# Patient Record
Sex: MALE | Race: BLACK OR AFRICAN AMERICAN | NOT HISPANIC OR LATINO | Employment: OTHER | URBAN - METROPOLITAN AREA
[De-identification: names, ages, dates, MRNs, and addresses within clinical notes are randomized per-mention and may not be internally consistent; named-entity substitution may affect disease eponyms.]

---

## 2018-10-02 ENCOUNTER — HOSPITAL ENCOUNTER (OUTPATIENT)
Dept: CARDIOLOGY | Facility: HOSPITAL | Age: 58
Discharge: HOME | End: 2018-10-02
Attending: INTERNAL MEDICINE
Payer: COMMERCIAL

## 2018-10-02 ENCOUNTER — OFFICE VISIT (OUTPATIENT)
Dept: INTERNAL MEDICINE | Facility: CLINIC | Age: 58
End: 2018-10-02
Payer: COMMERCIAL

## 2018-10-02 ENCOUNTER — HOSPITAL ENCOUNTER (OUTPATIENT)
Dept: RADIOLOGY | Facility: HOSPITAL | Age: 58
Discharge: HOME | End: 2018-10-02
Attending: INTERNAL MEDICINE
Payer: COMMERCIAL

## 2018-10-02 VITALS — WEIGHT: 242 LBS

## 2018-10-02 DIAGNOSIS — I50.22 CHRONIC SYSTOLIC CONGESTIVE HEART FAILURE: ICD-10-CM

## 2018-10-02 DIAGNOSIS — E78.00 PURE HYPERCHOLESTEROLEMIA: ICD-10-CM

## 2018-10-02 DIAGNOSIS — I50.22 CHRONIC SYSTOLIC CONGESTIVE HEART FAILURE: Primary | ICD-10-CM

## 2018-10-02 DIAGNOSIS — Z11.59 NEED FOR HEPATITIS C SCREENING TEST: ICD-10-CM

## 2018-10-02 DIAGNOSIS — N40.1 BENIGN PROSTATIC HYPERPLASIA WITH NOCTURIA: ICD-10-CM

## 2018-10-02 DIAGNOSIS — I25.10 CORONARY ARTERY DISEASE INVOLVING NATIVE CORONARY ARTERY OF NATIVE HEART WITHOUT ANGINA PECTORIS: ICD-10-CM

## 2018-10-02 DIAGNOSIS — D50.9 IRON DEFICIENCY ANEMIA, UNSPECIFIED IRON DEFICIENCY ANEMIA TYPE: ICD-10-CM

## 2018-10-02 DIAGNOSIS — R35.1 BENIGN PROSTATIC HYPERPLASIA WITH NOCTURIA: ICD-10-CM

## 2018-10-02 DIAGNOSIS — E03.9 HYPOTHYROIDISM, UNSPECIFIED TYPE: ICD-10-CM

## 2018-10-02 DIAGNOSIS — I47.20 V-TACH: ICD-10-CM

## 2018-10-02 PROCEDURE — 99999 PR PBB SHADOW E&M-NEW PATIENT-LVL III: CPT | Mod: PBBFAC,,, | Performed by: INTERNAL MEDICINE

## 2018-10-02 PROCEDURE — 93306 TTE W/DOPPLER COMPLETE: CPT | Mod: 26,,, | Performed by: INTERNAL MEDICINE

## 2018-10-02 PROCEDURE — 90686 IIV4 VACC NO PRSV 0.5 ML IM: CPT | Mod: S$GLB,,, | Performed by: INTERNAL MEDICINE

## 2018-10-02 PROCEDURE — 99205 OFFICE O/P NEW HI 60 MIN: CPT | Mod: 25,S$GLB,, | Performed by: INTERNAL MEDICINE

## 2018-10-02 PROCEDURE — 71046 X-RAY EXAM CHEST 2 VIEWS: CPT | Mod: TC,FY,PO

## 2018-10-02 PROCEDURE — 90471 IMMUNIZATION ADMIN: CPT | Mod: S$GLB,,, | Performed by: INTERNAL MEDICINE

## 2018-10-02 PROCEDURE — 93306 TTE W/DOPPLER COMPLETE: CPT | Mod: PO

## 2018-10-02 RX ORDER — FUROSEMIDE 80 MG/1
80 TABLET ORAL DAILY
Qty: 90 TABLET | Refills: 3 | Status: SHIPPED | OUTPATIENT
Start: 2018-10-02 | End: 2018-12-13

## 2018-10-02 RX ORDER — NITROGLYCERIN 80 MG/1
1 PATCH TRANSDERMAL DAILY
COMMUNITY
End: 2018-10-16

## 2018-10-02 RX ORDER — CARVEDILOL 25 MG/1
25 TABLET ORAL 2 TIMES DAILY WITH MEALS
COMMUNITY
End: 2018-12-13 | Stop reason: SDUPTHER

## 2018-10-02 RX ORDER — TADALAFIL 5 MG/1
5 TABLET ORAL DAILY PRN
COMMUNITY
End: 2019-09-18 | Stop reason: SDUPTHER

## 2018-10-02 RX ORDER — NITROGLYCERIN 0.4 MG/1
0.4 TABLET SUBLINGUAL EVERY 5 MIN PRN
COMMUNITY
End: 2020-08-19 | Stop reason: SDUPTHER

## 2018-10-02 RX ORDER — PRAVASTATIN SODIUM 40 MG/1
40 TABLET ORAL DAILY
COMMUNITY
End: 2019-12-18 | Stop reason: SDUPTHER

## 2018-10-02 RX ORDER — SPIRONOLACTONE 25 MG/1
25 TABLET ORAL DAILY
Qty: 90 TABLET | Refills: 3 | Status: SHIPPED | OUTPATIENT
Start: 2018-10-02 | End: 2019-06-19 | Stop reason: SDUPTHER

## 2018-10-02 RX ORDER — ASPIRIN 325 MG
325 TABLET ORAL DAILY
COMMUNITY
End: 2018-10-02

## 2018-10-02 RX ORDER — LEVOTHYROXINE SODIUM 25 UG/1
25 TABLET ORAL DAILY
COMMUNITY
End: 2018-10-16

## 2018-10-02 RX ORDER — MEXILETINE HYDROCHLORIDE 150 MG/1
150 CAPSULE ORAL 2 TIMES DAILY WITH MEALS
COMMUNITY
End: 2018-10-16 | Stop reason: SDUPTHER

## 2018-10-02 RX ORDER — FUROSEMIDE 40 MG/1
40 TABLET ORAL 2 TIMES DAILY
COMMUNITY
End: 2018-10-02

## 2018-10-02 RX ORDER — AMIODARONE HYDROCHLORIDE 200 MG/1
TABLET ORAL DAILY
COMMUNITY
End: 2019-06-20 | Stop reason: SDUPTHER

## 2018-10-02 NOTE — PROGRESS NOTES
Subjective:       Patient ID: Ar Sharma is a 57 y.o. male.    Chief Complaint: Establish Care    HPI  Pt establishing care.  Pt with a hx of MI, CHF, Vtach, has a defibrillator which last fired in Feb.  Pt was started on Entresto in March, and has had an increase in SINGH since then, now unable to walk 200 feet, with 2 pillow orthopnea.  He c/o abd bloating, an increase of LE edema, with a 20 lb weght gain since then.  He was apparently dxed with a Fe deff anemia in Aug, but hasn't had the EGD and colonoscopy scheduled.  Colonoscopy 2012 WNL per his report.  On  mg qd. He denies melena/BRBPR.  C/O nocturia x 3. Obtaining records from PCP and cardiologist in [place].  Review of Systems   All other systems reviewed and are negative.      Objective:      Physical Exam   Constitutional: He appears well-developed. No distress.   obese   HENT:   Head: Normocephalic.   Eyes: EOM are normal. No scleral icterus.   Neck: Normal range of motion. No tracheal deviation present.   Cardiovascular: Normal rate, regular rhythm and intact distal pulses. Exam reveals gallop (S3).   Pulmonary/Chest: Effort normal and breath sounds normal. No respiratory distress.   Abdominal: Soft. Bowel sounds are normal. He exhibits distension. There is no tenderness.   Genitourinary: Rectum normal. Rectal exam shows guaiac negative stool.   Genitourinary Comments: Prostate 45g   Musculoskeletal: Normal range of motion. He exhibits edema (2+ LEs).   Neurological: He is alert.   Skin: Skin is warm and dry. No rash noted. He is not diaphoretic. No erythema.   Psychiatric: He has a normal mood and affect. His behavior is normal.   Vitals reviewed.      Assessment:       1. Chronic systolic congestive heart failure    2. V-tach    3. Hypothyroidism, unspecified type    4. Iron deficiency anemia, unspecified iron deficiency anemia type    5. Pure hypercholesterolemia    6. Benign prostatic hyperplasia with nocturia    7. Coronary artery disease  involving native coronary artery of native heart without angina pectoris    8. Need for hepatitis C screening test        Plan:       Ar was seen today for establish care.    Diagnoses and all orders for this visit:    Chronic systolic congestive heart failure  -     2D echo with color flow doppler; Future  -     X-Ray Chest PA And Lateral; Future  -     Influenza - Quadrivalent (3 years & older) (PF)    V-tach  -     EKG 12-lead; Future  -     Comprehensive metabolic panel; Future    Hypothyroidism, unspecified type  -     TSH; Future    Iron deficiency anemia, unspecified iron deficiency anemia type  -     CBC auto differential; Future  -     Iron and TIBC; Future   D/C  qd    Pure hypercholesterolemia  -     Lipid panel; Future    Benign prostatic hyperplasia with nocturia  -     Prostate Specific Antigen, Diagnostic; Future    Coronary artery disease involving native coronary artery of native heart without angina pectoris  -     EKG 12-lead; Future   ASA 81 mg qd    Need for hepatitis C screening test  -     Hepatitis C antibody; Future      Follow-up in about 2 weeks (around 10/16/2018).

## 2018-10-04 LAB
AORTIC VALVE REGURGITATION: ABNORMAL
DIASTOLIC DYSFUNCTION: YES
ESTIMATED PA SYSTOLIC PRESSURE: 64
MITRAL VALVE MOBILITY: NORMAL
MITRAL VALVE REGURGITATION: ABNORMAL
RETIRED EF AND QEF - SEE NOTES: 30 (ref 55–65)
TRICUSPID VALVE REGURGITATION: ABNORMAL

## 2018-10-16 ENCOUNTER — OFFICE VISIT (OUTPATIENT)
Dept: INTERNAL MEDICINE | Facility: CLINIC | Age: 58
End: 2018-10-16
Payer: COMMERCIAL

## 2018-10-16 VITALS
WEIGHT: 224.19 LBS | TEMPERATURE: 97 F | BODY MASS INDEX: 33.2 KG/M2 | SYSTOLIC BLOOD PRESSURE: 122 MMHG | HEIGHT: 69 IN | HEART RATE: 67 BPM | OXYGEN SATURATION: 95 % | DIASTOLIC BLOOD PRESSURE: 80 MMHG

## 2018-10-16 DIAGNOSIS — I47.20 V-TACH: ICD-10-CM

## 2018-10-16 DIAGNOSIS — D50.9 IRON DEFICIENCY ANEMIA, UNSPECIFIED IRON DEFICIENCY ANEMIA TYPE: ICD-10-CM

## 2018-10-16 DIAGNOSIS — I50.22 CHRONIC SYSTOLIC CONGESTIVE HEART FAILURE: Primary | ICD-10-CM

## 2018-10-16 DIAGNOSIS — E03.9 HYPOTHYROIDISM, UNSPECIFIED TYPE: ICD-10-CM

## 2018-10-16 DIAGNOSIS — E78.00 PURE HYPERCHOLESTEROLEMIA: ICD-10-CM

## 2018-10-16 DIAGNOSIS — I25.10 CORONARY ARTERY DISEASE INVOLVING NATIVE CORONARY ARTERY OF NATIVE HEART WITHOUT ANGINA PECTORIS: ICD-10-CM

## 2018-10-16 DIAGNOSIS — Z23 NEED FOR TDAP VACCINATION: ICD-10-CM

## 2018-10-16 PROCEDURE — 99214 OFFICE O/P EST MOD 30 MIN: CPT | Mod: 25,S$GLB,, | Performed by: INTERNAL MEDICINE

## 2018-10-16 PROCEDURE — 90715 TDAP VACCINE 7 YRS/> IM: CPT | Mod: S$GLB,,, | Performed by: INTERNAL MEDICINE

## 2018-10-16 PROCEDURE — 99999 PR PBB SHADOW E&M-EST. PATIENT-LVL IV: CPT | Mod: PBBFAC,,, | Performed by: INTERNAL MEDICINE

## 2018-10-16 PROCEDURE — 90471 IMMUNIZATION ADMIN: CPT | Mod: S$GLB,,, | Performed by: INTERNAL MEDICINE

## 2018-10-16 PROCEDURE — 3008F BODY MASS INDEX DOCD: CPT | Mod: CPTII,S$GLB,, | Performed by: INTERNAL MEDICINE

## 2018-10-16 RX ORDER — LEVOTHYROXINE SODIUM 75 UG/1
75 TABLET ORAL DAILY
Qty: 90 TABLET | Refills: 3 | Status: SHIPPED | OUTPATIENT
Start: 2018-10-16 | End: 2018-12-17

## 2018-10-16 RX ORDER — MEXILETINE HYDROCHLORIDE 150 MG/1
150 CAPSULE ORAL 2 TIMES DAILY WITH MEALS
Qty: 180 CAPSULE | Refills: 3 | Status: SHIPPED | OUTPATIENT
Start: 2018-10-16 | End: 2019-12-13 | Stop reason: SDUPTHER

## 2018-10-16 NOTE — PROGRESS NOTES
Subjective:       Patient ID: Ar Sharma is a 57 y.o. male.    Chief Complaint: 2 week f/u    HPI  Recheck.  Labs reviewed.  Much less SOB on increased dose of Lasix, able to walk 100 m. Now.  No orthopnea, PND.  No LE edema.  Weight down 18 lbs / 2 weeks.  No CP, palpitations.  Review of Systems   All other systems reviewed and are negative.      Objective:      Physical Exam   Constitutional: He appears well-developed. No distress.   obese   HENT:   Head: Normocephalic.   Eyes: EOM are normal. No scleral icterus.   Neck: Normal range of motion. No tracheal deviation present.   Cardiovascular: Normal rate, regular rhythm, normal heart sounds and intact distal pulses.   Pulmonary/Chest: Effort normal and breath sounds normal. No respiratory distress.   Abdominal: He exhibits no distension.   Musculoskeletal: Normal range of motion. He exhibits no edema.   Neurological: He is alert.   Skin: Skin is warm and dry. No rash noted. He is not diaphoretic. No erythema.   Psychiatric: He has a normal mood and affect. His behavior is normal.   Vitals reviewed.      Assessment:       1. Chronic systolic congestive heart failure    2. V-tach    3. Hypothyroidism, unspecified type    4. Iron deficiency anemia, unspecified iron deficiency anemia type    5. Pure hypercholesterolemia    6. Coronary artery disease involving native coronary artery of native heart without angina pectoris    7. Need for Tdap vaccination        Plan:       Ar was seen today for 2 week f/u.    Diagnoses and all orders for this visit:    Chronic systolic congestive heart failure  -     Renal function panel; Future  -     Ambulatory referral to Cardiology    V-tach  -     mexiletine (MEXITIL) 150 MG Cap; Take 1 capsule (150 mg total) by mouth 2 (two) times daily with meals.  -     Ambulatory referral to Cardiology    Hypothyroidism, unspecified type  -     TSH; Future  -     levothyroxine (SYNTHROID) 75 MCG tablet; Take 1 tablet (75 mcg total) by mouth once  daily.    Iron deficiency anemia, unspecified iron deficiency anemia type  -     CBC auto differential; Future  -     Ambulatory referral to Gastroenterology    Pure hypercholesterolemia   Well-cont    Coronary artery disease involving native coronary artery of native heart without angina pectoris   Quiescent    Need for Tdap vaccination  -     Tdap Vaccine      No Follow-up on file.

## 2018-10-18 ENCOUNTER — INITIAL CONSULT (OUTPATIENT)
Dept: GASTROENTEROLOGY | Facility: CLINIC | Age: 58
End: 2018-10-18
Payer: COMMERCIAL

## 2018-10-18 DIAGNOSIS — D50.9 IRON DEFICIENCY ANEMIA, UNSPECIFIED IRON DEFICIENCY ANEMIA TYPE: Primary | ICD-10-CM

## 2018-10-18 PROCEDURE — 99999 PR PBB SHADOW E&M-EST. PATIENT-LVL II: CPT | Mod: PBBFAC,,, | Performed by: INTERNAL MEDICINE

## 2018-10-18 PROCEDURE — 99204 OFFICE O/P NEW MOD 45 MIN: CPT | Mod: S$GLB,,, | Performed by: INTERNAL MEDICINE

## 2018-10-18 NOTE — LETTER
October 18, 2018      Navid Newman MD  [address]  [address]  [place] [address]           [place] [facility]  [address]  [place] [address]  Phone: [phone]  Fax: [phone]          Patient: Ar Sharma   MR Number: [id number]   YOB: 1960   Date of Visit: 10/18/2018       Dear Dr. Navid Nweman:    Thank you for referring Ar Sharma to me for evaluation. Attached you will find relevant portions of my assessment and plan of care.    If you have questions, please do not hesitate to call me. I look forward to following Ar Sharma along with you.    Sincerely,    Ananya Morillo MD    Enclosure  CC:  No Recipients    If you would like to receive this communication electronically, please contact [email or web address] or [phone] to request more information on [employer] Link access.    For providers and/or their staff who would like to refer a patient to [facility], please contact us through our one-stop-shop provider referral line, [facility], at [phone].    If you feel you have received this communication in error or would no longer like to receive these types of communications, please e-mail [email or web address]

## 2018-10-18 NOTE — PROGRESS NOTES
Subjective:       Patient ID: Ar Sharma is a 57 y.o. male.    Chief Complaint: Advice Only and Colonoscopy    This is a 57-year-old male here for evaluation of iron-deficiency anemia.  He has noted to have a mild to moderate anemia with a hematocrit in the low 30s associated with no overt GI bleeding.  His a microcytic anemia with MCV of 59.  He states his last colonoscopy was approximately 6-7 years ago and was unremarkable.  No overt GI bleeding has been noted.  No change in stool caliber or unintentional weight loss.  Denies nausea, vomiting, dysphagia.  No other exacerbating or relieving factors or other associated symptoms.    The following portions of the patient's history were reviewed and updated as appropriate: allergies, current medications, past family history, past medical history, past social history, past surgical history and problem list.    HPI  Review of Systems   Constitutional: Negative for chills and fever.   HENT: Negative for postnasal drip and trouble swallowing.    Eyes: Negative for pain and visual disturbance.   Respiratory: Positive for shortness of breath. Negative for cough and choking.    Cardiovascular: Negative for chest pain and leg swelling.   Gastrointestinal: Negative for abdominal distention, abdominal pain, anal bleeding, blood in stool, constipation, diarrhea, nausea, rectal pain and vomiting.   Endocrine: Negative for cold intolerance and heat intolerance.   Genitourinary: Negative for difficulty urinating and hematuria.   Neurological: Negative for dizziness and numbness.   Hematological: Negative for adenopathy. Does not bruise/bleed easily.   Psychiatric/Behavioral: Negative for agitation and confusion.       Objective:      Physical Exam   Constitutional: He is oriented to person, place, and time. He appears well-developed and well-nourished. No distress.   HENT:   Head: Normocephalic and atraumatic.   Eyes: Conjunctivae are normal. No scleral icterus.   Neck: No tracheal  deviation present. No thyromegaly present.   Cardiovascular: Normal rate and regular rhythm. Exam reveals no gallop and no friction rub.   Murmur heard.  Pulmonary/Chest: Effort normal and breath sounds normal. He has no wheezes. He has no rales.   Abdominal: Soft. Bowel sounds are normal. He exhibits no distension. There is no tenderness. There is no rebound and no guarding.   Musculoskeletal: Normal range of motion. He exhibits edema. He exhibits no tenderness.   Neurological: He is alert and oriented to person, place, and time.   Skin: He is not diaphoretic.   Psychiatric: He has a normal mood and affect. His behavior is normal.   Nursing note and vitals reviewed.      Labs; as above  Assessment:       1. Iron deficiency anemia, unspecified iron deficiency anemia type        Plan:   1. Has cardiology f/u next week, messaged Dr. Stewart regarding candidacy for endoscopic evaluation  2. Prescribed iron therapy for now

## 2018-10-25 ENCOUNTER — OFFICE VISIT (OUTPATIENT)
Dept: CARDIOLOGY | Facility: CLINIC | Age: 58
End: 2018-10-25
Payer: COMMERCIAL

## 2018-10-25 VITALS
BODY MASS INDEX: 32.73 KG/M2 | DIASTOLIC BLOOD PRESSURE: 78 MMHG | HEART RATE: 85 BPM | WEIGHT: 221 LBS | SYSTOLIC BLOOD PRESSURE: 122 MMHG | HEIGHT: 69 IN

## 2018-10-25 DIAGNOSIS — E78.00 PURE HYPERCHOLESTEROLEMIA: ICD-10-CM

## 2018-10-25 DIAGNOSIS — Z98.890 S/P ABLATION OF VENTRICULAR ARRHYTHMIA: ICD-10-CM

## 2018-10-25 DIAGNOSIS — I50.23 ACUTE ON CHRONIC SYSTOLIC HEART FAILURE: Primary | ICD-10-CM

## 2018-10-25 DIAGNOSIS — D50.9 IRON DEFICIENCY ANEMIA, UNSPECIFIED IRON DEFICIENCY ANEMIA TYPE: ICD-10-CM

## 2018-10-25 DIAGNOSIS — I25.10 CORONARY ARTERY DISEASE INVOLVING NATIVE CORONARY ARTERY OF NATIVE HEART WITHOUT ANGINA PECTORIS: ICD-10-CM

## 2018-10-25 DIAGNOSIS — Z95.810 CARDIAC RESYNCHRONIZATION THERAPY DEFIBRILLATOR (CRT-D) IN PLACE: ICD-10-CM

## 2018-10-25 DIAGNOSIS — Z86.79 S/P ABLATION OF VENTRICULAR ARRHYTHMIA: ICD-10-CM

## 2018-10-25 DIAGNOSIS — I47.20 V-TACH: ICD-10-CM

## 2018-10-25 DIAGNOSIS — E03.9 HYPOTHYROIDISM, UNSPECIFIED TYPE: ICD-10-CM

## 2018-10-25 PROCEDURE — 99999 PR PBB SHADOW E&M-EST. PATIENT-LVL III: CPT | Mod: PBBFAC,,, | Performed by: INTERNAL MEDICINE

## 2018-10-25 PROCEDURE — 3008F BODY MASS INDEX DOCD: CPT | Mod: CPTII,S$GLB,, | Performed by: INTERNAL MEDICINE

## 2018-10-25 PROCEDURE — 99205 OFFICE O/P NEW HI 60 MIN: CPT | Mod: S$GLB,,, | Performed by: INTERNAL MEDICINE

## 2018-10-25 PROCEDURE — 93000 ELECTROCARDIOGRAM COMPLETE: CPT | Mod: S$GLB,,, | Performed by: INTERNAL MEDICINE

## 2018-10-25 NOTE — PATIENT INSTRUCTIONS
Pacemaker check   Schedule to see one of our heart failure specialists  Follow up with me in one month  Weigh yourself daily  Low salt diet

## 2018-10-25 NOTE — PROGRESS NOTES
Subjective:    Patient ID:  Ar Sharma is a 57 y.o. male who presents for evaluation of Congestive Heart Failure and Establish Care      HPI  58 y/o male recently moved from [facility], here to establish care. He has a hx of CAD (states he had MI in his 20's), HFrEF with EF 30-35% per recent 2DE with SLAVA 5.6 cm (chronic per record available in media section), s/p CRT-D (initial ICD 2012 with upgrade 2016 - Medtronic), DD, VT s/p ablation x 2 (2/2018 and again 3/2018), HLD, hypothyroidism, YOEL. Initially seen by Dr Newman and it sounds like he was in ADHF, lasix dose increased, and symptoms have significantly improved. Was very SOB with minimal activity, with bilateral LE edema/orthopnea/PND. Since increase in lasix dose SOB back at baseline and orthopnea/PND resolved, with improvement in edema. Currently with NYHA FC II symptoms. Denies CP, palps, syncope. Compliant with meds. Entresto started after last VT ablation.     Review of Systems   Constitution: Negative for weakness and malaise/fatigue.   HENT: Negative for congestion.    Eyes: Negative for blurred vision.   Cardiovascular: Positive for dyspnea on exertion and leg swelling. Negative for chest pain, claudication, cyanosis, irregular heartbeat, near-syncope, orthopnea, palpitations, paroxysmal nocturnal dyspnea and syncope.   Respiratory: Negative for shortness of breath.    Endocrine: Negative for polyuria.   Hematologic/Lymphatic: Negative for bleeding problem.   Skin: Negative for itching and rash.   Musculoskeletal: Negative for joint swelling, muscle cramps and muscle weakness.   Gastrointestinal: Negative for abdominal pain, hematemesis, hematochezia, melena, nausea and vomiting.   Genitourinary: Negative for dysuria and hematuria.   Neurological: Negative for dizziness, focal weakness, headaches, light-headedness and loss of balance.   Psychiatric/Behavioral: Negative for depression. The patient is not nervous/anxious.         Objective:    Physical  Exam   Constitutional: He is oriented to person, place, and time. He appears well-developed and well-nourished.   HENT:   Head: Normocephalic and atraumatic.   Neck: Neck supple. No JVD present.   Cardiovascular: Normal rate, regular rhythm and normal heart sounds.   Pulses:       Carotid pulses are 2+ on the right side, and 2+ on the left side.       Radial pulses are 2+ on the right side, and 2+ on the left side.        Femoral pulses are 2+ on the right side, and 2+ on the left side.       Dorsalis pedis pulses are 2+ on the right side, and 2+ on the left side.        Posterior tibial pulses are 2+ on the right side, and 2+ on the left side.   Pulmonary/Chest: Effort normal and breath sounds normal.   Abdominal: Soft. Bowel sounds are normal.   Musculoskeletal: He exhibits edema.   Neurological: He is alert and oriented to person, place, and time.   Skin: Skin is warm and dry.   Psychiatric: He has a normal mood and affect. His behavior is normal. Thought content normal.         Assessment:       1. Acute on chronic systolic heart failure    2. Coronary artery disease involving native coronary artery of native heart without angina pectoris    3. Pure hypercholesterolemia    4. V-tach    5. Cardiac resynchronization therapy defibrillator (CRT-D) in place    6. Iron deficiency anemia, unspecified iron deficiency anemia type    7. Hypothyroidism, unspecified type    8. S/P ablation of ventricular arrhythmia      56 y/o pt with hx and presentation as above. Doing well from a cardiac perspective and now compensated from a HF perspective. We discussed the etiology, evaluation, and management of CHF. It appears his decompensation was from dietary indiscretion (not following strict low salt diet). Had not been on increased dose of lasix for some time bc prior to VT ablation increased doses would correlate with VT/ICD shocks. We also discussed daily weights and indications to increase lasix dose at home when necessary.  Will refer to HTS clinic at [place]. Will schedule for ICD interrogation and to enroll in clinic.         Plan:       -ICD interrogation  -Referral to [facility] clinic  -F/u with me in 1 month

## 2018-10-25 NOTE — LETTER
October 25, 2018      Navid Newman MD  [address]  [address]  [place] [address]           [place] [facility]  [address]  [place] [address]  Phone: [phone]  Fax: [phone]          Patient: Ar Sharma   MR Number: [id number]   YOB: 1960   Date of Visit: 10/25/2018       Dear Dr. Navid Newman:    Thank you for referring Ar Sharma to me for evaluation. Attached you will find relevant portions of my assessment and plan of care.    If you have questions, please do not hesitate to call me. I look forward to following Ar Sharma along with you.    Sincerely,    Shahram Stewart MD    Enclosure  CC:  No Recipients    If you would like to receive this communication electronically, please contact [email or web address] or [phone] to request more information on [employer] Link access.    For providers and/or their staff who would like to refer a patient to [facility], please contact us through our one-stop-shop provider referral line, [facility], at [phone].    If you feel you have received this communication in error or would no longer like to receive these types of communications, please e-mail [email or web address]

## 2018-10-26 ENCOUNTER — TELEPHONE (OUTPATIENT)
Dept: GASTROENTEROLOGY | Facility: CLINIC | Age: 58
End: 2018-10-26

## 2018-10-26 NOTE — TELEPHONE ENCOUNTER
----- Message from Jodie Sotelo sent at 10/26/2018 12:49 PM CDT -----  Contact: Self [phone]  Patient would like to speak with you about the pharmacy still not having ferrous sulfate, dried (SLOW FE) 160 mg (50 mg iron) TbSR . Please advise

## 2018-10-29 ENCOUNTER — TELEPHONE (OUTPATIENT)
Dept: CARDIOLOGY | Facility: CLINIC | Age: 58
End: 2018-10-29

## 2018-10-29 NOTE — TELEPHONE ENCOUNTER
----- Message from Ivy Huerta sent at 10/26/2018  1:47 PM CDT -----  Regarding: EKG ORDER  Please place and link EKG order to the EKG or Doctor appointment scheduled on 10/25/18 thanks. Ivy [phone]

## 2018-10-30 ENCOUNTER — TELEPHONE (OUTPATIENT)
Dept: GASTROENTEROLOGY | Facility: CLINIC | Age: 58
End: 2018-10-30

## 2018-10-31 ENCOUNTER — TELEPHONE (OUTPATIENT)
Dept: GASTROENTEROLOGY | Facility: CLINIC | Age: 58
End: 2018-10-31

## 2018-10-31 NOTE — TELEPHONE ENCOUNTER
----- Message from Sunday Sepulveda sent at 10/30/2018  4:26 PM CDT -----  Contact: nunu/hannah [phone]  Pharmacist advised they need to verify the dosage for the ferrous sulfate, dried (SLOW FE) 160 mg (50 mg iron) TbSR. Please call.

## 2018-11-02 ENCOUNTER — OFFICE VISIT (OUTPATIENT)
Dept: TRANSPLANT | Facility: CLINIC | Age: 58
End: 2018-11-02
Payer: COMMERCIAL

## 2018-11-02 ENCOUNTER — LAB VISIT (OUTPATIENT)
Dept: LAB | Facility: HOSPITAL | Age: 58
End: 2018-11-02
Attending: INTERNAL MEDICINE
Payer: COMMERCIAL

## 2018-11-02 VITALS
HEART RATE: 78 BPM | HEIGHT: 69 IN | BODY MASS INDEX: 32.57 KG/M2 | DIASTOLIC BLOOD PRESSURE: 77 MMHG | WEIGHT: 219.88 LBS | SYSTOLIC BLOOD PRESSURE: 113 MMHG

## 2018-11-02 DIAGNOSIS — I50.22 CHRONIC SYSTOLIC CHF, NYHA CLASS 2 AND ACA/AHA STAGE C: ICD-10-CM

## 2018-11-02 DIAGNOSIS — I50.22 CHRONIC SYSTOLIC CONGESTIVE HEART FAILURE: ICD-10-CM

## 2018-11-02 DIAGNOSIS — I50.22 CHRONIC SYSTOLIC CONGESTIVE HEART FAILURE: Primary | ICD-10-CM

## 2018-11-02 DIAGNOSIS — D50.9 IRON DEFICIENCY ANEMIA, UNSPECIFIED IRON DEFICIENCY ANEMIA TYPE: ICD-10-CM

## 2018-11-02 LAB
ANION GAP SERPL CALC-SCNC: 11 MMOL/L
BNP SERPL-MCNC: 724 PG/ML
BUN SERPL-MCNC: 36 MG/DL
CALCIUM SERPL-MCNC: 9.6 MG/DL
CHLORIDE SERPL-SCNC: 101 MMOL/L
CO2 SERPL-SCNC: 25 MMOL/L
CREAT SERPL-MCNC: 1.9 MG/DL
EST. GFR  (AFRICAN AMERICAN): 44 ML/MIN/1.73 M^2
EST. GFR  (NON AFRICAN AMERICAN): 38 ML/MIN/1.73 M^2
GLUCOSE SERPL-MCNC: 105 MG/DL
POTASSIUM SERPL-SCNC: 4.8 MMOL/L
SODIUM SERPL-SCNC: 137 MMOL/L

## 2018-11-02 PROCEDURE — 3008F BODY MASS INDEX DOCD: CPT | Mod: CPTII,S$GLB,, | Performed by: INTERNAL MEDICINE

## 2018-11-02 PROCEDURE — 36415 COLL VENOUS BLD VENIPUNCTURE: CPT

## 2018-11-02 PROCEDURE — 99204 OFFICE O/P NEW MOD 45 MIN: CPT | Mod: S$GLB,,, | Performed by: INTERNAL MEDICINE

## 2018-11-02 PROCEDURE — 99999 PR PBB SHADOW E&M-EST. PATIENT-LVL III: CPT | Mod: PBBFAC,,, | Performed by: INTERNAL MEDICINE

## 2018-11-02 PROCEDURE — 83880 ASSAY OF NATRIURETIC PEPTIDE: CPT

## 2018-11-02 PROCEDURE — 80048 BASIC METABOLIC PNL TOTAL CA: CPT

## 2018-11-02 NOTE — PATIENT INSTRUCTIONS
INCREASE entresto to 49 / 51 this month ( Will start with two tablets of 24 /26 at night and one of the 24/26 in the day til Monday 11/5)  labs today and in 10 days  NEXt visit will try to increase entresto to full dose (97 / 103)

## 2018-11-03 ENCOUNTER — TELEPHONE (OUTPATIENT)
Dept: GASTROENTEROLOGY | Facility: HOSPITAL | Age: 58
End: 2018-11-03

## 2018-11-03 PROBLEM — I50.22 CHRONIC SYSTOLIC CHF, NYHA CLASS 2 AND ACA/AHA STAGE C: Status: ACTIVE | Noted: 2018-10-25

## 2018-11-03 PROBLEM — I50.22 CHRONIC SYSTOLIC CONGESTIVE HEART FAILURE: Status: RESOLVED | Noted: 2018-10-16 | Resolved: 2018-11-03

## 2018-11-03 NOTE — PROGRESS NOTES
"Subjective: class 2     Patient ID:  Ar Sharma is a 57 y.o. male who presents for evaluation of Consult (CHF)      HPI     hx of ICM ( LVEF 30-35%  2DE with LVEDD 5.6 cm OCt 2018)   s/p CRT-D (initial ICD 2012 with upgrade 2016 - Medtronic), VT s/p ablation x 2 (2/2018 and again 3/2018) who reports a progression of his CHF symptom over last 6 months  In early Oct 2018, had his lasix increased to 80 mg daily.  Due to his iron deficiency anemia, his ASA was decrease to 81 with plans to do GI workup  Since that time, he has lost 20 lbs with improvement in his SINGH      Today he can walk two blocks  His previously reported atypical right sided chest pain has decreased in frequency  Chest pain has not occurred in over a week.   No edema today    Review of Systems   Constitution: Negative for decreased appetite, weight gain and weight loss.   Cardiovascular: Negative for chest pain, dyspnea on exertion, leg swelling, near-syncope, orthopnea and palpitations.   Respiratory: Negative for cough and shortness of breath.    Musculoskeletal: Negative for myalgias.   Gastrointestinal: Negative for jaundice.        Objective:    Physical Exam   Constitutional: He is oriented to person, place, and time. He appears well-developed and well-nourished. He is active. He is not intubated.   /77 (BP Location: Left arm, Patient Position: Sitting, BP Method: Large (Automatic))   Pulse 78   Ht 5' 9" (1.753 m)   Wt 99.7 kg (219 lb 14.4 oz)   BMI 32.47 kg/m²      HENT:   Head: Normocephalic and atraumatic. Hair is normal.   Right Ear: External ear normal.   Left Ear: External ear normal.   Nose: Nose normal. No nasal deformity. No epistaxis.  No foreign bodies.   Mouth/Throat: Mucous membranes are normal. Mucous membranes are not cyanotic. No oropharyngeal exudate.   Eyes: Conjunctivae and EOM are normal. Pupils are equal, round, and reactive to light.   Neck: Neck supple. JVD (JVP 7 to 8 ) present. No hepatojugular reflux present. "   Cardiovascular: Normal rate, regular rhythm, normal heart sounds and normal pulses. Exam reveals no gallop.   Pulmonary/Chest: Effort normal and breath sounds normal. No apnea and no tachypnea. He is not intubated. No respiratory distress. He exhibits no tenderness.   Abdominal: Soft. Normal appearance and bowel sounds are normal. There is no tenderness. No hernia.   Musculoskeletal: Normal range of motion.   Neurological: He is alert and oriented to person, place, and time. He displays no seizure activity.   Skin: Skin is warm, dry and intact. No rash noted. No pallor.   Psychiatric: He has a normal mood and affect. His speech is normal and behavior is normal. Thought content normal. Cognition and memory are normal.     Lab Results   Component Value Date     (H) 11/02/2018     11/02/2018    K 4.8 11/02/2018     11/02/2018    CO2 25 11/02/2018    BUN 36 (H) 11/02/2018    CREATININE 1.9 (H) 11/02/2018     11/02/2018    AST 31 10/02/2018    ALT 20 10/02/2018    ALBUMIN 3.9 10/02/2018    PROT 7.3 10/02/2018    BILITOT 1.7 (H) 10/02/2018    CHOL 107 (L) 10/02/2018    HDL 35 (L) 10/02/2018    LDLCALC 60.2 (L) 10/02/2018    TRIG 59 10/02/2018       BNP   Date Value Ref Range Status   11/02/2018 724 (H) 0 - 99 pg/mL Final     Comment:     Values of less than 100 pg/ml are consistent with non-CHF populations.             Assessment:       1. Chronic systolic congestive heart failure    2. Iron deficiency anemia, unspecified iron deficiency anemia type    3. Chronic systolic CHF, NYHA class 2 and MARIFER/AHA stage C         Plan:       Iron deficiency anemia  CHF stable - Ok to proceed with much need colonoscopy    Chronic systolic CHF, NYHA class 2 and MARIFER/AHA stage C  Will increase enstresto to 49 /51 BID (will try to go to 24/26 in am and 49/51 in evening for a few days first)  Will check labs in ten days after change If creatinine > 2.2 or pt shows signs of symptomatic hypotension will decrease  lasix to 40 qd (I believe this will be his eventual chronic dose)  Wonder if cardiomems will be a good option for him (want to wait til he has his GI workup complete)  Will ask Dr Stewart to increase entersto to 97 /103 next visit

## 2018-11-03 NOTE — ASSESSMENT & PLAN NOTE
Will increase enstresto to 49 /51 BID (will try to go to 24/26 in am and 49/51 in evening for a few days first)  Will check labs in ten days after change If creatinine > 2.3 or pt shows signs of symptomatic hypotension will decrease lasix to 40 qd (I believe this will be his eventual chronic dose)  Wonder if cardiomems will be a good option for him (want to wait til he has his GI workup complete)  Will ask Dr Stewart to increase entersto to 97 /103 next visit

## 2018-11-03 NOTE — TELEPHONE ENCOUNTER
----- Message from Inna Shah MD sent at 11/2/2018 11:41 AM CDT -----  Saw him today   CHF stable   Ok to proceed with much need colonoscopy (will put it in my note today)

## 2018-11-05 ENCOUNTER — PATIENT MESSAGE (OUTPATIENT)
Dept: GASTROENTEROLOGY | Facility: CLINIC | Age: 58
End: 2018-11-05

## 2018-11-05 DIAGNOSIS — Z12.11 COLON CANCER SCREENING: Primary | ICD-10-CM

## 2018-11-07 ENCOUNTER — CLINICAL SUPPORT (OUTPATIENT)
Dept: CARDIOLOGY | Facility: CLINIC | Age: 58
End: 2018-11-07
Payer: COMMERCIAL

## 2018-11-07 DIAGNOSIS — Z95.810 CARDIAC RESYNCHRONIZATION THERAPY DEFIBRILLATOR (CRT-D) IN PLACE: Primary | ICD-10-CM

## 2018-11-07 PROCEDURE — 93289 INTERROG DEVICE EVAL HEART: CPT | Mod: 26,,, | Performed by: INTERNAL MEDICINE

## 2018-11-07 PROCEDURE — 99999 PR PBB SHADOW E&M-EST. PATIENT-LVL I: CPT | Mod: PBBFAC,,,

## 2018-11-12 ENCOUNTER — TELEPHONE (OUTPATIENT)
Dept: TRANSPLANT | Facility: CLINIC | Age: 58
End: 2018-11-12

## 2018-11-12 ENCOUNTER — LAB VISIT (OUTPATIENT)
Dept: LAB | Facility: HOSPITAL | Age: 58
End: 2018-11-12
Attending: INTERNAL MEDICINE
Payer: COMMERCIAL

## 2018-11-12 DIAGNOSIS — I50.22 CHRONIC SYSTOLIC CONGESTIVE HEART FAILURE: ICD-10-CM

## 2018-11-12 LAB
ANION GAP SERPL CALC-SCNC: 9 MMOL/L
BUN SERPL-MCNC: 35 MG/DL
CALCIUM SERPL-MCNC: 9.2 MG/DL
CHLORIDE SERPL-SCNC: 105 MMOL/L
CO2 SERPL-SCNC: 26 MMOL/L
CREAT SERPL-MCNC: 1.55 MG/DL
EST. GFR  (AFRICAN AMERICAN): 56.2 ML/MIN/1.73 M^2
EST. GFR  (NON AFRICAN AMERICAN): 48.6 ML/MIN/1.73 M^2
GLUCOSE SERPL-MCNC: 105 MG/DL
POTASSIUM SERPL-SCNC: 4.3 MMOL/L
SODIUM SERPL-SCNC: 140 MMOL/L

## 2018-11-12 PROCEDURE — 80048 BASIC METABOLIC PNL TOTAL CA: CPT | Mod: PO

## 2018-11-12 PROCEDURE — 36415 COLL VENOUS BLD VENIPUNCTURE: CPT | Mod: PO

## 2018-11-12 NOTE — TELEPHONE ENCOUNTER
1;30 pm  F/U on today's nurse lab phone review  And result review not from Dr. Shah.   Called and spoke with pt at this time.  Pt told me he is taking a full tablet of 49/51 twice daily and has been for over a week. Said at first he was taking 1/2 dose in the am and a full dose in the pm.  Informed pt Dr. Shah reviewed his lab results and they were good. Told pt Dr. Shah would like for him to continue the 49/51 (1) tablet twice daily and he will refer any further increase in dose to Dr. Stewart, who pt is scheduled to see 12/13/18 and pt is aware of this appt.

## 2018-12-03 ENCOUNTER — TELEPHONE (OUTPATIENT)
Dept: GASTROENTEROLOGY | Facility: CLINIC | Age: 58
End: 2018-12-03

## 2018-12-03 NOTE — TELEPHONE ENCOUNTER
----- Message from Jacklyn Looney sent at 12/3/2018  3:33 PM CST -----  Contact: self, [phone] (M)  Patient requests to speak with you regarding colonoscopy prep and directions received. Please advise.

## 2018-12-05 ENCOUNTER — TELEPHONE (OUTPATIENT)
Dept: ENDOSCOPY | Facility: HOSPITAL | Age: 58
End: 2018-12-05

## 2018-12-05 NOTE — TELEPHONE ENCOUNTER
"Spoke with patient about arrival time @1245 . Spoke with patient about prep instructions. States that his "other doctor" called him in the Golytely and prefers to take that instead of Suprep.  Golytely instructions reviewed with patient.     Prep instructions reviewed: the day before the procedure, follow a clear liquid diet all day, then start the first 1/2 of prep at 5pm and take 2nd 1/2 of prep @0645      .  Pt must be completely NPO when prep completed @  0745        .    Medications: Do not take Insulin or oral diabetic medications the day of the procedure.  Take as prescribed: heart, seizure and blood pressure medication in the morning with a sip of water (less than an ounce).  Take any breathing medications and bring inhalers to hospital with you Leave all valuables and jewelry at home.     Wear comfortable clothes to procedure to change into hospital gown You cannot drive for 24 hours after your procedure because you will receive sedation for your procedure to make you comfortable.  A ride must be provided at discharge.        "

## 2018-12-05 NOTE — PROGRESS NOTES
ICD Evaluation Report     Date: 11/7/2018     Primary MD: Dr. Newman  Primary Cardiologist: Dr. Stewart  Implanting MD:   and serial number:  Viva Quad XT CRT [id number]; [id number]; Medtronic  Implant date: 12/15/2016  Reason for evaluation: routine   Indication for ICD: unknown      Measurements  Atrial sensing - P wave: 1.1 mV  Atrial capture: Maintained: 0.75 V @ 0.4 ms   Atrial lead impedance: 475 ohms  Ventricular sensing - R wave: 9.8 mV  Ventricular capture: RV Maintained: 2.0 V @ 0.4 ms (M2 - RVC)  LV: Maintained: 1.25 V @ 0.4 ms   Ventricular lead impedance: RV:  437 ohms; LV:  418 ohms  Shock Impedance:  RV 47/ SVC 53 ohms  Underlying rhythm: NSR        Diagnostic Data  Atrial paced: 99.8 % Ventricular paced: 99.8 % (BVp)  Mode switch: none   Other: n/a  Ventricular Events: 4 NSVT events last one 10/28/2018 for 2 seconds  Battery status: satisfactory Magnet rate: 98.5 bpm   Estimated battery longevity:  1.6 years     VT Zones and Therapies:  AT/AF monitor > 171 bpm All Rx Off  VF > 200 bpm ATP during charging, 35 J x 6  FVT via -231 bpm Burst(2), 10J, 35J x 4  VT on 122-200 bpm Burst(3) 5J, 35J x 4        Final Parameters  Mode: DDDR Lower rate: 60 bpm Upper rate: 1115 bpm  AV Delay: 170/110 ms Mode Switch: 70 Rate Response: On - max sensor 115 bpm  Atrial - Amplitude: 2.0 V Pulse width: 0.5 ms Sensitivity: 0.3 mV   R Ventricular - Amplitude: 2.0 V Pulse width: 0.4 ms Sensitivity: 0.3 mV   LV output:  1.25 V @ 0.4 msec  Polarity: Bipolar      Changes made:  No changes         Conclusions: normal CRT-D function.         Follow up: 3 months, transfer remote monitoring from [place] to [facility]       Prashant Hudson.

## 2018-12-07 ENCOUNTER — ANESTHESIA (OUTPATIENT)
Dept: ENDOSCOPY | Facility: HOSPITAL | Age: 58
End: 2018-12-07
Payer: COMMERCIAL

## 2018-12-07 ENCOUNTER — HOSPITAL ENCOUNTER (OUTPATIENT)
Facility: HOSPITAL | Age: 58
Discharge: HOME | End: 2018-12-07
Attending: INTERNAL MEDICINE | Admitting: INTERNAL MEDICINE
Payer: COMMERCIAL

## 2018-12-07 ENCOUNTER — ANESTHESIA EVENT (OUTPATIENT)
Dept: ENDOSCOPY | Facility: HOSPITAL | Age: 58
End: 2018-12-07
Payer: COMMERCIAL

## 2018-12-07 VITALS
BODY MASS INDEX: 31.84 KG/M2 | RESPIRATION RATE: 18 BRPM | HEIGHT: 69 IN | OXYGEN SATURATION: 94 % | TEMPERATURE: 98 F | HEART RATE: 60 BPM | SYSTOLIC BLOOD PRESSURE: 107 MMHG | DIASTOLIC BLOOD PRESSURE: 69 MMHG | WEIGHT: 215 LBS

## 2018-12-07 DIAGNOSIS — D50.9 IRON DEFICIENCY ANEMIA: ICD-10-CM

## 2018-12-07 PROCEDURE — 27201028 HC NEEDLE, SCLERO: Performed by: INTERNAL MEDICINE

## 2018-12-07 PROCEDURE — 27201012 HC FORCEPS, HOT/COLD, DISP: Performed by: INTERNAL MEDICINE

## 2018-12-07 PROCEDURE — 88305 TISSUE EXAM BY PATHOLOGIST: CPT | Mod: 26,,, | Performed by: PATHOLOGY

## 2018-12-07 PROCEDURE — 88305 TISSUE EXAM BY PATHOLOGIST: CPT

## 2018-12-07 PROCEDURE — 45380 COLONOSCOPY AND BIOPSY: CPT | Mod: ,,, | Performed by: INTERNAL MEDICINE

## 2018-12-07 PROCEDURE — 43239 EGD BIOPSY SINGLE/MULTIPLE: CPT | Mod: 51,,, | Performed by: INTERNAL MEDICINE

## 2018-12-07 PROCEDURE — 45380 COLONOSCOPY AND BIOPSY: CPT | Performed by: INTERNAL MEDICINE

## 2018-12-07 PROCEDURE — 43239 EGD BIOPSY SINGLE/MULTIPLE: CPT | Performed by: INTERNAL MEDICINE

## 2018-12-07 PROCEDURE — 63600175 PHARM REV CODE 636 W HCPCS: Performed by: NURSE ANESTHETIST, CERTIFIED REGISTERED

## 2018-12-07 PROCEDURE — 25000003 PHARM REV CODE 250: Performed by: NURSE ANESTHETIST, CERTIFIED REGISTERED

## 2018-12-07 PROCEDURE — 25000003 PHARM REV CODE 250: Performed by: INTERNAL MEDICINE

## 2018-12-07 PROCEDURE — 45381 COLONOSCOPY SUBMUCOUS NJX: CPT | Performed by: INTERNAL MEDICINE

## 2018-12-07 PROCEDURE — 88342 IMHCHEM/IMCYTCHM 1ST ANTB: CPT | Mod: 26,,, | Performed by: PATHOLOGY

## 2018-12-07 PROCEDURE — 37000009 HC ANESTHESIA EA ADD 15 MINS: Performed by: INTERNAL MEDICINE

## 2018-12-07 PROCEDURE — 45381 COLONOSCOPY SUBMUCOUS NJX: CPT | Mod: 51,,, | Performed by: INTERNAL MEDICINE

## 2018-12-07 PROCEDURE — 37000008 HC ANESTHESIA 1ST 15 MINUTES: Performed by: INTERNAL MEDICINE

## 2018-12-07 RX ORDER — ETOMIDATE 2 MG/ML
INJECTION INTRAVENOUS
Status: DISCONTINUED | OUTPATIENT
Start: 2018-12-07 | End: 2018-12-07

## 2018-12-07 RX ORDER — PROPOFOL 10 MG/ML
VIAL (ML) INTRAVENOUS CONTINUOUS PRN
Status: DISCONTINUED | OUTPATIENT
Start: 2018-12-07 | End: 2018-12-07

## 2018-12-07 RX ORDER — LIDOCAINE HCL/PF 100 MG/5ML
SYRINGE (ML) INTRAVENOUS
Status: DISCONTINUED | OUTPATIENT
Start: 2018-12-07 | End: 2018-12-07

## 2018-12-07 RX ORDER — SODIUM CHLORIDE 0.9 % (FLUSH) 0.9 %
3 SYRINGE (ML) INJECTION
Status: DISCONTINUED | OUTPATIENT
Start: 2018-12-07 | End: 2018-12-07 | Stop reason: HOSPADM

## 2018-12-07 RX ORDER — KETAMINE HYDROCHLORIDE 50 MG/ML
INJECTION, SOLUTION INTRAMUSCULAR; INTRAVENOUS
Status: DISCONTINUED | OUTPATIENT
Start: 2018-12-07 | End: 2018-12-07

## 2018-12-07 RX ORDER — SODIUM CHLORIDE 9 MG/ML
INJECTION, SOLUTION INTRAVENOUS CONTINUOUS
Status: DISCONTINUED | OUTPATIENT
Start: 2018-12-07 | End: 2018-12-07 | Stop reason: HOSPADM

## 2018-12-07 RX ADMIN — ETOMIDATE 4 MG: 2 INJECTION, SOLUTION INTRAVENOUS at 01:12

## 2018-12-07 RX ADMIN — KETAMINE HYDROCHLORIDE 20 MG: 50 INJECTION, SOLUTION, CONCENTRATE INTRAMUSCULAR; INTRAVENOUS at 01:12

## 2018-12-07 RX ADMIN — ETOMIDATE 6 MG: 2 INJECTION, SOLUTION INTRAVENOUS at 01:12

## 2018-12-07 RX ADMIN — PROPOFOL 75 MCG/KG/MIN: 10 INJECTION, EMULSION INTRAVENOUS at 01:12

## 2018-12-07 RX ADMIN — LIDOCAINE HYDROCHLORIDE 100 MG: 20 INJECTION, SOLUTION INTRAVENOUS at 01:12

## 2018-12-07 RX ADMIN — SODIUM CHLORIDE: 0.9 INJECTION, SOLUTION INTRAVENOUS at 12:12

## 2018-12-07 NOTE — ANESTHESIA POSTPROCEDURE EVALUATION
"Anesthesia Post Evaluation    Patient: Ar Sharma    Procedure(s) Performed: Procedure(s) (LRB):  COLONOSCOPY/suprep (N/A)  EGD (ESOPHAGOGASTRODUODENOSCOPY) (N/A)    Final Anesthesia Type: MAC  Patient location during evaluation: GI PACU  Patient participation: Yes- Able to Participate  Level of consciousness: awake and alert and oriented  Post-procedure vital signs: reviewed and stable  Pain management: adequate  Airway patency: patent  PONV status at discharge: No PONV  Anesthetic complications: no      Cardiovascular status: blood pressure returned to baseline, hemodynamically stable and stable  Respiratory status: unassisted, spontaneous ventilation and room air  Hydration status: euvolemic  Follow-up not needed.        Visit Vitals  /71   Pulse 65   Temp 37.7 °C (99.9 °F)   Resp 17   Ht 5' 9" (1.753 m)   Wt 97.5 kg (215 lb)   SpO2 (!) 93%   BMI 31.75 kg/m²       Pain/Gina Score: Pain Assessment Performed: Yes (12/7/2018 12:50 PM)  Presence of Pain: denies (12/7/2018 12:50 PM)        "

## 2018-12-07 NOTE — PROVATION PATIENT INSTRUCTIONS
Discharge Summary/Instructions after an Endoscopic Procedure  Patient Name: Ar Sharma  Patient MRN: [id number]  Patient YOB: 1960 Friday, December 07, 2018  Ananya Morillo MD  RESTRICTIONS:  During your procedure today, you received medications for sedation.  These   medications may affect your judgment, balance and coordination.  Therefore,   for 24 hours, you have the following restrictions:   - DO NOT drive a car, operate machinery, make legal/financial decisions,   sign important papers or drink alcohol.    ACTIVITY:  Today: no heavy lifting, straining or running due to procedural   sedation/anesthesia.  The following day: return to full activity including work.  DIET:  Eat and drink normally unless instructed otherwise.     TREATMENT FOR COMMON SIDE EFFECTS:  - Mild abdominal pain, nausea, belching, bloating or excessive gas:  rest,   eat lightly and use a heating pad.  - Sore Throat: treat with throat lozenges and/or gargle with warm salt   water.  - Because air was used during the procedure, expelling large amounts of air   from your rectum or belching is normal.  - If a bowel prep was taken, you may not have a bowel movement for 1-3 days.    This is normal.  SYMPTOMS TO WATCH FOR AND REPORT TO YOUR PHYSICIAN:  1. Abdominal pain or bloating, other than gas cramps.  2. Chest pain.  3. Back pain.  4. Signs of infection such as: chills or fever occurring within 24 hours   after the procedure.  5. Rectal bleeding, which would show as bright red, maroon, or black stools.   (A tablespoon of blood from the rectum is not serious, especially if   hemorrhoids are present.)  6. Vomiting.  7. Weakness or dizziness.  GO DIRECTLY TO THE NEAREST EMERGENCY ROOM IF YOU HAVE ANY OF THE FOLLOWING:      Difficulty breathing              Chills and/or fever over 101 F   Persistent vomiting and/or vomiting blood   Severe abdominal pain   Severe chest pain   Black, tarry stools   Bleeding- more than one  tablespoon   Any other symptom or condition that you feel may need urgent attention  Your doctor recommends these additional instructions:  If any biopsies were taken, your doctors clinic will contact you in 1 to 2   weeks with any results.  - Discharge patient to home (via wheelchair).   - Patient has a contact number available for emergencies.  The signs and   symptoms of potential delayed complications were discussed with the   patient.  Return to normal activities tomorrow.  Written discharge   instructions were provided to the patient.   - Resume previous diet.   - Continue present medications.   - Await pathology results.   - Repeat colonoscopy date to be determined after pending pathology results   are reviewed for surveillance.  For questions, problems or results please call your physician - Ananya Morillo MD at Work:  [phone].  EMERGENCY PHONE NUMBER: [phone],  LAB RESULTS: [phone]  IF A COMPLICATION OR EMERGENCY SITUATION ARISES AND YOU ARE UNABLE TO REACH   YOUR PHYSICIAN - GO DIRECTLY TO THE EMERGENCY ROOM.  Ananya Morillo MD  12/7/2018 2:13:45 PM  This report has been verified and signed electronically.  PROVATION

## 2018-12-07 NOTE — H&P
[facility]  Gastroenterology  H&P    Patient Name: Ar Sharma  MRN: [id number]  Admission Date: 12/7/2018  Code Status: Full Code    Attending Provider: Ananya Morillo MD   Primary Care Physician: Navid Newman MD  Principal Problem:<principal problem not specified>    Subjective:     History of Present Illness: Iron deficiency anemia    Past Medical History:   Diagnosis Date    Congestive heart failure (CHF) 2015    Heart attack     HLD (hyperlipidemia)     Hypertension     Thyroid disease     V tach        Past Surgical History:   Procedure Laterality Date    ablations  03/05/2018    albations  02/01/2018    defibulater N/A 2016    JOINT REPLACEMENT         Review of patient's allergies indicates:   Allergen Reactions    Atorvastatin Other (See Comments)     Joint pain     Family History     None        Tobacco Use    Smoking status: Former Smoker    Smokeless tobacco: Former User   Substance and Sexual Activity    Alcohol use: No     Frequency: Never    Drug use: No    Sexual activity: Yes     Partners: Female     Review of Systems   Constitutional: Negative for appetite change and unexpected weight change.   Cardiovascular: Negative for chest pain and palpitations.   Gastrointestinal: Negative for abdominal distention and abdominal pain.     Objective:     Vital Signs (Most Recent):  Temp: 99.9 °F (37.7 °C) (12/07/18 1248)  Pulse: 65 (12/07/18 1248)  Resp: 17 (12/07/18 1248)  BP: 109/71 (12/07/18 1248)  SpO2: (!) 93 % (12/07/18 1248) Vital Signs (24h Range):  Temp:  [99.9 °F (37.7 °C)] 99.9 °F (37.7 °C)  Pulse:  [65] 65  Resp:  [17] 17  SpO2:  [93 %] 93 %  BP: (109)/(71) 109/71     Weight: 97.5 kg (215 lb) (12/07/18 1248)  Body mass index is 31.75 kg/m².    No intake or output data in the 24 hours ending 12/07/18 1302    Lines/Drains/Airways     Peripheral Intravenous Line                 Peripheral IV - Single Lumen 12/07/18 1250 Right Hand less than 1 day                 Physical Exam   Constitutional: He is oriented to person, place, and time. He appears well-developed and well-nourished. No distress.   HENT:   Head: Normocephalic.   Eyes: Conjunctivae are normal. No scleral icterus.   Neck: No tracheal deviation present. No thyromegaly present.   Cardiovascular: Normal rate, regular rhythm and normal heart sounds. Exam reveals no gallop and no friction rub.   No murmur heard.  Pulmonary/Chest: Effort normal and breath sounds normal. He has no wheezes. He has no rales.   Abdominal: Soft. Bowel sounds are normal. He exhibits no distension. There is no tenderness. There is no rebound and no guarding.   Musculoskeletal: Normal range of motion. He exhibits no edema or tenderness.   Neurological: He is alert and oriented to person, place, and time.   Skin: He is not diaphoretic.   Psychiatric: He has a normal mood and affect. His behavior is normal.         Assessment/Plan:     Active Diagnoses:    Diagnosis Date Noted POA    Iron deficiency anemia [D50.9] 10/16/2018 Yes      Problems Resolved During this Admission:     Plan  1. EGD/Colonoscopy    Ananya Morillo MD  Gastroenterology  [facility]

## 2018-12-07 NOTE — PROVATION PATIENT INSTRUCTIONS
Discharge Summary/Instructions after an Endoscopic Procedure  Patient Name: Ar Sharma  Patient MRN: [id number]  Patient YOB: 1960 Friday, December 07, 2018  Ananya Morillo MD  RESTRICTIONS:  During your procedure today, you received medications for sedation.  These   medications may affect your judgment, balance and coordination.  Therefore,   for 24 hours, you have the following restrictions:   - DO NOT drive a car, operate machinery, make legal/financial decisions,   sign important papers or drink alcohol.    ACTIVITY:  Today: no heavy lifting, straining or running due to procedural   sedation/anesthesia.  The following day: return to full activity including work.  DIET:  Eat and drink normally unless instructed otherwise.     TREATMENT FOR COMMON SIDE EFFECTS:  - Mild abdominal pain, nausea, belching, bloating or excessive gas:  rest,   eat lightly and use a heating pad.  - Sore Throat: treat with throat lozenges and/or gargle with warm salt   water.  - Because air was used during the procedure, expelling large amounts of air   from your rectum or belching is normal.  - If a bowel prep was taken, you may not have a bowel movement for 1-3 days.    This is normal.  SYMPTOMS TO WATCH FOR AND REPORT TO YOUR PHYSICIAN:  1. Abdominal pain or bloating, other than gas cramps.  2. Chest pain.  3. Back pain.  4. Signs of infection such as: chills or fever occurring within 24 hours   after the procedure.  5. Rectal bleeding, which would show as bright red, maroon, or black stools.   (A tablespoon of blood from the rectum is not serious, especially if   hemorrhoids are present.)  6. Vomiting.  7. Weakness or dizziness.  GO DIRECTLY TO THE NEAREST EMERGENCY ROOM IF YOU HAVE ANY OF THE FOLLOWING:      Difficulty breathing              Chills and/or fever over 101 F   Persistent vomiting and/or vomiting blood   Severe abdominal pain   Severe chest pain   Black, tarry stools   Bleeding- more than one  tablespoon   Any other symptom or condition that you feel may need urgent attention  Your doctor recommends these additional instructions:  If any biopsies were taken, your doctors clinic will contact you in 1 to 2   weeks with any results.  - Discharge patient to home (via wheelchair).   - Patient has a contact number available for emergencies.  The signs and   symptoms of potential delayed complications were discussed with the   patient.  Return to normal activities tomorrow.  Written discharge   instructions were provided to the patient.   - Resume previous diet.   - Continue present medications.   - Await pathology results.  For questions, problems or results please call your physician - Ananya Morillo MD at Work:  [phone].  EMERGENCY PHONE NUMBER: [phone],  LAB RESULTS: [phone]  IF A COMPLICATION OR EMERGENCY SITUATION ARISES AND YOU ARE UNABLE TO REACH   YOUR PHYSICIAN - GO DIRECTLY TO THE EMERGENCY ROOM.  Ananya Morillo MD  12/7/2018 1:48:35 PM  This report has been verified and signed electronically.  PROVATION

## 2018-12-07 NOTE — ANESTHESIA PREPROCEDURE EVALUATION
12/07/2018  Ar Sharma is a 57 y.o., male.    1. Chronic systolic congestive heart failure    2. Iron deficiency anemia, unspecified iron deficiency anemia type    3. Chronic systolic CHF, NYHA class 2 and MARIFER/AHA stage C      Anesthesia Evaluation      I have reviewed the Medications.     Review of Systems  Anesthesia Hx:  No problems with previous Anesthesia Denies Hx of Anesthetic complications History of prior surgery of interest to airway management or planning: Previous anesthesia: General Denies Family Hx of Anesthesia complications.   Denies Personal Hx of Anesthesia complications.   Social:  Non-Smoker, No Alcohol Use    Hematology/Oncology:  Hematology Normal   Oncology Normal     EENT/Dental:EENT/Dental Normal   Cardiovascular:   Exercise tolerance: good CAD   CHF    Pulmonary:  Pulmonary Normal    Renal/:  Renal/ Normal     Hepatic/GI:  Hepatic/GI Normal    Musculoskeletal:  Musculoskeletal Normal    Neurological:  Neurology Normal    Endocrine:   Hypothyroidism    Dermatological:  Skin Normal    Psych:  Psychiatric Normal         CONCLUSIONS     1 - Moderately depressed left ventricular systolic function (EF 30-35%).     2 - Restrictive LV filling pattern, indicating markedly elevated LAP (grade 3 diastolic dysfunction).     3 - Normal right ventricular systolic function .     4 - Pulmonary hypertension. The estimated PA systolic pressure is 64 mmHg.     5 - Mild mitral regurgitation.     6 - Increased central venous pressure.     7 - Severe left atrial enlargement.   Small PFO present.    Physical Exam  General:  Well nourished    Airway/Jaw/Neck:  Airway Findings: Mouth Opening: Normal Tongue: Normal  General Airway Assessment: Adult  Mallampati: II      Dental:  Dental Findings: In tact   Chest/Lungs:  Chest/Lungs Findings: Clear to auscultation, Normal Respiratory Rate      Heart/Vascular:  Heart Findings: Rate: Normal  Rhythm: Regular Rhythm        Mental Status:  Mental Status Findings:  Cooperative, Alert and Oriented         Anesthesia Plan  Type of Anesthesia, risks & benefits discussed:  Anesthesia Type:  MAC  Patient's Preference: MAC  Intra-op Monitoring Plan:   Intra-op Monitoring Plan Comments:   Post Op Pain Control Plan:   Post Op Pain Control Plan Comments:   Induction:   IV  Beta Blocker:  Patient is on a Beta-Blocker and has received one dose within the past 24 hours (No further documentation required).       Informed Consent: Patient understands risks and agrees with Anesthesia plan.  Questions answered. Anesthesia consent signed with patient.  ASA Score: 2     Day of Surgery Review of History & Physical: I have interviewed and examined the patient. I have reviewed the patient's H&P dated:            Ready For Surgery From Anesthesia Perspective.

## 2018-12-13 ENCOUNTER — OFFICE VISIT (OUTPATIENT)
Dept: CARDIOLOGY | Facility: CLINIC | Age: 58
End: 2018-12-13
Payer: COMMERCIAL

## 2018-12-13 ENCOUNTER — LAB VISIT (OUTPATIENT)
Dept: LAB | Facility: HOSPITAL | Age: 58
End: 2018-12-13
Attending: INTERNAL MEDICINE
Payer: COMMERCIAL

## 2018-12-13 ENCOUNTER — TELEPHONE (OUTPATIENT)
Dept: CARDIOLOGY | Facility: CLINIC | Age: 58
End: 2018-12-13

## 2018-12-13 VITALS
WEIGHT: 216 LBS | DIASTOLIC BLOOD PRESSURE: 75 MMHG | HEART RATE: 89 BPM | HEIGHT: 69 IN | BODY MASS INDEX: 31.99 KG/M2 | SYSTOLIC BLOOD PRESSURE: 119 MMHG

## 2018-12-13 DIAGNOSIS — I25.10 CORONARY ARTERY DISEASE INVOLVING NATIVE CORONARY ARTERY OF NATIVE HEART WITHOUT ANGINA PECTORIS: ICD-10-CM

## 2018-12-13 DIAGNOSIS — Z98.890 S/P ABLATION OF VENTRICULAR ARRHYTHMIA: ICD-10-CM

## 2018-12-13 DIAGNOSIS — E03.9 HYPOTHYROIDISM, UNSPECIFIED TYPE: ICD-10-CM

## 2018-12-13 DIAGNOSIS — I47.20 V-TACH: ICD-10-CM

## 2018-12-13 DIAGNOSIS — E78.00 PURE HYPERCHOLESTEROLEMIA: ICD-10-CM

## 2018-12-13 DIAGNOSIS — I50.22 CHRONIC SYSTOLIC CONGESTIVE HEART FAILURE: ICD-10-CM

## 2018-12-13 DIAGNOSIS — D50.9 IRON DEFICIENCY ANEMIA, UNSPECIFIED IRON DEFICIENCY ANEMIA TYPE: ICD-10-CM

## 2018-12-13 DIAGNOSIS — Z95.810 CARDIAC RESYNCHRONIZATION THERAPY DEFIBRILLATOR (CRT-D) IN PLACE: ICD-10-CM

## 2018-12-13 DIAGNOSIS — I50.22 CHRONIC SYSTOLIC CHF, NYHA CLASS 2 AND ACA/AHA STAGE C: Primary | ICD-10-CM

## 2018-12-13 DIAGNOSIS — Z86.79 S/P ABLATION OF VENTRICULAR ARRHYTHMIA: ICD-10-CM

## 2018-12-13 LAB
ALBUMIN SERPL BCP-MCNC: 4.2 G/DL
ANION GAP SERPL CALC-SCNC: 10 MMOL/L
ANISOCYTOSIS BLD QL SMEAR: ABNORMAL
BASOPHILS # BLD AUTO: 0.04 K/UL
BASOPHILS NFR BLD: 0.5 %
BUN SERPL-MCNC: 38 MG/DL
CALCIUM SERPL-MCNC: 9.2 MG/DL
CHLORIDE SERPL-SCNC: 101 MMOL/L
CO2 SERPL-SCNC: 28 MMOL/L
CREAT SERPL-MCNC: 1.93 MG/DL
DACRYOCYTES BLD QL SMEAR: ABNORMAL
DIFFERENTIAL METHOD: ABNORMAL
EOSINOPHIL # BLD AUTO: 0.3 K/UL
EOSINOPHIL NFR BLD: 2.9 %
ERYTHROCYTE [DISTWIDTH] IN BLOOD BY AUTOMATED COUNT: 27.5 %
EST. GFR  (AFRICAN AMERICAN): 43.1 ML/MIN/1.73 M^2
EST. GFR  (NON AFRICAN AMERICAN): 37.3 ML/MIN/1.73 M^2
GLUCOSE SERPL-MCNC: 103 MG/DL
HCT VFR BLD AUTO: 45.5 %
HGB BLD-MCNC: 14.7 G/DL
LYMPHOCYTES # BLD AUTO: 1 K/UL
LYMPHOCYTES NFR BLD: 10.9 %
MCH RBC QN AUTO: 20.5 PG
MCHC RBC AUTO-ENTMCNC: 32.3 G/DL
MCV RBC AUTO: 64 FL
MONOCYTES # BLD AUTO: 1 K/UL
MONOCYTES NFR BLD: 11.3 %
NEUTROPHILS # BLD AUTO: 6.5 K/UL
NEUTROPHILS NFR BLD: 74.4 %
OVALOCYTES BLD QL SMEAR: ABNORMAL
PHOSPHATE SERPL-MCNC: 4.8 MG/DL
PLATELET # BLD AUTO: 491 K/UL
PLATELET BLD QL SMEAR: ABNORMAL
PMV BLD AUTO: ABNORMAL FL
POIKILOCYTOSIS BLD QL SMEAR: ABNORMAL
POTASSIUM SERPL-SCNC: 4.1 MMOL/L
RBC # BLD AUTO: 7.17 M/UL
SCHISTOCYTES BLD QL SMEAR: PRESENT
SODIUM SERPL-SCNC: 139 MMOL/L
T4 FREE SERPL-MCNC: 1.89 NG/DL
TSH SERPL DL<=0.005 MIU/L-ACNC: 0.09 UIU/ML
WBC # BLD AUTO: 8.75 K/UL

## 2018-12-13 PROCEDURE — 3008F BODY MASS INDEX DOCD: CPT | Mod: CPTII,S$GLB,, | Performed by: INTERNAL MEDICINE

## 2018-12-13 PROCEDURE — 84443 ASSAY THYROID STIM HORMONE: CPT | Mod: PO

## 2018-12-13 PROCEDURE — 80069 RENAL FUNCTION PANEL: CPT | Mod: PO

## 2018-12-13 PROCEDURE — 84439 ASSAY OF FREE THYROXINE: CPT

## 2018-12-13 PROCEDURE — 99214 OFFICE O/P EST MOD 30 MIN: CPT | Mod: S$GLB,,, | Performed by: INTERNAL MEDICINE

## 2018-12-13 PROCEDURE — 36415 COLL VENOUS BLD VENIPUNCTURE: CPT | Mod: PO

## 2018-12-13 PROCEDURE — 99999 PR PBB SHADOW E&M-EST. PATIENT-LVL III: CPT | Mod: PBBFAC,,, | Performed by: INTERNAL MEDICINE

## 2018-12-13 PROCEDURE — 85025 COMPLETE CBC W/AUTO DIFF WBC: CPT | Mod: PO

## 2018-12-13 RX ORDER — CARVEDILOL 25 MG/1
25 TABLET ORAL 2 TIMES DAILY WITH MEALS
Qty: 180 TABLET | Refills: 3 | Status: SHIPPED | OUTPATIENT
Start: 2018-12-13 | End: 2019-12-09 | Stop reason: SDUPTHER

## 2018-12-13 RX ORDER — ASPIRIN 81 MG/1
81 TABLET ORAL DAILY
COMMUNITY
End: 2022-10-17 | Stop reason: SDUPTHER

## 2018-12-13 RX ORDER — FUROSEMIDE 40 MG/1
40 TABLET ORAL DAILY
Qty: 90 TABLET | Refills: 3 | Status: SHIPPED | OUTPATIENT
Start: 2018-12-13 | End: 2019-12-09 | Stop reason: SDUPTHER

## 2018-12-13 NOTE — PATIENT INSTRUCTIONS
Increase entresto to 2 in the AM and 1 in the PM for 2 weeks and then 2 in the AM and 2 in the PM after  Will decrease lasix dose to 40 mg once daily

## 2018-12-13 NOTE — TELEPHONE ENCOUNTER
Alessandro Agosto     Pt relocated from [place]. Can you follow his [facility] home monitoring     [phone]     Thank you!

## 2018-12-13 NOTE — PROGRESS NOTES
Subjective:    Patient ID:  Ar Sharma is a 57 y.o. male who presents for follow-up of Congestive Heart Failure      HPI  56 y/o male recently moved from [facility], here for f/u. He has a hx of CAD (states he had MI in his 20's), HFrEF with EF 30-35% per recent 2DE with SLAVA 5.6 cm (chronic per record available in media section), s/p CRT-D (initial ICD 2012 with upgrade 2016 - Medtronic), DD, VT s/p ablation x 2 (2/2018 and again 3/2018), HLD, hypothyroidism, YOEL. Initially seen by Dr Newman and it sounds like he was in ADHF, lasix dose increased, and symptoms had significantly improved. Was very SOB with minimal activity, with bilateral LE edema/orthopnea/PND. Since increase in lasix dose SOB back at baseline and orthopnea/PND resolved, with improvement in edema. Currently with NYHA FC II symptoms. Denies CP, palps, syncope. Compliant with meds. Entresto started after last VT ablation.   Was seen by Dr Shah with [facility] and Entresto dose increased with plan to increase with me next clinic visit. Has had intermittent dizziness without syncope.     Review of Systems   Constitution: Negative for weakness and malaise/fatigue.   HENT: Negative for congestion.    Eyes: Negative for blurred vision.   Cardiovascular: Positive for dyspnea on exertion. Negative for chest pain, claudication, cyanosis, irregular heartbeat, leg swelling, near-syncope, orthopnea, palpitations, paroxysmal nocturnal dyspnea and syncope.   Respiratory: Negative for shortness of breath.    Endocrine: Negative for polyuria.   Hematologic/Lymphatic: Negative for bleeding problem.   Skin: Negative for itching and rash.   Musculoskeletal: Negative for joint swelling, muscle cramps and muscle weakness.   Gastrointestinal: Negative for abdominal pain, hematemesis, hematochezia, melena, nausea and vomiting.   Genitourinary: Negative for dysuria and hematuria.   Neurological: Positive for dizziness. Negative for focal weakness, headaches,  light-headedness and loss of balance.   Psychiatric/Behavioral: Negative for depression. The patient is not nervous/anxious.         Objective:    Physical Exam   Constitutional: He is oriented to person, place, and time. He appears well-developed and well-nourished.   HENT:   Head: Normocephalic and atraumatic.   Neck: Neck supple. No JVD present.   Cardiovascular: Normal rate, regular rhythm and normal heart sounds.   Pulses:       Carotid pulses are 2+ on the right side, and 2+ on the left side.       Radial pulses are 2+ on the right side, and 2+ on the left side.        Femoral pulses are 2+ on the right side, and 2+ on the left side.       Dorsalis pedis pulses are 2+ on the right side, and 2+ on the left side.        Posterior tibial pulses are 2+ on the right side, and 2+ on the left side.   Pulmonary/Chest: Effort normal and breath sounds normal.   Abdominal: Soft. Bowel sounds are normal.   Musculoskeletal: He exhibits no edema.   Neurological: He is alert and oriented to person, place, and time.   Skin: Skin is warm and dry.   Psychiatric: He has a normal mood and affect. His behavior is normal. Thought content normal.         Assessment:       1. Chronic systolic CHF, NYHA class 2 and MARIFER/AHA stage C    2. Coronary artery disease involving native coronary artery of native heart without angina pectoris    3. Cardiac resynchronization therapy defibrillator (CRT-D) in place    4. S/P ablation of ventricular arrhythmia    5. V-tach    6. Pure hypercholesterolemia    7. Iron deficiency anemia, unspecified iron deficiency anemia type    8. Hypothyroidism, unspecified type      56 y/o pt with hx and presentation as above. Doing well from a cardiac perspective and compensated from a HF perspective, currently with NYHA FC II symptoms. His dizziness may be related to meds and hypotension and will decrease lasix to 40 mg once daily. He was told to increase if HF symptoms recur or weight gain of 3-5 lbs. Will  increase Entresto to eventual 98/103 BID dosing, however, will slowly titrate 2 in the AM and 1 in the PM for 2 weeks, then 2 and 2 for total 97/103 BID after.          Plan:       -Increase Entresto as above   -Decrease lasix to 40 mg daily  -BMP in 2 weeks  -f/u with me in 3 months

## 2018-12-17 ENCOUNTER — OFFICE VISIT (OUTPATIENT)
Dept: INTERNAL MEDICINE | Facility: CLINIC | Age: 58
End: 2018-12-17
Payer: COMMERCIAL

## 2018-12-17 VITALS
WEIGHT: 220.44 LBS | DIASTOLIC BLOOD PRESSURE: 70 MMHG | HEIGHT: 69 IN | HEART RATE: 85 BPM | SYSTOLIC BLOOD PRESSURE: 108 MMHG | BODY MASS INDEX: 32.65 KG/M2

## 2018-12-17 DIAGNOSIS — E03.9 HYPOTHYROIDISM, UNSPECIFIED TYPE: ICD-10-CM

## 2018-12-17 DIAGNOSIS — I47.20 V-TACH: ICD-10-CM

## 2018-12-17 DIAGNOSIS — I25.10 CORONARY ARTERY DISEASE INVOLVING NATIVE CORONARY ARTERY OF NATIVE HEART WITHOUT ANGINA PECTORIS: ICD-10-CM

## 2018-12-17 DIAGNOSIS — D50.9 IRON DEFICIENCY ANEMIA, UNSPECIFIED IRON DEFICIENCY ANEMIA TYPE: ICD-10-CM

## 2018-12-17 DIAGNOSIS — I50.22 CHRONIC SYSTOLIC CONGESTIVE HEART FAILURE: Primary | ICD-10-CM

## 2018-12-17 DIAGNOSIS — N18.30 CKD (CHRONIC KIDNEY DISEASE), STAGE III: ICD-10-CM

## 2018-12-17 DIAGNOSIS — E78.00 PURE HYPERCHOLESTEROLEMIA: ICD-10-CM

## 2018-12-17 PROCEDURE — 3008F BODY MASS INDEX DOCD: CPT | Mod: CPTII,S$GLB,, | Performed by: INTERNAL MEDICINE

## 2018-12-17 PROCEDURE — 99999 PR PBB SHADOW E&M-EST. PATIENT-LVL III: CPT | Mod: PBBFAC,,, | Performed by: INTERNAL MEDICINE

## 2018-12-17 PROCEDURE — 99214 OFFICE O/P EST MOD 30 MIN: CPT | Mod: S$GLB,,, | Performed by: INTERNAL MEDICINE

## 2018-12-17 RX ORDER — LEVOTHYROXINE SODIUM 50 UG/1
50 TABLET ORAL DAILY
Qty: 30 TABLET | Refills: 11 | Status: SHIPPED | OUTPATIENT
Start: 2018-12-17 | End: 2019-03-18

## 2018-12-17 NOTE — PROGRESS NOTES
Subjective:       Patient ID: Ar Sharma is a 57 y.o. male.    Chief Complaint: Follow-up    HPI  Recheck.  Chart reviewed.  Still wit R sided CP q 2 weeks.  Has some SINGH but no orthopnea/PND.  No LE edema.  No palpitations.  Review of Systems   All other systems reviewed and are negative.      Objective:      Physical Exam   Constitutional: He appears well-developed. No distress.   HENT:   Head: Normocephalic.   Eyes: EOM are normal. No scleral icterus.   Neck: Normal range of motion. No tracheal deviation present.   Cardiovascular: Normal rate, regular rhythm, normal heart sounds and intact distal pulses.   Pulmonary/Chest: Effort normal and breath sounds normal. No respiratory distress.   Abdominal: Soft. Bowel sounds are normal. He exhibits no distension. There is no tenderness.   Musculoskeletal: Normal range of motion. He exhibits no edema.   Neurological: He is alert.   Skin: Skin is warm and dry. No rash noted. He is not diaphoretic. No erythema.   Psychiatric: He has a normal mood and affect. His behavior is normal.   Vitals reviewed.      Assessment:       1. Chronic systolic congestive heart failure    2. Hypothyroidism, unspecified type    3. Pure hypercholesterolemia    4. Coronary artery disease involving native coronary artery of native heart without angina pectoris    5. V-tach    6. CKD (chronic kidney disease), stage III    7. Iron deficiency anemia, unspecified iron deficiency anemia type        Plan:       Ar was seen today for follow-up.    Diagnoses and all orders for this visit:    Chronic systolic congestive heart failure   Compensated    Hypothyroidism, unspecified type  -     TSH; Future  -     levothyroxine (SYNTHROID) 50 MCG tablet; Take 1 tablet (50 mcg total) by mouth once daily.    Pure hypercholesterolemia   Well-cont    Coronary artery disease involving native coronary artery of native heart without angina pectoris   Quiescent    V-tach   Quiescent    CKD (chronic kidney  disease), stage III  -     Comprehensive metabolic panel; Future    Iron deficiency anemia, unspecified iron deficiency anemia type  -     CBC auto differential; Future      Follow-up in about 3 months (around 3/17/2019).

## 2018-12-21 ENCOUNTER — TELEPHONE (OUTPATIENT)
Dept: CARDIOLOGY | Facility: CLINIC | Age: 58
End: 2018-12-21

## 2018-12-21 NOTE — TELEPHONE ENCOUNTER
----- Message from Molly Boyle sent at 12/21/2018 11:25 AM CST -----  Contact: [phone]/ self   Patient called in requesting to speak with you regarding Rx of entresto causing dizziness. During call pt also mentioned his defibrillator went out last night 12/20, advised pt to go ER but he declined. Pt also has questions in regards to transferring of device. Please call to further discuss and advise.

## 2018-12-21 NOTE — TELEPHONE ENCOUNTER
Returned pt phone call    During last clinical visit, dosage of entresto was increased to 49-51 mg     Pt stated he has been having side effects of feeling dizzy, passing out and recent chest pain     His defibrillator shocked him, pt did not head over to the ED but stated he would upload the episode to be transmitted over to us     Pt stated he has since decreased the dosage of the entresto and feels better doing so    Would you like for pt to be scheduled for sooner clinical lorena

## 2019-01-07 ENCOUNTER — TELEPHONE (OUTPATIENT)
Dept: CARDIOLOGY | Facility: CLINIC | Age: 59
End: 2019-01-07

## 2019-01-25 ENCOUNTER — OFFICE VISIT (OUTPATIENT)
Dept: TRANSPLANT | Facility: CLINIC | Age: 59
End: 2019-01-25
Payer: COMMERCIAL

## 2019-01-25 VITALS
BODY MASS INDEX: 33.96 KG/M2 | HEIGHT: 69 IN | SYSTOLIC BLOOD PRESSURE: 108 MMHG | HEART RATE: 88 BPM | WEIGHT: 229.31 LBS | DIASTOLIC BLOOD PRESSURE: 70 MMHG

## 2019-01-25 DIAGNOSIS — I50.22 CHRONIC SYSTOLIC CHF, NYHA CLASS 2 AND ACA/AHA STAGE C: Primary | ICD-10-CM

## 2019-01-25 DIAGNOSIS — Z98.890 S/P ABLATION OF VENTRICULAR ARRHYTHMIA: ICD-10-CM

## 2019-01-25 DIAGNOSIS — Z86.79 S/P ABLATION OF VENTRICULAR ARRHYTHMIA: ICD-10-CM

## 2019-01-25 PROCEDURE — 99999 PR PBB SHADOW E&M-EST. PATIENT-LVL III: CPT | Mod: PBBFAC,,, | Performed by: INTERNAL MEDICINE

## 2019-01-25 PROCEDURE — 3008F BODY MASS INDEX DOCD: CPT | Mod: CPTII,S$GLB,, | Performed by: INTERNAL MEDICINE

## 2019-01-25 PROCEDURE — 99999 PR PBB SHADOW E&M-EST. PATIENT-LVL III: ICD-10-PCS | Mod: PBBFAC,,, | Performed by: INTERNAL MEDICINE

## 2019-01-25 PROCEDURE — 99213 PR OFFICE/OUTPT VISIT, EST, LEVL III, 20-29 MIN: ICD-10-PCS | Mod: S$GLB,,, | Performed by: INTERNAL MEDICINE

## 2019-01-25 PROCEDURE — 3008F PR BODY MASS INDEX (BMI) DOCUMENTED: ICD-10-PCS | Mod: CPTII,S$GLB,, | Performed by: INTERNAL MEDICINE

## 2019-01-25 PROCEDURE — 99213 OFFICE O/P EST LOW 20 MIN: CPT | Mod: S$GLB,,, | Performed by: INTERNAL MEDICINE

## 2019-01-25 NOTE — PROGRESS NOTES
"Subjective: class 2    Patient ID:  Ar Sharma is a 58 y.o. male who presents for follow-up of Congestive Heart Failure      HPI  hx of ICM ( LVEF 30-35%  2DE with LVEDD 5.6 cm OCt 2018)   s/p CRT-D (initial ICD 2012 with upgrade 2016 - Medtronic), VT s/p ablation x 2 (2/2018 and again 3/2018) who comes in for medical management At his last visit I tried to titrate his entresto.  He got up to 97 / 103 in Dec 2018 when started developing dizziness when he stop  He noted an ICD shock in late Dec 2018 at which point he decreased he decreased his entresto to 49 / 51.  Since that change in dose, he has no issues.  Feels less winded / tired since increasing entresto dose. No edema despite gaining 10 lbs since last visit in Nov 2018.     Review of Systems   Constitution: Negative for decreased appetite, weight gain and weight loss.   Cardiovascular: Negative for chest pain, dyspnea on exertion, leg swelling, near-syncope, orthopnea and palpitations.   Respiratory: Negative for cough and shortness of breath.    Musculoskeletal: Negative for myalgias.   Gastrointestinal: Negative for jaundice.        Objective:    Physical Exam   Constitutional: He is oriented to person, place, and time. He appears well-developed and well-nourished. He is active. He is not intubated.   /70 (BP Location: Left arm, Patient Position: Sitting, BP Method: Medium (Automatic))   Pulse 88   Ht 5' 9" (1.753 m)   Wt 104 kg (229 lb 4.8 oz)   BMI 33.86 kg/m²      HENT:   Head: Normocephalic and atraumatic. Hair is normal.   Right Ear: External ear normal.   Left Ear: External ear normal.   Nose: Nose normal. No nasal deformity. No epistaxis.  No foreign bodies.   Mouth/Throat: Mucous membranes are normal. Mucous membranes are not cyanotic. No oropharyngeal exudate.   Eyes: Conjunctivae and EOM are normal. Pupils are equal, round, and reactive to light.   Neck: Neck supple. No hepatojugular reflux and no JVD present.   Cardiovascular: " Normal rate, regular rhythm, normal heart sounds and normal pulses. Exam reveals no gallop.   Pulmonary/Chest: Effort normal and breath sounds normal. No apnea and no tachypnea. He is not intubated. No respiratory distress. He exhibits no tenderness.   Abdominal: Soft. Normal appearance and bowel sounds are normal. There is no tenderness. No hernia.   Musculoskeletal: Normal range of motion.   Neurological: He is alert and oriented to person, place, and time. He displays no seizure activity.   Skin: Skin is warm, dry and intact. No rash noted. No pallor.   Psychiatric: He has a normal mood and affect. His speech is normal and behavior is normal. Thought content normal. Cognition and memory are normal.   no labs today      Assessment:       1. Chronic systolic CHF, NYHA class 2 and MARIFER/AHA stage C    2. S/P ablation of ventricular arrhythmia         Plan:       Chronic systolic CHF, NYHA class 2 and MARIFER/AHA stage C  Keep on coreg 25 BID / entresto 49 / 51 / aldactone 25       S/P ablation of ventricular arrhythmia  ICD interogogation scheduled for next week  Can followup with Dr Stewart     Follow-up if symptoms worsen or fail to improve.

## 2019-01-28 ENCOUNTER — OFFICE VISIT (OUTPATIENT)
Dept: GASTROENTEROLOGY | Facility: CLINIC | Age: 59
End: 2019-01-28
Payer: COMMERCIAL

## 2019-01-28 ENCOUNTER — LAB VISIT (OUTPATIENT)
Dept: LAB | Facility: HOSPITAL | Age: 59
End: 2019-01-28
Attending: INTERNAL MEDICINE
Payer: COMMERCIAL

## 2019-01-28 VITALS
BODY MASS INDEX: 33.14 KG/M2 | WEIGHT: 224.44 LBS | SYSTOLIC BLOOD PRESSURE: 111 MMHG | DIASTOLIC BLOOD PRESSURE: 62 MMHG | HEART RATE: 89 BPM

## 2019-01-28 DIAGNOSIS — D50.8 OTHER IRON DEFICIENCY ANEMIA: Primary | ICD-10-CM

## 2019-01-28 DIAGNOSIS — D50.8 OTHER IRON DEFICIENCY ANEMIA: ICD-10-CM

## 2019-01-28 LAB
BASOPHILS # BLD AUTO: 0.04 K/UL
BASOPHILS NFR BLD: 0.5 %
DIFFERENTIAL METHOD: ABNORMAL
EOSINOPHIL # BLD AUTO: 0.2 K/UL
EOSINOPHIL NFR BLD: 2.8 %
ERYTHROCYTE [DISTWIDTH] IN BLOOD BY AUTOMATED COUNT: 20.3 %
FERRITIN SERPL-MCNC: 20 NG/ML
HCT VFR BLD AUTO: 45.7 %
HGB BLD-MCNC: 14.6 G/DL
IRON SERPL-MCNC: 42 UG/DL
LYMPHOCYTES # BLD AUTO: 1.2 K/UL
LYMPHOCYTES NFR BLD: 15.7 %
MCH RBC QN AUTO: 21.9 PG
MCHC RBC AUTO-ENTMCNC: 31.9 G/DL
MCV RBC AUTO: 68 FL
MONOCYTES # BLD AUTO: 0.4 K/UL
MONOCYTES NFR BLD: 5 %
NEUTROPHILS # BLD AUTO: 5.9 K/UL
NEUTROPHILS NFR BLD: 76 %
PLATELET # BLD AUTO: 481 K/UL
PMV BLD AUTO: ABNORMAL FL
RBC # BLD AUTO: 6.68 M/UL
SATURATED IRON: 9 %
TOTAL IRON BINDING CAPACITY: 462 UG/DL
TRANSFERRIN SERPL-MCNC: 312 MG/DL
WBC # BLD AUTO: 7.75 K/UL

## 2019-01-28 PROCEDURE — 3008F BODY MASS INDEX DOCD: CPT | Mod: CPTII,S$GLB,, | Performed by: INTERNAL MEDICINE

## 2019-01-28 PROCEDURE — 99214 OFFICE O/P EST MOD 30 MIN: CPT | Mod: S$GLB,,, | Performed by: INTERNAL MEDICINE

## 2019-01-28 PROCEDURE — 99214 PR OFFICE/OUTPT VISIT, EST, LEVL IV, 30-39 MIN: ICD-10-PCS | Mod: S$GLB,,, | Performed by: INTERNAL MEDICINE

## 2019-01-28 PROCEDURE — 83540 ASSAY OF IRON: CPT

## 2019-01-28 PROCEDURE — 3008F PR BODY MASS INDEX (BMI) DOCUMENTED: ICD-10-PCS | Mod: CPTII,S$GLB,, | Performed by: INTERNAL MEDICINE

## 2019-01-28 PROCEDURE — 99999 PR PBB SHADOW E&M-EST. PATIENT-LVL III: CPT | Mod: PBBFAC,,, | Performed by: INTERNAL MEDICINE

## 2019-01-28 PROCEDURE — 99999 PR PBB SHADOW E&M-EST. PATIENT-LVL III: ICD-10-PCS | Mod: PBBFAC,,, | Performed by: INTERNAL MEDICINE

## 2019-01-28 PROCEDURE — 82728 ASSAY OF FERRITIN: CPT

## 2019-01-28 PROCEDURE — 36415 COLL VENOUS BLD VENIPUNCTURE: CPT

## 2019-01-28 PROCEDURE — 85025 COMPLETE CBC W/AUTO DIFF WBC: CPT

## 2019-01-28 NOTE — PROGRESS NOTES
Subjective:       Patient ID: Ar Sharma is a 58 y.o. male.    Chief Complaint: Follow-up    This is a 58-year-old male here for a follow-up visit regarding iron deficiency anemia.  Initially presented with a mild-to-moderate anemia associated with a hematocrit in the low 30s.  No overt GI bleeding was noted.  It was a microcytic anemia, moderate.  He underwent upper endoscopy revealing H pylori negative gastritis in addition to some granular mucosa in the hepatic flexure.  No bleeding was noted and biopsies of this area in the colon did not show any adenomatous change but chronic colitis.  Clinically doing well at this time.  No change in bowel habits.  His last hematocrit had normalized.  He is no longer on iron therapy.    The following portions of the patient's history were reviewed and updated as appropriate: allergies, current medications, past family history, past medical history, past social history, past surgical history and problem list.    (Portions of this note were dictated using voice recognition software and may contain dictation related errors in spelling/grammar/syntax not found on text review)    HPI  Review of Systems   Constitutional: Negative for appetite change and unexpected weight change.   Respiratory: Negative for apnea and chest tightness.    Cardiovascular: Negative for chest pain and palpitations.   Gastrointestinal: Negative for abdominal distention and blood in stool.       Objective:      Physical Exam   Constitutional: He is oriented to person, place, and time. He appears well-developed and well-nourished. No distress.   HENT:   Head: Normocephalic and atraumatic.   Eyes: Conjunctivae are normal. No scleral icterus.   Cardiovascular: Normal rate and regular rhythm. Exam reveals no gallop and no friction rub.   Murmur heard.  Pulmonary/Chest: Effort normal. No respiratory distress.   Abdominal: Soft. Bowel sounds are normal. There is no tenderness. There is no rebound and no  guarding.   Musculoskeletal: Normal range of motion. He exhibits edema. He exhibits no tenderness.   Neurological: He is alert and oriented to person, place, and time.   Skin: He is not diaphoretic.   Psychiatric: He has a normal mood and affect. His behavior is normal.   Nursing note and vitals reviewed.      Labs; reviewe  Assessment:       1. Other iron deficiency anemia        Plan:   1. Unclear etiology, colon bx reviewed with him. No adenomatous/malignant changes. No current symptoms of chronic colitis  2. Will repeat labs today, continue current meds

## 2019-01-30 ENCOUNTER — OFFICE VISIT (OUTPATIENT)
Dept: CARDIOLOGY | Facility: CLINIC | Age: 59
End: 2019-01-30
Payer: COMMERCIAL

## 2019-01-30 DIAGNOSIS — I47.20 V-TACH: Primary | ICD-10-CM

## 2019-01-30 PROCEDURE — 93289 PR INTERROG EVAL, IN PERSON,CARDVERT/DEFIB: ICD-10-PCS | Mod: 26,,, | Performed by: INTERNAL MEDICINE

## 2019-01-30 PROCEDURE — 93289 INTERROG DEVICE EVAL HEART: CPT | Mod: 26,,, | Performed by: INTERNAL MEDICINE

## 2019-01-30 PROCEDURE — 99212 OFFICE O/P EST SF 10 MIN: CPT | Mod: S$GLB,,, | Performed by: INTERNAL MEDICINE

## 2019-01-30 PROCEDURE — 99999 PR PBB SHADOW E&M-EST. PATIENT-LVL I: ICD-10-PCS | Mod: PBBFAC,,,

## 2019-01-30 PROCEDURE — 99212 PR OFFICE/OUTPT VISIT, EST, LEVL II, 10-19 MIN: ICD-10-PCS | Mod: S$GLB,,, | Performed by: INTERNAL MEDICINE

## 2019-01-30 PROCEDURE — 99999 PR PBB SHADOW E&M-EST. PATIENT-LVL I: CPT | Mod: PBBFAC,,,

## 2019-02-04 ENCOUNTER — TELEPHONE (OUTPATIENT)
Dept: GASTROENTEROLOGY | Facility: CLINIC | Age: 59
End: 2019-02-04

## 2019-02-04 NOTE — TELEPHONE ENCOUNTER
----- Message from Ananya Morillo MD sent at 1/31/2019 10:20 PM CST -----  Iron levels are better, they are just below normal. I think once daily iron supplement is warranted. CBC is normal. F/u in 6 months

## 2019-03-11 ENCOUNTER — LAB VISIT (OUTPATIENT)
Dept: LAB | Facility: HOSPITAL | Age: 59
End: 2019-03-11
Attending: INTERNAL MEDICINE
Payer: COMMERCIAL

## 2019-03-11 DIAGNOSIS — N18.30 CKD (CHRONIC KIDNEY DISEASE), STAGE III: ICD-10-CM

## 2019-03-11 DIAGNOSIS — D50.9 IRON DEFICIENCY ANEMIA, UNSPECIFIED IRON DEFICIENCY ANEMIA TYPE: ICD-10-CM

## 2019-03-11 DIAGNOSIS — E03.9 HYPOTHYROIDISM, UNSPECIFIED TYPE: ICD-10-CM

## 2019-03-11 LAB
ALBUMIN SERPL BCP-MCNC: 4.3 G/DL
ALP SERPL-CCNC: 99 U/L
ALT SERPL W/O P-5'-P-CCNC: 30 U/L
ANION GAP SERPL CALC-SCNC: 10 MMOL/L
ANISOCYTOSIS BLD QL SMEAR: ABNORMAL
AST SERPL-CCNC: 41 U/L
BASOPHILS # BLD AUTO: 0.09 K/UL
BASOPHILS NFR BLD: 1 %
BILIRUB SERPL-MCNC: 1.2 MG/DL
BUN SERPL-MCNC: 20 MG/DL
CALCIUM SERPL-MCNC: 9.5 MG/DL
CHLORIDE SERPL-SCNC: 104 MMOL/L
CO2 SERPL-SCNC: 26 MMOL/L
CREAT SERPL-MCNC: 1.34 MG/DL
DIFFERENTIAL METHOD: ABNORMAL
EOSINOPHIL # BLD AUTO: 0.3 K/UL
EOSINOPHIL NFR BLD: 3.6 %
ERYTHROCYTE [DISTWIDTH] IN BLOOD BY AUTOMATED COUNT: 21.1 %
EST. GFR  (AFRICAN AMERICAN): >60 ML/MIN/1.73 M^2
EST. GFR  (NON AFRICAN AMERICAN): 57.6 ML/MIN/1.73 M^2
GLUCOSE SERPL-MCNC: 104 MG/DL
HCT VFR BLD AUTO: 51.8 %
HGB BLD-MCNC: 16.9 G/DL
LYMPHOCYTES # BLD AUTO: 0.9 K/UL
LYMPHOCYTES NFR BLD: 10.2 %
MCH RBC QN AUTO: 21.7 PG
MCHC RBC AUTO-ENTMCNC: 32.6 G/DL
MCV RBC AUTO: 67 FL
MONOCYTES # BLD AUTO: 1.1 K/UL
MONOCYTES NFR BLD: 12.1 %
NEUTROPHILS # BLD AUTO: 6.6 K/UL
NEUTROPHILS NFR BLD: 73.1 %
OVALOCYTES BLD QL SMEAR: ABNORMAL
PLATELET # BLD AUTO: 545 K/UL
PLATELET BLD QL SMEAR: ABNORMAL
PMV BLD AUTO: ABNORMAL FL
POIKILOCYTOSIS BLD QL SMEAR: SLIGHT
POTASSIUM SERPL-SCNC: 4.3 MMOL/L
PROT SERPL-MCNC: 8 G/DL
RBC # BLD AUTO: 7.79 M/UL
SODIUM SERPL-SCNC: 140 MMOL/L
T4 FREE SERPL-MCNC: 1.39 NG/DL
TSH SERPL DL<=0.005 MIU/L-ACNC: 9.29 UIU/ML
WBC # BLD AUTO: 9.01 K/UL

## 2019-03-11 PROCEDURE — 36415 COLL VENOUS BLD VENIPUNCTURE: CPT | Mod: PO

## 2019-03-11 PROCEDURE — 84439 ASSAY OF FREE THYROXINE: CPT

## 2019-03-11 PROCEDURE — 84443 ASSAY THYROID STIM HORMONE: CPT | Mod: PO

## 2019-03-11 PROCEDURE — 85025 COMPLETE CBC W/AUTO DIFF WBC: CPT | Mod: PO

## 2019-03-11 PROCEDURE — 80053 COMPREHEN METABOLIC PANEL: CPT | Mod: PO

## 2019-03-11 NOTE — PROGRESS NOTES
ICD Evaluation Report     Date: 1/30/2019     Primary MD: Dr. Newman  Primary Cardiologist: Dr. Stewart  Implanting MD:   and serial number:  Viva Quad XT CRT [id number]; [id number]; Medtronic  Implant date: 12/15/2016  Reason for evaluation: routine   Indication for ICD: unknown      Measurements  Atrial sensing - P wave: 1.3-2.1 mV  Atrial capture: Maintained: 1.0 V @ 0.4 ms   Atrial lead impedance: 475 ohms  Ventricular sensing - R wave: 8.0 mV  Ventricular capture: RV Maintained: 0.5 V @ 0.4 ms (M2 - RVC)  LV: Maintained: 1.00 V @ 0.4 ms   Ventricular lead impedance: RV:  418 ohms; LV:  399 ohms  Shock Impedance:  RV 44/51 SVC 53 ohms  Underlying rhythm: NSR        Diagnostic Data  Atrial paced: 99.3 % Ventricular paced: 99.9 % (BVp)  Mode switch: none   Other: n/a  Ventricular Events: 1 VT terminated with ATP on 12/26/2018 lasting 12 seconds at 130 bpm, 3 NSVT events longest 2 seconds with V rate 130-139 on 12/20/2018; another VT event terminated with 5 shocks on 12/20/2018 lasting 38 seconds at heart rate 140 bpm.   Battery status: satisfactory Magnet rate: 98.5 bpm   Estimated battery longevity:  1.6 years     VT Zones and Therapies:  AT/AF monitor > 171 bpm All Rx Off  VF > 200 bpm ATP during charging, 35 J x 6  FVT via -231 bpm Burst(2), 10J, 35J x 4  VT on 122-200 bpm Burst(3) 5J, 35J x 4        Final Parameters  Mode: DDDR Lower rate: 60 bpm Upper rate: 1115 bpm  AV Delay: 170/110 ms Mode Switch: 70 Rate Response: On - max sensor 115 bpm  Atrial - Amplitude: 2.0 V Pulse width: 0.5 ms Sensitivity: 0.3 mV   R Ventricular - Amplitude: 2.0 V Pulse width: 0.4 ms Sensitivity: 0.3 mV   LV output:  1.25 V @ 0.4 msec  Polarity: Bipolar      Changes made:  No changes         Conclusions: normal CRT-D function.         Follow up: 3 months, transfer remote monitoring from [place] to [facility]       Prashant Sepulveda

## 2019-03-14 ENCOUNTER — OFFICE VISIT (OUTPATIENT)
Dept: CARDIOLOGY | Facility: CLINIC | Age: 59
End: 2019-03-14
Payer: COMMERCIAL

## 2019-03-14 VITALS
HEART RATE: 85 BPM | WEIGHT: 222.88 LBS | OXYGEN SATURATION: 90 % | SYSTOLIC BLOOD PRESSURE: 118 MMHG | DIASTOLIC BLOOD PRESSURE: 78 MMHG | BODY MASS INDEX: 32.91 KG/M2

## 2019-03-14 DIAGNOSIS — Z86.79 S/P ABLATION OF VENTRICULAR ARRHYTHMIA: ICD-10-CM

## 2019-03-14 DIAGNOSIS — I25.10 CORONARY ARTERY DISEASE INVOLVING NATIVE CORONARY ARTERY OF NATIVE HEART WITHOUT ANGINA PECTORIS: ICD-10-CM

## 2019-03-14 DIAGNOSIS — Z95.810 CARDIAC RESYNCHRONIZATION THERAPY DEFIBRILLATOR (CRT-D) IN PLACE: ICD-10-CM

## 2019-03-14 DIAGNOSIS — I47.20 V-TACH: ICD-10-CM

## 2019-03-14 DIAGNOSIS — I50.22 CHRONIC SYSTOLIC CHF, NYHA CLASS 2 AND ACA/AHA STAGE C: Primary | ICD-10-CM

## 2019-03-14 DIAGNOSIS — N18.30 CKD (CHRONIC KIDNEY DISEASE), STAGE III: ICD-10-CM

## 2019-03-14 DIAGNOSIS — D50.9 IRON DEFICIENCY ANEMIA, UNSPECIFIED IRON DEFICIENCY ANEMIA TYPE: ICD-10-CM

## 2019-03-14 DIAGNOSIS — Z98.890 S/P ABLATION OF VENTRICULAR ARRHYTHMIA: ICD-10-CM

## 2019-03-14 DIAGNOSIS — E78.00 PURE HYPERCHOLESTEROLEMIA: ICD-10-CM

## 2019-03-14 DIAGNOSIS — E03.9 HYPOTHYROIDISM, UNSPECIFIED TYPE: ICD-10-CM

## 2019-03-14 PROCEDURE — 99214 PR OFFICE/OUTPT VISIT, EST, LEVL IV, 30-39 MIN: ICD-10-PCS | Mod: S$GLB,,, | Performed by: INTERNAL MEDICINE

## 2019-03-14 PROCEDURE — 3008F PR BODY MASS INDEX (BMI) DOCUMENTED: ICD-10-PCS | Mod: CPTII,S$GLB,, | Performed by: INTERNAL MEDICINE

## 2019-03-14 PROCEDURE — 99214 OFFICE O/P EST MOD 30 MIN: CPT | Mod: S$GLB,,, | Performed by: INTERNAL MEDICINE

## 2019-03-14 PROCEDURE — 99999 PR PBB SHADOW E&M-EST. PATIENT-LVL III: ICD-10-PCS | Mod: PBBFAC,,, | Performed by: INTERNAL MEDICINE

## 2019-03-14 PROCEDURE — 3008F BODY MASS INDEX DOCD: CPT | Mod: CPTII,S$GLB,, | Performed by: INTERNAL MEDICINE

## 2019-03-14 PROCEDURE — 99999 PR PBB SHADOW E&M-EST. PATIENT-LVL III: CPT | Mod: PBBFAC,,, | Performed by: INTERNAL MEDICINE

## 2019-03-14 NOTE — PROGRESS NOTES
Subjective:    Patient ID:  Ar Sharma is a 58 y.o. male who presents for follow-up of Congestive Heart Failure      HPI     59 y/o male with a hx of CAD (states he had MI in his 20's), HFrEF with EF 30-35% per last 2DE with SLAVA 5.6 cm (chronic per record available in media section), s/p CRT-D (initial ICD 2012 with upgrade 2016 - Medtronic), DD, VT s/p ablation x 2 (2/2018 and again 3/2018), HLD, hypothyroidism, YOEL. Initially seen by Dr Newman and it sounds like he was in ADHF, lasix dose increased, and symptoms had significantly improved. Was very SOB with minimal activity, with bilateral LE edema/orthopnea/PND. Since increase in lasix dose SOB back at baseline and orthopnea/PND resolved, with improvement in edema. Currently with NYHA FC II symptoms. Denies CP, palps, syncope. Compliant with meds. Entresto started after last VT ablation.   Entresto dose was increased in Dec. Did not tolerate it well. Had ICD shocks in Dec (per interrogation mentions 5 times), he remembers one. Doing well since and staying active.    Review of Systems   Constitution: Negative for weakness and malaise/fatigue.   HENT: Negative for congestion.    Eyes: Negative for blurred vision.   Cardiovascular: Negative for chest pain, claudication, cyanosis, dyspnea on exertion, irregular heartbeat, leg swelling, near-syncope, orthopnea, palpitations, paroxysmal nocturnal dyspnea and syncope.   Respiratory: Negative for shortness of breath.    Endocrine: Negative for polyuria.   Hematologic/Lymphatic: Negative for bleeding problem.   Skin: Negative for itching and rash.   Musculoskeletal: Negative for joint swelling, muscle cramps and muscle weakness.   Gastrointestinal: Negative for abdominal pain, hematemesis, hematochezia, melena, nausea and vomiting.   Genitourinary: Negative for dysuria and hematuria.   Neurological: Negative for dizziness, focal weakness, headaches, light-headedness and loss of balance.   Psychiatric/Behavioral:  Negative for depression. The patient is not nervous/anxious.         Objective:    Physical Exam   Constitutional: He is oriented to person, place, and time. He appears well-developed and well-nourished.   HENT:   Head: Normocephalic and atraumatic.   Neck: Neck supple. No JVD present.   Cardiovascular: Normal rate, regular rhythm and normal heart sounds.   Pulses:       Carotid pulses are 2+ on the right side, and 2+ on the left side.       Radial pulses are 2+ on the right side, and 2+ on the left side.        Femoral pulses are 2+ on the right side, and 2+ on the left side.       Dorsalis pedis pulses are 2+ on the right side, and 2+ on the left side.        Posterior tibial pulses are 2+ on the right side, and 2+ on the left side.   Pulmonary/Chest: Effort normal and breath sounds normal.   Abdominal: Soft. Bowel sounds are normal.   Musculoskeletal: He exhibits no edema.   Neurological: He is alert and oriented to person, place, and time.   Skin: Skin is warm and dry.   Psychiatric: He has a normal mood and affect. His behavior is normal. Thought content normal.         Assessment:       1. Chronic systolic CHF, NYHA class 2 and MARIFER/AHA stage C    2. Coronary artery disease involving native coronary artery of native heart without angina pectoris    3. Cardiac resynchronization therapy defibrillator (CRT-D) in place    4. S/P ablation of ventricular arrhythmia    5. Pure hypercholesterolemia    6. V-tach    7. CKD (chronic kidney disease), stage III    8. Iron deficiency anemia, unspecified iron deficiency anemia type    9. Hypothyroidism, unspecified type      57 y/o pt with hx and presentation as above. Doing well from a cardiac perspective and compensated from a HF perspective. Discussed the importance of med compliance, heart healthy diet, and regular exercise.        Plan:       -Continue current medical management  -F/u in 6 months

## 2019-03-15 ENCOUNTER — TELEPHONE (OUTPATIENT)
Dept: GASTROENTEROLOGY | Facility: CLINIC | Age: 59
End: 2019-03-15

## 2019-03-15 NOTE — TELEPHONE ENCOUNTER
----- Message from Ananya Morillo MD sent at 3/14/2019 10:45 PM CDT -----  Hct even higher and normal, f/u in one year

## 2019-03-18 ENCOUNTER — OFFICE VISIT (OUTPATIENT)
Dept: INTERNAL MEDICINE | Facility: CLINIC | Age: 59
End: 2019-03-18
Payer: COMMERCIAL

## 2019-03-18 VITALS
HEART RATE: 91 BPM | HEIGHT: 69 IN | DIASTOLIC BLOOD PRESSURE: 70 MMHG | BODY MASS INDEX: 33.09 KG/M2 | WEIGHT: 223.44 LBS | SYSTOLIC BLOOD PRESSURE: 110 MMHG | OXYGEN SATURATION: 95 %

## 2019-03-18 DIAGNOSIS — D75.839 THROMBOCYTOSIS: ICD-10-CM

## 2019-03-18 DIAGNOSIS — I50.22 CHRONIC SYSTOLIC CONGESTIVE HEART FAILURE: ICD-10-CM

## 2019-03-18 DIAGNOSIS — E78.00 PURE HYPERCHOLESTEROLEMIA: ICD-10-CM

## 2019-03-18 DIAGNOSIS — Z00.00 ROUTINE GENERAL MEDICAL EXAMINATION AT A HEALTH CARE FACILITY: Primary | ICD-10-CM

## 2019-03-18 DIAGNOSIS — I47.20 V-TACH: ICD-10-CM

## 2019-03-18 DIAGNOSIS — E03.9 HYPOTHYROIDISM, UNSPECIFIED TYPE: ICD-10-CM

## 2019-03-18 DIAGNOSIS — I25.10 CORONARY ARTERY DISEASE INVOLVING NATIVE CORONARY ARTERY OF NATIVE HEART WITHOUT ANGINA PECTORIS: ICD-10-CM

## 2019-03-18 PROCEDURE — 99396 PREV VISIT EST AGE 40-64: CPT | Mod: S$GLB,,, | Performed by: INTERNAL MEDICINE

## 2019-03-18 PROCEDURE — 99999 PR PBB SHADOW E&M-EST. PATIENT-LVL III: CPT | Mod: PBBFAC,,, | Performed by: INTERNAL MEDICINE

## 2019-03-18 PROCEDURE — 99396 PR PREVENTIVE VISIT,EST,40-64: ICD-10-PCS | Mod: S$GLB,,, | Performed by: INTERNAL MEDICINE

## 2019-03-18 PROCEDURE — 99999 PR PBB SHADOW E&M-EST. PATIENT-LVL III: ICD-10-PCS | Mod: PBBFAC,,, | Performed by: INTERNAL MEDICINE

## 2019-03-18 RX ORDER — LEVOTHYROXINE SODIUM 75 UG/1
75 TABLET ORAL DAILY
Qty: 90 TABLET | Refills: 3 | Status: SHIPPED | OUTPATIENT
Start: 2019-03-18 | End: 2019-09-18

## 2019-03-18 NOTE — PROGRESS NOTES
Subjective:       Patient ID: Ar Sharma is a 58 y.o. male.    Chief Complaint: Follow-up (3 month f/up )    HPI  Wellness check.  Chart reviewed.  SINGH at 100 m, no orthopnea.  Has occ palpitations.  Defibrillator fired in Dec.  No CP.  Review of Systems   Constitutional: Negative for activity change and unexpected weight change.   HENT: Negative for hearing loss, rhinorrhea and trouble swallowing.    Eyes: Negative for discharge and visual disturbance.   Respiratory: Negative for chest tightness and wheezing.    Cardiovascular: Negative for chest pain and palpitations.   Gastrointestinal: Negative for blood in stool, constipation, diarrhea and vomiting.   Endocrine: Negative for polydipsia and polyuria.   Genitourinary: Negative for difficulty urinating, hematuria and urgency.   Musculoskeletal: Negative for arthralgias, joint swelling and neck pain.   Neurological: Negative for weakness and headaches.   Psychiatric/Behavioral: Negative for confusion and dysphoric mood.   All other systems reviewed and are negative.      Objective:      Physical Exam   Constitutional: He appears well-developed. No distress.   obese   HENT:   Head: Normocephalic.   Eyes: EOM are normal. No scleral icterus.   Neck: Normal range of motion. No tracheal deviation present.   Cardiovascular: Normal rate, regular rhythm, normal heart sounds and intact distal pulses.   Pulmonary/Chest: Effort normal and breath sounds normal. No respiratory distress.   Abdominal: Soft. Bowel sounds are normal. He exhibits no distension. There is no tenderness.   Musculoskeletal: Normal range of motion. He exhibits no edema.   Neurological: He is alert.   Skin: Skin is warm and dry. No rash noted. He is not diaphoretic. No erythema.   Psychiatric: He has a normal mood and affect. His behavior is normal.   Vitals reviewed.      Assessment:       1. Routine general medical examination at a health care facility    2. Chronic systolic congestive heart  failure    3. Hypothyroidism, unspecified type    4. Pure hypercholesterolemia    5. Coronary artery disease involving native coronary artery of native heart without angina pectoris    6. V-tach    7. Thrombocytosis        Plan:       Ar was seen today for follow-up.    Diagnoses and all orders for this visit:    Routine general medical examination at a health care facility    Chronic systolic congestive heart failure  -     Cancel: Comprehensive metabolic panel; Future  -     Comprehensive metabolic panel; Future    Hypothyroidism, unspecified type  -     Cancel: TSH; Future  -     levothyroxine (SYNTHROID) 75 MCG tablet; Take 1 tablet (75 mcg total) by mouth once daily.  -     TSH; Future    Pure hypercholesterolemia   Well-cont    Coronary artery disease involving native coronary artery of native heart without angina pectoris   Quiescent    V-tach   Quiescent    Thrombocytosis  -     CBC auto differential; Future  -     CBC auto differential; Future      Follow-up in about 6 months (around 9/18/2019).

## 2019-03-19 ENCOUNTER — TELEPHONE (OUTPATIENT)
Dept: GASTROENTEROLOGY | Facility: CLINIC | Age: 59
End: 2019-03-19

## 2019-04-10 ENCOUNTER — CLINICAL SUPPORT (OUTPATIENT)
Dept: CARDIOLOGY | Facility: CLINIC | Age: 59
End: 2019-04-10
Payer: COMMERCIAL

## 2019-04-10 DIAGNOSIS — Z95.810 CARDIAC RESYNCHRONIZATION THERAPY DEFIBRILLATOR (CRT-D) IN PLACE: Primary | ICD-10-CM

## 2019-04-10 PROCEDURE — 93289 INTERROG DEVICE EVAL HEART: CPT | Mod: 26,,, | Performed by: INTERNAL MEDICINE

## 2019-04-10 PROCEDURE — 93289 PR INTERROG EVAL, IN PERSON,CARDVERT/DEFIB: ICD-10-PCS | Mod: 26,,, | Performed by: INTERNAL MEDICINE

## 2019-04-11 NOTE — PROGRESS NOTES
ICD Evaluation Report     Date: 4/10/2019     Primary MD: Dr. Newman  Primary Cardiologist: Dr. Stewart  Implanting MD:   and serial number:  Viva Quad XT CRT [id number]; [id number]; Medtronic  Implant date: 12/15/2016  Reason for evaluation: routine   Indication for ICD: unknown      Measurements  Atrial sensing - P wave: 1.5 mV  Atrial capture: Maintained: 1.0 V @ 0.4 ms   Atrial lead impedance: 494 ohms  Ventricular sensing - R wave: 9.8 mV  Ventricular capture: RV Maintained: 0.75 V @ 0.4 ms (M2 - RVC)  LV: Maintained: 1.0 V @ 0.4 ms   Ventricular lead impedance: RV:  437 ohms; LV:  418 ohms  Shock Impedance:  RV 47/ SVC 56 ohms  Underlying rhythm: NSR        Diagnostic Data  Atrial paced: 99.8 % Ventricular paced: 99.8 % (BVp)  Mode switch: none   Other: n/a  Ventricular Events: none   Battery status: satisfactory Magnet rate: 98.5 bpm   Estimated battery longevity:  1.6 years     VT Zones and Therapies:  AT/AF monitor > 171 bpm All Rx Off  VF > 200 bpm ATP during charging, 35 J x 6  FVT via -231 bpm Burst(2), 10J, 35J x 4  VT on 122-200 bpm Burst(3) 5J, 35J x 4        Final Parameters  Mode: DDDR Lower rate: 60 bpm Upper rate: 1115 bpm  AV Delay: 170/110 ms Mode Switch: 70 Rate Response: On - max sensor 115 bpm  Atrial - Amplitude: 2.0 V Pulse width: 0.4 ms Sensitivity: 0.3 mV   R Ventricular - Amplitude: 2.0 V Pulse width: 0.4 ms Sensitivity: 0.3 mV   LV output:  1.5 V @ 0.4 msec  Polarity: Bipolar      Changes made:  No changes         Conclusions: normal CRT-D function.         Follow up: 3 months        Prashant Sepulveda

## 2019-05-27 ENCOUNTER — TELEPHONE (OUTPATIENT)
Dept: CARDIOLOGY | Facility: CLINIC | Age: 59
End: 2019-05-27

## 2019-05-27 NOTE — TELEPHONE ENCOUNTER
Reached out to pt     Left detailed message requesting call back in regards to rescheduling pt clinical lorena

## 2019-06-19 ENCOUNTER — PATIENT MESSAGE (OUTPATIENT)
Dept: CARDIOLOGY | Facility: CLINIC | Age: 59
End: 2019-06-19

## 2019-06-19 ENCOUNTER — TELEPHONE (OUTPATIENT)
Dept: FAMILY MEDICINE | Facility: CLINIC | Age: 59
End: 2019-06-19

## 2019-06-19 DIAGNOSIS — I50.22 CHRONIC SYSTOLIC CONGESTIVE HEART FAILURE: ICD-10-CM

## 2019-06-19 RX ORDER — SPIRONOLACTONE 25 MG/1
25 TABLET ORAL DAILY
Qty: 90 TABLET | Refills: 3 | Status: SHIPPED | OUTPATIENT
Start: 2019-06-19 | End: 2020-06-17 | Stop reason: SDUPTHER

## 2019-06-19 RX ORDER — SPIRONOLACTONE 25 MG/1
25 TABLET ORAL DAILY
Qty: 90 TABLET | Refills: 3 | Status: SHIPPED | OUTPATIENT
Start: 2019-06-19 | End: 2019-06-19 | Stop reason: SDUPTHER

## 2019-06-20 NOTE — TELEPHONE ENCOUNTER
----- Message from Lyric Joyner sent at 6/20/2019  1:35 PM CDT -----  Contact: [phone]/self  Patient requesting to speak with you regarding refills for medications below. Please send to [facility] in [place].    amiodarone (PACERONE) 200 MG Tab  sacubitril-valsartan (ENTRESTO) 49-51 mg per tablet

## 2019-06-21 RX ORDER — AMIODARONE HYDROCHLORIDE 200 MG/1
200 TABLET ORAL DAILY
Qty: 90 TABLET | Refills: 3 | Status: SHIPPED | OUTPATIENT
Start: 2019-06-21 | End: 2020-06-19

## 2019-06-21 NOTE — TELEPHONE ENCOUNTER
Reached out to pt     Advised that medications were erx to his [facility] pharmacy

## 2019-06-21 NOTE — TELEPHONE ENCOUNTER
----- Message from Gene Boyle sent at 6/21/2019  9:44 AM CDT -----  Contact: Patient  Patient called to state his refill of sacubitril-valsartan (ENTRESTO) 49-51 mg per tablet was sent to the wrong pharmacy.  He needs it to go to the pharmacy listed below.    He also needs a refill on his amiodarone (PACERONE) 200 MG Tab.    Please call [phone] to discuss today as well he wants his default pharmacy changed.    [facility] [address] [facility]  PHONE NUMBER -[phone]

## 2019-06-24 ENCOUNTER — TELEPHONE (OUTPATIENT)
Dept: CARDIOLOGY | Facility: CLINIC | Age: 59
End: 2019-06-24

## 2019-06-24 NOTE — TELEPHONE ENCOUNTER
----- Message from Verena Cortes sent at 6/24/2019 10:13 AM CDT -----  Contact: Self [phone]  Patient is calling to get refills on his medication sent to [facility]/pharmacy #[id number] - [address] AT [facility] [phone] (Fax)    1. sacubitril-valsartan (ENTRESTO) 49-51 mg per tablet 180 tablet     2. amiodarone (PACERONE) 200 MG Tab 90 tablet

## 2019-06-24 NOTE — TELEPHONE ENCOUNTER
Reached out to [facility] pharmacy to confirm pt rx     Pt advised that medications were verbally confirmed, pharmacy will be reaching out to him once his rx is ready

## 2019-07-09 ENCOUNTER — TELEPHONE (OUTPATIENT)
Dept: CARDIOLOGY | Facility: CLINIC | Age: 59
End: 2019-07-09

## 2019-07-31 ENCOUNTER — CLINICAL SUPPORT (OUTPATIENT)
Dept: CARDIOLOGY | Facility: CLINIC | Age: 59
End: 2019-07-31
Payer: COMMERCIAL

## 2019-07-31 DIAGNOSIS — Z95.810 CARDIAC RESYNCHRONIZATION THERAPY DEFIBRILLATOR (CRT-D) IN PLACE: Primary | ICD-10-CM

## 2019-07-31 PROCEDURE — 93289 PR INTERROG EVAL, IN PERSON,CARDVERT/DEFIB: ICD-10-PCS | Mod: 26,,, | Performed by: INTERNAL MEDICINE

## 2019-07-31 PROCEDURE — 93289 INTERROG DEVICE EVAL HEART: CPT | Mod: 26,,, | Performed by: INTERNAL MEDICINE

## 2019-08-27 NOTE — PROGRESS NOTES
ICD Evaluation Report     Date: 7/31/2019     Primary MD: Dr. Newman  Primary Cardiologist: Dr. Stewart  Implanting MD:   and serial number:  Viva Quad XT CRT [id number]; [id number]; [employer]tronic  Implant date: 12/15/2016  Reason for evaluation: routine   Indication for ICD: unknown      Measurements  Atrial sensing - P wave: 1.4 mV  Atrial capture: Maintained: 1.0 V @ 0.4 ms   Atrial lead impedance: 513 ohms  Ventricular sensing - R wave: 9.8 mV  Ventricular capture: RV Maintained: 0.75 V @ 0.4 ms (M2 - RVC)  LV: Maintained: 1.0 V @ 0.4 ms   Ventricular lead impedance: RV:  437 ohms; LV:  418 ohms  Shock Impedance:  RV 49/ SVC 61 ohms  Underlying rhythm: NSR        Diagnostic Data  Atrial paced: 99.8 % Ventricular paced: 99.8 % (BVp)  Mode switch: none   Other: n/a  Ventricular Events: none   Battery status: satisfactory Magnet rate: 98.5 bpm   Estimated battery longevity:  1.8 years     VT Zones and Therapies:  AT/AF monitor > 171 bpm All Rx Off  VF > 200 bpm ATP during charging, 35 J x 6  FVT via -231 bpm Burst(2), 10J, 35J x 4  VT on 122-200 bpm Burst(3) 5J, 35J x 4        Final Parameters  Mode: DDDR Lower rate: 60 bpm Upper rate: 115 bpm  AV Delay: 170/110 ms Mode Switch: 70 Rate Response: On - max sensor 115 bpm  Atrial - Amplitude: 2.0 V Pulse width: 0.4 ms Sensitivity: 0.3 mV   R Ventricular - Amplitude: 2.0 V Pulse width: 0.4 ms Sensitivity: 0.3 mV   LV output:  1.5 V @ 0.4 msec  Polarity: Bipolar      Changes made:  No changes         Conclusions: normal CRT-D function.         Follow up: 3 months        Prashant Sepulveda

## 2019-09-11 ENCOUNTER — LAB VISIT (OUTPATIENT)
Dept: LAB | Facility: HOSPITAL | Age: 59
End: 2019-09-11
Attending: INTERNAL MEDICINE
Payer: COMMERCIAL

## 2019-09-11 DIAGNOSIS — D75.839 THROMBOCYTOSIS: ICD-10-CM

## 2019-09-11 DIAGNOSIS — E03.9 HYPOTHYROIDISM, UNSPECIFIED TYPE: ICD-10-CM

## 2019-09-11 DIAGNOSIS — I50.22 CHRONIC SYSTOLIC CONGESTIVE HEART FAILURE: ICD-10-CM

## 2019-09-11 LAB
ALBUMIN SERPL BCP-MCNC: 3.9 G/DL (ref 3.5–5.2)
ALP SERPL-CCNC: 70 U/L (ref 38–126)
ALT SERPL W/O P-5'-P-CCNC: 25 U/L (ref 10–44)
ANION GAP SERPL CALC-SCNC: 5 MMOL/L (ref 8–16)
AST SERPL-CCNC: 32 U/L (ref 15–46)
BASOPHILS # BLD AUTO: ABNORMAL K/UL (ref 0–0.2)
BASOPHILS NFR BLD: 2 % (ref 0–1.9)
BILIRUB SERPL-MCNC: 1.2 MG/DL (ref 0.1–1)
BUN SERPL-MCNC: 31 MG/DL (ref 2–20)
CALCIUM SERPL-MCNC: 9 MG/DL (ref 8.7–10.5)
CHLORIDE SERPL-SCNC: 103 MMOL/L (ref 95–110)
CO2 SERPL-SCNC: 30 MMOL/L (ref 23–29)
CREAT SERPL-MCNC: 1.88 MG/DL (ref 0.5–1.4)
DIFFERENTIAL METHOD: ABNORMAL
EOSINOPHIL # BLD AUTO: ABNORMAL K/UL (ref 0–0.5)
EOSINOPHIL NFR BLD: 1 % (ref 0–8)
ERYTHROCYTE [DISTWIDTH] IN BLOOD BY AUTOMATED COUNT: 16.9 % (ref 11.5–14.5)
EST. GFR  (AFRICAN AMERICAN): 44.2 ML/MIN/1.73 M^2
EST. GFR  (NON AFRICAN AMERICAN): 38.2 ML/MIN/1.73 M^2
GLUCOSE SERPL-MCNC: 106 MG/DL (ref 70–110)
HCT VFR BLD AUTO: 50.7 % (ref 40–54)
HGB BLD-MCNC: 16.9 G/DL (ref 14–18)
LYMPHOCYTES # BLD AUTO: ABNORMAL K/UL (ref 1–4.8)
LYMPHOCYTES NFR BLD: 11 % (ref 18–48)
MCH RBC QN AUTO: 26.4 PG (ref 27–31)
MCHC RBC AUTO-ENTMCNC: 33.3 G/DL (ref 32–36)
MCV RBC AUTO: 79 FL (ref 82–98)
MONOCYTES # BLD AUTO: ABNORMAL K/UL (ref 0.3–1)
MONOCYTES NFR BLD: 8 % (ref 4–15)
NEUTROPHILS NFR BLD: 78 % (ref 38–73)
PLATELET # BLD AUTO: 499 K/UL (ref 150–350)
PLATELET BLD QL SMEAR: ABNORMAL
PMV BLD AUTO: 10.8 FL (ref 9.2–12.9)
POTASSIUM SERPL-SCNC: 4.1 MMOL/L (ref 3.5–5.1)
PROT SERPL-MCNC: 7.4 G/DL (ref 6–8.4)
RBC # BLD AUTO: 6.41 M/UL (ref 4.6–6.2)
SODIUM SERPL-SCNC: 138 MMOL/L (ref 136–145)
T4 FREE SERPL-MCNC: 1.07 NG/DL (ref 0.71–1.51)
TSH SERPL DL<=0.005 MIU/L-ACNC: 27.6 UIU/ML (ref 0.4–4)
WBC # BLD AUTO: 8.71 K/UL (ref 3.9–12.7)

## 2019-09-11 PROCEDURE — 36415 COLL VENOUS BLD VENIPUNCTURE: CPT | Mod: PO

## 2019-09-11 PROCEDURE — 85027 COMPLETE CBC AUTOMATED: CPT | Mod: PO

## 2019-09-11 PROCEDURE — 84443 ASSAY THYROID STIM HORMONE: CPT | Mod: PO

## 2019-09-11 PROCEDURE — 80053 COMPREHEN METABOLIC PANEL: CPT | Mod: PO

## 2019-09-11 PROCEDURE — 85007 BL SMEAR W/DIFF WBC COUNT: CPT | Mod: PO

## 2019-09-11 PROCEDURE — 84439 ASSAY OF FREE THYROXINE: CPT

## 2019-09-18 ENCOUNTER — OFFICE VISIT (OUTPATIENT)
Dept: INTERNAL MEDICINE | Facility: CLINIC | Age: 59
End: 2019-09-18
Payer: COMMERCIAL

## 2019-09-18 VITALS
HEART RATE: 88 BPM | DIASTOLIC BLOOD PRESSURE: 78 MMHG | HEIGHT: 69 IN | OXYGEN SATURATION: 94 % | BODY MASS INDEX: 34.21 KG/M2 | SYSTOLIC BLOOD PRESSURE: 120 MMHG | WEIGHT: 231 LBS | TEMPERATURE: 98 F

## 2019-09-18 DIAGNOSIS — N18.30 CKD (CHRONIC KIDNEY DISEASE), STAGE III: ICD-10-CM

## 2019-09-18 DIAGNOSIS — E03.9 HYPOTHYROIDISM, UNSPECIFIED TYPE: ICD-10-CM

## 2019-09-18 DIAGNOSIS — E78.00 PURE HYPERCHOLESTEROLEMIA: ICD-10-CM

## 2019-09-18 DIAGNOSIS — I25.10 CORONARY ARTERY DISEASE INVOLVING NATIVE CORONARY ARTERY OF NATIVE HEART WITHOUT ANGINA PECTORIS: ICD-10-CM

## 2019-09-18 DIAGNOSIS — I50.22 CHRONIC SYSTOLIC CONGESTIVE HEART FAILURE: Primary | ICD-10-CM

## 2019-09-18 DIAGNOSIS — N52.9 IMPOTENCE: ICD-10-CM

## 2019-09-18 PROCEDURE — 3008F PR BODY MASS INDEX (BMI) DOCUMENTED: ICD-10-PCS | Mod: CPTII,S$GLB,, | Performed by: INTERNAL MEDICINE

## 2019-09-18 PROCEDURE — 99999 PR PBB SHADOW E&M-EST. PATIENT-LVL III: ICD-10-PCS | Mod: PBBFAC,,, | Performed by: INTERNAL MEDICINE

## 2019-09-18 PROCEDURE — 99999 PR PBB SHADOW E&M-EST. PATIENT-LVL III: CPT | Mod: PBBFAC,,, | Performed by: INTERNAL MEDICINE

## 2019-09-18 PROCEDURE — 3008F BODY MASS INDEX DOCD: CPT | Mod: CPTII,S$GLB,, | Performed by: INTERNAL MEDICINE

## 2019-09-18 PROCEDURE — 90471 FLU VACCINE (QUAD) GREATER THAN OR EQUAL TO 3YO PRESERVATIVE FREE IM: ICD-10-PCS | Mod: S$GLB,,, | Performed by: INTERNAL MEDICINE

## 2019-09-18 PROCEDURE — 99214 OFFICE O/P EST MOD 30 MIN: CPT | Mod: 25,S$GLB,, | Performed by: INTERNAL MEDICINE

## 2019-09-18 PROCEDURE — 90471 IMMUNIZATION ADMIN: CPT | Mod: S$GLB,,, | Performed by: INTERNAL MEDICINE

## 2019-09-18 PROCEDURE — 99214 PR OFFICE/OUTPT VISIT, EST, LEVL IV, 30-39 MIN: ICD-10-PCS | Mod: 25,S$GLB,, | Performed by: INTERNAL MEDICINE

## 2019-09-18 PROCEDURE — 90686 FLU VACCINE (QUAD) GREATER THAN OR EQUAL TO 3YO PRESERVATIVE FREE IM: ICD-10-PCS | Mod: S$GLB,,, | Performed by: INTERNAL MEDICINE

## 2019-09-18 PROCEDURE — 90686 IIV4 VACC NO PRSV 0.5 ML IM: CPT | Mod: S$GLB,,, | Performed by: INTERNAL MEDICINE

## 2019-09-18 RX ORDER — LEVOTHYROXINE SODIUM 100 UG/1
100 TABLET ORAL DAILY
Qty: 90 TABLET | Refills: 3 | Status: SHIPPED | OUTPATIENT
Start: 2019-09-18 | End: 2019-12-18

## 2019-09-18 RX ORDER — TADALAFIL 5 MG/1
5 TABLET ORAL DAILY PRN
Qty: 12 TABLET | Refills: 3 | Status: SHIPPED | OUTPATIENT
Start: 2019-09-18 | End: 2020-08-19 | Stop reason: SDUPTHER

## 2019-09-18 RX ORDER — SIMVASTATIN 40 MG/1
40 TABLET, FILM COATED ORAL NIGHTLY
Qty: 90 TABLET | Refills: 3 | Status: SHIPPED | OUTPATIENT
Start: 2019-09-18 | End: 2019-12-18

## 2019-09-18 NOTE — PROGRESS NOTES
Subjective:       Patient ID: Ar Sharma is a 58 y.o. male.    Chief Complaint: Follow-up (6 month f/u thinks he had problem with pravastin c/o toe and leg soreness); Mole (discuss mole on back thinks its getting bigger); and Medication Refill (ironpills, cialis )    HPI  Checkup.  Chart reviewed.  Stopped Prava due to r podagra.  Weight up 8 lbs/ 6 mo.  No CP, SOB, orthopnea.  Has occ palpitations.  No SINGH.  No nocturia.  Review of Systems   Constitutional: Negative for activity change and unexpected weight change.   HENT: Negative for hearing loss, rhinorrhea and trouble swallowing.    Eyes: Negative for discharge and visual disturbance.   Respiratory: Negative for chest tightness and wheezing.    Cardiovascular: Negative for chest pain and palpitations.   Gastrointestinal: Negative for blood in stool, constipation, diarrhea and vomiting.   Endocrine: Negative for polydipsia and polyuria.   Genitourinary: Negative for difficulty urinating, hematuria and urgency.   Musculoskeletal: Positive for arthralgias and joint swelling. Negative for neck pain.   Neurological: Negative for weakness and headaches.   Psychiatric/Behavioral: Negative for confusion and dysphoric mood.   All other systems reviewed and are negative.      Objective:      Physical Exam   Constitutional: He appears well-developed.   obese   HENT:   Head: Normocephalic.   Eyes: EOM are normal.   Neck: Normal range of motion.   Cardiovascular: Normal rate, regular rhythm and intact distal pulses. Exam reveals gallop (S3).   Pulmonary/Chest: Effort normal and breath sounds normal.   Abdominal: Soft. Bowel sounds are normal. He exhibits no distension. There is no tenderness.   Musculoskeletal: Normal range of motion. He exhibits no edema.   Neurological: He is alert.   Skin: Skin is warm and dry.   Psychiatric: He has a normal mood and affect.   Vitals reviewed.      Assessment:       1. Chronic systolic congestive heart failure    2. Hypothyroidism,  unspecified type    3. Pure hypercholesterolemia    4. Coronary artery disease involving native coronary artery of native heart without angina pectoris    5. CKD (chronic kidney disease), stage III        Plan:       Ar was seen today for follow-up, mole and medication refill.    Diagnoses and all orders for this visit:    Chronic systolic congestive heart failure  -     Influenza - Quadrivalent (PF)    Hypothyroidism, unspecified type  -     TSH; Future  -     levothyroxine (SYNTHROID) 100 MCG tablet; Take 1 tablet (100 mcg total) by mouth once daily.    Pure hypercholesterolemia  -     simvastatin (ZOCOR) 40 MG tablet; Take 1 tablet (40 mg total) by mouth every evening.  -     Lipid panel; Future    Coronary artery disease involving native coronary artery of native heart without angina pectoris   Quiescent    CKD (chronic kidney disease), stage III  -     Renal function panel; Future  -     Uric acid; Future      Follow up in about 3 months (around 12/18/2019).

## 2019-09-24 ENCOUNTER — PATIENT OUTREACH (OUTPATIENT)
Dept: ADMINISTRATIVE | Facility: OTHER | Age: 59
End: 2019-09-24

## 2019-09-26 ENCOUNTER — OFFICE VISIT (OUTPATIENT)
Dept: CARDIOLOGY | Facility: CLINIC | Age: 59
End: 2019-09-26
Payer: COMMERCIAL

## 2019-09-26 VITALS
OXYGEN SATURATION: 90 % | HEART RATE: 79 BPM | HEIGHT: 69 IN | WEIGHT: 231.31 LBS | SYSTOLIC BLOOD PRESSURE: 120 MMHG | BODY MASS INDEX: 34.26 KG/M2 | DIASTOLIC BLOOD PRESSURE: 88 MMHG

## 2019-09-26 DIAGNOSIS — E78.5 HYPERLIPIDEMIA, UNSPECIFIED HYPERLIPIDEMIA TYPE: ICD-10-CM

## 2019-09-26 DIAGNOSIS — Z86.79 S/P ABLATION OF VENTRICULAR ARRHYTHMIA: ICD-10-CM

## 2019-09-26 DIAGNOSIS — Z95.810 CARDIAC RESYNCHRONIZATION THERAPY DEFIBRILLATOR (CRT-D) IN PLACE: ICD-10-CM

## 2019-09-26 DIAGNOSIS — E03.9 HYPOTHYROIDISM, UNSPECIFIED TYPE: ICD-10-CM

## 2019-09-26 DIAGNOSIS — I50.22 CHRONIC SYSTOLIC CHF, NYHA CLASS 2 AND ACA/AHA STAGE C: Primary | ICD-10-CM

## 2019-09-26 DIAGNOSIS — I47.20 V-TACH: ICD-10-CM

## 2019-09-26 DIAGNOSIS — N18.30 CKD (CHRONIC KIDNEY DISEASE), STAGE III: ICD-10-CM

## 2019-09-26 DIAGNOSIS — E78.00 PURE HYPERCHOLESTEROLEMIA: ICD-10-CM

## 2019-09-26 DIAGNOSIS — D50.9 IRON DEFICIENCY ANEMIA, UNSPECIFIED IRON DEFICIENCY ANEMIA TYPE: ICD-10-CM

## 2019-09-26 DIAGNOSIS — Z98.890 S/P ABLATION OF VENTRICULAR ARRHYTHMIA: ICD-10-CM

## 2019-09-26 DIAGNOSIS — I25.10 CORONARY ARTERY DISEASE INVOLVING NATIVE CORONARY ARTERY OF NATIVE HEART WITHOUT ANGINA PECTORIS: ICD-10-CM

## 2019-09-26 PROCEDURE — 99999 PR PBB SHADOW E&M-EST. PATIENT-LVL III: ICD-10-PCS | Mod: PBBFAC,,, | Performed by: INTERNAL MEDICINE

## 2019-09-26 PROCEDURE — 99214 PR OFFICE/OUTPT VISIT, EST, LEVL IV, 30-39 MIN: ICD-10-PCS | Mod: S$GLB,,, | Performed by: INTERNAL MEDICINE

## 2019-09-26 PROCEDURE — 99999 PR PBB SHADOW E&M-EST. PATIENT-LVL III: CPT | Mod: PBBFAC,,, | Performed by: INTERNAL MEDICINE

## 2019-09-26 PROCEDURE — 3008F BODY MASS INDEX DOCD: CPT | Mod: CPTII,S$GLB,, | Performed by: INTERNAL MEDICINE

## 2019-09-26 PROCEDURE — 99214 OFFICE O/P EST MOD 30 MIN: CPT | Mod: S$GLB,,, | Performed by: INTERNAL MEDICINE

## 2019-09-26 PROCEDURE — 3008F PR BODY MASS INDEX (BMI) DOCUMENTED: ICD-10-PCS | Mod: CPTII,S$GLB,, | Performed by: INTERNAL MEDICINE

## 2019-09-26 NOTE — PROGRESS NOTES
Subjective:    Patient ID:  Ar Sharma is a 58 y.o. male who presents for follow-up of Congestive Heart Failure      HPI    57 y/o male with a hx of CAD (states he had MI in his 20's), HFrEF with EF 30-35% per last 2DE with SLAVA 5.6 cm (chronic per record available in media section), s/p CRT-D (initial ICD 2012 with upgrade 2016 - Medtronic), DD, VT s/p ablation x 2 (2/2018 and again 3/2018), HLD, hypothyroidism, YOEL. Initially seen by Dr Newman and it sounds like he was in ADHF, lasix dose increased, and symptoms had significantly improved. Was very SOB with minimal activity, with bilateral LE edema/orthopnea/PND. Since increase in lasix dose SOB back at baseline and orthopnea/PND resolved, with improvement in edema. Currently with NYHA FC II symptoms. Denies CP, palps, syncope. Compliant with meds. Entresto started after last VT ablation. Entresto dose was increased in Dec 2018. Did not tolerate it well. Had ICD shocks in Dec (per interrogation mentions 5 times), he remembers one.   Doing well since last clinic visit.     Review of Systems   Constitution: Negative for malaise/fatigue.   HENT: Negative for congestion.    Eyes: Negative for blurred vision.   Cardiovascular: Positive for dyspnea on exertion. Negative for chest pain, claudication, cyanosis, irregular heartbeat, leg swelling, near-syncope, orthopnea, palpitations, paroxysmal nocturnal dyspnea and syncope.   Respiratory: Negative for shortness of breath.    Endocrine: Negative for polyuria.   Hematologic/Lymphatic: Negative for bleeding problem.   Skin: Negative for itching and rash.   Musculoskeletal: Negative for joint swelling, muscle cramps and muscle weakness.   Gastrointestinal: Negative for abdominal pain, hematemesis, hematochezia, melena, nausea and vomiting.   Genitourinary: Negative for dysuria and hematuria.   Neurological: Negative for dizziness, focal weakness, headaches, light-headedness, loss of balance and weakness.    Psychiatric/Behavioral: Negative for depression. The patient is not nervous/anxious.         Objective:    Physical Exam   Constitutional: He is oriented to person, place, and time. He appears well-developed and well-nourished.   HENT:   Head: Normocephalic and atraumatic.   Neck: Neck supple. No JVD present.   Cardiovascular: Normal rate, regular rhythm and normal heart sounds.   Pulses:       Carotid pulses are 2+ on the right side, and 2+ on the left side.       Radial pulses are 2+ on the right side, and 2+ on the left side.        Femoral pulses are 2+ on the right side, and 2+ on the left side.       Dorsalis pedis pulses are 2+ on the right side, and 2+ on the left side.        Posterior tibial pulses are 2+ on the right side, and 2+ on the left side.   Pulmonary/Chest: Effort normal and breath sounds normal.   Abdominal: Soft. Bowel sounds are normal.   Musculoskeletal: He exhibits no edema.   Neurological: He is alert and oriented to person, place, and time.   Skin: Skin is warm and dry.   Psychiatric: He has a normal mood and affect. His behavior is normal. Thought content normal.         Assessment:       1. Chronic systolic CHF, NYHA class 2 and MARIFER/AHA stage C    2. Coronary artery disease involving native coronary artery of native heart without angina pectoris    3. Cardiac resynchronization therapy defibrillator (CRT-D) in place    4. Pure hypercholesterolemia    5. S/P ablation of ventricular arrhythmia    6. V-tach    7. CKD (chronic kidney disease), stage III    8. Iron deficiency anemia, unspecified iron deficiency anemia type    9. Hypothyroidism, unspecified type      59 y/o pt with hx and presentation as above. Doing well from a cardiac perspective and compensated from a HF perspective. Will update 2DE and obtain lipid panel. Discussed the etiology, evaluation, and management of CHF, HLD, ICD, CRT, CKD, YOEL. Discussed the importance of med compliance, heart healthy diet, and regular exercise.           Plan:       -2DE with CFD  -Lipid panel  -f/u in 6 months

## 2019-10-01 ENCOUNTER — HOSPITAL ENCOUNTER (OUTPATIENT)
Dept: CARDIOLOGY | Facility: HOSPITAL | Age: 59
Discharge: HOME | End: 2019-10-01
Attending: INTERNAL MEDICINE
Payer: COMMERCIAL

## 2019-10-01 DIAGNOSIS — I50.22 CHRONIC SYSTOLIC CHF, NYHA CLASS 2 AND ACA/AHA STAGE C: ICD-10-CM

## 2019-10-01 PROCEDURE — 93306 TTE W/DOPPLER COMPLETE: CPT | Mod: PO

## 2019-10-01 PROCEDURE — 93306 TTE W/DOPPLER COMPLETE: CPT | Mod: 26,,, | Performed by: STUDENT IN AN ORGANIZED HEALTH CARE EDUCATION/TRAINING PROGRAM

## 2019-10-01 PROCEDURE — 93306 ECHO (CUPID ONLY): ICD-10-PCS | Mod: 26,,, | Performed by: STUDENT IN AN ORGANIZED HEALTH CARE EDUCATION/TRAINING PROGRAM

## 2019-10-02 LAB
AORTIC ROOT ANNULUS: 2.43 CM
AORTIC VALVE CUSP SEPERATION: 1.77 CM
AV INDEX (PROSTH): 0.51
AV MEAN GRADIENT: 6 MMHG
AV PEAK GRADIENT: 9 MMHG
AV VALVE AREA: 2.01 CM2
AV VELOCITY RATIO: 0.49
CV ECHO LV RWT: 0.48 CM
DOP CALC AO PEAK VEL: 1.54 M/S
DOP CALC AO VTI: 35 CM
DOP CALC LVOT AREA: 4 CM2
DOP CALC LVOT DIAMETER: 2.25 CM
DOP CALC LVOT PEAK VEL: 0.75 M/S
DOP CALC LVOT STROKE VOLUME: 70.46 CM3
DOP CALCLVOT PEAK VEL VTI: 17.73 CM
E WAVE DECELERATION TIME: 204.99 MSEC
E/A RATIO: 1.15
ECHO LV POSTERIOR WALL: 1.14 CM (ref 0.6–1.1)
FRACTIONAL SHORTENING: 29 % (ref 28–44)
INTERVENTRICULAR SEPTUM: 1.09 CM (ref 0.6–1.1)
IVC PROX: 1.4 CM
LA MAJOR: 6.73 CM
LA MINOR: 5.65 CM
LA WIDTH: 4.9 CM
LEFT ATRIUM SIZE: 4.08 CM
LEFT ATRIUM VOLUME: 104.39 CM3
LEFT INTERNAL DIMENSION IN SYSTOLE: 3.4 CM (ref 2.1–4)
LEFT VENTRICLE DIASTOLIC VOLUME: 106.43 ML
LEFT VENTRICLE SYSTOLIC VOLUME: 47.54 ML
LEFT VENTRICULAR INTERNAL DIMENSION IN DIASTOLE: 4.78 CM (ref 3.5–6)
LEFT VENTRICULAR MASS: 196.33 G
MV A" WAVE DURATION": 138 MSEC
MV PEAK A VEL: 0.65 M/S
MV PEAK E VEL: 0.75 M/S
PISA TR MAX VEL: 3.12 M/S
PULM VEIN S/D RATIO: 1.02
PV PEAK D VEL: 0.43 M/S
PV PEAK S VEL: 0.44 M/S
PV PEAK VELOCITY: 0.93 CM/S
RA PRESSURE: 3 MMHG
RIGHT VENTRICULAR END-DIASTOLIC DIMENSION: 2.94 CM
SINUS: 2.05 CM
TR MAX PG: 39 MMHG
TRICUSPID ANNULAR PLANE SYSTOLIC EXCURSION: 2.02 CM
TV REST PULMONARY ARTERY PRESSURE: 42 MMHG

## 2019-10-03 ENCOUNTER — PATIENT MESSAGE (OUTPATIENT)
Dept: INTERNAL MEDICINE | Facility: CLINIC | Age: 59
End: 2019-10-03

## 2019-10-03 ENCOUNTER — PATIENT MESSAGE (OUTPATIENT)
Dept: CARDIOLOGY | Facility: CLINIC | Age: 59
End: 2019-10-03

## 2019-10-23 ENCOUNTER — OFFICE VISIT (OUTPATIENT)
Dept: INTERNAL MEDICINE | Facility: CLINIC | Age: 59
End: 2019-10-23
Payer: COMMERCIAL

## 2019-10-23 VITALS
WEIGHT: 232.06 LBS | BODY MASS INDEX: 34.37 KG/M2 | DIASTOLIC BLOOD PRESSURE: 88 MMHG | HEART RATE: 67 BPM | SYSTOLIC BLOOD PRESSURE: 122 MMHG | OXYGEN SATURATION: 92 % | HEIGHT: 69 IN

## 2019-10-23 DIAGNOSIS — E78.00 PURE HYPERCHOLESTEROLEMIA: ICD-10-CM

## 2019-10-23 DIAGNOSIS — I50.22 CHRONIC SYSTOLIC CONGESTIVE HEART FAILURE: ICD-10-CM

## 2019-10-23 DIAGNOSIS — J06.9 UPPER RESPIRATORY TRACT INFECTION, UNSPECIFIED TYPE: Primary | ICD-10-CM

## 2019-10-23 PROCEDURE — 99214 OFFICE O/P EST MOD 30 MIN: CPT | Mod: S$GLB,,, | Performed by: INTERNAL MEDICINE

## 2019-10-23 PROCEDURE — 99214 PR OFFICE/OUTPT VISIT, EST, LEVL IV, 30-39 MIN: ICD-10-PCS | Mod: S$GLB,,, | Performed by: INTERNAL MEDICINE

## 2019-10-23 PROCEDURE — 99999 PR PBB SHADOW E&M-EST. PATIENT-LVL III: CPT | Mod: PBBFAC,,, | Performed by: INTERNAL MEDICINE

## 2019-10-23 PROCEDURE — 3008F BODY MASS INDEX DOCD: CPT | Mod: CPTII,S$GLB,, | Performed by: INTERNAL MEDICINE

## 2019-10-23 PROCEDURE — 3008F PR BODY MASS INDEX (BMI) DOCUMENTED: ICD-10-PCS | Mod: CPTII,S$GLB,, | Performed by: INTERNAL MEDICINE

## 2019-10-23 PROCEDURE — 99999 PR PBB SHADOW E&M-EST. PATIENT-LVL III: ICD-10-PCS | Mod: PBBFAC,,, | Performed by: INTERNAL MEDICINE

## 2019-10-23 RX ORDER — HYDROCODONE BITARTRATE AND HOMATROPINE METHYLBROMIDE ORAL SOLUTION 5; 1.5 MG/5ML; MG/5ML
5 LIQUID ORAL EVERY 4 HOURS PRN
Qty: 120 ML | Refills: 0 | Status: SHIPPED | OUTPATIENT
Start: 2019-10-23 | End: 2019-11-02

## 2019-10-23 RX ORDER — METHYLPREDNISOLONE 4 MG/1
TABLET ORAL
Qty: 1 PACKAGE | Refills: 0 | Status: SHIPPED | OUTPATIENT
Start: 2019-10-23 | End: 2021-05-18

## 2019-10-23 NOTE — PROGRESS NOTES
Subjective:       Patient ID: Ar Sharma is a 58 y.o. male.    Chief Complaint: Sore Throat (body aches) and Cough    HPI  Pt with 3 days of coryza, min prod cough, sl SINGH.  No F/C, no N/V/D.  Got his fluvax.  Having trouble tolerating statins.  Review of Systems   All other systems reviewed and are negative.      Objective:      Physical Exam   Constitutional: He appears well-developed.   obese   HENT:   Head: Normocephalic.   Eyes: EOM are normal.   Neck: Normal range of motion.   Cardiovascular: Normal rate, regular rhythm, normal heart sounds and intact distal pulses.   Pulmonary/Chest: Effort normal. He has wheezes (few scattered).   Abdominal: Bowel sounds are normal.   Musculoskeletal: Normal range of motion. He exhibits no edema.   Neurological: He is alert.   Skin: Skin is warm and dry.   Psychiatric: He has a normal mood and affect.   Vitals reviewed.      Assessment:       1. Upper respiratory tract infection, unspecified type    2. Chronic systolic congestive heart failure    3. Pure hypercholesterolemia        Plan:       Ar was seen today for sore throat and cough.    Diagnoses and all orders for this visit:    Upper respiratory tract infection, unspecified type  -     methylPREDNISolone (MEDROL DOSEPACK) 4 mg tablet; use as directed  -     hydrocodone-homatropine 5-1.5 mg/5 ml (HYCODAN) 5-1.5 mg/5 mL Syrp; Take 5 mLs by mouth every 4 (four) hours as needed (cough).    Chronic systolic congestive heart failure   Compensated    Pure hypercholesterolemia   Add Coenzyme Q10    Follow up if symptoms worsen or fail to improve.

## 2019-11-20 ENCOUNTER — CLINICAL SUPPORT (OUTPATIENT)
Dept: CARDIOLOGY | Facility: CLINIC | Age: 59
End: 2019-11-20
Payer: COMMERCIAL

## 2019-11-20 DIAGNOSIS — Z95.810 CARDIAC RESYNCHRONIZATION THERAPY DEFIBRILLATOR (CRT-D) IN PLACE: Primary | ICD-10-CM

## 2019-11-20 PROCEDURE — 93289 INTERROG DEVICE EVAL HEART: CPT | Mod: 26,,, | Performed by: INTERNAL MEDICINE

## 2019-11-20 PROCEDURE — 93289 PR INTERROG EVAL, IN PERSON,CARDVERT/DEFIB: ICD-10-PCS | Mod: 26,,, | Performed by: INTERNAL MEDICINE

## 2019-12-03 ENCOUNTER — PATIENT OUTREACH (OUTPATIENT)
Dept: ADMINISTRATIVE | Facility: HOSPITAL | Age: 59
End: 2019-12-03

## 2019-12-09 DIAGNOSIS — I50.22 CHRONIC SYSTOLIC CONGESTIVE HEART FAILURE: ICD-10-CM

## 2019-12-10 RX ORDER — FUROSEMIDE 40 MG/1
TABLET ORAL
Qty: 90 TABLET | Refills: 1 | Status: SHIPPED | OUTPATIENT
Start: 2019-12-10 | End: 2020-06-17 | Stop reason: SDUPTHER

## 2019-12-10 RX ORDER — CARVEDILOL 25 MG/1
TABLET ORAL
Qty: 180 TABLET | Refills: 1 | Status: SHIPPED | OUTPATIENT
Start: 2019-12-10 | End: 2020-06-17 | Stop reason: SDUPTHER

## 2019-12-11 ENCOUNTER — LAB VISIT (OUTPATIENT)
Dept: LAB | Facility: HOSPITAL | Age: 59
End: 2019-12-11
Attending: INTERNAL MEDICINE
Payer: COMMERCIAL

## 2019-12-11 DIAGNOSIS — E03.9 HYPOTHYROIDISM, UNSPECIFIED TYPE: ICD-10-CM

## 2019-12-11 DIAGNOSIS — E78.00 PURE HYPERCHOLESTEROLEMIA: ICD-10-CM

## 2019-12-11 DIAGNOSIS — N18.30 CKD (CHRONIC KIDNEY DISEASE), STAGE III: ICD-10-CM

## 2019-12-11 LAB
ALBUMIN SERPL BCP-MCNC: 4 G/DL (ref 3.5–5.2)
ANION GAP SERPL CALC-SCNC: 8 MMOL/L (ref 8–16)
BUN SERPL-MCNC: 29 MG/DL (ref 2–20)
CALCIUM SERPL-MCNC: 9 MG/DL (ref 8.7–10.5)
CHLORIDE SERPL-SCNC: 102 MMOL/L (ref 95–110)
CHOLEST SERPL-MCNC: 172 MG/DL (ref 120–199)
CHOLEST/HDLC SERPL: 5.1 {RATIO} (ref 2–5)
CO2 SERPL-SCNC: 30 MMOL/L (ref 23–29)
CREAT SERPL-MCNC: 1.76 MG/DL (ref 0.5–1.4)
EST. GFR  (AFRICAN AMERICAN): 47.9 ML/MIN/1.73 M^2
EST. GFR  (NON AFRICAN AMERICAN): 41.4 ML/MIN/1.73 M^2
GLUCOSE SERPL-MCNC: 94 MG/DL (ref 70–110)
HDLC SERPL-MCNC: 34 MG/DL (ref 40–75)
HDLC SERPL: 19.8 % (ref 20–50)
LDLC SERPL CALC-MCNC: 110.8 MG/DL (ref 63–159)
NONHDLC SERPL-MCNC: 138 MG/DL
PHOSPHATE SERPL-MCNC: 4.1 MG/DL (ref 2.7–4.5)
POTASSIUM SERPL-SCNC: 4.6 MMOL/L (ref 3.5–5.1)
SODIUM SERPL-SCNC: 140 MMOL/L (ref 136–145)
T4 FREE SERPL-MCNC: 1.34 NG/DL (ref 0.71–1.51)
TRIGL SERPL-MCNC: 136 MG/DL (ref 30–150)
TSH SERPL DL<=0.005 MIU/L-ACNC: 12.8 UIU/ML (ref 0.4–4)
URATE SERPL-MCNC: 11.2 MG/DL (ref 3.4–7)

## 2019-12-11 PROCEDURE — 36415 COLL VENOUS BLD VENIPUNCTURE: CPT | Mod: PO

## 2019-12-11 PROCEDURE — 84550 ASSAY OF BLOOD/URIC ACID: CPT

## 2019-12-11 PROCEDURE — 84443 ASSAY THYROID STIM HORMONE: CPT | Mod: PO

## 2019-12-11 PROCEDURE — 80069 RENAL FUNCTION PANEL: CPT | Mod: PO

## 2019-12-11 PROCEDURE — 84439 ASSAY OF FREE THYROXINE: CPT

## 2019-12-11 PROCEDURE — 80061 LIPID PANEL: CPT

## 2019-12-11 NOTE — PROGRESS NOTES
ICD Evaluation Report     Date: 11/20/2019     Primary MD: Dr. Newman  Primary Cardiologist: Dr. Stewart  Implanting MD:   and serial number:  Viva Quad XT CRT [id number]; [id number]; Medtronic  Implant date: 12/15/2016  Reason for evaluation: routine   Indication for ICD: unknown      Measurements  Atrial sensing - P wave: 1.0 mV  Atrial capture: Maintained: 1.0 V @ 0.4 ms   Atrial lead impedance: 437 ohms  Ventricular sensing - R wave: 12.0 mV  Ventricular capture: RV Maintained: 0.75 V @ 0.4 ms (M2 - RVC)  LV: Maintained: 1.0 V @ 0.4 ms   Ventricular lead impedance: RV:  437 ohms; LV:  437 ohms  Shock Impedance:  RV 43/ SVC 52 ohms  Underlying rhythm: NSR        Diagnostic Data  Atrial paced: 99.8 % Ventricular paced: 99.8 % (BVp)  Mode switch: none   Other: n/a  Ventricular Events: 11/16/2019 v rate similar to bigemin rate  bpm  Battery status: satisfactory Magnet rate: 98.5 bpm   Estimated battery longevity:  1.9 years     VT Zones and Therapies:  AT/AF monitor > 171 bpm All Rx Off  VF > 200 bpm ATP during charging, 35 J x 6  FVT via -231 bpm Burst(2), 10J, 35J x 4  VT on 122-200 bpm Burst(3) 5J, 35J x 4        Final Parameters  Mode: DDDR Lower rate: 60 bpm Upper rate: 115 bpm  AV Delay: 170/110 ms Mode Switch: 70 Rate Response: On - max sensor 115 bpm  Atrial - Amplitude: 2.0 V Pulse width: 0.4 ms Sensitivity: 0.3 mV   R Ventricular - Amplitude: 2.0 V Pulse width: 0.4 ms Sensitivity: 0.3 mV   LV output:  1.5 V @ 0.4 msec  Polarity: Bipolar      Changes made:  No changes         Conclusions: normal CRT-D function.         Follow up: 3 months        Prashant Sepulveda

## 2019-12-13 DIAGNOSIS — I47.20 V-TACH: ICD-10-CM

## 2019-12-13 RX ORDER — MEXILETINE HYDROCHLORIDE 150 MG/1
CAPSULE ORAL
Qty: 180 CAPSULE | Refills: 4 | Status: SHIPPED | OUTPATIENT
Start: 2019-12-13 | End: 2020-06-17 | Stop reason: SDUPTHER

## 2019-12-18 ENCOUNTER — OFFICE VISIT (OUTPATIENT)
Dept: INTERNAL MEDICINE | Facility: CLINIC | Age: 59
End: 2019-12-18
Payer: COMMERCIAL

## 2019-12-18 VITALS
OXYGEN SATURATION: 94 % | SYSTOLIC BLOOD PRESSURE: 104 MMHG | HEIGHT: 69 IN | BODY MASS INDEX: 34.65 KG/M2 | HEART RATE: 82 BPM | DIASTOLIC BLOOD PRESSURE: 80 MMHG | WEIGHT: 233.94 LBS

## 2019-12-18 DIAGNOSIS — E78.00 PURE HYPERCHOLESTEROLEMIA: ICD-10-CM

## 2019-12-18 DIAGNOSIS — N18.30 CKD (CHRONIC KIDNEY DISEASE), STAGE III: ICD-10-CM

## 2019-12-18 DIAGNOSIS — M1A.30X0 CHRONIC GOUT DUE TO RENAL IMPAIRMENT WITHOUT TOPHUS, UNSPECIFIED SITE: ICD-10-CM

## 2019-12-18 DIAGNOSIS — I25.10 CORONARY ARTERY DISEASE INVOLVING NATIVE CORONARY ARTERY OF NATIVE HEART WITHOUT ANGINA PECTORIS: ICD-10-CM

## 2019-12-18 DIAGNOSIS — I50.22 CHRONIC SYSTOLIC CONGESTIVE HEART FAILURE: Primary | ICD-10-CM

## 2019-12-18 DIAGNOSIS — E03.9 HYPOTHYROIDISM, UNSPECIFIED TYPE: ICD-10-CM

## 2019-12-18 PROCEDURE — 99999 PR PBB SHADOW E&M-EST. PATIENT-LVL III: ICD-10-PCS | Mod: PBBFAC,,, | Performed by: INTERNAL MEDICINE

## 2019-12-18 PROCEDURE — 90471 PNEUMOCOCCAL CONJUGATE VACCINE 13-VALENT LESS THAN 5YO & GREATER THAN: ICD-10-PCS | Mod: S$GLB,,, | Performed by: INTERNAL MEDICINE

## 2019-12-18 PROCEDURE — 99999 PR PBB SHADOW E&M-EST. PATIENT-LVL III: CPT | Mod: PBBFAC,,, | Performed by: INTERNAL MEDICINE

## 2019-12-18 PROCEDURE — 3008F BODY MASS INDEX DOCD: CPT | Mod: CPTII,S$GLB,, | Performed by: INTERNAL MEDICINE

## 2019-12-18 PROCEDURE — 99214 OFFICE O/P EST MOD 30 MIN: CPT | Mod: 25,S$GLB,, | Performed by: INTERNAL MEDICINE

## 2019-12-18 PROCEDURE — 90670 PNEUMOCOCCAL CONJUGATE VACCINE 13-VALENT LESS THAN 5YO & GREATER THAN: ICD-10-PCS | Mod: S$GLB,,, | Performed by: INTERNAL MEDICINE

## 2019-12-18 PROCEDURE — 90471 IMMUNIZATION ADMIN: CPT | Mod: S$GLB,,, | Performed by: INTERNAL MEDICINE

## 2019-12-18 PROCEDURE — 3008F PR BODY MASS INDEX (BMI) DOCUMENTED: ICD-10-PCS | Mod: CPTII,S$GLB,, | Performed by: INTERNAL MEDICINE

## 2019-12-18 PROCEDURE — 99214 PR OFFICE/OUTPT VISIT, EST, LEVL IV, 30-39 MIN: ICD-10-PCS | Mod: 25,S$GLB,, | Performed by: INTERNAL MEDICINE

## 2019-12-18 PROCEDURE — 90670 PCV13 VACCINE IM: CPT | Mod: S$GLB,,, | Performed by: INTERNAL MEDICINE

## 2019-12-18 RX ORDER — ALLOPURINOL 300 MG/1
300 TABLET ORAL DAILY
Qty: 90 TABLET | Refills: 3 | Status: SHIPPED | OUTPATIENT
Start: 2019-12-18 | End: 2022-03-10 | Stop reason: SDUPTHER

## 2019-12-18 RX ORDER — SIMVASTATIN 40 MG/1
40 TABLET, FILM COATED ORAL NIGHTLY
Qty: 90 TABLET | Refills: 3 | Status: SHIPPED | OUTPATIENT
Start: 2019-12-18 | End: 2019-12-18

## 2019-12-18 RX ORDER — LEVOTHYROXINE SODIUM 125 UG/1
125 TABLET ORAL DAILY
Qty: 90 TABLET | Refills: 3 | Status: SHIPPED | OUTPATIENT
Start: 2019-12-18 | End: 2020-11-20

## 2019-12-18 RX ORDER — PRAVASTATIN SODIUM 40 MG/1
40 TABLET ORAL DAILY
Qty: 90 TABLET | Refills: 4 | Status: SHIPPED | OUTPATIENT
Start: 2019-12-18 | End: 2021-09-15 | Stop reason: SDUPTHER

## 2019-12-18 NOTE — PROGRESS NOTES
Subjective:       Patient ID: Ar Sharma is a 58 y.o. male.    Chief Complaint: Follow-up and Medication Refill (Pravastatin)    HPI  Checkup.  Chart reviewed.  Weight stable.  SINGH improving over time but still present.  LVEF up to 45% as of Oct.  No CP, palpitations, LE edema.  Has had 2 recent gout flares in his R foot.  Back on Prava.  Due for dental cleaning.  Review of Systems   All other systems reviewed and are negative.      Objective:      Physical Exam   Constitutional: He appears well-developed.   obese   HENT:   Head: Normocephalic.   Eyes: EOM are normal.   Neck: Normal range of motion.   Cardiovascular: Normal rate, regular rhythm and intact distal pulses. Exam reveals gallop (soft S3).   Pulmonary/Chest: Effort normal and breath sounds normal.   Abdominal: Soft. Bowel sounds are normal. He exhibits no distension. There is no tenderness.   Musculoskeletal: Normal range of motion. He exhibits no edema.   Neurological: He is alert.   Skin: Skin is warm and dry.   Psychiatric: He has a normal mood and affect.   Vitals reviewed.      Assessment:       1. Chronic systolic congestive heart failure    2. Pure hypercholesterolemia    3. Hypothyroidism, unspecified type    4. Coronary artery disease involving native coronary artery of native heart without angina pectoris    5. CKD (chronic kidney disease), stage III    6. Chronic gout due to renal impairment without tophus, unspecified site        Plan:       Ar was seen today for follow-up and medication refill.    Diagnoses and all orders for this visit:    Chronic systolic congestive heart failure  -     Pneumococcal Conjugate Vaccine (13 Valent) (IM)    Pure hypercholesterolemia  -     simvastatin (ZOCOR) 40 MG tablet; Take 1 tablet (40 mg total) by mouth every evening.    Hypothyroidism, unspecified type  -     TSH; Future  -     levothyroxine (SYNTHROID) 125 MCG tablet; Take 1 tablet (125 mcg total) by mouth once daily.    Coronary artery disease  involving native coronary artery of native heart without angina pectoris  -     Pneumococcal Conjugate Vaccine (13 Valent) (IM)    CKD (chronic kidney disease), stage III  -     Comprehensive metabolic panel; Future    Chronic gout due to renal impairment without tophus, unspecified site  -     Uric acid; Future  -     allopurinol (ZYLOPRIM) 300 MG tablet; Take 1 tablet (300 mg total) by mouth once daily.      Follow up in about 3 months (around 3/18/2020).

## 2019-12-31 ENCOUNTER — CLINICAL SUPPORT (OUTPATIENT)
Dept: CARDIOLOGY | Facility: CLINIC | Age: 59
End: 2019-12-31
Payer: COMMERCIAL

## 2019-12-31 DIAGNOSIS — Z95.810 CARDIAC RESYNCHRONIZATION THERAPY DEFIBRILLATOR (CRT-D) IN PLACE: Primary | ICD-10-CM

## 2019-12-31 PROCEDURE — 93295 DEV INTERROG REMOTE 1/2/MLT: CPT | Mod: S$GLB,,, | Performed by: INTERNAL MEDICINE

## 2019-12-31 PROCEDURE — 93296 REM INTERROG EVL PM/IDS: CPT | Mod: S$GLB,,, | Performed by: INTERNAL MEDICINE

## 2019-12-31 PROCEDURE — 99499 UNLISTED E&M SERVICE: CPT | Mod: S$GLB,,, | Performed by: INTERNAL MEDICINE

## 2019-12-31 PROCEDURE — 93295 PR INTERROG EVAL, REMOTE, UP TO 90 DAYS,CARDVERT/DEFIB: ICD-10-PCS | Mod: S$GLB,,, | Performed by: INTERNAL MEDICINE

## 2019-12-31 PROCEDURE — 93296 PR INTERROG EVAL, REMOTE, UP TO 90 DAYS, PACER/DEFIB,TECH REVIEW: ICD-10-PCS | Mod: S$GLB,,, | Performed by: INTERNAL MEDICINE

## 2019-12-31 PROCEDURE — 99499 NO LOS: ICD-10-PCS | Mod: S$GLB,,, | Performed by: INTERNAL MEDICINE

## 2020-01-07 NOTE — PROGRESS NOTES
Subjective:      Patient ID: Ar Sharma is a 59 y.o. male.    Chief Complaint: No chief complaint on file.    HPI:    ROS Medtronic remote AICD interrogation report downloaded and prepared by medical assistant and interpreted by me and full report scanned into Epic media.  Battery OK with ANTHONY 2 years.  All 3 leads OK.  One monitored nonsustained VT noted. No therapy given.  Normal device function.    Past Medical History:   Diagnosis Date    Congestive heart failure (CHF) 2015    Heart attack     HLD (hyperlipidemia)     Hypertension     Thyroid disease     V tach         Past Surgical History:   Procedure Laterality Date    ablations  03/05/2018    albations  02/01/2018    COLONOSCOPY N/A 12/7/2018    Procedure: COLONOSCOPY/suprep;  Surgeon: Ananya Morillo MD;  Location: [facility];  Service: Endoscopy;  Laterality: N/A;    defibulater N/A 2016    ESOPHAGOGASTRODUODENOSCOPY N/A 12/7/2018    Procedure: EGD (ESOPHAGOGASTRODUODENOSCOPY);  Surgeon: Ananya Morillo MD;  Location: [facility];  Service: Endoscopy;  Laterality: N/A;    JOINT REPLACEMENT         No family history on file.    Social History     Socioeconomic History    Marital status:      Spouse name: Not on file    Number of children: Not on file    Years of education: Not on file    Highest education level: Not on file   Occupational History    Not on file   Social Needs    Financial resource strain: Not on file    Food insecurity:     Worry: Not on file     Inability: Not on file    Transportation needs:     Medical: Not on file     Non-medical: Not on file   Tobacco Use    Smoking status: Former Smoker    Smokeless tobacco: Former User   Substance and Sexual Activity    Alcohol use: No     Frequency: Never    Drug use: No    Sexual activity: Yes     Partners: Female   Lifestyle    Physical activity:     Days per week: Not on file     Minutes per session: Not on file    Stress: Not at all   Relationships     Social connections:     Talks on phone: Not on file     Gets together: Not on file     Attends Mandaen service: Not on file     Active member of club or organization: Not on file     Attends meetings of clubs or organizations: Not on file     Relationship status: Not on file   Other Topics Concern    Not on file   Social History Narrative    Not on file       Current Outpatient Medications on File Prior to Visit   Medication Sig Dispense Refill    allopurinol (ZYLOPRIM) 300 MG tablet Take 1 tablet (300 mg total) by mouth once daily. 90 tablet 3    amiodarone (PACERONE) 200 MG Tab Take 1 tablet (200 mg total) by mouth once daily. 90 tablet 3    aspirin (ECOTRIN) 81 MG EC tablet Take 81 mg by mouth once daily.      carvedilol (COREG) 25 MG tablet TAKE 1 TABLET BY MOUTH TWICE A DAY WITH MEALS 180 tablet 1    furosemide (LASIX) 40 MG tablet TAKE 1 TABLET BY MOUTH EVERY DAY 90 tablet 1    levothyroxine (SYNTHROID) 125 MCG tablet Take 1 tablet (125 mcg total) by mouth once daily. 90 tablet 3    methylPREDNISolone (MEDROL DOSEPACK) 4 mg tablet use as directed (Patient not taking: Reported on 12/18/2019) 1 Package 0    mexiletine (MEXITIL) 150 MG Cap TAKE 1 CAPSULE BY MOUTH TWICE A DAY WITH MEALS 180 capsule 4    nitroGLYCERIN (NITROSTAT) 0.4 MG SL tablet Place 0.4 mg under the tongue every 5 (five) minutes as needed for Chest pain.      pravastatin (PRAVACHOL) 40 MG tablet Take 1 tablet (40 mg total) by mouth once daily. 90 tablet 4    sacubitril-valsartan (ENTRESTO) 49-51 mg per tablet Take 1 tablet by mouth 2 (two) times daily. 180 tablet 3    spironolactone (ALDACTONE) 25 MG tablet Take 1 tablet (25 mg total) by mouth once daily. 90 tablet 3    tadalafil (CIALIS) 5 MG tablet Take 1 tablet (5 mg total) by mouth daily as needed for Erectile Dysfunction. 12 tablet 3     No current facility-administered medications on file prior to visit.        Review of patient's allergies indicates:   Allergen Reactions     Atorvastatin Other (See Comments)     Joint pain     Objective:   There were no vitals filed for this visit.     Physical Exam     Assessment:     1. Cardiac resynchronization therapy defibrillator (CRT-D) in place      Plan:   Diagnoses and all orders for this visit:    Cardiac resynchronization therapy defibrillator (CRT-D) in place         No follow-ups on file.

## 2020-02-17 ENCOUNTER — TELEPHONE (OUTPATIENT)
Dept: CARDIOLOGY | Facility: CLINIC | Age: 60
End: 2020-02-17

## 2020-02-17 NOTE — TELEPHONE ENCOUNTER
Returned pt phone call     Pt was advised that he is due to come back to clinic for a device check in November     Pt was also scheduled for 6 month f/u with Dr. Stewart

## 2020-02-17 NOTE — TELEPHONE ENCOUNTER
----- Message from Pablo Foley sent at 2/17/2020  2:50 PM CST -----  Contact: [phone]/self  Patient calling to schedule a pacemaker device check appointment.  Please call back to assist at [phone]

## 2020-03-13 ENCOUNTER — PATIENT OUTREACH (OUTPATIENT)
Dept: ADMINISTRATIVE | Facility: OTHER | Age: 60
End: 2020-03-13

## 2020-03-17 ENCOUNTER — TELEPHONE (OUTPATIENT)
Dept: CARDIOLOGY | Facility: CLINIC | Age: 60
End: 2020-03-17

## 2020-03-17 NOTE — TELEPHONE ENCOUNTER
Reached out to pt in regards to rescheduling clinical visit this Thursday 3/19 due to COVID-19 concerns     Offered pt the option of a telemedicine visit through pt's patient portal, pt kindly declined and wished to be scheduled for a clinical visit instead

## 2020-03-18 ENCOUNTER — LAB VISIT (OUTPATIENT)
Dept: LAB | Facility: HOSPITAL | Age: 60
End: 2020-03-18
Attending: INTERNAL MEDICINE
Payer: COMMERCIAL

## 2020-03-18 DIAGNOSIS — E03.9 HYPOTHYROIDISM, UNSPECIFIED TYPE: ICD-10-CM

## 2020-03-18 DIAGNOSIS — N18.30 CKD (CHRONIC KIDNEY DISEASE), STAGE III: ICD-10-CM

## 2020-03-18 DIAGNOSIS — M1A.30X0 CHRONIC GOUT DUE TO RENAL IMPAIRMENT WITHOUT TOPHUS, UNSPECIFIED SITE: ICD-10-CM

## 2020-03-18 LAB
ALBUMIN SERPL BCP-MCNC: 3.9 G/DL (ref 3.5–5.2)
ALP SERPL-CCNC: 65 U/L (ref 38–126)
ALT SERPL W/O P-5'-P-CCNC: 25 U/L (ref 10–44)
ANION GAP SERPL CALC-SCNC: 8 MMOL/L (ref 8–16)
AST SERPL-CCNC: 42 U/L (ref 15–46)
BILIRUB SERPL-MCNC: 0.8 MG/DL (ref 0.1–1)
BUN SERPL-MCNC: 33 MG/DL (ref 2–20)
CALCIUM SERPL-MCNC: 8.5 MG/DL (ref 8.7–10.5)
CHLORIDE SERPL-SCNC: 103 MMOL/L (ref 95–110)
CO2 SERPL-SCNC: 27 MMOL/L (ref 23–29)
CREAT SERPL-MCNC: 1.92 MG/DL (ref 0.5–1.4)
EST. GFR  (AFRICAN AMERICAN): 42.8 ML/MIN/1.73 M^2
EST. GFR  (NON AFRICAN AMERICAN): 37 ML/MIN/1.73 M^2
GLUCOSE SERPL-MCNC: 87 MG/DL (ref 70–110)
POTASSIUM SERPL-SCNC: 4.9 MMOL/L (ref 3.5–5.1)
PROT SERPL-MCNC: 6.9 G/DL (ref 6–8.4)
SODIUM SERPL-SCNC: 138 MMOL/L (ref 136–145)
T4 FREE SERPL-MCNC: 1.2 NG/DL (ref 0.71–1.51)
TSH SERPL DL<=0.005 MIU/L-ACNC: 8.99 UIU/ML (ref 0.4–4)
URATE SERPL-MCNC: 11.6 MG/DL (ref 3.4–7)

## 2020-03-18 PROCEDURE — 84439 ASSAY OF FREE THYROXINE: CPT

## 2020-03-18 PROCEDURE — 36415 COLL VENOUS BLD VENIPUNCTURE: CPT | Mod: PO

## 2020-03-18 PROCEDURE — 84550 ASSAY OF BLOOD/URIC ACID: CPT

## 2020-03-18 PROCEDURE — 80053 COMPREHEN METABOLIC PANEL: CPT | Mod: PO

## 2020-03-18 PROCEDURE — 84443 ASSAY THYROID STIM HORMONE: CPT | Mod: PO

## 2020-05-19 ENCOUNTER — PATIENT OUTREACH (OUTPATIENT)
Dept: ADMINISTRATIVE | Facility: OTHER | Age: 60
End: 2020-05-19

## 2020-05-21 ENCOUNTER — OFFICE VISIT (OUTPATIENT)
Dept: INTERNAL MEDICINE | Facility: CLINIC | Age: 60
End: 2020-05-21
Payer: COMMERCIAL

## 2020-05-21 ENCOUNTER — OFFICE VISIT (OUTPATIENT)
Dept: CARDIOLOGY | Facility: CLINIC | Age: 60
End: 2020-05-21
Payer: COMMERCIAL

## 2020-05-21 VITALS
TEMPERATURE: 97 F | HEART RATE: 77 BPM | HEIGHT: 69 IN | SYSTOLIC BLOOD PRESSURE: 100 MMHG | OXYGEN SATURATION: 98 % | DIASTOLIC BLOOD PRESSURE: 62 MMHG | BODY MASS INDEX: 33.92 KG/M2 | WEIGHT: 229 LBS

## 2020-05-21 VITALS
WEIGHT: 228.19 LBS | SYSTOLIC BLOOD PRESSURE: 100 MMHG | OXYGEN SATURATION: 98 % | HEIGHT: 69 IN | HEART RATE: 78 BPM | BODY MASS INDEX: 33.8 KG/M2 | DIASTOLIC BLOOD PRESSURE: 68 MMHG

## 2020-05-21 DIAGNOSIS — N18.30 CKD (CHRONIC KIDNEY DISEASE), STAGE III: ICD-10-CM

## 2020-05-21 DIAGNOSIS — I25.10 CORONARY ARTERY DISEASE INVOLVING NATIVE CORONARY ARTERY OF NATIVE HEART WITHOUT ANGINA PECTORIS: ICD-10-CM

## 2020-05-21 DIAGNOSIS — M1A.30X0 CHRONIC GOUT DUE TO RENAL IMPAIRMENT WITHOUT TOPHUS, UNSPECIFIED SITE: ICD-10-CM

## 2020-05-21 DIAGNOSIS — E78.00 PURE HYPERCHOLESTEROLEMIA: ICD-10-CM

## 2020-05-21 DIAGNOSIS — D75.839 THROMBOCYTOSIS: ICD-10-CM

## 2020-05-21 DIAGNOSIS — E03.9 HYPOTHYROIDISM, UNSPECIFIED TYPE: ICD-10-CM

## 2020-05-21 DIAGNOSIS — D50.9 IRON DEFICIENCY ANEMIA, UNSPECIFIED IRON DEFICIENCY ANEMIA TYPE: ICD-10-CM

## 2020-05-21 DIAGNOSIS — Z86.79 S/P ABLATION OF VENTRICULAR ARRHYTHMIA: ICD-10-CM

## 2020-05-21 DIAGNOSIS — I47.20 V-TACH: ICD-10-CM

## 2020-05-21 DIAGNOSIS — Z98.890 S/P ABLATION OF VENTRICULAR ARRHYTHMIA: ICD-10-CM

## 2020-05-21 DIAGNOSIS — Z95.810 CARDIAC RESYNCHRONIZATION THERAPY DEFIBRILLATOR (CRT-D) IN PLACE: ICD-10-CM

## 2020-05-21 DIAGNOSIS — I50.22 CHRONIC SYSTOLIC CHF, NYHA CLASS 2 AND ACA/AHA STAGE C: Primary | ICD-10-CM

## 2020-05-21 DIAGNOSIS — Z00.00 ROUTINE GENERAL MEDICAL EXAMINATION AT A HEALTH CARE FACILITY: Primary | ICD-10-CM

## 2020-05-21 DIAGNOSIS — I50.22 CHRONIC SYSTOLIC CONGESTIVE HEART FAILURE: ICD-10-CM

## 2020-05-21 PROCEDURE — 99999 PR PBB SHADOW E&M-EST. PATIENT-LVL III: CPT | Mod: PBBFAC,,, | Performed by: INTERNAL MEDICINE

## 2020-05-21 PROCEDURE — 90732 PNEUMOCOCCAL POLYSACCHARIDE VACCINE 23-VALENT =>2YO SQ IM: ICD-10-PCS | Mod: S$GLB,,, | Performed by: INTERNAL MEDICINE

## 2020-05-21 PROCEDURE — 99396 PR PREVENTIVE VISIT,EST,40-64: ICD-10-PCS | Mod: 25,S$GLB,, | Performed by: INTERNAL MEDICINE

## 2020-05-21 PROCEDURE — 99214 OFFICE O/P EST MOD 30 MIN: CPT | Mod: S$GLB,,, | Performed by: INTERNAL MEDICINE

## 2020-05-21 PROCEDURE — 3008F PR BODY MASS INDEX (BMI) DOCUMENTED: ICD-10-PCS | Mod: CPTII,S$GLB,, | Performed by: INTERNAL MEDICINE

## 2020-05-21 PROCEDURE — 99999 PR PBB SHADOW E&M-EST. PATIENT-LVL III: ICD-10-PCS | Mod: PBBFAC,,, | Performed by: INTERNAL MEDICINE

## 2020-05-21 PROCEDURE — 90471 PNEUMOCOCCAL POLYSACCHARIDE VACCINE 23-VALENT =>2YO SQ IM: ICD-10-PCS | Mod: S$GLB,,, | Performed by: INTERNAL MEDICINE

## 2020-05-21 PROCEDURE — 90732 PPSV23 VACC 2 YRS+ SUBQ/IM: CPT | Mod: S$GLB,,, | Performed by: INTERNAL MEDICINE

## 2020-05-21 PROCEDURE — 99214 PR OFFICE/OUTPT VISIT, EST, LEVL IV, 30-39 MIN: ICD-10-PCS | Mod: S$GLB,,, | Performed by: INTERNAL MEDICINE

## 2020-05-21 PROCEDURE — 90471 IMMUNIZATION ADMIN: CPT | Mod: S$GLB,,, | Performed by: INTERNAL MEDICINE

## 2020-05-21 PROCEDURE — 3008F BODY MASS INDEX DOCD: CPT | Mod: CPTII,S$GLB,, | Performed by: INTERNAL MEDICINE

## 2020-05-21 PROCEDURE — 99396 PREV VISIT EST AGE 40-64: CPT | Mod: 25,S$GLB,, | Performed by: INTERNAL MEDICINE

## 2020-05-21 NOTE — PROGRESS NOTES
Subjective:       Patient ID: Ar Sharma is a 59 y.o. male.    Chief Complaint: Follow-up (here for resultsl)    HPI  Wellness check.  Chart reviewed.  Weight stable.  No CP, palpitations, defibrillator firing.  SINGH at baseline.  No cough, F/C.  Denies orthostasis.  Still on synthroid 100.  No gout flares.  Review of Systems   All other systems reviewed and are negative.      Objective:      Physical Exam   Constitutional: He appears well-developed.   obese   HENT:   Head: Normocephalic.   Eyes: EOM are normal.   Neck: Normal range of motion.   Cardiovascular: Normal rate, regular rhythm, normal heart sounds and intact distal pulses.   Pulmonary/Chest: Effort normal and breath sounds normal.   Abdominal: Soft. Bowel sounds are normal. He exhibits no distension. There is no tenderness.   Musculoskeletal: Normal range of motion. He exhibits no edema.   Neurological: He is alert.   Skin: Skin is warm and dry.   Psychiatric: He has a normal mood and affect.   Vitals reviewed.      Assessment:       1. Routine general medical examination at a health care facility    2. Chronic systolic congestive heart failure    3. Pure hypercholesterolemia    4. Hypothyroidism, unspecified type    5. Coronary artery disease involving native coronary artery of native heart without angina pectoris    6. CKD (chronic kidney disease), stage III    7. Chronic gout due to renal impairment without tophus, unspecified site    8. V-tach    9. Thrombocytosis        Plan:       Ar was seen today for follow-up.    Diagnoses and all orders for this visit:    Routine general medical examination at a health care facility    Chronic systolic congestive heart failure  -     Pneumococcal Polysaccharide Vaccine (23 Valent) (SQ/IM)   Cont rx    Pure hypercholesterolemia  -     Lipid Panel; Future    Hypothyroidism, unspecified type  -     TSH; Future    Coronary artery disease involving native coronary artery of native heart without angina  pectoris  -     CBC auto differential; Future    CKD (chronic kidney disease), stage III  -     Comprehensive metabolic panel; Future    Chronic gout due to renal impairment without tophus, unspecified site  -     Uric acid; Future    V-tach   Quiescent    Thrombocytosis  -     CBC auto differential; Future      Follow up in about 6 months (around 11/21/2020).

## 2020-05-21 NOTE — PROGRESS NOTES
Subjective:    Patient ID:  Ar Sharma is a 59 y.o. male who presents for follow-up of Congestive Heart Failure      HPI    60 y/o male with a hx of CAD (states he had MI in his 20's), HFrEF with EF 30-35% initially with last EF 45% (10/19) with SLAVA 4.78 cm, s/p CRT-D (initial ICD 2012 with upgrade 2016 - Medtronic), DD, VT s/p ablation x 2 (2/2018 and again 3/2018), HLD, hypothyroidism, YOEL. Initially seen by Dr Newman and it sounds like he was in ADHF, lasix dose increased, and symptoms had significantly improved. Was very SOB with minimal activity, with bilateral LE edema/orthopnea/PND. Increase in lasix dose resulted in SOB back at baseline and orthopnea/PND resolved, with improvement in edema. Entresto started after last VT ablation. Entresto dose was increased in Dec 2018. Did not tolerate it well, however, current dose tolerated (49-51). Had ICD shocks in 12/2018 (per interrogation mentions 5 times), he remembers one.   Doing well since last clinic visit. Currently with NYHA FC II symptoms. Denies CP, SOB/SINGH, palps, syncope. Compliant with meds.     Review of Systems   Constitution: Negative for malaise/fatigue.   HENT: Negative for congestion.    Eyes: Negative for blurred vision.   Cardiovascular: Negative for chest pain, claudication, cyanosis, dyspnea on exertion, irregular heartbeat, leg swelling, near-syncope, orthopnea, palpitations, paroxysmal nocturnal dyspnea and syncope.   Respiratory: Negative for shortness of breath.    Endocrine: Negative for polyuria.   Hematologic/Lymphatic: Negative for bleeding problem.   Skin: Negative for itching and rash.   Musculoskeletal: Negative for joint swelling, muscle cramps and muscle weakness.   Gastrointestinal: Negative for abdominal pain, hematemesis, hematochezia, melena, nausea and vomiting.   Genitourinary: Negative for dysuria and hematuria.   Neurological: Negative for dizziness, focal weakness, headaches, light-headedness, loss of balance and  weakness.   Psychiatric/Behavioral: Negative for depression. The patient is not nervous/anxious.         Objective:    Physical Exam   Constitutional: He is oriented to person, place, and time. He appears well-developed and well-nourished.   HENT:   Head: Normocephalic and atraumatic.   Neck: Neck supple. No JVD present.   Cardiovascular: Normal rate, regular rhythm and normal heart sounds.   Pulses:       Carotid pulses are 2+ on the right side, and 2+ on the left side.       Radial pulses are 2+ on the right side, and 2+ on the left side.        Femoral pulses are 2+ on the right side, and 2+ on the left side.       Dorsalis pedis pulses are 2+ on the right side, and 2+ on the left side.        Posterior tibial pulses are 2+ on the right side, and 2+ on the left side.   Pulmonary/Chest: Effort normal and breath sounds normal.   Abdominal: Soft. Bowel sounds are normal.   Musculoskeletal: He exhibits no edema.   Neurological: He is alert and oriented to person, place, and time.   Skin: Skin is warm and dry.   Psychiatric: He has a normal mood and affect. His behavior is normal. Thought content normal.         Assessment:       1. Chronic systolic CHF, NYHA class 2 and MARIFER/AHA stage C    2. Coronary artery disease involving native coronary artery of native heart without angina pectoris    3. S/P ablation of ventricular arrhythmia    4. Pure hypercholesterolemia    5. Cardiac resynchronization therapy defibrillator (CRT-D) in place    6. V-tach    7. CKD (chronic kidney disease), stage III    8. Iron deficiency anemia, unspecified iron deficiency anemia type    9. Hypothyroidism, unspecified type      58 y/o pt with hx and presentation as above. Doing well from a cardiac perspective and compensated from a HF perspective, currently with NYHA FC II symptoms. Needs to stay active and lose weight. Discussed the etiology, evaluation, and management of CHF, HLD, ICD, CRT, CKD, YOEL. Discussed the importance of med  compliance, heart healthy diet, and regular exercise.        Plan:       -Continue current medical management  -f/u in 6 months

## 2020-06-17 DIAGNOSIS — I50.22 CHRONIC SYSTOLIC CONGESTIVE HEART FAILURE: ICD-10-CM

## 2020-06-17 DIAGNOSIS — I47.20 V-TACH: ICD-10-CM

## 2020-06-17 RX ORDER — MEXILETINE HYDROCHLORIDE 150 MG/1
150 CAPSULE ORAL 2 TIMES DAILY WITH MEALS
Qty: 180 CAPSULE | Refills: 4 | Status: SHIPPED | OUTPATIENT
Start: 2020-06-17 | End: 2020-07-17 | Stop reason: SDUPTHER

## 2020-06-17 RX ORDER — CARVEDILOL 25 MG/1
25 TABLET ORAL 2 TIMES DAILY WITH MEALS
Qty: 180 TABLET | Refills: 4 | Status: SHIPPED | OUTPATIENT
Start: 2020-06-17 | End: 2020-11-12

## 2020-06-17 RX ORDER — FUROSEMIDE 40 MG/1
40 TABLET ORAL DAILY
Qty: 90 TABLET | Refills: 4 | Status: SHIPPED | OUTPATIENT
Start: 2020-06-17 | End: 2021-01-29 | Stop reason: SDUPTHER

## 2020-06-17 RX ORDER — SACUBITRIL AND VALSARTAN 49; 51 MG/1; MG/1
1 TABLET, FILM COATED ORAL 2 TIMES DAILY
Qty: 180 TABLET | Refills: 4 | Status: SHIPPED | OUTPATIENT
Start: 2020-06-17 | End: 2020-06-19

## 2020-06-17 RX ORDER — SPIRONOLACTONE 25 MG/1
25 TABLET ORAL DAILY
Qty: 90 TABLET | Refills: 4 | Status: SHIPPED | OUTPATIENT
Start: 2020-06-17 | End: 2020-08-17

## 2020-07-17 DIAGNOSIS — I47.20 V-TACH: ICD-10-CM

## 2020-07-17 RX ORDER — MEXILETINE HYDROCHLORIDE 150 MG/1
150 CAPSULE ORAL 2 TIMES DAILY WITH MEALS
Qty: 180 CAPSULE | Refills: 4 | Status: SHIPPED | OUTPATIENT
Start: 2020-07-17 | End: 2021-10-27 | Stop reason: SDUPTHER

## 2020-07-17 NOTE — TELEPHONE ENCOUNTER
----- Message from Ashanti Holliday sent at 7/17/2020 12:44 PM CDT -----  Pt needs a refill on  Mexiletine 150 Mg called into pharmacy at  [facility] [phone]    Pt can be reached at [phone]

## 2020-08-17 DIAGNOSIS — I50.22 CHRONIC SYSTOLIC CONGESTIVE HEART FAILURE: ICD-10-CM

## 2020-08-17 DIAGNOSIS — N52.9 IMPOTENCE: ICD-10-CM

## 2020-08-17 RX ORDER — TADALAFIL 5 MG/1
5 TABLET ORAL DAILY PRN
Qty: 12 TABLET | Refills: 3 | OUTPATIENT
Start: 2020-08-17

## 2020-08-17 RX ORDER — SPIRONOLACTONE 25 MG/1
TABLET ORAL
Qty: 90 TABLET | Refills: 4 | Status: SHIPPED | OUTPATIENT
Start: 2020-08-17 | End: 2021-08-21

## 2020-08-18 DIAGNOSIS — N52.9 IMPOTENCE: ICD-10-CM

## 2020-08-18 RX ORDER — TADALAFIL 5 MG/1
5 TABLET ORAL DAILY PRN
Qty: 12 TABLET | Refills: 3 | OUTPATIENT
Start: 2020-08-18

## 2020-08-18 NOTE — TELEPHONE ENCOUNTER
Returned call back to pt,  Stated that he need paperwork filled out, pt will drop of at temp station.

## 2020-08-19 ENCOUNTER — OFFICE VISIT (OUTPATIENT)
Dept: INTERNAL MEDICINE | Facility: CLINIC | Age: 60
End: 2020-08-19
Payer: MEDICARE

## 2020-08-19 VITALS
BODY MASS INDEX: 34.82 KG/M2 | OXYGEN SATURATION: 92 % | HEIGHT: 69 IN | SYSTOLIC BLOOD PRESSURE: 104 MMHG | WEIGHT: 235.13 LBS | DIASTOLIC BLOOD PRESSURE: 70 MMHG | HEART RATE: 80 BPM

## 2020-08-19 DIAGNOSIS — E03.9 HYPOTHYROIDISM, UNSPECIFIED TYPE: ICD-10-CM

## 2020-08-19 DIAGNOSIS — N52.9 IMPOTENCE: ICD-10-CM

## 2020-08-19 DIAGNOSIS — I50.22 CHRONIC SYSTOLIC CONGESTIVE HEART FAILURE: Primary | ICD-10-CM

## 2020-08-19 DIAGNOSIS — I25.10 CORONARY ARTERY DISEASE INVOLVING NATIVE CORONARY ARTERY OF NATIVE HEART WITHOUT ANGINA PECTORIS: ICD-10-CM

## 2020-08-19 PROCEDURE — 99214 PR OFFICE/OUTPT VISIT, EST, LEVL IV, 30-39 MIN: ICD-10-PCS | Mod: HCNC,S$GLB,, | Performed by: INTERNAL MEDICINE

## 2020-08-19 PROCEDURE — 99499 RISK ADDL DX/OHS AUDIT: ICD-10-PCS | Mod: S$GLB,,, | Performed by: INTERNAL MEDICINE

## 2020-08-19 PROCEDURE — 99999 PR PBB SHADOW E&M-EST. PATIENT-LVL IV: ICD-10-PCS | Mod: PBBFAC,HCNC,, | Performed by: INTERNAL MEDICINE

## 2020-08-19 PROCEDURE — 3008F PR BODY MASS INDEX (BMI) DOCUMENTED: ICD-10-PCS | Mod: HCNC,CPTII,S$GLB, | Performed by: INTERNAL MEDICINE

## 2020-08-19 PROCEDURE — 99999 PR PBB SHADOW E&M-EST. PATIENT-LVL IV: CPT | Mod: PBBFAC,HCNC,, | Performed by: INTERNAL MEDICINE

## 2020-08-19 PROCEDURE — 99499 UNLISTED E&M SERVICE: CPT | Mod: S$GLB,,, | Performed by: INTERNAL MEDICINE

## 2020-08-19 PROCEDURE — 3008F BODY MASS INDEX DOCD: CPT | Mod: HCNC,CPTII,S$GLB, | Performed by: INTERNAL MEDICINE

## 2020-08-19 PROCEDURE — 99214 OFFICE O/P EST MOD 30 MIN: CPT | Mod: HCNC,S$GLB,, | Performed by: INTERNAL MEDICINE

## 2020-08-19 RX ORDER — TADALAFIL 5 MG/1
5 TABLET ORAL DAILY PRN
Qty: 12 TABLET | Refills: 3 | Status: SHIPPED | OUTPATIENT
Start: 2020-08-19 | End: 2021-07-27

## 2020-08-19 RX ORDER — NITROGLYCERIN 0.4 MG/1
0.4 TABLET SUBLINGUAL EVERY 5 MIN PRN
Qty: 20 TABLET | Refills: 1 | Status: SHIPPED | OUTPATIENT
Start: 2020-08-19 | End: 2022-06-02 | Stop reason: SDUPTHER

## 2020-08-19 NOTE — PROGRESS NOTES
Subjective:       Patient ID: Ar Sharma is a 59 y.o. male.    Chief Complaint: Medication Refill (Cialis and need form filled out)    HPI  Chart reviewed.  Weight up 6 lbs/ 2 mo.  Pt requesting cialis rx.  Ins won't pay for Levitra or Viagra.  Pt denies any recent orthostasis, SOB, palpitations.  No defibrillator firing.  No use of SL NTG in years.  Review of Systems   All other systems reviewed and are negative.      Objective:      Physical Exam  Vitals signs reviewed.   Constitutional:       General: He is not in acute distress.     Appearance: He is well-developed. He is obese.   HENT:      Head: Normocephalic.      Mouth/Throat:      Mouth: Mucous membranes are moist.   Eyes:      General: No scleral icterus.     Extraocular Movements: Extraocular movements intact.   Neck:      Musculoskeletal: Normal range of motion.   Cardiovascular:      Rate and Rhythm: Normal rate and regular rhythm.      Heart sounds: Normal heart sounds.   Pulmonary:      Effort: Pulmonary effort is normal.      Breath sounds: Normal breath sounds.   Abdominal:      General: Bowel sounds are normal. There is no distension.      Palpations: Abdomen is soft. There is no mass.      Tenderness: There is no abdominal tenderness.   Musculoskeletal: Normal range of motion.      Right lower leg: Edema (tr) present.      Left lower leg: Edema (tr) present.   Skin:     General: Skin is warm and dry.   Neurological:      Mental Status: He is alert.         Assessment:       1. Chronic systolic congestive heart failure    2. Hypothyroidism, unspecified type    3. Coronary artery disease involving native coronary artery of native heart without angina pectoris    4. Impotence        Plan:       Ar was seen today for medication refill.    Diagnoses and all orders for this visit:    Chronic systolic congestive heart failure   Compensated    Hypothyroidism, unspecified type   Monitor    Coronary artery disease involving native coronary artery of  native heart without angina pectoris  -     nitroGLYCERIN (NITROSTAT) 0.4 MG SL tablet; Place 1 tablet (0.4 mg total) under the tongue every 5 (five) minutes as needed for Chest pain.    Impotence  -     tadalafiL (CIALIS) 5 MG tablet; Take 1 tablet (5 mg total) by mouth daily as needed for Erectile Dysfunction.      Follow up if symptoms worsen or fail to improve.

## 2020-09-17 ENCOUNTER — TELEPHONE (OUTPATIENT)
Dept: CARDIOLOGY | Facility: CLINIC | Age: 60
End: 2020-09-17

## 2020-09-17 NOTE — TELEPHONE ENCOUNTER
----- Message from Padmini Mancera sent at 9/17/2020  2:20 PM CDT -----  Type:  Needs Medical Advice    Who Called: call  Would the patient rather a call back or a response via [facility]? call  Best Call Back Number: [phone]  Additional Information: He was calling to see when should he return for fu and to have pacemaker checked.

## 2020-09-17 NOTE — TELEPHONE ENCOUNTER
Tried reaching out to pt in regards to message     Unable to leave VM requesting a call back     Pt is due for a 6 month f/u in November with Dr. Stewart, lorena was scheduled     Will try reaching out to pt again

## 2020-09-21 ENCOUNTER — TELEPHONE (OUTPATIENT)
Dept: CARDIOLOGY | Facility: CLINIC | Age: 60
End: 2020-09-21

## 2020-09-21 NOTE — TELEPHONE ENCOUNTER
Pt reached out to the office inquiring about when he's due for his medtronic device check    Advised pt that he is not due to f/u until November     Will reach out in the upcoming months when it's time to get the pt scheduled for his device check, pt states he is okay with coming to either the [facility] or [facility] location

## 2020-10-14 ENCOUNTER — TELEPHONE (OUTPATIENT)
Dept: CARDIOLOGY | Facility: CLINIC | Age: 60
End: 2020-10-14

## 2020-10-14 NOTE — TELEPHONE ENCOUNTER
Returned pt phone call     Pt states that his insurance is requesting for pt to have yearly testing done for either an echo or stress test for disability compliance     Pt is requesting to know if orders can be placed for pt to have and echo and nuclear stress test done for insurance    Please place orders

## 2020-10-14 NOTE — TELEPHONE ENCOUNTER
----- Message from Darrius Lam sent at 10/14/2020  1:20 PM CDT -----  Type:  Needs Medical Advice    Who Called: self  Reason:has some questions about tests he needs scheduled   Would the patient rather a call back or a response via [facility]? call  Best Call Back Number: [phone]  Additional Information: none

## 2020-10-16 RX ORDER — SACUBITRIL AND VALSARTAN 49; 51 MG/1; MG/1
1 TABLET, FILM COATED ORAL 2 TIMES DAILY
Qty: 180 TABLET | Refills: 3 | Status: SHIPPED | OUTPATIENT
Start: 2020-10-16 | End: 2021-10-27 | Stop reason: SDUPTHER

## 2020-10-20 DIAGNOSIS — I50.22 NYHA CLASS 2 AND ACC/AHA STAGE C CHRONIC SYSTOLIC CONGESTIVE HEART FAILURE: Primary | ICD-10-CM

## 2020-10-28 ENCOUNTER — PATIENT MESSAGE (OUTPATIENT)
Dept: CARDIOLOGY | Facility: CLINIC | Age: 60
End: 2020-10-28

## 2020-10-30 ENCOUNTER — TELEPHONE (OUTPATIENT)
Dept: CARDIOLOGY | Facility: CLINIC | Age: 60
End: 2020-10-30

## 2020-10-30 NOTE — TELEPHONE ENCOUNTER
Reached out to patient to schedule appointment for the [facility].  Appointment scheduled and confirmed with patient.

## 2020-11-04 ENCOUNTER — HOSPITAL ENCOUNTER (OUTPATIENT)
Dept: CARDIOLOGY | Facility: HOSPITAL | Age: 60
Discharge: HOME | End: 2020-11-04
Attending: NURSE PRACTITIONER
Payer: MEDICARE

## 2020-11-04 VITALS — WEIGHT: 235 LBS | BODY MASS INDEX: 34.8 KG/M2 | HEIGHT: 69 IN

## 2020-11-04 DIAGNOSIS — I50.22 NYHA CLASS 2 AND ACC/AHA STAGE C CHRONIC SYSTOLIC CONGESTIVE HEART FAILURE: ICD-10-CM

## 2020-11-04 LAB
AORTIC ROOT ANNULUS: 3.02 CM
AORTIC VALVE CUSP SEPERATION: 2.07 CM
AV INDEX (PROSTH): 0.75
AV MEAN GRADIENT: 7 MMHG
AV PEAK GRADIENT: 13 MMHG
AV VALVE AREA: 3.04 CM2
AV VELOCITY RATIO: 0.62
BSA FOR ECHO PROCEDURE: 2.28 M2
CV ECHO LV RWT: 0.56 CM
DOP CALC AO PEAK VEL: 1.77 M/S
DOP CALC AO VTI: 37.06 CM
DOP CALC LVOT AREA: 4.1 CM2
DOP CALC LVOT DIAMETER: 2.28 CM
DOP CALC LVOT PEAK VEL: 1.1 M/S
DOP CALC LVOT STROKE VOLUME: 112.67 CM3
DOP CALCLVOT PEAK VEL VTI: 27.61 CM
E WAVE DECELERATION TIME: 216.19 MSEC
E/A RATIO: 1.47
E/E' RATIO: 14 M/S
ECHO LV POSTERIOR WALL: 1.49 CM (ref 0.6–1.1)
FRACTIONAL SHORTENING: 27 % (ref 28–44)
INTERVENTRICULAR SEPTUM: 1.47 CM (ref 0.6–1.1)
IVC PROX: 2.1 CM
LA MAJOR: 6.08 CM
LA MINOR: 5.86 CM
LA WIDTH: 5.21 CM
LEFT INTERNAL DIMENSION IN SYSTOLE: 3.89 CM (ref 2.1–4)
LEFT VENTRICLE DIASTOLIC VOLUME INDEX: 62.56 ML/M2
LEFT VENTRICLE DIASTOLIC VOLUME: 138.39 ML
LEFT VENTRICLE MASS INDEX: 159 G/M2
LEFT VENTRICLE SYSTOLIC VOLUME INDEX: 29.6 ML/M2
LEFT VENTRICLE SYSTOLIC VOLUME: 65.56 ML
LEFT VENTRICULAR INTERNAL DIMENSION IN DIASTOLE: 5.35 CM (ref 3.5–6)
LEFT VENTRICULAR MASS: 350.64 G
LV LATERAL E/E' RATIO: 11.38 M/S
LV SEPTAL E/E' RATIO: 18.2 M/S
MV A" WAVE DURATION": 131 MSEC
MV PEAK A VEL: 0.62 M/S
MV PEAK E VEL: 0.91 M/S
MV STENOSIS PRESSURE HALF TIME: 62.69 MS
MV VALVE AREA P 1/2 METHOD: 3.51 CM2
PISA MRMAX VEL: 0.04 M/S
PISA TR MAX VEL: 4.06 M/S
PULM VEIN A" WAVE DURATION": 145 MSEC
PULM VEIN S/D RATIO: 0.83
PV PEAK D VEL: 0.63 M/S
PV PEAK S VEL: 0.52 M/S
PV PEAK VELOCITY: 0.89 CM/S
RA MAJOR: 5.77 CM
RA PRESSURE: 3 MMHG
RA WIDTH: 5.7 CM
RIGHT VENTRICULAR END-DIASTOLIC DIMENSION: 3.7 CM
SINUS: 2.27 CM
TDI LATERAL: 0.08 M/S
TDI SEPTAL: 0.05 M/S
TDI: 0.07 M/S
TR MAX PG: 66 MMHG
TRICUSPID ANNULAR PLANE SYSTOLIC EXCURSION: 2.31 CM
TV REST PULMONARY ARTERY PRESSURE: 69 MMHG

## 2020-11-04 PROCEDURE — 93306 ECHO (CUPID ONLY): ICD-10-PCS | Mod: 26,,, | Performed by: INTERNAL MEDICINE

## 2020-11-04 PROCEDURE — 93306 TTE W/DOPPLER COMPLETE: CPT | Mod: PO

## 2020-11-04 PROCEDURE — 93306 TTE W/DOPPLER COMPLETE: CPT | Mod: 26,,, | Performed by: INTERNAL MEDICINE

## 2020-11-10 ENCOUNTER — PATIENT OUTREACH (OUTPATIENT)
Dept: ADMINISTRATIVE | Facility: OTHER | Age: 60
End: 2020-11-10

## 2020-11-10 NOTE — PROGRESS NOTES
Health Maintenance Due   Topic Date Due    HIV Screening  12/22/1975     Updates were requested from care everywhere.  Chart was reviewed for overdue [facility] ([facility]) topics (CRS, Breast Cancer Screening, Eye exam)  Health Maintenance has been updated.  [facility] immunization registry triggered.  Immunizations were reconciled.

## 2020-11-12 ENCOUNTER — OFFICE VISIT (OUTPATIENT)
Dept: CARDIOLOGY | Facility: CLINIC | Age: 60
End: 2020-11-12
Payer: MEDICARE

## 2020-11-12 VITALS
DIASTOLIC BLOOD PRESSURE: 76 MMHG | WEIGHT: 233.63 LBS | BODY MASS INDEX: 34.5 KG/M2 | SYSTOLIC BLOOD PRESSURE: 118 MMHG | HEART RATE: 77 BPM | OXYGEN SATURATION: 95 %

## 2020-11-12 DIAGNOSIS — I25.10 CORONARY ARTERY DISEASE INVOLVING NATIVE CORONARY ARTERY OF NATIVE HEART WITHOUT ANGINA PECTORIS: Primary | ICD-10-CM

## 2020-11-12 DIAGNOSIS — Z98.890 S/P ABLATION OF VENTRICULAR ARRHYTHMIA: ICD-10-CM

## 2020-11-12 DIAGNOSIS — E78.00 PURE HYPERCHOLESTEROLEMIA: ICD-10-CM

## 2020-11-12 DIAGNOSIS — I50.22 NYHA CLASS 2 AND ACC/AHA STAGE C CHRONIC SYSTOLIC CONGESTIVE HEART FAILURE: ICD-10-CM

## 2020-11-12 DIAGNOSIS — Z86.79 S/P ABLATION OF VENTRICULAR ARRHYTHMIA: ICD-10-CM

## 2020-11-12 DIAGNOSIS — Z95.810 CARDIAC RESYNCHRONIZATION THERAPY DEFIBRILLATOR (CRT-D) IN PLACE: ICD-10-CM

## 2020-11-12 DIAGNOSIS — R07.9 CHEST PAIN, UNSPECIFIED TYPE: ICD-10-CM

## 2020-11-12 DIAGNOSIS — N18.30 STAGE 3 CHRONIC KIDNEY DISEASE, UNSPECIFIED WHETHER STAGE 3A OR 3B CKD: ICD-10-CM

## 2020-11-12 DIAGNOSIS — D50.9 IRON DEFICIENCY ANEMIA, UNSPECIFIED IRON DEFICIENCY ANEMIA TYPE: ICD-10-CM

## 2020-11-12 DIAGNOSIS — I47.20 V-TACH: ICD-10-CM

## 2020-11-12 PROCEDURE — 99215 OFFICE O/P EST HI 40 MIN: CPT | Mod: S$GLB,,, | Performed by: INTERNAL MEDICINE

## 2020-11-12 PROCEDURE — 99215 PR OFFICE/OUTPT VISIT, EST, LEVL V, 40-54 MIN: ICD-10-PCS | Mod: S$GLB,,, | Performed by: INTERNAL MEDICINE

## 2020-11-12 PROCEDURE — 99499 RISK ADDL DX/OHS AUDIT: ICD-10-PCS | Mod: S$GLB,,, | Performed by: INTERNAL MEDICINE

## 2020-11-12 PROCEDURE — 99999 PR PBB SHADOW E&M-EST. PATIENT-LVL IV: ICD-10-PCS | Mod: PBBFAC,,, | Performed by: INTERNAL MEDICINE

## 2020-11-12 PROCEDURE — 1126F AMNT PAIN NOTED NONE PRSNT: CPT | Mod: S$GLB,,, | Performed by: INTERNAL MEDICINE

## 2020-11-12 PROCEDURE — 1126F PR PAIN SEVERITY QUANTIFIED, NO PAIN PRESENT: ICD-10-PCS | Mod: S$GLB,,, | Performed by: INTERNAL MEDICINE

## 2020-11-12 PROCEDURE — 99499 UNLISTED E&M SERVICE: CPT | Mod: S$GLB,,, | Performed by: INTERNAL MEDICINE

## 2020-11-12 PROCEDURE — 99999 PR PBB SHADOW E&M-EST. PATIENT-LVL IV: CPT | Mod: PBBFAC,,, | Performed by: INTERNAL MEDICINE

## 2020-11-12 PROCEDURE — 3008F PR BODY MASS INDEX (BMI) DOCUMENTED: ICD-10-PCS | Mod: CPTII,S$GLB,, | Performed by: INTERNAL MEDICINE

## 2020-11-12 PROCEDURE — 3008F BODY MASS INDEX DOCD: CPT | Mod: CPTII,S$GLB,, | Performed by: INTERNAL MEDICINE

## 2020-11-12 RX ORDER — CARVEDILOL 12.5 MG/1
12.5 TABLET ORAL 2 TIMES DAILY WITH MEALS
Qty: 180 TABLET | Refills: 3 | Status: SHIPPED | OUTPATIENT
Start: 2020-11-12 | End: 2021-03-18

## 2020-11-12 NOTE — PROGRESS NOTES
Subjective:    Patient ID:  Ar Sharma is a 59 y.o. male who presents for follow-up of Congestive Heart Failure      HPI    60 y/o male with a hx of CAD (states he had MI in his 20's), HFrEF with EF 30-35% initially with last EF 45% (10/19) with SLAVA 4.78 cm, s/p CRT-D (initial ICD 2012 with upgrade 2016 - Medtronic), DD, VT s/p ablation x 2 (2/2018 and again 3/2018), HLD, hypothyroidism, YOEL. Initially seen by Dr Newman and it sounds like he was in ADHF, lasix dose increased, and symptoms had significantly improved. Was very SOB with minimal activity, with bilateral LE edema/orthopnea/PND. Increase in lasix dose resulted in SOB back at baseline and orthopnea/PND resolved, with improvement in edema. Entresto started after last VT ablation. Entresto dose was increased in Dec 2018. Did not tolerate it well, however, current dose tolerated (49-51). Had ICD shocks in 12/2018 (per interrogation mentions 5 times), he remembers one.   Has multiple symptoms. Has been having worsening of chronic angina and currently with CCS class III symptoms. Continues with NYHA FC II symptoms. Has episodic dizziness and fatigue.   Denies palps, syncope. Compliant with meds.     Review of Systems   Constitution: Positive for malaise/fatigue.   HENT: Negative for congestion.    Eyes: Negative for blurred vision.   Cardiovascular: Positive for chest pain, dyspnea on exertion and orthopnea. Negative for claudication, cyanosis, irregular heartbeat, leg swelling, near-syncope, palpitations, paroxysmal nocturnal dyspnea and syncope.   Respiratory: Negative for shortness of breath.    Endocrine: Negative for polyuria.   Hematologic/Lymphatic: Negative for bleeding problem.   Skin: Negative for itching and rash.   Musculoskeletal: Negative for joint swelling, muscle cramps and muscle weakness.   Gastrointestinal: Negative for abdominal pain, hematemesis, hematochezia, melena, nausea and vomiting.   Genitourinary: Negative for dysuria and  hematuria.   Neurological: Positive for dizziness and weakness. Negative for focal weakness, headaches, light-headedness and loss of balance.   Psychiatric/Behavioral: Negative for depression. The patient is not nervous/anxious.         Objective:    Physical Exam   Constitutional: He is oriented to person, place, and time. He appears well-developed and well-nourished.   HENT:   Head: Normocephalic and atraumatic.   Neck: Neck supple. No JVD present.   Cardiovascular: Normal rate, regular rhythm and normal heart sounds.   Pulses:       Carotid pulses are 2+ on the right side and 2+ on the left side.       Radial pulses are 2+ on the right side and 2+ on the left side.        Femoral pulses are 2+ on the right side and 2+ on the left side.       Dorsalis pedis pulses are 2+ on the right side and 2+ on the left side.        Posterior tibial pulses are 2+ on the right side and 2+ on the left side.   Pulmonary/Chest: Effort normal and breath sounds normal.   Abdominal: Soft. Bowel sounds are normal.   Musculoskeletal:         General: No edema.   Neurological: He is alert and oriented to person, place, and time.   Skin: Skin is warm and dry.   Psychiatric: He has a normal mood and affect. His behavior is normal. Thought content normal.         Assessment:       1. Coronary artery disease involving native coronary artery of native heart without angina pectoris    2. V-tach    3. S/P ablation of ventricular arrhythmia    4. Pure hypercholesterolemia    5. Cardiac resynchronization therapy defibrillator (CRT-D) in place    6. Stage 3 chronic kidney disease, unspecified whether stage 3a or 3b CKD    7. Iron deficiency anemia, unspecified iron deficiency anemia type    8. Chronic systolic CHF, NYHA class 2 and MARIFER/AHA stage C       59 year old patient with hx and presentation as above. Has a hx of CAD and will evaluate symptoms with noninvasive cardiac stress imaging. Will decrease dose of BB to see if it aids with symptoms.  Needs to stay active and lose weight. Has upcoming ICD interrogation. Discussed the etiology, evaluation, and management of CAD, VT, CHF, HLD, ICD, CRT, CKD, YOEL. Discussed the importance of med compliance, heart healthy diet, and regular exercise.      Plan:       -Nuclear SPECT  -Decrease dose of Coreg to 12.5 mg BID  -f/u in 1 month

## 2020-11-13 ENCOUNTER — LAB VISIT (OUTPATIENT)
Dept: LAB | Facility: HOSPITAL | Age: 60
End: 2020-11-13
Attending: INTERNAL MEDICINE
Payer: MEDICARE

## 2020-11-13 DIAGNOSIS — I25.10 CORONARY ARTERY DISEASE INVOLVING NATIVE CORONARY ARTERY OF NATIVE HEART WITHOUT ANGINA PECTORIS: ICD-10-CM

## 2020-11-13 DIAGNOSIS — E78.00 PURE HYPERCHOLESTEROLEMIA: ICD-10-CM

## 2020-11-13 DIAGNOSIS — N18.30 CKD (CHRONIC KIDNEY DISEASE), STAGE III: ICD-10-CM

## 2020-11-13 DIAGNOSIS — M1A.30X0 CHRONIC GOUT DUE TO RENAL IMPAIRMENT WITHOUT TOPHUS, UNSPECIFIED SITE: ICD-10-CM

## 2020-11-13 DIAGNOSIS — D75.839 THROMBOCYTOSIS: ICD-10-CM

## 2020-11-13 DIAGNOSIS — E03.9 HYPOTHYROIDISM, UNSPECIFIED TYPE: ICD-10-CM

## 2020-11-13 LAB
ALBUMIN SERPL BCP-MCNC: 4.1 G/DL (ref 3.5–5.2)
ALP SERPL-CCNC: 66 U/L (ref 38–126)
ALT SERPL W/O P-5'-P-CCNC: 17 U/L (ref 10–44)
ANION GAP SERPL CALC-SCNC: 5 MMOL/L (ref 8–16)
AST SERPL-CCNC: 29 U/L (ref 15–46)
BASOPHILS # BLD AUTO: 0.08 K/UL (ref 0–0.2)
BASOPHILS NFR BLD: 0.9 % (ref 0–1.9)
BILIRUB SERPL-MCNC: 0.8 MG/DL (ref 0.1–1)
BUN SERPL-MCNC: 50 MG/DL (ref 2–20)
CALCIUM SERPL-MCNC: 9.2 MG/DL (ref 8.7–10.5)
CHLORIDE SERPL-SCNC: 105 MMOL/L (ref 95–110)
CHOLEST SERPL-MCNC: 165 MG/DL (ref 120–199)
CHOLEST/HDLC SERPL: 4.9 {RATIO} (ref 2–5)
CO2 SERPL-SCNC: 31 MMOL/L (ref 23–29)
CREAT SERPL-MCNC: 2.43 MG/DL (ref 0.5–1.4)
DIFFERENTIAL METHOD: ABNORMAL
EOSINOPHIL # BLD AUTO: 0.3 K/UL (ref 0–0.5)
EOSINOPHIL NFR BLD: 3.2 % (ref 0–8)
ERYTHROCYTE [DISTWIDTH] IN BLOOD BY AUTOMATED COUNT: 15 % (ref 11.5–14.5)
EST. GFR  (AFRICAN AMERICAN): 32.2 ML/MIN/1.73 M^2
EST. GFR  (NON AFRICAN AMERICAN): 27.8 ML/MIN/1.73 M^2
GLUCOSE SERPL-MCNC: 97 MG/DL (ref 70–110)
HCT VFR BLD AUTO: 47.5 % (ref 40–54)
HDLC SERPL-MCNC: 34 MG/DL (ref 40–75)
HDLC SERPL: 20.6 % (ref 20–50)
HGB BLD-MCNC: 15.3 G/DL (ref 14–18)
IMM GRANULOCYTES # BLD AUTO: 0.08 K/UL (ref 0–0.04)
IMM GRANULOCYTES NFR BLD AUTO: 0.9 % (ref 0–0.5)
LDLC SERPL CALC-MCNC: 106.4 MG/DL (ref 63–159)
LYMPHOCYTES # BLD AUTO: 0.9 K/UL (ref 1–4.8)
LYMPHOCYTES NFR BLD: 10.4 % (ref 18–48)
MCH RBC QN AUTO: 27.6 PG (ref 27–31)
MCHC RBC AUTO-ENTMCNC: 32.2 G/DL (ref 32–36)
MCV RBC AUTO: 86 FL (ref 82–98)
MONOCYTES # BLD AUTO: 0.8 K/UL (ref 0.3–1)
MONOCYTES NFR BLD: 9.4 % (ref 4–15)
NEUTROPHILS # BLD AUTO: 6.4 K/UL (ref 1.8–7.7)
NEUTROPHILS NFR BLD: 75.2 % (ref 38–73)
NONHDLC SERPL-MCNC: 131 MG/DL
NRBC BLD-RTO: 0 /100 WBC
PLATELET # BLD AUTO: 520 K/UL (ref 150–350)
PMV BLD AUTO: 11.3 FL (ref 9.2–12.9)
POTASSIUM SERPL-SCNC: 4.9 MMOL/L (ref 3.5–5.1)
PROT SERPL-MCNC: 7.5 G/DL (ref 6–8.4)
RBC # BLD AUTO: 5.55 M/UL (ref 4.6–6.2)
SODIUM SERPL-SCNC: 141 MMOL/L (ref 136–145)
T4 FREE SERPL-MCNC: 1.35 NG/DL (ref 0.71–1.51)
TRIGL SERPL-MCNC: 123 MG/DL (ref 30–150)
TSH SERPL DL<=0.005 MIU/L-ACNC: 7.46 UIU/ML (ref 0.4–4)
URATE SERPL-MCNC: 10.8 MG/DL (ref 3.4–7)
WBC # BLD AUTO: 8.53 K/UL (ref 3.9–12.7)

## 2020-11-13 PROCEDURE — 84439 ASSAY OF FREE THYROXINE: CPT

## 2020-11-13 PROCEDURE — 85025 COMPLETE CBC W/AUTO DIFF WBC: CPT | Mod: PO

## 2020-11-13 PROCEDURE — 84550 ASSAY OF BLOOD/URIC ACID: CPT

## 2020-11-13 PROCEDURE — 84443 ASSAY THYROID STIM HORMONE: CPT | Mod: PO

## 2020-11-13 PROCEDURE — 80061 LIPID PANEL: CPT

## 2020-11-13 PROCEDURE — 36415 COLL VENOUS BLD VENIPUNCTURE: CPT | Mod: PO

## 2020-11-13 PROCEDURE — 80053 COMPREHEN METABOLIC PANEL: CPT | Mod: PO

## 2020-11-18 ENCOUNTER — CLINICAL SUPPORT (OUTPATIENT)
Dept: CARDIOLOGY | Facility: CLINIC | Age: 60
End: 2020-11-18
Payer: MEDICARE

## 2020-11-18 DIAGNOSIS — Z95.810 CARDIAC RESYNCHRONIZATION THERAPY DEFIBRILLATOR (CRT-D) IN PLACE: Primary | ICD-10-CM

## 2020-11-18 PROCEDURE — 93284 PRGRMG EVAL IMPLANTABLE DFB: CPT | Mod: S$GLB,,, | Performed by: INTERNAL MEDICINE

## 2020-11-18 PROCEDURE — 93284 PR PROGRAM EVAL (IN PERSON) IMPLANT DEVICE,CARDVERT/DEFIB,MULT LEAD: ICD-10-PCS | Mod: S$GLB,,, | Performed by: INTERNAL MEDICINE

## 2020-11-19 NOTE — PROGRESS NOTES
ICD Evaluation Report     Date: 11/18/2020     Primary MD: Dr. Newman  Primary Cardiologist: Dr. Stewart  Implanting MD:   and serial number:  Viva Quad XT CRT [id number]; [id number]; Medtronic  Implant date: 12/15/2016  Reason for evaluation: routine   Indication for ICD: unknown      Measurements  Atrial sensing - P wave: 1.5 mV  Atrial capture: Maintained: 0.75 V @ 0.4 ms   Atrial lead impedance: 475 ohms  Ventricular sensing - R wave: 12.0 mV  Ventricular capture: RV Maintained: 0.75 V @ 0.4 ms (M2 - RVC)  LV: Maintained: 1.0 V @ 0.4 ms   Ventricular lead impedance: RV:  418 ohms; LV:  418 ohms  Shock Impedance:  RV 43/ SVC 53 ohms  Underlying rhythm: NSR        Diagnostic Data  Atrial paced: 99.5 % Ventricular paced: 99.9 % (BVp)  Mode switch: none   Other: n/a  Ventricular Events: 4 NSVT last 9/12/2020 for 4 sec V rate 164 bpm  Battery status: satisfactory Magnet rate: 98.5 bpm   Estimated battery longevity:  1.8 years     VT Zones and Therapies:  AT/AF monitor > 171 bpm All Rx Off  VF > 200 bpm ATP during charging, 35 J x 6  FVT via -231 bpm Burst(2), 10J, 35J x 4  VT on 122-200 bpm Burst(3) 5J, 35J x 4        Final Parameters  Mode: DDDR Lower rate: 60 bpm Upper rate: 115 bpm  AV Delay: 170/130 ms Mode Switch: 70 Rate Response: On - max sensor 115 bpm  Atrial - Amplitude: 2.0 V Pulse width: 0.4 ms Sensitivity: 0.3 mV   R Ventricular - Amplitude: 2.0 V Pulse width: 0.4 ms Sensitivity: 0.3 mV   LV output:  1.5 V @ 0.4 msec  Polarity: Bipolar      Changes made:  No changes         Conclusions: normal CRT-D function.         Follow up: 1 year         Prashant Sepulveda

## 2020-11-20 ENCOUNTER — OFFICE VISIT (OUTPATIENT)
Dept: INTERNAL MEDICINE | Facility: CLINIC | Age: 60
End: 2020-11-20
Payer: MEDICARE

## 2020-11-20 VITALS
HEART RATE: 73 BPM | DIASTOLIC BLOOD PRESSURE: 78 MMHG | BODY MASS INDEX: 35.15 KG/M2 | SYSTOLIC BLOOD PRESSURE: 130 MMHG | WEIGHT: 237.31 LBS | OXYGEN SATURATION: 93 % | HEIGHT: 69 IN

## 2020-11-20 DIAGNOSIS — I50.22 CHRONIC SYSTOLIC CONGESTIVE HEART FAILURE: Primary | ICD-10-CM

## 2020-11-20 DIAGNOSIS — N18.32 STAGE 3B CHRONIC KIDNEY DISEASE: ICD-10-CM

## 2020-11-20 DIAGNOSIS — D50.9 IRON DEFICIENCY ANEMIA, UNSPECIFIED IRON DEFICIENCY ANEMIA TYPE: ICD-10-CM

## 2020-11-20 DIAGNOSIS — M1A.30X0 CHRONIC GOUT DUE TO RENAL IMPAIRMENT WITHOUT TOPHUS, UNSPECIFIED SITE: ICD-10-CM

## 2020-11-20 DIAGNOSIS — Z11.4 ENCOUNTER FOR SCREENING FOR HIV: ICD-10-CM

## 2020-11-20 DIAGNOSIS — I25.10 CORONARY ARTERY DISEASE INVOLVING NATIVE CORONARY ARTERY OF NATIVE HEART WITHOUT ANGINA PECTORIS: ICD-10-CM

## 2020-11-20 DIAGNOSIS — E03.9 HYPOTHYROIDISM, UNSPECIFIED TYPE: ICD-10-CM

## 2020-11-20 DIAGNOSIS — E78.00 PURE HYPERCHOLESTEROLEMIA: ICD-10-CM

## 2020-11-20 PROCEDURE — 99999 PR PBB SHADOW E&M-EST. PATIENT-LVL IV: ICD-10-PCS | Mod: PBBFAC,,, | Performed by: INTERNAL MEDICINE

## 2020-11-20 PROCEDURE — 1125F AMNT PAIN NOTED PAIN PRSNT: CPT | Mod: S$GLB,,, | Performed by: INTERNAL MEDICINE

## 2020-11-20 PROCEDURE — 99499 RISK ADDL DX/OHS AUDIT: ICD-10-PCS | Mod: S$GLB,,, | Performed by: INTERNAL MEDICINE

## 2020-11-20 PROCEDURE — 99215 OFFICE O/P EST HI 40 MIN: CPT | Mod: S$GLB,,, | Performed by: INTERNAL MEDICINE

## 2020-11-20 PROCEDURE — 99999 PR PBB SHADOW E&M-EST. PATIENT-LVL IV: CPT | Mod: PBBFAC,,, | Performed by: INTERNAL MEDICINE

## 2020-11-20 PROCEDURE — 3008F BODY MASS INDEX DOCD: CPT | Mod: CPTII,S$GLB,, | Performed by: INTERNAL MEDICINE

## 2020-11-20 PROCEDURE — 3008F PR BODY MASS INDEX (BMI) DOCUMENTED: ICD-10-PCS | Mod: CPTII,S$GLB,, | Performed by: INTERNAL MEDICINE

## 2020-11-20 PROCEDURE — 99499 UNLISTED E&M SERVICE: CPT | Mod: S$GLB,,, | Performed by: INTERNAL MEDICINE

## 2020-11-20 PROCEDURE — 99215 PR OFFICE/OUTPT VISIT, EST, LEVL V, 40-54 MIN: ICD-10-PCS | Mod: S$GLB,,, | Performed by: INTERNAL MEDICINE

## 2020-11-20 PROCEDURE — 1125F PR PAIN SEVERITY QUANTIFIED, PAIN PRESENT: ICD-10-PCS | Mod: S$GLB,,, | Performed by: INTERNAL MEDICINE

## 2020-11-20 RX ORDER — LEVOTHYROXINE SODIUM 150 UG/1
150 TABLET ORAL DAILY
Qty: 90 TABLET | Refills: 3 | Status: SHIPPED | OUTPATIENT
Start: 2020-11-20 | End: 2021-02-18

## 2020-11-20 NOTE — PROGRESS NOTES
Subjective:       Patient ID: Ar Sharma is a 59 y.o. male.    Chief Complaint: Follow-up (6 month f/u labs done) and Low-back Pain (thinks its his kidneys both sides says it seems to be getting worse)    HPI  Wellness check.  Chart reviewed.  Weight stable.  Pt c/o SINGH at 75-100m, has frequent PND with 2 pillow orthopnea.  No defibrillator firing, no CP.  No gout flares.  No LE edema.  Has noticed more fatigue lately.  Echo 2 weeks ago showed an eF of 50%. Stress test pending.  Having some LBP with stooping, no radicular sxs.  No F/C, no cough.  Vaccines UTD.  Review of Systems   Constitutional: Negative for activity change and unexpected weight change.   HENT: Positive for trouble swallowing. Negative for hearing loss and rhinorrhea.    Eyes: Negative for discharge and visual disturbance.   Respiratory: Negative for chest tightness and wheezing.    Cardiovascular: Positive for chest pain and palpitations.   Gastrointestinal: Negative for blood in stool, constipation, diarrhea and vomiting.   Endocrine: Negative for polydipsia and polyuria.   Genitourinary: Negative for difficulty urinating, hematuria and urgency.   Musculoskeletal: Negative for arthralgias, joint swelling and neck pain.   Neurological: Negative for weakness and headaches.   Psychiatric/Behavioral: Positive for dysphoric mood. Negative for confusion.       Objective:      Physical Exam  Vitals signs reviewed.   Constitutional:       General: He is not in acute distress.     Appearance: He is well-developed. He is obese.   HENT:      Head: Normocephalic.      Mouth/Throat:      Mouth: Mucous membranes are moist.   Eyes:      General: No scleral icterus.     Extraocular Movements: Extraocular movements intact.      Conjunctiva/sclera: Conjunctivae normal.   Neck:      Musculoskeletal: Normal range of motion.   Cardiovascular:      Rate and Rhythm: Normal rate and regular rhythm.      Pulses: Normal pulses.      Heart sounds: Normal heart  sounds. No gallop.    Pulmonary:      Effort: Pulmonary effort is normal.      Breath sounds: Normal breath sounds.   Abdominal:      General: Bowel sounds are normal. There is no distension.      Palpations: Abdomen is soft. There is no mass.   Musculoskeletal: Normal range of motion.      Right lower leg: No edema.      Left lower leg: No edema.   Skin:     General: Skin is warm and dry.   Neurological:      General: No focal deficit present.      Mental Status: He is alert.   Psychiatric:         Mood and Affect: Mood normal.         Assessment:       1. Chronic systolic congestive heart failure    2. Hypothyroidism, unspecified type    3. Coronary artery disease involving native coronary artery of native heart without angina pectoris    4. Stage 3b chronic kidney disease    5. Pure hypercholesterolemia    6. Chronic gout due to renal impairment without tophus, unspecified site    7. Iron deficiency anemia, unspecified iron deficiency anemia type    8. Encounter for screening for HIV        Plan:       Ar was seen today for follow-up and low-back pain.    Diagnoses and all orders for this visit:    Chronic systolic congestive heart failure   Cont rx    Hypothyroidism, unspecified type  -     TSH; Future  -     levothyroxine (SYNTHROID) 150 MCG tablet; Take 1 tablet (150 mcg total) by mouth once daily.    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

## 2020-11-20 NOTE — PROGRESS NOTES
Patient, Ar Sharma (MRN #[id number]), presented with a recorded BMI of 35.05 kg/m^2 and a documented comorbidity(s):  - Hyperlipidemia  - Atrial Fibrillation  to which the severe obesity is a contributing factor. This is consistent with the definition of severe obesity (BMI 35.0-39.9) with comorbidity (ICD-10 E66.01, Z68.35). The patient's severe obesity was monitored, evaluated, addressed and/or treated. This addendum to the medical record is made on 11/20/2020.

## 2020-11-24 ENCOUNTER — HOSPITAL ENCOUNTER (OUTPATIENT)
Dept: RADIOLOGY | Facility: HOSPITAL | Age: 60
Discharge: HOME | End: 2020-11-24
Attending: INTERNAL MEDICINE
Payer: MEDICARE

## 2020-11-24 ENCOUNTER — HOSPITAL ENCOUNTER (OUTPATIENT)
Dept: CARDIOLOGY | Facility: HOSPITAL | Age: 60
Discharge: HOME | End: 2020-11-24
Attending: INTERNAL MEDICINE
Payer: MEDICARE

## 2020-11-24 DIAGNOSIS — R07.9 CHEST PAIN, UNSPECIFIED TYPE: ICD-10-CM

## 2020-11-24 LAB
CV STRESS BASE HR: 73 BPM
DIASTOLIC BLOOD PRESSURE: 71 MMHG
OHS CV CPX 1 MINUTE RECOVERY HEART RATE: 63 BPM
OHS CV CPX 85 PERCENT MAX PREDICTED HEART RATE MALE: 136
OHS CV CPX MAX PREDICTED HEART RATE: 160
OHS CV CPX PATIENT IS FEMALE: 0
OHS CV CPX PATIENT IS MALE: 1
OHS CV CPX PEAK DIASTOLIC BLOOD PRESSURE: 64 MMHG
OHS CV CPX PEAK HEAR RATE: 77 BPM
OHS CV CPX PEAK RATE PRESSURE PRODUCT: 7777
OHS CV CPX PEAK SYSTOLIC BLOOD PRESSURE: 101 MMHG
OHS CV CPX PERCENT MAX PREDICTED HEART RATE ACHIEVED: 48
OHS CV CPX RATE PRESSURE PRODUCT PRESENTING: 6935
SYSTOLIC BLOOD PRESSURE: 95 MMHG

## 2020-11-24 PROCEDURE — 93018 NUCLEAR STRESS TEST (CUPID ONLY): ICD-10-PCS | Mod: ,,, | Performed by: INTERNAL MEDICINE

## 2020-11-24 PROCEDURE — 93017 CV STRESS TEST TRACING ONLY: CPT | Mod: PO

## 2020-11-24 PROCEDURE — 93016 NUCLEAR STRESS TEST (CUPID ONLY): ICD-10-PCS | Mod: ,,, | Performed by: INTERNAL MEDICINE

## 2020-11-24 PROCEDURE — 63600175 PHARM REV CODE 636 W HCPCS: Mod: PO | Performed by: INTERNAL MEDICINE

## 2020-11-24 PROCEDURE — 93016 CV STRESS TEST SUPVJ ONLY: CPT | Mod: ,,, | Performed by: INTERNAL MEDICINE

## 2020-11-24 PROCEDURE — A9502 TC99M TETROFOSMIN: HCPCS | Mod: PO

## 2020-11-24 PROCEDURE — 93018 CV STRESS TEST I&R ONLY: CPT | Mod: ,,, | Performed by: INTERNAL MEDICINE

## 2020-11-24 RX ORDER — REGADENOSON 0.08 MG/ML
0.4 INJECTION, SOLUTION INTRAVENOUS ONCE
Status: COMPLETED | OUTPATIENT
Start: 2020-11-24 | End: 2020-11-24

## 2020-11-24 RX ADMIN — REGADENOSON 0.4 MG: 0.08 INJECTION, SOLUTION INTRAVENOUS at 12:11

## 2020-11-30 ENCOUNTER — HOSPITAL ENCOUNTER (OUTPATIENT)
Dept: RADIOLOGY | Facility: HOSPITAL | Age: 60
Discharge: HOME | End: 2020-11-30
Attending: INTERNAL MEDICINE
Payer: MEDICARE

## 2020-11-30 DIAGNOSIS — N18.32 STAGE 3B CHRONIC KIDNEY DISEASE: ICD-10-CM

## 2020-11-30 PROCEDURE — 76770 US EXAM ABDO BACK WALL COMP: CPT | Mod: TC,PO

## 2020-12-04 ENCOUNTER — TELEPHONE (OUTPATIENT)
Dept: CARDIOLOGY | Facility: CLINIC | Age: 60
End: 2020-12-04

## 2020-12-04 NOTE — TELEPHONE ENCOUNTER
----- Message from Shahram Stewart MD sent at 12/4/2020 11:23 AM CST -----  Your stress test was abnormal. I would like to perform a procedure called an angiogram to look for heart blockages. We can schedule for the procedure directly or schedule you a clinic visit first to discuss the procedure.

## 2020-12-04 NOTE — TELEPHONE ENCOUNTER
Reached out to pt and advised about abnormal stress test results     Pt wished to scheduled a clinical lorena to further discuss procedure and test results

## 2020-12-07 ENCOUNTER — PES CALL (OUTPATIENT)
Dept: ADMINISTRATIVE | Facility: CLINIC | Age: 60
End: 2020-12-07

## 2020-12-10 ENCOUNTER — OFFICE VISIT (OUTPATIENT)
Dept: CARDIOLOGY | Facility: CLINIC | Age: 60
End: 2020-12-10
Payer: MEDICARE

## 2020-12-10 VITALS
OXYGEN SATURATION: 90 % | SYSTOLIC BLOOD PRESSURE: 112 MMHG | BODY MASS INDEX: 34.83 KG/M2 | DIASTOLIC BLOOD PRESSURE: 72 MMHG | WEIGHT: 235.19 LBS | HEART RATE: 86 BPM | HEIGHT: 69 IN

## 2020-12-10 DIAGNOSIS — R94.39 ABNORMAL STRESS TEST: Primary | ICD-10-CM

## 2020-12-10 DIAGNOSIS — I47.20 V-TACH: ICD-10-CM

## 2020-12-10 DIAGNOSIS — E03.9 HYPOTHYROIDISM, UNSPECIFIED TYPE: ICD-10-CM

## 2020-12-10 DIAGNOSIS — I25.10 CORONARY ARTERY DISEASE INVOLVING NATIVE CORONARY ARTERY OF NATIVE HEART WITHOUT ANGINA PECTORIS: ICD-10-CM

## 2020-12-10 DIAGNOSIS — N18.32 STAGE 3B CHRONIC KIDNEY DISEASE: ICD-10-CM

## 2020-12-10 DIAGNOSIS — E78.00 PURE HYPERCHOLESTEROLEMIA: ICD-10-CM

## 2020-12-10 DIAGNOSIS — Z86.79 S/P ABLATION OF VENTRICULAR ARRHYTHMIA: ICD-10-CM

## 2020-12-10 DIAGNOSIS — I50.20 HFREF (HEART FAILURE WITH REDUCED EJECTION FRACTION): ICD-10-CM

## 2020-12-10 DIAGNOSIS — Z95.810 CARDIAC RESYNCHRONIZATION THERAPY DEFIBRILLATOR (CRT-D) IN PLACE: ICD-10-CM

## 2020-12-10 DIAGNOSIS — Z98.890 S/P ABLATION OF VENTRICULAR ARRHYTHMIA: ICD-10-CM

## 2020-12-10 DIAGNOSIS — D50.9 IRON DEFICIENCY ANEMIA, UNSPECIFIED IRON DEFICIENCY ANEMIA TYPE: ICD-10-CM

## 2020-12-10 PROCEDURE — 1126F AMNT PAIN NOTED NONE PRSNT: CPT | Mod: HCNC,S$GLB,, | Performed by: INTERNAL MEDICINE

## 2020-12-10 PROCEDURE — 3008F PR BODY MASS INDEX (BMI) DOCUMENTED: ICD-10-PCS | Mod: HCNC,CPTII,S$GLB, | Performed by: INTERNAL MEDICINE

## 2020-12-10 PROCEDURE — 99214 PR OFFICE/OUTPT VISIT, EST, LEVL IV, 30-39 MIN: ICD-10-PCS | Mod: HCNC,S$GLB,, | Performed by: INTERNAL MEDICINE

## 2020-12-10 PROCEDURE — 1126F PR PAIN SEVERITY QUANTIFIED, NO PAIN PRESENT: ICD-10-PCS | Mod: HCNC,S$GLB,, | Performed by: INTERNAL MEDICINE

## 2020-12-10 PROCEDURE — 99999 PR PBB SHADOW E&M-EST. PATIENT-LVL IV: ICD-10-PCS | Mod: PBBFAC,HCNC,, | Performed by: INTERNAL MEDICINE

## 2020-12-10 PROCEDURE — 3008F BODY MASS INDEX DOCD: CPT | Mod: HCNC,CPTII,S$GLB, | Performed by: INTERNAL MEDICINE

## 2020-12-10 PROCEDURE — 99214 OFFICE O/P EST MOD 30 MIN: CPT | Mod: HCNC,S$GLB,, | Performed by: INTERNAL MEDICINE

## 2020-12-10 PROCEDURE — 99999 PR PBB SHADOW E&M-EST. PATIENT-LVL IV: CPT | Mod: PBBFAC,HCNC,, | Performed by: INTERNAL MEDICINE

## 2020-12-10 RX ORDER — DIPHENHYDRAMINE HCL 25 MG
50 CAPSULE ORAL ONCE
Status: CANCELLED | OUTPATIENT
Start: 2020-12-10 | End: 2020-12-10

## 2020-12-10 RX ORDER — SODIUM CHLORIDE 9 MG/ML
INJECTION, SOLUTION INTRAVENOUS CONTINUOUS
Status: CANCELLED | OUTPATIENT
Start: 2020-12-10 | End: 2020-12-10

## 2020-12-10 NOTE — PROGRESS NOTES
Subjective:    Patient ID:  Ar Sharma is a 59 y.o. male who presents for follow-up of Coronary Artery Disease      HPI     58 y/o male with a hx of CAD (states he had MI in his 20's), HFrEF with EF 30-35% initially with last EF 50% (11/04) with SLAVA 5.35 cm, s/p CRT-D (initial ICD 2012 with upgrade 2016 - Medtronic), DD, VT s/p ablation x 2 (2/2018 and again 3/2018), HLD, hypothyroidism, YOEL. Initially seen by Dr Newman and it sounds like he was in ADHF, lasix dose increased, and symptoms had significantly improved. Was very SOB with minimal activity, with bilateral LE edema/orthopnea/PND. Increase in lasix dose resulted in SOB back at baseline and orthopnea/PND resolved, with improvement in edema. Entresto started after last VT ablation. Entresto dose was increased in Dec 2018. Did not tolerate it well, however, current dose tolerated (49-51). Had ICD shocks in 12/2018 (per interrogation mentions 5 times), he remembers one.   Last clinic visit had been having worsening of chronic angina and symptoms. Continues with CCS III angina (although improved) and NYHA FC II symptoms. Has episodic dizziness and fatigue. Takes extra doses of ASA with improvement. Reserves NTG for very severe pain, which he has not had.  Denies palps, syncope. Compliant with meds.   Recent 2DE with EF 50% and nuclear SPECT abnormal with mostly prior infarct (EF 31%).    Review of Systems   Constitution: Positive for malaise/fatigue.   HENT: Negative for congestion.    Eyes: Negative for blurred vision.   Cardiovascular: Positive for chest pain and dyspnea on exertion. Negative for claudication, cyanosis, irregular heartbeat, leg swelling, near-syncope, orthopnea, palpitations, paroxysmal nocturnal dyspnea and syncope.   Respiratory: Positive for shortness of breath.    Endocrine: Negative for polyuria.   Hematologic/Lymphatic: Negative for bleeding problem.   Skin: Negative for itching and rash.   Musculoskeletal: Negative for joint  swelling, muscle cramps and muscle weakness.   Gastrointestinal: Negative for abdominal pain, hematemesis, hematochezia, melena, nausea and vomiting.   Genitourinary: Negative for dysuria and hematuria.   Neurological: Negative for dizziness, focal weakness, headaches, light-headedness, loss of balance and weakness.   Psychiatric/Behavioral: Negative for depression. The patient is not nervous/anxious.         Objective:    Physical Exam   Constitutional: He is oriented to person, place, and time. He appears well-developed and well-nourished.   HENT:   Head: Normocephalic and atraumatic.   Neck: Neck supple. No JVD present.   Cardiovascular: Normal rate, regular rhythm and normal heart sounds.   Pulses:       Carotid pulses are 2+ on the right side and 2+ on the left side.       Radial pulses are 2+ on the right side and 2+ on the left side.        Femoral pulses are 2+ on the right side and 2+ on the left side.       Dorsalis pedis pulses are 2+ on the right side and 2+ on the left side.        Posterior tibial pulses are 2+ on the right side and 2+ on the left side.   Pulmonary/Chest: Effort normal and breath sounds normal.   Abdominal: Soft. Bowel sounds are normal.   Musculoskeletal:         General: No edema.   Neurological: He is alert and oriented to person, place, and time.   Skin: Skin is warm and dry.   Psychiatric: He has a normal mood and affect. His behavior is normal. Thought content normal.         Assessment:       1. Abnormal stress test    2. Coronary artery disease involving native coronary artery of native heart without angina pectoris    3. Cardiac resynchronization therapy defibrillator (CRT-D) in place    4. Pure hypercholesterolemia    5. S/P ablation of ventricular arrhythmia    6. V-tach    7. Stage 3b chronic kidney disease    8. Iron deficiency anemia, unspecified iron deficiency anemia type    9. Hypothyroidism, unspecified type    10. HFrEF (heart failure with reduced ejection fraction)       59 year old patient with hx and presentation as above. Has hx of CAD,abnormal stress test, and will evaluate symptoms with C. Wishes to wait until after Holidays, which is reasonable, as symptoms are currently stable. Discussed the etiology, evaluation, and management of CAD, VT, CHF, HLD, ICD, CRT, CKD, YOEL. Discussed the importance of med compliance, heart healthy diet, and regular exercise.      Plan:       -LHC (diagnostic only)  -Right radial access with 4/5 slender, pigtail, [place]  -The procedure was discussed with the pt along with the risks/benefits and the pt voiced understanding. All questions were answered and written consents obtained.   -f/u in 2 months

## 2020-12-14 ENCOUNTER — LAB VISIT (OUTPATIENT)
Dept: LAB | Facility: HOSPITAL | Age: 60
End: 2020-12-14
Attending: INTERNAL MEDICINE
Payer: MEDICARE

## 2020-12-14 ENCOUNTER — LAB VISIT (OUTPATIENT)
Dept: FAMILY MEDICINE | Facility: CLINIC | Age: 60
End: 2020-12-14
Payer: MEDICARE

## 2020-12-14 DIAGNOSIS — Z01.818 PREOP TESTING: ICD-10-CM

## 2020-12-14 LAB
ANION GAP SERPL CALC-SCNC: 10 MMOL/L (ref 8–16)
BASOPHILS # BLD AUTO: 0.09 K/UL (ref 0–0.2)
BASOPHILS NFR BLD: 1 % (ref 0–1.9)
BUN SERPL-MCNC: 38 MG/DL (ref 6–20)
CALCIUM SERPL-MCNC: 9.3 MG/DL (ref 8.7–10.5)
CHLORIDE SERPL-SCNC: 106 MMOL/L (ref 95–110)
CO2 SERPL-SCNC: 24 MMOL/L (ref 23–29)
CREAT SERPL-MCNC: 2 MG/DL (ref 0.5–1.4)
DIFFERENTIAL METHOD: ABNORMAL
EOSINOPHIL # BLD AUTO: 0.3 K/UL (ref 0–0.5)
EOSINOPHIL NFR BLD: 3.2 % (ref 0–8)
ERYTHROCYTE [DISTWIDTH] IN BLOOD BY AUTOMATED COUNT: 14.8 % (ref 11.5–14.5)
EST. GFR  (AFRICAN AMERICAN): 41 ML/MIN/1.73 M^2
EST. GFR  (NON AFRICAN AMERICAN): 35 ML/MIN/1.73 M^2
GLUCOSE SERPL-MCNC: 77 MG/DL (ref 70–110)
HCT VFR BLD AUTO: 47.5 % (ref 40–54)
HGB BLD-MCNC: 15.5 G/DL (ref 14–18)
IMM GRANULOCYTES # BLD AUTO: 0.09 K/UL (ref 0–0.04)
IMM GRANULOCYTES NFR BLD AUTO: 1 % (ref 0–0.5)
LYMPHOCYTES # BLD AUTO: 0.8 K/UL (ref 1–4.8)
LYMPHOCYTES NFR BLD: 8.8 % (ref 18–48)
MCH RBC QN AUTO: 27.6 PG (ref 27–31)
MCHC RBC AUTO-ENTMCNC: 32.6 G/DL (ref 32–36)
MCV RBC AUTO: 85 FL (ref 82–98)
MONOCYTES # BLD AUTO: 0.8 K/UL (ref 0.3–1)
MONOCYTES NFR BLD: 9.6 % (ref 4–15)
NEUTROPHILS # BLD AUTO: 6.7 K/UL (ref 1.8–7.7)
NEUTROPHILS NFR BLD: 76.4 % (ref 38–73)
NRBC BLD-RTO: 0 /100 WBC
PLATELET # BLD AUTO: 530 K/UL (ref 150–350)
PMV BLD AUTO: 11.8 FL (ref 9.2–12.9)
POTASSIUM SERPL-SCNC: 5 MMOL/L (ref 3.5–5.1)
RBC # BLD AUTO: 5.61 M/UL (ref 4.6–6.2)
SODIUM SERPL-SCNC: 140 MMOL/L (ref 136–145)
WBC # BLD AUTO: 8.77 K/UL (ref 3.9–12.7)

## 2020-12-14 PROCEDURE — 85025 COMPLETE CBC W/AUTO DIFF WBC: CPT | Mod: HCNC

## 2020-12-14 PROCEDURE — U0003 INFECTIOUS AGENT DETECTION BY NUCLEIC ACID (DNA OR RNA); SEVERE ACUTE RESPIRATORY SYNDROME CORONAVIRUS 2 (SARS-COV-2) (CORONAVIRUS DISEASE [COVID-19]), AMPLIFIED PROBE TECHNIQUE, MAKING USE OF HIGH THROUGHPUT TECHNOLOGIES AS DESCRIBED BY CMS-2020-01-R: HCPCS | Mod: HCNC

## 2020-12-14 PROCEDURE — 80048 BASIC METABOLIC PNL TOTAL CA: CPT | Mod: HCNC

## 2020-12-14 PROCEDURE — 36415 COLL VENOUS BLD VENIPUNCTURE: CPT | Mod: HCNC

## 2020-12-15 LAB — SARS-COV-2 RNA RESP QL NAA+PROBE: NOT DETECTED

## 2020-12-16 ENCOUNTER — HOSPITAL ENCOUNTER (OUTPATIENT)
Facility: HOSPITAL | Age: 60
Discharge: HOME | End: 2020-12-16
Attending: INTERNAL MEDICINE | Admitting: INTERNAL MEDICINE
Payer: MEDICARE

## 2020-12-16 VITALS
BODY MASS INDEX: 34.8 KG/M2 | RESPIRATION RATE: 22 BRPM | HEIGHT: 69 IN | DIASTOLIC BLOOD PRESSURE: 65 MMHG | OXYGEN SATURATION: 93 % | TEMPERATURE: 98 F | SYSTOLIC BLOOD PRESSURE: 105 MMHG | HEART RATE: 61 BPM | WEIGHT: 235 LBS

## 2020-12-16 DIAGNOSIS — I25.10 CORONARY ARTERY DISEASE INVOLVING NATIVE CORONARY ARTERY OF NATIVE HEART WITHOUT ANGINA PECTORIS: ICD-10-CM

## 2020-12-16 DIAGNOSIS — Z01.818 PREOP TESTING: Primary | ICD-10-CM

## 2020-12-16 DIAGNOSIS — R94.39 ABNORMAL STRESS TEST: ICD-10-CM

## 2020-12-16 PROCEDURE — C1887 CATHETER, GUIDING: HCPCS | Mod: HCNC | Performed by: INTERNAL MEDICINE

## 2020-12-16 PROCEDURE — C1769 GUIDE WIRE: HCPCS | Mod: HCNC | Performed by: INTERNAL MEDICINE

## 2020-12-16 PROCEDURE — 93010 EKG 12-LEAD: ICD-10-PCS | Mod: HCNC,76,, | Performed by: INTERNAL MEDICINE

## 2020-12-16 PROCEDURE — 93458 L HRT ARTERY/VENTRICLE ANGIO: CPT | Mod: 26,HCNC,, | Performed by: INTERNAL MEDICINE

## 2020-12-16 PROCEDURE — 25500020 PHARM REV CODE 255: Mod: HCNC | Performed by: INTERNAL MEDICINE

## 2020-12-16 PROCEDURE — 99152 PR MOD CONSCIOUS SEDATION, SAME PHYS, 5+ YRS, FIRST 15 MIN: ICD-10-PCS | Mod: HCNC,,, | Performed by: INTERNAL MEDICINE

## 2020-12-16 PROCEDURE — 25000003 PHARM REV CODE 250: Mod: HCNC | Performed by: INTERNAL MEDICINE

## 2020-12-16 PROCEDURE — 99152 MOD SED SAME PHYS/QHP 5/>YRS: CPT | Mod: HCNC,,, | Performed by: INTERNAL MEDICINE

## 2020-12-16 PROCEDURE — 93458 L HRT ARTERY/VENTRICLE ANGIO: CPT | Mod: HCNC | Performed by: INTERNAL MEDICINE

## 2020-12-16 PROCEDURE — C1894 INTRO/SHEATH, NON-LASER: HCPCS | Mod: HCNC | Performed by: INTERNAL MEDICINE

## 2020-12-16 PROCEDURE — 99152 MOD SED SAME PHYS/QHP 5/>YRS: CPT | Mod: HCNC | Performed by: INTERNAL MEDICINE

## 2020-12-16 PROCEDURE — 93005 ELECTROCARDIOGRAM TRACING: CPT | Mod: HCNC

## 2020-12-16 PROCEDURE — 93458 PR CATH PLACE/CORON ANGIO, IMG SUPER/INTERP,W LEFT HEART VENTRICULOGRAPHY: ICD-10-PCS | Mod: 26,HCNC,, | Performed by: INTERNAL MEDICINE

## 2020-12-16 PROCEDURE — 63600175 PHARM REV CODE 636 W HCPCS: Mod: HCNC | Performed by: INTERNAL MEDICINE

## 2020-12-16 PROCEDURE — 93010 ELECTROCARDIOGRAM REPORT: CPT | Mod: HCNC,76,, | Performed by: INTERNAL MEDICINE

## 2020-12-16 RX ORDER — VERAPAMIL HYDROCHLORIDE 2.5 MG/ML
INJECTION, SOLUTION INTRAVENOUS
Status: DISCONTINUED | OUTPATIENT
Start: 2020-12-16 | End: 2020-12-16 | Stop reason: HOSPADM

## 2020-12-16 RX ORDER — SODIUM CHLORIDE 9 MG/ML
INJECTION, SOLUTION INTRAVENOUS CONTINUOUS
Status: ACTIVE | OUTPATIENT
Start: 2020-12-16 | End: 2020-12-16

## 2020-12-16 RX ORDER — HEPARIN SODIUM 200 [USP'U]/100ML
INJECTION, SOLUTION INTRAVENOUS
Status: DISCONTINUED | OUTPATIENT
Start: 2020-12-16 | End: 2020-12-16 | Stop reason: HOSPADM

## 2020-12-16 RX ORDER — MIDAZOLAM HYDROCHLORIDE 1 MG/ML
INJECTION, SOLUTION INTRAMUSCULAR; INTRAVENOUS
Status: DISCONTINUED | OUTPATIENT
Start: 2020-12-16 | End: 2020-12-16 | Stop reason: HOSPADM

## 2020-12-16 RX ORDER — LIDOCAINE HYDROCHLORIDE 10 MG/ML
INJECTION, SOLUTION EPIDURAL; INFILTRATION; INTRACAUDAL; PERINEURAL
Status: DISCONTINUED | OUTPATIENT
Start: 2020-12-16 | End: 2020-12-16 | Stop reason: HOSPADM

## 2020-12-16 RX ORDER — HEPARIN SODIUM 1000 [USP'U]/ML
INJECTION, SOLUTION INTRAVENOUS; SUBCUTANEOUS
Status: DISCONTINUED | OUTPATIENT
Start: 2020-12-16 | End: 2020-12-16 | Stop reason: HOSPADM

## 2020-12-16 RX ORDER — ONDANSETRON 2 MG/ML
4 INJECTION INTRAMUSCULAR; INTRAVENOUS EVERY 12 HOURS PRN
Status: DISCONTINUED | OUTPATIENT
Start: 2020-12-16 | End: 2020-12-16 | Stop reason: HOSPADM

## 2020-12-16 RX ORDER — DIPHENHYDRAMINE HCL 25 MG
50 CAPSULE ORAL ONCE
Status: DISCONTINUED | OUTPATIENT
Start: 2020-12-16 | End: 2020-12-16 | Stop reason: HOSPADM

## 2020-12-16 RX ORDER — FENTANYL CITRATE 50 UG/ML
INJECTION, SOLUTION INTRAMUSCULAR; INTRAVENOUS
Status: DISCONTINUED | OUTPATIENT
Start: 2020-12-16 | End: 2020-12-16 | Stop reason: HOSPADM

## 2020-12-16 RX ORDER — DIPHENHYDRAMINE HYDROCHLORIDE 50 MG/ML
INJECTION INTRAMUSCULAR; INTRAVENOUS
Status: DISCONTINUED | OUTPATIENT
Start: 2020-12-16 | End: 2020-12-16 | Stop reason: HOSPADM

## 2020-12-16 RX ORDER — IODIXANOL 320 MG/ML
INJECTION, SOLUTION INTRAVASCULAR
Status: DISCONTINUED | OUTPATIENT
Start: 2020-12-16 | End: 2020-12-16 | Stop reason: HOSPADM

## 2020-12-16 RX ORDER — ACETAMINOPHEN 325 MG/1
650 TABLET ORAL EVERY 4 HOURS PRN
Status: DISCONTINUED | OUTPATIENT
Start: 2020-12-16 | End: 2020-12-16 | Stop reason: HOSPADM

## 2020-12-16 NOTE — PROCEDURES
": Shahram Stewart MD  Pre-procedure diagnosis: Abnormal stress test  Post-procedure diagnosis: nonobstructive CAD    Post Procedure Note: [facility]    The pt was brought to the cath lab and under sterile technique, right radial access was obtained without difficulty. Images were obtained in multiple views and nonobstructive CAD noted. Please see full report for details. The pt tolerated the procedure well without complications. Radial band device used with successful hemostasis.     Vitals:    12/16/20 0807   BP: 111/63   BP Location: Left arm   Patient Position: Lying   Pulse: 61   Resp: 19   Temp: 98.2 °F (36.8 °C)   TempSrc: Oral   SpO2: (!) 93%   Weight: 106.6 kg (235 lb)   Height: 5' 9" (1.753 m)         Gen: NAD  Ext: 2+ radial pulse, no evidence of hematoma  Estimated blood loss: < 50 cc    Plan:  -Post cath care per protocol  -Medical management for GDMT for CHF    "

## 2020-12-16 NOTE — DISCHARGE SUMMARY
[facility]  Discharge Summary      Admit Date: 12/16/2020    Discharge Date and Time:  12/16/2020 3:41 PM    Attending Physician: Shahram Stewart MD     Reason for Admission: [facility]    Procedures Performed: Procedure(s) (LRB):  Left heart cath (N/A)    Hospital Course (synopsis of major diagnoses, care, treatment, and services provided during the course of the hospital stay): The pt was brought to the cath lab and LHC revealed mild CAD, elevated EDP. No immediate post procedure complications.      Final Diagnoses:    Principal Problem: Abnormal stress test   Secondary Diagnoses:   Active Hospital Problems    Diagnosis  POA    *Abnormal stress test [R94.39]  Yes     Priority: Low    HFrEF (heart failure with reduced ejection fraction) [I50.20]  Yes    CKD (chronic kidney disease), stage III [N18.30]  Yes    Cardiac resynchronization therapy defibrillator (CRT-D) in place [Z95.810]  Yes    Chronic systolic CHF, NYHA class 2 and MARIFER/AHA stage C [I50.22]  Yes     [employer] Regulatory Update 10/1/2020      S/P ablation of ventricular arrhythmia [Z98.890, Z86.79]  Not Applicable    Coronary artery disease involving native coronary artery of native heart without angina pectoris [I25.10]  Yes    Pure hypercholesterolemia [E78.00]  Yes    Iron deficiency anemia [D50.9]  Yes      Resolved Hospital Problems   No resolved problems to display.       Discharged Condition: good    Disposition: Home or Self Care    Follow Up/Patient Instructions:     Medications:  Reconciled Home Medications:      Medication List      CONTINUE taking these medications    allopurinoL 300 MG tablet  Commonly known as: ZYLOPRIM  Take 1 tablet (300 mg total) by mouth once daily.     amiodarone 200 MG Tab  Commonly known as: PACERONE  TAKE 1 TABLET BY MOUTH EVERY DAY     aspirin 81 MG EC tablet  Commonly known as: ECOTRIN  Take 81 mg by mouth once daily.     carvediloL 12.5 MG tablet  Commonly known as: COREG  Take 1 tablet (12.5 mg  total) by mouth 2 (two) times daily with meals.     ENTRESTO 49-51 mg per tablet  Generic drug: sacubitriL-valsartan  Take 1 tablet by mouth 2 (two) times daily.     furosemide 40 MG tablet  Commonly known as: LASIX  Take 1 tablet (40 mg total) by mouth once daily.     levothyroxine 150 MCG tablet  Commonly known as: SYNTHROID  Take 1 tablet (150 mcg total) by mouth once daily.     methylPREDNISolone 4 mg tablet  Commonly known as: MEDROL DOSEPACK  use as directed     mexiletine 150 MG Cap  Commonly known as: MEXITIL  Take 1 capsule (150 mg total) by mouth 2 (two) times daily with meals.     nitroGLYCERIN 0.4 MG SL tablet  Commonly known as: NITROSTAT  Place 1 tablet (0.4 mg total) under the tongue every 5 (five) minutes as needed for Chest pain.     pravastatin 40 MG tablet  Commonly known as: PRAVACHOL  Take 1 tablet (40 mg total) by mouth once daily.     spironolactone 25 MG tablet  Commonly known as: ALDACTONE  TAKE 1 TABLET BY MOUTH EVERY DAY     tadalafiL 5 MG tablet  Commonly known as: CIALIS  Take 1 tablet (5 mg total) by mouth daily as needed for Erectile Dysfunction.          Discharge Procedure Orders   COVID-19 Routine Screening   Standing Status: Future Number of Occurrences: 1 Standing Exp. Date: 02/09/22   Order Comments: preop----run asap     Order Specific Question Answer Comments   Is the patient symptomatic? No    Is this needed for pre-procedure or pre-op testing? Yes    Diagnosis: Preop testing [782515]      CBC W/ AUTO DIFFERENTIAL   Standing Status: Future Number of Occurrences: 1 Standing Exp. Date: 02/09/22     BASIC METABOLIC PANEL   Standing Status: Future Number of Occurrences: 1 Standing Exp. Date: 02/09/22     Diet Cardiac     Activity as tolerated     Follow-up Information     Schedule an appointment as soon as possible for a visit with Shahram Stewart MD.    Specialties: INTERVENTIONAL CARDIOLOGY, Cardiology  Why: If symptoms worsen, For suture removal  Contact information:  [address]  [address]  [place] [address]  [phone]

## 2020-12-16 NOTE — Clinical Note
The catheter is inserted ostium   left main. Angiography performed of the left coronary arteries. Multiple views were taken. Angiography performed via hand injection with 13 mL of contrast

## 2020-12-16 NOTE — DISCHARGE INSTRUCTIONS
Discharge Instructions:    Wear face mask.      Do not drive a car, operate heavy equipment, care for a young child, etc for the next 12-24 hours.   Avoid drinking alcohol for 24 hours.  Do not make any important decisions for 24 hours.    Drink fluids to keep hydrated. Resume your usual diet as tolerated.     Rest for today then activity as tolerated.   Do not lift anything over 5 pounds for the first 3 days after procedure.    Remove dressing tomorrow then may shower with warm soapy water. Do not scrub site. Pat dry.   May apply bandaid for 2 days.  No tub baths.  Do not submerge wound in water for 3 days.     Call MD for any unrelieved pain, excessive nausea or vomiting, redness around site, bleeding, or pus or foul smelling drainage, or any other questions or concerns.    CALL 911 and Go to the ER for any difficulty breathing or chest pain.      If site swells or bleeds, hold direct pressure to area for 10 full minutes.   If site continues to bleed, continue to hold pressure to site and have someone bring you to the ER.      Follow any additional instructions given to you by MD.      Call MD to schedule a follow up appointment, or follow up as instructed.

## 2020-12-16 NOTE — PROGRESS NOTES
Pt arrived to post cath lab awake and alert. Vasc band on right wrist, no complications. Pulses palpable. VSS. Cap refill <3. IVF infusing. Will continue to monitor.

## 2020-12-16 NOTE — NURSING
Dr menon at bedside to speak to pt regarding plan and follow up; pt resting quietlyn in bed w/o complaints and belongings at bedside and pt wearing blue surg mask for discharge right radial access site drsg cdi soft w/ palpable pulse and good check check and sensation

## 2020-12-16 NOTE — NURSING
Patient discharged to home and able to ambulate and urinate and meal tray given to patient; pt has belongings and blue surg mask for discharge; discharge instructions reviewed with pt and pt has copy of instructions; pt escorted to pov to wife with nurse Pritchett; pt aao and has no complaintsl; right radial access site drsg cdi and no hematoma nor bleeding and good circ check at discharge

## 2020-12-16 NOTE — Clinical Note
The catheter is repositioned ostium   right coronary artery. Angiography performed of the right coronary arteries. Angiography performed via hand injection with 5 mL of contrast

## 2021-01-14 ENCOUNTER — TELEPHONE (OUTPATIENT)
Dept: CARDIOLOGY | Facility: CLINIC | Age: 61
End: 2021-01-14

## 2021-01-28 ENCOUNTER — OFFICE VISIT (OUTPATIENT)
Dept: INTERNAL MEDICINE | Facility: CLINIC | Age: 61
End: 2021-01-28
Payer: MEDICARE

## 2021-01-28 VITALS
DIASTOLIC BLOOD PRESSURE: 68 MMHG | HEIGHT: 69 IN | BODY MASS INDEX: 35.2 KG/M2 | HEART RATE: 77 BPM | OXYGEN SATURATION: 95 % | SYSTOLIC BLOOD PRESSURE: 120 MMHG | WEIGHT: 237.63 LBS

## 2021-01-28 DIAGNOSIS — N18.32 STAGE 3B CHRONIC KIDNEY DISEASE: ICD-10-CM

## 2021-01-28 DIAGNOSIS — D50.9 IRON DEFICIENCY ANEMIA, UNSPECIFIED IRON DEFICIENCY ANEMIA TYPE: ICD-10-CM

## 2021-01-28 DIAGNOSIS — M1A.30X0 CHRONIC GOUT DUE TO RENAL IMPAIRMENT WITHOUT TOPHUS, UNSPECIFIED SITE: ICD-10-CM

## 2021-01-28 DIAGNOSIS — K11.20 PAROTITIS: Primary | ICD-10-CM

## 2021-01-28 PROCEDURE — 1126F AMNT PAIN NOTED NONE PRSNT: CPT | Mod: S$GLB,,, | Performed by: INTERNAL MEDICINE

## 2021-01-28 PROCEDURE — 99999 PR PBB SHADOW E&M-EST. PATIENT-LVL IV: ICD-10-PCS | Mod: PBBFAC,,, | Performed by: INTERNAL MEDICINE

## 2021-01-28 PROCEDURE — 99214 PR OFFICE/OUTPT VISIT, EST, LEVL IV, 30-39 MIN: ICD-10-PCS | Mod: S$GLB,,, | Performed by: INTERNAL MEDICINE

## 2021-01-28 PROCEDURE — 1126F PR PAIN SEVERITY QUANTIFIED, NO PAIN PRESENT: ICD-10-PCS | Mod: S$GLB,,, | Performed by: INTERNAL MEDICINE

## 2021-01-28 PROCEDURE — 99999 PR PBB SHADOW E&M-EST. PATIENT-LVL IV: CPT | Mod: PBBFAC,,, | Performed by: INTERNAL MEDICINE

## 2021-01-28 PROCEDURE — 99214 OFFICE O/P EST MOD 30 MIN: CPT | Mod: S$GLB,,, | Performed by: INTERNAL MEDICINE

## 2021-01-28 PROCEDURE — 3008F BODY MASS INDEX DOCD: CPT | Mod: CPTII,S$GLB,, | Performed by: INTERNAL MEDICINE

## 2021-01-28 PROCEDURE — 3008F PR BODY MASS INDEX (BMI) DOCUMENTED: ICD-10-PCS | Mod: CPTII,S$GLB,, | Performed by: INTERNAL MEDICINE

## 2021-01-28 RX ORDER — CEPHALEXIN 500 MG/1
500 CAPSULE ORAL EVERY 12 HOURS
Qty: 14 CAPSULE | Refills: 0 | Status: SHIPPED | OUTPATIENT
Start: 2021-01-28 | End: 2021-02-04

## 2021-01-29 DIAGNOSIS — I50.22 CHRONIC SYSTOLIC CONGESTIVE HEART FAILURE: ICD-10-CM

## 2021-01-30 RX ORDER — FUROSEMIDE 40 MG/1
40 TABLET ORAL DAILY
Qty: 90 TABLET | Refills: 4 | Status: SHIPPED | OUTPATIENT
Start: 2021-01-30 | End: 2021-12-14 | Stop reason: SDUPTHER

## 2021-02-04 ENCOUNTER — LAB VISIT (OUTPATIENT)
Dept: LAB | Facility: HOSPITAL | Age: 61
End: 2021-02-04
Attending: INTERNAL MEDICINE
Payer: MEDICARE

## 2021-02-04 DIAGNOSIS — M1A.30X0 CHRONIC GOUT DUE TO RENAL IMPAIRMENT WITHOUT TOPHUS, UNSPECIFIED SITE: ICD-10-CM

## 2021-02-04 DIAGNOSIS — D50.9 IRON DEFICIENCY ANEMIA, UNSPECIFIED IRON DEFICIENCY ANEMIA TYPE: ICD-10-CM

## 2021-02-04 DIAGNOSIS — N18.32 STAGE 3B CHRONIC KIDNEY DISEASE: ICD-10-CM

## 2021-02-04 DIAGNOSIS — Z11.4 ENCOUNTER FOR SCREENING FOR HIV: ICD-10-CM

## 2021-02-04 DIAGNOSIS — E03.9 HYPOTHYROIDISM, UNSPECIFIED TYPE: ICD-10-CM

## 2021-02-04 LAB
ALBUMIN SERPL BCP-MCNC: 4.1 G/DL (ref 3.5–5.2)
ALP SERPL-CCNC: 71 U/L (ref 38–126)
ALT SERPL W/O P-5'-P-CCNC: 22 U/L (ref 10–44)
ANION GAP SERPL CALC-SCNC: 7 MMOL/L (ref 8–16)
AST SERPL-CCNC: 31 U/L (ref 15–46)
BASOPHILS # BLD AUTO: 0.08 K/UL (ref 0–0.2)
BASOPHILS NFR BLD: 1 % (ref 0–1.9)
BILIRUB SERPL-MCNC: 0.7 MG/DL (ref 0.1–1)
CALCIUM SERPL-MCNC: 9.3 MG/DL (ref 8.7–10.5)
CHLORIDE SERPL-SCNC: 106 MMOL/L (ref 95–110)
CO2 SERPL-SCNC: 30 MMOL/L (ref 23–29)
CREAT SERPL-MCNC: 1.95 MG/DL (ref 0.5–1.4)
DIFFERENTIAL METHOD: ABNORMAL
EOSINOPHIL # BLD AUTO: 0.2 K/UL (ref 0–0.5)
EOSINOPHIL NFR BLD: 2.9 % (ref 0–8)
ERYTHROCYTE [DISTWIDTH] IN BLOOD BY AUTOMATED COUNT: 14.6 % (ref 11.5–14.5)
EST. GFR  (AFRICAN AMERICAN): 41.7 ML/MIN/1.73 M^2
EST. GFR  (NON AFRICAN AMERICAN): 36.1 ML/MIN/1.73 M^2
GLUCOSE SERPL-MCNC: 110 MG/DL (ref 70–110)
HCT VFR BLD AUTO: 48 % (ref 40–54)
HGB BLD-MCNC: 15.3 G/DL (ref 14–18)
IMM GRANULOCYTES # BLD AUTO: 0.08 K/UL (ref 0–0.04)
IMM GRANULOCYTES NFR BLD AUTO: 1 % (ref 0–0.5)
LYMPHOCYTES # BLD AUTO: 0.9 K/UL (ref 1–4.8)
LYMPHOCYTES NFR BLD: 10.6 % (ref 18–48)
MCH RBC QN AUTO: 26.7 PG (ref 27–31)
MCHC RBC AUTO-ENTMCNC: 31.9 G/DL (ref 32–36)
MCV RBC AUTO: 84 FL (ref 82–98)
MONOCYTES # BLD AUTO: 0.8 K/UL (ref 0.3–1)
MONOCYTES NFR BLD: 10.1 % (ref 4–15)
NEUTROPHILS # BLD AUTO: 6.1 K/UL (ref 1.8–7.7)
NEUTROPHILS NFR BLD: 74.4 % (ref 38–73)
NRBC BLD-RTO: 0 /100 WBC
PLATELET # BLD AUTO: 551 K/UL (ref 150–350)
PMV BLD AUTO: 11.8 FL (ref 9.2–12.9)
POTASSIUM SERPL-SCNC: 5 MMOL/L (ref 3.5–5.1)
PROT SERPL-MCNC: 7.3 G/DL (ref 6–8.4)
RBC # BLD AUTO: 5.73 M/UL (ref 4.6–6.2)
SODIUM SERPL-SCNC: 143 MMOL/L (ref 136–145)
T4 FREE SERPL-MCNC: 1.42 NG/DL (ref 0.71–1.51)
TSH SERPL DL<=0.005 MIU/L-ACNC: 6.65 UIU/ML (ref 0.4–4)
URATE SERPL-MCNC: 7.9 MG/DL (ref 3.4–7)
UUN UR-MCNC: 37 MG/DL (ref 2–20)
WBC # BLD AUTO: 8.22 K/UL (ref 3.9–12.7)

## 2021-02-04 PROCEDURE — 85025 COMPLETE CBC W/AUTO DIFF WBC: CPT | Mod: PO

## 2021-02-04 PROCEDURE — 84439 ASSAY OF FREE THYROXINE: CPT

## 2021-02-04 PROCEDURE — 84443 ASSAY THYROID STIM HORMONE: CPT | Mod: PO

## 2021-02-04 PROCEDURE — 36415 COLL VENOUS BLD VENIPUNCTURE: CPT | Mod: PO

## 2021-02-04 PROCEDURE — 84550 ASSAY OF BLOOD/URIC ACID: CPT

## 2021-02-04 PROCEDURE — 80053 COMPREHEN METABOLIC PANEL: CPT | Mod: PO

## 2021-02-04 PROCEDURE — 86703 HIV-1/HIV-2 1 RESULT ANTBDY: CPT | Mod: PO

## 2021-02-05 LAB — HIV 1+2 AB+HIV1 P24 AG SERPL QL IA: NEGATIVE

## 2021-02-18 ENCOUNTER — OFFICE VISIT (OUTPATIENT)
Dept: INTERNAL MEDICINE | Facility: CLINIC | Age: 61
End: 2021-02-18
Payer: MEDICARE

## 2021-02-18 VITALS
BODY MASS INDEX: 34.44 KG/M2 | HEART RATE: 73 BPM | DIASTOLIC BLOOD PRESSURE: 76 MMHG | OXYGEN SATURATION: 94 % | TEMPERATURE: 96 F | HEIGHT: 69 IN | SYSTOLIC BLOOD PRESSURE: 132 MMHG | WEIGHT: 232.5 LBS

## 2021-02-18 DIAGNOSIS — M1A.30X0 CHRONIC GOUT DUE TO RENAL IMPAIRMENT WITHOUT TOPHUS, UNSPECIFIED SITE: ICD-10-CM

## 2021-02-18 DIAGNOSIS — N18.32 STAGE 3B CHRONIC KIDNEY DISEASE: Primary | ICD-10-CM

## 2021-02-18 DIAGNOSIS — E78.00 PURE HYPERCHOLESTEROLEMIA: ICD-10-CM

## 2021-02-18 DIAGNOSIS — E03.9 HYPOTHYROIDISM, UNSPECIFIED TYPE: ICD-10-CM

## 2021-02-18 DIAGNOSIS — I50.20 HFREF (HEART FAILURE WITH REDUCED EJECTION FRACTION): ICD-10-CM

## 2021-02-18 DIAGNOSIS — I50.22 CHRONIC SYSTOLIC CONGESTIVE HEART FAILURE: ICD-10-CM

## 2021-02-18 PROBLEM — R94.39 ABNORMAL STRESS TEST: Status: RESOLVED | Noted: 2020-12-10 | Resolved: 2021-02-18

## 2021-02-18 PROCEDURE — 99214 PR OFFICE/OUTPT VISIT, EST, LEVL IV, 30-39 MIN: ICD-10-PCS | Mod: S$GLB,,, | Performed by: INTERNAL MEDICINE

## 2021-02-18 PROCEDURE — 99999 PR PBB SHADOW E&M-EST. PATIENT-LVL III: CPT | Mod: PBBFAC,,, | Performed by: INTERNAL MEDICINE

## 2021-02-18 PROCEDURE — 99999 PR PBB SHADOW E&M-EST. PATIENT-LVL III: ICD-10-PCS | Mod: PBBFAC,,, | Performed by: INTERNAL MEDICINE

## 2021-02-18 PROCEDURE — 1125F PR PAIN SEVERITY QUANTIFIED, PAIN PRESENT: ICD-10-PCS | Mod: S$GLB,,, | Performed by: INTERNAL MEDICINE

## 2021-02-18 PROCEDURE — 3008F PR BODY MASS INDEX (BMI) DOCUMENTED: ICD-10-PCS | Mod: CPTII,S$GLB,, | Performed by: INTERNAL MEDICINE

## 2021-02-18 PROCEDURE — 1125F AMNT PAIN NOTED PAIN PRSNT: CPT | Mod: S$GLB,,, | Performed by: INTERNAL MEDICINE

## 2021-02-18 PROCEDURE — 99214 OFFICE O/P EST MOD 30 MIN: CPT | Mod: S$GLB,,, | Performed by: INTERNAL MEDICINE

## 2021-02-18 PROCEDURE — 3008F BODY MASS INDEX DOCD: CPT | Mod: CPTII,S$GLB,, | Performed by: INTERNAL MEDICINE

## 2021-02-18 RX ORDER — LEVOTHYROXINE SODIUM 175 UG/1
175 TABLET ORAL DAILY
Qty: 90 TABLET | Refills: 3 | Status: SHIPPED | OUTPATIENT
Start: 2021-02-18 | End: 2022-02-23

## 2021-02-22 ENCOUNTER — TELEPHONE (OUTPATIENT)
Dept: CARDIOLOGY | Facility: CLINIC | Age: 61
End: 2021-02-22

## 2021-02-22 ENCOUNTER — CLINICAL SUPPORT (OUTPATIENT)
Dept: CARDIOLOGY | Facility: CLINIC | Age: 61
End: 2021-02-22
Payer: MEDICARE

## 2021-02-22 DIAGNOSIS — Z95.810 CARDIAC RESYNCHRONIZATION THERAPY DEFIBRILLATOR (CRT-D) IN PLACE: Primary | ICD-10-CM

## 2021-02-22 PROCEDURE — 93295 DEV INTERROG REMOTE 1/2/MLT: CPT | Mod: S$GLB,,, | Performed by: INTERNAL MEDICINE

## 2021-02-22 PROCEDURE — 93296 REM INTERROG EVL PM/IDS: CPT | Mod: S$GLB,,, | Performed by: INTERNAL MEDICINE

## 2021-02-22 PROCEDURE — 93295 PR INTERROG EVAL, REMOTE, UP TO 90 DAYS,CARDVERT/DEFIB: ICD-10-PCS | Mod: S$GLB,,, | Performed by: INTERNAL MEDICINE

## 2021-02-22 PROCEDURE — 93296 PR INTERROG EVAL, REMOTE, UP TO 90 DAYS, PACER/DEFIB,TECH REVIEW: ICD-10-PCS | Mod: S$GLB,,, | Performed by: INTERNAL MEDICINE

## 2021-02-23 ENCOUNTER — PATIENT MESSAGE (OUTPATIENT)
Dept: CARDIOLOGY | Facility: CLINIC | Age: 61
End: 2021-02-23

## 2021-03-18 ENCOUNTER — OFFICE VISIT (OUTPATIENT)
Dept: CARDIOLOGY | Facility: CLINIC | Age: 61
End: 2021-03-18
Payer: MEDICARE

## 2021-03-18 VITALS
OXYGEN SATURATION: 92 % | WEIGHT: 236.38 LBS | SYSTOLIC BLOOD PRESSURE: 97 MMHG | BODY MASS INDEX: 35.01 KG/M2 | DIASTOLIC BLOOD PRESSURE: 59 MMHG | HEART RATE: 68 BPM | HEIGHT: 69 IN

## 2021-03-18 DIAGNOSIS — Z86.79 S/P ABLATION OF VENTRICULAR ARRHYTHMIA: ICD-10-CM

## 2021-03-18 DIAGNOSIS — D50.9 IRON DEFICIENCY ANEMIA, UNSPECIFIED IRON DEFICIENCY ANEMIA TYPE: ICD-10-CM

## 2021-03-18 DIAGNOSIS — I50.22 NYHA CLASS 2 AND ACC/AHA STAGE C CHRONIC SYSTOLIC CONGESTIVE HEART FAILURE: Primary | ICD-10-CM

## 2021-03-18 DIAGNOSIS — I25.10 CORONARY ARTERY DISEASE INVOLVING NATIVE CORONARY ARTERY OF NATIVE HEART WITHOUT ANGINA PECTORIS: ICD-10-CM

## 2021-03-18 DIAGNOSIS — Z98.890 S/P ABLATION OF VENTRICULAR ARRHYTHMIA: ICD-10-CM

## 2021-03-18 DIAGNOSIS — Z95.810 CARDIAC RESYNCHRONIZATION THERAPY DEFIBRILLATOR (CRT-D) IN PLACE: ICD-10-CM

## 2021-03-18 DIAGNOSIS — E78.00 PURE HYPERCHOLESTEROLEMIA: ICD-10-CM

## 2021-03-18 DIAGNOSIS — N18.32 STAGE 3B CHRONIC KIDNEY DISEASE: ICD-10-CM

## 2021-03-18 DIAGNOSIS — I50.20 HFREF (HEART FAILURE WITH REDUCED EJECTION FRACTION): ICD-10-CM

## 2021-03-18 DIAGNOSIS — I47.20 V-TACH: ICD-10-CM

## 2021-03-18 PROCEDURE — 1126F PR PAIN SEVERITY QUANTIFIED, NO PAIN PRESENT: ICD-10-PCS | Mod: S$GLB,,, | Performed by: INTERNAL MEDICINE

## 2021-03-18 PROCEDURE — 99999 PR PBB SHADOW E&M-EST. PATIENT-LVL III: CPT | Mod: PBBFAC,,, | Performed by: INTERNAL MEDICINE

## 2021-03-18 PROCEDURE — 1126F AMNT PAIN NOTED NONE PRSNT: CPT | Mod: S$GLB,,, | Performed by: INTERNAL MEDICINE

## 2021-03-18 PROCEDURE — 3008F BODY MASS INDEX DOCD: CPT | Mod: CPTII,S$GLB,, | Performed by: INTERNAL MEDICINE

## 2021-03-18 PROCEDURE — 99214 PR OFFICE/OUTPT VISIT, EST, LEVL IV, 30-39 MIN: ICD-10-PCS | Mod: S$GLB,,, | Performed by: INTERNAL MEDICINE

## 2021-03-18 PROCEDURE — 99214 OFFICE O/P EST MOD 30 MIN: CPT | Mod: S$GLB,,, | Performed by: INTERNAL MEDICINE

## 2021-03-18 PROCEDURE — 3008F PR BODY MASS INDEX (BMI) DOCUMENTED: ICD-10-PCS | Mod: CPTII,S$GLB,, | Performed by: INTERNAL MEDICINE

## 2021-03-18 PROCEDURE — 99499 UNLISTED E&M SERVICE: CPT | Mod: S$GLB,,, | Performed by: INTERNAL MEDICINE

## 2021-03-18 PROCEDURE — 99499 RISK ADDL DX/OHS AUDIT: ICD-10-PCS | Mod: S$GLB,,, | Performed by: INTERNAL MEDICINE

## 2021-03-18 PROCEDURE — 99999 PR PBB SHADOW E&M-EST. PATIENT-LVL III: ICD-10-PCS | Mod: PBBFAC,,, | Performed by: INTERNAL MEDICINE

## 2021-03-18 RX ORDER — CARVEDILOL 25 MG/1
25 TABLET ORAL 2 TIMES DAILY WITH MEALS
Qty: 180 TABLET | Refills: 3 | Status: SHIPPED | OUTPATIENT
Start: 2021-03-18 | End: 2022-02-23

## 2021-05-10 ENCOUNTER — LAB VISIT (OUTPATIENT)
Dept: LAB | Facility: HOSPITAL | Age: 61
End: 2021-05-10
Attending: INTERNAL MEDICINE
Payer: MEDICARE

## 2021-05-10 DIAGNOSIS — E78.00 PURE HYPERCHOLESTEROLEMIA: ICD-10-CM

## 2021-05-10 DIAGNOSIS — N18.32 STAGE 3B CHRONIC KIDNEY DISEASE: ICD-10-CM

## 2021-05-10 DIAGNOSIS — E03.9 HYPOTHYROIDISM, UNSPECIFIED TYPE: ICD-10-CM

## 2021-05-10 LAB
ALBUMIN SERPL BCP-MCNC: 4.2 G/DL (ref 3.5–5.2)
ALP SERPL-CCNC: 78 U/L (ref 38–126)
ALT SERPL W/O P-5'-P-CCNC: 19 U/L (ref 10–44)
ANION GAP SERPL CALC-SCNC: 7 MMOL/L (ref 8–16)
AST SERPL-CCNC: 38 U/L (ref 15–46)
BILIRUB SERPL-MCNC: 0.6 MG/DL (ref 0.1–1)
CALCIUM SERPL-MCNC: 9 MG/DL (ref 8.7–10.5)
CHLORIDE SERPL-SCNC: 104 MMOL/L (ref 95–110)
CHOLEST SERPL-MCNC: 156 MG/DL (ref 120–199)
CHOLEST/HDLC SERPL: 5.2 {RATIO} (ref 2–5)
CO2 SERPL-SCNC: 27 MMOL/L (ref 23–29)
CREAT SERPL-MCNC: 3.53 MG/DL (ref 0.5–1.4)
EST. GFR  (AFRICAN AMERICAN): 20.3 ML/MIN/1.73 M^2
EST. GFR  (NON AFRICAN AMERICAN): 17.6 ML/MIN/1.73 M^2
GLUCOSE SERPL-MCNC: 114 MG/DL (ref 70–110)
HDLC SERPL-MCNC: 30 MG/DL (ref 40–75)
HDLC SERPL: 19.2 % (ref 20–50)
LDLC SERPL CALC-MCNC: 100.6 MG/DL (ref 63–159)
NONHDLC SERPL-MCNC: 126 MG/DL
POTASSIUM SERPL-SCNC: 4.8 MMOL/L (ref 3.5–5.1)
PROT SERPL-MCNC: 7.6 G/DL (ref 6–8.4)
SODIUM SERPL-SCNC: 138 MMOL/L (ref 136–145)
TRIGL SERPL-MCNC: 127 MG/DL (ref 30–150)
TSH SERPL DL<=0.005 MIU/L-ACNC: 3.97 UIU/ML (ref 0.4–4)
UUN UR-MCNC: 72 MG/DL (ref 2–20)

## 2021-05-10 PROCEDURE — 84443 ASSAY THYROID STIM HORMONE: CPT | Mod: PO | Performed by: INTERNAL MEDICINE

## 2021-05-10 PROCEDURE — 36415 COLL VENOUS BLD VENIPUNCTURE: CPT | Mod: PO | Performed by: INTERNAL MEDICINE

## 2021-05-10 PROCEDURE — 80061 LIPID PANEL: CPT | Performed by: INTERNAL MEDICINE

## 2021-05-10 PROCEDURE — 80053 COMPREHEN METABOLIC PANEL: CPT | Mod: PO | Performed by: INTERNAL MEDICINE

## 2021-05-18 ENCOUNTER — LAB VISIT (OUTPATIENT)
Dept: LAB | Facility: HOSPITAL | Age: 61
End: 2021-05-18
Attending: STUDENT IN AN ORGANIZED HEALTH CARE EDUCATION/TRAINING PROGRAM
Payer: MEDICARE

## 2021-05-18 ENCOUNTER — OFFICE VISIT (OUTPATIENT)
Dept: FAMILY MEDICINE | Facility: CLINIC | Age: 61
End: 2021-05-18
Payer: MEDICARE

## 2021-05-18 VITALS
HEART RATE: 78 BPM | DIASTOLIC BLOOD PRESSURE: 78 MMHG | WEIGHT: 231.13 LBS | TEMPERATURE: 98 F | HEIGHT: 69 IN | OXYGEN SATURATION: 92 % | SYSTOLIC BLOOD PRESSURE: 116 MMHG | BODY MASS INDEX: 34.23 KG/M2

## 2021-05-18 DIAGNOSIS — Z95.810 CARDIAC RESYNCHRONIZATION THERAPY DEFIBRILLATOR (CRT-D) IN PLACE: ICD-10-CM

## 2021-05-18 DIAGNOSIS — E03.9 HYPOTHYROIDISM, UNSPECIFIED TYPE: ICD-10-CM

## 2021-05-18 DIAGNOSIS — N18.32 STAGE 3B CHRONIC KIDNEY DISEASE: ICD-10-CM

## 2021-05-18 DIAGNOSIS — N17.9 AKI (ACUTE KIDNEY INJURY): ICD-10-CM

## 2021-05-18 DIAGNOSIS — N17.9 AKI (ACUTE KIDNEY INJURY): Primary | ICD-10-CM

## 2021-05-18 DIAGNOSIS — R73.9 HYPERGLYCEMIA: ICD-10-CM

## 2021-05-18 LAB
ALBUMIN SERPL BCP-MCNC: 4.2 G/DL (ref 3.5–5.2)
ANION GAP SERPL CALC-SCNC: 9 MMOL/L (ref 8–16)
CALCIUM SERPL-MCNC: 9.1 MG/DL (ref 8.7–10.5)
CHLORIDE SERPL-SCNC: 105 MMOL/L (ref 95–110)
CO2 SERPL-SCNC: 26 MMOL/L (ref 23–29)
CREAT SERPL-MCNC: 2.63 MG/DL (ref 0.5–1.4)
EST. GFR  (AFRICAN AMERICAN): 29 ML/MIN/1.73 M^2
EST. GFR  (NON AFRICAN AMERICAN): 25.1 ML/MIN/1.73 M^2
GLUCOSE SERPL-MCNC: 99 MG/DL (ref 70–110)
PHOSPHATE SERPL-MCNC: 3.7 MG/DL (ref 2.7–4.5)
POTASSIUM SERPL-SCNC: 4.9 MMOL/L (ref 3.5–5.1)
SODIUM SERPL-SCNC: 140 MMOL/L (ref 136–145)
UUN UR-MCNC: 52 MG/DL (ref 2–20)

## 2021-05-18 PROCEDURE — 80069 RENAL FUNCTION PANEL: CPT | Mod: PO | Performed by: STUDENT IN AN ORGANIZED HEALTH CARE EDUCATION/TRAINING PROGRAM

## 2021-05-18 PROCEDURE — 36415 COLL VENOUS BLD VENIPUNCTURE: CPT | Mod: PO | Performed by: STUDENT IN AN ORGANIZED HEALTH CARE EDUCATION/TRAINING PROGRAM

## 2021-05-18 PROCEDURE — 1126F AMNT PAIN NOTED NONE PRSNT: CPT | Mod: S$GLB,,, | Performed by: STUDENT IN AN ORGANIZED HEALTH CARE EDUCATION/TRAINING PROGRAM

## 2021-05-18 PROCEDURE — 1126F PR PAIN SEVERITY QUANTIFIED, NO PAIN PRESENT: ICD-10-PCS | Mod: S$GLB,,, | Performed by: STUDENT IN AN ORGANIZED HEALTH CARE EDUCATION/TRAINING PROGRAM

## 2021-05-18 PROCEDURE — 99214 OFFICE O/P EST MOD 30 MIN: CPT | Mod: S$GLB,,, | Performed by: STUDENT IN AN ORGANIZED HEALTH CARE EDUCATION/TRAINING PROGRAM

## 2021-05-18 PROCEDURE — 3008F PR BODY MASS INDEX (BMI) DOCUMENTED: ICD-10-PCS | Mod: CPTII,S$GLB,, | Performed by: STUDENT IN AN ORGANIZED HEALTH CARE EDUCATION/TRAINING PROGRAM

## 2021-05-18 PROCEDURE — 3008F BODY MASS INDEX DOCD: CPT | Mod: CPTII,S$GLB,, | Performed by: STUDENT IN AN ORGANIZED HEALTH CARE EDUCATION/TRAINING PROGRAM

## 2021-05-18 PROCEDURE — 99499 RISK ADDL DX/OHS AUDIT: ICD-10-PCS | Mod: S$GLB,,, | Performed by: STUDENT IN AN ORGANIZED HEALTH CARE EDUCATION/TRAINING PROGRAM

## 2021-05-18 PROCEDURE — 99499 UNLISTED E&M SERVICE: CPT | Mod: S$GLB,,, | Performed by: STUDENT IN AN ORGANIZED HEALTH CARE EDUCATION/TRAINING PROGRAM

## 2021-05-18 PROCEDURE — 99214 PR OFFICE/OUTPT VISIT, EST, LEVL IV, 30-39 MIN: ICD-10-PCS | Mod: S$GLB,,, | Performed by: STUDENT IN AN ORGANIZED HEALTH CARE EDUCATION/TRAINING PROGRAM

## 2021-05-26 ENCOUNTER — CLINICAL SUPPORT (OUTPATIENT)
Dept: CARDIOLOGY | Facility: CLINIC | Age: 61
End: 2021-05-26
Payer: MEDICARE

## 2021-05-26 DIAGNOSIS — Z95.810 CARDIAC RESYNCHRONIZATION THERAPY DEFIBRILLATOR (CRT-D) IN PLACE: Primary | ICD-10-CM

## 2021-05-26 PROCEDURE — 93296 REM INTERROG EVL PM/IDS: CPT | Mod: S$GLB,,, | Performed by: INTERNAL MEDICINE

## 2021-05-26 PROCEDURE — 93295 DEV INTERROG REMOTE 1/2/MLT: CPT | Mod: S$GLB,,, | Performed by: INTERNAL MEDICINE

## 2021-05-26 PROCEDURE — 93295 PR INTERROG EVAL, REMOTE, UP TO 90 DAYS,CARDVERT/DEFIB: ICD-10-PCS | Mod: S$GLB,,, | Performed by: INTERNAL MEDICINE

## 2021-05-26 PROCEDURE — 93296 PR INTERROG EVAL, REMOTE, UP TO 90 DAYS, PACER/DEFIB,TECH REVIEW: ICD-10-PCS | Mod: S$GLB,,, | Performed by: INTERNAL MEDICINE

## 2021-06-03 ENCOUNTER — OFFICE VISIT (OUTPATIENT)
Dept: NEPHROLOGY | Facility: CLINIC | Age: 61
End: 2021-06-03
Payer: MEDICARE

## 2021-06-03 VITALS
HEIGHT: 69 IN | BODY MASS INDEX: 34.12 KG/M2 | WEIGHT: 230.38 LBS | HEART RATE: 86 BPM | SYSTOLIC BLOOD PRESSURE: 120 MMHG | DIASTOLIC BLOOD PRESSURE: 78 MMHG

## 2021-06-03 DIAGNOSIS — N18.32 STAGE 3B CHRONIC KIDNEY DISEASE: ICD-10-CM

## 2021-06-03 PROCEDURE — 99205 PR OFFICE/OUTPT VISIT, NEW, LEVL V, 60-74 MIN: ICD-10-PCS | Mod: S$GLB,,, | Performed by: INTERNAL MEDICINE

## 2021-06-03 PROCEDURE — 3008F PR BODY MASS INDEX (BMI) DOCUMENTED: ICD-10-PCS | Mod: CPTII,S$GLB,, | Performed by: INTERNAL MEDICINE

## 2021-06-03 PROCEDURE — 3008F BODY MASS INDEX DOCD: CPT | Mod: CPTII,S$GLB,, | Performed by: INTERNAL MEDICINE

## 2021-06-03 PROCEDURE — 99999 PR PBB SHADOW E&M-EST. PATIENT-LVL III: CPT | Mod: PBBFAC,,, | Performed by: INTERNAL MEDICINE

## 2021-06-03 PROCEDURE — 99205 OFFICE O/P NEW HI 60 MIN: CPT | Mod: S$GLB,,, | Performed by: INTERNAL MEDICINE

## 2021-06-03 PROCEDURE — 99999 PR PBB SHADOW E&M-EST. PATIENT-LVL III: ICD-10-PCS | Mod: PBBFAC,,, | Performed by: INTERNAL MEDICINE

## 2021-06-10 ENCOUNTER — HOSPITAL ENCOUNTER (OUTPATIENT)
Dept: RADIOLOGY | Facility: HOSPITAL | Age: 61
Discharge: HOME | End: 2021-06-10
Attending: INTERNAL MEDICINE
Payer: MEDICARE

## 2021-06-10 DIAGNOSIS — N18.32 STAGE 3B CHRONIC KIDNEY DISEASE: ICD-10-CM

## 2021-06-10 PROCEDURE — 76770 US EXAM ABDO BACK WALL COMP: CPT | Mod: TC,PO

## 2021-06-14 ENCOUNTER — LAB VISIT (OUTPATIENT)
Dept: LAB | Facility: HOSPITAL | Age: 61
End: 2021-06-14
Attending: INTERNAL MEDICINE
Payer: MEDICARE

## 2021-06-14 DIAGNOSIS — N18.32 CHRONIC KIDNEY DISEASE (CKD) STAGE G3B/A1, MODERATELY DECREASED GLOMERULAR FILTRATION RATE (GFR) BETWEEN 30-44 ML/MIN/1.73 SQUARE METER AND ALBUMINURIA CREATININE RATIO LESS THAN 30 MG/G: Primary | ICD-10-CM

## 2021-06-14 PROCEDURE — 84166 PROTEIN E-PHORESIS/URINE/CSF: CPT | Mod: PO | Performed by: INTERNAL MEDICINE

## 2021-06-14 PROCEDURE — 84166 PATHOLOGIST INTERPRETATION UPE: ICD-10-PCS | Mod: 26,,, | Performed by: PATHOLOGY

## 2021-06-14 PROCEDURE — 84156 ASSAY OF PROTEIN URINE: CPT | Mod: PO | Performed by: INTERNAL MEDICINE

## 2021-06-14 PROCEDURE — 84166 PROTEIN E-PHORESIS/URINE/CSF: CPT | Mod: 26,,, | Performed by: PATHOLOGY

## 2021-06-15 LAB
PROT 24H UR-MRATE: NORMAL MG/SPEC (ref 0–100)
PROT UR-MCNC: <7 MG/DL (ref 0–15)
URINE COLLECTION DURATION: 24 HR
URINE VOLUME: 1950 ML

## 2021-06-17 LAB
PATHOLOGIST INTERPRETATION UPE: NORMAL
PROT PATTERN UR ELPH-IMP: NORMAL

## 2021-07-22 ENCOUNTER — OFFICE VISIT (OUTPATIENT)
Dept: NEPHROLOGY | Facility: CLINIC | Age: 61
End: 2021-07-22
Payer: MEDICARE

## 2021-07-22 VITALS
WEIGHT: 231.69 LBS | HEIGHT: 69 IN | DIASTOLIC BLOOD PRESSURE: 74 MMHG | SYSTOLIC BLOOD PRESSURE: 110 MMHG | HEART RATE: 83 BPM | BODY MASS INDEX: 34.32 KG/M2

## 2021-07-22 DIAGNOSIS — N18.32 STAGE 3B CHRONIC KIDNEY DISEASE: Primary | ICD-10-CM

## 2021-07-22 DIAGNOSIS — E79.0 HYPERURICEMIA: ICD-10-CM

## 2021-07-22 DIAGNOSIS — E55.9 VITAMIN D DEFICIENCY: ICD-10-CM

## 2021-07-22 PROCEDURE — 99999 PR PBB SHADOW E&M-EST. PATIENT-LVL III: CPT | Mod: PBBFAC,,, | Performed by: INTERNAL MEDICINE

## 2021-07-22 PROCEDURE — 1126F AMNT PAIN NOTED NONE PRSNT: CPT | Mod: CPTII,S$GLB,, | Performed by: INTERNAL MEDICINE

## 2021-07-22 PROCEDURE — 3008F BODY MASS INDEX DOCD: CPT | Mod: CPTII,S$GLB,, | Performed by: INTERNAL MEDICINE

## 2021-07-22 PROCEDURE — 99214 PR OFFICE/OUTPT VISIT, EST, LEVL IV, 30-39 MIN: ICD-10-PCS | Mod: S$GLB,,, | Performed by: INTERNAL MEDICINE

## 2021-07-22 PROCEDURE — 99999 PR PBB SHADOW E&M-EST. PATIENT-LVL III: ICD-10-PCS | Mod: PBBFAC,,, | Performed by: INTERNAL MEDICINE

## 2021-07-22 PROCEDURE — 99214 OFFICE O/P EST MOD 30 MIN: CPT | Mod: S$GLB,,, | Performed by: INTERNAL MEDICINE

## 2021-07-22 PROCEDURE — 1126F PR PAIN SEVERITY QUANTIFIED, NO PAIN PRESENT: ICD-10-PCS | Mod: CPTII,S$GLB,, | Performed by: INTERNAL MEDICINE

## 2021-07-22 PROCEDURE — 3008F PR BODY MASS INDEX (BMI) DOCUMENTED: ICD-10-PCS | Mod: CPTII,S$GLB,, | Performed by: INTERNAL MEDICINE

## 2021-07-22 RX ORDER — ERGOCALCIFEROL 1.25 MG/1
50000 CAPSULE ORAL
Qty: 4 CAPSULE | Refills: 5 | Status: SHIPPED | OUTPATIENT
Start: 2021-07-22 | End: 2022-02-28 | Stop reason: SDUPTHER

## 2021-08-26 ENCOUNTER — CLINICAL SUPPORT (OUTPATIENT)
Dept: CARDIOLOGY | Facility: CLINIC | Age: 61
End: 2021-08-26
Payer: MEDICARE

## 2021-08-26 DIAGNOSIS — Z95.810 CARDIAC RESYNCHRONIZATION THERAPY DEFIBRILLATOR (CRT-D) IN PLACE: Primary | ICD-10-CM

## 2021-08-26 PROCEDURE — 93295 PR INTERROG EVAL, REMOTE, UP TO 90 DAYS,CARDVERT/DEFIB: ICD-10-PCS | Mod: S$GLB,,, | Performed by: INTERNAL MEDICINE

## 2021-08-26 PROCEDURE — 93295 DEV INTERROG REMOTE 1/2/MLT: CPT | Mod: S$GLB,,, | Performed by: INTERNAL MEDICINE

## 2021-08-26 PROCEDURE — 93296 REM INTERROG EVL PM/IDS: CPT | Mod: S$GLB,,, | Performed by: INTERNAL MEDICINE

## 2021-08-26 PROCEDURE — 93296 PR INTERROG EVAL, REMOTE, UP TO 90 DAYS, PACER/DEFIB,TECH REVIEW: ICD-10-PCS | Mod: S$GLB,,, | Performed by: INTERNAL MEDICINE

## 2021-09-07 NOTE — Clinical Note
The ECG shows a sinus rhythm.  75 year old female with PMH of DM, CAD, ESRD (M/W/F, compliant, had a session yesterday) presents to the ED complaining of right knee pain for many years. HD stable. Imp: Right knee arthritis, no clinical evidence suggestive of septic arthritis. Plan for right knee xray, meds, reassess.

## 2021-09-14 ENCOUNTER — TELEPHONE (OUTPATIENT)
Dept: FAMILY MEDICINE | Facility: CLINIC | Age: 61
End: 2021-09-14

## 2021-09-14 DIAGNOSIS — E78.00 PURE HYPERCHOLESTEROLEMIA: ICD-10-CM

## 2021-09-15 RX ORDER — AMIODARONE HYDROCHLORIDE 200 MG/1
200 TABLET ORAL DAILY
Qty: 90 TABLET | Refills: 0 | Status: SHIPPED | OUTPATIENT
Start: 2021-09-15 | End: 2021-12-13

## 2021-09-15 RX ORDER — PRAVASTATIN SODIUM 40 MG/1
40 TABLET ORAL DAILY
Qty: 90 TABLET | Refills: 4 | Status: SHIPPED | OUTPATIENT
Start: 2021-09-15 | End: 2022-11-10

## 2021-09-20 ENCOUNTER — TELEPHONE (OUTPATIENT)
Dept: CARDIOLOGY | Facility: CLINIC | Age: 61
End: 2021-09-20

## 2021-10-18 ENCOUNTER — PATIENT MESSAGE (OUTPATIENT)
Dept: TRANSPLANT | Facility: CLINIC | Age: 61
End: 2021-10-18

## 2021-10-27 ENCOUNTER — TELEPHONE (OUTPATIENT)
Dept: FAMILY MEDICINE | Facility: CLINIC | Age: 61
End: 2021-10-27

## 2021-10-27 DIAGNOSIS — I47.20 V-TACH: ICD-10-CM

## 2021-10-27 RX ORDER — SACUBITRIL AND VALSARTAN 49; 51 MG/1; MG/1
1 TABLET, FILM COATED ORAL 2 TIMES DAILY
Qty: 180 TABLET | Refills: 3 | Status: ON HOLD | OUTPATIENT
Start: 2021-10-27 | End: 2022-10-03 | Stop reason: HOSPADM

## 2021-10-27 RX ORDER — MEXILETINE HYDROCHLORIDE 150 MG/1
150 CAPSULE ORAL 2 TIMES DAILY WITH MEALS
Qty: 180 CAPSULE | Refills: 4 | Status: SHIPPED | OUTPATIENT
Start: 2021-10-27 | End: 2022-11-10

## 2021-11-10 ENCOUNTER — CLINICAL SUPPORT (OUTPATIENT)
Dept: CARDIOLOGY | Facility: CLINIC | Age: 61
End: 2021-11-10
Payer: MEDICARE

## 2021-11-10 DIAGNOSIS — Z95.810 CARDIAC RESYNCHRONIZATION THERAPY DEFIBRILLATOR (CRT-D) IN PLACE: Primary | ICD-10-CM

## 2021-11-10 PROCEDURE — 93283 PRGRMG EVAL IMPLANTABLE DFB: CPT | Mod: 26,HCNC,, | Performed by: INTERNAL MEDICINE

## 2021-11-10 PROCEDURE — 93283 PR PROGRAM EVAL (IN PERSON) IMPLANT DEVICE,CARDVERT/DEFIB,2 LEAD: ICD-10-PCS | Mod: 26,HCNC,, | Performed by: INTERNAL MEDICINE

## 2021-11-11 ENCOUNTER — LAB VISIT (OUTPATIENT)
Dept: LAB | Facility: HOSPITAL | Age: 61
End: 2021-11-11
Attending: STUDENT IN AN ORGANIZED HEALTH CARE EDUCATION/TRAINING PROGRAM
Payer: MEDICARE

## 2021-11-11 DIAGNOSIS — N17.9 AKI (ACUTE KIDNEY INJURY): ICD-10-CM

## 2021-11-11 DIAGNOSIS — R73.9 HYPERGLYCEMIA: ICD-10-CM

## 2021-11-11 DIAGNOSIS — E03.9 HYPOTHYROIDISM, UNSPECIFIED TYPE: ICD-10-CM

## 2021-11-11 DIAGNOSIS — N18.32 STAGE 3B CHRONIC KIDNEY DISEASE: ICD-10-CM

## 2021-11-11 LAB
ALBUMIN SERPL BCP-MCNC: 4 G/DL (ref 3.5–5.2)
ALP SERPL-CCNC: 67 U/L (ref 38–126)
ALT SERPL W/O P-5'-P-CCNC: 13 U/L (ref 10–44)
ANION GAP SERPL CALC-SCNC: 9 MMOL/L (ref 8–16)
AST SERPL-CCNC: 21 U/L (ref 15–46)
BASOPHILS # BLD AUTO: 0.09 K/UL (ref 0–0.2)
BASOPHILS NFR BLD: 1.1 % (ref 0–1.9)
BILIRUB SERPL-MCNC: 0.7 MG/DL (ref 0.1–1)
CALCIUM SERPL-MCNC: 8.6 MG/DL (ref 8.7–10.5)
CHLORIDE SERPL-SCNC: 101 MMOL/L (ref 95–110)
CO2 SERPL-SCNC: 31 MMOL/L (ref 23–29)
CREAT SERPL-MCNC: 4.22 MG/DL (ref 0.5–1.4)
DIFFERENTIAL METHOD: ABNORMAL
EOSINOPHIL # BLD AUTO: 0.3 K/UL (ref 0–0.5)
EOSINOPHIL NFR BLD: 3.5 % (ref 0–8)
ERYTHROCYTE [DISTWIDTH] IN BLOOD BY AUTOMATED COUNT: 15.7 % (ref 11.5–14.5)
EST. GFR  (AFRICAN AMERICAN): 16.4 ML/MIN/1.73 M^2
EST. GFR  (NON AFRICAN AMERICAN): 14.2 ML/MIN/1.73 M^2
ESTIMATED AVG GLUCOSE: 126 MG/DL (ref 68–131)
GLUCOSE SERPL-MCNC: 89 MG/DL (ref 70–110)
HBA1C MFR BLD: 6 % (ref 4–5.6)
HCT VFR BLD AUTO: 43.4 % (ref 40–54)
HGB BLD-MCNC: 14 G/DL (ref 14–18)
IMM GRANULOCYTES # BLD AUTO: 0.07 K/UL (ref 0–0.04)
IMM GRANULOCYTES NFR BLD AUTO: 0.8 % (ref 0–0.5)
LYMPHOCYTES # BLD AUTO: 0.8 K/UL (ref 1–4.8)
LYMPHOCYTES NFR BLD: 9.7 % (ref 18–48)
MCH RBC QN AUTO: 25.5 PG (ref 27–31)
MCHC RBC AUTO-ENTMCNC: 32.3 G/DL (ref 32–36)
MCV RBC AUTO: 79 FL (ref 82–98)
MONOCYTES # BLD AUTO: 0.8 K/UL (ref 0.3–1)
MONOCYTES NFR BLD: 9.3 % (ref 4–15)
NEUTROPHILS # BLD AUTO: 6.3 K/UL (ref 1.8–7.7)
NEUTROPHILS NFR BLD: 75.6 % (ref 38–73)
NRBC BLD-RTO: 0 /100 WBC
PLATELET # BLD AUTO: 648 K/UL (ref 150–450)
PMV BLD AUTO: 12.7 FL (ref 9.2–12.9)
POTASSIUM SERPL-SCNC: 4.9 MMOL/L (ref 3.5–5.1)
PROT SERPL-MCNC: 6.9 G/DL (ref 6–8.4)
RBC # BLD AUTO: 5.49 M/UL (ref 4.6–6.2)
SODIUM SERPL-SCNC: 141 MMOL/L (ref 136–145)
T4 FREE SERPL-MCNC: 1.48 NG/DL (ref 0.71–1.51)
TSH SERPL DL<=0.005 MIU/L-ACNC: 6.08 UIU/ML (ref 0.4–4)
UUN UR-MCNC: 65 MG/DL (ref 2–20)
WBC # BLD AUTO: 8.36 K/UL (ref 3.9–12.7)

## 2021-11-11 PROCEDURE — 84443 ASSAY THYROID STIM HORMONE: CPT | Mod: HCNC,PO | Performed by: STUDENT IN AN ORGANIZED HEALTH CARE EDUCATION/TRAINING PROGRAM

## 2021-11-11 PROCEDURE — 83036 HEMOGLOBIN GLYCOSYLATED A1C: CPT | Mod: HCNC | Performed by: STUDENT IN AN ORGANIZED HEALTH CARE EDUCATION/TRAINING PROGRAM

## 2021-11-11 PROCEDURE — 36415 COLL VENOUS BLD VENIPUNCTURE: CPT | Mod: HCNC,PO | Performed by: STUDENT IN AN ORGANIZED HEALTH CARE EDUCATION/TRAINING PROGRAM

## 2021-11-11 PROCEDURE — 84439 ASSAY OF FREE THYROXINE: CPT | Mod: HCNC | Performed by: STUDENT IN AN ORGANIZED HEALTH CARE EDUCATION/TRAINING PROGRAM

## 2021-11-11 PROCEDURE — 80053 COMPREHEN METABOLIC PANEL: CPT | Mod: HCNC,PO | Performed by: STUDENT IN AN ORGANIZED HEALTH CARE EDUCATION/TRAINING PROGRAM

## 2021-11-11 PROCEDURE — 85025 COMPLETE CBC W/AUTO DIFF WBC: CPT | Mod: HCNC,PO | Performed by: STUDENT IN AN ORGANIZED HEALTH CARE EDUCATION/TRAINING PROGRAM

## 2021-11-17 ENCOUNTER — PATIENT OUTREACH (OUTPATIENT)
Dept: ADMINISTRATIVE | Facility: OTHER | Age: 61
End: 2021-11-17

## 2021-11-18 ENCOUNTER — OFFICE VISIT (OUTPATIENT)
Dept: FAMILY MEDICINE | Facility: CLINIC | Age: 61
End: 2021-11-18
Payer: MEDICARE

## 2021-11-18 ENCOUNTER — OFFICE VISIT (OUTPATIENT)
Dept: CARDIOLOGY | Facility: CLINIC | Age: 61
End: 2021-11-18
Payer: MEDICARE

## 2021-11-18 VITALS
BODY MASS INDEX: 34.97 KG/M2 | SYSTOLIC BLOOD PRESSURE: 96 MMHG | TEMPERATURE: 98 F | OXYGEN SATURATION: 88 % | HEIGHT: 69 IN | HEART RATE: 65 BPM | WEIGHT: 236.13 LBS | DIASTOLIC BLOOD PRESSURE: 72 MMHG

## 2021-11-18 VITALS
OXYGEN SATURATION: 89 % | WEIGHT: 236.38 LBS | DIASTOLIC BLOOD PRESSURE: 63 MMHG | BODY MASS INDEX: 35.01 KG/M2 | SYSTOLIC BLOOD PRESSURE: 95 MMHG | HEIGHT: 69 IN | HEART RATE: 82 BPM

## 2021-11-18 DIAGNOSIS — I47.20 V-TACH: ICD-10-CM

## 2021-11-18 DIAGNOSIS — Z86.79 S/P ABLATION OF VENTRICULAR ARRHYTHMIA: ICD-10-CM

## 2021-11-18 DIAGNOSIS — I50.22 NYHA CLASS 2 AND ACC/AHA STAGE C CHRONIC SYSTOLIC CONGESTIVE HEART FAILURE: ICD-10-CM

## 2021-11-18 DIAGNOSIS — I50.20 HFREF (HEART FAILURE WITH REDUCED EJECTION FRACTION): Primary | ICD-10-CM

## 2021-11-18 DIAGNOSIS — Z95.810 CARDIAC RESYNCHRONIZATION THERAPY DEFIBRILLATOR (CRT-D) IN PLACE: ICD-10-CM

## 2021-11-18 DIAGNOSIS — E78.00 PURE HYPERCHOLESTEROLEMIA: ICD-10-CM

## 2021-11-18 DIAGNOSIS — I25.10 CORONARY ARTERY DISEASE INVOLVING NATIVE CORONARY ARTERY OF NATIVE HEART WITHOUT ANGINA PECTORIS: ICD-10-CM

## 2021-11-18 DIAGNOSIS — D50.9 IRON DEFICIENCY ANEMIA, UNSPECIFIED IRON DEFICIENCY ANEMIA TYPE: ICD-10-CM

## 2021-11-18 DIAGNOSIS — N18.4 CKD (CHRONIC KIDNEY DISEASE) STAGE 4, GFR 15-29 ML/MIN: Primary | ICD-10-CM

## 2021-11-18 DIAGNOSIS — E03.9 HYPOTHYROIDISM, UNSPECIFIED TYPE: ICD-10-CM

## 2021-11-18 DIAGNOSIS — I50.20 HFREF (HEART FAILURE WITH REDUCED EJECTION FRACTION): ICD-10-CM

## 2021-11-18 DIAGNOSIS — Z98.890 S/P ABLATION OF VENTRICULAR ARRHYTHMIA: ICD-10-CM

## 2021-11-18 PROCEDURE — 3078F PR MOST RECENT DIASTOLIC BLOOD PRESSURE < 80 MM HG: ICD-10-PCS | Mod: HCNC,CPTII,S$GLB, | Performed by: INTERNAL MEDICINE

## 2021-11-18 PROCEDURE — 4010F ACE/ARB THERAPY RXD/TAKEN: CPT | Mod: CPTII,S$GLB,, | Performed by: STUDENT IN AN ORGANIZED HEALTH CARE EDUCATION/TRAINING PROGRAM

## 2021-11-18 PROCEDURE — 3066F NEPHROPATHY DOC TX: CPT | Mod: CPTII,S$GLB,, | Performed by: STUDENT IN AN ORGANIZED HEALTH CARE EDUCATION/TRAINING PROGRAM

## 2021-11-18 PROCEDURE — 4010F PR ACE/ARB THEARPY RXD/TAKEN: ICD-10-PCS | Mod: CPTII,S$GLB,, | Performed by: STUDENT IN AN ORGANIZED HEALTH CARE EDUCATION/TRAINING PROGRAM

## 2021-11-18 PROCEDURE — 3074F SYST BP LT 130 MM HG: CPT | Mod: HCNC,CPTII,S$GLB, | Performed by: INTERNAL MEDICINE

## 2021-11-18 PROCEDURE — 3074F PR MOST RECENT SYSTOLIC BLOOD PRESSURE < 130 MM HG: ICD-10-PCS | Mod: HCNC,CPTII,S$GLB, | Performed by: INTERNAL MEDICINE

## 2021-11-18 PROCEDURE — 99214 PR OFFICE/OUTPT VISIT, EST, LEVL IV, 30-39 MIN: ICD-10-PCS | Mod: HCNC,S$GLB,, | Performed by: INTERNAL MEDICINE

## 2021-11-18 PROCEDURE — 4010F ACE/ARB THERAPY RXD/TAKEN: CPT | Mod: HCNC,CPTII,S$GLB, | Performed by: INTERNAL MEDICINE

## 2021-11-18 PROCEDURE — 1159F MED LIST DOCD IN RCRD: CPT | Mod: CPTII,S$GLB,, | Performed by: STUDENT IN AN ORGANIZED HEALTH CARE EDUCATION/TRAINING PROGRAM

## 2021-11-18 PROCEDURE — 99999 PR PBB SHADOW E&M-EST. PATIENT-LVL IV: ICD-10-PCS | Mod: PBBFAC,HCNC,, | Performed by: INTERNAL MEDICINE

## 2021-11-18 PROCEDURE — 3078F DIAST BP <80 MM HG: CPT | Mod: CPTII,S$GLB,, | Performed by: STUDENT IN AN ORGANIZED HEALTH CARE EDUCATION/TRAINING PROGRAM

## 2021-11-18 PROCEDURE — 3066F NEPHROPATHY DOC TX: CPT | Mod: HCNC,CPTII,S$GLB, | Performed by: INTERNAL MEDICINE

## 2021-11-18 PROCEDURE — 3044F PR MOST RECENT HEMOGLOBIN A1C LEVEL <7.0%: ICD-10-PCS | Mod: HCNC,CPTII,S$GLB, | Performed by: INTERNAL MEDICINE

## 2021-11-18 PROCEDURE — 3078F DIAST BP <80 MM HG: CPT | Mod: HCNC,CPTII,S$GLB, | Performed by: INTERNAL MEDICINE

## 2021-11-18 PROCEDURE — 1160F PR REVIEW ALL MEDS BY PRESCRIBER/CLIN PHARMACIST DOCUMENTED: ICD-10-PCS | Mod: CPTII,S$GLB,, | Performed by: STUDENT IN AN ORGANIZED HEALTH CARE EDUCATION/TRAINING PROGRAM

## 2021-11-18 PROCEDURE — 99215 OFFICE O/P EST HI 40 MIN: CPT | Mod: S$GLB,,, | Performed by: STUDENT IN AN ORGANIZED HEALTH CARE EDUCATION/TRAINING PROGRAM

## 2021-11-18 PROCEDURE — 4010F PR ACE/ARB THEARPY RXD/TAKEN: ICD-10-PCS | Mod: HCNC,CPTII,S$GLB, | Performed by: INTERNAL MEDICINE

## 2021-11-18 PROCEDURE — 3066F PR DOCUMENTATION OF TREATMENT FOR NEPHROPATHY: ICD-10-PCS | Mod: CPTII,S$GLB,, | Performed by: STUDENT IN AN ORGANIZED HEALTH CARE EDUCATION/TRAINING PROGRAM

## 2021-11-18 PROCEDURE — 99215 PR OFFICE/OUTPT VISIT, EST, LEVL V, 40-54 MIN: ICD-10-PCS | Mod: S$GLB,,, | Performed by: STUDENT IN AN ORGANIZED HEALTH CARE EDUCATION/TRAINING PROGRAM

## 2021-11-18 PROCEDURE — 3074F SYST BP LT 130 MM HG: CPT | Mod: CPTII,S$GLB,, | Performed by: STUDENT IN AN ORGANIZED HEALTH CARE EDUCATION/TRAINING PROGRAM

## 2021-11-18 PROCEDURE — 3008F PR BODY MASS INDEX (BMI) DOCUMENTED: ICD-10-PCS | Mod: HCNC,CPTII,S$GLB, | Performed by: INTERNAL MEDICINE

## 2021-11-18 PROCEDURE — 3008F BODY MASS INDEX DOCD: CPT | Mod: HCNC,CPTII,S$GLB, | Performed by: INTERNAL MEDICINE

## 2021-11-18 PROCEDURE — 1159F PR MEDICATION LIST DOCUMENTED IN MEDICAL RECORD: ICD-10-PCS | Mod: CPTII,S$GLB,, | Performed by: STUDENT IN AN ORGANIZED HEALTH CARE EDUCATION/TRAINING PROGRAM

## 2021-11-18 PROCEDURE — 99499 RISK ADDL DX/OHS AUDIT: ICD-10-PCS | Mod: S$PBB,,, | Performed by: INTERNAL MEDICINE

## 2021-11-18 PROCEDURE — 1160F RVW MEDS BY RX/DR IN RCRD: CPT | Mod: CPTII,S$GLB,, | Performed by: STUDENT IN AN ORGANIZED HEALTH CARE EDUCATION/TRAINING PROGRAM

## 2021-11-18 PROCEDURE — 99499 UNLISTED E&M SERVICE: CPT | Mod: S$PBB,,, | Performed by: INTERNAL MEDICINE

## 2021-11-18 PROCEDURE — 99999 PR PBB SHADOW E&M-EST. PATIENT-LVL IV: CPT | Mod: PBBFAC,HCNC,, | Performed by: INTERNAL MEDICINE

## 2021-11-18 PROCEDURE — 3078F PR MOST RECENT DIASTOLIC BLOOD PRESSURE < 80 MM HG: ICD-10-PCS | Mod: CPTII,S$GLB,, | Performed by: STUDENT IN AN ORGANIZED HEALTH CARE EDUCATION/TRAINING PROGRAM

## 2021-11-18 PROCEDURE — 3074F PR MOST RECENT SYSTOLIC BLOOD PRESSURE < 130 MM HG: ICD-10-PCS | Mod: CPTII,S$GLB,, | Performed by: STUDENT IN AN ORGANIZED HEALTH CARE EDUCATION/TRAINING PROGRAM

## 2021-11-18 PROCEDURE — 3044F HG A1C LEVEL LT 7.0%: CPT | Mod: CPTII,S$GLB,, | Performed by: STUDENT IN AN ORGANIZED HEALTH CARE EDUCATION/TRAINING PROGRAM

## 2021-11-18 PROCEDURE — 3044F PR MOST RECENT HEMOGLOBIN A1C LEVEL <7.0%: ICD-10-PCS | Mod: CPTII,S$GLB,, | Performed by: STUDENT IN AN ORGANIZED HEALTH CARE EDUCATION/TRAINING PROGRAM

## 2021-11-18 PROCEDURE — 3066F PR DOCUMENTATION OF TREATMENT FOR NEPHROPATHY: ICD-10-PCS | Mod: HCNC,CPTII,S$GLB, | Performed by: INTERNAL MEDICINE

## 2021-11-18 PROCEDURE — 99417 PR PROLONGED SVC, OUTPT, W/WO DIRECT PT CONTACT,  EA ADDTL 15 MIN: ICD-10-PCS | Mod: S$GLB,,, | Performed by: STUDENT IN AN ORGANIZED HEALTH CARE EDUCATION/TRAINING PROGRAM

## 2021-11-18 PROCEDURE — 99417 PROLNG OP E/M EACH 15 MIN: CPT | Mod: S$GLB,,, | Performed by: STUDENT IN AN ORGANIZED HEALTH CARE EDUCATION/TRAINING PROGRAM

## 2021-11-18 PROCEDURE — 99214 OFFICE O/P EST MOD 30 MIN: CPT | Mod: HCNC,S$GLB,, | Performed by: INTERNAL MEDICINE

## 2021-11-18 PROCEDURE — 3044F HG A1C LEVEL LT 7.0%: CPT | Mod: HCNC,CPTII,S$GLB, | Performed by: INTERNAL MEDICINE

## 2021-11-18 PROCEDURE — 3008F BODY MASS INDEX DOCD: CPT | Mod: CPTII,S$GLB,, | Performed by: STUDENT IN AN ORGANIZED HEALTH CARE EDUCATION/TRAINING PROGRAM

## 2021-11-18 PROCEDURE — 3008F PR BODY MASS INDEX (BMI) DOCUMENTED: ICD-10-PCS | Mod: CPTII,S$GLB,, | Performed by: STUDENT IN AN ORGANIZED HEALTH CARE EDUCATION/TRAINING PROGRAM

## 2021-11-22 ENCOUNTER — LAB VISIT (OUTPATIENT)
Dept: LAB | Facility: HOSPITAL | Age: 61
End: 2021-11-22
Attending: INTERNAL MEDICINE
Payer: MEDICARE

## 2021-11-22 DIAGNOSIS — N17.8 ACUTE RENAL FAILURE WITH OTHER SPECIFIED PATHOLOGICAL LESION IN KIDNEY: ICD-10-CM

## 2021-11-22 DIAGNOSIS — D63.1 ANEMIA IN STAGE 4 CHRONIC KIDNEY DISEASE: ICD-10-CM

## 2021-11-22 DIAGNOSIS — N18.4 ANEMIA IN STAGE 4 CHRONIC KIDNEY DISEASE: ICD-10-CM

## 2021-11-22 LAB
ALBUMIN SERPL BCP-MCNC: 3.9 G/DL (ref 3.5–5.2)
ANION GAP SERPL CALC-SCNC: 7 MMOL/L (ref 8–16)
BASOPHILS # BLD AUTO: 0.09 K/UL (ref 0–0.2)
BASOPHILS NFR BLD: 1 % (ref 0–1.9)
BUN SERPL-MCNC: 58 MG/DL (ref 8–23)
C3 SERPL-MCNC: 70 MG/DL (ref 50–180)
C4 SERPL-MCNC: 21 MG/DL (ref 11–44)
CALCIUM SERPL-MCNC: 9 MG/DL (ref 8.7–10.5)
CHLORIDE SERPL-SCNC: 106 MMOL/L (ref 95–110)
CO2 SERPL-SCNC: 26 MMOL/L (ref 23–29)
CREAT SERPL-MCNC: 3.5 MG/DL (ref 0.5–1.4)
DIFFERENTIAL METHOD: ABNORMAL
EOSINOPHIL # BLD AUTO: 0.3 K/UL (ref 0–0.5)
EOSINOPHIL NFR BLD: 3.5 % (ref 0–8)
ERYTHROCYTE [DISTWIDTH] IN BLOOD BY AUTOMATED COUNT: 15.9 % (ref 11.5–14.5)
EST. GFR  (AFRICAN AMERICAN): 21 ML/MIN/1.73 M^2
EST. GFR  (NON AFRICAN AMERICAN): 18 ML/MIN/1.73 M^2
FERRITIN SERPL-MCNC: 59 NG/ML (ref 20–300)
GLUCOSE SERPL-MCNC: 92 MG/DL (ref 70–110)
HCT VFR BLD AUTO: 42.1 % (ref 40–54)
HGB BLD-MCNC: 13.6 G/DL (ref 14–18)
IMM GRANULOCYTES # BLD AUTO: 0.08 K/UL (ref 0–0.04)
IMM GRANULOCYTES NFR BLD AUTO: 0.9 % (ref 0–0.5)
IRON SERPL-MCNC: 50 UG/DL (ref 45–160)
LYMPHOCYTES # BLD AUTO: 0.7 K/UL (ref 1–4.8)
LYMPHOCYTES NFR BLD: 8.2 % (ref 18–48)
MCH RBC QN AUTO: 25.2 PG (ref 27–31)
MCHC RBC AUTO-ENTMCNC: 32.3 G/DL (ref 32–36)
MCV RBC AUTO: 78 FL (ref 82–98)
MONOCYTES # BLD AUTO: 0.7 K/UL (ref 0.3–1)
MONOCYTES NFR BLD: 8.2 % (ref 4–15)
NEUTROPHILS # BLD AUTO: 6.7 K/UL (ref 1.8–7.7)
NEUTROPHILS NFR BLD: 78.2 % (ref 38–73)
NRBC BLD-RTO: 0 /100 WBC
PHOSPHATE SERPL-MCNC: 4.1 MG/DL (ref 2.7–4.5)
PLATELET # BLD AUTO: 545 K/UL (ref 150–450)
PMV BLD AUTO: 11.8 FL (ref 9.2–12.9)
POTASSIUM SERPL-SCNC: 5.5 MMOL/L (ref 3.5–5.1)
PTH-INTACT SERPL-MCNC: 177.7 PG/ML (ref 9–77)
RBC # BLD AUTO: 5.39 M/UL (ref 4.6–6.2)
RHEUMATOID FACT SERPL-ACNC: <13 IU/ML (ref 0–15)
SATURATED IRON: 13 % (ref 20–50)
SODIUM SERPL-SCNC: 139 MMOL/L (ref 136–145)
TOTAL IRON BINDING CAPACITY: 373 UG/DL (ref 250–450)
TRANSFERRIN SERPL-MCNC: 252 MG/DL (ref 200–375)
URATE SERPL-MCNC: 9.2 MG/DL (ref 3.4–7)
WBC # BLD AUTO: 8.62 K/UL (ref 3.9–12.7)

## 2021-11-22 PROCEDURE — 86704 HEP B CORE ANTIBODY TOTAL: CPT | Mod: HCNC | Performed by: INTERNAL MEDICINE

## 2021-11-22 PROCEDURE — 86160 COMPLEMENT ANTIGEN: CPT | Mod: HCNC | Performed by: INTERNAL MEDICINE

## 2021-11-22 PROCEDURE — 83970 ASSAY OF PARATHORMONE: CPT | Mod: HCNC | Performed by: INTERNAL MEDICINE

## 2021-11-22 PROCEDURE — 86706 HEP B SURFACE ANTIBODY: CPT | Mod: HCNC | Performed by: INTERNAL MEDICINE

## 2021-11-22 PROCEDURE — 36415 COLL VENOUS BLD VENIPUNCTURE: CPT | Mod: HCNC | Performed by: INTERNAL MEDICINE

## 2021-11-22 PROCEDURE — 83520 IMMUNOASSAY QUANT NOS NONAB: CPT | Mod: 59,HCNC | Performed by: INTERNAL MEDICINE

## 2021-11-22 PROCEDURE — 86225 DNA ANTIBODY NATIVE: CPT | Mod: HCNC | Performed by: INTERNAL MEDICINE

## 2021-11-22 PROCEDURE — 87340 HEPATITIS B SURFACE AG IA: CPT | Mod: HCNC | Performed by: INTERNAL MEDICINE

## 2021-11-22 PROCEDURE — 86160 COMPLEMENT ANTIGEN: CPT | Mod: 59,HCNC | Performed by: INTERNAL MEDICINE

## 2021-11-22 PROCEDURE — 86038 ANTINUCLEAR ANTIBODIES: CPT | Mod: HCNC | Performed by: INTERNAL MEDICINE

## 2021-11-22 PROCEDURE — 82728 ASSAY OF FERRITIN: CPT | Mod: HCNC | Performed by: INTERNAL MEDICINE

## 2021-11-22 PROCEDURE — 84550 ASSAY OF BLOOD/URIC ACID: CPT | Mod: HCNC | Performed by: INTERNAL MEDICINE

## 2021-11-22 PROCEDURE — 86592 SYPHILIS TEST NON-TREP QUAL: CPT | Mod: HCNC | Performed by: INTERNAL MEDICINE

## 2021-11-22 PROCEDURE — 84466 ASSAY OF TRANSFERRIN: CPT | Mod: HCNC | Performed by: INTERNAL MEDICINE

## 2021-11-22 PROCEDURE — 86255 FLUORESCENT ANTIBODY SCREEN: CPT | Mod: HCNC | Performed by: INTERNAL MEDICINE

## 2021-11-22 PROCEDURE — 85025 COMPLETE CBC W/AUTO DIFF WBC: CPT | Mod: HCNC | Performed by: INTERNAL MEDICINE

## 2021-11-22 PROCEDURE — 86235 NUCLEAR ANTIGEN ANTIBODY: CPT | Mod: HCNC | Performed by: INTERNAL MEDICINE

## 2021-11-22 PROCEDURE — 80069 RENAL FUNCTION PANEL: CPT | Mod: HCNC | Performed by: INTERNAL MEDICINE

## 2021-11-22 PROCEDURE — 86431 RHEUMATOID FACTOR QUANT: CPT | Mod: HCNC | Performed by: INTERNAL MEDICINE

## 2021-11-23 LAB
ANA SER QL IF: NORMAL
DSDNA AB SER-ACNC: NORMAL [IU]/ML
HBV CORE AB SERPL QL IA: NEGATIVE
HBV SURFACE AB SER-ACNC: NEGATIVE M[IU]/ML
HBV SURFACE AG SERPL QL IA: NEGATIVE
KAPPA LC SER QL IA: 5.53 MG/DL (ref 0.33–1.94)
KAPPA LC/LAMBDA SER IA: 1.68 (ref 0.26–1.65)
LAMBDA LC SER QL IA: 3.29 MG/DL (ref 0.57–2.63)
RPR SER QL: NORMAL

## 2021-11-24 DIAGNOSIS — Z01.818 PRE-OP EVALUATION: Primary | ICD-10-CM

## 2021-11-24 LAB
ANTI-SSA ANTIBODY: 0.11 RATIO (ref 0–0.99)
ANTI-SSA INTERPRETATION: NEGATIVE

## 2021-11-26 ENCOUNTER — INITIAL CONSULT (OUTPATIENT)
Dept: VASCULAR SURGERY | Facility: CLINIC | Age: 61
End: 2021-11-26
Attending: SURGERY
Payer: MEDICARE

## 2021-11-26 ENCOUNTER — CLINICAL SUPPORT (OUTPATIENT)
Dept: CARDIOLOGY | Facility: CLINIC | Age: 61
End: 2021-11-26
Payer: MEDICARE

## 2021-11-26 ENCOUNTER — HOSPITAL ENCOUNTER (OUTPATIENT)
Dept: RADIOLOGY | Facility: HOSPITAL | Age: 61
Discharge: HOME | End: 2021-11-26
Attending: SURGERY
Payer: MEDICARE

## 2021-11-26 ENCOUNTER — HOSPITAL ENCOUNTER (OUTPATIENT)
Dept: CARDIOLOGY | Facility: CLINIC | Age: 61
Discharge: HOME | End: 2021-11-26
Attending: SURGERY
Payer: MEDICARE

## 2021-11-26 ENCOUNTER — HOSPITAL ENCOUNTER (OUTPATIENT)
Dept: VASCULAR SURGERY | Facility: CLINIC | Age: 61
Discharge: HOME | End: 2021-11-26
Attending: SURGERY
Payer: MEDICARE

## 2021-11-26 VITALS
DIASTOLIC BLOOD PRESSURE: 71 MMHG | HEART RATE: 80 BPM | WEIGHT: 231.5 LBS | TEMPERATURE: 98 F | HEIGHT: 70 IN | SYSTOLIC BLOOD PRESSURE: 108 MMHG | BODY MASS INDEX: 33.14 KG/M2

## 2021-11-26 DIAGNOSIS — Z01.818 PRE-OP EVALUATION: ICD-10-CM

## 2021-11-26 DIAGNOSIS — N18.4 CKD (CHRONIC KIDNEY DISEASE), STAGE IV: ICD-10-CM

## 2021-11-26 DIAGNOSIS — Z01.818 PRE-OP EVALUATION: Primary | ICD-10-CM

## 2021-11-26 DIAGNOSIS — N18.4 CKD (CHRONIC KIDNEY DISEASE) STAGE 4, GFR 15-29 ML/MIN: ICD-10-CM

## 2021-11-26 DIAGNOSIS — Z95.810 CARDIAC RESYNCHRONIZATION THERAPY DEFIBRILLATOR (CRT-D) IN PLACE: Primary | ICD-10-CM

## 2021-11-26 LAB
ANCA AB TITR SER IF: NORMAL TITER
P-ANCA TITR SER IF: NORMAL TITER

## 2021-11-26 PROCEDURE — 93295 PR INTERROG EVAL, REMOTE, UP TO 90 DAYS,CARDVERT/DEFIB: ICD-10-PCS | Mod: HCNC,S$GLB,, | Performed by: INTERNAL MEDICINE

## 2021-11-26 PROCEDURE — 93296 REM INTERROG EVL PM/IDS: CPT | Mod: HCNC,S$GLB,, | Performed by: INTERNAL MEDICINE

## 2021-11-26 PROCEDURE — 99499 RISK ADDL DX/OHS AUDIT: ICD-10-PCS | Mod: S$PBB,,, | Performed by: SURGERY

## 2021-11-26 PROCEDURE — 71046 X-RAY EXAM CHEST 2 VIEWS: CPT | Mod: 26,HCNC,, | Performed by: RADIOLOGY

## 2021-11-26 PROCEDURE — 93010 EKG 12-LEAD: ICD-10-PCS | Mod: HCNC,S$GLB,, | Performed by: INTERNAL MEDICINE

## 2021-11-26 PROCEDURE — 93970 PR US DUPLEX, UPPER OR LOWER EXT VENOUS,COMPLETE BILAT: ICD-10-PCS | Mod: HCNC,S$GLB,, | Performed by: SURGERY

## 2021-11-26 PROCEDURE — 99999 PR PBB SHADOW E&M-EST. PATIENT-LVL III: CPT | Mod: PBBFAC,HCNC,, | Performed by: SURGERY

## 2021-11-26 PROCEDURE — 93005 ELECTROCARDIOGRAM TRACING: CPT | Mod: HCNC,S$GLB,, | Performed by: SURGERY

## 2021-11-26 PROCEDURE — 93296 PR INTERROG EVAL, REMOTE, UP TO 90 DAYS, PACER/DEFIB,TECH REVIEW: ICD-10-PCS | Mod: HCNC,S$GLB,, | Performed by: INTERNAL MEDICINE

## 2021-11-26 PROCEDURE — 71046 X-RAY EXAM CHEST 2 VIEWS: CPT | Mod: TC,HCNC

## 2021-11-26 PROCEDURE — 99203 PR OFFICE/OUTPT VISIT, NEW, LEVL III, 30-44 MIN: ICD-10-PCS | Mod: HCNC,S$GLB,, | Performed by: SURGERY

## 2021-11-26 PROCEDURE — 93295 DEV INTERROG REMOTE 1/2/MLT: CPT | Mod: HCNC,S$GLB,, | Performed by: INTERNAL MEDICINE

## 2021-11-26 PROCEDURE — 99999 PR PBB SHADOW E&M-EST. PATIENT-LVL III: ICD-10-PCS | Mod: PBBFAC,HCNC,, | Performed by: SURGERY

## 2021-11-26 PROCEDURE — 93970 EXTREMITY STUDY: CPT | Mod: HCNC,S$GLB,, | Performed by: SURGERY

## 2021-11-26 PROCEDURE — 93010 ELECTROCARDIOGRAM REPORT: CPT | Mod: HCNC,S$GLB,, | Performed by: INTERNAL MEDICINE

## 2021-11-26 PROCEDURE — 93005 EKG 12-LEAD: ICD-10-PCS | Mod: HCNC,S$GLB,, | Performed by: SURGERY

## 2021-11-26 PROCEDURE — 99499 UNLISTED E&M SERVICE: CPT | Mod: S$PBB,,, | Performed by: SURGERY

## 2021-11-26 PROCEDURE — 71046 XR CHEST PA AND LATERAL: ICD-10-PCS | Mod: 26,HCNC,, | Performed by: RADIOLOGY

## 2021-11-26 PROCEDURE — 99203 OFFICE O/P NEW LOW 30 MIN: CPT | Mod: HCNC,S$GLB,, | Performed by: SURGERY

## 2021-11-30 ENCOUNTER — TELEPHONE (OUTPATIENT)
Dept: FAMILY MEDICINE | Facility: CLINIC | Age: 61
End: 2021-11-30

## 2021-12-09 ENCOUNTER — HOSPITAL ENCOUNTER (OUTPATIENT)
Dept: RADIOLOGY | Facility: HOSPITAL | Age: 61
Discharge: HOME | End: 2021-12-09
Attending: INTERNAL MEDICINE
Payer: MEDICARE

## 2021-12-09 DIAGNOSIS — N17.8 ACUTE RENAL FAILURE WITH OTHER SPECIFIED PATHOLOGICAL LESION IN KIDNEY: ICD-10-CM

## 2021-12-09 PROCEDURE — 76770 US RETROPERITONEAL COMPLETE: ICD-10-PCS | Mod: 26,HCNC,, | Performed by: RADIOLOGY

## 2021-12-09 PROCEDURE — 76770 US EXAM ABDO BACK WALL COMP: CPT | Mod: 26,HCNC,, | Performed by: RADIOLOGY

## 2021-12-09 PROCEDURE — 76770 US EXAM ABDO BACK WALL COMP: CPT | Mod: TC,HCNC

## 2021-12-29 ENCOUNTER — TELEPHONE (OUTPATIENT)
Dept: VASCULAR SURGERY | Facility: CLINIC | Age: 61
End: 2021-12-29

## 2022-01-10 ENCOUNTER — LAB VISIT (OUTPATIENT)
Dept: LAB | Facility: HOSPITAL | Age: 62
End: 2022-01-10
Attending: INTERNAL MEDICINE
Payer: MEDICARE

## 2022-01-10 DIAGNOSIS — N18.4 CKD (CHRONIC KIDNEY DISEASE) STAGE 4, GFR 15-29 ML/MIN: ICD-10-CM

## 2022-01-10 DIAGNOSIS — D63.1 ANEMIA IN STAGE 4 CHRONIC KIDNEY DISEASE: ICD-10-CM

## 2022-01-10 DIAGNOSIS — N18.4 ANEMIA IN STAGE 4 CHRONIC KIDNEY DISEASE: ICD-10-CM

## 2022-01-10 DIAGNOSIS — E79.0 HYPERURICEMIA: ICD-10-CM

## 2022-01-10 DIAGNOSIS — N25.81 SECONDARY HYPERPARATHYROIDISM OF RENAL ORIGIN: ICD-10-CM

## 2022-01-10 LAB
25(OH)D3+25(OH)D2 SERPL-MCNC: 19 NG/ML (ref 30–96)
ALBUMIN SERPL BCP-MCNC: 4.1 G/DL (ref 3.5–5.2)
ANION GAP SERPL CALC-SCNC: 13 MMOL/L (ref 8–16)
BASOPHILS # BLD AUTO: 0.07 K/UL (ref 0–0.2)
BASOPHILS NFR BLD: 1 % (ref 0–1.9)
CALCIUM SERPL-MCNC: 8.8 MG/DL (ref 8.7–10.5)
CHLORIDE SERPL-SCNC: 98 MMOL/L (ref 95–110)
CO2 SERPL-SCNC: 25 MMOL/L (ref 23–29)
CREAT SERPL-MCNC: 4.83 MG/DL (ref 0.5–1.4)
DIFFERENTIAL METHOD: ABNORMAL
EOSINOPHIL # BLD AUTO: 0.3 K/UL (ref 0–0.5)
EOSINOPHIL NFR BLD: 3.9 % (ref 0–8)
ERYTHROCYTE [DISTWIDTH] IN BLOOD BY AUTOMATED COUNT: 16.3 % (ref 11.5–14.5)
EST. GFR  (AFRICAN AMERICAN): 13.8 ML/MIN/1.73 M^2
EST. GFR  (NON AFRICAN AMERICAN): 12 ML/MIN/1.73 M^2
FERRITIN SERPL-MCNC: 40 NG/ML (ref 20–300)
GLUCOSE SERPL-MCNC: 103 MG/DL (ref 70–110)
HCT VFR BLD AUTO: 42.9 % (ref 40–54)
HGB BLD-MCNC: 13.8 G/DL (ref 14–18)
IMM GRANULOCYTES # BLD AUTO: 0.05 K/UL (ref 0–0.04)
IMM GRANULOCYTES NFR BLD AUTO: 0.7 % (ref 0–0.5)
IRON SERPL-MCNC: 31 UG/DL (ref 45–160)
LYMPHOCYTES # BLD AUTO: 0.6 K/UL (ref 1–4.8)
LYMPHOCYTES NFR BLD: 8.3 % (ref 18–48)
MCH RBC QN AUTO: 23.9 PG (ref 27–31)
MCHC RBC AUTO-ENTMCNC: 32.2 G/DL (ref 32–36)
MCV RBC AUTO: 74 FL (ref 82–98)
MONOCYTES # BLD AUTO: 0.8 K/UL (ref 0.3–1)
MONOCYTES NFR BLD: 10.8 % (ref 4–15)
NEUTROPHILS # BLD AUTO: 5.2 K/UL (ref 1.8–7.7)
NEUTROPHILS NFR BLD: 75.3 % (ref 38–73)
NRBC BLD-RTO: 0 /100 WBC
PHOSPHATE SERPL-MCNC: 4.3 MG/DL (ref 2.7–4.5)
PLATELET # BLD AUTO: 438 K/UL (ref 150–450)
PMV BLD AUTO: ABNORMAL FL (ref 9.2–12.9)
POTASSIUM SERPL-SCNC: 4.4 MMOL/L (ref 3.5–5.1)
PTH-INTACT SERPL-MCNC: 184.2 PG/ML (ref 9–77)
RBC # BLD AUTO: 5.78 M/UL (ref 4.6–6.2)
SATURATED IRON: 9 % (ref 20–50)
SODIUM SERPL-SCNC: 136 MMOL/L (ref 136–145)
TOTAL IRON BINDING CAPACITY: 357 UG/DL (ref 250–450)
TRANSFERRIN SERPL-MCNC: 241 MG/DL (ref 200–375)
URATE SERPL-MCNC: 8.7 MG/DL (ref 3.4–7)
UUN UR-MCNC: 72 MG/DL (ref 2–20)
WBC # BLD AUTO: 6.95 K/UL (ref 3.9–12.7)

## 2022-01-10 PROCEDURE — 84550 ASSAY OF BLOOD/URIC ACID: CPT | Performed by: INTERNAL MEDICINE

## 2022-01-10 PROCEDURE — 80069 RENAL FUNCTION PANEL: CPT | Mod: PO | Performed by: INTERNAL MEDICINE

## 2022-01-10 PROCEDURE — 84466 ASSAY OF TRANSFERRIN: CPT | Mod: PO | Performed by: INTERNAL MEDICINE

## 2022-01-10 PROCEDURE — 85025 COMPLETE CBC W/AUTO DIFF WBC: CPT | Mod: PO | Performed by: INTERNAL MEDICINE

## 2022-01-10 PROCEDURE — 82728 ASSAY OF FERRITIN: CPT | Performed by: INTERNAL MEDICINE

## 2022-01-10 PROCEDURE — 36415 COLL VENOUS BLD VENIPUNCTURE: CPT | Mod: PO | Performed by: INTERNAL MEDICINE

## 2022-01-10 PROCEDURE — 83970 ASSAY OF PARATHORMONE: CPT | Mod: PO | Performed by: INTERNAL MEDICINE

## 2022-01-10 PROCEDURE — 82306 VITAMIN D 25 HYDROXY: CPT | Mod: PO | Performed by: INTERNAL MEDICINE

## 2022-02-07 ENCOUNTER — LAB VISIT (OUTPATIENT)
Dept: LAB | Facility: HOSPITAL | Age: 62
End: 2022-02-07
Attending: INTERNAL MEDICINE
Payer: MEDICARE

## 2022-02-07 DIAGNOSIS — I50.22 CHRONIC SYSTOLIC CONGESTIVE HEART FAILURE: ICD-10-CM

## 2022-02-07 DIAGNOSIS — N18.4 ANEMIA IN STAGE 4 CHRONIC KIDNEY DISEASE: ICD-10-CM

## 2022-02-07 DIAGNOSIS — N25.81 SECONDARY HYPERPARATHYROIDISM OF RENAL ORIGIN: ICD-10-CM

## 2022-02-07 DIAGNOSIS — D63.1 ANEMIA IN STAGE 4 CHRONIC KIDNEY DISEASE: ICD-10-CM

## 2022-02-07 LAB
ALBUMIN SERPL BCP-MCNC: 4.1 G/DL (ref 3.5–5.2)
ANION GAP SERPL CALC-SCNC: 9 MMOL/L (ref 8–16)
BASOPHILS # BLD AUTO: 0.08 K/UL (ref 0–0.2)
BASOPHILS NFR BLD: 1.1 % (ref 0–1.9)
CALCIUM SERPL-MCNC: 9.1 MG/DL (ref 8.7–10.5)
CHLORIDE SERPL-SCNC: 97 MMOL/L (ref 95–110)
CO2 SERPL-SCNC: 29 MMOL/L (ref 23–29)
CREAT SERPL-MCNC: 4.3 MG/DL (ref 0.5–1.4)
DIFFERENTIAL METHOD: ABNORMAL
EOSINOPHIL # BLD AUTO: 0.3 K/UL (ref 0–0.5)
EOSINOPHIL NFR BLD: 3.4 % (ref 0–8)
ERYTHROCYTE [DISTWIDTH] IN BLOOD BY AUTOMATED COUNT: 17.6 % (ref 11.5–14.5)
EST. GFR  (AFRICAN AMERICAN): 15.9 ML/MIN/1.73 M^2
EST. GFR  (NON AFRICAN AMERICAN): 13.8 ML/MIN/1.73 M^2
GLUCOSE SERPL-MCNC: 86 MG/DL (ref 70–110)
HCT VFR BLD AUTO: 42.3 % (ref 40–54)
HGB BLD-MCNC: 13.6 G/DL (ref 14–18)
IMM GRANULOCYTES # BLD AUTO: 0.05 K/UL (ref 0–0.04)
IMM GRANULOCYTES NFR BLD AUTO: 0.7 % (ref 0–0.5)
LYMPHOCYTES # BLD AUTO: 0.8 K/UL (ref 1–4.8)
LYMPHOCYTES NFR BLD: 10.6 % (ref 18–48)
MCH RBC QN AUTO: 22.9 PG (ref 27–31)
MCHC RBC AUTO-ENTMCNC: 32.2 G/DL (ref 32–36)
MCV RBC AUTO: 71 FL (ref 82–98)
MONOCYTES # BLD AUTO: 0.8 K/UL (ref 0.3–1)
MONOCYTES NFR BLD: 10.8 % (ref 4–15)
NEUTROPHILS # BLD AUTO: 5.4 K/UL (ref 1.8–7.7)
NEUTROPHILS NFR BLD: 73.4 % (ref 38–73)
NRBC BLD-RTO: 0 /100 WBC
PHOSPHATE SERPL-MCNC: 3.7 MG/DL (ref 2.7–4.5)
PLATELET # BLD AUTO: 495 K/UL (ref 150–450)
PMV BLD AUTO: ABNORMAL FL (ref 9.2–12.9)
POTASSIUM SERPL-SCNC: 4.5 MMOL/L (ref 3.5–5.1)
RBC # BLD AUTO: 5.93 M/UL (ref 4.6–6.2)
SODIUM SERPL-SCNC: 135 MMOL/L (ref 136–145)
URATE SERPL-MCNC: 4.8 MG/DL (ref 3.4–7)
UUN UR-MCNC: 72 MG/DL (ref 2–20)
WBC # BLD AUTO: 7.33 K/UL (ref 3.9–12.7)

## 2022-02-07 PROCEDURE — 84550 ASSAY OF BLOOD/URIC ACID: CPT | Performed by: INTERNAL MEDICINE

## 2022-02-07 PROCEDURE — 36415 COLL VENOUS BLD VENIPUNCTURE: CPT | Mod: PO | Performed by: INTERNAL MEDICINE

## 2022-02-07 PROCEDURE — 85025 COMPLETE CBC W/AUTO DIFF WBC: CPT | Mod: PO | Performed by: INTERNAL MEDICINE

## 2022-02-07 PROCEDURE — 83970 ASSAY OF PARATHORMONE: CPT | Mod: PO | Performed by: INTERNAL MEDICINE

## 2022-02-07 PROCEDURE — 80069 RENAL FUNCTION PANEL: CPT | Mod: PO | Performed by: INTERNAL MEDICINE

## 2022-02-08 LAB — PTH-INTACT SERPL-MCNC: 194.7 PG/ML (ref 9–77)

## 2022-02-23 DIAGNOSIS — E03.9 HYPOTHYROIDISM, UNSPECIFIED TYPE: ICD-10-CM

## 2022-02-23 RX ORDER — LEVOTHYROXINE SODIUM 175 UG/1
TABLET ORAL
Qty: 90 TABLET | Refills: 3 | Status: SHIPPED | OUTPATIENT
Start: 2022-02-23 | End: 2023-02-26

## 2022-02-23 NOTE — TELEPHONE ENCOUNTER
Care Due:                  Date            Visit Type   Department     Provider  --------------------------------------------------------------------------------                                EP -                              PRIMARY      [facility] FAMILY  Last Visit: 11-      CARE ([facility])   MEDICINE       Jc Elliott                              EP -                              PRIMARY      [facility]  Next Visit: 02-      CARE ([facility])   MEDICINE       Jc Elliott                                                            Last  Test          Frequency    Reason                     Performed    Due Date  --------------------------------------------------------------------------------    Lipid Panel.  12 months..  pravastatin..............  05-   05-    Powered by [employer]. Reference number: [id number].   2/23/2022 10:06:59 AM CST

## 2022-02-28 ENCOUNTER — CLINICAL SUPPORT (OUTPATIENT)
Dept: CARDIOLOGY | Facility: CLINIC | Age: 62
End: 2022-02-28
Payer: MEDICARE

## 2022-02-28 ENCOUNTER — OFFICE VISIT (OUTPATIENT)
Dept: FAMILY MEDICINE | Facility: CLINIC | Age: 62
End: 2022-02-28
Payer: MEDICARE

## 2022-02-28 VITALS
BODY MASS INDEX: 32.32 KG/M2 | HEART RATE: 87 BPM | OXYGEN SATURATION: 90 % | DIASTOLIC BLOOD PRESSURE: 80 MMHG | WEIGHT: 225.75 LBS | SYSTOLIC BLOOD PRESSURE: 136 MMHG | HEIGHT: 70 IN | TEMPERATURE: 98 F

## 2022-02-28 DIAGNOSIS — E03.9 HYPOTHYROIDISM, UNSPECIFIED TYPE: ICD-10-CM

## 2022-02-28 DIAGNOSIS — Z95.810 CARDIAC RESYNCHRONIZATION THERAPY DEFIBRILLATOR (CRT-D) IN PLACE: Primary | ICD-10-CM

## 2022-02-28 DIAGNOSIS — I25.10 CORONARY ARTERY DISEASE INVOLVING NATIVE CORONARY ARTERY OF NATIVE HEART WITHOUT ANGINA PECTORIS: ICD-10-CM

## 2022-02-28 DIAGNOSIS — I50.22 NYHA CLASS 2 AND ACC/AHA STAGE C CHRONIC SYSTOLIC CONGESTIVE HEART FAILURE: ICD-10-CM

## 2022-02-28 DIAGNOSIS — Z95.810 CARDIAC RESYNCHRONIZATION THERAPY DEFIBRILLATOR (CRT-D) IN PLACE: ICD-10-CM

## 2022-02-28 DIAGNOSIS — N18.4 CKD (CHRONIC KIDNEY DISEASE) STAGE 4, GFR 15-29 ML/MIN: ICD-10-CM

## 2022-02-28 DIAGNOSIS — Z12.5 PROSTATE CANCER SCREENING: Primary | ICD-10-CM

## 2022-02-28 DIAGNOSIS — R73.9 HYPERGLYCEMIA: ICD-10-CM

## 2022-02-28 DIAGNOSIS — Z87.891 PERSONAL HISTORY OF NICOTINE DEPENDENCE: ICD-10-CM

## 2022-02-28 DIAGNOSIS — I50.20 HFREF (HEART FAILURE WITH REDUCED EJECTION FRACTION): ICD-10-CM

## 2022-02-28 DIAGNOSIS — Z13.6 SCREENING FOR CARDIOVASCULAR CONDITION: ICD-10-CM

## 2022-02-28 DIAGNOSIS — E78.00 PURE HYPERCHOLESTEROLEMIA: ICD-10-CM

## 2022-02-28 PROCEDURE — 99999 PR PBB SHADOW E&M-EST. PATIENT-LVL I: ICD-10-PCS | Mod: PBBFAC,,,

## 2022-02-28 PROCEDURE — 93295 DEV INTERROG REMOTE 1/2/MLT: CPT | Mod: S$GLB,,, | Performed by: INTERNAL MEDICINE

## 2022-02-28 PROCEDURE — 99214 PR OFFICE/OUTPT VISIT, EST, LEVL IV, 30-39 MIN: ICD-10-PCS | Mod: S$GLB,,, | Performed by: STUDENT IN AN ORGANIZED HEALTH CARE EDUCATION/TRAINING PROGRAM

## 2022-02-28 PROCEDURE — 99999 PR PBB SHADOW E&M-EST. PATIENT-LVL I: CPT | Mod: PBBFAC,,,

## 2022-02-28 PROCEDURE — 99214 OFFICE O/P EST MOD 30 MIN: CPT | Mod: S$GLB,,, | Performed by: STUDENT IN AN ORGANIZED HEALTH CARE EDUCATION/TRAINING PROGRAM

## 2022-02-28 PROCEDURE — 93295 PR INTERROG EVAL, REMOTE, UP TO 90 DAYS,CARDVERT/DEFIB: ICD-10-PCS | Mod: S$GLB,,, | Performed by: INTERNAL MEDICINE

## 2022-02-28 RX ORDER — ERGOCALCIFEROL 1.25 MG/1
50000 CAPSULE ORAL
Qty: 12 CAPSULE | Refills: 3 | Status: SHIPPED | OUTPATIENT
Start: 2022-02-28 | End: 2023-02-02

## 2022-02-28 NOTE — PROGRESS NOTES
Patient ID: Ar Sharma is a 61 y.o. male.     Chief Complaint: Follow-up    HPI   patient here for follow up    CKD4- getting swelling in his legs bilaterally, left worse than right. Wearing compression stockings. Compliant with lasix and low sodium diet.     HFrEF- having increasing shortness of breath, cannot walk one block without stopping to rest.     Hypothyroidism- denies heat/cold intolerance.     Review of Systems  Review of Systems   Constitutional: Negative for fever.   HENT: Negative for ear pain and sinus pain.    Eyes: Negative for discharge.   Respiratory: Negative for cough and shortness of breath.    Cardiovascular: Negative for chest pain and leg swelling.   Gastrointestinal: Negative for diarrhea, nausea and vomiting.   Genitourinary: Negative for urgency.   Musculoskeletal: Negative for myalgias.   Skin: Negative for rash.   Neurological: Negative for weakness and headaches.   Psychiatric/Behavioral: Negative for depression.   All other systems reviewed and are negative.      Currently Medications  Current Outpatient Medications on File Prior to Visit   Medication Sig Dispense Refill    allopurinol (ZYLOPRIM) 300 MG tablet Take 1 tablet (300 mg total) by mouth once daily. 90 tablet 3    amiodarone (PACERONE) 200 MG Tab TAKE 1 TABLET BY MOUTH ONCE DAILY 90 tablet 0    aspirin (ECOTRIN) 81 MG EC tablet Take 81 mg by mouth once daily.      carvediloL (COREG) 25 MG tablet TAKE 1 TABLET BY MOUTH TWICE A DAY WITH FOOD 180 tablet 3    FLUZONE QUAD 8464-5324, PF, 60 mcg (15 mcg x 4)/0.5 mL Syrg       furosemide (LASIX) 80 MG tablet Take 1 tablet (80 mg total) by mouth 3 (three) times daily as needed (Swelling). 180 tablet 3    levothyroxine (SYNTHROID, LEVOTHROID) 175 MCG tablet TAKE 1 TABLET BY MOUTH ONCE DAILY. 90 tablet 3    mexiletine (MEXITIL) 150 MG Cap Take 1 capsule (150 mg total) by mouth 2 (two) times daily with meals. 180 capsule 4    pravastatin (PRAVACHOL) 40 MG tablet Take 1  "tablet (40 mg total) by mouth once daily. 90 tablet 4    sacubitriL-valsartan (ENTRESTO) 49-51 mg per tablet Take 1 tablet by mouth 2 (two) times daily. 180 tablet 3    [DISCONTINUED] ergocalciferol (ERGOCALCIFEROL) 50,000 unit Cap Take 1 capsule (50,000 Units total) by mouth every 7 days. 4 capsule 5    nitroGLYCERIN (NITROSTAT) 0.4 MG SL tablet Place 1 tablet (0.4 mg total) under the tongue every 5 (five) minutes as needed for Chest pain. (Patient not taking: Reported on 2/28/2022) 20 tablet 1    tadalafiL (CIALIS) 5 MG tablet TAKE 1 TABLET BY MOUTH DAILY AS NEEDED FOR ERECTILE DYSFUNCTION (Patient not taking: Reported on 2/28/2022) 12 tablet 3     No current facility-administered medications on file prior to visit.       Physical  Exam  Vitals:    02/28/22 0929   BP: 136/80   BP Location: Left arm   Patient Position: Sitting   Pulse: 87   Temp: 98.1 °F (36.7 °C)   SpO2: (!) 90%   Weight: 102.4 kg (225 lb 12.4 oz)   Height: 5' 10" (1.778 m)      Body mass index is 32.4 kg/m².    Physical Exam  Vitals and nursing note reviewed.   Constitutional:       General: He is not in acute distress.     Appearance: He is not ill-appearing.   HENT:      Head: Normocephalic and atraumatic.      Right Ear: External ear normal.      Left Ear: External ear normal.      Nose: Nose normal.      Mouth/Throat:      Mouth: Mucous membranes are moist.   Eyes:      Extraocular Movements: Extraocular movements intact.      Conjunctiva/sclera: Conjunctivae normal.   Cardiovascular:      Rate and Rhythm: Normal rate and regular rhythm.      Pulses: Normal pulses.      Heart sounds: No murmur heard.  Pulmonary:      Effort: Pulmonary effort is normal. No respiratory distress.      Breath sounds: No wheezing.   Abdominal:      General: There is no distension.      Palpations: Abdomen is soft. There is no mass.      Tenderness: There is no abdominal tenderness.   Musculoskeletal:         General: No swelling.      Cervical back: Normal " range of motion.      Right lower leg: Edema present.      Left lower leg: Edema present.      Comments: Compression stockings in place to bilateral lower extremities.    Skin:     Coloration: Skin is not jaundiced.      Findings: No rash.   Neurological:      General: No focal deficit present.      Mental Status: He is alert and oriented to person, place, and time.   Psychiatric:         Mood and Affect: Mood normal.         Thought Content: Thought content normal.         Labs:    Complete Blood Count  Lab Results   Component Value Date    RBC 5.93 02/07/2022    HGB 13.6 (L) 02/07/2022    HCT 42.3 02/07/2022    MCV 71 (L) 02/07/2022    MCH 22.9 (L) 02/07/2022    MCHC 32.2 02/07/2022    RDW 17.6 (H) 02/07/2022     (H) 02/07/2022    MPV SEE COMMENT 02/07/2022    GRAN 5.4 02/07/2022    GRAN 73.4 (H) 02/07/2022    LYMPH 0.8 (L) 02/07/2022    LYMPH 10.6 (L) 02/07/2022    MONO 0.8 02/07/2022    MONO 10.8 02/07/2022    EOS 0.3 02/07/2022    BASO 0.08 02/07/2022    EOSINOPHIL 3.4 02/07/2022    BASOPHIL 1.1 02/07/2022    DIFFMETHOD Automated 02/07/2022       Comprehensive Metabolic Panel  Lab Results   Component Value Date    GLU 86 02/07/2022    BUN 72 (H) 02/07/2022    CREATININE 4.30 (H) 02/07/2022     (L) 02/07/2022    K 4.5 02/07/2022    CL 97 02/07/2022    PROT 6.9 11/11/2021    ALBUMIN 4.1 02/07/2022    BILITOT 0.7 11/11/2021    AST 21 11/11/2021    ALKPHOS 67 11/11/2021    CO2 29 02/07/2022    ALT 13 11/11/2021    ANIONGAP 9 02/07/2022    EGFRNONAA 13.8 (A) 02/07/2022    ESTGFRAFRICA 15.9 (A) 02/07/2022       TSH  Lab Results   Component Value Date    TSH 6.080 (H) 11/11/2021       Imaging:  US Retroperitoneal Complete  Narrative: EXAMINATION:  US RETROPERITONEAL COMPLETE    CLINICAL HISTORY:  Other acute kidney failure    TECHNIQUE:  Ultrasound of the kidneys and urinary bladder was performed including color flow and Doppler evaluation of the kidneys.    COMPARISON:  None.    FINDINGS:  The right  kidney is normal in length measuring 11.0 cm. The left kidney is normal in length measuring 12.0 cm. Segmental arterial resistive indices are within normal limits. 3 mm hyperechoic foci in the right midpole, possible calcification.  Right mid/lower pole cyst measuring 2.0 cm.  No left renal lesions identified.  No hydronephrosis..  The bladder is grossly unremarkable.  Impression: Small right renal cyst.  No renal masses or hydronephrosis identified.    Electronically signed by: Franklin Buchanan MD  Date:    12/09/2021  Time:    13:25      Assessment/Plan:    Problem List Items Addressed This Visit        Cardiac/Vascular    Pure hypercholesterolemia    Current Assessment & Plan     - continue pravastatin           Coronary artery disease involving native coronary artery of native heart without angina pectoris    Cardiac resynchronization therapy defibrillator (CRT-D) in place    HFrEF (heart failure with reduced ejection fraction)    Current Assessment & Plan     - wearing compression stockings  - following with cardiology  - compliant with low sodium diet  - taking lasix as needed              Renal/    CKD (chronic kidney disease) stage 4, GFR 15-29 ml/min    Current Assessment & Plan     - following with renal  - discussed options for dialysis, patient states that he will likely do peritoneal dialysis              Endocrine    Hypothyroidism    Current Assessment & Plan     - taking synthroid              Other    Chronic systolic CHF, NYHA class 2 and MARIFER/AHA stage C    Overview     IMO Regulatory Update 10/1/2020             Other Visit Diagnoses     Prostate cancer screening    -  Primary    Relevant Orders    PSA, Screening    Hyperglycemia        Relevant Orders    Hemoglobin A1C    Personal history of nicotine dependence        Relevant Orders    CT Chest Lung Screening Low Dose    Screening for cardiovascular condition        Relevant Orders    Lipid Panel           Discussed how to stay healthy including:  diet, exercise, refraining from smoking and discussed screening exams / tests needed for age, sex and family Hx.    RTC 6 mo    Jc Elliott MD

## 2022-02-28 NOTE — ASSESSMENT & PLAN NOTE
- wearing compression stockings  - following with cardiology  - compliant with low sodium diet  - taking lasix as needed

## 2022-02-28 NOTE — ASSESSMENT & PLAN NOTE
- following with renal  - discussed options for dialysis, patient states that he will likely do peritoneal dialysis

## 2022-03-08 NOTE — PROGRESS NOTES
Subjective:    Patient ID:  Ar Sharma is a 61 y.o. male who presents for follow-up of Pacemaker Check      HPI    CRT-D remote interrogation report downloaded and prepared by medical assistant and interpreted by me and full report scanned into Epic media.      Company: [employer]  Mode: DDDR  Battery: OK with ANTHONY 13 months   Leads: OK    Events: No significant events   Function: Normal device function. Will schedule with EP in 3-6 months for generator change.    ROS     Objective:    Physical Exam      Assessment:       1. Cardiac resynchronization therapy defibrillator (CRT-D) in place         Plan:

## 2022-03-10 DIAGNOSIS — M1A.30X0 CHRONIC GOUT DUE TO RENAL IMPAIRMENT WITHOUT TOPHUS, UNSPECIFIED SITE: ICD-10-CM

## 2022-03-10 RX ORDER — ALLOPURINOL 300 MG/1
300 TABLET ORAL DAILY
Qty: 90 TABLET | Refills: 3 | Status: ON HOLD | OUTPATIENT
Start: 2022-03-10 | End: 2022-10-03 | Stop reason: HOSPADM

## 2022-03-10 NOTE — TELEPHONE ENCOUNTER
----- Message from Pablo Foley sent at 3/10/2022  2:48 PM CST -----  Contact: patient  [phone]  Refill request for allopurinol (ZYLOPRIM) 300 MG tablet  Pharmacy: [facility]/PHARMACY #[id number] - [address] AT [facility]  #[facility] [place] Name

## 2022-03-10 NOTE — TELEPHONE ENCOUNTER
No new care gaps identified.  Powered by [employer]. Reference number: [id number].   3/10/2022 2:51:44 PM CST

## 2022-03-11 ENCOUNTER — HOSPITAL ENCOUNTER (OUTPATIENT)
Dept: RADIOLOGY | Facility: HOSPITAL | Age: 62
Discharge: HOME | End: 2022-03-11
Attending: STUDENT IN AN ORGANIZED HEALTH CARE EDUCATION/TRAINING PROGRAM
Payer: MEDICARE

## 2022-03-11 DIAGNOSIS — Z87.891 PERSONAL HISTORY OF NICOTINE DEPENDENCE: ICD-10-CM

## 2022-03-11 PROCEDURE — 71271 CT THORAX LUNG CANCER SCR C-: CPT | Mod: TC,PO

## 2022-03-14 ENCOUNTER — PATIENT MESSAGE (OUTPATIENT)
Dept: FAMILY MEDICINE | Facility: CLINIC | Age: 62
End: 2022-03-14
Payer: MEDICARE

## 2022-03-14 RX ORDER — FLUTICASONE PROPIONATE 50 MCG
1 SPRAY, SUSPENSION (ML) NASAL DAILY
Qty: 10 ML | Refills: 0 | Status: SHIPPED | OUTPATIENT
Start: 2022-03-14 | End: 2022-05-20

## 2022-04-04 ENCOUNTER — LAB VISIT (OUTPATIENT)
Dept: LAB | Facility: HOSPITAL | Age: 62
End: 2022-04-04
Attending: STUDENT IN AN ORGANIZED HEALTH CARE EDUCATION/TRAINING PROGRAM
Payer: MEDICARE

## 2022-04-04 DIAGNOSIS — Z12.5 PROSTATE CANCER SCREENING: ICD-10-CM

## 2022-04-04 DIAGNOSIS — Z13.6 SCREENING FOR CARDIOVASCULAR CONDITION: ICD-10-CM

## 2022-04-04 DIAGNOSIS — R73.9 HYPERGLYCEMIA: ICD-10-CM

## 2022-04-04 LAB
CHOLEST SERPL-MCNC: 104 MG/DL (ref 120–199)
CHOLEST/HDLC SERPL: 2.9 {RATIO} (ref 2–5)
COMPLEXED PSA SERPL-MCNC: 3.4 NG/ML (ref 0–4)
ESTIMATED AVG GLUCOSE: 128 MG/DL (ref 68–131)
HBA1C MFR BLD: 6.1 % (ref 4–5.6)
HDLC SERPL-MCNC: 36 MG/DL (ref 40–75)
HDLC SERPL: 34.6 % (ref 20–50)
LDLC SERPL CALC-MCNC: 57.2 MG/DL (ref 63–159)
NONHDLC SERPL-MCNC: 68 MG/DL
TRIGL SERPL-MCNC: 54 MG/DL (ref 30–150)

## 2022-04-04 PROCEDURE — 83036 HEMOGLOBIN GLYCOSYLATED A1C: CPT | Performed by: STUDENT IN AN ORGANIZED HEALTH CARE EDUCATION/TRAINING PROGRAM

## 2022-04-04 PROCEDURE — 80061 LIPID PANEL: CPT | Performed by: STUDENT IN AN ORGANIZED HEALTH CARE EDUCATION/TRAINING PROGRAM

## 2022-04-04 PROCEDURE — 84153 ASSAY OF PSA TOTAL: CPT | Performed by: STUDENT IN AN ORGANIZED HEALTH CARE EDUCATION/TRAINING PROGRAM

## 2022-04-04 PROCEDURE — 36415 COLL VENOUS BLD VENIPUNCTURE: CPT | Mod: PO | Performed by: STUDENT IN AN ORGANIZED HEALTH CARE EDUCATION/TRAINING PROGRAM

## 2022-05-09 ENCOUNTER — PES CALL (OUTPATIENT)
Dept: ADMINISTRATIVE | Facility: CLINIC | Age: 62
End: 2022-05-09
Payer: MEDICARE

## 2022-05-18 PROBLEM — I50.9 CHF (NYHA CLASS II, ACC/AHA STAGE C): Status: RESOLVED | Noted: 2018-02-13 | Resolved: 2022-05-18

## 2022-05-18 PROBLEM — R09.89 CARDIAC LV EJECTION FRACTION 10-20%: Status: ACTIVE | Noted: 2017-02-02

## 2022-05-18 PROBLEM — I47.20 V-TACH: Status: RESOLVED | Noted: 2018-10-16 | Resolved: 2022-05-18

## 2022-05-18 PROBLEM — E78.5 HYPERLIPIDEMIA: Status: ACTIVE | Noted: 2022-05-18

## 2022-05-18 PROBLEM — I50.9 CHF (NYHA CLASS II, ACC/AHA STAGE C): Status: ACTIVE | Noted: 2018-02-13

## 2022-05-18 PROBLEM — I10 ESSENTIAL (PRIMARY) HYPERTENSION: Status: ACTIVE | Noted: 2022-05-18

## 2022-05-18 PROBLEM — R73.03 PREDIABETES: Status: ACTIVE | Noted: 2018-04-30

## 2022-05-18 PROBLEM — D75.839 THROMBOCYTHEMIA: Status: ACTIVE | Noted: 2022-05-18

## 2022-05-18 NOTE — PROGRESS NOTES
"Ar Sharma presented for a  Medicare AWV and comprehensive Health Risk Assessment today. The following components were reviewed and updated:    · Medical history  · Family History  · Social history  · Allergies and Current Medications  · Health Risk Assessment  · Health Maintenance  · Care Team         ** See Completed Assessments for Annual Wellness Visit within the encounter summary.**         The following assessments were completed:  · Living Situation  · CAGE  · Depression Screening  · Timed Get Up and Go  · Whisper Test  · Cognitive Function Screening  · Nutrition Screening  · ADL Screening  · PAQ Screening        Vitals:    05/20/22 1058   BP: (!) 108/58   Pulse: 84   SpO2: (!) 93%   Weight: 101.4 kg (223 lb 7 oz)   Height: 5' 10" (1.778 m)     Body mass index is 32.06 kg/m².  Physical Exam  Vitals and nursing note reviewed.   Constitutional:       General: He is not in acute distress.     Appearance: Normal appearance. He is obese. He is not ill-appearing.   HENT:      Head: Normocephalic and atraumatic.   Eyes:      General: No scleral icterus.        Right eye: No discharge.         Left eye: No discharge.      Extraocular Movements: Extraocular movements intact.      Conjunctiva/sclera: Conjunctivae normal.   Cardiovascular:      Rate and Rhythm: Normal rate and regular rhythm.      Heart sounds: Normal heart sounds. No murmur heard.     Comments: +defibrillator  Pulmonary:      Effort: Pulmonary effort is normal. No respiratory distress.      Breath sounds: Rales (bibasilar ) present. No wheezing or rhonchi.      Comments: +congested cough  Musculoskeletal:      Cervical back: Normal range of motion.      Right lower leg: Edema present.      Left lower leg: Edema present.   Skin:     General: Skin is warm and dry.      Findings: No rash.   Neurological:      Mental Status: He is alert and oriented to person, place, and time.   Psychiatric:         Mood and Affect: Mood normal.         Behavior: Behavior " normal. Behavior is cooperative.         Cognition and Memory: Cognition and memory normal.               Diagnoses and health risks identified today and associated recommendations/orders:    1. Encounter for preventive health examination  - Chart reviewed. Problem list updated. Discussed current medical diagnosis, current medications, medical/surgical/family/social history; updated provider list; documented vital signs; identified any cognitive impairment; and updated risk factor list. Addressed any outstanding health maintenance. Provided patient with personalized health advice. Continue to follow up with PCP and any specialists.       2. CKD (chronic kidney disease) stage 4, GFR 15-29 ml/min  Chronic; stable on current treatment plan; follow up with PCP  - follows with Nephrology  - avoid NSAIDS, renal dose meds, avoid nephrotoxins     3. Chronic systolic CHF, NYHA class 2 and MARIFER/AHA stage C  Chronic; stable on current treatment plan; follow up with PCP  - follows with Cardiology  - recommend low sodium diet    4. HFrEF (heart failure with reduced ejection fraction)  Chronic; stable on current treatment plan; follow up with PCP  - follows with Cardiology  - recommend low sodium diet    5. Cardiac LV ejection fraction 10-20%  Chronic; stable on current treatment plan; follow up with PCP  - follows with Cardiology  - recommend low sodium diet    6. Ischemic cardiomyopathy  Chronic; stable on current treatment plan; follow up with PCP  - follows with Cardiology  - recommend low sodium diet    7. Thrombocythemia -  on 4/4/22  Chronic; stable on current treatment plan; follow up with PCP  -  on 4/4/22    8. Coronary artery disease involving native coronary artery of native heart without angina pectoris  Chronic; stable on current treatment plan; follow up with PCP  - follows with Cardiology  - on statin and ASA    9. Cardiac resynchronization therapy defibrillator (CRT-D) in place  Chronic; stable on  current treatment plan; follow up with PCP  - follows with Cardiology     10. Essential (primary) hypertension  Chronic; stable on current treatment plan; follow up with PCP  - follows with Cardiology  - recommend low sodium diet     11. Hyperlipidemia, unspecified hyperlipidemia type  Chronic; stable on current treatment plan; follow up with PCP  - follows with Cardiology  - on statin and ASA    12. Prediabetes  Chronic; stable on current treatment plan; follow up with PCP  -- Recommendation for healthy diet and increasing exercise as tolerated with goal of 150min/week . Recommend weight loss    13. Benign prostatic hyperplasia with lower urinary tract symptoms, symptom details unspecified  Chronic; stable on current treatment plan; follow up with PCP      14. BMI 32.0-32.9,adult  - Recommendation for healthy diet and increasing exercise as tolerated with goal of 150min/week . Recommend weight loss        Provided Ar with a 5-10 year written screening schedule and personal prevention plan. Recommendations were developed using the USPSTF age appropriate recommendations. Education, counseling, and referrals were provided as needed. After Visit Summary printed and given to patient which includes a list of additional screenings\tests needed.    Follow up in about 1 year (around 5/20/2023) for your next annual wellness visit.    Isha Copeland, FNP-C   Advance Care Planning         I offered to discuss advanced care planning, including how to pick a person who would make decisions for you if you were unable to make them for yourself, called a health care power of , and what kind of decisions you might make such as use of life sustaining treatments such as ventilators and tube feeding when faced with a life limiting illness recorded on a living will that they will need to know. (How you want to be cared for as you near the end of your natural life)     X Patient is interested in learning more about how to make  advanced directives.  I provided them paperwork and offered to discuss this with them.

## 2022-05-19 ENCOUNTER — TELEPHONE (OUTPATIENT)
Dept: ADMINISTRATIVE | Facility: CLINIC | Age: 62
End: 2022-05-19
Payer: MEDICARE

## 2022-05-20 ENCOUNTER — OFFICE VISIT (OUTPATIENT)
Dept: INTERNAL MEDICINE | Facility: CLINIC | Age: 62
End: 2022-05-20
Payer: MEDICARE

## 2022-05-20 VITALS
HEART RATE: 84 BPM | OXYGEN SATURATION: 93 % | BODY MASS INDEX: 31.99 KG/M2 | SYSTOLIC BLOOD PRESSURE: 108 MMHG | WEIGHT: 223.44 LBS | DIASTOLIC BLOOD PRESSURE: 58 MMHG | HEIGHT: 70 IN

## 2022-05-20 DIAGNOSIS — I50.22 NYHA CLASS 2 AND ACC/AHA STAGE C CHRONIC SYSTOLIC CONGESTIVE HEART FAILURE: ICD-10-CM

## 2022-05-20 DIAGNOSIS — I25.5 ISCHEMIC CARDIOMYOPATHY: ICD-10-CM

## 2022-05-20 DIAGNOSIS — D75.839 THROMBOCYTHEMIA: ICD-10-CM

## 2022-05-20 DIAGNOSIS — R09.89 CARDIAC LV EJECTION FRACTION 10-20%: ICD-10-CM

## 2022-05-20 DIAGNOSIS — I50.20 HFREF (HEART FAILURE WITH REDUCED EJECTION FRACTION): ICD-10-CM

## 2022-05-20 DIAGNOSIS — I25.10 CORONARY ARTERY DISEASE INVOLVING NATIVE CORONARY ARTERY OF NATIVE HEART WITHOUT ANGINA PECTORIS: ICD-10-CM

## 2022-05-20 DIAGNOSIS — I10 ESSENTIAL (PRIMARY) HYPERTENSION: ICD-10-CM

## 2022-05-20 DIAGNOSIS — N18.4 CKD (CHRONIC KIDNEY DISEASE) STAGE 4, GFR 15-29 ML/MIN: ICD-10-CM

## 2022-05-20 DIAGNOSIS — Z00.00 ENCOUNTER FOR PREVENTIVE HEALTH EXAMINATION: Primary | ICD-10-CM

## 2022-05-20 DIAGNOSIS — E78.5 HYPERLIPIDEMIA, UNSPECIFIED HYPERLIPIDEMIA TYPE: ICD-10-CM

## 2022-05-20 DIAGNOSIS — R73.03 PREDIABETES: ICD-10-CM

## 2022-05-20 DIAGNOSIS — Z95.810 CARDIAC RESYNCHRONIZATION THERAPY DEFIBRILLATOR (CRT-D) IN PLACE: ICD-10-CM

## 2022-05-20 DIAGNOSIS — N40.1 BENIGN PROSTATIC HYPERPLASIA WITH LOWER URINARY TRACT SYMPTOMS, SYMPTOM DETAILS UNSPECIFIED: ICD-10-CM

## 2022-05-20 PROCEDURE — 99999 PR PBB SHADOW E&M-EST. PATIENT-LVL IV: ICD-10-PCS | Mod: PBBFAC,,, | Performed by: NURSE PRACTITIONER

## 2022-05-20 PROCEDURE — G0439 PR MEDICARE ANNUAL WELLNESS SUBSEQUENT VISIT: ICD-10-PCS | Mod: S$GLB,,, | Performed by: NURSE PRACTITIONER

## 2022-05-20 PROCEDURE — 99999 PR PBB SHADOW E&M-EST. PATIENT-LVL IV: CPT | Mod: PBBFAC,,, | Performed by: NURSE PRACTITIONER

## 2022-05-20 PROCEDURE — G0439 PPPS, SUBSEQ VISIT: HCPCS | Mod: S$GLB,,, | Performed by: NURSE PRACTITIONER

## 2022-05-20 NOTE — PATIENT INSTRUCTIONS
Counseling and Referral of Other Preventative  (Italic type indicates deductible and co-insurance are waived)    Patient Name: Ar Sharma  Today's Date: 5/20/2022    Health Maintenance       Date Due Completion Date    COVID-19 Vaccine (3 - Booster for Pfizer series) 05/31/2022 (Originally 8/25/2021) 3/25/2021    Influenza Vaccine (Season Ended) 09/01/2022 9/25/2020    LDCT Lung Screen 03/11/2023 3/11/2022    PROSTATE-SPECIFIC ANTIGEN 04/04/2023 4/4/2022    High Dose Statin 05/20/2023 5/20/2022    Aspirin/Antiplatelet Therapy 05/20/2023 5/20/2022    Lipid Panel 04/04/2027 4/4/2022    TETANUS VACCINE 10/16/2028 10/16/2018 (Done)    Override on 10/16/2018: Done    Colorectal Cancer Screening 12/07/2028 12/7/2018        No orders of the defined types were placed in this encounter.    The following information is provided to all patients.  This information is to help you find resources for any of the problems found today that may be affecting your health:                Living healthy guide: www.[facility].gov      Understanding Diabetes: www.diabetes.org      Eating healthy: www.cdc.gov/healthyweight      CDC home safety checklist: www.cdc.gov/steadi/patient.html      Agency on Aging: www.goea.[place].[facility]      Alcoholics anonymous (AA): www.aa.org      Physical Activity: www.claudia.nih.gov/gy0rwao      Tobacco use: www.quitwithusla.org

## 2022-05-27 ENCOUNTER — CLINICAL SUPPORT (OUTPATIENT)
Dept: CARDIOLOGY | Facility: CLINIC | Age: 62
End: 2022-05-27
Payer: MEDICARE

## 2022-05-27 DIAGNOSIS — Z95.810 CARDIAC RESYNCHRONIZATION THERAPY DEFIBRILLATOR (CRT-D) IN PLACE: Primary | ICD-10-CM

## 2022-05-27 PROCEDURE — 99999 PR PBB SHADOW E&M-EST. PATIENT-LVL I: ICD-10-PCS | Mod: PBBFAC,,,

## 2022-05-27 PROCEDURE — 99999 PR PBB SHADOW E&M-EST. PATIENT-LVL I: CPT | Mod: PBBFAC,,,

## 2022-06-02 ENCOUNTER — OFFICE VISIT (OUTPATIENT)
Dept: CARDIOLOGY | Facility: CLINIC | Age: 62
End: 2022-06-02
Payer: MEDICARE

## 2022-06-02 VITALS
SYSTOLIC BLOOD PRESSURE: 100 MMHG | DIASTOLIC BLOOD PRESSURE: 60 MMHG | HEIGHT: 70 IN | OXYGEN SATURATION: 93 % | WEIGHT: 221.19 LBS | BODY MASS INDEX: 31.67 KG/M2 | HEART RATE: 83 BPM

## 2022-06-02 DIAGNOSIS — I25.5 ISCHEMIC CARDIOMYOPATHY: ICD-10-CM

## 2022-06-02 DIAGNOSIS — Z98.890 S/P ABLATION OF VENTRICULAR ARRHYTHMIA: ICD-10-CM

## 2022-06-02 DIAGNOSIS — Z86.79 S/P ABLATION OF VENTRICULAR ARRHYTHMIA: ICD-10-CM

## 2022-06-02 DIAGNOSIS — I50.20 HFREF (HEART FAILURE WITH REDUCED EJECTION FRACTION): ICD-10-CM

## 2022-06-02 DIAGNOSIS — E78.00 PURE HYPERCHOLESTEROLEMIA: Primary | ICD-10-CM

## 2022-06-02 DIAGNOSIS — D50.9 IRON DEFICIENCY ANEMIA, UNSPECIFIED IRON DEFICIENCY ANEMIA TYPE: ICD-10-CM

## 2022-06-02 DIAGNOSIS — I50.22 NYHA CLASS 2 AND ACC/AHA STAGE C CHRONIC SYSTOLIC CONGESTIVE HEART FAILURE: ICD-10-CM

## 2022-06-02 DIAGNOSIS — Z95.810 CARDIAC RESYNCHRONIZATION THERAPY DEFIBRILLATOR (CRT-D) IN PLACE: ICD-10-CM

## 2022-06-02 DIAGNOSIS — R73.03 PREDIABETES: ICD-10-CM

## 2022-06-02 DIAGNOSIS — I25.10 ATHEROSCLEROSIS OF NATIVE CORONARY ARTERY OF NATIVE HEART WITHOUT ANGINA PECTORIS: ICD-10-CM

## 2022-06-02 DIAGNOSIS — N18.4 CKD (CHRONIC KIDNEY DISEASE) STAGE 4, GFR 15-29 ML/MIN: ICD-10-CM

## 2022-06-02 DIAGNOSIS — E78.5 HYPERLIPIDEMIA, UNSPECIFIED HYPERLIPIDEMIA TYPE: ICD-10-CM

## 2022-06-02 DIAGNOSIS — I25.10 CORONARY ARTERY DISEASE INVOLVING NATIVE CORONARY ARTERY OF NATIVE HEART WITHOUT ANGINA PECTORIS: ICD-10-CM

## 2022-06-02 DIAGNOSIS — R09.89 CARDIAC LV EJECTION FRACTION 10-20%: ICD-10-CM

## 2022-06-02 DIAGNOSIS — I10 ESSENTIAL (PRIMARY) HYPERTENSION: ICD-10-CM

## 2022-06-02 PROCEDURE — 99999 PR PBB SHADOW E&M-EST. PATIENT-LVL IV: ICD-10-PCS | Mod: PBBFAC,,, | Performed by: INTERNAL MEDICINE

## 2022-06-02 PROCEDURE — 99214 OFFICE O/P EST MOD 30 MIN: CPT | Mod: S$GLB,,, | Performed by: INTERNAL MEDICINE

## 2022-06-02 PROCEDURE — 99214 PR OFFICE/OUTPT VISIT, EST, LEVL IV, 30-39 MIN: ICD-10-PCS | Mod: S$GLB,,, | Performed by: INTERNAL MEDICINE

## 2022-06-02 PROCEDURE — 99999 PR PBB SHADOW E&M-EST. PATIENT-LVL IV: CPT | Mod: PBBFAC,,, | Performed by: INTERNAL MEDICINE

## 2022-06-02 RX ORDER — NITROGLYCERIN 0.4 MG/1
0.4 TABLET SUBLINGUAL EVERY 5 MIN PRN
Qty: 20 TABLET | Refills: 1 | Status: SHIPPED | OUTPATIENT
Start: 2022-06-02

## 2022-06-02 NOTE — PROGRESS NOTES
Cardiology Clinic note    Subjective:   Patient ID:  Ar Sharma is a 61 y.o. male who presents for rotuine follow-up of CAD.    Hx:  Coronary artery disease with reported MI in 20s, HFrEF (EF 50% 2020),CRT-D with upgrade to Medtronic 2016, DD, VT s/p ablation x 2, HTD, hypothyroidism, YOEL.    Last seen by primary cardiologist November 2021 and doing well from cardiac perspective in compensated from heart failure perspective.  Was active and compliant with medication regimen.    Previous episodes of tachycardia, chest pain.  None reported recently until the past few weeks.  He reports chest pain at rest with associated symptoms of diaphoresis lasting minutes with resolution with aspirin or sublingual nitroglycerin.  No radiation.  Reports an episode of chest pain this morning.  Previous stress test 2020 with large apical defect noted however follow up coronary angiogram with nonobstructive CAD.    He denies shortness of breath, palpitations, orthopnea, bilateral lower extremity edema, palpitations, syncope.    Lipids reviewed and at goal  Recent interrogation without any events; battery life 11 months    HPI:   Ar Sharma  has a past medical history of CKD (chronic kidney disease) stage 4, GFR 15-29 ml/min, Congestive heart failure (CHF) (2015), Edema, Heart attack, HLD (hyperlipidemia), Hypertension, Hyperuricemia, Hypocalcemia, Renal cyst, left, Secondary hyperparathyroidism, Thyroid disease, V tach, and Vitamin D deficiency.          Patient Active Problem List    Diagnosis Date Noted    Hyperlipidemia 05/18/2022     Formatting of this note might be different from the original.  Converted from [employer]:  Description - HYPERLIPIDEMIA      Essential (primary) hypertension 05/18/2022     Formatting of this note might be different from the original.  Converted from [employer]:  Description - ESSENTIAL HYPERTENSION, BENIGN      Thrombocythemia -  on 4/4/22 05/18/2022    HFrEF (heart failure  with reduced ejection fraction) 12/10/2020    CKD (chronic kidney disease) stage 4, GFR 15-29 ml/min 12/17/2018    Chronic systolic CHF, NYHA class 2 and MARIFER/AHA stage C 10/25/2018     [facility] Regulatory Update 10/1/2020      Cardiac resynchronization therapy defibrillator (CRT-D) in place 10/25/2018    S/P ablation of ventricular arrhythmia 10/25/2018    Hypothyroidism 10/16/2018    Iron deficiency anemia 10/16/2018    Pure hypercholesterolemia 10/16/2018    Coronary artery disease involving native coronary artery of native heart without angina pectoris 10/16/2018    Prediabetes 04/30/2018    Cardiac LV ejection fraction 10-20% 02/02/2017    Enlarged prostate with lower urinary tract symptoms (LUTS) 06/09/2014     Formatting of this note might be different from the original.  Converted from [employer]:  Description - BENIGN PROSTATIC HYPERTROPHY, WITH OBSTRUCTION      Ischemic cardiomyopathy 04/16/2014     Formatting of this note might be different from the original.  Converted from [employer]:  Description - CARDIOMYOPATHY, ISCHEMIC      Primary osteoarthritis of one knee 02/05/2013     Formatting of this note might be different from the original.  Converted from [employer]:  Description - OSTEOARTHRITIS PRIMARY KNEE         Patient's Medications   New Prescriptions    No medications on file   Previous Medications    ALLOPURINOL (ZYLOPRIM) 300 MG TABLET    Take 1 tablet (300 mg total) by mouth once daily.    AMIODARONE (PACERONE) 200 MG TAB    TAKE 1 TABLET BY MOUTH EVERY DAY    ASPIRIN (ECOTRIN) 81 MG EC TABLET    Take 81 mg by mouth once daily.    CARVEDILOL (COREG) 25 MG TABLET    TAKE 1 TABLET BY MOUTH TWICE A DAY WITH FOOD    ERGOCALCIFEROL (ERGOCALCIFEROL) 50,000 UNIT CAP    Take 1 capsule (50,000 Units total) by mouth every 7 days.    FLUZONE QUAD 3629-3037, PF, 60 MCG (15 MCG X 4)/0.5 ML SYRG        FUROSEMIDE (LASIX) 80 MG TABLET    Take 1 tablet (80 mg total) by mouth 3 (three) times daily as  "needed (Swelling).    LEVOTHYROXINE (SYNTHROID, LEVOTHROID) 175 MCG TABLET    TAKE 1 TABLET BY MOUTH ONCE DAILY.    MEXILETINE (MEXITIL) 150 MG CAP    Take 1 capsule (150 mg total) by mouth 2 (two) times daily with meals.    PRAVASTATIN (PRAVACHOL) 40 MG TABLET    Take 1 tablet (40 mg total) by mouth once daily.    SACUBITRIL-VALSARTAN (ENTRESTO) 49-51 MG PER TABLET    Take 1 tablet by mouth 2 (two) times daily.    TADALAFIL (CIALIS) 5 MG TABLET    TAKE 1 TABLET BY MOUTH DAILY AS NEEDED FOR ERECTILE DYSFUNCTION   Modified Medications    Modified Medication Previous Medication    NITROGLYCERIN (NITROSTAT) 0.4 MG SL TABLET nitroGLYCERIN (NITROSTAT) 0.4 MG SL tablet       Place 1 tablet (0.4 mg total) under the tongue every 5 (five) minutes as needed for Chest pain.    Place 1 tablet (0.4 mg total) under the tongue every 5 (five) minutes as needed for Chest pain.   Discontinued Medications    No medications on file        Review of Systems   Constitutional: Negative.   Cardiovascular: Positive for chest pain. Negative for dyspnea on exertion, irregular heartbeat, leg swelling, near-syncope, orthopnea, palpitations and syncope.   Skin: Negative.    Musculoskeletal: Negative.    Neurological: Negative.    Psychiatric/Behavioral: Negative.          Objective:   Vitals  Vitals:    06/02/22 1125 06/02/22 1126   BP: 90/62 100/60   Pulse: 83 83   SpO2: (!) 93% (!) 93%   Weight: 100.3 kg (221 lb 3.2 oz)    Height: 5' 10" (1.778 m)           Physical Exam  Constitutional:       Appearance: He is obese.   HENT:      Head: Normocephalic.   Cardiovascular:      Rate and Rhythm: Normal rate and regular rhythm.      Pulses: Normal pulses.      Heart sounds: Murmur heard.   Pulmonary:      Effort: Pulmonary effort is normal.      Breath sounds: Normal breath sounds.   Musculoskeletal:      Cervical back: Normal range of motion and neck supple.   Skin:     General: Skin is warm and dry.   Neurological:      Mental Status: He is alert " "and oriented to person, place, and time.           Lab Results    Lab Results   Component Value Date    WBC 6.88 04/04/2022    HGB 13.1 (L) 04/04/2022    HCT 40.8 04/04/2022    MCV 70 (L) 04/04/2022       Lab Results   Component Value Date     (H) 04/04/2022       Lab Results   Component Value Date    K 4.8 04/04/2022    BUN 68 (H) 04/04/2022    CREATININE 4.08 (H) 04/04/2022       Lab Results   Component Value Date    GLU 92 04/04/2022    HGBA1C 6.1 (H) 04/04/2022       Lab Results   Component Value Date    AST 21 11/11/2021    ALT 13 11/11/2021    ALBUMIN 4.0 04/04/2022    PROT 6.9 11/11/2021       Lab Results   Component Value Date    CHOL 104 (L) 04/04/2022    HDL 36 (L) 04/04/2022    LDLCALC 57.2 (L) 04/04/2022    TRIG 54 04/04/2022       Lab Results   Component Value Date     (H) 11/02/2018       Cardiac Studies  Significant Imaging: Echocardiogram:   Transthoracic echo (TTE) complete (Cupid Only):   Results for orders placed or performed during the hospital encounter of 11/04/20   Echo Color Flow Doppler? Yes   Result Value Ref Range    BSA 2.28 m2    TDI SEPTAL 0.05 m/s    LV LATERAL E/E' RATIO 11.38 m/s    LV SEPTAL E/E' RATIO 18.20 m/s    LA WIDTH 5.21 cm    AORTIC VALVE CUSP SEPERATION 2.07 cm    TDI LATERAL 0.08 m/s    PV PEAK VELOCITY 0.89 cm/s    LVIDd 5.35 3.5 - 6.0 cm    IVS 1.47 (A) 0.6 - 1.1 cm    Posterior Wall 1.49 (A) 0.6 - 1.1 cm    Ao root annulus 3.02 cm    LVIDs 3.89 2.1 - 4.0 cm    FS 27 28 - 44 %    IVC proximal 2.1 cm    Sinus 2.27 cm    LV mass 350.64 g    RVDD 3.70 cm    TAPSE 2.31 cm    Left Ventricle Relative Wall Thickness 0.56 cm    AV mean gradient 7 mmHg    AV valve area 3.04 cm2    AV Velocity Ratio 0.62     AV index (prosthetic) 0.75     MV valve area p 1/2 method 3.51 cm2    E/A ratio 1.47     Mean e' 0.07 m/s    E wave deceleration time 216.19 msec    MV "A" wave duration 131.00 msec    Pulm vein "A" wave 145.00 msec    Pulm vein S/D ratio 0.83     LVOT diameter " 2.28 cm    LVOT area 4.1 cm2    LVOT peak marvin 1.10 m/s    LVOT peak VTI 27.61 cm    Ao peak marvin 1.77 m/s    Ao VTI 37.06 cm    Mr max marvin 0.04 m/s    LVOT stroke volume 112.67 cm3    AV peak gradient 13 mmHg    E/E' ratio 14.00 m/s    MV Peak E Marvin 0.91 m/s    TR Max Marvin 4.06 m/s    MV stenosis pressure 1/2 time 62.69 ms    MV Peak A Marvin 0.62 m/s    PV Peak S Marvin 0.52 m/s    PV Peak D Marvin 0.63 m/s    LV Systolic Volume 65.56 mL    LV Systolic Volume Index 29.6 mL/m2    LV Diastolic Volume 138.39 mL    LV Diastolic Volume Index 62.56 mL/m2    LV Mass Index 159 g/m2    RA Major Axis 5.77 cm    Left Atrium Minor Axis 5.86 cm    Left Atrium Major Axis 6.08 cm    Triscuspid Valve Regurgitation Peak Gradient 66 mmHg    RA Width 5.70 cm    Right Atrial Pressure (from IVC) 3 mmHg    TV rest pulmonary artery pressure 69 mmHg    Narrative    · The left ventricle is normal in size with normal systolic function. The   estimated ejection fraction is 50%.  · There is mild left ventricular concentric hypertrophy.  · There are segmental left ventricular wall motion abnormalities, apical,   distal anterior akinesis.  · Normal left ventricular diastolic function.  · Mild-to-moderate mitral regurgitation.  · Moderate right ventricular enlargement.  · The estimated PA systolic pressure is 69 mmHg.  · Mild to moderate tricuspid regurgitation.  · There is pulmonary hypertension.        ECG:  AV dual paced rhythm  Stress Test 2020:   1.  There is a large fixed perfusion defect in the apical aspect of the left ventricle.  This is characteristic of an infarct.  There is no reversible perfusion defect visualized.     2.  There is moderate generalized hypokinesis of the walls of the left ventricle.     3.  The left ventricular ejection fraction was calculated to be 31%.  The normal is greater than 54    Cath study 2020: Non-obstructive disease, + DD    Assessment:     1. Pure hypercholesterolemia    2. Coronary artery disease involving native  coronary artery of native heart without angina pectoris    3. Atherosclerosis of native coronary artery of native heart without angina pectoris    4. Chronic systolic CHF, NYHA class 2 and MARIFER/AHA stage C    5. Cardiac resynchronization therapy defibrillator (CRT-D) in place    6. S/P ablation of ventricular arrhythmia    7. HFrEF (heart failure with reduced ejection fraction)    8. Cardiac LV ejection fraction 10-20%    9. Ischemic cardiomyopathy    10. Hyperlipidemia, unspecified hyperlipidemia type    11. Essential (primary) hypertension    12. Prediabetes    13. Iron deficiency anemia, unspecified iron deficiency anemia type    14. CKD (chronic kidney disease) stage 4, GFR 15-29 ml/min        Plan:     Unable to get EKG today    Nuclear stress test at next availability; if abnormal will proceed with coronary  angiogram; will need testing with O2 instead of contrast dye given severely depressed kidney functions; creatinine 4.8, GFR 17; see nephro for managment    Continue to use PRN nitro, as CP is relieved with administration of 1-2.  Will follow follow up immediately once nuclear testing results.      Otherwise, continue with current medical plan and lifestyle changes.      Orders Placed This Encounter   Procedures    NM Myocardial Perfusion Spect Multi Pharmacologic     Standing Status:   Future     Standing Expiration Date:   6/2/2023     Order Specific Question:   May the Radiologist modify the order per protocol to meet the clinical needs of the patient?     Answer:   Yes     Order Specific Question:   Stress Medication to use:     Answer:   Regadenoson     Order Specific Question:   Diabetes?     Answer:   No     Order Specific Question:   Will a Cardiologist read this study?     Answer:   No    Nuclear Stress Test     Standing Status:   Future     Standing Expiration Date:   6/2/2023     Order Specific Question:   Which stress agent will be used?     Answer:   Pharm     Order Specific Question:   Which  medicaton for the stress procedure?     Answer:   Regadenoson     Order Specific Question:   Release to patient     Answer:   Immediate       Follow up in 2 weeks  Return sooner for concerns or questions. If symptoms persist go to the ED    He expressed verbal understanding and agreed with the plan    Thank you for the opportunity to care for this patient. Will be available for questions if needed.     Total duration of face to face visit time 30 minutes.  Total time spent counseling greater than fifty percent of total visit time.  Counseling included discussion regarding imaging findings, diagnosis, possibilities, treatment options, risks and benefits.    MARIBEL Wynn-C  Cardiology Clinic  [facility]

## 2022-06-03 NOTE — PROGRESS NOTES
Subjective:    Patient ID:  Ar Sharma is a 61 y.o. male who presents for follow-up of Pacemaker Check      HPI    CRT-D remote interrogation report downloaded and prepared by medical assistant and interpreted by me and full report scanned into Epic media.      Company: [employer]  Mode: DDDR  Battery: OK with ANTHONY 11 months   Leads: OK    Events: No significant events   Function: Normal device function    ROS     Objective:    Physical Exam      Assessment:       1. Cardiac resynchronization therapy defibrillator (CRT-D) in place         Plan:

## 2022-06-09 ENCOUNTER — LAB VISIT (OUTPATIENT)
Dept: LAB | Facility: HOSPITAL | Age: 62
End: 2022-06-09
Attending: INTERNAL MEDICINE
Payer: MEDICARE

## 2022-06-09 DIAGNOSIS — N18.4 ANEMIA IN STAGE 4 CHRONIC KIDNEY DISEASE: ICD-10-CM

## 2022-06-09 DIAGNOSIS — N18.4 CKD (CHRONIC KIDNEY DISEASE) STAGE 4, GFR 15-29 ML/MIN: ICD-10-CM

## 2022-06-09 DIAGNOSIS — N25.81 SECONDARY HYPERPARATHYROIDISM OF RENAL ORIGIN: ICD-10-CM

## 2022-06-09 DIAGNOSIS — D63.1 ANEMIA IN STAGE 4 CHRONIC KIDNEY DISEASE: ICD-10-CM

## 2022-06-09 LAB
ALBUMIN SERPL BCP-MCNC: 4 G/DL (ref 3.5–5.2)
ANION GAP SERPL CALC-SCNC: 13 MMOL/L (ref 8–16)
BASOPHILS # BLD AUTO: 0.07 K/UL (ref 0–0.2)
BASOPHILS NFR BLD: 0.9 % (ref 0–1.9)
CALCIUM SERPL-MCNC: 8.7 MG/DL (ref 8.7–10.5)
CHLORIDE SERPL-SCNC: 100 MMOL/L (ref 95–110)
CO2 SERPL-SCNC: 26 MMOL/L (ref 23–29)
CREAT SERPL-MCNC: 4.64 MG/DL (ref 0.5–1.4)
DIFFERENTIAL METHOD: ABNORMAL
EOSINOPHIL # BLD AUTO: 0.2 K/UL (ref 0–0.5)
EOSINOPHIL NFR BLD: 2.3 % (ref 0–8)
ERYTHROCYTE [DISTWIDTH] IN BLOOD BY AUTOMATED COUNT: 21 % (ref 11.5–14.5)
EST. GFR  (AFRICAN AMERICAN): 14.5 ML/MIN/1.73 M^2
EST. GFR  (NON AFRICAN AMERICAN): 12.6 ML/MIN/1.73 M^2
FERRITIN SERPL-MCNC: 37 NG/ML (ref 20–300)
GLUCOSE SERPL-MCNC: 90 MG/DL (ref 70–110)
HCT VFR BLD AUTO: 41.3 % (ref 40–54)
HGB BLD-MCNC: 13.3 G/DL (ref 14–18)
IMM GRANULOCYTES # BLD AUTO: 0.03 K/UL (ref 0–0.04)
IMM GRANULOCYTES NFR BLD AUTO: 0.4 % (ref 0–0.5)
IRON SERPL-MCNC: 36 UG/DL (ref 45–160)
LYMPHOCYTES # BLD AUTO: 0.7 K/UL (ref 1–4.8)
LYMPHOCYTES NFR BLD: 8.8 % (ref 18–48)
MCH RBC QN AUTO: 22.7 PG (ref 27–31)
MCHC RBC AUTO-ENTMCNC: 32.2 G/DL (ref 32–36)
MCV RBC AUTO: 70 FL (ref 82–98)
MONOCYTES # BLD AUTO: 0.9 K/UL (ref 0.3–1)
MONOCYTES NFR BLD: 11.2 % (ref 4–15)
NEUTROPHILS # BLD AUTO: 5.9 K/UL (ref 1.8–7.7)
NEUTROPHILS NFR BLD: 76.4 % (ref 38–73)
NRBC BLD-RTO: 0 /100 WBC
PHOSPHATE SERPL-MCNC: 5.4 MG/DL (ref 2.7–4.5)
PLATELET # BLD AUTO: 403 K/UL (ref 150–450)
PMV BLD AUTO: ABNORMAL FL (ref 9.2–12.9)
POTASSIUM SERPL-SCNC: 4.8 MMOL/L (ref 3.5–5.1)
PTH-INTACT SERPL-MCNC: 248.6 PG/ML (ref 9–77)
RBC # BLD AUTO: 5.87 M/UL (ref 4.6–6.2)
SATURATED IRON: 9 % (ref 20–50)
SODIUM SERPL-SCNC: 139 MMOL/L (ref 136–145)
TOTAL IRON BINDING CAPACITY: 410 UG/DL (ref 250–450)
TRANSFERRIN SERPL-MCNC: 277 MG/DL (ref 200–375)
URATE SERPL-MCNC: 4.8 MG/DL (ref 3.4–7)
UUN UR-MCNC: 81 MG/DL (ref 2–20)
WBC # BLD AUTO: 7.75 K/UL (ref 3.9–12.7)

## 2022-06-09 PROCEDURE — 87340 HEPATITIS B SURFACE AG IA: CPT | Mod: PO | Performed by: INTERNAL MEDICINE

## 2022-06-09 PROCEDURE — 86704 HEP B CORE ANTIBODY TOTAL: CPT | Mod: PO | Performed by: INTERNAL MEDICINE

## 2022-06-09 PROCEDURE — 86706 HEP B SURFACE ANTIBODY: CPT | Mod: PO | Performed by: INTERNAL MEDICINE

## 2022-06-09 PROCEDURE — 36415 COLL VENOUS BLD VENIPUNCTURE: CPT | Mod: PO | Performed by: INTERNAL MEDICINE

## 2022-06-09 PROCEDURE — 85025 COMPLETE CBC W/AUTO DIFF WBC: CPT | Mod: PO | Performed by: INTERNAL MEDICINE

## 2022-06-09 PROCEDURE — 82728 ASSAY OF FERRITIN: CPT | Performed by: INTERNAL MEDICINE

## 2022-06-09 PROCEDURE — 80069 RENAL FUNCTION PANEL: CPT | Mod: PO | Performed by: INTERNAL MEDICINE

## 2022-06-09 PROCEDURE — 84466 ASSAY OF TRANSFERRIN: CPT | Mod: PO | Performed by: INTERNAL MEDICINE

## 2022-06-09 PROCEDURE — 83970 ASSAY OF PARATHORMONE: CPT | Mod: PO | Performed by: INTERNAL MEDICINE

## 2022-06-09 PROCEDURE — 84550 ASSAY OF BLOOD/URIC ACID: CPT | Performed by: INTERNAL MEDICINE

## 2022-06-09 PROCEDURE — 86803 HEPATITIS C AB TEST: CPT | Mod: PO | Performed by: INTERNAL MEDICINE

## 2022-06-10 LAB
HBV CORE AB SERPL QL IA: NEGATIVE
HBV SURFACE AB SER-ACNC: NEGATIVE M[IU]/ML
HBV SURFACE AG SERPL QL IA: NEGATIVE
HCV AB SERPL QL IA: NEGATIVE

## 2022-07-07 ENCOUNTER — LAB VISIT (OUTPATIENT)
Dept: LAB | Facility: HOSPITAL | Age: 62
End: 2022-07-07
Attending: INTERNAL MEDICINE
Payer: MEDICARE

## 2022-07-07 DIAGNOSIS — N18.4 CKD (CHRONIC KIDNEY DISEASE) STAGE 4, GFR 15-29 ML/MIN: ICD-10-CM

## 2022-07-07 LAB
ALBUMIN SERPL BCP-MCNC: 4.4 G/DL (ref 3.5–5.2)
ANION GAP SERPL CALC-SCNC: 11 MMOL/L (ref 8–16)
CALCIUM SERPL-MCNC: 9.5 MG/DL (ref 8.7–10.5)
CHLORIDE SERPL-SCNC: 99 MMOL/L (ref 95–110)
CO2 SERPL-SCNC: 26 MMOL/L (ref 23–29)
CREAT SERPL-MCNC: 4.06 MG/DL (ref 0.5–1.4)
EST. GFR  (AFRICAN AMERICAN): 17.1 ML/MIN/1.73 M^2
EST. GFR  (NON AFRICAN AMERICAN): 14.8 ML/MIN/1.73 M^2
GLUCOSE SERPL-MCNC: 92 MG/DL (ref 70–110)
PHOSPHATE SERPL-MCNC: 3.9 MG/DL (ref 2.7–4.5)
POTASSIUM SERPL-SCNC: 4.3 MMOL/L (ref 3.5–5.1)
SODIUM SERPL-SCNC: 136 MMOL/L (ref 136–145)
UUN UR-MCNC: 76 MG/DL (ref 2–20)

## 2022-07-07 PROCEDURE — 36415 COLL VENOUS BLD VENIPUNCTURE: CPT | Mod: PO | Performed by: INTERNAL MEDICINE

## 2022-07-07 PROCEDURE — 80069 RENAL FUNCTION PANEL: CPT | Mod: PO | Performed by: INTERNAL MEDICINE

## 2022-07-11 ENCOUNTER — HOSPITAL ENCOUNTER (OUTPATIENT)
Dept: CARDIOLOGY | Facility: HOSPITAL | Age: 62
Discharge: HOME | End: 2022-07-11
Attending: NURSE PRACTITIONER
Payer: MEDICARE

## 2022-07-11 ENCOUNTER — HOSPITAL ENCOUNTER (OUTPATIENT)
Dept: RADIOLOGY | Facility: HOSPITAL | Age: 62
Discharge: HOME | End: 2022-07-11
Attending: NURSE PRACTITIONER
Payer: MEDICARE

## 2022-07-11 DIAGNOSIS — I25.10 CORONARY ARTERY DISEASE INVOLVING NATIVE CORONARY ARTERY OF NATIVE HEART WITHOUT ANGINA PECTORIS: ICD-10-CM

## 2022-07-11 DIAGNOSIS — I25.10 ATHEROSCLEROSIS OF NATIVE CORONARY ARTERY OF NATIVE HEART WITHOUT ANGINA PECTORIS: ICD-10-CM

## 2022-07-11 LAB
CV STRESS BASE HR: 81 BPM
DIASTOLIC BLOOD PRESSURE: 58 MMHG
OHS CV CPX 85 PERCENT MAX PREDICTED HEART RATE MALE: 134
OHS CV CPX MAX PREDICTED HEART RATE: 158
OHS CV CPX PATIENT IS FEMALE: 0
OHS CV CPX PATIENT IS MALE: 1
OHS CV CPX PEAK DIASTOLIC BLOOD PRESSURE: 67 MMHG
OHS CV CPX PEAK HEAR RATE: 96 BPM
OHS CV CPX PEAK RATE PRESSURE PRODUCT: 9312
OHS CV CPX PEAK SYSTOLIC BLOOD PRESSURE: 97 MMHG
OHS CV CPX PERCENT MAX PREDICTED HEART RATE ACHIEVED: 61
OHS CV CPX RATE PRESSURE PRODUCT PRESENTING: 7857
SYSTOLIC BLOOD PRESSURE: 97 MMHG

## 2022-07-11 PROCEDURE — 93018 NUCLEAR STRESS TEST (CUPID ONLY): ICD-10-PCS | Mod: ,,, | Performed by: INTERNAL MEDICINE

## 2022-07-11 PROCEDURE — 63600175 PHARM REV CODE 636 W HCPCS: Mod: PO | Performed by: NURSE PRACTITIONER

## 2022-07-11 PROCEDURE — 93016 NUCLEAR STRESS TEST (CUPID ONLY): ICD-10-PCS | Mod: ,,, | Performed by: INTERNAL MEDICINE

## 2022-07-11 PROCEDURE — 93016 CV STRESS TEST SUPVJ ONLY: CPT | Mod: ,,, | Performed by: INTERNAL MEDICINE

## 2022-07-11 PROCEDURE — 93018 CV STRESS TEST I&R ONLY: CPT | Mod: ,,, | Performed by: INTERNAL MEDICINE

## 2022-07-11 PROCEDURE — A9502 TC99M TETROFOSMIN: HCPCS | Mod: PO

## 2022-07-11 PROCEDURE — 93017 CV STRESS TEST TRACING ONLY: CPT | Mod: PO

## 2022-07-11 RX ORDER — REGADENOSON 0.08 MG/ML
0.4 INJECTION, SOLUTION INTRAVENOUS ONCE
Status: COMPLETED | OUTPATIENT
Start: 2022-07-11 | End: 2022-07-11

## 2022-07-11 RX ADMIN — REGADENOSON 0.4 MG: 0.08 INJECTION, SOLUTION INTRAVENOUS at 12:07

## 2022-07-21 ENCOUNTER — OFFICE VISIT (OUTPATIENT)
Dept: CARDIOLOGY | Facility: CLINIC | Age: 62
End: 2022-07-21
Payer: MEDICARE

## 2022-07-21 VITALS
HEIGHT: 70 IN | SYSTOLIC BLOOD PRESSURE: 104 MMHG | BODY MASS INDEX: 30.35 KG/M2 | DIASTOLIC BLOOD PRESSURE: 78 MMHG | WEIGHT: 212 LBS | HEART RATE: 75 BPM | OXYGEN SATURATION: 95 %

## 2022-07-21 DIAGNOSIS — I10 ESSENTIAL (PRIMARY) HYPERTENSION: ICD-10-CM

## 2022-07-21 DIAGNOSIS — I42.8 OTHER CARDIOMYOPATHY: Primary | ICD-10-CM

## 2022-07-21 DIAGNOSIS — I25.5 ISCHEMIC CARDIOMYOPATHY: ICD-10-CM

## 2022-07-21 DIAGNOSIS — I50.20 HFREF (HEART FAILURE WITH REDUCED EJECTION FRACTION): ICD-10-CM

## 2022-07-21 DIAGNOSIS — I25.10 CORONARY ARTERY DISEASE INVOLVING NATIVE CORONARY ARTERY OF NATIVE HEART WITHOUT ANGINA PECTORIS: ICD-10-CM

## 2022-07-21 DIAGNOSIS — E78.49 OTHER HYPERLIPIDEMIA: ICD-10-CM

## 2022-07-21 DIAGNOSIS — Z98.890 S/P ABLATION OF VENTRICULAR ARRHYTHMIA: ICD-10-CM

## 2022-07-21 DIAGNOSIS — Z95.810 CARDIAC RESYNCHRONIZATION THERAPY DEFIBRILLATOR (CRT-D) IN PLACE: ICD-10-CM

## 2022-07-21 DIAGNOSIS — N18.4 CKD (CHRONIC KIDNEY DISEASE) STAGE 4, GFR 15-29 ML/MIN: ICD-10-CM

## 2022-07-21 DIAGNOSIS — Z86.79 S/P ABLATION OF VENTRICULAR ARRHYTHMIA: ICD-10-CM

## 2022-07-21 DIAGNOSIS — R73.03 PREDIABETES: ICD-10-CM

## 2022-07-21 DIAGNOSIS — I50.22 NYHA CLASS 2 AND ACC/AHA STAGE C CHRONIC SYSTOLIC CONGESTIVE HEART FAILURE: ICD-10-CM

## 2022-07-21 PROCEDURE — 99999 PR PBB SHADOW E&M-EST. PATIENT-LVL IV: ICD-10-PCS | Mod: PBBFAC,,, | Performed by: INTERNAL MEDICINE

## 2022-07-21 PROCEDURE — 99214 PR OFFICE/OUTPT VISIT, EST, LEVL IV, 30-39 MIN: ICD-10-PCS | Mod: S$GLB,,, | Performed by: INTERNAL MEDICINE

## 2022-07-21 PROCEDURE — 3078F PR MOST RECENT DIASTOLIC BLOOD PRESSURE < 80 MM HG: ICD-10-PCS | Mod: CPTII,S$GLB,, | Performed by: INTERNAL MEDICINE

## 2022-07-21 PROCEDURE — 3044F PR MOST RECENT HEMOGLOBIN A1C LEVEL <7.0%: ICD-10-PCS | Mod: CPTII,S$GLB,, | Performed by: INTERNAL MEDICINE

## 2022-07-21 PROCEDURE — 99214 OFFICE O/P EST MOD 30 MIN: CPT | Mod: S$GLB,,, | Performed by: INTERNAL MEDICINE

## 2022-07-21 PROCEDURE — 1160F RVW MEDS BY RX/DR IN RCRD: CPT | Mod: CPTII,S$GLB,, | Performed by: INTERNAL MEDICINE

## 2022-07-21 PROCEDURE — 3074F SYST BP LT 130 MM HG: CPT | Mod: CPTII,S$GLB,, | Performed by: INTERNAL MEDICINE

## 2022-07-21 PROCEDURE — 99999 PR PBB SHADOW E&M-EST. PATIENT-LVL IV: CPT | Mod: PBBFAC,,, | Performed by: INTERNAL MEDICINE

## 2022-07-21 PROCEDURE — 4010F PR ACE/ARB THEARPY RXD/TAKEN: ICD-10-PCS | Mod: CPTII,S$GLB,, | Performed by: INTERNAL MEDICINE

## 2022-07-21 PROCEDURE — 3008F BODY MASS INDEX DOCD: CPT | Mod: CPTII,S$GLB,, | Performed by: INTERNAL MEDICINE

## 2022-07-21 PROCEDURE — 3066F PR DOCUMENTATION OF TREATMENT FOR NEPHROPATHY: ICD-10-PCS | Mod: CPTII,S$GLB,, | Performed by: INTERNAL MEDICINE

## 2022-07-21 PROCEDURE — 3008F PR BODY MASS INDEX (BMI) DOCUMENTED: ICD-10-PCS | Mod: CPTII,S$GLB,, | Performed by: INTERNAL MEDICINE

## 2022-07-21 PROCEDURE — 3066F NEPHROPATHY DOC TX: CPT | Mod: CPTII,S$GLB,, | Performed by: INTERNAL MEDICINE

## 2022-07-21 PROCEDURE — 3044F HG A1C LEVEL LT 7.0%: CPT | Mod: CPTII,S$GLB,, | Performed by: INTERNAL MEDICINE

## 2022-07-21 PROCEDURE — 1160F PR REVIEW ALL MEDS BY PRESCRIBER/CLIN PHARMACIST DOCUMENTED: ICD-10-PCS | Mod: CPTII,S$GLB,, | Performed by: INTERNAL MEDICINE

## 2022-07-21 PROCEDURE — 1159F PR MEDICATION LIST DOCUMENTED IN MEDICAL RECORD: ICD-10-PCS | Mod: CPTII,S$GLB,, | Performed by: INTERNAL MEDICINE

## 2022-07-21 PROCEDURE — 3078F DIAST BP <80 MM HG: CPT | Mod: CPTII,S$GLB,, | Performed by: INTERNAL MEDICINE

## 2022-07-21 PROCEDURE — 4010F ACE/ARB THERAPY RXD/TAKEN: CPT | Mod: CPTII,S$GLB,, | Performed by: INTERNAL MEDICINE

## 2022-07-21 PROCEDURE — 3074F PR MOST RECENT SYSTOLIC BLOOD PRESSURE < 130 MM HG: ICD-10-PCS | Mod: CPTII,S$GLB,, | Performed by: INTERNAL MEDICINE

## 2022-07-21 PROCEDURE — 1159F MED LIST DOCD IN RCRD: CPT | Mod: CPTII,S$GLB,, | Performed by: INTERNAL MEDICINE

## 2022-07-21 NOTE — PROGRESS NOTES
Subjective:    Patient ID:  Ar Sharma is a 61 y.o. male who presents for follow-up of Abnormal Stress Test      HPI    62 y/o male with a hx of CAD (states he had MI in his 20's), HFrEF with EF 30-35% initially with last EF 50% (11/04) with SLAVA 5.35 cm, s/p CRT-D (initial ICD 2012 with upgrade 2016 - Medtronic), DD, VT s/p ablation x 2 (2/2018 and again 3/2018), HLD, hypothyroidism, YOEL. Initially seen by Dr Newman and it sounds like he was in ADHF, lasix dose increased, and symptoms had significantly improved. Was very SOB with minimal activity, with bilateral LE edema/orthopnea/PND. Increase in lasix dose resulted in SOB back at baseline and orthopnea/PND resolved, with improvement in edema. Entresto started after last VT ablation. Entresto dose was increased in Dec 2018. Did not tolerate it well, however, current dose tolerated (49-51). Had ICD shocks in 12/2018 (per interrogation mentions 5 times), he remembers one.   Previous clinic visit had been having worsening of chronic angina and symptoms. Chest pain significantly improved and no recent episodes. NYHA FC II symptoms. Has episodic dizziness and fatigue. Takes extra doses of ASA for CP. Reserves NTG for very severe pain, which he has not had.  Had previous episode of heart racing that he gets before he gets shocked, was able to lay down, doubled dose of carvedilol, no shock and heart racing improved. Had intermittent heart racing for about 1 week after that resolved. ICD interrogation from 3/4/2021 in media section with 1 terminated VT episode with ATP. BB increased at previous clinic visit.  Had nuclear SPECT with:  1. Abnormal study.  There is marked thinning of the lateral left ventricular wall, absence of uptake within the inferior wall and cardiac apex, global hypokinesis with abnormally increased systolic and end-diastolic volume, as well as decreased ejection fraction at 35%..  2. No definite areas of reversibility to suggest ischemia is  seen.  Symptoms resolved and BP stable. Denies palps, syncope. Compliant with meds. Had nuclear SPECT in the past with [facility] with nonobstructive CAD.     Review of Systems   Constitutional: Positive for malaise/fatigue.   HENT: Negative for congestion.    Eyes: Negative for blurred vision.   Cardiovascular: Negative for chest pain, claudication, cyanosis, dyspnea on exertion, irregular heartbeat, leg swelling, near-syncope, orthopnea, palpitations, paroxysmal nocturnal dyspnea and syncope.   Respiratory: Negative for shortness of breath.    Endocrine: Negative for polyuria.   Hematologic/Lymphatic: Negative for bleeding problem.   Skin: Negative for itching and rash.   Musculoskeletal: Negative for joint swelling, muscle cramps and muscle weakness.   Gastrointestinal: Negative for abdominal pain, hematemesis, hematochezia, melena, nausea and vomiting.   Genitourinary: Negative for dysuria and hematuria.   Neurological: Negative for dizziness, focal weakness, headaches, light-headedness, loss of balance and weakness.   Psychiatric/Behavioral: Negative for depression. The patient is not nervous/anxious.         Objective:    Physical Exam  Constitutional:       Appearance: He is well-developed.   HENT:      Head: Normocephalic and atraumatic.   Neck:      Vascular: No JVD.   Cardiovascular:      Rate and Rhythm: Normal rate and regular rhythm.      Pulses:           Carotid pulses are 2+ on the right side and 2+ on the left side.       Radial pulses are 2+ on the right side and 2+ on the left side.        Femoral pulses are 2+ on the right side and 2+ on the left side.       Dorsalis pedis pulses are 2+ on the right side and 2+ on the left side.        Posterior tibial pulses are 2+ on the right side and 2+ on the left side.      Heart sounds: Normal heart sounds.   Pulmonary:      Effort: Pulmonary effort is normal.      Breath sounds: Normal breath sounds.   Abdominal:      General: Bowel sounds are normal.       Palpations: Abdomen is soft.   Musculoskeletal:      Cervical back: Neck supple.   Skin:     General: Skin is warm and dry.   Neurological:      Mental Status: He is alert and oriented to person, place, and time.   Psychiatric:         Behavior: Behavior normal.         Thought Content: Thought content normal.           Assessment:       1. Other cardiomyopathy    2. Chronic systolic CHF, NYHA class 2 and MARIFER/AHA stage C    3. Cardiac resynchronization therapy defibrillator (CRT-D) in place    4. Coronary artery disease involving native coronary artery of native heart without angina pectoris    5. HFrEF (heart failure with reduced ejection fraction)    6. Other hyperlipidemia    7. Essential (primary) hypertension    8. Ischemic cardiomyopathy    9. S/P ablation of ventricular arrhythmia    10. CKD (chronic kidney disease) stage 4, GFR 15-29 ml/min    11. Prediabetes      60 y/o pt with hx and presentation as above. Doing well from a cardiac perspective and compensated from a HF perspective. Currently asymptomatic and on good medical management. Will defer LHC for now. Will obtain 2DE. Needs to stay active. Discussed the etiology, evaluation, and management of CAD, VT, CHF, HLD, ICD, CRT, CKD, YOEL. Discussed the importance of med compliance, heart healthy diet, and regular exercise.      Plan:       -Continue current medical management  -2DE   -f/u in 1 month with NP

## 2022-08-01 ENCOUNTER — HOSPITAL ENCOUNTER (OUTPATIENT)
Dept: CARDIOLOGY | Facility: HOSPITAL | Age: 62
Discharge: HOME | End: 2022-08-01
Attending: INTERNAL MEDICINE
Payer: MEDICARE

## 2022-08-01 VITALS — BODY MASS INDEX: 30.35 KG/M2 | WEIGHT: 212 LBS | HEIGHT: 70 IN

## 2022-08-01 DIAGNOSIS — I42.8 OTHER CARDIOMYOPATHY: ICD-10-CM

## 2022-08-01 LAB
AORTIC ROOT ANNULUS: 2.88 CM
AORTIC VALVE CUSP SEPERATION: 1.7 CM
AV INDEX (PROSTH): 0.7
AV MEAN GRADIENT: 5 MMHG
AV PEAK GRADIENT: 9 MMHG
AV VALVE AREA: 2.43 CM2
AV VELOCITY RATIO: 0.6
BSA FOR ECHO PROCEDURE: 2.18 M2
CV ECHO LV RWT: 0.57 CM
DOP CALC AO PEAK VEL: 1.51 M/S
DOP CALC AO VTI: 29.48 CM
DOP CALC LVOT AREA: 3.5 CM2
DOP CALC LVOT DIAMETER: 2.1 CM
DOP CALC LVOT PEAK VEL: 0.9 M/S
DOP CALC LVOT STROKE VOLUME: 71.73 CM3
DOP CALC MV VTI: 27.8 CM
DOP CALCLVOT PEAK VEL VTI: 20.72 CM
E WAVE DECELERATION TIME: 259.92 MSEC
E/A RATIO: 1.85
E/E' RATIO: 16.18 M/S
ECHO LV POSTERIOR WALL: 1.39 CM (ref 0.6–1.1)
EJECTION FRACTION: 45 %
FRACTIONAL SHORTENING: 25 % (ref 28–44)
INTERVENTRICULAR SEPTUM: 1.17 CM (ref 0.6–1.1)
IVC PROX: 3 CM
LA MAJOR: 6.96 CM
LA MINOR: 6.75 CM
LA WIDTH: 5.89 CM
LEFT ATRIUM SIZE: 4.39 CM
LEFT ATRIUM VOLUME INDEX MOD: 58.3 ML/M2
LEFT ATRIUM VOLUME INDEX: 70.4 ML/M2
LEFT ATRIUM VOLUME MOD: 124.7 CM3
LEFT ATRIUM VOLUME: 150.63 CM3
LEFT INTERNAL DIMENSION IN SYSTOLE: 3.64 CM (ref 2.1–4)
LEFT VENTRICLE DIASTOLIC VOLUME INDEX: 52.28 ML/M2
LEFT VENTRICLE DIASTOLIC VOLUME: 111.88 ML
LEFT VENTRICLE MASS INDEX: 115 G/M2
LEFT VENTRICLE SYSTOLIC VOLUME INDEX: 26.1 ML/M2
LEFT VENTRICLE SYSTOLIC VOLUME: 55.8 ML
LEFT VENTRICULAR INTERNAL DIMENSION IN DIASTOLE: 4.88 CM (ref 3.5–6)
LEFT VENTRICULAR MASS: 246.55 G
LV LATERAL E/E' RATIO: 14.83 M/S
LV SEPTAL E/E' RATIO: 17.8 M/S
MV A" WAVE DURATION": 11.42 MSEC
MV MEAN GRADIENT: 0 MMHG
MV PEAK A VEL: 0.48 M/S
MV PEAK E VEL: 0.89 M/S
MV PEAK GRADIENT: 4 MMHG
MV STENOSIS PRESSURE HALF TIME: 75.38 MS
MV VALVE AREA BY CONTINUITY EQUATION: 2.58 CM2
MV VALVE AREA P 1/2 METHOD: 2.92 CM2
PISA MRMAX VEL: 0.04 M/S
PISA TR MAX VEL: 3.3 M/S
PULM VEIN S/D RATIO: 0.92
PV PEAK D VEL: 0.48 M/S
PV PEAK S VEL: 0.44 M/S
PV PEAK VELOCITY: 0.88 CM/S
RA MAJOR: 6.37 CM
RA PRESSURE: 15 MMHG
RA WIDTH: 5.6 CM
RIGHT VENTRICULAR END-DIASTOLIC DIMENSION: 4.04 CM
RV TISSUE DOPPLER FREE WALL SYSTOLIC VELOCITY 1 (APICAL 4 CHAMBER VIEW): 7.56 CM/S
SINUS: 2.54 CM
TDI LATERAL: 0.06 M/S
TDI SEPTAL: 0.05 M/S
TDI: 0.06 M/S
TR MAX PG: 44 MMHG
TRICUSPID ANNULAR PLANE SYSTOLIC EXCURSION: 1.25 CM
TV REST PULMONARY ARTERY PRESSURE: 59 MMHG

## 2022-08-01 PROCEDURE — C8929 TTE W OR WO FOL WCON,DOPPLER: HCPCS | Mod: PO

## 2022-08-01 PROCEDURE — 93306 ECHO (CUPID ONLY): ICD-10-PCS | Mod: 26,,, | Performed by: INTERNAL MEDICINE

## 2022-08-01 PROCEDURE — 93306 TTE W/DOPPLER COMPLETE: CPT | Mod: 26,,, | Performed by: INTERNAL MEDICINE

## 2022-08-01 PROCEDURE — 25500020 PHARM REV CODE 255: Mod: PO | Performed by: INTERNAL MEDICINE

## 2022-08-01 RX ADMIN — HUMAN ALBUMIN MICROSPHERES AND PERFLUTREN 0.11 MG: 10; .22 INJECTION, SOLUTION INTRAVENOUS at 11:08

## 2022-08-26 ENCOUNTER — CLINICAL SUPPORT (OUTPATIENT)
Dept: CARDIOLOGY | Facility: CLINIC | Age: 62
End: 2022-08-26
Payer: MEDICARE

## 2022-08-26 DIAGNOSIS — Z95.810 CARDIAC RESYNCHRONIZATION THERAPY DEFIBRILLATOR (CRT-D) IN PLACE: Primary | ICD-10-CM

## 2022-08-26 PROCEDURE — 93296 PR INTERROG EVAL, REMOTE, UP TO 90 DAYS, PACER/DEFIB,TECH REVIEW: ICD-10-PCS | Mod: S$GLB,,, | Performed by: INTERNAL MEDICINE

## 2022-08-26 PROCEDURE — 93295 DEV INTERROG REMOTE 1/2/MLT: CPT | Mod: S$GLB,,, | Performed by: INTERNAL MEDICINE

## 2022-08-26 PROCEDURE — 93295 PR INTERROG EVAL, REMOTE, UP TO 90 DAYS,CARDVERT/DEFIB: ICD-10-PCS | Mod: S$GLB,,, | Performed by: INTERNAL MEDICINE

## 2022-08-26 PROCEDURE — 99999 PR PBB SHADOW E&M-EST. PATIENT-LVL I: ICD-10-PCS | Mod: PBBFAC,,,

## 2022-08-26 PROCEDURE — 93296 REM INTERROG EVL PM/IDS: CPT | Mod: S$GLB,,, | Performed by: INTERNAL MEDICINE

## 2022-08-26 PROCEDURE — 99999 PR PBB SHADOW E&M-EST. PATIENT-LVL I: CPT | Mod: PBBFAC,,,

## 2022-08-28 ENCOUNTER — PATIENT MESSAGE (OUTPATIENT)
Dept: FAMILY MEDICINE | Facility: CLINIC | Age: 62
End: 2022-08-28
Payer: MEDICARE

## 2022-09-06 ENCOUNTER — LAB VISIT (OUTPATIENT)
Dept: LAB | Facility: HOSPITAL | Age: 62
End: 2022-09-06
Attending: INTERNAL MEDICINE
Payer: MEDICARE

## 2022-09-06 DIAGNOSIS — N25.81 SECONDARY HYPERPARATHYROIDISM OF RENAL ORIGIN: ICD-10-CM

## 2022-09-06 DIAGNOSIS — D63.1 ANEMIA IN STAGE 4 CHRONIC KIDNEY DISEASE: ICD-10-CM

## 2022-09-06 DIAGNOSIS — N18.4 ANEMIA IN STAGE 4 CHRONIC KIDNEY DISEASE: ICD-10-CM

## 2022-09-06 DIAGNOSIS — N18.4 CKD (CHRONIC KIDNEY DISEASE) STAGE 4, GFR 15-29 ML/MIN: ICD-10-CM

## 2022-09-06 LAB
ALBUMIN SERPL BCP-MCNC: 4.3 G/DL (ref 3.5–5.2)
ANION GAP SERPL CALC-SCNC: 11 MMOL/L (ref 8–16)
BASOPHILS # BLD AUTO: 0.07 K/UL (ref 0–0.2)
BASOPHILS NFR BLD: 1 % (ref 0–1.9)
CALCIUM SERPL-MCNC: 9 MG/DL (ref 8.7–10.5)
CHLORIDE SERPL-SCNC: 105 MMOL/L (ref 95–110)
CO2 SERPL-SCNC: 25 MMOL/L (ref 23–29)
CREAT SERPL-MCNC: 4.18 MG/DL (ref 0.5–1.4)
DIFFERENTIAL METHOD: ABNORMAL
EOSINOPHIL # BLD AUTO: 0.3 K/UL (ref 0–0.5)
EOSINOPHIL NFR BLD: 3.4 % (ref 0–8)
ERYTHROCYTE [DISTWIDTH] IN BLOOD BY AUTOMATED COUNT: 20.8 % (ref 11.5–14.5)
EST. GFR  (NO RACE VARIABLE): 15.3 ML/MIN/1.73 M^2
GLUCOSE SERPL-MCNC: 102 MG/DL (ref 70–110)
HCT VFR BLD AUTO: 42.2 % (ref 40–54)
HGB BLD-MCNC: 13.8 G/DL (ref 14–18)
IMM GRANULOCYTES # BLD AUTO: 0.06 K/UL (ref 0–0.04)
IMM GRANULOCYTES NFR BLD AUTO: 0.8 % (ref 0–0.5)
IRON SERPL-MCNC: 42 UG/DL (ref 45–160)
LYMPHOCYTES # BLD AUTO: 0.6 K/UL (ref 1–4.8)
LYMPHOCYTES NFR BLD: 8.6 % (ref 18–48)
MCH RBC QN AUTO: 23.5 PG (ref 27–31)
MCHC RBC AUTO-ENTMCNC: 32.7 G/DL (ref 32–36)
MCV RBC AUTO: 72 FL (ref 82–98)
MONOCYTES # BLD AUTO: 0.8 K/UL (ref 0.3–1)
MONOCYTES NFR BLD: 10.3 % (ref 4–15)
NEUTROPHILS # BLD AUTO: 5.5 K/UL (ref 1.8–7.7)
NEUTROPHILS NFR BLD: 75.9 % (ref 38–73)
NRBC BLD-RTO: 0 /100 WBC
PHOSPHATE SERPL-MCNC: 4.2 MG/DL (ref 2.7–4.5)
PLATELET # BLD AUTO: 573 K/UL (ref 150–450)
PMV BLD AUTO: ABNORMAL FL (ref 9.2–12.9)
POTASSIUM SERPL-SCNC: 4.8 MMOL/L (ref 3.5–5.1)
PTH-INTACT SERPL-MCNC: 191.4 PG/ML (ref 9–77)
RBC # BLD AUTO: 5.87 M/UL (ref 4.6–6.2)
SATURATED IRON: 11 % (ref 20–50)
SODIUM SERPL-SCNC: 141 MMOL/L (ref 136–145)
TOTAL IRON BINDING CAPACITY: 389 UG/DL (ref 250–450)
TRANSFERRIN SERPL-MCNC: 263 MG/DL (ref 200–375)
UUN UR-MCNC: 98 MG/DL (ref 2–20)
WBC # BLD AUTO: 7.25 K/UL (ref 3.9–12.7)

## 2022-09-06 PROCEDURE — 84466 ASSAY OF TRANSFERRIN: CPT | Mod: PO | Performed by: INTERNAL MEDICINE

## 2022-09-06 PROCEDURE — 85025 COMPLETE CBC W/AUTO DIFF WBC: CPT | Mod: PO | Performed by: INTERNAL MEDICINE

## 2022-09-06 PROCEDURE — 82728 ASSAY OF FERRITIN: CPT | Mod: PO | Performed by: INTERNAL MEDICINE

## 2022-09-06 PROCEDURE — 36415 COLL VENOUS BLD VENIPUNCTURE: CPT | Mod: PO | Performed by: INTERNAL MEDICINE

## 2022-09-06 PROCEDURE — 80069 RENAL FUNCTION PANEL: CPT | Mod: PO | Performed by: INTERNAL MEDICINE

## 2022-09-06 PROCEDURE — 83970 ASSAY OF PARATHORMONE: CPT | Mod: PO | Performed by: INTERNAL MEDICINE

## 2022-09-06 PROCEDURE — 84550 ASSAY OF BLOOD/URIC ACID: CPT | Mod: PO | Performed by: INTERNAL MEDICINE

## 2022-09-07 LAB
FERRITIN SERPL-MCNC: 55 NG/ML (ref 20–300)
URATE SERPL-MCNC: 5.4 MG/DL (ref 3.4–7)

## 2022-09-08 ENCOUNTER — OFFICE VISIT (OUTPATIENT)
Dept: CARDIOLOGY | Facility: CLINIC | Age: 62
End: 2022-09-08
Payer: MEDICARE

## 2022-09-08 VITALS
OXYGEN SATURATION: 93 % | SYSTOLIC BLOOD PRESSURE: 110 MMHG | HEIGHT: 70 IN | HEART RATE: 75 BPM | DIASTOLIC BLOOD PRESSURE: 82 MMHG | WEIGHT: 213 LBS | BODY MASS INDEX: 30.49 KG/M2

## 2022-09-08 DIAGNOSIS — Z98.890 S/P ABLATION OF VENTRICULAR ARRHYTHMIA: ICD-10-CM

## 2022-09-08 DIAGNOSIS — I25.10 CORONARY ARTERY DISEASE INVOLVING NATIVE CORONARY ARTERY OF NATIVE HEART WITHOUT ANGINA PECTORIS: Primary | ICD-10-CM

## 2022-09-08 DIAGNOSIS — E78.5 HYPERLIPIDEMIA, UNSPECIFIED HYPERLIPIDEMIA TYPE: ICD-10-CM

## 2022-09-08 DIAGNOSIS — I10 ESSENTIAL (PRIMARY) HYPERTENSION: ICD-10-CM

## 2022-09-08 DIAGNOSIS — I25.5 ISCHEMIC CARDIOMYOPATHY: ICD-10-CM

## 2022-09-08 DIAGNOSIS — Z95.810 CARDIAC RESYNCHRONIZATION THERAPY DEFIBRILLATOR (CRT-D) IN PLACE: ICD-10-CM

## 2022-09-08 DIAGNOSIS — Z86.79 S/P ABLATION OF VENTRICULAR ARRHYTHMIA: ICD-10-CM

## 2022-09-08 DIAGNOSIS — I50.22 NYHA CLASS 2 AND ACC/AHA STAGE C CHRONIC SYSTOLIC CONGESTIVE HEART FAILURE: ICD-10-CM

## 2022-09-08 DIAGNOSIS — I50.20 HFREF (HEART FAILURE WITH REDUCED EJECTION FRACTION): ICD-10-CM

## 2022-09-08 PROCEDURE — 3066F NEPHROPATHY DOC TX: CPT | Mod: CPTII,S$GLB,, | Performed by: INTERNAL MEDICINE

## 2022-09-08 PROCEDURE — 99999 PR PBB SHADOW E&M-EST. PATIENT-LVL III: ICD-10-PCS | Mod: PBBFAC,,, | Performed by: INTERNAL MEDICINE

## 2022-09-08 PROCEDURE — 99214 OFFICE O/P EST MOD 30 MIN: CPT | Mod: S$GLB,,, | Performed by: INTERNAL MEDICINE

## 2022-09-08 PROCEDURE — 3008F PR BODY MASS INDEX (BMI) DOCUMENTED: ICD-10-PCS | Mod: CPTII,S$GLB,, | Performed by: INTERNAL MEDICINE

## 2022-09-08 PROCEDURE — 3044F PR MOST RECENT HEMOGLOBIN A1C LEVEL <7.0%: ICD-10-PCS | Mod: CPTII,S$GLB,, | Performed by: INTERNAL MEDICINE

## 2022-09-08 PROCEDURE — 4010F PR ACE/ARB THEARPY RXD/TAKEN: ICD-10-PCS | Mod: CPTII,S$GLB,, | Performed by: INTERNAL MEDICINE

## 2022-09-08 PROCEDURE — 99214 PR OFFICE/OUTPT VISIT, EST, LEVL IV, 30-39 MIN: ICD-10-PCS | Mod: S$GLB,,, | Performed by: INTERNAL MEDICINE

## 2022-09-08 PROCEDURE — 3079F PR MOST RECENT DIASTOLIC BLOOD PRESSURE 80-89 MM HG: ICD-10-PCS | Mod: CPTII,S$GLB,, | Performed by: INTERNAL MEDICINE

## 2022-09-08 PROCEDURE — 3074F PR MOST RECENT SYSTOLIC BLOOD PRESSURE < 130 MM HG: ICD-10-PCS | Mod: CPTII,S$GLB,, | Performed by: INTERNAL MEDICINE

## 2022-09-08 PROCEDURE — 3074F SYST BP LT 130 MM HG: CPT | Mod: CPTII,S$GLB,, | Performed by: INTERNAL MEDICINE

## 2022-09-08 PROCEDURE — 4010F ACE/ARB THERAPY RXD/TAKEN: CPT | Mod: CPTII,S$GLB,, | Performed by: INTERNAL MEDICINE

## 2022-09-08 PROCEDURE — 3079F DIAST BP 80-89 MM HG: CPT | Mod: CPTII,S$GLB,, | Performed by: INTERNAL MEDICINE

## 2022-09-08 PROCEDURE — 99999 PR PBB SHADOW E&M-EST. PATIENT-LVL III: CPT | Mod: PBBFAC,,, | Performed by: INTERNAL MEDICINE

## 2022-09-08 PROCEDURE — 3008F BODY MASS INDEX DOCD: CPT | Mod: CPTII,S$GLB,, | Performed by: INTERNAL MEDICINE

## 2022-09-08 PROCEDURE — 3066F PR DOCUMENTATION OF TREATMENT FOR NEPHROPATHY: ICD-10-PCS | Mod: CPTII,S$GLB,, | Performed by: INTERNAL MEDICINE

## 2022-09-08 PROCEDURE — 3044F HG A1C LEVEL LT 7.0%: CPT | Mod: CPTII,S$GLB,, | Performed by: INTERNAL MEDICINE

## 2022-09-08 NOTE — PROGRESS NOTES
Subjective:    Patient ID:  Ar Sharma is a 61 y.o. male who presents for follow-up of Coronary Artery Disease      HPI  62 y/o male with a hx of CAD (states he had MI in his 20's), HFrEF with EF 30-35% initially with last EF 50% (11/04) with SLAVA 5.35 cm, s/p CRT-D (initial ICD 2012 with upgrade 2016 - Medtronic), DD, VT s/p ablation x 2 (2/2018 and again 3/2018), HLD, hypothyroidism, YOEL. Initially seen by Dr Newman and it sounds like he was in ADHF, lasix dose increased, and symptoms had significantly improved. Was very SOB with minimal activity, with bilateral LE edema/orthopnea/PND. Increase in lasix dose resulted in SOB back at baseline and orthopnea/PND resolved, with improvement in edema. Entresto started after last VT ablation. Entresto dose was increased in Dec 2018. Did not tolerate it well, however, current dose tolerated (49-51). Had ICD shocks in 12/2018 (per interrogation mentions 5 times), he remembers one.   Previous clinic visit had been having worsening of chronic angina and symptoms. Chest pain significantly improved and no recent episodes. NYHA FC II symptoms. Has episodic dizziness and fatigue. Takes extra doses of ASA for CP. Reserves NTG for very severe pain, which he has not had.  Had previous episode of heart racing that he gets before he gets shocked, was able to lay down, doubled dose of carvedilol, no shock and heart racing improved. Had intermittent heart racing for about 1 week after that resolved. ICD interrogation from 3/4/2021 in media section with 1 terminated VT episode with ATP. BB increased at previous clinic visit.  Had nuclear SPECT with:  1. Abnormal study.  There is marked thinning of the lateral left ventricular wall, absence of uptake within the inferior wall and cardiac apex, global hypokinesis with abnormally increased systolic and end-diastolic volume, as well as decreased ejection fraction at 35%..  2. No definite areas of reversibility to suggest ischemia is  seen.  Symptoms resolved and BP stable. Denies palps, syncope. Compliant with meds. Had nuclear SPECT in the past [facility] with nonobstructive CAD.   Syncope about 3 weeks ago with prodrome and out for like 5 mins - carrying a fridge up the stairs.  Seen by Nephrology and refusing PD/HD for now.     Review of Systems   Constitutional: Positive for malaise/fatigue.   HENT:  Negative for congestion.    Eyes:  Negative for blurred vision.   Cardiovascular:  Negative for chest pain, claudication, cyanosis, dyspnea on exertion, irregular heartbeat, leg swelling, near-syncope, orthopnea, palpitations, paroxysmal nocturnal dyspnea and syncope.   Respiratory:  Negative for shortness of breath.    Endocrine: Negative for polyuria.   Hematologic/Lymphatic: Negative for bleeding problem.   Skin:  Negative for itching and rash.   Musculoskeletal:  Negative for joint swelling, muscle cramps and muscle weakness.   Gastrointestinal:  Negative for abdominal pain, hematemesis, hematochezia, melena, nausea and vomiting.   Genitourinary:  Negative for dysuria and hematuria.   Neurological:  Negative for dizziness, focal weakness, headaches, light-headedness, loss of balance and weakness.   Psychiatric/Behavioral:  Negative for depression. The patient is not nervous/anxious.       Objective:    Physical Exam  Constitutional:       Appearance: He is well-developed.   HENT:      Head: Normocephalic and atraumatic.   Neck:      Vascular: No JVD.   Cardiovascular:      Rate and Rhythm: Normal rate and regular rhythm.      Pulses:           Carotid pulses are 2+ on the right side and 2+ on the left side.       Radial pulses are 2+ on the right side and 2+ on the left side.        Femoral pulses are 2+ on the right side and 2+ on the left side.       Dorsalis pedis pulses are 2+ on the right side and 2+ on the left side.        Posterior tibial pulses are 2+ on the right side and 2+ on the left side.      Heart sounds: Normal heart sounds.    Pulmonary:      Effort: Pulmonary effort is normal.      Breath sounds: Normal breath sounds.   Abdominal:      General: Bowel sounds are normal.      Palpations: Abdomen is soft.   Musculoskeletal:      Cervical back: Neck supple.   Skin:     General: Skin is warm and dry.   Neurological:      Mental Status: He is alert and oriented to person, place, and time.   Psychiatric:         Behavior: Behavior normal.         Thought Content: Thought content normal.         Assessment:       1. Coronary artery disease involving native coronary artery of native heart without angina pectoris    2. Cardiac resynchronization therapy defibrillator (CRT-D) in place    3. Chronic systolic CHF, NYHA class 2 and MARIFER/AHA stage C    4. Essential (primary) hypertension    5. HFrEF (heart failure with reduced ejection fraction)    6. Hyperlipidemia, unspecified hyperlipidemia type    7. Ischemic cardiomyopathy    8. S/P ablation of ventricular arrhythmia      60 y/o pt with hx and presentation as above. Doing well from a cardiac perspective and compensated from a HF perspective. Currently asymptomatic and on good medical management. Will defer LHC for now. Needs to stay active. Discussed the etiology, evaluation, and management of CAD, VT, CHF, HLD, ICD, CRT, CKD, YOEL. Discussed the importance of med compliance, heart healthy diet, and regular exercise.      Plan:       -Continue current medical management  -f/u in 3 months

## 2022-09-14 NOTE — PROGRESS NOTES
Subjective:    Patient ID:  Ar Sharma is a 61 y.o. male who presents for follow-up of Pacemaker Check      HPI    CRT-D remote interrogation report downloaded and prepared by medical assistant and interpreted by me and full report scanned into Epic media.      Company: [employer]  Mode: DDDR  Battery: OK with ANTHONY 8 months   Leads: OK    Events: No significant events   Function: Normal device function      ROS     Objective:    Physical Exam      Assessment:       1. Cardiac resynchronization therapy defibrillator (CRT-D) in place         Plan:

## 2022-09-20 ENCOUNTER — OFFICE VISIT (OUTPATIENT)
Dept: FAMILY MEDICINE | Facility: CLINIC | Age: 62
End: 2022-09-20
Payer: MEDICARE

## 2022-09-20 VITALS
HEIGHT: 70 IN | SYSTOLIC BLOOD PRESSURE: 120 MMHG | WEIGHT: 214.31 LBS | OXYGEN SATURATION: 96 % | TEMPERATURE: 98 F | DIASTOLIC BLOOD PRESSURE: 80 MMHG | BODY MASS INDEX: 30.68 KG/M2 | HEART RATE: 86 BPM

## 2022-09-20 DIAGNOSIS — I10 ESSENTIAL (PRIMARY) HYPERTENSION: ICD-10-CM

## 2022-09-20 DIAGNOSIS — E78.49 OTHER HYPERLIPIDEMIA: ICD-10-CM

## 2022-09-20 DIAGNOSIS — N18.4 CKD (CHRONIC KIDNEY DISEASE) STAGE 4, GFR 15-29 ML/MIN: Primary | ICD-10-CM

## 2022-09-20 DIAGNOSIS — I50.22 CHRONIC SYSTOLIC CONGESTIVE HEART FAILURE: ICD-10-CM

## 2022-09-20 PROCEDURE — 3008F PR BODY MASS INDEX (BMI) DOCUMENTED: ICD-10-PCS | Mod: CPTII,S$GLB,, | Performed by: STUDENT IN AN ORGANIZED HEALTH CARE EDUCATION/TRAINING PROGRAM

## 2022-09-20 PROCEDURE — 99214 OFFICE O/P EST MOD 30 MIN: CPT | Mod: S$GLB,,, | Performed by: STUDENT IN AN ORGANIZED HEALTH CARE EDUCATION/TRAINING PROGRAM

## 2022-09-20 PROCEDURE — 4010F PR ACE/ARB THEARPY RXD/TAKEN: ICD-10-PCS | Mod: CPTII,S$GLB,, | Performed by: STUDENT IN AN ORGANIZED HEALTH CARE EDUCATION/TRAINING PROGRAM

## 2022-09-20 PROCEDURE — 3044F PR MOST RECENT HEMOGLOBIN A1C LEVEL <7.0%: ICD-10-PCS | Mod: CPTII,S$GLB,, | Performed by: STUDENT IN AN ORGANIZED HEALTH CARE EDUCATION/TRAINING PROGRAM

## 2022-09-20 PROCEDURE — 1159F PR MEDICATION LIST DOCUMENTED IN MEDICAL RECORD: ICD-10-PCS | Mod: CPTII,S$GLB,, | Performed by: STUDENT IN AN ORGANIZED HEALTH CARE EDUCATION/TRAINING PROGRAM

## 2022-09-20 PROCEDURE — 3074F PR MOST RECENT SYSTOLIC BLOOD PRESSURE < 130 MM HG: ICD-10-PCS | Mod: CPTII,S$GLB,, | Performed by: STUDENT IN AN ORGANIZED HEALTH CARE EDUCATION/TRAINING PROGRAM

## 2022-09-20 PROCEDURE — 3079F PR MOST RECENT DIASTOLIC BLOOD PRESSURE 80-89 MM HG: ICD-10-PCS | Mod: CPTII,S$GLB,, | Performed by: STUDENT IN AN ORGANIZED HEALTH CARE EDUCATION/TRAINING PROGRAM

## 2022-09-20 PROCEDURE — 3074F SYST BP LT 130 MM HG: CPT | Mod: CPTII,S$GLB,, | Performed by: STUDENT IN AN ORGANIZED HEALTH CARE EDUCATION/TRAINING PROGRAM

## 2022-09-20 PROCEDURE — 99214 PR OFFICE/OUTPT VISIT, EST, LEVL IV, 30-39 MIN: ICD-10-PCS | Mod: S$GLB,,, | Performed by: STUDENT IN AN ORGANIZED HEALTH CARE EDUCATION/TRAINING PROGRAM

## 2022-09-20 PROCEDURE — 3008F BODY MASS INDEX DOCD: CPT | Mod: CPTII,S$GLB,, | Performed by: STUDENT IN AN ORGANIZED HEALTH CARE EDUCATION/TRAINING PROGRAM

## 2022-09-20 PROCEDURE — 4010F ACE/ARB THERAPY RXD/TAKEN: CPT | Mod: CPTII,S$GLB,, | Performed by: STUDENT IN AN ORGANIZED HEALTH CARE EDUCATION/TRAINING PROGRAM

## 2022-09-20 PROCEDURE — 3066F PR DOCUMENTATION OF TREATMENT FOR NEPHROPATHY: ICD-10-PCS | Mod: CPTII,S$GLB,, | Performed by: STUDENT IN AN ORGANIZED HEALTH CARE EDUCATION/TRAINING PROGRAM

## 2022-09-20 PROCEDURE — 1159F MED LIST DOCD IN RCRD: CPT | Mod: CPTII,S$GLB,, | Performed by: STUDENT IN AN ORGANIZED HEALTH CARE EDUCATION/TRAINING PROGRAM

## 2022-09-20 PROCEDURE — 3066F NEPHROPATHY DOC TX: CPT | Mod: CPTII,S$GLB,, | Performed by: STUDENT IN AN ORGANIZED HEALTH CARE EDUCATION/TRAINING PROGRAM

## 2022-09-20 PROCEDURE — 3079F DIAST BP 80-89 MM HG: CPT | Mod: CPTII,S$GLB,, | Performed by: STUDENT IN AN ORGANIZED HEALTH CARE EDUCATION/TRAINING PROGRAM

## 2022-09-20 PROCEDURE — 3044F HG A1C LEVEL LT 7.0%: CPT | Mod: CPTII,S$GLB,, | Performed by: STUDENT IN AN ORGANIZED HEALTH CARE EDUCATION/TRAINING PROGRAM

## 2022-09-20 PROCEDURE — 1160F RVW MEDS BY RX/DR IN RCRD: CPT | Mod: CPTII,S$GLB,, | Performed by: STUDENT IN AN ORGANIZED HEALTH CARE EDUCATION/TRAINING PROGRAM

## 2022-09-20 PROCEDURE — 1160F PR REVIEW ALL MEDS BY PRESCRIBER/CLIN PHARMACIST DOCUMENTED: ICD-10-PCS | Mod: CPTII,S$GLB,, | Performed by: STUDENT IN AN ORGANIZED HEALTH CARE EDUCATION/TRAINING PROGRAM

## 2022-09-20 NOTE — PROGRESS NOTES
Patient ID: Ar Sharma is a 61 y.o. male.     Chief Complaint: Follow-up    Follow-up  Pertinent negatives include no chest pain, coughing, fever, headaches, myalgias, nausea, rash, vomiting or weakness. CKD4- patient recently seen my endocrine. He took the informational class on dialsysis. He is hesitant to place dialysis access at this time. He would like a second opinion. He is trying to make dietary changes to prolong his time off of HD.     HLD- denies recent chest pain and shortness of breath    HFrEF- taking lasix daily. He increases the dose on days that he has extra swelling.          Review of Systems  Review of Systems   Constitutional:  Negative for fever.   HENT:  Negative for ear pain and sinus pain.    Eyes:  Negative for discharge.   Respiratory:  Negative for cough and shortness of breath.    Cardiovascular:  Negative for chest pain and leg swelling.   Gastrointestinal:  Negative for diarrhea, nausea and vomiting.   Genitourinary:  Negative for urgency.   Musculoskeletal:  Negative for myalgias.   Skin:  Negative for rash.   Neurological:  Negative for weakness and headaches.   Psychiatric/Behavioral:  Negative for depression.    All other systems reviewed and are negative.    Currently Medications  Current Outpatient Medications on File Prior to Visit   Medication Sig Dispense Refill    allopurinoL (ZYLOPRIM) 300 MG tablet Take 1 tablet (300 mg total) by mouth once daily. 90 tablet 3    amiodarone (PACERONE) 200 MG Tab TAKE 1 TABLET BY MOUTH EVERY DAY 90 tablet 0    aspirin (ECOTRIN) 81 MG EC tablet Take 81 mg by mouth once daily.      carvediloL (COREG) 25 MG tablet TAKE 1 TABLET BY MOUTH TWICE A DAY WITH FOOD 180 tablet 3    ergocalciferol (ERGOCALCIFEROL) 50,000 unit Cap Take 1 capsule (50,000 Units total) by mouth every 7 days. 12 capsule 3    FLUZONE QUAD 3157-4421, PF, 60 mcg (15 mcg x 4)/0.5 mL Syrg       furosemide (LASIX) 80 MG tablet Take 1 tablet (80 mg total) by mouth 3 (three)  "times daily as needed (Swelling). 180 tablet 3    levothyroxine (SYNTHROID, LEVOTHROID) 175 MCG tablet TAKE 1 TABLET BY MOUTH ONCE DAILY. 90 tablet 3    metOLazone (ZAROXOLYN) 10 MG tablet Take 1 tablet (10 mg total) by mouth daily as needed (Swelling). 30 tablet 11    mexiletine (MEXITIL) 150 MG Cap Take 1 capsule (150 mg total) by mouth 2 (two) times daily with meals. 180 capsule 4    nitroGLYCERIN (NITROSTAT) 0.4 MG SL tablet Place 1 tablet (0.4 mg total) under the tongue every 5 (five) minutes as needed for Chest pain. 20 tablet 1    pravastatin (PRAVACHOL) 40 MG tablet Take 1 tablet (40 mg total) by mouth once daily. 90 tablet 4    sacubitriL-valsartan (ENTRESTO) 49-51 mg per tablet Take 1 tablet by mouth 2 (two) times daily. 180 tablet 3    tadalafiL (CIALIS) 5 MG tablet TAKE 1 TABLET BY MOUTH DAILY AS NEEDED FOR ERECTILE DYSFUNCTION 12 tablet 3     Current Facility-Administered Medications on File Prior to Visit   Medication Dose Route Frequency Provider Last Rate Last Admin    perflutren protein-A microsphr 0.22 mg/mL IV susp  0.5 mL Intravenous Once Shahram Stewart MD           Physical  Exam  Vitals:    09/20/22 1400 09/20/22 1559   BP: 120/80    BP Location: Left arm    Patient Position: Sitting    Pulse: 86    Temp: 98 °F (36.7 °C)    SpO2: (!) 91% 96%   Weight: 97.2 kg (214 lb 4.6 oz)    Height: 5' 10" (1.778 m)       Body mass index is 30.75 kg/m².    Physical Exam  Vitals and nursing note reviewed.   Constitutional:       General: He is not in acute distress.     Appearance: He is not ill-appearing.   HENT:      Head: Normocephalic and atraumatic.      Right Ear: External ear normal.      Left Ear: External ear normal.      Nose: Nose normal.      Mouth/Throat:      Mouth: Mucous membranes are moist.   Eyes:      Extraocular Movements: Extraocular movements intact.      Conjunctiva/sclera: Conjunctivae normal.   Cardiovascular:      Rate and Rhythm: Normal rate and regular rhythm.      Pulses: " Normal pulses.      Heart sounds: No murmur heard.  Pulmonary:      Effort: Pulmonary effort is normal. No respiratory distress.      Breath sounds: No wheezing.   Abdominal:      General: There is no distension.      Palpations: Abdomen is soft. There is no mass.      Tenderness: There is no abdominal tenderness.   Musculoskeletal:         General: No swelling.      Cervical back: Normal range of motion.   Skin:     Coloration: Skin is not jaundiced.      Findings: No rash.   Neurological:      General: No focal deficit present.      Mental Status: He is alert and oriented to person, place, and time.   Psychiatric:         Mood and Affect: Mood normal.         Thought Content: Thought content normal.       Labs:    Complete Blood Count  Lab Results   Component Value Date    RBC 5.87 09/06/2022    HGB 13.8 (L) 09/06/2022    HCT 42.2 09/06/2022    MCV 72 (L) 09/06/2022    MCH 23.5 (L) 09/06/2022    MCHC 32.7 09/06/2022    RDW 20.8 (H) 09/06/2022     (H) 09/06/2022    MPV SEE COMMENT 09/06/2022    GRAN 5.5 09/06/2022    GRAN 75.9 (H) 09/06/2022    LYMPH 0.6 (L) 09/06/2022    LYMPH 8.6 (L) 09/06/2022    MONO 0.8 09/06/2022    MONO 10.3 09/06/2022    EOS 0.3 09/06/2022    BASO 0.07 09/06/2022    EOSINOPHIL 3.4 09/06/2022    BASOPHIL 1.0 09/06/2022    DIFFMETHOD Automated 09/06/2022       Comprehensive Metabolic Panel  Lab Results   Component Value Date     09/06/2022    BUN 98 (H) 09/06/2022    CREATININE 4.18 (H) 09/06/2022     09/06/2022    K 4.8 09/06/2022     09/06/2022    ALBUMIN 4.3 09/06/2022    CO2 25 09/06/2022    ANIONGAP 11 09/06/2022    EGFRNONAA 14.8 (A) 07/07/2022    ESTGFRAFRICA 17.1 (A) 07/07/2022       TSH  No results found for: TSH    Imaging:  Echo  · The left ventricle is normal in size with concentric remodeling and   mildly decreased systolic function.  · The estimated ejection fraction is 40-45%.  · There is abnormal septal wall motion consistent with right ventricular    pacemaker.  · There are segmental left ventricular wall motion abnormalities.  · Grade II left ventricular diastolic dysfunction.  · Moderate right ventricular enlargement with moderately reduced right   ventricular systolic function.  · Severe left atrial enlargement.  · Severe right atrial enlargement.  · Mild mitral regurgitation.  · Mild tricuspid regurgitation.  · There is pulmonary hypertension.  · The estimated PA systolic pressure is 59 mmHg.  · Elevated central venous pressure (15 mmHg).         Assessment/Plan:    Problem List Items Addressed This Visit          Cardiac/Vascular    Hyperlipidemia    Overview     Formatting of this note might be different from the original.  Converted from [employer]:  Description - HYPERLIPIDEMIA         Essential (primary) hypertension    Overview     Formatting of this note might be different from the original.  Converted from [employer]:  Description - ESSENTIAL HYPERTENSION, BENIGN            Renal/    CKD (chronic kidney disease) stage 4, GFR 15-29 ml/min - Primary    Relevant Orders    Ambulatory referral/consult to Nephrology     Other Visit Diagnoses       Chronic systolic congestive heart failure             Referral placed to nephrology for second opinion regarding access for dialysis.     Discussed how to stay healthy including: diet, exercise, refraining from smoking and discussed screening exams / tests needed for age, sex and family Hx.    RTC 3 mo    Jc Elliott MD

## 2022-09-25 ENCOUNTER — HOSPITAL ENCOUNTER (INPATIENT)
Facility: HOSPITAL | Age: 62
LOS: 3 days | Discharge: ANOTHER HOSPITAL | DRG: 286 | End: 2022-09-28
Attending: EMERGENCY MEDICINE | Admitting: INTERNAL MEDICINE
Payer: MEDICARE

## 2022-09-25 DIAGNOSIS — I25.10 CORONARY ARTERY DISEASE INVOLVING NATIVE CORONARY ARTERY OF NATIVE HEART WITHOUT ANGINA PECTORIS: ICD-10-CM

## 2022-09-25 DIAGNOSIS — R07.9 CHEST PAIN: ICD-10-CM

## 2022-09-25 DIAGNOSIS — F55.1: ICD-10-CM

## 2022-09-25 DIAGNOSIS — I50.23 ACUTE ON CHRONIC HFREF (HEART FAILURE WITH REDUCED EJECTION FRACTION): ICD-10-CM

## 2022-09-25 DIAGNOSIS — I47.20 V-TACH: ICD-10-CM

## 2022-09-25 DIAGNOSIS — Z45.02 DEFIBRILLATOR DISCHARGE: Primary | ICD-10-CM

## 2022-09-25 DIAGNOSIS — E87.1 HYPONATREMIA: ICD-10-CM

## 2022-09-25 LAB
ALBUMIN SERPL BCP-MCNC: 3.6 G/DL (ref 3.5–5.2)
ALP SERPL-CCNC: 78 U/L (ref 55–135)
ALT SERPL W/O P-5'-P-CCNC: 15 U/L (ref 10–44)
ANION GAP SERPL CALC-SCNC: 11 MMOL/L (ref 8–16)
AST SERPL-CCNC: 16 U/L (ref 10–40)
BASOPHILS # BLD AUTO: 0.06 K/UL (ref 0–0.2)
BASOPHILS NFR BLD: 1 % (ref 0–1.9)
BILIRUB SERPL-MCNC: 1.4 MG/DL (ref 0.1–1)
BNP SERPL-MCNC: 2304 PG/ML (ref 0–99)
BUN SERPL-MCNC: 80 MG/DL (ref 8–23)
CALCIUM SERPL-MCNC: 9 MG/DL (ref 8.7–10.5)
CHLORIDE SERPL-SCNC: 92 MMOL/L (ref 95–110)
CO2 SERPL-SCNC: 24 MMOL/L (ref 23–29)
CREAT SERPL-MCNC: 4.1 MG/DL (ref 0.5–1.4)
CTP QC/QA: YES
DIFFERENTIAL METHOD: ABNORMAL
EOSINOPHIL # BLD AUTO: 0.2 K/UL (ref 0–0.5)
EOSINOPHIL NFR BLD: 2.8 % (ref 0–8)
ERYTHROCYTE [DISTWIDTH] IN BLOOD BY AUTOMATED COUNT: 20 % (ref 11.5–14.5)
EST. GFR  (NO RACE VARIABLE): 16 ML/MIN/1.73 M^2
GLUCOSE SERPL-MCNC: 106 MG/DL (ref 70–110)
HCT VFR BLD AUTO: 39.2 % (ref 40–54)
HGB BLD-MCNC: 13.3 G/DL (ref 14–18)
IMM GRANULOCYTES # BLD AUTO: 0.03 K/UL (ref 0–0.04)
IMM GRANULOCYTES NFR BLD AUTO: 0.5 % (ref 0–0.5)
LYMPHOCYTES # BLD AUTO: 0.4 K/UL (ref 1–4.8)
LYMPHOCYTES NFR BLD: 6.1 % (ref 18–48)
MAGNESIUM SERPL-MCNC: 2.2 MG/DL (ref 1.6–2.6)
MCH RBC QN AUTO: 23.9 PG (ref 27–31)
MCHC RBC AUTO-ENTMCNC: 33.9 G/DL (ref 32–36)
MCV RBC AUTO: 71 FL (ref 82–98)
MONOCYTES # BLD AUTO: 0.5 K/UL (ref 0.3–1)
MONOCYTES NFR BLD: 8.1 % (ref 4–15)
NEUTROPHILS # BLD AUTO: 4.9 K/UL (ref 1.8–7.7)
NEUTROPHILS NFR BLD: 81.5 % (ref 38–73)
NRBC BLD-RTO: 0 /100 WBC
PLATELET # BLD AUTO: 353 K/UL (ref 150–450)
PMV BLD AUTO: ABNORMAL FL (ref 9.2–12.9)
POTASSIUM SERPL-SCNC: 4.4 MMOL/L (ref 3.5–5.1)
PROT SERPL-MCNC: 6.5 G/DL (ref 6–8.4)
RBC # BLD AUTO: 5.56 M/UL (ref 4.6–6.2)
SARS-COV-2 RDRP RESP QL NAA+PROBE: NEGATIVE
SODIUM SERPL-SCNC: 127 MMOL/L (ref 136–145)
TROPONIN I SERPL DL<=0.01 NG/ML-MCNC: 0.01 NG/ML (ref 0–0.03)
WBC # BLD AUTO: 6.02 K/UL (ref 3.9–12.7)

## 2022-09-25 PROCEDURE — U0002 COVID-19 LAB TEST NON-CDC: HCPCS | Performed by: EMERGENCY MEDICINE

## 2022-09-25 PROCEDURE — 12000002 HC ACUTE/MED SURGE SEMI-PRIVATE ROOM

## 2022-09-25 PROCEDURE — 80053 COMPREHEN METABOLIC PANEL: CPT | Performed by: EMERGENCY MEDICINE

## 2022-09-25 PROCEDURE — 83735 ASSAY OF MAGNESIUM: CPT | Performed by: EMERGENCY MEDICINE

## 2022-09-25 PROCEDURE — 84484 ASSAY OF TROPONIN QUANT: CPT | Performed by: EMERGENCY MEDICINE

## 2022-09-25 PROCEDURE — 85025 COMPLETE CBC W/AUTO DIFF WBC: CPT | Performed by: EMERGENCY MEDICINE

## 2022-09-25 PROCEDURE — 93010 ELECTROCARDIOGRAM REPORT: CPT | Mod: ,,, | Performed by: INTERNAL MEDICINE

## 2022-09-25 PROCEDURE — 83880 ASSAY OF NATRIURETIC PEPTIDE: CPT | Performed by: EMERGENCY MEDICINE

## 2022-09-25 PROCEDURE — 93005 ELECTROCARDIOGRAM TRACING: CPT

## 2022-09-25 PROCEDURE — 93010 EKG 12-LEAD: ICD-10-PCS | Mod: ,,, | Performed by: INTERNAL MEDICINE

## 2022-09-25 PROCEDURE — 99285 EMERGENCY DEPT VISIT HI MDM: CPT | Mod: 25

## 2022-09-25 RX ORDER — SODIUM CHLORIDE 0.9 % (FLUSH) 0.9 %
10 SYRINGE (ML) INJECTION
Status: DISCONTINUED | OUTPATIENT
Start: 2022-09-25 | End: 2022-09-28 | Stop reason: HOSPADM

## 2022-09-25 RX ORDER — IPRATROPIUM BROMIDE AND ALBUTEROL SULFATE 2.5; .5 MG/3ML; MG/3ML
3 SOLUTION RESPIRATORY (INHALATION) EVERY 4 HOURS PRN
Status: DISCONTINUED | OUTPATIENT
Start: 2022-09-25 | End: 2022-09-28 | Stop reason: HOSPADM

## 2022-09-25 RX ORDER — SODIUM CHLORIDE 0.9 % (FLUSH) 0.9 %
10 SYRINGE (ML) INJECTION
Status: DISCONTINUED | OUTPATIENT
Start: 2022-09-25 | End: 2022-09-25

## 2022-09-25 RX ORDER — ACETAMINOPHEN 325 MG/1
650 TABLET ORAL EVERY 6 HOURS PRN
Status: DISCONTINUED | OUTPATIENT
Start: 2022-09-25 | End: 2022-09-28 | Stop reason: HOSPADM

## 2022-09-25 RX ORDER — TALC
6 POWDER (GRAM) TOPICAL NIGHTLY PRN
Status: DISCONTINUED | OUTPATIENT
Start: 2022-09-25 | End: 2022-09-28 | Stop reason: HOSPADM

## 2022-09-25 RX ORDER — HEPARIN SODIUM 5000 [USP'U]/ML
5000 INJECTION, SOLUTION INTRAVENOUS; SUBCUTANEOUS EVERY 8 HOURS
Status: DISCONTINUED | OUTPATIENT
Start: 2022-09-26 | End: 2022-09-28 | Stop reason: HOSPADM

## 2022-09-25 RX ORDER — ONDANSETRON 2 MG/ML
4 INJECTION INTRAMUSCULAR; INTRAVENOUS EVERY 8 HOURS PRN
Status: DISCONTINUED | OUTPATIENT
Start: 2022-09-25 | End: 2022-09-28 | Stop reason: HOSPADM

## 2022-09-25 NOTE — ED NOTES
Pt placed on 2L O2 NC due to low 90 sats, pt states that is normal due to hx of COPD, does not use home O2.    Currently using urinal at bedside, in no acute distress, denies pain or SOB. Wife at bedside.

## 2022-09-25 NOTE — Clinical Note
The catheter was repositioned into the ostium   left main. An angiography was performed of the left coronary arteries. Multiple views were taken. The angiography was performed via hand injection with 30 mL of contrast.

## 2022-09-25 NOTE — Clinical Note
The site was marked. The right groin and right radial was prepped. The site was prepped with ChloraPrep. The site was clipped. The patient was draped. The patient was positioned supine.

## 2022-09-25 NOTE — Clinical Note
10 ml of contrast were injected throughout the case. 90 mL of contrast was the total wasted during the case. 100 mL was the total amount used during the case.

## 2022-09-25 NOTE — PHARMACY MED REC
"    [facility]           Pharmacy  Admission Medication History     The home medication history was taken by Haven Jenkins.      Medication history obtained from Medications listed below were obtained from: Patient/family    Based on information gathered for medication list, you may go to "Admission" then "Reconcile Home Medications" tabs to review and/or act upon those items.     The home medication list has been updated by the Pharmacy department.   Please read ALL comments highlighted in yellow.   Please address this information as you see fit.    Feel free to contact us if you have any questions or require assistance.      Potential issues to be addressed PRIOR TO DISCHARGE  Patient reported not taking the following medications: (cialis 5mg; metolazone 10mg). These medications remain on the home medication list. Please address accordingly.     Current Facility-Administered Medications on File Prior to Encounter   Medication Dose Route Frequency Provider Last Rate Last Admin    perflutren protein-A microsphr 0.22 mg/mL IV susp  0.5 mL Intravenous Once Shahram Stewart MD         Current Outpatient Medications on File Prior to Encounter   Medication Sig Dispense Refill    allopurinoL (ZYLOPRIM) 300 MG tablet Take 1 tablet (300 mg total) by mouth once daily. 90 tablet 3    amiodarone (PACERONE) 200 MG Tab TAKE 1 TABLET BY MOUTH EVERY DAY (Patient taking differently: Take 200 mg by mouth once daily.) 90 tablet 0    aspirin (ECOTRIN) 81 MG EC tablet Take 81 mg by mouth once daily.      carvediloL (COREG) 25 MG tablet TAKE 1 TABLET BY MOUTH TWICE A DAY WITH FOOD (Patient taking differently: Take 25 mg by mouth 2 (two) times daily with meals.) 180 tablet 3    ergocalciferol (ERGOCALCIFEROL) 50,000 unit Cap Take 1 capsule (50,000 Units total) by mouth every 7 days. 12 capsule 3    furosemide (LASIX) 80 MG tablet Take 1 tablet (80 mg total) by mouth 3 (three) times daily as needed (Swelling). 180 " tablet 3    levothyroxine (SYNTHROID, LEVOTHROID) 175 MCG tablet TAKE 1 TABLET BY MOUTH ONCE DAILY. (Patient taking differently: Take 175 mcg by mouth once daily.) 90 tablet 3    mexiletine (MEXITIL) 150 MG Cap Take 1 capsule (150 mg total) by mouth 2 (two) times daily with meals. 180 capsule 4    nitroGLYCERIN (NITROSTAT) 0.4 MG SL tablet Place 1 tablet (0.4 mg total) under the tongue every 5 (five) minutes as needed for Chest pain. 20 tablet 1    pravastatin (PRAVACHOL) 40 MG tablet Take 1 tablet (40 mg total) by mouth once daily. 90 tablet 4    sacubitriL-valsartan (ENTRESTO) 49-51 mg per tablet Take 1 tablet by mouth 2 (two) times daily. 180 tablet 3    FLUZONE QUAD 6483-5260, PF, 60 mcg (15 mcg x 4)/0.5 mL Syrg       metOLazone (ZAROXOLYN) 10 MG tablet Take 1 tablet (10 mg total) by mouth daily as needed (Swelling). (Patient not taking: Reported on 9/25/2022) 30 tablet 11    tadalafiL (CIALIS) 5 MG tablet TAKE 1 TABLET BY MOUTH DAILY AS NEEDED FOR ERECTILE DYSFUNCTION (Patient not taking: Reported on 9/25/2022) 12 tablet 3       Please address this information as you see fit.  Feel free to contact us if you have any questions or require assistance.    Haven Jenkins  [phone]              .

## 2022-09-25 NOTE — Clinical Note
The catheter was repositioned into the ostium   right coronary artery. An angiography was performed of the right coronary arteries. Multiple views were taken. The angiography was performed via hand injection with 15 mL of contrast.

## 2022-09-26 PROBLEM — I50.23 ACUTE ON CHRONIC HFREF (HEART FAILURE WITH REDUCED EJECTION FRACTION): Status: ACTIVE | Noted: 2022-09-26

## 2022-09-26 PROBLEM — I47.20 V-TACH: Status: ACTIVE | Noted: 2022-09-26

## 2022-09-26 PROBLEM — F55.1: Status: ACTIVE | Noted: 2022-09-26

## 2022-09-26 LAB
ALBUMIN SERPL BCP-MCNC: 3.7 G/DL (ref 3.5–5.2)
ALP SERPL-CCNC: 81 U/L (ref 55–135)
ALT SERPL W/O P-5'-P-CCNC: 16 U/L (ref 10–44)
ANION GAP SERPL CALC-SCNC: 11 MMOL/L (ref 8–16)
AST SERPL-CCNC: 17 U/L (ref 10–40)
AV INDEX (PROSTH): 0.58
AV MEAN GRADIENT: 6 MMHG
AV PEAK GRADIENT: 11 MMHG
AV VALVE AREA: 2.01 CM2
AV VELOCITY RATIO: 0.6
BASOPHILS # BLD AUTO: 0.07 K/UL (ref 0–0.2)
BASOPHILS NFR BLD: 1.1 % (ref 0–1.9)
BILIRUB SERPL-MCNC: 1.9 MG/DL (ref 0.1–1)
BSA FOR ECHO PROCEDURE: 2.12 M2
BUN SERPL-MCNC: 74 MG/DL (ref 8–23)
CALCIUM SERPL-MCNC: 9.6 MG/DL (ref 8.7–10.5)
CHLORIDE SERPL-SCNC: 97 MMOL/L (ref 95–110)
CO2 SERPL-SCNC: 25 MMOL/L (ref 23–29)
CREAT SERPL-MCNC: 3.5 MG/DL (ref 0.5–1.4)
CV ECHO LV RWT: 0.41 CM
DIFFERENTIAL METHOD: ABNORMAL
DOP CALC AO PEAK VEL: 1.63 M/S
DOP CALC AO VTI: 33 CM
DOP CALC LVOT AREA: 3.5 CM2
DOP CALC LVOT DIAMETER: 2.1 CM
DOP CALC LVOT PEAK VEL: 0.97 M/S
DOP CALC LVOT STROKE VOLUME: 66.47 CM3
DOP CALC MV VTI: 29.7 CM
DOP CALCLVOT PEAK VEL VTI: 19.2 CM
E WAVE DECELERATION TIME: 333.78 MSEC
E/A RATIO: 1.79
E/E' RATIO: 15.27 M/S
ECHO LV POSTERIOR WALL: 1.23 CM (ref 0.6–1.1)
EJECTION FRACTION: 40 %
EOSINOPHIL # BLD AUTO: 0.2 K/UL (ref 0–0.5)
EOSINOPHIL NFR BLD: 2.7 % (ref 0–8)
ERYTHROCYTE [DISTWIDTH] IN BLOOD BY AUTOMATED COUNT: 20.6 % (ref 11.5–14.5)
EST. GFR  (NO RACE VARIABLE): 19 ML/MIN/1.73 M^2
FRACTIONAL SHORTENING: 27 % (ref 28–44)
GLUCOSE SERPL-MCNC: 92 MG/DL (ref 70–110)
HCT VFR BLD AUTO: 42.2 % (ref 40–54)
HCT VFR BLD AUTO: 43.2 % (ref 40–54)
HGB BLD-MCNC: 14.3 G/DL (ref 14–18)
HGB BLD-MCNC: 14.6 G/DL (ref 14–18)
IMM GRANULOCYTES # BLD AUTO: 0.04 K/UL (ref 0–0.04)
IMM GRANULOCYTES NFR BLD AUTO: 0.6 % (ref 0–0.5)
INTERVENTRICULAR SEPTUM: 1.08 CM (ref 0.6–1.1)
IVC DIAMETER: 2.53 CM
IVRT: 125.59 MSEC
LA MAJOR: 7.15 CM
LA MINOR: 6.19 CM
LEFT ATRIUM SIZE: 4.88 CM
LEFT ATRIUM VOLUME INDEX MOD: 37.2 ML/M2
LEFT ATRIUM VOLUME MOD: 77.75 CM3
LEFT INTERNAL DIMENSION IN SYSTOLE: 4.34 CM (ref 2.1–4)
LEFT VENTRICLE DIASTOLIC VOLUME INDEX: 84.87 ML/M2
LEFT VENTRICLE DIASTOLIC VOLUME: 177.38 ML
LEFT VENTRICLE MASS INDEX: 141 G/M2
LEFT VENTRICLE SYSTOLIC VOLUME INDEX: 40.6 ML/M2
LEFT VENTRICLE SYSTOLIC VOLUME: 84.87 ML
LEFT VENTRICULAR INTERNAL DIMENSION IN DIASTOLE: 5.96 CM (ref 3.5–6)
LEFT VENTRICULAR MASS: 295.05 G
LV LATERAL E/E' RATIO: 14 M/S
LV SEPTAL E/E' RATIO: 16.8 M/S
LVOT MG: 1.78 MMHG
LVOT MV: 0.61 CM/S
LYMPHOCYTES # BLD AUTO: 0.5 K/UL (ref 1–4.8)
LYMPHOCYTES NFR BLD: 7 % (ref 18–48)
MAGNESIUM SERPL-MCNC: 2.4 MG/DL (ref 1.6–2.6)
MCH RBC QN AUTO: 24.3 PG (ref 27–31)
MCHC RBC AUTO-ENTMCNC: 34.6 G/DL (ref 32–36)
MCV RBC AUTO: 70 FL (ref 82–98)
MONOCYTES # BLD AUTO: 0.6 K/UL (ref 0.3–1)
MONOCYTES NFR BLD: 8.9 % (ref 4–15)
MV MEAN GRADIENT: 1 MMHG
MV PEAK A VEL: 0.47 M/S
MV PEAK E VEL: 0.84 M/S
MV PEAK GRADIENT: 3 MMHG
MV STENOSIS PRESSURE HALF TIME: 105.47 MS
MV VALVE AREA BY CONTINUITY EQUATION: 2.24 CM2
MV VALVE AREA P 1/2 METHOD: 2.09 CM2
NEUTROPHILS # BLD AUTO: 5.3 K/UL (ref 1.8–7.7)
NEUTROPHILS NFR BLD: 79.7 % (ref 38–73)
NRBC BLD-RTO: 0 /100 WBC
PISA MRMAX VEL: 3.97 M/S
PISA TR MAX VEL: 3.61 M/S
PLATELET # BLD AUTO: 398 K/UL (ref 150–450)
PMV BLD AUTO: ABNORMAL FL (ref 9.2–12.9)
POTASSIUM SERPL-SCNC: 4.3 MMOL/L (ref 3.5–5.1)
PROT SERPL-MCNC: 6.8 G/DL (ref 6–8.4)
PV MV: 0.65 M/S
PV PEAK VELOCITY: 1 CM/S
RA MAJOR: 7.06 CM
RA PRESSURE: 15 MMHG
RA WIDTH: 6.09 CM
RBC # BLD AUTO: 6.01 M/UL (ref 4.6–6.2)
RIGHT VENTRICULAR END-DIASTOLIC DIMENSION: 3.34 CM
RV TISSUE DOPPLER FREE WALL SYSTOLIC VELOCITY 1 (APICAL 4 CHAMBER VIEW): 0.01 CM/S
SODIUM SERPL-SCNC: 133 MMOL/L (ref 136–145)
TDI LATERAL: 0.06 M/S
TDI SEPTAL: 0.05 M/S
TDI: 0.06 M/S
TR MAX PG: 52 MMHG
TRICUSPID ANNULAR PLANE SYSTOLIC EXCURSION: 1.14 CM
TV REST PULMONARY ARTERY PRESSURE: 67 MMHG
WBC # BLD AUTO: 6.61 K/UL (ref 3.9–12.7)

## 2022-09-26 PROCEDURE — 36415 COLL VENOUS BLD VENIPUNCTURE: CPT | Performed by: INTERNAL MEDICINE

## 2022-09-26 PROCEDURE — 20000000 HC ICU ROOM

## 2022-09-26 PROCEDURE — 93010 ELECTROCARDIOGRAM REPORT: CPT | Mod: ,,, | Performed by: INTERNAL MEDICINE

## 2022-09-26 PROCEDURE — 36415 COLL VENOUS BLD VENIPUNCTURE: CPT | Performed by: NURSE PRACTITIONER

## 2022-09-26 PROCEDURE — 63600175 PHARM REV CODE 636 W HCPCS: Performed by: NURSE PRACTITIONER

## 2022-09-26 PROCEDURE — 93010 EKG 12-LEAD: ICD-10-PCS | Mod: ,,, | Performed by: INTERNAL MEDICINE

## 2022-09-26 PROCEDURE — 25000003 PHARM REV CODE 250: Performed by: INTERNAL MEDICINE

## 2022-09-26 PROCEDURE — 94760 N-INVAS EAR/PLS OXIMETRY 1: CPT

## 2022-09-26 PROCEDURE — 99900035 HC TECH TIME PER 15 MIN (STAT)

## 2022-09-26 PROCEDURE — 27000221 HC OXYGEN, UP TO 24 HOURS

## 2022-09-26 PROCEDURE — 94761 N-INVAS EAR/PLS OXIMETRY MLT: CPT

## 2022-09-26 PROCEDURE — 99223 1ST HOSP IP/OBS HIGH 75: CPT | Mod: ,,, | Performed by: NURSE PRACTITIONER

## 2022-09-26 PROCEDURE — 83735 ASSAY OF MAGNESIUM: CPT | Performed by: NURSE PRACTITIONER

## 2022-09-26 PROCEDURE — 85025 COMPLETE CBC W/AUTO DIFF WBC: CPT | Performed by: NURSE PRACTITIONER

## 2022-09-26 PROCEDURE — 25000003 PHARM REV CODE 250: Performed by: NURSE PRACTITIONER

## 2022-09-26 PROCEDURE — 85018 HEMOGLOBIN: CPT | Performed by: INTERNAL MEDICINE

## 2022-09-26 PROCEDURE — 99223 1ST HOSP IP/OBS HIGH 75: CPT | Mod: ,,, | Performed by: INTERNAL MEDICINE

## 2022-09-26 PROCEDURE — 99223 PR INITIAL HOSPITAL CARE,LEVL III: ICD-10-PCS | Mod: ,,, | Performed by: NURSE PRACTITIONER

## 2022-09-26 PROCEDURE — 63600175 PHARM REV CODE 636 W HCPCS: Performed by: INTERNAL MEDICINE

## 2022-09-26 PROCEDURE — 85014 HEMATOCRIT: CPT | Performed by: INTERNAL MEDICINE

## 2022-09-26 PROCEDURE — 99223 PR INITIAL HOSPITAL CARE,LEVL III: ICD-10-PCS | Mod: ,,, | Performed by: INTERNAL MEDICINE

## 2022-09-26 PROCEDURE — 93005 ELECTROCARDIOGRAM TRACING: CPT

## 2022-09-26 PROCEDURE — 80053 COMPREHEN METABOLIC PANEL: CPT | Performed by: NURSE PRACTITIONER

## 2022-09-26 RX ORDER — AMIODARONE HYDROCHLORIDE 200 MG/1
200 TABLET ORAL DAILY
Status: DISCONTINUED | OUTPATIENT
Start: 2022-09-26 | End: 2022-09-28 | Stop reason: HOSPADM

## 2022-09-26 RX ORDER — FUROSEMIDE 40 MG/1
40 TABLET ORAL DAILY
Status: DISCONTINUED | OUTPATIENT
Start: 2022-09-26 | End: 2022-09-28 | Stop reason: HOSPADM

## 2022-09-26 RX ORDER — ASPIRIN 81 MG/1
81 TABLET ORAL DAILY
Status: DISCONTINUED | OUTPATIENT
Start: 2022-09-26 | End: 2022-09-28 | Stop reason: HOSPADM

## 2022-09-26 RX ORDER — CARVEDILOL 25 MG/1
25 TABLET ORAL 2 TIMES DAILY WITH MEALS
Status: DISCONTINUED | OUTPATIENT
Start: 2022-09-26 | End: 2022-09-26

## 2022-09-26 RX ORDER — MUPIROCIN 20 MG/G
OINTMENT TOPICAL 2 TIMES DAILY
Status: DISCONTINUED | OUTPATIENT
Start: 2022-09-26 | End: 2022-09-28 | Stop reason: HOSPADM

## 2022-09-26 RX ORDER — MEXILETINE HYDROCHLORIDE 150 MG/1
150 CAPSULE ORAL 2 TIMES DAILY WITH MEALS
Status: DISCONTINUED | OUTPATIENT
Start: 2022-09-26 | End: 2022-09-28 | Stop reason: HOSPADM

## 2022-09-26 RX ORDER — CARVEDILOL 12.5 MG/1
12.5 TABLET ORAL 2 TIMES DAILY WITH MEALS
Status: DISCONTINUED | OUTPATIENT
Start: 2022-09-26 | End: 2022-09-28 | Stop reason: HOSPADM

## 2022-09-26 RX ORDER — PRAVASTATIN SODIUM 40 MG/1
40 TABLET ORAL DAILY
Status: DISCONTINUED | OUTPATIENT
Start: 2022-09-26 | End: 2022-09-28 | Stop reason: HOSPADM

## 2022-09-26 RX ADMIN — MUPIROCIN: 20 OINTMENT TOPICAL at 08:09

## 2022-09-26 RX ADMIN — HEPARIN SODIUM 5000 UNITS: 5000 INJECTION INTRAVENOUS; SUBCUTANEOUS at 09:09

## 2022-09-26 RX ADMIN — HEPARIN SODIUM 5000 UNITS: 5000 INJECTION INTRAVENOUS; SUBCUTANEOUS at 05:09

## 2022-09-26 RX ADMIN — HEPARIN SODIUM 5000 UNITS: 5000 INJECTION INTRAVENOUS; SUBCUTANEOUS at 01:09

## 2022-09-26 RX ADMIN — SACUBITRIL AND VALSARTAN 1 TABLET: 24; 26 TABLET, FILM COATED ORAL at 09:09

## 2022-09-26 RX ADMIN — ASPIRIN 81 MG: 81 TABLET, COATED ORAL at 08:09

## 2022-09-26 RX ADMIN — CARVEDILOL 25 MG: 25 TABLET, FILM COATED ORAL at 08:09

## 2022-09-26 RX ADMIN — MEXILETINE HYDROCHLORIDE 150 MG: 150 CAPSULE ORAL at 10:09

## 2022-09-26 RX ADMIN — SODIUM CHLORIDE, SODIUM LACTATE, POTASSIUM CHLORIDE, AND CALCIUM CHLORIDE 250 ML: .6; .31; .03; .02 INJECTION, SOLUTION INTRAVENOUS at 12:09

## 2022-09-26 RX ADMIN — PRAVASTATIN SODIUM 40 MG: 40 TABLET ORAL at 08:09

## 2022-09-26 RX ADMIN — MUPIROCIN: 20 OINTMENT TOPICAL at 09:09

## 2022-09-26 RX ADMIN — AMIODARONE HYDROCHLORIDE 200 MG: 200 TABLET ORAL at 08:09

## 2022-09-26 RX ADMIN — SACUBITRIL AND VALSARTAN 1 TABLET: 49; 51 TABLET, FILM COATED ORAL at 08:09

## 2022-09-26 RX ADMIN — FUROSEMIDE 40 MG: 40 TABLET ORAL at 08:09

## 2022-09-26 RX ADMIN — LEVOTHYROXINE SODIUM 175 MCG: 25 TABLET ORAL at 05:09

## 2022-09-26 NOTE — CONSULTS
[facility] - Intensive Care  Cardiology  Consult Note    Patient Name: Ar Sharma  MRN: [id number]  Admission Date: 9/25/2022  Hospital Length of Stay: 1 days  Code Status: Full Code   Attending Provider: Oswaldo Colbert MD   Consulting Provider: JASKARAN Morales ANP  Primary Care Physician: Jc Elliott MD  Principal Problem:Defibrillator discharge    Patient information was obtained from patient, past medical records and ER records.     [facility]  Consult performed by: JASKARAN Nieto, NARDA  Consult ordered by: Martha Casey NP  Reason for consult: VTach- AICD firing         Subjective:     Chief Complaint:  VTach s/p AICD discharge      HPI:   62yo male with non obstructive CAD, HFrEF, CKD stage IV, HLP, HTN, AICD s/p defib discharge, YOEL and VT ablation in [place] who presented to the ER for AICD firing. He reports feeling lightheaded yesterday and then noted his AICD to fire. He called EMS with reports of recurrent VTach upon their arrival with no firing at that time. He reports a syncopal episode about 3 weeks ago after heavy exertion (carry mini fridge up the stairs). He reports LH/dizziness and diaphoresis prior to the episode then reports chest pain described as a tightness after the syncope. He reports noting his BP to be in the 60s around that time and had his Entresto dose reduced with BP up to the 90s/50s. He is also prescribed Lasix 80mg po TID prn but typically only took it BID and that was recently decreased to 40mg po BID due to his renal issues. He reports original placement of AICD In 2012 with up grade to BiV AICD around 2018 and under VT ablations in [place]. Admitted to [facility] and Cardiology consulted for evaluation of AICD firing. Labs: CBC WNL. BMP with Na 133 K+ 4.3 BUN 24 creatinine 3.5 BNP 2304 Troponin .015. No further episodes of VTach since admission. Echocardiogram pending       Hospital Course: 9/26/2022 per HPI    Past  Medical History:   Diagnosis Date    CKD (chronic kidney disease) stage 4, GFR 15-29 ml/min     Congestive heart failure (CHF) 2015    Edema     Heart attack     HLD (hyperlipidemia)     Hypertension     Hyperuricemia     Hypocalcemia     Renal cyst, left     Secondary hyperparathyroidism     Thyroid disease     V tach     Vitamin D deficiency        Past Surgical History:   Procedure Laterality Date    ablations  03/05/2018    albations  02/01/2018    COLONOSCOPY N/A 12/07/2018    Procedure: COLONOSCOPY/suprep;  Surgeon: Ananya Morillo MD;  Location: [facility] ENDO;  Service: Endoscopy;  Laterality: N/A;    defibulater N/A 2016    ESOPHAGOGASTRODUODENOSCOPY N/A 12/07/2018    Procedure: EGD (ESOPHAGOGASTRODUODENOSCOPY);  Surgeon: Ananya Morillo MD;  Location: [facility] ENDO;  Service: Endoscopy;  Laterality: N/A;    JOINT REPLACEMENT Bilateral 10/2016    knees, bilat    LEFT HEART CATHETERIZATION N/A 12/16/2020    Procedure: Left heart cath;  Surgeon: Shahram Stewart MD;  Location: [facility] CATH LAB/EP;  Service: Cardiology;  Laterality: N/A;       Review of patient's allergies indicates:   Allergen Reactions    Lokelma [sodium zirconium cyclosilicate] Other (See Comments)     Fluid retention, weight gain, CHF excerebration, severe constipation    Atorvastatin Other (See Comments)     Joint pain       No current facility-administered medications on file prior to encounter.     Current Outpatient Medications on File Prior to Encounter   Medication Sig    allopurinoL (ZYLOPRIM) 300 MG tablet Take 1 tablet (300 mg total) by mouth once daily.    amiodarone (PACERONE) 200 MG Tab TAKE 1 TABLET BY MOUTH EVERY DAY (Patient taking differently: Take 200 mg by mouth once daily.)    aspirin (ECOTRIN) 81 MG EC tablet Take 81 mg by mouth once daily.    carvediloL (COREG) 25 MG tablet TAKE 1 TABLET BY MOUTH TWICE A DAY WITH FOOD (Patient taking differently: Take 25 mg by mouth 2 (two) times daily with meals.)     ergocalciferol (ERGOCALCIFEROL) 50,000 unit Cap Take 1 capsule (50,000 Units total) by mouth every 7 days.    furosemide (LASIX) 80 MG tablet Take 1 tablet (80 mg total) by mouth 3 (three) times daily as needed (Swelling).    levothyroxine (SYNTHROID, LEVOTHROID) 175 MCG tablet TAKE 1 TABLET BY MOUTH ONCE DAILY. (Patient taking differently: Take 175 mcg by mouth once daily.)    mexiletine (MEXITIL) 150 MG Cap Take 1 capsule (150 mg total) by mouth 2 (two) times daily with meals.    nitroGLYCERIN (NITROSTAT) 0.4 MG SL tablet Place 1 tablet (0.4 mg total) under the tongue every 5 (five) minutes as needed for Chest pain.    pravastatin (PRAVACHOL) 40 MG tablet Take 1 tablet (40 mg total) by mouth once daily.    sacubitriL-valsartan (ENTRESTO) 49-51 mg per tablet Take 1 tablet by mouth 2 (two) times daily.    FLUZONE QUAD 3203-2439, PF, 60 mcg (15 mcg x 4)/0.5 mL Syrg     metOLazone (ZAROXOLYN) 10 MG tablet Take 1 tablet (10 mg total) by mouth daily as needed (Swelling). (Patient not taking: Reported on 9/25/2022)    tadalafiL (CIALIS) 5 MG tablet TAKE 1 TABLET BY MOUTH DAILY AS NEEDED FOR ERECTILE DYSFUNCTION (Patient not taking: Reported on 9/25/2022)     Family History       Problem Relation (Age of Onset)    Alcohol abuse Father    Hypertension Mother    Kidney disease Father    No Known Problems Sister, Brother, Daughter, Son    Stroke Mother          Tobacco Use    Smoking status: Former     Packs/day: 1.00     Years: 25.00     Pack years: 25.00     Types: Cigarettes     Start date: 1/1/1979     Quit date: 3/3/2012     Years since quitting: 10.5    Smokeless tobacco: Former   Substance and Sexual Activity    Alcohol use: No    Drug use: No    Sexual activity: Yes     Partners: Female     Review of Systems   Constitutional: Negative for chills, decreased appetite, diaphoresis and fever.   Cardiovascular:  Positive for chest pain and syncope. Negative for claudication, cyanosis, dyspnea on exertion,  irregular heartbeat, leg swelling, near-syncope, orthopnea, palpitations and paroxysmal nocturnal dyspnea.   Respiratory:  Negative for cough, hemoptysis, shortness of breath and wheezing.    Gastrointestinal:  Negative for bloating, abdominal pain, constipation, diarrhea, melena, nausea and vomiting.   Neurological:  Positive for dizziness. Negative for weakness.   Objective:     Vital Signs (Most Recent):  Temp: 97.8 °F (36.6 °C) (09/26/22 0345)  Pulse: 61 (09/26/22 0900)  Resp: 15 (09/26/22 0900)  BP: (!) 96/55 (09/26/22 0900)  SpO2: (!) 91 % (09/26/22 0900)   Vital Signs (24h Range):  Temp:  [97.8 °F (36.6 °C)-98.6 °F (37 °C)] 97.8 °F (36.6 °C)  Pulse:  [60-79] 61  Resp:  [13-22] 15  SpO2:  [90 %-98 %] 91 %  BP: ()/(52-71) 96/55     Weight: 93 kg (205 lb 0.4 oz)  Body mass index is 30.28 kg/m².    SpO2: (!) 91 %  O2 Device (Oxygen Therapy): nasal cannula      Intake/Output Summary (Last 24 hours) at 9/26/2022 1047  Last data filed at 9/26/2022 0744  Gross per 24 hour   Intake --   Output 4555 ml   Net -4555 ml       Lines/Drains/Airways       Peripheral Intravenous Line  Duration                  Peripheral IV - Single Lumen 09/25/22 18 G Anterior;Left Forearm 1 day         Peripheral IV - Single Lumen 09/26/22 0130 20 G Anterior;Right Forearm <1 day                    Physical Exam  Constitutional:       General: He is not in acute distress.     Appearance: He is well-developed.   Cardiovascular:      Rate and Rhythm: Normal rate and regular rhythm.      Heart sounds: No murmur heard.    No gallop.   Pulmonary:      Effort: Pulmonary effort is normal. No respiratory distress.      Breath sounds: Normal breath sounds. No wheezing.   Abdominal:      General: Bowel sounds are normal. There is no distension.      Palpations: Abdomen is soft.      Tenderness: There is no abdominal tenderness.   Skin:     General: Skin is warm and dry.   Neurological:      Mental Status: He is alert and oriented to person,  place, and time.       Significant Labs: BMP:   Recent Labs   Lab 09/25/22  1638 09/26/22  0641    92   * 133*   K 4.4 4.3   CL 92* 97   CO2 24 25   BUN 80* 74*   CREATININE 4.1* 3.5*   CALCIUM 9.0 9.6   MG 2.2 2.4   , CBC   Recent Labs   Lab 09/25/22  1638 09/26/22  0641   WBC 6.02 6.61   HGB 13.3* 14.6   HCT 39.2* 42.2    398   , and Troponin   Recent Labs   Lab 09/25/22  1638   TROPONINI 0.015       Significant Imaging: Echocardiogram: Transthoracic echo (TTE) complete (Cupid Only): final results pending   Results for orders placed or performed during the hospital encounter of 09/25/22   Echo   Result Value Ref Range    BSA 2.12 m2    TDI SEPTAL 0.05 m/s    LV LATERAL E/E' RATIO 14.00 m/s    LV SEPTAL E/E' RATIO 16.80 m/s    IVC diameter 2.53 cm    Left Ventricular Outflow Tract Mean Velocity 0.61 cm/s    Left Ventricular Outflow Tract Mean Gradient 1.78 mmHg    Pulmonary Valve Mean Velocity 0.65 m/s    TDI LATERAL 0.06 m/s    PV PEAK VELOCITY 1.00 cm/s    LVIDd 5.96 3.5 - 6.0 cm    IVS 1.08 0.6 - 1.1 cm    Posterior Wall 1.23 (A) 0.6 - 1.1 cm    LVIDs 4.34 (A) 2.1 - 4.0 cm    FS 27 28 - 44 %    LV mass 295.05 g    LA size 4.88 cm    RVDD 3.34 cm    TAPSE 1.14 cm    RV S' 0.01 cm/s    Left Ventricle Relative Wall Thickness 0.41 cm    AV mean gradient 6 mmHg    AV valve area 1.74 cm2    AV Velocity Ratio 0.60     AV index (prosthetic) 0.54     MV mean gradient 1 mmHg    MV valve area p 1/2 method 2.09 cm2    MV valve area by continuity eq 2.00 cm2    E/A ratio 1.79     Mean e' 0.06 m/s    E wave deceleration time 333.78 msec    IVRT 125.59 msec    LVOT diameter 2.03 cm    LVOT area 3.2 cm2    LVOT peak marvin 0.97 m/s    LVOT peak VTI 18.40 cm    Ao peak marvin 1.63 m/s    Ao VTI 34.3 cm    Mr max marvin 3.97 m/s    LVOT stroke volume 59.52 cm3    AV peak gradient 11 mmHg    MV peak gradient 3 mmHg    E/E' ratio 15.27 m/s    MV Peak E Marvin 0.84 m/s    TR Max Marvin 3.61 m/s    MV VTI 29.7 cm    MV stenosis  pressure 1/2 time 105.47 ms    MV Peak A Marvin 0.47 m/s    LV Systolic Volume 84.87 mL    LV Systolic Volume Index 40.6 mL/m2    LV Diastolic Volume 177.38 mL    LV Diastolic Volume Index 84.87 mL/m2    LV Mass Index 141 g/m2    RA Major Axis 7.06 cm    Left Atrium Minor Axis 6.19 cm    Left Atrium Major Axis 7.15 cm    Triscuspid Valve Regurgitation Peak Gradient 52 mmHg    LA Volume Index (Mod) 37.2 mL/m2    LA volume (mod) 77.75 cm3    RA Width 6.09 cm     Assessment and Plan:     * Defibrillator discharge  - AICD firing at home; interrogation in ER with verbal reports with 11-12 episodes of VTach in past month all treated with ATP until yesterday  - continued on home dose of Amiodarone and Mexiltene; K+ 4.3 Mg 2.3 and at goal  - monitor on telemetry while admitted; further cardiac workup as detailed previously     Essential (primary) hypertension  - SBP 90s-100s overnight  - on Entresto and Coreg at home doses  - continue medication and monitor BP     Hyperlipidemia  - continue statin therapy     HFrEF (heart failure with reduced ejection fraction)  - echo in August 2022 with EF 40-45% LV WMA and grade II diastolic dysfunction; repeat echo pending  - on Coreg, Entresto and Lasix at home  - BNP 2304; previous reading 724 3 years ago; no acute decompensation noted on PE  - continue GMDT as BP tolerates    CKD (chronic kidney disease) stage 4, GFR 15-29 ml/min  - creatinine 4.1 upon admission; down to 3.5 this AM  - baseline creatinine 3-4    Coronary artery disease involving native coronary artery of native heart without angina pectoris  - C in 12/2020 with non obstructive CAD to LAD, LCX and RCA  - nuclear stress test in 7/2022 with thinning of lateral wall but not reversible ischemia noted; LVEF 35%  - presentation with VTach and AICD firing; repeat echo pending today; may need repeat ischemic evaluation- modality to be decided after echo resulted and depending on creatinine  - continue ASA, statin, BB and ARB;  troponin .015        VTE Risk Mitigation (From admission, onward)         Ordered     heparin (porcine) injection 5,000 Units  Every 8 hours         09/25/22 2220     IP VTE HIGH RISK PATIENT  Once         09/25/22 2220     Place sequential compression device  Until discontinued         09/25/22 2220                Thank you for your consult. I will follow up with patient     Audelia RICCI JASKARAN [place]aco, ANP  Cardiology   [place] - Intensive Care

## 2022-09-26 NOTE — HPI
Ar Sharma is a 61-year-old male with a history of hypothyroid, CKD, CAD, HTN, HLD, YOEL who presented to the ED for evaluation after his AICD defibrillated him. Patient reports having 6 episodes of being defibrillated tonight. He denies any chest pain, shortness of breath, or syncope. He has a history of CHF and has required ablation is in the past for refractory V-tach. He reports he has been more fatigued lately and can barely finish cutting the grass at home without taking many breaks. In the ED: BNP 2304, Na 127. CXR: No detrimental change since November 26, 2021. EKG with NSR RBBB. Patient will be admitted to [facility] for further care and cardiology evaluation.

## 2022-09-26 NOTE — ASSESSMENT & PLAN NOTE
Chronic systolic CHF, NYHA class 2 and MARIFER/AHA stage C  -No c/o CP or SOB  -Monitor on telemetry  -Monitor and trend BMP, Mg, and renal function; keep K >4, Mg >2  -Sodium restriction (<2g/d), fluid restriction (<2L)   -Monitor for signs of fluid overload: RR>30, O2 sat<92%, weight gain of >3 lbs in 24 hours, or urinary output <160ml/8hr  -Continue home meds

## 2022-09-26 NOTE — ED NOTES
Assumed care from Tania RN and Lennie RN. Pt AAOx4. Respirations even and unlabored. NSR noted, denies CP and SOB. Updated patient on POC.  Will continue to monitor.

## 2022-09-26 NOTE — PLAN OF CARE
Care Plan    POC reviewed with Ar Sharma and family. Questions and concerns addressed. No acute events overnight, no discharges from AICD. Consult to Cardiology this AM. AM labs due to be drawn now. Will continue to monitor. See below and flowsheets for full assessment and VS info.     Neuro:  Sarabjit Coma Scale  Best Eye Response: (P) 4-->(E4) spontaneous  Best Motor Response: (P) 6-->(M6) obeys commands  Best Verbal Response: (P) 5-->(V5) oriented  Sarabjit Coma Scale Score: (P) 15  Assessment Qualifiers: (P) patient not sedated/intubated, no eye obstruction present  Pupil PERRLA: (P) yes  24 hr Temp:  [97.8 °F (36.6 °C)-98.6 °F (37 °C)]      CV:  Rhythm: (P) paced rhythm  DVT prophylaxis: VTE Required Core Measure: (SCDs) Sequential compression device initiated/maintained    Resp:  O2 Device (Oxygen Therapy): nasal cannula       GI/:  GI prophylaxis: no  Diet/Nutrition Received: NPO  Last Bowel Movement: 09/24/22  Voiding Characteristics: voids spontaneously without difficulty   Intake/Output Summary (Last 24 hours) at 9/26/2022 0631  Last data filed at 9/26/2022 0400  Gross per 24 hour   Intake --   Output 3855 ml   Net -3855 ml       Labs/Accuchecks:  Recent Labs   Lab 09/25/22  1638   WBC 6.02   RBC 5.56   HGB 13.3*   HCT 39.2*         Recent Labs   Lab 09/25/22  1638   *   K 4.4   CO2 24   CL 92*   BUN 80*   CREATININE 4.1*   ALKPHOS 78   ALT 15   AST 16   BILITOT 1.4*    No results for input(s): PROTIME, INR, APTT, HEPANTIXA in the last 168 hours.   Recent Labs   Lab 09/25/22  1638   TROPONINI 0.015       Electrolytes: No replacement orders  Accuchecks: none    Gtts/LDAs:      Lines/Drains/Airways       Peripheral Intravenous Line  Duration                  Peripheral IV - Single Lumen 09/25/22 18 G Anterior;Left Forearm 1 day         Peripheral IV - Single Lumen 09/26/22 0130 20 G Anterior;Right Forearm <1 day                    Skin/Wounds  Bathing/Skin Care:  dressed/undressed;electrode patches/site rotation (09/26/22 0030)  Wounds: No  Wound care consulted: No    Consults  Consults (From admission, onward)          Status Ordering Provider     Inpatient consult to Cardiology-[facility]  Once        Provider:  (Not yet assigned)    Acknowledged NENO LYMAN           Problem: Adult Inpatient Plan of Care  Goal: Plan of Care Review  Outcome: Ongoing, Progressing  Goal: Patient-Specific Goal (Individualized)  Outcome: Ongoing, Progressing  Goal: Absence of Hospital-Acquired Illness or Injury  Outcome: Ongoing, Progressing  Goal: Optimal Comfort and Wellbeing  Outcome: Ongoing, Progressing  Goal: Readiness for Transition of Care  Outcome: Ongoing, Progressing     Problem: Skin Injury Risk Increased  Goal: Skin Health and Integrity  Outcome: Ongoing, Progressing     Problem: Fluid Volume Excess  Goal: Fluid Balance  Outcome: Ongoing, Progressing     Problem: Adjustment to Illness (Heart Failure)  Goal: Optimal Coping  Outcome: Ongoing, Progressing     Problem: Cardiac Output Decreased (Heart Failure)  Goal: Optimal Cardiac Output  Outcome: Ongoing, Progressing     Problem: Dysrhythmia (Heart Failure)  Goal: Stable Heart Rate and Rhythm  Outcome: Ongoing, Progressing     Problem: Fluid Imbalance (Heart Failure)  Goal: Fluid Balance  Outcome: Ongoing, Progressing     Problem: Functional Ability Impaired (Heart Failure)  Goal: Optimal Functional Ability  Outcome: Ongoing, Progressing     Problem: Adjustment to Illness (Chronic Kidney Disease)  Goal: Optimal Coping with Chronic Illness  Outcome: Ongoing, Progressing     Problem: Electrolyte Imbalance (Chronic Kidney Disease)  Goal: Electrolyte Balance  Outcome: Ongoing, Progressing     Problem: Fluid Volume Excess (Chronic Kidney Disease)  Goal: Fluid Balance  Outcome: Ongoing, Progressing     Problem: Functional Decline (Chronic Kidney Disease)  Goal: Optimal Functional Ability  Outcome: Ongoing, Progressing     Problem:  Hematologic Alteration (Chronic Kidney Disease)  Goal: Absence of Anemia Signs and Symptoms  Outcome: Ongoing, Progressing     Problem: Oral Intake Inadequate (Chronic Kidney Disease)  Goal: Optimal Oral Intake  Outcome: Ongoing, Progressing     Problem: Pain (Chronic Kidney Disease)  Goal: Acceptable Pain Control  Outcome: Ongoing, Progressing     Problem: Renal Function Impairment (Chronic Kidney Disease)  Goal: Minimize Renal Failure Effects  Outcome: Ongoing, Progressing

## 2022-09-26 NOTE — CONSULTS
Consult Note  [facility]    Attending: [place]  Admit Date: 9/25/2022  Today's Date: 09/26/2022      62 yo M w/ PMHx of HFrEF (ECHO 7/22 EF 40%) s/p AICD replacement in 2016, HTN, CAD, HLD, CKD4, and hypothyroidism who presented to the ED for evaluation after his AICD defibrillated him. Patient reports having 6 episodes of being defibrillated 9/25 starting that morning and continuing even with little exertion. He denies any chest pain, shortness of breath, or syncope at this time, but states he had noticed recently after starting supplements for his kidneys that he began having CP and palpitations. After that time he stopped the supplements and had not had any issues until this weekend.     In the ED vitals stable. CXR w/o any pleural effusions or consolidations. His EKG had a new RBBB and old infarcts. CBC and CMP only notable for hypoNa to 127. BNP 2304 but Trop neg. Patient will be admitted to [facility] for further care and cardiology evaluation.    Review of Systems   Constitutional:  Negative for chills, fever and malaise/fatigue.   HENT:  Negative for congestion, sinus pain and sore throat.    Eyes:  Negative for photophobia.   Respiratory:  Negative for cough, sputum production and shortness of breath.    Cardiovascular:  Negative for chest pain, palpitations and leg swelling.   Gastrointestinal:  Negative for abdominal pain, constipation, diarrhea, nausea and vomiting.   Genitourinary:  Negative for dysuria and hematuria.   Musculoskeletal:  Negative for myalgias.   Skin:  Negative for rash.   Neurological:  Negative for sensory change, weakness and headaches.     OBJECTIVE:     Vital Signs Trends/Hx Reviewed  Vitals:    09/26/22 0652 09/26/22 0700 09/26/22 0715 09/26/22 0800   BP:  109/65 (!) 101/56    BP Location:       Patient Position:       Pulse:  66 73    Resp:  13 (!) 21    Temp:       TempSrc:       SpO2: 96% 96% (!) 94%    Weight:    93 kg (205 lb 0.4 oz)    Height:                Physical Exam  Constitutional:       General: He is not in acute distress.  HENT:      Head: Normocephalic and atraumatic.      Right Ear: External ear normal.      Left Ear: External ear normal.      Nose: Nose normal.   Eyes:      Extraocular Movements: Extraocular movements intact.      Conjunctiva/sclera: Conjunctivae normal.      Pupils: Pupils are equal, round, and reactive to light.   Cardiovascular:      Rate and Rhythm: Normal rate and regular rhythm.      Pulses: Normal pulses.      Heart sounds: Normal heart sounds. No murmur heard.    No friction rub. No gallop.   Pulmonary:      Effort: Pulmonary effort is normal. No respiratory distress.      Breath sounds: Normal breath sounds. No wheezing or rhonchi.   Abdominal:      General: Bowel sounds are normal. There is no distension.      Palpations: There is no mass.      Tenderness: There is no abdominal tenderness.   Musculoskeletal:         General: No swelling or tenderness. Normal range of motion.      Cervical back: Normal range of motion. No tenderness.   Lymphadenopathy:      Cervical: No cervical adenopathy.   Skin:     General: Skin is warm and dry.      Findings: No rash.   Neurological:      General: No focal deficit present.      Mental Status: He is alert.       Laboratory:  Recent Labs   Lab 09/26/22  0641   WBC 6.61   RBC 6.01   HGB 14.6   HCT 42.2      MCV 70*   MCH 24.3*   MCHC 34.6     Recent Labs   Lab 09/26/22  0641   *   K 4.3   CL 97   CO2 25   BUN 74*   CREATININE 3.5*   MG 2.4     No results for input(s): PH, PCO2, PO2, HCO3, POCSATURATED, BE in the last 24 hours.      Chest Imaging:   CXR 9/25:  Pacing device remains.  Cardiac silhouette is enlarged with focal enlargement of the primary pulmonary segment, unchanged.  Pulmonary vascularity is increased as previously.  Mild diffusely increased interstitial opacification remains, similar to the previous examination.  There is stable blunting of the  bilateral costophrenic sulci, consistent with pleural scarring or fluid.  No pneumothorax.  There are atherosclerotic calcifications of the aorta.  Visualized osseous structures are stable.    ASSESSMENT & RECOMMENDATIONS     62 yo M w/ PMHx of HFrEF (ECHO 7/22 EF 40%) s/p AICD replacement in 2016, HTN, CAD, HLD, CKD4, and hypothyroidism who presents with AICD firing and need for cardiac evaluation.      Neuro/Psych  No known issues at this time.     CV  #HTN  #HFrEF  #CAD  #Defibrillator discharge  -Pt reported increased firing from AICD over the last several days and increasing fatigue   -Device was interrogated in the ED and noted >100 episodes of Vtach since 9/11 and had 6 episodes of defibrillation 9/26  -Cardiology was consulted  -Pt follows closely w/ Dr Stewart outpt last seen 7/21  -At last visit his Coreg had been increased 2/2 episodes of heart racing and shock  -Last ECHO 8/22 w/ EF 40-45%  -EKG on admit w/ NSR and RBBB  -Trop neg on admit but BNP >2.3k  -Restarted home Amiodarone 200mg qd, Entresto 49-51 BID, Coreg 25mg qd, Asa 81 qd, and Pravastatin 40mg qd  -On lasix 80mg TID prn at home  -Scheduled lasix 40mg PO qd      Pulm  No issues at this time    FEN/GI  F: None   E: As above Mg 2.4/ Phos  N: Cardiac    RENAL  UOP: 3575cc ON and 3855 in last 24 hrs    #CKD4  -Follows with Nephrology outpt  -BUN/Cr: 74/3.5 BL  -Continue to monitor renal status and urine output     Heme  H/H 14/42; stable  WBC 6  DVT prophylaxis: Heparin (prophylactic)    Endo  #Hypothyroidism  -Cnt home synthroid 175mcg qd     ID  No known issues at this time.      Ghanshyam Howard M.D.  [facility]   09/26/2022 8:35 AM

## 2022-09-26 NOTE — HOSPITAL COURSE
9/26/2022 per HPI  9/27/2022 NPO for [facility] today. No recurrent VTach overnight   9/28/2022 [facility] yesterday with mild CAD and no significant disease requiring PCI. Remains on oral Amio and Mexilitene. HR stable overnight with no recurrent VTach. SBP 70s-90s overnight with MAP in low to mid 60s- no complaints of dizziness, fatigue or syncope yesterday

## 2022-09-26 NOTE — PLAN OF CARE
The sw met with the pt to complete the assessment but he was asleep so Nicolette Sharma(wife)[phone] was at bedside and she answered the questions. The pt and his spouse live in [place]. The pt's disabled and receives a monthly Disability check. The pt still drives but his wife will transport him home at d/c. The pt's independent with his adl's and doesn't use dme. The sw completed the white board in the pt's room and gave his wife a d/c brochure with her name and contact info on it. The sw encouraged her to call if she has any further questions or concerns. The sw will continue to follow the pt throughout his transitions of care and will assist with any d/c needs.        09/26/22 1543   Discharge Assessment   Assessment Type Discharge Planning Assessment   Confirmed/corrected address, phone number and insurance Yes   Confirmed Demographics Correct on Facesheet   Source of Information family   If unable to respond/provide information was family/caregiver contacted? Yes   Contact Name/Number Nicolette Sharma(wife)[phone]   When was your last doctors appointment?   (about 2 weeks ago)   Communicated SLAVA with patient/caregiver Date not available/Unable to determine   Reason For Admission Defibrillator discharge   Lives With spouse   Do you expect to return to your current living situation? Yes   Do you have help at home or someone to help you manage your care at home? Yes   Who are your caregiver(s) and their phone number(s)? Nicolette Sharma(wife)[phone]   Prior to hospitilization cognitive status: Alert/Oriented   Current cognitive status: Alert/Oriented   Walking or Climbing Stairs Difficulty none   Dressing/Bathing Difficulty none   Home Accessibility wheelchair accessible   Home Layout Able to live on 1st floor   Equipment Currently Used at Home none   Readmission within 30 days? No   Patient currently being followed by outpatient case management? No   Do you currently have service(s) that help you manage your care  at home? No   Do you take prescription medications? Yes   Do you have prescription coverage? Yes   Coverage [facility] [employer] [facility] Medicare Advantage   Do you have any problems affording any of your prescribed medications? No   Is the patient taking medications as prescribed? yes   Who is going to help you get home at discharge? Nicolette Sharma(wife)[phone]   How do you get to doctors appointments? car, drives self   Are you on dialysis? No   Do you take coumadin? No   Discharge Plan A Home with family   Discharge Plan B Home Health   DME Needed Upon Discharge  other (see comments)  (TBD)   Discharge Plan discussed with: Spouse/sig other   Name(s) and Number(s) Nicolette Sharma(wife)[phone]   Discharge Barriers Identified None   Relationship/Environment   Name(s) of Who Lives With Patient Nicolette Sharma(wife)[phone]

## 2022-09-26 NOTE — ASSESSMENT & PLAN NOTE
- AICD firing at home; interrogation in ER with verbal reports with 11-12 episodes of VTach in past month all treated with ATP until yesterday  - continued on home dose of Amiodarone and Mexiltene; K+ 4.3 Mg 2.3 and at goal  - monitor on telemetry while admitted; further cardiac workup as detailed previously

## 2022-09-26 NOTE — HPI
60yo male with non obstructive CAD, HFrEF, CKD stage IV, HLP, HTN, AICD s/p defib discharge, YOEL and VT ablation in [place] who presented to the ER for AICD firing. He reports feeling lightheaded yesterday and then noted his AICD to fire. He called EMS with reports of recurrent VTach upon their arrival with no firing at that time. He reports a syncopal episode about 3 weeks ago after heavy exertion (carry mini fridge up the stairs). He reports LH/dizziness and diaphoresis prior to the episode then reports chest pain described as a tightness after the syncope. He reports noting his BP to be in the 60s around that time and had his Entresto dose reduced with BP up to the 90s/50s. He is also prescribed Lasix 80mg po TID prn but typically only took it BID and that was recently decreased to 40mg po BID due to his renal issues. He reports original placement of AICD In 2012 with up grade to BiV AICD around 2018 and under VT ablations in [place]. Admitted to [facility] Medicine and Cardiology consulted for evaluation of AICD firing. Labs: CBC WNL. BMP with Na 133 K+ 4.3 BUN 24 creatinine 3.5 BNP 2304 Troponin .015. No further episodes of VTach since admission. Echocardiogram pending

## 2022-09-26 NOTE — ED PROVIDER NOTES
NAME:  Ar Sharma  CSN:     [id number]  MRN:    [id number]  ADMIT DATE: 9/25/2022        eMERGENCY dEPARTMENT eNCOUnter    CHIEF COMPLAINT    Chief Complaint   Patient presents with    Pacemaker Problem     Reports that defibrillator has fired multiple times today. EMS reports witnessed V-tach enroute. Presents awake, alert.       HPI      Ar Sharma is a 61 y.o. male who presents to the ED for evaluation after his AICD defibrillated him.  Patient reports having 6 episodes of being defibrillated tonight.  He denies any chest pain or shortness of breath.  Patient also denies any episodes of syncope.  He has a history of CHF and has required ablation is in the past for refractory V-tach.  Patient reports that he feels well at this time.          ALLERGIES    Review of patient's allergies indicates:   Allergen Reactions    Lokelma [sodium zirconium cyclosilicate] Other (See Comments)     Fluid retention, weight gain, CHF excerebration, severe constipation    Atorvastatin Other (See Comments)     Joint pain       PAST MEDICAL HISTORY  Past Medical History:   Diagnosis Date    CKD (chronic kidney disease) stage 4, GFR 15-29 ml/min     Congestive heart failure (CHF) 2015    Edema     Heart attack     HLD (hyperlipidemia)     Hypertension     Hyperuricemia     Hypocalcemia     Renal cyst, left     Secondary hyperparathyroidism     Thyroid disease     V tach     Vitamin D deficiency        SURGICAL HISTORY    Past Surgical History:   Procedure Laterality Date    ablations  03/05/2018    albations  02/01/2018    COLONOSCOPY N/A 12/07/2018    Procedure: COLONOSCOPY/suprep;  Surgeon: Ananya Morillo MD;  Location: [facility] ENDO;  Service: Endoscopy;  Laterality: N/A;    defibulater N/A 2016    ESOPHAGOGASTRODUODENOSCOPY N/A 12/07/2018    Procedure: EGD (ESOPHAGOGASTRODUODENOSCOPY);  Surgeon: Ananya Morillo MD;  Location: [facility] ENDO;  Service: Endoscopy;  Laterality: N/A;    JOINT REPLACEMENT Bilateral 10/2016    knees,  bilat    LEFT HEART CATHETERIZATION N/A 12/16/2020    Procedure: Left heart cath;  Surgeon: Shahram Stewart MD;  Location: [facility] CATH LAB/EP;  Service: Cardiology;  Laterality: N/A;       SOCIAL HISTORY    Social History     Socioeconomic History    Marital status:    Tobacco Use    Smoking status: Former     Packs/day: 1.00     Years: 25.00     Pack years: 25.00     Types: Cigarettes     Start date: 1/1/1979     Quit date: 3/3/2012     Years since quitting: 10.5    Smokeless tobacco: Former   Substance and Sexual Activity    Alcohol use: No    Drug use: No    Sexual activity: Yes     Partners: Female     Social Determinants of Health     Financial Resource Strain: Low Risk     Difficulty of Paying Living Expenses: Not very hard   Food Insecurity: No Food Insecurity    Worried About Running Out of Food in the Last Year: Never true    Ran Out of Food in the Last Year: Never true   Transportation Needs: No Transportation Needs    Lack of Transportation (Medical): No    Lack of Transportation (Non-Medical): No   Physical Activity: Insufficiently Active    Days of Exercise per Week: 2 days    Minutes of Exercise per Session: 30 min   Stress: No Stress Concern Present    Feeling of Stress : Only a little   Social Connections: Moderately Integrated    Frequency of Communication with Friends and Family: Twice a week    Frequency of Social Gatherings with Friends and Family: Once a week    Attends [employer] Services: More than 4 times per year    Active Member of Clubs or Organizations: No    Attends Club or Organization Meetings: Never    Marital Status:    Housing Stability: Low Risk     Unable to Pay for Housing in the Last Year: No    Number of Places Lived in the Last Year: 1    Unstable Housing in the Last Year: No       FAMILY HISTORY    Family History   Problem Relation Age of Onset    Hypertension Mother     Stroke Mother     Alcohol abuse Father     Kidney disease Father     No Known Problems Sister      No Known Problems Brother     No Known Problems Daughter     No Known Problems Son        REVIEW OF SYSTEMS   ROS  All Systems otherwise negative except as noted in the History of Present Illness.        PHYSICAL EXAM    Reviewed Triage Note  VITAL SIGNS:   ED Triage Vitals [09/25/22 1538]   Enc Vitals Group      /64      Pulse 68      Resp 17      Temp 98 °F (36.7 °C)      Temp src Oral      SpO2 (!) 90 %      Weight       Height       Head Circumference       Peak Flow       Pain Score       Pain Loc       Pain Edu?       Excl. in GC?        Patient Vitals for the past 24 hrs:   BP Temp Temp src Pulse Resp SpO2   09/25/22 2003 (!) 109/58 98.6 °F (37 °C) Oral 69 16 96 %   09/25/22 1902 112/62 -- -- 62 15 95 %   09/25/22 1802 110/60 -- -- 60 15 95 %   09/25/22 1701 (!) 107/57 -- -- 61 15 96 %   09/25/22 1601 (!) 109/58 -- -- 62 19 96 %   09/25/22 1538 109/64 98 °F (36.7 °C) Oral 68 17 (!) 90 %           Physical Exam    Constitutional:  Well-developed, well-nourished. No acute distress  HENT:  Normocephalic, atraumatic.  Eyes:  EOMI. Conjunctiva normal without discharge.   Neck: Normal range of motion.No stridor. No meningismus.   Respiratory:  Normal breath sounds bilaterally.  No respiratory distress, retractions, or conversational dyspnea. No wheezing. No rhonchi. No rales.   Cardiovascular:  Normal heart rate. Normal rhythm. No pitting lower extremity edema.   GI:  Abdomen soft, non-distended, non-tender. Normal bowel sounds. No guarding, rigidity or rebound.    : No CVA tenderness.   Musculoskeletal:  No gross deformity or limited range of motion of all major joints. No palpable bony deformity. No tenderness to palpation.  Integument:  Warm and dry. No rash.  Neurologic:  Normal motor function. Normal sensory function. No focal deficits noted. Alert and Interactive.  Psychiatric:  Affect normal. Mood normal.         LABS  Pertinent labs reviewed. (See chart for details)   Labs Reviewed   CBC W/ AUTO  DIFFERENTIAL - Abnormal; Notable for the following components:       Result Value    Hemoglobin 13.3 (*)     Hematocrit 39.2 (*)     MCV 71 (*)     MCH 23.9 (*)     RDW 20.0 (*)     Lymph # 0.4 (*)     Gran % 81.5 (*)     Lymph % 6.1 (*)     All other components within normal limits   COMPREHENSIVE METABOLIC PANEL - Abnormal; Notable for the following components:    Sodium 127 (*)     Chloride 92 (*)     BUN 80 (*)     Creatinine 4.1 (*)     Total Bilirubin 1.4 (*)     eGFR 16 (*)     All other components within normal limits   B-TYPE NATRIURETIC PEPTIDE - Abnormal; Notable for the following components:    BNP 2,304 (*)     All other components within normal limits   TROPONIN I   MAGNESIUM   SARS-COV-2 RDRP GENE         RADIOLOGY    Imaging Results              X-Ray Chest AP Portable (Final result)  Result time 09/25/22 16:59:31      Final result by Keesha Villalpando MD (09/25/22 16:59:31)                   Impression:      No detrimental change since November 26, 2021      Electronically signed by: Keesha Villalpando MD  Date:    09/25/2022  Time:    16:59               Narrative:    EXAMINATION:  XR CHEST AP PORTABLE    CLINICAL HISTORY:  cp;    TECHNIQUE:  Single frontal view of the chest was performed.    COMPARISON:  November 26, 2021    FINDINGS:  Pacing device remains.  Cardiac silhouette is enlarged with focal enlargement of the primary pulmonary segment, unchanged.  Pulmonary vascularity is increased as previously.  Mild diffusely increased interstitial opacification remains, similar to the previous examination.  There is stable blunting of the bilateral costophrenic sulci, consistent with pleural scarring or fluid.  No pneumothorax.  There are atherosclerotic calcifications of the aorta.  Visualized osseous structures are stable.                                      PROCEDURES    Procedures      EKG     Interpreted by ERP:     EKG Readings: (Independently Interpreted)   Rhythm: Paced Rhythm. Heart Rate:  71. Ectopy: No Ectopy. Conduction: Normal. ST Segments: Normal ST Segments. T Waves: Normal. Clinical Impression: Paced Rhythm     ED COURSE & MEDICAL DECISION MAKING    Pertinent & Imaging studies reviewed. (See chart for details and specific orders.)        Medications - No data to display       Patient's pacemaker was interrogated and demonstrated over 100 episodes of V-tach since September 11th.  Today the patient was defibrillated 6 times.  His labs are all within normal limits.  I discussed this case with Dr. Becka blackburn for cardiology.  Patient will be admitted to the ICU for further evaluation and management.       DISPOSITION  Patient admitted in stable condition at No discharge date for patient encounter.        FINAL IMPRESSION    1. Defibrillator discharge    2. Chest pain            Critical care time spent with this patient (not including separately billable items) was  0 minutes.     DISCLAIMER: This note was prepared with Dragon NaturallySpeaking voice recognition transcription software. Garbled syntax, mangled pronouns, and other bizarre constructions may be attributed to that software system.      Henry Solis MD  09/25/2022  8:40 PM           Henry Solis MD  09/25/22 2043

## 2022-09-26 NOTE — PROGRESS NOTES
e-ICU admit note          Reason for ICU admission:    HPI:    60 yo male presented after AICD triggered x 6.    History of CHF and has required ablation is in the past for refractory V-tach.    More fatigued lately.    Feels well now in ED         PMx:       CKD stage 4   CHF 2015   Edema     Heart attack     HLD     Hypertension     Hyperuricemia     Hypocalcemia     Renal cyst, left     Secondary hyperparathyroidism     Thyroid disease     V tach -AICD    Vitamin D deficiency             Home Meds:    allopurinoL   amiodarone    aspirin   carvediloL  furosemide 80 MG tid  levothyroxine     mexiletine   nitroGLYCERIN 0.4 MG SL  pravastatin  sacubitriL-valsartan   metOLazone   tadalafiL              Significant labs:    BNP 2304  BUN 80  Creat 4                  Significant images     CXR: No detrimental change since November 26, 2021.    ECG:    NSR RBBB.                     I viewed the pt via camera at    2:25 am:    63 A-V paced, 98%, 103/63, R 16    Asleep    NAD                                Assessment/Plan:      Defibrillator discharge  S/P ablation for ventricular arrhythmia  Interrogation and demonstrated over 100 episodes of V-tach since September 11th. Today the patient was defibrillated 6 times. Cardiology review          HTN  Prn home meds for > 180      HLD  statin     HFrEF   Continue home meds     CKD stage 4  Follow creat     CAD  ASA, statin, BB     Hypothyroidism  Synthroid                    DVT ppx  Hep sc

## 2022-09-26 NOTE — ASSESSMENT & PLAN NOTE
- echo in August 2022 with EF 40-45% LV WMA and grade II diastolic dysfunction; repeat echo pending  - on Coreg, Entresto and Lasix at home  - BNP 2304; previous reading 724 3 years ago; no acute decompensation noted on PE  - continue GMDT as BP tolerates

## 2022-09-26 NOTE — ASSESSMENT & PLAN NOTE
S/P ablation of ventricular arrhythmia  -Patient's pacemaker was interrogated and demonstrated over 100 episodes of V-tach since September 11th. Today the patient was defibrillated 6 times.   -EKG with Normal sinus rhythm. Right bundle branch block.   -CXR: No detrimental change since November 26, 2021  -Consult cardiology

## 2022-09-26 NOTE — ED NOTES
Pt resting in room, respirations even and unlabored, pt updated on plan of care.  Pt on continuous cm and pulse ox.  Side rails up x2, bed locked and in low position, call light within reach, family at bedside.  Will continue to monitor.

## 2022-09-26 NOTE — H&P (VIEW-ONLY)
[facility] - Intensive Care  Cardiology  Consult Note    Patient Name: Ar Sharma  MRN: [id number]  Admission Date: 9/25/2022  Hospital Length of Stay: 1 days  Code Status: Full Code   Attending Provider: Oswaldo Colbert MD   Consulting Provider: JASKARAN Morales ANP  Primary Care Physician: Jc Elliott MD  Principal Problem:Defibrillator discharge    Patient information was obtained from patient, past medical records and ER records.     [facility]  Consult performed by: JASKARAN Nieto, NARDA  Consult ordered by: Martha Casey NP  Reason for consult: VTach- AICD firing         Subjective:     Chief Complaint:  VTach s/p AICD discharge      HPI:   60yo male with non obstructive CAD, HFrEF, CKD stage IV, HLP, HTN, AICD s/p defib discharge, YOEL and VT ablation in [place] who presented to the ER for AICD firing. He reports feeling lightheaded yesterday and then noted his AICD to fire. He called EMS with reports of recurrent VTach upon their arrival with no firing at that time. He reports a syncopal episode about 3 weeks ago after heavy exertion (carry mini fridge up the stairs). He reports LH/dizziness and diaphoresis prior to the episode then reports chest pain described as a tightness after the syncope. He reports noting his BP to be in the 60s around that time and had his Entresto dose reduced with BP up to the 90s/50s. He is also prescribed Lasix 80mg po TID prn but typically only took it BID and that was recently decreased to 40mg po BID due to his renal issues. He reports original placement of AICD In 2012 with up grade to BiV AICD around 2018 and under VT ablations in [place]. Admitted to [facility] Medicine and Cardiology consulted for evaluation of AICD firing. Labs: CBC WNL. BMP with Na 133 K+ 4.3 BUN 24 creatinine 3.5 BNP 2304 Troponin .015. No further episodes of VTach since admission. Echocardiogram pending       Hospital Course: 9/26/2022 per HPI    Past  Medical History:   Diagnosis Date    CKD (chronic kidney disease) stage 4, GFR 15-29 ml/min     Congestive heart failure (CHF) 2015    Edema     Heart attack     HLD (hyperlipidemia)     Hypertension     Hyperuricemia     Hypocalcemia     Renal cyst, left     Secondary hyperparathyroidism     Thyroid disease     V tach     Vitamin D deficiency        Past Surgical History:   Procedure Laterality Date    ablations  03/05/2018    albations  02/01/2018    COLONOSCOPY N/A 12/07/2018    Procedure: COLONOSCOPY/suprep;  Surgeon: Ananya Morillo MD;  Location: [facility] ENDO;  Service: Endoscopy;  Laterality: N/A;    defibulater N/A 2016    ESOPHAGOGASTRODUODENOSCOPY N/A 12/07/2018    Procedure: EGD (ESOPHAGOGASTRODUODENOSCOPY);  Surgeon: Ananya Morillo MD;  Location: [facility] ENDO;  Service: Endoscopy;  Laterality: N/A;    JOINT REPLACEMENT Bilateral 10/2016    knees, bilat    LEFT HEART CATHETERIZATION N/A 12/16/2020    Procedure: Left heart cath;  Surgeon: Shahram Stewart MD;  Location: [facility] CATH LAB/EP;  Service: Cardiology;  Laterality: N/A;       Review of patient's allergies indicates:   Allergen Reactions    Lokelma [sodium zirconium cyclosilicate] Other (See Comments)     Fluid retention, weight gain, CHF excerebration, severe constipation    Atorvastatin Other (See Comments)     Joint pain       No current facility-administered medications on file prior to encounter.     Current Outpatient Medications on File Prior to Encounter   Medication Sig    allopurinoL (ZYLOPRIM) 300 MG tablet Take 1 tablet (300 mg total) by mouth once daily.    amiodarone (PACERONE) 200 MG Tab TAKE 1 TABLET BY MOUTH EVERY DAY (Patient taking differently: Take 200 mg by mouth once daily.)    aspirin (ECOTRIN) 81 MG EC tablet Take 81 mg by mouth once daily.    carvediloL (COREG) 25 MG tablet TAKE 1 TABLET BY MOUTH TWICE A DAY WITH FOOD (Patient taking differently: Take 25 mg by mouth 2 (two) times daily with meals.)     ergocalciferol (ERGOCALCIFEROL) 50,000 unit Cap Take 1 capsule (50,000 Units total) by mouth every 7 days.    furosemide (LASIX) 80 MG tablet Take 1 tablet (80 mg total) by mouth 3 (three) times daily as needed (Swelling).    levothyroxine (SYNTHROID, LEVOTHROID) 175 MCG tablet TAKE 1 TABLET BY MOUTH ONCE DAILY. (Patient taking differently: Take 175 mcg by mouth once daily.)    mexiletine (MEXITIL) 150 MG Cap Take 1 capsule (150 mg total) by mouth 2 (two) times daily with meals.    nitroGLYCERIN (NITROSTAT) 0.4 MG SL tablet Place 1 tablet (0.4 mg total) under the tongue every 5 (five) minutes as needed for Chest pain.    pravastatin (PRAVACHOL) 40 MG tablet Take 1 tablet (40 mg total) by mouth once daily.    sacubitriL-valsartan (ENTRESTO) 49-51 mg per tablet Take 1 tablet by mouth 2 (two) times daily.    FLUZONE QUAD 9034-3824, PF, 60 mcg (15 mcg x 4)/0.5 mL Syrg     metOLazone (ZAROXOLYN) 10 MG tablet Take 1 tablet (10 mg total) by mouth daily as needed (Swelling). (Patient not taking: Reported on 9/25/2022)    tadalafiL (CIALIS) 5 MG tablet TAKE 1 TABLET BY MOUTH DAILY AS NEEDED FOR ERECTILE DYSFUNCTION (Patient not taking: Reported on 9/25/2022)     Family History       Problem Relation (Age of Onset)    Alcohol abuse Father    Hypertension Mother    Kidney disease Father    No Known Problems Sister, Brother, Daughter, Son    Stroke Mother          Tobacco Use    Smoking status: Former     Packs/day: 1.00     Years: 25.00     Pack years: 25.00     Types: Cigarettes     Start date: 1/1/1979     Quit date: 3/3/2012     Years since quitting: 10.5    Smokeless tobacco: Former   Substance and Sexual Activity    Alcohol use: No    Drug use: No    Sexual activity: Yes     Partners: Female     Review of Systems   Constitutional: Negative for chills, decreased appetite, diaphoresis and fever.   Cardiovascular:  Positive for chest pain and syncope. Negative for claudication, cyanosis, dyspnea on exertion,  irregular heartbeat, leg swelling, near-syncope, orthopnea, palpitations and paroxysmal nocturnal dyspnea.   Respiratory:  Negative for cough, hemoptysis, shortness of breath and wheezing.    Gastrointestinal:  Negative for bloating, abdominal pain, constipation, diarrhea, melena, nausea and vomiting.   Neurological:  Positive for dizziness. Negative for weakness.   Objective:     Vital Signs (Most Recent):  Temp: 97.8 °F (36.6 °C) (09/26/22 0345)  Pulse: 61 (09/26/22 0900)  Resp: 15 (09/26/22 0900)  BP: (!) 96/55 (09/26/22 0900)  SpO2: (!) 91 % (09/26/22 0900)   Vital Signs (24h Range):  Temp:  [97.8 °F (36.6 °C)-98.6 °F (37 °C)] 97.8 °F (36.6 °C)  Pulse:  [60-79] 61  Resp:  [13-22] 15  SpO2:  [90 %-98 %] 91 %  BP: ()/(52-71) 96/55     Weight: 93 kg (205 lb 0.4 oz)  Body mass index is 30.28 kg/m².    SpO2: (!) 91 %  O2 Device (Oxygen Therapy): nasal cannula      Intake/Output Summary (Last 24 hours) at 9/26/2022 1047  Last data filed at 9/26/2022 0744  Gross per 24 hour   Intake --   Output 4555 ml   Net -4555 ml       Lines/Drains/Airways       Peripheral Intravenous Line  Duration                  Peripheral IV - Single Lumen 09/25/22 18 G Anterior;Left Forearm 1 day         Peripheral IV - Single Lumen 09/26/22 0130 20 G Anterior;Right Forearm <1 day                    Physical Exam  Constitutional:       General: He is not in acute distress.     Appearance: He is well-developed.   Cardiovascular:      Rate and Rhythm: Normal rate and regular rhythm.      Heart sounds: No murmur heard.    No gallop.   Pulmonary:      Effort: Pulmonary effort is normal. No respiratory distress.      Breath sounds: Normal breath sounds. No wheezing.   Abdominal:      General: Bowel sounds are normal. There is no distension.      Palpations: Abdomen is soft.      Tenderness: There is no abdominal tenderness.   Skin:     General: Skin is warm and dry.   Neurological:      Mental Status: He is alert and oriented to person,  place, and time.       Significant Labs: BMP:   Recent Labs   Lab 09/25/22  1638 09/26/22  0641    92   * 133*   K 4.4 4.3   CL 92* 97   CO2 24 25   BUN 80* 74*   CREATININE 4.1* 3.5*   CALCIUM 9.0 9.6   MG 2.2 2.4   , CBC   Recent Labs   Lab 09/25/22  1638 09/26/22  0641   WBC 6.02 6.61   HGB 13.3* 14.6   HCT 39.2* 42.2    398   , and Troponin   Recent Labs   Lab 09/25/22  1638   TROPONINI 0.015       Significant Imaging: Echocardiogram: Transthoracic echo (TTE) complete (Cupid Only): final results pending   Results for orders placed or performed during the hospital encounter of 09/25/22   Echo   Result Value Ref Range    BSA 2.12 m2    TDI SEPTAL 0.05 m/s    LV LATERAL E/E' RATIO 14.00 m/s    LV SEPTAL E/E' RATIO 16.80 m/s    IVC diameter 2.53 cm    Left Ventricular Outflow Tract Mean Velocity 0.61 cm/s    Left Ventricular Outflow Tract Mean Gradient 1.78 mmHg    Pulmonary Valve Mean Velocity 0.65 m/s    TDI LATERAL 0.06 m/s    PV PEAK VELOCITY 1.00 cm/s    LVIDd 5.96 3.5 - 6.0 cm    IVS 1.08 0.6 - 1.1 cm    Posterior Wall 1.23 (A) 0.6 - 1.1 cm    LVIDs 4.34 (A) 2.1 - 4.0 cm    FS 27 28 - 44 %    LV mass 295.05 g    LA size 4.88 cm    RVDD 3.34 cm    TAPSE 1.14 cm    RV S' 0.01 cm/s    Left Ventricle Relative Wall Thickness 0.41 cm    AV mean gradient 6 mmHg    AV valve area 1.74 cm2    AV Velocity Ratio 0.60     AV index (prosthetic) 0.54     MV mean gradient 1 mmHg    MV valve area p 1/2 method 2.09 cm2    MV valve area by continuity eq 2.00 cm2    E/A ratio 1.79     Mean e' 0.06 m/s    E wave deceleration time 333.78 msec    IVRT 125.59 msec    LVOT diameter 2.03 cm    LVOT area 3.2 cm2    LVOT peak marvin 0.97 m/s    LVOT peak VTI 18.40 cm    Ao peak marvin 1.63 m/s    Ao VTI 34.3 cm    Mr max marvin 3.97 m/s    LVOT stroke volume 59.52 cm3    AV peak gradient 11 mmHg    MV peak gradient 3 mmHg    E/E' ratio 15.27 m/s    MV Peak E Marvin 0.84 m/s    TR Max Marvin 3.61 m/s    MV VTI 29.7 cm    MV stenosis  pressure 1/2 time 105.47 ms    MV Peak A Marvin 0.47 m/s    LV Systolic Volume 84.87 mL    LV Systolic Volume Index 40.6 mL/m2    LV Diastolic Volume 177.38 mL    LV Diastolic Volume Index 84.87 mL/m2    LV Mass Index 141 g/m2    RA Major Axis 7.06 cm    Left Atrium Minor Axis 6.19 cm    Left Atrium Major Axis 7.15 cm    Triscuspid Valve Regurgitation Peak Gradient 52 mmHg    LA Volume Index (Mod) 37.2 mL/m2    LA volume (mod) 77.75 cm3    RA Width 6.09 cm     Assessment and Plan:     * Defibrillator discharge  - AICD firing at home; interrogation in ER with verbal reports with 11-12 episodes of VTach in past month all treated with ATP until yesterday  - continued on home dose of Amiodarone and Mexiltene; K+ 4.3 Mg 2.3 and at goal  - monitor on telemetry while admitted; further cardiac workup as detailed previously     Essential (primary) hypertension  - SBP 90s-100s overnight  - on Entresto and Coreg at home doses  - continue medication and monitor BP     Hyperlipidemia  - continue statin therapy     HFrEF (heart failure with reduced ejection fraction)  - echo in August 2022 with EF 40-45% LV WMA and grade II diastolic dysfunction; repeat echo pending  - on Coreg, Entresto and Lasix at home  - BNP 2304; previous reading 724 3 years ago; no acute decompensation noted on PE  - continue GMDT as BP tolerates    CKD (chronic kidney disease) stage 4, GFR 15-29 ml/min  - creatinine 4.1 upon admission; down to 3.5 this AM  - baseline creatinine 3-4    Coronary artery disease involving native coronary artery of native heart without angina pectoris  - C in 12/2020 with non obstructive CAD to LAD, LCX and RCA  - nuclear stress test in 7/2022 with thinning of lateral wall but not reversible ischemia noted; LVEF 35%  - presentation with VTach and AICD firing; repeat echo pending today; may need repeat ischemic evaluation- modality to be decided after echo resulted and depending on creatinine  - continue ASA, statin, BB and ARB;  troponin .015        VTE Risk Mitigation (From admission, onward)         Ordered     heparin (porcine) injection 5,000 Units  Every 8 hours         09/25/22 2220     IP VTE HIGH RISK PATIENT  Once         09/25/22 2220     Place sequential compression device  Until discontinued         09/25/22 2220                Thank you for your consult. I will follow up with patient     Audelia RICCI JASKARAN [place]aco, ANP  Cardiology   [place] - Intensive Care

## 2022-09-26 NOTE — ASSESSMENT & PLAN NOTE
- SBP 90s-100s overnight  - on Entresto and Coreg at home doses  - continue medication and monitor BP

## 2022-09-26 NOTE — ASSESSMENT & PLAN NOTE
- LHC in 12/2020 with non obstructive CAD to LAD, LCX and RCA  - nuclear stress test in 7/2022 with thinning of lateral wall but not reversible ischemia noted; LVEF 35%  - presentation with VTach and AICD firing; repeat echo pending today; may need repeat ischemic evaluation- modality to be decided after echo resulted and depending on creatinine  - continue ASA, statin, BB and ARB; troponin .015

## 2022-09-26 NOTE — SUBJECTIVE & OBJECTIVE
Past Medical History:   Diagnosis Date    CKD (chronic kidney disease) stage 4, GFR 15-29 ml/min     Congestive heart failure (CHF) 2015    Edema     Heart attack     HLD (hyperlipidemia)     Hypertension     Hyperuricemia     Hypocalcemia     Renal cyst, left     Secondary hyperparathyroidism     Thyroid disease     V tach     Vitamin D deficiency        Past Surgical History:   Procedure Laterality Date    ablations  03/05/2018    albations  02/01/2018    COLONOSCOPY N/A 12/07/2018    Procedure: COLONOSCOPY/suprep;  Surgeon: Ananya Morillo MD;  Location: [facility] ENDO;  Service: Endoscopy;  Laterality: N/A;    defibulater N/A 2016    ESOPHAGOGASTRODUODENOSCOPY N/A 12/07/2018    Procedure: EGD (ESOPHAGOGASTRODUODENOSCOPY);  Surgeon: Ananya Morillo MD;  Location: [facility] ENDO;  Service: Endoscopy;  Laterality: N/A;    JOINT REPLACEMENT Bilateral 10/2016    knees, bilat    LEFT HEART CATHETERIZATION N/A 12/16/2020    Procedure: Left heart cath;  Surgeon: Shahram Stewart MD;  Location: [facility] CATH LAB/EP;  Service: Cardiology;  Laterality: N/A;       Review of patient's allergies indicates:   Allergen Reactions    Lokelma [sodium zirconium cyclosilicate] Other (See Comments)     Fluid retention, weight gain, CHF excerebration, severe constipation    Atorvastatin Other (See Comments)     Joint pain       No current facility-administered medications on file prior to encounter.     Current Outpatient Medications on File Prior to Encounter   Medication Sig    allopurinoL (ZYLOPRIM) 300 MG tablet Take 1 tablet (300 mg total) by mouth once daily.    amiodarone (PACERONE) 200 MG Tab TAKE 1 TABLET BY MOUTH EVERY DAY (Patient taking differently: Take 200 mg by mouth once daily.)    aspirin (ECOTRIN) 81 MG EC tablet Take 81 mg by mouth once daily.    carvediloL (COREG) 25 MG tablet TAKE 1 TABLET BY MOUTH TWICE A DAY WITH FOOD (Patient taking differently: Take 25 mg by mouth 2 (two) times daily with meals.)    ergocalciferol  (ERGOCALCIFEROL) 50,000 unit Cap Take 1 capsule (50,000 Units total) by mouth every 7 days.    furosemide (LASIX) 80 MG tablet Take 1 tablet (80 mg total) by mouth 3 (three) times daily as needed (Swelling).    levothyroxine (SYNTHROID, LEVOTHROID) 175 MCG tablet TAKE 1 TABLET BY MOUTH ONCE DAILY. (Patient taking differently: Take 175 mcg by mouth once daily.)    mexiletine (MEXITIL) 150 MG Cap Take 1 capsule (150 mg total) by mouth 2 (two) times daily with meals.    nitroGLYCERIN (NITROSTAT) 0.4 MG SL tablet Place 1 tablet (0.4 mg total) under the tongue every 5 (five) minutes as needed for Chest pain.    pravastatin (PRAVACHOL) 40 MG tablet Take 1 tablet (40 mg total) by mouth once daily.    sacubitriL-valsartan (ENTRESTO) 49-51 mg per tablet Take 1 tablet by mouth 2 (two) times daily.    FLUZONE QUAD 2323-8423, PF, 60 mcg (15 mcg x 4)/0.5 mL Syrg     metOLazone (ZAROXOLYN) 10 MG tablet Take 1 tablet (10 mg total) by mouth daily as needed (Swelling). (Patient not taking: Reported on 9/25/2022)    tadalafiL (CIALIS) 5 MG tablet TAKE 1 TABLET BY MOUTH DAILY AS NEEDED FOR ERECTILE DYSFUNCTION (Patient not taking: Reported on 9/25/2022)     Family History       Problem Relation (Age of Onset)    Alcohol abuse Father    Hypertension Mother    Kidney disease Father    No Known Problems Sister, Brother, Daughter, Son    Stroke Mother          Tobacco Use    Smoking status: Former     Packs/day: 1.00     Years: 25.00     Pack years: 25.00     Types: Cigarettes     Start date: 1/1/1979     Quit date: 3/3/2012     Years since quitting: 10.5    Smokeless tobacco: Former   Substance and Sexual Activity    Alcohol use: No    Drug use: No    Sexual activity: Yes     Partners: Female     Review of Systems   Constitutional: Negative for chills, decreased appetite, diaphoresis and fever.   Cardiovascular:  Positive for chest pain and syncope. Negative for claudication, cyanosis, dyspnea on exertion, irregular heartbeat, leg  swelling, near-syncope, orthopnea, palpitations and paroxysmal nocturnal dyspnea.   Respiratory:  Negative for cough, hemoptysis, shortness of breath and wheezing.    Gastrointestinal:  Negative for bloating, abdominal pain, constipation, diarrhea, melena, nausea and vomiting.   Neurological:  Positive for dizziness. Negative for weakness.   Objective:     Vital Signs (Most Recent):  Temp: 97.8 °F (36.6 °C) (09/26/22 0345)  Pulse: 61 (09/26/22 0900)  Resp: 15 (09/26/22 0900)  BP: (!) 96/55 (09/26/22 0900)  SpO2: (!) 91 % (09/26/22 0900)   Vital Signs (24h Range):  Temp:  [97.8 °F (36.6 °C)-98.6 °F (37 °C)] 97.8 °F (36.6 °C)  Pulse:  [60-79] 61  Resp:  [13-22] 15  SpO2:  [90 %-98 %] 91 %  BP: ()/(52-71) 96/55     Weight: 93 kg (205 lb 0.4 oz)  Body mass index is 30.28 kg/m².    SpO2: (!) 91 %  O2 Device (Oxygen Therapy): nasal cannula      Intake/Output Summary (Last 24 hours) at 9/26/2022 1047  Last data filed at 9/26/2022 0744  Gross per 24 hour   Intake --   Output 4555 ml   Net -4555 ml       Lines/Drains/Airways       Peripheral Intravenous Line  Duration                  Peripheral IV - Single Lumen 09/25/22 18 G Anterior;Left Forearm 1 day         Peripheral IV - Single Lumen 09/26/22 0130 20 G Anterior;Right Forearm <1 day                    Physical Exam  Constitutional:       General: He is not in acute distress.     Appearance: He is well-developed.   Cardiovascular:      Rate and Rhythm: Normal rate and regular rhythm.      Heart sounds: No murmur heard.    No gallop.   Pulmonary:      Effort: Pulmonary effort is normal. No respiratory distress.      Breath sounds: Normal breath sounds. No wheezing.   Abdominal:      General: Bowel sounds are normal. There is no distension.      Palpations: Abdomen is soft.      Tenderness: There is no abdominal tenderness.   Skin:     General: Skin is warm and dry.   Neurological:      Mental Status: He is alert and oriented to person, place, and time.        Significant Labs: BMP:   Recent Labs   Lab 09/25/22  1638 09/26/22  0641    92   * 133*   K 4.4 4.3   CL 92* 97   CO2 24 25   BUN 80* 74*   CREATININE 4.1* 3.5*   CALCIUM 9.0 9.6   MG 2.2 2.4   , CBC   Recent Labs   Lab 09/25/22  1638 09/26/22  0641   WBC 6.02 6.61   HGB 13.3* 14.6   HCT 39.2* 42.2    398   , and Troponin   Recent Labs   Lab 09/25/22  1638   TROPONINI 0.015       Significant Imaging: Echocardiogram: Transthoracic echo (TTE) complete (Cupid Only): final results pending   Results for orders placed or performed during the hospital encounter of 09/25/22   Echo   Result Value Ref Range    BSA 2.12 m2    TDI SEPTAL 0.05 m/s    LV LATERAL E/E' RATIO 14.00 m/s    LV SEPTAL E/E' RATIO 16.80 m/s    IVC diameter 2.53 cm    Left Ventricular Outflow Tract Mean Velocity 0.61 cm/s    Left Ventricular Outflow Tract Mean Gradient 1.78 mmHg    Pulmonary Valve Mean Velocity 0.65 m/s    TDI LATERAL 0.06 m/s    PV PEAK VELOCITY 1.00 cm/s    LVIDd 5.96 3.5 - 6.0 cm    IVS 1.08 0.6 - 1.1 cm    Posterior Wall 1.23 (A) 0.6 - 1.1 cm    LVIDs 4.34 (A) 2.1 - 4.0 cm    FS 27 28 - 44 %    LV mass 295.05 g    LA size 4.88 cm    RVDD 3.34 cm    TAPSE 1.14 cm    RV S' 0.01 cm/s    Left Ventricle Relative Wall Thickness 0.41 cm    AV mean gradient 6 mmHg    AV valve area 1.74 cm2    AV Velocity Ratio 0.60     AV index (prosthetic) 0.54     MV mean gradient 1 mmHg    MV valve area p 1/2 method 2.09 cm2    MV valve area by continuity eq 2.00 cm2    E/A ratio 1.79     Mean e' 0.06 m/s    E wave deceleration time 333.78 msec    IVRT 125.59 msec    LVOT diameter 2.03 cm    LVOT area 3.2 cm2    LVOT peak marvin 0.97 m/s    LVOT peak VTI 18.40 cm    Ao peak marvin 1.63 m/s    Ao VTI 34.3 cm    Mr max marvin 3.97 m/s    LVOT stroke volume 59.52 cm3    AV peak gradient 11 mmHg    MV peak gradient 3 mmHg    E/E' ratio 15.27 m/s    MV Peak E Marvin 0.84 m/s    TR Max Marvin 3.61 m/s    MV VTI 29.7 cm    MV stenosis pressure 1/2 time  105.47 ms    MV Peak A Marvin 0.47 m/s    LV Systolic Volume 84.87 mL    LV Systolic Volume Index 40.6 mL/m2    LV Diastolic Volume 177.38 mL    LV Diastolic Volume Index 84.87 mL/m2    LV Mass Index 141 g/m2    RA Major Axis 7.06 cm    Left Atrium Minor Axis 6.19 cm    Left Atrium Major Axis 7.15 cm    Triscuspid Valve Regurgitation Peak Gradient 52 mmHg    LA Volume Index (Mod) 37.2 mL/m2    LA volume (mod) 77.75 cm3    RA Width 6.09 cm

## 2022-09-26 NOTE — SUBJECTIVE & OBJECTIVE
Past Medical History:   Diagnosis Date    CKD (chronic kidney disease) stage 4, GFR 15-29 ml/min     Congestive heart failure (CHF) 2015    Edema     Heart attack     HLD (hyperlipidemia)     Hypertension     Hyperuricemia     Hypocalcemia     Renal cyst, left     Secondary hyperparathyroidism     Thyroid disease     V tach     Vitamin D deficiency        Past Surgical History:   Procedure Laterality Date    ablations  03/05/2018    albations  02/01/2018    COLONOSCOPY N/A 12/07/2018    Procedure: COLONOSCOPY/suprep;  Surgeon: Ananya Morillo MD;  Location: [facility] ENDO;  Service: Endoscopy;  Laterality: N/A;    defibulater N/A 2016    ESOPHAGOGASTRODUODENOSCOPY N/A 12/07/2018    Procedure: EGD (ESOPHAGOGASTRODUODENOSCOPY);  Surgeon: Ananya Morillo MD;  Location: [facility] ENDO;  Service: Endoscopy;  Laterality: N/A;    JOINT REPLACEMENT Bilateral 10/2016    knees, bilat    LEFT HEART CATHETERIZATION N/A 12/16/2020    Procedure: Left heart cath;  Surgeon: Shahram Stewart MD;  Location: [facility] CATH LAB/EP;  Service: Cardiology;  Laterality: N/A;       Review of patient's allergies indicates:   Allergen Reactions    Lokelma [sodium zirconium cyclosilicate] Other (See Comments)     Fluid retention, weight gain, CHF excerebration, severe constipation    Atorvastatin Other (See Comments)     Joint pain       No current facility-administered medications on file prior to encounter.     Current Outpatient Medications on File Prior to Encounter   Medication Sig    allopurinoL (ZYLOPRIM) 300 MG tablet Take 1 tablet (300 mg total) by mouth once daily.    amiodarone (PACERONE) 200 MG Tab TAKE 1 TABLET BY MOUTH EVERY DAY (Patient taking differently: Take 200 mg by mouth once daily.)    aspirin (ECOTRIN) 81 MG EC tablet Take 81 mg by mouth once daily.    carvediloL (COREG) 25 MG tablet TAKE 1 TABLET BY MOUTH TWICE A DAY WITH FOOD (Patient taking differently: Take 25 mg by mouth 2 (two) times daily with meals.)    ergocalciferol  (ERGOCALCIFEROL) 50,000 unit Cap Take 1 capsule (50,000 Units total) by mouth every 7 days.    furosemide (LASIX) 80 MG tablet Take 1 tablet (80 mg total) by mouth 3 (three) times daily as needed (Swelling).    levothyroxine (SYNTHROID, LEVOTHROID) 175 MCG tablet TAKE 1 TABLET BY MOUTH ONCE DAILY. (Patient taking differently: Take 175 mcg by mouth once daily.)    mexiletine (MEXITIL) 150 MG Cap Take 1 capsule (150 mg total) by mouth 2 (two) times daily with meals.    nitroGLYCERIN (NITROSTAT) 0.4 MG SL tablet Place 1 tablet (0.4 mg total) under the tongue every 5 (five) minutes as needed for Chest pain.    pravastatin (PRAVACHOL) 40 MG tablet Take 1 tablet (40 mg total) by mouth once daily.    sacubitriL-valsartan (ENTRESTO) 49-51 mg per tablet Take 1 tablet by mouth 2 (two) times daily.    FLUZONE QUAD 1354-0653, PF, 60 mcg (15 mcg x 4)/0.5 mL Syrg     metOLazone (ZAROXOLYN) 10 MG tablet Take 1 tablet (10 mg total) by mouth daily as needed (Swelling). (Patient not taking: Reported on 9/25/2022)    tadalafiL (CIALIS) 5 MG tablet TAKE 1 TABLET BY MOUTH DAILY AS NEEDED FOR ERECTILE DYSFUNCTION (Patient not taking: Reported on 9/25/2022)     Family History       Problem Relation (Age of Onset)    Alcohol abuse Father    Hypertension Mother    Kidney disease Father    No Known Problems Sister, Brother, Daughter, Son    Stroke Mother          Tobacco Use    Smoking status: Former     Packs/day: 1.00     Years: 25.00     Pack years: 25.00     Types: Cigarettes     Start date: 1/1/1979     Quit date: 3/3/2012     Years since quitting: 10.5    Smokeless tobacco: Former   Substance and Sexual Activity    Alcohol use: No    Drug use: No    Sexual activity: Yes     Partners: Female     Review of Systems   Constitutional:  Positive for fatigue.   HENT: Negative.     Eyes: Negative.    Respiratory: Negative.     Cardiovascular: Negative.    Gastrointestinal: Negative.    Genitourinary: Negative.    Musculoskeletal: Negative.     Skin: Negative.    Neurological: Negative.    Psychiatric/Behavioral: Negative.     Objective:     Vital Signs (Most Recent):  Temp: 98.1 °F (36.7 °C) (09/26/22 0031)  Pulse: 60 (09/26/22 0100)  Resp: 17 (09/26/22 0100)  BP: 108/69 (09/26/22 0100)  SpO2: 97 % (09/26/22 0100) Vital Signs (24h Range):  Temp:  [98 °F (36.7 °C)-98.6 °F (37 °C)] 98.1 °F (36.7 °C)  Pulse:  [60-79] 60  Resp:  [14-19] 17  SpO2:  [90 %-97 %] 97 %  BP: (105-118)/(56-71) 108/69     Weight: 92.5 kg (203 lb 14.8 oz)  Body mass index is 30.11 kg/m².    Physical Exam  Vitals and nursing note reviewed.   Constitutional:       General: He is not in acute distress.     Appearance: Normal appearance. He is obese. He is not ill-appearing, toxic-appearing or diaphoretic.   HENT:      Head: Normocephalic and atraumatic.      Mouth/Throat:      Mouth: Mucous membranes are dry.   Eyes:      Pupils: Pupils are equal, round, and reactive to light.   Cardiovascular:      Rate and Rhythm: Normal rate and regular rhythm.      Pulses: Normal pulses.      Heart sounds: Normal heart sounds.      Comments: Paced on monitor  Pulmonary:      Effort: Pulmonary effort is normal. No respiratory distress.      Breath sounds: Normal breath sounds. No wheezing, rhonchi or rales.   Abdominal:      General: Bowel sounds are normal. There is no distension.      Palpations: Abdomen is soft.      Tenderness: There is no abdominal tenderness.   Musculoskeletal:         General: No swelling.      Cervical back: Normal range of motion.   Skin:     General: Skin is warm and dry.      Capillary Refill: Capillary refill takes 2 to 3 seconds.   Neurological:      General: No focal deficit present.      Mental Status: He is alert and oriented to person, place, and time. Mental status is at baseline.   Psychiatric:         Mood and Affect: Mood normal.         Behavior: Behavior normal.         Thought Content: Thought content normal.         Judgment: Judgment normal.         CRANIAL  NERVES     CN III, IV, VI   Pupils are equal, round, and reactive to light.     Significant Labs: All pertinent labs within the past 24 hours have been reviewed.    Significant Imaging: I have reviewed all pertinent imaging results/findings within the past 24 hours.

## 2022-09-26 NOTE — H&P
[facility] Medicine  History & Physical    Patient Name: Ar Sharma  MRN: [id number]  Patient Class: IP- Inpatient  Admission Date: 9/25/2022  Attending Physician: Oswaldo Colbert MD   Primary Care Provider: Jc Elliott MD         Patient information was obtained from patient, spouse/SO, past medical records and ER records.     Subjective:     Principal Problem:Defibrillator discharge    Chief Complaint:   Chief Complaint   Patient presents with    Pacemaker Problem     Reports that defibrillator has fired multiple times today. EMS reports witnessed V-tach enroute. Presents awake, alert.        HPI: Ar Sharma is a 61-year-old male with a history of hypothyroid, CKD, CAD, HTN, HLD, YOEL who presented to the ED for evaluation after his AICD defibrillated him. Patient reports having 6 episodes of being defibrillated tonight. He denies any chest pain, shortness of breath, or syncope. He has a history of CHF and has required ablation is in the past for refractory V-tach. He reports he has been more fatigued lately and can barely finish cutting the grass at home without taking many breaks. In the ED: BNP 2304, Na 127. CXR: No detrimental change since November 26, 2021. EKG with NSR RBBB. Patient will be admitted to [facility] for further care and cardiology evaluation.      Past Medical History:   Diagnosis Date    CKD (chronic kidney disease) stage 4, GFR 15-29 ml/min     Congestive heart failure (CHF) 2015    Edema     Heart attack     HLD (hyperlipidemia)     Hypertension     Hyperuricemia     Hypocalcemia     Renal cyst, left     Secondary hyperparathyroidism     Thyroid disease     V tach     Vitamin D deficiency        Past Surgical History:   Procedure Laterality Date    ablations  03/05/2018    albations  02/01/2018    COLONOSCOPY N/A 12/07/2018    Procedure: COLONOSCOPY/suprep;  Surgeon: Ananya Morillo MD;  Location: [facility];  Service:  Endoscopy;  Laterality: N/A;    defibulater N/A 2016    ESOPHAGOGASTRODUODENOSCOPY N/A 12/07/2018    Procedure: EGD (ESOPHAGOGASTRODUODENOSCOPY);  Surgeon: Ananya Morillo MD;  Location: [facility] ENDO;  Service: Endoscopy;  Laterality: N/A;    JOINT REPLACEMENT Bilateral 10/2016    knees, bilat    LEFT HEART CATHETERIZATION N/A 12/16/2020    Procedure: Left heart cath;  Surgeon: Shahram Stewart MD;  Location: [facility] CATH LAB/EP;  Service: Cardiology;  Laterality: N/A;       Review of patient's allergies indicates:   Allergen Reactions    Lokelma [sodium zirconium cyclosilicate] Other (See Comments)     Fluid retention, weight gain, CHF excerebration, severe constipation    Atorvastatin Other (See Comments)     Joint pain       No current facility-administered medications on file prior to encounter.     Current Outpatient Medications on File Prior to Encounter   Medication Sig    allopurinoL (ZYLOPRIM) 300 MG tablet Take 1 tablet (300 mg total) by mouth once daily.    amiodarone (PACERONE) 200 MG Tab TAKE 1 TABLET BY MOUTH EVERY DAY (Patient taking differently: Take 200 mg by mouth once daily.)    aspirin (ECOTRIN) 81 MG EC tablet Take 81 mg by mouth once daily.    carvediloL (COREG) 25 MG tablet TAKE 1 TABLET BY MOUTH TWICE A DAY WITH FOOD (Patient taking differently: Take 25 mg by mouth 2 (two) times daily with meals.)    ergocalciferol (ERGOCALCIFEROL) 50,000 unit Cap Take 1 capsule (50,000 Units total) by mouth every 7 days.    furosemide (LASIX) 80 MG tablet Take 1 tablet (80 mg total) by mouth 3 (three) times daily as needed (Swelling).    levothyroxine (SYNTHROID, LEVOTHROID) 175 MCG tablet TAKE 1 TABLET BY MOUTH ONCE DAILY. (Patient taking differently: Take 175 mcg by mouth once daily.)    mexiletine (MEXITIL) 150 MG Cap Take 1 capsule (150 mg total) by mouth 2 (two) times daily with meals.    nitroGLYCERIN (NITROSTAT) 0.4 MG SL tablet Place 1 tablet (0.4 mg total) under the tongue every 5 (five)  minutes as needed for Chest pain.    pravastatin (PRAVACHOL) 40 MG tablet Take 1 tablet (40 mg total) by mouth once daily.    sacubitriL-valsartan (ENTRESTO) 49-51 mg per tablet Take 1 tablet by mouth 2 (two) times daily.    FLUZONE QUAD 2748-8857, PF, 60 mcg (15 mcg x 4)/0.5 mL Syrg     metOLazone (ZAROXOLYN) 10 MG tablet Take 1 tablet (10 mg total) by mouth daily as needed (Swelling). (Patient not taking: Reported on 9/25/2022)    tadalafiL (CIALIS) 5 MG tablet TAKE 1 TABLET BY MOUTH DAILY AS NEEDED FOR ERECTILE DYSFUNCTION (Patient not taking: Reported on 9/25/2022)     Family History       Problem Relation (Age of Onset)    Alcohol abuse Father    Hypertension Mother    Kidney disease Father    No Known Problems Sister, Brother, Daughter, Son    Stroke Mother          Tobacco Use    Smoking status: Former     Packs/day: 1.00     Years: 25.00     Pack years: 25.00     Types: Cigarettes     Start date: 1/1/1979     Quit date: 3/3/2012     Years since quitting: 10.5    Smokeless tobacco: Former   Substance and Sexual Activity    Alcohol use: No    Drug use: No    Sexual activity: Yes     Partners: Female     Review of Systems   Constitutional:  Positive for fatigue.   HENT: Negative.     Eyes: Negative.    Respiratory: Negative.     Cardiovascular: Negative.    Gastrointestinal: Negative.    Genitourinary: Negative.    Musculoskeletal: Negative.    Skin: Negative.    Neurological: Negative.    Psychiatric/Behavioral: Negative.     Objective:     Vital Signs (Most Recent):  Temp: 98.1 °F (36.7 °C) (09/26/22 0031)  Pulse: 60 (09/26/22 0100)  Resp: 17 (09/26/22 0100)  BP: 108/69 (09/26/22 0100)  SpO2: 97 % (09/26/22 0100) Vital Signs (24h Range):  Temp:  [98 °F (36.7 °C)-98.6 °F (37 °C)] 98.1 °F (36.7 °C)  Pulse:  [60-79] 60  Resp:  [14-19] 17  SpO2:  [90 %-97 %] 97 %  BP: (105-118)/(56-71) 108/69     Weight: 92.5 kg (203 lb 14.8 oz)  Body mass index is 30.11 kg/m².    Physical Exam  Vitals and nursing note  reviewed.   Constitutional:       General: He is not in acute distress.     Appearance: Normal appearance. He is obese. He is not ill-appearing, toxic-appearing or diaphoretic.   HENT:      Head: Normocephalic and atraumatic.      Mouth/Throat:      Mouth: Mucous membranes are dry.   Eyes:      Pupils: Pupils are equal, round, and reactive to light.   Cardiovascular:      Rate and Rhythm: Normal rate and regular rhythm.      Pulses: Normal pulses.      Heart sounds: Normal heart sounds.      Comments: Paced on monitor  Pulmonary:      Effort: Pulmonary effort is normal. No respiratory distress.      Breath sounds: Normal breath sounds. No wheezing, rhonchi or rales.   Abdominal:      General: Bowel sounds are normal. There is no distension.      Palpations: Abdomen is soft.      Tenderness: There is no abdominal tenderness.   Musculoskeletal:         General: No swelling.      Cervical back: Normal range of motion.   Skin:     General: Skin is warm and dry.      Capillary Refill: Capillary refill takes 2 to 3 seconds.   Neurological:      General: No focal deficit present.      Mental Status: He is alert and oriented to person, place, and time. Mental status is at baseline.   Psychiatric:         Mood and Affect: Mood normal.         Behavior: Behavior normal.         Thought Content: Thought content normal.         Judgment: Judgment normal.         CRANIAL NERVES     CN III, IV, VI   Pupils are equal, round, and reactive to light.     Significant Labs: All pertinent labs within the past 24 hours have been reviewed.    Significant Imaging: I have reviewed all pertinent imaging results/findings within the past 24 hours.    Assessment/Plan:     * Defibrillator discharge  S/P ablation of ventricular arrhythmia  -Patient's pacemaker was interrogated and demonstrated over 100 episodes of V-tach since September 11th. Today the patient was defibrillated 6 times.   -EKG with Normal sinus rhythm. Right bundle branch block.    -CXR: No detrimental change since November 26, 2021  -Consult cardiology    Hyponatremia  -Na 127  -Monitor response to diuresis once home meds resumed    Essential (primary) hypertension  Chronic, Latest blood pressure and vitals reviewed-   Temp:  [98 °F (36.7 °C)-98.6 °F (37 °C)]   Pulse:  [60-69]   Resp:  [15-19]   BP: (107-112)/(57-64)   SpO2:  [90 %-96 %] .   Home meds for hypertension were reviewed and noted below.   Hypertension Medications             carvediloL (COREG) 25 MG tablet TAKE 1 TABLET BY MOUTH TWICE A DAY WITH FOOD    furosemide (LASIX) 80 MG tablet Take 1 tablet (80 mg total) by mouth 3 (three) times daily as needed (Swelling).    nitroGLYCERIN (NITROSTAT) 0.4 MG SL tablet Place 1 tablet (0.4 mg total) under the tongue every 5 (five) minutes as needed for Chest pain.    sacubitriL-valsartan (ENTRESTO) 49-51 mg per tablet Take 1 tablet by mouth 2 (two) times daily.    metOLazone (ZAROXOLYN) 10 MG tablet Take 1 tablet (10 mg total) by mouth daily as needed (Swelling).   -Resume home BP medications  -Monitor and trend vital signs q4hr  -Will utilize p.r.n. blood pressure medication only if patient's blood pressure greater than  180/110 and he develops symptoms such as worsening chest pain or shortness of breath.    Hyperlipidemia  -Resume home statin    HFrEF (heart failure with reduced ejection fraction)  Chronic systolic CHF, NYHA class 2 and MARIFER/AHA stage C  -No c/o CP or SOB  -Monitor on telemetry  -Monitor and trend BMP, Mg, and renal function; keep K >4, Mg >2  -Sodium restriction (<2g/d), fluid restriction (<2L)   -Monitor for signs of fluid overload: RR>30, O2 sat<92%, weight gain of >3 lbs in 24 hours, or urinary output <160ml/8hr  -Continue home meds    CKD (chronic kidney disease) stage 4, GFR 15-29 ml/min  CrCl cannot be calculated (Unknown ideal weight.). according to latest data  -Continue to monitor renal function with daily labs   -Avoid nephrotoxins  -Renally dose all  medications   -Monitor events that may lead to decreased renal perfusion (hypovolemia, hypotension, sepsis)  -Monitor urine output to assure that no obstruction precipitates worsening in GFR    Coronary artery disease involving native coronary artery of native heart without angina pectoris  -Continue ASA, statin, BB    Hypothyroidism  -Resume home synthroid      VTE Risk Mitigation (From admission, onward)         Ordered     heparin (porcine) injection 5,000 Units  Every 8 hours         09/25/22 2220     IP VTE HIGH RISK PATIENT  Once         09/25/22 2220     Place sequential compression device  Until discontinued         09/25/22 2220              Critical care time spent on the evaluation and treatment of severe organ dysfunction, review of pertinent labs and imaging studies, discussions with consulting providers and discussions with patient/family: 70 minutes.     Martha Casey DNP, [facility]  Hospitalist   Department of Hospital Medicine   [facility]   Office #: [phone]

## 2022-09-26 NOTE — ASSESSMENT & PLAN NOTE
Chronic, Latest blood pressure and vitals reviewed-   Temp:  [98 °F (36.7 °C)-98.6 °F (37 °C)]   Pulse:  [60-69]   Resp:  [15-19]   BP: (107-112)/(57-64)   SpO2:  [90 %-96 %] .   Home meds for hypertension were reviewed and noted below.   Hypertension Medications             carvediloL (COREG) 25 MG tablet TAKE 1 TABLET BY MOUTH TWICE A DAY WITH FOOD    furosemide (LASIX) 80 MG tablet Take 1 tablet (80 mg total) by mouth 3 (three) times daily as needed (Swelling).    nitroGLYCERIN (NITROSTAT) 0.4 MG SL tablet Place 1 tablet (0.4 mg total) under the tongue every 5 (five) minutes as needed for Chest pain.    sacubitriL-valsartan (ENTRESTO) 49-51 mg per tablet Take 1 tablet by mouth 2 (two) times daily.    metOLazone (ZAROXOLYN) 10 MG tablet Take 1 tablet (10 mg total) by mouth daily as needed (Swelling).   -Resume home BP medications  -Monitor and trend vital signs q4hr  -Will utilize p.r.n. blood pressure medication only if patient's blood pressure greater than  180/110 and he develops symptoms such as worsening chest pain or shortness of breath.

## 2022-09-27 LAB
ALBUMIN SERPL BCP-MCNC: 3.4 G/DL (ref 3.5–5.2)
ALP SERPL-CCNC: 80 U/L (ref 55–135)
ALT SERPL W/O P-5'-P-CCNC: 17 U/L (ref 10–44)
ANION GAP SERPL CALC-SCNC: 9 MMOL/L (ref 8–16)
AST SERPL-CCNC: 16 U/L (ref 10–40)
BASOPHILS # BLD AUTO: 0.08 K/UL (ref 0–0.2)
BASOPHILS NFR BLD: 1.1 % (ref 0–1.9)
BILIRUB SERPL-MCNC: 1.4 MG/DL (ref 0.1–1)
BUN SERPL-MCNC: 75 MG/DL (ref 8–23)
CALCIUM SERPL-MCNC: 9.2 MG/DL (ref 8.7–10.5)
CHLORIDE SERPL-SCNC: 97 MMOL/L (ref 95–110)
CO2 SERPL-SCNC: 28 MMOL/L (ref 23–29)
CREAT SERPL-MCNC: 3.6 MG/DL (ref 0.5–1.4)
DIFFERENTIAL METHOD: ABNORMAL
EOSINOPHIL # BLD AUTO: 0.2 K/UL (ref 0–0.5)
EOSINOPHIL NFR BLD: 3.3 % (ref 0–8)
ERYTHROCYTE [DISTWIDTH] IN BLOOD BY AUTOMATED COUNT: 20.3 % (ref 11.5–14.5)
EST. GFR  (NO RACE VARIABLE): 18 ML/MIN/1.73 M^2
GLUCOSE SERPL-MCNC: 99 MG/DL (ref 70–110)
HCT VFR BLD AUTO: 41.9 % (ref 40–54)
HGB BLD-MCNC: 13.9 G/DL (ref 14–18)
IMM GRANULOCYTES # BLD AUTO: 0.04 K/UL (ref 0–0.04)
IMM GRANULOCYTES NFR BLD AUTO: 0.6 % (ref 0–0.5)
LYMPHOCYTES # BLD AUTO: 0.5 K/UL (ref 1–4.8)
LYMPHOCYTES NFR BLD: 7.3 % (ref 18–48)
MAGNESIUM SERPL-MCNC: 2.2 MG/DL (ref 1.6–2.6)
MCH RBC QN AUTO: 24 PG (ref 27–31)
MCHC RBC AUTO-ENTMCNC: 33.2 G/DL (ref 32–36)
MCV RBC AUTO: 72 FL (ref 82–98)
MONOCYTES # BLD AUTO: 0.8 K/UL (ref 0.3–1)
MONOCYTES NFR BLD: 11.2 % (ref 4–15)
NEUTROPHILS # BLD AUTO: 5.6 K/UL (ref 1.8–7.7)
NEUTROPHILS NFR BLD: 76.5 % (ref 38–73)
NRBC BLD-RTO: 0 /100 WBC
PLATELET # BLD AUTO: 365 K/UL (ref 150–450)
PMV BLD AUTO: ABNORMAL FL (ref 9.2–12.9)
POTASSIUM SERPL-SCNC: 4.5 MMOL/L (ref 3.5–5.1)
PROT SERPL-MCNC: 6.4 G/DL (ref 6–8.4)
RBC # BLD AUTO: 5.8 M/UL (ref 4.6–6.2)
SODIUM SERPL-SCNC: 134 MMOL/L (ref 136–145)
WBC # BLD AUTO: 7.25 K/UL (ref 3.9–12.7)

## 2022-09-27 PROCEDURE — 93454 CORONARY ARTERY ANGIO S&I: CPT | Mod: 26,,, | Performed by: INTERNAL MEDICINE

## 2022-09-27 PROCEDURE — 25000003 PHARM REV CODE 250: Performed by: INTERNAL MEDICINE

## 2022-09-27 PROCEDURE — 25500020 PHARM REV CODE 255: Performed by: INTERNAL MEDICINE

## 2022-09-27 PROCEDURE — 85025 COMPLETE CBC W/AUTO DIFF WBC: CPT | Performed by: NURSE PRACTITIONER

## 2022-09-27 PROCEDURE — 99900035 HC TECH TIME PER 15 MIN (STAT)

## 2022-09-27 PROCEDURE — 63600175 PHARM REV CODE 636 W HCPCS: Performed by: INTERNAL MEDICINE

## 2022-09-27 PROCEDURE — C1769 GUIDE WIRE: HCPCS | Performed by: INTERNAL MEDICINE

## 2022-09-27 PROCEDURE — 27000221 HC OXYGEN, UP TO 24 HOURS

## 2022-09-27 PROCEDURE — 93454 CORONARY ARTERY ANGIO S&I: CPT | Performed by: INTERNAL MEDICINE

## 2022-09-27 PROCEDURE — 63600175 PHARM REV CODE 636 W HCPCS: Performed by: NURSE PRACTITIONER

## 2022-09-27 PROCEDURE — C1894 INTRO/SHEATH, NON-LASER: HCPCS | Performed by: INTERNAL MEDICINE

## 2022-09-27 PROCEDURE — 94761 N-INVAS EAR/PLS OXIMETRY MLT: CPT

## 2022-09-27 PROCEDURE — C1887 CATHETER, GUIDING: HCPCS | Performed by: INTERNAL MEDICINE

## 2022-09-27 PROCEDURE — 93454 PR CATH PLACE/CORONARY ANGIO, IMG SUPER/INTERP: ICD-10-PCS | Mod: 26,,, | Performed by: INTERNAL MEDICINE

## 2022-09-27 PROCEDURE — 83735 ASSAY OF MAGNESIUM: CPT | Performed by: NURSE PRACTITIONER

## 2022-09-27 PROCEDURE — 25000003 PHARM REV CODE 250

## 2022-09-27 PROCEDURE — 20000000 HC ICU ROOM

## 2022-09-27 PROCEDURE — 25000003 PHARM REV CODE 250: Performed by: NURSE PRACTITIONER

## 2022-09-27 PROCEDURE — 80053 COMPREHEN METABOLIC PANEL: CPT | Performed by: NURSE PRACTITIONER

## 2022-09-27 PROCEDURE — 36415 COLL VENOUS BLD VENIPUNCTURE: CPT | Performed by: NURSE PRACTITIONER

## 2022-09-27 RX ORDER — ONDANSETRON 8 MG/1
8 TABLET, ORALLY DISINTEGRATING ORAL EVERY 8 HOURS PRN
Status: DISCONTINUED | OUTPATIENT
Start: 2022-09-27 | End: 2022-09-28 | Stop reason: HOSPADM

## 2022-09-27 RX ORDER — LIDOCAINE HYDROCHLORIDE 10 MG/ML
INJECTION, SOLUTION EPIDURAL; INFILTRATION; INTRACAUDAL; PERINEURAL
Status: DISCONTINUED | OUTPATIENT
Start: 2022-09-27 | End: 2022-09-27 | Stop reason: HOSPADM

## 2022-09-27 RX ORDER — ACETAMINOPHEN 325 MG/1
650 TABLET ORAL EVERY 4 HOURS PRN
Status: DISCONTINUED | OUTPATIENT
Start: 2022-09-27 | End: 2022-09-28 | Stop reason: HOSPADM

## 2022-09-27 RX ORDER — SODIUM CHLORIDE 9 MG/ML
INJECTION, SOLUTION INTRAVENOUS CONTINUOUS
Status: ACTIVE | OUTPATIENT
Start: 2022-09-27 | End: 2022-09-27

## 2022-09-27 RX ORDER — DIPHENHYDRAMINE HYDROCHLORIDE 50 MG/ML
INJECTION INTRAMUSCULAR; INTRAVENOUS
Status: DISCONTINUED | OUTPATIENT
Start: 2022-09-27 | End: 2022-09-27 | Stop reason: HOSPADM

## 2022-09-27 RX ORDER — SODIUM CHLORIDE 9 MG/ML
INJECTION, SOLUTION INTRAMUSCULAR; INTRAVENOUS; SUBCUTANEOUS
Status: DISCONTINUED | OUTPATIENT
Start: 2022-09-27 | End: 2022-09-27 | Stop reason: HOSPADM

## 2022-09-27 RX ORDER — FENTANYL CITRATE 50 UG/ML
INJECTION, SOLUTION INTRAMUSCULAR; INTRAVENOUS
Status: DISCONTINUED | OUTPATIENT
Start: 2022-09-27 | End: 2022-09-27 | Stop reason: HOSPADM

## 2022-09-27 RX ORDER — NOREPINEPHRINE BITARTRATE/D5W 4MG/250ML
PLASTIC BAG, INJECTION (ML) INTRAVENOUS
Status: DISPENSED
Start: 2022-09-27 | End: 2022-09-28

## 2022-09-27 RX ORDER — HEPARIN SODIUM 200 [USP'U]/100ML
INJECTION, SOLUTION INTRAVENOUS
Status: DISCONTINUED | OUTPATIENT
Start: 2022-09-27 | End: 2022-09-28 | Stop reason: HOSPADM

## 2022-09-27 RX ORDER — IODIXANOL 320 MG/ML
INJECTION, SOLUTION INTRAVASCULAR
Status: DISCONTINUED | OUTPATIENT
Start: 2022-09-27 | End: 2022-09-27 | Stop reason: HOSPADM

## 2022-09-27 RX ORDER — MIDAZOLAM HYDROCHLORIDE 1 MG/ML
INJECTION, SOLUTION INTRAMUSCULAR; INTRAVENOUS
Status: DISCONTINUED | OUTPATIENT
Start: 2022-09-27 | End: 2022-09-27 | Stop reason: HOSPADM

## 2022-09-27 RX ADMIN — MEXILETINE HYDROCHLORIDE 150 MG: 150 CAPSULE ORAL at 08:09

## 2022-09-27 RX ADMIN — FUROSEMIDE 40 MG: 40 TABLET ORAL at 08:09

## 2022-09-27 RX ADMIN — AMIODARONE HYDROCHLORIDE 200 MG: 200 TABLET ORAL at 08:09

## 2022-09-27 RX ADMIN — MEXILETINE HYDROCHLORIDE 150 MG: 150 CAPSULE ORAL at 04:09

## 2022-09-27 RX ADMIN — HEPARIN SODIUM 5000 UNITS: 5000 INJECTION INTRAVENOUS; SUBCUTANEOUS at 10:09

## 2022-09-27 RX ADMIN — SACUBITRIL AND VALSARTAN 1 TABLET: 24; 26 TABLET, FILM COATED ORAL at 08:09

## 2022-09-27 RX ADMIN — MUPIROCIN: 20 OINTMENT TOPICAL at 08:09

## 2022-09-27 RX ADMIN — HEPARIN SODIUM 5000 UNITS: 5000 INJECTION INTRAVENOUS; SUBCUTANEOUS at 04:09

## 2022-09-27 RX ADMIN — MUPIROCIN: 20 OINTMENT TOPICAL at 09:09

## 2022-09-27 RX ADMIN — ASPIRIN 81 MG: 81 TABLET, COATED ORAL at 08:09

## 2022-09-27 RX ADMIN — HEPARIN SODIUM 5000 UNITS: 5000 INJECTION INTRAVENOUS; SUBCUTANEOUS at 06:09

## 2022-09-27 RX ADMIN — PRAVASTATIN SODIUM 40 MG: 40 TABLET ORAL at 08:09

## 2022-09-27 RX ADMIN — LEVOTHYROXINE SODIUM 175 MCG: 25 TABLET ORAL at 06:09

## 2022-09-27 RX ADMIN — SACUBITRIL AND VALSARTAN 1 TABLET: 24; 26 TABLET, FILM COATED ORAL at 09:09

## 2022-09-27 RX ADMIN — CARVEDILOL 12.5 MG: 12.5 TABLET, FILM COATED ORAL at 08:09

## 2022-09-27 RX ADMIN — SODIUM CHLORIDE: 0.9 INJECTION, SOLUTION INTRAVENOUS at 02:09

## 2022-09-27 NOTE — NURSING
BP 77/52 pt asymptomatic. MD Molly. At bedside. NAD. Wife at bedside updated on POC. Verbalizes understanding. Verbal order to get a new H&H at 1800. Safety maintained. Will cont to monitor.   
MD Bryson notified about pt MAP trending in the 50's. Pt Aox4. NAD. Verbal order to hold Coreg. Safety maintained. Will cont to monitor.   
MD Filemon and Ghanshyam Howard MD notified about pt's SBP trending in the 70's and MAP trending in the 50's after 250cc bolus given. Pt is asymptomatic NAD. Pt states that this is normal for him and that he feels fine. Per MD Anita will hold off on giving additional bolus due to CHF exab admitting dx. No new orders given. Safety maintained, will cont to monitor.   
Yes

## 2022-09-27 NOTE — PROGRESS NOTES
Progress Note  [facility]    Attending: Esther  Admit Date: 9/25/2022  Today's Date: 09/27/2022    SUBJECTIVE:     NAEON. Pt denies any further AICD firings since he has been here. He did have persistent hypotension yesterday w/ MAP's ~60 but he was completely asymptomatic and mentating appropriately upon exam. Cardiology will plan to take him for an angiogram this AM and afterwards he potentially can step down.    Review of Systems   Constitutional:  Negative for chills, fever and malaise/fatigue.   HENT:  Negative for congestion, sinus pain and sore throat.    Eyes:  Negative for photophobia.   Respiratory:  Negative for cough, sputum production and shortness of breath.    Cardiovascular:  Negative for chest pain, palpitations and leg swelling.   Gastrointestinal:  Negative for abdominal pain, constipation, diarrhea, nausea and vomiting.   Genitourinary:  Negative for dysuria and hematuria.   Musculoskeletal:  Negative for myalgias.   Skin:  Negative for rash.   Neurological:  Negative for sensory change, weakness and headaches.     OBJECTIVE:     Vital Signs Trends/Hx Reviewed  Vitals:    09/27/22 0830 09/27/22 0845 09/27/22 0900 09/27/22 0934   BP: (!) 102/55  97/60    Pulse: 65 70 60    Resp: 17 (!) 25 18    Temp:       TempSrc:       SpO2: (!) 92% 95% (!) 92%    Weight:    93 kg (205 lb 0.4 oz)   Height:                Physical Exam  Constitutional:       General: He is not in acute distress.  HENT:      Head: Normocephalic and atraumatic.      Right Ear: External ear normal.      Left Ear: External ear normal.      Nose: Nose normal.   Eyes:      Extraocular Movements: Extraocular movements intact.      Conjunctiva/sclera: Conjunctivae normal.      Pupils: Pupils are equal, round, and reactive to light.   Cardiovascular:      Rate and Rhythm: Normal rate and regular rhythm.      Pulses: Normal pulses.      Heart sounds: Normal heart sounds. No murmur heard.    No friction rub. No  gallop.   Pulmonary:      Effort: Pulmonary effort is normal. No respiratory distress.      Breath sounds: Normal breath sounds. No wheezing or rhonchi.   Abdominal:      General: Bowel sounds are normal. There is no distension.      Palpations: There is no mass.      Tenderness: There is no abdominal tenderness.   Musculoskeletal:         General: No swelling or tenderness. Normal range of motion.      Cervical back: Normal range of motion. No tenderness.   Lymphadenopathy:      Cervical: No cervical adenopathy.   Skin:     General: Skin is warm and dry.      Findings: No rash.   Neurological:      General: No focal deficit present.      Mental Status: He is alert.       Laboratory:  Recent Labs   Lab 09/27/22  0437   WBC 7.25   RBC 5.80   HGB 13.9*   HCT 41.9      MCV 72*   MCH 24.0*   MCHC 33.2     Recent Labs   Lab 09/27/22 0437   *   K 4.5   CL 97   CO2 28   BUN 75*   CREATININE 3.6*   MG 2.2     No results for input(s): PH, PCO2, PO2, HCO3, POCSATURATED, BE in the last 24 hours.      Chest Imaging:   No new chest imaging.     ASSESSMENT & RECOMMENDATIONS     62 yo M w/ PMHx of HFrEF (ECHO 7/22 EF 40%) s/p AICD replacement in 2016, HTN, CAD, HLD, CKD4, and hypothyroidism who presents with AICD firing and need for cardiac evaluation.       Neuro/Psych  No known issues at this time.     CV  #HTN  #HFrEF  #CAD  #Defibrillator discharge  -Pt reported increased firing from AICD over the last several days and increasing fatigue   -Device was interrogated in the ED and noted >100 episodes of Vtach since 9/11 and had 6 episodes of defibrillation 9/26  -Cardiology was consulted  -Pt follows closely w/ Dr Stewart outpt last seen 7/21  -At last visit his Coreg had been increased 2/2 episodes of heart racing and shock  -Last ECHO 8/22 w/ EF 40-45%  -EKG on admit w/ NSR and RBBB  -Trop neg on admit but BNP >2.3k  -Restarted home Amiodarone 200mg qd, Mexiletine 150mg BID, Entresto 49-51 BID, Coreg 12.5 mg BID  (from 25qd), Asa 81 qd, and Pravastatin 40mg qd  -On lasix 80mg TID prn at home  -Will change to scheduled lasix 40mg PO qd  -Cardiology to take pt for LHC today  -Cnt to monitor UOP and E- while diuresing      Pulm  No issues at this time     FEN/GI  F: None   E: As above Mg 2.2/ Phos  N: Cardiac     RENAL  UOP: 2450cc in last 24 hrs     #CKD4  -Follows with Nephrology outpt  -BUN/Cr: 75/3.6 still around BL  -Continue to monitor renal status and urine output     Heme  H/H 13.9/41.9; stable  WBC 7.25  DVT prophylaxis: Heparin (prophylactic)     Endo  #Hypothyroidism  -Cnt home synthroid 175mcg qd      ID  No known issues at this time.    Ghanshyam Howard M.D.  [facility]   09/27/2022 10:44 AM

## 2022-09-27 NOTE — ASSESSMENT & PLAN NOTE
Estimated Creatinine Clearance: 24 mL/min (A) (based on SCr of 3.6 mg/dL (H)). according to latest data  -Continue to monitor renal function with daily labs   -Avoid nephrotoxins  -Renally dose all medications   -Monitor events that may lead to decreased renal perfusion (hypovolemia, hypotension, sepsis)  -Monitor urine output to assure that no obstruction precipitates worsening in GFR  -hydration with catheterization

## 2022-09-27 NOTE — PROGRESS NOTES
[facility] Medicine  Progress Note    Patient Name: Ar Sharma  MRN: [id number]  Patient Class: IP- Inpatient   Admission Date: 9/25/2022  Length of Stay: 2 days  Attending Physician: Rigoberto Massey, *  Primary Care Provider: Jc Elliott MD        Subjective:     Principal Problem:Defibrillator discharge        HPI:  Ar Sharma is a 61-year-old male with a history of hypothyroid, CKD, CAD, HTN, HLD, YOEL who presented to the ED for evaluation after his AICD defibrillated him. Patient reports having 6 episodes of being defibrillated tonight. He denies any chest pain, shortness of breath, or syncope. He has a history of CHF and has required ablation is in the past for refractory V-tach. He reports he has been more fatigued lately and can barely finish cutting the grass at home without taking many breaks. In the ED: BNP 2304, Na 127. CXR: No detrimental change since November 26, 2021. EKG with NSR RBBB. Patient will be admitted to [facility] for further care and cardiology evaluation.      Overview/Hospital Course:  No notes on file    Interval History:  No further palpitations today.  Denies any chest pain with his previous episodes.  Have reviewed his home supplements which he started taking on September 8th, which include dandelion root, ginseng. Discussed with Cardiology and planned for left heart catheterization later today.    Review of Systems   Constitutional:  Positive for fatigue.   Respiratory:  Negative for shortness of breath.    Cardiovascular:  Positive for palpitations. Negative for chest pain.   Gastrointestinal:  Negative for abdominal pain, nausea and vomiting.   Neurological:  Negative for weakness.   Objective:     Vital Signs (Most Recent):  Temp: 98 °F (36.7 °C) (09/27/22 0715)  Pulse: 60 (09/27/22 0900)  Resp: 18 (09/27/22 0900)  BP: 97/60 (09/27/22 0900)  SpO2: (!) 92 % (09/27/22 0900)   Vital Signs (24h Range):  Temp:  [97.7 °F (36.5  °C)-98.1 °F (36.7 °C)] 98 °F (36.7 °C)  Pulse:  [60-82] 60  Resp:  [15-40] 18  SpO2:  [87 %-95 %] 92 %  BP: ()/(43-71) 97/60     Weight: 93 kg (205 lb 0.4 oz)  Body mass index is 30.28 kg/m².    Intake/Output Summary (Last 24 hours) at 9/27/2022 1136  Last data filed at 9/27/2022 0900  Gross per 24 hour   Intake 219.52 ml   Output 2115 ml   Net -1895.48 ml      Physical Exam  Vitals and nursing note reviewed.   Constitutional:       General: He is not in acute distress.     Appearance: Normal appearance. He is obese. He is not ill-appearing, toxic-appearing or diaphoretic.   HENT:      Head: Normocephalic and atraumatic.      Mouth/Throat:      Mouth: Mucous membranes are dry.   Eyes:      Pupils: Pupils are equal, round, and reactive to light.   Cardiovascular:      Rate and Rhythm: Normal rate and regular rhythm.      Pulses: Normal pulses.      Heart sounds: Normal heart sounds.      Comments: Paced on monitor  Pulmonary:      Effort: Pulmonary effort is normal. No respiratory distress.      Breath sounds: Normal breath sounds. No wheezing, rhonchi or rales.   Abdominal:      General: Bowel sounds are normal. There is no distension.      Palpations: Abdomen is soft.      Tenderness: There is no abdominal tenderness.   Musculoskeletal:         General: No swelling.      Cervical back: Normal range of motion.   Skin:     General: Skin is warm and dry.      Capillary Refill: Capillary refill takes 2 to 3 seconds.   Neurological:      General: No focal deficit present.      Mental Status: He is alert and oriented to person, place, and time. Mental status is at baseline.   Psychiatric:         Mood and Affect: Mood normal.         Behavior: Behavior normal.         Thought Content: Thought content normal.         Judgment: Judgment normal.       Significant Labs: All pertinent labs within the past 24 hours have been reviewed.    Significant Imaging: I have reviewed all pertinent imaging results/findings within  the past 24 hours.      Assessment/Plan:      * Defibrillator discharge  S/P ablation of ventricular arrhythmia  -Patient's pacemaker was interrogated and demonstrated over 100 episodes of V-tach since September 11th. Today the patient was defibrillated 6 times.   -EKG with Normal sinus rhythm. Right bundle branch block.   -CXR: No detrimental change since November 26, 2021  -Consult cardiology:  Plan for left heart catheterization today    V-tach  -leading to multiple defibrillator discharges  -cardiology following   -continue home amiodarone and mexiletine  -plan for left heart catheterization today      Hyponatremia  -Na 127 on admission  -Monitor response to diuresis once home meds resumed  -improving    Essential (primary) hypertension  Chronic, Latest blood pressure and vitals reviewed-   Temp:  [97.7 °F (36.5 °C)-98.1 °F (36.7 °C)]   Pulse:  [60-82]   Resp:  [15-40]   BP: ()/(43-71)   SpO2:  [87 %-95 %] .   Home meds for hypertension were reviewed and noted below.   Hypertension Medications             carvediloL (COREG) 25 MG tablet TAKE 1 TABLET BY MOUTH TWICE A DAY WITH FOOD    furosemide (LASIX) 80 MG tablet Take 1 tablet (80 mg total) by mouth 3 (three) times daily as needed (Swelling).    nitroGLYCERIN (NITROSTAT) 0.4 MG SL tablet Place 1 tablet (0.4 mg total) under the tongue every 5 (five) minutes as needed for Chest pain.    sacubitriL-valsartan (ENTRESTO) 49-51 mg per tablet Take 1 tablet by mouth 2 (two) times daily.    metOLazone (ZAROXOLYN) 10 MG tablet Take 1 tablet (10 mg total) by mouth daily as needed (Swelling).   -continue home BP medications  -Monitor and trend vital signs q4hr  -Will utilize p.r.n. blood pressure medication only if patient's blood pressure greater than  180/110 and he develops symptoms such as worsening chest pain or shortness of breath.    Hyperlipidemia  -Resume home statin    HFrEF (heart failure with reduced ejection fraction)  Chronic systolic CHF, NYHA class 2  and MARIFER/AHA stage C  -No c/o CP or SOB  -Monitor on telemetry  -Monitor and trend BMP, Mg, and renal function; keep K >4, Mg >2  -Sodium restriction (<2g/d), fluid restriction (<2L)   -Monitor for signs of fluid overload: RR>30, O2 sat<92%, weight gain of >3 lbs in 24 hours, or urinary output <160ml/8hr  -Continue home meds    CKD (chronic kidney disease) stage 4, GFR 15-29 ml/min  Estimated Creatinine Clearance: 24 mL/min (A) (based on SCr of 3.6 mg/dL (H)). according to latest data  -Continue to monitor renal function with daily labs   -Avoid nephrotoxins  -Renally dose all medications   -Monitor events that may lead to decreased renal perfusion (hypovolemia, hypotension, sepsis)  -Monitor urine output to assure that no obstruction precipitates worsening in GFR  -hydration with catheterization    Coronary artery disease involving native coronary artery of native heart without angina pectoris  -Continue ASA, statin, BB    Hypothyroidism  -continue home synthroid      VTE Risk Mitigation (From admission, onward)         Ordered     heparin (porcine) injection 5,000 Units  Every 8 hours         09/25/22 2220     IP VTE HIGH RISK PATIENT  Once         09/25/22 2220     Place sequential compression device  Until discontinued         09/25/22 2220                Discharge Planning   SLAVA: 9/28/2022     Code Status: Full Code   Is the patient medically ready for discharge?:     Reason for patient still in hospital (select all that apply): Patient trending condition  Discharge Plan A: Home with family            Critical care time spent on the evaluation and treatment of severe organ dysfunction, review of pertinent labs and imaging studies, discussions with consulting providers and discussions with patient/family: 31 minutes.      Rigoberto Massey MD  Department of Hospital Medicine   [place] - Intensive Care

## 2022-09-27 NOTE — ASSESSMENT & PLAN NOTE
S/P ablation of ventricular arrhythmia  -Patient's pacemaker was interrogated and demonstrated over 100 episodes of V-tach since September 11th. Today the patient was defibrillated 6 times.   -EKG with Normal sinus rhythm. Right bundle branch block.   -CXR: No detrimental change since November 26, 2021  -Consult cardiology:  Plan for left heart catheterization today

## 2022-09-27 NOTE — PLAN OF CARE
Problem: Adult Inpatient Plan of Care  Goal: Plan of Care Review  Outcome: Ongoing, Progressing  Goal: Patient-Specific Goal (Individualized)  Outcome: Ongoing, Progressing  Goal: Absence of Hospital-Acquired Illness or Injury  Outcome: Ongoing, Progressing  Goal: Optimal Comfort and Wellbeing  Outcome: Ongoing, Progressing  Goal: Readiness for Transition of Care  Outcome: Ongoing, Progressing     Problem: Skin Injury Risk Increased  Goal: Skin Health and Integrity  Outcome: Ongoing, Progressing     Problem: Fluid Volume Excess  Goal: Fluid Balance  Outcome: Ongoing, Progressing     Problem: Adjustment to Illness (Heart Failure)  Goal: Optimal Coping  Outcome: Ongoing, Progressing     Problem: Cardiac Output Decreased (Heart Failure)  Goal: Optimal Cardiac Output  Outcome: Ongoing, Progressing     Problem: Dysrhythmia (Heart Failure)  Goal: Stable Heart Rate and Rhythm  Outcome: Ongoing, Progressing     Problem: Fluid Imbalance (Heart Failure)  Goal: Fluid Balance  Outcome: Ongoing, Progressing     Problem: Functional Ability Impaired (Heart Failure)  Goal: Optimal Functional Ability  Outcome: Ongoing, Progressing     Problem: Adjustment to Illness (Chronic Kidney Disease)  Goal: Optimal Coping with Chronic Illness  Outcome: Ongoing, Progressing     Problem: Electrolyte Imbalance (Chronic Kidney Disease)  Goal: Electrolyte Balance  Outcome: Ongoing, Progressing     Problem: Fluid Volume Excess (Chronic Kidney Disease)  Goal: Fluid Balance  Outcome: Ongoing, Progressing     Problem: Functional Decline (Chronic Kidney Disease)  Goal: Optimal Functional Ability  Outcome: Ongoing, Progressing     Problem: Hematologic Alteration (Chronic Kidney Disease)  Goal: Absence of Anemia Signs and Symptoms  Outcome: Ongoing, Progressing     Problem: Oral Intake Inadequate (Chronic Kidney Disease)  Goal: Optimal Oral Intake  Outcome: Ongoing, Progressing     Problem: Pain (Chronic Kidney Disease)  Goal: Acceptable Pain  Control  Outcome: Ongoing, Progressing     Problem: Renal Function Impairment (Chronic Kidney Disease)  Goal: Minimize Renal Failure Effects  Outcome: Ongoing, Progressing

## 2022-09-27 NOTE — ASSESSMENT & PLAN NOTE
-leading to multiple defibrillator discharges  -cardiology following   -continue home amiodarone and mexiletine  -plan for left heart catheterization today

## 2022-09-27 NOTE — ASSESSMENT & PLAN NOTE
Chronic, Latest blood pressure and vitals reviewed-   Temp:  [97.7 °F (36.5 °C)-98.1 °F (36.7 °C)]   Pulse:  [60-82]   Resp:  [15-40]   BP: ()/(43-71)   SpO2:  [87 %-95 %] .   Home meds for hypertension were reviewed and noted below.   Hypertension Medications             carvediloL (COREG) 25 MG tablet TAKE 1 TABLET BY MOUTH TWICE A DAY WITH FOOD    furosemide (LASIX) 80 MG tablet Take 1 tablet (80 mg total) by mouth 3 (three) times daily as needed (Swelling).    nitroGLYCERIN (NITROSTAT) 0.4 MG SL tablet Place 1 tablet (0.4 mg total) under the tongue every 5 (five) minutes as needed for Chest pain.    sacubitriL-valsartan (ENTRESTO) 49-51 mg per tablet Take 1 tablet by mouth 2 (two) times daily.    metOLazone (ZAROXOLYN) 10 MG tablet Take 1 tablet (10 mg total) by mouth daily as needed (Swelling).   -continue home BP medications  -Monitor and trend vital signs q4hr  -Will utilize p.r.n. blood pressure medication only if patient's blood pressure greater than  180/110 and he develops symptoms such as worsening chest pain or shortness of breath.

## 2022-09-27 NOTE — INTERVAL H&P NOTE
The patient has been examined and the H&P has been reviewed:    I concur with the findings and no changes have occurred since H&P was written.    Anesthesia/Surgery risks, benefits and alternative options discussed and understood by patient/family.          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  Converted from [employer]:  Description - HYPERLIPIDEMIA      Essential (primary) hypertension [I10]  Yes     Formatting of this note might be different from the original.  Converted from [employer]:  Description - ESSENTIAL HYPERTENSION, BENIGN      HFrEF (heart failure with reduced ejection fraction) [I50.20]  Yes    CKD (chronic kidney disease) stage 4, GFR 15-29 ml/min [N18.4]  Yes    Hypothyroidism [E03.9]  Yes    Coronary artery disease involving native coronary artery of native heart without angina pectoris [I25.10]  Yes      Resolved Hospital Problems   No resolved problems to display.

## 2022-09-27 NOTE — SUBJECTIVE & OBJECTIVE
Interval History:  No further palpitations today.  Denies any chest pain with his previous episodes.  Have reviewed his home supplements which he started taking on September 8th, which include dandelion root, ginseng. Discussed with Cardiology and planned for left heart catheterization later today.    Review of Systems   Constitutional:  Positive for fatigue.   Respiratory:  Negative for shortness of breath.    Cardiovascular:  Positive for palpitations. Negative for chest pain.   Gastrointestinal:  Negative for abdominal pain, nausea and vomiting.   Neurological:  Negative for weakness.   Objective:     Vital Signs (Most Recent):  Temp: 98 °F (36.7 °C) (09/27/22 0715)  Pulse: 60 (09/27/22 0900)  Resp: 18 (09/27/22 0900)  BP: 97/60 (09/27/22 0900)  SpO2: (!) 92 % (09/27/22 0900)   Vital Signs (24h Range):  Temp:  [97.7 °F (36.5 °C)-98.1 °F (36.7 °C)] 98 °F (36.7 °C)  Pulse:  [60-82] 60  Resp:  [15-40] 18  SpO2:  [87 %-95 %] 92 %  BP: ()/(43-71) 97/60     Weight: 93 kg (205 lb 0.4 oz)  Body mass index is 30.28 kg/m².    Intake/Output Summary (Last 24 hours) at 9/27/2022 1136  Last data filed at 9/27/2022 0900  Gross per 24 hour   Intake 219.52 ml   Output 2115 ml   Net -1895.48 ml      Physical Exam  Vitals and nursing note reviewed.   Constitutional:       General: He is not in acute distress.     Appearance: Normal appearance. He is obese. He is not ill-appearing, toxic-appearing or diaphoretic.   HENT:      Head: Normocephalic and atraumatic.      Mouth/Throat:      Mouth: Mucous membranes are dry.   Eyes:      Pupils: Pupils are equal, round, and reactive to light.   Cardiovascular:      Rate and Rhythm: Normal rate and regular rhythm.      Pulses: Normal pulses.      Heart sounds: Normal heart sounds.      Comments: Paced on monitor  Pulmonary:      Effort: Pulmonary effort is normal. No respiratory distress.      Breath sounds: Normal breath sounds. No wheezing, rhonchi or rales.   Abdominal:       General: Bowel sounds are normal. There is no distension.      Palpations: Abdomen is soft.      Tenderness: There is no abdominal tenderness.   Musculoskeletal:         General: No swelling.      Cervical back: Normal range of motion.   Skin:     General: Skin is warm and dry.      Capillary Refill: Capillary refill takes 2 to 3 seconds.   Neurological:      General: No focal deficit present.      Mental Status: He is alert and oriented to person, place, and time. Mental status is at baseline.   Psychiatric:         Mood and Affect: Mood normal.         Behavior: Behavior normal.         Thought Content: Thought content normal.         Judgment: Judgment normal.       Significant Labs: All pertinent labs within the past 24 hours have been reviewed.    Significant Imaging: I have reviewed all pertinent imaging results/findings within the past 24 hours.

## 2022-09-27 NOTE — BRIEF OP NOTE
Brief Operative Note:    : Juan Roque MD     Referring Physician: Self,Aaareferral     All Operators: Surgeon(s):  Juan Roque MD     Preoperative Diagnosis: Chest pain [R07.9]  Coronary artery disease involving native coronary artery of native heart without angina pectoris [I25.10]     Postop Diagnosis: Chest pain [R07.9]  Coronary artery disease involving native coronary artery of native heart without angina pectoris [I25.10]    Treatments/Procedures: Procedure(s) (LRB):  Left heart cath (Left)    Access: Rt radial.    Findings:  Mild LAD & RCA disease is present.     See catheterization report for full details.    Intervention: None    See catheterization report for full details.    Closure device: Vascband.       Plan:  - Post cath protocol   - Medical management.   - IVF @50 cc/ hr x 2 hours  - Follow up with cardiologist    Estimated Blood loss: 20 cc    Specimens removed: Altagracia Shannon MD

## 2022-09-28 ENCOUNTER — DOCUMENTATION ONLY (OUTPATIENT)
Dept: CARDIOLOGY | Facility: HOSPITAL | Age: 62
End: 2022-09-28
Payer: MEDICARE

## 2022-09-28 ENCOUNTER — HOSPITAL ENCOUNTER (INPATIENT)
Facility: HOSPITAL | Age: 62
LOS: 5 days | Discharge: HOME | DRG: 308 | End: 2022-10-03
Attending: INTERNAL MEDICINE | Admitting: INTERNAL MEDICINE
Payer: MEDICARE

## 2022-09-28 VITALS
OXYGEN SATURATION: 92 % | BODY MASS INDEX: 30.21 KG/M2 | DIASTOLIC BLOOD PRESSURE: 50 MMHG | SYSTOLIC BLOOD PRESSURE: 93 MMHG | TEMPERATURE: 98 F | HEART RATE: 61 BPM | RESPIRATION RATE: 17 BRPM | HEIGHT: 69 IN | WEIGHT: 203.94 LBS

## 2022-09-28 DIAGNOSIS — I50.41 ACUTE COMBINED SYSTOLIC AND DIASTOLIC CONGESTIVE HEART FAILURE: ICD-10-CM

## 2022-09-28 DIAGNOSIS — I47.20 V-TACH: ICD-10-CM

## 2022-09-28 DIAGNOSIS — I47.20 VT (VENTRICULAR TACHYCARDIA): ICD-10-CM

## 2022-09-28 DIAGNOSIS — Z45.02 DEFIBRILLATOR DISCHARGE: Primary | ICD-10-CM

## 2022-09-28 PROBLEM — I50.21 ACUTE SYSTOLIC CONGESTIVE HEART FAILURE: Status: ACTIVE | Noted: 2022-09-26

## 2022-09-28 PROBLEM — I50.22: Status: RESOLVED | Noted: 2018-10-25 | Resolved: 2022-09-28

## 2022-09-28 PROBLEM — I42.8 NON-ISCHEMIC CARDIOMYOPATHY: Status: ACTIVE | Noted: 2022-09-28

## 2022-09-28 LAB
ALBUMIN SERPL BCP-MCNC: 3.4 G/DL (ref 3.5–5.2)
ALBUMIN SERPL BCP-MCNC: 3.7 G/DL (ref 3.5–5.2)
ALP SERPL-CCNC: 83 U/L (ref 55–135)
ALP SERPL-CCNC: 97 U/L (ref 55–135)
ALT SERPL W/O P-5'-P-CCNC: 14 U/L (ref 10–44)
ALT SERPL W/O P-5'-P-CCNC: 17 U/L (ref 10–44)
ANION GAP SERPL CALC-SCNC: 13 MMOL/L (ref 8–16)
ANION GAP SERPL CALC-SCNC: 9 MMOL/L (ref 8–16)
AST SERPL-CCNC: 14 U/L (ref 10–40)
AST SERPL-CCNC: 18 U/L (ref 10–40)
BASOPHILS # BLD AUTO: 0.08 K/UL (ref 0–0.2)
BASOPHILS NFR BLD: 0.9 % (ref 0–1.9)
BILIRUB SERPL-MCNC: 1.4 MG/DL (ref 0.1–1)
BILIRUB SERPL-MCNC: 1.6 MG/DL (ref 0.1–1)
BNP SERPL-MCNC: 1416 PG/ML (ref 0–99)
BUN SERPL-MCNC: 76 MG/DL (ref 8–23)
BUN SERPL-MCNC: 77 MG/DL (ref 8–23)
CALCIUM SERPL-MCNC: 9.1 MG/DL (ref 8.7–10.5)
CALCIUM SERPL-MCNC: 9.6 MG/DL (ref 8.7–10.5)
CHLORIDE SERPL-SCNC: 98 MMOL/L (ref 95–110)
CHLORIDE SERPL-SCNC: 99 MMOL/L (ref 95–110)
CO2 SERPL-SCNC: 24 MMOL/L (ref 23–29)
CO2 SERPL-SCNC: 27 MMOL/L (ref 23–29)
CREAT SERPL-MCNC: 4.5 MG/DL (ref 0.5–1.4)
CREAT SERPL-MCNC: 4.6 MG/DL (ref 0.5–1.4)
DIFFERENTIAL METHOD: ABNORMAL
EOSINOPHIL # BLD AUTO: 0.3 K/UL (ref 0–0.5)
EOSINOPHIL NFR BLD: 3.3 % (ref 0–8)
ERYTHROCYTE [DISTWIDTH] IN BLOOD BY AUTOMATED COUNT: 21.3 % (ref 11.5–14.5)
EST. GFR  (NO RACE VARIABLE): 14 ML/MIN/1.73 M^2
EST. GFR  (NO RACE VARIABLE): 14 ML/MIN/1.73 M^2
FERRITIN SERPL-MCNC: 86 NG/ML (ref 20–300)
GLUCOSE SERPL-MCNC: 103 MG/DL (ref 70–110)
GLUCOSE SERPL-MCNC: 95 MG/DL (ref 70–110)
HCT VFR BLD AUTO: 45.5 % (ref 40–54)
HGB BLD-MCNC: 15.2 G/DL (ref 14–18)
IMM GRANULOCYTES # BLD AUTO: 0.05 K/UL (ref 0–0.04)
IMM GRANULOCYTES NFR BLD AUTO: 0.6 % (ref 0–0.5)
IRON SERPL-MCNC: 46 UG/DL (ref 45–160)
LACTATE SERPL-SCNC: 0.9 MMOL/L (ref 0.5–2.2)
LACTATE SERPL-SCNC: 1 MMOL/L (ref 0.5–2.2)
LYMPHOCYTES # BLD AUTO: 0.6 K/UL (ref 1–4.8)
LYMPHOCYTES NFR BLD: 7 % (ref 18–48)
MAGNESIUM SERPL-MCNC: 2.2 MG/DL (ref 1.6–2.6)
MCH RBC QN AUTO: 24 PG (ref 27–31)
MCHC RBC AUTO-ENTMCNC: 33.4 G/DL (ref 32–36)
MCV RBC AUTO: 72 FL (ref 82–98)
MONOCYTES # BLD AUTO: 0.8 K/UL (ref 0.3–1)
MONOCYTES NFR BLD: 9.3 % (ref 4–15)
NEUTROPHILS # BLD AUTO: 7.1 K/UL (ref 1.8–7.7)
NEUTROPHILS NFR BLD: 78.9 % (ref 38–73)
NRBC BLD-RTO: 0 /100 WBC
PHOSPHATE SERPL-MCNC: 3.7 MG/DL (ref 2.7–4.5)
PLATELET # BLD AUTO: 410 K/UL (ref 150–450)
PMV BLD AUTO: ABNORMAL FL (ref 9.2–12.9)
POCT GLUCOSE: 100 MG/DL (ref 70–110)
POCT GLUCOSE: 118 MG/DL (ref 70–110)
POCT GLUCOSE: 123 MG/DL (ref 70–110)
POTASSIUM SERPL-SCNC: 4.9 MMOL/L (ref 3.5–5.1)
POTASSIUM SERPL-SCNC: 4.9 MMOL/L (ref 3.5–5.1)
PROT SERPL-MCNC: 6.7 G/DL (ref 6–8.4)
PROT SERPL-MCNC: 7.2 G/DL (ref 6–8.4)
RBC # BLD AUTO: 6.33 M/UL (ref 4.6–6.2)
SATURATED IRON: 11 % (ref 20–50)
SODIUM SERPL-SCNC: 135 MMOL/L (ref 136–145)
SODIUM SERPL-SCNC: 135 MMOL/L (ref 136–145)
TOTAL IRON BINDING CAPACITY: 404 UG/DL (ref 250–450)
TRANSFERRIN SERPL-MCNC: 273 MG/DL (ref 200–375)
WBC # BLD AUTO: 9.04 K/UL (ref 3.9–12.7)

## 2022-09-28 PROCEDURE — 25000003 PHARM REV CODE 250: Performed by: NURSE PRACTITIONER

## 2022-09-28 PROCEDURE — 25000003 PHARM REV CODE 250: Performed by: INTERNAL MEDICINE

## 2022-09-28 PROCEDURE — 99291 CRITICAL CARE FIRST HOUR: CPT | Mod: ,,, | Performed by: NURSE PRACTITIONER

## 2022-09-28 PROCEDURE — 99291 PR CRITICAL CARE, E/M 30-74 MINUTES: ICD-10-PCS | Mod: ,,, | Performed by: INTERNAL MEDICINE

## 2022-09-28 PROCEDURE — 82728 ASSAY OF FERRITIN: CPT | Performed by: INTERNAL MEDICINE

## 2022-09-28 PROCEDURE — 94761 N-INVAS EAR/PLS OXIMETRY MLT: CPT

## 2022-09-28 PROCEDURE — 83880 ASSAY OF NATRIURETIC PEPTIDE: CPT | Performed by: INTERNAL MEDICINE

## 2022-09-28 PROCEDURE — 83735 ASSAY OF MAGNESIUM: CPT | Performed by: NURSE PRACTITIONER

## 2022-09-28 PROCEDURE — 84466 ASSAY OF TRANSFERRIN: CPT | Performed by: INTERNAL MEDICINE

## 2022-09-28 PROCEDURE — 99223 PR INITIAL HOSPITAL CARE,LEVL III: ICD-10-PCS | Mod: GC,,, | Performed by: STUDENT IN AN ORGANIZED HEALTH CARE EDUCATION/TRAINING PROGRAM

## 2022-09-28 PROCEDURE — 93010 EKG 12-LEAD: ICD-10-PCS | Mod: ,,, | Performed by: INTERNAL MEDICINE

## 2022-09-28 PROCEDURE — 20600001 HC STEP DOWN PRIVATE ROOM

## 2022-09-28 PROCEDURE — 36415 COLL VENOUS BLD VENIPUNCTURE: CPT | Performed by: NURSE PRACTITIONER

## 2022-09-28 PROCEDURE — 99291 CRITICAL CARE FIRST HOUR: CPT | Mod: ,,, | Performed by: INTERNAL MEDICINE

## 2022-09-28 PROCEDURE — 85025 COMPLETE CBC W/AUTO DIFF WBC: CPT | Performed by: INTERNAL MEDICINE

## 2022-09-28 PROCEDURE — 99900035 HC TECH TIME PER 15 MIN (STAT)

## 2022-09-28 PROCEDURE — 27000221 HC OXYGEN, UP TO 24 HOURS

## 2022-09-28 PROCEDURE — 84100 ASSAY OF PHOSPHORUS: CPT | Performed by: HOSPITALIST

## 2022-09-28 PROCEDURE — 93005 ELECTROCARDIOGRAM TRACING: CPT

## 2022-09-28 PROCEDURE — 93010 ELECTROCARDIOGRAM REPORT: CPT | Mod: ,,, | Performed by: INTERNAL MEDICINE

## 2022-09-28 PROCEDURE — 99291 PR CRITICAL CARE, E/M 30-74 MINUTES: ICD-10-PCS | Mod: ,,, | Performed by: NURSE PRACTITIONER

## 2022-09-28 PROCEDURE — 63600175 PHARM REV CODE 636 W HCPCS: Performed by: NURSE PRACTITIONER

## 2022-09-28 PROCEDURE — 80053 COMPREHEN METABOLIC PANEL: CPT | Performed by: INTERNAL MEDICINE

## 2022-09-28 PROCEDURE — 83605 ASSAY OF LACTIC ACID: CPT | Mod: 91 | Performed by: INTERNAL MEDICINE

## 2022-09-28 PROCEDURE — 36415 COLL VENOUS BLD VENIPUNCTURE: CPT | Performed by: INTERNAL MEDICINE

## 2022-09-28 PROCEDURE — 63600175 PHARM REV CODE 636 W HCPCS: Performed by: HOSPITALIST

## 2022-09-28 PROCEDURE — 83605 ASSAY OF LACTIC ACID: CPT | Performed by: INTERNAL MEDICINE

## 2022-09-28 PROCEDURE — 63600175 PHARM REV CODE 636 W HCPCS: Performed by: INTERNAL MEDICINE

## 2022-09-28 PROCEDURE — 99223 1ST HOSP IP/OBS HIGH 75: CPT | Mod: GC,,, | Performed by: STUDENT IN AN ORGANIZED HEALTH CARE EDUCATION/TRAINING PROGRAM

## 2022-09-28 PROCEDURE — 99223 PR INITIAL HOSPITAL CARE,LEVL III: ICD-10-PCS | Mod: GC,,, | Performed by: INTERNAL MEDICINE

## 2022-09-28 PROCEDURE — 25000003 PHARM REV CODE 250: Performed by: HOSPITALIST

## 2022-09-28 PROCEDURE — 99223 1ST HOSP IP/OBS HIGH 75: CPT | Mod: GC,,, | Performed by: INTERNAL MEDICINE

## 2022-09-28 PROCEDURE — 80053 COMPREHEN METABOLIC PANEL: CPT | Mod: 91 | Performed by: HOSPITALIST

## 2022-09-28 RX ORDER — ACETAMINOPHEN 325 MG/1
650 TABLET ORAL EVERY 4 HOURS PRN
Status: DISCONTINUED | OUTPATIENT
Start: 2022-09-28 | End: 2022-09-28

## 2022-09-28 RX ORDER — SODIUM CHLORIDE 0.9 % (FLUSH) 0.9 %
10 SYRINGE (ML) INJECTION
Status: CANCELLED | OUTPATIENT
Start: 2022-09-28

## 2022-09-28 RX ORDER — ONDANSETRON 4 MG/1
8 TABLET, ORALLY DISINTEGRATING ORAL EVERY 8 HOURS PRN
Status: DISCONTINUED | OUTPATIENT
Start: 2022-09-28 | End: 2022-09-28

## 2022-09-28 RX ORDER — MUPIROCIN 20 MG/G
OINTMENT TOPICAL 2 TIMES DAILY
Status: COMPLETED | OUTPATIENT
Start: 2022-09-28 | End: 2022-09-30

## 2022-09-28 RX ORDER — ONDANSETRON 2 MG/ML
4 INJECTION INTRAMUSCULAR; INTRAVENOUS EVERY 8 HOURS PRN
Status: DISCONTINUED | OUTPATIENT
Start: 2022-09-28 | End: 2022-10-03 | Stop reason: HOSPADM

## 2022-09-28 RX ORDER — HEPARIN SODIUM 5000 [USP'U]/ML
5000 INJECTION, SOLUTION INTRAVENOUS; SUBCUTANEOUS EVERY 8 HOURS
Status: CANCELLED | OUTPATIENT
Start: 2022-09-28

## 2022-09-28 RX ORDER — FUROSEMIDE 40 MG/1
40 TABLET ORAL DAILY
Status: DISCONTINUED | OUTPATIENT
Start: 2022-09-29 | End: 2022-09-28

## 2022-09-28 RX ORDER — ONDANSETRON 8 MG/1
8 TABLET, ORALLY DISINTEGRATING ORAL EVERY 8 HOURS PRN
Status: CANCELLED | OUTPATIENT
Start: 2022-09-28

## 2022-09-28 RX ORDER — MEXILETINE HYDROCHLORIDE 150 MG/1
150 CAPSULE ORAL 2 TIMES DAILY WITH MEALS
Status: DISCONTINUED | OUTPATIENT
Start: 2022-09-28 | End: 2022-10-03 | Stop reason: HOSPADM

## 2022-09-28 RX ORDER — CARVEDILOL 12.5 MG/1
12.5 TABLET ORAL 2 TIMES DAILY WITH MEALS
Status: DISCONTINUED | OUTPATIENT
Start: 2022-09-28 | End: 2022-09-28

## 2022-09-28 RX ORDER — IPRATROPIUM BROMIDE AND ALBUTEROL SULFATE 2.5; .5 MG/3ML; MG/3ML
3 SOLUTION RESPIRATORY (INHALATION) EVERY 4 HOURS PRN
Status: DISCONTINUED | OUTPATIENT
Start: 2022-09-28 | End: 2022-10-03 | Stop reason: HOSPADM

## 2022-09-28 RX ORDER — FUROSEMIDE 40 MG/1
40 TABLET ORAL DAILY
Status: CANCELLED | OUTPATIENT
Start: 2022-09-29

## 2022-09-28 RX ORDER — ONDANSETRON 2 MG/ML
4 INJECTION INTRAMUSCULAR; INTRAVENOUS EVERY 8 HOURS PRN
Status: CANCELLED | OUTPATIENT
Start: 2022-09-28

## 2022-09-28 RX ORDER — FUROSEMIDE 10 MG/ML
80 INJECTION INTRAMUSCULAR; INTRAVENOUS
Status: DISCONTINUED | OUTPATIENT
Start: 2022-09-28 | End: 2022-09-28

## 2022-09-28 RX ORDER — ASPIRIN 81 MG/1
81 TABLET ORAL DAILY
Status: CANCELLED | OUTPATIENT
Start: 2022-09-29

## 2022-09-28 RX ORDER — TALC
6 POWDER (GRAM) TOPICAL NIGHTLY PRN
Status: CANCELLED | OUTPATIENT
Start: 2022-09-28

## 2022-09-28 RX ORDER — MUPIROCIN 20 MG/G
OINTMENT TOPICAL 2 TIMES DAILY
Status: CANCELLED | OUTPATIENT
Start: 2022-09-28 | End: 2022-10-01

## 2022-09-28 RX ORDER — AMIODARONE HYDROCHLORIDE 200 MG/1
200 TABLET ORAL DAILY
Status: DISCONTINUED | OUTPATIENT
Start: 2022-09-29 | End: 2022-09-28

## 2022-09-28 RX ORDER — TALC
6 POWDER (GRAM) TOPICAL NIGHTLY PRN
Status: DISCONTINUED | OUTPATIENT
Start: 2022-09-28 | End: 2022-10-03 | Stop reason: HOSPADM

## 2022-09-28 RX ORDER — CARVEDILOL 12.5 MG/1
12.5 TABLET ORAL 2 TIMES DAILY WITH MEALS
Status: CANCELLED | OUTPATIENT
Start: 2022-09-28

## 2022-09-28 RX ORDER — ACETAMINOPHEN 325 MG/1
650 TABLET ORAL EVERY 6 HOURS PRN
Status: CANCELLED | OUTPATIENT
Start: 2022-09-28

## 2022-09-28 RX ORDER — ACETAMINOPHEN 325 MG/1
650 TABLET ORAL EVERY 6 HOURS PRN
Status: DISCONTINUED | OUTPATIENT
Start: 2022-09-28 | End: 2022-10-03 | Stop reason: HOSPADM

## 2022-09-28 RX ORDER — AMIODARONE HYDROCHLORIDE 200 MG/1
200 TABLET ORAL ONCE
Status: DISCONTINUED | OUTPATIENT
Start: 2022-09-28 | End: 2022-09-30

## 2022-09-28 RX ORDER — MEXILETINE HYDROCHLORIDE 150 MG/1
150 CAPSULE ORAL 2 TIMES DAILY WITH MEALS
Status: CANCELLED | OUTPATIENT
Start: 2022-09-28

## 2022-09-28 RX ORDER — AMIODARONE HYDROCHLORIDE 200 MG/1
400 TABLET ORAL DAILY
Status: DISCONTINUED | OUTPATIENT
Start: 2022-09-29 | End: 2022-10-03 | Stop reason: HOSPADM

## 2022-09-28 RX ORDER — ACETAMINOPHEN 325 MG/1
650 TABLET ORAL EVERY 4 HOURS PRN
Status: CANCELLED | OUTPATIENT
Start: 2022-09-28

## 2022-09-28 RX ORDER — AMIODARONE HYDROCHLORIDE 200 MG/1
200 TABLET ORAL DAILY
Status: CANCELLED | OUTPATIENT
Start: 2022-09-29

## 2022-09-28 RX ORDER — HEPARIN SODIUM 5000 [USP'U]/ML
5000 INJECTION, SOLUTION INTRAVENOUS; SUBCUTANEOUS EVERY 8 HOURS
Status: DISCONTINUED | OUTPATIENT
Start: 2022-09-28 | End: 2022-10-03 | Stop reason: HOSPADM

## 2022-09-28 RX ORDER — PRAVASTATIN SODIUM 40 MG/1
40 TABLET ORAL DAILY
Status: DISCONTINUED | OUTPATIENT
Start: 2022-09-29 | End: 2022-10-03 | Stop reason: HOSPADM

## 2022-09-28 RX ORDER — ASPIRIN 81 MG/1
81 TABLET ORAL DAILY
Status: DISCONTINUED | OUTPATIENT
Start: 2022-09-29 | End: 2022-10-03 | Stop reason: HOSPADM

## 2022-09-28 RX ORDER — SODIUM CHLORIDE 0.9 % (FLUSH) 0.9 %
10 SYRINGE (ML) INJECTION
Status: DISCONTINUED | OUTPATIENT
Start: 2022-09-28 | End: 2022-10-03 | Stop reason: HOSPADM

## 2022-09-28 RX ORDER — PRAVASTATIN SODIUM 40 MG/1
40 TABLET ORAL DAILY
Status: CANCELLED | OUTPATIENT
Start: 2022-09-29

## 2022-09-28 RX ORDER — IPRATROPIUM BROMIDE AND ALBUTEROL SULFATE 2.5; .5 MG/3ML; MG/3ML
3 SOLUTION RESPIRATORY (INHALATION) EVERY 4 HOURS PRN
Status: CANCELLED | OUTPATIENT
Start: 2022-09-28

## 2022-09-28 RX ADMIN — MEXILETINE HYDROCHLORIDE 150 MG: 150 CAPSULE ORAL at 08:09

## 2022-09-28 RX ADMIN — SACUBITRIL AND VALSARTAN 1 TABLET: 24; 26 TABLET, FILM COATED ORAL at 08:09

## 2022-09-28 RX ADMIN — PRAVASTATIN SODIUM 40 MG: 40 TABLET ORAL at 08:09

## 2022-09-28 RX ADMIN — FUROSEMIDE 20 MG/HR: 10 INJECTION, SOLUTION INTRAVENOUS at 07:09

## 2022-09-28 RX ADMIN — HEPARIN SODIUM 5000 UNITS: 5000 INJECTION INTRAVENOUS; SUBCUTANEOUS at 05:09

## 2022-09-28 RX ADMIN — ASPIRIN 81 MG: 81 TABLET, COATED ORAL at 08:09

## 2022-09-28 RX ADMIN — MUPIROCIN: 20 OINTMENT TOPICAL at 08:09

## 2022-09-28 RX ADMIN — CARVEDILOL 12.5 MG: 12.5 TABLET, FILM COATED ORAL at 07:09

## 2022-09-28 RX ADMIN — FUROSEMIDE 40 MG: 40 TABLET ORAL at 08:09

## 2022-09-28 RX ADMIN — AMIODARONE HYDROCHLORIDE 200 MG: 200 TABLET ORAL at 08:09

## 2022-09-28 RX ADMIN — MEXILETINE HYDROCHLORIDE 150 MG: 150 CAPSULE ORAL at 05:09

## 2022-09-28 RX ADMIN — LEVOTHYROXINE SODIUM 175 MCG: 25 TABLET ORAL at 05:09

## 2022-09-28 RX ADMIN — FUROSEMIDE 80 MG: 10 INJECTION, SOLUTION INTRAMUSCULAR; INTRAVENOUS at 05:09

## 2022-09-28 NOTE — ASSESSMENT & PLAN NOTE
- LHC in 12/2020 with non obstructive CAD to LAD, LCX and RCA  - nuclear stress test in 7/2022 with thinning of lateral wall but not reversible ischemia noted; LVEF 35%  - presentation with VTach and AICD firing; repeat echo with unchanged EF   - repeat LHC with non obstructive CAD   - continue ASA, statin, BB and ARB; troponin .015

## 2022-09-28 NOTE — SUBJECTIVE & OBJECTIVE
Past Medical History:   Diagnosis Date    CKD (chronic kidney disease) stage 4, GFR 15-29 ml/min     Congestive heart failure (CHF)     Edema     Heart attack     HLD (hyperlipidemia)     Hypertension     Hyperuricemia     Hypocalcemia     Renal cyst, left     Secondary hyperparathyroidism     Thyroid disease     V tach     Vitamin D deficiency        Past Surgical History:   Procedure Laterality Date    ablations  2018    albations  2018    COLONOSCOPY N/A 2018    Procedure: COLONOSCOPY/suprep;  Surgeon: Ananya Morillo MD;  Location: [facility] ENDO;  Service: Endoscopy;  Laterality: N/A;    defibulater N/A     ESOPHAGOGASTRODUODENOSCOPY N/A 2018    Procedure: EGD (ESOPHAGOGASTRODUODENOSCOPY);  Surgeon: Ananya Morillo MD;  Location: [facility] ENDO;  Service: Endoscopy;  Laterality: N/A;    JOINT REPLACEMENT Bilateral 10/2016    knees, bilat    LEFT HEART CATHETERIZATION N/A 2020    Procedure: Left heart cath;  Surgeon: Shahram Stewart MD;  Location: [facility] CATH LAB/EP;  Service: Cardiology;  Laterality: N/A;       Review of patient's allergies indicates:   Allergen Reactions    Lokelma [sodium zirconium cyclosilicate] Other (See Comments)     Fluid retention, weight gain, CHF excerebration, severe constipation    Atorvastatin Other (See Comments)     Joint pain       Current Facility-Administered Medications on File Prior to Encounter   Medication    [] NORepinephrine bitartrate-D5W 4 mg/250 mL (16 mcg/mL) infusion Soln    [DISCONTINUED] acetaminophen tablet 650 mg    [DISCONTINUED] acetaminophen tablet 650 mg    [DISCONTINUED] albuterol-ipratropium 2.5 mg-0.5 mg/3 mL nebulizer solution 3 mL    [DISCONTINUED] amiodarone tablet 200 mg    [DISCONTINUED] aspirin EC tablet 81 mg    [DISCONTINUED] carvediloL tablet 12.5 mg    [DISCONTINUED] furosemide tablet 40 mg    [DISCONTINUED] heparin (porcine) injection 5,000 Units    [DISCONTINUED] heparin infusion 1,000 units/500 ml in 0.9%  NaCl (pressure line flush)    [DISCONTINUED] levothyroxine tablet 175 mcg    [DISCONTINUED] melatonin tablet 6 mg    [DISCONTINUED] mexiletine capsule 150 mg    [DISCONTINUED] mupirocin 2 % ointment    [DISCONTINUED] ondansetron disintegrating tablet 8 mg    [DISCONTINUED] ondansetron injection 4 mg    [DISCONTINUED] pravastatin tablet 40 mg    [DISCONTINUED] sacubitriL-valsartan 24-26 mg per tablet 1 tablet    [DISCONTINUED] sodium chloride 0.9% flush 10 mL     Current Outpatient Medications on File Prior to Encounter   Medication Sig    allopurinoL (ZYLOPRIM) 300 MG tablet Take 1 tablet (300 mg total) by mouth once daily.    amiodarone (PACERONE) 200 MG Tab TAKE 1 TABLET BY MOUTH EVERY DAY (Patient taking differently: Take 200 mg by mouth once daily.)    aspirin (ECOTRIN) 81 MG EC tablet Take 81 mg by mouth once daily.    carvediloL (COREG) 25 MG tablet TAKE 1 TABLET BY MOUTH TWICE A DAY WITH FOOD (Patient taking differently: Take 25 mg by mouth 2 (two) times daily with meals.)    ergocalciferol (ERGOCALCIFEROL) 50,000 unit Cap Take 1 capsule (50,000 Units total) by mouth every 7 days.    furosemide (LASIX) 80 MG tablet Take 1 tablet (80 mg total) by mouth 3 (three) times daily as needed (Swelling).    levothyroxine (SYNTHROID, LEVOTHROID) 175 MCG tablet TAKE 1 TABLET BY MOUTH ONCE DAILY. (Patient taking differently: Take 175 mcg by mouth once daily.)    mexiletine (MEXITIL) 150 MG Cap Take 1 capsule (150 mg total) by mouth 2 (two) times daily with meals.    pravastatin (PRAVACHOL) 40 MG tablet Take 1 tablet (40 mg total) by mouth once daily.    sacubitriL-valsartan (ENTRESTO) 49-51 mg per tablet Take 1 tablet by mouth 2 (two) times daily.    FLUZONE QUAD 3864-4554, PF, 60 mcg (15 mcg x 4)/0.5 mL Syrg     metOLazone (ZAROXOLYN) 10 MG tablet Take 1 tablet (10 mg total) by mouth daily as needed (Swelling). (Patient not taking: No sig reported)    nitroGLYCERIN (NITROSTAT) 0.4 MG SL tablet Place 1 tablet (0.4 mg  total) under the tongue every 5 (five) minutes as needed for Chest pain.    tadalafiL (CIALIS) 5 MG tablet TAKE 1 TABLET BY MOUTH DAILY AS NEEDED FOR ERECTILE DYSFUNCTION (Patient not taking: Reported on 9/25/2022)     Family History       Problem Relation (Age of Onset)    Alcohol abuse Father    Hypertension Mother    Kidney disease Father    No Known Problems Sister, Brother, Daughter, Son    Stroke Mother          Tobacco Use    Smoking status: Former     Packs/day: 1.00     Years: 25.00     Pack years: 25.00     Types: Cigarettes     Start date: 1/1/1979     Quit date: 3/3/2012     Years since quitting: 10.5    Smokeless tobacco: Former   Substance and Sexual Activity    Alcohol use: No    Drug use: No    Sexual activity: Yes     Partners: Female     Review of Systems   Constitutional: Negative for fever.   HENT:  Negative for ear pain.    Eyes:  Negative for double vision.   Cardiovascular:  Positive for palpitations. Negative for chest pain and dyspnea on exertion.   Respiratory:  Negative for shortness of breath.    Endocrine: Negative for heat intolerance.   Hematologic/Lymphatic: Negative for adenopathy.   Skin:  Negative for dry skin.   Musculoskeletal:  Negative for falls.   Gastrointestinal:  Negative for anorexia.   Genitourinary:  Negative for flank pain.   Neurological:  Negative for disturbances in coordination.   Psychiatric/Behavioral:  Negative for depression.    Objective:     Vital Signs (Most Recent):  Temp: 98 °F (36.7 °C) (09/28/22 1404)  Pulse: 81 (09/28/22 1404)  Resp: 20 (09/28/22 1404)  BP: (!) 87/55 (09/28/22 1404)  SpO2: (!) 91 % (09/28/22 1404)   Vital Signs (24h Range):  Temp:  [98 °F (36.7 °C)-98.2 °F (36.8 °C)] 98 °F (36.7 °C)  Pulse:  [] 81  Resp:  [16-43] 20  SpO2:  [85 %-95 %] 91 %  BP: ()/(39-66) 87/55     Weight: 91 kg (200 lb 9.9 oz)  Body mass index is 29.63 kg/m².    SpO2: (!) 91 %       No intake or output data in the 24 hours ending 09/28/22  1650    Lines/Drains/Airways       Peripheral Intravenous Line  Duration                  Peripheral IV - Single Lumen 09/25/22 18 G Anterior;Left Forearm 3 days         Peripheral IV - Single Lumen 09/26/22 0130 20 G Anterior;Right Forearm 2 days                    Physical Exam  Constitutional:       Appearance: Normal appearance.   HENT:      Head: Normocephalic.      Nose: Nose normal.   Eyes:      Pupils: Pupils are equal, round, and reactive to light.   Cardiovascular:      Rate and Rhythm: Normal rate.      Comments: JVD 16 cmH2o  Regular rhythm, paced on tele  No LE edema  Pulmonary:      Effort: Pulmonary effort is normal.   Abdominal:      General: Abdomen is flat.      Palpations: Abdomen is soft.      Tenderness: There is no abdominal tenderness.   Musculoskeletal:         General: Normal range of motion.      Right lower leg: No edema.      Left lower leg: No edema.   Neurological:      General: No focal deficit present.      Mental Status: He is alert and oriented to person, place, and time.   Psychiatric:         Mood and Affect: Mood normal.         Behavior: Behavior normal.

## 2022-09-28 NOTE — ASSESSMENT & PLAN NOTE
- echo in August 2022 with EF 40-45% LV WMA and grade II diastolic dysfunction; repeat echo with unchanged EF   - on Coreg, Entresto and Lasix at home- will continue   - BNP 2304; previous reading 724 3 years ago; no acute decompensation noted on PE  - continue GMDT as BP tolerates;l SBP 70s-90s overnight but MAP mid 60s- no complaints of dizziness, fatigue or recurrent syncope- continue meds

## 2022-09-28 NOTE — DISCHARGE SUMMARY
"[facility]  Discharge Summary      Patient Name: Ar Sharma  MRN: [id number]  Patient Class: IP- Inpatient  Admission Date: 9/25/2022  Hospital Length of Stay: 3 days  Discharge Date and Time:  09/28/2022 12:50 PM  Attending Physician: Rigoberto Massey, *   Discharging Provider: Rigoberto Massey MD  Primary Care Provider: Jc Elliott MD      HPI:   Ar Sharma is a 61-year-old male with a history of hypothyroid, CKD, CAD, HTN, HLD, YOEL who presented to the ED for evaluation after his AICD defibrillated him. Patient reports having 6 episodes of being defibrillated tonight. He denies any chest pain, shortness of breath, or syncope. He has a history of CHF and has required ablation is in the past for refractory V-tach. He reports he has been more fatigued lately and can barely finish cutting the grass at home without taking many breaks. In the ED: BNP 2304, Na 127. CXR: No detrimental change since November 26, 2021. EKG with NSR RBBB. Patient will be admitted to [facility] for further care and cardiology evaluation.      Procedure(s) (LRB):  Left heart cath (Left)      Hospital Course:   Mr. Sharma presented following multiple episodes V-tach with 6 shocks over the past 2 days prior to admission.  ICD was interrogated and demonstrated over 100 episodes of V-tach since September 11th.  Cardiology consulted and performed left heart catheterization 9/27, which revealed only mild CAD in LAD and RCA.  While in house, he was maintained on his home doses of amiodarone and mexiletine.  No further episodes of V-tach while in the hospital.  Discussed with Cardiology and recommend transfer to [facility] for EP evaluation.  Discussed with transfer center and accepted by Cardiology Service.    BP (!) 93/50 (BP Location: Left arm, Patient Position: Lying)   Pulse 61   Temp 98.2 °F (36.8 °C) (Oral)   Resp 17   Ht 5' 9" (1.753 m)   Wt 92.5 kg (203 lb 14.8 " oz)   SpO2 (!) 92%   BMI 30.11 kg/m²   Physical Exam  Vitals and nursing note reviewed.   Constitutional:       General: He is not in acute distress.     Appearance: Normal appearance. He is obese. He is not ill-appearing, toxic-appearing or diaphoretic.   HENT:      Head: Normocephalic and atraumatic.      Mouth/Throat:      Mouth: Mucous membranes are dry.   Eyes:      Pupils: Pupils are equal, round, and reactive to light.   Cardiovascular:      Rate and Rhythm: Normal rate and regular rhythm.      Pulses: Normal pulses.      Heart sounds: Normal heart sounds.      Comments: Paced on monitor  Pulmonary:      Effort: Pulmonary effort is normal. No respiratory distress.      Breath sounds: Normal breath sounds. No wheezing, rhonchi or rales.   Abdominal:      General: Bowel sounds are normal. There is no distension.      Palpations: Abdomen is soft.      Tenderness: There is no abdominal tenderness.   Musculoskeletal:         General: No swelling.      Cervical back: Normal range of motion.   Skin:     General: Skin is warm and dry.      Capillary Refill: Capillary refill takes 2 to 3 seconds.   Neurological:      General: No focal deficit present.      Mental Status: He is alert and oriented to person, place, and time. Mental status is at baseline.   Psychiatric:         Mood and Affect: Mood normal.         Behavior: Behavior normal.         Thought Content: Thought content normal.         Judgment: Judgment normal.        Goals of Care Treatment Preferences:  Code Status: Full Code      Consults:   Consults (From admission, onward)        Status Ordering Provider     Inpatient consult to [facility]iology[facility]        Provider:  Mariaelena Barlow MD    Completed NENO LYMAN          No new Assessment & Plan notes have been filed under this hospital service since the last note was generated.  Service: Hospital Medicine    Final Active Diagnoses:    Diagnosis Date Noted POA    PRINCIPAL PROBLEM:   Defibrillator discharge [Z45.02] 09/25/2022 Not Applicable    Abuse of herbal or folk remedies [F55.1] 09/26/2022 Yes    Acute on chronic HFrEF (heart failure with reduced ejection fraction) [I50.23] 09/26/2022 Yes    V-tach [I47.2] 09/26/2022 Yes    Hyponatremia [E87.1] 09/25/2022 Yes    Hyperlipidemia [E78.5] 05/18/2022 Yes    Essential (primary) hypertension [I10] 05/18/2022 Yes    HFrEF (heart failure with reduced ejection fraction) [I50.20] 12/10/2020 Yes    CKD (chronic kidney disease) stage 4, GFR 15-29 ml/min [N18.4] 12/17/2018 Yes    Hypothyroidism [E03.9] 10/16/2018 Yes    Coronary artery disease involving native coronary artery of native heart without angina pectoris [I25.10] 10/16/2018 Yes      Problems Resolved During this Admission:       Discharged Condition: fair    Disposition:     Follow Up:    Patient Instructions:   No discharge procedures on file.    Significant Diagnostic Studies:  See above    Pending Diagnostic Studies:     None         Medications:  Transfer Medications (for Discharge Readmit only):   Current Facility-Administered Medications   Medication Dose Route Frequency Provider Last Rate Last Admin    acetaminophen tablet 650 mg  650 mg Oral Q6H PRN Martha Casey NP        acetaminophen tablet 650 mg  650 mg Oral Q4H PRN Stephy Shannon MD        albuterol-ipratropium 2.5 mg-0.5 mg/3 mL nebulizer solution 3 mL  3 mL Nebulization Q4H PRN Martha Casey NP        amiodarone tablet 200 mg  200 mg Oral Daily Martha Casey NP   200 mg at 09/28/22 0809    aspirin EC tablet 81 mg  81 mg Oral Daily Martha Casey NP   81 mg at 09/28/22 0809    carvediloL tablet 12.5 mg  12.5 mg Oral BID  Oswaldo Colbert MD   12.5 mg at 09/28/22 0745    furosemide tablet 40 mg  40 mg Oral Daily Martha Casey NP   40 mg at 09/28/22 0809    heparin (porcine) injection 5,000 Units  5,000 Units Subcutaneous Q8H Martha Casey NP   5,000 Units at 09/28/22  0525    heparin infusion 1,000 units/500 ml in 0.9% NaCl (pressure line flush)    Continuous PRN Juan Roque MD 1,500 mL/hr at 09/27/22 1231 3,000 Units/hr at 09/27/22 1231    levothyroxine tablet 175 mcg  175 mcg Oral Before breakfast Martha Casey NP   175 mcg at 09/28/22 0525    melatonin tablet 6 mg  6 mg Oral Nightly PRN Henry Solis MD        mexiletine capsule 150 mg  150 mg Oral BID  Audelia Monaco APRN, ANP   150 mg at 09/28/22 0810    mupirocin 2 % ointment   Nasal BID Oswaldo Colbert MD   Given at 09/28/22 0810    ondansetron disintegrating tablet 8 mg  8 mg Oral Q8H PRN Stephy Shannon MD        ondansetron injection 4 mg  4 mg Intravenous Q8H PRN Martha Casey NP        pravastatin tablet 40 mg  40 mg Oral Daily Martha Casey NP   40 mg at 09/28/22 0809    sacubitriL-valsartan 24-26 mg per tablet 1 tablet  1 tablet Oral BID Oswaldo Colbert MD   1 tablet at 09/28/22 0809    sodium chloride 0.9% flush 10 mL  10 mL Intravenous PRN Henry Solis MD           Indwelling Lines/Drains at time of discharge:   Lines/Drains/Airways     None                 Time spent on the discharge of patient: 34 minutes        Rigoberto Massey MD  Department of Hospital Medicine  [place] - Intensive Care

## 2022-09-28 NOTE — SUBJECTIVE & OBJECTIVE
Review of Systems   Constitutional: Negative for chills, decreased appetite, diaphoresis and fever.   Cardiovascular:  Negative for chest pain, claudication, cyanosis, dyspnea on exertion, irregular heartbeat, leg swelling, near-syncope, orthopnea, palpitations, paroxysmal nocturnal dyspnea and syncope.   Respiratory:  Negative for cough, hemoptysis, shortness of breath and wheezing.    Gastrointestinal:  Negative for bloating, abdominal pain, constipation, diarrhea, melena, nausea and vomiting.   Neurological:  Negative for dizziness and weakness.   Objective:     Vital Signs (Most Recent):  Temp: 98.2 °F (36.8 °C) (09/28/22 1200)  Pulse: 61 (09/28/22 1200)  Resp: 17 (09/28/22 1200)  BP: (!) 93/50 (09/28/22 1200)  SpO2: (!) 92 % (09/28/22 1200)   Vital Signs (24h Range):  Temp:  [97.7 °F (36.5 °C)-98.2 °F (36.8 °C)] 98.2 °F (36.8 °C)  Pulse:  [] 61  Resp:  [14-43] 17  SpO2:  [85 %-95 %] 92 %  BP: ()/(38-66) 93/50     Weight: 92.5 kg (203 lb 14.8 oz)  Body mass index is 30.11 kg/m².     SpO2: (!) 92 %  O2 Device (Oxygen Therapy): room air      Intake/Output Summary (Last 24 hours) at 9/28/2022 1254  Last data filed at 9/28/2022 0800  Gross per 24 hour   Intake 720 ml   Output 800 ml   Net -80 ml       Lines/Drains/Airways       Peripheral Intravenous Line  Duration                  Peripheral IV - Single Lumen 09/25/22 18 G Anterior;Left Forearm 3 days         Peripheral IV - Single Lumen 09/26/22 0130 20 G Anterior;Right Forearm 2 days                    Physical Exam  Constitutional:       General: He is not in acute distress.     Appearance: He is well-developed.   Cardiovascular:      Rate and Rhythm: Normal rate and regular rhythm.      Heart sounds: No murmur heard.    No gallop.   Pulmonary:      Effort: Pulmonary effort is normal. No respiratory distress.      Breath sounds: Normal breath sounds. No wheezing.   Abdominal:      General: Bowel sounds are normal. There is no distension.       Palpations: Abdomen is soft.      Tenderness: There is no abdominal tenderness.   Skin:     General: Skin is warm and dry.   Neurological:      Mental Status: He is alert and oriented to person, place, and time.       Significant Labs: BMP:   Recent Labs   Lab 09/27/22  0437 09/28/22  0642   GLU 99 95   * 135*   K 4.5 4.9   CL 97 99   CO2 28 27   BUN 75* 77*   CREATININE 3.6* 4.6*   CALCIUM 9.2 9.1   MG 2.2 2.2   , CBC   Recent Labs   Lab 09/26/22  1810 09/27/22  0437   WBC  --  7.25   HGB 14.3 13.9*   HCT 43.2 41.9   PLT  --  365       Significant Imaging: Echocardiogram: Transthoracic echo (TTE) complete (Cupid Only):   Results for orders placed or performed during the hospital encounter of 09/25/22   Echo   Result Value Ref Range    BSA 2.12 m2    TDI SEPTAL 0.05 m/s    LV LATERAL E/E' RATIO 14.00 m/s    LV SEPTAL E/E' RATIO 16.80 m/s    IVC diameter 2.53 cm    Left Ventricular Outflow Tract Mean Velocity 0.61 cm/s    Left Ventricular Outflow Tract Mean Gradient 1.78 mmHg    Pulmonary Valve Mean Velocity 0.65 m/s    TDI LATERAL 0.06 m/s    PV PEAK VELOCITY 1.00 cm/s    LVIDd 5.96 3.5 - 6.0 cm    IVS 1.08 0.6 - 1.1 cm    Posterior Wall 1.23 (A) 0.6 - 1.1 cm    LVIDs 4.34 (A) 2.1 - 4.0 cm    FS 27 28 - 44 %    LV mass 295.05 g    LA size 4.88 cm    RVDD 3.34 cm    TAPSE 1.14 cm    RV S' 0.01 cm/s    Left Ventricle Relative Wall Thickness 0.41 cm    AV mean gradient 6 mmHg    AV valve area 2.01 cm2    AV Velocity Ratio 0.60     AV index (prosthetic) 0.58     MV mean gradient 1 mmHg    MV valve area p 1/2 method 2.09 cm2    MV valve area by continuity eq 2.24 cm2    E/A ratio 1.79     Mean e' 0.06 m/s    E wave deceleration time 333.78 msec    IVRT 125.59 msec    LVOT diameter 2.10 cm    LVOT area 3.5 cm2    LVOT peak edu 0.97 m/s    LVOT peak VTI 19.20 cm    Ao peak edu 1.63 m/s    Ao VTI 33.00 cm    Mr max edu 3.97 m/s    LVOT stroke volume 66.47 cm3    AV peak gradient 11 mmHg    MV peak gradient 3 mmHg     E/E' ratio 15.27 m/s    MV Peak E Marvin 0.84 m/s    TR Max Marvin 3.61 m/s    MV VTI 29.7 cm    MV stenosis pressure 1/2 time 105.47 ms    MV Peak A Marvin 0.47 m/s    LV Systolic Volume 84.87 mL    LV Systolic Volume Index 40.6 mL/m2    LV Diastolic Volume 177.38 mL    LV Diastolic Volume Index 84.87 mL/m2    LV Mass Index 141 g/m2    RA Major Axis 7.06 cm    Left Atrium Minor Axis 6.19 cm    Left Atrium Major Axis 7.15 cm    Triscuspid Valve Regurgitation Peak Gradient 52 mmHg    LA Volume Index (Mod) 37.2 mL/m2    LA volume (mod) 77.75 cm3    RA Width 6.09 cm    Right Atrial Pressure (from IVC) 15 mmHg    EF 40 %    TV rest pulmonary artery pressure 67 mmHg    Narrative    · The left ventricle is normal in size with eccentric hypertrophy and   mildly decreased systolic function.  · The estimated ejection fraction is 40%.  · There is abnormal septal wall motion consistent with right ventricular   pacemaker.  · Grade II left ventricular diastolic dysfunction.  · With low normal right ventricular systolic function.  · Moderate right atrial enlargement.  · Mild tricuspid regurgitation.  · There is pulmonary hypertension.  · The estimated PA systolic pressure is 67 mmHg.  · Elevated central venous pressure (15 mmHg).

## 2022-09-28 NOTE — SUBJECTIVE & OBJECTIVE
Past Medical History:   Diagnosis Date    CKD (chronic kidney disease) stage 4, GFR 15-29 ml/min     Congestive heart failure (CHF)     Edema     Heart attack     HLD (hyperlipidemia)     Hypertension     Hyperuricemia     Hypocalcemia     Renal cyst, left     Secondary hyperparathyroidism     Thyroid disease     V tach     Vitamin D deficiency        Past Surgical History:   Procedure Laterality Date    ablations  2018    albations  2018    COLONOSCOPY N/A 2018    Procedure: COLONOSCOPY/suprep;  Surgeon: Ananya Morillo MD;  Location: [facility] ENDO;  Service: Endoscopy;  Laterality: N/A;    defibulater N/A     ESOPHAGOGASTRODUODENOSCOPY N/A 2018    Procedure: EGD (ESOPHAGOGASTRODUODENOSCOPY);  Surgeon: Ananya Morillo MD;  Location: [facility] ENDO;  Service: Endoscopy;  Laterality: N/A;    JOINT REPLACEMENT Bilateral 10/2016    knees, bilat    LEFT HEART CATHETERIZATION N/A 2020    Procedure: Left heart cath;  Surgeon: Shahram Stewart MD;  Location: [facility] CATH LAB/EP;  Service: Cardiology;  Laterality: N/A;       Review of patient's allergies indicates:   Allergen Reactions    Lokelma [sodium zirconium cyclosilicate] Other (See Comments)     Fluid retention, weight gain, CHF excerebration, severe constipation    Atorvastatin Other (See Comments)     Joint pain       Current Facility-Administered Medications on File Prior to Encounter   Medication    [] NORepinephrine bitartrate-D5W 4 mg/250 mL (16 mcg/mL) infusion Soln    [DISCONTINUED] acetaminophen tablet 650 mg    [DISCONTINUED] acetaminophen tablet 650 mg    [DISCONTINUED] albuterol-ipratropium 2.5 mg-0.5 mg/3 mL nebulizer solution 3 mL    [DISCONTINUED] amiodarone tablet 200 mg    [DISCONTINUED] aspirin EC tablet 81 mg    [DISCONTINUED] carvediloL tablet 12.5 mg    [DISCONTINUED] furosemide tablet 40 mg    [DISCONTINUED] heparin (porcine) injection 5,000 Units    [DISCONTINUED] heparin infusion 1,000 units/500 ml in 0.9%  NaCl (pressure line flush)    [DISCONTINUED] levothyroxine tablet 175 mcg    [DISCONTINUED] melatonin tablet 6 mg    [DISCONTINUED] mexiletine capsule 150 mg    [DISCONTINUED] mupirocin 2 % ointment    [DISCONTINUED] ondansetron disintegrating tablet 8 mg    [DISCONTINUED] ondansetron injection 4 mg    [DISCONTINUED] pravastatin tablet 40 mg    [DISCONTINUED] sacubitriL-valsartan 24-26 mg per tablet 1 tablet    [DISCONTINUED] sodium chloride 0.9% flush 10 mL     Current Outpatient Medications on File Prior to Encounter   Medication Sig    allopurinoL (ZYLOPRIM) 300 MG tablet Take 1 tablet (300 mg total) by mouth once daily.    amiodarone (PACERONE) 200 MG Tab TAKE 1 TABLET BY MOUTH EVERY DAY (Patient taking differently: Take 200 mg by mouth once daily.)    aspirin (ECOTRIN) 81 MG EC tablet Take 81 mg by mouth once daily.    carvediloL (COREG) 25 MG tablet TAKE 1 TABLET BY MOUTH TWICE A DAY WITH FOOD (Patient taking differently: Take 25 mg by mouth 2 (two) times daily with meals.)    ergocalciferol (ERGOCALCIFEROL) 50,000 unit Cap Take 1 capsule (50,000 Units total) by mouth every 7 days.    furosemide (LASIX) 80 MG tablet Take 1 tablet (80 mg total) by mouth 3 (three) times daily as needed (Swelling).    levothyroxine (SYNTHROID, LEVOTHROID) 175 MCG tablet TAKE 1 TABLET BY MOUTH ONCE DAILY. (Patient taking differently: Take 175 mcg by mouth once daily.)    mexiletine (MEXITIL) 150 MG Cap Take 1 capsule (150 mg total) by mouth 2 (two) times daily with meals.    pravastatin (PRAVACHOL) 40 MG tablet Take 1 tablet (40 mg total) by mouth once daily.    sacubitriL-valsartan (ENTRESTO) 49-51 mg per tablet Take 1 tablet by mouth 2 (two) times daily.    FLUZONE QUAD 0457-7234, PF, 60 mcg (15 mcg x 4)/0.5 mL Syrg     metOLazone (ZAROXOLYN) 10 MG tablet Take 1 tablet (10 mg total) by mouth daily as needed (Swelling). (Patient not taking: No sig reported)    nitroGLYCERIN (NITROSTAT) 0.4 MG SL tablet Place 1 tablet (0.4 mg  total) under the tongue every 5 (five) minutes as needed for Chest pain.    tadalafiL (CIALIS) 5 MG tablet TAKE 1 TABLET BY MOUTH DAILY AS NEEDED FOR ERECTILE DYSFUNCTION (Patient not taking: Reported on 9/25/2022)     Family History       Problem Relation (Age of Onset)    Alcohol abuse Father    Hypertension Mother    Kidney disease Father    No Known Problems Sister, Brother, Daughter, Son    Stroke Mother          Tobacco Use    Smoking status: Former     Packs/day: 1.00     Years: 25.00     Pack years: 25.00     Types: Cigarettes     Start date: 1/1/1979     Quit date: 3/3/2012     Years since quitting: 10.5    Smokeless tobacco: Former   Substance and Sexual Activity    Alcohol use: No    Drug use: No    Sexual activity: Yes     Partners: Female     Review of Systems   All other systems reviewed and are negative.  Objective:     Vital Signs (Most Recent):  Temp: 98 °F (36.7 °C) (09/28/22 1404)  Pulse: 81 (09/28/22 1404)  Resp: 20 (09/28/22 1404)  BP: (!) 87/55 (09/28/22 1404)  SpO2: (!) 91 % (09/28/22 1404)   Vital Signs (24h Range):  Temp:  [98 °F (36.7 °C)-98.2 °F (36.8 °C)] 98 °F (36.7 °C)  Pulse:  [] 81  Resp:  [16-43] 20  SpO2:  [85 %-95 %] 91 %  BP: ()/(43-66) 87/55       Weight: 91 kg (200 lb 9.9 oz)  Body mass index is 29.63 kg/m².    SpO2: (!) 91 %       Physical Exam  Constitutional:       Appearance: Normal appearance.   HENT:      Head: Normocephalic and atraumatic.      Nose: Nose normal.   Eyes:      Extraocular Movements: Extraocular movements intact.      Pupils: Pupils are equal, round, and reactive to light.   Cardiovascular:      Rate and Rhythm: Normal rate.      Pulses: Normal pulses.      Heart sounds: Normal heart sounds.   Pulmonary:      Effort: Pulmonary effort is normal.      Breath sounds: Normal breath sounds.   Abdominal:      General: Abdomen is flat.      Palpations: Abdomen is soft.   Musculoskeletal:         General: Normal range of motion.      Cervical back:  Normal range of motion and neck supple.   Skin:     Comments: JVP to mid-neck   Neurological:      General: No focal deficit present.      Mental Status: He is alert and oriented to person, place, and time.       Significant Labs: EP:   Recent Labs   Lab 09/26/22  1810 09/27/22  0437 09/28/22  0642 09/28/22  1545   NA  --  134* 135* 135*   K  --  4.5 4.9 4.9   CL  --  97 99 98   CO2  --  28 27 24   GLU  --  99 95 103   BUN  --  75* 77* 76*   CREATININE  --  3.6* 4.6* 4.5*   CALCIUM  --  9.2 9.1 9.6   PROT  --  6.4 6.7 7.2   ALBUMIN  --  3.4* 3.4* 3.7   BILITOT  --  1.4* 1.4* 1.6*   ALKPHOS  --  80 83 97   AST  --  16 14 18   ALT  --  17 14 17   ANIONGAP  --  9 9 13   WBC  --  7.25  --  9.04   HGB 14.3 13.9*  --  15.2   HCT 43.2 41.9  --  45.5   PLT  --  365  --  410       Significant Imaging: Echocardiogram: Transthoracic echo (TTE) complete (Cupid Only):   Results for orders placed or performed during the hospital encounter of 09/25/22   Echo   Result Value Ref Range    BSA 2.12 m2    TDI SEPTAL 0.05 m/s    LV LATERAL E/E' RATIO 14.00 m/s    LV SEPTAL E/E' RATIO 16.80 m/s    IVC diameter 2.53 cm    Left Ventricular Outflow Tract Mean Velocity 0.61 cm/s    Left Ventricular Outflow Tract Mean Gradient 1.78 mmHg    Pulmonary Valve Mean Velocity 0.65 m/s    TDI LATERAL 0.06 m/s    PV PEAK VELOCITY 1.00 cm/s    LVIDd 5.96 3.5 - 6.0 cm    IVS 1.08 0.6 - 1.1 cm    Posterior Wall 1.23 (A) 0.6 - 1.1 cm    LVIDs 4.34 (A) 2.1 - 4.0 cm    FS 27 28 - 44 %    LV mass 295.05 g    LA size 4.88 cm    RVDD 3.34 cm    TAPSE 1.14 cm    RV S' 0.01 cm/s    Left Ventricle Relative Wall Thickness 0.41 cm    AV mean gradient 6 mmHg    AV valve area 2.01 cm2    AV Velocity Ratio 0.60     AV index (prosthetic) 0.58     MV mean gradient 1 mmHg    MV valve area p 1/2 method 2.09 cm2    MV valve area by continuity eq 2.24 cm2    E/A ratio 1.79     Mean e' 0.06 m/s    E wave deceleration time 333.78 msec    IVRT 125.59 msec    LVOT diameter 2.10  cm    LVOT area 3.5 cm2    LVOT peak marvin 0.97 m/s    LVOT peak VTI 19.20 cm    Ao peak marvin 1.63 m/s    Ao VTI 33.00 cm    Mr max marvin 3.97 m/s    LVOT stroke volume 66.47 cm3    AV peak gradient 11 mmHg    MV peak gradient 3 mmHg    E/E' ratio 15.27 m/s    MV Peak E Marvin 0.84 m/s    TR Max Marvin 3.61 m/s    MV VTI 29.7 cm    MV stenosis pressure 1/2 time 105.47 ms    MV Peak A Marvin 0.47 m/s    LV Systolic Volume 84.87 mL    LV Systolic Volume Index 40.6 mL/m2    LV Diastolic Volume 177.38 mL    LV Diastolic Volume Index 84.87 mL/m2    LV Mass Index 141 g/m2    RA Major Axis 7.06 cm    Left Atrium Minor Axis 6.19 cm    Left Atrium Major Axis 7.15 cm    Triscuspid Valve Regurgitation Peak Gradient 52 mmHg    LA Volume Index (Mod) 37.2 mL/m2    LA volume (mod) 77.75 cm3    RA Width 6.09 cm    Right Atrial Pressure (from IVC) 15 mmHg    EF 40 %    TV rest pulmonary artery pressure 67 mmHg    Narrative    · The left ventricle is normal in size with eccentric hypertrophy and   mildly decreased systolic function.  · The estimated ejection fraction is 40%.  · There is abnormal septal wall motion consistent with right ventricular   pacemaker.  · Grade II left ventricular diastolic dysfunction.  · With low normal right ventricular systolic function.  · Moderate right atrial enlargement.  · Mild tricuspid regurgitation.  · There is pulmonary hypertension.  · The estimated PA systolic pressure is 67 mmHg.  · Elevated central venous pressure (15 mmHg).

## 2022-09-28 NOTE — HPI
62 y/o male with PMH for HFrEF with EF 30-35% initially with last EF 40-45%, s/p CRT-D (initial ICD 2012 with upgrade 2016 - [employer]), DD, VT s/p ablation x 2 (2/2018 and again 3/2018), HLD, hypothyroidism, YOEL. He had 6 shocks last Sunday 3 days ago, presented at [facility] and got hospitalized and today after LHC (which showed non obs CAD) was transferrred here for VT ablation candidacy. He is on amio 200 daily and mexeline bid 150 and was transferred on this regimen. Currently he is A O x 3 and denies any symptoms. He has been having low BP readings at home 80s/40s over the past few weeks and had 1 syncopal event and fall 1 month ago with low BP. Currenlty his CVP is 16 cmH2o, he is on room air in paced rhythm.

## 2022-09-28 NOTE — ASSESSMENT & PLAN NOTE
#Acute CHF  -6 VT shocks on Sunday  -JVD 16 cmH2o on exam  -TTE 9/26 with EF 40-45%, grade 2 diastolic dysfunction, PASP 67, CVP 15  -Home meds: Coreg 25 bid, entresto 49/51 bid, lasix 80 tid prn  -follows up with [facility] for outpt cardiology     Plan  -hold entresto and coreg for low BP and worsening Cr, BP 87/55 now  -being careful with BP since had a fall with syncope from low BP 4 weeks ago, reports low BP for a few weeks  -IV lasix 80 bid  -tele  -K>4, Mg>2

## 2022-09-28 NOTE — PROGRESS NOTES
[facility] - Intensive Care  Cardiology  Progress Note    Patient Name: Ar Sharma  MRN: [id number]  Admission Date: 9/25/2022  Hospital Length of Stay: 3 days  Code Status: Full Code   Attending Physician: Rigoberto Massey, *   Primary Care Physician: cJ Elliott MD  Expected Discharge Date: 9/28/2022  Principal Problem:Defibrillator discharge    Subjective:     Hospital Course:   9/26/2022 per HPI  9/27/2022 NPO for [facility] today. No recurrent VTach overnight   9/28/2022 [facility] yesterday with mild CAD and no significant disease requiring PCI. Remains on oral Amio and Mexilitene. HR stable overnight with no recurrent VTach. SBP 70s-90s overnight with MAP in low to mid 60s- no complaints of dizziness, fatigue or syncope yesterday             Review of Systems   Constitutional: Negative for chills, decreased appetite, diaphoresis and fever.   Cardiovascular:  Negative for chest pain, claudication, cyanosis, dyspnea on exertion, irregular heartbeat, leg swelling, near-syncope, orthopnea, palpitations, paroxysmal nocturnal dyspnea and syncope.   Respiratory:  Negative for cough, hemoptysis, shortness of breath and wheezing.    Gastrointestinal:  Negative for bloating, abdominal pain, constipation, diarrhea, melena, nausea and vomiting.   Neurological:  Negative for dizziness and weakness.   Objective:     Vital Signs (Most Recent):  Temp: 98.2 °F (36.8 °C) (09/28/22 1200)  Pulse: 61 (09/28/22 1200)  Resp: 17 (09/28/22 1200)  BP: (!) 93/50 (09/28/22 1200)  SpO2: (!) 92 % (09/28/22 1200)   Vital Signs (24h Range):  Temp:  [97.7 °F (36.5 °C)-98.2 °F (36.8 °C)] 98.2 °F (36.8 °C)  Pulse:  [] 61  Resp:  [14-43] 17  SpO2:  [85 %-95 %] 92 %  BP: ()/(38-66) 93/50     Weight: 92.5 kg (203 lb 14.8 oz)  Body mass index is 30.11 kg/m².     SpO2: (!) 92 %  O2 Device (Oxygen Therapy): room air      Intake/Output Summary (Last 24 hours) at 9/28/2022 1254  Last data filed at 9/28/2022 0800  Gross per 24 hour   Intake 720  ml   Output 800 ml   Net -80 ml       Lines/Drains/Airways       Peripheral Intravenous Line  Duration                  Peripheral IV - Single Lumen 09/25/22 18 G Anterior;Left Forearm 3 days         Peripheral IV - Single Lumen 09/26/22 0130 20 G Anterior;Right Forearm 2 days                    Physical Exam  Constitutional:       General: He is not in acute distress.     Appearance: He is well-developed.   Cardiovascular:      Rate and Rhythm: Normal rate and regular rhythm.      Heart sounds: No murmur heard.    No gallop.   Pulmonary:      Effort: Pulmonary effort is normal. No respiratory distress.      Breath sounds: Normal breath sounds. No wheezing.   Abdominal:      General: Bowel sounds are normal. There is no distension.      Palpations: Abdomen is soft.      Tenderness: There is no abdominal tenderness.   Skin:     General: Skin is warm and dry.   Neurological:      Mental Status: He is alert and oriented to person, place, and time.       Significant Labs: BMP:   Recent Labs   Lab 09/27/22  0437 09/28/22  0642   GLU 99 95   * 135*   K 4.5 4.9   CL 97 99   CO2 28 27   BUN 75* 77*   CREATININE 3.6* 4.6*   CALCIUM 9.2 9.1   MG 2.2 2.2   , CBC   Recent Labs   Lab 09/26/22  1810 09/27/22  0437   WBC  --  7.25   HGB 14.3 13.9*   HCT 43.2 41.9   PLT  --  365       Significant Imaging: Echocardiogram: Transthoracic echo (TTE) complete (Cupid Only):   Results for orders placed or performed during the hospital encounter of 09/25/22   Echo   Result Value Ref Range    BSA 2.12 m2    TDI SEPTAL 0.05 m/s    LV LATERAL E/E' RATIO 14.00 m/s    LV SEPTAL E/E' RATIO 16.80 m/s    IVC diameter 2.53 cm    Left Ventricular Outflow Tract Mean Velocity 0.61 cm/s    Left Ventricular Outflow Tract Mean Gradient 1.78 mmHg    Pulmonary Valve Mean Velocity 0.65 m/s    TDI LATERAL 0.06 m/s    PV PEAK VELOCITY 1.00 cm/s    LVIDd 5.96 3.5 - 6.0 cm    IVS 1.08 0.6 - 1.1 cm    Posterior Wall 1.23 (A) 0.6 - 1.1 cm    LVIDs 4.34 (A)  2.1 - 4.0 cm    FS 27 28 - 44 %    LV mass 295.05 g    LA size 4.88 cm    RVDD 3.34 cm    TAPSE 1.14 cm    RV S' 0.01 cm/s    Left Ventricle Relative Wall Thickness 0.41 cm    AV mean gradient 6 mmHg    AV valve area 2.01 cm2    AV Velocity Ratio 0.60     AV index (prosthetic) 0.58     MV mean gradient 1 mmHg    MV valve area p 1/2 method 2.09 cm2    MV valve area by continuity eq 2.24 cm2    E/A ratio 1.79     Mean e' 0.06 m/s    E wave deceleration time 333.78 msec    IVRT 125.59 msec    LVOT diameter 2.10 cm    LVOT area 3.5 cm2    LVOT peak marvin 0.97 m/s    LVOT peak VTI 19.20 cm    Ao peak marvin 1.63 m/s    Ao VTI 33.00 cm    Mr max marvin 3.97 m/s    LVOT stroke volume 66.47 cm3    AV peak gradient 11 mmHg    MV peak gradient 3 mmHg    E/E' ratio 15.27 m/s    MV Peak E Marvin 0.84 m/s    TR Max Marvin 3.61 m/s    MV VTI 29.7 cm    MV stenosis pressure 1/2 time 105.47 ms    MV Peak A Marvin 0.47 m/s    LV Systolic Volume 84.87 mL    LV Systolic Volume Index 40.6 mL/m2    LV Diastolic Volume 177.38 mL    LV Diastolic Volume Index 84.87 mL/m2    LV Mass Index 141 g/m2    RA Major Axis 7.06 cm    Left Atrium Minor Axis 6.19 cm    Left Atrium Major Axis 7.15 cm    Triscuspid Valve Regurgitation Peak Gradient 52 mmHg    LA Volume Index (Mod) 37.2 mL/m2    LA volume (mod) 77.75 cm3    RA Width 6.09 cm    Right Atrial Pressure (from IVC) 15 mmHg    EF 40 %    TV rest pulmonary artery pressure 67 mmHg    Narrative    · The left ventricle is normal in size with eccentric hypertrophy and   mildly decreased systolic function.  · The estimated ejection fraction is 40%.  · There is abnormal septal wall motion consistent with right ventricular   pacemaker.  · Grade II left ventricular diastolic dysfunction.  · With low normal right ventricular systolic function.  · Moderate right atrial enlargement.  · Mild tricuspid regurgitation.  · There is pulmonary hypertension.  · The estimated PA systolic pressure is 67 mmHg.  · Elevated central  venous pressure (15 mmHg).        Assessment and Plan:     Brief HPI: Seen on AM rounds with Dr. Barlow with wife at bedside while resting in bed. Denies any complaints. Reviewed angiogram findings from yesterday. No complaints today. Discussed recommendation for transfer to [facility]- both agreeable and transfer initiated per Hospital Medicine     * Defibrillator discharge  - AICD firing at home; interrogation in ER with verbal reports with 11-12 episodes of VTach in past month all treated with ATP until yesterday  - continue Amiodarone and Mexiltene; K+ goal >4.0 Mg >2.0   - no recurrent VTach since admission  - continue to monitor on telemetry; transfer to [facility] for VTach ablation evaluation     V-tach  - continue Amiodarone and Mexilitene  - management as detailed previously     Essential (primary) hypertension  - SBP 90s-100s overnight  - on Entresto and Coreg at home doses  - continue medication and monitor BP     Hyperlipidemia  - continue statin therapy     HFrEF (heart failure with reduced ejection fraction)  - echo in August 2022 with EF 40-45% LV WMA and grade II diastolic dysfunction; repeat echo with unchanged EF   - on Coreg, Entresto and Lasix at home- will continue   - BNP 2304; previous reading 724 3 years ago; no acute decompensation noted on PE  - continue GMDT as BP tolerates;l SBP 70s-90s overnight but MAP mid 60s- no complaints of dizziness, fatigue or recurrent syncope- continue meds     CKD (chronic kidney disease) stage 4, GFR 15-29 ml/min  - creatinine 4.1 upon admission; down to 3.6 this AM  - baseline creatinine 3-4    Coronary artery disease involving native coronary artery of native heart without angina pectoris  - LHC in 12/2020 with non obstructive CAD to LAD, LCX and RCA  - nuclear stress test in 7/2022 with thinning of lateral wall but not reversible ischemia noted; LVEF 35%  - presentation with VTach and AICD firing; repeat echo with unchanged EF   - repeat LHC with non obstructive  CAD   - continue ASA, statin, BB and ARB; troponin .015        VTE Risk Mitigation (From admission, onward)           Ordered     heparin infusion 1,000 units/500 ml in 0.9% NaCl (pressure line flush)  Intra-op continuous PRN         09/27/22 1231     heparin (porcine) injection 5,000 Units  Every 8 hours         09/25/22 2220     IP VTE HIGH RISK PATIENT  Once         09/25/22 2220     Place sequential compression device  Until discontinued         09/25/22 2220                    > 40 minutes was spent in the care of this patient for evaluation, critical thinking and decision making. Also discussion with patient and wife as to present condition. Not all time was spent in direct face to face with patient as time was spent reviewing ECGs, CXR, labs, medications and past studies.     Audelia Monaco APRN, ANP  Cardiology  [place] - Intensive Care

## 2022-09-28 NOTE — CONSULTS
[facility] - Cardiology Stepdown  Cardiac Electrophysiology  Consult Note    Admission Date: 9/28/2022  Code Status: Full Code   Attending Provider: Mitch Cho MD  Consulting Provider: Colin Hyatt MD  Principal Problem:VT (ventricular tachycardia)    Inpatient consult to Electrophysiology  Consult performed by: Colin Hyatt MD  Consult ordered by: Schuyler Traore MD        Subjective:     Chief Complaint:  Lightheaded    HPI:   Mr. Sharma is a 60 yo male with past medical history of:  -VT (first dx 2012 then had CRT-D placed 2016 in [place] followed by 2 VT ablations in 2018 also in [place]  -HFrEF (EF 40-45%)  -CKD V (baseline creatinine around 4)  -Hypothyroid  -CAD  -HTN    Who presented to [facility] on 9/25/22 after receiving 6 ICD shocks that day. Patient reports he had episodes of dizziness/lightheadedness prior to being shocked but no syncope. He did have syncopal episode several weeks ago. Interrogation of device at bedside today shows 15 episodes on 9/25/22. Most episodes were treated successfully with ATP but 6 episodes received 5 J shocks. Regarding his EP history, he had his first episode of VT in 2012 and had single chamber ICD placed at that time. He had upgrade to CRT-D in 2016. He had 2 VT ablations in 2018 in [place] (requesting outside records).     [place] [facility]ensive with BP in upper 80s/50s and appears volume overloaded on exam. CXR pending. EKG showing AP-BiV paced.      Most recent Echo from 9/26/22:   The left ventricle is normal in size with eccentric hypertrophy and mildly decreased systolic function.   The estimated ejection fraction is 40-45%.   There is abnormal septal wall motion consistent with right ventricular pacemaker.   Grade II left ventricular diastolic dysfunction.   With low normal right ventricular systolic function.   Moderate right atrial enlargement.   Mild tricuspid regurgitation.   There is pulmonary  hypertension.   The estimated PA systolic pressure is 67 mmHg.   Elevated central venous pressure (15 mmHg).        Past Medical History:   Diagnosis Date    CKD (chronic kidney disease) stage 4, GFR 15-29 ml/min     Congestive heart failure (CHF)     Edema     Heart attack     HLD (hyperlipidemia)     Hypertension     Hyperuricemia     Hypocalcemia     Renal cyst, left     Secondary hyperparathyroidism     Thyroid disease     V tach     Vitamin D deficiency        Past Surgical History:   Procedure Laterality Date    ablations  2018    albations  2018    COLONOSCOPY N/A 2018    Procedure: COLONOSCOPY/suprep;  Surgeon: Ananya Morillo MD;  Location: [facility] ENDO;  Service: Endoscopy;  Laterality: N/A;    defibulater N/A     ESOPHAGOGASTRODUODENOSCOPY N/A 2018    Procedure: EGD (ESOPHAGOGASTRODUODENOSCOPY);  Surgeon: Ananya Morillo MD;  Location: [facility] ENDO;  Service: Endoscopy;  Laterality: N/A;    JOINT REPLACEMENT Bilateral 10/2016    knees, bilat    LEFT HEART CATHETERIZATION N/A 2020    Procedure: Left heart cath;  Surgeon: Shahram Stewart MD;  Location: [facility] CATH LAB/EP;  Service: Cardiology;  Laterality: N/A;       Review of patient's allergies indicates:   Allergen Reactions    Lokelma [sodium zirconium cyclosilicate] Other (See Comments)     Fluid retention, weight gain, CHF excerebration, severe constipation    Atorvastatin Other (See Comments)     Joint pain       Current Facility-Administered Medications on File Prior to Encounter   Medication    [] NORepinephrine bitartrate-D5W 4 mg/250 mL (16 mcg/mL) infusion Soln    [DISCONTINUED] acetaminophen tablet 650 mg    [DISCONTINUED] acetaminophen tablet 650 mg    [DISCONTINUED] albuterol-ipratropium 2.5 mg-0.5 mg/3 mL nebulizer solution 3 mL    [DISCONTINUED] amiodarone tablet 200 mg    [DISCONTINUED] aspirin EC tablet 81 mg    [DISCONTINUED] carvediloL tablet 12.5 mg     [DISCONTINUED] furosemide tablet 40 mg    [DISCONTINUED] heparin (porcine) injection 5,000 Units    [DISCONTINUED] heparin infusion 1,000 units/500 ml in 0.9% NaCl (pressure line flush)    [DISCONTINUED] levothyroxine tablet 175 mcg    [DISCONTINUED] melatonin tablet 6 mg    [DISCONTINUED] mexiletine capsule 150 mg    [DISCONTINUED] mupirocin 2 % ointment    [DISCONTINUED] ondansetron disintegrating tablet 8 mg    [DISCONTINUED] ondansetron injection 4 mg    [DISCONTINUED] pravastatin tablet 40 mg    [DISCONTINUED] sacubitriL-valsartan 24-26 mg per tablet 1 tablet    [DISCONTINUED] sodium chloride 0.9% flush 10 mL     Current Outpatient Medications on File Prior to Encounter   Medication Sig    allopurinoL (ZYLOPRIM) 300 MG tablet Take 1 tablet (300 mg total) by mouth once daily.    amiodarone (PACERONE) 200 MG Tab TAKE 1 TABLET BY MOUTH EVERY DAY (Patient taking differently: Take 200 mg by mouth once daily.)    aspirin (ECOTRIN) 81 MG EC tablet Take 81 mg by mouth once daily.    carvediloL (COREG) 25 MG tablet TAKE 1 TABLET BY MOUTH TWICE A DAY WITH FOOD (Patient taking differently: Take 25 mg by mouth 2 (two) times daily with meals.)    ergocalciferol (ERGOCALCIFEROL) 50,000 unit Cap Take 1 capsule (50,000 Units total) by mouth every 7 days.    furosemide (LASIX) 80 MG tablet Take 1 tablet (80 mg total) by mouth 3 (three) times daily as needed (Swelling).    levothyroxine (SYNTHROID, LEVOTHROID) 175 MCG tablet TAKE 1 TABLET BY MOUTH ONCE DAILY. (Patient taking differently: Take 175 mcg by mouth once daily.)    mexiletine (MEXITIL) 150 MG Cap Take 1 capsule (150 mg total) by mouth 2 (two) times daily with meals.    pravastatin (PRAVACHOL) 40 MG tablet Take 1 tablet (40 mg total) by mouth once daily.    sacubitriL-valsartan (ENTRESTO) 49-51 mg per tablet Take 1 tablet by mouth 2 (two) times daily.    FLUZONE QUAD 0671-5645, PF, 60 mcg (15 mcg x 4)/0.5 mL Syrg     metOLazone (ZAROXOLYN) 10 MG  tablet Take 1 tablet (10 mg total) by mouth daily as needed (Swelling). (Patient not taking: No sig reported)    nitroGLYCERIN (NITROSTAT) 0.4 MG SL tablet Place 1 tablet (0.4 mg total) under the tongue every 5 (five) minutes as needed for Chest pain.    tadalafiL (CIALIS) 5 MG tablet TAKE 1 TABLET BY MOUTH DAILY AS NEEDED FOR ERECTILE DYSFUNCTION (Patient not taking: Reported on 9/25/2022)     Family History       Problem Relation (Age of Onset)    Alcohol abuse Father    Hypertension Mother    Kidney disease Father    No Known Problems Sister, Brother, Daughter, Son    Stroke Mother          Tobacco Use    Smoking status: Former     Packs/day: 1.00     Years: 25.00     Pack years: 25.00     Types: Cigarettes     Start date: 1/1/1979     Quit date: 3/3/2012     Years since quitting: 10.5    Smokeless tobacco: Former   Substance and Sexual Activity    Alcohol use: No    Drug use: No    Sexual activity: Yes     Partners: Female     Review of Systems   All other systems reviewed and are negative.  Objective:     Vital Signs (Most Recent):  Temp: 98 °F (36.7 °C) (09/28/22 1404)  Pulse: 81 (09/28/22 1404)  Resp: 20 (09/28/22 1404)  BP: (!) 87/55 (09/28/22 1404)  SpO2: (!) 91 % (09/28/22 1404)   Vital Signs (24h Range):  Temp:  [98 °F (36.7 °C)-98.2 °F (36.8 °C)] 98 °F (36.7 °C)  Pulse:  [] 81  Resp:  [16-43] 20  SpO2:  [85 %-95 %] 91 %  BP: ()/(43-66) 87/55       Weight: 91 kg (200 lb 9.9 oz)  Body mass index is 29.63 kg/m².    SpO2: (!) 91 %       Physical Exam  Constitutional:       Appearance: Normal appearance.   HENT:      Head: Normocephalic and atraumatic.      Nose: Nose normal.   Eyes:      Extraocular Movements: Extraocular movements intact.      Pupils: Pupils are equal, round, and reactive to light.   Cardiovascular:      Rate and Rhythm: Normal rate.      Pulses: Normal pulses.      Heart sounds: Normal heart sounds.   Pulmonary:      Effort: Pulmonary effort is normal.      Breath  sounds: Normal breath sounds.   Abdominal:      General: Abdomen is flat.      Palpations: Abdomen is soft.   Musculoskeletal:         General: Normal range of motion.      Cervical back: Normal range of motion and neck supple.   Skin:     Comments: JVP to mid-neck   Neurological:      General: No focal deficit present.      Mental Status: He is alert and oriented to person, place, and time.       Significant Labs: EP:   Recent Labs   Lab 09/26/22  1810 09/27/22  0437 09/28/22  0642 09/28/22  1545   NA  --  134* 135* 135*   K  --  4.5 4.9 4.9   CL  --  97 99 98   CO2  --  28 27 24   GLU  --  99 95 103   BUN  --  75* 77* 76*   CREATININE  --  3.6* 4.6* 4.5*   CALCIUM  --  9.2 9.1 9.6   PROT  --  6.4 6.7 7.2   ALBUMIN  --  3.4* 3.4* 3.7   BILITOT  --  1.4* 1.4* 1.6*   ALKPHOS  --  80 83 97   AST  --  16 14 18   ALT  --  17 14 17   ANIONGAP  --  9 9 13   WBC  --  7.25  --  9.04   HGB 14.3 13.9*  --  15.2   HCT 43.2 41.9  --  45.5   PLT  --  365  --  410       Significant Imaging: Echocardiogram: Transthoracic echo (TTE) complete ([facility] Only):   Results for orders placed or performed during the hospital encounter of 09/25/22   Echo   Result Value Ref Range    BSA 2.12 m2    TDI SEPTAL 0.05 m/s    LV LATERAL E/E' RATIO 14.00 m/s    LV SEPTAL E/E' RATIO 16.80 m/s    IVC diameter 2.53 cm    Left Ventricular Outflow Tract Mean Velocity 0.61 cm/s    Left Ventricular Outflow Tract Mean Gradient 1.78 mmHg    Pulmonary Valve Mean Velocity 0.65 m/s    TDI LATERAL 0.06 m/s    PV PEAK VELOCITY 1.00 cm/s    LVIDd 5.96 3.5 - 6.0 cm    IVS 1.08 0.6 - 1.1 cm    Posterior Wall 1.23 (A) 0.6 - 1.1 cm    LVIDs 4.34 (A) 2.1 - 4.0 cm    FS 27 28 - 44 %    LV mass 295.05 g    LA size 4.88 cm    RVDD 3.34 cm    TAPSE 1.14 cm    RV S' 0.01 cm/s    Left Ventricle Relative Wall Thickness 0.41 cm    AV mean gradient 6 mmHg    AV valve area 2.01 cm2    AV Velocity Ratio 0.60     AV index (prosthetic) 0.58     MV mean gradient 1 mmHg    MV valve  area p 1/2 method 2.09 cm2    MV valve area by continuity eq 2.24 cm2    E/A ratio 1.79     Mean e' 0.06 m/s    E wave deceleration time 333.78 msec    IVRT 125.59 msec    LVOT diameter 2.10 cm    LVOT area 3.5 cm2    LVOT peak marvin 0.97 m/s    LVOT peak VTI 19.20 cm    Ao peak marvin 1.63 m/s    Ao VTI 33.00 cm    Mr max marvin 3.97 m/s    LVOT stroke volume 66.47 cm3    AV peak gradient 11 mmHg    MV peak gradient 3 mmHg    E/E' ratio 15.27 m/s    MV Peak E Marvin 0.84 m/s    TR Max Marvin 3.61 m/s    MV VTI 29.7 cm    MV stenosis pressure 1/2 time 105.47 ms    MV Peak A Marvin 0.47 m/s    LV Systolic Volume 84.87 mL    LV Systolic Volume Index 40.6 mL/m2    LV Diastolic Volume 177.38 mL    LV Diastolic Volume Index 84.87 mL/m2    LV Mass Index 141 g/m2    RA Major Axis 7.06 cm    Left Atrium Minor Axis 6.19 cm    Left Atrium Major Axis 7.15 cm    Triscuspid Valve Regurgitation Peak Gradient 52 mmHg    LA Volume Index (Mod) 37.2 mL/m2    LA volume (mod) 77.75 cm3    RA Width 6.09 cm    Right Atrial Pressure (from IVC) 15 mmHg    EF 40 %    TV rest pulmonary artery pressure 67 mmHg    Narrative    · The left ventricle is normal in size with eccentric hypertrophy and   mildly decreased systolic function.  · The estimated ejection fraction is 40%.  · There is abnormal septal wall motion consistent with right ventricular   pacemaker.  · Grade II left ventricular diastolic dysfunction.  · With low normal right ventricular systolic function.  · Moderate right atrial enlargement.  · Mild tricuspid regurgitation.  · There is pulmonary hypertension.  · The estimated PA systolic pressure is 67 mmHg.  · Elevated central venous pressure (15 mmHg).                  Assessment and Plan:     * VT (ventricular tachycardia)  -Patient had first episode of VT in 2012 had single chamber ICD placed at that time  -Upgrade to CRT-D in 2016 ([employer])  -2 VT ablations in 2018 in [place] (pending records)  -Device interrogation today showed 15 episodes  of MMVT cycle length 420 msec-440 msec on 9/25/22. Most episodes successfully treated with ATP. 6 episodes successfully treated with 5 J shocks. One of these episodes he went into VF requiring 35 J shock.  -Currently rhythm is AP-BiV paced  -At home: Amio 200 qd, Mexiletene 150 BID  -Will increase Amio to 400 mg qd and continue Mexiletene 150 BID  -Currently in decompensated HF and will defer HF management to primary team  -EP will continue to follow      Decompensated HFrEF  -Management per primary    CKD V  -baseline Cr 3.8-4      Thank you for your consult. I will follow-up with patient. Please contact us if you have any additional questions.    Colin Hyatt MD  Cardiac Electrophysiology  [facility] - Card[facility] Step[facility]

## 2022-09-28 NOTE — ASSESSMENT & PLAN NOTE
-H/O VT since 2012 (was diagnosed in [place] in 2012 when he presented with palpitations)  -got AICD in 2012 which was upgraded to CRT-D in 2016, was started on mexelitine  -He had VT ablation in 2/2018 and another VT ablation in 3/2018 in [place]  -he had 6 shocks last Sunday 3 days ago, presented at [facility] and got hospitalized and today after LHC (which showed non obs CAD) was transferrred here for VT ablation candidacy     Plan  -formal device interrogation for his medtronic CRT-D device  -consult to EP  -on amio 200 daily and dyan 150 bid since 2018, continue the same for now  -K>4, Mg>2, telemetry

## 2022-09-28 NOTE — HPI
Mr. Sharma is a 60 yo male with past medical history of:  -VT (first dx 2012 then had CRT-D placed 2016 in [place] followed by 2 VT ablations in 2018 also in [place]  -HFrEF (EF 40-45%)  -CKD V (baseline creatinine around 4)  -Hypothyroid  -CAD  -HTN    Who presented to [facility] on 9/25/22 after receiving 6 ICD shocks that day. Patient reports he had episodes of dizziness/lightheadedness prior to being shocked but no syncope. He did have syncopal episode several weeks ago. Interrogation of device at bedside today shows 15 episodes on 9/25/22. Most episodes were treated successfully with ATP but 6 episodes received 5 J shocks. Regarding his EP history, he had his first episode of VT in 2012 and had single chamber ICD placed at that time. He had upgrade to CRT-D in 2016. He had 2 VT ablations in 2018 in [place] (requesting outside records).     On arrival to [facility], patient hypotensive with BP in upper 80s/50s and appears volume overloaded on exam. CXR pending. EKG showing AP-BiV paced.      Most recent Echo from 9/26/22:  The left ventricle is normal in size with eccentric hypertrophy and mildly decreased systolic function.  The estimated ejection fraction is 40-45%.  There is abnormal septal wall motion consistent with right ventricular pacemaker.  Grade II left ventricular diastolic dysfunction.  With low normal right ventricular systolic function.  Moderate right atrial enlargement.  Mild tricuspid regurgitation.  There is pulmonary hypertension.  The estimated PA systolic pressure is 67 mmHg.  Elevated central venous pressure (15 mmHg).

## 2022-09-28 NOTE — PLAN OF CARE
09/28/22 1210   Final Note   Assessment Type Final Discharge Note   Anticipated Discharge Disposition   (the pt will transfer to [facility] for EP)   Post-Acute Status   Discharge Delays (!) PFC Arranged Transportation

## 2022-09-28 NOTE — PLAN OF CARE
Pt is AAOx4. No complaints of nausea, vomiting, or diarrhea. No complaints of pain. AV paced. Safety precautions maintained. Tolerated all medications well.

## 2022-09-28 NOTE — ASSESSMENT & PLAN NOTE
- AICD firing at home; interrogation in ER with verbal reports with 11-12 episodes of VTach in past month all treated with ATP until yesterday  - continue Amiodarone and Mexiltene; K+ goal >4.0 Mg >2.0   - no recurrent VTach since admission  - continue to monitor on telemetry; transfer to [facility] for VTach ablation evaluation

## 2022-09-28 NOTE — H&P
[facility] Step[facility]  Cardiology  History and Physical     Patient Name: Ar Sharma  MRN: [id number]  Admission Date: 9/28/2022  Code Status: Full Code   Attending Provider: Mitch Cho MD   Primary Care Physician: Jc Elliott MD  Principal Problem:<principal problem not specified>    Patient information was obtained from patient and ER records.     Subjective:     Chief Complaint:  Transfer for VT     HPI:  62 y/o male with PMH for HFrEF with EF 30-35% initially with last EF 40-45%, s/p CRT-D (initial ICD 2012 with upgrade 2016 - Medtronic), DD, VT s/p ablation x 2 (2/2018 and again 3/2018), HLD, hypothyroidism, YOEL. He had 6 shocks last Sunday 3 days ago, presented at [facility] and got hospitalized and today after LHC (which showed non obs CAD) was transferrred here for VT ablation candidacy. He is on amio 200 daily and mexeline bid 150 and was transferred on this regimen. Currently he is A O x 3 and denies any symptoms. He has been having low BP readings at home 80s/40s over the past few weeks and had 1 syncopal event and fall 1 month ago with low BP. Currenlty his CVP is 16 cmH2o, he is on room air in paced rhythm.         Past Medical History:   Diagnosis Date    CKD (chronic kidney disease) stage 4, GFR 15-29 ml/min     Congestive heart failure (CHF) 2015    Edema     Heart attack     HLD (hyperlipidemia)     Hypertension     Hyperuricemia     Hypocalcemia     Renal cyst, left     Secondary hyperparathyroidism     Thyroid disease     V tach     Vitamin D deficiency        Past Surgical History:   Procedure Laterality Date    ablations  03/05/2018    albations  02/01/2018    COLONOSCOPY N/A 12/07/2018    Procedure: COLONOSCOPY/suprep;  Surgeon: Ananya Morillo MD;  Location: [facility] ENDO;  Service: Endoscopy;  Laterality: N/A;    defibulater N/A 2016    ESOPHAGOGASTRODUODENOSCOPY N/A 12/07/2018    Procedure: EGD (ESOPHAGOGASTRODUODENOSCOPY);  Surgeon: Ananya Morillo MD;  Location: [facility]  ENDO;  Service: Endoscopy;  Laterality: N/A;    JOINT REPLACEMENT Bilateral 10/2016    knees, bilat    LEFT HEART CATHETERIZATION N/A 2020    Procedure: Left heart cath;  Surgeon: Shahram Stewart MD;  Location: [facility] CATH LAB/EP;  Service: Cardiology;  Laterality: N/A;       Review of patient's allergies indicates:   Allergen Reactions    Lokelma [sodium zirconium cyclosilicate] Other (See Comments)     Fluid retention, weight gain, CHF excerebration, severe constipation    Atorvastatin Other (See Comments)     Joint pain       Current Facility-Administered Medications on File Prior to Encounter   Medication    [] NORepinephrine bitartrate-D5W 4 mg/250 mL (16 mcg/mL) infusion Soln    [DISCONTINUED] acetaminophen tablet 650 mg    [DISCONTINUED] acetaminophen tablet 650 mg    [DISCONTINUED] albuterol-ipratropium 2.5 mg-0.5 mg/3 mL nebulizer solution 3 mL    [DISCONTINUED] amiodarone tablet 200 mg    [DISCONTINUED] aspirin EC tablet 81 mg    [DISCONTINUED] carvediloL tablet 12.5 mg    [DISCONTINUED] furosemide tablet 40 mg    [DISCONTINUED] heparin (porcine) injection 5,000 Units    [DISCONTINUED] heparin infusion 1,000 units/500 ml in 0.9% NaCl (pressure line flush)    [DISCONTINUED] levothyroxine tablet 175 mcg    [DISCONTINUED] melatonin tablet 6 mg    [DISCONTINUED] mexiletine capsule 150 mg    [DISCONTINUED] mupirocin 2 % ointment    [DISCONTINUED] ondansetron disintegrating tablet 8 mg    [DISCONTINUED] ondansetron injection 4 mg    [DISCONTINUED] pravastatin tablet 40 mg    [DISCONTINUED] sacubitriL-valsartan 24-26 mg per tablet 1 tablet    [DISCONTINUED] sodium chloride 0.9% flush 10 mL     Current Outpatient Medications on File Prior to Encounter   Medication Sig    allopurinoL (ZYLOPRIM) 300 MG tablet Take 1 tablet (300 mg total) by mouth once daily.    amiodarone (PACERONE) 200 MG Tab TAKE 1 TABLET BY MOUTH EVERY DAY (Patient taking differently: Take 200 mg by mouth once daily.)    aspirin  (ECOTRIN) 81 MG EC tablet Take 81 mg by mouth once daily.    carvediloL (COREG) 25 MG tablet TAKE 1 TABLET BY MOUTH TWICE A DAY WITH FOOD (Patient taking differently: Take 25 mg by mouth 2 (two) times daily with meals.)    ergocalciferol (ERGOCALCIFEROL) 50,000 unit Cap Take 1 capsule (50,000 Units total) by mouth every 7 days.    furosemide (LASIX) 80 MG tablet Take 1 tablet (80 mg total) by mouth 3 (three) times daily as needed (Swelling).    levothyroxine (SYNTHROID, LEVOTHROID) 175 MCG tablet TAKE 1 TABLET BY MOUTH ONCE DAILY. (Patient taking differently: Take 175 mcg by mouth once daily.)    mexiletine (MEXITIL) 150 MG Cap Take 1 capsule (150 mg total) by mouth 2 (two) times daily with meals.    pravastatin (PRAVACHOL) 40 MG tablet Take 1 tablet (40 mg total) by mouth once daily.    sacubitriL-valsartan (ENTRESTO) 49-51 mg per tablet Take 1 tablet by mouth 2 (two) times daily.    FLUZONE QUAD 7896-9310, PF, 60 mcg (15 mcg x 4)/0.5 mL Syrg     metOLazone (ZAROXOLYN) 10 MG tablet Take 1 tablet (10 mg total) by mouth daily as needed (Swelling). (Patient not taking: No sig reported)    nitroGLYCERIN (NITROSTAT) 0.4 MG SL tablet Place 1 tablet (0.4 mg total) under the tongue every 5 (five) minutes as needed for Chest pain.    tadalafiL (CIALIS) 5 MG tablet TAKE 1 TABLET BY MOUTH DAILY AS NEEDED FOR ERECTILE DYSFUNCTION (Patient not taking: Reported on 9/25/2022)     Family History       Problem Relation (Age of Onset)    Alcohol abuse Father    Hypertension Mother    Kidney disease Father    No Known Problems Sister, Brother, Daughter, Son    Stroke Mother          Tobacco Use    Smoking status: Former     Packs/day: 1.00     Years: 25.00     Pack years: 25.00     Types: Cigarettes     Start date: 1/1/1979     Quit date: 3/3/2012     Years since quitting: 10.5    Smokeless tobacco: Former   Substance and Sexual Activity    Alcohol use: No    Drug use: No    Sexual activity: Yes     Partners: Female     Review of  Systems   Constitutional: Negative for fever.   HENT:  Negative for ear pain.    Eyes:  Negative for double vision.   Cardiovascular:  Positive for palpitations. Negative for chest pain and dyspnea on exertion.   Respiratory:  Negative for shortness of breath.    Endocrine: Negative for heat intolerance.   Hematologic/Lymphatic: Negative for adenopathy.   Skin:  Negative for dry skin.   Musculoskeletal:  Negative for falls.   Gastrointestinal:  Negative for anorexia.   Genitourinary:  Negative for flank pain.   Neurological:  Negative for disturbances in coordination.   Psychiatric/Behavioral:  Negative for depression.    Objective:     Vital Signs (Most Recent):  Temp: 98 °F (36.7 °C) (09/28/22 1404)  Pulse: 81 (09/28/22 1404)  Resp: 20 (09/28/22 1404)  BP: (!) 87/55 (09/28/22 1404)  SpO2: (!) 91 % (09/28/22 1404)   Vital Signs (24h Range):  Temp:  [98 °F (36.7 °C)-98.2 °F (36.8 °C)] 98 °F (36.7 °C)  Pulse:  [] 81  Resp:  [16-43] 20  SpO2:  [85 %-95 %] 91 %  BP: ()/(39-66) 87/55     Weight: 91 kg (200 lb 9.9 oz)  Body mass index is 29.63 kg/m².    SpO2: (!) 91 %       No intake or output data in the 24 hours ending 09/28/22 1650    Lines/Drains/Airways       Peripheral Intravenous Line  Duration                  Peripheral IV - Single Lumen 09/25/22 18 G Anterior;Left Forearm 3 days         Peripheral IV - Single Lumen 09/26/22 0130 20 G Anterior;Right Forearm 2 days                    Physical Exam  Constitutional:       Appearance: Normal appearance.   HENT:      Head: Normocephalic.      Nose: Nose normal.   Eyes:      Pupils: Pupils are equal, round, and reactive to light.   Cardiovascular:      Rate and Rhythm: Normal rate.      Comments: JVD 16 cmH2o  Regular rhythm, paced on tele  No LE edema  Pulmonary:      Effort: Pulmonary effort is normal.   Abdominal:      General: Abdomen is flat.      Palpations: Abdomen is soft.      Tenderness: There is no abdominal tenderness.   Musculoskeletal:          General: Normal range of motion.      Right lower leg: No edema.      Left lower leg: No edema.   Neurological:      General: No focal deficit present.      Mental Status: He is alert and oriented to person, place, and time.   Psychiatric:         Mood and Affect: Mood normal.         Behavior: Behavior normal.       Assessment and Plan:     VT (ventricular tachycardia)  -H/O VT since 2012 (was diagnosed in [place] in 2012 when he presented with palpitations)  -got AICD in 2012 which was upgraded to CRT-D in 2016, was started on mexelitine  -He had VT ablation in 2/2018 and another VT ablation in 3/2018 in [place]  -he had 6 shocks last Sunday 3 days ago, presented at [facility] and got hospitalized and today after LHC (which showed non obs CAD) was transferrred here for VT ablation candidacy     Plan  -formal device interrogation for his medtronic CRT-D device  -consult to EP  -on amio 200 daily and dyan 150 bid since 2018, continue the same for now  -K>4, Mg>2, telemetry    Acute combined systolic and diastolic congestive heart failure  #Acute CHF  -6 VT shocks on Sunday  -JVD 16 cmH2o on exam  -TTE 9/26 with EF 40-45%, grade 2 diastolic dysfunction, PASP 67, CVP 15  -Home meds: Coreg 25 bid, entresto 49/51 bid, lasix 80 tid prn  -follows up with [facility] for outpt cardiology     Plan  -hold entresto and coreg for low BP and worsening Cr, BP 87/55 now  -being careful with BP since had a fall with syncope from low BP 4 weeks ago, reports low BP for a few weeks  -IV lasix 80  and then 20 /h gtt  -tele  -K>4, Mg>2    Non-ischemic cardiomyopathy  C done 9/28/22 in [facility] with non obstructive CAD      CKD (chronic kidney disease) stage 4, GFR 15-29 ml/min  On entresto since 2018, current dose 49/51 bid  Hold entresto for now    Iron deficiency anemia  MCV 71  Iron studies ordered     Hypothyroidism  Continue synthroid home dose  TSH in am        VTE Risk Mitigation (From admission, onward)           Ordered      heparin (porcine) injection 5,000 Units  Every 8 hours         09/28/22 1400     IP VTE HIGH RISK PATIENT  Once         09/28/22 1400     Place sequential compression device  Until discontinued         09/28/22 1400                    Schuyler Traore MD  Cardiology   [facility] - Cardiology Stepdown

## 2022-09-28 NOTE — HOSPITAL COURSE
"Mr. Sharma presented following multiple episodes V-tach with 6 shocks over the past 2 days prior to admission.  ICD was interrogated and demonstrated over 100 episodes of V-tach since September 11th.  Cardiology consulted and performed left heart catheterization 9/27, which revealed only mild CAD in LAD and RCA.  While in house, he was maintained on his home doses of amiodarone and mexiletine.  No further episodes of V-tach while in the hospital.  Discussed with Cardiology and recommend transfer to [facility] for EP evaluation.  Discussed with transfer center and accepted by Cardiology Service.    BP (!) 93/50 (BP Location: Left arm, Patient Position: Lying)   Pulse 61   Temp 98.2 °F (36.8 °C) (Oral)   Resp 17   Ht 5' 9" (1.753 m)   Wt 92.5 kg (203 lb 14.8 oz)   SpO2 (!) 92%   BMI 30.11 kg/m²   Physical Exam  Vitals and nursing note reviewed.   Constitutional:       General: He is not in acute distress.     Appearance: Normal appearance. He is obese. He is not ill-appearing, toxic-appearing or diaphoretic.   HENT:      Head: Normocephalic and atraumatic.      Mouth/Throat:      Mouth: Mucous membranes are dry.   Eyes:      Pupils: Pupils are equal, round, and reactive to light.   Cardiovascular:      Rate and Rhythm: Normal rate and regular rhythm.      Pulses: Normal pulses.      Heart sounds: Normal heart sounds.      Comments: Paced on monitor  Pulmonary:      Effort: Pulmonary effort is normal. No respiratory distress.      Breath sounds: Normal breath sounds. No wheezing, rhonchi or rales.   Abdominal:      General: Bowel sounds are normal. There is no distension.      Palpations: Abdomen is soft.      Tenderness: There is no abdominal tenderness.   Musculoskeletal:         General: No swelling.      Cervical back: Normal range of motion.   Skin:     General: Skin is warm and dry.      Capillary Refill: Capillary refill takes 2 to 3 seconds.   Neurological:      General: No focal deficit present.    "   Mental Status: He is alert and oriented to person, place, and time. Mental status is at baseline.   Psychiatric:         Mood and Affect: Mood normal.         Behavior: Behavior normal.         Thought Content: Thought content normal.         Judgment: Judgment normal.

## 2022-09-28 NOTE — ASSESSMENT & PLAN NOTE
-Patient had first episode of VT in 2012 had single chamber ICD placed at that time  -Upgrade to CRT-D in 2016 (Medtronic)  -2 VT ablations in 2018 in [place] (pending records)  -Device interrogation today showed 15 episodes of MMVT cycle length 420 msec-440 msec on 9/25/22. Most episodes successfully treated with ATP. 6 episodes successfully treated with 5 J shocks. One of these episodes he went into VF requiring 35 J shock.  -Currently rhythm is AP-BiV paced  -At home: Amio 200 qd, Mexiletene 150 BID  -Will increase Amio to 400 mg qd and continue Mexiletene 150 BID  -Currently in decompensated HF and will defer HF management to primary team  -EP will continue to follow

## 2022-09-29 ENCOUNTER — DOCUMENTATION ONLY (OUTPATIENT)
Dept: ELECTROPHYSIOLOGY | Facility: CLINIC | Age: 62
End: 2022-09-29
Payer: MEDICARE

## 2022-09-29 LAB
ALBUMIN SERPL BCP-MCNC: 3.3 G/DL (ref 3.5–5.2)
ALP SERPL-CCNC: 87 U/L (ref 55–135)
ALT SERPL W/O P-5'-P-CCNC: 18 U/L (ref 10–44)
ANION GAP SERPL CALC-SCNC: 8 MMOL/L (ref 8–16)
ANION GAP SERPL CALC-SCNC: 9 MMOL/L (ref 8–16)
AST SERPL-CCNC: 17 U/L (ref 10–40)
BASOPHILS # BLD AUTO: 0.08 K/UL (ref 0–0.2)
BASOPHILS NFR BLD: 0.9 % (ref 0–1.9)
BILIRUB SERPL-MCNC: 1.3 MG/DL (ref 0.1–1)
BUN SERPL-MCNC: 84 MG/DL (ref 8–23)
BUN SERPL-MCNC: 86 MG/DL (ref 8–23)
CALCIUM SERPL-MCNC: 9.3 MG/DL (ref 8.7–10.5)
CALCIUM SERPL-MCNC: 9.3 MG/DL (ref 8.7–10.5)
CHLORIDE SERPL-SCNC: 96 MMOL/L (ref 95–110)
CHLORIDE SERPL-SCNC: 97 MMOL/L (ref 95–110)
CO2 SERPL-SCNC: 26 MMOL/L (ref 23–29)
CO2 SERPL-SCNC: 27 MMOL/L (ref 23–29)
CREAT SERPL-MCNC: 4.5 MG/DL (ref 0.5–1.4)
CREAT SERPL-MCNC: 4.7 MG/DL (ref 0.5–1.4)
DIFFERENTIAL METHOD: ABNORMAL
EOSINOPHIL # BLD AUTO: 0.3 K/UL (ref 0–0.5)
EOSINOPHIL NFR BLD: 3 % (ref 0–8)
ERYTHROCYTE [DISTWIDTH] IN BLOOD BY AUTOMATED COUNT: 21 % (ref 11.5–14.5)
EST. GFR  (NO RACE VARIABLE): 13.3 ML/MIN/1.73 M^2
EST. GFR  (NO RACE VARIABLE): 14 ML/MIN/1.73 M^2
GLUCOSE SERPL-MCNC: 96 MG/DL (ref 70–110)
GLUCOSE SERPL-MCNC: 99 MG/DL (ref 70–110)
HCT VFR BLD AUTO: 42.4 % (ref 40–54)
HGB BLD-MCNC: 14.2 G/DL (ref 14–18)
IMM GRANULOCYTES # BLD AUTO: 0.04 K/UL (ref 0–0.04)
IMM GRANULOCYTES NFR BLD AUTO: 0.4 % (ref 0–0.5)
LYMPHOCYTES # BLD AUTO: 0.7 K/UL (ref 1–4.8)
LYMPHOCYTES NFR BLD: 7.3 % (ref 18–48)
MAGNESIUM SERPL-MCNC: 2 MG/DL (ref 1.6–2.6)
MAGNESIUM SERPL-MCNC: 2 MG/DL (ref 1.6–2.6)
MCH RBC QN AUTO: 24.1 PG (ref 27–31)
MCHC RBC AUTO-ENTMCNC: 33.5 G/DL (ref 32–36)
MCV RBC AUTO: 72 FL (ref 82–98)
MONOCYTES # BLD AUTO: 0.8 K/UL (ref 0.3–1)
MONOCYTES NFR BLD: 8.3 % (ref 4–15)
NEUTROPHILS # BLD AUTO: 7.4 K/UL (ref 1.8–7.7)
NEUTROPHILS NFR BLD: 80.1 % (ref 38–73)
NRBC BLD-RTO: 0 /100 WBC
PLATELET # BLD AUTO: 387 K/UL (ref 150–450)
PMV BLD AUTO: ABNORMAL FL (ref 9.2–12.9)
POCT GLUCOSE: 110 MG/DL (ref 70–110)
POTASSIUM SERPL-SCNC: 4.1 MMOL/L (ref 3.5–5.1)
POTASSIUM SERPL-SCNC: 4.7 MMOL/L (ref 3.5–5.1)
PROT SERPL-MCNC: 6.3 G/DL (ref 6–8.4)
RBC # BLD AUTO: 5.88 M/UL (ref 4.6–6.2)
SODIUM SERPL-SCNC: 131 MMOL/L (ref 136–145)
SODIUM SERPL-SCNC: 132 MMOL/L (ref 136–145)
TSH SERPL DL<=0.005 MIU/L-ACNC: 3.04 UIU/ML (ref 0.4–4)
WBC # BLD AUTO: 9.24 K/UL (ref 3.9–12.7)

## 2022-09-29 PROCEDURE — 25000003 PHARM REV CODE 250: Performed by: HOSPITALIST

## 2022-09-29 PROCEDURE — 80053 COMPREHEN METABOLIC PANEL: CPT | Performed by: INTERNAL MEDICINE

## 2022-09-29 PROCEDURE — 99231 SBSQ HOSP IP/OBS SF/LOW 25: CPT | Mod: GC,,, | Performed by: INTERNAL MEDICINE

## 2022-09-29 PROCEDURE — 25000003 PHARM REV CODE 250: Performed by: STUDENT IN AN ORGANIZED HEALTH CARE EDUCATION/TRAINING PROGRAM

## 2022-09-29 PROCEDURE — 84443 ASSAY THYROID STIM HORMONE: CPT | Performed by: INTERNAL MEDICINE

## 2022-09-29 PROCEDURE — 83735 ASSAY OF MAGNESIUM: CPT | Mod: 91 | Performed by: STUDENT IN AN ORGANIZED HEALTH CARE EDUCATION/TRAINING PROGRAM

## 2022-09-29 PROCEDURE — 85025 COMPLETE CBC W/AUTO DIFF WBC: CPT | Performed by: INTERNAL MEDICINE

## 2022-09-29 PROCEDURE — 63600175 PHARM REV CODE 636 W HCPCS: Performed by: HOSPITALIST

## 2022-09-29 PROCEDURE — 83735 ASSAY OF MAGNESIUM: CPT | Performed by: INTERNAL MEDICINE

## 2022-09-29 PROCEDURE — 20600001 HC STEP DOWN PRIVATE ROOM

## 2022-09-29 PROCEDURE — 36415 COLL VENOUS BLD VENIPUNCTURE: CPT | Performed by: STUDENT IN AN ORGANIZED HEALTH CARE EDUCATION/TRAINING PROGRAM

## 2022-09-29 PROCEDURE — 25000003 PHARM REV CODE 250: Performed by: INTERNAL MEDICINE

## 2022-09-29 PROCEDURE — 63600175 PHARM REV CODE 636 W HCPCS: Performed by: STUDENT IN AN ORGANIZED HEALTH CARE EDUCATION/TRAINING PROGRAM

## 2022-09-29 PROCEDURE — 99233 SBSQ HOSP IP/OBS HIGH 50: CPT | Mod: GC,,, | Performed by: STUDENT IN AN ORGANIZED HEALTH CARE EDUCATION/TRAINING PROGRAM

## 2022-09-29 PROCEDURE — 36415 COLL VENOUS BLD VENIPUNCTURE: CPT | Performed by: INTERNAL MEDICINE

## 2022-09-29 PROCEDURE — 80048 BASIC METABOLIC PNL TOTAL CA: CPT | Mod: XB | Performed by: STUDENT IN AN ORGANIZED HEALTH CARE EDUCATION/TRAINING PROGRAM

## 2022-09-29 PROCEDURE — 99233 PR SUBSEQUENT HOSPITAL CARE,LEVL III: ICD-10-PCS | Mod: GC,,, | Performed by: STUDENT IN AN ORGANIZED HEALTH CARE EDUCATION/TRAINING PROGRAM

## 2022-09-29 PROCEDURE — 99231 PR SUBSEQUENT HOSPITAL CARE,LEVL I: ICD-10-PCS | Mod: GC,,, | Performed by: INTERNAL MEDICINE

## 2022-09-29 RX ORDER — CARVEDILOL 12.5 MG/1
12.5 TABLET ORAL 2 TIMES DAILY
Status: COMPLETED | OUTPATIENT
Start: 2022-09-29 | End: 2022-09-30

## 2022-09-29 RX ORDER — FUROSEMIDE 10 MG/ML
80 INJECTION INTRAMUSCULAR; INTRAVENOUS ONCE
Status: COMPLETED | OUTPATIENT
Start: 2022-09-29 | End: 2022-09-29

## 2022-09-29 RX ADMIN — MUPIROCIN: 20 OINTMENT TOPICAL at 08:09

## 2022-09-29 RX ADMIN — HEPARIN SODIUM 5000 UNITS: 5000 INJECTION INTRAVENOUS; SUBCUTANEOUS at 08:09

## 2022-09-29 RX ADMIN — PRAVASTATIN SODIUM 40 MG: 40 TABLET ORAL at 10:09

## 2022-09-29 RX ADMIN — AMIODARONE HYDROCHLORIDE 400 MG: 200 TABLET ORAL at 10:09

## 2022-09-29 RX ADMIN — HEPARIN SODIUM 5000 UNITS: 5000 INJECTION INTRAVENOUS; SUBCUTANEOUS at 05:09

## 2022-09-29 RX ADMIN — ASPIRIN 81 MG: 81 TABLET, COATED ORAL at 10:09

## 2022-09-29 RX ADMIN — MEXILETINE HYDROCHLORIDE 150 MG: 150 CAPSULE ORAL at 05:09

## 2022-09-29 RX ADMIN — CARVEDILOL 12.5 MG: 12.5 TABLET, FILM COATED ORAL at 08:09

## 2022-09-29 RX ADMIN — LEVOTHYROXINE SODIUM 175 MCG: 150 TABLET ORAL at 05:09

## 2022-09-29 RX ADMIN — MUPIROCIN: 20 OINTMENT TOPICAL at 11:09

## 2022-09-29 RX ADMIN — FUROSEMIDE 80 MG: 10 INJECTION, SOLUTION INTRAMUSCULAR; INTRAVENOUS at 11:09

## 2022-09-29 RX ADMIN — MEXILETINE HYDROCHLORIDE 150 MG: 150 CAPSULE ORAL at 07:09

## 2022-09-29 NOTE — PROGRESS NOTES
Cardiac device interrogation and/or reprogramming completed by industry representative, Kia BASURTO with [employer] on 9/28/22; please refer to report located in the Media tab.

## 2022-09-29 NOTE — ASSESSMENT & PLAN NOTE
#Acute CHF  -6 VT shocks on Sunday  -JVD 16 cmH2o on exam  -TTE 9/26 with EF 40-45%, grade 2 diastolic dysfunction, PASP 67, CVP 15  -Home meds: Coreg 25 bid, entresto 49/51 bid, lasix 80 tid prn  -follows up with [facility] for outpt cardiology     Plan  -continue holding entresto as pt will likely benefit more from bidil.  - restarting coreg tonight at a lower dose 12.5mg BID while monitoring BP  -being careful with BP since had a fall with syncope from low BP 4 weeks ago, reported low BP for a few weeks  - on lasix 20mg/hr last night but only net -1L, unclear if accurate intakes. Added IV 80mg bolus and increased drip to 40mg/hr  -tele  -K>4, Mg>2

## 2022-09-29 NOTE — PROGRESS NOTES
[facility] - Cardiology Stepdown  Cardiology  Progress Note    Patient Name: Ar Sharma  MRN: [id number]  Admission Date: 9/28/2022  Hospital Length of Stay: 1 days  Code Status: Full Code   Attending Physician: Mitch Cho MD   Primary Care Physician: Jc Elliott MD  Expected Discharge Date: 10/4/2022  Principal Problem:VT (ventricular tachycardia)    Subjective:     Pt feels better today. Net negative 1L on 20mg/hr overnight. JVP continues to be elevated. Denies CP, palpitations, dizziness or any other complaints today.       Past Medical History:   Diagnosis Date    CKD (chronic kidney disease) stage 4, GFR 15-29 ml/min     Congestive heart failure (CHF) 2015    Edema     Heart attack     HLD (hyperlipidemia)     Hypertension     Hyperuricemia     Hypocalcemia     Renal cyst, left     Secondary hyperparathyroidism     Thyroid disease     V tach     Vitamin D deficiency        Past Surgical History:   Procedure Laterality Date    ablations  03/05/2018    albations  02/01/2018    COLONOSCOPY N/A 12/07/2018    Procedure: COLONOSCOPY/suprep;  Surgeon: Ananya Morillo MD;  Location: [facility];  Service: Endoscopy;  Laterality: N/A;    defibulater N/A 2016    ESOPHAGOGASTRODUODENOSCOPY N/A 12/07/2018    Procedure: EGD (ESOPHAGOGASTRODUODENOSCOPY);  Surgeon: Ananya Morillo MD;  Location: [facility];  Service: Endoscopy;  Laterality: N/A;    JOINT REPLACEMENT Bilateral 10/2016    knees, bilat    LEFT HEART CATHETERIZATION N/A 12/16/2020    Procedure: Left heart cath;  Surgeon: Shahram Stewart MD;  Location: [facility] CATH LAB/EP;  Service: Cardiology;  Laterality: N/A;    LEFT HEART CATHETERIZATION Left 9/27/2022    Procedure: Left heart cath;  Surgeon: Juan Roque MD;  Location: [facility] CATH LAB/EP;  Service: Cardiology;  Laterality: Left;       Review of patient's allergies indicates:   Allergen Reactions    Lokelma [sodium zirconium cyclosilicate] Other (See Comments)     Fluid  retention, weight gain, CHF excerebration, severe constipation    Atorvastatin Other (See Comments)     Joint pain       No current facility-administered medications on file prior to encounter.     Current Outpatient Medications on File Prior to Encounter   Medication Sig    allopurinoL (ZYLOPRIM) 300 MG tablet Take 1 tablet (300 mg total) by mouth once daily.    amiodarone (PACERONE) 200 MG Tab TAKE 1 TABLET BY MOUTH EVERY DAY (Patient taking differently: Take 200 mg by mouth once daily.)    aspirin (ECOTRIN) 81 MG EC tablet Take 81 mg by mouth once daily.    carvediloL (COREG) 25 MG tablet TAKE 1 TABLET BY MOUTH TWICE A DAY WITH FOOD (Patient taking differently: Take 25 mg by mouth 2 (two) times daily with meals.)    ergocalciferol (ERGOCALCIFEROL) 50,000 unit Cap Take 1 capsule (50,000 Units total) by mouth every 7 days.    furosemide (LASIX) 80 MG tablet Take 1 tablet (80 mg total) by mouth 3 (three) times daily as needed (Swelling).    levothyroxine (SYNTHROID, LEVOTHROID) 175 MCG tablet TAKE 1 TABLET BY MOUTH ONCE DAILY. (Patient taking differently: Take 175 mcg by mouth once daily.)    mexiletine (MEXITIL) 150 MG Cap Take 1 capsule (150 mg total) by mouth 2 (two) times daily with meals.    pravastatin (PRAVACHOL) 40 MG tablet Take 1 tablet (40 mg total) by mouth once daily.    sacubitriL-valsartan (ENTRESTO) 49-51 mg per tablet Take 1 tablet by mouth 2 (two) times daily.    FLUZONE QUAD 7740-5056, PF, 60 mcg (15 mcg x 4)/0.5 mL Syrg     metOLazone (ZAROXOLYN) 10 MG tablet Take 1 tablet (10 mg total) by mouth daily as needed (Swelling). (Patient not taking: No sig reported)    nitroGLYCERIN (NITROSTAT) 0.4 MG SL tablet Place 1 tablet (0.4 mg total) under the tongue every 5 (five) minutes as needed for Chest pain.    tadalafiL (CIALIS) 5 MG tablet TAKE 1 TABLET BY MOUTH DAILY AS NEEDED FOR ERECTILE DYSFUNCTION (Patient not taking: Reported on 9/25/2022)     Family History       Problem Relation  (Age of Onset)    Alcohol abuse Father    Hypertension Mother    Kidney disease Father    No Known Problems Sister, Brother, Daughter, Son    Stroke Mother          Tobacco Use    Smoking status: Former     Packs/day: 1.00     Years: 25.00     Pack years: 25.00     Types: Cigarettes     Start date: 1/1/1979     Quit date: 3/3/2012     Years since quitting: 10.5    Smokeless tobacco: Former   Substance and Sexual Activity    Alcohol use: No    Drug use: No    Sexual activity: Yes     Partners: Female     Objective:     Vital Signs (Most Recent):  Temp: 97 °F (36.1 °C) (09/29/22 1125)  Pulse: 62 (09/29/22 1125)  Resp: 18 (09/29/22 1125)  BP: (!) 100/58 (09/29/22 1125)  SpO2: (!) 91 % (09/29/22 1125)   Vital Signs (24h Range):  Temp:  [96.7 °F (35.9 °C)-98.5 °F (36.9 °C)] 97 °F (36.1 °C)  Pulse:  [59-70] 62  Resp:  [18-20] 18  SpO2:  [89 %-94 %] 91 %  BP: ()/(49-65) 100/58     Weight: 91 kg (200 lb 9.9 oz)  Body mass index is 29.63 kg/m².    SpO2: (!) 91 %  O2 Device (Oxygen Therapy): room air      Intake/Output Summary (Last 24 hours) at 9/29/2022 1553  Last data filed at 9/29/2022 1130  Gross per 24 hour   Intake 598 ml   Output 2650 ml   Net -2052 ml       Lines/Drains/Airways       Peripheral Intravenous Line  Duration                  Peripheral IV - Single Lumen 09/25/22 18 G Anterior;Left Forearm 4 days         Peripheral IV - Single Lumen 09/26/22 0130 20 G Anterior;Right Forearm 3 days                    Physical Exam  Vitals reviewed.   Constitutional:       Appearance: Normal appearance.   HENT:      Head: Normocephalic.      Nose: Nose normal.   Eyes:      Pupils: Pupils are equal, round, and reactive to light.   Cardiovascular:      Rate and Rhythm: Normal rate and regular rhythm.      Comments: JVD 12 cmH2o  Regular rhythm, paced on tele  No LE edema  Pulmonary:      Effort: Pulmonary effort is normal.   Abdominal:      General: Abdomen is flat.      Palpations: Abdomen is soft.       Tenderness: There is no abdominal tenderness.   Musculoskeletal:         General: Normal range of motion.      Right lower leg: No edema.      Left lower leg: No edema.   Neurological:      General: No focal deficit present.      Mental Status: He is alert and oriented to person, place, and time.   Psychiatric:         Mood and Affect: Mood normal.         Behavior: Behavior normal.       Assessment and Plan:       * VT (ventricular tachycardia)  -H/O VT since 2012 (was diagnosed in [place] in 2012 when he presented with palpitations)  -got AICD in 2012 which was upgraded to CRT-D in 2016, was started on mexelitine  -He had VT ablation in 2/2018 and another VT ablation in 3/2018 in [place]  -he had 6 shocks last Sunday 3 days ago, presented at [facility] and got hospitalized and today after LHC (which showed non obs CAD) was transferrred here for VT ablation candidacy     Plan  -formal device interrogation for his medtronic CRT-D device done this morning  - EP following. Per fellow interrogation 15 episodes of MMVT  -on amio 200 daily and dyan 150 bid since 2018, continue the same for now  -K>4, Mg>2, telemetry    Non-ischemic cardiomyopathy  C done 9/28/22 in [facility] with non obstructive CAD      Acute combined systolic and diastolic congestive heart failure  #Acute CHF  -6 VT shocks on Sunday  -JVD 16 cmH2o on exam  -TTE 9/26 with EF 40-45%, grade 2 diastolic dysfunction, PASP 67, CVP 15  -Home meds: Coreg 25 bid, entresto 49/51 bid, lasix 80 tid prn  -follows up with [facility] for outpt cardiology     Plan  -continue holding entresto as pt will likely benefit more from bidil.  - restarting coreg tonight at a lower dose 12.5mg BID while monitoring BP  -being careful with BP since had a fall with syncope from low BP 4 weeks ago, reported low BP for a few weeks  - on lasix 20mg/hr last night but only net -1L, unclear if accurate intakes. Added IV 80mg bolus and increased drip to 40mg/hr  -tele  -K>4, Mg>2    CKD  (chronic kidney disease) stage 4, GFR 15-29 ml/min  -On entresto since 2018, current dose 49/51 bid  Hold entresto for now    Iron deficiency anemia  MCV 71  Iron studies reviewed with significant iron deficiency  - we will replace as needed    Hypothyroidism  -Continue synthroid home dose  -TSH 3.045        VTE Risk Mitigation (From admission, onward)         Ordered     heparin (porcine) injection 5,000 Units  Every 8 hours         09/28/22 1400     IP VTE HIGH RISK PATIENT  Once         09/28/22 1400     Place sequential compression device  Until discontinued         09/28/22 1400                Jonathan Weaver MD  Cardiology  [facility] - Cardiology Stepdown

## 2022-09-29 NOTE — PROGRESS NOTES
09/28/22 2337   Vital Signs   BP (!) 89/55   MAP (mmHg) 67   BP Location Right arm   BP Method Automatic   Patient Position Lying   River ALCANTAR aware. No new orders at this time. Will recheck in 2 hours and continue to monitor patient as ordered.

## 2022-09-29 NOTE — ASSESSMENT & PLAN NOTE
-H/O VT since 2012 (was diagnosed in [place] in 2012 when he presented with palpitations)  -got AICD in 2012 which was upgraded to CRT-D in 2016, was started on mexelitine  -He had VT ablation in 2/2018 and another VT ablation in 3/2018 in [place]  -he had 6 shocks last Sunday 3 days ago, presented at [facility] and got hospitalized and today after LHC (which showed non obs CAD) was transferrred here for VT ablation candidacy     Plan  -formal device interrogation for his medtronic CRT-D device done this morning  - EP following. Per fellow interrogation 15 episodes of MMVT  -on amio 200 daily and dyan 150 bid since 2018, continue the same for now  -K>4, Mg>2, telemetry

## 2022-09-29 NOTE — PLAN OF CARE
Lasix gtt infusing per orders; UOP monitored. BP low during shift; see notes. Blood glucose orders maintained. Patient remains free from falls and injuries through out shift. Patient AAO. Patient denies chest pain and SOB. Patient's family at bedside. Plan of care reviewed with patient. Patient verbalizes understanding of plan.  Will continue to monitor.

## 2022-09-29 NOTE — SUBJECTIVE & OBJECTIVE
Pt feels better today. Net negative 1L on 20mg/hr overnight. JVP continues to be elevated. Denies CP, palpitations, dizziness or any other complaints today.       Past Medical History:   Diagnosis Date    CKD (chronic kidney disease) stage 4, GFR 15-29 ml/min     Congestive heart failure (CHF) 2015    Edema     Heart attack     HLD (hyperlipidemia)     Hypertension     Hyperuricemia     Hypocalcemia     Renal cyst, left     Secondary hyperparathyroidism     Thyroid disease     V tach     Vitamin D deficiency        Past Surgical History:   Procedure Laterality Date    ablations  03/05/2018    albations  02/01/2018    COLONOSCOPY N/A 12/07/2018    Procedure: COLONOSCOPY/suprep;  Surgeon: Ananya Morillo MD;  Location: [facility] ENDO;  Service: Endoscopy;  Laterality: N/A;    defibulater N/A 2016    ESOPHAGOGASTRODUODENOSCOPY N/A 12/07/2018    Procedure: EGD (ESOPHAGOGASTRODUODENOSCOPY);  Surgeon: Ananya Morillo MD;  Location: [facility] ENDO;  Service: Endoscopy;  Laterality: N/A;    JOINT REPLACEMENT Bilateral 10/2016    knees, bilat    LEFT HEART CATHETERIZATION N/A 12/16/2020    Procedure: Left heart cath;  Surgeon: Shahram Stewart MD;  Location: [facility] CATH LAB/EP;  Service: Cardiology;  Laterality: N/A;    LEFT HEART CATHETERIZATION Left 9/27/2022    Procedure: Left heart cath;  Surgeon: Juan Roque MD;  Location: [facility] CATH LAB/EP;  Service: Cardiology;  Laterality: Left;       Review of patient's allergies indicates:   Allergen Reactions    Lokelma [sodium zirconium cyclosilicate] Other (See Comments)     Fluid retention, weight gain, CHF excerebration, severe constipation    Atorvastatin Other (See Comments)     Joint pain       No current facility-administered medications on file prior to encounter.     Current Outpatient Medications on File Prior to Encounter   Medication Sig    allopurinoL (ZYLOPRIM) 300 MG tablet Take 1 tablet (300 mg total) by mouth once daily.    amiodarone (PACERONE) 200 MG Tab TAKE 1  TABLET BY MOUTH EVERY DAY (Patient taking differently: Take 200 mg by mouth once daily.)    aspirin (ECOTRIN) 81 MG EC tablet Take 81 mg by mouth once daily.    carvediloL (COREG) 25 MG tablet TAKE 1 TABLET BY MOUTH TWICE A DAY WITH FOOD (Patient taking differently: Take 25 mg by mouth 2 (two) times daily with meals.)    ergocalciferol (ERGOCALCIFEROL) 50,000 unit Cap Take 1 capsule (50,000 Units total) by mouth every 7 days.    furosemide (LASIX) 80 MG tablet Take 1 tablet (80 mg total) by mouth 3 (three) times daily as needed (Swelling).    levothyroxine (SYNTHROID, LEVOTHROID) 175 MCG tablet TAKE 1 TABLET BY MOUTH ONCE DAILY. (Patient taking differently: Take 175 mcg by mouth once daily.)    mexiletine (MEXITIL) 150 MG Cap Take 1 capsule (150 mg total) by mouth 2 (two) times daily with meals.    pravastatin (PRAVACHOL) 40 MG tablet Take 1 tablet (40 mg total) by mouth once daily.    sacubitriL-valsartan (ENTRESTO) 49-51 mg per tablet Take 1 tablet by mouth 2 (two) times daily.    FLUZONE QUAD [id number], PF, 60 mcg (15 mcg x 4)/0.5 mL Syrg     metOLazone (ZAROXOLYN) 10 MG tablet Take 1 tablet (10 mg total) by mouth daily as needed (Swelling). (Patient not taking: No sig reported)    nitroGLYCERIN (NITROSTAT) 0.4 MG SL tablet Place 1 tablet (0.4 mg total) under the tongue every 5 (five) minutes as needed for Chest pain.    tadalafiL (CIALIS) 5 MG tablet TAKE 1 TABLET BY MOUTH DAILY AS NEEDED FOR ERECTILE DYSFUNCTION (Patient not taking: Reported on 9/25/2022)     Family History       Problem Relation (Age of Onset)    Alcohol abuse Father    Hypertension Mother    Kidney disease Father    No Known Problems Sister, Brother, Daughter, Son    Stroke Mother          Tobacco Use    Smoking status: Former     Packs/day: 1.00     Years: 25.00     Pack years: 25.00     Types: Cigarettes     Start date: 1/1/1979     Quit date: 3/3/2012     Years since quitting: 10.5    Smokeless tobacco: Former   Substance and Sexual  Activity    Alcohol use: No    Drug use: No    Sexual activity: Yes     Partners: Female     Objective:     Vital Signs (Most Recent):  Temp: 97 °F (36.1 °C) (09/29/22 1125)  Pulse: 62 (09/29/22 1125)  Resp: 18 (09/29/22 1125)  BP: (!) 100/58 (09/29/22 1125)  SpO2: (!) 91 % (09/29/22 1125)   Vital Signs (24h Range):  Temp:  [96.7 °F (35.9 °C)-98.5 °F (36.9 °C)] 97 °F (36.1 °C)  Pulse:  [59-70] 62  Resp:  [18-20] 18  SpO2:  [89 %-94 %] 91 %  BP: ()/(49-65) 100/58     Weight: 91 kg (200 lb 9.9 oz)  Body mass index is 29.63 kg/m².    SpO2: (!) 91 %  O2 Device (Oxygen Therapy): room air      Intake/Output Summary (Last 24 hours) at 9/29/2022 1553  Last data filed at 9/29/2022 1130  Gross per 24 hour   Intake 598 ml   Output 2650 ml   Net -2052 ml       Lines/Drains/Airways       Peripheral Intravenous Line  Duration                  Peripheral IV - Single Lumen 09/25/22 18 G Anterior;Left Forearm 4 days         Peripheral IV - Single Lumen 09/26/22 0130 20 G Anterior;Right Forearm 3 days                    Physical Exam  Vitals reviewed.   Constitutional:       Appearance: Normal appearance.   HENT:      Head: Normocephalic.      Nose: Nose normal.   Eyes:      Pupils: Pupils are equal, round, and reactive to light.   Cardiovascular:      Rate and Rhythm: Normal rate and regular rhythm.      Comments: JVD 12 cmH2o  Regular rhythm, paced on tele  No LE edema  Pulmonary:      Effort: Pulmonary effort is normal.   Abdominal:      General: Abdomen is flat.      Palpations: Abdomen is soft.      Tenderness: There is no abdominal tenderness.   Musculoskeletal:         General: Normal range of motion.      Right lower leg: No edema.      Left lower leg: No edema.   Neurological:      General: No focal deficit present.      Mental Status: He is alert and oriented to person, place, and time.   Psychiatric:         Mood and Affect: Mood normal.         Behavior: Behavior normal.

## 2022-09-29 NOTE — PLAN OF CARE
[facility] - Cardiology Stepdown  Initial Discharge Assessment       Primary Care Provider: Jc Elliott MD    Admission Diagnosis: V-tach [I47.2]    Admission Date: 9/28/2022  Expected Discharge Date: 10/4/2022    Discharge Barriers Identified: None    Payor: [employer]ARE ADVANTAGE / Plan: [employer] MCARE / Product Type: Medicare Advantage /     Extended Emergency Contact Information  Primary Emergency Contact: Nicolette Sharma  Mobile Phone: [phone]  Relation: Spouse  Preferred language: English   needed? No    Discharge Plan A: Home with family  Discharge Plan B: Home      [id number] [place] [place] [facility] [address] [address] [facility]  [address] [place] [address]  Phone: [phone] Fax: [phone]    [id number] [place] [facility] [address]  [address] [place] [address]  Phone: [phone] Fax: [phone]      Initial Assessment (most recent)       Adult Discharge Assessment - 09/29/22 0900          Discharge Assessment    Assessment Type Discharge Planning Assessment     Confirmed/corrected address, phone number and insurance Yes     Confirmed Demographics Correct on Facesheet     Source of Information patient;family     If unable to respond/provide information was family/caregiver contacted? Yes     Lives With spouse     Do you expect to return to your current living situation? Yes     Do you have help at home or someone to help you manage your care at home? Yes     Who are your caregiver(s) and their phone number(s)? Nicolette Sharma(wife)[phone]     Prior to hospitilization cognitive status: Alert/Oriented     Current cognitive status: Alert/Oriented     Walking or Climbing Stairs Difficulty none     Dressing/Bathing Difficulty none     Home Accessibility wheelchair accessible     Home Layout Able to live on 1st floor     Equipment Currently Used at Home none     Readmission within 30 days? No      Patient currently being followed by outpatient case management? No     Do you currently have service(s) that help you manage your care at home? No     Do you take prescription medications? Yes     Do you have prescription coverage? Yes     Coverage [facility] Medicare Advantage     Do you have any problems affording any of your prescribed medications? No     Is the patient taking medications as prescribed? yes     Who is going to help you get home at discharge? Nicolette Sharma     How do you get to doctors appointments? car, drives self     Are you on dialysis? No     Do you take coumadin? No     Discharge Plan A Home with family     Discharge Plan B Home     DME Needed Upon Discharge  none     Discharge Plan discussed with: Patient;Spouse/sig other     Discharge Barriers Identified None                          Pt admitted 9/28/2022  1:29 PM    For V-tach [I47.2]       DPEA completed with pt and spouse Nicolette Sharma [phone] present by bedside. Pt and spouse love together at the address listed on the facesheet and pt is independent in his ADLs. Plan is home no needs at time.     Discharge packet provided to pt, all questions answered prior to leaving room and contact number provided to reach CM.      Pt is followed cardiologist Dr Shahram Stewart.     PCP is Jc Elliott MD  [phone] (p)  [phone] (f)    Future Appointments   Date Time Provider Department Center   10/7/2022 10:00 AM LAB, [facility] LAB [facility]   10/7/2022 10:10 AM LAB, [place] [facility]   11/1/2022  3:00 PM Jose Simental MD [facility] NEPH [place]   12/8/2022 11:00 AM Shahram Stewart MD [facility] CARDIO [place]   12/20/2022 10:20 AM Jc Elliott MD [facility] MED [place] [facility]         Kia Swenson, BRAXTONN, RN   Case Management [phone]

## 2022-09-29 NOTE — PROGRESS NOTES
Clinical Cardiac Electrophysiology Follow-Up Note     Date:  9/29/2022  Requesting attending:  Mitch Cho MD    Chief Complaint/Reason for Consultation:    Lightheadedness.  Pt is a pleasant 61 y.o male who initially presented to [facility] on 9/25 after receiving 9 ICD shocks. Interrogation of device at bedside today shows 15 episodes on 9/25/22. Most episodes were treated successfully with ATP but 6 episodes received 5 J shocks.  Home medications include Amidarone 200 mg ( concern regarding compliance) in addition to mexilitine 150 mg  Profoundly hypervolemic. On lasix gtt.   EP consulted for VT ablation    Problem List:   VT (first dx 2012 then had CRT-D placed 2016 in [place] followed by 2 VT ablations in 2018 also in [place]  -HFrEF (EF 40-45%)  -CKD V (baseline creatinine around 4)  -Hypothyroid  -CAD  -HTN    24 Hr Events and Subjective:   NAEON. Remained afebrile. HR 59-81. MAP 59-71. Saturating well on RA.   Output of 1.3 L since yesterday and net -1.6L since admit. Wbc and hgb stable.      Scheduled Meds:       amiodarone  200 mg Oral Once    amiodarone  400 mg Oral Daily    aspirin  81 mg Oral Daily    heparin (porcine)  5,000 Units Subcutaneous Q8H    levothyroxine  175 mcg Oral Before breakfast    mexiletine  150 mg Oral BID WM    mupirocin   Nasal BID    pravastatin  40 mg Oral Daily       Continuous Infusions:      furosemide (LASIX) 10 mg/mL infusion (non-titrating) 20 mg/hr (09/28/22 2128)         PRN Meds:   acetaminophen, albuterol-ipratropium, melatonin, ondansetron, sodium chloride 0.9%    Allergies:     Review of patient's allergies indicates:   Allergen Reactions    Lokelma [sodium zirconium cyclosilicate] Other (See Comments)     Fluid retention, weight gain, CHF excerebration, severe constipation    Atorvastatin Other (See Comments)     Joint pain       Physical Exam:     Vitals:    09/29/22 0752   BP: 99/65   Pulse: 65   Resp: 18   Temp: 96.7 °F (35.9 °C)       Eyes: Sclerae  anicteric. Ears/nose/throat: Mucous membranes moist. Neck: No thyromegaly, lymphadenopathy, or jugular venous distention. Respiratory: Lungs clear to auscultation bilaterally. Cardiovascular: Heart was regular with normal first and second heart sounds. No S3 or S4. GI: Soft, nontender, nondistended, with normal bowel sounds. Musculoskeletal: extremities without cyanosis, clubbing or pitting edema. Neurologic: Patient is alert and oriented x3 and there is no focal strength deficit. Skin: no rashes, cuts, sores. Psychiatric: normal affect. Hematologic: no abnormal bruising or bleeding.    Laboratory Data:      BUN/Cr/glu/ALT/AST/amyl/lip:  84/4.7/--/18/17/--/-- (09/29 0448)  WBC/Hgb/Hct/Plts:  9.24/14.2/42.4/387 (09/29 0448)     Protein Creatinine Ratios:    Creatinine, Urine   Date Value Ref Range Status   06/09/2022 94.3 23.0 - 375.0 mg/dL Final   04/04/2022 43.8 23.0 - 375.0 mg/dL Final   02/07/2022 39.8 23.0 - 375.0 mg/dL Final     Protein, Urine   Date Value Ref Range Status   06/14/2021 <7 0 - 15 mg/dL Final   06/10/2021 <7 0 - 15 mg/dL Final     Protein, Urine Random   Date Value Ref Range Status   06/09/2022 8 0 - 15 mg/dL Final   04/04/2022 <7 0 - 15 mg/dL Final   02/07/2022 <7 0 - 15 mg/dL Final     Prot/Creat Ratio, Urine   Date Value Ref Range Status   06/09/2022 0.08 0.00 - 0.20 Final   04/04/2022 Unable to calculate 0.00 - 0.20 Final   02/07/2022 Unable to calculate 0.00 - 0.20 Final           Plan:    Pt is a 61 y.o male who presents to the hospital for possibility of VT ablation in light of VT storm.  Pt currently with ADHF. Undergoing diuresis with Lasix gtt.  Interrogation of device at bedside today shows 15 episodes on 9/25/22. Most episodes were treated successfully with ATP but 6 episodes received 5 J shocks.    Plan:  -- Formal device interrogation pending~ will reprogram some of the current settings that do not seem to be appropriate   -- cont Amiodarone 400 mg qd~ stress the importance of  compliance.  -- cont Mexilitine at 150 mg   -- cont diuresis  -- correct electrolyte derangements   -- will hold off on evaluation for VT ablation at the time given Volume status.       Pt seen, examined and discussed with the attending physician, Dr. Restrepo  of the arrhythmia consult service.   Current plan of care discussed with the patient, all questions answered.   Current plan of care discussed with primary service.    Farnoosh Shariati [facility]  PGY4 Cardiology Fellow

## 2022-09-29 NOTE — PROGRESS NOTES
09/28/22 1929   Vital Signs   Resp 20   SpO2 (!) 91 %   Pulse Oximetry Type Intermittent   O2 Device (Oxygen Therapy) room air   BP (!) 82/49   MAP (mmHg) 59   BP Location Right arm   BP Method Automatic   Patient Position Lying   River ALCANTAR notified of BP. Patient asymptomatic. Ordered to stop lasix gtt. Will follow orders and continue to monitor patient.

## 2022-09-30 LAB
ANION GAP SERPL CALC-SCNC: 12 MMOL/L (ref 8–16)
BASOPHILS # BLD AUTO: 0.07 K/UL (ref 0–0.2)
BASOPHILS NFR BLD: 0.8 % (ref 0–1.9)
BUN SERPL-MCNC: 90 MG/DL (ref 8–23)
CALCIUM SERPL-MCNC: 9.5 MG/DL (ref 8.7–10.5)
CHLORIDE SERPL-SCNC: 93 MMOL/L (ref 95–110)
CO2 SERPL-SCNC: 27 MMOL/L (ref 23–29)
CREAT SERPL-MCNC: 4.5 MG/DL (ref 0.5–1.4)
DIFFERENTIAL METHOD: ABNORMAL
EOSINOPHIL # BLD AUTO: 0.3 K/UL (ref 0–0.5)
EOSINOPHIL NFR BLD: 3.3 % (ref 0–8)
ERYTHROCYTE [DISTWIDTH] IN BLOOD BY AUTOMATED COUNT: 21 % (ref 11.5–14.5)
EST. GFR  (NO RACE VARIABLE): 14 ML/MIN/1.73 M^2
GLUCOSE SERPL-MCNC: 94 MG/DL (ref 70–110)
HCT VFR BLD AUTO: 43.7 % (ref 40–54)
HGB BLD-MCNC: 15.1 G/DL (ref 14–18)
IMM GRANULOCYTES # BLD AUTO: 0.05 K/UL (ref 0–0.04)
IMM GRANULOCYTES NFR BLD AUTO: 0.6 % (ref 0–0.5)
LYMPHOCYTES # BLD AUTO: 0.6 K/UL (ref 1–4.8)
LYMPHOCYTES NFR BLD: 7.2 % (ref 18–48)
MAGNESIUM SERPL-MCNC: 1.9 MG/DL (ref 1.6–2.6)
MAGNESIUM SERPL-MCNC: 2 MG/DL (ref 1.6–2.6)
MCH RBC QN AUTO: 24.7 PG (ref 27–31)
MCHC RBC AUTO-ENTMCNC: 34.6 G/DL (ref 32–36)
MCV RBC AUTO: 71 FL (ref 82–98)
MONOCYTES # BLD AUTO: 0.7 K/UL (ref 0.3–1)
MONOCYTES NFR BLD: 8.1 % (ref 4–15)
NEUTROPHILS # BLD AUTO: 6.9 K/UL (ref 1.8–7.7)
NEUTROPHILS NFR BLD: 80 % (ref 38–73)
NRBC BLD-RTO: 0 /100 WBC
PLATELET # BLD AUTO: 399 K/UL (ref 150–450)
PMV BLD AUTO: ABNORMAL FL (ref 9.2–12.9)
POTASSIUM SERPL-SCNC: 4.1 MMOL/L (ref 3.5–5.1)
RBC # BLD AUTO: 6.12 M/UL (ref 4.6–6.2)
SODIUM SERPL-SCNC: 132 MMOL/L (ref 136–145)
WBC # BLD AUTO: 8.6 K/UL (ref 3.9–12.7)

## 2022-09-30 PROCEDURE — 20600001 HC STEP DOWN PRIVATE ROOM

## 2022-09-30 PROCEDURE — 63600175 PHARM REV CODE 636 W HCPCS: Performed by: STUDENT IN AN ORGANIZED HEALTH CARE EDUCATION/TRAINING PROGRAM

## 2022-09-30 PROCEDURE — 99233 SBSQ HOSP IP/OBS HIGH 50: CPT | Mod: GC,,, | Performed by: INTERNAL MEDICINE

## 2022-09-30 PROCEDURE — 85025 COMPLETE CBC W/AUTO DIFF WBC: CPT | Performed by: STUDENT IN AN ORGANIZED HEALTH CARE EDUCATION/TRAINING PROGRAM

## 2022-09-30 PROCEDURE — 83735 ASSAY OF MAGNESIUM: CPT | Performed by: STUDENT IN AN ORGANIZED HEALTH CARE EDUCATION/TRAINING PROGRAM

## 2022-09-30 PROCEDURE — 99233 PR SUBSEQUENT HOSPITAL CARE,LEVL III: ICD-10-PCS | Mod: ,,, | Performed by: INTERNAL MEDICINE

## 2022-09-30 PROCEDURE — 80048 BASIC METABOLIC PNL TOTAL CA: CPT | Mod: 91 | Performed by: STUDENT IN AN ORGANIZED HEALTH CARE EDUCATION/TRAINING PROGRAM

## 2022-09-30 PROCEDURE — 36415 COLL VENOUS BLD VENIPUNCTURE: CPT | Performed by: STUDENT IN AN ORGANIZED HEALTH CARE EDUCATION/TRAINING PROGRAM

## 2022-09-30 PROCEDURE — 83735 ASSAY OF MAGNESIUM: CPT | Mod: 91 | Performed by: STUDENT IN AN ORGANIZED HEALTH CARE EDUCATION/TRAINING PROGRAM

## 2022-09-30 PROCEDURE — 99233 PR SUBSEQUENT HOSPITAL CARE,LEVL III: ICD-10-PCS | Mod: GC,,, | Performed by: INTERNAL MEDICINE

## 2022-09-30 PROCEDURE — 25000003 PHARM REV CODE 250: Performed by: INTERNAL MEDICINE

## 2022-09-30 PROCEDURE — 99233 SBSQ HOSP IP/OBS HIGH 50: CPT | Mod: ,,, | Performed by: INTERNAL MEDICINE

## 2022-09-30 PROCEDURE — 63600175 PHARM REV CODE 636 W HCPCS: Mod: JG | Performed by: STUDENT IN AN ORGANIZED HEALTH CARE EDUCATION/TRAINING PROGRAM

## 2022-09-30 PROCEDURE — 63600175 PHARM REV CODE 636 W HCPCS: Performed by: HOSPITALIST

## 2022-09-30 PROCEDURE — 25000003 PHARM REV CODE 250: Performed by: STUDENT IN AN ORGANIZED HEALTH CARE EDUCATION/TRAINING PROGRAM

## 2022-09-30 PROCEDURE — 25000003 PHARM REV CODE 250: Performed by: HOSPITALIST

## 2022-09-30 RX ADMIN — CARVEDILOL 12.5 MG: 12.5 TABLET, FILM COATED ORAL at 08:09

## 2022-09-30 RX ADMIN — HEPARIN SODIUM 5000 UNITS: 5000 INJECTION INTRAVENOUS; SUBCUTANEOUS at 05:09

## 2022-09-30 RX ADMIN — MEXILETINE HYDROCHLORIDE 150 MG: 150 CAPSULE ORAL at 05:09

## 2022-09-30 RX ADMIN — PRAVASTATIN SODIUM 40 MG: 40 TABLET ORAL at 09:09

## 2022-09-30 RX ADMIN — AMIODARONE HYDROCHLORIDE 400 MG: 200 TABLET ORAL at 09:09

## 2022-09-30 RX ADMIN — FUROSEMIDE 40 MG/HR: 10 INJECTION, SOLUTION INTRAVENOUS at 12:09

## 2022-09-30 RX ADMIN — CARVEDILOL 12.5 MG: 12.5 TABLET, FILM COATED ORAL at 09:09

## 2022-09-30 RX ADMIN — MUPIROCIN: 20 OINTMENT TOPICAL at 08:09

## 2022-09-30 RX ADMIN — ASPIRIN 81 MG: 81 TABLET, COATED ORAL at 09:09

## 2022-09-30 RX ADMIN — HEPARIN SODIUM 5000 UNITS: 5000 INJECTION INTRAVENOUS; SUBCUTANEOUS at 03:09

## 2022-09-30 RX ADMIN — LEVOTHYROXINE SODIUM 175 MCG: 150 TABLET ORAL at 05:09

## 2022-09-30 RX ADMIN — MUPIROCIN: 20 OINTMENT TOPICAL at 09:09

## 2022-09-30 RX ADMIN — HEPARIN SODIUM 5000 UNITS: 5000 INJECTION INTRAVENOUS; SUBCUTANEOUS at 09:09

## 2022-09-30 RX ADMIN — FUROSEMIDE 40 MG/HR: 10 INJECTION, SOLUTION INTRAVENOUS at 09:09

## 2022-09-30 RX ADMIN — FERUMOXYTOL 510 MG: 510 INJECTION INTRAVENOUS at 10:09

## 2022-09-30 RX ADMIN — MEXILETINE HYDROCHLORIDE 150 MG: 150 CAPSULE ORAL at 07:09

## 2022-09-30 NOTE — NURSING
Order seen for CVP, however pt does not have a central line for monitoring. CCU cardiology paged via .

## 2022-09-30 NOTE — ASSESSMENT & PLAN NOTE
#Acute CHF  -6 VT shocks on Sunday  -JVD 16 cmH2o on exam  -TTE 9/26 with EF 40-45%, grade 2 diastolic dysfunction, PASP 67, CVP 15  -Home meds: Coreg 25 bid, entresto 49/51 bid, lasix 80 tid prn  -[place] [facility] [place]     Plan  -continue holding entresto as pt will likely benefit more from bidil.  - restarting coreg tonight at a lower dose 12.5mg BID while monitoring BP  -being careful with BP since had a fall with syncope from low BP 4 weeks ago, reported low BP for a few weeks  - on lasix 20mg/hr last night but only net -1L, unclear if accurate intakes. Added IV 80mg bolus and increased drip to 40mg/hr  -tele  -K>4, Mg>2  - out patient follow up with HF clinic

## 2022-09-30 NOTE — PROGRESS NOTES
[facility] - Cardiology Stepdown  Cardiology  Progress Note    Patient Name: Ar Sharma  MRN: [id number]  Admission Date: 9/28/2022  Hospital Length of Stay: 2 days  Code Status: Full Code   Attending Physician: Colin Lieberman MD   Primary Care Physician: Jc Elliott MD  Expected Discharge Date: 10/4/2022  Principal Problem:VT (ventricular tachycardia)    Subjective:     Hospital Course:   Patient transferred for evaluation for possible VT ablation, found to be fluid overloaded. Started diuresis.      Interval History: NAEON. Patient states he is doing okay. Denies palpitations, dizziness or chest pain.       Objective:     Vital Signs (Most Recent):  Temp: 97.6 °F (36.4 °C) (09/30/22 1546)  Pulse: 67 (09/30/22 1546)  Resp: 18 (09/30/22 1546)  BP: (!) 91/50 (09/30/22 1546)  SpO2: (!) 90 % (09/30/22 1546)   Vital Signs (24h Range):  Temp:  [97.1 °F (36.2 °C)-98.2 °F (36.8 °C)] 97.6 °F (36.4 °C)  Pulse:  [61-73] 67  Resp:  [16-18] 18  SpO2:  [90 %-98 %] 90 %  BP: ()/(50-58) 91/50     Weight: 91 kg (200 lb 9.9 oz)  Body mass index is 29.63 kg/m².     SpO2: (!) 90 %  O2 Device (Oxygen Therapy): room air      Intake/Output Summary (Last 24 hours) at 9/30/2022 1734  Last data filed at 9/30/2022 0531  Gross per 24 hour   Intake 120 ml   Output 1775 ml   Net -1655 ml       Lines/Drains/Airways       Peripheral Intravenous Line  Duration                  Peripheral IV - Single Lumen 09/25/22 18 G Anterior;Left Forearm 5 days                    Physical Exam  Vitals reviewed.   Constitutional:       Appearance: Normal appearance.   HENT:      Head: Normocephalic.      Nose: Nose normal.   Eyes:      Pupils: Pupils are equal, round, and reactive to light.   Cardiovascular:      Rate and Rhythm: Normal rate and regular rhythm.      Comments: JVD 10 cmH2o  Regular rhythm, paced on tele  No LE edema  Pulmonary:      Effort: Pulmonary effort is normal.   Abdominal:      General: Abdomen is flat.      Palpations:  Abdomen is soft.      Tenderness: There is no abdominal tenderness.   Musculoskeletal:         General: Normal range of motion.      Right lower leg: No edema.      Left lower leg: No edema.   Neurological:      General: No focal deficit present.      Mental Status: He is alert and oriented to person, place, and time.   Psychiatric:         Mood and Affect: Mood normal.         Behavior: Behavior normal.     Significant Labs: CMP   Recent Labs   Lab 09/29/22  0448 09/29/22  1631   * 131*   K 4.7 4.1   CL 97 96   CO2 26 27   GLU 96 99   BUN 84* 86*   CREATININE 4.7* 4.5*   CALCIUM 9.3 9.3   PROT 6.3  --    ALBUMIN 3.3*  --    BILITOT 1.3*  --    ALKPHOS 87  --    AST 17  --    ALT 18  --    ANIONGAP 9 8    and CBC   Recent Labs   Lab 09/29/22 0448 09/30/22  0316   WBC 9.24 8.60   HGB 14.2 15.1   HCT 42.4 43.7    399         Assessment and Plan:       * VT (ventricular tachycardia)  -H/O VT since 2012 (was diagnosed in [place] in 2012 when he presented with palpitations)  -got AICD in 2012 which was upgraded to CRT-D in 2016, was started on mexelitine  -He had VT ablation in 2/2018 and another VT ablation in 3/2018 in [place]  -he had 6 shocks last Sunday 3 days ago, presented at [facility] and got hospitalized and today after LHC (which showed non obs CAD) was transferrred here for VT ablation candidacy     Plan  -formal device interrogation for his medtronic CRT-D device done this morning  - EP following. Per fellow interrogation 15 episodes of MMVT  -on amio 200 daily and dyan 150 bid since 2018, continue the same for now  -K>4, Mg>2, telemetry    Non-ischemic cardiomyopathy  LHC done 9/28/22 in [facility] with non obstructive CAD      Acute combined systolic and diastolic congestive heart failure  #Acute CHF  -6 VT shocks on Sunday  -JVD 16 cmH2o on exam  -TTE 9/26 with EF 40-45%, grade 2 diastolic dysfunction, PASP 67, CVP 15  -Home meds: Coreg 25 bid, entresto 49/51 bid, lasix 80 tid prn  -follows up  with [facility] [place] for outpt cardiology     Plan  -continue holding entresto as pt will likely benefit more from bidil.  - restarting coreg tonight at a lower dose 12.5mg BID while monitoring BP  -being careful with BP since had a fall with syncope from low BP 4 weeks ago, reported low BP for a few weeks  - on lasix 20mg/hr last night but only net -1L, unclear if accurate intakes. Added IV 80mg bolus and increased drip to 40mg/hr  -tele  -K>4, Mg>2  - out patient follow up with HF clinic    CKD (chronic kidney disease) stage 4, GFR 15-29 ml/min  -On entresto since 2018, current dose 49/51 bid  Hold entresto for now    Iron deficiency anemia  MCV 71  Iron studies reviewed with significant iron deficiency  - we will replace as needed    Hypothyroidism  -Continue synthroid home dose  -TSH 3.045      VTE Risk Mitigation (From admission, onward)         Ordered     heparin (porcine) injection 5,000 Units  Every 8 hours         09/28/22 1400     IP VTE HIGH RISK PATIENT  Once         09/28/22 1400     Place sequential compression device  Until discontinued         09/28/22 1400                Lina Dalal MD  Internal Medicine, PGY-1  [facility]

## 2022-09-30 NOTE — NURSING
Order seen for connor, writer messaged MELO Cao via secure chat and informed him that pt is independent, urinates in urinal and we have been able to accurately measure urine output. Also pt declines connor.

## 2022-09-30 NOTE — HOSPITAL COURSE
Patient presented to OSH after defibrillator discharge, underwent LHC and found to have non-obs CAD. [place] [facility] [place] and possible [place] ablation. Was determined not to be ablation candidate as he was found to be fluid overloaded. Started diuresis. Held home Entresto as patient presented with elevated Cr and known CKD. Considered initiating bidil but BP had been hypotensive since admission. Increased amiodarone dose and continued home mexiletine. Holding home coreg due to low HR. Would consider metoprolol 12.5 mg. Patient to follow up with general cardiologist, HF transitional clinic and electrophysiologist outpatient.

## 2022-09-30 NOTE — PLAN OF CARE
Lasix gtt infusing per orders; UOP monitored. Blood glucose orders maintained. Patient remains free from falls and injuries through out shift. Patient AAO. Patient denies chest pain and SOB. Plan of care reviewed with patient. Patient verbalizes understanding of plan.  Will continue to monitor.

## 2022-09-30 NOTE — SUBJECTIVE & OBJECTIVE
Interval History: NAEON. Patient states he is doing okay. Denies palpitations, dizziness or chest pain.       Objective:     Vital Signs (Most Recent):  Temp: 97.6 °F (36.4 °C) (09/30/22 1546)  Pulse: 67 (09/30/22 1546)  Resp: 18 (09/30/22 1546)  BP: (!) 91/50 (09/30/22 1546)  SpO2: (!) 90 % (09/30/22 1546)   Vital Signs (24h Range):  Temp:  [97.1 °F (36.2 °C)-98.2 °F (36.8 °C)] 97.6 °F (36.4 °C)  Pulse:  [61-73] 67  Resp:  [16-18] 18  SpO2:  [90 %-98 %] 90 %  BP: ()/(50-58) 91/50     Weight: 91 kg (200 lb 9.9 oz)  Body mass index is 29.63 kg/m².     SpO2: (!) 90 %  O2 Device (Oxygen Therapy): room air      Intake/Output Summary (Last 24 hours) at 9/30/2022 1734  Last data filed at 9/30/2022 0531  Gross per 24 hour   Intake 120 ml   Output 1775 ml   Net -1655 ml       Lines/Drains/Airways       Peripheral Intravenous Line  Duration                  Peripheral IV - Single Lumen 09/25/22 18 G Anterior;Left Forearm 5 days                    Physical Exam  Vitals reviewed.   Constitutional:       Appearance: Normal appearance.   HENT:      Head: Normocephalic.      Nose: Nose normal.   Eyes:      Pupils: Pupils are equal, round, and reactive to light.   Cardiovascular:      Rate and Rhythm: Normal rate and regular rhythm.      Comments: JVD 10 cmH2o  Regular rhythm, paced on tele  No LE edema  Pulmonary:      Effort: Pulmonary effort is normal.   Abdominal:      General: Abdomen is flat.      Palpations: Abdomen is soft.      Tenderness: There is no abdominal tenderness.   Musculoskeletal:         General: Normal range of motion.      Right lower leg: No edema.      Left lower leg: No edema.   Neurological:      General: No focal deficit present.      Mental Status: He is alert and oriented to person, place, and time.   Psychiatric:         Mood and Affect: Mood normal.         Behavior: Behavior normal.     Significant Labs: CMP   Recent Labs   Lab 09/29/22  0448 09/29/22  1631   * 131*   K 4.7 4.1   CL 97  96   CO2 26 27   GLU 96 99   BUN 84* 86*   CREATININE 4.7* 4.5*   CALCIUM 9.3 9.3   PROT 6.3  --    ALBUMIN 3.3*  --    BILITOT 1.3*  --    ALKPHOS 87  --    AST 17  --    ALT 18  --    ANIONGAP 9 8    and CBC   Recent Labs   Lab 09/29/22  0448 09/30/22  0316   WBC 9.24 8.60   HGB 14.2 15.1   HCT 42.4 43.7    399

## 2022-09-30 NOTE — PROGRESS NOTES
Clinical Cardiac Electrophysiology Follow-Up Note     Date:  9/30/2022  Requesting attending:  Mitch Cho MD    Chief Complaint/Reason for Consultation:    Lightheadedness.  Pt is a pleasant 61 y.o male who initially presented to [facility] on 9/25 after receiving 9 ICD shocks. Interrogation of device at bedside today shows 15 episodes on 9/25/22. Most episodes were treated successfully with ATP but 6 episodes received 5 J shocks.  Home medications include Amidarone 200 mg ( concern regarding compliance) in addition to mexilitine 150 mg  Profoundly hypervolemic. On lasix gtt.   EP consulted for VT ablation     Problem List:   VT (first dx 2012 then had CRT-D placed 2016 in [place] followed by 2 VT ablations in 2018 also in [place]  -HFrEF (EF 40-45%)  -CKD V (baseline creatinine around 4)  -Hypothyroid  -CAD  -HTN     24 Hr Events and Subjective:   NAEON. R    Scheduled Meds:       amiodarone  200 mg Oral Once    amiodarone  400 mg Oral Daily    aspirin  81 mg Oral Daily    carvediloL  12.5 mg Oral BID    heparin (porcine)  5,000 Units Subcutaneous Q8H    levothyroxine  175 mcg Oral Before breakfast    mexiletine  150 mg Oral BID WM    mupirocin   Nasal BID    pravastatin  40 mg Oral Daily       Continuous Infusions:      furosemide (LASIX) 10 mg/mL infusion (non-titrating) 40 mg/hr (09/30/22 0004)         PRN Meds:   acetaminophen, albuterol-ipratropium, melatonin, ondansetron, sodium chloride 0.9%    Allergies:     Review of patient's allergies indicates:   Allergen Reactions    Lokelma [sodium zirconium cyclosilicate] Other (See Comments)     Fluid retention, weight gain, CHF excerebration, severe constipation    Atorvastatin Other (See Comments)     Joint pain       Physical Exam:     Vitals:    09/30/22 0423   BP: (!) 92/55   Pulse: 65   Resp: 18   Temp: 98.2 °F (36.8 °C)       Eyes: Sclerae anicteric. Ears/nose/throat: Mucous membranes moist. Neck: No thyromegaly, lymphadenopathy, or jugular  venous distention. Respiratory: Lungs clear to auscultation bilaterally. Cardiovascular: Heart was regular with normal first and second heart sounds. No S3 or S4. GI: Soft, nontender, nondistended, with normal bowel sounds. Musculoskeletal: extremities without cyanosis, clubbing or pitting edema. Neurologic: Patient is alert and oriented x3 and there is no focal strength deficit. Skin: no rashes, cuts, sores. Psychiatric: normal affect. Hematologic: no abnormal bruising or bleeding.    Laboratory Data:      BUN/Cr/glu/ALT/AST/amyl/lip:  86/4.5/--/--/--/--/-- (09/29 1631)  WBC/Hgb/Hct/Plts:  8.60/15.1/43.7/399 (09/30 0316)     Protein Creatinine Ratios:    Creatinine, Urine   Date Value Ref Range Status   06/09/2022 94.3 23.0 - 375.0 mg/dL Final   04/04/2022 43.8 23.0 - 375.0 mg/dL Final   02/07/2022 39.8 23.0 - 375.0 mg/dL Final     Protein, Urine   Date Value Ref Range Status   06/14/2021 <7 0 - 15 mg/dL Final   06/10/2021 <7 0 - 15 mg/dL Final     Protein, Urine Random   Date Value Ref Range Status   06/09/2022 8 0 - 15 mg/dL Final   04/04/2022 <7 0 - 15 mg/dL Final   02/07/2022 <7 0 - 15 mg/dL Final     Prot/Creat Ratio, Urine   Date Value Ref Range Status   06/09/2022 0.08 0.00 - 0.20 Final   04/04/2022 Unable to calculate 0.00 - 0.20 Final   02/07/2022 Unable to calculate 0.00 - 0.20 Final       Plan:      Pt is a 61 y.o male who presents to the hospital for possibility of VT ablation in light of VT storm.  Pt currently with ADHF. Undergoing diuresis with Lasix gtt.  Interrogation of device at bedside today shows 15 episodes on 9/25/22. Most episodes were treated successfully with ATP but 6 episodes received 5 J shocks.   s/p Formal device interrogation on 9/30~ with reprogram some of the current settings that do not seem to be appropriate   -- cont Amiodarone 400 mg qd~ stress the importance of compliance.  -- cont Mexilitine at 150 mg   -- cont diuresis  -- correct electrolyte derangements   -- will hold off  on evaluation for VT ablation at the time given Volume status.      Pt to be staffed with Dr. Tenorio this afternoon.     Farnoosh Shariati [facility]  PGY4 Cardiology Fellow

## 2022-10-01 LAB
ANION GAP SERPL CALC-SCNC: 12 MMOL/L (ref 8–16)
ANION GAP SERPL CALC-SCNC: 15 MMOL/L (ref 8–16)
ANION GAP SERPL CALC-SCNC: 17 MMOL/L (ref 8–16)
BASOPHILS # BLD AUTO: 0.06 K/UL (ref 0–0.2)
BASOPHILS NFR BLD: 0.7 % (ref 0–1.9)
BUN SERPL-MCNC: 92 MG/DL (ref 8–23)
BUN SERPL-MCNC: 96 MG/DL (ref 8–23)
BUN SERPL-MCNC: 98 MG/DL (ref 8–23)
CALCIUM SERPL-MCNC: 10.3 MG/DL (ref 8.7–10.5)
CALCIUM SERPL-MCNC: 9.3 MG/DL (ref 8.7–10.5)
CALCIUM SERPL-MCNC: 9.7 MG/DL (ref 8.7–10.5)
CHLORIDE SERPL-SCNC: 88 MMOL/L (ref 95–110)
CHLORIDE SERPL-SCNC: 91 MMOL/L (ref 95–110)
CHLORIDE SERPL-SCNC: 94 MMOL/L (ref 95–110)
CO2 SERPL-SCNC: 25 MMOL/L (ref 23–29)
CO2 SERPL-SCNC: 25 MMOL/L (ref 23–29)
CO2 SERPL-SCNC: 27 MMOL/L (ref 23–29)
CREAT SERPL-MCNC: 4.2 MG/DL (ref 0.5–1.4)
CREAT SERPL-MCNC: 4.6 MG/DL (ref 0.5–1.4)
CREAT SERPL-MCNC: 4.6 MG/DL (ref 0.5–1.4)
DIFFERENTIAL METHOD: ABNORMAL
EOSINOPHIL # BLD AUTO: 0.3 K/UL (ref 0–0.5)
EOSINOPHIL NFR BLD: 3.1 % (ref 0–8)
ERYTHROCYTE [DISTWIDTH] IN BLOOD BY AUTOMATED COUNT: 20.3 % (ref 11.5–14.5)
EST. GFR  (NO RACE VARIABLE): 13.6 ML/MIN/1.73 M^2
EST. GFR  (NO RACE VARIABLE): 13.6 ML/MIN/1.73 M^2
EST. GFR  (NO RACE VARIABLE): 15.2 ML/MIN/1.73 M^2
GLUCOSE SERPL-MCNC: 103 MG/DL (ref 70–110)
GLUCOSE SERPL-MCNC: 83 MG/DL (ref 70–110)
GLUCOSE SERPL-MCNC: 93 MG/DL (ref 70–110)
HCT VFR BLD AUTO: 44.5 % (ref 40–54)
HGB BLD-MCNC: 15.2 G/DL (ref 14–18)
IMM GRANULOCYTES # BLD AUTO: 0.04 K/UL (ref 0–0.04)
IMM GRANULOCYTES NFR BLD AUTO: 0.5 % (ref 0–0.5)
LYMPHOCYTES # BLD AUTO: 0.6 K/UL (ref 1–4.8)
LYMPHOCYTES NFR BLD: 7.7 % (ref 18–48)
MAGNESIUM SERPL-MCNC: 1.8 MG/DL (ref 1.6–2.6)
MCH RBC QN AUTO: 24.1 PG (ref 27–31)
MCHC RBC AUTO-ENTMCNC: 34.2 G/DL (ref 32–36)
MCV RBC AUTO: 71 FL (ref 82–98)
MONOCYTES # BLD AUTO: 0.7 K/UL (ref 0.3–1)
MONOCYTES NFR BLD: 9 % (ref 4–15)
NEUTROPHILS # BLD AUTO: 6.3 K/UL (ref 1.8–7.7)
NEUTROPHILS NFR BLD: 79 % (ref 38–73)
NRBC BLD-RTO: 0 /100 WBC
PLATELET # BLD AUTO: 371 K/UL (ref 150–450)
PMV BLD AUTO: ABNORMAL FL (ref 9.2–12.9)
POCT GLUCOSE: 127 MG/DL (ref 70–110)
POCT GLUCOSE: 85 MG/DL (ref 70–110)
POCT GLUCOSE: 95 MG/DL (ref 70–110)
POCT GLUCOSE: 99 MG/DL (ref 70–110)
POTASSIUM SERPL-SCNC: 3.9 MMOL/L (ref 3.5–5.1)
POTASSIUM SERPL-SCNC: 4 MMOL/L (ref 3.5–5.1)
POTASSIUM SERPL-SCNC: 4.2 MMOL/L (ref 3.5–5.1)
RBC # BLD AUTO: 6.3 M/UL (ref 4.6–6.2)
SODIUM SERPL-SCNC: 128 MMOL/L (ref 136–145)
SODIUM SERPL-SCNC: 133 MMOL/L (ref 136–145)
SODIUM SERPL-SCNC: 133 MMOL/L (ref 136–145)
WBC # BLD AUTO: 8.01 K/UL (ref 3.9–12.7)

## 2022-10-01 PROCEDURE — 20600001 HC STEP DOWN PRIVATE ROOM

## 2022-10-01 PROCEDURE — 63600175 PHARM REV CODE 636 W HCPCS: Performed by: HOSPITALIST

## 2022-10-01 PROCEDURE — 36415 COLL VENOUS BLD VENIPUNCTURE: CPT | Performed by: STUDENT IN AN ORGANIZED HEALTH CARE EDUCATION/TRAINING PROGRAM

## 2022-10-01 PROCEDURE — 99232 PR SUBSEQUENT HOSPITAL CARE,LEVL II: ICD-10-PCS | Mod: GC,,, | Performed by: INTERNAL MEDICINE

## 2022-10-01 PROCEDURE — 99232 SBSQ HOSP IP/OBS MODERATE 35: CPT | Mod: GC,,, | Performed by: INTERNAL MEDICINE

## 2022-10-01 PROCEDURE — 99233 SBSQ HOSP IP/OBS HIGH 50: CPT | Mod: ,,, | Performed by: INTERNAL MEDICINE

## 2022-10-01 PROCEDURE — 80048 BASIC METABOLIC PNL TOTAL CA: CPT | Performed by: STUDENT IN AN ORGANIZED HEALTH CARE EDUCATION/TRAINING PROGRAM

## 2022-10-01 PROCEDURE — 63600175 PHARM REV CODE 636 W HCPCS: Performed by: STUDENT IN AN ORGANIZED HEALTH CARE EDUCATION/TRAINING PROGRAM

## 2022-10-01 PROCEDURE — 83735 ASSAY OF MAGNESIUM: CPT | Performed by: STUDENT IN AN ORGANIZED HEALTH CARE EDUCATION/TRAINING PROGRAM

## 2022-10-01 PROCEDURE — 25000003 PHARM REV CODE 250: Performed by: INTERNAL MEDICINE

## 2022-10-01 PROCEDURE — 99233 PR SUBSEQUENT HOSPITAL CARE,LEVL III: ICD-10-PCS | Mod: ,,, | Performed by: INTERNAL MEDICINE

## 2022-10-01 PROCEDURE — 25000003 PHARM REV CODE 250: Performed by: HOSPITALIST

## 2022-10-01 PROCEDURE — 85025 COMPLETE CBC W/AUTO DIFF WBC: CPT | Performed by: STUDENT IN AN ORGANIZED HEALTH CARE EDUCATION/TRAINING PROGRAM

## 2022-10-01 RX ADMIN — FUROSEMIDE 40 MG/HR: 10 INJECTION, SOLUTION INTRAVENOUS at 01:10

## 2022-10-01 RX ADMIN — PRAVASTATIN SODIUM 40 MG: 40 TABLET ORAL at 08:10

## 2022-10-01 RX ADMIN — FUROSEMIDE 40 MG/HR: 10 INJECTION, SOLUTION INTRAVENOUS at 07:10

## 2022-10-01 RX ADMIN — HEPARIN SODIUM 5000 UNITS: 5000 INJECTION INTRAVENOUS; SUBCUTANEOUS at 09:10

## 2022-10-01 RX ADMIN — FUROSEMIDE 40 MG/HR: 10 INJECTION, SOLUTION INTRAVENOUS at 11:10

## 2022-10-01 RX ADMIN — MEXILETINE HYDROCHLORIDE 150 MG: 150 CAPSULE ORAL at 05:10

## 2022-10-01 RX ADMIN — HEPARIN SODIUM 5000 UNITS: 5000 INJECTION INTRAVENOUS; SUBCUTANEOUS at 01:10

## 2022-10-01 RX ADMIN — MEXILETINE HYDROCHLORIDE 150 MG: 150 CAPSULE ORAL at 07:10

## 2022-10-01 RX ADMIN — HEPARIN SODIUM 5000 UNITS: 5000 INJECTION INTRAVENOUS; SUBCUTANEOUS at 05:10

## 2022-10-01 RX ADMIN — AMIODARONE HYDROCHLORIDE 400 MG: 200 TABLET ORAL at 08:10

## 2022-10-01 RX ADMIN — ASPIRIN 81 MG: 81 TABLET, COATED ORAL at 08:10

## 2022-10-01 RX ADMIN — LEVOTHYROXINE SODIUM 175 MCG: 150 TABLET ORAL at 05:10

## 2022-10-01 NOTE — SUBJECTIVE & OBJECTIVE
Interval History: Patient was seen this morning, watching TV comfortably, offered no acute complaints. Telemetry showed no VT over last 24 hours.     Review of Systems   Constitutional: Negative for chills and fever.   Cardiovascular:  Negative for chest pain, orthopnea and palpitations.   Respiratory:  Negative for cough and shortness of breath.    Gastrointestinal:  Negative for abdominal pain.   Neurological:  Negative for dizziness, headaches and light-headedness.   Psychiatric/Behavioral:  Negative for altered mental status.    Objective:     Vital Signs (Most Recent):  Temp: 97.7 °F (36.5 °C) (10/01/22 0800)  Pulse: 66 (10/01/22 1051)  Resp: 20 (10/01/22 0800)  BP: (!) 83/50 (10/01/22 0800)  SpO2: (!) 93 % (10/01/22 0800)   Vital Signs (24h Range):  Temp:  [97.6 °F (36.4 °C)-98.9 °F (37.2 °C)] 97.7 °F (36.5 °C)  Pulse:  [60-74] 66  Resp:  [18-20] 20  SpO2:  [90 %-93 %] 93 %  BP: (82-91)/(46-55) 83/50     Weight: 91 kg (200 lb 9.9 oz)  Body mass index is 29.63 kg/m².     SpO2: (!) 93 %  O2 Device (Oxygen Therapy): room air    Physical Exam  Vitals reviewed.   Constitutional:       General: He is not in acute distress.     Appearance: Normal appearance. He is not toxic-appearing.   HENT:      Head: Normocephalic and atraumatic.      Mouth/Throat:      Mouth: Mucous membranes are moist.   Cardiovascular:      Rate and Rhythm: Normal rate and regular rhythm.      Heart sounds: No murmur heard.    No friction rub. No gallop.      Comments: LUCW device  Pulmonary:      Effort: Pulmonary effort is normal. No respiratory distress.      Breath sounds: Normal breath sounds.   Abdominal:      Palpations: Abdomen is soft.   Musculoskeletal:      Right lower leg: No edema.      Left lower leg: No edema.   Skin:     General: Skin is warm.   Neurological:      Mental Status: He is alert and oriented to person, place, and time. Mental status is at baseline.     Significant Labs: EP:   Recent Labs   Lab 09/30/22  7905  09/30/22  1558 09/30/22  2338 10/01/22  0246 10/01/22  0749   NA  --  132* 133*  --  133*   K  --  4.1 4.2  --  4.0   CL  --  93* 94*  --  91*   CO2  --  27 27  --  25   GLU  --  94 103  --  83   BUN  --  90* 92*  --  96*   CREATININE  --  4.5* 4.6*  --  4.2*   CALCIUM  --  9.5 9.3  --  10.3   ANIONGAP  --  12 12  --  17*   WBC 8.60  --   --  8.01  --    HGB 15.1  --   --  15.2  --    HCT 43.7  --   --  44.5  --      --   --  371  --        Significant Imaging: Echocardiogram: 2D echo with color flow doppler:   Results for orders placed or performed during the hospital encounter of 10/02/18   2D echo with color flow doppler   Result Value Ref Range    EF + QEF 30 (A) 55 - 65    Mitral Valve Regurgitation MILD     Diastolic Dysfunction Yes (A)     Aortic Valve Regurgitation TRIVIAL     Est. PA Systolic Pressure 64 (A)     Mitral Valve Mobility NORMAL     Tricuspid Valve Regurgitation MILD     Narrative    Date of Procedure: 10/02/2018        TEST DESCRIPTION   Technical Quality: This is a technically adequate study.     Aorta: The aortic root is normal in size, measuring 2.6 cm at sinotubular junction and 2.6 cm at Sinuses of Valsalva.     Left Atrium: The left atrial volume index is severely enlarged, measuring 65.66 cc/m2.     Left Ventricle: The left ventricle is normal in size, with an end-diastolic diameter of 5.6 cm, and an end-systolic diameter of 4.6 cm. LV wall thickness is normal, with the septum measuring 1.4 cm and the posterior wall measuring 1.5 cm across. Relative   wall thickness was increased at 0.54, and the LV mass index was increased at 197.5 g/m2 consistent with concentric left ventricular hypertrophy. The inferior septum is moderately hypokinetic. The anterior wall is moderately hypokinetic. The following   segments were moderately hypokinetic: apical lateral wall, mid anterolateral wall, apical inferior wall, mid inferior wall.  Left ventricular systolic function appears moderately  depressed. Visually estimated ejection fraction is 30-35%. The LV Doppler derived stroke volume equals 74.0 ccs.     Diastolic indices: E wave velocity 1.0 m/s, E/A ratio 2.1,  msec., E/e' ratio(avg) 20. There is diastolic dysfunction secondary to restrictive abnormality.     Right Atrium: The right atrium is normal in size, measuring 6.9 cm in length and 6.0 cm in width in the apical view.     Right Ventricle: The right ventricle is normal in size measuring 3.8 cm at the base in the apical right ventricle-focused view. Global right ventricular systolic function appears normal. Tricuspid annular plane systolic excursion (TAPSE) is 2.1 cm. The   estimated PA systolic pressure is 64 mmHg.     Aortic Valve:  The aortic valve is mildly sclerotic with normal leaflet mobility. The aortic valve is tri-leaflet in structure. Additionally, there is trivial aortic regurgitation.     Mitral Valve:  The mitral valve is mildly sclerotic with normal leaflet mobility. The pressure half time is 51 msec. The calculated mitral valve area is 4.31 cm2. There is mild mitral regurgitation.     Tricuspid Valve:  The tricuspid valve is mildly sclerotic with normal leaflet mobility. There is mild tricuspid regurgitation.     Pulmonary Valve:  The pulmonic valve is normal in structure.     IVC: IVC is enlarged and collapses < 50% with a sniff, suggesting high right atrial pressure of 15 mmHg.     Intracavitary: There is no evidence of pericardial effusion, intracavity mass, thrombi, or vegetation.         CONCLUSIONS     1 - Moderately depressed left ventricular systolic function (EF 30-35%).     2 - Restrictive LV filling pattern, indicating markedly elevated LAP (grade 3 diastolic dysfunction).     3 - Normal right ventricular systolic function .     4 - Pulmonary hypertension. The estimated PA systolic pressure is 64 mmHg.     5 - Mild mitral regurgitation.     6 - Increased central venous pressure.     7 - Severe left atrial  enlargement.   Small PFO present.          This document has been electronically    SIGNED BY: Cristobal Chavira MD On: 10/04/2018 12:02    This document was originally electronically signed on: 10/02/2018 15:47    and Transthoracic echo (TTE) complete (Cupid Only):   Results for orders placed or performed during the hospital encounter of 09/25/22   Echo   Result Value Ref Range    BSA 2.12 m2    TDI SEPTAL 0.05 m/s    LV LATERAL E/E' RATIO 14.00 m/s    LV SEPTAL E/E' RATIO 16.80 m/s    IVC diameter 2.53 cm    Left Ventricular Outflow Tract Mean Velocity 0.61 cm/s    Left Ventricular Outflow Tract Mean Gradient 1.78 mmHg    Pulmonary Valve Mean Velocity 0.65 m/s    TDI LATERAL 0.06 m/s    PV PEAK VELOCITY 1.00 cm/s    LVIDd 5.96 3.5 - 6.0 cm    IVS 1.08 0.6 - 1.1 cm    Posterior Wall 1.23 (A) 0.6 - 1.1 cm    LVIDs 4.34 (A) 2.1 - 4.0 cm    FS 27 28 - 44 %    LV mass 295.05 g    LA size 4.88 cm    RVDD 3.34 cm    TAPSE 1.14 cm    RV S' 0.01 cm/s    Left Ventricle Relative Wall Thickness 0.41 cm    AV mean gradient 6 mmHg    AV valve area 2.01 cm2    AV Velocity Ratio 0.60     AV index (prosthetic) 0.58     MV mean gradient 1 mmHg    MV valve area p 1/2 method 2.09 cm2    MV valve area by continuity eq 2.24 cm2    E/A ratio 1.79     Mean e' 0.06 m/s    E wave deceleration time 333.78 msec    IVRT 125.59 msec    LVOT diameter 2.10 cm    LVOT area 3.5 cm2    LVOT peak marvin 0.97 m/s    LVOT peak VTI 19.20 cm    Ao peak marvin 1.63 m/s    Ao VTI 33.00 cm    Mr max marvin 3.97 m/s    LVOT stroke volume 66.47 cm3    AV peak gradient 11 mmHg    MV peak gradient 3 mmHg    E/E' ratio 15.27 m/s    MV Peak E Marvin 0.84 m/s    TR Max Marvin 3.61 m/s    MV VTI 29.7 cm    MV stenosis pressure 1/2 time 105.47 ms    MV Peak A Marvin 0.47 m/s    LV Systolic Volume 84.87 mL    LV Systolic Volume Index 40.6 mL/m2    LV Diastolic Volume 177.38 mL    LV Diastolic Volume Index 84.87 mL/m2    LV Mass Index 141 g/m2    RA Major Axis 7.06 cm    Left Atrium Minor  Axis 6.19 cm    Left Atrium Major Axis 7.15 cm    Triscuspid Valve Regurgitation Peak Gradient 52 mmHg    LA Volume Index (Mod) 37.2 mL/m2    LA volume (mod) 77.75 cm3    RA Width 6.09 cm    Right Atrial Pressure (from IVC) 15 mmHg    EF 40 %    TV rest pulmonary artery pressure 67 mmHg    Narrative    · The left ventricle is normal in size with eccentric hypertrophy and   mildly decreased systolic function.  · The estimated ejection fraction is 40%.  · There is abnormal septal wall motion consistent with right ventricular   pacemaker.  · Grade II left ventricular diastolic dysfunction.  · With low normal right ventricular systolic function.  · Moderate right atrial enlargement.  · Mild tricuspid regurgitation.  · There is pulmonary hypertension.  · The estimated PA systolic pressure is 67 mmHg.  · Elevated central venous pressure (15 mmHg).      , Ejection fraction:   Recent Labs   Lab 09/26/22  1016   EF 40   , and X-Ray: CXR: X-Ray Chest 1 View (CXR): No results found for this visit on 09/28/22. and X-Ray Chest PA and Lateral (CXR): No results found for this visit on 09/28/22.

## 2022-10-01 NOTE — SUBJECTIVE & OBJECTIVE
Interval History: LORI Gregg. 4.2L UOP    Review of Systems   All other systems reviewed and are negative.    Objective:     Vital Signs (Most Recent):  Temp: 98.2 °F (36.8 °C) (10/01/22 1600)  Pulse: 69 (10/01/22 1600)  Resp: 20 (10/01/22 1600)  BP: (!) 85/51 (10/01/22 1600)  SpO2: 95 % (10/01/22 1600)   Vital Signs (24h Range):  Temp:  [97.7 °F (36.5 °C)-98.9 °F (37.2 °C)] 98.2 °F (36.8 °C)  Pulse:  [59-74] 69  Resp:  [18-20] 20  SpO2:  [92 %-95 %] 95 %  BP: (82-91)/(46-55) 85/51     Weight: 91 kg (200 lb 9.9 oz)  Body mass index is 29.63 kg/m².     SpO2: 95 %  O2 Device (Oxygen Therapy): room air      Intake/Output Summary (Last 24 hours) at 10/1/2022 1716  Last data filed at 10/1/2022 1642  Gross per 24 hour   Intake 1315.33 ml   Output 4800 ml   Net -3484.67 ml       Lines/Drains/Airways       Peripheral Intravenous Line  Duration                  Peripheral IV - Single Lumen 09/25/22 18 G Anterior;Left Forearm 6 days                    Physical Exam  Vitals reviewed.   Constitutional:       Appearance: Normal appearance.   HENT:      Head: Normocephalic.      Nose: Nose normal.   Eyes:      Pupils: Pupils are equal, round, and reactive to light.   Cardiovascular:      Rate and Rhythm: Normal rate and regular rhythm.      Comments: JVD 9 cmH2o  Regular rhythm, paced on tele  No LE edema  Pulmonary:      Effort: Pulmonary effort is normal.   Abdominal:      General: Abdomen is flat.      Palpations: Abdomen is soft.      Tenderness: There is no abdominal tenderness.   Musculoskeletal:         General: Normal range of motion.      Right lower leg: No edema.      Left lower leg: No edema.   Neurological:      General: No focal deficit present.      Mental Status: He is alert and oriented to person, place, and time.   Psychiatric:         Mood and Affect: Mood normal.         Behavior: Behavior normal.     Significant Labs: CMP   Recent Labs   Lab 09/30/22  1558 09/30/22  2338 10/01/22  0749   * 133*  133*   K 4.1 4.2 4.0   CL 93* 94* 91*   CO2 27 27 25   GLU 94 103 83   BUN 90* 92* 96*   CREATININE 4.5* 4.6* 4.2*   CALCIUM 9.5 9.3 10.3   ANIONGAP 12 12 17*    and CBC   Recent Labs   Lab 09/30/22  0316 10/01/22  0246   WBC 8.60 8.01   HGB 15.1 15.2   HCT 43.7 44.5    371

## 2022-10-01 NOTE — PROGRESS NOTES
[facility] Stepdown  Cardiology  Progress Note    Patient Name: Ar Sharma  MRN: [id number]  Admission Date: 9/28/2022  Hospital Length of Stay: 3 days  Code Status: Full Code   Attending Physician: Colin Lieberman MD   Primary Care Physician: Jc Elliott MD  Expected Discharge Date: 10/4/2022  Principal Problem:VT (ventricular tachycardia)    Subjective:     Hospital Course:   Patient transferred for evaluation for possible VT ablation, found to be fluid overloaded. Started diuresis.      Interval History: NAEON. Diuresing. 4.2L UOP    Review of Systems   All other systems reviewed and are negative.    Objective:     Vital Signs (Most Recent):  Temp: 98.2 °F (36.8 °C) (10/01/22 1600)  Pulse: 69 (10/01/22 1600)  Resp: 20 (10/01/22 1600)  BP: (!) 85/51 (10/01/22 1600)  SpO2: 95 % (10/01/22 1600)   Vital Signs (24h Range):  Temp:  [97.7 °F (36.5 °C)-98.9 °F (37.2 °C)] 98.2 °F (36.8 °C)  Pulse:  [59-74] 69  Resp:  [18-20] 20  SpO2:  [92 %-95 %] 95 %  BP: (82-91)/(46-55) 85/51     Weight: 91 kg (200 lb 9.9 oz)  Body mass index is 29.63 kg/m².     SpO2: 95 %  O2 Device (Oxygen Therapy): room air      Intake/Output Summary (Last 24 hours) at 10/1/2022 1716  Last data filed at 10/1/2022 1642  Gross per 24 hour   Intake 1315.33 ml   Output 4800 ml   Net -3484.67 ml       Lines/Drains/Airways       Peripheral Intravenous Line  Duration                  Peripheral IV - Single Lumen 09/25/22 18 G Anterior;Left Forearm 6 days                    Physical Exam  Vitals reviewed.   Constitutional:       Appearance: Normal appearance.   HENT:      Head: Normocephalic.      Nose: Nose normal.   Eyes:      Pupils: Pupils are equal, round, and reactive to light.   Cardiovascular:      Rate and Rhythm: Normal rate and regular rhythm.      Comments: JVD 9 cmH2o  Regular rhythm, paced on tele  No LE edema  Pulmonary:      Effort: Pulmonary effort is normal.   Abdominal:      General: Abdomen is flat.      Palpations:  Abdomen is soft.      Tenderness: There is no abdominal tenderness.   Musculoskeletal:         General: Normal range of motion.      Right lower leg: No edema.      Left lower leg: No edema.   Neurological:      General: No focal deficit present.      Mental Status: He is alert and oriented to person, place, and time.   Psychiatric:         Mood and Affect: Mood normal.         Behavior: Behavior normal.     Significant Labs: CMP   Recent Labs   Lab 09/30/22  1558 09/30/22  2338 10/01/22  0749   * 133* 133*   K 4.1 4.2 4.0   CL 93* 94* 91*   CO2 27 27 25   GLU 94 103 83   BUN 90* 92* 96*   CREATININE 4.5* 4.6* 4.2*   CALCIUM 9.5 9.3 10.3   ANIONGAP 12 12 17*    and CBC   Recent Labs   Lab 09/30/22  0316 10/01/22  0246   WBC 8.60 8.01   HGB 15.1 15.2   HCT 43.7 44.5    371         Assessment and Plan:     * VT (ventricular tachycardia)  -H/O VT since 2012 (was diagnosed in [place] [facility] in 2012 when he presented with palpitations)  -got AICD in 2012 which was upgraded to CRT-D in 2016, was started on mexelitine  -He had VT ablation in 2/2018 and another VT ablation in 3/2018 in [facility]  -he had 6 shocks last Sunday 3 days ago, presented at [facility] and got hospitalized and today after LHC (which showed non obs CAD) was transferrred here for VT ablation candidacy     Plan  -formal device interrogation for his medtronic CRT-D device done this morning  - EP following. Per fellow interrogation 15 episodes of MMVT  -on amio 200 daily and dyan 150 bid since 2018, continue the same for now  -K>4, Mg>2, telemetry    Acute combined systolic and diastolic congestive heart failure  #Acute CHF  -6 VT shocks on Sunday  -JVD 16 cmH2o on exam  -TTE 9/26 with EF 40-45%, grade 2 diastolic dysfunction, PASP 67, CVP 15  -Home meds: Coreg 25 bid, entresto 49/51 bid, lasix 80 tid prn  -follows up with [facility] for outpt cardiology     Plan  -continue holding entresto as pt will likely benefit more from bidil.  - restarting coreg  tonight at a lower dose 12.5mg BID while monitoring BP  -being careful with BP since had a fall with syncope from low BP 4 weeks ago, reported low BP for a few weeks  - on lasix 20mg/hr last night but only net -1L, unclear if accurate intakes. Added IV 80mg bolus and increased drip to 40mg/hr  -tele  -K>4, Mg>2  - out patient follow up with HF clinic    Non-ischemic cardiomyopathy  [place] done 9/28/22 in [facility] with non obstructive CAD      CKD (chronic kidney disease) stage 4, GFR 15-29 ml/min  -On entresto since 2018, current dose 49/51 bid  Hold entresto for now    Iron deficiency anemia  MCV 71  Iron studies reviewed with significant iron deficiency  - we will replace as needed    Hypothyroidism  -Continue synthroid home dose  -TSH 3.045        VTE Risk Mitigation (From admission, onward)         Ordered     heparin (porcine) injection 5,000 Units  Every 8 hours         09/28/22 1400     IP VTE HIGH RISK PATIENT  Once         09/28/22 1400     Place sequential compression device  Until discontinued         09/28/22 1400                Lina Dalal MD  Internal Medicine, PGY-1  [facility]

## 2022-10-01 NOTE — PROGRESS NOTES
Patient called explaining he was seen on 11/28/17 for cough and has finished the meds a couple weeks ago     Patient states his cough is a little better but still there and coughing quite a bit    Pharmacy is loaded and ok to leave a message   [facility] - Cardiology Stepdown  Cardiac Electrophysiology  Progress Note    Admission Date: 9/28/2022  Code Status: Full Code   Attending Physician: Colin Lieberman MD   Expected Discharge Date: 10/4/2022  Principal Problem:VT (ventricular tachycardia)    Subjective:     Interval History: Patient was seen this morning, watching TV comfortably, offered no acute complaints. Telemetry showed no VT over last 24 hours.     Review of Systems   Constitutional: Negative for chills and fever.   Cardiovascular:  Negative for chest pain, orthopnea and palpitations.   Respiratory:  Negative for cough and shortness of breath.    Gastrointestinal:  Negative for abdominal pain.   Neurological:  Negative for dizziness, headaches and light-headedness.   Psychiatric/Behavioral:  Negative for altered mental status.    Objective:     Vital Signs (Most Recent):  Temp: 97.7 °F (36.5 °C) (10/01/22 0800)  Pulse: 66 (10/01/22 1051)  Resp: 20 (10/01/22 0800)  BP: (!) 83/50 (10/01/22 0800)  SpO2: (!) 93 % (10/01/22 0800)   Vital Signs (24h Range):  Temp:  [97.6 °F (36.4 °C)-98.9 °F (37.2 °C)] 97.7 °F (36.5 °C)  Pulse:  [60-74] 66  Resp:  [18-20] 20  SpO2:  [90 %-93 %] 93 %  BP: (82-91)/(46-55) 83/50     Weight: 91 kg (200 lb 9.9 oz)  Body mass index is 29.63 kg/m².     SpO2: (!) 93 %  O2 Device (Oxygen Therapy): room air    Physical Exam  Vitals reviewed.   Constitutional:       General: He is not in acute distress.     Appearance: Normal appearance. He is not toxic-appearing.   HENT:      Head: Normocephalic and atraumatic.      Mouth/Throat:      Mouth: Mucous membranes are moist.   Cardiovascular:      Rate and Rhythm: Normal rate and regular rhythm.      Heart sounds: No murmur heard.    No friction rub. No gallop.      Comments: LUCW device  Pulmonary:      Effort: Pulmonary effort is normal. No respiratory distress.      Breath sounds: Normal breath sounds.   Abdominal:      Palpations: Abdomen is soft.   Musculoskeletal:       Right lower leg: No edema.      Left lower leg: No edema.   Skin:     General: Skin is warm.   Neurological:      Mental Status: He is alert and oriented to person, place, and time. Mental status is at baseline.     Significant Labs: EP:   Recent Labs   Lab 09/30/22  0316 09/30/22  1558 09/30/22  2338 10/01/22  0246 10/01/22  0749   NA  --  132* 133*  --  133*   K  --  4.1 4.2  --  4.0   CL  --  93* 94*  --  91*   CO2  --  27 27  --  25   GLU  --  94 103  --  83   BUN  --  90* 92*  --  96*   CREATININE  --  4.5* 4.6*  --  4.2*   CALCIUM  --  9.5 9.3  --  10.3   ANIONGAP  --  12 12  --  17*   WBC 8.60  --   --  8.01  --    HGB 15.1  --   --  15.2  --    HCT 43.7  --   --  44.5  --      --   --  371  --        Significant Imaging: Echocardiogram: 2D echo with color flow doppler:   Results for orders placed or performed during the hospital encounter of 10/02/18   2D echo with color flow doppler   Result Value Ref Range    EF + QEF 30 (A) 55 - 65    Mitral Valve Regurgitation MILD     Diastolic Dysfunction Yes (A)     Aortic Valve Regurgitation TRIVIAL     Est. PA Systolic Pressure 64 (A)     Mitral Valve Mobility NORMAL     Tricuspid Valve Regurgitation MILD     Narrative    Date of Procedure: 10/02/2018        TEST DESCRIPTION   Technical Quality: This is a technically adequate study.     Aorta: The aortic root is normal in size, measuring 2.6 cm at sinotubular junction and 2.6 cm at Sinuses of Valsalva.     Left Atrium: The left atrial volume index is severely enlarged, measuring 65.66 cc/m2.     Left Ventricle: The left ventricle is normal in size, with an end-diastolic diameter of 5.6 cm, and an end-systolic diameter of 4.6 cm. LV wall thickness is normal, with the septum measuring 1.4 cm and the posterior wall measuring 1.5 cm across. Relative   wall thickness was increased at 0.54, and the LV mass index was increased at 197.5 g/m2 consistent with concentric left ventricular hypertrophy. The inferior septum  is moderately hypokinetic. The anterior wall is moderately hypokinetic. The following   segments were moderately hypokinetic: apical lateral wall, mid anterolateral wall, apical inferior wall, mid inferior wall.  Left ventricular systolic function appears moderately depressed. Visually estimated ejection fraction is 30-35%. The LV Doppler derived stroke volume equals 74.0 ccs.     Diastolic indices: E wave velocity 1.0 m/s, E/A ratio 2.1,  msec., E/e' ratio(avg) 20. There is diastolic dysfunction secondary to restrictive abnormality.     Right Atrium: The right atrium is normal in size, measuring 6.9 cm in length and 6.0 cm in width in the apical view.     Right Ventricle: The right ventricle is normal in size measuring 3.8 cm at the base in the apical right ventricle-focused view. Global right ventricular systolic function appears normal. Tricuspid annular plane systolic excursion (TAPSE) is 2.1 cm. The   estimated PA systolic pressure is 64 mmHg.     Aortic Valve:  The aortic valve is mildly sclerotic with normal leaflet mobility. The aortic valve is tri-leaflet in structure. Additionally, there is trivial aortic regurgitation.     Mitral Valve:  The mitral valve is mildly sclerotic with normal leaflet mobility. The pressure half time is 51 msec. The calculated mitral valve area is 4.31 cm2. There is mild mitral regurgitation.     Tricuspid Valve:  The tricuspid valve is mildly sclerotic with normal leaflet mobility. There is mild tricuspid regurgitation.     Pulmonary Valve:  The pulmonic valve is normal in structure.     IVC: IVC is enlarged and collapses < 50% with a sniff, suggesting high right atrial pressure of 15 mmHg.     Intracavitary: There is no evidence of pericardial effusion, intracavity mass, thrombi, or vegetation.         CONCLUSIONS     1 - Moderately depressed left ventricular systolic function (EF 30-35%).     2 - Restrictive LV filling pattern, indicating markedly elevated LAP (grade 3  diastolic dysfunction).     3 - Normal right ventricular systolic function .     4 - Pulmonary hypertension. The estimated PA systolic pressure is 64 mmHg.     5 - Mild mitral regurgitation.     6 - Increased central venous pressure.     7 - Severe left atrial enlargement.   Small PFO present.          This document has been electronically    SIGNED BY: Cristobal Chavira MD On: 10/04/2018 12:02    This document was originally electronically signed on: 10/02/2018 15:47    and Transthoracic echo (TTE) complete (Cupid Only):   Results for orders placed or performed during the hospital encounter of 09/25/22   Echo   Result Value Ref Range    BSA 2.12 m2    TDI SEPTAL 0.05 m/s    LV LATERAL E/E' RATIO 14.00 m/s    LV SEPTAL E/E' RATIO 16.80 m/s    IVC diameter 2.53 cm    Left Ventricular Outflow Tract Mean Velocity 0.61 cm/s    Left Ventricular Outflow Tract Mean Gradient 1.78 mmHg    Pulmonary Valve Mean Velocity 0.65 m/s    TDI LATERAL 0.06 m/s    PV PEAK VELOCITY 1.00 cm/s    LVIDd 5.96 3.5 - 6.0 cm    IVS 1.08 0.6 - 1.1 cm    Posterior Wall 1.23 (A) 0.6 - 1.1 cm    LVIDs 4.34 (A) 2.1 - 4.0 cm    FS 27 28 - 44 %    LV mass 295.05 g    LA size 4.88 cm    RVDD 3.34 cm    TAPSE 1.14 cm    RV S' 0.01 cm/s    Left Ventricle Relative Wall Thickness 0.41 cm    AV mean gradient 6 mmHg    AV valve area 2.01 cm2    AV Velocity Ratio 0.60     AV index (prosthetic) 0.58     MV mean gradient 1 mmHg    MV valve area p 1/2 method 2.09 cm2    MV valve area by continuity eq 2.24 cm2    E/A ratio 1.79     Mean e' 0.06 m/s    E wave deceleration time 333.78 msec    IVRT 125.59 msec    LVOT diameter 2.10 cm    LVOT area 3.5 cm2    LVOT peak marvin 0.97 m/s    LVOT peak VTI 19.20 cm    Ao peak marvin 1.63 m/s    Ao VTI 33.00 cm    Mr max marvin 3.97 m/s    LVOT stroke volume 66.47 cm3    AV peak gradient 11 mmHg    MV peak gradient 3 mmHg    E/E' ratio 15.27 m/s    MV Peak E Marvin 0.84 m/s    TR Max Marvin 3.61 m/s    MV VTI 29.7 cm    MV stenosis pressure  1/2 time 105.47 ms    MV Peak A Marvin 0.47 m/s    LV Systolic Volume 84.87 mL    LV Systolic Volume Index 40.6 mL/m2    LV Diastolic Volume 177.38 mL    LV Diastolic Volume Index 84.87 mL/m2    LV Mass Index 141 g/m2    RA Major Axis 7.06 cm    Left Atrium Minor Axis 6.19 cm    Left Atrium Major Axis 7.15 cm    Triscuspid Valve Regurgitation Peak Gradient 52 mmHg    LA Volume Index (Mod) 37.2 mL/m2    LA volume (mod) 77.75 cm3    RA Width 6.09 cm    Right Atrial Pressure (from IVC) 15 mmHg    EF 40 %    TV rest pulmonary artery pressure 67 mmHg    Narrative    · The left ventricle is normal in size with eccentric hypertrophy and   mildly decreased systolic function.  · The estimated ejection fraction is 40%.  · There is abnormal septal wall motion consistent with right ventricular   pacemaker.  · Grade II left ventricular diastolic dysfunction.  · With low normal right ventricular systolic function.  · Moderate right atrial enlargement.  · Mild tricuspid regurgitation.  · There is pulmonary hypertension.  · The estimated PA systolic pressure is 67 mmHg.  · Elevated central venous pressure (15 mmHg).      , Ejection fraction:   Recent Labs   Lab 09/26/22  1016   EF 40   , and X-Ray: CXR: X-Ray Chest 1 View (CXR): No results found for this visit on 09/28/22. and X-Ray Chest PA and Lateral (CXR): No results found for this visit on 09/28/22.    Assessment and Plan:     * VT (ventricular tachycardia)  Patient is a 61 year old male with history of VT in 2012 s/p scICD placed at that time, upgraded to CRT-D in 2016 (Medtronic) for ICM; 2 VT ablations in 2018 in [place] (pending records), device interrogation 09/26/2022 showed 15 episodes of MMVT cycle length 420 msec-440 msec on 9/25/22. Most episodes successfully treated with ATP. 6 episodes successfully treated with 5 J shocks. One of these episodes he went into VF requiring 35 J shock.  -Currently rhythm is AP-BiV paced  -At home: amiodarone 200 qd, Mexiletene 150 BID (but  was only taking once daily)  -Continue amiodarone 400 mg PO daily and continue mexiletene 150 BID with meals (Renal function)  -Currently in decompensated HF, will defer HF management to primary team, continue to pursue euvolemia  -Follow up with Dr Tenorio in 4-6 weeks post discharge  - Follow up with General Cardiologist Dr Stewart in 3-4 weeks    S/P ablation of ventricular arrhythmia  In [place] in 2018 x 2    Plan of care was discussed with staff, Dr Tenorio.     Thank you for your consultation. We will sign off. Please call with questions or concerns.     Sandeep Camargo MD  Cardiac Electrophysiology PGY5  [facility] - Cardiology Stepdown

## 2022-10-01 NOTE — PLAN OF CARE
Lasix gtt infusing per orders; UOP monitored. Patient remains free from falls and injuries through out shift. Patient AAO. Patient denies chest pain and SOB. Plan of care reviewed with patient. Patient verbalizes understanding of plan.  Will continue to monitor

## 2022-10-01 NOTE — ASSESSMENT & PLAN NOTE
Patient is a 61 year old male with history of VT in 2012 s/p scICD placed at that time, upgraded to CRT-D in 2016 (Medtronic) for ICM; 2 VT ablations in 2018 in [place] (pending records), device interrogation 09/26/2022 showed 15 episodes of MMVT cycle length 420 msec-440 msec on 9/25/22. Most episodes successfully treated with ATP. 6 episodes successfully treated with 5 J shocks. One of these episodes he went into VF requiring 35 J shock.  -Currently rhythm is AP-BiV paced  -At home: amiodarone 200 qd, Mexiletene 150 BID (but was only taking once daily)  -Continue amiodarone 400 mg PO daily and continue mexiletene 150 BID with meals (Renal function)  -Currently in decompensated HF, will defer HF management to primary team, continue to pursue euvolemia  -Follow up with Dr Tenorio in 4-6 weeks post discharge

## 2022-10-02 LAB
ANION GAP SERPL CALC-SCNC: 13 MMOL/L (ref 8–16)
ANION GAP SERPL CALC-SCNC: 16 MMOL/L (ref 8–16)
BASOPHILS # BLD AUTO: 0.06 K/UL (ref 0–0.2)
BASOPHILS NFR BLD: 0.7 % (ref 0–1.9)
BUN SERPL-MCNC: 104 MG/DL (ref 8–23)
BUN SERPL-MCNC: 105 MG/DL (ref 8–23)
BUN SERPL-MCNC: 109 MG/DL (ref 8–23)
BUN SERPL-MCNC: 113 MG/DL (ref 8–23)
CALCIUM SERPL-MCNC: 9.4 MG/DL (ref 8.7–10.5)
CALCIUM SERPL-MCNC: 9.6 MG/DL (ref 8.7–10.5)
CALCIUM SERPL-MCNC: 9.7 MG/DL (ref 8.7–10.5)
CALCIUM SERPL-MCNC: 9.8 MG/DL (ref 8.7–10.5)
CHLORIDE SERPL-SCNC: 89 MMOL/L (ref 95–110)
CHLORIDE SERPL-SCNC: 89 MMOL/L (ref 95–110)
CHLORIDE SERPL-SCNC: 90 MMOL/L (ref 95–110)
CHLORIDE SERPL-SCNC: 91 MMOL/L (ref 95–110)
CO2 SERPL-SCNC: 22 MMOL/L (ref 23–29)
CO2 SERPL-SCNC: 24 MMOL/L (ref 23–29)
CO2 SERPL-SCNC: 25 MMOL/L (ref 23–29)
CO2 SERPL-SCNC: 27 MMOL/L (ref 23–29)
CREAT SERPL-MCNC: 4.2 MG/DL (ref 0.5–1.4)
CREAT SERPL-MCNC: 4.2 MG/DL (ref 0.5–1.4)
CREAT SERPL-MCNC: 4.5 MG/DL (ref 0.5–1.4)
CREAT SERPL-MCNC: 4.7 MG/DL (ref 0.5–1.4)
DIFFERENTIAL METHOD: ABNORMAL
EOSINOPHIL # BLD AUTO: 0.3 K/UL (ref 0–0.5)
EOSINOPHIL NFR BLD: 3.1 % (ref 0–8)
ERYTHROCYTE [DISTWIDTH] IN BLOOD BY AUTOMATED COUNT: 20.4 % (ref 11.5–14.5)
EST. GFR  (NO RACE VARIABLE): 13.3 ML/MIN/1.73 M^2
EST. GFR  (NO RACE VARIABLE): 14 ML/MIN/1.73 M^2
EST. GFR  (NO RACE VARIABLE): 15.2 ML/MIN/1.73 M^2
EST. GFR  (NO RACE VARIABLE): 15.2 ML/MIN/1.73 M^2
GLUCOSE SERPL-MCNC: 102 MG/DL (ref 70–110)
GLUCOSE SERPL-MCNC: 106 MG/DL (ref 70–110)
GLUCOSE SERPL-MCNC: 77 MG/DL (ref 70–110)
GLUCOSE SERPL-MCNC: 86 MG/DL (ref 70–110)
HCT VFR BLD AUTO: 44.2 % (ref 40–54)
HGB BLD-MCNC: 15.1 G/DL (ref 14–18)
IMM GRANULOCYTES # BLD AUTO: 0.07 K/UL (ref 0–0.04)
IMM GRANULOCYTES NFR BLD AUTO: 0.9 % (ref 0–0.5)
LYMPHOCYTES # BLD AUTO: 0.6 K/UL (ref 1–4.8)
LYMPHOCYTES NFR BLD: 7.6 % (ref 18–48)
MAGNESIUM SERPL-MCNC: 1.8 MG/DL (ref 1.6–2.6)
MCH RBC QN AUTO: 24 PG (ref 27–31)
MCHC RBC AUTO-ENTMCNC: 34.2 G/DL (ref 32–36)
MCV RBC AUTO: 70 FL (ref 82–98)
MONOCYTES # BLD AUTO: 0.9 K/UL (ref 0.3–1)
MONOCYTES NFR BLD: 10.6 % (ref 4–15)
NEUTROPHILS # BLD AUTO: 6.2 K/UL (ref 1.8–7.7)
NEUTROPHILS NFR BLD: 77.1 % (ref 38–73)
NRBC BLD-RTO: 0 /100 WBC
PLATELET # BLD AUTO: 374 K/UL (ref 150–450)
PMV BLD AUTO: ABNORMAL FL (ref 9.2–12.9)
POCT GLUCOSE: 115 MG/DL (ref 70–110)
POTASSIUM SERPL-SCNC: 3.7 MMOL/L (ref 3.5–5.1)
POTASSIUM SERPL-SCNC: 3.9 MMOL/L (ref 3.5–5.1)
POTASSIUM SERPL-SCNC: 4.2 MMOL/L (ref 3.5–5.1)
POTASSIUM SERPL-SCNC: 4.2 MMOL/L (ref 3.5–5.1)
RBC # BLD AUTO: 6.3 M/UL (ref 4.6–6.2)
SODIUM SERPL-SCNC: 129 MMOL/L (ref 136–145)
SODIUM SERPL-SCNC: 129 MMOL/L (ref 136–145)
SODIUM SERPL-SCNC: 130 MMOL/L (ref 136–145)
SODIUM SERPL-SCNC: 130 MMOL/L (ref 136–145)
WBC # BLD AUTO: 8.03 K/UL (ref 3.9–12.7)

## 2022-10-02 PROCEDURE — 36415 COLL VENOUS BLD VENIPUNCTURE: CPT | Performed by: STUDENT IN AN ORGANIZED HEALTH CARE EDUCATION/TRAINING PROGRAM

## 2022-10-02 PROCEDURE — 83735 ASSAY OF MAGNESIUM: CPT | Performed by: STUDENT IN AN ORGANIZED HEALTH CARE EDUCATION/TRAINING PROGRAM

## 2022-10-02 PROCEDURE — 99233 PR SUBSEQUENT HOSPITAL CARE,LEVL III: ICD-10-PCS | Mod: GC,,, | Performed by: INTERNAL MEDICINE

## 2022-10-02 PROCEDURE — 25000003 PHARM REV CODE 250: Performed by: HOSPITALIST

## 2022-10-02 PROCEDURE — 85025 COMPLETE CBC W/AUTO DIFF WBC: CPT | Performed by: STUDENT IN AN ORGANIZED HEALTH CARE EDUCATION/TRAINING PROGRAM

## 2022-10-02 PROCEDURE — 20600001 HC STEP DOWN PRIVATE ROOM

## 2022-10-02 PROCEDURE — 25000003 PHARM REV CODE 250: Performed by: INTERNAL MEDICINE

## 2022-10-02 PROCEDURE — 63600175 PHARM REV CODE 636 W HCPCS: Performed by: HOSPITALIST

## 2022-10-02 PROCEDURE — 63600175 PHARM REV CODE 636 W HCPCS: Performed by: STUDENT IN AN ORGANIZED HEALTH CARE EDUCATION/TRAINING PROGRAM

## 2022-10-02 PROCEDURE — 99233 SBSQ HOSP IP/OBS HIGH 50: CPT | Mod: GC,,, | Performed by: INTERNAL MEDICINE

## 2022-10-02 PROCEDURE — 80048 BASIC METABOLIC PNL TOTAL CA: CPT | Performed by: STUDENT IN AN ORGANIZED HEALTH CARE EDUCATION/TRAINING PROGRAM

## 2022-10-02 RX ORDER — FUROSEMIDE 80 MG/1
80 TABLET ORAL 2 TIMES DAILY
Status: DISCONTINUED | OUTPATIENT
Start: 2022-10-03 | End: 2022-10-03 | Stop reason: HOSPADM

## 2022-10-02 RX ORDER — FUROSEMIDE 80 MG/1
80 TABLET ORAL 2 TIMES DAILY
Status: DISCONTINUED | OUTPATIENT
Start: 2022-10-02 | End: 2022-10-02

## 2022-10-02 RX ORDER — FUROSEMIDE 10 MG/ML
80 INJECTION INTRAMUSCULAR; INTRAVENOUS 2 TIMES DAILY
Status: COMPLETED | OUTPATIENT
Start: 2022-10-02 | End: 2022-10-02

## 2022-10-02 RX ADMIN — LEVOTHYROXINE SODIUM 175 MCG: 150 TABLET ORAL at 05:10

## 2022-10-02 RX ADMIN — MEXILETINE HYDROCHLORIDE 150 MG: 150 CAPSULE ORAL at 05:10

## 2022-10-02 RX ADMIN — MEXILETINE HYDROCHLORIDE 150 MG: 150 CAPSULE ORAL at 07:10

## 2022-10-02 RX ADMIN — FUROSEMIDE 80 MG: 10 INJECTION, SOLUTION INTRAMUSCULAR; INTRAVENOUS at 09:10

## 2022-10-02 RX ADMIN — PRAVASTATIN SODIUM 40 MG: 40 TABLET ORAL at 08:10

## 2022-10-02 RX ADMIN — HEPARIN SODIUM 5000 UNITS: 5000 INJECTION INTRAVENOUS; SUBCUTANEOUS at 09:10

## 2022-10-02 RX ADMIN — HEPARIN SODIUM 5000 UNITS: 5000 INJECTION INTRAVENOUS; SUBCUTANEOUS at 05:10

## 2022-10-02 RX ADMIN — FUROSEMIDE 80 MG: 10 INJECTION, SOLUTION INTRAMUSCULAR; INTRAVENOUS at 01:10

## 2022-10-02 RX ADMIN — AMIODARONE HYDROCHLORIDE 400 MG: 200 TABLET ORAL at 08:10

## 2022-10-02 RX ADMIN — ASPIRIN 81 MG: 81 TABLET, COATED ORAL at 08:10

## 2022-10-02 RX ADMIN — HEPARIN SODIUM 5000 UNITS: 5000 INJECTION INTRAVENOUS; SUBCUTANEOUS at 01:10

## 2022-10-02 NOTE — ASSESSMENT & PLAN NOTE
#Acute CHF  -6 VT shocks on Sunday  -JVD 16 cmH2o on exam  -TTE 9/26 with EF 40-45%, grade 2 diastolic dysfunction, PASP 67, CVP 15  -Home meds: Coreg 25 bid, entresto 49/51 bid, lasix 80 tid prn  -[facility]s up [place] [facility] [place] outpt cardiology     Plan  -continue holding entresto as pt will likely benefit more from bidil.  -being careful with BP since had a fall with syncope from low BP 4 weeks ago, reported low BP for a few weeks  - on lasix 20mg/hr last night but only net -1L, unclear if accurate intakes. Added IV 80mg bolus and increased drip to 40mg/hr  - Now transitioning to lasix PO on 10/03  -tele  -K>4, Mg>2  - out patient follow up with HF clinic

## 2022-10-02 NOTE — PROGRESS NOTES
[facility] - Cardiology Stepdown  Cardiology  Progress Note    Patient Name: Ar Sharma  MRN: [id number]  Admission Date: 9/28/2022  Hospital Length of Stay: 4 days  Code Status: Full Code   Attending Physician: Colin Lieberman MD   Primary Care Physician: Jc Elliott MD  Expected Discharge Date: 10/3/2022  Principal Problem:VT (ventricular tachycardia)    Subjective:     Hospital Course:   Patient transferred for evaluation for possible VT ablation, found to be fluid overloaded. Started diuresis.      Interval History: NAEON. Patient with 1.2L UOP. No complaints at this time.     Review of Systems   All other systems reviewed and are negative.    Objective:     Vital Signs (Most Recent):  Temp: 97.7 °F (36.5 °C) (10/02/22 1134)  Pulse: 61 (10/02/22 1543)  Resp: 16 (10/02/22 1134)  BP: (!) 92/55 (10/02/22 1134)  SpO2: 96 % (10/02/22 1134)   Vital Signs (24h Range):  Temp:  [97.7 °F (36.5 °C)-98.9 °F (37.2 °C)] 97.7 °F (36.5 °C)  Pulse:  [59-67] 61  Resp:  [16-22] 16  SpO2:  [91 %-96 %] 96 %  BP: ()/(53-56) 92/55     Weight: 88 kg (194 lb 0.1 oz)  Body mass index is 28.65 kg/m².     SpO2: 96 %  O2 Device (Oxygen Therapy): room air      Intake/Output Summary (Last 24 hours) at 10/2/2022 1702  Last data filed at 10/2/2022 1342  Gross per 24 hour   Intake 997.99 ml   Output 3075 ml   Net -2077.01 ml       Lines/Drains/Airways       Peripheral Intravenous Line  Duration                  Peripheral IV - Single Lumen 09/25/22 18 G Anterior;Left Forearm 7 days                    Physical Exam  Vitals reviewed.   Constitutional:       Appearance: Normal appearance.   HENT:      Head: Normocephalic.      Nose: Nose normal.   Eyes:      Pupils: Pupils are equal, round, and reactive to light.   Cardiovascular:      Rate and Rhythm: Normal rate and regular rhythm.      Comments: JVD 9 cmH2o  Regular rhythm, paced on tele  No LE edema  Pulmonary:      Effort: Pulmonary effort is normal.   Abdominal:      General:  Abdomen is flat.      Palpations: Abdomen is soft.      Tenderness: There is no abdominal tenderness.   Musculoskeletal:         General: Normal range of motion.      Right lower leg: No edema.      Left lower leg: No edema.   Neurological:      General: No focal deficit present.      Mental Status: He is alert and oriented to person, place, and time.   Psychiatric:         Mood and Affect: Mood normal.         Behavior: Behavior normal.     Significant Labs: CMP   Recent Labs   Lab 10/01/22  2359 10/02/22  0800 10/02/22  1536   * 130* 129*   K 4.2 4.2 3.9   CL 91* 89* 89*   CO2 22* 25 27    77 86   * 104* 109*   CREATININE 4.7* 4.2* 4.2*   CALCIUM 9.4 9.7 9.6   ANIONGAP 16 16 13    and CBC   Recent Labs   Lab 10/01/22  0246 10/02/22  0417   WBC 8.01 8.03   HGB 15.2 15.1   HCT 44.5 44.2    374     Assessment and Plan:       * VT (ventricular tachycardia)  -H/O VT since 2012 (was diagnosed in [place] in 2012 when he presented with palpitations)  -got AICD in 2012 which was upgraded to CRT-D in 2016, was started on mexelitine  -He had VT ablation in 2/2018 and another VT ablation in 3/2018 in [place]  -he had 6 shocks last Sunday 3 days ago, presented at [facility] and got hospitalized and today after LHC (which showed non obs CAD) was transferrred here for VT ablation candidacy     Plan  -formal device interrogation for his medtronic CRT-D device done this morning  - EP following. Per fellow interrogation 15 episodes of MMVT  -on amio 200 daily and dyan 150 bid since 2018, continue the same for now  -K>4, Mg>2, telemetry  - Will follow up with EP as outpatient    Acute combined systolic and diastolic congestive heart failure  #Acute CHF  -6 VT shocks on Sunday  -JVD 16 cmH2o on exam  -TTE 9/26 with EF 40-45%, grade 2 diastolic dysfunction, PASP 67, CVP 15  -Home meds: Coreg 25 bid, entresto 49/51 bid, lasix 80 tid prn  -follows up with [facility] for outpt cardiology     Plan  -continue  holding entresto as pt will likely benefit more from bidil.  -being careful with BP since had a fall with syncope from low BP 4 weeks ago, reported low BP for a few weeks  - on lasix 20mg/hr last night but only net -1L, unclear if accurate intakes. Added IV 80mg bolus and increased drip to 40mg/hr  - Now transitioning to lasix PO on 10/03  -tele  -K>4, Mg>2  - out patient follow up with HF clinic    Non-ischemic cardiomyopathy  [facility] done 9/28/22 in [facility] with non obstructive CAD      CKD (chronic kidney disease) stage 4, GFR 15-29 ml/min  -On entresto since 2018, current dose 49/51 bid  Hold entresto for now    Iron deficiency anemia  MCV 71  Iron studies reviewed with significant iron deficiency  - we will replace as needed    Hypothyroidism  -Continue synthroid home dose  -TSH 3.045        VTE Risk Mitigation (From admission, onward)         Ordered     heparin (porcine) injection 5,000 Units  Every 8 hours         09/28/22 1400     IP VTE HIGH RISK PATIENT  Once         09/28/22 1400     Place sequential compression device  Until discontinued         09/28/22 1400                Lina Dalal MD  Internal Medicine, PGY-1  [facility]

## 2022-10-02 NOTE — ASSESSMENT & PLAN NOTE
-H/O VT since 2012 (was diagnosed in [place] in 2012 when he presented with palpitations)  -got AICD in 2012 which was upgraded to CRT-D in 2016, was started on mexelitine  -He had VT ablation in 2/2018 and another VT ablation in 3/2018 in [place]  -he had 6 shocks last Sunday 3 days ago, presented at [facility] and got hospitalized and today after LHC (which showed non obs CAD) was transferrred here for VT ablation candidacy     Plan  -formal device interrogation for his medtronic CRT-D device done this morning  - EP following. Per fellow interrogation 15 episodes of MMVT  -on amio 200 daily and dyan 150 bid since 2018, continue the same for now  -K>4, Mg>2, telemetry  - Will follow up with EP as outpatient

## 2022-10-02 NOTE — SUBJECTIVE & OBJECTIVE
Interval History: NAEON. Patient with 1.2L UOP. No complaints at this time.     Review of Systems   All other systems reviewed and are negative.    Objective:     Vital Signs (Most Recent):  Temp: 97.7 °F (36.5 °C) (10/02/22 1134)  Pulse: 61 (10/02/22 1543)  Resp: 16 (10/02/22 1134)  BP: (!) 92/55 (10/02/22 1134)  SpO2: 96 % (10/02/22 1134)   Vital Signs (24h Range):  Temp:  [97.7 °F (36.5 °C)-98.9 °F (37.2 °C)] 97.7 °F (36.5 °C)  Pulse:  [59-67] 61  Resp:  [16-22] 16  SpO2:  [91 %-96 %] 96 %  BP: ()/(53-56) 92/55     Weight: 88 kg (194 lb 0.1 oz)  Body mass index is 28.65 kg/m².     SpO2: 96 %  O2 Device (Oxygen Therapy): room air      Intake/Output Summary (Last 24 hours) at 10/2/2022 1702  Last data filed at 10/2/2022 1342  Gross per 24 hour   Intake 997.99 ml   Output 3075 ml   Net -2077.01 ml       Lines/Drains/Airways       Peripheral Intravenous Line  Duration                  Peripheral IV - Single Lumen 09/25/22 18 G Anterior;Left Forearm 7 days                    Physical Exam  Vitals reviewed.   Constitutional:       Appearance: Normal appearance.   HENT:      Head: Normocephalic.      Nose: Nose normal.   Eyes:      Pupils: Pupils are equal, round, and reactive to light.   Cardiovascular:      Rate and Rhythm: Normal rate and regular rhythm.      Comments: JVD 9 cmH2o  Regular rhythm, paced on tele  No LE edema  Pulmonary:      Effort: Pulmonary effort is normal.   Abdominal:      General: Abdomen is flat.      Palpations: Abdomen is soft.      Tenderness: There is no abdominal tenderness.   Musculoskeletal:         General: Normal range of motion.      Right lower leg: No edema.      Left lower leg: No edema.   Neurological:      General: No focal deficit present.      Mental Status: He is alert and oriented to person, place, and time.   Psychiatric:         Mood and Affect: Mood normal.         Behavior: Behavior normal.     Significant Labs: CMP   Recent Labs   Lab 10/01/22  5389 10/02/22  0801  10/02/22  1536   * 130* 129*   K 4.2 4.2 3.9   CL 91* 89* 89*   CO2 22* 25 27    77 86   * 104* 109*   CREATININE 4.7* 4.2* 4.2*   CALCIUM 9.4 9.7 9.6   ANIONGAP 16 16 13    and CBC   Recent Labs   Lab 10/01/22  0246 10/02/22  0417   WBC 8.01 8.03   HGB 15.2 15.1   HCT 44.5 44.2    374

## 2022-10-03 ENCOUNTER — TELEPHONE (OUTPATIENT)
Dept: ELECTROPHYSIOLOGY | Facility: CLINIC | Age: 62
End: 2022-10-03
Payer: MEDICARE

## 2022-10-03 VITALS
RESPIRATION RATE: 17 BRPM | HEART RATE: 63 BPM | TEMPERATURE: 97 F | BODY MASS INDEX: 28.73 KG/M2 | WEIGHT: 194 LBS | OXYGEN SATURATION: 91 % | HEIGHT: 69 IN | SYSTOLIC BLOOD PRESSURE: 94 MMHG | DIASTOLIC BLOOD PRESSURE: 58 MMHG

## 2022-10-03 LAB
ANION GAP SERPL CALC-SCNC: 13 MMOL/L (ref 8–16)
BASOPHILS # BLD AUTO: 0.08 K/UL (ref 0–0.2)
BASOPHILS NFR BLD: 0.9 % (ref 0–1.9)
BUN SERPL-MCNC: 112 MG/DL (ref 8–23)
CALCIUM SERPL-MCNC: 10 MG/DL (ref 8.7–10.5)
CHLORIDE SERPL-SCNC: 88 MMOL/L (ref 95–110)
CO2 SERPL-SCNC: 27 MMOL/L (ref 23–29)
CREAT SERPL-MCNC: 4.4 MG/DL (ref 0.5–1.4)
DIFFERENTIAL METHOD: ABNORMAL
EOSINOPHIL # BLD AUTO: 0.2 K/UL (ref 0–0.5)
EOSINOPHIL NFR BLD: 2.8 % (ref 0–8)
ERYTHROCYTE [DISTWIDTH] IN BLOOD BY AUTOMATED COUNT: 20.5 % (ref 11.5–14.5)
EST. GFR  (NO RACE VARIABLE): 14.4 ML/MIN/1.73 M^2
GLUCOSE SERPL-MCNC: 85 MG/DL (ref 70–110)
HCT VFR BLD AUTO: 45.8 % (ref 40–54)
HGB BLD-MCNC: 15.3 G/DL (ref 14–18)
IMM GRANULOCYTES # BLD AUTO: 0.07 K/UL (ref 0–0.04)
IMM GRANULOCYTES NFR BLD AUTO: 0.8 % (ref 0–0.5)
LYMPHOCYTES # BLD AUTO: 0.6 K/UL (ref 1–4.8)
LYMPHOCYTES NFR BLD: 7.4 % (ref 18–48)
MAGNESIUM SERPL-MCNC: 1.9 MG/DL (ref 1.6–2.6)
MCH RBC QN AUTO: 24.1 PG (ref 27–31)
MCHC RBC AUTO-ENTMCNC: 33.4 G/DL (ref 32–36)
MCV RBC AUTO: 72 FL (ref 82–98)
MONOCYTES # BLD AUTO: 1.2 K/UL (ref 0.3–1)
MONOCYTES NFR BLD: 14 % (ref 4–15)
NEUTROPHILS # BLD AUTO: 6.4 K/UL (ref 1.8–7.7)
NEUTROPHILS NFR BLD: 74.1 % (ref 38–73)
NRBC BLD-RTO: 0 /100 WBC
PLATELET # BLD AUTO: 391 K/UL (ref 150–450)
PMV BLD AUTO: ABNORMAL FL (ref 9.2–12.9)
POCT GLUCOSE: 118 MG/DL (ref 70–110)
POTASSIUM SERPL-SCNC: 4.4 MMOL/L (ref 3.5–5.1)
RBC # BLD AUTO: 6.36 M/UL (ref 4.6–6.2)
SODIUM SERPL-SCNC: 128 MMOL/L (ref 136–145)
WBC # BLD AUTO: 8.65 K/UL (ref 3.9–12.7)

## 2022-10-03 PROCEDURE — 25000003 PHARM REV CODE 250: Performed by: HOSPITALIST

## 2022-10-03 PROCEDURE — 36415 COLL VENOUS BLD VENIPUNCTURE: CPT | Performed by: STUDENT IN AN ORGANIZED HEALTH CARE EDUCATION/TRAINING PROGRAM

## 2022-10-03 PROCEDURE — 63600175 PHARM REV CODE 636 W HCPCS: Performed by: HOSPITALIST

## 2022-10-03 PROCEDURE — 99239 HOSP IP/OBS DSCHRG MGMT >30: CPT | Mod: GC,,, | Performed by: INTERNAL MEDICINE

## 2022-10-03 PROCEDURE — 25000003 PHARM REV CODE 250: Performed by: INTERNAL MEDICINE

## 2022-10-03 PROCEDURE — 99239 PR HOSPITAL DISCHARGE DAY,>30 MIN: ICD-10-PCS | Mod: GC,,, | Performed by: INTERNAL MEDICINE

## 2022-10-03 PROCEDURE — 80048 BASIC METABOLIC PNL TOTAL CA: CPT | Performed by: STUDENT IN AN ORGANIZED HEALTH CARE EDUCATION/TRAINING PROGRAM

## 2022-10-03 PROCEDURE — 85025 COMPLETE CBC W/AUTO DIFF WBC: CPT | Performed by: STUDENT IN AN ORGANIZED HEALTH CARE EDUCATION/TRAINING PROGRAM

## 2022-10-03 PROCEDURE — 83735 ASSAY OF MAGNESIUM: CPT | Performed by: STUDENT IN AN ORGANIZED HEALTH CARE EDUCATION/TRAINING PROGRAM

## 2022-10-03 PROCEDURE — 25000003 PHARM REV CODE 250: Performed by: STUDENT IN AN ORGANIZED HEALTH CARE EDUCATION/TRAINING PROGRAM

## 2022-10-03 RX ORDER — MEXILETINE HYDROCHLORIDE 150 MG/1
150 CAPSULE ORAL EVERY 12 HOURS
Qty: 120 CAPSULE | Refills: 2 | Status: SHIPPED | OUTPATIENT
Start: 2022-10-03 | End: 2022-10-03 | Stop reason: HOSPADM

## 2022-10-03 RX ORDER — ASPIRIN 81 MG/1
81 TABLET ORAL DAILY
Qty: 60 TABLET | Refills: 2 | Status: SHIPPED | OUTPATIENT
Start: 2022-10-03 | End: 2024-03-15 | Stop reason: SDUPTHER

## 2022-10-03 RX ORDER — POTASSIUM CHLORIDE 20 MEQ/1
40 TABLET, EXTENDED RELEASE ORAL ONCE
Status: DISCONTINUED | OUTPATIENT
Start: 2022-10-03 | End: 2022-10-03 | Stop reason: HOSPADM

## 2022-10-03 RX ORDER — FUROSEMIDE 80 MG/1
80 TABLET ORAL 2 TIMES DAILY
Qty: 120 TABLET | Refills: 3 | Status: SHIPPED | OUTPATIENT
Start: 2022-10-03 | End: 2024-01-31 | Stop reason: SDUPTHER

## 2022-10-03 RX ORDER — POTASSIUM CHLORIDE 20 MEQ/1
20 TABLET, EXTENDED RELEASE ORAL ONCE
Qty: 30 TABLET | Refills: 2 | Status: SHIPPED | OUTPATIENT
Start: 2022-10-03 | End: 2022-10-17

## 2022-10-03 RX ORDER — AMIODARONE HYDROCHLORIDE 400 MG/1
400 TABLET ORAL DAILY
Qty: 60 TABLET | Refills: 3 | Status: SHIPPED | OUTPATIENT
Start: 2022-10-03 | End: 2023-06-29 | Stop reason: SDUPTHER

## 2022-10-03 RX ADMIN — FUROSEMIDE 80 MG: 80 TABLET ORAL at 09:10

## 2022-10-03 RX ADMIN — LEVOTHYROXINE SODIUM 175 MCG: 150 TABLET ORAL at 06:10

## 2022-10-03 RX ADMIN — MEXILETINE HYDROCHLORIDE 150 MG: 150 CAPSULE ORAL at 07:10

## 2022-10-03 RX ADMIN — ASPIRIN 81 MG: 81 TABLET, COATED ORAL at 09:10

## 2022-10-03 RX ADMIN — AMIODARONE HYDROCHLORIDE 400 MG: 200 TABLET ORAL at 09:10

## 2022-10-03 RX ADMIN — PRAVASTATIN SODIUM 40 MG: 40 TABLET ORAL at 09:10

## 2022-10-03 RX ADMIN — HEPARIN SODIUM 5000 UNITS: 5000 INJECTION INTRAVENOUS; SUBCUTANEOUS at 06:10

## 2022-10-03 NOTE — TELEPHONE ENCOUNTER
----- Message from Brook Casey MA sent at 10/3/2022  9:03 AM CDT -----  Regarding: FW: Follow up appt  Patient seen Dr. Restrepo in the hospital can you give a date to schedule them  ----- Message -----  From: Sandeep Camargo MD  Sent: 10/1/2022  12:32 PM CDT  To: Coby BASURTO Staff  Subject: Follow up appt                                   Hi team,     Was wondering if y'all would be able to help set up 2 things?    1. Device clinic follow up at [facility]  2. Follow up with Dr Tenorio in 4-6 weeks at [facility]    Would appreciate it!    Thanks,  Sandeep Camargo MD  Cardiology PGY5  [facility]

## 2022-10-03 NOTE — ASSESSMENT & PLAN NOTE
-6 VT shocks on Sunday  -JVD 16 cmH2o on exam  -TTE 9/26 with EF 40-45%, grade 2 diastolic dysfunction, PASP 67, CVP 15  -Home meds: Coreg 25 bid, entresto 49/51 bid, lasix 80 tid prn  -follows up with [facility] for outpt cardiology     Plan  - Continue holding entresto as Cr is elevated and pt will likely benefit more from bidil but current BP is too low  - Being careful with BP since had a fall with syncope from low BP 4 weeks ago, reported low BP for a few weeks  - Lasix 80 PO BID  - Patient advised to weigh himself at home and add extra dose of lasix if > 5 lb increase  - K>4, Mg>2  - Out patient follow up with HF clinic  - Outpatient Cardiologist follow up to re-initiate GDMT  - Patient d/c'd with K+ Rx but refusing to take as he states his potassium is fine

## 2022-10-03 NOTE — ASSESSMENT & PLAN NOTE
H/O VT since 2012 (was diagnosed in [place] in 2012 when he presented with palpitations). Got AICD in 2012 which was upgraded to CRT-D in 2016, was started on mexelitine. He had VT ablation in 2/2018 and another VT ablation in 3/2018 in [place]. He had 6 shocks last Sunday 3 days ago, presented at [facility] and got hospitalized and today after LHC (which showed non obs CAD) was transferrred here for VT ablation candidacy     Plan  - Formal device interrogation for his medtronic CRT-D device done   - EP following. Per fellow interrogation 15 episodes of MMVT  - On amio 200 daily and dyan 150 bid since 2018. Increased to 400   - K>4, Mg>2  - Telemetry  - Will follow up with EP as outpatient

## 2022-10-03 NOTE — DISCHARGE SUMMARY
[facility] - Cardiology Stepdown  Cardiology  Discharge Summary      Patient Name: Ar Sharma  MRN: [id number]  Admission Date: 9/28/2022  Hospital Length of Stay: 5 days  Discharge Date and Time: No discharge date for patient encounter.  Attending Physician: Colin Lieberman MD    Discharging Provider: Lina Dalal MD  Primary Care Physician: Jc Elliott MD    HPI:   60 y/o male with PMH for HFrEF with EF 30-35% initially with last EF 40-45%, s/p CRT-D (initial ICD 2012 with upgrade 2016 - Medtronic), DD, VT s/p ablation x 2 (2/2018 and again 3/2018), HLD, hypothyroidism, YOEL. He had 6 shocks last Sunday 3 days ago, presented at [facility] and got hospitalized and today after LHC (which showed non obs CAD) was transferrred here for VT ablation candidacy. He is on amio 200 daily and mexeline bid 150 and was transferred on this regimen. Currently he is A O x 3 and denies any symptoms. He has been having low BP readings at home 80s/40s over the past few weeks and had 1 syncopal event and fall 1 month ago with low BP. Currenlty his CVP is 16 cmH2o, he is on room air in paced rhythm.       Indwelling Lines/Drains at time of discharge:  Lines/Drains/Airways     None                 Hospital Course:  Patient presented to OSH after defibrillator discharge, underwent LHC and found to have non-obs CAD. Transferred to [facility] for evaluation and possible VT ablation. Was determined not to be ablation candidate as he was found to be fluid overloaded. Started diuresis. Held home Entresto as patient presented with elevated Cr and known CKD. Considered initiating bidil but BP had been hypotensive since admission. Increased amiodarone dose and continued home mexiletine. Holding home coreg due to low HR. Would consider metoprolol 12.5 mg. Patient to follow up with general cardiologist, HF transitional clinic and electrophysiologist outpatient.     Goals of Care Treatment Preferences:  Code Status: Full Code      Consults:    Consults (From admission, onward)        Status Ordering Provider     Inpatient consult to Electrophysiology  Once        Provider:  (Not yet assigned)    Completed FRANCISCO J SEAMAN          Significant Diagnostic Studies: Labs:   CMP   Recent Labs   Lab 10/02/22  1536 10/02/22  2323 10/03/22  0753   * 130* 128*   K 3.9 3.7 4.4   CL 89* 90* 88*   CO2 27 24 27   GLU 86 102 85   * 113* 112*   CREATININE 4.2* 4.5* 4.4*   CALCIUM 9.6 9.8 10.0   ANIONGAP 13 16 13    and CBC   Recent Labs   Lab 10/02/22  0417 10/03/22  0444   WBC 8.03 8.65   HGB 15.1 15.3   HCT 44.2 45.8    391       Pending Diagnostic Studies:     Procedure Component Value Units Date/Time    Basic metabolic panel [[id number]]     Order Status: Sent Lab Status: No result     Specimen: Blood           Final Active Diagnoses:    Diagnosis Date Noted POA    PRINCIPAL PROBLEM:  VT (ventricular tachycardia) [I47.20] 09/26/2022 Unknown    Acute combined systolic and diastolic congestive heart failure [I50.41] 09/26/2022 Yes    Non-ischemic cardiomyopathy [I42.8] 09/28/2022 Yes    CKD (chronic kidney disease) stage 4, GFR 15-29 ml/min [N18.4] 12/17/2018 Yes    S/P ablation of ventricular arrhythmia [Z98.890, Z86.79] 10/25/2018 Not Applicable    Hypothyroidism [E03.9] 10/16/2018 Yes    Iron deficiency anemia [D50.9] 10/16/2018 Yes    Coronary artery disease involving native coronary artery of native heart without angina pectoris [I25.10] 10/16/2018 Yes      Problems Resolved During this Admission:     * VT (ventricular tachycardia)  H/O VT since 2012 (was diagnosed in [place] in 2012 when he presented with palpitations). Got AICD in 2012 which was upgraded to CRT-D in 2016, was started on mexelitine. He had VT ablation in 2/2018 and another VT ablation in 3/2018 in [place]. He had 6 shocks last Sunday 3 days ago, presented at [facility] and got hospitalized and today after [facility] (which showed non obs CAD) was transferrred here  for VT ablation candidacy     Plan  - Formal device interrogation for his medtronic CRT-D device done   - EP following. Per fellow interrogation 15 episodes of MMVT  - On amio 200 daily and dyan 150 bid since 2018. Increased to 400   - K>4, Mg>2  - Telemetry  - Will follow up with EP as outpatient    Acute combined systolic and diastolic congestive heart failure  -6 VT shocks on Sunday  -JVD 16 cmH2o on exam  -TTE 9/26 with EF 40-45%, grade 2 diastolic dysfunction, PASP 67, CVP 15  -Home meds: Coreg 25 bid, entresto 49/51 bid, lasix 80 tid prn  -follows up with [facility] for outpt cardiology     Plan  - Continue holding entresto as Cr is elevated and pt will likely benefit more from bidil but current BP is too low  - Being careful with BP since had a fall with syncope from low BP 4 weeks ago, reported low BP for a few weeks  - Lasix 80 PO BID  - Patient advised to weigh himself at home and add extra dose of lasix if > 5 lb increase  - K>4, Mg>2  - Out patient follow up with HF clinic  - Outpatient Cardiologist follow up to re-initiate GDMT  - Patient d/c'd with K+ Rx but refusing to take as he states his potassium is fine        Discharged Condition: fair    Disposition: Home or Self Care    Follow Up:    Patient Instructions:      Ambulatory referral/consult to Cardiology   Standing Status: Future   Referral Priority: Routine Referral Type: Consultation   Referral Reason: Specialty Services Required   Referred to Provider: DEVONTE KUO Requested Specialty: Cardiology   Number of Visits Requested: 1     Ambulatory referral/consult to Electrophysiology   Standing Status: Future   Referral Priority: Routine Referral Type: Consultation   Referral Reason: Specialty Services Required   Referred to Provider: JESSICA TAPIA Requested Specialty: Electrophysiology   Number of Visits Requested: 1     Ambulatory referral/consult to Heart Failure Transitional Care Clinic   Standing Status: Future   Referral Priority:  Routine Referral Type: Consultation   Referral Reason: Specialty Services Required   Requested Specialty: Cardiology   Number of Visits Requested: 1     Medications:  Reconciled Home Medications:      Medication List      START taking these medications    potassium chloride SA 20 MEQ tablet  Commonly known as: K-DUR,KLOR-CON  Take 1 tablet (20 mEq total) by mouth once daily.        CHANGE how you take these medications    amiodarone 400 MG tablet  Commonly known as: PACERONE  Take 1 tablet (400 mg total) by mouth once daily.  What changed:   · medication strength  · how much to take     * aspirin 81 MG EC tablet  Commonly known as: ECOTRIN  Take 81 mg by mouth once daily.  What changed: Another medication with the same name was added. Make sure you understand how and when to take each.     * aspirin 81 MG EC tablet  Commonly known as: ECOTRIN  Take 1 tablet (81 mg total) by mouth once daily.  What changed: You were already taking a medication with the same name, and this prescription was added. Make sure you understand how and when to take each.     furosemide 80 MG tablet  Commonly known as: LASIX  Take 1 tablet (80 mg total) by mouth 2 (two) times daily. If increased 3 pounds in a day or 5 pounds in a week, take an extra dose of lasix 80mg until those pounds are lost.  What changed:   · when to take this  · reasons to take this         * This list has 2 medication(s) that are the same as other medications prescribed for you. Read the directions carefully, and ask your doctor or other care provider to review them with you.            CONTINUE taking these medications    ergocalciferol 50,000 unit Cap  Commonly known as: ERGOCALCIFEROL  Take 1 capsule (50,000 Units total) by mouth every 7 days.     levothyroxine 175 MCG tablet  Commonly known as: SYNTHROID, LEVOTHROID  TAKE 1 TABLET BY MOUTH ONCE DAILY.     mexiletine 150 MG Cap  Commonly known as: MEXITIL  Take 1 capsule (150 mg total) by mouth 2 (two) times daily  with meals.     nitroGLYCERIN 0.4 MG SL tablet  Commonly known as: NITROSTAT  Place 1 tablet (0.4 mg total) under the tongue every 5 (five) minutes as needed for Chest pain.     pravastatin 40 MG tablet  Commonly known as: PRAVACHOL  Take 1 tablet (40 mg total) by mouth once daily.        STOP taking these medications    allopurinoL 300 MG tablet  Commonly known as: ZYLOPRIM     carvediloL 25 MG tablet  Commonly known as: COREG     ENTRESTO 49-51 mg per tablet  Generic drug: sacubitriL-valsartan     FLUZONE QUAD 6639-5414 (PF) 60 mcg (15 mcg x 4)/0.5 mL Syrg  Generic drug: flu vacc [id number] 6mos up(PF)     metOLazone 10 MG tablet  Commonly known as: ZAROXOLYN     tadalafiL 5 MG tablet  Commonly known as: CIALIS            Time spent on the discharge of patient: 40 minutes    Lina Dalal MD  Internal Medicine, PGY-1  [facility]

## 2022-10-03 NOTE — TELEPHONE ENCOUNTER
Called patient to get him scheduled for hospital follow up at the [place] location. Patient agreed to 12/15/22. Patient will have device check done at [place]. Patient agreed to appointment.

## 2022-10-03 NOTE — PLAN OF CARE
[facility] - Cardiology Stepdown  Discharge Final Note    Primary Care Provider: Jc Elliott MD    Expected Discharge Date: 10/3/2022    Final Discharge Note (most recent)       Final Note - 10/03/22 1428          Final Note    Assessment Type Final Discharge Note     Anticipated Discharge Disposition Home or Self Care     Hospital Resources/Appts/Education Provided Provided patient/caregiver with written discharge plan information;Appointments scheduled by Navigator/Coordinator;Appointments scheduled and added to AVS                     Important Message from Medicare  Important Message from Medicare regarding Discharge Appeal Rights: Given to patient/caregiver, Explained to patient/caregiver, Signed/date by patient/caregiver     Date IMM was signed: 10/03/22  Time IMM was signed: 1000    KLAUDIA Yu, RN    [phone]

## 2022-10-03 NOTE — PLAN OF CARE
IMM signed by patient, given copy for his records, explained about the Medicare message and answered any questions.    BRAXTON YuN, RN    [phone]

## 2022-10-04 ENCOUNTER — PATIENT MESSAGE (OUTPATIENT)
Dept: CARDIOLOGY | Facility: CLINIC | Age: 62
End: 2022-10-04
Payer: MEDICARE

## 2022-10-04 ENCOUNTER — TELEPHONE (OUTPATIENT)
Dept: CARDIOLOGY | Facility: CLINIC | Age: 62
End: 2022-10-04
Payer: MEDICARE

## 2022-10-04 ENCOUNTER — EPISODE CHANGES (OUTPATIENT)
Dept: CARDIOLOGY | Facility: CLINIC | Age: 62
End: 2022-10-04

## 2022-10-04 ENCOUNTER — PATIENT OUTREACH (OUTPATIENT)
Dept: ADMINISTRATIVE | Facility: CLINIC | Age: 62
End: 2022-10-04
Payer: MEDICARE

## 2022-10-04 DIAGNOSIS — I50.41 ACUTE COMBINED SYSTOLIC AND DIASTOLIC CONGESTIVE HEART FAILURE: Primary | ICD-10-CM

## 2022-10-04 NOTE — PROGRESS NOTES
C3 nurse spoke with Ar Sharma for a TCC post hospital discharge follow up call. The patient has a scheduled HOSFU appointment with Jc Elliott MD on 10/11/22 @ 1000.

## 2022-10-04 NOTE — TELEPHONE ENCOUNTER
----- Message from Linda Donovan Patient Care Assistant sent at 10/4/2022  1:20 PM CDT -----  Type:  Sooner Apoointment Request    Caller is requesting a sooner appointment.  Caller declined first available appointment listed below.  Caller will not accept being placed on the waitlist and is requesting a message be sent to doctor.  Name of Caller: pt  When is the first available appointment? 12/08  Symptoms: hospital follow up  Would the patient rather a call back or a response via [facility]?  Please call  Best Call Back Number: [phone]  Additional Information:  patient was discharge on 10/03 from hospital

## 2022-10-04 NOTE — TELEPHONE ENCOUNTER
Heart Failure Transitional Care Clinic Phone Enrollment Complete.    Phone enrollment completed due to pt    Called and spoke to pt via phone. Introduced self to pt as HFTCC nurse navigator.      Patient education will be given: RN will send pt HFTCC information by My Chart Portal.     Reviewed the following key points of HFTCC program with pt and family:              1.) Take your medications as directed.               2.) Weight yourself daily              3.) Follow low salt and limited fluid diet.               4.) Stop smoking and start exercising              5.) Go to your appointments and call your team.          Reviewed plan for follow up once discharged to include phone calls, in person and virtual visits to assist pt optimizing their heart failure medication regimen and encouraging healthy lifestyle modifications.  Reminded pt that program will assist them over the next 4-6 weeks and then patient will be transferred to long term care provider .  Reminded pt how to contact HFTCC navigator via phone and or via [facility].      Pt given appointment today via phone. MA to schedule pt for Friday afternoon, Labs to be completed at [place] lab.       Pt also reminded RN will call 48-72 hours after discharge to check on them.      PT and family verbalize read back of information given.  Encouraged pt and family to read over information often and contact team with any questions or concerns.

## 2022-10-04 NOTE — PROGRESS NOTES
C3 nurse attempted to contact Ar Sharma's spouse for a TCC post hospital discharge follow up call. The patient is unable to conduct the call @ this time. The patient requested a callback.    The patient does not have a scheduled HOSFU appointment within 5-7 days post hospital discharge date 10/03/22. Message sent to Physician staff to assist with HOSFU appointment scheduling.

## 2022-10-05 ENCOUNTER — TELEPHONE (OUTPATIENT)
Dept: CARDIOLOGY | Facility: CLINIC | Age: 62
End: 2022-10-05
Payer: MEDICARE

## 2022-10-05 NOTE — TELEPHONE ENCOUNTER
Called pt back in regards to message   Advised pt there are no openings for a sooner appt at either [facility] or [facility] until December     Appt has been added to wait list        V/U  JA

## 2022-10-05 NOTE — TELEPHONE ENCOUNTER
----- Message from Brooks Del Real sent at 10/5/2022  8:13 AM CDT -----  Contact: [phone]  Who Called: PT  Regarding:  pt was seen at the ER , discharged on Monday. They advised him to follow up with his cardiologist   Would the patient rather a call back or a response via [facility]? Call back  Best Call Back Number: [phone]  Additional Information: n/a

## 2022-10-07 ENCOUNTER — OFFICE VISIT (OUTPATIENT)
Dept: CARDIOLOGY | Facility: CLINIC | Age: 62
End: 2022-10-07
Payer: MEDICARE

## 2022-10-07 ENCOUNTER — LAB VISIT (OUTPATIENT)
Dept: LAB | Facility: HOSPITAL | Age: 62
End: 2022-10-07
Attending: INTERNAL MEDICINE
Payer: MEDICARE

## 2022-10-07 VITALS
SYSTOLIC BLOOD PRESSURE: 108 MMHG | HEART RATE: 79 BPM | OXYGEN SATURATION: 93 % | BODY MASS INDEX: 28.87 KG/M2 | WEIGHT: 194.88 LBS | HEIGHT: 69 IN | DIASTOLIC BLOOD PRESSURE: 61 MMHG

## 2022-10-07 DIAGNOSIS — E55.9 VITAMIN D DEFICIENCY: ICD-10-CM

## 2022-10-07 DIAGNOSIS — R73.03 PREDIABETES: ICD-10-CM

## 2022-10-07 DIAGNOSIS — N18.4 ANEMIA IN STAGE 4 CHRONIC KIDNEY DISEASE: ICD-10-CM

## 2022-10-07 DIAGNOSIS — N18.4 CKD (CHRONIC KIDNEY DISEASE) STAGE 4, GFR 15-29 ML/MIN: ICD-10-CM

## 2022-10-07 DIAGNOSIS — Z86.79 S/P ABLATION OF VENTRICULAR ARRHYTHMIA: ICD-10-CM

## 2022-10-07 DIAGNOSIS — I50.41 ACUTE COMBINED SYSTOLIC AND DIASTOLIC CONGESTIVE HEART FAILURE: Primary | ICD-10-CM

## 2022-10-07 DIAGNOSIS — Z95.810 CARDIAC RESYNCHRONIZATION THERAPY DEFIBRILLATOR (CRT-D) IN PLACE: ICD-10-CM

## 2022-10-07 DIAGNOSIS — N25.81 SECONDARY HYPERPARATHYROIDISM OF RENAL ORIGIN: ICD-10-CM

## 2022-10-07 DIAGNOSIS — D63.1 ANEMIA IN STAGE 4 CHRONIC KIDNEY DISEASE: ICD-10-CM

## 2022-10-07 DIAGNOSIS — Z98.890 S/P ABLATION OF VENTRICULAR ARRHYTHMIA: ICD-10-CM

## 2022-10-07 DIAGNOSIS — I50.41 ACUTE COMBINED SYSTOLIC AND DIASTOLIC CONGESTIVE HEART FAILURE: ICD-10-CM

## 2022-10-07 DIAGNOSIS — E78.5 HYPERLIPIDEMIA, UNSPECIFIED HYPERLIPIDEMIA TYPE: ICD-10-CM

## 2022-10-07 DIAGNOSIS — I42.8 NON-ISCHEMIC CARDIOMYOPATHY: ICD-10-CM

## 2022-10-07 LAB
25(OH)D3+25(OH)D2 SERPL-MCNC: 34 NG/ML (ref 30–96)
ALBUMIN SERPL BCP-MCNC: 4.7 G/DL (ref 3.5–5.2)
ALBUMIN SERPL BCP-MCNC: 4.7 G/DL (ref 3.5–5.2)
ALP SERPL-CCNC: 95 U/L (ref 38–126)
ALT SERPL W/O P-5'-P-CCNC: 66 U/L (ref 10–44)
ANION GAP SERPL CALC-SCNC: 12 MMOL/L (ref 8–16)
ANION GAP SERPL CALC-SCNC: 12 MMOL/L (ref 8–16)
AST SERPL-CCNC: 54 U/L (ref 15–46)
BASOPHILS # BLD AUTO: 0.07 K/UL (ref 0–0.2)
BASOPHILS # BLD AUTO: 0.07 K/UL (ref 0–0.2)
BASOPHILS NFR BLD: 0.9 % (ref 0–1.9)
BASOPHILS NFR BLD: 0.9 % (ref 0–1.9)
BILIRUB SERPL-MCNC: 1.8 MG/DL (ref 0.1–1)
CALCIUM SERPL-MCNC: 9.9 MG/DL (ref 8.7–10.5)
CALCIUM SERPL-MCNC: 9.9 MG/DL (ref 8.7–10.5)
CHLORIDE SERPL-SCNC: 96 MMOL/L (ref 95–110)
CHLORIDE SERPL-SCNC: 96 MMOL/L (ref 95–110)
CO2 SERPL-SCNC: 31 MMOL/L (ref 23–29)
CO2 SERPL-SCNC: 31 MMOL/L (ref 23–29)
CREAT SERPL-MCNC: 3.72 MG/DL (ref 0.5–1.4)
CREAT SERPL-MCNC: 3.72 MG/DL (ref 0.5–1.4)
DIFFERENTIAL METHOD: ABNORMAL
DIFFERENTIAL METHOD: ABNORMAL
EOSINOPHIL # BLD AUTO: 0.3 K/UL (ref 0–0.5)
EOSINOPHIL # BLD AUTO: 0.3 K/UL (ref 0–0.5)
EOSINOPHIL NFR BLD: 3.7 % (ref 0–8)
EOSINOPHIL NFR BLD: 3.7 % (ref 0–8)
ERYTHROCYTE [DISTWIDTH] IN BLOOD BY AUTOMATED COUNT: 21.2 % (ref 11.5–14.5)
ERYTHROCYTE [DISTWIDTH] IN BLOOD BY AUTOMATED COUNT: 21.2 % (ref 11.5–14.5)
EST. GFR  (NO RACE VARIABLE): 17.6 ML/MIN/1.73 M^2
EST. GFR  (NO RACE VARIABLE): 17.6 ML/MIN/1.73 M^2
FERRITIN SERPL-MCNC: 676 NG/ML (ref 20–300)
GLUCOSE SERPL-MCNC: 107 MG/DL (ref 70–110)
GLUCOSE SERPL-MCNC: 107 MG/DL (ref 70–110)
HCT VFR BLD AUTO: 50.1 % (ref 40–54)
HCT VFR BLD AUTO: 50.1 % (ref 40–54)
HGB BLD-MCNC: 16.7 G/DL (ref 14–18)
HGB BLD-MCNC: 16.7 G/DL (ref 14–18)
IMM GRANULOCYTES # BLD AUTO: 0.05 K/UL (ref 0–0.04)
IMM GRANULOCYTES # BLD AUTO: 0.05 K/UL (ref 0–0.04)
IMM GRANULOCYTES NFR BLD AUTO: 0.7 % (ref 0–0.5)
IMM GRANULOCYTES NFR BLD AUTO: 0.7 % (ref 0–0.5)
IRON SERPL-MCNC: 109 UG/DL (ref 45–160)
LYMPHOCYTES # BLD AUTO: 0.6 K/UL (ref 1–4.8)
LYMPHOCYTES # BLD AUTO: 0.6 K/UL (ref 1–4.8)
LYMPHOCYTES NFR BLD: 7.9 % (ref 18–48)
LYMPHOCYTES NFR BLD: 7.9 % (ref 18–48)
MAGNESIUM SERPL-MCNC: 2.4 MG/DL (ref 1.6–2.6)
MCH RBC QN AUTO: 24.1 PG (ref 27–31)
MCH RBC QN AUTO: 24.1 PG (ref 27–31)
MCHC RBC AUTO-ENTMCNC: 33.3 G/DL (ref 32–36)
MCHC RBC AUTO-ENTMCNC: 33.3 G/DL (ref 32–36)
MCV RBC AUTO: 72 FL (ref 82–98)
MCV RBC AUTO: 72 FL (ref 82–98)
MONOCYTES # BLD AUTO: 0.7 K/UL (ref 0.3–1)
MONOCYTES # BLD AUTO: 0.7 K/UL (ref 0.3–1)
MONOCYTES NFR BLD: 9.7 % (ref 4–15)
MONOCYTES NFR BLD: 9.7 % (ref 4–15)
NEUTROPHILS # BLD AUTO: 5.8 K/UL (ref 1.8–7.7)
NEUTROPHILS # BLD AUTO: 5.8 K/UL (ref 1.8–7.7)
NEUTROPHILS NFR BLD: 77.1 % (ref 38–73)
NEUTROPHILS NFR BLD: 77.1 % (ref 38–73)
NRBC BLD-RTO: 0 /100 WBC
NRBC BLD-RTO: 0 /100 WBC
NT-PROBNP SERPL-MCNC: 3100 PG/ML (ref 5–900)
PHOSPHATE SERPL-MCNC: 4.1 MG/DL (ref 2.7–4.5)
PHOSPHATE SERPL-MCNC: 4.1 MG/DL (ref 2.7–4.5)
PLATELET # BLD AUTO: 463 K/UL (ref 150–450)
PLATELET # BLD AUTO: 463 K/UL (ref 150–450)
PMV BLD AUTO: ABNORMAL FL (ref 9.2–12.9)
PMV BLD AUTO: ABNORMAL FL (ref 9.2–12.9)
POTASSIUM SERPL-SCNC: 4.2 MMOL/L (ref 3.5–5.1)
POTASSIUM SERPL-SCNC: 4.2 MMOL/L (ref 3.5–5.1)
PROT SERPL-MCNC: 8 G/DL (ref 6–8.4)
PTH-INTACT SERPL-MCNC: 120.4 PG/ML (ref 9–77)
RBC # BLD AUTO: 6.92 M/UL (ref 4.6–6.2)
RBC # BLD AUTO: 6.92 M/UL (ref 4.6–6.2)
SATURATED IRON: 28 % (ref 20–50)
SODIUM SERPL-SCNC: 139 MMOL/L (ref 136–145)
SODIUM SERPL-SCNC: 139 MMOL/L (ref 136–145)
TOTAL IRON BINDING CAPACITY: 394 UG/DL (ref 250–450)
TRANSFERRIN SERPL-MCNC: 266 MG/DL (ref 200–375)
URATE SERPL-MCNC: 8.9 MG/DL (ref 3.4–7)
UUN UR-MCNC: 117 MG/DL (ref 2–20)
UUN UR-MCNC: 117 MG/DL (ref 2–20)
WBC # BLD AUTO: 7.51 K/UL (ref 3.9–12.7)
WBC # BLD AUTO: 7.51 K/UL (ref 3.9–12.7)

## 2022-10-07 PROCEDURE — 83970 ASSAY OF PARATHORMONE: CPT | Mod: PO | Performed by: INTERNAL MEDICINE

## 2022-10-07 PROCEDURE — 84550 ASSAY OF BLOOD/URIC ACID: CPT | Performed by: INTERNAL MEDICINE

## 2022-10-07 PROCEDURE — 1111F DSCHRG MED/CURRENT MED MERGE: CPT | Mod: CPTII,S$GLB,,

## 2022-10-07 PROCEDURE — 3074F PR MOST RECENT SYSTOLIC BLOOD PRESSURE < 130 MM HG: ICD-10-PCS | Mod: CPTII,S$GLB,,

## 2022-10-07 PROCEDURE — 84100 ASSAY OF PHOSPHORUS: CPT | Mod: PO

## 2022-10-07 PROCEDURE — 3044F HG A1C LEVEL LT 7.0%: CPT | Mod: CPTII,S$GLB,,

## 2022-10-07 PROCEDURE — 99204 OFFICE O/P NEW MOD 45 MIN: CPT | Mod: S$GLB,,,

## 2022-10-07 PROCEDURE — 80053 COMPREHEN METABOLIC PANEL: CPT | Mod: PO

## 2022-10-07 PROCEDURE — 1159F MED LIST DOCD IN RCRD: CPT | Mod: CPTII,S$GLB,,

## 2022-10-07 PROCEDURE — 83735 ASSAY OF MAGNESIUM: CPT | Mod: PO

## 2022-10-07 PROCEDURE — 3008F PR BODY MASS INDEX (BMI) DOCUMENTED: ICD-10-PCS | Mod: CPTII,S$GLB,,

## 2022-10-07 PROCEDURE — 1159F PR MEDICATION LIST DOCUMENTED IN MEDICAL RECORD: ICD-10-PCS | Mod: CPTII,S$GLB,,

## 2022-10-07 PROCEDURE — 3066F PR DOCUMENTATION OF TREATMENT FOR NEPHROPATHY: ICD-10-PCS | Mod: CPTII,S$GLB,,

## 2022-10-07 PROCEDURE — 4010F ACE/ARB THERAPY RXD/TAKEN: CPT | Mod: CPTII,S$GLB,,

## 2022-10-07 PROCEDURE — 99999 PR PBB SHADOW E&M-EST. PATIENT-LVL IV: ICD-10-PCS | Mod: PBBFAC,,,

## 2022-10-07 PROCEDURE — 84466 ASSAY OF TRANSFERRIN: CPT | Mod: PO | Performed by: INTERNAL MEDICINE

## 2022-10-07 PROCEDURE — 84100 ASSAY OF PHOSPHORUS: CPT | Performed by: INTERNAL MEDICINE

## 2022-10-07 PROCEDURE — 82306 VITAMIN D 25 HYDROXY: CPT | Mod: PO | Performed by: INTERNAL MEDICINE

## 2022-10-07 PROCEDURE — 3074F SYST BP LT 130 MM HG: CPT | Mod: CPTII,S$GLB,,

## 2022-10-07 PROCEDURE — 85025 COMPLETE CBC W/AUTO DIFF WBC: CPT | Mod: PO | Performed by: INTERNAL MEDICINE

## 2022-10-07 PROCEDURE — 1111F PR DISCHARGE MEDS RECONCILED W/ CURRENT OUTPATIENT MED LIST: ICD-10-PCS | Mod: CPTII,S$GLB,,

## 2022-10-07 PROCEDURE — 82728 ASSAY OF FERRITIN: CPT | Performed by: INTERNAL MEDICINE

## 2022-10-07 PROCEDURE — 3008F BODY MASS INDEX DOCD: CPT | Mod: CPTII,S$GLB,,

## 2022-10-07 PROCEDURE — 3066F NEPHROPATHY DOC TX: CPT | Mod: CPTII,S$GLB,,

## 2022-10-07 PROCEDURE — 99999 PR PBB SHADOW E&M-EST. PATIENT-LVL IV: CPT | Mod: PBBFAC,,,

## 2022-10-07 PROCEDURE — 3044F PR MOST RECENT HEMOGLOBIN A1C LEVEL <7.0%: ICD-10-PCS | Mod: CPTII,S$GLB,,

## 2022-10-07 PROCEDURE — 99204 PR OFFICE/OUTPT VISIT, NEW, LEVL IV, 45-59 MIN: ICD-10-PCS | Mod: S$GLB,,,

## 2022-10-07 PROCEDURE — 3078F DIAST BP <80 MM HG: CPT | Mod: CPTII,S$GLB,,

## 2022-10-07 PROCEDURE — 83880 ASSAY OF NATRIURETIC PEPTIDE: CPT | Mod: PO

## 2022-10-07 PROCEDURE — 4010F PR ACE/ARB THEARPY RXD/TAKEN: ICD-10-PCS | Mod: CPTII,S$GLB,,

## 2022-10-07 PROCEDURE — 3078F PR MOST RECENT DIASTOLIC BLOOD PRESSURE < 80 MM HG: ICD-10-PCS | Mod: CPTII,S$GLB,,

## 2022-10-07 NOTE — PROGRESS NOTES
HF TCC Provider Note (Initial Clinic) Consult Note    Date of original referral: 10/3/22  Age: 61 y.o.  Gender: male  Ethnicity: Black or   Number of admissions for CHF within the preceding year: 1   Duration of CHF: 2012  Type of Congestive Heart Failure: Combined  Etiology: Non-ischemic    Social history: former smoker, quit >10 years (previously smoked 1.5 ppd x ~30s), former heavy drinker, quit 3-4 years ago (previously drank 1L whiskey/week), denies h/o IVDU   Enrolled in Infusion suite: no    Diagnostic Labs:   EKG - 09/29/2022  CXR - 09/28/2022  ECHO - 09/26/2022  Stress test -   Stress echo -   Pharmacologic stress -   Cardiac catheterization - 09/29/2022   Cardiac MRI -     Lab Results   Component Value Date     (L) 10/03/2022     (L) 10/02/2022    K 4.4 10/03/2022    K 3.7 10/02/2022    CL 88 (L) 10/03/2022    CL 90 (L) 10/02/2022    CO2 27 10/03/2022    CO2 24 10/02/2022    GLU 85 10/03/2022     10/02/2022     (H) 10/03/2022     (H) 10/02/2022    CREATININE 4.4 (H) 10/03/2022    CREATININE 4.5 (H) 10/02/2022    CALCIUM 10.0 10/03/2022    CALCIUM 9.8 10/02/2022    PROT 6.3 09/29/2022    PROT 7.2 09/28/2022    ALBUMIN 3.3 (L) 09/29/2022    ALBUMIN 3.7 09/28/2022    BILITOT 1.3 (H) 09/29/2022    BILITOT 1.6 (H) 09/28/2022    ALKPHOS 87 09/29/2022    ALKPHOS 97 09/28/2022    AST 17 09/29/2022    AST 18 09/28/2022    ALT 18 09/29/2022    ALT 17 09/28/2022    ANIONGAP 13 10/03/2022    ANIONGAP 16 10/02/2022    ESTGFRAFRICA 17.1 (A) 07/07/2022    ESTGFRAFRICA 14.5 (A) 06/09/2022    EGFRNONAA 14.8 (A) 07/07/2022    EGFRNONAA 12.6 (A) 06/09/2022       Lab Results   Component Value Date    WBC 8.65 10/03/2022    WBC 8.03 10/02/2022    RBC 6.36 (H) 10/03/2022    RBC 6.30 (H) 10/02/2022    HGB 15.3 10/03/2022    HGB 15.1 10/02/2022    HCT 45.8 10/03/2022    HCT 44.2 10/02/2022    MCV 72 (L) 10/03/2022    MCV 70 (L) 10/02/2022    MCH 24.1 (L) 10/03/2022    MCH 24.0 (L)  10/02/2022    MCHC 33.4 10/03/2022    MCHC 34.2 10/02/2022    RDW 20.5 (H) 10/03/2022    RDW 20.4 (H) 10/02/2022     10/03/2022     10/02/2022    MPV SEE COMMENT 10/03/2022    MPV SEE COMMENT 10/02/2022    IMMGR 0.8 (H) 10/03/2022    IMMGR 0.9 (H) 10/02/2022    IGABS 0.07 (H) 10/03/2022    IGABS 0.07 (H) 10/02/2022    LYMPH 0.6 (L) 10/03/2022    LYMPH 7.4 (L) 10/03/2022    MONO 1.2 (H) 10/03/2022    MONO 14.0 10/03/2022    EOS 0.2 10/03/2022    EOS 0.3 10/02/2022    BASO 0.08 10/03/2022    BASO 0.06 10/02/2022    NRBC 0 10/03/2022    NRBC 0 10/02/2022    GRAN 6.4 10/03/2022    GRAN 74.1 (H) 10/03/2022    EOSINOPHIL 2.8 10/03/2022    EOSINOPHIL 3.1 10/02/2022    BASOPHIL 0.9 10/03/2022    BASOPHIL 0.7 10/02/2022    PLTEST Appears normal 09/11/2019    PLTEST Increased (A) 03/11/2019    ANISO Moderate 03/11/2019    ANISO Moderate 12/13/2018    HYPO Moderate 10/02/2018       Lab Results   Component Value Date    BNP 1,416 (H) 09/28/2022    BNP 2,304 (H) 09/25/2022    MG 1.9 10/03/2022    MG 1.8 10/02/2022    PHOS 3.7 09/28/2022    PHOS 4.2 09/06/2022    TROPONINI 0.015 09/25/2022    HGBA1C 6.1 (H) 04/04/2022    HGBA1C 6.0 (H) 11/11/2021    TSH 3.045 09/29/2022    TSH 6.080 (H) 11/11/2021    FREET4 1.48 11/11/2021    FREET4 1.42 02/04/2021       Lab Results   Component Value Date    IRON 46 09/28/2022    TIBC 404 09/28/2022    FERRITIN 86 09/28/2022    CHOL 104 (L) 04/04/2022    TRIG 54 04/04/2022    HDL 36 (L) 04/04/2022    LDLCALC 57.2 (L) 04/04/2022    CHOLHDL 34.6 04/04/2022    TOTALCHOLEST 2.9 04/04/2022    NONHDLCHOL 68 04/04/2022    COLORU Yellow 06/09/2022    APPEARANCEUA Clear 06/09/2022    PHUR 6.0 06/09/2022    SPECGRAV 1.015 06/09/2022    PROTEINUA Trace (A) 06/09/2022    GLUCUA Negative 06/09/2022    KETONESU Negative 06/09/2022    BILIRUBINUA Negative 06/09/2022    OCCULTUA Negative 06/09/2022    NITRITE Negative 06/09/2022    LEUKOCYTESUR Negative 06/09/2022       List all implanted cardiac  devices:   Implanted cardio-defibrillator: CRT-D    Current Outpatient Medications on File Prior to Visit   Medication Sig Dispense Refill    amiodarone (PACERONE) 400 MG tablet Take 1 tablet (400 mg total) by mouth once daily. 60 tablet 3    aspirin (ECOTRIN) 81 MG EC tablet Take 81 mg by mouth once daily.      aspirin (ECOTRIN) 81 MG EC tablet Take 1 tablet (81 mg total) by mouth once daily. 60 tablet 2    ergocalciferol (ERGOCALCIFEROL) 50,000 unit Cap Take 1 capsule (50,000 Units total) by mouth every 7 days. 12 capsule 3    furosemide (LASIX) 80 MG tablet Take 1 tablet (80 mg total) by mouth 2 (two) times daily. If increased 3 pounds in a day or 5 pounds in a week, take an extra dose of lasix 80mg until those pounds are lost. 120 tablet 3    levothyroxine (SYNTHROID, LEVOTHROID) 175 MCG tablet TAKE 1 TABLET BY MOUTH ONCE DAILY. (Patient taking differently: Take 175 mcg by mouth once daily.) 90 tablet 3    mexiletine (MEXITIL) 150 MG Cap Take 1 capsule (150 mg total) by mouth 2 (two) times daily with meals. 180 capsule 4    nitroGLYCERIN (NITROSTAT) 0.4 MG SL tablet Place 1 tablet (0.4 mg total) under the tongue every 5 (five) minutes as needed for Chest pain. 20 tablet 1    potassium chloride SA (K-DUR,KLOR-CON) 20 MEQ tablet Take 1 tablet (20 mEq total) by mouth once daily. (Patient not taking: Reported on 10/4/2022) 30 tablet 2    pravastatin (PRAVACHOL) 40 MG tablet Take 1 tablet (40 mg total) by mouth once daily. 90 tablet 4     No current facility-administered medications on file prior to visit.         HPI:  Patient reports improvement in SOB since hospital discharge. Ambulating to clinic today and pace normal without appreciable SOB    Patient sleeps on 1 number of pillows   Patient wakes up SOB, has to get out of bed, associated cough- denies   Palpitations - denies   Dizzy, light-headed, pre-syncope or syncope- denies   Since discharge frequency of performing weights, home weight and weight change-  performing daily weights, weight stable 188-190lbs on home scale since discharge    Other information felt pertinent to HPI: Mr. Ar Sharma is a 62 yo male with a PMHx of HFrEF (EF 30-35%, now recovered to 40-45%), s/p CRT-D (initial ICD 2012 with upgrade 2016 - Medtronic), VT s/p ablation x 2 (2/2018 and again 3/2018), HLD, hypothyroidism, CKD IV, YOEL who presents to first HFTCC visit following recent admission for VT. He intially presented to [facility] following multiple ICD discharges. LHC completed and revealed non-obstructive CAD. Transferred to [facility] for possible VT ablation, however was determined not to be ablation candidate 2/2 fluid overloaded. He was adequately diuresed and deemed fit for discharge with close follow up with EP outpatient. GDMT deescalated 2/2 symptomatic hypotension.     PHYSICAL:   Vitals:    10/07/22 1349   BP: 108/61   Pulse: 79      Wt Readings from Last 3 Encounters:   10/07/22 88.4 kg (194 lb 14.4 oz)   10/02/22 88 kg (194 lb 0.1 oz)   09/28/22 92.5 kg (203 lb 14.8 oz)       JVD: yes, measured at level of clavicle sitting   Heart rhythm: regular  Cardiac murmur: No    S3: no  S4: no  Lungs:  faint crackles in posterior lung bases  Hepatojugular reflux: yes  Edema: no      Echo 9/26/22:  The left ventricle is normal in size with eccentric hypertrophy and mildly decreased systolic function.  The estimated ejection fraction is 40-45%.  There is abnormal septal wall motion consistent with right ventricular pacemaker.  Grade II left ventricular diastolic dysfunction.  With low normal right ventricular systolic function.  Moderate right atrial enlargement.  Mild tricuspid regurgitation.  There is pulmonary hypertension.  The estimated PA systolic pressure is 67 mmHg.  Elevated central venous pressure (15 mmHg).    ASSESSMENT: Chronic combined systolic and diastolic HF    PLAN:      Patient Instructions:   Instruct the patient to notify this clinic if HH, a physician or an advanced care  provider wants to change medication one of their HF medications   Activity and Diet restrictions:   Recommend 2-3 gram sodium restriction and 1500cc- 2000cc fluid restriction.  Encourage physical activity with graded exercise program.  Requested patient to weigh themselves daily, and to notify us if their weight increases by more than 3 lbs in 1 day or 5 lbs in 3 days.    Assigned dry weight on home scale: unsure  Medication changes (include current dose and changed dose): NYHA Class II symptoms. Well compensated on exam. Patient endorses good UOP on lasix 80mg BID. Discussed switching lasix to torsemide for improved bioavailability, but patients wishes to continue lasix 80mg BID for now as he was taking lasix 80mg daily prior to hospitalization. Provided instructions to notify us with worsening SOB, swelling, or 3lb weight gain in 1 day/5lb weight gain in 3 days. CKD IV-V, closely followed by Nephrology with discussions of RRT. Will plan for volume management, otherwise GDMT limited by advanced kidney disease.    Upcoming labs and date anticipated: RTC in 1.5 weeks for repeat labs and close follow up    Annalisa Barker PA-C

## 2022-10-11 ENCOUNTER — OFFICE VISIT (OUTPATIENT)
Dept: FAMILY MEDICINE | Facility: CLINIC | Age: 62
End: 2022-10-11
Payer: MEDICARE

## 2022-10-11 VITALS
HEART RATE: 75 BPM | DIASTOLIC BLOOD PRESSURE: 80 MMHG | OXYGEN SATURATION: 97 % | HEIGHT: 69 IN | SYSTOLIC BLOOD PRESSURE: 116 MMHG | WEIGHT: 189.69 LBS | BODY MASS INDEX: 28.09 KG/M2

## 2022-10-11 DIAGNOSIS — Z23 NEED FOR INFLUENZA VACCINATION: ICD-10-CM

## 2022-10-11 DIAGNOSIS — E78.00 PURE HYPERCHOLESTEROLEMIA: ICD-10-CM

## 2022-10-11 DIAGNOSIS — Z09 HOSPITAL DISCHARGE FOLLOW-UP: Primary | ICD-10-CM

## 2022-10-11 DIAGNOSIS — I25.10 CORONARY ARTERY DISEASE INVOLVING NATIVE CORONARY ARTERY OF NATIVE HEART WITHOUT ANGINA PECTORIS: ICD-10-CM

## 2022-10-11 DIAGNOSIS — I10 ESSENTIAL (PRIMARY) HYPERTENSION: ICD-10-CM

## 2022-10-11 DIAGNOSIS — N18.4 CKD (CHRONIC KIDNEY DISEASE) STAGE 4, GFR 15-29 ML/MIN: ICD-10-CM

## 2022-10-11 DIAGNOSIS — I50.20 HFREF (HEART FAILURE WITH REDUCED EJECTION FRACTION): ICD-10-CM

## 2022-10-11 PROCEDURE — 3044F PR MOST RECENT HEMOGLOBIN A1C LEVEL <7.0%: ICD-10-PCS | Mod: CPTII,S$GLB,, | Performed by: STUDENT IN AN ORGANIZED HEALTH CARE EDUCATION/TRAINING PROGRAM

## 2022-10-11 PROCEDURE — 90686 IIV4 VACC NO PRSV 0.5 ML IM: CPT | Mod: S$GLB,,, | Performed by: STUDENT IN AN ORGANIZED HEALTH CARE EDUCATION/TRAINING PROGRAM

## 2022-10-11 PROCEDURE — 4010F PR ACE/ARB THEARPY RXD/TAKEN: ICD-10-PCS | Mod: CPTII,S$GLB,, | Performed by: STUDENT IN AN ORGANIZED HEALTH CARE EDUCATION/TRAINING PROGRAM

## 2022-10-11 PROCEDURE — 3008F PR BODY MASS INDEX (BMI) DOCUMENTED: ICD-10-PCS | Mod: CPTII,S$GLB,, | Performed by: STUDENT IN AN ORGANIZED HEALTH CARE EDUCATION/TRAINING PROGRAM

## 2022-10-11 PROCEDURE — 90686 FLU VACCINE (QUAD) GREATER THAN OR EQUAL TO 3YO PRESERVATIVE FREE IM: ICD-10-PCS | Mod: S$GLB,,, | Performed by: STUDENT IN AN ORGANIZED HEALTH CARE EDUCATION/TRAINING PROGRAM

## 2022-10-11 PROCEDURE — 3079F PR MOST RECENT DIASTOLIC BLOOD PRESSURE 80-89 MM HG: ICD-10-PCS | Mod: CPTII,S$GLB,, | Performed by: STUDENT IN AN ORGANIZED HEALTH CARE EDUCATION/TRAINING PROGRAM

## 2022-10-11 PROCEDURE — 1160F PR REVIEW ALL MEDS BY PRESCRIBER/CLIN PHARMACIST DOCUMENTED: ICD-10-PCS | Mod: CPTII,S$GLB,, | Performed by: STUDENT IN AN ORGANIZED HEALTH CARE EDUCATION/TRAINING PROGRAM

## 2022-10-11 PROCEDURE — 3044F HG A1C LEVEL LT 7.0%: CPT | Mod: CPTII,S$GLB,, | Performed by: STUDENT IN AN ORGANIZED HEALTH CARE EDUCATION/TRAINING PROGRAM

## 2022-10-11 PROCEDURE — 3079F DIAST BP 80-89 MM HG: CPT | Mod: CPTII,S$GLB,, | Performed by: STUDENT IN AN ORGANIZED HEALTH CARE EDUCATION/TRAINING PROGRAM

## 2022-10-11 PROCEDURE — 3066F NEPHROPATHY DOC TX: CPT | Mod: CPTII,S$GLB,, | Performed by: STUDENT IN AN ORGANIZED HEALTH CARE EDUCATION/TRAINING PROGRAM

## 2022-10-11 PROCEDURE — 99495 TCM SERVICES (MODERATE COMPLEXITY): ICD-10-PCS | Mod: S$GLB,,, | Performed by: STUDENT IN AN ORGANIZED HEALTH CARE EDUCATION/TRAINING PROGRAM

## 2022-10-11 PROCEDURE — 1159F PR MEDICATION LIST DOCUMENTED IN MEDICAL RECORD: ICD-10-PCS | Mod: CPTII,S$GLB,, | Performed by: STUDENT IN AN ORGANIZED HEALTH CARE EDUCATION/TRAINING PROGRAM

## 2022-10-11 PROCEDURE — 99495 TRANSJ CARE MGMT MOD F2F 14D: CPT | Mod: S$GLB,,, | Performed by: STUDENT IN AN ORGANIZED HEALTH CARE EDUCATION/TRAINING PROGRAM

## 2022-10-11 PROCEDURE — G0008 FLU VACCINE (QUAD) GREATER THAN OR EQUAL TO 3YO PRESERVATIVE FREE IM: ICD-10-PCS | Mod: S$GLB,,, | Performed by: STUDENT IN AN ORGANIZED HEALTH CARE EDUCATION/TRAINING PROGRAM

## 2022-10-11 PROCEDURE — 3008F BODY MASS INDEX DOCD: CPT | Mod: CPTII,S$GLB,, | Performed by: STUDENT IN AN ORGANIZED HEALTH CARE EDUCATION/TRAINING PROGRAM

## 2022-10-11 PROCEDURE — 1160F RVW MEDS BY RX/DR IN RCRD: CPT | Mod: CPTII,S$GLB,, | Performed by: STUDENT IN AN ORGANIZED HEALTH CARE EDUCATION/TRAINING PROGRAM

## 2022-10-11 PROCEDURE — 3066F PR DOCUMENTATION OF TREATMENT FOR NEPHROPATHY: ICD-10-PCS | Mod: CPTII,S$GLB,, | Performed by: STUDENT IN AN ORGANIZED HEALTH CARE EDUCATION/TRAINING PROGRAM

## 2022-10-11 PROCEDURE — G0008 ADMIN INFLUENZA VIRUS VAC: HCPCS | Mod: S$GLB,,, | Performed by: STUDENT IN AN ORGANIZED HEALTH CARE EDUCATION/TRAINING PROGRAM

## 2022-10-11 PROCEDURE — 3074F PR MOST RECENT SYSTOLIC BLOOD PRESSURE < 130 MM HG: ICD-10-PCS | Mod: CPTII,S$GLB,, | Performed by: STUDENT IN AN ORGANIZED HEALTH CARE EDUCATION/TRAINING PROGRAM

## 2022-10-11 PROCEDURE — 1159F MED LIST DOCD IN RCRD: CPT | Mod: CPTII,S$GLB,, | Performed by: STUDENT IN AN ORGANIZED HEALTH CARE EDUCATION/TRAINING PROGRAM

## 2022-10-11 PROCEDURE — 4010F ACE/ARB THERAPY RXD/TAKEN: CPT | Mod: CPTII,S$GLB,, | Performed by: STUDENT IN AN ORGANIZED HEALTH CARE EDUCATION/TRAINING PROGRAM

## 2022-10-11 PROCEDURE — 3074F SYST BP LT 130 MM HG: CPT | Mod: CPTII,S$GLB,, | Performed by: STUDENT IN AN ORGANIZED HEALTH CARE EDUCATION/TRAINING PROGRAM

## 2022-10-11 NOTE — PROGRESS NOTES
Transitional Care Note  Subjective:       Patient ID: Ar Sharma is a 61 y.o. male.  Chief Complaint: Hospital Follow Up    Family and/or Caretaker present at visit?  No.  Diagnostic tests reviewed/disposition: No diagnosic tests pending after this hospitalization.  Disease/illness education: yes  Home health/community services discussion/referrals: Patient does not have home health established from hospital visit.  They do not need home health.  If needed, we will set up home health for the patient.   Establishment or re-establishment of referral orders for community resources: No other necessary community resources.   Discussion with other health care providers: No discussion with other health care providers necessary.   HPI    Patient here for hospital discharge follow up. He has no complaints today. Prior to admission, he had received several shocks from his ICD. Patient was found to be fluid overloaded and was diuresed while inpatient. He has been feeling well since discharge.     Review of Systems   Constitutional:  Negative for fever.   HENT:  Negative for sneezing and sore throat.    Eyes:  Negative for photophobia.   Respiratory:  Negative for cough, shortness of breath and wheezing.    Cardiovascular:  Negative for chest pain.   Gastrointestinal:  Negative for diarrhea, nausea and vomiting.   Genitourinary:  Negative for frequency.   Musculoskeletal:  Negative for joint swelling.   Skin:  Negative for rash.   Neurological:  Negative for dizziness and headaches.   Psychiatric/Behavioral:  Negative for hallucinations.      Objective:      Physical Exam  Vitals and nursing note reviewed.   Constitutional:       General: He is not in acute distress.     Appearance: He is not ill-appearing.   HENT:      Head: Normocephalic and atraumatic.      Right Ear: External ear normal.      Left Ear: External ear normal.      Nose: Nose normal.      Mouth/Throat:      Mouth: Mucous membranes are moist.   Eyes:       Extraocular Movements: Extraocular movements intact.      Conjunctiva/sclera: Conjunctivae normal.   Cardiovascular:      Rate and Rhythm: Normal rate and regular rhythm.      Pulses: Normal pulses.      Heart sounds: No murmur heard.  Pulmonary:      Effort: Pulmonary effort is normal. No respiratory distress.      Breath sounds: No wheezing.   Abdominal:      General: There is no distension.      Palpations: Abdomen is soft. There is no mass.      Tenderness: There is no abdominal tenderness.   Musculoskeletal:         General: No swelling.      Cervical back: Normal range of motion.   Skin:     Coloration: Skin is not jaundiced.      Findings: No rash.   Neurological:      General: No focal deficit present.      Mental Status: He is alert and oriented to person, place, and time.   Psychiatric:         Mood and Affect: Mood normal.         Thought Content: Thought content normal.       Assessment:       1. Hospital discharge follow-up    2. Need for influenza vaccination    3. Pure hypercholesterolemia    4. Coronary artery disease involving native coronary artery of native heart without angina pectoris    5. HFrEF (heart failure with reduced ejection fraction)    6. Essential (primary) hypertension    7. CKD (chronic kidney disease) stage 4, GFR 15-29 ml/min          Plan:       Follow up with cardiology. Keep appt with me in December as scheduled.

## 2022-10-14 ENCOUNTER — TELEPHONE (OUTPATIENT)
Dept: CARDIOLOGY | Facility: CLINIC | Age: 62
End: 2022-10-14
Payer: MEDICARE

## 2022-10-14 NOTE — TELEPHONE ENCOUNTER
"[facility]) weekly phone follow up / triage call completed.     TCC RN Navigator spoke with pt.    Current Patient reported weight: 187 lbs     Recent Patient reported blood pressure and heart rate: "98/68"    Pt reports the following:  []  Shortness of Breath with Activity  []  Shortness of Breath at rest   []  Fatigue  []  Edema   [] Chest pain or tightness  [] Weight Increase since discharge  [x] None of the above    Pt reports using "Daily weight and symptom tracker".    Pt reports being in the GREEN Zone. If in yellow/red, reminded that they should be calling [facility] today or now.     Medications:   Medication compliance reviewed with pt.  Pt reports having medication list available and has all medications at home for use per list.     Education:   Confirmed pt still has "[facility] Home Care Guide"  .     Reminded of key points as listed below.     Recommend 2 -3 gram sodium restriction and 1500 cc-2000 cc fluid restriction.  Encourage physical activity with graded exercise program.  Requested patient to weigh themselves daily, and to notify us if their weight increases by more than 3 lbs in 1 day or 5 lbs in 3 days.   Reminded to use "Daily weight and symptom tracker".  Even if pt does not have a scale, to use symptom tracker.       Watch for these Signs and Symptoms: If any of these occur, contact [facility] immediately:   Increase in shortness of breath with movement   Increase in swelling in your legs and ankles   Weight gain of more than 3 pounds in a day or 5 pounds in 3 days.   Difficulty breathing when you are lying down   Worsening fatigue or tiredness   Stomach bloating, a full feeling or a loss of appetite   Increased coughing--especially when you are lying down      Pt was able to verbalize back to RN in their own words correct diet/fluid restrictions, necessity for exercise, warning signs and symptoms, when and how to contact their [facility] " team.      Pt reminded of upcoming appointment.  PT reports they will attend. 10/20/22      Pt reminded of how and when to contact [facility]:  [phone] (Mon-Fri, 8a-5p) & for urgent issues on the weekend to page the Heart Transplant MD on call.  Pt also encouraged utilize [facility] messaging as well.      Pt  verbalized understanding and in agreement of plan.       Will follow up with pt at next clinic visit and RN navigator available for pt questions, issues or concerns.

## 2022-10-17 ENCOUNTER — OFFICE VISIT (OUTPATIENT)
Dept: CARDIOLOGY | Facility: CLINIC | Age: 62
End: 2022-10-17
Payer: MEDICARE

## 2022-10-17 ENCOUNTER — EDUCATION (OUTPATIENT)
Dept: TRANSPLANT | Facility: CLINIC | Age: 62
End: 2022-10-17
Payer: MEDICARE

## 2022-10-17 ENCOUNTER — LAB VISIT (OUTPATIENT)
Dept: LAB | Facility: HOSPITAL | Age: 62
End: 2022-10-17
Attending: INTERNAL MEDICINE
Payer: MEDICARE

## 2022-10-17 VITALS
HEIGHT: 69 IN | SYSTOLIC BLOOD PRESSURE: 112 MMHG | WEIGHT: 190.69 LBS | BODY MASS INDEX: 28.24 KG/M2 | HEART RATE: 79 BPM | DIASTOLIC BLOOD PRESSURE: 71 MMHG

## 2022-10-17 DIAGNOSIS — R73.03 PREDIABETES: ICD-10-CM

## 2022-10-17 DIAGNOSIS — I50.42 CHRONIC COMBINED SYSTOLIC AND DIASTOLIC HEART FAILURE: Primary | ICD-10-CM

## 2022-10-17 DIAGNOSIS — I50.41 ACUTE COMBINED SYSTOLIC AND DIASTOLIC CONGESTIVE HEART FAILURE: ICD-10-CM

## 2022-10-17 DIAGNOSIS — Z98.890 S/P ABLATION OF VENTRICULAR ARRHYTHMIA: ICD-10-CM

## 2022-10-17 DIAGNOSIS — Z86.79 S/P ABLATION OF VENTRICULAR ARRHYTHMIA: ICD-10-CM

## 2022-10-17 DIAGNOSIS — E78.5 HYPERLIPIDEMIA, UNSPECIFIED HYPERLIPIDEMIA TYPE: ICD-10-CM

## 2022-10-17 DIAGNOSIS — N18.4 CKD (CHRONIC KIDNEY DISEASE) STAGE 4, GFR 15-29 ML/MIN: ICD-10-CM

## 2022-10-17 DIAGNOSIS — I42.8 NON-ISCHEMIC CARDIOMYOPATHY: ICD-10-CM

## 2022-10-17 DIAGNOSIS — Z95.810 CARDIAC RESYNCHRONIZATION THERAPY DEFIBRILLATOR (CRT-D) IN PLACE: ICD-10-CM

## 2022-10-17 LAB
ALBUMIN SERPL BCP-MCNC: 4.5 G/DL (ref 3.5–5.2)
ALP SERPL-CCNC: 105 U/L (ref 38–126)
ALT SERPL W/O P-5'-P-CCNC: 90 U/L (ref 10–44)
ANION GAP SERPL CALC-SCNC: 11 MMOL/L (ref 8–16)
AST SERPL-CCNC: 59 U/L (ref 15–46)
BILIRUB SERPL-MCNC: 1.8 MG/DL (ref 0.1–1)
CALCIUM SERPL-MCNC: 9.6 MG/DL (ref 8.7–10.5)
CHLORIDE SERPL-SCNC: 100 MMOL/L (ref 95–110)
CO2 SERPL-SCNC: 31 MMOL/L (ref 23–29)
CREAT SERPL-MCNC: 2.96 MG/DL (ref 0.5–1.4)
EST. GFR  (NO RACE VARIABLE): 23.1 ML/MIN/1.73 M^2
GLUCOSE SERPL-MCNC: 95 MG/DL (ref 70–110)
MAGNESIUM SERPL-MCNC: 2.2 MG/DL (ref 1.6–2.6)
NT-PROBNP SERPL-MCNC: 4590 PG/ML (ref 5–900)
PHOSPHATE SERPL-MCNC: 4.2 MG/DL (ref 2.7–4.5)
POTASSIUM SERPL-SCNC: 3.7 MMOL/L (ref 3.5–5.1)
PROT SERPL-MCNC: 7.8 G/DL (ref 6–8.4)
SODIUM SERPL-SCNC: 142 MMOL/L (ref 136–145)
UUN UR-MCNC: 64 MG/DL (ref 2–20)

## 2022-10-17 PROCEDURE — 84100 ASSAY OF PHOSPHORUS: CPT | Mod: PO

## 2022-10-17 PROCEDURE — 99999 PR PBB SHADOW E&M-EST. PATIENT-LVL III: ICD-10-PCS | Mod: PBBFAC,,,

## 2022-10-17 PROCEDURE — 1111F PR DISCHARGE MEDS RECONCILED W/ CURRENT OUTPATIENT MED LIST: ICD-10-PCS | Mod: CPTII,S$GLB,,

## 2022-10-17 PROCEDURE — 3044F PR MOST RECENT HEMOGLOBIN A1C LEVEL <7.0%: ICD-10-PCS | Mod: CPTII,S$GLB,,

## 2022-10-17 PROCEDURE — 83735 ASSAY OF MAGNESIUM: CPT | Mod: PO

## 2022-10-17 PROCEDURE — 3074F PR MOST RECENT SYSTOLIC BLOOD PRESSURE < 130 MM HG: ICD-10-PCS | Mod: CPTII,S$GLB,,

## 2022-10-17 PROCEDURE — 4010F PR ACE/ARB THEARPY RXD/TAKEN: ICD-10-PCS | Mod: CPTII,S$GLB,,

## 2022-10-17 PROCEDURE — 3044F HG A1C LEVEL LT 7.0%: CPT | Mod: CPTII,S$GLB,,

## 2022-10-17 PROCEDURE — 3078F PR MOST RECENT DIASTOLIC BLOOD PRESSURE < 80 MM HG: ICD-10-PCS | Mod: CPTII,S$GLB,,

## 2022-10-17 PROCEDURE — 3074F SYST BP LT 130 MM HG: CPT | Mod: CPTII,S$GLB,,

## 2022-10-17 PROCEDURE — 1160F RVW MEDS BY RX/DR IN RCRD: CPT | Mod: CPTII,S$GLB,,

## 2022-10-17 PROCEDURE — 99999 PR PBB SHADOW E&M-EST. PATIENT-LVL III: CPT | Mod: PBBFAC,,,

## 2022-10-17 PROCEDURE — 1160F PR REVIEW ALL MEDS BY PRESCRIBER/CLIN PHARMACIST DOCUMENTED: ICD-10-PCS | Mod: CPTII,S$GLB,,

## 2022-10-17 PROCEDURE — 3078F DIAST BP <80 MM HG: CPT | Mod: CPTII,S$GLB,,

## 2022-10-17 PROCEDURE — 1159F PR MEDICATION LIST DOCUMENTED IN MEDICAL RECORD: ICD-10-PCS | Mod: CPTII,S$GLB,,

## 2022-10-17 PROCEDURE — 3066F NEPHROPATHY DOC TX: CPT | Mod: CPTII,S$GLB,,

## 2022-10-17 PROCEDURE — 80053 COMPREHEN METABOLIC PANEL: CPT | Mod: PO

## 2022-10-17 PROCEDURE — 83880 ASSAY OF NATRIURETIC PEPTIDE: CPT | Mod: PO

## 2022-10-17 PROCEDURE — 1159F MED LIST DOCD IN RCRD: CPT | Mod: CPTII,S$GLB,,

## 2022-10-17 PROCEDURE — 1111F DSCHRG MED/CURRENT MED MERGE: CPT | Mod: CPTII,S$GLB,,

## 2022-10-17 PROCEDURE — 3066F PR DOCUMENTATION OF TREATMENT FOR NEPHROPATHY: ICD-10-PCS | Mod: CPTII,S$GLB,,

## 2022-10-17 PROCEDURE — 99214 PR OFFICE/OUTPT VISIT, EST, LEVL IV, 30-39 MIN: ICD-10-PCS | Mod: S$GLB,,,

## 2022-10-17 PROCEDURE — 99214 OFFICE O/P EST MOD 30 MIN: CPT | Mod: S$GLB,,,

## 2022-10-17 PROCEDURE — 4010F ACE/ARB THERAPY RXD/TAKEN: CPT | Mod: CPTII,S$GLB,,

## 2022-10-17 NOTE — PROGRESS NOTES
[facility] Provider Note (Follow-up) Consult Note      HPI:     Patient reports improvement in SOB since hospital discharge. Ambulating to clinic today and pace normal without appreciable SOB. Reports SOB continues to improve. Now able to walk couple blocks before SOB              Patient sleeps on 1 number of pillows              Patient wakes up SOB, has to get out of bed, associated cough- denies              Palpitations - denies              Dizzy, light-headed, pre-syncope or syncope- denies              Since discharge frequency of performing weights, home weight and weight change- performing daily weights, weight downtrending since last visit 190-> 187lbs today               Other information felt pertinent to HPI: Mr. Ar Sharma is a 62 yo male with a PMHx of HFrEF (EF 30-35%, now recovered to 40-45%), s/p CRT-D (initial ICD 2012 with upgrade 2016 - Medtronic), VT s/p ablation x 2 (2/2018 and again 3/2018), HLD, hypothyroidism, CKD IV, YOEL who presents to second [facility] visit following recent admission for VT. He intially presented to [facility] following multiple ICD discharges. LHC completed and revealed non-obstructive CAD. Transferred to [facility] for possible VT ablation, however was determined not to be ablation candidate 2/2 fluid overloaded. He was adequately diuresed and deemed fit for discharge with close follow up with EP outpatient. GDMT deescalated 2/2 symptomatic hypotension.    10/17/22: Last visit we discussed switching lasix to torsemide for improved bioavailability, but patients opted to continue lasix 80mg BID for now as he was taking lasix 80mg daily prior to hospitalization. Today he has no acute complaints. Denies worsening SOB/LE edema. Home recorded BPs 90s/50-60s.     PHYSICAL:   Vitals:    10/17/22 1401   BP: 112/71   Pulse: 79      Wt Readings from Last 3 Encounters:   10/17/22 86.5 kg (190 lb 11.2 oz)   10/11/22 86 kg (189 lb 11.3 oz)   10/07/22 88.4 kg (194 lb 14.4 oz)       JVD: yes,  measured at level of clavicle sitting   Heart rhythm: regular  Cardiac murmur: No    S3: no  S4: no  Lungs: faint crackles in posterior lung bases  Hepatojugular reflux: no  Edema: no      Lab Results   Component Value Date     10/17/2022    K 3.7 10/17/2022    MG 2.2 10/17/2022     10/17/2022    CO2 31 (H) 10/17/2022    BUN 64 (H) 10/17/2022    CREATININE 2.96 (H) 10/17/2022    GLU 95 10/17/2022    CALCIUM 9.6 10/17/2022    AST 59 (H) 10/17/2022    ALT 90 (H) 10/17/2022    ALBUMIN 4.5 10/17/2022    PROT 7.8 10/17/2022    BILITOT 1.8 (H) 10/17/2022     Lab Results   Component Value Date    BNP 1,416 (H) 09/28/2022    BNP 2,304 (H) 09/25/2022     (H) 11/02/2018       ASSESSMENT: Chronic combined systolic and diastolic HF    PLAN:      Patient Instructions:   Instruct the patient to notify this clinic if HH, a physician or an advanced care provider wants to change medication one of their HF medications   Activity and Diet restrictions:   Recommend 2-3 gram sodium restriction and 1500cc- 2000cc fluid restriction.  Encourage physical activity with graded exercise program.  Requested patient to weigh themselves daily, and to notify us if their weight increases by more than 3 lbs in 1 day or 5 lbs in 3 days.    Assigned dry weight on home scale: 187lbs  Medication changes (include current dose and changed dose): NYHA Class II symptoms. Mild fluid volume on exam, but stable from previous visit. Down 4lbs on clinic scale. Patient endorses good UOP on lasix 80mg BID. Discussed switching lasix to torsemide for improved bioavailability, but patients wishes to continue lasix 80mg BID for now as symptoms well managed. Instructed to notify us with worsening SOB, swelling, or 3lb weight gain in 1 day/5lb weight gain in 3 days for further diuretic adjustment. CKD IV-V, closely followed by Nephrology with discussions of RRT. Will plan for volume management as GDMT limited by advanced kidney disease and  hypotension. Consider starting low dose losartan and toprol in future as tolerates for additional GDMT.    RD education provided during visit.    Upcoming labs and date anticipated: RTC in 1.5 weeks for repeat labs and close follow up    Annalisa Barker PA-C

## 2022-10-17 NOTE — PROGRESS NOTES
Met with pt to discuss 2g Na diet and 1500 ml fluid restriction      Pt reports fair lorena, some constipation with bm every other day, no prob chew/swallow    Pt eats a wide variety of foods during the week:    Breakfast: kashai go lean blueberry with 2% milk, waffle with egg and yogurt parfait, or french toast sticks.     Lunch: plain salad, tuna sandwich, or leftovers from dinner    Dinner: plant base meat substitute with a starch and vegetable. Pt cooks with ralph's no sodium and stated that he watches serum potassium due to potassium in the seasoning. Pt drinks 50 - 60 oz of fluid per day.             Pt was educated on how to read nutrition labels to understand food has natural sodium present. Recc'd of 600mg Na per meal to allow for snacks. Pt recc to drink no more than 1500ml / 50oz. Pt given nutrition handout and contact info if needs arise. Will follow up in [facility] as needed

## 2022-10-25 ENCOUNTER — OFFICE VISIT (OUTPATIENT)
Dept: CARDIOLOGY | Facility: CLINIC | Age: 62
End: 2022-10-25
Payer: MEDICARE

## 2022-10-25 ENCOUNTER — LAB VISIT (OUTPATIENT)
Dept: LAB | Facility: HOSPITAL | Age: 62
End: 2022-10-25
Payer: MEDICARE

## 2022-10-25 VITALS
HEIGHT: 69 IN | HEART RATE: 80 BPM | WEIGHT: 190.88 LBS | DIASTOLIC BLOOD PRESSURE: 69 MMHG | SYSTOLIC BLOOD PRESSURE: 107 MMHG | BODY MASS INDEX: 28.27 KG/M2

## 2022-10-25 DIAGNOSIS — Z98.890 S/P ABLATION OF VENTRICULAR ARRHYTHMIA: ICD-10-CM

## 2022-10-25 DIAGNOSIS — R73.03 PREDIABETES: ICD-10-CM

## 2022-10-25 DIAGNOSIS — I50.42 CHRONIC COMBINED SYSTOLIC AND DIASTOLIC HEART FAILURE: Primary | ICD-10-CM

## 2022-10-25 DIAGNOSIS — Z95.810 CARDIAC RESYNCHRONIZATION THERAPY DEFIBRILLATOR (CRT-D) IN PLACE: ICD-10-CM

## 2022-10-25 DIAGNOSIS — N18.4 CKD (CHRONIC KIDNEY DISEASE) STAGE 4, GFR 15-29 ML/MIN: ICD-10-CM

## 2022-10-25 DIAGNOSIS — I50.41 ACUTE COMBINED SYSTOLIC AND DIASTOLIC CONGESTIVE HEART FAILURE: ICD-10-CM

## 2022-10-25 DIAGNOSIS — Z86.79 S/P ABLATION OF VENTRICULAR ARRHYTHMIA: ICD-10-CM

## 2022-10-25 DIAGNOSIS — I42.8 NON-ISCHEMIC CARDIOMYOPATHY: ICD-10-CM

## 2022-10-25 DIAGNOSIS — E78.5 HYPERLIPIDEMIA, UNSPECIFIED HYPERLIPIDEMIA TYPE: ICD-10-CM

## 2022-10-25 LAB
ALBUMIN SERPL BCP-MCNC: 4.5 G/DL (ref 3.5–5.2)
ALP SERPL-CCNC: 102 U/L (ref 38–126)
ALT SERPL W/O P-5'-P-CCNC: 62 U/L (ref 10–44)
ANION GAP SERPL CALC-SCNC: 10 MMOL/L (ref 8–16)
AST SERPL-CCNC: 49 U/L (ref 15–46)
BILIRUB SERPL-MCNC: 1.5 MG/DL (ref 0.1–1)
CALCIUM SERPL-MCNC: 9.4 MG/DL (ref 8.7–10.5)
CHLORIDE SERPL-SCNC: 101 MMOL/L (ref 95–110)
CO2 SERPL-SCNC: 31 MMOL/L (ref 23–29)
CREAT SERPL-MCNC: 2.93 MG/DL (ref 0.5–1.4)
EST. GFR  (NO RACE VARIABLE): 23.4 ML/MIN/1.73 M^2
GLUCOSE SERPL-MCNC: 98 MG/DL (ref 70–110)
MAGNESIUM SERPL-MCNC: 2.2 MG/DL (ref 1.6–2.6)
NT-PROBNP SERPL-MCNC: 3270 PG/ML (ref 5–900)
PHOSPHATE SERPL-MCNC: 3.6 MG/DL (ref 2.7–4.5)
POTASSIUM SERPL-SCNC: 3.8 MMOL/L (ref 3.5–5.1)
PROT SERPL-MCNC: 7.4 G/DL (ref 6–8.4)
SODIUM SERPL-SCNC: 142 MMOL/L (ref 136–145)
UUN UR-MCNC: 60 MG/DL (ref 2–20)

## 2022-10-25 PROCEDURE — 83880 ASSAY OF NATRIURETIC PEPTIDE: CPT | Mod: PO

## 2022-10-25 PROCEDURE — 83735 ASSAY OF MAGNESIUM: CPT | Mod: PO

## 2022-10-25 PROCEDURE — 4010F ACE/ARB THERAPY RXD/TAKEN: CPT | Mod: CPTII,S$GLB,,

## 2022-10-25 PROCEDURE — 3044F PR MOST RECENT HEMOGLOBIN A1C LEVEL <7.0%: ICD-10-PCS | Mod: CPTII,S$GLB,,

## 2022-10-25 PROCEDURE — 3078F PR MOST RECENT DIASTOLIC BLOOD PRESSURE < 80 MM HG: ICD-10-PCS | Mod: CPTII,S$GLB,,

## 2022-10-25 PROCEDURE — 99999 PR PBB SHADOW E&M-EST. PATIENT-LVL III: ICD-10-PCS | Mod: PBBFAC,,,

## 2022-10-25 PROCEDURE — 1111F PR DISCHARGE MEDS RECONCILED W/ CURRENT OUTPATIENT MED LIST: ICD-10-PCS | Mod: CPTII,S$GLB,,

## 2022-10-25 PROCEDURE — 1160F PR REVIEW ALL MEDS BY PRESCRIBER/CLIN PHARMACIST DOCUMENTED: ICD-10-PCS | Mod: CPTII,S$GLB,,

## 2022-10-25 PROCEDURE — 4010F PR ACE/ARB THEARPY RXD/TAKEN: ICD-10-PCS | Mod: CPTII,S$GLB,,

## 2022-10-25 PROCEDURE — 99214 PR OFFICE/OUTPT VISIT, EST, LEVL IV, 30-39 MIN: ICD-10-PCS | Mod: S$GLB,,,

## 2022-10-25 PROCEDURE — 1159F PR MEDICATION LIST DOCUMENTED IN MEDICAL RECORD: ICD-10-PCS | Mod: CPTII,S$GLB,,

## 2022-10-25 PROCEDURE — 99999 PR PBB SHADOW E&M-EST. PATIENT-LVL III: CPT | Mod: PBBFAC,,,

## 2022-10-25 PROCEDURE — 3074F PR MOST RECENT SYSTOLIC BLOOD PRESSURE < 130 MM HG: ICD-10-PCS | Mod: CPTII,S$GLB,,

## 2022-10-25 PROCEDURE — 84100 ASSAY OF PHOSPHORUS: CPT | Mod: PO

## 2022-10-25 PROCEDURE — 3074F SYST BP LT 130 MM HG: CPT | Mod: CPTII,S$GLB,,

## 2022-10-25 PROCEDURE — 80053 COMPREHEN METABOLIC PANEL: CPT | Mod: PO

## 2022-10-25 PROCEDURE — 1111F DSCHRG MED/CURRENT MED MERGE: CPT | Mod: CPTII,S$GLB,,

## 2022-10-25 PROCEDURE — 3066F NEPHROPATHY DOC TX: CPT | Mod: CPTII,S$GLB,,

## 2022-10-25 PROCEDURE — 99214 OFFICE O/P EST MOD 30 MIN: CPT | Mod: S$GLB,,,

## 2022-10-25 PROCEDURE — 1160F RVW MEDS BY RX/DR IN RCRD: CPT | Mod: CPTII,S$GLB,,

## 2022-10-25 PROCEDURE — 3066F PR DOCUMENTATION OF TREATMENT FOR NEPHROPATHY: ICD-10-PCS | Mod: CPTII,S$GLB,,

## 2022-10-25 PROCEDURE — 1159F MED LIST DOCD IN RCRD: CPT | Mod: CPTII,S$GLB,,

## 2022-10-25 PROCEDURE — 3078F DIAST BP <80 MM HG: CPT | Mod: CPTII,S$GLB,,

## 2022-10-25 PROCEDURE — 3044F HG A1C LEVEL LT 7.0%: CPT | Mod: CPTII,S$GLB,,

## 2022-10-25 NOTE — PROGRESS NOTES
LMSW met with pt to discuss barriers towards treatment. Pt has access to transportation, medication, insurance benefits, food, and housing. Pt reports no need for any HME. Pt reports no needs at this time.

## 2022-10-25 NOTE — PROGRESS NOTES
HF [facility] Provider Note (Follow-up) Consult Note      HPI:     Patient denies worsening SOB. Now able to walk couple blocks before SOB              Patient sleeps on 1 number of pillows              Patient wakes up SOB, has to get out of bed, associated cough- denies              Palpitations - denies              Dizzy, light-headed, pre-syncope or syncope- reports episode of dizziness yesterday lasting 5mins in duration after taking morning meds on empty stomach. Denies pre-syncope/syncope              Since discharge frequency of performing weights, home weight and weight change- performing daily weights, weight stable 187-188lbs               Other information felt pertinent to HPI: Mr. Ar Sharma is a 62 yo male with a PMHx of HFrEF (EF 30-35%, now recovered to 40-45%), s/p CRT-D (initial ICD 2012 with upgrade 2016 - Medtr[employer]), VT s/p ablation x 2 (2/2018 and again 3/2018), HLD, hypothyroidism, CKD IV, YOEL who presents to third [facility] visit following recent admission for VT. He intially presented to [facility] following multiple ICD discharges. LHC completed and revealed non-obstructive CAD. Transferred to [facility] for possible VT ablation, however was determined not to be ablation candidate 2/2 fluid overloaded. He was adequately diuresed and deemed fit for discharge with close follow up with EP outpatient. GDMT deescalated 2/2 symptomatic hypotension.    10/17/22: Last visit we discussed switching lasix to torsemide for improved bioavailability, but patients opted to continue lasix 80mg BID for now as he was taking lasix 80mg daily prior to hospitalization. Today he has no acute complaints. Denies worsening SOB/LE edema. Home recorded BPs 90s/50-60s.    10/25/22: Today he denies worsening SOB/LE edema. Reports single episode of dizziness yesterday lasting couple mins in duration after taking morning medications on empty stomach. Home recorded BPs consistently 80-90s/50-60s mmHg.     PHYSICAL:   Vitals:    10/25/22  1356   BP: 107/69   Pulse: 80        Wt Readings from Last 3 Encounters:   10/25/22 86.6 kg (190 lb 14.4 oz)   10/17/22 86.5 kg (190 lb 11.2 oz)   10/11/22 86 kg (189 lb 11.3 oz)       JVD: yes, measured at level of clavicle sitting   Heart rhythm: regular  Cardiac murmur: No    S3: no  S4: no  Lungs: faint crackles in posterior lung bases  Hepatojugular reflux: no  Edema: no      Lab Results   Component Value Date     10/25/2022    K 3.8 10/25/2022    MG 2.2 10/25/2022     10/25/2022    CO2 31 (H) 10/25/2022    BUN 60 (H) 10/25/2022    CREATININE 2.93 (H) 10/25/2022    GLU 98 10/25/2022    CALCIUM 9.4 10/25/2022    AST 49 (H) 10/25/2022    ALT 62 (H) 10/25/2022    ALBUMIN 4.5 10/25/2022    PROT 7.4 10/25/2022    BILITOT 1.5 (H) 10/25/2022     Lab Results   Component Value Date    BNP 1,416 (H) 09/28/2022    BNP 2,304 (H) 09/25/2022     (H) 11/02/2018       ASSESSMENT: Chronic combined systolic and diastolic HF    PLAN:      Patient Instructions:   Instruct the patient to notify this clinic if HH, a physician or an advanced care provider wants to change medication one of their HF medications   Activity and Diet restrictions:   Recommend 2-3 gram sodium restriction and 1500cc- 2000cc fluid restriction.  Encourage physical activity with graded exercise program.  Requested patient to weigh themselves daily, and to notify us if their weight increases by more than 3 lbs in 1 day or 5 lbs in 3 days.    Assigned dry weight on home scale: 187lbs  Medication changes (include current dose and changed dose): NYHA Class II symptoms. Mild fluid volume on exam, but stable from previous visit. Weight stable on clinic scale. Continue lasix 80mg BID for now. Consider switching to torsemide vs bumex in future as needed for additional diuresis. Instructed to notify us with worsening SOB, swelling, or 3lb weight gain in 1 day/5lb weight gain in 3 days for further diuretic adjustment. CKD IV-V, closely followed by  Nephrology with discussions of RRT. Will plan for volume management as GDMT limited by advanced kidney disease and hypotension. Consider starting low dose losartan and toprol in future as tolerates for additional GDMT.    Upcoming labs and date anticipated: Plan for weekly phone check in with tentative phone d/c at that time    Will need close follow up with EP.    Annalisa Barker PA-C

## 2022-11-01 ENCOUNTER — OFFICE VISIT (OUTPATIENT)
Dept: NEPHROLOGY | Facility: CLINIC | Age: 62
End: 2022-11-01
Payer: MEDICARE

## 2022-11-01 VITALS
HEART RATE: 83 BPM | HEIGHT: 69 IN | OXYGEN SATURATION: 95 % | WEIGHT: 191.88 LBS | BODY MASS INDEX: 28.42 KG/M2 | SYSTOLIC BLOOD PRESSURE: 132 MMHG | DIASTOLIC BLOOD PRESSURE: 80 MMHG

## 2022-11-01 DIAGNOSIS — I42.8 NON-ISCHEMIC CARDIOMYOPATHY: ICD-10-CM

## 2022-11-01 DIAGNOSIS — Z95.810 CARDIAC RESYNCHRONIZATION THERAPY DEFIBRILLATOR (CRT-D) IN PLACE: ICD-10-CM

## 2022-11-01 DIAGNOSIS — D50.8 OTHER IRON DEFICIENCY ANEMIA: ICD-10-CM

## 2022-11-01 DIAGNOSIS — N18.4 CKD (CHRONIC KIDNEY DISEASE) STAGE 4, GFR 15-29 ML/MIN: Primary | ICD-10-CM

## 2022-11-01 DIAGNOSIS — R73.03 PREDIABETES: ICD-10-CM

## 2022-11-01 DIAGNOSIS — N25.81 SECONDARY HYPERPARATHYROIDISM OF RENAL ORIGIN: ICD-10-CM

## 2022-11-01 PROCEDURE — 3075F PR MOST RECENT SYSTOLIC BLOOD PRESS GE 130-139MM HG: ICD-10-PCS | Mod: CPTII,S$GLB,, | Performed by: INTERNAL MEDICINE

## 2022-11-01 PROCEDURE — 3008F BODY MASS INDEX DOCD: CPT | Mod: CPTII,S$GLB,, | Performed by: INTERNAL MEDICINE

## 2022-11-01 PROCEDURE — 1111F PR DISCHARGE MEDS RECONCILED W/ CURRENT OUTPATIENT MED LIST: ICD-10-PCS | Mod: CPTII,S$GLB,, | Performed by: INTERNAL MEDICINE

## 2022-11-01 PROCEDURE — 99999 PR PBB SHADOW E&M-EST. PATIENT-LVL III: CPT | Mod: PBBFAC,,, | Performed by: INTERNAL MEDICINE

## 2022-11-01 PROCEDURE — 3044F HG A1C LEVEL LT 7.0%: CPT | Mod: CPTII,S$GLB,, | Performed by: INTERNAL MEDICINE

## 2022-11-01 PROCEDURE — 3079F PR MOST RECENT DIASTOLIC BLOOD PRESSURE 80-89 MM HG: ICD-10-PCS | Mod: CPTII,S$GLB,, | Performed by: INTERNAL MEDICINE

## 2022-11-01 PROCEDURE — 1159F MED LIST DOCD IN RCRD: CPT | Mod: CPTII,S$GLB,, | Performed by: INTERNAL MEDICINE

## 2022-11-01 PROCEDURE — 1160F RVW MEDS BY RX/DR IN RCRD: CPT | Mod: CPTII,S$GLB,, | Performed by: INTERNAL MEDICINE

## 2022-11-01 PROCEDURE — 3044F PR MOST RECENT HEMOGLOBIN A1C LEVEL <7.0%: ICD-10-PCS | Mod: CPTII,S$GLB,, | Performed by: INTERNAL MEDICINE

## 2022-11-01 PROCEDURE — 99214 OFFICE O/P EST MOD 30 MIN: CPT | Mod: S$GLB,,, | Performed by: INTERNAL MEDICINE

## 2022-11-01 PROCEDURE — 1159F PR MEDICATION LIST DOCUMENTED IN MEDICAL RECORD: ICD-10-PCS | Mod: CPTII,S$GLB,, | Performed by: INTERNAL MEDICINE

## 2022-11-01 PROCEDURE — 3075F SYST BP GE 130 - 139MM HG: CPT | Mod: CPTII,S$GLB,, | Performed by: INTERNAL MEDICINE

## 2022-11-01 PROCEDURE — 99999 PR PBB SHADOW E&M-EST. PATIENT-LVL III: ICD-10-PCS | Mod: PBBFAC,,, | Performed by: INTERNAL MEDICINE

## 2022-11-01 PROCEDURE — 3066F NEPHROPATHY DOC TX: CPT | Mod: CPTII,S$GLB,, | Performed by: INTERNAL MEDICINE

## 2022-11-01 PROCEDURE — 3008F PR BODY MASS INDEX (BMI) DOCUMENTED: ICD-10-PCS | Mod: CPTII,S$GLB,, | Performed by: INTERNAL MEDICINE

## 2022-11-01 PROCEDURE — 3066F PR DOCUMENTATION OF TREATMENT FOR NEPHROPATHY: ICD-10-PCS | Mod: CPTII,S$GLB,, | Performed by: INTERNAL MEDICINE

## 2022-11-01 PROCEDURE — 4010F PR ACE/ARB THEARPY RXD/TAKEN: ICD-10-PCS | Mod: CPTII,S$GLB,, | Performed by: INTERNAL MEDICINE

## 2022-11-01 PROCEDURE — 99214 PR OFFICE/OUTPT VISIT, EST, LEVL IV, 30-39 MIN: ICD-10-PCS | Mod: S$GLB,,, | Performed by: INTERNAL MEDICINE

## 2022-11-01 PROCEDURE — 3079F DIAST BP 80-89 MM HG: CPT | Mod: CPTII,S$GLB,, | Performed by: INTERNAL MEDICINE

## 2022-11-01 PROCEDURE — 1111F DSCHRG MED/CURRENT MED MERGE: CPT | Mod: CPTII,S$GLB,, | Performed by: INTERNAL MEDICINE

## 2022-11-01 PROCEDURE — 4010F ACE/ARB THERAPY RXD/TAKEN: CPT | Mod: CPTII,S$GLB,, | Performed by: INTERNAL MEDICINE

## 2022-11-01 PROCEDURE — 1160F PR REVIEW ALL MEDS BY PRESCRIBER/CLIN PHARMACIST DOCUMENTED: ICD-10-PCS | Mod: CPTII,S$GLB,, | Performed by: INTERNAL MEDICINE

## 2022-11-02 ENCOUNTER — PATIENT MESSAGE (OUTPATIENT)
Dept: NEPHROLOGY | Facility: CLINIC | Age: 62
End: 2022-11-02
Payer: MEDICARE

## 2022-11-02 PROBLEM — N25.81 SECONDARY HYPERPARATHYROIDISM OF RENAL ORIGIN: Status: ACTIVE | Noted: 2022-11-02

## 2022-11-02 PROBLEM — I10 ESSENTIAL (PRIMARY) HYPERTENSION: Status: RESOLVED | Noted: 2022-05-18 | Resolved: 2022-11-02

## 2022-11-02 NOTE — PROGRESS NOTES
Subjective:       Patient ID: Ar Sharma is a 61 y.o. Black or  male who presents for new evaluation of Consult    HPI    He is referred by his PCP for CKD, seen by a Nephrologist previously but wishes to change.    He has a significant history of DCM with most recentr EF 40-45%  and is s/p ICD placement, recent hospital stay for ICD firing multiple times. He was noted ()self as well) to be significantly hypotensive. He had an JEFF episode during hospital stay, no RRT indicated. He has hesitance regarding AV access, additionally he is interested in PD if dialysis were to be indicated.     Today he is feeling well. He is on a plant based diet, 90-95% as will have fish and shrimp a few times a week    Review of Systems   Constitutional:  Positive for fatigue. Negative for activity change and appetite change.   HENT:  Negative for facial swelling.    Respiratory:  Positive for shortness of breath.    Cardiovascular:  Negative for leg swelling.   Gastrointestinal:  Negative for nausea and vomiting.   Genitourinary:  Negative for difficulty urinating.   Musculoskeletal:  Positive for gait problem.   Allergic/Immunologic: Positive for immunocompromised state.   Neurological:  Positive for weakness (when BP is low).   Psychiatric/Behavioral:  Negative for confusion and decreased concentration.      Objective:      Physical Exam  Vitals and nursing note reviewed.   Constitutional:       General: He is not in acute distress.     Appearance: Normal appearance. He is not ill-appearing.   HENT:      Head: Atraumatic.   Cardiovascular:      Rate and Rhythm: Normal rate.   Pulmonary:      Effort: Pulmonary effort is normal. No respiratory distress.   Abdominal:      General: There is no distension.   Musculoskeletal:      Right lower leg: No edema.      Left lower leg: No edema.   Skin:     General: Skin is warm and dry.   Neurological:      Mental Status: He is alert and oriented to person, place, and time.    Psychiatric:         Mood and Affect: Mood normal.       Assessment:       1. CKD (chronic kidney disease) stage 4, GFR 15-29 ml/min    2. Prediabetes    3. Non-ischemic cardiomyopathy    4. Other iron deficiency anemia    5. Cardiac resynchronization therapy defibrillator (CRT-D) in place    6. Secondary hyperparathyroidism of renal origin          Plan:         CKD appearing to be at stage 4, appears to be Cardiorenal in etiology  - discussed improving heart function will improve kidney function  - temporarily off Entresto (and Coreg) due to hypotension  - seeing Cards for follow up and will rec resuming Entresto at reduced dose  - avoid NSAIDs, low sodium diet     Mineral and Bone Disease/SHPT  - continue to trend PTH  - continue D2    NIDCM  - s/p recent LHC without need for PCI  - resume Entresto, dosing per Cards      Labs in 2 months

## 2022-11-03 ENCOUNTER — TELEPHONE (OUTPATIENT)
Dept: CARDIOLOGY | Facility: CLINIC | Age: 62
End: 2022-11-03
Payer: MEDICARE

## 2022-11-03 ENCOUNTER — OFFICE VISIT (OUTPATIENT)
Dept: CARDIOLOGY | Facility: CLINIC | Age: 62
End: 2022-11-03
Payer: MEDICARE

## 2022-11-03 VITALS
SYSTOLIC BLOOD PRESSURE: 98 MMHG | DIASTOLIC BLOOD PRESSURE: 74 MMHG | WEIGHT: 191.19 LBS | HEIGHT: 69 IN | BODY MASS INDEX: 28.32 KG/M2 | OXYGEN SATURATION: 94 % | HEART RATE: 83 BPM

## 2022-11-03 DIAGNOSIS — R73.03 PREDIABETES: ICD-10-CM

## 2022-11-03 DIAGNOSIS — Z98.890 S/P ABLATION OF VENTRICULAR ARRHYTHMIA: ICD-10-CM

## 2022-11-03 DIAGNOSIS — I47.20 VT (VENTRICULAR TACHYCARDIA): ICD-10-CM

## 2022-11-03 DIAGNOSIS — I25.10 CORONARY ARTERY DISEASE INVOLVING NATIVE CORONARY ARTERY OF NATIVE HEART WITHOUT ANGINA PECTORIS: ICD-10-CM

## 2022-11-03 DIAGNOSIS — I50.20 HFREF (HEART FAILURE WITH REDUCED EJECTION FRACTION): Primary | ICD-10-CM

## 2022-11-03 DIAGNOSIS — R09.89 CARDIAC LV EJECTION FRACTION 10-20%: ICD-10-CM

## 2022-11-03 DIAGNOSIS — I50.41 ACUTE COMBINED SYSTOLIC AND DIASTOLIC CONGESTIVE HEART FAILURE: ICD-10-CM

## 2022-11-03 DIAGNOSIS — E78.00 PURE HYPERCHOLESTEROLEMIA: ICD-10-CM

## 2022-11-03 DIAGNOSIS — E78.5 HYPERLIPIDEMIA, UNSPECIFIED HYPERLIPIDEMIA TYPE: ICD-10-CM

## 2022-11-03 DIAGNOSIS — Z95.810 CARDIAC RESYNCHRONIZATION THERAPY DEFIBRILLATOR (CRT-D) IN PLACE: ICD-10-CM

## 2022-11-03 DIAGNOSIS — Z86.79 S/P ABLATION OF VENTRICULAR ARRHYTHMIA: ICD-10-CM

## 2022-11-03 DIAGNOSIS — I42.8 NON-ISCHEMIC CARDIOMYOPATHY: ICD-10-CM

## 2022-11-03 PROCEDURE — 4010F ACE/ARB THERAPY RXD/TAKEN: CPT | Mod: CPTII,S$GLB,, | Performed by: INTERNAL MEDICINE

## 2022-11-03 PROCEDURE — 4010F PR ACE/ARB THEARPY RXD/TAKEN: ICD-10-PCS | Mod: CPTII,S$GLB,, | Performed by: INTERNAL MEDICINE

## 2022-11-03 PROCEDURE — 99214 PR OFFICE/OUTPT VISIT, EST, LEVL IV, 30-39 MIN: ICD-10-PCS | Mod: S$GLB,,, | Performed by: INTERNAL MEDICINE

## 2022-11-03 PROCEDURE — 99999 PR PBB SHADOW E&M-EST. PATIENT-LVL III: CPT | Mod: PBBFAC,,, | Performed by: INTERNAL MEDICINE

## 2022-11-03 PROCEDURE — 1160F PR REVIEW ALL MEDS BY PRESCRIBER/CLIN PHARMACIST DOCUMENTED: ICD-10-PCS | Mod: CPTII,S$GLB,, | Performed by: INTERNAL MEDICINE

## 2022-11-03 PROCEDURE — 3044F PR MOST RECENT HEMOGLOBIN A1C LEVEL <7.0%: ICD-10-PCS | Mod: CPTII,S$GLB,, | Performed by: INTERNAL MEDICINE

## 2022-11-03 PROCEDURE — 3074F PR MOST RECENT SYSTOLIC BLOOD PRESSURE < 130 MM HG: ICD-10-PCS | Mod: CPTII,S$GLB,, | Performed by: INTERNAL MEDICINE

## 2022-11-03 PROCEDURE — 99999 PR PBB SHADOW E&M-EST. PATIENT-LVL III: ICD-10-PCS | Mod: PBBFAC,,, | Performed by: INTERNAL MEDICINE

## 2022-11-03 PROCEDURE — 3008F PR BODY MASS INDEX (BMI) DOCUMENTED: ICD-10-PCS | Mod: CPTII,S$GLB,, | Performed by: INTERNAL MEDICINE

## 2022-11-03 PROCEDURE — 3074F SYST BP LT 130 MM HG: CPT | Mod: CPTII,S$GLB,, | Performed by: INTERNAL MEDICINE

## 2022-11-03 PROCEDURE — 3078F PR MOST RECENT DIASTOLIC BLOOD PRESSURE < 80 MM HG: ICD-10-PCS | Mod: CPTII,S$GLB,, | Performed by: INTERNAL MEDICINE

## 2022-11-03 PROCEDURE — 3078F DIAST BP <80 MM HG: CPT | Mod: CPTII,S$GLB,, | Performed by: INTERNAL MEDICINE

## 2022-11-03 PROCEDURE — 1159F PR MEDICATION LIST DOCUMENTED IN MEDICAL RECORD: ICD-10-PCS | Mod: CPTII,S$GLB,, | Performed by: INTERNAL MEDICINE

## 2022-11-03 PROCEDURE — 3044F HG A1C LEVEL LT 7.0%: CPT | Mod: CPTII,S$GLB,, | Performed by: INTERNAL MEDICINE

## 2022-11-03 PROCEDURE — 1160F RVW MEDS BY RX/DR IN RCRD: CPT | Mod: CPTII,S$GLB,, | Performed by: INTERNAL MEDICINE

## 2022-11-03 PROCEDURE — 99214 OFFICE O/P EST MOD 30 MIN: CPT | Mod: S$GLB,,, | Performed by: INTERNAL MEDICINE

## 2022-11-03 PROCEDURE — 1159F MED LIST DOCD IN RCRD: CPT | Mod: CPTII,S$GLB,, | Performed by: INTERNAL MEDICINE

## 2022-11-03 PROCEDURE — 3008F BODY MASS INDEX DOCD: CPT | Mod: CPTII,S$GLB,, | Performed by: INTERNAL MEDICINE

## 2022-11-03 PROCEDURE — 3066F NEPHROPATHY DOC TX: CPT | Mod: CPTII,S$GLB,, | Performed by: INTERNAL MEDICINE

## 2022-11-03 PROCEDURE — 3066F PR DOCUMENTATION OF TREATMENT FOR NEPHROPATHY: ICD-10-PCS | Mod: CPTII,S$GLB,, | Performed by: INTERNAL MEDICINE

## 2022-11-03 RX ORDER — SACUBITRIL AND VALSARTAN 24; 26 MG/1; MG/1
1 TABLET, FILM COATED ORAL 2 TIMES DAILY
Qty: 180 TABLET | Refills: 3 | Status: SHIPPED | OUTPATIENT
Start: 2022-11-03 | End: 2023-02-02 | Stop reason: SDUPTHER

## 2022-11-03 NOTE — TELEPHONE ENCOUNTER
"[facility]    Attempted to call pt to complete 1 week "check in"  and discharge phone call. Unable to reach pt at listed phone numbers.  Was able to leave message on voicemail encouraging pt to return call with [facility] phone number..     Will continue to try to reach patient.    "

## 2022-11-03 NOTE — PROGRESS NOTES
Subjective:    Patient ID:  Ar Sharma is a 61 y.o. male who presents for follow-up of Congestive Heart Failure      HPI    62 y/o male with a hx of CAD (states he had MI in his 20's), HFrEF with EF 30-35% initially with last EF 50% (11/04) with SLAVA 5.35 cm, s/p CRT-D (initial ICD 2012 with upgrade 2016 - Medtronic), DD, VT s/p ablation x 2 (2/2018 and again 3/2018), HLD, hypothyroidism, YOEL. Initially seen by Dr Newman and it sounds like he was in ADHF, lasix dose increased, and symptoms had significantly improved. Was very SOB with minimal activity, with bilateral LE edema/orthopnea/PND. Increase in lasix dose resulted in SOB back at baseline and orthopnea/PND resolved, with improvement in edema. Entresto started after last VT ablation. Entresto dose was increased in Dec 2018. Did not tolerate it well, however, current dose tolerated (49-51). Had ICD shocks in 12/2018 (per interrogation mentions 5 times), he remembers one.   Previous clinic visit had been having worsening of chronic angina and symptoms. Chest pain significantly improved and no recent episodes. NYHA FC II symptoms. Has episodic dizziness and fatigue. Takes extra doses of ASA for CP. Reserves NTG for very severe pain, which he has not had.  Had previous episode of heart racing that he gets before he gets shocked, was able to lay down, doubled dose of carvedilol, no shock and heart racing improved. Had intermittent heart racing for about 1 week after that resolved. ICD interrogation from 3/4/2021 in media section with 1 terminated VT episode with ATP. BB increased at previous clinic visit.  Had nuclear SPECT with:  1. Abnormal study.  There is marked thinning of the lateral left ventricular wall, absence of uptake within the inferior wall and cardiac apex, global hypokinesis with abnormally increased systolic and end-diastolic volume, as well as decreased ejection fraction at 35%..  2. No definite areas of reversibility to suggest ischemia is  seen.  Symptoms resolved and BP stable. Had nuclear SPECT in the past with [facility] with nonobstructive CAD.   Syncope prior to last visit with prodrome and out for like 5 mins - carrying a fridge up the stairs. Was admitted for ICD discharge. Meds adjusted, discharged, and has followed with HTS.   Seen by Nephrology and refusing PD/HD for now.     Review of Systems   Constitutional: Positive for malaise/fatigue.   HENT:  Negative for congestion.    Eyes:  Negative for blurred vision.   Cardiovascular:  Negative for chest pain, claudication, cyanosis, dyspnea on exertion, irregular heartbeat, leg swelling, near-syncope, orthopnea, palpitations, paroxysmal nocturnal dyspnea and syncope.   Respiratory:  Negative for shortness of breath.    Endocrine: Negative for polyuria.   Hematologic/Lymphatic: Negative for bleeding problem.   Skin:  Negative for itching and rash.   Musculoskeletal:  Negative for joint swelling, muscle cramps and muscle weakness.   Gastrointestinal:  Negative for abdominal pain, hematemesis, hematochezia, melena, nausea and vomiting.   Genitourinary:  Negative for dysuria and hematuria.   Neurological:  Negative for dizziness, focal weakness, headaches, light-headedness, loss of balance and weakness.   Psychiatric/Behavioral:  Negative for depression. The patient is not nervous/anxious.       Objective:    Physical Exam  Constitutional:       Appearance: He is well-developed.   HENT:      Head: Normocephalic and atraumatic.   Neck:      Vascular: No JVD.   Cardiovascular:      Rate and Rhythm: Normal rate and regular rhythm.      Pulses:           Carotid pulses are 2+ on the right side and 2+ on the left side.       Radial pulses are 2+ on the right side and 2+ on the left side.        Femoral pulses are 2+ on the right side and 2+ on the left side.       Dorsalis pedis pulses are 2+ on the right side and 2+ on the left side.        Posterior tibial pulses are 2+ on the right side and 2+ on the left  side.      Heart sounds: Normal heart sounds.   Pulmonary:      Effort: Pulmonary effort is normal.      Breath sounds: Normal breath sounds.   Abdominal:      General: Bowel sounds are normal.      Palpations: Abdomen is soft.   Musculoskeletal:      Cervical back: Neck supple.   Skin:     General: Skin is warm and dry.   Neurological:      Mental Status: He is alert and oriented to person, place, and time.   Psychiatric:         Behavior: Behavior normal.         Thought Content: Thought content normal.         Assessment:       1. HFrEF (heart failure with reduced ejection fraction)    2. VT (ventricular tachycardia)    3. Acute combined systolic and diastolic congestive heart failure    4. Cardiac resynchronization therapy defibrillator (CRT-D) in place    5. Coronary artery disease involving native coronary artery of native heart without angina pectoris    6. Cardiac LV ejection fraction 10-20%    7. Hyperlipidemia, unspecified hyperlipidemia type    8. Non-ischemic cardiomyopathy    9. Pure hypercholesterolemia    10. S/P ablation of ventricular arrhythmia    11. Prediabetes      60 y/o pt with hx and presentation as above. Doing well from a cardiac perspective and compensated from a HF perspective. Currently asymptomatic and on good medical management. Will restart Entresto and low dose due to BP. Needs to stay active. Discussed the etiology, evaluation, and management of CAD, VT, CHF, HLD, ICD, CRT, CKD, YOEL. Discussed the importance of med compliance, heart healthy diet, and regular exercise.      Plan:       HFrEF  -2DE with EF 40-45%  - Start Entresto 24/26 mg BID  -Hold BB for now 2/2 hypotension  -CRT-D interrogation reviewed    VT  -ICD in place  -Cont Mexiletine/Amio for now    CAD  -stable, no acute issues  -cont ASA/statin    HLD  -last lipid panel reviewed and at goal  -cont statin    Total duration of face to face visit time 30 minutes.  Total time spent counseling greater than fifty percent of  total visit time.  Counseling included discussion regarding imaging findings, diagnosis, possibilities, treatment options, risks and benefits.  -In today's visit, at least 4 established conditions that pose a risk to life or bodily function have been addressed and the conditions are severe.      -f/u in 3 months

## 2022-11-03 NOTE — TELEPHONE ENCOUNTER
"[facility]) DISCHARGE VISIT - PHONE     Called and spoke to pt.     Most Recent Hospital Discharge Date: 10/03/22     Last admission Diagnosis/chief complaint:SOB      Pt reports the following:  []  Shortness of Breath with activity  []  Shortness of Breath at rest   []  Fatigue  []  Edema   [] Chest pain or tightness  [] Weight Increase since discharge. Pt weight 188 lbs  [x] None of the above    Medications:   Medication reconciliation completed today per RN.  Pt reports having all medications available and understands how to take them appropriately. Reminded pt to call prior to making any changes to medications. Denies needs for medication.     Education:   [x] Confirmed pt still has  "Heart Failure [facility] Home Care Guide" .   Reviewed key points as listed below.     Recommend 2 gram sodium restriction and 1500cc fluid restriction.  Encourage physical activity with graded exercise program.  Requested patient to weigh themselves daily, and to notify us if their weight increases by more than 3 lbs in 1 day or 5 lbs in 3 days.     [x] Reviewed completed "Daily Weight and Symptom Tracker".  Reviewed with patient when and how to call HFTCC according to "Yellow Zone" and "Red Zone".       Watch for these Signs and Symptoms: If any of these occur, contact HFT[facility] immediately:   Increase in shortness of breath with movement   Increase in swelling in your legs and ankles   Weight gain of more than 3 pounds in a night or 5 pounds in 3 days.   Difficulty breathing when you are lying down   Worsening fatigue or tiredness   Stomach bloating, a full feeling or a loss of appetite   Increased coughing--especially when you are lying down    MyChart and Care Companion:   Patient active on myChart? Yes, patient uses regularly.    HF TCC Program Plan:  Pt has successfully completed HFTCC program.  Pt care to be transferred to PCP/ [facility] for long term care.     Pt educated on " how to call their offices and how to call [facility] call in the event of an after hour issue.    PT reminded to continue to follow recommendations made during the H[facility] program to include monitoring daily weights, taking medications according to list, following up to appointments per provider recommendations, stop smoking/ start exercising and following a heart friendly low salt, low fluid diet.      Pt was able to verbalize back to RN in their own words correct diet/fluid restrictions, necessity for exercise, warning signs and symptoms, when and how to contact their  Long term care team .      Plan:     Pt [facility] appt on 11/3/22 at 1430.      [x]  Discussed upcoming appointments and/or plan for follow-up care with his/her PCP/Cardiology Dr. Stewart    Electronic hand off completed : To Dr. Stewart by routing epic note per Annalisa Barker PA-C.     Please refer to provider note for additional details and assessment.

## 2022-11-07 ENCOUNTER — TELEPHONE (OUTPATIENT)
Dept: CARDIOLOGY | Facility: CLINIC | Age: 62
End: 2022-11-07
Payer: MEDICARE

## 2022-11-07 NOTE — TELEPHONE ENCOUNTER
Reached out to patient.  Scheduled yearly in office device interrogation in the [facility].

## 2022-11-10 DIAGNOSIS — I47.20 V-TACH: ICD-10-CM

## 2022-11-10 DIAGNOSIS — E78.00 PURE HYPERCHOLESTEROLEMIA: ICD-10-CM

## 2022-11-10 RX ORDER — MEXILETINE HYDROCHLORIDE 150 MG/1
150 CAPSULE ORAL 2 TIMES DAILY WITH MEALS
Qty: 180 CAPSULE | Refills: 4 | Status: SHIPPED | OUTPATIENT
Start: 2022-11-10 | End: 2023-02-02 | Stop reason: SDUPTHER

## 2022-11-10 RX ORDER — PRAVASTATIN SODIUM 40 MG/1
TABLET ORAL
Qty: 90 TABLET | Refills: 3 | Status: SHIPPED | OUTPATIENT
Start: 2022-11-10 | End: 2023-11-01

## 2022-11-10 NOTE — TELEPHONE ENCOUNTER
Refill Routing Note   Medication(s) are not appropriate for processing by [facility] for the following reason(s):      - Outside of protocol  - Allergy/Contraindication (Reactions: Other (See Comments). No reaction type specified. User documented allergy severity: None specified.)    [facility] action(s):  Route  Defer       Medication Therapy Plan: MEXILENTINE(OP)  Medication reconciliation completed: No     Appointments  past 12m or future 3m with PCP    Date Provider   Last Visit   10/11/2022 Jc Elliott MD   Next Visit   12/20/2022 Jc Elliott MD   ED visits in past 90 days: 0        Note composed:3:24 PM 11/10/2022

## 2022-11-10 NOTE — TELEPHONE ENCOUNTER
No new care gaps identified.  [facility] Embedded Care Gaps. Reference number: [id number]. 11/10/2022   3:05:53 PM CST

## 2022-11-14 ENCOUNTER — TELEPHONE (OUTPATIENT)
Dept: ELECTROPHYSIOLOGY | Facility: CLINIC | Age: 62
End: 2022-11-14
Payer: MEDICARE

## 2022-11-15 ENCOUNTER — TELEPHONE (OUTPATIENT)
Dept: ELECTROPHYSIOLOGY | Facility: CLINIC | Age: 62
End: 2022-11-15
Payer: MEDICARE

## 2022-11-15 NOTE — TELEPHONE ENCOUNTER
----- Message from Aniya Carmona sent at 11/15/2022 10:07 AM CST -----  Regarding: returning a call  The pt is returning a call. Please call him back [phone]. Thanks, Aniya    
Spoke with pt to schedule bookout appointment. Appointment confirmed  
please use heat packs to area 3x/day 20 mins each session, take motrin 600mg every 6 hrs as needed, tylenol 650mg every 4 hrs as needed, stay active, no heavy lifting and return if symptoms  worsens. should get better with time. prescribed robaxin and tylenol.

## 2022-11-16 ENCOUNTER — CLINICAL SUPPORT (OUTPATIENT)
Dept: CARDIOLOGY | Facility: CLINIC | Age: 62
End: 2022-11-16
Payer: MEDICARE

## 2022-11-16 DIAGNOSIS — Z95.810 CARDIAC RESYNCHRONIZATION THERAPY DEFIBRILLATOR (CRT-D) IN PLACE: Primary | ICD-10-CM

## 2022-11-16 PROCEDURE — 93284 PR PROGRAM EVAL (IN PERSON) IMPLANT DEVICE,CARDVERT/DEFIB,MULT LEAD: ICD-10-PCS | Mod: S$GLB,,, | Performed by: INTERNAL MEDICINE

## 2022-11-16 PROCEDURE — 93284 PRGRMG EVAL IMPLANTABLE DFB: CPT | Mod: S$GLB,,, | Performed by: INTERNAL MEDICINE

## 2022-11-23 NOTE — PROGRESS NOTES
ICD Evaluation Report     Date: 11/16/2022     Primary MD: Dr. Jc Elliott  Primary Cardiologist: Dr. Stewart  Implanting MD:   and serial number:  Viva Quad XT CRT [id number]; [id number]; Medtronic  Implant date: 12/15/2016  Reason for evaluation: routine   Indication for ICD: unknown      Measurements  Atrial sensing - P wave: 1.0 mV  Atrial capture: Maintained: 1.0 V @ 0.4 ms   Atrial lead impedance: 494 ohms  Ventricular sensing - R wave: 8.3 mV  Ventricular capture: RV Maintained: 0.75 V @ 0.4 ms (M2 - RVC)  LV: Maintained: 1.0 V @ 0.4 ms   Ventricular lead impedance: RV:  475 ohms; LV:  437 ohms  Shock Impedance:  RV 48/ SVC 57 ohms  Underlying rhythm: NSR        Diagnostic Data  Atrial paced: 99.2 % Ventricular paced: 100 % (BVp)  Mode switch: none   Other: n/a  Ventricular Events: 3 treated VT/VF since 9/2022; all 3 events successfully treated with ATP, 5 NSVT with longest 5 seconds on 10/5/2022.    Battery status: satisfactory Magnet rate: 98.5 bpm   Estimated battery longevity:  1 months      VT Zones and Therapies:  AT/AF monitor > 171 bpm All Rx Off  VF > 200 bpm ATP during charging, 35 J x 6  FVT via -231 bpm Burst(2), 10J, 35J x 4  VT on 122-200 bpm Burst(3) 5J, 35J x 4        Final Parameters  Mode: DDDR Lower rate: 60 bpm Upper rate: 115 bpm  AV Delay: 170/130 ms Mode Switch: 70 Rate Response: On - max sensor 115 bpm  Atrial - Amplitude: 2.0 V Pulse width: 0.4 ms Sensitivity: 0.3 mV   R Ventricular - Amplitude: 2.0 V Pulse width: 0.4 ms Sensitivity: 0.3 mV   LV output:  2.5 V @ 0.4 msec  Polarity: Bipolar      Changes made:  Increased LV output to 2.5 V @ 0.4 ms      Conclusions: normal functioning device      Follow up: needs a generator change          Prashant Hudson.

## 2022-11-25 ENCOUNTER — CLINICAL SUPPORT (OUTPATIENT)
Dept: CARDIOLOGY | Facility: CLINIC | Age: 62
End: 2022-11-25
Payer: MEDICARE

## 2022-11-25 DIAGNOSIS — Z95.810 CARDIAC RESYNCHRONIZATION THERAPY DEFIBRILLATOR (CRT-D) IN PLACE: Primary | ICD-10-CM

## 2022-11-25 PROCEDURE — 93295 PR INTERROG EVAL, REMOTE, UP TO 90 DAYS,CARDVERT/DEFIB: ICD-10-PCS | Mod: S$GLB,,, | Performed by: INTERNAL MEDICINE

## 2022-11-25 PROCEDURE — 93296 REM INTERROG EVL PM/IDS: CPT | Mod: S$GLB,,, | Performed by: INTERNAL MEDICINE

## 2022-11-25 PROCEDURE — 93295 DEV INTERROG REMOTE 1/2/MLT: CPT | Mod: S$GLB,,, | Performed by: INTERNAL MEDICINE

## 2022-11-25 PROCEDURE — 93296 PR INTERROG EVAL, REMOTE, UP TO 90 DAYS, PACER/DEFIB,TECH REVIEW: ICD-10-PCS | Mod: S$GLB,,, | Performed by: INTERNAL MEDICINE

## 2022-11-28 ENCOUNTER — TELEPHONE (OUTPATIENT)
Dept: ELECTROPHYSIOLOGY | Facility: CLINIC | Age: 62
End: 2022-11-28
Payer: MEDICARE

## 2022-11-28 NOTE — TELEPHONE ENCOUNTER
Spoke with pt to move up appointment. He ws originally scheduled for 1:20 but device will be there in the [place] so we have to move it for 10 am. Appointment confirmed for 10am

## 2022-11-30 ENCOUNTER — TELEPHONE (OUTPATIENT)
Dept: ELECTROPHYSIOLOGY | Facility: CLINIC | Age: 62
End: 2022-11-30
Payer: MEDICARE

## 2022-12-01 ENCOUNTER — OFFICE VISIT (OUTPATIENT)
Dept: CARDIOLOGY | Facility: CLINIC | Age: 62
End: 2022-12-01
Payer: MEDICARE

## 2022-12-01 VITALS
WEIGHT: 195.13 LBS | DIASTOLIC BLOOD PRESSURE: 86 MMHG | SYSTOLIC BLOOD PRESSURE: 127 MMHG | BODY MASS INDEX: 28.81 KG/M2 | HEART RATE: 85 BPM

## 2022-12-01 DIAGNOSIS — I47.20 VT (VENTRICULAR TACHYCARDIA): ICD-10-CM

## 2022-12-01 DIAGNOSIS — D75.839 THROMBOCYTHEMIA: ICD-10-CM

## 2022-12-01 DIAGNOSIS — I50.20 HFREF (HEART FAILURE WITH REDUCED EJECTION FRACTION): ICD-10-CM

## 2022-12-01 DIAGNOSIS — I50.41 ACUTE COMBINED SYSTOLIC AND DIASTOLIC CONGESTIVE HEART FAILURE: ICD-10-CM

## 2022-12-01 DIAGNOSIS — R09.89 CARDIAC LV EJECTION FRACTION 10-20%: Primary | ICD-10-CM

## 2022-12-01 DIAGNOSIS — E03.9 HYPOTHYROIDISM, UNSPECIFIED TYPE: ICD-10-CM

## 2022-12-01 DIAGNOSIS — Z86.79 S/P ABLATION OF VENTRICULAR ARRHYTHMIA: ICD-10-CM

## 2022-12-01 DIAGNOSIS — N18.4 CKD (CHRONIC KIDNEY DISEASE) STAGE 4, GFR 15-29 ML/MIN: ICD-10-CM

## 2022-12-01 DIAGNOSIS — Z98.890 S/P ABLATION OF VENTRICULAR ARRHYTHMIA: ICD-10-CM

## 2022-12-01 DIAGNOSIS — I25.10 CORONARY ARTERY DISEASE INVOLVING NATIVE CORONARY ARTERY OF NATIVE HEART WITHOUT ANGINA PECTORIS: ICD-10-CM

## 2022-12-01 DIAGNOSIS — I42.8 NON-ISCHEMIC CARDIOMYOPATHY: ICD-10-CM

## 2022-12-01 DIAGNOSIS — N25.81 SECONDARY HYPERPARATHYROIDISM OF RENAL ORIGIN: ICD-10-CM

## 2022-12-01 DIAGNOSIS — Z95.810 CARDIAC RESYNCHRONIZATION THERAPY DEFIBRILLATOR (CRT-D) IN PLACE: ICD-10-CM

## 2022-12-01 PROCEDURE — 3079F DIAST BP 80-89 MM HG: CPT | Mod: CPTII,S$GLB,, | Performed by: INTERNAL MEDICINE

## 2022-12-01 PROCEDURE — 3074F PR MOST RECENT SYSTOLIC BLOOD PRESSURE < 130 MM HG: ICD-10-PCS | Mod: CPTII,S$GLB,, | Performed by: INTERNAL MEDICINE

## 2022-12-01 PROCEDURE — 3044F HG A1C LEVEL LT 7.0%: CPT | Mod: CPTII,S$GLB,, | Performed by: INTERNAL MEDICINE

## 2022-12-01 PROCEDURE — 93010 RHYTHM STRIP: ICD-10-PCS | Mod: S$GLB,,, | Performed by: INTERNAL MEDICINE

## 2022-12-01 PROCEDURE — 1159F PR MEDICATION LIST DOCUMENTED IN MEDICAL RECORD: ICD-10-PCS | Mod: CPTII,S$GLB,, | Performed by: INTERNAL MEDICINE

## 2022-12-01 PROCEDURE — 4010F ACE/ARB THERAPY RXD/TAKEN: CPT | Mod: CPTII,S$GLB,, | Performed by: INTERNAL MEDICINE

## 2022-12-01 PROCEDURE — 99999 PR PBB SHADOW E&M-EST. PATIENT-LVL III: ICD-10-PCS | Mod: PBBFAC,,, | Performed by: INTERNAL MEDICINE

## 2022-12-01 PROCEDURE — 3079F PR MOST RECENT DIASTOLIC BLOOD PRESSURE 80-89 MM HG: ICD-10-PCS | Mod: CPTII,S$GLB,, | Performed by: INTERNAL MEDICINE

## 2022-12-01 PROCEDURE — 93005 RHYTHM STRIP: ICD-10-PCS | Mod: S$GLB,,, | Performed by: INTERNAL MEDICINE

## 2022-12-01 PROCEDURE — 3044F PR MOST RECENT HEMOGLOBIN A1C LEVEL <7.0%: ICD-10-PCS | Mod: CPTII,S$GLB,, | Performed by: INTERNAL MEDICINE

## 2022-12-01 PROCEDURE — 93010 ELECTROCARDIOGRAM REPORT: CPT | Mod: S$GLB,,, | Performed by: INTERNAL MEDICINE

## 2022-12-01 PROCEDURE — 3074F SYST BP LT 130 MM HG: CPT | Mod: CPTII,S$GLB,, | Performed by: INTERNAL MEDICINE

## 2022-12-01 PROCEDURE — 99215 PR OFFICE/OUTPT VISIT, EST, LEVL V, 40-54 MIN: ICD-10-PCS | Mod: S$GLB,,, | Performed by: INTERNAL MEDICINE

## 2022-12-01 PROCEDURE — 4010F PR ACE/ARB THEARPY RXD/TAKEN: ICD-10-PCS | Mod: CPTII,S$GLB,, | Performed by: INTERNAL MEDICINE

## 2022-12-01 PROCEDURE — 1160F PR REVIEW ALL MEDS BY PRESCRIBER/CLIN PHARMACIST DOCUMENTED: ICD-10-PCS | Mod: CPTII,S$GLB,, | Performed by: INTERNAL MEDICINE

## 2022-12-01 PROCEDURE — 3066F NEPHROPATHY DOC TX: CPT | Mod: CPTII,S$GLB,, | Performed by: INTERNAL MEDICINE

## 2022-12-01 PROCEDURE — 1159F MED LIST DOCD IN RCRD: CPT | Mod: CPTII,S$GLB,, | Performed by: INTERNAL MEDICINE

## 2022-12-01 PROCEDURE — 1160F RVW MEDS BY RX/DR IN RCRD: CPT | Mod: CPTII,S$GLB,, | Performed by: INTERNAL MEDICINE

## 2022-12-01 PROCEDURE — 93005 ELECTROCARDIOGRAM TRACING: CPT | Mod: S$GLB,,, | Performed by: INTERNAL MEDICINE

## 2022-12-01 PROCEDURE — 3008F PR BODY MASS INDEX (BMI) DOCUMENTED: ICD-10-PCS | Mod: CPTII,S$GLB,, | Performed by: INTERNAL MEDICINE

## 2022-12-01 PROCEDURE — 3008F BODY MASS INDEX DOCD: CPT | Mod: CPTII,S$GLB,, | Performed by: INTERNAL MEDICINE

## 2022-12-01 PROCEDURE — 99215 OFFICE O/P EST HI 40 MIN: CPT | Mod: S$GLB,,, | Performed by: INTERNAL MEDICINE

## 2022-12-01 PROCEDURE — 99999 PR PBB SHADOW E&M-EST. PATIENT-LVL III: CPT | Mod: PBBFAC,,, | Performed by: INTERNAL MEDICINE

## 2022-12-01 PROCEDURE — 3066F PR DOCUMENTATION OF TREATMENT FOR NEPHROPATHY: ICD-10-PCS | Mod: CPTII,S$GLB,, | Performed by: INTERNAL MEDICINE

## 2022-12-01 RX ORDER — METOPROLOL SUCCINATE 25 MG/1
25 TABLET, EXTENDED RELEASE ORAL DAILY
Qty: 90 TABLET | Refills: 3 | Status: SHIPPED | OUTPATIENT
Start: 2022-12-01 | End: 2023-11-19

## 2022-12-01 NOTE — PROGRESS NOTES
Subjective:    Patient ID:  Ar Sharma is a 61 y.o. male who presents for evaluation of VT    HPI  61 y.o. M  CAD, ICM  VT, ablated x2 in [place]  CRT-D  CKD  HTN on meds    Went to [facility] [place]- 9/2022 with ICD shocks. 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 Since increase amio to 400 and resumption of mexiletine at 150 bid, no VT/VF.  He's able to exercise on bike 2x/week. No CP, and NYHA II SINGH.    ICD is at ANTHONY    My interpretation of today's ECG is APBiVP    Review of Systems   Constitutional: Negative. Negative for malaise/fatigue.   HENT: Negative.  Negative for ear pain and tinnitus.    Eyes:  Negative for blurred vision.   Cardiovascular:  Positive for dyspnea on exertion. Negative for chest pain, near-syncope, palpitations and syncope.   Respiratory: Negative.  Negative for shortness of breath.    Endocrine: Negative.  Negative for polyuria.   Hematologic/Lymphatic: Does not bruise/bleed easily.   Skin: Negative.  Negative for rash.   Musculoskeletal: Negative.  Negative for joint pain and muscle weakness.   Gastrointestinal: Negative.  Negative for abdominal pain and change in bowel habit.   Genitourinary:  Negative for frequency.   Neurological: Negative.  Negative for dizziness and weakness.   Psychiatric/Behavioral: Negative.  Negative for depression. The patient is not nervous/anxious.    Allergic/Immunologic: Negative for environmental allergies.      Objective:    Physical Exam  Vitals and nursing note reviewed.   Constitutional:       Appearance: He is well-developed.   HENT:      Head: Normocephalic and atraumatic.   Eyes:      General: Lids are normal. No scleral icterus.     Conjunctiva/sclera: Conjunctivae normal.   Neck:      Thyroid: No thyromegaly.      Vascular: No JVD.      Trachea: No tracheal deviation.   Cardiovascular:      Rate and Rhythm: Normal rate and regular rhythm.      Pulses:           Radial pulses are 2+ on the  right side and 2+ on the left side.   Pulmonary:      Effort: Pulmonary effort is normal. No tachypnea, accessory muscle usage or respiratory distress.      Breath sounds: Normal breath sounds. No wheezing.   Abdominal:      General: There is no distension.   Musculoskeletal:         General: Normal range of motion.      Cervical back: Normal range of motion.   Skin:     General: Skin is warm and dry.   Neurological:      Mental Status: He is alert and oriented to person, place, and time.      Motor: No abnormal muscle tone.      Deep Tendon Reflexes: Reflexes are normal and symmetric.   Psychiatric:         Behavior: Behavior normal.         Assessment:       1. Cardiac LV ejection fraction 10-20%    2. VT (ventricular tachycardia)    3. Acute combined systolic and diastolic congestive heart failure    4. Coronary artery disease involving native coronary artery of native heart without angina pectoris    5. Cardiac resynchronization therapy defibrillator (CRT-D) in place    6. HFrEF (heart failure with reduced ejection fraction)    7. Non-ischemic cardiomyopathy    8. S/P ablation of ventricular arrhythmia    9. CKD (chronic kidney disease) stage 4, GFR 15-29 ml/min    10. Thrombocythemia -  on 4/4/22    11. Hypothyroidism, unspecified type    12. Secondary hyperparathyroidism of renal origin         Plan:       CM.  Add toprol (coreg had been stopped due to hypotension).    ICD  Schedule gen change.    VT  Continue amio/dyan.

## 2022-12-03 DIAGNOSIS — Z45.02 IMPLANTABLE CARDIOVERTER-DEFIBRILLATOR (ICD) AT END OF BATTERY LIFE: Primary | ICD-10-CM

## 2022-12-06 ENCOUNTER — TELEPHONE (OUTPATIENT)
Dept: ELECTROPHYSIOLOGY | Facility: CLINIC | Age: 62
End: 2022-12-06
Payer: MEDICARE

## 2022-12-06 NOTE — TELEPHONE ENCOUNTER
----- Message from Brook Casey MA sent at 12/6/2022  3:12 PM CST -----    ----- Message -----  From: Belen Jensen  Sent: 12/6/2022   3:08 PM CST  To: [facility]y BASURTO Staff    Needs advice from nurse:      Who Called:pt  Regarding:has questions regarding notes in after summary  Would the patient rather a call back or VIA [facility]?  Best Call Back number:[phone]  Additional Info:

## 2022-12-07 ENCOUNTER — TELEPHONE (OUTPATIENT)
Dept: ELECTROPHYSIOLOGY | Facility: CLINIC | Age: 62
End: 2022-12-07
Payer: MEDICARE

## 2022-12-08 DIAGNOSIS — Z45.02 ICD (IMPLANTABLE CARDIOVERTER-DEFIBRILLATOR) BATTERY DEPLETION: Primary | ICD-10-CM

## 2022-12-11 NOTE — PROGRESS NOTES
Subjective:    Patient ID:  Ar Sharma is a 61 y.o. male who presents for follow-up of CRT-D    HPI    CRT-D remote interrogation report downloaded and prepared by medical assistant and interpreted by me and full report scanned into Epic media.       Company: [employer]  Mode: DDDR  Battery: OK with ANTHONY 1 month   Leads: OK    Events: No significant events since last interrogation (previously with multiple V-episodes with treatment), nothing recent. Follows with EP and will be scheduled for generator change  Function: Normal device function    ROS     Objective:    Physical Exam      Assessment:       1. Cardiac resynchronization therapy defibrillator (CRT-D) in place         Plan:

## 2022-12-13 ENCOUNTER — PATIENT MESSAGE (OUTPATIENT)
Dept: ELECTROPHYSIOLOGY | Facility: CLINIC | Age: 62
End: 2022-12-13
Payer: MEDICARE

## 2022-12-20 ENCOUNTER — OFFICE VISIT (OUTPATIENT)
Dept: FAMILY MEDICINE | Facility: CLINIC | Age: 62
End: 2022-12-20
Payer: MEDICARE

## 2022-12-20 ENCOUNTER — LAB VISIT (OUTPATIENT)
Dept: LAB | Facility: HOSPITAL | Age: 62
End: 2022-12-20
Attending: INTERNAL MEDICINE
Payer: MEDICARE

## 2022-12-20 VITALS
DIASTOLIC BLOOD PRESSURE: 76 MMHG | BODY MASS INDEX: 28.75 KG/M2 | SYSTOLIC BLOOD PRESSURE: 132 MMHG | OXYGEN SATURATION: 94 % | HEIGHT: 69 IN | HEART RATE: 80 BPM | WEIGHT: 194.13 LBS

## 2022-12-20 DIAGNOSIS — R73.03 PREDIABETES: ICD-10-CM

## 2022-12-20 DIAGNOSIS — I50.20 HFREF (HEART FAILURE WITH REDUCED EJECTION FRACTION): ICD-10-CM

## 2022-12-20 DIAGNOSIS — E78.2 MIXED HYPERLIPIDEMIA: Primary | ICD-10-CM

## 2022-12-20 DIAGNOSIS — N18.4 CKD (CHRONIC KIDNEY DISEASE) STAGE 4, GFR 15-29 ML/MIN: ICD-10-CM

## 2022-12-20 DIAGNOSIS — E78.2 MIXED HYPERLIPIDEMIA: ICD-10-CM

## 2022-12-20 DIAGNOSIS — I25.119 ATHEROSCLEROSIS OF NATIVE CORONARY ARTERY OF NATIVE HEART WITH ANGINA PECTORIS: ICD-10-CM

## 2022-12-20 LAB
ALBUMIN SERPL BCP-MCNC: 4.3 G/DL (ref 3.5–5.2)
ANION GAP SERPL CALC-SCNC: 9 MMOL/L (ref 8–16)
BASOPHILS # BLD AUTO: 0.09 K/UL (ref 0–0.2)
BASOPHILS NFR BLD: 1 % (ref 0–1.9)
BILIRUB UR QL STRIP: NEGATIVE
CALCIUM SERPL-MCNC: 9.3 MG/DL (ref 8.7–10.5)
CHLORIDE SERPL-SCNC: 103 MMOL/L (ref 95–110)
CHOLEST SERPL-MCNC: 156 MG/DL (ref 120–199)
CHOLEST/HDLC SERPL: 4.2 {RATIO} (ref 2–5)
CLARITY UR REFRACT.AUTO: CLEAR
CO2 SERPL-SCNC: 29 MMOL/L (ref 23–29)
COLOR UR AUTO: YELLOW
CREAT SERPL-MCNC: 3.44 MG/DL (ref 0.5–1.4)
CREAT UR-MCNC: 29.4 MG/DL (ref 23–375)
DIFFERENTIAL METHOD: ABNORMAL
EOSINOPHIL # BLD AUTO: 0.3 K/UL (ref 0–0.5)
EOSINOPHIL NFR BLD: 3.3 % (ref 0–8)
ERYTHROCYTE [DISTWIDTH] IN BLOOD BY AUTOMATED COUNT: 19.7 % (ref 11.5–14.5)
EST. GFR  (NO RACE VARIABLE): 19.3 ML/MIN/1.73 M^2
ESTIMATED AVG GLUCOSE: 117 MG/DL (ref 68–131)
GLUCOSE SERPL-MCNC: 93 MG/DL (ref 70–110)
GLUCOSE UR QL STRIP: NEGATIVE
HBA1C MFR BLD: 5.7 % (ref 4–5.6)
HCT VFR BLD AUTO: 48.8 % (ref 40–54)
HDLC SERPL-MCNC: 37 MG/DL (ref 40–75)
HDLC SERPL: 23.7 % (ref 20–50)
HGB BLD-MCNC: 16 G/DL (ref 14–18)
HGB UR QL STRIP: NEGATIVE
IMM GRANULOCYTES # BLD AUTO: 0.07 K/UL (ref 0–0.04)
IMM GRANULOCYTES NFR BLD AUTO: 0.8 % (ref 0–0.5)
KETONES UR QL STRIP: NEGATIVE
LDLC SERPL CALC-MCNC: 98.2 MG/DL (ref 63–159)
LEUKOCYTE ESTERASE UR QL STRIP: NEGATIVE
LYMPHOCYTES # BLD AUTO: 0.7 K/UL (ref 1–4.8)
LYMPHOCYTES NFR BLD: 8.4 % (ref 18–48)
MCH RBC QN AUTO: 26.3 PG (ref 27–31)
MCHC RBC AUTO-ENTMCNC: 32.8 G/DL (ref 32–36)
MCV RBC AUTO: 80 FL (ref 82–98)
MONOCYTES # BLD AUTO: 0.9 K/UL (ref 0.3–1)
MONOCYTES NFR BLD: 10.2 % (ref 4–15)
NEUTROPHILS # BLD AUTO: 6.7 K/UL (ref 1.8–7.7)
NEUTROPHILS NFR BLD: 76.3 % (ref 38–73)
NITRITE UR QL STRIP: NEGATIVE
NONHDLC SERPL-MCNC: 119 MG/DL
NRBC BLD-RTO: 0 /100 WBC
PH UR STRIP: 7 [PH] (ref 5–8)
PHOSPHATE SERPL-MCNC: 3.9 MG/DL (ref 2.7–4.5)
PLATELET # BLD AUTO: 663 K/UL (ref 150–450)
PMV BLD AUTO: 11.9 FL (ref 9.2–12.9)
POTASSIUM SERPL-SCNC: 4.4 MMOL/L (ref 3.5–5.1)
PROT UR QL STRIP: NEGATIVE
PROT UR-MCNC: <7 MG/DL (ref 0–15)
PROT/CREAT UR: NORMAL MG/G{CREAT} (ref 0–0.2)
PTH-INTACT SERPL-MCNC: 143.7 PG/ML (ref 9–77)
RBC # BLD AUTO: 6.08 M/UL (ref 4.6–6.2)
SODIUM SERPL-SCNC: 141 MMOL/L (ref 136–145)
SP GR UR STRIP: 1.01 (ref 1–1.03)
TRIGL SERPL-MCNC: 104 MG/DL (ref 30–150)
TSH SERPL DL<=0.005 MIU/L-ACNC: 1.48 UIU/ML (ref 0.4–4)
URN SPEC COLLECT METH UR: NORMAL
UROBILINOGEN UR STRIP-ACNC: NEGATIVE EU/DL
UUN UR-MCNC: 56 MG/DL (ref 2–20)
WBC # BLD AUTO: 8.73 K/UL (ref 3.9–12.7)

## 2022-12-20 PROCEDURE — 3008F PR BODY MASS INDEX (BMI) DOCUMENTED: ICD-10-PCS | Mod: CPTII,S$GLB,, | Performed by: STUDENT IN AN ORGANIZED HEALTH CARE EDUCATION/TRAINING PROGRAM

## 2022-12-20 PROCEDURE — 80069 RENAL FUNCTION PANEL: CPT | Mod: PO | Performed by: INTERNAL MEDICINE

## 2022-12-20 PROCEDURE — 3078F DIAST BP <80 MM HG: CPT | Mod: CPTII,S$GLB,, | Performed by: STUDENT IN AN ORGANIZED HEALTH CARE EDUCATION/TRAINING PROGRAM

## 2022-12-20 PROCEDURE — 1160F PR REVIEW ALL MEDS BY PRESCRIBER/CLIN PHARMACIST DOCUMENTED: ICD-10-PCS | Mod: CPTII,S$GLB,, | Performed by: STUDENT IN AN ORGANIZED HEALTH CARE EDUCATION/TRAINING PROGRAM

## 2022-12-20 PROCEDURE — 3075F SYST BP GE 130 - 139MM HG: CPT | Mod: CPTII,S$GLB,, | Performed by: STUDENT IN AN ORGANIZED HEALTH CARE EDUCATION/TRAINING PROGRAM

## 2022-12-20 PROCEDURE — 3066F NEPHROPATHY DOC TX: CPT | Mod: CPTII,S$GLB,, | Performed by: STUDENT IN AN ORGANIZED HEALTH CARE EDUCATION/TRAINING PROGRAM

## 2022-12-20 PROCEDURE — 3008F BODY MASS INDEX DOCD: CPT | Mod: CPTII,S$GLB,, | Performed by: STUDENT IN AN ORGANIZED HEALTH CARE EDUCATION/TRAINING PROGRAM

## 2022-12-20 PROCEDURE — 36415 COLL VENOUS BLD VENIPUNCTURE: CPT | Mod: PO | Performed by: STUDENT IN AN ORGANIZED HEALTH CARE EDUCATION/TRAINING PROGRAM

## 2022-12-20 PROCEDURE — 3044F HG A1C LEVEL LT 7.0%: CPT | Mod: CPTII,S$GLB,, | Performed by: STUDENT IN AN ORGANIZED HEALTH CARE EDUCATION/TRAINING PROGRAM

## 2022-12-20 PROCEDURE — 3075F PR MOST RECENT SYSTOLIC BLOOD PRESS GE 130-139MM HG: ICD-10-PCS | Mod: CPTII,S$GLB,, | Performed by: STUDENT IN AN ORGANIZED HEALTH CARE EDUCATION/TRAINING PROGRAM

## 2022-12-20 PROCEDURE — 3078F PR MOST RECENT DIASTOLIC BLOOD PRESSURE < 80 MM HG: ICD-10-PCS | Mod: CPTII,S$GLB,, | Performed by: STUDENT IN AN ORGANIZED HEALTH CARE EDUCATION/TRAINING PROGRAM

## 2022-12-20 PROCEDURE — 1159F PR MEDICATION LIST DOCUMENTED IN MEDICAL RECORD: ICD-10-PCS | Mod: CPTII,S$GLB,, | Performed by: STUDENT IN AN ORGANIZED HEALTH CARE EDUCATION/TRAINING PROGRAM

## 2022-12-20 PROCEDURE — 3066F PR DOCUMENTATION OF TREATMENT FOR NEPHROPATHY: ICD-10-PCS | Mod: CPTII,S$GLB,, | Performed by: STUDENT IN AN ORGANIZED HEALTH CARE EDUCATION/TRAINING PROGRAM

## 2022-12-20 PROCEDURE — 4010F PR ACE/ARB THEARPY RXD/TAKEN: ICD-10-PCS | Mod: CPTII,S$GLB,, | Performed by: STUDENT IN AN ORGANIZED HEALTH CARE EDUCATION/TRAINING PROGRAM

## 2022-12-20 PROCEDURE — 84156 ASSAY OF PROTEIN URINE: CPT | Performed by: INTERNAL MEDICINE

## 2022-12-20 PROCEDURE — 99214 OFFICE O/P EST MOD 30 MIN: CPT | Mod: S$GLB,,, | Performed by: STUDENT IN AN ORGANIZED HEALTH CARE EDUCATION/TRAINING PROGRAM

## 2022-12-20 PROCEDURE — 4010F ACE/ARB THERAPY RXD/TAKEN: CPT | Mod: CPTII,S$GLB,, | Performed by: STUDENT IN AN ORGANIZED HEALTH CARE EDUCATION/TRAINING PROGRAM

## 2022-12-20 PROCEDURE — 83036 HEMOGLOBIN GLYCOSYLATED A1C: CPT | Performed by: STUDENT IN AN ORGANIZED HEALTH CARE EDUCATION/TRAINING PROGRAM

## 2022-12-20 PROCEDURE — 80061 LIPID PANEL: CPT | Performed by: STUDENT IN AN ORGANIZED HEALTH CARE EDUCATION/TRAINING PROGRAM

## 2022-12-20 PROCEDURE — 81003 URINALYSIS AUTO W/O SCOPE: CPT | Mod: PO | Performed by: INTERNAL MEDICINE

## 2022-12-20 PROCEDURE — 1160F RVW MEDS BY RX/DR IN RCRD: CPT | Mod: CPTII,S$GLB,, | Performed by: STUDENT IN AN ORGANIZED HEALTH CARE EDUCATION/TRAINING PROGRAM

## 2022-12-20 PROCEDURE — 83970 ASSAY OF PARATHORMONE: CPT | Mod: PO | Performed by: INTERNAL MEDICINE

## 2022-12-20 PROCEDURE — 99214 PR OFFICE/OUTPT VISIT, EST, LEVL IV, 30-39 MIN: ICD-10-PCS | Mod: S$GLB,,, | Performed by: STUDENT IN AN ORGANIZED HEALTH CARE EDUCATION/TRAINING PROGRAM

## 2022-12-20 PROCEDURE — 84443 ASSAY THYROID STIM HORMONE: CPT | Mod: PO | Performed by: STUDENT IN AN ORGANIZED HEALTH CARE EDUCATION/TRAINING PROGRAM

## 2022-12-20 PROCEDURE — 85025 COMPLETE CBC W/AUTO DIFF WBC: CPT | Mod: PO | Performed by: INTERNAL MEDICINE

## 2022-12-20 PROCEDURE — 3044F PR MOST RECENT HEMOGLOBIN A1C LEVEL <7.0%: ICD-10-PCS | Mod: CPTII,S$GLB,, | Performed by: STUDENT IN AN ORGANIZED HEALTH CARE EDUCATION/TRAINING PROGRAM

## 2022-12-20 PROCEDURE — 1159F MED LIST DOCD IN RCRD: CPT | Mod: CPTII,S$GLB,, | Performed by: STUDENT IN AN ORGANIZED HEALTH CARE EDUCATION/TRAINING PROGRAM

## 2022-12-23 ENCOUNTER — NURSE TRIAGE (OUTPATIENT)
Dept: ADMINISTRATIVE | Facility: CLINIC | Age: 62
End: 2022-12-23
Payer: MEDICARE

## 2022-12-23 NOTE — TELEPHONE ENCOUNTER
Pt called for c/o congestion and body aches and sore throat and triaged for 2 of them and care advice to see MD today. Pt denies any SOB or fever and he will download the OCA and be seen virtually. Pt told to reach back out if any other issues or concerns                Reason for Disposition   MILD or MODERATE muscle aches or pain and taking a statin medicine (a lipid or cholesterol lowering drug)   SEVERE sore throat pain    Additional Information   Negative: Shock suspected (e.g., cold/pale/clammy skin, too weak to stand, low BP, rapid pulse)   Negative: Difficult to awaken or acting confused (e.g., disoriented, slurred speech)   Negative: Sounds like a life-threatening emergency to the triager   Negative: Dark (cola colored) or red-colored urine   Negative: Drinking very little and dehydration suspected (e.g., no urine > 12 hours, very dry mouth, very lightheaded)   Negative: Patient sounds very sick or weak to the triager   Negative: SEVERE pain (e.g., excruciating, unable to do any normal activities) and not improved 2 hours after pain medicine   Negative: SEVERE pain and taking a statin medicine (a lipid or cholesterol lowering drug)   Negative: Fever > 104 F (40 C)   Negative: Fever > 101 F (38.3 C) and over 60 years of age   Negative: Fever > 100.0 F  (37.8 C) and bedridden (e.g., nursing home patient, CVA, chronic illness, recovering from surgery)   Negative: Fever > 100.0 F (37.8 C) and indwelling urinary catheter (e.g., Vo, coude)   Negative: Fever > 100.0 F (37.8 C) and diabetes mellitus or weak immune system (e.g., HIV positive, cancer chemo, splenectomy, organ transplant, chronic steroids)   Negative: Fever present > 3 days (72 hours)   Negative: Muscle aches are unexplained and occur within 1 month of a tick bite   Negative: Diabetes mellitus or weak immune system (e.g., HIV positive, cancer chemo, splenectomy, organ transplant, chronic steroids)   Negative: MODERATE pain (e.g., interferes with  normal activities) and present > 3 days   Negative: Bluish (or gray) lips or face   Negative: Shock suspected (e.g., cold/pale/clammy skin, too weak to stand, low BP, rapid pulse)   Negative: SEVERE difficulty breathing (e.g., struggling for each breath, speaks in single words)   Negative: Sounds like a life-threatening emergency to the triager   Severe sore throat   Negative: SEVERE difficulty breathing (e.g., struggling for each breath, speaks in single words)   Negative: Sounds like a life-threatening emergency to the triager   Negative: Drooling or spitting out saliva (because can't swallow)   Negative: Unable to open mouth completely   Negative: Drinking very little and has signs of dehydration (e.g., no urine > 12 hours, very dry mouth, very lightheaded)   Negative: Patient sounds very sick or weak to the triager   Negative: Difficulty breathing (per caller) but not severe   Negative: Fever > 103 F (39.4 C)   Negative: Refuses to drink anything for > 12 hours    Protocols used: Muscle Aches and Body Pain-A-OH, Influenza - Seasonal-A-OH, Sore Throat-A-OH

## 2022-12-23 NOTE — TELEPHONE ENCOUNTER
Spoke to patient. Patient stated on Wednesday he started to have congestion and postnasal drip. Thursday the congestion was worse and today he started having body aches. Patient did complete a telemedicine appointment and they will be send ing in abx and steroids.

## 2022-12-28 NOTE — PROGRESS NOTES
Patient ID: Ar Sharma is a 62 y.o. male.     Chief Complaint: Follow-up      Follow-up  Pertinent negatives include no chest pain, coughing, fever, headaches, myalgias, nausea, rash, vomiting or weakness.     CKD4- avoiding NSAIDS, following with renal    HLD- denies recent chest pain and shortness of breath    HFrEF- taking lasix daily. He increases the dose on days that he has extra swelling.          Review of Systems  Review of Systems   Constitutional:  Negative for fever.   HENT:  Negative for ear pain and sinus pain.    Eyes:  Negative for discharge.   Respiratory:  Negative for cough and shortness of breath.    Cardiovascular:  Negative for chest pain and leg swelling.   Gastrointestinal:  Negative for diarrhea, nausea and vomiting.   Genitourinary:  Negative for urgency.   Musculoskeletal:  Negative for myalgias.   Skin:  Negative for rash.   Neurological:  Negative for weakness and headaches.   Psychiatric/Behavioral:  Negative for depression.    All other systems reviewed and are negative.    Currently Medications  Current Outpatient Medications on File Prior to Visit   Medication Sig Dispense Refill    amiodarone (PACERONE) 400 MG tablet Take 1 tablet (400 mg total) by mouth once daily. 60 tablet 3    aspirin (ECOTRIN) 81 MG EC tablet Take 1 tablet (81 mg total) by mouth once daily. 60 tablet 2    ergocalciferol (ERGOCALCIFEROL) 50,000 unit Cap Take 1 capsule (50,000 Units total) by mouth every 7 days. 12 capsule 3    furosemide (LASIX) 80 MG tablet Take 1 tablet (80 mg total) by mouth 2 (two) times daily. If increased 3 pounds in a day or 5 pounds in a week, take an extra dose of lasix 80mg until those pounds are lost. 120 tablet 3    levothyroxine (SYNTHROID, LEVOTHROID) 175 MCG tablet TAKE 1 TABLET BY MOUTH ONCE DAILY. (Patient taking differently: Take 175 mcg by mouth once daily.) 90 tablet 3    metoprolol succinate (TOPROL-XL) 25 MG 24 hr tablet Take 1 tablet (25 mg total) by mouth once  "daily. 90 tablet 3    mexiletine (MEXITIL) 150 MG Cap TAKE 1 CAPSULE (150 MG TOTAL) BY MOUTH 2 (TWO) TIMES DAILY WITH MEALS. 180 capsule 4    nitroGLYCERIN (NITROSTAT) 0.4 MG SL tablet Place 1 tablet (0.4 mg total) under the tongue every 5 (five) minutes as needed for Chest pain. 20 tablet 1    pravastatin (PRAVACHOL) 40 MG tablet TAKE 1 TABLET BY MOUTH EVERY DAY 90 tablet 3    sacubitriL-valsartan (ENTRESTO) 24-26 mg per tablet Take 1 tablet by mouth 2 (two) times daily. 180 tablet 3     No current facility-administered medications on file prior to visit.       Physical  Exam  Vitals:    12/20/22 1019   BP: 132/76   BP Location: Right arm   Patient Position: Sitting   Pulse: 80   SpO2: (!) 94%   Weight: 88 kg (194 lb 1.8 oz)   Height: 5' 9" (1.753 m)      Body mass index is 28.67 kg/m².    Physical Exam  Vitals and nursing note reviewed.   Constitutional:       General: He is not in acute distress.     Appearance: He is not ill-appearing.   HENT:      Head: Normocephalic and atraumatic.      Right Ear: External ear normal.      Left Ear: External ear normal.      Nose: Nose normal.      Mouth/Throat:      Mouth: Mucous membranes are moist.   Eyes:      Extraocular Movements: Extraocular movements intact.      Conjunctiva/sclera: Conjunctivae normal.   Cardiovascular:      Rate and Rhythm: Normal rate and regular rhythm.      Pulses: Normal pulses.      Heart sounds: No murmur heard.  Pulmonary:      Effort: Pulmonary effort is normal. No respiratory distress.      Breath sounds: No wheezing.   Abdominal:      General: There is no distension.      Palpations: Abdomen is soft. There is no mass.      Tenderness: There is no abdominal tenderness.   Musculoskeletal:         General: No swelling.      Cervical back: Normal range of motion.   Skin:     Coloration: Skin is not jaundiced.      Findings: No rash.   Neurological:      General: No focal deficit present.      Mental Status: He is alert and oriented to person, " place, and time.   Psychiatric:         Mood and Affect: Mood normal.         Thought Content: Thought content normal.       Labs:    Complete Blood Count  Lab Results   Component Value Date    RBC 6.08 12/20/2022    HGB 16.0 12/20/2022    HCT 48.8 12/20/2022    MCV 80 (L) 12/20/2022    MCH 26.3 (L) 12/20/2022    MCHC 32.8 12/20/2022    RDW 19.7 (H) 12/20/2022     (H) 12/20/2022    MPV 11.9 12/20/2022    GRAN 6.7 12/20/2022    GRAN 76.3 (H) 12/20/2022    LYMPH 0.7 (L) 12/20/2022    LYMPH 8.4 (L) 12/20/2022    MONO 0.9 12/20/2022    MONO 10.2 12/20/2022    EOS 0.3 12/20/2022    BASO 0.09 12/20/2022    EOSINOPHIL 3.3 12/20/2022    BASOPHIL 1.0 12/20/2022    DIFFMETHOD Automated 12/20/2022       Comprehensive Metabolic Panel  Lab Results   Component Value Date    GLU 93 12/20/2022    BUN 56 (H) 12/20/2022    CREATININE 3.44 (H) 12/20/2022     12/20/2022    K 4.4 12/20/2022     12/20/2022    PROT 7.4 10/25/2022    ALBUMIN 4.3 12/20/2022    BILITOT 1.5 (H) 10/25/2022    AST 49 (H) 10/25/2022    ALKPHOS 102 10/25/2022    CO2 29 12/20/2022    ALT 62 (H) 10/25/2022    ANIONGAP 9 12/20/2022    EGFRNONAA 14.8 (A) 07/07/2022    ESTGFRAFRICA 17.1 (A) 07/07/2022       TSH  Lab Results   Component Value Date    TSH 1.480 12/20/2022       Imaging:  Cardiac catheterization    There was non-obstructive coronary artery disease. Stable as compared   to prior.    The procedure log was documented by Documenter: Neftali Casey RT; May Galdamez RN and verified by Juan Roque MD.    Date: 9/27/2022  Time: 6:36 PM        Assessment/Plan:    Problem List Items Addressed This Visit          Cardiac/Vascular    HFrEF (heart failure with reduced ejection fraction)    Relevant Orders    TSH (Completed)    Hyperlipidemia - Primary    Overview     Formatting of this note might be different from the original.  Converted from [employer]:  Description - HYPERLIPIDEMIA         Relevant Orders    LIPID PANEL (Completed)        Endocrine    Prediabetes    Relevant Orders    HEMOGLOBIN A1C (Completed)     Discussed how to stay healthy including: diet, exercise, refraining from smoking and discussed screening exams / tests needed for age, sex and family Hx.    RTC 3 mo    Jc Elliott MD

## 2022-12-30 ENCOUNTER — PES CALL (OUTPATIENT)
Dept: ADMINISTRATIVE | Facility: CLINIC | Age: 62
End: 2022-12-30
Payer: MEDICARE

## 2023-01-17 ENCOUNTER — LAB VISIT (OUTPATIENT)
Dept: LAB | Facility: HOSPITAL | Age: 63
End: 2023-01-17
Attending: INTERNAL MEDICINE
Payer: MEDICARE

## 2023-01-17 DIAGNOSIS — Z45.02 IMPLANTABLE CARDIOVERTER-DEFIBRILLATOR (ICD) AT END OF BATTERY LIFE: ICD-10-CM

## 2023-01-17 LAB
ANION GAP SERPL CALC-SCNC: 9 MMOL/L (ref 8–16)
APTT BLDCRRT: 32.5 SEC (ref 21–32)
CALCIUM SERPL-MCNC: 9 MG/DL (ref 8.7–10.5)
CHLORIDE SERPL-SCNC: 101 MMOL/L (ref 95–110)
CO2 SERPL-SCNC: 29 MMOL/L (ref 23–29)
CREAT SERPL-MCNC: 3.27 MG/DL (ref 0.5–1.4)
ERYTHROCYTE [DISTWIDTH] IN BLOOD BY AUTOMATED COUNT: 17.7 % (ref 11.5–14.5)
EST. GFR  (NO RACE VARIABLE): 20.4 ML/MIN/1.73 M^2
GLUCOSE SERPL-MCNC: 103 MG/DL (ref 70–110)
HCT VFR BLD AUTO: 46.4 % (ref 40–54)
HGB BLD-MCNC: 15.6 G/DL (ref 14–18)
INR PPP: 1.2 (ref 0.8–1.2)
MCH RBC QN AUTO: 27.2 PG (ref 27–31)
MCHC RBC AUTO-ENTMCNC: 33.6 G/DL (ref 32–36)
MCV RBC AUTO: 81 FL (ref 82–98)
PLATELET # BLD AUTO: 680 K/UL (ref 150–450)
PMV BLD AUTO: 11 FL (ref 9.2–12.9)
POTASSIUM SERPL-SCNC: 4.4 MMOL/L (ref 3.5–5.1)
PROTHROMBIN TIME: 12.7 SEC (ref 9–12.5)
RBC # BLD AUTO: 5.74 M/UL (ref 4.6–6.2)
SODIUM SERPL-SCNC: 139 MMOL/L (ref 136–145)
UUN UR-MCNC: 65 MG/DL (ref 2–20)
WBC # BLD AUTO: 10.02 K/UL (ref 3.9–12.7)

## 2023-01-17 PROCEDURE — 85730 THROMBOPLASTIN TIME PARTIAL: CPT | Mod: PO | Performed by: INTERNAL MEDICINE

## 2023-01-17 PROCEDURE — 85610 PROTHROMBIN TIME: CPT | Mod: PO | Performed by: INTERNAL MEDICINE

## 2023-01-17 PROCEDURE — 36415 COLL VENOUS BLD VENIPUNCTURE: CPT | Mod: PO | Performed by: INTERNAL MEDICINE

## 2023-01-17 PROCEDURE — 85027 COMPLETE CBC AUTOMATED: CPT | Mod: PO | Performed by: INTERNAL MEDICINE

## 2023-01-17 PROCEDURE — 80048 BASIC METABOLIC PNL TOTAL CA: CPT | Mod: PO | Performed by: INTERNAL MEDICINE

## 2023-01-18 ENCOUNTER — TELEPHONE (OUTPATIENT)
Dept: FAMILY MEDICINE | Facility: CLINIC | Age: 63
End: 2023-01-18
Payer: MEDICARE

## 2023-01-23 ENCOUNTER — TELEPHONE (OUTPATIENT)
Dept: ELECTROPHYSIOLOGY | Facility: CLINIC | Age: 63
End: 2023-01-23
Payer: MEDICARE

## 2023-01-23 NOTE — TELEPHONE ENCOUNTER
Spoke to patient    CONFIRMED procedure arrival time of 515    Reiterated instructions including:  -Directions to check in desk  -NPO after midnight night prior to procedure  -High importance of HOLDING n/a  -Pre-procedure LABS reviewed platelet 680 Cr 3.27-Dr Tenorio made aware ok to proceed  -Confirmed no fever, cough, or shortness of breath in the past 30 days  -Bathe night prior and morning prior to procedure with Hibiclens solution or an antibacterial soap  -Reviewed current visitor policy    Patient verbalized understanding of above and appreciated the call.

## 2023-01-23 NOTE — H&P
Electrophysiology Pre Procedure H&P    HPI:   Ar Sharma is a 61 y.o. male who presents for evaluation of VT     HPI  61 y.o. M  CAD, ICM  VT, ablated x2 in [place]  CRT-D  CKD  HTN on meds     Went to [facility] [place] 9/2022 with ICD shocks. 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 Since increase amio to 400 and resumption of mexiletine at 150 bid, no VT/VF.  He's able to exercise on bike 2x/week. No CP, and NYHA II SINGH.     ICD is at ANTHONY     My interpretation of today's ECG is APBiVP      Interval History:  Patient denies chest pain fevers chills SOB      Past Medical History:   Diagnosis Date    CKD (chronic kidney disease) stage 4, GFR 15-29 ml/min     Congestive heart failure (CHF) 2015    Edema     Essential (primary) hypertension 5/18/2022    Formatting of this note might be different from the original. Converted from Centricity: Description - ESSENTIAL HYPERTENSION, BENIGN    Heart attack     HLD (hyperlipidemia)     Hypertension     Hyperuricemia     Hypocalcemia     Renal cyst, left     Secondary hyperparathyroidism     Thyroid disease     V tach     Vitamin D deficiency         Social History     Socioeconomic History    Marital status:    Tobacco Use    Smoking status: Former     Packs/day: 1.00     Years: 25.00     Pack years: 25.00     Types: Cigarettes     Start date: 1/1/1979     Quit date: 3/3/2012     Years since quitting: 10.9    Smokeless tobacco: Former   Substance and Sexual Activity    Alcohol use: No    Drug use: No    Sexual activity: Yes     Partners: Female     Social Determinants of Health     Financial Resource Strain: Low Risk     Difficulty of Paying Living Expenses: Not very hard   Food Insecurity: No Food Insecurity    Worried About Running Out of Food in the Last Year: Never true    Ran Out of Food in the Last Year: Never true   Transportation Needs: No Transportation Needs    Lack of Transportation (Medical): No     Lack of Transportation (Non-Medical): No   Physical Activity: Insufficiently Active    Days of Exercise per Week: 2 days    Minutes of Exercise per Session: 30 min   Stress: No Stress Concern Present    Feeling of Stress : Only a little   Social Connections: Moderately Integrated    Frequency of Communication with Friends and Family: Twice a week    Frequency of Social Gatherings with Friends and Family: Once a week    Attends [employer] Services: More than 4 times per year    Active Member of Clubs or Organizations: No    Attends Club or Organization Meetings: Never    Marital Status:    Housing Stability: Low Risk     Unable to Pay for Housing in the Last Year: No    Number of Places Lived in the Last Year: 1    Unstable Housing in the Last Year: No        Family History   Problem Relation Age of Onset    Hypertension Mother     Stroke Mother     Alcohol abuse Father     Kidney disease Father     No Known Problems Sister     No Known Problems Brother     No Known Problems Daughter     No Known Problems Son         Current Facility-Administered Medications   Medication Dose Route Frequency Provider Last Rate Last Admin    ceFAZolin 2 g in dextrose 5 % in water (D5W) 5 % 50 mL IVPB (MB+)  2 g Intravenous On Call Procedure Almita Pedersen NP          ROS:  Constitutional: (-)fevers, (-)chills, (-)night sweats  HEENT: (-) headaches (-) lightheadedness (-) blurry vision  Cardiovascular: (-)chest pain (-)paroxysmal nocturnal dyspnea (-)orthopnea  Respiratory: (-)shortness of breath, (-)dyspnea on exertion (-)hemoptysis  Gastrointestinal: (-)abdominal pain (-)nausea (-)vomiting (-)tarry stools  Musculoskeletal: (-)arthralgias (-)limited range of motion  Neurologic: (-)parasthesias (-)mood disorder (-)anxiety  Endo: (-)polyuria (-)polydipsia (-)heat/cold intolerance  Skin: (-)rash     Vitals:    01/24/23 0554 01/24/23 0556   BP: 103/67 103/70   BP Location: Left arm Right arm   Patient Position: Lying Lying  "  Pulse: 79    Resp: 18    Temp: 97.5 °F (36.4 °C)    TempSrc: Temporal    SpO2: (!) 94%    Weight: 83.9 kg (185 lb)    Height: 5' 9" (1.753 m)      Physical Exam:   Gen: no acute distress, pleasant patient answering questions appropriately  HEENT: extra-ocular muscles intact, normocephalic-atraumatic  Neck: no jugular venous distension, no lymphadenopathy, no thyromegaly, no carotid bruits  CVS: regular rate and rhythm, S1S2 normal, no murmurs/rubs/gallops  CHEST: clear to auscultation bilaterally, no wheezes/rales/ronchi  ABD: Soft, non-tender, nondistended, bowel sounds present  EXT: No Edema, 2+ pulses bilaterally (radial, femoral, dorsales pedis, posterior tibial)  NEURO: awake, alert, and oriented   Skin: No rash     Review of Labs:   At baseline      Most recent ECG  Av paced     Assessment/Plan:  CM.  Add toprol (coreg had been stopped due to hypotension).     CRTD  Gen change, discussed possible temp PM     VT  Continue amio/dyan.    "

## 2023-01-24 ENCOUNTER — HOSPITAL ENCOUNTER (OUTPATIENT)
Facility: HOSPITAL | Age: 63
Discharge: HOME | End: 2023-01-24
Attending: INTERNAL MEDICINE | Admitting: INTERNAL MEDICINE
Payer: MEDICARE

## 2023-01-24 ENCOUNTER — ANESTHESIA (OUTPATIENT)
Dept: MEDSURG UNIT | Facility: HOSPITAL | Age: 63
End: 2023-01-24
Payer: MEDICARE

## 2023-01-24 ENCOUNTER — ANESTHESIA EVENT (OUTPATIENT)
Dept: MEDSURG UNIT | Facility: HOSPITAL | Age: 63
End: 2023-01-24
Payer: MEDICARE

## 2023-01-24 VITALS
BODY MASS INDEX: 27.4 KG/M2 | HEIGHT: 69 IN | RESPIRATION RATE: 45 BRPM | WEIGHT: 185 LBS | OXYGEN SATURATION: 95 % | HEART RATE: 60 BPM | SYSTOLIC BLOOD PRESSURE: 100 MMHG | TEMPERATURE: 97 F | DIASTOLIC BLOOD PRESSURE: 64 MMHG

## 2023-01-24 DIAGNOSIS — I47.20 VT (VENTRICULAR TACHYCARDIA): Primary | ICD-10-CM

## 2023-01-24 DIAGNOSIS — Z95.9 CARDIAC DEVICE IN SITU: ICD-10-CM

## 2023-01-24 DIAGNOSIS — I49.9 ARRHYTHMIA: ICD-10-CM

## 2023-01-24 DIAGNOSIS — Z45.02 IMPLANTABLE CARDIOVERTER-DEFIBRILLATOR (ICD) AT END OF BATTERY LIFE: ICD-10-CM

## 2023-01-24 PROCEDURE — 99234 PR OBSERV/HOSP SAME DATE,LEVL III: ICD-10-PCS | Mod: ,,, | Performed by: INTERNAL MEDICINE

## 2023-01-24 PROCEDURE — 93005 ELECTROCARDIOGRAM TRACING: CPT | Mod: 59

## 2023-01-24 PROCEDURE — 37000009 HC ANESTHESIA EA ADD 15 MINS: Performed by: INTERNAL MEDICINE

## 2023-01-24 PROCEDURE — 25000003 PHARM REV CODE 250: Performed by: INTERNAL MEDICINE

## 2023-01-24 PROCEDURE — 25000003 PHARM REV CODE 250: Performed by: NURSE PRACTITIONER

## 2023-01-24 PROCEDURE — C1882 AICD, OTHER THAN SING/DUAL: HCPCS | Performed by: INTERNAL MEDICINE

## 2023-01-24 PROCEDURE — 99234 HOSP IP/OBS SM DT SF/LOW 45: CPT | Mod: ,,, | Performed by: INTERNAL MEDICINE

## 2023-01-24 PROCEDURE — 25000003 PHARM REV CODE 250: Performed by: NURSE ANESTHETIST, CERTIFIED REGISTERED

## 2023-01-24 PROCEDURE — 93010 EKG 12-LEAD: ICD-10-PCS | Mod: ,,, | Performed by: INTERNAL MEDICINE

## 2023-01-24 PROCEDURE — 93005 ELECTROCARDIOGRAM TRACING: CPT

## 2023-01-24 PROCEDURE — D9220A PRA ANESTHESIA: Mod: CRNA,,, | Performed by: NURSE ANESTHETIST, CERTIFIED REGISTERED

## 2023-01-24 PROCEDURE — D9220A PRA ANESTHESIA: ICD-10-PCS | Mod: CRNA,,, | Performed by: NURSE ANESTHETIST, CERTIFIED REGISTERED

## 2023-01-24 PROCEDURE — 33264 RMVL & RPLCMT DFB GEN MLT LD: CPT | Mod: ,,, | Performed by: INTERNAL MEDICINE

## 2023-01-24 PROCEDURE — 93010 ELECTROCARDIOGRAM REPORT: CPT | Mod: ,,, | Performed by: INTERNAL MEDICINE

## 2023-01-24 PROCEDURE — 33264 RMVL & RPLCMT DFB GEN MLT LD: CPT | Performed by: INTERNAL MEDICINE

## 2023-01-24 PROCEDURE — 27201423 OPTIME MED/SURG SUP & DEVICES STERILE SUPPLY: Performed by: INTERNAL MEDICINE

## 2023-01-24 PROCEDURE — 63600175 PHARM REV CODE 636 W HCPCS: Performed by: NURSE PRACTITIONER

## 2023-01-24 PROCEDURE — D9220A PRA ANESTHESIA: ICD-10-PCS | Mod: ANES,,, | Performed by: ANESTHESIOLOGY

## 2023-01-24 PROCEDURE — 63600175 PHARM REV CODE 636 W HCPCS: Performed by: NURSE ANESTHETIST, CERTIFIED REGISTERED

## 2023-01-24 PROCEDURE — D9220A PRA ANESTHESIA: Mod: ANES,,, | Performed by: ANESTHESIOLOGY

## 2023-01-24 PROCEDURE — 33264 PR REMV&REPLC CVD GEN MULT LEAD: ICD-10-PCS | Mod: ,,, | Performed by: INTERNAL MEDICINE

## 2023-01-24 PROCEDURE — 37000008 HC ANESTHESIA 1ST 15 MINUTES: Performed by: INTERNAL MEDICINE

## 2023-01-24 PROCEDURE — 94761 N-INVAS EAR/PLS OXIMETRY MLT: CPT

## 2023-01-24 DEVICE — CRTD DTMA1Q1 CLARIA MRI QUAD US DF1
Type: IMPLANTABLE DEVICE | Site: CHEST | Status: FUNCTIONAL
Brand: CLARIA MRI™ QUAD CRT-D SURESCAN™

## 2023-01-24 RX ORDER — PROPOFOL 10 MG/ML
VIAL (ML) INTRAVENOUS CONTINUOUS PRN
Status: DISCONTINUED | OUTPATIENT
Start: 2023-01-24 | End: 2023-01-24

## 2023-01-24 RX ORDER — BUPIVACAINE HYDROCHLORIDE 2.5 MG/ML
INJECTION, SOLUTION EPIDURAL; INFILTRATION; INTRACAUDAL
Status: DISCONTINUED | OUTPATIENT
Start: 2023-01-24 | End: 2023-01-24 | Stop reason: HOSPADM

## 2023-01-24 RX ORDER — PHENYLEPHRINE HCL IN 0.9% NACL 1 MG/10 ML
SYRINGE (ML) INTRAVENOUS
Status: DISCONTINUED | OUTPATIENT
Start: 2023-01-24 | End: 2023-01-24

## 2023-01-24 RX ORDER — HYDROMORPHONE HYDROCHLORIDE 1 MG/ML
0.2 INJECTION, SOLUTION INTRAMUSCULAR; INTRAVENOUS; SUBCUTANEOUS EVERY 5 MIN PRN
Status: DISCONTINUED | OUTPATIENT
Start: 2023-01-24 | End: 2023-01-24 | Stop reason: HOSPADM

## 2023-01-24 RX ORDER — ACETAMINOPHEN 325 MG/1
650 TABLET ORAL EVERY 4 HOURS PRN
Status: DISCONTINUED | OUTPATIENT
Start: 2023-01-24 | End: 2023-01-24 | Stop reason: HOSPADM

## 2023-01-24 RX ORDER — FENTANYL CITRATE 50 UG/ML
INJECTION, SOLUTION INTRAMUSCULAR; INTRAVENOUS
Status: DISCONTINUED | OUTPATIENT
Start: 2023-01-24 | End: 2023-01-24

## 2023-01-24 RX ORDER — SODIUM CHLORIDE 0.9 G/100ML
IRRIGANT IRRIGATION
Status: DISCONTINUED | OUTPATIENT
Start: 2023-01-24 | End: 2023-01-24 | Stop reason: HOSPADM

## 2023-01-24 RX ORDER — MIDAZOLAM HYDROCHLORIDE 1 MG/ML
INJECTION, SOLUTION INTRAMUSCULAR; INTRAVENOUS
Status: DISCONTINUED | OUTPATIENT
Start: 2023-01-24 | End: 2023-01-24

## 2023-01-24 RX ORDER — SODIUM CHLORIDE 0.9 % (FLUSH) 0.9 %
10 SYRINGE (ML) INJECTION
Status: DISCONTINUED | OUTPATIENT
Start: 2023-01-24 | End: 2023-01-24 | Stop reason: HOSPADM

## 2023-01-24 RX ORDER — LIDOCAINE HYDROCHLORIDE 20 MG/ML
INJECTION, SOLUTION INFILTRATION; PERINEURAL
Status: DISCONTINUED | OUTPATIENT
Start: 2023-01-24 | End: 2023-01-24 | Stop reason: HOSPADM

## 2023-01-24 RX ORDER — LIDOCAINE HYDROCHLORIDE 20 MG/ML
INJECTION INTRAVENOUS
Status: DISCONTINUED | OUTPATIENT
Start: 2023-01-24 | End: 2023-01-24

## 2023-01-24 RX ORDER — DOXYCYCLINE 100 MG/1
100 CAPSULE ORAL 2 TIMES DAILY
Qty: 10 CAPSULE | Refills: 0 | Status: SHIPPED | OUTPATIENT
Start: 2023-01-24 | End: 2023-01-29

## 2023-01-24 RX ORDER — PROPOFOL 10 MG/ML
VIAL (ML) INTRAVENOUS
Status: DISCONTINUED | OUTPATIENT
Start: 2023-01-24 | End: 2023-01-24

## 2023-01-24 RX ADMIN — FENTANYL CITRATE 25 MCG: 0.05 INJECTION, SOLUTION INTRAMUSCULAR; INTRAVENOUS at 07:01

## 2023-01-24 RX ADMIN — LIDOCAINE HYDROCHLORIDE 40 MG: 20 INJECTION INTRAVENOUS at 07:01

## 2023-01-24 RX ADMIN — SODIUM CHLORIDE: 9 INJECTION, SOLUTION INTRAVENOUS at 07:01

## 2023-01-24 RX ADMIN — CEFAZOLIN 2 G: 2 INJECTION, POWDER, FOR SOLUTION INTRAMUSCULAR; INTRAVENOUS at 07:01

## 2023-01-24 RX ADMIN — Medication 150 MCG: at 07:01

## 2023-01-24 RX ADMIN — MIDAZOLAM HYDROCHLORIDE 1 MG: 1 INJECTION, SOLUTION INTRAMUSCULAR; INTRAVENOUS at 07:01

## 2023-01-24 RX ADMIN — Medication 150 MCG: at 08:01

## 2023-01-24 RX ADMIN — Medication 100 MCG: at 07:01

## 2023-01-24 RX ADMIN — PROPOFOL 20 MG: 10 INJECTION, EMULSION INTRAVENOUS at 08:01

## 2023-01-24 RX ADMIN — Medication 75 MCG/KG/MIN: at 07:01

## 2023-01-24 RX ADMIN — PROPOFOL 30 MG: 10 INJECTION, EMULSION INTRAVENOUS at 07:01

## 2023-01-24 NOTE — Clinical Note
There was an existing generator. The generator was removed. The leads were disconnected. The generator was returned to .

## 2023-01-24 NOTE — TRANSFER OF CARE
"Anesthesia Transfer of Care Note    Patient: Ar Sharma    Procedure(s) Performed: Procedure(s) (LRB):  REPLACEMENT, ICD GENERATOR (Left)    Patient location: PACU    Anesthesia Type: general    Transport from OR: Transported from OR on 6-10 L/min O2 by face mask with adequate spontaneous ventilation    Post pain: adequate analgesia    Post assessment: no apparent anesthetic complications and tolerated procedure well    Post vital signs: stable    Level of consciousness: awake and alert    Nausea/Vomiting: no nausea/vomiting    Complications: none    Transfer of care protocol was followed      Last vitals:   Visit Vitals  BP (!) 97/54 (BP Location: Right arm, Patient Position: Lying)   Pulse 61   Temp 36.1 °C (97 °F) (Temporal)   Resp 18   Ht 5' 9" (1.753 m)   Wt 83.9 kg (185 lb)   SpO2 99%   BMI 27.32 kg/m²     "

## 2023-01-24 NOTE — Clinical Note
The chest was prepped. The site was prepped with ChloraPrep and Ioban. The site was clipped. The patient was draped.

## 2023-01-24 NOTE — PROGRESS NOTES
Dr. Abarca notified BP 85/53, phone orders received for NS bolus 250 cc now. Will continue to monitor.

## 2023-01-24 NOTE — PROGRESS NOTES
Dr. Abarca notified BP 98/58, MD states OK to move patient to next phase of care. Will continue to monitor.

## 2023-01-24 NOTE — ANESTHESIA PREPROCEDURE EVALUATION
01/24/2023  Ar Sharma is a 62 y.o., male with CHF, CKD, HTN, HPL here for ICD generator change.  EF 40-45%, Pulmonary Hypertension.    Past Medical History:   Diagnosis Date    CKD (chronic kidney disease) stage 4, GFR 15-29 ml/min     Congestive heart failure (CHF) 2015    Edema     Essential (primary) hypertension 5/18/2022    Formatting of this note might be different from the original. Converted from Centricity: Description - ESSENTIAL HYPERTENSION, BENIGN    Heart attack     HLD (hyperlipidemia)     Hypertension     Hyperuricemia     Hypocalcemia     Renal cyst, left     Secondary hyperparathyroidism     Thyroid disease     V tach     Vitamin D deficiency      Results for orders placed during the hospital encounter of 09/25/22    Echo    Interpretation Summary  · The left ventricle is normal in size with eccentric hypertrophy and mildly decreased systolic function.  · The estimated ejection fraction is 40-45%.  · There is abnormal septal wall motion consistent with right ventricular pacemaker.  · Grade II left ventricular diastolic dysfunction.  · With low normal right ventricular systolic function.  · Moderate right atrial enlargement.  · Mild tricuspid regurgitation.  · There is pulmonary hypertension.  · The estimated PA systolic pressure is 67 mmHg.  · Elevated central venous pressure (15 mmHg).      Results for orders placed during the hospital encounter of 09/25/22    Echo    Interpretation Summary  · The left ventricle is normal in size with eccentric hypertrophy and mildly decreased systolic function.  · The estimated ejection fraction is 40-45%.  · There is abnormal septal wall motion consistent with right ventricular pacemaker.  · Grade II left ventricular diastolic dysfunction.  · With low normal right ventricular systolic function.  · Moderate right atrial enlargement.  ·  Mild tricuspid regurgitation.  · There is pulmonary hypertension.  · The estimated PA systolic pressure is 67 mmHg.  · Elevated central venous pressure (15 mmHg).        Pre-op Assessment    I have reviewed the Patient Summary Reports.     I have reviewed the Nursing Notes. I have reviewed the NPO Status.      Review of Systems  Cardiovascular:   Hypertension CAD   CHF    Renal/:   Chronic Renal Disease        Physical Exam  General: Well nourished, Cooperative, Alert and Oriented    Airway:  Mallampati: II   Mouth Opening: Normal  TM Distance: Normal  Neck ROM: Normal ROM    Dental:  Intact        Anesthesia Plan  Type of Anesthesia, risks & benefits discussed:    Anesthesia Type: Gen Natural Airway  Intra-op Monitoring Plan: Standard ASA Monitors  Post Op Pain Control Plan: multimodal analgesia  Induction:  IV  Informed Consent: Informed consent signed with the Patient and all parties understand the risks and agree with anesthesia plan.  All questions answered.   ASA Score: 3    Ready For Surgery From Anesthesia Perspective.     .

## 2023-01-24 NOTE — PLAN OF CARE
Patient received from PACU 2nd floor at 1010 accompanied by MILADIS Bhagat. Report received. Patient is aao x4. Vss. Left chest wall with aquacell dressing intact. No bleeding, no hematoma noted.  Breathing is even  and unlabored. Spouse at bedside. Turkey sandwich and juice provided. Dr. Miranda at bedside with orders to keep patient another hour for BP monitoring post bolus. Will monitor.

## 2023-01-24 NOTE — PLAN OF CARE
Patient ambulated to bathroom. Voided. No problems. Vss. Aaox4. Left chest wall with aquacell dressing intact no bleeding, no hematoma noted. AVS printed. Home care instructions reviewed with patient and spouse. Iv hep lock removed. Wheelchair provided for discharge. RX to be picked up in outpatient pharmacy.

## 2023-01-24 NOTE — DISCHARGE SUMMARY
[facility] - Short Stay Cardiac Unit  Cardiology  Discharge Summary      Patient Name: Ar Sharma  MRN: [id number]  Admission Date: 1/24/2023  Hospital Length of Stay: 0 days  Discharge Date and Time:  01/24/2023 11:44 AM  Attending Physician: River Tenorio MD  Discharging Provider: Prabhu Miranda MD  Primary Care Physician: Jc Elliott MD    HPI: Ar Sharma is a 61 y.o. male who presents for evaluation of VT     HPI  61 y.o. M  CAD, ICM  VT, ablated x2 in [place]  CRT-D  CKD  HTN on meds     Went to [facility] [place] 9/2022 with ICD shocks. 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 Since increase amio to 400 and resumption of mexiletine at 150 bid, no VT/VF.  He's able to exercise on bike 2x/week. No CP, and NYHA II SINGH.     ICD is at ANTHONY     My interpretation of today's ECG is APBiVP        Interval History:  Patient denies chest pain fevers chills SOB    Procedure(s) (LRB):  REPLACEMENT, ICD GENERATOR (Left)     Indwelling Lines/Drains at time of discharge:  Lines/Drains/Airways       None                   Hospital Course (synopsis of major diagnoses, care, treatment, and services provided during the course of the hospital stay): Patient arrived fasting for generator change. Patient tolerated procedure well. Patient monitored post op without issues and discharged.         Pending Diagnostic Studies:       None            Final Active Diagnoses:    Diagnosis Date Noted POA    PRINCIPAL PROBLEM:  Implantable cardioverter-defibrillator (ICD) at end of battery life [Z45.02] 09/25/2022 Not Applicable      Problems Resolved During this Admission:       Discharged Condition: good    Follow Up:   Follow-up Information       [facility] Follow up in 1 week(s).               River Tenorio MD Follow up in 4 month(s).    Specialties: Electrophysiology, Cardiology  Contact information:  [address]  [facility] [address]  [phone]                            Patient Instructions:   No heavy lifting for 1 week      Medications:  Reconciled Home Medications:      Medication List        START taking these medications      doxycycline 100 MG Cap  Commonly known as: VIBRAMYCIN  Take 1 capsule (100 mg total) by mouth 2 (two) times daily. for 5 days            CHANGE how you take these medications      levothyroxine 175 MCG tablet  Commonly known as: SYNTHROID, LEVOTHROID  TAKE 1 TABLET BY MOUTH ONCE DAILY.  What changed: when to take this            CONTINUE taking these medications      amiodarone 400 MG tablet  Commonly known as: PACERONE  Take 1 tablet (400 mg total) by mouth once daily.     aspirin 81 MG EC tablet  Commonly known as: ECOTRIN  Take 1 tablet (81 mg total) by mouth once daily.     ENTRESTO 24-26 mg per tablet  Generic drug: sacubitriL-valsartan  Take 1 tablet by mouth 2 (two) times daily.     ergocalciferol 50,000 unit Cap  Commonly known as: ERGOCALCIFEROL  Take 1 capsule (50,000 Units total) by mouth every 7 days.     furosemide 80 MG tablet  Commonly known as: LASIX  Take 1 tablet (80 mg total) by mouth 2 (two) times daily. If increased 3 pounds in a day or 5 pounds in a week, take an extra dose of lasix 80mg until those pounds are lost.     metoprolol succinate 25 MG 24 hr tablet  Commonly known as: TOPROL-XL  Take 1 tablet (25 mg total) by mouth once daily.     mexiletine 150 MG Cap  Commonly known as: MEXITIL  TAKE 1 CAPSULE (150 MG TOTAL) BY MOUTH 2 (TWO) TIMES DAILY WITH MEALS.     nitroGLYCERIN 0.4 MG SL tablet  Commonly known as: NITROSTAT  Place 1 tablet (0.4 mg total) under the tongue every 5 (five) minutes as needed for Chest pain.     pravastatin 40 MG tablet  Commonly known as: PRAVACHOL  TAKE 1 TABLET BY MOUTH EVERY DAY              Time spent on the discharge of patient: 30 minutes    Prabhu Miranda MD  Cardiology  [facility] - Short Stay Cardiac Unit

## 2023-01-24 NOTE — ANESTHESIA POSTPROCEDURE EVALUATION
Anesthesia Post Evaluation    Patient: Ar Sharma    Procedure(s) Performed: Procedure(s) (LRB):  REPLACEMENT, ICD GENERATOR (Left)    Final Anesthesia Type: general      Patient location during evaluation: PACU  Patient participation: Yes- Able to Participate  Level of consciousness: awake and alert and oriented  Post-procedure vital signs: reviewed and stable  Pain management: adequate  Airway patency: patent    PONV status at discharge: No PONV  Anesthetic complications: no      Cardiovascular status: blood pressure returned to baseline and hemodynamically stable  Respiratory status: unassisted and spontaneous ventilation  Hydration status: euvolemic  Follow-up not needed.          Vitals Value Taken Time   /64 01/24/23 1147   Temp 36.3 °C (97.3 °F) 01/24/23 1000   Pulse 60 01/24/23 1152   Resp 25 01/24/23 1152   SpO2 95 % 01/24/23 1130   Vitals shown include unvalidated device data.      No case tracking events are documented in the log.      Pain/Gina Score: Gina Score: 10 (1/24/2023 10:00 AM)

## 2023-01-24 NOTE — NURSING TRANSFER
Nursing Transfer Note      1/24/2023     Reason patient is being transferred: per order    Transfer To: Cath Hold 7    Transfer via stretcher    Transfer with cardiac monitoring    Transported by RN    Medicines sent: N/A    Any special needs or follow-up needed: N/A    Chart send with patient: Yes    Notified: spouse    Patient reassessed at: 1/24/2023 @ 0900    Upon arrival to floor: cardiac monitor applied, patient oriented to room, and bed in lowest position

## 2023-02-01 ENCOUNTER — TELEPHONE (OUTPATIENT)
Dept: ELECTROPHYSIOLOGY | Facility: CLINIC | Age: 63
End: 2023-02-01
Payer: MEDICARE

## 2023-02-01 ENCOUNTER — CLINICAL SUPPORT (OUTPATIENT)
Dept: CARDIOLOGY | Facility: HOSPITAL | Age: 63
End: 2023-02-01
Attending: INTERNAL MEDICINE
Payer: MEDICARE

## 2023-02-01 DIAGNOSIS — Z45.02 IMPLANTABLE CARDIOVERTER-DEFIBRILLATOR (ICD) AT END OF BATTERY LIFE: ICD-10-CM

## 2023-02-01 PROCEDURE — 93284 PRGRMG EVAL IMPLANTABLE DFB: CPT

## 2023-02-01 PROCEDURE — 93284 CARDIAC DEVICE CHECK - IN CLINIC & HOSPITAL: ICD-10-PCS | Mod: 26,,, | Performed by: INTERNAL MEDICINE

## 2023-02-01 PROCEDURE — 93284 PRGRMG EVAL IMPLANTABLE DFB: CPT | Mod: 26,,, | Performed by: INTERNAL MEDICINE

## 2023-02-01 NOTE — TELEPHONE ENCOUNTER
Called and spoke with patient to help connect home monitor and assist in sending manual transmission. Patient was receiving an error code on monitor. Advised patient to call [employer] Stay Connected for troubleshooting. Patient stated he would.

## 2023-02-02 ENCOUNTER — OFFICE VISIT (OUTPATIENT)
Dept: CARDIOLOGY | Facility: CLINIC | Age: 63
End: 2023-02-02
Payer: MEDICARE

## 2023-02-02 VITALS
DIASTOLIC BLOOD PRESSURE: 65 MMHG | WEIGHT: 192.19 LBS | OXYGEN SATURATION: 65 % | HEART RATE: 99 BPM | HEIGHT: 69 IN | SYSTOLIC BLOOD PRESSURE: 98 MMHG | BODY MASS INDEX: 28.47 KG/M2

## 2023-02-02 DIAGNOSIS — E78.5 HYPERLIPIDEMIA, UNSPECIFIED HYPERLIPIDEMIA TYPE: ICD-10-CM

## 2023-02-02 DIAGNOSIS — Z86.79 S/P ABLATION OF VENTRICULAR ARRHYTHMIA: ICD-10-CM

## 2023-02-02 DIAGNOSIS — Z98.890 S/P ABLATION OF VENTRICULAR ARRHYTHMIA: ICD-10-CM

## 2023-02-02 DIAGNOSIS — I47.20 V-TACH: ICD-10-CM

## 2023-02-02 DIAGNOSIS — I25.119 ATHEROSCLEROSIS OF NATIVE CORONARY ARTERY OF NATIVE HEART WITH ANGINA PECTORIS: ICD-10-CM

## 2023-02-02 DIAGNOSIS — E03.8 OTHER SPECIFIED HYPOTHYROIDISM: ICD-10-CM

## 2023-02-02 DIAGNOSIS — I50.20 HFREF (HEART FAILURE WITH REDUCED EJECTION FRACTION): Primary | ICD-10-CM

## 2023-02-02 DIAGNOSIS — I47.20 VT (VENTRICULAR TACHYCARDIA): ICD-10-CM

## 2023-02-02 DIAGNOSIS — I50.41 ACUTE COMBINED SYSTOLIC AND DIASTOLIC CONGESTIVE HEART FAILURE: ICD-10-CM

## 2023-02-02 DIAGNOSIS — Z95.810 CARDIAC RESYNCHRONIZATION THERAPY DEFIBRILLATOR (CRT-D) IN PLACE: ICD-10-CM

## 2023-02-02 DIAGNOSIS — R09.89 CARDIAC LV EJECTION FRACTION 10-20%: ICD-10-CM

## 2023-02-02 DIAGNOSIS — Z45.02 IMPLANTABLE CARDIOVERTER-DEFIBRILLATOR (ICD) AT END OF BATTERY LIFE: ICD-10-CM

## 2023-02-02 PROCEDURE — 99999 PR PBB SHADOW E&M-EST. PATIENT-LVL IV: CPT | Mod: PBBFAC,,, | Performed by: INTERNAL MEDICINE

## 2023-02-02 PROCEDURE — 1160F PR REVIEW ALL MEDS BY PRESCRIBER/CLIN PHARMACIST DOCUMENTED: ICD-10-PCS | Mod: CPTII,S$GLB,, | Performed by: INTERNAL MEDICINE

## 2023-02-02 PROCEDURE — 3074F SYST BP LT 130 MM HG: CPT | Mod: CPTII,S$GLB,, | Performed by: INTERNAL MEDICINE

## 2023-02-02 PROCEDURE — 1159F PR MEDICATION LIST DOCUMENTED IN MEDICAL RECORD: ICD-10-PCS | Mod: CPTII,S$GLB,, | Performed by: INTERNAL MEDICINE

## 2023-02-02 PROCEDURE — 3074F PR MOST RECENT SYSTOLIC BLOOD PRESSURE < 130 MM HG: ICD-10-PCS | Mod: CPTII,S$GLB,, | Performed by: INTERNAL MEDICINE

## 2023-02-02 PROCEDURE — 99999 PR PBB SHADOW E&M-EST. PATIENT-LVL IV: ICD-10-PCS | Mod: PBBFAC,,, | Performed by: INTERNAL MEDICINE

## 2023-02-02 PROCEDURE — 1160F RVW MEDS BY RX/DR IN RCRD: CPT | Mod: CPTII,S$GLB,, | Performed by: INTERNAL MEDICINE

## 2023-02-02 PROCEDURE — 3008F PR BODY MASS INDEX (BMI) DOCUMENTED: ICD-10-PCS | Mod: CPTII,S$GLB,, | Performed by: INTERNAL MEDICINE

## 2023-02-02 PROCEDURE — 99215 PR OFFICE/OUTPT VISIT, EST, LEVL V, 40-54 MIN: ICD-10-PCS | Mod: S$GLB,,, | Performed by: INTERNAL MEDICINE

## 2023-02-02 PROCEDURE — 3078F PR MOST RECENT DIASTOLIC BLOOD PRESSURE < 80 MM HG: ICD-10-PCS | Mod: CPTII,S$GLB,, | Performed by: INTERNAL MEDICINE

## 2023-02-02 PROCEDURE — 3008F BODY MASS INDEX DOCD: CPT | Mod: CPTII,S$GLB,, | Performed by: INTERNAL MEDICINE

## 2023-02-02 PROCEDURE — 3078F DIAST BP <80 MM HG: CPT | Mod: CPTII,S$GLB,, | Performed by: INTERNAL MEDICINE

## 2023-02-02 PROCEDURE — 1159F MED LIST DOCD IN RCRD: CPT | Mod: CPTII,S$GLB,, | Performed by: INTERNAL MEDICINE

## 2023-02-02 PROCEDURE — 99215 OFFICE O/P EST HI 40 MIN: CPT | Mod: S$GLB,,, | Performed by: INTERNAL MEDICINE

## 2023-02-02 RX ORDER — MEXILETINE HYDROCHLORIDE 150 MG/1
150 CAPSULE ORAL 2 TIMES DAILY WITH MEALS
Qty: 180 CAPSULE | Refills: 3 | Status: SHIPPED | OUTPATIENT
Start: 2023-02-02 | End: 2024-01-31 | Stop reason: SDUPTHER

## 2023-02-02 RX ORDER — SACUBITRIL AND VALSARTAN 24; 26 MG/1; MG/1
1 TABLET, FILM COATED ORAL 2 TIMES DAILY
Qty: 180 TABLET | Refills: 3 | Status: SHIPPED | OUTPATIENT
Start: 2023-02-02

## 2023-02-02 RX ORDER — ERGOCALCIFEROL 1.25 MG/1
CAPSULE ORAL
Qty: 12 CAPSULE | Refills: 3 | Status: SHIPPED | OUTPATIENT
Start: 2023-02-02 | End: 2024-01-23

## 2023-02-02 NOTE — PROGRESS NOTES
Subjective:    Patient ID:  Ar Sharma is a 62 y.o. male who presents for follow-up of Congestive Heart Failure      HPI    63 y/o male with a hx of CAD (states he had MI in his 20's), HFrEF with EF 30-35% initially with last EF 50% (11/04) with SLAVA 5.35 cm, s/p CRT-D (initial ICD 2012 with upgrade 2016 - [employer]), DD, VT s/p ablation x 2 (2/2018 and again 3/2018), HLD, hypothyroidism, YOEL. Initially seen by Dr Newman and it sounds like he was in ADHF, lasix dose increased, and symptoms had significantly improved. Was very SOB with minimal activity, with bilateral LE edema/orthopnea/PND. Increase in lasix dose resulted in SOB back at baseline and orthopnea/PND resolved, with improvement in edema. Entresto started after last VT ablation. Entresto dose was increased in Dec 2018. Did not tolerate it well, however, current dose tolerated (49-51). Had ICD shocks in 12/2018 (per interrogation mentions 5 times), he remembers one.   Previous clinic visit had been having worsening of chronic angina and symptoms. Chest pain significantly improved and no recent episodes. NYHA FC II symptoms. Has episodic dizziness and fatigue. Takes extra doses of ASA for CP. Reserves NTG for very severe pain, which he has not had.  Had previous episode of heart racing that he gets before he gets shocked, was able to lay down, doubled dose of carvedilol, no shock and heart racing improved. Had intermittent heart racing for about 1 week after that resolved. ICD interrogation from 3/4/2021 in media section with 1 terminated VT episode with ATP. BB increased at previous clinic visit.  Had nuclear SPECT with:  1. Abnormal study.  There is marked thinning of the lateral left ventricular wall, absence of uptake within the inferior wall and cardiac apex, global hypokinesis with abnormally increased systolic and end-diastolic volume, as well as decreased ejection fraction at 35%..  2. No definite areas of reversibility to suggest ischemia is  seen.  Symptoms resolved and BP stable. Had nuclear SPECT in the past with [facility] with nonobstructive CAD.   Syncope prior to last visit with prodrome and out for like 5 mins - carrying a fridge up the stairs. Was admitted for ICD discharge. Meds adjusted, discharged, and has followed with HTS.   Seen by Nephrology and refusing PD/HD for now.     2/2/2023:  Since last visit had successful ICD generator change and site healing well. Restarted on BB (Toprol this time) and tolerating well. Denies CP, SOB/SINGH, orthopnea, PND, syncope, palps, LE edema. Tolerating meds well.     Review of Systems   Constitutional: Positive for malaise/fatigue.   HENT:  Negative for congestion.    Eyes:  Negative for blurred vision.   Cardiovascular:  Negative for chest pain, claudication, cyanosis, dyspnea on exertion, irregular heartbeat, leg swelling, near-syncope, orthopnea, palpitations, paroxysmal nocturnal dyspnea and syncope.   Respiratory:  Negative for shortness of breath.    Endocrine: Negative for polyuria.   Hematologic/Lymphatic: Negative for bleeding problem.   Skin:  Negative for itching and rash.   Musculoskeletal:  Negative for joint swelling, muscle cramps and muscle weakness.   Gastrointestinal:  Negative for abdominal pain, hematemesis, hematochezia, melena, nausea and vomiting.   Genitourinary:  Negative for dysuria and hematuria.   Neurological:  Negative for dizziness, focal weakness, headaches, light-headedness, loss of balance and weakness.   Psychiatric/Behavioral:  Negative for depression. The patient is not nervous/anxious.       Objective:    Physical Exam  Constitutional:       Appearance: He is well-developed.   HENT:      Head: Normocephalic and atraumatic.   Neck:      Vascular: No JVD.   Cardiovascular:      Rate and Rhythm: Normal rate and regular rhythm.      Pulses:           Carotid pulses are 2+ on the right side and 2+ on the left side.       Radial pulses are 2+ on the right side and 2+ on the left  side.        Femoral pulses are 2+ on the right side and 2+ on the left side.       Dorsalis pedis pulses are 2+ on the right side and 2+ on the left side.        Posterior tibial pulses are 2+ on the right side and 2+ on the left side.      Heart sounds: Normal heart sounds.   Pulmonary:      Effort: Pulmonary effort is normal.      Breath sounds: Normal breath sounds.   Abdominal:      General: Bowel sounds are normal.      Palpations: Abdomen is soft.   Musculoskeletal:      Cervical back: Neck supple.   Skin:     General: Skin is warm and dry.   Neurological:      Mental Status: He is alert and oriented to person, place, and time.   Psychiatric:         Behavior: Behavior normal.         Thought Content: Thought content normal.         Assessment:       1. HFrEF (heart failure with reduced ejection fraction)    2. Cardiac resynchronization therapy defibrillator (CRT-D) in place    3. Cardiac LV ejection fraction 10-20%    4. Atherosclerosis of native coronary artery of native heart with angina pectoris    5. Acute combined systolic and diastolic congestive heart failure    6. Hyperlipidemia, unspecified hyperlipidemia type    7. Implantable cardioverter-defibrillator (ICD) at end of battery life    8. VT (ventricular tachycardia)    9. S/P ablation of ventricular arrhythmia    10. Other specified hypothyroidism    11. V-tach      61 y/o pt with hx and presentation as above. Doing well from a cardiac perspective and compensated from a HF perspective. Currently asymptomatic and on good medical management. Cont GDMT. Needs to stay active. Discussed the etiology, evaluation, and management of CAD, VT, CHF, HLD, ICD, CRT, CKD, YOEL. Discussed the importance of med compliance, heart healthy diet, and regular exercise.      Plan:       HFrEF  -2DE with EF 40-45%  - Cont Entresto 24/26 mg BID  -Cont BB    VT  -ICD in place - recent gen change  -Cont Mexiletine/Amio for now    CAD  -stable, no acute issues  -cont  ASA/statin    HLD  -last lipid panel reviewed and at goal  -cont statin    Total duration of face to face visit time 30 minutes.  Total time spent counseling greater than fifty percent of total visit time.  Counseling included discussion regarding imaging findings, diagnosis, possibilities, treatment options, risks and benefits.  -In today's visit, at least 4 established conditions that pose a risk to life or bodily function have been addressed and the conditions are severe.      -f/u in 6 months

## 2023-02-25 DIAGNOSIS — E03.9 HYPOTHYROIDISM, UNSPECIFIED TYPE: ICD-10-CM

## 2023-02-25 NOTE — TELEPHONE ENCOUNTER
No new care gaps identified.  [facility] Embedded Care Gaps. Reference number: [id number]. 2/25/2023   3:28:54 PM CST

## 2023-02-26 RX ORDER — LEVOTHYROXINE SODIUM 175 UG/1
TABLET ORAL
Qty: 90 TABLET | Refills: 3 | Status: SHIPPED | OUTPATIENT
Start: 2023-02-26 | End: 2023-09-11 | Stop reason: SDUPTHER

## 2023-02-27 NOTE — TELEPHONE ENCOUNTER
Refill Decision Note   Ar Sharma  is requesting a refill authorization.  Brief Assessment and Rationale for Refill:  Approve     Medication Therapy Plan:       Medication Reconciliation Completed: No   Comments:     No Care Gaps recommended.     Note composed:6:18 PM 02/26/2023

## 2023-03-01 ENCOUNTER — OFFICE VISIT (OUTPATIENT)
Dept: NEPHROLOGY | Facility: CLINIC | Age: 63
End: 2023-03-01
Payer: MEDICARE

## 2023-03-01 VITALS
HEART RATE: 83 BPM | SYSTOLIC BLOOD PRESSURE: 138 MMHG | HEIGHT: 69 IN | WEIGHT: 188.5 LBS | OXYGEN SATURATION: 97 % | DIASTOLIC BLOOD PRESSURE: 80 MMHG | BODY MASS INDEX: 27.92 KG/M2

## 2023-03-01 DIAGNOSIS — E55.9 VITAMIN D DEFICIENCY: ICD-10-CM

## 2023-03-01 DIAGNOSIS — R09.89 CARDIAC LV EJECTION FRACTION 10-20%: ICD-10-CM

## 2023-03-01 DIAGNOSIS — N18.4 CKD (CHRONIC KIDNEY DISEASE) STAGE 4, GFR 15-29 ML/MIN: Primary | ICD-10-CM

## 2023-03-01 DIAGNOSIS — N25.81 SECONDARY HYPERPARATHYROIDISM OF RENAL ORIGIN: ICD-10-CM

## 2023-03-01 DIAGNOSIS — R73.03 PREDIABETES: ICD-10-CM

## 2023-03-01 PROCEDURE — 99215 PR OFFICE/OUTPT VISIT, EST, LEVL V, 40-54 MIN: ICD-10-PCS | Mod: S$GLB,,, | Performed by: INTERNAL MEDICINE

## 2023-03-01 PROCEDURE — 3066F NEPHROPATHY DOC TX: CPT | Mod: CPTII,S$GLB,, | Performed by: INTERNAL MEDICINE

## 2023-03-01 PROCEDURE — 3075F PR MOST RECENT SYSTOLIC BLOOD PRESS GE 130-139MM HG: ICD-10-PCS | Mod: CPTII,S$GLB,, | Performed by: INTERNAL MEDICINE

## 2023-03-01 PROCEDURE — 3079F PR MOST RECENT DIASTOLIC BLOOD PRESSURE 80-89 MM HG: ICD-10-PCS | Mod: CPTII,S$GLB,, | Performed by: INTERNAL MEDICINE

## 2023-03-01 PROCEDURE — 3066F PR DOCUMENTATION OF TREATMENT FOR NEPHROPATHY: ICD-10-PCS | Mod: CPTII,S$GLB,, | Performed by: INTERNAL MEDICINE

## 2023-03-01 PROCEDURE — 99999 PR PBB SHADOW E&M-EST. PATIENT-LVL III: ICD-10-PCS | Mod: PBBFAC,,, | Performed by: INTERNAL MEDICINE

## 2023-03-01 PROCEDURE — 4010F ACE/ARB THERAPY RXD/TAKEN: CPT | Mod: CPTII,S$GLB,, | Performed by: INTERNAL MEDICINE

## 2023-03-01 PROCEDURE — 3008F PR BODY MASS INDEX (BMI) DOCUMENTED: ICD-10-PCS | Mod: CPTII,S$GLB,, | Performed by: INTERNAL MEDICINE

## 2023-03-01 PROCEDURE — 3075F SYST BP GE 130 - 139MM HG: CPT | Mod: CPTII,S$GLB,, | Performed by: INTERNAL MEDICINE

## 2023-03-01 PROCEDURE — 3079F DIAST BP 80-89 MM HG: CPT | Mod: CPTII,S$GLB,, | Performed by: INTERNAL MEDICINE

## 2023-03-01 PROCEDURE — 3008F BODY MASS INDEX DOCD: CPT | Mod: CPTII,S$GLB,, | Performed by: INTERNAL MEDICINE

## 2023-03-01 PROCEDURE — 99215 OFFICE O/P EST HI 40 MIN: CPT | Mod: S$GLB,,, | Performed by: INTERNAL MEDICINE

## 2023-03-01 PROCEDURE — 99999 PR PBB SHADOW E&M-EST. PATIENT-LVL III: CPT | Mod: PBBFAC,,, | Performed by: INTERNAL MEDICINE

## 2023-03-01 PROCEDURE — 4010F PR ACE/ARB THEARPY RXD/TAKEN: ICD-10-PCS | Mod: CPTII,S$GLB,, | Performed by: INTERNAL MEDICINE

## 2023-03-01 NOTE — PROGRESS NOTES
Subjective:       Patient ID: Ar Sharma is a 62 y.o. Black or  male who presents for new evaluation of Chronic Kidney Disease    HPI    He is referred by his PCP for CKD, seen by a Nephrologist previously but wishes to change.    He has a significant history of DCM with most recentr EF 40-45%  and is s/p ICD placement, recent hospital stay for ICD firing multiple times. He was noted ()self as well) to be significantly hypotensive. He had an JEFF episode during hospital stay, no RRT indicated. He has hesitance regarding AV access, additionally he is interested in PD if dialysis were to be indicated.   Today he is feeling well. He is on a plant based diet, 90-95% as will have fish and shrimp a few times a week    March 2023: Back on Entresto, BP has been fine. Energy level may be better, sleeping better, SOB/SINGH is stable    Review of Systems   Constitutional:  Positive for fatigue. Negative for activity change and appetite change.   HENT:  Negative for facial swelling.    Respiratory:  Positive for shortness of breath.    Cardiovascular:  Negative for leg swelling.   Gastrointestinal:  Negative for nausea and vomiting.   Genitourinary:  Negative for difficulty urinating.   Musculoskeletal:  Positive for gait problem.   Allergic/Immunologic: Positive for immunocompromised state (CHF CKD).   Neurological:  Negative for weakness.   Psychiatric/Behavioral:  Negative for confusion and decreased concentration.      Objective:      Physical Exam  Vitals and nursing note reviewed.   Constitutional:       General: He is not in acute distress.     Appearance: Normal appearance. He is not ill-appearing.   HENT:      Head: Atraumatic.   Cardiovascular:      Rate and Rhythm: Normal rate.   Pulmonary:      Effort: Pulmonary effort is normal. No respiratory distress.   Abdominal:      General: There is no distension.   Musculoskeletal:      Right lower leg: No edema.      Left lower leg: No edema.   Skin:      General: Skin is warm and dry.   Neurological:      Mental Status: He is alert and oriented to person, place, and time.   Psychiatric:         Mood and Affect: Mood normal.       Assessment:       1. CKD (chronic kidney disease) stage 4, GFR 15-29 ml/min    2. Cardiac LV ejection fraction 10-20%    3. Secondary hyperparathyroidism of renal origin    4. Prediabetes            Plan:         CKD appearing to be at stage 4, appears to be Cardiorenal in etiology  - stable kidney function   - discussed treating cardiac function will improve kidney function  - avoid NSAIDs, low sodium diet     Mineral and Bone Disease/SHPT  - continue to trend PTH  - continue D2, may need D analogue      RTC 4mo or prn sooner  49 minutes of total time spent on the encounter, which includes face to face time and non-face to face time preparing to see the patient (eg, review of tests), Obtaining and/or reviewing separately obtained history, Documenting clinical information in the electronic or other health record, Independently interpreting results (not separately reported) and communicating results to the patient/family/caregiver, or Care coordination (not separately reported).

## 2023-03-02 PROBLEM — I47.20 VT (VENTRICULAR TACHYCARDIA): Status: RESOLVED | Noted: 2022-09-26 | Resolved: 2023-03-02

## 2023-03-02 NOTE — PROGRESS NOTES
Ar Sahrma presented for a  Medicare AWV and comprehensive Health Risk Assessment today. The following components were reviewed and updated:    Medical history  Family History  Social history  Allergies and Current Medications  Health Risk Assessment  Health Maintenance  Care Team         ** See Completed Assessments for Annual Wellness Visit within the encounter summary.**         The following assessments were completed:  Living Situation  CAGE  Depression Screening  Timed Get Up and Go  Whisper Test  Cognitive Function Screening        Nutrition Screening  ADL Screening  PAQ Screening      Review for Opioid Screening: Pt does not have Rx for Opioids      Review for Substance Use Disorders: Patient does not use substance        Current Outpatient Medications:     amiodarone (PACERONE) 400 MG tablet, Take 1 tablet (400 mg total) by mouth once daily., Disp: 60 tablet, Rfl: 3    aspirin (ECOTRIN) 81 MG EC tablet, Take 1 tablet (81 mg total) by mouth once daily., Disp: 60 tablet, Rfl: 2    ergocalciferol (ERGOCALCIFEROL) 50,000 unit Cap, TAKE 1 CAPSULE BY MOUTH ONE TIME PER WEEK, Disp: 12 capsule, Rfl: 3    furosemide (LASIX) 80 MG tablet, Take 1 tablet (80 mg total) by mouth 2 (two) times daily. If increased 3 pounds in a day or 5 pounds in a week, take an extra dose of lasix 80mg until those pounds are lost., Disp: 120 tablet, Rfl: 3    levothyroxine (SYNTHROID, LEVOTHROID) 175 MCG tablet, TAKE 1 TABLET BY MOUTH EVERY DAY, Disp: 90 tablet, Rfl: 3    metoprolol succinate (TOPROL-XL) 25 MG 24 hr tablet, Take 1 tablet (25 mg total) by mouth once daily., Disp: 90 tablet, Rfl: 3    mexiletine (MEXITIL) 150 MG Cap, Take 1 capsule (150 mg total) by mouth 2 (two) times daily with meals., Disp: 180 capsule, Rfl: 3    nitroGLYCERIN (NITROSTAT) 0.4 MG SL tablet, Place 1 tablet (0.4 mg total) under the tongue every 5 (five) minutes as needed for Chest pain., Disp: 20 tablet, Rfl: 1    pravastatin (PRAVACHOL) 40 MG tablet, TAKE  "1 TABLET BY MOUTH EVERY DAY, Disp: 90 tablet, Rfl: 3    sacubitriL-valsartan (ENTRESTO) 24-26 mg per tablet, Take 1 tablet by mouth 2 (two) times daily., Disp: 180 tablet, Rfl: 3       Vitals:    03/03/23 1056   BP: 97/62   Pulse: 91   Weight: 85.1 kg (187 lb 9.8 oz)   Height: 5' 9" (1.753 m)   PainSc: 0-No pain      Physical Exam  Vitals and nursing note reviewed.   Constitutional:       General: He is not in acute distress.     Appearance: Normal appearance. He is not ill-appearing.   HENT:      Head: Normocephalic and atraumatic.   Eyes:      General: No scleral icterus.        Right eye: No discharge.         Left eye: No discharge.      Extraocular Movements: Extraocular movements intact.      Conjunctiva/sclera: Conjunctivae normal.      Comments: +glasses   Cardiovascular:      Rate and Rhythm: Normal rate and regular rhythm.      Heart sounds: Normal heart sounds. No murmur heard.  Pulmonary:      Effort: Pulmonary effort is normal. No respiratory distress.      Breath sounds: Normal breath sounds. No wheezing, rhonchi or rales.   Musculoskeletal:      Cervical back: Normal range of motion.      Right lower leg: No edema.      Left lower leg: No edema.   Skin:     General: Skin is warm and dry.      Findings: No rash.   Neurological:      Mental Status: He is alert and oriented to person, place, and time.      Gait: Gait abnormal.   Psychiatric:         Mood and Affect: Mood normal.         Behavior: Behavior normal. Behavior is cooperative.         Cognition and Memory: Cognition and memory normal.           Diagnoses and health risks identified today and associated recommendations/orders:    1. Encounter for preventive health examination  - Chart reviewed. Problem list updated. Discussed current medical diagnosis, current medications, medical/surgical/family/social history; updated provider list; documented vital signs; identified any cognitive impairment; and updated risk factor list. Addressed any " outstanding health maintenance. Provided patient with personalized health advice. Continue to follow up with PCP and any specialists.     2. CKD (chronic kidney disease) stage 4, GFR 15-29 ml/min  Chronic; stable on current treatment plan; follow up with PCP  - recommend avoiding NSAIDS   - follows Nephrology     3. Atherosclerosis of native coronary artery of native heart with angina pectoris  Chronic; stable on current treatment plan; follow up with PCP  - taking statin     4. HFrEF (heart failure with reduced ejection fraction)  Chronic; stable on current treatment plan; follow up with PCP and Cardiology   - taking entresto    5. Secondary hyperparathyroidism of renal origin  Chronic; stable on current treatment plan; follow up with PCP  - follows with Nephrology     6. Non-ischemic cardiomyopathy  Chronic; stable on current treatment plan; follow up with PCP and Cardiology     7. Thrombocythemia -  on 4/4/22  Chronic; stable on current treatment plan; follow up with PCP    8. Cardiac resynchronization therapy defibrillator (CRT-D) in place  Chronic; stable on current treatment plan; follow up with PCP and Cardiology     9. Hyperlipidemia, unspecified hyperlipidemia type  Chronic; stable on current treatment plan; follow up with PCP  - taking statin     10. Benign prostatic hyperplasia with lower urinary tract symptoms, symptom details unspecified  Chronic; stable on current treatment plan; follow up with PCP    11. Hypothyroidism, unspecified type  Chronic; stable on current treatment plan; follow up with PCP  - taking synthroid     12. Abnormality of gait and mobility  Chronic; stable on current treatment plan; follow up with PCP    13. BMI 27.0-27.9,adult  - Recommendation for healthy diet and increasing exercise as tolerated with goal of 150min/week . Recommend weight loss        Provided Ar with a 5-10 year written screening schedule and personal prevention plan. Recommendations were developed using  the USPSTF age appropriate recommendations. Education, counseling, and referrals were provided as needed. After Visit Summary printed and given to patient which includes a list of additional screenings\tests needed.    Follow up in about 1 year (around 3/3/2024) for your next annual wellness visit.    Isha Barahona, FNP-C    Advance Care Planning     I offered to discuss advanced care planning, including how to pick a person who would make decisions for you if you were unable to make them for yourself, called a health care power of , and what kind of decisions you might make such as use of life sustaining treatments such as ventilators and tube feeding when faced with a life limiting illness recorded on a living will that they will need to know. (How you want to be cared for as you near the end of your natural life)     X  Patient is unwilling to engage in a discussion regarding advance directives at this time.

## 2023-03-03 ENCOUNTER — OFFICE VISIT (OUTPATIENT)
Dept: INTERNAL MEDICINE | Facility: CLINIC | Age: 63
End: 2023-03-03
Payer: MEDICARE

## 2023-03-03 VITALS
DIASTOLIC BLOOD PRESSURE: 62 MMHG | SYSTOLIC BLOOD PRESSURE: 97 MMHG | HEIGHT: 69 IN | WEIGHT: 187.63 LBS | HEART RATE: 91 BPM | BODY MASS INDEX: 27.79 KG/M2

## 2023-03-03 DIAGNOSIS — D75.839 THROMBOCYTHEMIA: ICD-10-CM

## 2023-03-03 DIAGNOSIS — I50.20 HFREF (HEART FAILURE WITH REDUCED EJECTION FRACTION): ICD-10-CM

## 2023-03-03 DIAGNOSIS — I42.8 NON-ISCHEMIC CARDIOMYOPATHY: ICD-10-CM

## 2023-03-03 DIAGNOSIS — R26.9 ABNORMALITY OF GAIT AND MOBILITY: ICD-10-CM

## 2023-03-03 DIAGNOSIS — N18.4 CKD (CHRONIC KIDNEY DISEASE) STAGE 4, GFR 15-29 ML/MIN: ICD-10-CM

## 2023-03-03 DIAGNOSIS — Z95.810 CARDIAC RESYNCHRONIZATION THERAPY DEFIBRILLATOR (CRT-D) IN PLACE: ICD-10-CM

## 2023-03-03 DIAGNOSIS — N25.81 SECONDARY HYPERPARATHYROIDISM OF RENAL ORIGIN: ICD-10-CM

## 2023-03-03 DIAGNOSIS — E78.5 HYPERLIPIDEMIA, UNSPECIFIED HYPERLIPIDEMIA TYPE: ICD-10-CM

## 2023-03-03 DIAGNOSIS — N40.1 BENIGN PROSTATIC HYPERPLASIA WITH LOWER URINARY TRACT SYMPTOMS, SYMPTOM DETAILS UNSPECIFIED: ICD-10-CM

## 2023-03-03 DIAGNOSIS — Z00.00 ENCOUNTER FOR PREVENTIVE HEALTH EXAMINATION: Primary | ICD-10-CM

## 2023-03-03 DIAGNOSIS — I25.119 ATHEROSCLEROSIS OF NATIVE CORONARY ARTERY OF NATIVE HEART WITH ANGINA PECTORIS: ICD-10-CM

## 2023-03-03 DIAGNOSIS — E03.9 HYPOTHYROIDISM, UNSPECIFIED TYPE: ICD-10-CM

## 2023-03-03 PROCEDURE — 3066F PR DOCUMENTATION OF TREATMENT FOR NEPHROPATHY: ICD-10-PCS | Mod: CPTII,S$GLB,, | Performed by: NURSE PRACTITIONER

## 2023-03-03 PROCEDURE — 1159F PR MEDICATION LIST DOCUMENTED IN MEDICAL RECORD: ICD-10-PCS | Mod: CPTII,S$GLB,, | Performed by: NURSE PRACTITIONER

## 2023-03-03 PROCEDURE — 3074F PR MOST RECENT SYSTOLIC BLOOD PRESSURE < 130 MM HG: ICD-10-PCS | Mod: CPTII,S$GLB,, | Performed by: NURSE PRACTITIONER

## 2023-03-03 PROCEDURE — 1159F MED LIST DOCD IN RCRD: CPT | Mod: CPTII,S$GLB,, | Performed by: NURSE PRACTITIONER

## 2023-03-03 PROCEDURE — 3078F PR MOST RECENT DIASTOLIC BLOOD PRESSURE < 80 MM HG: ICD-10-PCS | Mod: CPTII,S$GLB,, | Performed by: NURSE PRACTITIONER

## 2023-03-03 PROCEDURE — 3008F BODY MASS INDEX DOCD: CPT | Mod: CPTII,S$GLB,, | Performed by: NURSE PRACTITIONER

## 2023-03-03 PROCEDURE — 3074F SYST BP LT 130 MM HG: CPT | Mod: CPTII,S$GLB,, | Performed by: NURSE PRACTITIONER

## 2023-03-03 PROCEDURE — 99999 PR PBB SHADOW E&M-EST. PATIENT-LVL III: ICD-10-PCS | Mod: PBBFAC,,, | Performed by: NURSE PRACTITIONER

## 2023-03-03 PROCEDURE — G0439 PR MEDICARE ANNUAL WELLNESS SUBSEQUENT VISIT: ICD-10-PCS | Mod: S$GLB,,, | Performed by: NURSE PRACTITIONER

## 2023-03-03 PROCEDURE — 4010F PR ACE/ARB THEARPY RXD/TAKEN: ICD-10-PCS | Mod: CPTII,S$GLB,, | Performed by: NURSE PRACTITIONER

## 2023-03-03 PROCEDURE — 1160F RVW MEDS BY RX/DR IN RCRD: CPT | Mod: CPTII,S$GLB,, | Performed by: NURSE PRACTITIONER

## 2023-03-03 PROCEDURE — 99999 PR PBB SHADOW E&M-EST. PATIENT-LVL III: CPT | Mod: PBBFAC,,, | Performed by: NURSE PRACTITIONER

## 2023-03-03 PROCEDURE — 3066F NEPHROPATHY DOC TX: CPT | Mod: CPTII,S$GLB,, | Performed by: NURSE PRACTITIONER

## 2023-03-03 PROCEDURE — G0439 PPPS, SUBSEQ VISIT: HCPCS | Mod: S$GLB,,, | Performed by: NURSE PRACTITIONER

## 2023-03-03 PROCEDURE — 3078F DIAST BP <80 MM HG: CPT | Mod: CPTII,S$GLB,, | Performed by: NURSE PRACTITIONER

## 2023-03-03 PROCEDURE — 1160F PR REVIEW ALL MEDS BY PRESCRIBER/CLIN PHARMACIST DOCUMENTED: ICD-10-PCS | Mod: CPTII,S$GLB,, | Performed by: NURSE PRACTITIONER

## 2023-03-03 PROCEDURE — 3008F PR BODY MASS INDEX (BMI) DOCUMENTED: ICD-10-PCS | Mod: CPTII,S$GLB,, | Performed by: NURSE PRACTITIONER

## 2023-03-03 PROCEDURE — 4010F ACE/ARB THERAPY RXD/TAKEN: CPT | Mod: CPTII,S$GLB,, | Performed by: NURSE PRACTITIONER

## 2023-03-03 NOTE — PATIENT INSTRUCTIONS
Counseling and Referral of Other Preventative  (Italic type indicates deductible and co-insurance are waived)    Patient Name: Ar Sharma  Today's Date: 3/3/2023    Health Maintenance       Date Due Completion Date    COVID-19 Vaccine (4 - Booster for Pfizer series) 03/02/2024 (Originally 3/9/2022) 1/12/2022    LDCT Lung Screen 03/11/2023 3/11/2022    PROSTATE-SPECIFIC ANTIGEN 04/04/2023 4/4/2022    Hemoglobin A1c (Prediabetes) 12/20/2023 12/20/2022    High Dose Statin 03/01/2024 3/1/2023    Lipid Panel 12/20/2027 12/20/2022    TETANUS VACCINE 10/16/2028 10/16/2018 (Done)    Override on 10/16/2018: Done    Colorectal Cancer Screening 12/07/2028 12/7/2018        No orders of the defined types were placed in this encounter.      The following information is provided to all patients.  This information is to help you find resources for any of the problems found today that may be affecting your health:                Living healthy guide: www.[facility].[place].gov      Understanding Diabetes: www.diabetes.org      Eating healthy: www.cdc.gov/healthyweight      CDC home safety checklist: www.cdc.gov/steadi/patient.html      Agency on Aging: www.goea.[place].gov      Alcoholics anonymous (AA): www.aa.org      Physical Activity: www.claudia.nih.gov/fn9dkwv      Tobacco use: www.quitwithusla.org

## 2023-03-27 ENCOUNTER — OFFICE VISIT (OUTPATIENT)
Dept: OPTOMETRY | Facility: CLINIC | Age: 63
End: 2023-03-27
Payer: MEDICARE

## 2023-03-27 DIAGNOSIS — H52.4 HYPEROPIA OF BOTH EYES WITH ASTIGMATISM AND PRESBYOPIA: ICD-10-CM

## 2023-03-27 DIAGNOSIS — H04.123 DRY EYE SYNDROME, BILATERAL: ICD-10-CM

## 2023-03-27 DIAGNOSIS — H52.03 HYPEROPIA OF BOTH EYES WITH ASTIGMATISM AND PRESBYOPIA: ICD-10-CM

## 2023-03-27 DIAGNOSIS — H52.203 HYPEROPIA OF BOTH EYES WITH ASTIGMATISM AND PRESBYOPIA: ICD-10-CM

## 2023-03-27 DIAGNOSIS — Z01.00 ENCOUNTER FOR COMPLETE EYE EXAM: ICD-10-CM

## 2023-03-27 DIAGNOSIS — H25.13 SENILE NUCLEAR CATARACT, BILATERAL: Primary | ICD-10-CM

## 2023-03-27 PROCEDURE — 3066F PR DOCUMENTATION OF TREATMENT FOR NEPHROPATHY: ICD-10-PCS | Mod: CPTII,S$GLB,, | Performed by: OPTOMETRIST

## 2023-03-27 PROCEDURE — 3066F NEPHROPATHY DOC TX: CPT | Mod: CPTII,S$GLB,, | Performed by: OPTOMETRIST

## 2023-03-27 PROCEDURE — 4010F PR ACE/ARB THEARPY RXD/TAKEN: ICD-10-PCS | Mod: CPTII,S$GLB,, | Performed by: OPTOMETRIST

## 2023-03-27 PROCEDURE — 99204 OFFICE O/P NEW MOD 45 MIN: CPT | Mod: S$GLB,,, | Performed by: OPTOMETRIST

## 2023-03-27 PROCEDURE — 99204 PR OFFICE/OUTPT VISIT, NEW, LEVL IV, 45-59 MIN: ICD-10-PCS | Mod: S$GLB,,, | Performed by: OPTOMETRIST

## 2023-03-27 PROCEDURE — 99999 PR PBB SHADOW E&M-EST. PATIENT-LVL III: ICD-10-PCS | Mod: PBBFAC,,, | Performed by: OPTOMETRIST

## 2023-03-27 PROCEDURE — 1159F PR MEDICATION LIST DOCUMENTED IN MEDICAL RECORD: ICD-10-PCS | Mod: CPTII,S$GLB,, | Performed by: OPTOMETRIST

## 2023-03-27 PROCEDURE — 4010F ACE/ARB THERAPY RXD/TAKEN: CPT | Mod: CPTII,S$GLB,, | Performed by: OPTOMETRIST

## 2023-03-27 PROCEDURE — 99999 PR PBB SHADOW E&M-EST. PATIENT-LVL III: CPT | Mod: PBBFAC,,, | Performed by: OPTOMETRIST

## 2023-03-27 PROCEDURE — 1159F MED LIST DOCD IN RCRD: CPT | Mod: CPTII,S$GLB,, | Performed by: OPTOMETRIST

## 2023-03-27 PROCEDURE — 92015 DETERMINE REFRACTIVE STATE: CPT | Mod: S$GLB,,, | Performed by: OPTOMETRIST

## 2023-03-27 PROCEDURE — 92015 PR REFRACTION: ICD-10-PCS | Mod: S$GLB,,, | Performed by: OPTOMETRIST

## 2023-03-28 NOTE — PROGRESS NOTES
HPI    CC: Pt is here today for a Routine eye exam. He states he has noticed a   gradual decrease in his vision at both ranges.  NASEEM: 2019    (+) Changes in vision   (-) Pain  (-) Irritation   (+) Itching   (-) Flashes  (-) Floaters  (+) Glasses wearer  (-) CL wearer  (-) Uses eye gtts    Does patient want a refraction today? yes    (-) Eye injury  (-) Eye surgery   (-)POHx  (-)FOHx    (-)DM  Hemoglobin A1C       Date                     Value               Ref Range             Status                12/20/2022               5.7 (H)             4.0 - 5.6 %           Final                   04/04/2022               6.1 (H)             4.0 - 5.6 %           Final                   11/11/2021               6.0 (H)             4.0 - 5.6 %           Final                 Last edited by Burt Zuleta on 3/27/2023  9:05 AM.            Assessment /Plan     For exam results, see Encounter Report.    Senile nuclear cataract, bilateral    Hyperopia of both eyes with astigmatism and presbyopia    Encounter for complete eye exam    Dry eye syndrome, bilateral      MONITOR. ED PT ON ALL EXAM FINDINGS  RX FINAL SPECS   MILD NS OU; UV PROTECTION; MONITOR. PRESURGICAL   DISCUSSED AT'S PRN FOR DRYNESS OU   RTC 1 YR//PRN FOR REE/DFE

## 2023-04-07 ENCOUNTER — PATIENT MESSAGE (OUTPATIENT)
Dept: FAMILY MEDICINE | Facility: CLINIC | Age: 63
End: 2023-04-07
Payer: MEDICARE

## 2023-05-02 ENCOUNTER — TELEPHONE (OUTPATIENT)
Dept: FAMILY MEDICINE | Facility: CLINIC | Age: 63
End: 2023-05-02
Payer: MEDICARE

## 2023-05-02 DIAGNOSIS — N18.4 CKD (CHRONIC KIDNEY DISEASE) STAGE 4, GFR 15-29 ML/MIN: ICD-10-CM

## 2023-05-02 DIAGNOSIS — E78.00 PURE HYPERCHOLESTEROLEMIA: Primary | ICD-10-CM

## 2023-05-02 DIAGNOSIS — Z12.5 PROSTATE CANCER SCREENING: ICD-10-CM

## 2023-05-02 DIAGNOSIS — E03.9 HYPOTHYROIDISM, UNSPECIFIED TYPE: ICD-10-CM

## 2023-05-02 DIAGNOSIS — E78.2 MIXED HYPERLIPIDEMIA: ICD-10-CM

## 2023-05-02 DIAGNOSIS — R73.03 PREDIABETES: ICD-10-CM

## 2023-05-02 NOTE — TELEPHONE ENCOUNTER
----- Message from Verena Cortes sent at 5/2/2023  8:20 AM CDT -----  Regarding: Call back  Contact: [phone]  Who Called: PT     Patient is calling to talk to nurse in regards to getting lab orders for his Annual.

## 2023-05-04 ENCOUNTER — LAB VISIT (OUTPATIENT)
Dept: LAB | Facility: HOSPITAL | Age: 63
End: 2023-05-04
Attending: STUDENT IN AN ORGANIZED HEALTH CARE EDUCATION/TRAINING PROGRAM
Payer: MEDICARE

## 2023-05-04 DIAGNOSIS — R73.03 PREDIABETES: ICD-10-CM

## 2023-05-04 DIAGNOSIS — E78.00 PURE HYPERCHOLESTEROLEMIA: ICD-10-CM

## 2023-05-04 DIAGNOSIS — E78.2 MIXED HYPERLIPIDEMIA: ICD-10-CM

## 2023-05-04 DIAGNOSIS — E03.9 HYPOTHYROIDISM, UNSPECIFIED TYPE: ICD-10-CM

## 2023-05-04 DIAGNOSIS — N18.4 CKD (CHRONIC KIDNEY DISEASE) STAGE 4, GFR 15-29 ML/MIN: ICD-10-CM

## 2023-05-04 DIAGNOSIS — Z12.5 PROSTATE CANCER SCREENING: ICD-10-CM

## 2023-05-04 LAB
ALBUMIN SERPL BCP-MCNC: 4.1 G/DL (ref 3.5–5.2)
ALP SERPL-CCNC: 110 U/L (ref 38–126)
ALT SERPL W/O P-5'-P-CCNC: 56 U/L (ref 10–44)
ANION GAP SERPL CALC-SCNC: 11 MMOL/L (ref 8–16)
AST SERPL-CCNC: 45 U/L (ref 15–46)
BASOPHILS # BLD AUTO: 0.07 K/UL (ref 0–0.2)
BASOPHILS NFR BLD: 0.8 % (ref 0–1.9)
BILIRUB SERPL-MCNC: 1.2 MG/DL (ref 0.1–1)
CALCIUM SERPL-MCNC: 8.9 MG/DL (ref 8.7–10.5)
CHLORIDE SERPL-SCNC: 102 MMOL/L (ref 95–110)
CHOLEST SERPL-MCNC: 151 MG/DL (ref 120–199)
CHOLEST/HDLC SERPL: 3.7 {RATIO} (ref 2–5)
CO2 SERPL-SCNC: 27 MMOL/L (ref 23–29)
COMPLEXED PSA SERPL-MCNC: 4.2 NG/ML (ref 0–4)
CREAT SERPL-MCNC: 3.89 MG/DL (ref 0.5–1.4)
DIFFERENTIAL METHOD: ABNORMAL
EOSINOPHIL # BLD AUTO: 0.3 K/UL (ref 0–0.5)
EOSINOPHIL NFR BLD: 3.1 % (ref 0–8)
ERYTHROCYTE [DISTWIDTH] IN BLOOD BY AUTOMATED COUNT: 15.6 % (ref 11.5–14.5)
EST. GFR  (NO RACE VARIABLE): 16.6 ML/MIN/1.73 M^2
ESTIMATED AVG GLUCOSE: 126 MG/DL (ref 68–131)
GLUCOSE SERPL-MCNC: 104 MG/DL (ref 70–110)
HBA1C MFR BLD: 6 % (ref 4–5.6)
HCT VFR BLD AUTO: 51.5 % (ref 40–54)
HDLC SERPL-MCNC: 41 MG/DL (ref 40–75)
HDLC SERPL: 27.2 % (ref 20–50)
HGB BLD-MCNC: 17.3 G/DL (ref 14–18)
IMM GRANULOCYTES # BLD AUTO: 0.08 K/UL (ref 0–0.04)
IMM GRANULOCYTES NFR BLD AUTO: 0.9 % (ref 0–0.5)
LDLC SERPL CALC-MCNC: 94.2 MG/DL (ref 63–159)
LYMPHOCYTES # BLD AUTO: 0.9 K/UL (ref 1–4.8)
LYMPHOCYTES NFR BLD: 9.9 % (ref 18–48)
MCH RBC QN AUTO: 26.6 PG (ref 27–31)
MCHC RBC AUTO-ENTMCNC: 33.6 G/DL (ref 32–36)
MCV RBC AUTO: 79 FL (ref 82–98)
MONOCYTES # BLD AUTO: 0.8 K/UL (ref 0.3–1)
MONOCYTES NFR BLD: 9.6 % (ref 4–15)
NEUTROPHILS # BLD AUTO: 6.6 K/UL (ref 1.8–7.7)
NEUTROPHILS NFR BLD: 75.7 % (ref 38–73)
NONHDLC SERPL-MCNC: 110 MG/DL
NRBC BLD-RTO: 0 /100 WBC
PLATELET # BLD AUTO: 612 K/UL (ref 150–450)
PMV BLD AUTO: 11.3 FL (ref 9.2–12.9)
POTASSIUM SERPL-SCNC: 4.5 MMOL/L (ref 3.5–5.1)
PROT SERPL-MCNC: 7.5 G/DL (ref 6–8.4)
RBC # BLD AUTO: 6.5 M/UL (ref 4.6–6.2)
SODIUM SERPL-SCNC: 140 MMOL/L (ref 136–145)
TRIGL SERPL-MCNC: 79 MG/DL (ref 30–150)
TSH SERPL DL<=0.005 MIU/L-ACNC: 1.16 UIU/ML (ref 0.4–4)
UUN UR-MCNC: 64 MG/DL (ref 2–20)
WBC # BLD AUTO: 8.76 K/UL (ref 3.9–12.7)

## 2023-05-04 PROCEDURE — 36415 COLL VENOUS BLD VENIPUNCTURE: CPT | Mod: PO | Performed by: STUDENT IN AN ORGANIZED HEALTH CARE EDUCATION/TRAINING PROGRAM

## 2023-05-04 PROCEDURE — 80061 LIPID PANEL: CPT | Performed by: STUDENT IN AN ORGANIZED HEALTH CARE EDUCATION/TRAINING PROGRAM

## 2023-05-04 PROCEDURE — 80053 COMPREHEN METABOLIC PANEL: CPT | Mod: PO | Performed by: STUDENT IN AN ORGANIZED HEALTH CARE EDUCATION/TRAINING PROGRAM

## 2023-05-04 PROCEDURE — 85025 COMPLETE CBC W/AUTO DIFF WBC: CPT | Mod: PO | Performed by: STUDENT IN AN ORGANIZED HEALTH CARE EDUCATION/TRAINING PROGRAM

## 2023-05-04 PROCEDURE — 84443 ASSAY THYROID STIM HORMONE: CPT | Mod: PO | Performed by: STUDENT IN AN ORGANIZED HEALTH CARE EDUCATION/TRAINING PROGRAM

## 2023-05-04 PROCEDURE — 84153 ASSAY OF PSA TOTAL: CPT | Performed by: STUDENT IN AN ORGANIZED HEALTH CARE EDUCATION/TRAINING PROGRAM

## 2023-05-04 PROCEDURE — 83036 HEMOGLOBIN GLYCOSYLATED A1C: CPT | Performed by: STUDENT IN AN ORGANIZED HEALTH CARE EDUCATION/TRAINING PROGRAM

## 2023-05-08 ENCOUNTER — OFFICE VISIT (OUTPATIENT)
Dept: FAMILY MEDICINE | Facility: CLINIC | Age: 63
End: 2023-05-08
Payer: MEDICARE

## 2023-05-08 VITALS
HEIGHT: 69 IN | HEART RATE: 84 BPM | WEIGHT: 192.25 LBS | DIASTOLIC BLOOD PRESSURE: 80 MMHG | SYSTOLIC BLOOD PRESSURE: 132 MMHG | OXYGEN SATURATION: 92 % | BODY MASS INDEX: 28.47 KG/M2 | TEMPERATURE: 97 F

## 2023-05-08 DIAGNOSIS — E78.00 PURE HYPERCHOLESTEROLEMIA: ICD-10-CM

## 2023-05-08 DIAGNOSIS — R05.9 COUGH, UNSPECIFIED TYPE: ICD-10-CM

## 2023-05-08 DIAGNOSIS — E03.9 HYPOTHYROIDISM, UNSPECIFIED TYPE: ICD-10-CM

## 2023-05-08 DIAGNOSIS — R97.20 ELEVATED PSA: ICD-10-CM

## 2023-05-08 DIAGNOSIS — Z87.891 PERSONAL HISTORY OF NICOTINE DEPENDENCE: Primary | ICD-10-CM

## 2023-05-08 DIAGNOSIS — R73.03 PREDIABETES: ICD-10-CM

## 2023-05-08 PROCEDURE — 1159F PR MEDICATION LIST DOCUMENTED IN MEDICAL RECORD: ICD-10-PCS | Mod: CPTII,S$GLB,, | Performed by: STUDENT IN AN ORGANIZED HEALTH CARE EDUCATION/TRAINING PROGRAM

## 2023-05-08 PROCEDURE — 1160F RVW MEDS BY RX/DR IN RCRD: CPT | Mod: CPTII,S$GLB,, | Performed by: STUDENT IN AN ORGANIZED HEALTH CARE EDUCATION/TRAINING PROGRAM

## 2023-05-08 PROCEDURE — 1160F PR REVIEW ALL MEDS BY PRESCRIBER/CLIN PHARMACIST DOCUMENTED: ICD-10-PCS | Mod: CPTII,S$GLB,, | Performed by: STUDENT IN AN ORGANIZED HEALTH CARE EDUCATION/TRAINING PROGRAM

## 2023-05-08 PROCEDURE — 3079F DIAST BP 80-89 MM HG: CPT | Mod: CPTII,S$GLB,, | Performed by: STUDENT IN AN ORGANIZED HEALTH CARE EDUCATION/TRAINING PROGRAM

## 2023-05-08 PROCEDURE — 3008F PR BODY MASS INDEX (BMI) DOCUMENTED: ICD-10-PCS | Mod: CPTII,S$GLB,, | Performed by: STUDENT IN AN ORGANIZED HEALTH CARE EDUCATION/TRAINING PROGRAM

## 2023-05-08 PROCEDURE — 3075F SYST BP GE 130 - 139MM HG: CPT | Mod: CPTII,S$GLB,, | Performed by: STUDENT IN AN ORGANIZED HEALTH CARE EDUCATION/TRAINING PROGRAM

## 2023-05-08 PROCEDURE — 4010F PR ACE/ARB THEARPY RXD/TAKEN: ICD-10-PCS | Mod: CPTII,S$GLB,, | Performed by: STUDENT IN AN ORGANIZED HEALTH CARE EDUCATION/TRAINING PROGRAM

## 2023-05-08 PROCEDURE — 4010F ACE/ARB THERAPY RXD/TAKEN: CPT | Mod: CPTII,S$GLB,, | Performed by: STUDENT IN AN ORGANIZED HEALTH CARE EDUCATION/TRAINING PROGRAM

## 2023-05-08 PROCEDURE — 3079F PR MOST RECENT DIASTOLIC BLOOD PRESSURE 80-89 MM HG: ICD-10-PCS | Mod: CPTII,S$GLB,, | Performed by: STUDENT IN AN ORGANIZED HEALTH CARE EDUCATION/TRAINING PROGRAM

## 2023-05-08 PROCEDURE — 99214 PR OFFICE/OUTPT VISIT, EST, LEVL IV, 30-39 MIN: ICD-10-PCS | Mod: S$GLB,,, | Performed by: STUDENT IN AN ORGANIZED HEALTH CARE EDUCATION/TRAINING PROGRAM

## 2023-05-08 PROCEDURE — 99214 OFFICE O/P EST MOD 30 MIN: CPT | Mod: S$GLB,,, | Performed by: STUDENT IN AN ORGANIZED HEALTH CARE EDUCATION/TRAINING PROGRAM

## 2023-05-08 PROCEDURE — 3044F PR MOST RECENT HEMOGLOBIN A1C LEVEL <7.0%: ICD-10-PCS | Mod: CPTII,S$GLB,, | Performed by: STUDENT IN AN ORGANIZED HEALTH CARE EDUCATION/TRAINING PROGRAM

## 2023-05-08 PROCEDURE — 3008F BODY MASS INDEX DOCD: CPT | Mod: CPTII,S$GLB,, | Performed by: STUDENT IN AN ORGANIZED HEALTH CARE EDUCATION/TRAINING PROGRAM

## 2023-05-08 PROCEDURE — 3066F PR DOCUMENTATION OF TREATMENT FOR NEPHROPATHY: ICD-10-PCS | Mod: CPTII,S$GLB,, | Performed by: STUDENT IN AN ORGANIZED HEALTH CARE EDUCATION/TRAINING PROGRAM

## 2023-05-08 PROCEDURE — 3066F NEPHROPATHY DOC TX: CPT | Mod: CPTII,S$GLB,, | Performed by: STUDENT IN AN ORGANIZED HEALTH CARE EDUCATION/TRAINING PROGRAM

## 2023-05-08 PROCEDURE — 3044F HG A1C LEVEL LT 7.0%: CPT | Mod: CPTII,S$GLB,, | Performed by: STUDENT IN AN ORGANIZED HEALTH CARE EDUCATION/TRAINING PROGRAM

## 2023-05-08 PROCEDURE — 1159F MED LIST DOCD IN RCRD: CPT | Mod: CPTII,S$GLB,, | Performed by: STUDENT IN AN ORGANIZED HEALTH CARE EDUCATION/TRAINING PROGRAM

## 2023-05-08 PROCEDURE — 3075F PR MOST RECENT SYSTOLIC BLOOD PRESS GE 130-139MM HG: ICD-10-PCS | Mod: CPTII,S$GLB,, | Performed by: STUDENT IN AN ORGANIZED HEALTH CARE EDUCATION/TRAINING PROGRAM

## 2023-05-08 NOTE — PROGRESS NOTES
Patient ID: Ar Sharma is a 62 y.o. male.     Chief Complaint: cough    Cough  This is a new problem. The current episode started 1 to 4 weeks ago. The problem has been unchanged. The cough is Non-productive. Associated symptoms include nasal congestion and postnasal drip. Pertinent negatives include no chest pain, ear congestion, ear pain, fever, headaches, myalgias, rash, sore throat, shortness of breath, sweats or weight loss. Nothing aggravates the symptoms. He has tried OTC cough suppressant for the symptoms. The treatment provided no relief.        Review of Systems  Review of Systems   Constitutional:  Negative for fever and weight loss.   HENT:  Positive for postnasal drip. Negative for ear pain, sinus pain and sore throat.    Eyes:  Negative for discharge.   Respiratory:  Positive for cough. Negative for shortness of breath.    Cardiovascular:  Negative for chest pain and leg swelling.   Gastrointestinal:  Negative for diarrhea, nausea and vomiting.   Genitourinary:  Negative for urgency.   Musculoskeletal:  Negative for myalgias.   Skin:  Negative for rash.   Neurological:  Negative for weakness and headaches.   Psychiatric/Behavioral:  Negative for depression.    All other systems reviewed and are negative.    Currently Medications  Current Outpatient Medications on File Prior to Visit   Medication Sig Dispense Refill    amiodarone (PACERONE) 400 MG tablet Take 1 tablet (400 mg total) by mouth once daily. 60 tablet 3    aspirin (ECOTRIN) 81 MG EC tablet Take 1 tablet (81 mg total) by mouth once daily. 60 tablet 2    ergocalciferol (ERGOCALCIFEROL) 50,000 unit Cap TAKE 1 CAPSULE BY MOUTH ONE TIME PER WEEK 12 capsule 3    furosemide (LASIX) 80 MG tablet Take 1 tablet (80 mg total) by mouth 2 (two) times daily. If increased 3 pounds in a day or 5 pounds in a week, take an extra dose of lasix 80mg until those pounds are lost. 120 tablet 3    levothyroxine (SYNTHROID, LEVOTHROID) 175 MCG tablet TAKE 1  "TABLET BY MOUTH EVERY DAY 90 tablet 3    mexiletine (MEXITIL) 150 MG Cap Take 1 capsule (150 mg total) by mouth 2 (two) times daily with meals. 180 capsule 3    nitroGLYCERIN (NITROSTAT) 0.4 MG SL tablet Place 1 tablet (0.4 mg total) under the tongue every 5 (five) minutes as needed for Chest pain. 20 tablet 1    pravastatin (PRAVACHOL) 40 MG tablet TAKE 1 TABLET BY MOUTH EVERY DAY 90 tablet 3    sacubitriL-valsartan (ENTRESTO) 24-26 mg per tablet Take 1 tablet by mouth 2 (two) times daily. 180 tablet 3    metoprolol succinate (TOPROL-XL) 25 MG 24 hr tablet Take 1 tablet (25 mg total) by mouth once daily. 90 tablet 3     No current facility-administered medications on file prior to visit.       Physical  Exam  Vitals:    05/08/23 1510   BP: 132/80   BP Location: Right arm   Patient Position: Sitting   Pulse: 84   Temp: 97.1 °F (36.2 °C)   SpO2: (!) 92%   Weight: 87.2 kg (192 lb 3.9 oz)   Height: 5' 9" (1.753 m)      Body mass index is 28.39 kg/m².  Wt Readings from Last 3 Encounters:   05/08/23 87.2 kg (192 lb 3.9 oz)   03/03/23 85.1 kg (187 lb 9.8 oz)   03/01/23 85.5 kg (188 lb 8 oz)       Physical Exam  Vitals and nursing note reviewed.   Constitutional:       General: He is not in acute distress.     Appearance: He is not ill-appearing.   HENT:      Head: Normocephalic and atraumatic.      Right Ear: External ear normal.      Left Ear: External ear normal.      Nose: Nose normal.      Mouth/Throat:      Mouth: Mucous membranes are moist.   Eyes:      Extraocular Movements: Extraocular movements intact.      Conjunctiva/sclera: Conjunctivae normal.   Cardiovascular:      Rate and Rhythm: Normal rate and regular rhythm.      Pulses: Normal pulses.      Heart sounds: No murmur heard.  Pulmonary:      Effort: Pulmonary effort is normal. No respiratory distress.      Breath sounds: No wheezing.   Abdominal:      General: There is no distension.      Palpations: Abdomen is soft. There is no mass.      Tenderness: There " is no abdominal tenderness.   Musculoskeletal:         General: No swelling.      Cervical back: Normal range of motion.   Skin:     Coloration: Skin is not jaundiced.      Findings: No rash.   Neurological:      General: No focal deficit present.      Mental Status: He is alert and oriented to person, place, and time.   Psychiatric:         Mood and Affect: Mood normal.         Thought Content: Thought content normal.       Labs:    Complete Blood Count  Lab Results   Component Value Date    RBC 6.50 (H) 05/04/2023    HGB 17.3 05/04/2023    HCT 51.5 05/04/2023    MCV 79 (L) 05/04/2023    MCH 26.6 (L) 05/04/2023    MCHC 33.6 05/04/2023    RDW 15.6 (H) 05/04/2023     (H) 05/04/2023    MPV 11.3 05/04/2023    GRAN 6.6 05/04/2023    GRAN 75.7 (H) 05/04/2023    LYMPH 0.9 (L) 05/04/2023    LYMPH 9.9 (L) 05/04/2023    MONO 0.8 05/04/2023    MONO 9.6 05/04/2023    EOS 0.3 05/04/2023    BASO 0.07 05/04/2023    EOSINOPHIL 3.1 05/04/2023    BASOPHIL 0.8 05/04/2023    DIFFMETHOD Automated 05/04/2023       Comprehensive Metabolic Panel  Lab Results   Component Value Date     05/04/2023    BUN 64 (H) 05/04/2023    CREATININE 3.89 (H) 05/04/2023     05/04/2023    K 4.5 05/04/2023     05/04/2023    PROT 7.5 05/04/2023    ALBUMIN 4.1 05/04/2023    BILITOT 1.2 (H) 05/04/2023    AST 45 05/04/2023    ALKPHOS 110 05/04/2023    CO2 27 05/04/2023    ALT 56 (H) 05/04/2023    ANIONGAP 11 05/04/2023       TSH  Lab Results   Component Value Date    TSH 1.160 05/04/2023       Imaging:  Cardiac device check - [facility]  Additional Comments  IN-OFFICE device interrogation and testing performed s/p CRT-D generator change.  CRT-D programmed DDDR 60/115    Aquacel dressing removed, slight red drainage noticed when bandage removed. Dermabond adhesive still present to site. No signs of infection noted. Cleansed site with antiseptic wound cleanser. Patient denies pain, fever, or swelling. Educated patient on wound  care to site, signs of infection to be aware of, and remote home monitoring. Patient verbalized understanding.     Presenting egram demonstrates ApBv  Underlying rhythm c/w sinus bradycardia w/ 1st degree AVB  Device function WNL.  RA pacing 99.9% BiV pacing 100%  Anticoagulation Status: none  Atrial arrhythmias: none  Ventricular arrhythmias: none   Battery Status/Longevity: 6.9 years  Reprogramming at this visit: none  Follow up in EP clinic in 3-4 months.  Follow up via remote monitoring as scheduled.    Report prepared by EMELINA Calderon RN      Assessment/Plan:    1. Personal history of nicotine dependence  -     CT Chest Lung Screening Low Dose; Future; Expected date: 05/08/2023    2. Elevated PSA  -     HEMOGLOBIN A1C; Future; Expected date: 05/08/2023  -     Ambulatory referral/consult to Urology; Future; Expected date: 05/15/2023    3. Pure hypercholesterolemia  -     CBC Auto Differential; Future  -     Comprehensive metabolic panel; Future; Expected date: 05/08/2023  -     HEMOGLOBIN A1C; Future; Expected date: 05/08/2023  -     LIPID PANEL; Future; Expected date: 05/08/2023    4. Prediabetes  -     Comprehensive metabolic panel; Future; Expected date: 05/08/2023  -     HEMOGLOBIN A1C; Future; Expected date: 05/08/2023    5. Hypothyroidism, unspecified type    6. Cough, unspecified type  -     X-Ray Chest PA And Lateral; Future; Expected date: 05/08/2023         Discussed how to stay healthy including: diet, exercise, refraining from smoking and discussed screening exams / tests needed for age, sex and family Hx.        Jc Elliott MD

## 2023-05-09 ENCOUNTER — HOSPITAL ENCOUNTER (OUTPATIENT)
Dept: RADIOLOGY | Facility: HOSPITAL | Age: 63
Discharge: HOME | End: 2023-05-09
Attending: STUDENT IN AN ORGANIZED HEALTH CARE EDUCATION/TRAINING PROGRAM
Payer: MEDICARE

## 2023-05-09 DIAGNOSIS — R05.9 COUGH, UNSPECIFIED TYPE: ICD-10-CM

## 2023-05-09 PROCEDURE — 71046 X-RAY EXAM CHEST 2 VIEWS: CPT | Mod: 26,,, | Performed by: RADIOLOGY

## 2023-05-09 PROCEDURE — 71046 X-RAY EXAM CHEST 2 VIEWS: CPT | Mod: TC,FY,PO

## 2023-05-09 PROCEDURE — 71046 XR CHEST PA AND LATERAL: ICD-10-PCS | Mod: 26,,, | Performed by: RADIOLOGY

## 2023-05-10 ENCOUNTER — OFFICE VISIT (OUTPATIENT)
Dept: UROLOGY | Facility: CLINIC | Age: 63
End: 2023-05-10
Payer: MEDICARE

## 2023-05-10 VITALS
SYSTOLIC BLOOD PRESSURE: 93 MMHG | DIASTOLIC BLOOD PRESSURE: 71 MMHG | HEART RATE: 87 BPM | BODY MASS INDEX: 28.13 KG/M2 | WEIGHT: 189.94 LBS | HEIGHT: 69 IN

## 2023-05-10 DIAGNOSIS — R97.20 ELEVATED PSA: ICD-10-CM

## 2023-05-10 PROCEDURE — 3078F DIAST BP <80 MM HG: CPT | Mod: CPTII,S$GLB,, | Performed by: UROLOGY

## 2023-05-10 PROCEDURE — 1159F PR MEDICATION LIST DOCUMENTED IN MEDICAL RECORD: ICD-10-PCS | Mod: CPTII,S$GLB,, | Performed by: UROLOGY

## 2023-05-10 PROCEDURE — 99999 PR PBB SHADOW E&M-EST. PATIENT-LVL III: CPT | Mod: PBBFAC,,, | Performed by: UROLOGY

## 2023-05-10 PROCEDURE — 4010F PR ACE/ARB THEARPY RXD/TAKEN: ICD-10-PCS | Mod: CPTII,S$GLB,, | Performed by: UROLOGY

## 2023-05-10 PROCEDURE — 3074F PR MOST RECENT SYSTOLIC BLOOD PRESSURE < 130 MM HG: ICD-10-PCS | Mod: CPTII,S$GLB,, | Performed by: UROLOGY

## 2023-05-10 PROCEDURE — 1159F MED LIST DOCD IN RCRD: CPT | Mod: CPTII,S$GLB,, | Performed by: UROLOGY

## 2023-05-10 PROCEDURE — 3008F PR BODY MASS INDEX (BMI) DOCUMENTED: ICD-10-PCS | Mod: CPTII,S$GLB,, | Performed by: UROLOGY

## 2023-05-10 PROCEDURE — 99999 PR PBB SHADOW E&M-EST. PATIENT-LVL III: ICD-10-PCS | Mod: PBBFAC,,, | Performed by: UROLOGY

## 2023-05-10 PROCEDURE — 3044F PR MOST RECENT HEMOGLOBIN A1C LEVEL <7.0%: ICD-10-PCS | Mod: CPTII,S$GLB,, | Performed by: UROLOGY

## 2023-05-10 PROCEDURE — 99204 PR OFFICE/OUTPT VISIT, NEW, LEVL IV, 45-59 MIN: ICD-10-PCS | Mod: S$GLB,,, | Performed by: UROLOGY

## 2023-05-10 PROCEDURE — 3074F SYST BP LT 130 MM HG: CPT | Mod: CPTII,S$GLB,, | Performed by: UROLOGY

## 2023-05-10 PROCEDURE — 99204 OFFICE O/P NEW MOD 45 MIN: CPT | Mod: S$GLB,,, | Performed by: UROLOGY

## 2023-05-10 PROCEDURE — 3066F PR DOCUMENTATION OF TREATMENT FOR NEPHROPATHY: ICD-10-PCS | Mod: CPTII,S$GLB,, | Performed by: UROLOGY

## 2023-05-10 PROCEDURE — 3044F HG A1C LEVEL LT 7.0%: CPT | Mod: CPTII,S$GLB,, | Performed by: UROLOGY

## 2023-05-10 PROCEDURE — 4010F ACE/ARB THERAPY RXD/TAKEN: CPT | Mod: CPTII,S$GLB,, | Performed by: UROLOGY

## 2023-05-10 PROCEDURE — 3078F PR MOST RECENT DIASTOLIC BLOOD PRESSURE < 80 MM HG: ICD-10-PCS | Mod: CPTII,S$GLB,, | Performed by: UROLOGY

## 2023-05-10 PROCEDURE — 3066F NEPHROPATHY DOC TX: CPT | Mod: CPTII,S$GLB,, | Performed by: UROLOGY

## 2023-05-10 PROCEDURE — 3008F BODY MASS INDEX DOCD: CPT | Mod: CPTII,S$GLB,, | Performed by: UROLOGY

## 2023-05-10 NOTE — PROGRESS NOTES
[facility]   Clinic Note    SUBJECTIVE:     Chief Complaint   Patient presents with    Other     Elevated PSA        Referral from: Jc Elliott MD.    History of Present Illness:  Ar Sharma is a 62 y.o. male who presents to clinic for evaluation of elevated PSA.      Patient here for elevated PSA.  PSA has been up trending since 2018.  Most recent value was 4.2.  Patient has never had an abnormal PSA but before.  No family history of prostate cancer.  Patient currently takes aspirin daily.  He has a cardiac history, including an MI in his 20s.  His most recent cardiology note reveals that his EF most recently was in the 50s.  He does currently have an ICD and pacemaker in place.  He also has CKD in his baseline creatinine is greater than 3.    PSA:  Lab Results   Component Value Date    PSA 4.2 (H) 05/04/2023    PSA 3.4 04/04/2022    PSADIAG 1.3 10/02/2018       Previously abnormal PSA: no.   Previous biopsy: no.   Family history of prostate cancer: no.      Patient endorses no additional complaints at this time.    Anticoagulation/Antiplatelets:  Yes; ASA    Past Medical History:   Diagnosis Date    CKD (chronic kidney disease) stage 4, GFR 15-29 ml/min     Congestive heart failure (CHF) 2015    Edema     Essential (primary) hypertension 5/18/2022    Formatting of this note might be different from the original. Converted from [employer]: Description - ESSENTIAL HYPERTENSION, BENIGN    Heart attack     HLD (hyperlipidemia)     Hypertension     Hyperuricemia     Hypocalcemia     Renal cyst, left     Secondary hyperparathyroidism     Thyroid disease     V tach     Vitamin D deficiency        Past Surgical History:   Procedure Laterality Date    ablations  03/05/2018    albations  02/01/2018    COLONOSCOPY N/A 12/07/2018    Procedure: COLONOSCOPY/suprep;  Surgeon: Ananya Morillo MD;  Location: [facility];  Service: Endoscopy;  Laterality: N/A;    defibulater N/A 2016    ESOPHAGOGASTRODUODENOSCOPY N/A  2018    Procedure: EGD (ESOPHAGOGASTRODUODENOSCOPY);  Surgeon: Ananya Morillo MD;  Location: [facility] ENDO;  Service: Endoscopy;  Laterality: N/A;    JOINT REPLACEMENT Bilateral 10/2016    knees, bilat    LEFT HEART CATHETERIZATION N/A 2020    Procedure: Left heart cath;  Surgeon: Shahram Stewart MD;  Location: [facility] CATH LAB/EP;  Service: Cardiology;  Laterality: N/A;    LEFT HEART CATHETERIZATION Left 2022    Procedure: Left heart cath;  Surgeon: Juan Roque MD;  Location: [facility] CATH LAB/EP;  Service: Cardiology;  Laterality: Left;    REPLACEMENT OF IMPLANTABLE CARDIOVERTER-DEFIBRILLATOR (ICD) GENERATOR Left 2023    Procedure: REPLACEMENT, ICD GENERATOR;  Surgeon: River Tenorio MD;  Location: [facility] EP LAB;  Service: Cardiology;  Laterality: Left;  ANTHONY, CRTD gen chg, MDT, MAC, DM, 3prep       Family History   Problem Relation Age of Onset    Cancer Mother     Hypertension Mother     Stroke Mother     Breast cancer Mother     Alcohol abuse Father     Kidney disease Father     Arthritis Sister     No Known Problems Brother     No Known Problems Daughter     No Known Problems Son        Social History     Tobacco Use    Smoking status: Former     Packs/day: 1.00     Years: 25.00     Pack years: 25.00     Types: Cigarettes     Start date: 1979     Quit date: 3/3/2012     Years since quittin.1    Smokeless tobacco: Never   Substance Use Topics    Alcohol use: Yes     Comment: rare    Drug use: No       Current Outpatient Medications on File Prior to Visit   Medication Sig Dispense Refill    amiodarone (PACERONE) 400 MG tablet Take 1 tablet (400 mg total) by mouth once daily. 60 tablet 3    aspirin (ECOTRIN) 81 MG EC tablet Take 1 tablet (81 mg total) by mouth once daily. 60 tablet 2    ergocalciferol (ERGOCALCIFEROL) 50,000 unit Cap TAKE 1 CAPSULE BY MOUTH ONE TIME PER WEEK 12 capsule 3    furosemide (LASIX) 80 MG tablet Take 1 tablet (80 mg total) by mouth 2 (two) times daily. If  "increased 3 pounds in a day or 5 pounds in a week, take an extra dose of lasix 80mg until those pounds are lost. 120 tablet 3    levothyroxine (SYNTHROID, LEVOTHROID) 175 MCG tablet TAKE 1 TABLET BY MOUTH EVERY DAY 90 tablet 3    mexiletine (MEXITIL) 150 MG Cap Take 1 capsule (150 mg total) by mouth 2 (two) times daily with meals. 180 capsule 3    nitroGLYCERIN (NITROSTAT) 0.4 MG SL tablet Place 1 tablet (0.4 mg total) under the tongue every 5 (five) minutes as needed for Chest pain. 20 tablet 1    pravastatin (PRAVACHOL) 40 MG tablet TAKE 1 TABLET BY MOUTH EVERY DAY 90 tablet 3    sacubitriL-valsartan (ENTRESTO) 24-26 mg per tablet Take 1 tablet by mouth 2 (two) times daily. 180 tablet 3    metoprolol succinate (TOPROL-XL) 25 MG 24 hr tablet Take 1 tablet (25 mg total) by mouth once daily. 90 tablet 3     No current facility-administered medications on file prior to visit.       Review of patient's allergies indicates:   Allergen Reactions    Lokelma [sodium zirconium cyclosilicate] Other (See Comments)     Fluid retention, weight gain, CHF excerebration, severe constipation    Atorvastatin Other (See Comments)     Joint pain       Review of Systems:  A review of 10+ systems was conducted with pertinent positive and negative findings documented in HPI with all other systems reviewed and negative.    OBJECTIVE:     Estimated body mass index is 28.05 kg/m² as calculated from the following:    Height as of this encounter: 5' 9" (1.753 m).    Weight as of this encounter: 86.1 kg (189 lb 14.8 oz).    Vital Signs (Most Recent)  Vitals:    05/10/23 1005   BP: 93/71   Pulse: 87       Physical Exam:  GENERAL: patient sitting comfortably  HEENT: normocephalic  NECK: supple, no JVD  PULM: normal chest rise, no increased WOB  HEART: non-diaphoretic  ABDO: soft, nondistended, nontender  BACK: no CVA tenderness bilaterally  SKIN: warm, dry, well perfused  EXT: no bruising or edema  NEURO: grossly normal with no focal " deficits  PSYCH: appropriate mood and affect    Genitourinary Exam:  MIGUELINA: appropriate sphincter tone, smooth, non-rubbery, rubbery prostate w/o nodules    Lab Results   Component Value Date    BUN 64 (H) 05/04/2023    CREATININE 3.89 (H) 05/04/2023    WBC 8.76 05/04/2023    HGB 17.3 05/04/2023    HCT 51.5 05/04/2023     (H) 05/04/2023    AST 45 05/04/2023    ALT 56 (H) 05/04/2023    ALKPHOS 110 05/04/2023    ALBUMIN 4.1 05/04/2023    HGBA1C 6.0 (H) 05/04/2023        PSA:  Lab Results   Component Value Date    PSA 4.2 (H) 05/04/2023    PSA 3.4 04/04/2022    PSADIAG 1.3 10/02/2018       Testosterone:  No results found for: TOTALTESTOST, TOTALTESTOST, TESTOSTERONE     Imaging:  I have personally reviewed all relevant imaging.    No results found for this or any previous visit (from the past 2160 hour(s)).  No results found for this or any previous visit (from the past 2160 hour(s)).  X-Ray Chest PA And Lateral  Narrative: EXAMINATION:  XR CHEST PA AND LATERAL    CLINICAL HISTORY:  Cough, unspecified    COMPARISON:  09/28/2022.    FINDINGS:  No significant interval change.    Mild cardiomegaly.  Left chest wall AICD with intact leads.  No pneumothorax.  Aortic atherosclerosis.    Again, the pulmonary arteries are enlarged.    Mild pulmonary vascular congestion.    Bilateral costophrenic angle blunting is again noted characteristic of small pleural effusions and mild coarsening interstitial markings, mid and lower lung distribution, bilaterally symmetric, with superimposed right basilar haziness, question interstitial edema/CHF sequela.    No dense consolidation.  Impression: See discussion above.    Electronically signed by: Tarun Fermin MD  Date:    05/09/2023  Time:    08:30      ASSESSMENT     1. Elevated PSA        PLAN:     Elevated PSA    - PSA values were discussed. PSA is a protein made by the prostate in both benign and malignant conditions. I discussed with the finding of an abnormal PSA test or rising  PSA level. We discussed about the benefits and limitations of PSA testing/screening.    - We discussed the most common differential diagnosis of an elevated PSA including BPH, prostatitis (chronic and acute), and prostate cancer. Other common benign reasons for elevated PSA include infection, recent instrumentation, ejaculation, and trauma.     -  We also discussed next steps, including repeating the PSA, proceeding to prostate needle biopsy, as well as the utility of a multi-parametric prostate MRI.    - After our discussion, we decided to proceed with repeat PSA in 3 months, we will be conservative with his cardiac history and history of CKD.  We will also reach out to his cardiologist to determine if his ICD is MRI safe.  I will see him back in 3 months with a PSA and if it is still elevated we will consider MRI versus biopsy or continued observation.    Fan Mckeon MD  Urology  [facility]  [facility]    Disclaimer: This note has been generated using voice-recognition software. There may be typographical errors that have been missed during proof-reading.

## 2023-05-11 ENCOUNTER — CLINICAL SUPPORT (OUTPATIENT)
Dept: CARDIOLOGY | Facility: HOSPITAL | Age: 63
End: 2023-05-11
Payer: MEDICARE

## 2023-05-11 DIAGNOSIS — Z95.810 PRESENCE OF AUTOMATIC (IMPLANTABLE) CARDIAC DEFIBRILLATOR: ICD-10-CM

## 2023-05-11 PROCEDURE — 93295 DEV INTERROG REMOTE 1/2/MLT: CPT | Mod: ,,, | Performed by: INTERNAL MEDICINE

## 2023-05-11 PROCEDURE — 93296 REM INTERROG EVL PM/IDS: CPT | Performed by: INTERNAL MEDICINE

## 2023-05-11 PROCEDURE — 93295 CARDIAC DEVICE CHECK - REMOTE: ICD-10-PCS | Mod: ,,, | Performed by: INTERNAL MEDICINE

## 2023-05-30 ENCOUNTER — HOSPITAL ENCOUNTER (OUTPATIENT)
Dept: RADIOLOGY | Facility: HOSPITAL | Age: 63
Discharge: HOME | End: 2023-05-30
Attending: STUDENT IN AN ORGANIZED HEALTH CARE EDUCATION/TRAINING PROGRAM
Payer: MEDICARE

## 2023-05-30 DIAGNOSIS — Z87.891 PERSONAL HISTORY OF NICOTINE DEPENDENCE: ICD-10-CM

## 2023-05-30 PROCEDURE — 71271 CT THORAX LUNG CANCER SCR C-: CPT | Mod: TC,PO

## 2023-05-30 PROCEDURE — 71271 CT THORAX LUNG CANCER SCR C-: CPT | Mod: 26,,, | Performed by: RADIOLOGY

## 2023-05-30 PROCEDURE — 71271 CT CHEST LUNG SCREENING LOW DOSE: ICD-10-PCS | Mod: 26,,, | Performed by: RADIOLOGY

## 2023-06-01 ENCOUNTER — OFFICE VISIT (OUTPATIENT)
Dept: CARDIOLOGY | Facility: CLINIC | Age: 63
End: 2023-06-01
Payer: MEDICARE

## 2023-06-01 VITALS
WEIGHT: 192.88 LBS | DIASTOLIC BLOOD PRESSURE: 73 MMHG | BODY MASS INDEX: 28.57 KG/M2 | SYSTOLIC BLOOD PRESSURE: 108 MMHG | HEART RATE: 76 BPM | HEIGHT: 69 IN

## 2023-06-01 DIAGNOSIS — Z95.810 CARDIAC RESYNCHRONIZATION THERAPY DEFIBRILLATOR (CRT-D) IN PLACE: ICD-10-CM

## 2023-06-01 DIAGNOSIS — I42.8 NON-ISCHEMIC CARDIOMYOPATHY: ICD-10-CM

## 2023-06-01 DIAGNOSIS — N18.4 CKD (CHRONIC KIDNEY DISEASE) STAGE 4, GFR 15-29 ML/MIN: ICD-10-CM

## 2023-06-01 DIAGNOSIS — Z98.890 S/P ABLATION OF VENTRICULAR ARRHYTHMIA: ICD-10-CM

## 2023-06-01 DIAGNOSIS — E03.9 HYPOTHYROIDISM, UNSPECIFIED TYPE: ICD-10-CM

## 2023-06-01 DIAGNOSIS — I25.119 ATHEROSCLEROSIS OF NATIVE CORONARY ARTERY OF NATIVE HEART WITH ANGINA PECTORIS: ICD-10-CM

## 2023-06-01 DIAGNOSIS — I50.41 ACUTE COMBINED SYSTOLIC AND DIASTOLIC CONGESTIVE HEART FAILURE: ICD-10-CM

## 2023-06-01 DIAGNOSIS — D75.839 THROMBOCYTHEMIA: ICD-10-CM

## 2023-06-01 DIAGNOSIS — E78.00 PURE HYPERCHOLESTEROLEMIA: Primary | ICD-10-CM

## 2023-06-01 DIAGNOSIS — Z45.02 IMPLANTABLE CARDIOVERTER-DEFIBRILLATOR (ICD) AT END OF BATTERY LIFE: ICD-10-CM

## 2023-06-01 DIAGNOSIS — R09.89 CARDIAC LV EJECTION FRACTION 10-20%: ICD-10-CM

## 2023-06-01 DIAGNOSIS — E78.5 HYPERLIPIDEMIA, UNSPECIFIED HYPERLIPIDEMIA TYPE: ICD-10-CM

## 2023-06-01 DIAGNOSIS — I50.20 HFREF (HEART FAILURE WITH REDUCED EJECTION FRACTION): ICD-10-CM

## 2023-06-01 DIAGNOSIS — Z86.79 S/P ABLATION OF VENTRICULAR ARRHYTHMIA: ICD-10-CM

## 2023-06-01 PROCEDURE — 3078F DIAST BP <80 MM HG: CPT | Mod: CPTII,S$GLB,, | Performed by: INTERNAL MEDICINE

## 2023-06-01 PROCEDURE — 1160F PR REVIEW ALL MEDS BY PRESCRIBER/CLIN PHARMACIST DOCUMENTED: ICD-10-PCS | Mod: CPTII,S$GLB,, | Performed by: INTERNAL MEDICINE

## 2023-06-01 PROCEDURE — 3066F NEPHROPATHY DOC TX: CPT | Mod: CPTII,S$GLB,, | Performed by: INTERNAL MEDICINE

## 2023-06-01 PROCEDURE — 3066F PR DOCUMENTATION OF TREATMENT FOR NEPHROPATHY: ICD-10-PCS | Mod: CPTII,S$GLB,, | Performed by: INTERNAL MEDICINE

## 2023-06-01 PROCEDURE — 1159F PR MEDICATION LIST DOCUMENTED IN MEDICAL RECORD: ICD-10-PCS | Mod: CPTII,S$GLB,, | Performed by: INTERNAL MEDICINE

## 2023-06-01 PROCEDURE — 99999 PR PBB SHADOW E&M-EST. PATIENT-LVL III: CPT | Mod: PBBFAC,,, | Performed by: INTERNAL MEDICINE

## 2023-06-01 PROCEDURE — 93005 RHYTHM STRIP: ICD-10-PCS | Mod: S$GLB,,, | Performed by: INTERNAL MEDICINE

## 2023-06-01 PROCEDURE — 99999 PR PBB SHADOW E&M-EST. PATIENT-LVL III: ICD-10-PCS | Mod: PBBFAC,,, | Performed by: INTERNAL MEDICINE

## 2023-06-01 PROCEDURE — 3078F PR MOST RECENT DIASTOLIC BLOOD PRESSURE < 80 MM HG: ICD-10-PCS | Mod: CPTII,S$GLB,, | Performed by: INTERNAL MEDICINE

## 2023-06-01 PROCEDURE — 99215 PR OFFICE/OUTPT VISIT, EST, LEVL V, 40-54 MIN: ICD-10-PCS | Mod: S$GLB,,, | Performed by: INTERNAL MEDICINE

## 2023-06-01 PROCEDURE — 99215 OFFICE O/P EST HI 40 MIN: CPT | Mod: S$GLB,,, | Performed by: INTERNAL MEDICINE

## 2023-06-01 PROCEDURE — 1160F RVW MEDS BY RX/DR IN RCRD: CPT | Mod: CPTII,S$GLB,, | Performed by: INTERNAL MEDICINE

## 2023-06-01 PROCEDURE — 3044F PR MOST RECENT HEMOGLOBIN A1C LEVEL <7.0%: ICD-10-PCS | Mod: CPTII,S$GLB,, | Performed by: INTERNAL MEDICINE

## 2023-06-01 PROCEDURE — 3008F PR BODY MASS INDEX (BMI) DOCUMENTED: ICD-10-PCS | Mod: CPTII,S$GLB,, | Performed by: INTERNAL MEDICINE

## 2023-06-01 PROCEDURE — 3008F BODY MASS INDEX DOCD: CPT | Mod: CPTII,S$GLB,, | Performed by: INTERNAL MEDICINE

## 2023-06-01 PROCEDURE — 93010 RHYTHM STRIP: ICD-10-PCS | Mod: S$GLB,,, | Performed by: INTERNAL MEDICINE

## 2023-06-01 PROCEDURE — 4010F PR ACE/ARB THEARPY RXD/TAKEN: ICD-10-PCS | Mod: CPTII,S$GLB,, | Performed by: INTERNAL MEDICINE

## 2023-06-01 PROCEDURE — 1159F MED LIST DOCD IN RCRD: CPT | Mod: CPTII,S$GLB,, | Performed by: INTERNAL MEDICINE

## 2023-06-01 PROCEDURE — 3074F PR MOST RECENT SYSTOLIC BLOOD PRESSURE < 130 MM HG: ICD-10-PCS | Mod: CPTII,S$GLB,, | Performed by: INTERNAL MEDICINE

## 2023-06-01 PROCEDURE — 93005 ELECTROCARDIOGRAM TRACING: CPT | Mod: S$GLB,,, | Performed by: INTERNAL MEDICINE

## 2023-06-01 PROCEDURE — 3074F SYST BP LT 130 MM HG: CPT | Mod: CPTII,S$GLB,, | Performed by: INTERNAL MEDICINE

## 2023-06-01 PROCEDURE — 93010 ELECTROCARDIOGRAM REPORT: CPT | Mod: S$GLB,,, | Performed by: INTERNAL MEDICINE

## 2023-06-01 PROCEDURE — 3044F HG A1C LEVEL LT 7.0%: CPT | Mod: CPTII,S$GLB,, | Performed by: INTERNAL MEDICINE

## 2023-06-01 PROCEDURE — 4010F ACE/ARB THERAPY RXD/TAKEN: CPT | Mod: CPTII,S$GLB,, | Performed by: INTERNAL MEDICINE

## 2023-06-01 NOTE — PROGRESS NOTES
Subjective:    Patient ID:  Ar Sharma is a 62 y.o. male who presents for evaluation of VT    HPI  62 y.o. M  CAD, ICM  VT, ablated x2 in [place]  CRT-D  CKD  HTN on meds    Went to [facility] 9/2022 with ICD shocks. 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 Since increase amio to 400 and resumption of mexiletine at 150 bid, no VT/VF.  He's able to exercise on bike 2x/week. No CP, and NYHA II SINGH.    ICD gen change done 1/23. Well healed since. No VA event on remote monitoring.     My interpretation of today's ECG is APBiVP    Review of Systems   Constitutional: Negative. Negative for malaise/fatigue.   HENT: Negative.  Negative for ear pain and tinnitus.    Eyes:  Negative for blurred vision.   Cardiovascular:  Positive for dyspnea on exertion. Negative for chest pain, near-syncope, palpitations and syncope.   Respiratory: Negative.  Negative for shortness of breath.    Endocrine: Negative.  Negative for polyuria.   Hematologic/Lymphatic: Does not bruise/bleed easily.   Skin: Negative.  Negative for rash.   Musculoskeletal: Negative.  Negative for joint pain and muscle weakness.   Gastrointestinal: Negative.  Negative for abdominal pain and change in bowel habit.   Genitourinary:  Negative for frequency.   Neurological: Negative.  Negative for dizziness and weakness.   Psychiatric/Behavioral: Negative.  Negative for depression. The patient is not nervous/anxious.    Allergic/Immunologic: Negative for environmental allergies.      Objective:    Physical Exam  Vitals and nursing note reviewed.   Constitutional:       Appearance: He is well-developed.   HENT:      Head: Normocephalic and atraumatic.   Eyes:      General: Lids are normal. No scleral icterus.     Conjunctiva/sclera: Conjunctivae normal.   Neck:      Thyroid: No thyromegaly.      Vascular: No JVD.      Trachea: No tracheal deviation.   Cardiovascular:      Rate and Rhythm: Normal rate and  regular rhythm.      Pulses:           Radial pulses are 2+ on the right side and 2+ on the left side.   Pulmonary:      Effort: Pulmonary effort is normal. No tachypnea, accessory muscle usage or respiratory distress.      Breath sounds: Normal breath sounds. No wheezing.   Abdominal:      General: There is no distension.   Musculoskeletal:         General: Normal range of motion.      Cervical back: Normal range of motion.   Skin:     General: Skin is warm and dry.   Neurological:      Mental Status: He is alert and oriented to person, place, and time.      Motor: No abnormal muscle tone.      Deep Tendon Reflexes: Reflexes are normal and symmetric.   Psychiatric:         Behavior: Behavior normal.         Assessment:       1. Pure hypercholesterolemia    2. Cardiac resynchronization therapy defibrillator (CRT-D) in place    3. Atherosclerosis of native coronary artery of native heart with angina pectoris    4. S/P ablation of ventricular arrhythmia    5. Cardiac LV ejection fraction 10-20%    6. HFrEF (heart failure with reduced ejection fraction)    7. Implantable cardioverter-defibrillator (ICD) at end of battery life    8. Hyperlipidemia, unspecified hyperlipidemia type    9. Acute combined systolic and diastolic congestive heart failure    10. Non-ischemic cardiomyopathy    11. CKD (chronic kidney disease) stage 4, GFR 15-29 ml/min    12. Thrombocythemia -  on 4/4/22    13. Hypothyroidism, unspecified type         Plan:       CM.  [facility]    ICD  Remote monitoring to continue. Interrogation today pending.    VT  Continue amio/dyan.  TSH, LFTs, PFTs yearly    Return in 1 year with echo and amio tests, or earlier prn.

## 2023-06-28 RX ORDER — AMIODARONE HYDROCHLORIDE 400 MG/1
400 TABLET ORAL DAILY
Qty: 60 TABLET | Refills: 3 | Status: CANCELLED | OUTPATIENT
Start: 2023-06-28 | End: 2024-06-27

## 2023-06-28 NOTE — TELEPHONE ENCOUNTER
----- Message from Kassi Perales sent at 6/28/2023  9:58 AM CDT -----  .Type:  RX Refill Request    Who Called: pt  Refill or New Rx:refill  RX Name and Strength:amiodarone (PACERONE) 400 MG tablet  How is the patient currently taking it? (ex. 1XDay):Take 1 tablet (400 mg total) by mouth once daily  Is this a 30 day or 90 day RX:60 tablet  Preferred Pharmacy with phone number:[facility]/PHARMACY #[id number] - [address] AT [facility]  Local or Mail Order:local  Ordering Provider:[facility]IOLOG[facility] ([facility])  Would the patient rather a call back or a response via [facility]? Call back  Best Call Back Number:[phone]  Additional Information:

## 2023-06-29 ENCOUNTER — OFFICE VISIT (OUTPATIENT)
Dept: CARDIOLOGY | Facility: CLINIC | Age: 63
End: 2023-06-29
Payer: MEDICARE

## 2023-06-29 VITALS
WEIGHT: 190.63 LBS | HEIGHT: 69 IN | DIASTOLIC BLOOD PRESSURE: 66 MMHG | BODY MASS INDEX: 28.24 KG/M2 | SYSTOLIC BLOOD PRESSURE: 96 MMHG | OXYGEN SATURATION: 91 % | HEART RATE: 78 BPM

## 2023-06-29 DIAGNOSIS — Z86.79 S/P ABLATION OF VENTRICULAR ARRHYTHMIA: ICD-10-CM

## 2023-06-29 DIAGNOSIS — I25.119 ATHEROSCLEROSIS OF NATIVE CORONARY ARTERY OF NATIVE HEART WITH ANGINA PECTORIS: Primary | ICD-10-CM

## 2023-06-29 DIAGNOSIS — I50.20 HFREF (HEART FAILURE WITH REDUCED EJECTION FRACTION): ICD-10-CM

## 2023-06-29 DIAGNOSIS — E78.5 HYPERLIPIDEMIA, UNSPECIFIED HYPERLIPIDEMIA TYPE: ICD-10-CM

## 2023-06-29 DIAGNOSIS — R73.03 PREDIABETES: ICD-10-CM

## 2023-06-29 DIAGNOSIS — Z98.890 S/P ABLATION OF VENTRICULAR ARRHYTHMIA: ICD-10-CM

## 2023-06-29 DIAGNOSIS — I42.8 NON-ISCHEMIC CARDIOMYOPATHY: ICD-10-CM

## 2023-06-29 DIAGNOSIS — Z95.810 CARDIAC RESYNCHRONIZATION THERAPY DEFIBRILLATOR (CRT-D) IN PLACE: ICD-10-CM

## 2023-06-29 PROCEDURE — 1160F PR REVIEW ALL MEDS BY PRESCRIBER/CLIN PHARMACIST DOCUMENTED: ICD-10-PCS | Mod: CPTII,S$GLB,, | Performed by: INTERNAL MEDICINE

## 2023-06-29 PROCEDURE — 1159F MED LIST DOCD IN RCRD: CPT | Mod: CPTII,S$GLB,, | Performed by: INTERNAL MEDICINE

## 2023-06-29 PROCEDURE — 3078F DIAST BP <80 MM HG: CPT | Mod: CPTII,S$GLB,, | Performed by: INTERNAL MEDICINE

## 2023-06-29 PROCEDURE — 99999 PR PBB SHADOW E&M-EST. PATIENT-LVL III: CPT | Mod: PBBFAC,,, | Performed by: INTERNAL MEDICINE

## 2023-06-29 PROCEDURE — 3074F SYST BP LT 130 MM HG: CPT | Mod: CPTII,S$GLB,, | Performed by: INTERNAL MEDICINE

## 2023-06-29 PROCEDURE — 3044F PR MOST RECENT HEMOGLOBIN A1C LEVEL <7.0%: ICD-10-PCS | Mod: CPTII,S$GLB,, | Performed by: INTERNAL MEDICINE

## 2023-06-29 PROCEDURE — 3078F PR MOST RECENT DIASTOLIC BLOOD PRESSURE < 80 MM HG: ICD-10-PCS | Mod: CPTII,S$GLB,, | Performed by: INTERNAL MEDICINE

## 2023-06-29 PROCEDURE — 99215 OFFICE O/P EST HI 40 MIN: CPT | Mod: S$GLB,,, | Performed by: INTERNAL MEDICINE

## 2023-06-29 PROCEDURE — 4010F PR ACE/ARB THEARPY RXD/TAKEN: ICD-10-PCS | Mod: CPTII,S$GLB,, | Performed by: INTERNAL MEDICINE

## 2023-06-29 PROCEDURE — 4010F ACE/ARB THERAPY RXD/TAKEN: CPT | Mod: CPTII,S$GLB,, | Performed by: INTERNAL MEDICINE

## 2023-06-29 PROCEDURE — 3066F PR DOCUMENTATION OF TREATMENT FOR NEPHROPATHY: ICD-10-PCS | Mod: CPTII,S$GLB,, | Performed by: INTERNAL MEDICINE

## 2023-06-29 PROCEDURE — 99215 PR OFFICE/OUTPT VISIT, EST, LEVL V, 40-54 MIN: ICD-10-PCS | Mod: S$GLB,,, | Performed by: INTERNAL MEDICINE

## 2023-06-29 PROCEDURE — 1159F PR MEDICATION LIST DOCUMENTED IN MEDICAL RECORD: ICD-10-PCS | Mod: CPTII,S$GLB,, | Performed by: INTERNAL MEDICINE

## 2023-06-29 PROCEDURE — 3044F HG A1C LEVEL LT 7.0%: CPT | Mod: CPTII,S$GLB,, | Performed by: INTERNAL MEDICINE

## 2023-06-29 PROCEDURE — 3074F PR MOST RECENT SYSTOLIC BLOOD PRESSURE < 130 MM HG: ICD-10-PCS | Mod: CPTII,S$GLB,, | Performed by: INTERNAL MEDICINE

## 2023-06-29 PROCEDURE — 1160F RVW MEDS BY RX/DR IN RCRD: CPT | Mod: CPTII,S$GLB,, | Performed by: INTERNAL MEDICINE

## 2023-06-29 PROCEDURE — 3008F BODY MASS INDEX DOCD: CPT | Mod: CPTII,S$GLB,, | Performed by: INTERNAL MEDICINE

## 2023-06-29 PROCEDURE — 3066F NEPHROPATHY DOC TX: CPT | Mod: CPTII,S$GLB,, | Performed by: INTERNAL MEDICINE

## 2023-06-29 PROCEDURE — 99999 PR PBB SHADOW E&M-EST. PATIENT-LVL III: ICD-10-PCS | Mod: PBBFAC,,, | Performed by: INTERNAL MEDICINE

## 2023-06-29 PROCEDURE — 3008F PR BODY MASS INDEX (BMI) DOCUMENTED: ICD-10-PCS | Mod: CPTII,S$GLB,, | Performed by: INTERNAL MEDICINE

## 2023-06-29 RX ORDER — AMIODARONE HYDROCHLORIDE 400 MG/1
400 TABLET ORAL DAILY
Qty: 90 TABLET | Refills: 3 | Status: SHIPPED | OUTPATIENT
Start: 2023-06-29 | End: 2024-06-28

## 2023-06-29 NOTE — PROGRESS NOTES
Subjective:    Patient ID:  Ar Sharma is a 62 y.o. male who presents for follow-up of Congestive Heart Failure      HPI    61 y/o male with a hx of CAD (states he had MI in his 20's), HFrEF with EF 30-35% initially with last EF 50% (11/04) with SLAVA 5.35 cm, s/p CRT-D (initial ICD 2012 with upgrade 2016 - [employer]), DD, VT s/p ablation x 2 (2/2018 and again 3/2018), HLD, hypothyroidism, YOEL. Initially seen by Dr Newman and it sounds like he was in ADHF, lasix dose increased, and symptoms had significantly improved. Was very SOB with minimal activity, with bilateral LE edema/orthopnea/PND. Increase in lasix dose resulted in SOB back at baseline and orthopnea/PND resolved, with improvement in edema. Entresto started after last VT ablation. Entresto dose was increased in Dec 2018. Did not tolerate it well, however, current dose tolerated (49-51). Had ICD shocks in 12/2018 (per interrogation mentions 5 times), he remembers one.   Previous clinic visit had been having worsening of chronic angina and symptoms. Chest pain significantly improved and no recent episodes. NYHA FC II symptoms. Has episodic dizziness and fatigue. Takes extra doses of ASA for CP. Reserves NTG for very severe pain, which he has not had.  Had previous episode of heart racing that he gets before he gets shocked, was able to lay down, doubled dose of carvedilol, no shock and heart racing improved. Had intermittent heart racing for about 1 week after that resolved. ICD interrogation from 3/4/2021 in media section with 1 terminated VT episode with ATP. BB increased at previous clinic visit.  Had nuclear SPECT with:  1. Abnormal study.  There is marked thinning of the lateral left ventricular wall, absence of uptake within the inferior wall and cardiac apex, global hypokinesis with abnormally increased systolic and end-diastolic volume, as well as decreased ejection fraction at 35%..  2. No definite areas of reversibility to suggest ischemia is  seen.  Symptoms resolved and BP stable. Had nuclear SPECT in the past with [facility] with nonobstructive CAD.   Syncope prior to last visit with prodrome and out for like 5 mins - carrying a fridge up the stairs. Was admitted for ICD discharge. Meds adjusted, discharged, and has followed with HTS.   Seen by Nephrology and refusing PD/HD for now.     2/2/2023:  Since last visit had successful ICD generator change and site healing well. Restarted on BB (Toprol this time) and tolerating well. Denies CP, SOB/SINGH, orthopnea, PND, syncope, palps, LE edema. Tolerating meds well.     6/29/2023:  Since last visit, no new symptoms. Seen by EP - Dr Tenorio - and no new events/changes to meds.    Review of Systems   Constitutional: Positive for malaise/fatigue.   HENT:  Negative for congestion.    Eyes:  Negative for blurred vision.   Cardiovascular:  Negative for chest pain, claudication, cyanosis, dyspnea on exertion, irregular heartbeat, leg swelling, near-syncope, orthopnea, palpitations, paroxysmal nocturnal dyspnea and syncope.   Respiratory:  Negative for shortness of breath.    Endocrine: Negative for polyuria.   Hematologic/Lymphatic: Negative for bleeding problem.   Skin:  Negative for itching and rash.   Musculoskeletal:  Negative for joint swelling, muscle cramps and muscle weakness.   Gastrointestinal:  Negative for abdominal pain, hematemesis, hematochezia, melena, nausea and vomiting.   Genitourinary:  Negative for dysuria and hematuria.   Neurological:  Negative for dizziness, focal weakness, headaches, light-headedness, loss of balance and weakness.   Psychiatric/Behavioral:  Negative for depression. The patient is not nervous/anxious.         Objective:    Physical Exam  Constitutional:       Appearance: He is well-developed.   HENT:      Head: Normocephalic and atraumatic.   Neck:      Vascular: No JVD.   Cardiovascular:      Rate and Rhythm: Normal rate and regular rhythm.      Pulses:           Carotid pulses are  2+ on the right side and 2+ on the left side.       Radial pulses are 2+ on the right side and 2+ on the left side.        Femoral pulses are 2+ on the right side and 2+ on the left side.       Dorsalis pedis pulses are 2+ on the right side and 2+ on the left side.        Posterior tibial pulses are 2+ on the right side and 2+ on the left side.      Heart sounds: Normal heart sounds.   Pulmonary:      Effort: Pulmonary effort is normal.      Breath sounds: Normal breath sounds.   Abdominal:      General: Bowel sounds are normal.      Palpations: Abdomen is soft.   Musculoskeletal:      Cervical back: Neck supple.   Skin:     General: Skin is warm and dry.   Neurological:      Mental Status: He is alert and oriented to person, place, and time.   Psychiatric:         Behavior: Behavior normal.         Thought Content: Thought content normal.           Assessment:       1. Atherosclerosis of native coronary artery of native heart with angina pectoris    2. Cardiac resynchronization therapy defibrillator (CRT-D) in place    3. S/P ablation of ventricular arrhythmia    4. HFrEF (heart failure with reduced ejection fraction)    5. Hyperlipidemia, unspecified hyperlipidemia type    6. Non-ischemic cardiomyopathy    7. Prediabetes      61 y/o pt with hx and presentation as above. Doing well from a cardiac perspective and compensated from a HF perspective. Currently asymptomatic and on good medical management. Cont GDMT. Needs to stay active. Discussed the etiology, evaluation, and management of CAD, VT, CHF, HLD, ICD, CRT, CKD, YOEL. Discussed the importance of med compliance, heart healthy diet, and regular exercise.      Plan:       HFrEF  -2DE with EF 40-45%  - Cont Entresto 24/26 mg BID  -Cont BB    VT  -ICD in place - previous gen change  -Cont Mexiletine/Amio for now    CAD  -stable, no acute issues  -cont ASA/statin    HLD  -last lipid panel reviewed and at goal  -cont statin    Total duration of face to face visit  time 30 minutes.  Total time spent counseling greater than fifty percent of total visit time.  Counseling included discussion regarding imaging findings, diagnosis, possibilities, treatment options, risks and benefits.  -In today's visit, at least 4 established conditions that pose a risk to life or bodily function have been addressed and the conditions are severe.      -f/u in 8 months

## 2023-07-05 ENCOUNTER — LAB VISIT (OUTPATIENT)
Dept: LAB | Facility: HOSPITAL | Age: 63
End: 2023-07-05
Attending: INTERNAL MEDICINE
Payer: MEDICARE

## 2023-07-05 DIAGNOSIS — E55.9 VITAMIN D DEFICIENCY: ICD-10-CM

## 2023-07-05 DIAGNOSIS — N18.4 CKD (CHRONIC KIDNEY DISEASE) STAGE 4, GFR 15-29 ML/MIN: ICD-10-CM

## 2023-07-05 LAB
25(OH)D3+25(OH)D2 SERPL-MCNC: 26 NG/ML (ref 30–96)
ALBUMIN SERPL BCP-MCNC: 4.2 G/DL (ref 3.5–5.2)
ANION GAP SERPL CALC-SCNC: 12 MMOL/L (ref 8–16)
CALCIUM SERPL-MCNC: 9.1 MG/DL (ref 8.7–10.5)
CHLORIDE SERPL-SCNC: 102 MMOL/L (ref 95–110)
CO2 SERPL-SCNC: 27 MMOL/L (ref 23–29)
CREAT SERPL-MCNC: 3.34 MG/DL (ref 0.5–1.4)
EST. GFR  (NO RACE VARIABLE): 19.9 ML/MIN/1.73 M^2
GLUCOSE SERPL-MCNC: 83 MG/DL (ref 70–110)
PHOSPHATE SERPL-MCNC: 3.2 MG/DL (ref 2.7–4.5)
POTASSIUM SERPL-SCNC: 4.1 MMOL/L (ref 3.5–5.1)
PTH-INTACT SERPL-MCNC: 216 PG/ML (ref 9–77)
SODIUM SERPL-SCNC: 141 MMOL/L (ref 136–145)
UUN UR-MCNC: 60 MG/DL (ref 2–20)

## 2023-07-05 PROCEDURE — 82306 VITAMIN D 25 HYDROXY: CPT | Mod: PO | Performed by: INTERNAL MEDICINE

## 2023-07-05 PROCEDURE — 36415 COLL VENOUS BLD VENIPUNCTURE: CPT | Mod: PO | Performed by: INTERNAL MEDICINE

## 2023-07-05 PROCEDURE — 80069 RENAL FUNCTION PANEL: CPT | Mod: PO | Performed by: INTERNAL MEDICINE

## 2023-07-05 PROCEDURE — 83970 ASSAY OF PARATHORMONE: CPT | Mod: PO | Performed by: INTERNAL MEDICINE

## 2023-07-10 ENCOUNTER — OFFICE VISIT (OUTPATIENT)
Dept: NEPHROLOGY | Facility: CLINIC | Age: 63
End: 2023-07-10
Payer: MEDICARE

## 2023-07-10 VITALS
SYSTOLIC BLOOD PRESSURE: 102 MMHG | BODY MASS INDEX: 28.24 KG/M2 | HEIGHT: 69 IN | OXYGEN SATURATION: 89 % | HEART RATE: 87 BPM | DIASTOLIC BLOOD PRESSURE: 62 MMHG | WEIGHT: 190.69 LBS

## 2023-07-10 DIAGNOSIS — I50.22 CHRONIC SYSTOLIC CONGESTIVE HEART FAILURE: ICD-10-CM

## 2023-07-10 DIAGNOSIS — N18.4 ANEMIA IN STAGE 4 CHRONIC KIDNEY DISEASE: ICD-10-CM

## 2023-07-10 DIAGNOSIS — I42.8 NON-ISCHEMIC CARDIOMYOPATHY: ICD-10-CM

## 2023-07-10 DIAGNOSIS — D63.1 ANEMIA IN STAGE 4 CHRONIC KIDNEY DISEASE: ICD-10-CM

## 2023-07-10 DIAGNOSIS — R09.89 CARDIAC LV EJECTION FRACTION 10-20%: ICD-10-CM

## 2023-07-10 DIAGNOSIS — R73.03 PREDIABETES: ICD-10-CM

## 2023-07-10 DIAGNOSIS — N18.4 CKD (CHRONIC KIDNEY DISEASE) STAGE 4, GFR 15-29 ML/MIN: Primary | ICD-10-CM

## 2023-07-10 DIAGNOSIS — E55.9 VITAMIN D DEFICIENCY: ICD-10-CM

## 2023-07-10 DIAGNOSIS — N25.81 SECONDARY HYPERPARATHYROIDISM OF RENAL ORIGIN: ICD-10-CM

## 2023-07-10 PROCEDURE — 4010F ACE/ARB THERAPY RXD/TAKEN: CPT | Mod: CPTII,S$GLB,, | Performed by: INTERNAL MEDICINE

## 2023-07-10 PROCEDURE — 99215 PR OFFICE/OUTPT VISIT, EST, LEVL V, 40-54 MIN: ICD-10-PCS | Mod: S$GLB,,, | Performed by: INTERNAL MEDICINE

## 2023-07-10 PROCEDURE — 3078F DIAST BP <80 MM HG: CPT | Mod: CPTII,S$GLB,, | Performed by: INTERNAL MEDICINE

## 2023-07-10 PROCEDURE — 1159F MED LIST DOCD IN RCRD: CPT | Mod: CPTII,S$GLB,, | Performed by: INTERNAL MEDICINE

## 2023-07-10 PROCEDURE — 3008F BODY MASS INDEX DOCD: CPT | Mod: CPTII,S$GLB,, | Performed by: INTERNAL MEDICINE

## 2023-07-10 PROCEDURE — 3008F PR BODY MASS INDEX (BMI) DOCUMENTED: ICD-10-PCS | Mod: CPTII,S$GLB,, | Performed by: INTERNAL MEDICINE

## 2023-07-10 PROCEDURE — 3078F PR MOST RECENT DIASTOLIC BLOOD PRESSURE < 80 MM HG: ICD-10-PCS | Mod: CPTII,S$GLB,, | Performed by: INTERNAL MEDICINE

## 2023-07-10 PROCEDURE — 3074F SYST BP LT 130 MM HG: CPT | Mod: CPTII,S$GLB,, | Performed by: INTERNAL MEDICINE

## 2023-07-10 PROCEDURE — 1160F RVW MEDS BY RX/DR IN RCRD: CPT | Mod: CPTII,S$GLB,, | Performed by: INTERNAL MEDICINE

## 2023-07-10 PROCEDURE — 99999 PR PBB SHADOW E&M-EST. PATIENT-LVL III: ICD-10-PCS | Mod: PBBFAC,,, | Performed by: INTERNAL MEDICINE

## 2023-07-10 PROCEDURE — 3044F HG A1C LEVEL LT 7.0%: CPT | Mod: CPTII,S$GLB,, | Performed by: INTERNAL MEDICINE

## 2023-07-10 PROCEDURE — 3074F PR MOST RECENT SYSTOLIC BLOOD PRESSURE < 130 MM HG: ICD-10-PCS | Mod: CPTII,S$GLB,, | Performed by: INTERNAL MEDICINE

## 2023-07-10 PROCEDURE — 1160F PR REVIEW ALL MEDS BY PRESCRIBER/CLIN PHARMACIST DOCUMENTED: ICD-10-PCS | Mod: CPTII,S$GLB,, | Performed by: INTERNAL MEDICINE

## 2023-07-10 PROCEDURE — 1159F PR MEDICATION LIST DOCUMENTED IN MEDICAL RECORD: ICD-10-PCS | Mod: CPTII,S$GLB,, | Performed by: INTERNAL MEDICINE

## 2023-07-10 PROCEDURE — 99999 PR PBB SHADOW E&M-EST. PATIENT-LVL III: CPT | Mod: PBBFAC,,, | Performed by: INTERNAL MEDICINE

## 2023-07-10 PROCEDURE — 3066F NEPHROPATHY DOC TX: CPT | Mod: CPTII,S$GLB,, | Performed by: INTERNAL MEDICINE

## 2023-07-10 PROCEDURE — 3044F PR MOST RECENT HEMOGLOBIN A1C LEVEL <7.0%: ICD-10-PCS | Mod: CPTII,S$GLB,, | Performed by: INTERNAL MEDICINE

## 2023-07-10 PROCEDURE — 3066F PR DOCUMENTATION OF TREATMENT FOR NEPHROPATHY: ICD-10-PCS | Mod: CPTII,S$GLB,, | Performed by: INTERNAL MEDICINE

## 2023-07-10 PROCEDURE — 4010F PR ACE/ARB THEARPY RXD/TAKEN: ICD-10-PCS | Mod: CPTII,S$GLB,, | Performed by: INTERNAL MEDICINE

## 2023-07-10 PROCEDURE — 99215 OFFICE O/P EST HI 40 MIN: CPT | Mod: S$GLB,,, | Performed by: INTERNAL MEDICINE

## 2023-07-10 NOTE — PATIENT INSTRUCTIONS
Farxiga or Jardiance   - potential side effects genital yeast infections or UTI, and mild dehydration   - this is a class of medications that is good for slowing down the progression of kidney disease   - also good for congestive heart failure

## 2023-07-10 NOTE — PROGRESS NOTES
Subjective:       Patient ID: Ar Sharma is a 62 y.o. Black or  male who presents for follow up evaluation of Chronic Kidney Disease    HPI    He is referred by his PCP for CKD, seen by a Nephrologist previously but wishes to change.    He has a significant history of DCM with most recentr EF 40-45%  and is s/p ICD placement, recent hospital stay for ICD firing multiple times. He was noted ()self as well) to be significantly hypotensive. He had an JEFF episode during hospital stay, no RRT indicated. He has hesitance regarding AV access, additionally he is interested in PD if dialysis were to be indicated.   Today he is feeling well. He is on a plant based diet, 90-95% as will have fish and shrimp a few times a week    March 2023: Back on Entresto, BP has been fine. Energy level may be better, sleeping better, SOB/SINGH is stable    jULY 2023: Staying active. Appetiite is good. No NSAIDs. No LE edema.     Review of Systems   Constitutional:  Positive for fatigue. Negative for activity change and appetite change.   HENT:  Negative for facial swelling.    Respiratory:  Positive for shortness of breath (SINGH).    Cardiovascular:  Negative for leg swelling.   Gastrointestinal:  Negative for nausea and vomiting.   Genitourinary:  Negative for difficulty urinating.   Musculoskeletal:  Positive for gait problem.   Allergic/Immunologic: Positive for immunocompromised state (CHF CKD).   Neurological:  Negative for weakness.   Psychiatric/Behavioral:  Negative for confusion and decreased concentration.      Objective:      Physical Exam  Vitals and nursing note reviewed.   Constitutional:       General: He is not in acute distress.     Appearance: Normal appearance. He is not ill-appearing.   HENT:      Head: Atraumatic.   Eyes:      General: No scleral icterus.  Cardiovascular:      Rate and Rhythm: Normal rate.   Pulmonary:      Effort: Pulmonary effort is normal. No respiratory distress.      Breath  sounds: No wheezing or rales.   Abdominal:      General: There is no distension.   Musculoskeletal:      Right lower leg: No edema.      Left lower leg: No edema.   Skin:     General: Skin is warm and dry.   Neurological:      Mental Status: He is alert and oriented to person, place, and time. Mental status is at baseline.   Psychiatric:         Mood and Affect: Mood normal.       Assessment:       1. CKD (chronic kidney disease) stage 4, GFR 15-29 ml/min    2. Secondary hyperparathyroidism of renal origin    3. Vitamin D deficiency    4. Non-ischemic cardiomyopathy    5. Prediabetes    6. Cardiac LV ejection fraction 10-20%    7. Anemia in stage 4 chronic kidney disease    8. Chronic systolic congestive heart failure              Plan:         CKD Stage 4, appears to be Cardiorenal in etiology  - stable kidney function   - discussed treating cardiac function will improve/stabilize kidney function  - add Jardiance   - avoid NSAIDs, continue low sodium diet     Mineral and Bone Disease/SHPT  - continue to trend PTH  - continue D2, may need D analogue    Systolic HF  - Jardiance added as above for CKD indications    RTC 4mo or prn sooner  55 minutes of total time spent on the encounter, which includes face to face time and non-face to face time preparing to see the patient (eg, review of tests), Obtaining and/or reviewing separately obtained history, Documenting clinical information in the electronic or other health record, Independently interpreting results (not separately reported) and communicating results to the patient/family/caregiver, or Care coordination (not separately reported).

## 2023-07-17 ENCOUNTER — TELEPHONE (OUTPATIENT)
Dept: NEPHROLOGY | Facility: CLINIC | Age: 63
End: 2023-07-17
Payer: MEDICARE

## 2023-07-17 NOTE — TELEPHONE ENCOUNTER
Patient was called back and reports taking Jardiance for on ly 4 days. On the first day he noticed a drop in his blood pressure to 80/51. He also complained of chest pains and significant weight loss. He has since stopped this medication. Please respond in his [employer] portal with updates.

## 2023-07-17 NOTE — TELEPHONE ENCOUNTER
----- Message from Gary Wilder sent at 7/17/2023  1:53 PM CDT -----  Contact: NASEEM  Patient is requesting a call back from nurse regarding problems he has been having with medication that was recently prescribed, reports Jardiance is giving him chest pains, low bp, headaches, etc. Please call patient back at [phone]        Thanks

## 2023-08-09 ENCOUNTER — CLINICAL SUPPORT (OUTPATIENT)
Dept: CARDIOLOGY | Facility: HOSPITAL | Age: 63
End: 2023-08-09
Payer: MEDICARE

## 2023-08-09 DIAGNOSIS — Z95.810 PRESENCE OF AUTOMATIC (IMPLANTABLE) CARDIAC DEFIBRILLATOR: ICD-10-CM

## 2023-08-09 PROCEDURE — 93296 REM INTERROG EVL PM/IDS: CPT | Performed by: INTERNAL MEDICINE

## 2023-08-10 ENCOUNTER — LAB VISIT (OUTPATIENT)
Dept: LAB | Facility: HOSPITAL | Age: 63
End: 2023-08-10
Attending: UROLOGY
Payer: MEDICARE

## 2023-08-10 DIAGNOSIS — R97.20 ELEVATED PSA: ICD-10-CM

## 2023-08-10 LAB
PROSTATE SPECIFIC ANTIGEN, TOTAL: 2.5 NG/ML (ref 0–4)
PSA FREE MFR SERPL: 36.4 %
PSA FREE SERPL-MCNC: 0.91 NG/ML (ref 0–1.5)

## 2023-08-10 PROCEDURE — 84153 ASSAY OF PSA TOTAL: CPT | Performed by: UROLOGY

## 2023-08-10 PROCEDURE — 36415 COLL VENOUS BLD VENIPUNCTURE: CPT | Mod: PO | Performed by: UROLOGY

## 2023-08-14 ENCOUNTER — OFFICE VISIT (OUTPATIENT)
Dept: UROLOGY | Facility: CLINIC | Age: 63
End: 2023-08-14
Payer: MEDICARE

## 2023-08-14 VITALS
DIASTOLIC BLOOD PRESSURE: 67 MMHG | WEIGHT: 189.63 LBS | BODY MASS INDEX: 28.08 KG/M2 | SYSTOLIC BLOOD PRESSURE: 99 MMHG | HEART RATE: 98 BPM | HEIGHT: 69 IN

## 2023-08-14 DIAGNOSIS — R97.20 ELEVATED PSA: Primary | ICD-10-CM

## 2023-08-14 PROCEDURE — 99214 OFFICE O/P EST MOD 30 MIN: CPT | Mod: S$GLB,,, | Performed by: UROLOGY

## 2023-08-14 PROCEDURE — 3074F SYST BP LT 130 MM HG: CPT | Mod: CPTII,S$GLB,, | Performed by: UROLOGY

## 2023-08-14 PROCEDURE — 1160F PR REVIEW ALL MEDS BY PRESCRIBER/CLIN PHARMACIST DOCUMENTED: ICD-10-PCS | Mod: CPTII,S$GLB,, | Performed by: UROLOGY

## 2023-08-14 PROCEDURE — 99999 PR PBB SHADOW E&M-EST. PATIENT-LVL III: ICD-10-PCS | Mod: PBBFAC,,, | Performed by: UROLOGY

## 2023-08-14 PROCEDURE — 3074F PR MOST RECENT SYSTOLIC BLOOD PRESSURE < 130 MM HG: ICD-10-PCS | Mod: CPTII,S$GLB,, | Performed by: UROLOGY

## 2023-08-14 PROCEDURE — 3008F BODY MASS INDEX DOCD: CPT | Mod: CPTII,S$GLB,, | Performed by: UROLOGY

## 2023-08-14 PROCEDURE — 3008F PR BODY MASS INDEX (BMI) DOCUMENTED: ICD-10-PCS | Mod: CPTII,S$GLB,, | Performed by: UROLOGY

## 2023-08-14 PROCEDURE — 1160F RVW MEDS BY RX/DR IN RCRD: CPT | Mod: CPTII,S$GLB,, | Performed by: UROLOGY

## 2023-08-14 PROCEDURE — 1159F PR MEDICATION LIST DOCUMENTED IN MEDICAL RECORD: ICD-10-PCS | Mod: CPTII,S$GLB,, | Performed by: UROLOGY

## 2023-08-14 PROCEDURE — 3044F PR MOST RECENT HEMOGLOBIN A1C LEVEL <7.0%: ICD-10-PCS | Mod: CPTII,S$GLB,, | Performed by: UROLOGY

## 2023-08-14 PROCEDURE — 4010F PR ACE/ARB THEARPY RXD/TAKEN: ICD-10-PCS | Mod: CPTII,S$GLB,, | Performed by: UROLOGY

## 2023-08-14 PROCEDURE — 3066F NEPHROPATHY DOC TX: CPT | Mod: CPTII,S$GLB,, | Performed by: UROLOGY

## 2023-08-14 PROCEDURE — 99214 PR OFFICE/OUTPT VISIT, EST, LEVL IV, 30-39 MIN: ICD-10-PCS | Mod: S$GLB,,, | Performed by: UROLOGY

## 2023-08-14 PROCEDURE — 3044F HG A1C LEVEL LT 7.0%: CPT | Mod: CPTII,S$GLB,, | Performed by: UROLOGY

## 2023-08-14 PROCEDURE — 99999 PR PBB SHADOW E&M-EST. PATIENT-LVL III: CPT | Mod: PBBFAC,,, | Performed by: UROLOGY

## 2023-08-14 PROCEDURE — 3078F PR MOST RECENT DIASTOLIC BLOOD PRESSURE < 80 MM HG: ICD-10-PCS | Mod: CPTII,S$GLB,, | Performed by: UROLOGY

## 2023-08-14 PROCEDURE — 1159F MED LIST DOCD IN RCRD: CPT | Mod: CPTII,S$GLB,, | Performed by: UROLOGY

## 2023-08-14 PROCEDURE — 3078F DIAST BP <80 MM HG: CPT | Mod: CPTII,S$GLB,, | Performed by: UROLOGY

## 2023-08-14 PROCEDURE — 4010F ACE/ARB THERAPY RXD/TAKEN: CPT | Mod: CPTII,S$GLB,, | Performed by: UROLOGY

## 2023-08-14 PROCEDURE — 3066F PR DOCUMENTATION OF TREATMENT FOR NEPHROPATHY: ICD-10-PCS | Mod: CPTII,S$GLB,, | Performed by: UROLOGY

## 2023-08-14 NOTE — PROGRESS NOTES
[facility]   Clinic Note    SUBJECTIVE:     Chief Complaint   Patient presents with    Other     3 month follow up  Elevated PSA        Referral from: No ref. provider found.    History of Present Illness:  Ar Sharma is a 62 y.o. male who presents to clinic for evaluation of elevated PSA.      Patient here for elevated PSA.  PSA has been up trending since 2018.  Most recent value was 4.2.  Patient has never had an abnormal PSA but before.  No family history of prostate cancer.  Patient currently takes aspirin daily.  He has a cardiac history, including an MI in his 20s.  His most recent cardiology note reveals that his EF most recently was in the 50s.  He does currently have an ICD and pacemaker in place.  He also has CKD in his baseline creatinine is greater than 3.    PSA:  Lab Results   Component Value Date    PSA 4.2 (H) 05/04/2023    PSA 3.4 04/04/2022    PSADIAG 1.3 10/02/2018    PSATOTAL 2.5 08/10/2023    PSAFREE 0.91 08/10/2023       Previously abnormal PSA: no.   Previous biopsy: no.   Family history of prostate cancer: no.      08/14/2023  Here for follow up. Repeat PSA 2.5    Patient endorses no additional complaints at this time.    Anticoagulation/Antiplatelets:  Yes; ASA    Past Medical History:   Diagnosis Date    Cardiomyopathy     CKD (chronic kidney disease) stage 4, GFR 15-29 ml/min     Congestive heart failure (CHF) 2015    Edema     Essential (primary) hypertension 05/18/2022    Formatting of this note might be different from the original. Converted from [employer]: Description - ESSENTIAL HYPERTENSION, BENIGN    Heart attack     HLD (hyperlipidemia)     Hypertension     Hyperuricemia     Hypocalcemia     Renal cyst, left     Secondary hyperparathyroidism     Thyroid disease     V tach     Vitamin D deficiency        Past Surgical History:   Procedure Laterality Date    ablations  03/05/2018    albations  02/01/2018    COLONOSCOPY N/A 12/07/2018    Procedure: COLONOSCOPY/suprep;   Surgeon: Ananya Morillo MD;  Location: [facility] ENDO;  Service: Endoscopy;  Laterality: N/A;    defibulater N/A     ESOPHAGOGASTRODUODENOSCOPY N/A 2018    Procedure: EGD (ESOPHAGOGASTRODUODENOSCOPY);  Surgeon: Ananya Morillo MD;  Location: [facility] ENDO;  Service: Endoscopy;  Laterality: N/A;    JOINT REPLACEMENT Bilateral 10/2016    knees, bilat    LEFT HEART CATHETERIZATION N/A 2020    Procedure: Left heart cath;  Surgeon: Shahram Stewart MD;  Location: [facility] CATH LAB/EP;  Service: Cardiology;  Laterality: N/A;    LEFT HEART CATHETERIZATION Left 2022    Procedure: Left heart cath;  Surgeon: Juan Roque MD;  Location: [facility] CATH LAB/EP;  Service: Cardiology;  Laterality: Left;    REPLACEMENT OF IMPLANTABLE CARDIOVERTER-DEFIBRILLATOR (ICD) GENERATOR Left 2023    Procedure: REPLACEMENT, ICD GENERATOR;  Surgeon: River Tenorio MD;  Location: [facility] EP LAB;  Service: Cardiology;  Laterality: Left;  ANTHONY, CRTD gen chg, MDT, MAC, DM, 3prep       Family History   Problem Relation Age of Onset    Cancer Mother     Hypertension Mother     Stroke Mother     Breast cancer Mother     Alcohol abuse Father     Kidney disease Father     Arthritis Sister     No Known Problems Brother     No Known Problems Daughter     No Known Problems Son        Social History     Tobacco Use    Smoking status: Former     Current packs/day: 0.00     Average packs/day: 1 pack/day for 33.2 years (33.2 ttl pk-yrs)     Types: Cigarettes     Start date: 1979     Quit date: 3/3/2012     Years since quittin.4    Smokeless tobacco: Never   Substance Use Topics    Alcohol use: Yes     Comment: rare    Drug use: No       Current Outpatient Medications on File Prior to Visit   Medication Sig Dispense Refill    amiodarone (PACERONE) 400 MG tablet Take 1 tablet (400 mg total) by mouth once daily. 90 tablet 3    aspirin (ECOTRIN) 81 MG EC tablet Take 1 tablet (81 mg total) by mouth once daily. 60 tablet 2    ergocalciferol  "(ERGOCALCIFEROL) 50,000 unit Cap TAKE 1 CAPSULE BY MOUTH ONE TIME PER WEEK 12 capsule 3    furosemide (LASIX) 80 MG tablet Take 1 tablet (80 mg total) by mouth 2 (two) times daily. If increased 3 pounds in a day or 5 pounds in a week, take an extra dose of lasix 80mg until those pounds are lost. 120 tablet 3    levothyroxine (SYNTHROID, LEVOTHROID) 175 MCG tablet TAKE 1 TABLET BY MOUTH EVERY DAY 90 tablet 3    mexiletine (MEXITIL) 150 MG Cap Take 1 capsule (150 mg total) by mouth 2 (two) times daily with meals. 180 capsule 3    nitroGLYCERIN (NITROSTAT) 0.4 MG SL tablet Place 1 tablet (0.4 mg total) under the tongue every 5 (five) minutes as needed for Chest pain. 20 tablet 1    pravastatin (PRAVACHOL) 40 MG tablet TAKE 1 TABLET BY MOUTH EVERY DAY 90 tablet 3    sacubitriL-valsartan (ENTRESTO) 24-26 mg per tablet Take 1 tablet by mouth 2 (two) times daily. 180 tablet 3    metoprolol succinate (TOPROL-XL) 25 MG 24 hr tablet Take 1 tablet (25 mg total) by mouth once daily. 90 tablet 3     No current facility-administered medications on file prior to visit.       Review of patient's allergies indicates:   Allergen Reactions    Lokelma [sodium zirconium cyclosilicate] Other (See Comments)     Fluid retention, weight gain, CHF excerebration, severe constipation    Atorvastatin Other (See Comments)     Joint pain    Jardiance [empagliflozin] Other (See Comments)     Chest pains, significant weight loss, lower blood pressure       Review of Systems:  A review of 10+ systems was conducted with pertinent positive and negative findings documented in HPI with all other systems reviewed and negative.    OBJECTIVE:     Estimated body mass index is 28 kg/m² as calculated from the following:    Height as of this encounter: 5' 9" (1.753 m).    Weight as of this encounter: 86 kg (189 lb 9.5 oz).    Vital Signs (Most Recent)  Vitals:    08/14/23 0850   BP: 99/67   Pulse: 98       Physical Exam:  GENERAL: patient sitting " "comfortably  HEENT: normocephalic  NECK: supple, no JVD  PULM: normal chest rise, no increased WOB  HEART: non-diaphoretic  ABDO: soft, nondistended, nontender  BACK: no CVA tenderness bilaterally  SKIN: warm, dry, well perfused  EXT: no bruising or edema  NEURO: grossly normal with no focal deficits  PSYCH: appropriate mood and affect    Genitourinary Exam:  MIGUELINA: appropriate sphincter tone, smooth, non-rubbery, rubbery prostate w/o nodules    Lab Results   Component Value Date    BUN 60 (H) 07/05/2023    CREATININE 3.34 (H) 07/05/2023    WBC 8.76 05/04/2023    HGB 17.3 05/04/2023    HCT 51.5 05/04/2023     (H) 05/04/2023    AST 45 05/04/2023    ALT 56 (H) 05/04/2023    ALKPHOS 110 05/04/2023    ALBUMIN 4.2 07/05/2023    HGBA1C 6.0 (H) 05/04/2023        PSA:  Lab Results   Component Value Date    PSA 4.2 (H) 05/04/2023    PSA 3.4 04/04/2022    PSADIAG 1.3 10/02/2018    PSATOTAL 2.5 08/10/2023    PSAFREE 0.91 08/10/2023       Testosterone:  No results found for: "TOTALTESTOST", "TESTOSTERONE"     Imaging:  I have personally reviewed all relevant imaging.    Results for orders placed or performed during the hospital encounter of 05/30/23 (from the past 2160 hour(s))   CT Chest Lung Screening Low Dose    Narrative    EXAMINATION:  CT CHEST LUNG SCREENING LOW DOSE    CLINICAL HISTORY:  Lung cancer screening, >= 20 pk-yr smoking history, risk factor(s) (Age >= 50y); Personal history of nicotine dependence    TECHNIQUE:  CT of the thorax was performed with low dose, lung screening protocol.  No contrast was administered.  Sagittal and coronal reconstructions were obtained.    COMPARISON:  03/11/2022    FINDINGS:  There is mild cardiomegaly.  There is partial visualization of a cardiac pacemaker.  There is a mild amount of atherosclerosis in the left anterior descending, left circumflex, and right main coronary arteries.  There is no evidence of an acute pulmonary process.  There is a mild amount of scarring in both " lungs.  There are mild emphysematous changes in both lungs.  There is a 12 mm noncalcified intrapulmonary lymph node along the minor fissure of the right lung.  There is no pneumothorax or pleural effusion.    The spleen is enlarged.  It measures 18.2 cm in length.  There are several oval-shaped areas of hypodensity in the liver.  One of the larger ones measures 14 mm and is located in the left lobe of the liver.  It has a Hounsfield measurement of 6.      Impression    Lung-RADS Category:  2 - Benign Appearance or Behavior - continue annual screening with LDCT in 12 months.    Clinically or potentially clinically significant non lung cancer finding:  S - Significant.    Prior Lung Cancer Modifier:  No history of prior lung cancer.    1. There is no evidence of an acute pulmonary process.  There is a mild amount of scarring in both lungs.  2. There are mild emphysematous changes in both lungs.  3. There is a 12 mm noncalcified intrapulmonary lymph node along the minor fissure of the right lung.  4. There is mild cardiomegaly. There is partial visualization of a cardiac pacemaker.  5. The spleen is enlarged.  It measures 18.2 cm in length.  6. There are several oval-shaped areas of hypodensity in the liver. One of the larger ones measures 14 mm and is located in the left lobe of the liver. It has a Hounsfield measurement of 6.  This is characteristic of a cyst.  7. There is a mild amount of atherosclerosis in the left anterior descending, left circumflex, and right main coronary arteries.  All CT scans at this facility use dose modulation, iterative reconstruction, and/or weight base dosing when appropriate to reduce radiation dose when appropriate to reduce radiation dose to as low as reasonably achievable.      Electronically signed by: Stone Tanner MD  Date:    05/30/2023  Time:    14:08     No results found for this or any previous visit (from the past 2160 hour(s)).  CT Chest Lung Screening Low  Dose  Narrative: EXAMINATION:  CT CHEST LUNG SCREENING LOW DOSE    CLINICAL HISTORY:  Lung cancer screening, >= 20 pk-yr smoking history, risk factor(s) (Age >= 50y); Personal history of nicotine dependence    TECHNIQUE:  CT of the thorax was performed with low dose, lung screening protocol.  No contrast was administered.  Sagittal and coronal reconstructions were obtained.    COMPARISON:  03/11/2022    FINDINGS:  There is mild cardiomegaly.  There is partial visualization of a cardiac pacemaker.  There is a mild amount of atherosclerosis in the left anterior descending, left circumflex, and right main coronary arteries.  There is no evidence of an acute pulmonary process.  There is a mild amount of scarring in both lungs.  There are mild emphysematous changes in both lungs.  There is a 12 mm noncalcified intrapulmonary lymph node along the minor fissure of the right lung.  There is no pneumothorax or pleural effusion.    The spleen is enlarged.  It measures 18.2 cm in length.  There are several oval-shaped areas of hypodensity in the liver.  One of the larger ones measures 14 mm and is located in the left lobe of the liver.  It has a Hounsfield measurement of 6.  Impression: Lung-RADS Category:  2 - Benign Appearance or Behavior - continue annual screening with LDCT in 12 months.    Clinically or potentially clinically significant non lung cancer finding:  S - Significant.    Prior Lung Cancer Modifier:  No history of prior lung cancer.    1. There is no evidence of an acute pulmonary process.  There is a mild amount of scarring in both lungs.  2. There are mild emphysematous changes in both lungs.  3. There is a 12 mm noncalcified intrapulmonary lymph node along the minor fissure of the right lung.  4. There is mild cardiomegaly. There is partial visualization of a cardiac pacemaker.  5. The spleen is enlarged.  It measures 18.2 cm in length.  6. There are several oval-shaped areas of hypodensity in the liver. One  of the larger ones measures 14 mm and is located in the left lobe of the liver. It has a Hounsfield measurement of 6.  This is characteristic of a cyst.  7. There is a mild amount of atherosclerosis in the left anterior descending, left circumflex, and right main coronary arteries.  All CT scans at this facility use dose modulation, iterative reconstruction, and/or weight base dosing when appropriate to reduce radiation dose when appropriate to reduce radiation dose to as low as reasonably achievable.    Electronically signed by: Stone Tanner MD  Date:    05/30/2023  Time:    14:08      ASSESSMENT     1. Elevated PSA          PLAN:     Elevated PSA    - PSA has normalized. Will repeat in 6 months. RTC after repeat in 6 months    Fan Mckeon MD  Urology  [facility]    Disclaimer: This note has been generated using voice-recognition software. There may be typographical errors that have been missed during proof-reading.

## 2023-09-01 ENCOUNTER — LAB VISIT (OUTPATIENT)
Dept: LAB | Facility: HOSPITAL | Age: 63
End: 2023-09-01
Attending: STUDENT IN AN ORGANIZED HEALTH CARE EDUCATION/TRAINING PROGRAM
Payer: MEDICARE

## 2023-09-01 DIAGNOSIS — R97.20 ELEVATED PSA: ICD-10-CM

## 2023-09-01 DIAGNOSIS — R73.03 PREDIABETES: ICD-10-CM

## 2023-09-01 DIAGNOSIS — E78.00 PURE HYPERCHOLESTEROLEMIA: ICD-10-CM

## 2023-09-01 LAB
ALBUMIN SERPL BCP-MCNC: 4.1 G/DL (ref 3.5–5.2)
ALP SERPL-CCNC: 129 U/L (ref 38–126)
ALT SERPL W/O P-5'-P-CCNC: 57 U/L (ref 10–44)
ANION GAP SERPL CALC-SCNC: 10 MMOL/L (ref 8–16)
AST SERPL-CCNC: 51 U/L (ref 15–46)
BASOPHILS # BLD AUTO: 0.09 K/UL (ref 0–0.2)
BASOPHILS NFR BLD: 1.1 % (ref 0–1.9)
BILIRUB SERPL-MCNC: 1.5 MG/DL (ref 0.1–1)
CALCIUM SERPL-MCNC: 9 MG/DL (ref 8.7–10.5)
CHLORIDE SERPL-SCNC: 101 MMOL/L (ref 95–110)
CHOLEST SERPL-MCNC: 139 MG/DL (ref 120–199)
CHOLEST/HDLC SERPL: 4 {RATIO} (ref 2–5)
CO2 SERPL-SCNC: 28 MMOL/L (ref 23–29)
CREAT SERPL-MCNC: 4.48 MG/DL (ref 0.5–1.4)
DIFFERENTIAL METHOD: ABNORMAL
EOSINOPHIL # BLD AUTO: 0.2 K/UL (ref 0–0.5)
EOSINOPHIL NFR BLD: 2.7 % (ref 0–8)
ERYTHROCYTE [DISTWIDTH] IN BLOOD BY AUTOMATED COUNT: 21.2 % (ref 11.5–14.5)
EST. GFR  (NO RACE VARIABLE): 14 ML/MIN/1.73 M^2
ESTIMATED AVG GLUCOSE: 126 MG/DL (ref 68–131)
GLUCOSE SERPL-MCNC: 100 MG/DL (ref 70–110)
HBA1C MFR BLD: 6 % (ref 4–5.6)
HCT VFR BLD AUTO: 49.4 % (ref 40–54)
HDLC SERPL-MCNC: 35 MG/DL (ref 40–75)
HDLC SERPL: 25.2 % (ref 20–50)
HGB BLD-MCNC: 16.7 G/DL (ref 14–18)
IMM GRANULOCYTES # BLD AUTO: 0.06 K/UL (ref 0–0.04)
IMM GRANULOCYTES NFR BLD AUTO: 0.7 % (ref 0–0.5)
LDLC SERPL CALC-MCNC: 83.2 MG/DL (ref 63–159)
LYMPHOCYTES # BLD AUTO: 1 K/UL (ref 1–4.8)
LYMPHOCYTES NFR BLD: 11.2 % (ref 18–48)
MCH RBC QN AUTO: 24.9 PG (ref 27–31)
MCHC RBC AUTO-ENTMCNC: 33.8 G/DL (ref 32–36)
MCV RBC AUTO: 74 FL (ref 82–98)
MONOCYTES # BLD AUTO: 0.9 K/UL (ref 0.3–1)
MONOCYTES NFR BLD: 10.6 % (ref 4–15)
NEUTROPHILS # BLD AUTO: 6.3 K/UL (ref 1.8–7.7)
NEUTROPHILS NFR BLD: 73.7 % (ref 38–73)
NONHDLC SERPL-MCNC: 104 MG/DL
NRBC BLD-RTO: 0 /100 WBC
PLATELET # BLD AUTO: 607 K/UL (ref 150–450)
PMV BLD AUTO: 11.7 FL (ref 9.2–12.9)
POTASSIUM SERPL-SCNC: 4.2 MMOL/L (ref 3.5–5.1)
PROT SERPL-MCNC: 7.5 G/DL (ref 6–8.4)
RBC # BLD AUTO: 6.7 M/UL (ref 4.6–6.2)
SODIUM SERPL-SCNC: 139 MMOL/L (ref 136–145)
TRIGL SERPL-MCNC: 104 MG/DL (ref 30–150)
UUN UR-MCNC: 81 MG/DL (ref 2–20)
WBC # BLD AUTO: 8.49 K/UL (ref 3.9–12.7)

## 2023-09-01 PROCEDURE — 83036 HEMOGLOBIN GLYCOSYLATED A1C: CPT | Performed by: STUDENT IN AN ORGANIZED HEALTH CARE EDUCATION/TRAINING PROGRAM

## 2023-09-01 PROCEDURE — 85025 COMPLETE CBC W/AUTO DIFF WBC: CPT | Mod: PO | Performed by: STUDENT IN AN ORGANIZED HEALTH CARE EDUCATION/TRAINING PROGRAM

## 2023-09-01 PROCEDURE — 36415 COLL VENOUS BLD VENIPUNCTURE: CPT | Mod: PO | Performed by: STUDENT IN AN ORGANIZED HEALTH CARE EDUCATION/TRAINING PROGRAM

## 2023-09-01 PROCEDURE — 80061 LIPID PANEL: CPT | Performed by: STUDENT IN AN ORGANIZED HEALTH CARE EDUCATION/TRAINING PROGRAM

## 2023-09-01 PROCEDURE — 80053 COMPREHEN METABOLIC PANEL: CPT | Mod: PO | Performed by: STUDENT IN AN ORGANIZED HEALTH CARE EDUCATION/TRAINING PROGRAM

## 2023-09-11 ENCOUNTER — OFFICE VISIT (OUTPATIENT)
Dept: FAMILY MEDICINE | Facility: CLINIC | Age: 63
End: 2023-09-11
Payer: MEDICARE

## 2023-09-11 VITALS
TEMPERATURE: 98 F | DIASTOLIC BLOOD PRESSURE: 62 MMHG | BODY MASS INDEX: 27.87 KG/M2 | WEIGHT: 188.19 LBS | OXYGEN SATURATION: 90 % | SYSTOLIC BLOOD PRESSURE: 118 MMHG | HEIGHT: 69 IN | HEART RATE: 80 BPM

## 2023-09-11 DIAGNOSIS — E03.9 HYPOTHYROIDISM, UNSPECIFIED TYPE: ICD-10-CM

## 2023-09-11 DIAGNOSIS — J43.9 PULMONARY EMPHYSEMA, UNSPECIFIED EMPHYSEMA TYPE: ICD-10-CM

## 2023-09-11 DIAGNOSIS — N18.4 CKD (CHRONIC KIDNEY DISEASE) STAGE 4, GFR 15-29 ML/MIN: ICD-10-CM

## 2023-09-11 DIAGNOSIS — I70.0 AORTIC ATHEROSCLEROSIS: ICD-10-CM

## 2023-09-11 DIAGNOSIS — I50.20 HFREF (HEART FAILURE WITH REDUCED EJECTION FRACTION): ICD-10-CM

## 2023-09-11 DIAGNOSIS — N18.5 CHRONIC KIDNEY DISEASE (CKD), STAGE V: ICD-10-CM

## 2023-09-11 DIAGNOSIS — R73.9 HYPERGLYCEMIA: ICD-10-CM

## 2023-09-11 DIAGNOSIS — E78.00 PURE HYPERCHOLESTEROLEMIA: Primary | ICD-10-CM

## 2023-09-11 PROCEDURE — 3078F DIAST BP <80 MM HG: CPT | Mod: CPTII,S$GLB,, | Performed by: STUDENT IN AN ORGANIZED HEALTH CARE EDUCATION/TRAINING PROGRAM

## 2023-09-11 PROCEDURE — 99214 OFFICE O/P EST MOD 30 MIN: CPT | Mod: S$GLB,,, | Performed by: STUDENT IN AN ORGANIZED HEALTH CARE EDUCATION/TRAINING PROGRAM

## 2023-09-11 PROCEDURE — 3008F BODY MASS INDEX DOCD: CPT | Mod: CPTII,S$GLB,, | Performed by: STUDENT IN AN ORGANIZED HEALTH CARE EDUCATION/TRAINING PROGRAM

## 2023-09-11 PROCEDURE — 3008F PR BODY MASS INDEX (BMI) DOCUMENTED: ICD-10-PCS | Mod: CPTII,S$GLB,, | Performed by: STUDENT IN AN ORGANIZED HEALTH CARE EDUCATION/TRAINING PROGRAM

## 2023-09-11 PROCEDURE — 3066F NEPHROPATHY DOC TX: CPT | Mod: CPTII,S$GLB,, | Performed by: STUDENT IN AN ORGANIZED HEALTH CARE EDUCATION/TRAINING PROGRAM

## 2023-09-11 PROCEDURE — 3074F PR MOST RECENT SYSTOLIC BLOOD PRESSURE < 130 MM HG: ICD-10-PCS | Mod: CPTII,S$GLB,, | Performed by: STUDENT IN AN ORGANIZED HEALTH CARE EDUCATION/TRAINING PROGRAM

## 2023-09-11 PROCEDURE — 1159F PR MEDICATION LIST DOCUMENTED IN MEDICAL RECORD: ICD-10-PCS | Mod: CPTII,S$GLB,, | Performed by: STUDENT IN AN ORGANIZED HEALTH CARE EDUCATION/TRAINING PROGRAM

## 2023-09-11 PROCEDURE — 3044F HG A1C LEVEL LT 7.0%: CPT | Mod: CPTII,S$GLB,, | Performed by: STUDENT IN AN ORGANIZED HEALTH CARE EDUCATION/TRAINING PROGRAM

## 2023-09-11 PROCEDURE — 3074F SYST BP LT 130 MM HG: CPT | Mod: CPTII,S$GLB,, | Performed by: STUDENT IN AN ORGANIZED HEALTH CARE EDUCATION/TRAINING PROGRAM

## 2023-09-11 PROCEDURE — 4010F ACE/ARB THERAPY RXD/TAKEN: CPT | Mod: CPTII,S$GLB,, | Performed by: STUDENT IN AN ORGANIZED HEALTH CARE EDUCATION/TRAINING PROGRAM

## 2023-09-11 PROCEDURE — 3066F PR DOCUMENTATION OF TREATMENT FOR NEPHROPATHY: ICD-10-PCS | Mod: CPTII,S$GLB,, | Performed by: STUDENT IN AN ORGANIZED HEALTH CARE EDUCATION/TRAINING PROGRAM

## 2023-09-11 PROCEDURE — 3044F PR MOST RECENT HEMOGLOBIN A1C LEVEL <7.0%: ICD-10-PCS | Mod: CPTII,S$GLB,, | Performed by: STUDENT IN AN ORGANIZED HEALTH CARE EDUCATION/TRAINING PROGRAM

## 2023-09-11 PROCEDURE — 1160F PR REVIEW ALL MEDS BY PRESCRIBER/CLIN PHARMACIST DOCUMENTED: ICD-10-PCS | Mod: CPTII,S$GLB,, | Performed by: STUDENT IN AN ORGANIZED HEALTH CARE EDUCATION/TRAINING PROGRAM

## 2023-09-11 PROCEDURE — 1160F RVW MEDS BY RX/DR IN RCRD: CPT | Mod: CPTII,S$GLB,, | Performed by: STUDENT IN AN ORGANIZED HEALTH CARE EDUCATION/TRAINING PROGRAM

## 2023-09-11 PROCEDURE — 99214 PR OFFICE/OUTPT VISIT, EST, LEVL IV, 30-39 MIN: ICD-10-PCS | Mod: S$GLB,,, | Performed by: STUDENT IN AN ORGANIZED HEALTH CARE EDUCATION/TRAINING PROGRAM

## 2023-09-11 PROCEDURE — 4010F PR ACE/ARB THEARPY RXD/TAKEN: ICD-10-PCS | Mod: CPTII,S$GLB,, | Performed by: STUDENT IN AN ORGANIZED HEALTH CARE EDUCATION/TRAINING PROGRAM

## 2023-09-11 PROCEDURE — 3078F PR MOST RECENT DIASTOLIC BLOOD PRESSURE < 80 MM HG: ICD-10-PCS | Mod: CPTII,S$GLB,, | Performed by: STUDENT IN AN ORGANIZED HEALTH CARE EDUCATION/TRAINING PROGRAM

## 2023-09-11 PROCEDURE — 1159F MED LIST DOCD IN RCRD: CPT | Mod: CPTII,S$GLB,, | Performed by: STUDENT IN AN ORGANIZED HEALTH CARE EDUCATION/TRAINING PROGRAM

## 2023-09-11 RX ORDER — ALLOPURINOL 100 MG/1
100 TABLET ORAL DAILY
Qty: 90 TABLET | Refills: 3 | Status: SHIPPED | OUTPATIENT
Start: 2023-09-11

## 2023-09-11 RX ORDER — LEVOTHYROXINE SODIUM 175 UG/1
175 TABLET ORAL DAILY
Qty: 90 TABLET | Refills: 3 | Status: SHIPPED | OUTPATIENT
Start: 2023-09-11 | End: 2024-03-11

## 2023-09-15 PROBLEM — N18.5 CHRONIC KIDNEY DISEASE (CKD), STAGE V: Status: ACTIVE | Noted: 2023-09-15

## 2023-09-15 PROBLEM — J43.9 PULMONARY EMPHYSEMA, UNSPECIFIED EMPHYSEMA TYPE: Status: ACTIVE | Noted: 2023-09-15

## 2023-09-15 PROBLEM — I70.0 AORTIC ATHEROSCLEROSIS: Status: ACTIVE | Noted: 2023-09-15

## 2023-09-15 NOTE — PROGRESS NOTES
Patient ID: Ar Sharma is a 62 y.o. male.     Chief Complaint: Follow-up      Follow-up  Pertinent negatives include no chest pain, coughing, fever, headaches, myalgias, nausea, rash, vomiting or weakness.       CKD4- avoiding NSAIDS, following with renal    HLD- denies recent chest pain and shortness of breath    HFrEF- taking lasix daily. He increases the dose on days that he has extra swelling.          Review of Systems  Review of Systems   Constitutional:  Negative for fever.   HENT:  Negative for ear pain and sinus pain.    Eyes:  Negative for discharge.   Respiratory:  Negative for cough and shortness of breath.    Cardiovascular:  Negative for chest pain and leg swelling.   Gastrointestinal:  Negative for diarrhea, nausea and vomiting.   Genitourinary:  Negative for urgency.   Musculoskeletal:  Negative for myalgias.   Skin:  Negative for rash.   Neurological:  Negative for weakness and headaches.   Psychiatric/Behavioral:  Negative for depression.    All other systems reviewed and are negative.      Currently Medications  Current Outpatient Medications on File Prior to Visit   Medication Sig Dispense Refill    amiodarone (PACERONE) 400 MG tablet Take 1 tablet (400 mg total) by mouth once daily. 90 tablet 3    aspirin (ECOTRIN) 81 MG EC tablet Take 1 tablet (81 mg total) by mouth once daily. 60 tablet 2    ergocalciferol (ERGOCALCIFEROL) 50,000 unit Cap TAKE 1 CAPSULE BY MOUTH ONE TIME PER WEEK 12 capsule 3    furosemide (LASIX) 80 MG tablet Take 1 tablet (80 mg total) by mouth 2 (two) times daily. If increased 3 pounds in a day or 5 pounds in a week, take an extra dose of lasix 80mg until those pounds are lost. 120 tablet 3    mexiletine (MEXITIL) 150 MG Cap Take 1 capsule (150 mg total) by mouth 2 (two) times daily with meals. 180 capsule 3    nitroGLYCERIN (NITROSTAT) 0.4 MG SL tablet Place 1 tablet (0.4 mg total) under the tongue every 5 (five) minutes as needed for Chest pain. 20 tablet 1     "pravastatin (PRAVACHOL) 40 MG tablet TAKE 1 TABLET BY MOUTH EVERY DAY 90 tablet 3    sacubitriL-valsartan (ENTRESTO) 24-26 mg per tablet Take 1 tablet by mouth 2 (two) times daily. 180 tablet 3    metoprolol succinate (TOPROL-XL) 25 MG 24 hr tablet Take 1 tablet (25 mg total) by mouth once daily. 90 tablet 3     No current facility-administered medications on file prior to visit.       Physical  Exam  Vitals:    09/11/23 1443   BP: 118/62   BP Location: Left arm   Patient Position: Sitting   Pulse: 80   Temp: 98 °F (36.7 °C)   SpO2: (!) 90%   Weight: 85.3 kg (188 lb 2.6 oz)   Height: 5' 9" (1.753 m)      Body mass index is 27.79 kg/m².    Physical Exam  Vitals and nursing note reviewed.   Constitutional:       General: He is not in acute distress.     Appearance: He is not ill-appearing.   HENT:      Head: Normocephalic and atraumatic.      Right Ear: External ear normal.      Left Ear: External ear normal.      Nose: Nose normal.      Mouth/Throat:      Mouth: Mucous membranes are moist.   Eyes:      Extraocular Movements: Extraocular movements intact.      Conjunctiva/sclera: Conjunctivae normal.   Cardiovascular:      Rate and Rhythm: Normal rate and regular rhythm.      Pulses: Normal pulses.      Heart sounds: No murmur heard.  Pulmonary:      Effort: Pulmonary effort is normal. No respiratory distress.      Breath sounds: No wheezing.   Abdominal:      General: There is no distension.      Palpations: Abdomen is soft. There is no mass.      Tenderness: There is no abdominal tenderness.   Musculoskeletal:         General: No swelling.      Cervical back: Normal range of motion.   Skin:     Coloration: Skin is not jaundiced.      Findings: No rash.   Neurological:      General: No focal deficit present.      Mental Status: He is alert and oriented to person, place, and time.   Psychiatric:         Mood and Affect: Mood normal.         Thought Content: Thought content normal.         Labs:    Complete Blood " Count  Lab Results   Component Value Date    RBC 6.70 (H) 09/01/2023    HGB 16.7 09/01/2023    HCT 49.4 09/01/2023    MCV 74 (L) 09/01/2023    MCH 24.9 (L) 09/01/2023    MCHC 33.8 09/01/2023    RDW 21.2 (H) 09/01/2023     (H) 09/01/2023    MPV 11.7 09/01/2023    GRAN 6.3 09/01/2023    GRAN 73.7 (H) 09/01/2023    LYMPH 1.0 09/01/2023    LYMPH 11.2 (L) 09/01/2023    MONO 0.9 09/01/2023    MONO 10.6 09/01/2023    EOS 0.2 09/01/2023    BASO 0.09 09/01/2023    EOSINOPHIL 2.7 09/01/2023    BASOPHIL 1.1 09/01/2023    DIFFMETHOD Automated 09/01/2023       Comprehensive Metabolic Panel  Lab Results   Component Value Date     09/01/2023    BUN 81 (H) 09/01/2023    CREATININE 4.48 (H) 09/01/2023     09/01/2023    K 4.2 09/01/2023     09/01/2023    PROT 7.5 09/01/2023    ALBUMIN 4.1 09/01/2023    BILITOT 1.5 (H) 09/01/2023    AST 51 (H) 09/01/2023    ALKPHOS 129 (H) 09/01/2023    CO2 28 09/01/2023    ALT 57 (H) 09/01/2023    ANIONGAP 10 09/01/2023       TSH  Lab Results   Component Value Date    TSH 1.160 05/04/2023       Imaging:  Cardiac device check - Remote  Additional Comments  REMOTE Interrogation   ReportPresenting egram demonstrates: AP/BiV Paced  Autocapture Algorithms:  off  Device fxn WNL  RA pacing  100%, V pacing  100%  Atrial arrhythmias:  none  Ventricular arrhythmias:  none  Battery Longevity/Status:  6.2 years  Resume remote transmission in 3 months  Return to device clinic in February 2024    Report prepared by: Francisca VICK        Assessment/Plan:    Problem List Items Addressed This Visit          Pulmonary    Pulmonary emphysema, unspecified emphysema type    Current Assessment & Plan     -stable  - denies issues  - continue current meds            Cardiac/Vascular    Pure hypercholesterolemia - Primary    Relevant Orders    LIPID PANEL    HFrEF (heart failure with reduced ejection fraction)    Aortic atherosclerosis    Current Assessment & Plan     - stable  - continue current meds             Renal/    CKD (chronic kidney disease) stage 4, GFR 15-29 ml/min    Relevant Orders    CBC Auto Differential    Comprehensive metabolic panel    Chronic kidney disease (CKD), stage V    Current Assessment & Plan     - avoid NSAIDs  - follows with nephrology            Endocrine    Hypothyroidism    Relevant Medications    levothyroxine (SYNTHROID, LEVOTHROID) 175 MCG tablet    Other Relevant Orders    TSH     Other Visit Diagnoses       Hyperglycemia        Relevant Orders    CBC Auto Differential    Comprehensive metabolic panel    HEMOGLOBIN A1C          Discussed how to stay healthy including: diet, exercise, refraining from smoking and discussed screening exams / tests needed for age, sex and family Hx.    RTC 3 mo    Jc Elliott MD

## 2023-09-26 ENCOUNTER — LAB VISIT (OUTPATIENT)
Dept: LAB | Facility: HOSPITAL | Age: 63
End: 2023-09-26
Attending: INTERNAL MEDICINE
Payer: MEDICARE

## 2023-09-26 DIAGNOSIS — N18.4 CKD (CHRONIC KIDNEY DISEASE) STAGE 4, GFR 15-29 ML/MIN: ICD-10-CM

## 2023-09-26 LAB
CREAT UR-MCNC: 60.9 MG/DL (ref 23–375)
PROT UR-MCNC: <7 MG/DL (ref 0–15)
PROT/CREAT UR: NORMAL MG/G{CREAT} (ref 0–0.2)

## 2023-09-26 PROCEDURE — 84156 ASSAY OF PROTEIN URINE: CPT | Performed by: INTERNAL MEDICINE

## 2023-10-02 ENCOUNTER — OFFICE VISIT (OUTPATIENT)
Dept: NEPHROLOGY | Facility: CLINIC | Age: 63
End: 2023-10-02
Payer: MEDICARE

## 2023-10-02 VITALS
DIASTOLIC BLOOD PRESSURE: 80 MMHG | SYSTOLIC BLOOD PRESSURE: 124 MMHG | BODY MASS INDEX: 26.72 KG/M2 | HEIGHT: 69 IN | OXYGEN SATURATION: 97 % | WEIGHT: 180.38 LBS | HEART RATE: 90 BPM

## 2023-10-02 DIAGNOSIS — D63.1 ANEMIA IN STAGE 4 CHRONIC KIDNEY DISEASE: ICD-10-CM

## 2023-10-02 DIAGNOSIS — N18.4 CKD (CHRONIC KIDNEY DISEASE) STAGE 4, GFR 15-29 ML/MIN: Primary | ICD-10-CM

## 2023-10-02 DIAGNOSIS — I50.41 ACUTE COMBINED SYSTOLIC AND DIASTOLIC CONGESTIVE HEART FAILURE: ICD-10-CM

## 2023-10-02 DIAGNOSIS — I42.8 NON-ISCHEMIC CARDIOMYOPATHY: ICD-10-CM

## 2023-10-02 DIAGNOSIS — N25.81 SECONDARY HYPERPARATHYROIDISM OF RENAL ORIGIN: ICD-10-CM

## 2023-10-02 DIAGNOSIS — D50.8 OTHER IRON DEFICIENCY ANEMIA: ICD-10-CM

## 2023-10-02 DIAGNOSIS — R73.03 PREDIABETES: ICD-10-CM

## 2023-10-02 DIAGNOSIS — N18.4 ANEMIA IN STAGE 4 CHRONIC KIDNEY DISEASE: ICD-10-CM

## 2023-10-02 DIAGNOSIS — E55.9 VITAMIN D DEFICIENCY: ICD-10-CM

## 2023-10-02 DIAGNOSIS — I50.22 CHRONIC SYSTOLIC CONGESTIVE HEART FAILURE: ICD-10-CM

## 2023-10-02 PROCEDURE — 1159F PR MEDICATION LIST DOCUMENTED IN MEDICAL RECORD: ICD-10-PCS | Mod: CPTII,S$GLB,, | Performed by: INTERNAL MEDICINE

## 2023-10-02 PROCEDURE — 3079F PR MOST RECENT DIASTOLIC BLOOD PRESSURE 80-89 MM HG: ICD-10-PCS | Mod: CPTII,S$GLB,, | Performed by: INTERNAL MEDICINE

## 2023-10-02 PROCEDURE — 99215 PR OFFICE/OUTPT VISIT, EST, LEVL V, 40-54 MIN: ICD-10-PCS | Mod: S$GLB,,, | Performed by: INTERNAL MEDICINE

## 2023-10-02 PROCEDURE — 4010F ACE/ARB THERAPY RXD/TAKEN: CPT | Mod: CPTII,S$GLB,, | Performed by: INTERNAL MEDICINE

## 2023-10-02 PROCEDURE — 3044F PR MOST RECENT HEMOGLOBIN A1C LEVEL <7.0%: ICD-10-PCS | Mod: CPTII,S$GLB,, | Performed by: INTERNAL MEDICINE

## 2023-10-02 PROCEDURE — 3008F PR BODY MASS INDEX (BMI) DOCUMENTED: ICD-10-PCS | Mod: CPTII,S$GLB,, | Performed by: INTERNAL MEDICINE

## 2023-10-02 PROCEDURE — 99999 PR PBB SHADOW E&M-EST. PATIENT-LVL III: ICD-10-PCS | Mod: PBBFAC,,, | Performed by: INTERNAL MEDICINE

## 2023-10-02 PROCEDURE — 3066F NEPHROPATHY DOC TX: CPT | Mod: CPTII,S$GLB,, | Performed by: INTERNAL MEDICINE

## 2023-10-02 PROCEDURE — 3044F HG A1C LEVEL LT 7.0%: CPT | Mod: CPTII,S$GLB,, | Performed by: INTERNAL MEDICINE

## 2023-10-02 PROCEDURE — 99999 PR PBB SHADOW E&M-EST. PATIENT-LVL III: CPT | Mod: PBBFAC,,, | Performed by: INTERNAL MEDICINE

## 2023-10-02 PROCEDURE — 3074F PR MOST RECENT SYSTOLIC BLOOD PRESSURE < 130 MM HG: ICD-10-PCS | Mod: CPTII,S$GLB,, | Performed by: INTERNAL MEDICINE

## 2023-10-02 PROCEDURE — 1159F MED LIST DOCD IN RCRD: CPT | Mod: CPTII,S$GLB,, | Performed by: INTERNAL MEDICINE

## 2023-10-02 PROCEDURE — 4010F PR ACE/ARB THEARPY RXD/TAKEN: ICD-10-PCS | Mod: CPTII,S$GLB,, | Performed by: INTERNAL MEDICINE

## 2023-10-02 PROCEDURE — 3079F DIAST BP 80-89 MM HG: CPT | Mod: CPTII,S$GLB,, | Performed by: INTERNAL MEDICINE

## 2023-10-02 PROCEDURE — 99215 OFFICE O/P EST HI 40 MIN: CPT | Mod: S$GLB,,, | Performed by: INTERNAL MEDICINE

## 2023-10-02 PROCEDURE — 3074F SYST BP LT 130 MM HG: CPT | Mod: CPTII,S$GLB,, | Performed by: INTERNAL MEDICINE

## 2023-10-02 PROCEDURE — 3008F BODY MASS INDEX DOCD: CPT | Mod: CPTII,S$GLB,, | Performed by: INTERNAL MEDICINE

## 2023-10-02 PROCEDURE — 3066F PR DOCUMENTATION OF TREATMENT FOR NEPHROPATHY: ICD-10-PCS | Mod: CPTII,S$GLB,, | Performed by: INTERNAL MEDICINE

## 2023-10-02 NOTE — PROGRESS NOTES
Subjective:       Patient ID: Ar Sharma is a 62 y.o. Black or  male who presents for follow up evaluation of Chronic Kidney Disease    HPI    He is referred by his PCP for CKD, seen by a Nephrologist previously but wishes to change.    He has a significant history of DCM with most recentr EF 40-45%  and is s/p ICD placement, recent hospital stay for ICD firing multiple times. He was noted ()self as well) to be significantly hypotensive. He had an JEFF episode during hospital stay, no RRT indicated. He has hesitance regarding AV access, additionally he is interested in PD if dialysis were to be indicated.   Today he is feeling well. He is on a plant based diet, 90-95% as will have fish and shrimp a few times a week    March 2023: Back on Entresto, BP has been fine. Energy level may be better, sleeping better, SOB/SINGH is stable    jULY 2023: Staying active. Appetiite is good. No NSAIDs. No LE edema.     Oct 2023: No LE edema 80/80, fatigue. Lost 10 lbs since earlier this year. Cut back on sweets.     Review of Systems   Constitutional:  Positive for fatigue. Negative for activity change and appetite change.   HENT:  Negative for facial swelling.    Respiratory:  Positive for shortness of breath (SINGH).    Cardiovascular:  Negative for leg swelling.   Gastrointestinal:  Negative for nausea and vomiting.   Genitourinary:  Negative for difficulty urinating.   Musculoskeletal:  Positive for gait problem.   Allergic/Immunologic: Positive for immunocompromised state (CHF CKD).   Neurological:  Negative for weakness.   Psychiatric/Behavioral:  Negative for confusion and decreased concentration.        Objective:      Physical Exam  Vitals and nursing note reviewed.   Constitutional:       General: He is not in acute distress.     Appearance: Normal appearance. He is not ill-appearing.   HENT:      Head: Atraumatic.   Eyes:      General: No scleral icterus.  Cardiovascular:      Rate and Rhythm: Normal  rate.   Pulmonary:      Effort: Pulmonary effort is normal. No respiratory distress.      Breath sounds: No wheezing or rales.   Abdominal:      General: There is no distension.   Musculoskeletal:      Right lower leg: No edema.      Left lower leg: No edema.   Skin:     General: Skin is warm and dry.   Neurological:      Mental Status: He is alert and oriented to person, place, and time. Mental status is at baseline.   Psychiatric:         Mood and Affect: Mood normal.         Assessment:       1. CKD (chronic kidney disease) stage 4, GFR 15-29 ml/min    2. Secondary hyperparathyroidism of renal origin    3. Vitamin D deficiency    4. Non-ischemic cardiomyopathy    5. Prediabetes    6. Anemia in stage 4 chronic kidney disease    7. Chronic systolic congestive heart failure    8. Other iron deficiency anemia                Plan:         CKD Stage 4, appears to be Cardiorenal in etiology  - stable kidney function   - discussed treating cardiac function will improve/stabilize kidney function  - add Jardiance   - avoid NSAIDs, continue low sodium diet     Mineral and Bone Disease/SHPT  - continue to trend PTH  - continue D2, may need D analogue    Systolic HF  - Jardiance added as above for CKD indications, low sodium diet, Entresto,     RTC 3mo or prn sooner  49 minutes of total time spent on the encounter, which includes face to face time and non-face to face time preparing to see the patient (eg, review of tests), Obtaining and/or reviewing separately obtained history, Documenting clinical information in the electronic or other health record, Independently interpreting results (not separately reported) and communicating results to the patient/family/caregiver, or Care coordination (not separately reported).

## 2023-10-12 ENCOUNTER — PATIENT MESSAGE (OUTPATIENT)
Dept: RESPIRATORY THERAPY | Facility: HOSPITAL | Age: 63
End: 2023-10-12
Payer: MEDICARE

## 2023-11-01 DIAGNOSIS — E78.00 PURE HYPERCHOLESTEROLEMIA: ICD-10-CM

## 2023-11-01 RX ORDER — PRAVASTATIN SODIUM 40 MG/1
TABLET ORAL
Qty: 90 TABLET | Refills: 3 | Status: SHIPPED | OUTPATIENT
Start: 2023-11-01

## 2023-11-01 NOTE — TELEPHONE ENCOUNTER
Care Due:                  Date            Visit Type   Department     Provider  --------------------------------------------------------------------------------                                EP -                              PRIMARY      [facility]  Last Visit: 09-      CARE ([facility])   MEDICINE       Jc Elliott                              EP -                              [facility]  Next Visit: 03-      CARE ([facility])   MEDICINE       Jc Elliott                                                            Last  Test          Frequency    Reason                     Performed    Due Date  --------------------------------------------------------------------------------    Uric Acid...  12 months..  allopurinoL..............  10-   10-    [facility] Embedded Care Due Messages. Reference number: [id number].   11/01/2023 11:11:02 AM CDT

## 2023-11-01 NOTE — TELEPHONE ENCOUNTER
Refill Routing Note   Medication(s) are not appropriate for processing by [facility] for the following reason(s):      Allergy or intolerance    [facility] action(s):  Defer Care Due:  Labs due            Pharmacist review requested: Yes     Appointments  past 12m or future 3m with PCP    Date Provider   Last Visit   9/11/2023 Jc Elliott MD   Next Visit   3/11/2024 Jc Elliott MD   ED visits in past 90 days: 0        Note composed:3:15 PM 11/01/2023

## 2023-11-10 ENCOUNTER — CLINICAL SUPPORT (OUTPATIENT)
Dept: CARDIOLOGY | Facility: HOSPITAL | Age: 63
End: 2023-11-10
Attending: INTERNAL MEDICINE
Payer: MEDICARE

## 2023-11-10 ENCOUNTER — CLINICAL SUPPORT (OUTPATIENT)
Dept: CARDIOLOGY | Facility: HOSPITAL | Age: 63
End: 2023-11-10
Payer: MEDICARE

## 2023-11-10 DIAGNOSIS — Z95.810 PRESENCE OF AUTOMATIC (IMPLANTABLE) CARDIAC DEFIBRILLATOR: ICD-10-CM

## 2023-11-10 LAB
OHS CV AF BURDEN PERCENT: < 1
OHS CV BIV PACING PERCENT: 99.85 %
OHS CV DC REMOTE DEVICE TYPE: NORMAL
OHS CV RV PACING PERCENT: 99.94 %

## 2023-11-10 PROCEDURE — 93295 DEV INTERROG REMOTE 1/2/MLT: CPT | Mod: ,,, | Performed by: INTERNAL MEDICINE

## 2023-11-10 PROCEDURE — 93296 REM INTERROG EVL PM/IDS: CPT | Performed by: INTERNAL MEDICINE

## 2023-11-10 PROCEDURE — 93295 CARDIAC DEVICE CHECK - REMOTE: ICD-10-PCS | Mod: ,,, | Performed by: INTERNAL MEDICINE

## 2023-11-18 NOTE — TELEPHONE ENCOUNTER
No care due was identified.  [facility] Embedded Care Due Messages. Reference number: [id number].   11/18/2023 7:24:28 AM CST

## 2023-11-19 RX ORDER — METOPROLOL SUCCINATE 25 MG/1
25 TABLET, EXTENDED RELEASE ORAL
Qty: 90 TABLET | Refills: 3 | Status: SHIPPED | OUTPATIENT
Start: 2023-11-19

## 2023-11-19 NOTE — TELEPHONE ENCOUNTER
Refill Routing Note   Medication(s) are not appropriate for processing by [facility] for the following reason(s):        No active prescription written by provider    [facility] action(s):  Defer               Appointments  past 12m or future 3m with PCP    Date Provider   Last Visit   9/11/2023 Jc Elliott MD   Next Visit   3/11/2024 Jc Elliott MD   ED visits in past 90 days: 0        Note composed:8:30 PM 11/18/2023

## 2023-12-12 ENCOUNTER — TELEPHONE (OUTPATIENT)
Dept: FAMILY MEDICINE | Facility: CLINIC | Age: 63
End: 2023-12-12
Payer: MEDICARE

## 2023-12-12 NOTE — TELEPHONE ENCOUNTER
----- Message from Annie Yap sent at 12/12/2023 10:12 AM CST -----  Type:  Same Day Appointment Request    Caller is requesting a same day appointment.  Caller declined first available appointment listed below.    Name of Caller: Pt  When is the first available appointment? 1/18/24  Symptoms: Stomach issues/ couple of weeks with symptoms  Best Call Back Number:  [phone]  Additional Information:

## 2023-12-12 NOTE — TELEPHONE ENCOUNTER
Pt stated that he ate something bad a few weeks ago and has been having digestive problems ever since. Appt has been scheduled.

## 2023-12-13 ENCOUNTER — OFFICE VISIT (OUTPATIENT)
Dept: FAMILY MEDICINE | Facility: CLINIC | Age: 63
End: 2023-12-13
Payer: MEDICARE

## 2023-12-13 ENCOUNTER — HOSPITAL ENCOUNTER (OUTPATIENT)
Dept: RADIOLOGY | Facility: HOSPITAL | Age: 63
Discharge: HOME | End: 2023-12-13
Attending: FAMILY MEDICINE
Payer: MEDICARE

## 2023-12-13 ENCOUNTER — TELEPHONE (OUTPATIENT)
Dept: FAMILY MEDICINE | Facility: CLINIC | Age: 63
End: 2023-12-13

## 2023-12-13 VITALS
SYSTOLIC BLOOD PRESSURE: 120 MMHG | WEIGHT: 183.63 LBS | BODY MASS INDEX: 27.2 KG/M2 | HEART RATE: 76 BPM | HEIGHT: 69 IN | OXYGEN SATURATION: 91 % | TEMPERATURE: 98 F | DIASTOLIC BLOOD PRESSURE: 60 MMHG

## 2023-12-13 DIAGNOSIS — R10.30 LOWER ABDOMINAL PAIN: ICD-10-CM

## 2023-12-13 DIAGNOSIS — R10.30 LOWER ABDOMINAL PAIN: Primary | ICD-10-CM

## 2023-12-13 PROCEDURE — 99203 PR OFFICE/OUTPT VISIT, NEW, LEVL III, 30-44 MIN: ICD-10-PCS | Mod: S$GLB,,, | Performed by: FAMILY MEDICINE

## 2023-12-13 PROCEDURE — 3044F PR MOST RECENT HEMOGLOBIN A1C LEVEL <7.0%: ICD-10-PCS | Mod: CPTII,S$GLB,, | Performed by: FAMILY MEDICINE

## 2023-12-13 PROCEDURE — 4010F ACE/ARB THERAPY RXD/TAKEN: CPT | Mod: CPTII,S$GLB,, | Performed by: FAMILY MEDICINE

## 2023-12-13 PROCEDURE — 3066F PR DOCUMENTATION OF TREATMENT FOR NEPHROPATHY: ICD-10-PCS | Mod: CPTII,S$GLB,, | Performed by: FAMILY MEDICINE

## 2023-12-13 PROCEDURE — 99203 OFFICE O/P NEW LOW 30 MIN: CPT | Mod: S$GLB,,, | Performed by: FAMILY MEDICINE

## 2023-12-13 PROCEDURE — 3008F PR BODY MASS INDEX (BMI) DOCUMENTED: ICD-10-PCS | Mod: CPTII,S$GLB,, | Performed by: FAMILY MEDICINE

## 2023-12-13 PROCEDURE — 4010F PR ACE/ARB THEARPY RXD/TAKEN: ICD-10-PCS | Mod: CPTII,S$GLB,, | Performed by: FAMILY MEDICINE

## 2023-12-13 PROCEDURE — 3074F PR MOST RECENT SYSTOLIC BLOOD PRESSURE < 130 MM HG: ICD-10-PCS | Mod: CPTII,S$GLB,, | Performed by: FAMILY MEDICINE

## 2023-12-13 PROCEDURE — 74019 RADEX ABDOMEN 2 VIEWS: CPT | Mod: 26,,, | Performed by: RADIOLOGY

## 2023-12-13 PROCEDURE — 3066F NEPHROPATHY DOC TX: CPT | Mod: CPTII,S$GLB,, | Performed by: FAMILY MEDICINE

## 2023-12-13 PROCEDURE — 74019 XR ABDOMEN FLAT AND ERECT: ICD-10-PCS | Mod: 26,,, | Performed by: RADIOLOGY

## 2023-12-13 PROCEDURE — 3078F PR MOST RECENT DIASTOLIC BLOOD PRESSURE < 80 MM HG: ICD-10-PCS | Mod: CPTII,S$GLB,, | Performed by: FAMILY MEDICINE

## 2023-12-13 PROCEDURE — 3074F SYST BP LT 130 MM HG: CPT | Mod: CPTII,S$GLB,, | Performed by: FAMILY MEDICINE

## 2023-12-13 PROCEDURE — 3008F BODY MASS INDEX DOCD: CPT | Mod: CPTII,S$GLB,, | Performed by: FAMILY MEDICINE

## 2023-12-13 PROCEDURE — 3078F DIAST BP <80 MM HG: CPT | Mod: CPTII,S$GLB,, | Performed by: FAMILY MEDICINE

## 2023-12-13 PROCEDURE — 74019 RADEX ABDOMEN 2 VIEWS: CPT | Mod: TC,FY,PN

## 2023-12-13 PROCEDURE — 1159F MED LIST DOCD IN RCRD: CPT | Mod: CPTII,S$GLB,, | Performed by: FAMILY MEDICINE

## 2023-12-13 PROCEDURE — 3044F HG A1C LEVEL LT 7.0%: CPT | Mod: CPTII,S$GLB,, | Performed by: FAMILY MEDICINE

## 2023-12-13 PROCEDURE — 1159F PR MEDICATION LIST DOCUMENTED IN MEDICAL RECORD: ICD-10-PCS | Mod: CPTII,S$GLB,, | Performed by: FAMILY MEDICINE

## 2023-12-13 RX ORDER — DICYCLOMINE HYDROCHLORIDE 20 MG/1
20 TABLET ORAL 3 TIMES DAILY
Qty: 30 TABLET | Refills: 0 | Status: SHIPPED | OUTPATIENT
Start: 2023-12-13 | End: 2023-12-23

## 2023-12-13 NOTE — PROGRESS NOTES
Subjective:       Patient ID: Ar Sharma is a 62 y.o. male.    Chief Complaint: Abdominal Cramping, Gas, Melena, and Diarrhea    61 y/o male with hx of emphysema, ex smoker 10 years with c/o having some stomach problems, feels cramping sensation on a daily basis, started 2 weeks ago, gets some relief with taking Probiotics  Denies any heart burn, denies any blood or mucous in stool  Denies any change in diet        Review of Systems    Objective:      Physical Exam  Vitals and nursing note reviewed.   Constitutional:       General: He is not in acute distress.     Appearance: Normal appearance. He is well-developed. He is not ill-appearing, toxic-appearing or diaphoretic.   HENT:      Head: Normocephalic and atraumatic.      Jaw: No trismus.      Right Ear: Hearing, tympanic membrane, ear canal and external ear normal.      Left Ear: Hearing, tympanic membrane, ear canal and external ear normal.      Nose: Nose normal. No nasal deformity, mucosal edema or rhinorrhea.      Right Sinus: No maxillary sinus tenderness or frontal sinus tenderness.      Left Sinus: No maxillary sinus tenderness or frontal sinus tenderness.      Mouth/Throat:      Dentition: Normal dentition.      Pharynx: Uvula midline. No posterior oropharyngeal erythema or uvula swelling.   Eyes:      General: Lids are normal. No scleral icterus.     Conjunctiva/sclera: Conjunctivae normal.      Comments: Sclera clear bilat   Neck:      Trachea: Trachea and phonation normal.   Cardiovascular:      Rate and Rhythm: Normal rate and regular rhythm.      Pulses: Normal pulses.      Heart sounds: Normal heart sounds.   Pulmonary:      Effort: Pulmonary effort is normal. No respiratory distress.      Breath sounds: Normal breath sounds.   Abdominal:      General: Bowel sounds are normal. There is no distension.      Palpations: Abdomen is soft.      Tenderness: There is no abdominal tenderness.   Musculoskeletal:         General: No deformity. Normal  range of motion.      Cervical back: Full passive range of motion without pain and neck supple.   Skin:     General: Skin is warm and dry.      Coloration: Skin is not pale.   Neurological:      Mental Status: He is alert and oriented to person, place, and time.      Motor: No abnormal muscle tone.      Coordination: Coordination normal.   Psychiatric:         Speech: Speech normal.         Behavior: Behavior normal. Behavior is cooperative.         Thought Content: Thought content normal.         Judgment: Judgment normal.         Assessment:       No diagnosis found.    Plan:       Ar was seen today for abdominal cramping, gas, melena and diarrhea.    Diagnoses and all orders for this visit:    Lower abdominal pain    Other orders  -     dicyclomine (BENTYL) 20 mg tablet; Take 1 tablet (20 mg total) by mouth 3 (three) times daily. for 10 days    Continue Probiotic

## 2024-01-01 ENCOUNTER — HOSPITAL ENCOUNTER (INPATIENT)
Facility: HOSPITAL | Age: 64
LOS: 13 days | DRG: 480 | End: 2024-10-16
Attending: STUDENT IN AN ORGANIZED HEALTH CARE EDUCATION/TRAINING PROGRAM | Admitting: STUDENT IN AN ORGANIZED HEALTH CARE EDUCATION/TRAINING PROGRAM
Payer: MEDICARE

## 2024-01-01 ENCOUNTER — E-CONSULT (OUTPATIENT)
Dept: CARDIAC CATH/INVASIVE PROCEDURES | Facility: HOSPITAL | Age: 64
End: 2024-01-01
Payer: MEDICARE

## 2024-01-01 ENCOUNTER — HOSPITAL ENCOUNTER (EMERGENCY)
Facility: HOSPITAL | Age: 64
Discharge: ANOTHER HOSPITAL | End: 2024-10-03
Attending: EMERGENCY MEDICINE
Payer: MEDICARE

## 2024-01-01 VITALS
RESPIRATION RATE: 12 BRPM | DIASTOLIC BLOOD PRESSURE: 36 MMHG | SYSTOLIC BLOOD PRESSURE: 54 MMHG | BODY MASS INDEX: 26.42 KG/M2 | HEART RATE: 60 BPM | WEIGHT: 178.38 LBS | OXYGEN SATURATION: 90 % | HEIGHT: 69 IN | TEMPERATURE: 98 F

## 2024-01-01 VITALS
WEIGHT: 156 LBS | BODY MASS INDEX: 23.11 KG/M2 | HEART RATE: 62 BPM | OXYGEN SATURATION: 95 % | DIASTOLIC BLOOD PRESSURE: 60 MMHG | HEIGHT: 69 IN | SYSTOLIC BLOOD PRESSURE: 107 MMHG | RESPIRATION RATE: 18 BRPM | TEMPERATURE: 98 F

## 2024-01-01 DIAGNOSIS — S72.001A CLOSED RIGHT HIP FRACTURE, INITIAL ENCOUNTER: ICD-10-CM

## 2024-01-01 DIAGNOSIS — I50.9 CONGESTIVE HEART FAILURE: ICD-10-CM

## 2024-01-01 DIAGNOSIS — I95.9 HYPOTENSIVE EPISODE: ICD-10-CM

## 2024-01-01 DIAGNOSIS — S72.141A: Primary | ICD-10-CM

## 2024-01-01 DIAGNOSIS — R07.9 CHEST PAIN: ICD-10-CM

## 2024-01-01 DIAGNOSIS — A41.9 SEPTIC SHOCK: Primary | ICD-10-CM

## 2024-01-01 DIAGNOSIS — R57.9 SHOCK: ICD-10-CM

## 2024-01-01 DIAGNOSIS — R65.21 SEPTIC SHOCK: Primary | ICD-10-CM

## 2024-01-01 DIAGNOSIS — I51.89 ATRIAL MASS: ICD-10-CM

## 2024-01-01 DIAGNOSIS — S72.001A CLOSED RIGHT HIP FRACTURE: ICD-10-CM

## 2024-01-01 DIAGNOSIS — I82.90 THROMBUS: ICD-10-CM

## 2024-01-01 DIAGNOSIS — Z01.818 PRE-OP EVALUATION: ICD-10-CM

## 2024-01-01 DIAGNOSIS — I95.9 HYPOTENSION, UNSPECIFIED HYPOTENSION TYPE: ICD-10-CM

## 2024-01-01 DIAGNOSIS — N18.6 ESRD (END STAGE RENAL DISEASE): ICD-10-CM

## 2024-01-01 DIAGNOSIS — S72.144A CLOSED NONDISPLACED INTERTROCHANTERIC FRACTURE OF RIGHT FEMUR, INITIAL ENCOUNTER: Primary | ICD-10-CM

## 2024-01-01 DIAGNOSIS — L30.8 DERMATITIS ASSOCIATED WITH MOISTURE: ICD-10-CM
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
## 2024-01-01 LAB
A1AT SERPL-MCNC: 161 MG/DL (ref 100–190)
ACANTHOCYTES BLD QL SMEAR: PRESENT
ALBUMIN SERPL BCP-MCNC: 2.3 G/DL (ref 3.5–5.2)
ALBUMIN SERPL BCP-MCNC: 2.4 G/DL (ref 3.5–5.2)
ALBUMIN SERPL BCP-MCNC: 2.5 G/DL (ref 3.5–5.2)
ALBUMIN SERPL BCP-MCNC: 2.6 G/DL (ref 3.5–5.2)
ALBUMIN SERPL BCP-MCNC: 2.6 G/DL (ref 3.5–5.2)
ALBUMIN SERPL BCP-MCNC: 2.7 G/DL (ref 3.5–5.2)
ALBUMIN SERPL BCP-MCNC: 2.7 G/DL (ref 3.5–5.2)
ALBUMIN SERPL BCP-MCNC: 2.8 G/DL (ref 3.5–5.2)
ALBUMIN SERPL BCP-MCNC: 3 G/DL (ref 3.5–5.2)
ALBUMIN SERPL BCP-MCNC: 3.1 G/DL (ref 3.5–5.2)
ALBUMIN SERPL BCP-MCNC: 3.2 G/DL (ref 3.5–5.2)
ALBUMIN SERPL BCP-MCNC: 3.4 G/DL (ref 3.5–5.2)
ALBUMIN SERPL BCP-MCNC: 3.4 G/DL (ref 3.5–5.2)
ALBUMIN SERPL BCP-MCNC: 3.5 G/DL (ref 3.5–5.2)
ALLENS TEST: ABNORMAL
ALP SERPL-CCNC: 114 U/L (ref 55–135)
ALP SERPL-CCNC: 115 U/L (ref 55–135)
ALP SERPL-CCNC: 117 U/L (ref 55–135)
ALP SERPL-CCNC: 118 U/L (ref 55–135)
ALP SERPL-CCNC: 123 U/L (ref 55–135)
ALP SERPL-CCNC: 132 U/L (ref 55–135)
ALP SERPL-CCNC: 149 U/L (ref 55–135)
ALP SERPL-CCNC: 165 U/L (ref 55–135)
ALP SERPL-CCNC: 167 U/L (ref 55–135)
ALP SERPL-CCNC: 176 U/L (ref 55–135)
ALP SERPL-CCNC: 177 U/L (ref 55–135)
ALP SERPL-CCNC: 203 U/L (ref 55–135)
ALP SERPL-CCNC: 204 U/L (ref 55–135)
ALT SERPL W/O P-5'-P-CCNC: 189 U/L (ref 10–44)
ALT SERPL W/O P-5'-P-CCNC: 19 U/L (ref 10–44)
ALT SERPL W/O P-5'-P-CCNC: 22 U/L (ref 10–44)
ALT SERPL W/O P-5'-P-CCNC: 271 U/L (ref 10–44)
ALT SERPL W/O P-5'-P-CCNC: 29 U/L (ref 10–44)
ALT SERPL W/O P-5'-P-CCNC: 298 U/L (ref 10–44)
ALT SERPL W/O P-5'-P-CCNC: 33 U/L (ref 10–44)
ALT SERPL W/O P-5'-P-CCNC: 45 U/L (ref 10–44)
ALT SERPL W/O P-5'-P-CCNC: 45 U/L (ref 10–44)
ALT SERPL W/O P-5'-P-CCNC: 51 U/L (ref 10–44)
ALT SERPL W/O P-5'-P-CCNC: 52 U/L (ref 10–44)
ALT SERPL W/O P-5'-P-CCNC: 52 U/L (ref 10–44)
ALT SERPL W/O P-5'-P-CCNC: 88 U/L (ref 10–44)
AMMONIA PLAS-SCNC: 53 UMOL/L (ref 10–50)
ANA SER QL IF: NORMAL
ANION GAP SERPL CALC-SCNC: 10 MMOL/L (ref 8–16)
ANION GAP SERPL CALC-SCNC: 10 MMOL/L (ref 8–16)
ANION GAP SERPL CALC-SCNC: 11 MMOL/L (ref 8–16)
ANION GAP SERPL CALC-SCNC: 12 MMOL/L (ref 8–16)
ANION GAP SERPL CALC-SCNC: 13 MMOL/L (ref 8–16)
ANION GAP SERPL CALC-SCNC: 14 MMOL/L (ref 8–16)
ANION GAP SERPL CALC-SCNC: 15 MMOL/L (ref 8–16)
ANION GAP SERPL CALC-SCNC: 16 MMOL/L (ref 8–16)
ANION GAP SERPL CALC-SCNC: 18 MMOL/L (ref 8–16)
ANION GAP SERPL CALC-SCNC: 21 MMOL/L (ref 8–16)
ANION GAP SERPL CALC-SCNC: 7 MMOL/L (ref 8–16)
ANION GAP SERPL CALC-SCNC: 7 MMOL/L (ref 8–16)
ANION GAP SERPL CALC-SCNC: 8 MMOL/L (ref 8–16)
ANION GAP SERPL CALC-SCNC: 9 MMOL/L (ref 8–16)
ANISOCYTOSIS BLD QL SMEAR: SLIGHT
APTT PPP: 31.3 SEC (ref 21–32)
APTT PPP: 36.2 SEC (ref 21–32)
APTT PPP: 49.5 SEC (ref 21–32)
APTT PPP: 52.3 SEC (ref 21–32)
APTT PPP: 53.2 SEC (ref 21–32)
APTT PPP: 53.6 SEC (ref 21–32)
APTT PPP: 58.9 SEC (ref 21–32)
APTT PPP: 65.2 SEC (ref 21–32)
APTT PPP: 72.1 SEC (ref 21–32)
APTT PPP: 75.1 SEC (ref 21–32)
ASCENDING AORTA: 2.85 CM
AST SERPL-CCNC: 106 U/L (ref 10–40)
AST SERPL-CCNC: 110 U/L (ref 10–40)
AST SERPL-CCNC: 115 U/L (ref 10–40)
AST SERPL-CCNC: 152 U/L (ref 10–40)
AST SERPL-CCNC: 225 U/L (ref 10–40)
AST SERPL-CCNC: 240 U/L (ref 10–40)
AST SERPL-CCNC: 322 U/L (ref 10–40)
AST SERPL-CCNC: 367 U/L (ref 10–40)
AST SERPL-CCNC: 47 U/L (ref 10–40)
AST SERPL-CCNC: 48 U/L (ref 10–40)
AST SERPL-CCNC: 85 U/L (ref 10–40)
AST SERPL-CCNC: 93 U/L (ref 10–40)
AST SERPL-CCNC: 97 U/L (ref 10–40)
AV INDEX (PROSTH): 0.5
AV MEAN GRADIENT: 6.3 MMHG
AV PEAK GRADIENT: 10.2 MMHG
AV VALVE AREA BY VELOCITY RATIO: 1.6 CM²
AV VALVE AREA: 1.6 CM²
AV VELOCITY RATIO: 0.5
B HENSELAE IGG TITR SER IF: NORMAL TITER
B HENSELAE IGM TITR SER IF: NORMAL TITER
B QUINTANA IGG TITR SER IF: NORMAL TITER
B QUINTANA IGM TITR SER IF: NORMAL TITER
BACTERIA BLD CULT: NORMAL
BASOPHILS # BLD AUTO: 0.06 K/UL (ref 0–0.2)
BASOPHILS # BLD AUTO: 0.09 K/UL (ref 0–0.2)
BASOPHILS # BLD AUTO: 0.1 K/UL (ref 0–0.2)
BASOPHILS # BLD AUTO: 0.11 K/UL (ref 0–0.2)
BASOPHILS # BLD AUTO: 0.13 K/UL (ref 0–0.2)
BASOPHILS # BLD AUTO: 0.13 K/UL (ref 0–0.2)
BASOPHILS # BLD AUTO: 0.15 K/UL (ref 0–0.2)
BASOPHILS # BLD AUTO: 0.18 K/UL (ref 0–0.2)
BASOPHILS # BLD AUTO: ABNORMAL K/UL (ref 0–0.2)
BASOPHILS # BLD AUTO: ABNORMAL K/UL (ref 0–0.2)
BASOPHILS NFR BLD: 0 % (ref 0–1.9)
BASOPHILS NFR BLD: 0.3 % (ref 0–1.9)
BASOPHILS NFR BLD: 0.4 % (ref 0–1.9)
BASOPHILS NFR BLD: 0.4 % (ref 0–1.9)
BASOPHILS NFR BLD: 0.5 % (ref 0–1.9)
BASOPHILS NFR BLD: 0.6 % (ref 0–1.9)
BASOPHILS NFR BLD: 0.9 % (ref 0–1.9)
BASOPHILS NFR BLD: 1.1 % (ref 0–1.9)
BILIRUB DIRECT SERPL-MCNC: 3.8 MG/DL (ref 0.1–0.3)
BILIRUB SERPL-MCNC: 1.4 MG/DL (ref 0.1–1)
BILIRUB SERPL-MCNC: 1.6 MG/DL (ref 0.1–1)
BILIRUB SERPL-MCNC: 2 MG/DL (ref 0.1–1)
BILIRUB SERPL-MCNC: 2.1 MG/DL (ref 0.1–1)
BILIRUB SERPL-MCNC: 2.1 MG/DL (ref 0.1–1)
BILIRUB SERPL-MCNC: 2.2 MG/DL (ref 0.1–1)
BILIRUB SERPL-MCNC: 2.5 MG/DL (ref 0.1–1)
BILIRUB SERPL-MCNC: 2.5 MG/DL (ref 0.1–1)
BILIRUB SERPL-MCNC: 2.7 MG/DL (ref 0.1–1)
BILIRUB SERPL-MCNC: 2.9 MG/DL (ref 0.1–1)
BILIRUB SERPL-MCNC: 2.9 MG/DL (ref 0.1–1)
BILIRUB SERPL-MCNC: 3.6 MG/DL (ref 0.1–1)
BILIRUB SERPL-MCNC: 4.6 MG/DL (ref 0.1–1)
BSA FOR ECHO PROCEDURE: 1.84 M2
BSA FOR ECHO PROCEDURE: 1.84 M2
BSA FOR ECHO PROCEDURE: 1.97 M2
BUN SERPL-MCNC: 20 MG/DL (ref 8–23)
BUN SERPL-MCNC: 20 MG/DL (ref 8–23)
BUN SERPL-MCNC: 21 MG/DL (ref 8–23)
BUN SERPL-MCNC: 23 MG/DL (ref 8–23)
BUN SERPL-MCNC: 25 MG/DL (ref 8–23)
BUN SERPL-MCNC: 25 MG/DL (ref 8–23)
BUN SERPL-MCNC: 26 MG/DL (ref 8–23)
BUN SERPL-MCNC: 27 MG/DL (ref 8–23)
BUN SERPL-MCNC: 30 MG/DL (ref 8–23)
BUN SERPL-MCNC: 33 MG/DL (ref 8–23)
BUN SERPL-MCNC: 33 MG/DL (ref 8–23)
BUN SERPL-MCNC: 34 MG/DL (ref 8–23)
BUN SERPL-MCNC: 36 MG/DL (ref 8–23)
BUN SERPL-MCNC: 37 MG/DL (ref 8–23)
BUN SERPL-MCNC: 39 MG/DL (ref 8–23)
BUN SERPL-MCNC: 39 MG/DL (ref 8–23)
BUN SERPL-MCNC: 42 MG/DL (ref 8–23)
BUN SERPL-MCNC: 43 MG/DL (ref 8–23)
BUN SERPL-MCNC: 44 MG/DL (ref 8–23)
BUN SERPL-MCNC: 44 MG/DL (ref 8–23)
BUN SERPL-MCNC: 47 MG/DL (ref 8–23)
BUN SERPL-MCNC: 50 MG/DL (ref 8–23)
BUN SERPL-MCNC: 51 MG/DL (ref 8–23)
BUN SERPL-MCNC: 55 MG/DL (ref 8–23)
BUN SERPL-MCNC: 57 MG/DL (ref 8–23)
BUN SERPL-MCNC: 62 MG/DL (ref 8–23)
BUN SERPL-MCNC: 65 MG/DL (ref 8–23)
BUN SERPL-MCNC: 65 MG/DL (ref 8–23)
BUN SERPL-MCNC: 67 MG/DL (ref 8–23)
BUN SERPL-MCNC: 77 MG/DL (ref 8–23)
BUN SERPL-MCNC: 78 MG/DL (ref 8–23)
BURR CELLS BLD QL SMEAR: ABNORMAL
C BURNET AB SER QL IA: NEGATIVE
CALCIUM SERPL-MCNC: 7.8 MG/DL (ref 8.7–10.5)
CALCIUM SERPL-MCNC: 8 MG/DL (ref 8.7–10.5)
CALCIUM SERPL-MCNC: 8.1 MG/DL (ref 8.7–10.5)
CALCIUM SERPL-MCNC: 8.1 MG/DL (ref 8.7–10.5)
CALCIUM SERPL-MCNC: 8.2 MG/DL (ref 8.7–10.5)
CALCIUM SERPL-MCNC: 8.3 MG/DL (ref 8.7–10.5)
CALCIUM SERPL-MCNC: 8.4 MG/DL (ref 8.7–10.5)
CALCIUM SERPL-MCNC: 8.5 MG/DL (ref 8.7–10.5)
CALCIUM SERPL-MCNC: 8.6 MG/DL (ref 8.7–10.5)
CALCIUM SERPL-MCNC: 8.6 MG/DL (ref 8.7–10.5)
CALCIUM SERPL-MCNC: 8.7 MG/DL (ref 8.7–10.5)
CALCIUM SERPL-MCNC: 8.8 MG/DL (ref 8.7–10.5)
CALCIUM SERPL-MCNC: 8.9 MG/DL (ref 8.7–10.5)
CALCIUM SERPL-MCNC: 8.9 MG/DL (ref 8.7–10.5)
CALCIUM SERPL-MCNC: 9 MG/DL (ref 8.7–10.5)
CALCIUM SERPL-MCNC: 9.1 MG/DL (ref 8.7–10.5)
CHLORIDE SERPL-SCNC: 100 MMOL/L (ref 95–110)
CHLORIDE SERPL-SCNC: 100 MMOL/L (ref 95–110)
CHLORIDE SERPL-SCNC: 101 MMOL/L (ref 95–110)
CHLORIDE SERPL-SCNC: 102 MMOL/L (ref 95–110)
CHLORIDE SERPL-SCNC: 103 MMOL/L (ref 95–110)
CHLORIDE SERPL-SCNC: 104 MMOL/L (ref 95–110)
CHLORIDE SERPL-SCNC: 92 MMOL/L (ref 95–110)
CHLORIDE SERPL-SCNC: 94 MMOL/L (ref 95–110)
CHLORIDE SERPL-SCNC: 96 MMOL/L (ref 95–110)
CHLORIDE SERPL-SCNC: 97 MMOL/L (ref 95–110)
CHLORIDE SERPL-SCNC: 97 MMOL/L (ref 95–110)
CHLORIDE SERPL-SCNC: 99 MMOL/L (ref 95–110)
CHLORIDE SERPL-SCNC: 99 MMOL/L (ref 95–110)
CO2 SERPL-SCNC: 17 MMOL/L (ref 23–29)
CO2 SERPL-SCNC: 18 MMOL/L (ref 23–29)
CO2 SERPL-SCNC: 19 MMOL/L (ref 23–29)
CO2 SERPL-SCNC: 20 MMOL/L (ref 23–29)
CO2 SERPL-SCNC: 21 MMOL/L (ref 23–29)
CO2 SERPL-SCNC: 22 MMOL/L (ref 23–29)
CO2 SERPL-SCNC: 22 MMOL/L (ref 23–29)
CO2 SERPL-SCNC: 23 MMOL/L (ref 23–29)
CO2 SERPL-SCNC: 24 MMOL/L (ref 23–29)
CO2 SERPL-SCNC: 25 MMOL/L (ref 23–29)
CO2 SERPL-SCNC: 25 MMOL/L (ref 23–29)
CORTIS SERPL-MCNC: >110 UG/DL
CORTIS SERPL-MCNC: >110 UG/DL (ref 4.3–22.4)
CREAT SERPL-MCNC: 2.1 MG/DL (ref 0.5–1.4)
CREAT SERPL-MCNC: 2.5 MG/DL (ref 0.5–1.4)
CREAT SERPL-MCNC: 2.6 MG/DL (ref 0.5–1.4)
CREAT SERPL-MCNC: 2.6 MG/DL (ref 0.5–1.4)
CREAT SERPL-MCNC: 2.7 MG/DL (ref 0.5–1.4)
CREAT SERPL-MCNC: 2.9 MG/DL (ref 0.5–1.4)
CREAT SERPL-MCNC: 3.3 MG/DL (ref 0.5–1.4)
CREAT SERPL-MCNC: 3.3 MG/DL (ref 0.5–1.4)
CREAT SERPL-MCNC: 3.5 MG/DL (ref 0.5–1.4)
CREAT SERPL-MCNC: 3.5 MG/DL (ref 0.5–1.4)
CREAT SERPL-MCNC: 4.1 MG/DL (ref 0.5–1.4)
CREAT SERPL-MCNC: 4.1 MG/DL (ref 0.5–1.4)
CREAT SERPL-MCNC: 4.2 MG/DL (ref 0.5–1.4)
CREAT SERPL-MCNC: 4.3 MG/DL (ref 0.5–1.4)
CREAT SERPL-MCNC: 4.5 MG/DL (ref 0.5–1.4)
CREAT SERPL-MCNC: 4.7 MG/DL (ref 0.5–1.4)
CREAT SERPL-MCNC: 5.2 MG/DL (ref 0.5–1.4)
CREAT SERPL-MCNC: 5.4 MG/DL (ref 0.5–1.4)
CREAT SERPL-MCNC: 5.4 MG/DL (ref 0.5–1.4)
CREAT SERPL-MCNC: 6 MG/DL (ref 0.5–1.4)
CREAT SERPL-MCNC: 6.2 MG/DL (ref 0.5–1.4)
CREAT SERPL-MCNC: 6.3 MG/DL (ref 0.5–1.4)
CREAT SERPL-MCNC: 6.4 MG/DL (ref 0.5–1.4)
CREAT SERPL-MCNC: 7 MG/DL (ref 0.5–1.4)
CREAT SERPL-MCNC: 7.1 MG/DL (ref 0.5–1.4)
CREAT SERPL-MCNC: 7.3 MG/DL (ref 0.5–1.4)
CREAT SERPL-MCNC: 7.8 MG/DL (ref 0.5–1.4)
CREAT SERPL-MCNC: 7.8 MG/DL (ref 0.5–1.4)
CREAT SERPL-MCNC: 8 MG/DL (ref 0.5–1.4)
CV ECHO LV RWT: 0.37 CM
CV ECHO LV RWT: 0.56 CM
DELSYS: ABNORMAL
DIFFERENTIAL METHOD BLD: ABNORMAL
DOP CALC AO PEAK VEL: 1.6 M/S
DOP CALC AO VTI: 29.2 CM
DOP CALC LVOT AREA: 3.1 CM2
DOP CALC LVOT AREA: 3.1 CM2
DOP CALC LVOT DIAMETER: 2 CM
DOP CALC LVOT DIAMETER: 2 CM
DOP CALC LVOT PEAK VEL: 0.8 M/S
DOP CALC LVOT STROKE VOLUME: 45.8 CM3
DOP CALC MV VTI: 25.1 CM
DOP CALC MV VTI: 29.9 CM
DOP CALCLVOT PEAK VEL VTI: 14.6 CM
E WAVE DECELERATION TIME: 220.84 MSEC
E WAVE DECELERATION TIME: 262.74 MSEC
E/A RATIO: 1.44
E/A RATIO: 2.11
E/E' RATIO: 10.4 M/S
ECHO LV POSTERIOR WALL: 1.1 CM (ref 0.6–1.1)
ECHO LV POSTERIOR WALL: 1.4 CM (ref 0.6–1.1)
EOSINOPHIL # BLD AUTO: 0 K/UL (ref 0–0.5)
EOSINOPHIL # BLD AUTO: 0.1 K/UL (ref 0–0.5)
EOSINOPHIL # BLD AUTO: 0.2 K/UL (ref 0–0.5)
EOSINOPHIL # BLD AUTO: 0.3 K/UL (ref 0–0.5)
EOSINOPHIL # BLD AUTO: 0.4 K/UL (ref 0–0.5)
EOSINOPHIL # BLD AUTO: 0.5 K/UL (ref 0–0.5)
EOSINOPHIL # BLD AUTO: ABNORMAL K/UL (ref 0–0.5)
EOSINOPHIL # BLD AUTO: ABNORMAL K/UL (ref 0–0.5)
EOSINOPHIL NFR BLD: 0 % (ref 0–8)
EOSINOPHIL NFR BLD: 0.1 % (ref 0–8)
EOSINOPHIL NFR BLD: 0.1 % (ref 0–8)
EOSINOPHIL NFR BLD: 0.2 % (ref 0–8)
EOSINOPHIL NFR BLD: 0.7 % (ref 0–8)
EOSINOPHIL NFR BLD: 0.8 % (ref 0–8)
EOSINOPHIL NFR BLD: 1 % (ref 0–8)
EOSINOPHIL NFR BLD: 1.8 % (ref 0–8)
EOSINOPHIL NFR BLD: 1.9 % (ref 0–8)
EOSINOPHIL NFR BLD: 2 % (ref 0–8)
EOSINOPHIL NFR BLD: 2.1 % (ref 0–8)
EOSINOPHIL NFR BLD: 2.2 % (ref 0–8)
ERYTHROCYTE [DISTWIDTH] IN BLOOD BY AUTOMATED COUNT: 15.7 % (ref 11.5–14.5)
ERYTHROCYTE [DISTWIDTH] IN BLOOD BY AUTOMATED COUNT: 15.7 % (ref 11.5–14.5)
ERYTHROCYTE [DISTWIDTH] IN BLOOD BY AUTOMATED COUNT: 15.9 % (ref 11.5–14.5)
ERYTHROCYTE [DISTWIDTH] IN BLOOD BY AUTOMATED COUNT: 15.9 % (ref 11.5–14.5)
ERYTHROCYTE [DISTWIDTH] IN BLOOD BY AUTOMATED COUNT: 16 % (ref 11.5–14.5)
ERYTHROCYTE [DISTWIDTH] IN BLOOD BY AUTOMATED COUNT: 16.2 % (ref 11.5–14.5)
ERYTHROCYTE [DISTWIDTH] IN BLOOD BY AUTOMATED COUNT: 16.2 % (ref 11.5–14.5)
ERYTHROCYTE [DISTWIDTH] IN BLOOD BY AUTOMATED COUNT: 16.3 % (ref 11.5–14.5)
ERYTHROCYTE [DISTWIDTH] IN BLOOD BY AUTOMATED COUNT: 16.4 % (ref 11.5–14.5)
ERYTHROCYTE [DISTWIDTH] IN BLOOD BY AUTOMATED COUNT: 16.4 % (ref 11.5–14.5)
ERYTHROCYTE [DISTWIDTH] IN BLOOD BY AUTOMATED COUNT: 16.6 % (ref 11.5–14.5)
ERYTHROCYTE [DISTWIDTH] IN BLOOD BY AUTOMATED COUNT: 16.8 % (ref 11.5–14.5)
ERYTHROCYTE [DISTWIDTH] IN BLOOD BY AUTOMATED COUNT: 17 % (ref 11.5–14.5)
ERYTHROCYTE [SEDIMENTATION RATE] IN BLOOD BY PHOTOMETRIC METHOD: 27 MM/HR (ref 0–23)
EST. GFR  (NO RACE VARIABLE): 10 ML/MIN/1.73 M^2
EST. GFR  (NO RACE VARIABLE): 11 ML/MIN/1.73 M^2
EST. GFR  (NO RACE VARIABLE): 11 ML/MIN/1.73 M^2
EST. GFR  (NO RACE VARIABLE): 12 ML/MIN/1.73 M^2
EST. GFR  (NO RACE VARIABLE): 13 ML/MIN/1.73 M^2
EST. GFR  (NO RACE VARIABLE): 14 ML/MIN/1.73 M^2
EST. GFR  (NO RACE VARIABLE): 15 ML/MIN/1.73 M^2
EST. GFR  (NO RACE VARIABLE): 19 ML/MIN/1.73 M^2
EST. GFR  (NO RACE VARIABLE): 19 ML/MIN/1.73 M^2
EST. GFR  (NO RACE VARIABLE): 20 ML/MIN/1.73 M^2
EST. GFR  (NO RACE VARIABLE): 20 ML/MIN/1.73 M^2
EST. GFR  (NO RACE VARIABLE): 23 ML/MIN/1.73 M^2
EST. GFR  (NO RACE VARIABLE): 26 ML/MIN/1.73 M^2
EST. GFR  (NO RACE VARIABLE): 27 ML/MIN/1.73 M^2
EST. GFR  (NO RACE VARIABLE): 27 ML/MIN/1.73 M^2
EST. GFR  (NO RACE VARIABLE): 28 ML/MIN/1.73 M^2
EST. GFR  (NO RACE VARIABLE): 35 ML/MIN/1.73 M^2
EST. GFR  (NO RACE VARIABLE): 7 ML/MIN/1.73 M^2
EST. GFR  (NO RACE VARIABLE): 8 ML/MIN/1.73 M^2
EST. GFR  (NO RACE VARIABLE): 9 ML/MIN/1.73 M^2
FERRITIN SERPL-MCNC: 338 NG/ML (ref 20–300)
FLOW: 12
FLOW: 4
FLOW: 5
FLOW: 5
FRACTIONAL SHORTENING: 28.8 % (ref 28–44)
FRACTIONAL SHORTENING: 36 % (ref 28–44)
GLUCOSE SERPL-MCNC: 102 MG/DL (ref 70–110)
GLUCOSE SERPL-MCNC: 110 MG/DL (ref 70–110)
GLUCOSE SERPL-MCNC: 110 MG/DL (ref 70–110)
GLUCOSE SERPL-MCNC: 111 MG/DL (ref 70–110)
GLUCOSE SERPL-MCNC: 111 MG/DL (ref 70–110)
GLUCOSE SERPL-MCNC: 113 MG/DL (ref 70–110)
GLUCOSE SERPL-MCNC: 114 MG/DL (ref 70–110)
GLUCOSE SERPL-MCNC: 117 MG/DL (ref 70–110)
GLUCOSE SERPL-MCNC: 118 MG/DL (ref 70–110)
GLUCOSE SERPL-MCNC: 118 MG/DL (ref 70–110)
GLUCOSE SERPL-MCNC: 119 MG/DL (ref 70–110)
GLUCOSE SERPL-MCNC: 120 MG/DL (ref 70–110)
GLUCOSE SERPL-MCNC: 125 MG/DL (ref 70–110)
GLUCOSE SERPL-MCNC: 125 MG/DL (ref 70–110)
GLUCOSE SERPL-MCNC: 127 MG/DL (ref 70–110)
GLUCOSE SERPL-MCNC: 127 MG/DL (ref 70–110)
GLUCOSE SERPL-MCNC: 131 MG/DL (ref 70–110)
GLUCOSE SERPL-MCNC: 133 MG/DL (ref 70–110)
GLUCOSE SERPL-MCNC: 136 MG/DL (ref 70–110)
GLUCOSE SERPL-MCNC: 139 MG/DL (ref 70–110)
GLUCOSE SERPL-MCNC: 141 MG/DL (ref 70–110)
GLUCOSE SERPL-MCNC: 145 MG/DL (ref 70–110)
GLUCOSE SERPL-MCNC: 146 MG/DL (ref 70–110)
GLUCOSE SERPL-MCNC: 147 MG/DL (ref 70–110)
GLUCOSE SERPL-MCNC: 148 MG/DL (ref 70–110)
GLUCOSE SERPL-MCNC: 151 MG/DL (ref 70–110)
GLUCOSE SERPL-MCNC: 155 MG/DL (ref 70–110)
GLUCOSE SERPL-MCNC: 163 MG/DL (ref 70–110)
GLUCOSE SERPL-MCNC: 93 MG/DL (ref 70–110)
HAV IGM SERPL QL IA: NORMAL
HBV CORE IGM SERPL QL IA: NORMAL
HBV SURFACE AG SERPL QL IA: NORMAL
HCO3 UR-SCNC: 21.7 MMOL/L (ref 24–28)
HCO3 UR-SCNC: 21.9 MMOL/L (ref 24–28)
HCO3 UR-SCNC: 22.7 MMOL/L (ref 24–28)
HCO3 UR-SCNC: 22.9 MMOL/L (ref 24–28)
HCO3 UR-SCNC: 25.7 MMOL/L (ref 24–28)
HCO3 UR-SCNC: 26.3 MMOL/L (ref 24–28)
HCO3 UR-SCNC: 26.5 MMOL/L (ref 24–28)
HCO3 UR-SCNC: 27.4 MMOL/L (ref 24–28)
HCO3 UR-SCNC: 30.5 MMOL/L (ref 24–28)
HCT VFR BLD AUTO: 26.8 % (ref 40–54)
HCT VFR BLD AUTO: 27.3 % (ref 40–54)
HCT VFR BLD AUTO: 27.3 % (ref 40–54)
HCT VFR BLD AUTO: 27.8 % (ref 40–54)
HCT VFR BLD AUTO: 28.4 % (ref 40–54)
HCT VFR BLD AUTO: 29.2 % (ref 40–54)
HCT VFR BLD AUTO: 29.5 % (ref 40–54)
HCT VFR BLD AUTO: 29.8 % (ref 40–54)
HCT VFR BLD AUTO: 31.5 % (ref 40–54)
HCT VFR BLD AUTO: 32.4 % (ref 40–54)
HCT VFR BLD AUTO: 33 % (ref 40–54)
HCT VFR BLD AUTO: 33.6 % (ref 40–54)
HCT VFR BLD AUTO: 35.8 % (ref 40–54)
HCT VFR BLD AUTO: 37.3 % (ref 40–54)
HCT VFR BLD AUTO: 37.5 % (ref 40–54)
HCT VFR BLD AUTO: 40.2 % (ref 40–54)
HCT VFR BLD AUTO: 40.3 % (ref 40–54)
HCT VFR BLD AUTO: 40.6 % (ref 40–54)
HCT VFR BLD AUTO: 41.3 % (ref 40–54)
HCV AB SERPL QL IA: NORMAL
HGB BLD-MCNC: 10 G/DL (ref 14–18)
HGB BLD-MCNC: 10.4 G/DL (ref 14–18)
HGB BLD-MCNC: 10.6 G/DL (ref 14–18)
HGB BLD-MCNC: 10.7 G/DL (ref 14–18)
HGB BLD-MCNC: 11 G/DL (ref 14–18)
HGB BLD-MCNC: 11.3 G/DL (ref 14–18)
HGB BLD-MCNC: 11.5 G/DL (ref 14–18)
HGB BLD-MCNC: 12.2 G/DL (ref 14–18)
HGB BLD-MCNC: 12.6 G/DL (ref 14–18)
HGB BLD-MCNC: 12.9 G/DL (ref 14–18)
HGB BLD-MCNC: 13.2 G/DL (ref 14–18)
HGB BLD-MCNC: 13.2 G/DL (ref 14–18)
HGB BLD-MCNC: 13.4 G/DL (ref 14–18)
HGB BLD-MCNC: 13.6 G/DL (ref 14–18)
HGB BLD-MCNC: 9.7 G/DL (ref 14–18)
HGB BLD-MCNC: 9.8 G/DL (ref 14–18)
HGB BLD-MCNC: 9.8 G/DL (ref 14–18)
HGB BLD-MCNC: 9.9 G/DL (ref 14–18)
HGB BLD-MCNC: 9.9 G/DL (ref 14–18)
HIV 1+2 AB+HIV1 P24 AG SERPL QL IA: NORMAL
IMM GRANULOCYTES # BLD AUTO: 0.06 K/UL (ref 0–0.04)
IMM GRANULOCYTES # BLD AUTO: 0.08 K/UL (ref 0–0.04)
IMM GRANULOCYTES # BLD AUTO: 0.13 K/UL (ref 0–0.04)
IMM GRANULOCYTES # BLD AUTO: 0.16 K/UL (ref 0–0.04)
IMM GRANULOCYTES # BLD AUTO: 0.2 K/UL (ref 0–0.04)
IMM GRANULOCYTES # BLD AUTO: 0.22 K/UL (ref 0–0.04)
IMM GRANULOCYTES # BLD AUTO: 0.24 K/UL (ref 0–0.04)
IMM GRANULOCYTES # BLD AUTO: 0.31 K/UL (ref 0–0.04)
IMM GRANULOCYTES # BLD AUTO: 0.51 K/UL (ref 0–0.04)
IMM GRANULOCYTES # BLD AUTO: 0.59 K/UL (ref 0–0.04)
IMM GRANULOCYTES # BLD AUTO: 0.85 K/UL (ref 0–0.04)
IMM GRANULOCYTES # BLD AUTO: 1.42 K/UL (ref 0–0.04)
IMM GRANULOCYTES # BLD AUTO: 1.83 K/UL (ref 0–0.04)
IMM GRANULOCYTES # BLD AUTO: 1.85 K/UL (ref 0–0.04)
IMM GRANULOCYTES # BLD AUTO: ABNORMAL K/UL (ref 0–0.04)
IMM GRANULOCYTES NFR BLD AUTO: 0.6 % (ref 0–0.5)
IMM GRANULOCYTES NFR BLD AUTO: 0.8 % (ref 0–0.5)
IMM GRANULOCYTES NFR BLD AUTO: 0.9 % (ref 0–0.5)
IMM GRANULOCYTES NFR BLD AUTO: 1 % (ref 0–0.5)
IMM GRANULOCYTES NFR BLD AUTO: 1 % (ref 0–0.5)
IMM GRANULOCYTES NFR BLD AUTO: 1.1 % (ref 0–0.5)
IMM GRANULOCYTES NFR BLD AUTO: 1.2 % (ref 0–0.5)
IMM GRANULOCYTES NFR BLD AUTO: 1.6 % (ref 0–0.5)
IMM GRANULOCYTES NFR BLD AUTO: 1.9 % (ref 0–0.5)
IMM GRANULOCYTES NFR BLD AUTO: 2.8 % (ref 0–0.5)
IMM GRANULOCYTES NFR BLD AUTO: 4.1 % (ref 0–0.5)
IMM GRANULOCYTES NFR BLD AUTO: 4.2 % (ref 0–0.5)
IMM GRANULOCYTES NFR BLD AUTO: 4.5 % (ref 0–0.5)
IMM GRANULOCYTES NFR BLD AUTO: 4.9 % (ref 0–0.5)
IMM GRANULOCYTES NFR BLD AUTO: ABNORMAL % (ref 0–0.5)
INR PPP: 1.2 (ref 0.8–1.2)
INR PPP: 1.2 (ref 0.8–1.2)
INR PPP: 1.3 (ref 0.8–1.2)
INTERVENTRICULAR SEPTUM: 1.2 CM (ref 0.6–1.1)
INTERVENTRICULAR SEPTUM: 1.2 CM (ref 0.6–1.1)
IRON SERPL-MCNC: 22 UG/DL (ref 45–160)
IVC DIAMETER: 2.77 CM
IVC DIAMETER: 3.1 CM
IVC DIAMETER: 3.15 CM
IVRT: 98.95 MSEC
LA MAJOR: 6.74 CM
LA MAJOR: 7.76 CM
LA MINOR: 6.45 CM
LA WIDTH: 4.7 CM
LACTATE SERPL-SCNC: 0.7 MMOL/L (ref 0.5–2.2)
LACTATE SERPL-SCNC: 1.2 MMOL/L (ref 0.5–2.2)
LDH SERPL L TO P-CCNC: 433 U/L (ref 110–260)
LEFT ATRIUM SIZE: 4.53 CM
LEFT ATRIUM SIZE: 4.82 CM
LEFT ATRIUM VOLUME INDEX: 73.3 ML/M2
LEFT ATRIUM VOLUME: 135.65 CM3
LEFT INTERNAL DIMENSION IN SYSTOLE: 3.2 CM (ref 2.1–4)
LEFT INTERNAL DIMENSION IN SYSTOLE: 4.2 CM (ref 2.1–4)
LEFT VENTRICLE DIASTOLIC VOLUME INDEX: 61.17 ML/M2
LEFT VENTRICLE DIASTOLIC VOLUME INDEX: 93.56 ML/M2
LEFT VENTRICLE DIASTOLIC VOLUME: 119.89 ML
LEFT VENTRICLE DIASTOLIC VOLUME: 173.09 ML
LEFT VENTRICLE MASS INDEX: 133.6 G/M2
LEFT VENTRICLE MASS INDEX: 155.9 G/M2
LEFT VENTRICLE SYSTOLIC VOLUME INDEX: 20.3 ML/M2
LEFT VENTRICLE SYSTOLIC VOLUME INDEX: 43.2 ML/M2
LEFT VENTRICLE SYSTOLIC VOLUME: 39.85 ML
LEFT VENTRICLE SYSTOLIC VOLUME: 79.85 ML
LEFT VENTRICULAR INTERNAL DIMENSION IN DIASTOLE: 5 CM (ref 3.5–6)
LEFT VENTRICULAR INTERNAL DIMENSION IN DIASTOLE: 5.9 CM (ref 3.5–6)
LEFT VENTRICULAR MASS: 261.8 G
LEFT VENTRICULAR MASS: 288.5 G
LV LATERAL E/E' RATIO: 7.8 M/S
LV SEPTAL E/E' RATIO: 15.6 M/S
LVED V (TEICH): 119.89 ML
LVED V (TEICH): 173.09 ML
LVES V (TEICH): 39.85 ML
LVES V (TEICH): 79.85 ML
LVOT MG: 1.41 MMHG
LVOT MV: 0.56 CM/S
LYMPHOCYTES # BLD AUTO: 0.3 K/UL (ref 1–4.8)
LYMPHOCYTES # BLD AUTO: 0.4 K/UL (ref 1–4.8)
LYMPHOCYTES # BLD AUTO: 0.5 K/UL (ref 1–4.8)
LYMPHOCYTES # BLD AUTO: 0.6 K/UL (ref 1–4.8)
LYMPHOCYTES # BLD AUTO: 0.6 K/UL (ref 1–4.8)
LYMPHOCYTES # BLD AUTO: 0.7 K/UL (ref 1–4.8)
LYMPHOCYTES # BLD AUTO: 0.9 K/UL (ref 1–4.8)
LYMPHOCYTES # BLD AUTO: 0.9 K/UL (ref 1–4.8)
LYMPHOCYTES # BLD AUTO: 1.1 K/UL (ref 1–4.8)
LYMPHOCYTES # BLD AUTO: ABNORMAL K/UL (ref 1–4.8)
LYMPHOCYTES # BLD AUTO: ABNORMAL K/UL (ref 1–4.8)
LYMPHOCYTES NFR BLD: 1 % (ref 18–48)
LYMPHOCYTES NFR BLD: 1.2 % (ref 18–48)
LYMPHOCYTES NFR BLD: 1.4 % (ref 18–48)
LYMPHOCYTES NFR BLD: 1.4 % (ref 18–48)
LYMPHOCYTES NFR BLD: 1.7 % (ref 18–48)
LYMPHOCYTES NFR BLD: 1.8 % (ref 18–48)
LYMPHOCYTES NFR BLD: 2 % (ref 18–48)
LYMPHOCYTES NFR BLD: 2.5 % (ref 18–48)
LYMPHOCYTES NFR BLD: 3.2 % (ref 18–48)
LYMPHOCYTES NFR BLD: 3.4 % (ref 18–48)
LYMPHOCYTES NFR BLD: 3.4 % (ref 18–48)
LYMPHOCYTES NFR BLD: 4 % (ref 18–48)
LYMPHOCYTES NFR BLD: 4 % (ref 18–48)
LYMPHOCYTES NFR BLD: 4.2 % (ref 18–48)
LYMPHOCYTES NFR BLD: 4.7 % (ref 18–48)
LYMPHOCYTES NFR BLD: 5.1 % (ref 18–48)
LYMPHOCYTES NFR BLD: 5.4 % (ref 18–48)
LYMPHOCYTES NFR BLD: 5.6 % (ref 18–48)
MAGNESIUM SERPL-MCNC: 1.8 MG/DL (ref 1.6–2.6)
MAGNESIUM SERPL-MCNC: 1.9 MG/DL (ref 1.6–2.6)
MAGNESIUM SERPL-MCNC: 2 MG/DL (ref 1.6–2.6)
MAGNESIUM SERPL-MCNC: 2.1 MG/DL (ref 1.6–2.6)
MAGNESIUM SERPL-MCNC: 2.2 MG/DL (ref 1.6–2.6)
MAGNESIUM SERPL-MCNC: 2.3 MG/DL (ref 1.6–2.6)
MAGNESIUM SERPL-MCNC: 2.4 MG/DL (ref 1.6–2.6)
MCH RBC QN AUTO: 26.3 PG (ref 27–31)
MCH RBC QN AUTO: 26.4 PG (ref 27–31)
MCH RBC QN AUTO: 26.5 PG (ref 27–31)
MCH RBC QN AUTO: 26.5 PG (ref 27–31)
MCH RBC QN AUTO: 26.7 PG (ref 27–31)
MCH RBC QN AUTO: 26.7 PG (ref 27–31)
MCH RBC QN AUTO: 26.8 PG (ref 27–31)
MCH RBC QN AUTO: 27 PG (ref 27–31)
MCH RBC QN AUTO: 27.1 PG (ref 27–31)
MCH RBC QN AUTO: 27.2 PG (ref 27–31)
MCH RBC QN AUTO: 27.3 PG (ref 27–31)
MCH RBC QN AUTO: 27.4 PG (ref 27–31)
MCH RBC QN AUTO: 27.5 PG (ref 27–31)
MCH RBC QN AUTO: 27.6 PG (ref 27–31)
MCHC RBC AUTO-ENTMCNC: 32.8 G/DL (ref 32–36)
MCHC RBC AUTO-ENTMCNC: 33 G/DL (ref 32–36)
MCHC RBC AUTO-ENTMCNC: 33 G/DL (ref 32–36)
MCHC RBC AUTO-ENTMCNC: 33.6 G/DL (ref 32–36)
MCHC RBC AUTO-ENTMCNC: 33.7 G/DL (ref 32–36)
MCHC RBC AUTO-ENTMCNC: 33.9 G/DL (ref 32–36)
MCHC RBC AUTO-ENTMCNC: 34.1 G/DL (ref 32–36)
MCHC RBC AUTO-ENTMCNC: 34.2 G/DL (ref 32–36)
MCHC RBC AUTO-ENTMCNC: 34.2 G/DL (ref 32–36)
MCHC RBC AUTO-ENTMCNC: 34.6 G/DL (ref 32–36)
MCHC RBC AUTO-ENTMCNC: 34.9 G/DL (ref 32–36)
MCHC RBC AUTO-ENTMCNC: 34.9 G/DL (ref 32–36)
MCHC RBC AUTO-ENTMCNC: 35.3 G/DL (ref 32–36)
MCHC RBC AUTO-ENTMCNC: 35.5 G/DL (ref 32–36)
MCHC RBC AUTO-ENTMCNC: 35.6 G/DL (ref 32–36)
MCHC RBC AUTO-ENTMCNC: 35.9 G/DL (ref 32–36)
MCHC RBC AUTO-ENTMCNC: 36 G/DL (ref 32–36)
MCHC RBC AUTO-ENTMCNC: 36.6 G/DL (ref 32–36)
MCV RBC AUTO: 74 FL (ref 82–98)
MCV RBC AUTO: 75 FL (ref 82–98)
MCV RBC AUTO: 76 FL (ref 82–98)
MCV RBC AUTO: 77 FL (ref 82–98)
MCV RBC AUTO: 77 FL (ref 82–98)
MCV RBC AUTO: 78 FL (ref 82–98)
MCV RBC AUTO: 78 FL (ref 82–98)
MCV RBC AUTO: 79 FL (ref 82–98)
MCV RBC AUTO: 79 FL (ref 82–98)
MCV RBC AUTO: 80 FL (ref 82–98)
MCV RBC AUTO: 81 FL (ref 82–98)
MCV RBC AUTO: 81 FL (ref 82–98)
MCV RBC AUTO: 82 FL (ref 82–98)
MCV RBC AUTO: 82 FL (ref 82–98)
METAMYELOCYTES NFR BLD MANUAL: 1 %
METAMYELOCYTES NFR BLD MANUAL: 2 %
MITOCHONDRIA AB TITR SER IF: NORMAL {TITER}
MODE: ABNORMAL
MONOCYTES # BLD AUTO: 0.8 K/UL (ref 0.3–1)
MONOCYTES # BLD AUTO: 1.2 K/UL (ref 0.3–1)
MONOCYTES # BLD AUTO: 1.3 K/UL (ref 0.3–1)
MONOCYTES # BLD AUTO: 1.3 K/UL (ref 0.3–1)
MONOCYTES # BLD AUTO: 1.5 K/UL (ref 0.3–1)
MONOCYTES # BLD AUTO: 1.8 K/UL (ref 0.3–1)
MONOCYTES # BLD AUTO: 2 K/UL (ref 0.3–1)
MONOCYTES # BLD AUTO: 2 K/UL (ref 0.3–1)
MONOCYTES # BLD AUTO: 2.2 K/UL (ref 0.3–1)
MONOCYTES # BLD AUTO: 2.5 K/UL (ref 0.3–1)
MONOCYTES # BLD AUTO: 2.5 K/UL (ref 0.3–1)
MONOCYTES # BLD AUTO: 2.6 K/UL (ref 0.3–1)
MONOCYTES # BLD AUTO: 2.6 K/UL (ref 0.3–1)
MONOCYTES # BLD AUTO: 3.6 K/UL (ref 0.3–1)
MONOCYTES # BLD AUTO: ABNORMAL K/UL (ref 0.3–1)
MONOCYTES # BLD AUTO: ABNORMAL K/UL (ref 0.3–1)
MONOCYTES NFR BLD: 10.2 % (ref 4–15)
MONOCYTES NFR BLD: 10.2 % (ref 4–15)
MONOCYTES NFR BLD: 12 % (ref 4–15)
MONOCYTES NFR BLD: 12.1 % (ref 4–15)
MONOCYTES NFR BLD: 12.1 % (ref 4–15)
MONOCYTES NFR BLD: 12.2 % (ref 4–15)
MONOCYTES NFR BLD: 12.8 % (ref 4–15)
MONOCYTES NFR BLD: 13.2 % (ref 4–15)
MONOCYTES NFR BLD: 13.4 % (ref 4–15)
MONOCYTES NFR BLD: 3 % (ref 4–15)
MONOCYTES NFR BLD: 4 % (ref 4–15)
MONOCYTES NFR BLD: 4.8 % (ref 4–15)
MONOCYTES NFR BLD: 5 % (ref 4–15)
MONOCYTES NFR BLD: 6 % (ref 4–15)
MONOCYTES NFR BLD: 6.1 % (ref 4–15)
MONOCYTES NFR BLD: 6.1 % (ref 4–15)
MONOCYTES NFR BLD: 6.6 % (ref 4–15)
MONOCYTES NFR BLD: 7.9 % (ref 4–15)
MV MEAN GRADIENT: 1 MMHG
MV MEAN GRADIENT: 1 MMHG
MV PEAK A VEL: 0.37 M/S
MV PEAK A VEL: 0.52 M/S
MV PEAK E VEL: 0.75 M/S
MV PEAK E VEL: 0.78 M/S
MV PEAK GRADIENT: 3 MMHG
MV PEAK GRADIENT: 3 MMHG
MV STENOSIS PRESSURE HALF TIME: 64.04 MS
MV STENOSIS PRESSURE HALF TIME: 76.2 MS
MV VALVE AREA BY CONTINUITY EQUATION: 1.83 CM2
MV VALVE AREA P 1/2 METHOD: 2.89 CM2
MV VALVE AREA P 1/2 METHOD: 3.44 CM2
MYELOCYTES NFR BLD MANUAL: 1 %
MYELOCYTES NFR BLD MANUAL: 2 %
MYELOCYTES NFR BLD MANUAL: 5 %
NEUTROPHILS # BLD AUTO: 12.3 K/UL (ref 1.8–7.7)
NEUTROPHILS # BLD AUTO: 12.3 K/UL (ref 1.8–7.7)
NEUTROPHILS # BLD AUTO: 15 K/UL (ref 1.8–7.7)
NEUTROPHILS # BLD AUTO: 16 K/UL (ref 1.8–7.7)
NEUTROPHILS # BLD AUTO: 16.2 K/UL (ref 1.8–7.7)
NEUTROPHILS # BLD AUTO: 17 K/UL (ref 1.8–7.7)
NEUTROPHILS # BLD AUTO: 17.8 K/UL (ref 1.8–7.7)
NEUTROPHILS # BLD AUTO: 18.7 K/UL (ref 1.8–7.7)
NEUTROPHILS # BLD AUTO: 21.5 K/UL (ref 1.8–7.7)
NEUTROPHILS # BLD AUTO: 29.7 K/UL (ref 1.8–7.7)
NEUTROPHILS # BLD AUTO: 33.4 K/UL (ref 1.8–7.7)
NEUTROPHILS # BLD AUTO: 35.7 K/UL (ref 1.8–7.7)
NEUTROPHILS # BLD AUTO: 8 K/UL (ref 1.8–7.7)
NEUTROPHILS # BLD AUTO: 8.4 K/UL (ref 1.8–7.7)
NEUTROPHILS NFR BLD: 73 % (ref 38–73)
NEUTROPHILS NFR BLD: 77 % (ref 38–73)
NEUTROPHILS NFR BLD: 77.2 % (ref 38–73)
NEUTROPHILS NFR BLD: 78.8 % (ref 38–73)
NEUTROPHILS NFR BLD: 79 % (ref 38–73)
NEUTROPHILS NFR BLD: 79.3 % (ref 38–73)
NEUTROPHILS NFR BLD: 80.2 % (ref 38–73)
NEUTROPHILS NFR BLD: 82.5 % (ref 38–73)
NEUTROPHILS NFR BLD: 82.6 % (ref 38–73)
NEUTROPHILS NFR BLD: 84 % (ref 38–73)
NEUTROPHILS NFR BLD: 85.8 % (ref 38–73)
NEUTROPHILS NFR BLD: 86 % (ref 38–73)
NEUTROPHILS NFR BLD: 87.4 % (ref 38–73)
NEUTROPHILS NFR BLD: 87.9 % (ref 38–73)
NEUTROPHILS NFR BLD: 88 % (ref 38–73)
NEUTROPHILS NFR BLD: 88.6 % (ref 38–73)
NEUTROPHILS NFR BLD: 89.9 % (ref 38–73)
NEUTROPHILS NFR BLD: 90.5 % (ref 38–73)
NEUTS BAND NFR BLD MANUAL: 10 %
NEUTS BAND NFR BLD MANUAL: 2 %
NEUTS BAND NFR BLD MANUAL: 4 %
NEUTS BAND NFR BLD MANUAL: 6 %
NRBC BLD-RTO: 0 /100 WBC
OHS CV RV/LV RATIO: 0.78 CM
OHS CV RV/LV RATIO: 1.22 CM
OHS QRS DURATION: 192 MS
OHS QRS DURATION: 210 MS
OHS QTC CALCULATION: 536 MS
OHS QTC CALCULATION: 563 MS
PCO2 BLDA: 36.1 MMHG (ref 35–45)
PCO2 BLDA: 38.9 MMHG (ref 35–45)
PCO2 BLDA: 39.5 MMHG (ref 35–45)
PCO2 BLDA: 43.4 MMHG (ref 35–45)
PCO2 BLDA: 44.3 MMHG (ref 35–45)
PCO2 BLDA: 44.9 MMHG (ref 35–45)
PCO2 BLDA: 45.5 MMHG (ref 35–45)
PCO2 BLDA: 45.8 MMHG (ref 35–45)
PCO2 BLDA: 49.8 MMHG (ref 35–45)
PH SMN: 7.31 [PH] (ref 7.35–7.45)
PH SMN: 7.32 [PH] (ref 7.35–7.45)
PH SMN: 7.37 [PH] (ref 7.35–7.45)
PH SMN: 7.38 [PH] (ref 7.35–7.45)
PH SMN: 7.38 [PH] (ref 7.35–7.45)
PH SMN: 7.39 [PH] (ref 7.35–7.45)
PH SMN: 7.42 [PH] (ref 7.35–7.45)
PHOSPHATE SERPL-MCNC: 2.1 MG/DL (ref 2.7–4.5)
PHOSPHATE SERPL-MCNC: 2.3 MG/DL (ref 2.7–4.5)
PHOSPHATE SERPL-MCNC: 2.5 MG/DL (ref 2.7–4.5)
PHOSPHATE SERPL-MCNC: 2.6 MG/DL (ref 2.7–4.5)
PHOSPHATE SERPL-MCNC: 2.8 MG/DL (ref 2.7–4.5)
PHOSPHATE SERPL-MCNC: 3 MG/DL (ref 2.7–4.5)
PHOSPHATE SERPL-MCNC: 3.1 MG/DL (ref 2.7–4.5)
PHOSPHATE SERPL-MCNC: 3.3 MG/DL (ref 2.7–4.5)
PHOSPHATE SERPL-MCNC: 3.5 MG/DL (ref 2.7–4.5)
PHOSPHATE SERPL-MCNC: 4 MG/DL (ref 2.7–4.5)
PHOSPHATE SERPL-MCNC: 4.1 MG/DL (ref 2.7–4.5)
PHOSPHATE SERPL-MCNC: 4.1 MG/DL (ref 2.7–4.5)
PHOSPHATE SERPL-MCNC: 4.6 MG/DL (ref 2.7–4.5)
PHOSPHATE SERPL-MCNC: 4.7 MG/DL (ref 2.7–4.5)
PHOSPHATE SERPL-MCNC: 4.8 MG/DL (ref 2.7–4.5)
PHOSPHATE SERPL-MCNC: 5.4 MG/DL (ref 2.7–4.5)
PHOSPHATE SERPL-MCNC: 6.4 MG/DL (ref 2.7–4.5)
PHOSPHATE SERPL-MCNC: 8.3 MG/DL (ref 2.7–4.5)
PISA TR MAX VEL: 3.57 M/S
PISA TR MAX VEL: 3.62 M/S
PISA TR MAX VEL: 3.79 M/S
PLATELET # BLD AUTO: 272 K/UL (ref 150–450)
PLATELET # BLD AUTO: 288 K/UL (ref 150–450)
PLATELET # BLD AUTO: 294 K/UL (ref 150–450)
PLATELET # BLD AUTO: 302 K/UL (ref 150–450)
PLATELET # BLD AUTO: 311 K/UL (ref 150–450)
PLATELET # BLD AUTO: 320 K/UL (ref 150–450)
PLATELET # BLD AUTO: 352 K/UL (ref 150–450)
PLATELET # BLD AUTO: 389 K/UL (ref 150–450)
PLATELET # BLD AUTO: 396 K/UL (ref 150–450)
PLATELET # BLD AUTO: 397 K/UL (ref 150–450)
PLATELET # BLD AUTO: 400 K/UL (ref 150–450)
PLATELET # BLD AUTO: 407 K/UL (ref 150–450)
PLATELET # BLD AUTO: 413 K/UL (ref 150–450)
PLATELET # BLD AUTO: 416 K/UL (ref 150–450)
PLATELET # BLD AUTO: 421 K/UL (ref 150–450)
PLATELET # BLD AUTO: 442 K/UL (ref 150–450)
PLATELET # BLD AUTO: 450 K/UL (ref 150–450)
PLATELET # BLD AUTO: 492 K/UL (ref 150–450)
PLATELET BLD QL SMEAR: ABNORMAL
PMV BLD AUTO: 10.8 FL (ref 9.2–12.9)
PMV BLD AUTO: 11.2 FL (ref 9.2–12.9)
PMV BLD AUTO: 11.2 FL (ref 9.2–12.9)
PMV BLD AUTO: 11.3 FL (ref 9.2–12.9)
PMV BLD AUTO: 11.4 FL (ref 9.2–12.9)
PMV BLD AUTO: 11.5 FL (ref 9.2–12.9)
PMV BLD AUTO: 11.6 FL (ref 9.2–12.9)
PMV BLD AUTO: 11.7 FL (ref 9.2–12.9)
PMV BLD AUTO: 11.8 FL (ref 9.2–12.9)
PMV BLD AUTO: 12 FL (ref 9.2–12.9)
PMV BLD AUTO: 12 FL (ref 9.2–12.9)
PO2 BLDA: 26 MMHG (ref 40–60)
PO2 BLDA: 27 MMHG (ref 40–60)
PO2 BLDA: 32 MMHG (ref 40–60)
PO2 BLDA: 35 MMHG (ref 40–60)
PO2 BLDA: 37 MMHG (ref 40–60)
PO2 BLDA: 38 MMHG (ref 40–60)
PO2 BLDA: 40 MMHG (ref 40–60)
PO2 BLDA: 53 MMHG (ref 80–100)
PO2 BLDA: 75 MMHG (ref 80–100)
POC BE: -2 MMOL/L
POC BE: -3 MMOL/L
POC BE: -3 MMOL/L
POC BE: -5 MMOL/L
POC BE: 1 MMOL/L
POC BE: 2 MMOL/L
POC BE: 5 MMOL/L
POC SATURATED O2: 47 % (ref 95–100)
POC SATURATED O2: 48 % (ref 95–100)
POC SATURATED O2: 61 % (ref 95–100)
POC SATURATED O2: 68 % (ref 95–100)
POC SATURATED O2: 68 % (ref 95–100)
POC SATURATED O2: 69 % (ref 95–100)
POC SATURATED O2: 70 % (ref 95–100)
POC SATURATED O2: 87 % (ref 95–100)
POC SATURATED O2: 95 % (ref 95–100)
POC TCO2: 23 MMOL/L (ref 23–27)
POC TCO2: 23 MMOL/L (ref 24–29)
POC TCO2: 24 MMOL/L (ref 23–27)
POC TCO2: 24 MMOL/L (ref 24–29)
POC TCO2: 27 MMOL/L (ref 24–29)
POC TCO2: 28 MMOL/L (ref 24–29)
POC TCO2: 28 MMOL/L (ref 24–29)
POC TCO2: 29 MMOL/L (ref 24–29)
POC TCO2: 32 MMOL/L (ref 24–29)
POCT GLUCOSE: 161 MG/DL (ref 70–110)
POIKILOCYTOSIS BLD QL SMEAR: SLIGHT
POLYCHROMASIA BLD QL SMEAR: ABNORMAL
POTASSIUM SERPL-SCNC: 3.5 MMOL/L (ref 3.5–5.1)
POTASSIUM SERPL-SCNC: 3.7 MMOL/L (ref 3.5–5.1)
POTASSIUM SERPL-SCNC: 3.8 MMOL/L (ref 3.5–5.1)
POTASSIUM SERPL-SCNC: 3.9 MMOL/L (ref 3.5–5.1)
POTASSIUM SERPL-SCNC: 4 MMOL/L (ref 3.5–5.1)
POTASSIUM SERPL-SCNC: 4.1 MMOL/L (ref 3.5–5.1)
POTASSIUM SERPL-SCNC: 4.2 MMOL/L (ref 3.5–5.1)
POTASSIUM SERPL-SCNC: 4.2 MMOL/L (ref 3.5–5.1)
POTASSIUM SERPL-SCNC: 4.3 MMOL/L (ref 3.5–5.1)
POTASSIUM SERPL-SCNC: 4.4 MMOL/L (ref 3.5–5.1)
POTASSIUM SERPL-SCNC: 4.5 MMOL/L (ref 3.5–5.1)
POTASSIUM SERPL-SCNC: 4.6 MMOL/L (ref 3.5–5.1)
POTASSIUM SERPL-SCNC: 4.8 MMOL/L (ref 3.5–5.1)
POTASSIUM SERPL-SCNC: 4.9 MMOL/L (ref 3.5–5.1)
POTASSIUM SERPL-SCNC: 5.1 MMOL/L (ref 3.5–5.1)
POTASSIUM SERPL-SCNC: 5.2 MMOL/L (ref 3.5–5.1)
PROT SERPL-MCNC: 5.9 G/DL (ref 6–8.4)
PROT SERPL-MCNC: 5.9 G/DL (ref 6–8.4)
PROT SERPL-MCNC: 6 G/DL (ref 6–8.4)
PROT SERPL-MCNC: 6.2 G/DL (ref 6–8.4)
PROT SERPL-MCNC: 6.4 G/DL (ref 6–8.4)
PROT SERPL-MCNC: 6.5 G/DL (ref 6–8.4)
PROT SERPL-MCNC: 6.7 G/DL (ref 6–8.4)
PROT SERPL-MCNC: 6.7 G/DL (ref 6–8.4)
PROT SERPL-MCNC: 6.9 G/DL (ref 6–8.4)
PROT SERPL-MCNC: 7 G/DL (ref 6–8.4)
PROTHROMBIN TIME: 13 SEC (ref 9–12.5)
PROTHROMBIN TIME: 13 SEC (ref 9–12.5)
PROTHROMBIN TIME: 14.2 SEC (ref 9–12.5)
PV PEAK GRADIENT: 3 MMHG
PV PEAK GRADIENT: 4 MMHG
PV PEAK VELOCITY: 0.86 M/S
PV PEAK VELOCITY: 0.98 M/S
RA MAJOR: 7.26 CM
RA MAJOR: 7.42 CM
RA PRESSURE ESTIMATED: 15 MMHG
RA WIDTH: 5.2 CM
RA WIDTH: 6.4 CM
RBC # BLD AUTO: 3.57 M/UL (ref 4.6–6.2)
RBC # BLD AUTO: 3.62 M/UL (ref 4.6–6.2)
RBC # BLD AUTO: 3.66 M/UL (ref 4.6–6.2)
RBC # BLD AUTO: 3.73 M/UL (ref 4.6–6.2)
RBC # BLD AUTO: 3.75 M/UL (ref 4.6–6.2)
RBC # BLD AUTO: 3.75 M/UL (ref 4.6–6.2)
RBC # BLD AUTO: 3.78 M/UL (ref 4.6–6.2)
RBC # BLD AUTO: 3.85 M/UL (ref 4.6–6.2)
RBC # BLD AUTO: 4.07 M/UL (ref 4.6–6.2)
RBC # BLD AUTO: 4.1 M/UL (ref 4.6–6.2)
RBC # BLD AUTO: 4.14 M/UL (ref 4.6–6.2)
RBC # BLD AUTO: 4.2 M/UL (ref 4.6–6.2)
RBC # BLD AUTO: 4.52 M/UL (ref 4.6–6.2)
RBC # BLD AUTO: 4.65 M/UL (ref 4.6–6.2)
RBC # BLD AUTO: 4.74 M/UL (ref 4.6–6.2)
RBC # BLD AUTO: 4.93 M/UL (ref 4.6–6.2)
RBC # BLD AUTO: 4.95 M/UL (ref 4.6–6.2)
RBC # BLD AUTO: 4.95 M/UL (ref 4.6–6.2)
RHEUMATOID FACT SERPL-ACNC: <13 IU/ML (ref 0–15)
RIGHT VENTRICLE DIASTOLIC BASEL DIMENSION: 4.6 CM
RIGHT VENTRICLE DIASTOLIC BASEL DIMENSION: 6.1 CM
RIGHT VENTRICULAR END-DIASTOLIC DIMENSION: 4.62 CM
RIGHT VENTRICULAR END-DIASTOLIC DIMENSION: 6.15 CM
RV TB RVSP: 19 MMHG
RV TISSUE DOPPLER FREE WALL SYSTOLIC VELOCITY 1 (APICAL 4 CHAMBER VIEW): 6.33 CM/S
RV TISSUE DOPPLER FREE WALL SYSTOLIC VELOCITY 1 (APICAL 4 CHAMBER VIEW): 7.94 CM/S
SAMPLE: ABNORMAL
SATURATED IRON: 7 % (ref 20–50)
SCHISTOCYTES BLD QL SMEAR: PRESENT
SINUS: 2.74 CM
SINUS: 3.27 CM
SITE: ABNORMAL
SMOOTH MUSCLE AB TITR SER IF: NORMAL {TITER}
SODIUM SERPL-SCNC: 125 MMOL/L (ref 136–145)
SODIUM SERPL-SCNC: 126 MMOL/L (ref 136–145)
SODIUM SERPL-SCNC: 126 MMOL/L (ref 136–145)
SODIUM SERPL-SCNC: 127 MMOL/L (ref 136–145)
SODIUM SERPL-SCNC: 130 MMOL/L (ref 136–145)
SODIUM SERPL-SCNC: 130 MMOL/L (ref 136–145)
SODIUM SERPL-SCNC: 131 MMOL/L (ref 136–145)
SODIUM SERPL-SCNC: 131 MMOL/L (ref 136–145)
SODIUM SERPL-SCNC: 132 MMOL/L (ref 136–145)
SODIUM SERPL-SCNC: 133 MMOL/L (ref 136–145)
SODIUM SERPL-SCNC: 134 MMOL/L (ref 136–145)
SODIUM SERPL-SCNC: 135 MMOL/L (ref 136–145)
SODIUM SERPL-SCNC: 136 MMOL/L (ref 136–145)
SODIUM SERPL-SCNC: 136 MMOL/L (ref 136–145)
SODIUM SERPL-SCNC: 137 MMOL/L (ref 136–145)
SODIUM SERPL-SCNC: 137 MMOL/L (ref 136–145)
SODIUM SERPL-SCNC: 138 MMOL/L (ref 136–145)
SODIUM SERPL-SCNC: 138 MMOL/L (ref 136–145)
SP02: 92
SPECIMEN SOURCE: NORMAL
STJ: 2.2 CM
STJ: 2.22 CM
T WHIPPLEI DNA BLD QL NAA+PROBE: NEGATIVE
TARGETS BLD QL SMEAR: ABNORMAL
TARGETS BLD QL SMEAR: ABNORMAL
TDI LATERAL: 0.1 M/S
TDI SEPTAL: 0.05 M/S
TDI: 0.08 M/S
TOTAL IRON BINDING CAPACITY: 306 UG/DL (ref 250–450)
TR MAX PG: 51 MMHG
TR MAX PG: 52 MMHG
TR MAX PG: 57 MMHG
TRANSFERRIN SERPL-MCNC: 207 MG/DL (ref 200–375)
TRICUSPID ANNULAR PLANE SYSTOLIC EXCURSION: 0.99 CM
TRICUSPID ANNULAR PLANE SYSTOLIC EXCURSION: 1.37 CM
TROPONIN I SERPL DL<=0.01 NG/ML-MCNC: 0.04 NG/ML (ref 0–0.03)
TV REST PULMONARY ARTERY PRESSURE: 66 MMHG
TV REST PULMONARY ARTERY PRESSURE: 67 MMHG
TV REST PULMONARY ARTERY PRESSURE: 72 MMHG
VANCOMYCIN SERPL-MCNC: 12.9 UG/ML
VANCOMYCIN SERPL-MCNC: 15.9 UG/ML
VANCOMYCIN SERPL-MCNC: 19.4 UG/ML
VANCOMYCIN SERPL-MCNC: 22 UG/ML
VANCOMYCIN SERPL-MCNC: 22.1 UG/ML
VANCOMYCIN TROUGH SERPL-MCNC: 9.5 UG/ML (ref 10–22)
WBC # BLD AUTO: 10.57 K/UL (ref 3.9–12.7)
WBC # BLD AUTO: 14.87 K/UL (ref 3.9–12.7)
WBC # BLD AUTO: 15.41 K/UL (ref 3.9–12.7)
WBC # BLD AUTO: 18.99 K/UL (ref 3.9–12.7)
WBC # BLD AUTO: 19.72 K/UL (ref 3.9–12.7)
WBC # BLD AUTO: 19.83 K/UL (ref 3.9–12.7)
WBC # BLD AUTO: 20.75 K/UL (ref 3.9–12.7)
WBC # BLD AUTO: 20.85 K/UL (ref 3.9–12.7)
WBC # BLD AUTO: 20.95 K/UL (ref 3.9–12.7)
WBC # BLD AUTO: 27.13 K/UL (ref 3.9–12.7)
WBC # BLD AUTO: 31.56 K/UL (ref 3.9–12.7)
WBC # BLD AUTO: 33.99 K/UL (ref 3.9–12.7)
WBC # BLD AUTO: 37.42 K/UL (ref 3.9–12.7)
WBC # BLD AUTO: 37.64 K/UL (ref 3.9–12.7)
WBC # BLD AUTO: 38.14 K/UL (ref 3.9–12.7)
WBC # BLD AUTO: 40.62 K/UL (ref 3.9–12.7)
WBC # BLD AUTO: 41.42 K/UL (ref 3.9–12.7)
WBC # BLD AUTO: 9.71 K/UL (ref 3.9–12.7)
WBC TOXIC VACUOLES BLD QL SMEAR: PRESENT
WBC TOXIC VACUOLES BLD QL SMEAR: PRESENT
Z-SCORE OF LEFT VENTRICULAR DIMENSION IN END DIASTOLE: -1.15
Z-SCORE OF LEFT VENTRICULAR DIMENSION IN END DIASTOLE: 1.38
Z-SCORE OF LEFT VENTRICULAR DIMENSION IN END SYSTOLE: -0.59
Z-SCORE OF LEFT VENTRICULAR DIMENSION IN END SYSTOLE: 2.22

## 2024-01-01 PROCEDURE — 83735 ASSAY OF MAGNESIUM: CPT | Performed by: STUDENT IN AN ORGANIZED HEALTH CARE EDUCATION/TRAINING PROGRAM

## 2024-01-01 PROCEDURE — 25500020 PHARM REV CODE 255: Performed by: HOSPITALIST

## 2024-01-01 PROCEDURE — 99291 CRITICAL CARE FIRST HOUR: CPT | Mod: ,,, | Performed by: INTERNAL MEDICINE

## 2024-01-01 PROCEDURE — 63600175 PHARM REV CODE 636 W HCPCS: Performed by: INTERNAL MEDICINE

## 2024-01-01 PROCEDURE — 87040 BLOOD CULTURE FOR BACTERIA: CPT | Performed by: INTERNAL MEDICINE

## 2024-01-01 PROCEDURE — 36415 COLL VENOUS BLD VENIPUNCTURE: CPT | Mod: XB | Performed by: INTERNAL MEDICINE

## 2024-01-01 PROCEDURE — 63600175 PHARM REV CODE 636 W HCPCS: Performed by: ORTHOPAEDIC SURGERY

## 2024-01-01 PROCEDURE — 25000003 PHARM REV CODE 250: Performed by: STUDENT IN AN ORGANIZED HEALTH CARE EDUCATION/TRAINING PROGRAM

## 2024-01-01 PROCEDURE — 27245 TREAT THIGH FRACTURE: CPT | Mod: RT,,, | Performed by: ORTHOPAEDIC SURGERY

## 2024-01-01 PROCEDURE — 99900035 HC TECH TIME PER 15 MIN (STAT)

## 2024-01-01 PROCEDURE — 25000003 PHARM REV CODE 250: Performed by: ORTHOPAEDIC SURGERY

## 2024-01-01 PROCEDURE — 99223 1ST HOSP IP/OBS HIGH 75: CPT | Mod: ,,, | Performed by: INTERNAL MEDICINE

## 2024-01-01 PROCEDURE — 80053 COMPREHEN METABOLIC PANEL: CPT | Performed by: STUDENT IN AN ORGANIZED HEALTH CARE EDUCATION/TRAINING PROGRAM

## 2024-01-01 PROCEDURE — 94761 N-INVAS EAR/PLS OXIMETRY MLT: CPT | Mod: XB

## 2024-01-01 PROCEDURE — 85025 COMPLETE CBC W/AUTO DIFF WBC: CPT | Performed by: ORTHOPAEDIC SURGERY

## 2024-01-01 PROCEDURE — 99291 CRITICAL CARE FIRST HOUR: CPT | Mod: ,,, | Performed by: STUDENT IN AN ORGANIZED HEALTH CARE EDUCATION/TRAINING PROGRAM

## 2024-01-01 PROCEDURE — 99233 SBSQ HOSP IP/OBS HIGH 50: CPT | Mod: ,,, | Performed by: INTERNAL MEDICINE

## 2024-01-01 PROCEDURE — 85007 BL SMEAR W/DIFF WBC COUNT: CPT | Performed by: INTERNAL MEDICINE

## 2024-01-01 PROCEDURE — 21400001 HC TELEMETRY ROOM

## 2024-01-01 PROCEDURE — C1713 ANCHOR/SCREW BN/BN,TIS/BN: HCPCS | Performed by: ORTHOPAEDIC SURGERY

## 2024-01-01 PROCEDURE — 85730 THROMBOPLASTIN TIME PARTIAL: CPT | Performed by: HOSPITALIST

## 2024-01-01 PROCEDURE — 25000003 PHARM REV CODE 250: Performed by: HOSPITALIST

## 2024-01-01 PROCEDURE — 80069 RENAL FUNCTION PANEL: CPT | Performed by: HOSPITALIST

## 2024-01-01 PROCEDURE — 36415 COLL VENOUS BLD VENIPUNCTURE: CPT | Performed by: STUDENT IN AN ORGANIZED HEALTH CARE EDUCATION/TRAINING PROGRAM

## 2024-01-01 PROCEDURE — 85027 COMPLETE CBC AUTOMATED: CPT | Performed by: HOSPITALIST

## 2024-01-01 PROCEDURE — 25000003 PHARM REV CODE 250: Performed by: INTERNAL MEDICINE

## 2024-01-01 PROCEDURE — 80076 HEPATIC FUNCTION PANEL: CPT | Performed by: HOSPITALIST

## 2024-01-01 PROCEDURE — 37000009 HC ANESTHESIA EA ADD 15 MINS: Performed by: ORTHOPAEDIC SURGERY

## 2024-01-01 PROCEDURE — 85730 THROMBOPLASTIN TIME PARTIAL: CPT | Performed by: INTERNAL MEDICINE

## 2024-01-01 PROCEDURE — 63600175 PHARM REV CODE 636 W HCPCS: Performed by: HOSPITALIST

## 2024-01-01 PROCEDURE — 85610 PROTHROMBIN TIME: CPT | Performed by: INTERNAL MEDICINE

## 2024-01-01 PROCEDURE — 80202 ASSAY OF VANCOMYCIN: CPT | Performed by: STUDENT IN AN ORGANIZED HEALTH CARE EDUCATION/TRAINING PROGRAM

## 2024-01-01 PROCEDURE — 80053 COMPREHEN METABOLIC PANEL: CPT | Performed by: HOSPITALIST

## 2024-01-01 PROCEDURE — 99232 SBSQ HOSP IP/OBS MODERATE 35: CPT | Mod: ,,, | Performed by: INTERNAL MEDICINE

## 2024-01-01 PROCEDURE — 27201423 OPTIME MED/SURG SUP & DEVICES STERILE SUPPLY: Performed by: ORTHOPAEDIC SURGERY

## 2024-01-01 PROCEDURE — 99497 ADVNCD CARE PLAN 30 MIN: CPT | Mod: ,,, | Performed by: INTERNAL MEDICINE

## 2024-01-01 PROCEDURE — 20000000 HC ICU ROOM

## 2024-01-01 PROCEDURE — 83735 ASSAY OF MAGNESIUM: CPT | Mod: 91 | Performed by: INTERNAL MEDICINE

## 2024-01-01 PROCEDURE — C1751 CATH, INF, PER/CENT/MIDLINE: HCPCS

## 2024-01-01 PROCEDURE — 85025 COMPLETE CBC W/AUTO DIFF WBC: CPT | Performed by: INTERNAL MEDICINE

## 2024-01-01 PROCEDURE — 94761 N-INVAS EAR/PLS OXIMETRY MLT: CPT

## 2024-01-01 PROCEDURE — 85007 BL SMEAR W/DIFF WBC COUNT: CPT | Performed by: HOSPITALIST

## 2024-01-01 PROCEDURE — 71000039 HC RECOVERY, EACH ADD'L HOUR: Performed by: ORTHOPAEDIC SURGERY

## 2024-01-01 PROCEDURE — 83540 ASSAY OF IRON: CPT | Performed by: STUDENT IN AN ORGANIZED HEALTH CARE EDUCATION/TRAINING PROGRAM

## 2024-01-01 PROCEDURE — 80069 RENAL FUNCTION PANEL: CPT | Performed by: STUDENT IN AN ORGANIZED HEALTH CARE EDUCATION/TRAINING PROGRAM

## 2024-01-01 PROCEDURE — 36415 COLL VENOUS BLD VENIPUNCTURE: CPT | Performed by: INTERNAL MEDICINE

## 2024-01-01 PROCEDURE — 27000221 HC OXYGEN, UP TO 24 HOURS

## 2024-01-01 PROCEDURE — 82803 BLOOD GASES ANY COMBINATION: CPT

## 2024-01-01 PROCEDURE — 97163 PT EVAL HIGH COMPLEX 45 MIN: CPT

## 2024-01-01 PROCEDURE — 85014 HEMATOCRIT: CPT | Performed by: ORTHOPAEDIC SURGERY

## 2024-01-01 PROCEDURE — 84100 ASSAY OF PHOSPHORUS: CPT | Performed by: STUDENT IN AN ORGANIZED HEALTH CARE EDUCATION/TRAINING PROGRAM

## 2024-01-01 PROCEDURE — 90945 DIALYSIS ONE EVALUATION: CPT

## 2024-01-01 PROCEDURE — 63600175 PHARM REV CODE 636 W HCPCS: Mod: JZ,JG | Performed by: INTERNAL MEDICINE

## 2024-01-01 PROCEDURE — 99498 ADVNCD CARE PLAN ADDL 30 MIN: CPT | Mod: ,,, | Performed by: INTERNAL MEDICINE

## 2024-01-01 PROCEDURE — 93005 ELECTROCARDIOGRAM TRACING: CPT

## 2024-01-01 PROCEDURE — 25000242 PHARM REV CODE 250 ALT 637 W/ HCPCS: Performed by: ANESTHESIOLOGY

## 2024-01-01 PROCEDURE — 83735 ASSAY OF MAGNESIUM: CPT | Mod: 91 | Performed by: STUDENT IN AN ORGANIZED HEALTH CARE EDUCATION/TRAINING PROGRAM

## 2024-01-01 PROCEDURE — 87040 BLOOD CULTURE FOR BACTERIA: CPT | Mod: 59 | Performed by: PHYSICIAN ASSISTANT

## 2024-01-01 PROCEDURE — 86611 BARTONELLA ANTIBODY: CPT | Mod: 91 | Performed by: INTERNAL MEDICINE

## 2024-01-01 PROCEDURE — 80069 RENAL FUNCTION PANEL: CPT | Performed by: INTERNAL MEDICINE

## 2024-01-01 PROCEDURE — 87389 HIV-1 AG W/HIV-1&-2 AB AG IA: CPT | Performed by: STUDENT IN AN ORGANIZED HEALTH CARE EDUCATION/TRAINING PROGRAM

## 2024-01-01 PROCEDURE — 36000710: Performed by: ORTHOPAEDIC SURGERY

## 2024-01-01 PROCEDURE — 99291 CRITICAL CARE FIRST HOUR: CPT | Mod: 25,,, | Performed by: INTERNAL MEDICINE

## 2024-01-01 PROCEDURE — 63600175 PHARM REV CODE 636 W HCPCS: Performed by: STUDENT IN AN ORGANIZED HEALTH CARE EDUCATION/TRAINING PROGRAM

## 2024-01-01 PROCEDURE — 83735 ASSAY OF MAGNESIUM: CPT | Performed by: INTERNAL MEDICINE

## 2024-01-01 PROCEDURE — 94640 AIRWAY INHALATION TREATMENT: CPT

## 2024-01-01 PROCEDURE — 82533 TOTAL CORTISOL: CPT | Performed by: HOSPITALIST

## 2024-01-01 PROCEDURE — 80100008 HC CRRT DAILY MAINTENANCE

## 2024-01-01 PROCEDURE — 27202415 HC CARTRIDGE, CRRT

## 2024-01-01 PROCEDURE — 80053 COMPREHEN METABOLIC PANEL: CPT | Performed by: EMERGENCY MEDICINE

## 2024-01-01 PROCEDURE — 85652 RBC SED RATE AUTOMATED: CPT | Performed by: INTERNAL MEDICINE

## 2024-01-01 PROCEDURE — 96375 TX/PRO/DX INJ NEW DRUG ADDON: CPT

## 2024-01-01 PROCEDURE — 80202 ASSAY OF VANCOMYCIN: CPT | Performed by: INTERNAL MEDICINE

## 2024-01-01 PROCEDURE — 80069 RENAL FUNCTION PANEL: CPT | Mod: 91 | Performed by: STUDENT IN AN ORGANIZED HEALTH CARE EDUCATION/TRAINING PROGRAM

## 2024-01-01 PROCEDURE — 99232 SBSQ HOSP IP/OBS MODERATE 35: CPT | Mod: ,,, | Performed by: STUDENT IN AN ORGANIZED HEALTH CARE EDUCATION/TRAINING PROGRAM

## 2024-01-01 PROCEDURE — 85025 COMPLETE CBC W/AUTO DIFF WBC: CPT | Performed by: HOSPITALIST

## 2024-01-01 PROCEDURE — 86431 RHEUMATOID FACTOR QUANT: CPT | Performed by: INTERNAL MEDICINE

## 2024-01-01 PROCEDURE — 80069 RENAL FUNCTION PANEL: CPT | Mod: 91 | Performed by: HOSPITALIST

## 2024-01-01 PROCEDURE — 36556 INSERT NON-TUNNEL CV CATH: CPT

## 2024-01-01 PROCEDURE — 86015 ACTIN ANTIBODY EACH: CPT | Performed by: STUDENT IN AN ORGANIZED HEALTH CARE EDUCATION/TRAINING PROGRAM

## 2024-01-01 PROCEDURE — 85027 COMPLETE CBC AUTOMATED: CPT | Performed by: INTERNAL MEDICINE

## 2024-01-01 PROCEDURE — 99292 CRITICAL CARE ADDL 30 MIN: CPT | Mod: ,,, | Performed by: INTERNAL MEDICINE

## 2024-01-01 PROCEDURE — 80100014 HC HEMODIALYSIS 1:1

## 2024-01-01 PROCEDURE — 84100 ASSAY OF PHOSPHORUS: CPT | Performed by: INTERNAL MEDICINE

## 2024-01-01 PROCEDURE — 25000003 PHARM REV CODE 250

## 2024-01-01 PROCEDURE — 87798 DETECT AGENT NOS DNA AMP: CPT | Performed by: HOSPITALIST

## 2024-01-01 PROCEDURE — 99223 1ST HOSP IP/OBS HIGH 75: CPT | Mod: ,,, | Performed by: STUDENT IN AN ORGANIZED HEALTH CARE EDUCATION/TRAINING PROGRAM

## 2024-01-01 PROCEDURE — 80069 RENAL FUNCTION PANEL: CPT | Mod: 91 | Performed by: INTERNAL MEDICINE

## 2024-01-01 PROCEDURE — 99291 CRITICAL CARE FIRST HOUR: CPT | Mod: GT,,, | Performed by: STUDENT IN AN ORGANIZED HEALTH CARE EDUCATION/TRAINING PROGRAM

## 2024-01-01 PROCEDURE — 37000008 HC ANESTHESIA 1ST 15 MINUTES: Performed by: ORTHOPAEDIC SURGERY

## 2024-01-01 PROCEDURE — 82103 ALPHA-1-ANTITRYPSIN TOTAL: CPT | Performed by: STUDENT IN AN ORGANIZED HEALTH CARE EDUCATION/TRAINING PROGRAM

## 2024-01-01 PROCEDURE — 85730 THROMBOPLASTIN TIME PARTIAL: CPT | Mod: 91 | Performed by: HOSPITALIST

## 2024-01-01 PROCEDURE — 36620 INSERTION CATHETER ARTERY: CPT | Mod: ,,, | Performed by: INTERNAL MEDICINE

## 2024-01-01 PROCEDURE — 86038 ANTINUCLEAR ANTIBODIES: CPT | Performed by: STUDENT IN AN ORGANIZED HEALTH CARE EDUCATION/TRAINING PROGRAM

## 2024-01-01 PROCEDURE — 97530 THERAPEUTIC ACTIVITIES: CPT

## 2024-01-01 PROCEDURE — 71000033 HC RECOVERY, INTIAL HOUR: Performed by: ORTHOPAEDIC SURGERY

## 2024-01-01 PROCEDURE — 99223 1ST HOSP IP/OBS HIGH 75: CPT | Mod: 57,ICN,, | Performed by: ORTHOPAEDIC SURGERY

## 2024-01-01 PROCEDURE — 36600 WITHDRAWAL OF ARTERIAL BLOOD: CPT

## 2024-01-01 PROCEDURE — 92526 ORAL FUNCTION THERAPY: CPT

## 2024-01-01 PROCEDURE — 36000711: Performed by: ORTHOPAEDIC SURGERY

## 2024-01-01 PROCEDURE — 82728 ASSAY OF FERRITIN: CPT | Performed by: STUDENT IN AN ORGANIZED HEALTH CARE EDUCATION/TRAINING PROGRAM

## 2024-01-01 PROCEDURE — 99497 ADVNCD CARE PLAN 30 MIN: CPT | Mod: 25,,, | Performed by: INTERNAL MEDICINE

## 2024-01-01 PROCEDURE — 93010 ELECTROCARDIOGRAM REPORT: CPT | Mod: ,,, | Performed by: INTERNAL MEDICINE

## 2024-01-01 PROCEDURE — 36415 COLL VENOUS BLD VENIPUNCTURE: CPT | Mod: XB | Performed by: PHYSICIAN ASSISTANT

## 2024-01-01 PROCEDURE — 85025 COMPLETE CBC W/AUTO DIFF WBC: CPT | Performed by: STUDENT IN AN ORGANIZED HEALTH CARE EDUCATION/TRAINING PROGRAM

## 2024-01-01 PROCEDURE — 99900031 HC PATIENT EDUCATION (STAT)

## 2024-01-01 PROCEDURE — 97165 OT EVAL LOW COMPLEX 30 MIN: CPT

## 2024-01-01 PROCEDURE — 63600175 PHARM REV CODE 636 W HCPCS: Performed by: EMERGENCY MEDICINE

## 2024-01-01 PROCEDURE — 02HV33Z INSERTION OF INFUSION DEVICE INTO SUPERIOR VENA CAVA, PERCUTANEOUS APPROACH: ICD-10-PCS | Performed by: INTERNAL MEDICINE

## 2024-01-01 PROCEDURE — 86638 Q FEVER ANTIBODY: CPT | Performed by: INTERNAL MEDICINE

## 2024-01-01 PROCEDURE — 83615 LACTATE (LD) (LDH) ENZYME: CPT | Performed by: STUDENT IN AN ORGANIZED HEALTH CARE EDUCATION/TRAINING PROGRAM

## 2024-01-01 PROCEDURE — 36415 COLL VENOUS BLD VENIPUNCTURE: CPT | Performed by: HOSPITALIST

## 2024-01-01 PROCEDURE — 80053 COMPREHEN METABOLIC PANEL: CPT | Performed by: INTERNAL MEDICINE

## 2024-01-01 PROCEDURE — 90945 DIALYSIS ONE EVALUATION: CPT | Mod: ,,, | Performed by: INTERNAL MEDICINE

## 2024-01-01 PROCEDURE — 99223 1ST HOSP IP/OBS HIGH 75: CPT | Mod: ,,,

## 2024-01-01 PROCEDURE — 96374 THER/PROPH/DIAG INJ IV PUSH: CPT

## 2024-01-01 PROCEDURE — 0152U NFCT DS DNA UNTRGT NGNRJ SEQ: CPT | Performed by: INTERNAL MEDICINE

## 2024-01-01 PROCEDURE — 27100171 HC OXYGEN HIGH FLOW UP TO 24 HOURS

## 2024-01-01 PROCEDURE — 37799 UNLISTED PX VASCULAR SURGERY: CPT

## 2024-01-01 PROCEDURE — 84484 ASSAY OF TROPONIN QUANT: CPT | Performed by: INTERNAL MEDICINE

## 2024-01-01 PROCEDURE — 92610 EVALUATE SWALLOWING FUNCTION: CPT

## 2024-01-01 PROCEDURE — 99222 1ST HOSP IP/OBS MODERATE 55: CPT | Mod: ,,, | Performed by: STUDENT IN AN ORGANIZED HEALTH CARE EDUCATION/TRAINING PROGRAM

## 2024-01-01 PROCEDURE — 36415 COLL VENOUS BLD VENIPUNCTURE: CPT | Mod: XB | Performed by: STUDENT IN AN ORGANIZED HEALTH CARE EDUCATION/TRAINING PROGRAM

## 2024-01-01 PROCEDURE — 87798 DETECT AGENT NOS DNA AMP: CPT | Performed by: INTERNAL MEDICINE

## 2024-01-01 PROCEDURE — 5A1D90Z PERFORMANCE OF URINARY FILTRATION, CONTINUOUS, GREATER THAN 18 HOURS PER DAY: ICD-10-PCS | Performed by: INTERNAL MEDICINE

## 2024-01-01 PROCEDURE — 5A0945A ASSISTANCE WITH RESPIRATORY VENTILATION, 24-96 CONSECUTIVE HOURS, HIGH NASAL FLOW/VELOCITY: ICD-10-PCS | Performed by: STUDENT IN AN ORGANIZED HEALTH CARE EDUCATION/TRAINING PROGRAM

## 2024-01-01 PROCEDURE — 86381 MITOCHONDRIAL ANTIBODY EACH: CPT | Performed by: STUDENT IN AN ORGANIZED HEALTH CARE EDUCATION/TRAINING PROGRAM

## 2024-01-01 PROCEDURE — 83605 ASSAY OF LACTIC ACID: CPT | Performed by: INTERNAL MEDICINE

## 2024-01-01 PROCEDURE — 85610 PROTHROMBIN TIME: CPT | Performed by: EMERGENCY MEDICINE

## 2024-01-01 PROCEDURE — 36415 COLL VENOUS BLD VENIPUNCTURE: CPT | Performed by: ORTHOPAEDIC SURGERY

## 2024-01-01 PROCEDURE — 82533 TOTAL CORTISOL: CPT | Performed by: INTERNAL MEDICINE

## 2024-01-01 PROCEDURE — 97535 SELF CARE MNGMENT TRAINING: CPT

## 2024-01-01 PROCEDURE — 80053 COMPREHEN METABOLIC PANEL: CPT | Mod: 91 | Performed by: STUDENT IN AN ORGANIZED HEALTH CARE EDUCATION/TRAINING PROGRAM

## 2024-01-01 PROCEDURE — 90935 HEMODIALYSIS ONE EVALUATION: CPT | Mod: ,,, | Performed by: INTERNAL MEDICINE

## 2024-01-01 PROCEDURE — C1769 GUIDE WIRE: HCPCS | Performed by: ORTHOPAEDIC SURGERY

## 2024-01-01 PROCEDURE — 99285 EMERGENCY DEPT VISIT HI MDM: CPT | Mod: 25

## 2024-01-01 PROCEDURE — 80074 ACUTE HEPATITIS PANEL: CPT | Performed by: STUDENT IN AN ORGANIZED HEALTH CARE EDUCATION/TRAINING PROGRAM

## 2024-01-01 PROCEDURE — 82140 ASSAY OF AMMONIA: CPT | Performed by: HOSPITALIST

## 2024-01-01 PROCEDURE — 97110 THERAPEUTIC EXERCISES: CPT

## 2024-01-01 PROCEDURE — 80048 BASIC METABOLIC PNL TOTAL CA: CPT | Mod: 91 | Performed by: INTERNAL MEDICINE

## 2024-01-01 PROCEDURE — 85018 HEMOGLOBIN: CPT | Performed by: ORTHOPAEDIC SURGERY

## 2024-01-01 PROCEDURE — 0QS636Z REPOSITION RIGHT UPPER FEMUR WITH INTRAMEDULLARY INTERNAL FIXATION DEVICE, PERCUTANEOUS APPROACH: ICD-10-PCS | Performed by: ORTHOPAEDIC SURGERY

## 2024-01-01 PROCEDURE — 85025 COMPLETE CBC W/AUTO DIFF WBC: CPT | Performed by: EMERGENCY MEDICINE

## 2024-01-01 DEVICE — SCREW LOCKING BONE T2 5X37MM: Type: IMPLANTABLE DEVICE | Site: FEMUR | Status: FUNCTIONAL

## 2024-01-01 RX ORDER — ALLOPURINOL 100 MG/1
100 TABLET ORAL DAILY
Status: DISCONTINUED | OUTPATIENT
Start: 2024-01-01 | End: 2024-01-01

## 2024-01-01 RX ORDER — HYDROMORPHONE HYDROCHLORIDE 1 MG/ML
1 INJECTION, SOLUTION INTRAMUSCULAR; INTRAVENOUS; SUBCUTANEOUS EVERY 4 HOURS PRN
Status: DISCONTINUED | OUTPATIENT
Start: 2024-01-01 | End: 2024-01-01

## 2024-01-01 RX ORDER — HYDROCODONE BITARTRATE AND ACETAMINOPHEN 500; 5 MG/1; MG/1
TABLET ORAL CONTINUOUS
Status: DISCONTINUED | OUTPATIENT
Start: 2024-01-01 | End: 2024-01-01

## 2024-01-01 RX ORDER — SYRING-NEEDL,DISP,INSUL,0.3 ML 29 G X1/2"
296 SYRINGE, EMPTY DISPOSABLE MISCELLANEOUS
Status: DISPENSED | OUTPATIENT
Start: 2024-01-01 | End: 2024-01-01

## 2024-01-01 RX ORDER — MIDODRINE HYDROCHLORIDE 5 MG/1
15 TABLET ORAL EVERY 8 HOURS
Status: DISCONTINUED | OUTPATIENT
Start: 2024-01-01 | End: 2024-01-01

## 2024-01-01 RX ORDER — ACETAMINOPHEN 325 MG/1
650 TABLET ORAL EVERY 4 HOURS PRN
Status: DISCONTINUED | OUTPATIENT
Start: 2024-01-01 | End: 2024-01-01 | Stop reason: HOSPADM

## 2024-01-01 RX ORDER — MAGNESIUM SULFATE HEPTAHYDRATE 40 MG/ML
2 INJECTION, SOLUTION INTRAVENOUS
Status: DISCONTINUED | OUTPATIENT
Start: 2024-01-01 | End: 2024-01-01

## 2024-01-01 RX ORDER — NOREPINEPHRINE BITARTRATE/D5W 4MG/250ML
0-3 PLASTIC BAG, INJECTION (ML) INTRAVENOUS CONTINUOUS
Status: DISCONTINUED | OUTPATIENT
Start: 2024-01-01 | End: 2024-01-01

## 2024-01-01 RX ORDER — MAGNESIUM SULFATE HEPTAHYDRATE 40 MG/ML
2 INJECTION, SOLUTION INTRAVENOUS
Status: ACTIVE | OUTPATIENT
Start: 2024-01-01 | End: 2024-01-01

## 2024-01-01 RX ORDER — HYDROMORPHONE HYDROCHLORIDE 1 MG/ML
1 INJECTION, SOLUTION INTRAMUSCULAR; INTRAVENOUS; SUBCUTANEOUS EVERY 6 HOURS PRN
Status: DISCONTINUED | OUTPATIENT
Start: 2024-01-01 | End: 2024-01-01

## 2024-01-01 RX ORDER — HYDROMORPHONE HYDROCHLORIDE 2 MG/ML
0.2 INJECTION, SOLUTION INTRAMUSCULAR; INTRAVENOUS; SUBCUTANEOUS EVERY 5 MIN PRN
Status: DISCONTINUED | OUTPATIENT
Start: 2024-01-01 | End: 2024-01-01 | Stop reason: HOSPADM

## 2024-01-01 RX ORDER — MIDODRINE HYDROCHLORIDE 5 MG/1
15 TABLET ORAL 3 TIMES DAILY
Status: DISCONTINUED | OUTPATIENT
Start: 2024-01-01 | End: 2024-01-01 | Stop reason: SDUPTHER

## 2024-01-01 RX ORDER — HYDROCODONE BITARTRATE AND ACETAMINOPHEN 500; 5 MG/1; MG/1
TABLET ORAL CONTINUOUS
Status: ACTIVE | OUTPATIENT
Start: 2024-01-01 | End: 2024-01-01

## 2024-01-01 RX ORDER — PSEUDOEPHEDRINE/ACETAMINOPHEN 30MG-500MG
100 TABLET ORAL
Status: ACTIVE | OUTPATIENT
Start: 2024-01-01 | End: 2024-01-01

## 2024-01-01 RX ORDER — METOLAZONE 5 MG/1
10 TABLET ORAL SEE ADMIN INSTRUCTIONS
Status: DISCONTINUED | OUTPATIENT
Start: 2024-01-01 | End: 2024-01-01

## 2024-01-01 RX ORDER — OXYCODONE AND ACETAMINOPHEN 5; 325 MG/1; MG/1
1 TABLET ORAL EVERY 4 HOURS PRN
Status: DISCONTINUED | OUTPATIENT
Start: 2024-01-01 | End: 2024-01-01

## 2024-01-01 RX ORDER — POLYETHYLENE GLYCOL 3350 17 G/17G
17 POWDER, FOR SOLUTION ORAL DAILY
Status: DISCONTINUED | OUTPATIENT
Start: 2024-01-01 | End: 2024-01-01

## 2024-01-01 RX ORDER — SODIUM CHLORIDE 9 MG/ML
INJECTION, SOLUTION INTRAVENOUS ONCE
Status: DISCONTINUED | OUTPATIENT
Start: 2024-01-01 | End: 2024-01-01

## 2024-01-01 RX ORDER — NITROGLYCERIN 0.4 MG/1
0.4 TABLET SUBLINGUAL EVERY 5 MIN PRN
Status: DISCONTINUED | OUTPATIENT
Start: 2024-01-01 | End: 2024-01-01 | Stop reason: HOSPADM

## 2024-01-01 RX ORDER — QUETIAPINE FUMARATE 25 MG/1
25 TABLET, FILM COATED ORAL NIGHTLY
Status: DISCONTINUED | OUTPATIENT
Start: 2024-01-01 | End: 2024-01-01

## 2024-01-01 RX ORDER — HYDROXYZINE PAMOATE 25 MG/1
25 CAPSULE ORAL EVERY 8 HOURS PRN
Status: DISCONTINUED | OUTPATIENT
Start: 2024-01-01 | End: 2024-01-01 | Stop reason: HOSPADM

## 2024-01-01 RX ORDER — LEVALBUTEROL 1.25 MG/.5ML
1.25 SOLUTION, CONCENTRATE RESPIRATORY (INHALATION) EVERY 4 HOURS PRN
Status: DISCONTINUED | OUTPATIENT
Start: 2024-01-01 | End: 2024-01-01

## 2024-01-01 RX ORDER — AMOXICILLIN 250 MG
2 CAPSULE ORAL 2 TIMES DAILY
Status: DISCONTINUED | OUTPATIENT
Start: 2024-01-01 | End: 2024-01-01

## 2024-01-01 RX ORDER — PSEUDOEPHEDRINE/ACETAMINOPHEN 30MG-500MG
100 TABLET ORAL
Status: COMPLETED | OUTPATIENT
Start: 2024-01-01 | End: 2024-01-01

## 2024-01-01 RX ORDER — GLUCAGON 1 MG
1 KIT INJECTION
Status: DISCONTINUED | OUTPATIENT
Start: 2024-01-01 | End: 2024-01-01 | Stop reason: HOSPADM

## 2024-01-01 RX ORDER — SYRING-NEEDL,DISP,INSUL,0.3 ML 29 G X1/2"
296 SYRINGE, EMPTY DISPOSABLE MISCELLANEOUS
Status: COMPLETED | OUTPATIENT
Start: 2024-01-01 | End: 2024-01-01

## 2024-01-01 RX ORDER — TALC
6 POWDER (GRAM) TOPICAL NIGHTLY PRN
Status: DISCONTINUED | OUTPATIENT
Start: 2024-01-01 | End: 2024-01-01 | Stop reason: HOSPADM

## 2024-01-01 RX ORDER — NOREPINEPHRINE BITARTRATE/D5W 8 MG/250ML
0-3 PLASTIC BAG, INJECTION (ML) INTRAVENOUS CONTINUOUS
Status: DISCONTINUED | OUTPATIENT
Start: 2024-01-01 | End: 2024-01-01

## 2024-01-01 RX ORDER — IPRATROPIUM BROMIDE AND ALBUTEROL SULFATE 2.5; .5 MG/3ML; MG/3ML
3 SOLUTION RESPIRATORY (INHALATION) EVERY 4 HOURS PRN
Status: DISCONTINUED | OUTPATIENT
Start: 2024-01-01 | End: 2024-01-01

## 2024-01-01 RX ORDER — SIMETHICONE 80 MG
1 TABLET,CHEWABLE ORAL 4 TIMES DAILY PRN
Status: DISCONTINUED | OUTPATIENT
Start: 2024-01-01 | End: 2024-01-01 | Stop reason: HOSPADM

## 2024-01-01 RX ORDER — FENTANYL 50 UG/1
1 PATCH TRANSDERMAL
Status: DISCONTINUED | OUTPATIENT
Start: 2024-01-01 | End: 2024-01-01 | Stop reason: HOSPADM

## 2024-01-01 RX ORDER — NALOXONE HCL 0.4 MG/ML
0.4 VIAL (ML) INJECTION
Status: DISCONTINUED | OUTPATIENT
Start: 2024-01-01 | End: 2024-01-01

## 2024-01-01 RX ORDER — FLUDROCORTISONE ACETATE 0.1 MG/1
100 TABLET ORAL DAILY
Status: DISCONTINUED | OUTPATIENT
Start: 2024-01-01 | End: 2024-01-01

## 2024-01-01 RX ORDER — SODIUM CHLORIDE 0.9 % (FLUSH) 0.9 %
10 SYRINGE (ML) INJECTION
Status: DISCONTINUED | OUTPATIENT
Start: 2024-01-01 | End: 2024-01-01 | Stop reason: HOSPADM

## 2024-01-01 RX ORDER — POLYETHYLENE GLYCOL 3350 17 G/17G
17 POWDER, FOR SOLUTION ORAL 2 TIMES DAILY PRN
Status: DISCONTINUED | OUTPATIENT
Start: 2024-01-01 | End: 2024-01-01

## 2024-01-01 RX ORDER — BISACODYL 10 MG/1
10 SUPPOSITORY RECTAL DAILY
Status: DISCONTINUED | OUTPATIENT
Start: 2024-01-01 | End: 2024-01-01

## 2024-01-01 RX ORDER — LIDOCAINE HYDROCHLORIDE 10 MG/ML
1 INJECTION, SOLUTION INFILTRATION; PERINEURAL ONCE
Status: COMPLETED | OUTPATIENT
Start: 2024-01-01 | End: 2024-01-01

## 2024-01-01 RX ORDER — HYDROCODONE BITARTRATE AND ACETAMINOPHEN 10; 325 MG/1; MG/1
1 TABLET ORAL EVERY 4 HOURS PRN
Status: DISCONTINUED | OUTPATIENT
Start: 2024-01-01 | End: 2024-01-01

## 2024-01-01 RX ORDER — HYDROCODONE BITARTRATE AND ACETAMINOPHEN 7.5; 325 MG/1; MG/1
1 TABLET ORAL EVERY 4 HOURS PRN
Status: DISCONTINUED | OUTPATIENT
Start: 2024-01-01 | End: 2024-01-01

## 2024-01-01 RX ORDER — FAMOTIDINE 10 MG/ML
20 INJECTION INTRAVENOUS DAILY
Status: DISCONTINUED | OUTPATIENT
Start: 2024-01-01 | End: 2024-01-01

## 2024-01-01 RX ORDER — MUPIROCIN 20 MG/G
OINTMENT TOPICAL 2 TIMES DAILY
Status: DISPENSED | OUTPATIENT
Start: 2024-01-01 | End: 2024-01-01

## 2024-01-01 RX ORDER — DOBUTAMINE HYDROCHLORIDE 400 MG/100ML
0-20 INJECTION INTRAVENOUS CONTINUOUS
Status: DISCONTINUED | OUTPATIENT
Start: 2024-01-01 | End: 2024-01-01

## 2024-01-01 RX ORDER — MEROPENEM 500 MG/1
500 INJECTION, POWDER, FOR SOLUTION INTRAVENOUS
Status: DISCONTINUED | OUTPATIENT
Start: 2024-01-01 | End: 2024-01-01

## 2024-01-01 RX ORDER — FUROSEMIDE 40 MG/1
360 TABLET ORAL SEE ADMIN INSTRUCTIONS
Status: DISCONTINUED | OUTPATIENT
Start: 2024-01-01 | End: 2024-01-01

## 2024-01-01 RX ORDER — TRIAMCINOLONE ACETONIDE 1 MG/G
CREAM TOPICAL 2 TIMES DAILY
Status: DISCONTINUED | OUTPATIENT
Start: 2024-01-01 | End: 2024-01-01

## 2024-01-01 RX ORDER — HEPARIN SODIUM 5000 [USP'U]/ML
5000 INJECTION, SOLUTION INTRAVENOUS; SUBCUTANEOUS EVERY 8 HOURS
Status: COMPLETED | OUTPATIENT
Start: 2024-01-01 | End: 2024-01-01

## 2024-01-01 RX ORDER — HYDRALAZINE HYDROCHLORIDE 20 MG/ML
10 INJECTION INTRAMUSCULAR; INTRAVENOUS EVERY 6 HOURS PRN
Status: DISCONTINUED | OUTPATIENT
Start: 2024-01-01 | End: 2024-01-01

## 2024-01-01 RX ORDER — HEPARIN SODIUM,PORCINE/D5W 25000/250
0-40 INTRAVENOUS SOLUTION INTRAVENOUS CONTINUOUS
Status: DISCONTINUED | OUTPATIENT
Start: 2024-01-01 | End: 2024-01-01

## 2024-01-01 RX ORDER — LORAZEPAM 2 MG/ML
1 INJECTION INTRAMUSCULAR EVERY 4 HOURS PRN
Status: DISCONTINUED | OUTPATIENT
Start: 2024-01-01 | End: 2024-01-01 | Stop reason: HOSPADM

## 2024-01-01 RX ORDER — SODIUM CHLORIDE 0.9 % (FLUSH) 0.9 %
10 SYRINGE (ML) INJECTION EVERY 12 HOURS PRN
Status: DISCONTINUED | OUTPATIENT
Start: 2024-01-01 | End: 2024-01-01 | Stop reason: HOSPADM

## 2024-01-01 RX ORDER — HEPARIN SODIUM 5000 [USP'U]/ML
5000 INJECTION, SOLUTION INTRAVENOUS; SUBCUTANEOUS EVERY 8 HOURS
Status: DISCONTINUED | OUTPATIENT
Start: 2024-01-01 | End: 2024-01-01

## 2024-01-01 RX ORDER — PRAVASTATIN SODIUM 40 MG/1
40 TABLET ORAL NIGHTLY
Status: DISCONTINUED | OUTPATIENT
Start: 2024-01-01 | End: 2024-01-01

## 2024-01-01 RX ORDER — CHLORPROMAZINE HCI 25 MG/ML
25 INJECTION INTRAMUSCULAR ONCE
Status: COMPLETED | OUTPATIENT
Start: 2024-01-01 | End: 2024-01-01

## 2024-01-01 RX ORDER — KETOROLAC TROMETHAMINE 30 MG/ML
15 INJECTION, SOLUTION INTRAMUSCULAR; INTRAVENOUS EVERY 6 HOURS PRN
Status: COMPLETED | OUTPATIENT
Start: 2024-01-01 | End: 2024-01-01

## 2024-01-01 RX ORDER — OXYCODONE AND ACETAMINOPHEN 7.5; 325 MG/1; MG/1
1 TABLET ORAL EVERY 4 HOURS PRN
Status: DISCONTINUED | OUTPATIENT
Start: 2024-01-01 | End: 2024-01-01

## 2024-01-01 RX ORDER — AMIODARONE HYDROCHLORIDE 200 MG/1
400 TABLET ORAL DAILY
Status: DISCONTINUED | OUTPATIENT
Start: 2024-01-01 | End: 2024-01-01

## 2024-01-01 RX ORDER — MIDODRINE HYDROCHLORIDE 2.5 MG/1
2.5 TABLET ORAL 3 TIMES DAILY
Status: DISCONTINUED | OUTPATIENT
Start: 2024-01-01 | End: 2024-01-01

## 2024-01-01 RX ORDER — DOCUSATE SODIUM 100 MG/1
100 CAPSULE, LIQUID FILLED ORAL DAILY
Status: DISCONTINUED | OUTPATIENT
Start: 2024-01-01 | End: 2024-01-01

## 2024-01-01 RX ORDER — LEVOTHYROXINE SODIUM 75 UG/1
150 TABLET ORAL
Status: DISCONTINUED | OUTPATIENT
Start: 2024-01-01 | End: 2024-01-01

## 2024-01-01 RX ORDER — SODIUM CHLORIDE 9 MG/ML
INJECTION, SOLUTION INTRAVENOUS
Status: DISCONTINUED | OUTPATIENT
Start: 2024-01-01 | End: 2024-01-01

## 2024-01-01 RX ORDER — HYDROMORPHONE HYDROCHLORIDE 1 MG/ML
0.5 INJECTION, SOLUTION INTRAMUSCULAR; INTRAVENOUS; SUBCUTANEOUS
Status: DISCONTINUED | OUTPATIENT
Start: 2024-01-01 | End: 2024-01-01 | Stop reason: HOSPADM

## 2024-01-01 RX ORDER — MEXILETINE HYDROCHLORIDE 150 MG/1
150 CAPSULE ORAL 2 TIMES DAILY WITH MEALS
Status: DISCONTINUED | OUTPATIENT
Start: 2024-01-01 | End: 2024-01-01

## 2024-01-01 RX ORDER — OXYCODONE AND ACETAMINOPHEN 10; 325 MG/1; MG/1
1 TABLET ORAL EVERY 4 HOURS PRN
Status: DISCONTINUED | OUTPATIENT
Start: 2024-01-01 | End: 2024-01-01

## 2024-01-01 RX ORDER — MORPHINE SULFATE 4 MG/ML
4 INJECTION, SOLUTION INTRAMUSCULAR; INTRAVENOUS
Status: COMPLETED | OUTPATIENT
Start: 2024-01-01 | End: 2024-01-01

## 2024-01-01 RX ORDER — ENOXAPARIN SODIUM 100 MG/ML
1 INJECTION SUBCUTANEOUS EVERY 24 HOURS
Status: DISCONTINUED | OUTPATIENT
Start: 2024-01-01 | End: 2024-01-01

## 2024-01-01 RX ORDER — ONDANSETRON HYDROCHLORIDE 2 MG/ML
4 INJECTION, SOLUTION INTRAVENOUS
Status: COMPLETED | OUTPATIENT
Start: 2024-01-01 | End: 2024-01-01

## 2024-01-01 RX ORDER — GLYCOPYRROLATE 0.2 MG/ML
0.2 INJECTION INTRAMUSCULAR; INTRAVENOUS EVERY 4 HOURS PRN
Status: DISCONTINUED | OUTPATIENT
Start: 2024-01-01 | End: 2024-01-01 | Stop reason: HOSPADM

## 2024-01-01 RX ORDER — HEPARIN SODIUM 1000 [USP'U]/ML
1000 INJECTION, SOLUTION INTRAVENOUS; SUBCUTANEOUS
Status: DISCONTINUED | OUTPATIENT
Start: 2024-01-01 | End: 2024-01-01

## 2024-01-01 RX ORDER — ONDANSETRON HYDROCHLORIDE 2 MG/ML
4 INJECTION, SOLUTION INTRAVENOUS EVERY 6 HOURS PRN
Status: DISCONTINUED | OUTPATIENT
Start: 2024-01-01 | End: 2024-01-01 | Stop reason: HOSPADM

## 2024-01-01 RX ORDER — IPRATROPIUM BROMIDE AND ALBUTEROL SULFATE 2.5; .5 MG/3ML; MG/3ML
3 SOLUTION RESPIRATORY (INHALATION)
Status: DISCONTINUED | OUTPATIENT
Start: 2024-01-01 | End: 2024-01-01

## 2024-01-01 RX ORDER — NOREPINEPHRINE BITARTRATE/D5W 4MG/250ML
PLASTIC BAG, INJECTION (ML) INTRAVENOUS
Status: COMPLETED
Start: 2024-01-01 | End: 2024-01-01

## 2024-01-01 RX ORDER — HEPARIN SODIUM 5000 [USP'U]/ML
5000 INJECTION, SOLUTION INTRAVENOUS; SUBCUTANEOUS
Status: DISCONTINUED | OUTPATIENT
Start: 2024-01-01 | End: 2024-01-01

## 2024-01-01 RX ORDER — IPRATROPIUM BROMIDE AND ALBUTEROL SULFATE 2.5; .5 MG/3ML; MG/3ML
3 SOLUTION RESPIRATORY (INHALATION)
Status: COMPLETED | OUTPATIENT
Start: 2024-01-01 | End: 2024-01-01

## 2024-01-01 RX ADMIN — HEPARIN SODIUM 5000 UNITS: 5000 INJECTION INTRAVENOUS; SUBCUTANEOUS at 10:10

## 2024-01-01 RX ADMIN — VANCOMYCIN HYDROCHLORIDE 1000 MG: 1 INJECTION, POWDER, LYOPHILIZED, FOR SOLUTION INTRAVENOUS at 04:10

## 2024-01-01 RX ADMIN — HYDROMORPHONE HYDROCHLORIDE 0.5 MG: 1 INJECTION, SOLUTION INTRAMUSCULAR; INTRAVENOUS; SUBCUTANEOUS at 05:10

## 2024-01-01 RX ADMIN — HYDROMORPHONE HYDROCHLORIDE 1 MG: 1 INJECTION, SOLUTION INTRAMUSCULAR; INTRAVENOUS; SUBCUTANEOUS at 09:10

## 2024-01-01 RX ADMIN — FAMOTIDINE 20 MG: 10 INJECTION, SOLUTION INTRAVENOUS at 08:10

## 2024-01-01 RX ADMIN — CEFEPIME 1 G: 1 INJECTION, POWDER, FOR SOLUTION INTRAMUSCULAR; INTRAVENOUS at 05:10

## 2024-01-01 RX ADMIN — NOREPINEPHRINE BITARTRATE 0.55 MCG/KG/MIN: 1 INJECTION, SOLUTION, CONCENTRATE INTRAVENOUS at 05:10

## 2024-01-01 RX ADMIN — NOREPINEPHRINE BITARTRATE 0.04 MCG/KG/MIN: 1 INJECTION, SOLUTION, CONCENTRATE INTRAVENOUS at 09:10

## 2024-01-01 RX ADMIN — HYDROMORPHONE HYDROCHLORIDE 1 MG: 1 INJECTION, SOLUTION INTRAMUSCULAR; INTRAVENOUS; SUBCUTANEOUS at 04:10

## 2024-01-01 RX ADMIN — IPRATROPIUM BROMIDE AND ALBUTEROL SULFATE 3 ML: 2.5; .5 SOLUTION RESPIRATORY (INHALATION) at 08:10

## 2024-01-01 RX ADMIN — MIDODRINE HYDROCHLORIDE 15 MG: 5 TABLET ORAL at 01:10

## 2024-01-01 RX ADMIN — APIXABAN 5 MG: 5 TABLET, FILM COATED ORAL at 08:10

## 2024-01-01 RX ADMIN — HEPARIN SODIUM 5000 UNITS: 5000 INJECTION INTRAVENOUS; SUBCUTANEOUS at 04:10

## 2024-01-01 RX ADMIN — HYDROCORTISONE SODIUM SUCCINATE 100 MG: 100 INJECTION, POWDER, FOR SOLUTION INTRAMUSCULAR; INTRAVENOUS at 10:10

## 2024-01-01 RX ADMIN — VANCOMYCIN HYDROCHLORIDE 1000 MG: 1 INJECTION, POWDER, LYOPHILIZED, FOR SOLUTION INTRAVENOUS at 05:10

## 2024-01-01 RX ADMIN — HYDROMORPHONE HYDROCHLORIDE 0.5 MG: 1 INJECTION, SOLUTION INTRAMUSCULAR; INTRAVENOUS; SUBCUTANEOUS at 12:10

## 2024-01-01 RX ADMIN — TRIAMCINOLONE ACETONIDE: 1 CREAM TOPICAL at 09:10

## 2024-01-01 RX ADMIN — APIXABAN 5 MG: 5 TABLET, FILM COATED ORAL at 09:10

## 2024-01-01 RX ADMIN — MIDODRINE HYDROCHLORIDE 15 MG: 5 TABLET ORAL at 02:10

## 2024-01-01 RX ADMIN — NOREPINEPHRINE BITARTRATE 0.06 MCG/KG/MIN: 4 INJECTION, SOLUTION INTRAVENOUS at 04:10

## 2024-01-01 RX ADMIN — QUETIAPINE FUMARATE 25 MG: 25 TABLET ORAL at 11:10

## 2024-01-01 RX ADMIN — NOREPINEPHRINE BITARTRATE 0.16 MCG/KG/MIN: 4 INJECTION, SOLUTION INTRAVENOUS at 10:10

## 2024-01-01 RX ADMIN — MEXILETINE HYDROCHLORIDE 150 MG: 150 CAPSULE ORAL at 08:10

## 2024-01-01 RX ADMIN — EPOETIN ALFA-EPBX 10000 UNITS: 10000 INJECTION, SOLUTION INTRAVENOUS; SUBCUTANEOUS at 08:10

## 2024-01-01 RX ADMIN — MUPIROCIN: 20 OINTMENT TOPICAL at 09:10

## 2024-01-01 RX ADMIN — MEXILETINE HYDROCHLORIDE 150 MG: 150 CAPSULE ORAL at 07:10

## 2024-01-01 RX ADMIN — KETOROLAC TROMETHAMINE 15 MG: 30 INJECTION INTRAMUSCULAR; INTRAVENOUS at 01:10

## 2024-01-01 RX ADMIN — Medication 100 ML: at 10:10

## 2024-01-01 RX ADMIN — NOREPINEPHRINE BITARTRATE 0.04 MCG/KG/MIN: 4 INJECTION, SOLUTION INTRAVENOUS at 05:10

## 2024-01-01 RX ADMIN — OXYCODONE AND ACETAMINOPHEN 1 TABLET: 10; 325 TABLET ORAL at 05:10

## 2024-01-01 RX ADMIN — FLUDROCORTISONE ACETATE 100 MCG: 0.1 TABLET ORAL at 09:10

## 2024-01-01 RX ADMIN — CEFEPIME 1 G: 1 INJECTION, POWDER, FOR SOLUTION INTRAMUSCULAR; INTRAVENOUS at 03:10

## 2024-01-01 RX ADMIN — HEPARIN SODIUM 5000 UNITS: 5000 INJECTION INTRAVENOUS; SUBCUTANEOUS at 06:10

## 2024-01-01 RX ADMIN — DAPTOMYCIN 645 MG: 350 INJECTION, POWDER, LYOPHILIZED, FOR SOLUTION INTRAVENOUS at 12:10

## 2024-01-01 RX ADMIN — MEXILETINE HYDROCHLORIDE 150 MG: 150 CAPSULE ORAL at 05:10

## 2024-01-01 RX ADMIN — MORPHINE SULFATE 4 MG: 4 INJECTION INTRAVENOUS at 10:10

## 2024-01-01 RX ADMIN — MUPIROCIN: 20 OINTMENT TOPICAL at 08:10

## 2024-01-01 RX ADMIN — MIDODRINE HYDROCHLORIDE 15 MG: 5 TABLET ORAL at 05:10

## 2024-01-01 RX ADMIN — SODIUM CHLORIDE 500 ML: 9 INJECTION, SOLUTION INTRAVENOUS at 10:10

## 2024-01-01 RX ADMIN — HYDROCORTISONE SODIUM SUCCINATE 100 MG: 100 INJECTION, POWDER, FOR SOLUTION INTRAMUSCULAR; INTRAVENOUS at 01:10

## 2024-01-01 RX ADMIN — VASOPRESSIN 0.04 UNITS/MIN: 20 INJECTION INTRAVENOUS at 09:10

## 2024-01-01 RX ADMIN — SODIUM CHLORIDE: 9 INJECTION, SOLUTION INTRAVENOUS at 07:10

## 2024-01-01 RX ADMIN — VASOPRESSIN 0.04 UNITS/MIN: 20 INJECTION INTRAVENOUS at 01:10

## 2024-01-01 RX ADMIN — VANCOMYCIN HYDROCHLORIDE 750 MG: 750 INJECTION, POWDER, LYOPHILIZED, FOR SOLUTION INTRAVENOUS at 05:10

## 2024-01-01 RX ADMIN — MIDODRINE HYDROCHLORIDE 15 MG: 5 TABLET ORAL at 06:10

## 2024-01-01 RX ADMIN — OXYCODONE AND ACETAMINOPHEN 1 TABLET: 10; 325 TABLET ORAL at 08:10

## 2024-01-01 RX ADMIN — SODIUM PHOSPHATE, MONOBASIC, MONOHYDRATE AND SODIUM PHOSPHATE, DIBASIC, ANHYDROUS 30 MMOL: 142; 276 INJECTION, SOLUTION INTRAVENOUS at 08:10

## 2024-01-01 RX ADMIN — PRAVASTATIN SODIUM 40 MG: 40 TABLET ORAL at 09:10

## 2024-01-01 RX ADMIN — FENTANYL 1 PATCH: 50 PATCH TRANSDERMAL at 09:10

## 2024-01-01 RX ADMIN — MEROPENEM 500 MG: 500 INJECTION INTRAVENOUS at 10:10

## 2024-01-01 RX ADMIN — EPOETIN ALFA-EPBX 10000 UNITS: 10000 INJECTION, SOLUTION INTRAVENOUS; SUBCUTANEOUS at 10:10

## 2024-01-01 RX ADMIN — HYDROCORTISONE SODIUM SUCCINATE 100 MG: 100 INJECTION, POWDER, FOR SOLUTION INTRAMUSCULAR; INTRAVENOUS at 03:10

## 2024-01-01 RX ADMIN — MAGNESIUM CITRATE 296 ML: 1.75 LIQUID ORAL at 10:10

## 2024-01-01 RX ADMIN — IPRATROPIUM BROMIDE AND ALBUTEROL SULFATE 3 ML: 2.5; .5 SOLUTION RESPIRATORY (INHALATION) at 07:10

## 2024-01-01 RX ADMIN — SODIUM CHLORIDE: 9 INJECTION, SOLUTION INTRAVENOUS at 03:10

## 2024-01-01 RX ADMIN — DOBUTAMINE HYDROCHLORIDE 20 MCG/KG/MIN: 400 INJECTION INTRAVENOUS at 03:10

## 2024-01-01 RX ADMIN — IOHEXOL 100 ML: 350 INJECTION, SOLUTION INTRAVENOUS at 01:10

## 2024-01-01 RX ADMIN — HEPARIN SODIUM 5000 UNITS: 5000 INJECTION INTRAVENOUS; SUBCUTANEOUS at 01:10

## 2024-01-01 RX ADMIN — OXYCODONE HYDROCHLORIDE AND ACETAMINOPHEN 1 TABLET: 5; 325 TABLET ORAL at 03:10

## 2024-01-01 RX ADMIN — LEVOTHYROXINE SODIUM 150 MCG: 75 TABLET ORAL at 05:10

## 2024-01-01 RX ADMIN — TRIAMCINOLONE ACETONIDE: 1 CREAM TOPICAL at 08:10

## 2024-01-01 RX ADMIN — HYDROCORTISONE SODIUM SUCCINATE 100 MG: 100 INJECTION, POWDER, FOR SOLUTION INTRAMUSCULAR; INTRAVENOUS at 09:10

## 2024-01-01 RX ADMIN — HYDROCORTISONE SODIUM SUCCINATE 100 MG: 100 INJECTION, POWDER, FOR SOLUTION INTRAMUSCULAR; INTRAVENOUS at 06:10

## 2024-01-01 RX ADMIN — LEVOTHYROXINE SODIUM 150 MCG: 75 TABLET ORAL at 06:10

## 2024-01-01 RX ADMIN — DOCUSATE SODIUM 100 MG: 100 CAPSULE, LIQUID FILLED ORAL at 08:10

## 2024-01-01 RX ADMIN — VASOPRESSIN 0.04 UNITS/MIN: 20 INJECTION INTRAVENOUS at 07:10

## 2024-01-01 RX ADMIN — HYDROCORTISONE SODIUM SUCCINATE 100 MG: 100 INJECTION, POWDER, FOR SOLUTION INTRAMUSCULAR; INTRAVENOUS at 05:10

## 2024-01-01 RX ADMIN — IPRATROPIUM BROMIDE AND ALBUTEROL SULFATE 3 ML: .5; 3 SOLUTION RESPIRATORY (INHALATION) at 10:10

## 2024-01-01 RX ADMIN — OXYCODONE HYDROCHLORIDE AND ACETAMINOPHEN 1 TABLET: 5; 325 TABLET ORAL at 01:10

## 2024-01-01 RX ADMIN — CEFEPIME 1 G: 1 INJECTION, POWDER, FOR SOLUTION INTRAMUSCULAR; INTRAVENOUS at 04:10

## 2024-01-01 RX ADMIN — HEPARIN SODIUM 14 UNITS/KG/HR: 10000 INJECTION, SOLUTION INTRAVENOUS at 02:10

## 2024-01-01 RX ADMIN — ALLOPURINOL 100 MG: 100 TABLET ORAL at 08:10

## 2024-01-01 RX ADMIN — HYDROMORPHONE HYDROCHLORIDE 1 MG: 1 INJECTION, SOLUTION INTRAMUSCULAR; INTRAVENOUS; SUBCUTANEOUS at 07:10

## 2024-01-01 RX ADMIN — NOREPINEPHRINE BITARTRATE 0.14 MCG/KG/MIN: 4 INJECTION, SOLUTION INTRAVENOUS at 11:10

## 2024-01-01 RX ADMIN — POLYETHYLENE GLYCOL 3350 17 G: 17 POWDER, FOR SOLUTION ORAL at 08:10

## 2024-01-01 RX ADMIN — MEROPENEM 500 MG: 500 INJECTION INTRAVENOUS at 11:10

## 2024-01-01 RX ADMIN — NOREPINEPHRINE BITARTRATE 0.5 MCG/KG/MIN: 1 INJECTION, SOLUTION, CONCENTRATE INTRAVENOUS at 03:10

## 2024-01-01 RX ADMIN — MICAFUNGIN SODIUM 100 MG: 100 INJECTION, POWDER, LYOPHILIZED, FOR SOLUTION INTRAVENOUS at 10:10

## 2024-01-01 RX ADMIN — IOHEXOL 75 ML: 350 INJECTION, SOLUTION INTRAVENOUS at 02:10

## 2024-01-01 RX ADMIN — NOREPINEPHRINE BITARTRATE 0.17 MCG/KG/MIN: 4 INJECTION, SOLUTION INTRAVENOUS at 10:10

## 2024-01-01 RX ADMIN — MEROPENEM 500 MG: 500 INJECTION INTRAVENOUS at 01:10

## 2024-01-01 RX ADMIN — MIDODRINE HYDROCHLORIDE 15 MG: 5 TABLET ORAL at 10:10

## 2024-01-01 RX ADMIN — MIDODRINE HYDROCHLORIDE 15 MG: 5 TABLET ORAL at 09:10

## 2024-01-01 RX ADMIN — DOCUSATE SODIUM 100 MG: 100 CAPSULE, LIQUID FILLED ORAL at 09:10

## 2024-01-01 RX ADMIN — HYDROCODONE BITARTRATE AND ACETAMINOPHEN 1 TABLET: 10; 325 TABLET ORAL at 09:10

## 2024-01-01 RX ADMIN — MICAFUNGIN SODIUM 100 MG: 100 INJECTION, POWDER, LYOPHILIZED, FOR SOLUTION INTRAVENOUS at 09:10

## 2024-01-01 RX ADMIN — ONDANSETRON 4 MG: 2 INJECTION INTRAMUSCULAR; INTRAVENOUS at 11:10

## 2024-01-01 RX ADMIN — VANCOMYCIN HYDROCHLORIDE 1500 MG: 1.5 INJECTION, POWDER, LYOPHILIZED, FOR SOLUTION INTRAVENOUS at 05:10

## 2024-01-01 RX ADMIN — HYDROCORTISONE SODIUM SUCCINATE 100 MG: 100 INJECTION, POWDER, FOR SOLUTION INTRAMUSCULAR; INTRAVENOUS at 02:10

## 2024-01-01 RX ADMIN — VASOPRESSIN 0.04 UNITS/MIN: 20 INJECTION INTRAVENOUS at 05:10

## 2024-01-01 RX ADMIN — Medication 6 MG: at 08:10

## 2024-01-01 RX ADMIN — HEPARIN SODIUM 18 UNITS/KG/HR: 10000 INJECTION, SOLUTION INTRAVENOUS at 06:10

## 2024-01-01 RX ADMIN — MEXILETINE HYDROCHLORIDE 150 MG: 150 CAPSULE ORAL at 04:10

## 2024-01-01 RX ADMIN — PROMETHAZINE HYDROCHLORIDE 6.25 MG: 25 INJECTION INTRAMUSCULAR; INTRAVENOUS at 12:10

## 2024-01-01 RX ADMIN — ONDANSETRON 4 MG: 2 INJECTION INTRAMUSCULAR; INTRAVENOUS at 02:10

## 2024-01-01 RX ADMIN — HYDROMORPHONE HYDROCHLORIDE 0.5 MG: 1 INJECTION, SOLUTION INTRAMUSCULAR; INTRAVENOUS; SUBCUTANEOUS at 03:10

## 2024-01-01 RX ADMIN — HYDROMORPHONE HYDROCHLORIDE 1 MG: 1 INJECTION, SOLUTION INTRAMUSCULAR; INTRAVENOUS; SUBCUTANEOUS at 10:10

## 2024-01-01 RX ADMIN — LEVOTHYROXINE SODIUM 150 MCG: 75 TABLET ORAL at 04:10

## 2024-01-01 RX ADMIN — NOREPINEPHRINE BITARTRATE 0.02 MCG/KG/MIN: 4 INJECTION, SOLUTION INTRAVENOUS at 08:10

## 2024-01-01 RX ADMIN — MIDODRINE HYDROCHLORIDE 15 MG: 5 TABLET ORAL at 08:10

## 2024-01-01 RX ADMIN — ALLOPURINOL 100 MG: 100 TABLET ORAL at 09:10

## 2024-01-01 RX ADMIN — MEXILETINE HYDROCHLORIDE 150 MG: 150 CAPSULE ORAL at 06:10

## 2024-01-01 RX ADMIN — NOREPINEPHRINE BITARTRATE 0.66 MCG/KG/MIN: 1 INJECTION, SOLUTION, CONCENTRATE INTRAVENOUS at 03:10

## 2024-01-01 RX ADMIN — IPRATROPIUM BROMIDE AND ALBUTEROL SULFATE 3 ML: 2.5; .5 SOLUTION RESPIRATORY (INHALATION) at 04:10

## 2024-01-01 RX ADMIN — HYDROMORPHONE HYDROCHLORIDE 0.5 MG: 1 INJECTION, SOLUTION INTRAMUSCULAR; INTRAVENOUS; SUBCUTANEOUS at 02:10

## 2024-01-01 RX ADMIN — DAPTOMYCIN 645 MG: 350 INJECTION, POWDER, LYOPHILIZED, FOR SOLUTION INTRAVENOUS at 02:10

## 2024-01-01 RX ADMIN — CEFAZOLIN 2 G: 2 INJECTION, POWDER, FOR SOLUTION INTRAMUSCULAR; INTRAVENOUS at 12:10

## 2024-01-01 RX ADMIN — HEPARIN SODIUM 14 UNITS/KG/HR: 10000 INJECTION, SOLUTION INTRAVENOUS at 03:10

## 2024-01-01 RX ADMIN — HEPARIN SODIUM 14 UNITS/KG/HR: 10000 INJECTION, SOLUTION INTRAVENOUS at 11:10

## 2024-01-01 RX ADMIN — FAMOTIDINE 20 MG: 10 INJECTION, SOLUTION INTRAVENOUS at 10:10

## 2024-01-01 RX ADMIN — NOREPINEPHRINE BITARTRATE 0.12 MCG/KG/MIN: 4 INJECTION, SOLUTION INTRAVENOUS at 05:10

## 2024-01-01 RX ADMIN — HYDROMORPHONE HYDROCHLORIDE 1 MG: 1 INJECTION, SOLUTION INTRAMUSCULAR; INTRAVENOUS; SUBCUTANEOUS at 01:10

## 2024-01-01 RX ADMIN — OXYCODONE HYDROCHLORIDE AND ACETAMINOPHEN 1 TABLET: 5; 325 TABLET ORAL at 05:10

## 2024-01-01 RX ADMIN — HYDROXYZINE PAMOATE 25 MG: 25 CAPSULE ORAL at 12:10

## 2024-01-01 RX ADMIN — MICAFUNGIN SODIUM 100 MG: 100 INJECTION, POWDER, LYOPHILIZED, FOR SOLUTION INTRAVENOUS at 11:10

## 2024-01-01 RX ADMIN — VASOPRESSIN 0.04 UNITS/MIN: 20 INJECTION INTRAVENOUS at 11:10

## 2024-01-01 RX ADMIN — DOBUTAMINE HYDROCHLORIDE 12.5 MCG/KG/MIN: 400 INJECTION INTRAVENOUS at 06:10

## 2024-01-01 RX ADMIN — OXYCODONE HYDROCHLORIDE AND ACETAMINOPHEN 1 TABLET: 5; 325 TABLET ORAL at 04:10

## 2024-01-01 RX ADMIN — MIDODRINE HYDROCHLORIDE 15 MG: 5 TABLET ORAL at 04:10

## 2024-01-01 RX ADMIN — NOREPINEPHRINE BITARTRATE 0.06 MCG/KG/MIN: 4 INJECTION, SOLUTION INTRAVENOUS at 07:10

## 2024-01-01 RX ADMIN — HYDROMORPHONE HYDROCHLORIDE 0.5 MG: 1 INJECTION, SOLUTION INTRAMUSCULAR; INTRAVENOUS; SUBCUTANEOUS at 01:10

## 2024-01-01 RX ADMIN — FLUDROCORTISONE ACETATE 100 MCG: 0.1 TABLET ORAL at 08:10

## 2024-01-01 RX ADMIN — PRAVASTATIN SODIUM 40 MG: 40 TABLET ORAL at 08:10

## 2024-01-01 RX ADMIN — ONDANSETRON 4 MG: 2 INJECTION INTRAMUSCULAR; INTRAVENOUS at 05:10

## 2024-01-01 RX ADMIN — ALTEPLASE 2 MG: 2.2 INJECTION, POWDER, LYOPHILIZED, FOR SOLUTION INTRAVENOUS at 11:10

## 2024-01-01 RX ADMIN — OXYCODONE HYDROCHLORIDE AND ACETAMINOPHEN 1 TABLET: 5; 325 TABLET ORAL at 06:10

## 2024-01-01 RX ADMIN — DOBUTAMINE HYDROCHLORIDE 15 MCG/KG/MIN: 400 INJECTION INTRAVENOUS at 11:10

## 2024-01-01 RX ADMIN — OXYCODONE HYDROCHLORIDE AND ACETAMINOPHEN 1 TABLET: 5; 325 TABLET ORAL at 02:10

## 2024-01-01 RX ADMIN — ONDANSETRON 4 MG: 2 INJECTION INTRAMUSCULAR; INTRAVENOUS at 10:10

## 2024-01-01 RX ADMIN — VASOPRESSIN 0.04 UNITS/MIN: 20 INJECTION INTRAVENOUS at 03:10

## 2024-01-01 RX ADMIN — NOREPINEPHRINE BITARTRATE 0.02 MCG/KG/MIN: 4 INJECTION, SOLUTION INTRAVENOUS at 03:10

## 2024-01-01 RX ADMIN — SENNOSIDES AND DOCUSATE SODIUM 2 TABLET: 50; 8.6 TABLET ORAL at 08:10

## 2024-01-01 RX ADMIN — HEPARIN SODIUM 5000 UNITS: 5000 INJECTION INTRAVENOUS; SUBCUTANEOUS at 09:10

## 2024-01-01 RX ADMIN — LORAZEPAM 1 MG: 2 INJECTION INTRAMUSCULAR; INTRAVENOUS at 12:10

## 2024-01-01 RX ADMIN — VASOPRESSIN 0.04 UNITS/MIN: 20 INJECTION INTRAVENOUS at 02:10

## 2024-01-01 RX ADMIN — POLYETHYLENE GLYCOL 3350 17 G: 17 POWDER, FOR SOLUTION ORAL at 10:10

## 2024-01-01 RX ADMIN — OXYCODONE AND ACETAMINOPHEN 1 TABLET: 10; 325 TABLET ORAL at 01:10

## 2024-01-01 RX ADMIN — OXYCODONE AND ACETAMINOPHEN 1 TABLET: 7.5; 325 TABLET ORAL at 05:10

## 2024-01-01 RX ADMIN — NOREPINEPHRINE BITARTRATE 0.04 MCG/KG/MIN: 4 INJECTION, SOLUTION INTRAVENOUS at 12:10

## 2024-01-01 RX ADMIN — TRIAMCINOLONE ACETONIDE: 1 CREAM TOPICAL at 10:10

## 2024-01-01 RX ADMIN — NOREPINEPHRINE BITARTRATE 0.1 MCG/KG/MIN: 4 INJECTION, SOLUTION INTRAVENOUS at 03:10

## 2024-01-01 RX ADMIN — PRAVASTATIN SODIUM 40 MG: 40 TABLET ORAL at 10:10

## 2024-01-01 RX ADMIN — OXYCODONE HYDROCHLORIDE AND ACETAMINOPHEN 1 TABLET: 5; 325 TABLET ORAL at 12:10

## 2024-01-01 RX ADMIN — IPRATROPIUM BROMIDE AND ALBUTEROL SULFATE 3 ML: 2.5; .5 SOLUTION RESPIRATORY (INHALATION) at 11:10

## 2024-01-01 RX ADMIN — GLYCOPYRROLATE 0.2 MG: 0.2 INJECTION INTRAMUSCULAR; INTRAVENOUS at 01:10

## 2024-01-01 RX ADMIN — VANCOMYCIN HYDROCHLORIDE 500 MG: 500 INJECTION, POWDER, LYOPHILIZED, FOR SOLUTION INTRAVENOUS at 04:10

## 2024-01-01 RX ADMIN — NOREPINEPHRINE BITARTRATE 0.08 MCG/KG/MIN: 4 INJECTION, SOLUTION INTRAVENOUS at 10:10

## 2024-01-01 RX ADMIN — NOREPINEPHRINE BITARTRATE 0.06 MCG/KG/MIN: 4 INJECTION, SOLUTION INTRAVENOUS at 10:10

## 2024-01-01 RX ADMIN — FENTANYL 1 PATCH: 50 PATCH TRANSDERMAL at 10:10

## 2024-01-01 RX ADMIN — DOBUTAMINE HYDROCHLORIDE 2.5 MCG/KG/MIN: 400 INJECTION INTRAVENOUS at 04:10

## 2024-01-01 RX ADMIN — DOCUSATE SODIUM 100 MG: 100 CAPSULE, LIQUID FILLED ORAL at 10:10

## 2024-01-01 RX ADMIN — SODIUM PHOSPHATE, MONOBASIC, MONOHYDRATE AND SODIUM PHOSPHATE, DIBASIC, ANHYDROUS 20.01 MMOL: 142; 276 INJECTION, SOLUTION INTRAVENOUS at 12:10

## 2024-01-01 RX ADMIN — SODIUM CHLORIDE: 9 INJECTION, SOLUTION INTRAVENOUS at 12:10

## 2024-01-01 RX ADMIN — NOREPINEPHRINE BITARTRATE 0.5 MCG/KG/MIN: 1 INJECTION, SOLUTION, CONCENTRATE INTRAVENOUS at 02:10

## 2024-01-01 RX ADMIN — LIDOCAINE HYDROCHLORIDE 1 ML: 10 INJECTION, SOLUTION INFILTRATION; PERINEURAL at 10:10

## 2024-01-01 RX ADMIN — MEROPENEM 500 MG: 500 INJECTION INTRAVENOUS at 12:10

## 2024-01-01 RX ADMIN — FLUDROCORTISONE ACETATE 100 MCG: 0.1 TABLET ORAL at 10:10

## 2024-01-01 RX ADMIN — MAGNESIUM SULFATE HEPTAHYDRATE 2 G: 40 INJECTION, SOLUTION INTRAVENOUS at 03:10

## 2024-01-01 RX ADMIN — NOREPINEPHRINE BITARTRATE 0.6 MCG/KG/MIN: 1 INJECTION, SOLUTION, CONCENTRATE INTRAVENOUS at 03:10

## 2024-01-01 RX ADMIN — SODIUM CHLORIDE 250 ML: 9 INJECTION, SOLUTION INTRAVENOUS at 04:10

## 2024-01-01 RX ADMIN — NOREPINEPHRINE BITARTRATE 0.68 MCG/KG/MIN: 1 INJECTION, SOLUTION, CONCENTRATE INTRAVENOUS at 02:10

## 2024-01-01 RX ADMIN — NOREPINEPHRINE BITARTRATE 0.6 MCG/KG/MIN: 1 INJECTION, SOLUTION, CONCENTRATE INTRAVENOUS at 01:10

## 2024-01-01 RX ADMIN — MIDODRINE HYDROCHLORIDE 15 MG: 5 TABLET ORAL at 03:10

## 2024-01-01 RX ADMIN — ACETAMINOPHEN 650 MG: 325 TABLET ORAL at 04:10

## 2024-01-01 RX ADMIN — SODIUM CHLORIDE 1000 ML: 9 INJECTION, SOLUTION INTRAVENOUS at 12:10

## 2024-01-01 RX ADMIN — DOBUTAMINE HYDROCHLORIDE 20 MCG/KG/MIN: 400 INJECTION INTRAVENOUS at 11:10

## 2024-01-01 RX ADMIN — NOREPINEPHRINE BITARTRATE 0.18 MCG/KG/MIN: 4 INJECTION, SOLUTION INTRAVENOUS at 05:10

## 2024-01-01 RX ADMIN — OXYCODONE AND ACETAMINOPHEN 1 TABLET: 10; 325 TABLET ORAL at 02:10

## 2024-01-01 RX ADMIN — CHLORPROMAZINE HYDROCHLORIDE 25 MG: 25 INJECTION INTRAMUSCULAR at 08:10

## 2024-01-01 RX ADMIN — ONDANSETRON 4 MG: 2 INJECTION INTRAMUSCULAR; INTRAVENOUS at 08:10

## 2024-01-01 RX ADMIN — HEPARIN SODIUM 14 UNITS/KG/HR: 10000 INJECTION, SOLUTION INTRAVENOUS at 01:10

## 2024-01-01 RX ADMIN — MUPIROCIN: 20 OINTMENT TOPICAL at 10:10

## 2024-01-01 RX ADMIN — Medication 6 MG: at 10:10

## 2024-01-01 RX ADMIN — NOREPINEPHRINE BITARTRATE 0.17 MCG/KG/MIN: 1 INJECTION, SOLUTION, CONCENTRATE INTRAVENOUS at 04:10

## 2024-01-01 RX ADMIN — OXYCODONE HYDROCHLORIDE AND ACETAMINOPHEN 1 TABLET: 5; 325 TABLET ORAL at 10:10

## 2024-01-01 RX ADMIN — VASOPRESSIN 0.04 UNITS/MIN: 20 INJECTION INTRAVENOUS at 06:10

## 2024-01-01 RX ADMIN — DOBUTAMINE HYDROCHLORIDE 20 MCG/KG/MIN: 400 INJECTION INTRAVENOUS at 01:10

## 2024-01-01 RX ADMIN — KETOROLAC TROMETHAMINE 15 MG: 30 INJECTION INTRAMUSCULAR; INTRAVENOUS at 04:10

## 2024-01-01 RX ADMIN — HYDROCORTISONE SODIUM SUCCINATE 100 MG: 100 INJECTION, POWDER, FOR SOLUTION INTRAMUSCULAR; INTRAVENOUS at 08:10

## 2024-01-01 RX ADMIN — MAGNESIUM SULFATE HEPTAHYDRATE 2 G: 40 INJECTION, SOLUTION INTRAVENOUS at 06:10

## 2024-01-01 RX ADMIN — OXYCODONE HYDROCHLORIDE AND ACETAMINOPHEN 1 TABLET: 5; 325 TABLET ORAL at 08:10

## 2024-01-01 RX ADMIN — ALLOPURINOL 100 MG: 100 TABLET ORAL at 10:10

## 2024-01-01 RX ADMIN — KETOROLAC TROMETHAMINE 15 MG: 30 INJECTION INTRAMUSCULAR; INTRAVENOUS at 09:10

## 2024-01-01 RX ADMIN — Medication 0.1 MCG/KG/MIN: at 12:10

## 2024-01-01 RX ADMIN — IPRATROPIUM BROMIDE AND ALBUTEROL SULFATE 3 ML: 2.5; .5 SOLUTION RESPIRATORY (INHALATION) at 03:10

## 2024-01-01 RX ADMIN — DOBUTAMINE HYDROCHLORIDE 12.5 MCG/KG/MIN: 400 INJECTION INTRAVENOUS at 11:10

## 2024-01-01 RX ADMIN — NOREPINEPHRINE BITARTRATE 0.08 MCG/KG/MIN: 4 INJECTION, SOLUTION INTRAVENOUS at 08:10

## 2024-01-01 RX ADMIN — CEFAZOLIN 2 G: 2 INJECTION, POWDER, FOR SOLUTION INTRAMUSCULAR; INTRAVENOUS at 10:10

## 2024-01-01 RX ADMIN — DOBUTAMINE HYDROCHLORIDE 12.5 MCG/KG/MIN: 400 INJECTION INTRAVENOUS at 01:10

## 2024-01-09 ENCOUNTER — PATIENT MESSAGE (OUTPATIENT)
Dept: NEPHROLOGY | Facility: CLINIC | Age: 64
End: 2024-01-09
Payer: MEDICARE

## 2024-01-16 ENCOUNTER — LAB VISIT (OUTPATIENT)
Dept: LAB | Facility: HOSPITAL | Age: 64
End: 2024-01-16
Attending: INTERNAL MEDICINE
Payer: MEDICARE

## 2024-01-16 DIAGNOSIS — N18.4 CKD (CHRONIC KIDNEY DISEASE) STAGE 4, GFR 15-29 ML/MIN: ICD-10-CM

## 2024-01-16 LAB
CREAT UR-MCNC: 35.6 MG/DL (ref 23–375)
PROT UR-MCNC: 13 MG/DL (ref 0–15)
PROT/CREAT UR: 0.37 MG/G{CREAT} (ref 0–0.2)

## 2024-01-16 PROCEDURE — 82570 ASSAY OF URINE CREATININE: CPT | Performed by: INTERNAL MEDICINE

## 2024-01-17 NOTE — PROGRESS NOTES
"Subjective:       Patient ID: Ar Sharma is a 63 y.o. male.    Chief Complaint: CKD    HPI    Patient presents to clinic today for routine follow-up.  He has a significant history of DCM with most recentr EF 40-45%  and is s/p ICD placement.  He has hesitance regarding AV access, additionally he is interested in PD if dialysis were to be indicated.     Pt was seen and examined today in clinic.  Since pt's last office visit, states doing well with no specific or new complaints voiced.  Pt denies taking NSAIDs, no GI losses, no abx use, no recent infections, no dysuria, no cardiopulmonary sx's.  Laboratory studies and medications were reviewed.  Discussed with pt today in clinic continue renal friendly diet, hydration status, BP and BS control and avoid NSAIDs as directed.  All Nephrology related questions were answered to his satisfaction.    The past medical, family and social histories were reviewed for this encounter.    Review of Systems   Constitutional: Negative.    HENT: Negative.     Respiratory:  Negative for shortness of breath.    Cardiovascular: Negative.  Negative for leg swelling.   Gastrointestinal: Negative.    Genitourinary: Negative.    Musculoskeletal: Negative.    Skin: Negative.    Neurological:  Negative for dizziness, light-headedness and headaches.   Psychiatric/Behavioral: Negative.            height is 5' 9" (1.753 m) and weight is 81 kg (178 lb 8 oz). His blood pressure is 122/78 and his pulse is 92. His oxygen saturation is 98%.     Lab Results   Component Value Date    WBC 8.49 09/01/2023    HGB 16.7 09/01/2023    HCT 49.4 09/01/2023    MCV 74 (L) 09/01/2023     (H) 09/01/2023        CMP  Sodium   Date Value Ref Range Status   01/16/2024 141 136 - 145 mmol/L Final     Potassium   Date Value Ref Range Status   01/16/2024 4.4 3.5 - 5.1 mmol/L Final     Chloride   Date Value Ref Range Status   01/16/2024 101 95 - 110 mmol/L Final     CO2   Date Value Ref Range Status "   01/16/2024 29 23 - 29 mmol/L Final     Glucose   Date Value Ref Range Status   01/16/2024 102 70 - 110 mg/dL Final     BUN   Date Value Ref Range Status   01/16/2024 81 (H) 2 - 20 mg/dL Final     Creatinine   Date Value Ref Range Status   01/16/2024 4.47 (H) 0.50 - 1.40 mg/dL Final     Calcium   Date Value Ref Range Status   01/16/2024 9.5 8.7 - 10.5 mg/dL Final     Total Protein   Date Value Ref Range Status   09/01/2023 7.5 6.0 - 8.4 g/dL Final     Albumin   Date Value Ref Range Status   01/16/2024 4.2 3.5 - 5.2 g/dL Final     Total Bilirubin   Date Value Ref Range Status   09/01/2023 1.5 (H) 0.1 - 1.0 mg/dL Final     Comment:     For infants and newborns, interpretation of results should be based  on gestational age, weight and in agreement with clinical  observations.    Premature Infant recommended reference ranges:  Up to 24 hours.............<8.0 mg/dL  Up to 48 hours............<12.0 mg/dL  3-5 days..................<15.0 mg/dL  6-29 days.................<15.0 mg/dL       Alkaline Phosphatase   Date Value Ref Range Status   09/01/2023 129 (H) 38 - 126 U/L Final     AST   Date Value Ref Range Status   09/01/2023 51 (H) 15 - 46 U/L Final     ALT   Date Value Ref Range Status   09/01/2023 57 (H) 10 - 44 U/L Final     Anion Gap   Date Value Ref Range Status   01/16/2024 11 8 - 16 mmol/L Final     eGFR if    Date Value Ref Range Status   07/07/2022 17.1 (A) >60 mL/min/1.73 m^2 Final     eGFR if non    Date Value Ref Range Status   07/07/2022 14.8 (A) >60 mL/min/1.73 m^2 Final     Comment:     Calculation used to obtain the estimated glomerular filtration  rate (eGFR) is the CKD-EPI equation.          Current Outpatient Medications on File Prior to Visit   Medication Sig Dispense Refill    allopurinoL (ZYLOPRIM) 100 MG tablet Take 1 tablet (100 mg total) by mouth once daily. 90 tablet 3    amiodarone (PACERONE) 400 MG tablet Take 1 tablet (400 mg total) by mouth once daily. 90  tablet 3    aspirin (ECOTRIN) 81 MG EC tablet Take 1 tablet (81 mg total) by mouth once daily. 60 tablet 2    ergocalciferol (ERGOCALCIFEROL) 50,000 unit Cap TAKE 1 CAPSULE BY MOUTH ONE TIME PER WEEK 12 capsule 3    furosemide (LASIX) 80 MG tablet Take 1 tablet (80 mg total) by mouth 2 (two) times daily. If increased 3 pounds in a day or 5 pounds in a week, take an extra dose of lasix 80mg until those pounds are lost. 120 tablet 3    levothyroxine (SYNTHROID, LEVOTHROID) 175 MCG tablet Take 1 tablet (175 mcg total) by mouth once daily. 90 tablet 3    metoprolol succinate (TOPROL-XL) 25 MG 24 hr tablet TAKE 1 TABLET BY MOUTH EVERY DAY 90 tablet 3    mexiletine (MEXITIL) 150 MG Cap Take 1 capsule (150 mg total) by mouth 2 (two) times daily with meals. 180 capsule 3    nitroGLYCERIN (NITROSTAT) 0.4 MG SL tablet Place 1 tablet (0.4 mg total) under the tongue every 5 (five) minutes as needed for Chest pain. 20 tablet 1    pravastatin (PRAVACHOL) 40 MG tablet TAKE 1 TABLET BY MOUTH EVERY DAY 90 tablet 3    sacubitriL-valsartan (ENTRESTO) 24-26 mg per tablet Take 1 tablet by mouth 2 (two) times daily. 180 tablet 3    Bacillus coagulans/inulin (PROBIOTIC WITH PREBIOTIC ORAL) Take 2 capsules by mouth 2 (two) times a day.      [DISCONTINUED] B.animalis,bifid,infantis,long (PROBIOTIC 4X ORAL) Take by mouth.       No current facility-administered medications on file prior to visit.       Objective:        Physical Exam  Vitals and nursing note reviewed.   Constitutional:       Appearance: Normal appearance.   HENT:      Head: Normocephalic and atraumatic.   Eyes:      Extraocular Movements: Extraocular movements intact.      Pupils: Pupils are equal, round, and reactive to light.   Cardiovascular:      Rate and Rhythm: Normal rate and regular rhythm.   Pulmonary:      Effort: Pulmonary effort is normal.   Abdominal:      General: Bowel sounds are normal.      Palpations: Abdomen is soft.   Musculoskeletal:         General:  Normal range of motion.      Right lower leg: No edema.      Left lower leg: No edema.   Skin:     General: Skin is warm and dry.   Neurological:      General: No focal deficit present.      Mental Status: He is alert and oriented to person, place, and time.   Psychiatric:         Mood and Affect: Mood normal.         Behavior: Behavior normal.         Thought Content: Thought content normal.         Judgment: Judgment normal.           Assessment:       1. CKD (chronic kidney disease), symptom management only, stage 5    2. Secondary hyperparathyroidism of renal origin    3. Vitamin D deficiency    4. Non-ischemic cardiomyopathy    5. Chronic systolic congestive heart failure    6. Prediabetes        Plan:         CKD Stage 5, appears to be Cardiorenal in etiology.  Discussed treating cardiac function will improve/stabilize kidney function. Jardiance added on last visit.  Instructed to avoid NSAIDs, continue low sodium diet. He reports splurged on high Na foods during the holidays and would like to wait until next visit to see if Cr stabilizes prior to surgical eval for PD.     2-3. Mineral and Bone Disease/SHPT with noted mild increase to 232.5.  On ergocalciferol weekly; Ca 9.5.  Will add Vit D3 1,000 U daily in between.  Will redraw labs prior to next visit and add Calcitriol 0.25 mcg MWF with no change. Continue to trend PTH.      4-5. Followed by Cards and Electrophysiology. Systolic HF s/p ICD; Jardiance previously added as above for CKD indications.  Continue low sodium diet and Entresto as prescribed.    6. Followed by PCP. Most recent HgA1c 6.0, 9/1/23; stable. Reiterated how hyperglycemia negatively impacts her kidney function again today with pt. Advised pt on importance of BP and diabetes control, wt control change in diet, avoiding sodas, fried food, and artificial sweeteners. Lifestyle and wt control discussed with pt. Continue current regimen as prescribed.     Return to clinic in 3 mos    40 minutes  of total time spent on the encounter, which includes face to face time and non-face to face time preparing to see the patient (eg, review of tests), obtaining and/or reviewing separately obtained history, documenting clinical information in the electronic or other health record, Independently interpreting results and communicating results to the patient/family/caregiver, or care coordination.    Donna Ramírez, IVETP-C

## 2024-01-22 ENCOUNTER — OFFICE VISIT (OUTPATIENT)
Dept: UROLOGY | Facility: CLINIC | Age: 64
End: 2024-01-22
Payer: MEDICARE

## 2024-01-22 ENCOUNTER — LAB VISIT (OUTPATIENT)
Dept: LAB | Facility: HOSPITAL | Age: 64
End: 2024-01-22
Attending: UROLOGY
Payer: MEDICARE

## 2024-01-22 VITALS
SYSTOLIC BLOOD PRESSURE: 76 MMHG | HEART RATE: 93 BPM | HEIGHT: 69 IN | DIASTOLIC BLOOD PRESSURE: 53 MMHG | WEIGHT: 175.81 LBS | BODY MASS INDEX: 26.04 KG/M2

## 2024-01-22 DIAGNOSIS — R97.20 ELEVATED PSA: ICD-10-CM

## 2024-01-22 DIAGNOSIS — Z12.5 PROSTATE CANCER SCREENING: ICD-10-CM

## 2024-01-22 DIAGNOSIS — R97.20 ELEVATED PSA: Primary | ICD-10-CM

## 2024-01-22 LAB — COMPLEXED PSA SERPL-MCNC: 2.8 NG/ML (ref 0–4)

## 2024-01-22 PROCEDURE — 1159F MED LIST DOCD IN RCRD: CPT | Mod: CPTII,S$GLB,, | Performed by: UROLOGY

## 2024-01-22 PROCEDURE — 3078F DIAST BP <80 MM HG: CPT | Mod: CPTII,S$GLB,, | Performed by: UROLOGY

## 2024-01-22 PROCEDURE — 99999 PR PBB SHADOW E&M-EST. PATIENT-LVL III: CPT | Mod: PBBFAC,,, | Performed by: UROLOGY

## 2024-01-22 PROCEDURE — 3074F SYST BP LT 130 MM HG: CPT | Mod: CPTII,S$GLB,, | Performed by: UROLOGY

## 2024-01-22 PROCEDURE — 36415 COLL VENOUS BLD VENIPUNCTURE: CPT | Performed by: UROLOGY

## 2024-01-22 PROCEDURE — 84153 ASSAY OF PSA TOTAL: CPT | Performed by: UROLOGY

## 2024-01-22 PROCEDURE — 99214 OFFICE O/P EST MOD 30 MIN: CPT | Mod: S$GLB,,, | Performed by: UROLOGY

## 2024-01-22 PROCEDURE — 3008F BODY MASS INDEX DOCD: CPT | Mod: CPTII,S$GLB,, | Performed by: UROLOGY

## 2024-01-22 NOTE — PROGRESS NOTES
[facility]   Clinic Note    SUBJECTIVE:     Chief Complaint   Patient presents with    Annual Exam       Referral from: No ref. provider found.    History of Present Illness:  Ar Sharma is a 63 y.o. male who presents to clinic for evaluation of elevated PSA.      Patient here for elevated PSA.  PSA has been up trending since 2018.  Most recent value was 4.2.  Patient has never had an abnormal PSA but before.  No family history of prostate cancer.  Patient currently takes aspirin daily.  He has a cardiac history, including an MI in his 20s.  His most recent cardiology note reveals that his EF most recently was in the 50s.  He does currently have an ICD and pacemaker in place.  He also has CKD in his baseline creatinine is greater than 3.    PSA:  Lab Results   Component Value Date    PSA 4.2 (H) 05/04/2023    PSA 3.4 04/04/2022    PSADIAG 2.8 01/22/2024    PSADIAG 1.3 10/02/2018    PSATOTAL 2.5 08/10/2023    PSAFREE 0.91 08/10/2023       Previously abnormal PSA: no.   Previous biopsy: no.   Family history of prostate cancer: no.      08/14/2023  Here for follow up. Repeat PSA 2.5    01/22/2024  PSA today 2.8  Overall doing well    Patient endorses no additional complaints at this time.    Anticoagulation/Antiplatelets:  Yes; ASA    Past Medical History:   Diagnosis Date    Cardiomyopathy     CKD (chronic kidney disease) stage 4, GFR 15-29 ml/min     Congestive heart failure (CHF) 2015    Edema     Essential (primary) hypertension 05/18/2022    Formatting of this note might be different from the original. Converted from [employer]: Description - ESSENTIAL HYPERTENSION, BENIGN    Heart attack     HLD (hyperlipidemia)     Hypertension     Hyperuricemia     Hypocalcemia     Renal cyst, left     Secondary hyperparathyroidism     Thyroid disease     V tach     Vitamin D deficiency        Past Surgical History:   Procedure Laterality Date    ablations  03/05/2018    albations  02/01/2018    COLONOSCOPY N/A  2018    Procedure: COLONOSCOPY/suprep;  Surgeon: Ananya Morillo MD;  Location: [facility] ENDO;  Service: Endoscopy;  Laterality: N/A;    defibulater N/A     ESOPHAGOGASTRODUODENOSCOPY N/A 2018    Procedure: EGD (ESOPHAGOGASTRODUODENOSCOPY);  Surgeon: Ananya Morillo MD;  Location: [facility] ENDO;  Service: Endoscopy;  Laterality: N/A;    JOINT REPLACEMENT Bilateral 10/2016    knees, bilat    LEFT HEART CATHETERIZATION N/A 2020    Procedure: Left heart cath;  Surgeon: Shahram Stewart MD;  Location: [facility] CATH LAB/EP;  Service: Cardiology;  Laterality: N/A;    LEFT HEART CATHETERIZATION Left 2022    Procedure: Left heart cath;  Surgeon: Juan Roque MD;  Location: [facility] CATH LAB/EP;  Service: Cardiology;  Laterality: Left;    REPLACEMENT OF IMPLANTABLE CARDIOVERTER-DEFIBRILLATOR (ICD) GENERATOR Left 2023    Procedure: REPLACEMENT, ICD GENERATOR;  Surgeon: River Tenorio MD;  Location: [facility] EP LAB;  Service: Cardiology;  Laterality: Left;  ANTHONY, CRTD gen chg, MDT, MAC, DM, 3prep       Family History   Problem Relation Age of Onset    Cancer Mother     Hypertension Mother     Stroke Mother     Breast cancer Mother     Alcohol abuse Father     Kidney disease Father     Arthritis Sister     No Known Problems Brother     No Known Problems Daughter     No Known Problems Son        Social History     Tobacco Use    Smoking status: Former     Current packs/day: 0.00     Average packs/day: 1 pack/day for 33.2 years (33.2 ttl pk-yrs)     Types: Cigarettes     Start date: 1979     Quit date: 3/3/2012     Years since quittin.8     Passive exposure: Past    Smokeless tobacco: Never   Substance Use Topics    Alcohol use: Yes     Comment: rare    Drug use: No       Current Outpatient Medications on File Prior to Visit   Medication Sig Dispense Refill    allopurinoL (ZYLOPRIM) 100 MG tablet Take 1 tablet (100 mg total) by mouth once daily. 90 tablet 3    amiodarone (PACERONE) 400 MG tablet Take  1 tablet (400 mg total) by mouth once daily. 90 tablet 3    B.animalis,bifid,infantis,long (PROBIOTIC 4X ORAL) Take by mouth.      Bacillus coagulans/inulin (PROBIOTIC WITH PREBIOTIC ORAL) Take 2 capsules by mouth 2 (two) times a day.      ergocalciferol (ERGOCALCIFEROL) 50,000 unit Cap TAKE 1 CAPSULE BY MOUTH ONE TIME PER WEEK 12 capsule 3    levothyroxine (SYNTHROID, LEVOTHROID) 175 MCG tablet Take 1 tablet (175 mcg total) by mouth once daily. 90 tablet 3    metoprolol succinate (TOPROL-XL) 25 MG 24 hr tablet TAKE 1 TABLET BY MOUTH EVERY DAY 90 tablet 3    mexiletine (MEXITIL) 150 MG Cap Take 1 capsule (150 mg total) by mouth 2 (two) times daily with meals. 180 capsule 3    nitroGLYCERIN (NITROSTAT) 0.4 MG SL tablet Place 1 tablet (0.4 mg total) under the tongue every 5 (five) minutes as needed for Chest pain. 20 tablet 1    pravastatin (PRAVACHOL) 40 MG tablet TAKE 1 TABLET BY MOUTH EVERY DAY 90 tablet 3    sacubitriL-valsartan (ENTRESTO) 24-26 mg per tablet Take 1 tablet by mouth 2 (two) times daily. 180 tablet 3    aspirin (ECOTRIN) 81 MG EC tablet Take 1 tablet (81 mg total) by mouth once daily. 60 tablet 2    furosemide (LASIX) 80 MG tablet Take 1 tablet (80 mg total) by mouth 2 (two) times daily. If increased 3 pounds in a day or 5 pounds in a week, take an extra dose of lasix 80mg until those pounds are lost. 120 tablet 3     No current facility-administered medications on file prior to visit.       Review of patient's allergies indicates:   Allergen Reactions    Lokelma [sodium zirconium cyclosilicate] Other (See Comments)     Fluid retention, weight gain, CHF excerebration, severe constipation    Atorvastatin Other (See Comments)     Joint pain    Jardiance [empagliflozin] Other (See Comments)     Chest pains, significant weight loss, lower blood pressure       Review of Systems:  A review of 10+ systems was conducted with pertinent positive and negative findings documented in HPI with all other systems  "reviewed and negative.    OBJECTIVE:     Estimated body mass index is 25.96 kg/m² as calculated from the following:    Height as of this encounter: 5' 9" (1.753 m).    Weight as of this encounter: 79.8 kg (175 lb 13.1 oz).    Vital Signs (Most Recent)  Vitals:    01/22/24 1324   BP: (!) 76/53   Pulse: 93       Physical Exam:  GENERAL: patient sitting comfortably  HEENT: normocephalic  NECK: supple, no JVD  PULM: normal chest rise, no increased WOB  HEART: non-diaphoretic  ABDO: soft, nondistended, nontender  BACK: no CVA tenderness bilaterally  SKIN: warm, dry, well perfused  EXT: no bruising or edema  NEURO: grossly normal with no focal deficits  PSYCH: appropriate mood and affect    Genitourinary Exam:  MIGUELINA: appropriate sphincter tone, smooth, non-rubbery, rubbery prostate w/o nodules    Lab Results   Component Value Date    BUN 81 (H) 01/16/2024    CREATININE 4.47 (H) 01/16/2024    WBC 8.49 09/01/2023    HGB 16.7 09/01/2023    HCT 49.4 09/01/2023     (H) 09/01/2023    AST 51 (H) 09/01/2023    ALT 57 (H) 09/01/2023    ALKPHOS 129 (H) 09/01/2023    ALBUMIN 4.2 01/16/2024    HGBA1C 6.0 (H) 09/01/2023        PSA:  Lab Results   Component Value Date    PSA 4.2 (H) 05/04/2023    PSA 3.4 04/04/2022    PSADIAG 2.8 01/22/2024    PSADIAG 1.3 10/02/2018    PSATOTAL 2.5 08/10/2023    PSAFREE 0.91 08/10/2023       Testosterone:  No results found for: "TOTALTESTOST", "TESTOSTERONE"     Imaging:  I have personally reviewed all relevant imaging.    No results found for this or any previous visit (from the past 2160 hour(s)).    No results found for this or any previous visit (from the past 2160 hour(s)).  X-Ray Abdomen Flat And Erect  Narrative: EXAMINATION:  XR ABDOMEN FLAT AND ERECT    CLINICAL HISTORY:  Lower abdominal pain, unspecified    TECHNIQUE:  Flat and erect AP views of the abdomen were performed.    COMPARISON:  None    FINDINGS:  No free air or bowel obstruction.  Suspected cardiomegaly.  Cardiac assist lead " wires overlying the right atrium, right ventricle and coronary sinus.    Moderate to large stool burden.  No abnormal calcifications.  Impression: Moderate to large stool burden.    Electronically signed by: Mason Mendoza  Date:    12/13/2023  Time:    11:19      ASSESSMENT     1. Elevated PSA    2. Prostate cancer screening          PLAN:     Elevated PSA  Prostate cancer screening    - PSA has normalized. Continue to trend q6mo    Fan Mckeon MD  Urology  [facility]    Disclaimer: This note has been generated using voice-recognition software. There may be typographical errors that have been missed during proof-reading.

## 2024-01-23 RX ORDER — ERGOCALCIFEROL 1.25 MG/1
CAPSULE ORAL
Qty: 12 CAPSULE | Refills: 3 | Status: SHIPPED | OUTPATIENT
Start: 2024-01-23

## 2024-01-24 ENCOUNTER — OFFICE VISIT (OUTPATIENT)
Dept: NEPHROLOGY | Facility: CLINIC | Age: 64
End: 2024-01-24
Payer: MEDICARE

## 2024-01-24 VITALS
HEART RATE: 92 BPM | SYSTOLIC BLOOD PRESSURE: 122 MMHG | BODY MASS INDEX: 26.44 KG/M2 | OXYGEN SATURATION: 98 % | HEIGHT: 69 IN | DIASTOLIC BLOOD PRESSURE: 78 MMHG | WEIGHT: 178.5 LBS

## 2024-01-24 DIAGNOSIS — I42.8 NON-ISCHEMIC CARDIOMYOPATHY: ICD-10-CM

## 2024-01-24 DIAGNOSIS — E55.9 VITAMIN D DEFICIENCY: ICD-10-CM

## 2024-01-24 DIAGNOSIS — N18.5 CKD (CHRONIC KIDNEY DISEASE), SYMPTOM MANAGEMENT ONLY, STAGE 5: Primary | ICD-10-CM

## 2024-01-24 DIAGNOSIS — N25.81 SECONDARY HYPERPARATHYROIDISM OF RENAL ORIGIN: ICD-10-CM

## 2024-01-24 DIAGNOSIS — I50.22 CHRONIC SYSTOLIC CONGESTIVE HEART FAILURE: ICD-10-CM

## 2024-01-24 DIAGNOSIS — R73.03 PREDIABETES: ICD-10-CM

## 2024-01-24 PROCEDURE — 1160F RVW MEDS BY RX/DR IN RCRD: CPT | Mod: CPTII,S$GLB,, | Performed by: NURSE PRACTITIONER

## 2024-01-24 PROCEDURE — 99999 PR PBB SHADOW E&M-EST. PATIENT-LVL III: CPT | Mod: PBBFAC,,, | Performed by: NURSE PRACTITIONER

## 2024-01-24 PROCEDURE — 3066F NEPHROPATHY DOC TX: CPT | Mod: CPTII,S$GLB,, | Performed by: NURSE PRACTITIONER

## 2024-01-24 PROCEDURE — 3078F DIAST BP <80 MM HG: CPT | Mod: CPTII,S$GLB,, | Performed by: NURSE PRACTITIONER

## 2024-01-24 PROCEDURE — 3074F SYST BP LT 130 MM HG: CPT | Mod: CPTII,S$GLB,, | Performed by: NURSE PRACTITIONER

## 2024-01-24 PROCEDURE — 3008F BODY MASS INDEX DOCD: CPT | Mod: CPTII,S$GLB,, | Performed by: NURSE PRACTITIONER

## 2024-01-24 PROCEDURE — 99215 OFFICE O/P EST HI 40 MIN: CPT | Mod: S$GLB,,, | Performed by: NURSE PRACTITIONER

## 2024-01-24 PROCEDURE — 1159F MED LIST DOCD IN RCRD: CPT | Mod: CPTII,S$GLB,, | Performed by: NURSE PRACTITIONER

## 2024-01-24 NOTE — PATIENT INSTRUCTIONS
Continue all medications as reviewed today:  Add Vit D3 1000-2000U daily with weekly ergocalciferol    Keep blood pressure controlled  Low sodium diet: avoid/limit salt, processed foods (boxed or canned), fried foods, fast foods, etc as discussed    Keep blood sugar controlled  Low carbohydrate/sugar diet: avoid/limit sugary drinks, white breads, pasta, rice, etc as discussed    Drink water, flavored water/diluted juice (drink to thirst) daily as tolerated with no swelling/shortness of breath    Avoid NSAIDS: Advil, Ibuprofen, Aleve, Naproxen, Meloxicam, etc. (Aspirin is ok), Tylenol is Best    Exercise as tolerated     Follow up in 3 months and have labs drawn 1-2 weeks prior to visit

## 2024-01-31 DIAGNOSIS — I47.20 V-TACH: ICD-10-CM

## 2024-01-31 RX ORDER — FUROSEMIDE 80 MG/1
80 TABLET ORAL 2 TIMES DAILY
Qty: 120 TABLET | Refills: 3 | Status: SHIPPED | OUTPATIENT
Start: 2024-01-31 | End: 2024-06-17 | Stop reason: SDUPTHER

## 2024-01-31 RX ORDER — MEXILETINE HYDROCHLORIDE 150 MG/1
150 CAPSULE ORAL 2 TIMES DAILY WITH MEALS
Qty: 180 CAPSULE | Refills: 3 | Status: SHIPPED | OUTPATIENT
Start: 2024-01-31

## 2024-01-31 NOTE — TELEPHONE ENCOUNTER
----- Message from Soheila Mancera sent at 1/31/2024 10:51 AM CST -----  Contact: pt  Type:  RX Refill Request    Who Called: pt  Refill or New Rx:refill  RX Name and Strength:mexiletine (MEXITIL) 150 MG Cap, furosemide (LASIX) 80 MG tablet   Preferred Pharmacy with phone number:[facility]/pharmacy #[id number] - [address] AT [address] OF [address] [address]  Phone: [phone] Fax: [phone]  Local or Mail Order:local   Ordering Provider:Terry brown  Would the patient rather a call back or a response via [facility]? call  Best Call Back Number:[phone]   Additional Information:

## 2024-01-31 NOTE — TELEPHONE ENCOUNTER
Care Due:                  Date            Visit Type   Department     Provider  --------------------------------------------------------------------------------                                EP -                              PRIMARY      [facility] FAMILY  Last Visit: 09-      CARE ([facility])   MEDICINE       Jc Elliott                              EP -                              [facility] FAMILY  Next Visit: 03-      CARE ([facility])   MEDICINE       Jc Elliott                                                            Last  Test          Frequency    Reason                     Performed    Due Date  --------------------------------------------------------------------------------    TSH.........  12 months..  levothyroxine............  05- 04-    Uric Acid...  12 months..  allopurinoL..............  10-   10-    [facility] Embedded Care Due Messages. Reference number: [id number].   1/31/2024 11:11:06 AM CST

## 2024-01-31 NOTE — TELEPHONE ENCOUNTER
----- Message from Annie Yap sent at 1/31/2024 11:41 AM CST -----  Type:  Needs Medical Advice    Who Called:  Pt    Pharmacy name and phone #: [facility]/pharmacy #[id number] - [address] AT [facility]   Phone: [phone]  Fax: [phone]  Would the patient rather a call back or a response via [facility]?  call  Best Call Back Number:   [phone]  Additional Information:  Pt would like a call back regarding a prescription that his cardiologist usually prescribes but Dr is no longer within the [facility].  Pt wants to know if Dr. Elliott can prescribe medication meanwhile pt finds a new cardiologist.         mexiletine (MEXITIL) 150 MG Cap [[id number]]       furosemide (LASIX) 80 MG tablet [[id number]

## 2024-02-09 ENCOUNTER — CLINICAL SUPPORT (OUTPATIENT)
Dept: CARDIOLOGY | Facility: HOSPITAL | Age: 64
End: 2024-02-09
Attending: INTERNAL MEDICINE
Payer: MEDICARE

## 2024-02-09 ENCOUNTER — CLINICAL SUPPORT (OUTPATIENT)
Dept: CARDIOLOGY | Facility: HOSPITAL | Age: 64
End: 2024-02-09
Payer: MEDICARE

## 2024-02-09 DIAGNOSIS — Z95.810 PRESENCE OF AUTOMATIC (IMPLANTABLE) CARDIAC DEFIBRILLATOR: ICD-10-CM

## 2024-02-09 LAB
OHS CV AF BURDEN PERCENT: < 1
OHS CV BIV PACING PERCENT: 99.89 %
OHS CV DC REMOTE DEVICE TYPE: NORMAL
OHS CV ICD SHOCK: NO
OHS CV RV PACING PERCENT: 99.95 %

## 2024-02-09 PROCEDURE — 93295 DEV INTERROG REMOTE 1/2/MLT: CPT | Mod: ,,, | Performed by: INTERNAL MEDICINE

## 2024-02-09 PROCEDURE — 93296 REM INTERROG EVL PM/IDS: CPT | Performed by: INTERNAL MEDICINE

## 2024-02-12 ENCOUNTER — LAB VISIT (OUTPATIENT)
Dept: LAB | Facility: HOSPITAL | Age: 64
End: 2024-02-12
Attending: UROLOGY
Payer: MEDICARE

## 2024-02-12 DIAGNOSIS — R97.20 ELEVATED PSA: ICD-10-CM

## 2024-02-12 LAB — COMPLEXED PSA SERPL-MCNC: 1.8 NG/ML (ref 0–4)

## 2024-02-12 PROCEDURE — 36415 COLL VENOUS BLD VENIPUNCTURE: CPT | Mod: PN | Performed by: UROLOGY

## 2024-02-12 PROCEDURE — 84153 ASSAY OF PSA TOTAL: CPT | Performed by: UROLOGY

## 2024-03-04 ENCOUNTER — LAB VISIT (OUTPATIENT)
Dept: LAB | Facility: HOSPITAL | Age: 64
End: 2024-03-04
Attending: STUDENT IN AN ORGANIZED HEALTH CARE EDUCATION/TRAINING PROGRAM
Payer: MEDICARE

## 2024-03-04 DIAGNOSIS — R73.9 HYPERGLYCEMIA: ICD-10-CM

## 2024-03-04 DIAGNOSIS — E03.9 HYPOTHYROIDISM, UNSPECIFIED TYPE: ICD-10-CM

## 2024-03-04 DIAGNOSIS — E78.00 PURE HYPERCHOLESTEROLEMIA: ICD-10-CM

## 2024-03-04 DIAGNOSIS — N18.4 CKD (CHRONIC KIDNEY DISEASE) STAGE 4, GFR 15-29 ML/MIN: ICD-10-CM

## 2024-03-04 LAB
ALBUMIN SERPL BCP-MCNC: 4 G/DL (ref 3.5–5.2)
ALP SERPL-CCNC: 146 U/L (ref 38–126)
ALT SERPL W/O P-5'-P-CCNC: 35 U/L (ref 10–44)
ANION GAP SERPL CALC-SCNC: 12 MMOL/L (ref 8–16)
AST SERPL-CCNC: 39 U/L (ref 15–46)
BASOPHILS # BLD AUTO: 0.1 K/UL (ref 0–0.2)
BASOPHILS NFR BLD: 1.3 % (ref 0–1.9)
BILIRUB SERPL-MCNC: 2.3 MG/DL (ref 0.1–1)
CALCIUM SERPL-MCNC: 9.1 MG/DL (ref 8.7–10.5)
CHLORIDE SERPL-SCNC: 102 MMOL/L (ref 95–110)
CHOLEST SERPL-MCNC: 124 MG/DL (ref 120–199)
CHOLEST/HDLC SERPL: 3.2 {RATIO} (ref 2–5)
CO2 SERPL-SCNC: 26 MMOL/L (ref 23–29)
CREAT SERPL-MCNC: 4.59 MG/DL (ref 0.5–1.4)
DIFFERENTIAL METHOD BLD: ABNORMAL
EOSINOPHIL # BLD AUTO: 0.2 K/UL (ref 0–0.5)
EOSINOPHIL NFR BLD: 2.7 % (ref 0–8)
ERYTHROCYTE [DISTWIDTH] IN BLOOD BY AUTOMATED COUNT: 14.8 % (ref 11.5–14.5)
EST. GFR  (NO RACE VARIABLE): 13.5 ML/MIN/1.73 M^2
ESTIMATED AVG GLUCOSE: 114 MG/DL (ref 68–131)
GLUCOSE SERPL-MCNC: 87 MG/DL (ref 70–110)
HBA1C MFR BLD: 5.6 % (ref 4–5.6)
HCT VFR BLD AUTO: 44.2 % (ref 40–54)
HDLC SERPL-MCNC: 39 MG/DL (ref 40–75)
HDLC SERPL: 31.5 % (ref 20–50)
HGB BLD-MCNC: 15.2 G/DL (ref 14–18)
IMM GRANULOCYTES # BLD AUTO: 0.06 K/UL (ref 0–0.04)
IMM GRANULOCYTES NFR BLD AUTO: 0.8 % (ref 0–0.5)
LDLC SERPL CALC-MCNC: 71.8 MG/DL (ref 63–159)
LYMPHOCYTES # BLD AUTO: 0.9 K/UL (ref 1–4.8)
LYMPHOCYTES NFR BLD: 11.8 % (ref 18–48)
MCH RBC QN AUTO: 27.4 PG (ref 27–31)
MCHC RBC AUTO-ENTMCNC: 34.4 G/DL (ref 32–36)
MCV RBC AUTO: 80 FL (ref 82–98)
MONOCYTES # BLD AUTO: 0.9 K/UL (ref 0.3–1)
MONOCYTES NFR BLD: 11.1 % (ref 4–15)
NEUTROPHILS # BLD AUTO: 5.5 K/UL (ref 1.8–7.7)
NEUTROPHILS NFR BLD: 72.3 % (ref 38–73)
NONHDLC SERPL-MCNC: 85 MG/DL
NRBC BLD-RTO: 0 /100 WBC
PLATELET # BLD AUTO: 642 K/UL (ref 150–450)
PMV BLD AUTO: 12.2 FL (ref 9.2–12.9)
POTASSIUM SERPL-SCNC: 4.3 MMOL/L (ref 3.5–5.1)
PROT SERPL-MCNC: 7.4 G/DL (ref 6–8.4)
RBC # BLD AUTO: 5.55 M/UL (ref 4.6–6.2)
SODIUM SERPL-SCNC: 140 MMOL/L (ref 136–145)
T4 FREE SERPL-MCNC: 3.25 NG/DL (ref 0.71–1.51)
TRIGL SERPL-MCNC: 66 MG/DL (ref 30–150)
TSH SERPL DL<=0.005 MIU/L-ACNC: 0.28 UIU/ML (ref 0.4–4)
UUN UR-MCNC: 77 MG/DL (ref 2–20)
WBC # BLD AUTO: 7.65 K/UL (ref 3.9–12.7)

## 2024-03-04 PROCEDURE — 84439 ASSAY OF FREE THYROXINE: CPT | Performed by: STUDENT IN AN ORGANIZED HEALTH CARE EDUCATION/TRAINING PROGRAM

## 2024-03-04 PROCEDURE — 80053 COMPREHEN METABOLIC PANEL: CPT | Mod: PN | Performed by: STUDENT IN AN ORGANIZED HEALTH CARE EDUCATION/TRAINING PROGRAM

## 2024-03-04 PROCEDURE — 84443 ASSAY THYROID STIM HORMONE: CPT | Mod: PN | Performed by: STUDENT IN AN ORGANIZED HEALTH CARE EDUCATION/TRAINING PROGRAM

## 2024-03-04 PROCEDURE — 83036 HEMOGLOBIN GLYCOSYLATED A1C: CPT | Performed by: STUDENT IN AN ORGANIZED HEALTH CARE EDUCATION/TRAINING PROGRAM

## 2024-03-04 PROCEDURE — 80061 LIPID PANEL: CPT | Performed by: STUDENT IN AN ORGANIZED HEALTH CARE EDUCATION/TRAINING PROGRAM

## 2024-03-04 PROCEDURE — 85025 COMPLETE CBC W/AUTO DIFF WBC: CPT | Mod: PN | Performed by: STUDENT IN AN ORGANIZED HEALTH CARE EDUCATION/TRAINING PROGRAM

## 2024-03-04 PROCEDURE — 36415 COLL VENOUS BLD VENIPUNCTURE: CPT | Mod: PN | Performed by: STUDENT IN AN ORGANIZED HEALTH CARE EDUCATION/TRAINING PROGRAM

## 2024-03-11 ENCOUNTER — OFFICE VISIT (OUTPATIENT)
Dept: FAMILY MEDICINE | Facility: CLINIC | Age: 64
End: 2024-03-11
Payer: MEDICARE

## 2024-03-11 VITALS
OXYGEN SATURATION: 88 % | DIASTOLIC BLOOD PRESSURE: 66 MMHG | WEIGHT: 175.5 LBS | TEMPERATURE: 98 F | HEART RATE: 77 BPM | SYSTOLIC BLOOD PRESSURE: 100 MMHG | BODY MASS INDEX: 26 KG/M2 | HEIGHT: 69 IN

## 2024-03-11 DIAGNOSIS — N18.5 CHRONIC KIDNEY DISEASE (CKD), STAGE V: ICD-10-CM

## 2024-03-11 DIAGNOSIS — I42.8 NON-ISCHEMIC CARDIOMYOPATHY: ICD-10-CM

## 2024-03-11 DIAGNOSIS — I25.119 ATHEROSCLEROSIS OF NATIVE CORONARY ARTERY OF NATIVE HEART WITH ANGINA PECTORIS: ICD-10-CM

## 2024-03-11 DIAGNOSIS — E03.9 HYPOTHYROIDISM, UNSPECIFIED TYPE: ICD-10-CM

## 2024-03-11 DIAGNOSIS — I50.20 HFREF (HEART FAILURE WITH REDUCED EJECTION FRACTION): Primary | ICD-10-CM

## 2024-03-11 DIAGNOSIS — E78.2 MIXED HYPERLIPIDEMIA: ICD-10-CM

## 2024-03-11 DIAGNOSIS — J43.9 PULMONARY EMPHYSEMA, UNSPECIFIED EMPHYSEMA TYPE: ICD-10-CM

## 2024-03-11 DIAGNOSIS — I70.0 AORTIC ATHEROSCLEROSIS: ICD-10-CM

## 2024-03-11 PROCEDURE — 3074F SYST BP LT 130 MM HG: CPT | Mod: CPTII,S$GLB,, | Performed by: STUDENT IN AN ORGANIZED HEALTH CARE EDUCATION/TRAINING PROGRAM

## 2024-03-11 PROCEDURE — 3008F BODY MASS INDEX DOCD: CPT | Mod: CPTII,S$GLB,, | Performed by: STUDENT IN AN ORGANIZED HEALTH CARE EDUCATION/TRAINING PROGRAM

## 2024-03-11 PROCEDURE — 1160F RVW MEDS BY RX/DR IN RCRD: CPT | Mod: CPTII,S$GLB,, | Performed by: STUDENT IN AN ORGANIZED HEALTH CARE EDUCATION/TRAINING PROGRAM

## 2024-03-11 PROCEDURE — 3044F HG A1C LEVEL LT 7.0%: CPT | Mod: CPTII,S$GLB,, | Performed by: STUDENT IN AN ORGANIZED HEALTH CARE EDUCATION/TRAINING PROGRAM

## 2024-03-11 PROCEDURE — 3066F NEPHROPATHY DOC TX: CPT | Mod: CPTII,S$GLB,, | Performed by: STUDENT IN AN ORGANIZED HEALTH CARE EDUCATION/TRAINING PROGRAM

## 2024-03-11 PROCEDURE — 3078F DIAST BP <80 MM HG: CPT | Mod: CPTII,S$GLB,, | Performed by: STUDENT IN AN ORGANIZED HEALTH CARE EDUCATION/TRAINING PROGRAM

## 2024-03-11 PROCEDURE — 1159F MED LIST DOCD IN RCRD: CPT | Mod: CPTII,S$GLB,, | Performed by: STUDENT IN AN ORGANIZED HEALTH CARE EDUCATION/TRAINING PROGRAM

## 2024-03-11 PROCEDURE — 99396 PREV VISIT EST AGE 40-64: CPT | Mod: S$GLB,,, | Performed by: STUDENT IN AN ORGANIZED HEALTH CARE EDUCATION/TRAINING PROGRAM

## 2024-03-11 RX ORDER — LEVOTHYROXINE SODIUM 150 UG/1
150 TABLET ORAL DAILY
Qty: 90 TABLET | Refills: 3 | Status: SHIPPED | OUTPATIENT
Start: 2024-03-11

## 2024-03-15 PROBLEM — I50.41 ACUTE COMBINED SYSTOLIC AND DIASTOLIC CONGESTIVE HEART FAILURE: Status: RESOLVED | Noted: 2022-09-26 | Resolved: 2024-03-15

## 2024-03-15 PROBLEM — E78.2 MIXED HYPERLIPIDEMIA: Status: ACTIVE | Noted: 2018-10-16

## 2024-03-15 RX ORDER — ASPIRIN 81 MG/1
81 TABLET ORAL DAILY
Qty: 60 TABLET | Refills: 2 | Status: SHIPPED | OUTPATIENT
Start: 2024-03-15

## 2024-03-15 NOTE — PROGRESS NOTES
Patient ID: Ar Sharma is a 63 y.o. male.     Chief Complaint: wellness    Follow-up  Pertinent negatives include no chest pain, coughing, fever, headaches, myalgias, nausea, rash, vomiting or weakness.      Patient here for wellness exam. He has no complaints today. He denies recent chest pain and shortness of breath. He denies fevers and chills. He reports normal bowel movements.     Review of Systems  Review of Systems   Constitutional:  Negative for fever.   HENT:  Negative for ear pain and sinus pain.    Eyes:  Negative for discharge.   Respiratory:  Negative for cough and shortness of breath.    Cardiovascular:  Negative for chest pain and leg swelling.   Gastrointestinal:  Negative for diarrhea, nausea and vomiting.   Genitourinary:  Negative for urgency.   Musculoskeletal:  Negative for myalgias.   Skin:  Negative for rash.   Neurological:  Negative for weakness and headaches.   Psychiatric/Behavioral:  Negative for depression.    All other systems reviewed and are negative.      Currently Medications  Current Outpatient Medications on File Prior to Visit   Medication Sig Dispense Refill    allopurinoL (ZYLOPRIM) 100 MG tablet Take 1 tablet (100 mg total) by mouth once daily. 90 tablet 3    amiodarone (PACERONE) 400 MG tablet Take 1 tablet (400 mg total) by mouth once daily. 90 tablet 3    Bacillus coagulans/inulin (PROBIOTIC WITH PREBIOTIC ORAL) Take 2 capsules by mouth 2 (two) times a day.      ergocalciferol (ERGOCALCIFEROL) 50,000 unit Cap TAKE 1 CAPSULE BY MOUTH ONE TIME PER WEEK 12 capsule 3    furosemide (LASIX) 80 MG tablet Take 1 tablet (80 mg total) by mouth 2 (two) times daily. If increased 3 pounds in a day or 5 pounds in a week, take an extra dose of lasix 80mg until those pounds are lost. 120 tablet 3    metoprolol succinate (TOPROL-XL) 25 MG 24 hr tablet TAKE 1 TABLET BY MOUTH EVERY DAY 90 tablet 3    mexiletine (MEXITIL) 150 MG Cap Take 1 capsule (150 mg total) by mouth 2 (two) times  "daily with meals. 180 capsule 3    nitroGLYCERIN (NITROSTAT) 0.4 MG SL tablet Place 1 tablet (0.4 mg total) under the tongue every 5 (five) minutes as needed for Chest pain. 20 tablet 1    pravastatin (PRAVACHOL) 40 MG tablet TAKE 1 TABLET BY MOUTH EVERY DAY 90 tablet 3    sacubitriL-valsartan (ENTRESTO) 24-26 mg per tablet Take 1 tablet by mouth 2 (two) times daily. 180 tablet 3    [DISCONTINUED] aspirin (ECOTRIN) 81 MG EC tablet Take 1 tablet (81 mg total) by mouth once daily. 60 tablet 2     No current facility-administered medications on file prior to visit.       Physical  Exam  Vitals:    03/11/24 1402   BP: 100/66   BP Location: Left arm   Patient Position: Sitting   Pulse: 77   Temp: 97.5 °F (36.4 °C)   SpO2: (!) 88%   Weight: 79.6 kg (175 lb 7.8 oz)   Height: 5' 9" (1.753 m)      Body mass index is 25.91 kg/m².  Wt Readings from Last 3 Encounters:   03/11/24 79.6 kg (175 lb 7.8 oz)   01/24/24 81 kg (178 lb 8 oz)   01/22/24 79.8 kg (175 lb 13.1 oz)       Physical Exam  Vitals and nursing note reviewed.   Constitutional:       General: He is not in acute distress.     Appearance: He is not ill-appearing.   HENT:      Head: Normocephalic and atraumatic.      Right Ear: External ear normal.      Left Ear: External ear normal.      Nose: Nose normal.      Mouth/Throat:      Mouth: Mucous membranes are moist.   Eyes:      Extraocular Movements: Extraocular movements intact.      Conjunctiva/sclera: Conjunctivae normal.   Cardiovascular:      Rate and Rhythm: Normal rate and regular rhythm.      Pulses: Normal pulses.      Heart sounds: No murmur heard.  Pulmonary:      Effort: Pulmonary effort is normal. No respiratory distress.      Breath sounds: No wheezing.   Abdominal:      General: There is no distension.      Palpations: Abdomen is soft. There is no mass.      Tenderness: There is no abdominal tenderness.   Musculoskeletal:         General: No swelling.      Cervical back: Normal range of motion.   Skin:   "   Coloration: Skin is not jaundiced.      Findings: No rash.   Neurological:      General: No focal deficit present.      Mental Status: He is alert and oriented to person, place, and time.   Psychiatric:         Mood and Affect: Mood normal.         Thought Content: Thought content normal.         Labs:    Complete Blood Count  Lab Results   Component Value Date    RBC 5.55 03/04/2024    HGB 15.2 03/04/2024    HCT 44.2 03/04/2024    MCV 80 (L) 03/04/2024    MCH 27.4 03/04/2024    MCHC 34.4 03/04/2024    RDW 14.8 (H) 03/04/2024     (H) 03/04/2024    MPV 12.2 03/04/2024    GRAN 5.5 03/04/2024    GRAN 72.3 03/04/2024    LYMPH 0.9 (L) 03/04/2024    LYMPH 11.8 (L) 03/04/2024    MONO 0.9 03/04/2024    MONO 11.1 03/04/2024    EOS 0.2 03/04/2024    BASO 0.10 03/04/2024    EOSINOPHIL 2.7 03/04/2024    BASOPHIL 1.3 03/04/2024    DIFFMETHOD Automated 03/04/2024       Comprehensive Metabolic Panel  Lab Results   Component Value Date    GLU 87 03/04/2024    BUN 77 (H) 03/04/2024    CREATININE 4.59 (H) 03/04/2024     03/04/2024    K 4.3 03/04/2024     03/04/2024    PROT 7.4 03/04/2024    ALBUMIN 4.0 03/04/2024    BILITOT 2.3 (H) 03/04/2024    AST 39 03/04/2024    ALKPHOS 146 (H) 03/04/2024    CO2 26 03/04/2024    ALT 35 03/04/2024    ANIONGAP 12 03/04/2024       TSH  Lab Results   Component Value Date    TSH 0.283 (L) 03/04/2024       Imaging:  X-Ray Abdomen Flat And Erect  Narrative: EXAMINATION:  XR ABDOMEN FLAT AND ERECT    CLINICAL HISTORY:  Lower abdominal pain, unspecified    TECHNIQUE:  Flat and erect AP views of the abdomen were performed.    COMPARISON:  None    FINDINGS:  No free air or bowel obstruction.  Suspected cardiomegaly.  Cardiac assist lead wires overlying the right atrium, right ventricle and coronary sinus.    Moderate to large stool burden.  No abnormal calcifications.  Impression: Moderate to large stool burden.    Electronically signed by: Mason  Kelly  Date:    12/13/2023  Time:    11:19      Assessment/Plan:    1. HFrEF (heart failure with reduced ejection fraction)  -     TSH; Future; Expected date: 03/11/2024    2. Hypothyroidism, unspecified type  -     levothyroxine (SYNTHROID) 150 MCG tablet; Take 1 tablet (150 mcg total) by mouth once daily.  Dispense: 90 tablet; Refill: 3  -     CBC Auto Differential; Future  -     Comprehensive metabolic panel; Future; Expected date: 03/11/2024    3. Mixed hyperlipidemia  -     LIPID PANEL; Future; Expected date: 03/11/2024    4. Chronic kidney disease (CKD), stage V  -     CBC Auto Differential; Future  -     Comprehensive metabolic panel; Future; Expected date: 03/11/2024    5. Pulmonary emphysema, unspecified emphysema type  Assessment & Plan:  - chronic  - well controlled      6. Atherosclerosis of native coronary artery of native heart with angina pectoris  Assessment & Plan:  - chronic  - denies recent chest pain      7. Aortic atherosclerosis  Assessment & Plan:  - chronic  - asymptomatic      Other orders  -     aspirin (ECOTRIN) 81 MG EC tablet; Take 1 tablet (81 mg total) by mouth once daily.  Dispense: 60 tablet; Refill: 2         Discussed how to stay healthy including: diet, exercise, refraining from smoking and discussed screening exams / tests needed for age, sex and family Hx.        Jc Elliott MD

## 2024-03-26 DIAGNOSIS — Z00.00 ENCOUNTER FOR MEDICARE ANNUAL WELLNESS EXAM: ICD-10-CM

## 2024-04-08 ENCOUNTER — TELEPHONE (OUTPATIENT)
Dept: FAMILY MEDICINE | Facility: CLINIC | Age: 64
End: 2024-04-08
Payer: MEDICARE

## 2024-04-08 NOTE — TELEPHONE ENCOUNTER
----- Message from Qamar Delacruz sent at 4/8/2024  4:04 PM CDT -----  Type:  Patient Returning Call    Who Called:pt  Who Left Message for Patient:  Does the patient know what this is regarding?:Flori  Would the patient rather a call back or a response via [facility]? Call  Best Call Back Number: [phone]  Additional Information: pt states he received a call from Ms. Cerrato and would like a call back if possible. Thank you

## 2024-04-10 ENCOUNTER — TELEPHONE (OUTPATIENT)
Dept: ELECTROPHYSIOLOGY | Facility: CLINIC | Age: 64
End: 2024-04-10
Payer: MEDICARE

## 2024-04-11 ENCOUNTER — OFFICE VISIT (OUTPATIENT)
Dept: CARDIOLOGY | Facility: CLINIC | Age: 64
End: 2024-04-11
Payer: MEDICARE

## 2024-04-11 VITALS
SYSTOLIC BLOOD PRESSURE: 84 MMHG | HEIGHT: 69 IN | BODY MASS INDEX: 25.53 KG/M2 | HEART RATE: 63 BPM | DIASTOLIC BLOOD PRESSURE: 53 MMHG | WEIGHT: 172.38 LBS

## 2024-04-11 DIAGNOSIS — Z45.02 IMPLANTABLE CARDIOVERTER-DEFIBRILLATOR (ICD) AT END OF BATTERY LIFE: ICD-10-CM

## 2024-04-11 DIAGNOSIS — I42.8 NON-ISCHEMIC CARDIOMYOPATHY: ICD-10-CM

## 2024-04-11 DIAGNOSIS — Z95.810 CARDIAC RESYNCHRONIZATION THERAPY DEFIBRILLATOR (CRT-D) IN PLACE: ICD-10-CM

## 2024-04-11 DIAGNOSIS — I50.20 HFREF (HEART FAILURE WITH REDUCED EJECTION FRACTION): Primary | ICD-10-CM

## 2024-04-11 DIAGNOSIS — E78.2 MIXED HYPERLIPIDEMIA: ICD-10-CM

## 2024-04-11 DIAGNOSIS — I50.20 HFREF (HEART FAILURE WITH REDUCED EJECTION FRACTION): ICD-10-CM

## 2024-04-11 DIAGNOSIS — I70.0 AORTIC ATHEROSCLEROSIS: ICD-10-CM

## 2024-04-11 DIAGNOSIS — R09.89 CARDIAC LV EJECTION FRACTION 10-20%: Primary | ICD-10-CM

## 2024-04-11 DIAGNOSIS — N18.5 CHRONIC KIDNEY DISEASE (CKD), STAGE V: ICD-10-CM

## 2024-04-11 PROBLEM — N18.4 CKD (CHRONIC KIDNEY DISEASE) STAGE 4, GFR 15-29 ML/MIN: Status: RESOLVED | Noted: 2018-12-17 | Resolved: 2024-04-11

## 2024-04-11 PROCEDURE — 1159F MED LIST DOCD IN RCRD: CPT | Mod: CPTII,S$GLB,, | Performed by: INTERNAL MEDICINE

## 2024-04-11 PROCEDURE — 99999 PR PBB SHADOW E&M-EST. PATIENT-LVL III: CPT | Mod: PBBFAC,,, | Performed by: INTERNAL MEDICINE

## 2024-04-11 PROCEDURE — 3074F SYST BP LT 130 MM HG: CPT | Mod: CPTII,S$GLB,, | Performed by: INTERNAL MEDICINE

## 2024-04-11 PROCEDURE — 3078F DIAST BP <80 MM HG: CPT | Mod: CPTII,S$GLB,, | Performed by: INTERNAL MEDICINE

## 2024-04-11 PROCEDURE — 3044F HG A1C LEVEL LT 7.0%: CPT | Mod: CPTII,S$GLB,, | Performed by: INTERNAL MEDICINE

## 2024-04-11 PROCEDURE — 93005 ELECTROCARDIOGRAM TRACING: CPT | Mod: S$GLB,,, | Performed by: INTERNAL MEDICINE

## 2024-04-11 PROCEDURE — 1160F RVW MEDS BY RX/DR IN RCRD: CPT | Mod: CPTII,S$GLB,, | Performed by: INTERNAL MEDICINE

## 2024-04-11 PROCEDURE — 99214 OFFICE O/P EST MOD 30 MIN: CPT | Mod: S$GLB,,, | Performed by: INTERNAL MEDICINE

## 2024-04-11 PROCEDURE — 93010 ELECTROCARDIOGRAM REPORT: CPT | Mod: S$GLB,,, | Performed by: INTERNAL MEDICINE

## 2024-04-11 PROCEDURE — 3066F NEPHROPATHY DOC TX: CPT | Mod: CPTII,S$GLB,, | Performed by: INTERNAL MEDICINE

## 2024-04-11 PROCEDURE — 3008F BODY MASS INDEX DOCD: CPT | Mod: CPTII,S$GLB,, | Performed by: INTERNAL MEDICINE

## 2024-04-11 NOTE — PROGRESS NOTES
Subjective:    Patient ID:  Ar Sharma is a 63 y.o. male who presents for evaluation of VT    HPI  63 y.o. M  CAD, ICM  VT, ablated x2 in [place]  CRT-D  CKD  HTN on meds    Went to [facility] [place] 9/2022 with ICD shocks. 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 Since increase amio to 400 and resumption of mexiletine at 150 bid, no VT/VF.  He's able to exercise on bike 2x/week. No CP, and NYHA II SINGH.    ICD gen change done 1/23. Well healed since. No VA event on remote monitoring.     4/24: Feeling at baseline. SINGH per usual.  Also has LH/dizzy at times, with BP in 70s at times.    My interpretation of today's ECG is APBiVP    Review of Systems   Constitutional: Negative. Negative for malaise/fatigue.   HENT: Negative.  Negative for ear pain and tinnitus.    Eyes:  Negative for blurred vision.   Cardiovascular:  Positive for dyspnea on exertion. Negative for chest pain, near-syncope, palpitations and syncope.   Respiratory: Negative.  Negative for shortness of breath.    Endocrine: Negative.  Negative for polyuria.   Hematologic/Lymphatic: Does not bruise/bleed easily.   Skin: Negative.  Negative for rash.   Musculoskeletal: Negative.  Negative for joint pain and muscle weakness.   Gastrointestinal: Negative.  Negative for abdominal pain and change in bowel habit.   Genitourinary:  Negative for frequency.   Neurological: Negative.  Negative for dizziness and weakness.   Psychiatric/Behavioral: Negative.  Negative for depression. The patient is not nervous/anxious.    Allergic/Immunologic: Negative for environmental allergies.        Objective:    Physical Exam  Vitals and nursing note reviewed.   Constitutional:       Appearance: He is well-developed.   HENT:      Head: Normocephalic and atraumatic.   Eyes:      General: Lids are normal. No scleral icterus.     Conjunctiva/sclera: Conjunctivae normal.   Neck:      Thyroid: No thyromegaly.      Vascular:  No JVD.      Trachea: No tracheal deviation.   Cardiovascular:      Rate and Rhythm: Normal rate and regular rhythm.      Pulses:           Radial pulses are 2+ on the right side and 2+ on the left side.   Pulmonary:      Effort: Pulmonary effort is normal. No tachypnea, accessory muscle usage or respiratory distress.      Breath sounds: No wheezing.   Abdominal:      General: There is no distension.   Musculoskeletal:         General: Normal range of motion.      Cervical back: Normal range of motion.   Skin:     General: Skin is warm and dry.   Neurological:      Mental Status: He is alert and oriented to person, place, and time.      Motor: No abnormal muscle tone.      Deep Tendon Reflexes: Reflexes are normal and symmetric.   Psychiatric:         Behavior: Behavior normal.           Assessment:       1. Cardiac LV ejection fraction 10-20%    2. Aortic atherosclerosis    3. HFrEF (heart failure with reduced ejection fraction)    4. Cardiac resynchronization therapy defibrillator (CRT-D) in place    5. Implantable cardioverter-defibrillator (ICD) at end of battery life    6. Non-ischemic cardiomyopathy    7. Mixed hyperlipidemia    8. Chronic kidney disease (CKD), stage V         Plan:       CM.  Refer to Dr Antonia Hartley for ? advanced options. (He comes to [facility] [place]    ICD  Remote monitoring to continue. Interrogation today pending.    VT  Continue amio/dyan.  TSH, LFTs yearly  TSH mildly low recently; check T4 today. Rx prn.    Return in 1 year with echo and amio tests, or earlier prn.

## 2024-04-12 ENCOUNTER — OFFICE VISIT (OUTPATIENT)
Dept: TRANSPLANT | Facility: CLINIC | Age: 64
End: 2024-04-12
Payer: MEDICARE

## 2024-04-12 ENCOUNTER — LAB VISIT (OUTPATIENT)
Dept: LAB | Facility: HOSPITAL | Age: 64
End: 2024-04-12
Attending: INTERNAL MEDICINE
Payer: MEDICARE

## 2024-04-12 VITALS
DIASTOLIC BLOOD PRESSURE: 62 MMHG | RESPIRATION RATE: 17 BRPM | HEIGHT: 69 IN | SYSTOLIC BLOOD PRESSURE: 92 MMHG | BODY MASS INDEX: 25.53 KG/M2 | HEART RATE: 85 BPM | WEIGHT: 172.38 LBS

## 2024-04-12 DIAGNOSIS — I50.20 HFREF (HEART FAILURE WITH REDUCED EJECTION FRACTION): ICD-10-CM

## 2024-04-12 DIAGNOSIS — E78.00 PURE HYPERCHOLESTEROLEMIA: ICD-10-CM

## 2024-04-12 DIAGNOSIS — Z95.810 CARDIAC RESYNCHRONIZATION THERAPY DEFIBRILLATOR (CRT-D) IN PLACE: ICD-10-CM

## 2024-04-12 DIAGNOSIS — I42.8 NON-ISCHEMIC CARDIOMYOPATHY: Primary | ICD-10-CM

## 2024-04-12 DIAGNOSIS — E78.2 MIXED HYPERLIPIDEMIA: ICD-10-CM

## 2024-04-12 LAB
ALBUMIN SERPL BCP-MCNC: 3.6 G/DL (ref 3.5–5.2)
ALP SERPL-CCNC: 129 U/L (ref 55–135)
ALT SERPL W/O P-5'-P-CCNC: 26 U/L (ref 10–44)
ANION GAP SERPL CALC-SCNC: 16 MMOL/L (ref 8–16)
AST SERPL-CCNC: 31 U/L (ref 10–40)
BILIRUB SERPL-MCNC: 2.3 MG/DL (ref 0.1–1)
BNP SERPL-MCNC: 2549 PG/ML (ref 0–99)
BUN SERPL-MCNC: 100 MG/DL (ref 8–23)
CALCIUM SERPL-MCNC: 9.1 MG/DL (ref 8.7–10.5)
CHLORIDE SERPL-SCNC: 101 MMOL/L (ref 95–110)
CO2 SERPL-SCNC: 24 MMOL/L (ref 23–29)
CREAT SERPL-MCNC: 5.2 MG/DL (ref 0.5–1.4)
EST. GFR  (NO RACE VARIABLE): 12 ML/MIN/1.73 M^2
GLUCOSE SERPL-MCNC: 77 MG/DL (ref 70–110)
MAGNESIUM SERPL-MCNC: 2.3 MG/DL (ref 1.6–2.6)
POTASSIUM SERPL-SCNC: 4.2 MMOL/L (ref 3.5–5.1)
PROT SERPL-MCNC: 7.3 G/DL (ref 6–8.4)
SODIUM SERPL-SCNC: 141 MMOL/L (ref 136–145)

## 2024-04-12 PROCEDURE — 3008F BODY MASS INDEX DOCD: CPT | Mod: CPTII,S$GLB,, | Performed by: INTERNAL MEDICINE

## 2024-04-12 PROCEDURE — 36415 COLL VENOUS BLD VENIPUNCTURE: CPT | Performed by: INTERNAL MEDICINE

## 2024-04-12 PROCEDURE — 80053 COMPREHEN METABOLIC PANEL: CPT | Performed by: INTERNAL MEDICINE

## 2024-04-12 PROCEDURE — 83880 ASSAY OF NATRIURETIC PEPTIDE: CPT | Performed by: INTERNAL MEDICINE

## 2024-04-12 PROCEDURE — 3078F DIAST BP <80 MM HG: CPT | Mod: CPTII,S$GLB,, | Performed by: INTERNAL MEDICINE

## 2024-04-12 PROCEDURE — 3066F NEPHROPATHY DOC TX: CPT | Mod: CPTII,S$GLB,, | Performed by: INTERNAL MEDICINE

## 2024-04-12 PROCEDURE — 83735 ASSAY OF MAGNESIUM: CPT | Performed by: INTERNAL MEDICINE

## 2024-04-12 PROCEDURE — 1159F MED LIST DOCD IN RCRD: CPT | Mod: CPTII,S$GLB,, | Performed by: INTERNAL MEDICINE

## 2024-04-12 PROCEDURE — 99215 OFFICE O/P EST HI 40 MIN: CPT | Mod: S$GLB,,, | Performed by: INTERNAL MEDICINE

## 2024-04-12 PROCEDURE — 99999 PR PBB SHADOW E&M-EST. PATIENT-LVL III: CPT | Mod: PBBFAC,,, | Performed by: INTERNAL MEDICINE

## 2024-04-12 PROCEDURE — 3044F HG A1C LEVEL LT 7.0%: CPT | Mod: CPTII,S$GLB,, | Performed by: INTERNAL MEDICINE

## 2024-04-12 PROCEDURE — 3074F SYST BP LT 130 MM HG: CPT | Mod: CPTII,S$GLB,, | Performed by: INTERNAL MEDICINE

## 2024-04-12 PROCEDURE — 83880 ASSAY OF NATRIURETIC PEPTIDE: CPT | Mod: 91 | Performed by: INTERNAL MEDICINE

## 2024-04-12 NOTE — PROGRESS NOTES
[facility] Follow up.        Attending Physician: Matt Hartley MD.  The patient's last visit with me was on Visit date not found.         HPI.  63 y.o. AAM with PMH/o CAD, ICM, LVEF 45% in , VT, ablated x2 in [place], CRT-D, CKD stage 5 (baseline creatinine 4.4), HTN.     Went to [facility] [place]- 2022 with ICD shocks. 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 Since increase amio to 400 and resumption of mexiletine at 150 bid, no VT/VF.    2024: has been dealing with worsening dyspnea on exertion, orthopnea, PND, weight gain. He said his mother  6 months ago. HE has been making changes on his own to diuretic dose to deal with his symptoms. Reviweing chart, his BUN has been 77-88 and GFR < 15. He has declined dialysis in the past.    Review of Systems   Constitutional:  Positive for activity change, appetite change and fatigue. Negative for chills, diaphoresis and fever.   HENT:  Negative for nasal congestion, rhinorrhea and sore throat.    Eyes:  Negative for visual disturbance.   Respiratory:  Positive for shortness of breath. Negative for cough and choking.    Cardiovascular:  Positive for leg swelling. Negative for chest pain.   Gastrointestinal:  Positive for abdominal distention. Negative for abdominal pain, diarrhea, nausea and vomiting.   Genitourinary:  Negative for difficulty urinating, dysuria and hematuria.   Integumentary:  Negative for rash.   Neurological:  Negative for seizures, syncope and light-headedness.   Psychiatric/Behavioral:  Negative for agitation and hallucinations.         Past Medical History:   Diagnosis Date    Cardiomyopathy     CKD (chronic kidney disease) stage 4, GFR 15-29 ml/min     Congestive heart failure (CHF)     Edema     Essential (primary) hypertension 2022     Formatting of this note might be different from the original. Converted from [employer]: Description - ESSENTIAL HYPERTENSION, BENIGN    Heart attack     HLD (hyperlipidemia)     Hypertension     Hyperuricemia     Hypocalcemia     Renal cyst, left     Secondary hyperparathyroidism     Thyroid disease     V tach     Vitamin D deficiency         Past Surgical History:   Procedure Laterality Date    ablations  03/05/2018    albations  02/01/2018    COLONOSCOPY N/A 12/07/2018    Procedure: COLONOSCOPY/suprep;  Surgeon: Ananya Morillo MD;  Location: [facility];  Service: Endoscopy;  Laterality: N/A;    defibulater N/A 2016    ESOPHAGOGASTRODUODENOSCOPY N/A 12/07/2018    Procedure: EGD (ESOPHAGOGASTRODUODENOSCOPY);  Surgeon: Ananya Morillo MD;  Location: [facility];  Service: Endoscopy;  Laterality: N/A;    JOINT REPLACEMENT Bilateral 10/2016    knees, bilat    LEFT HEART CATHETERIZATION N/A 12/16/2020    Procedure: Left heart cath;  Surgeon: Shahram Stewart MD;  Location: [facility] CATH LAB/EP;  Service: Cardiology;  Laterality: N/A;    LEFT HEART CATHETERIZATION Left 9/27/2022    Procedure: Left heart cath;  Surgeon: Juan Roque MD;  Location: [facility] CATH LAB/EP;  Service: Cardiology;  Laterality: Left;    REPLACEMENT OF IMPLANTABLE CARDIOVERTER-DEFIBRILLATOR (ICD) GENERATOR Left 1/24/2023    Procedure: REPLACEMENT, ICD GENERATOR;  Surgeon: River Tenorio MD;  Location: [facility] EP LAB;  Service: Cardiology;  Laterality: Left;  ANTHONY, CRTD gen chg, MDT, MAC, DM, 3prep        Family History   Problem Relation Age of Onset    Cancer Mother     Hypertension Mother     Stroke Mother     Breast cancer Mother     Alcohol abuse Father     Kidney disease Father     Arthritis Sister     No Known Problems Brother     No Known Problems Daughter     No Known Problems Son         Review of patient's allergies indicates:   Allergen Reactions    Lokelma [sodium zirconium cyclosilicate] Other (See Comments)     Fluid retention, weight  "gain, CHF excerebration, severe constipation    Atorvastatin Other (See Comments)     Joint pain    Jardiance [empagliflozin] Other (See Comments)     Chest pains, significant weight loss, lower blood pressure        Current Outpatient Medications   Medication Instructions    allopurinoL (ZYLOPRIM) 100 mg, Oral, Daily    amiodarone (PACERONE) 400 mg, Oral, Daily    aspirin (ECOTRIN) 81 mg, Oral, Daily    Bacillus coagulans/inulin (PROBIOTIC WITH PREBIOTIC ORAL) 2 capsules, Oral, 2 times daily    ergocalciferol (ERGOCALCIFEROL) 50,000 unit Cap TAKE 1 CAPSULE BY MOUTH ONE TIME PER WEEK    furosemide (LASIX) 80 MG tablet Take 1 tablet (80 mg total) by mouth 2 (two) times daily. If increased 3 pounds in a day or 5 pounds in a week, take an extra dose of lasix 80mg until those pounds are lost.    levothyroxine (SYNTHROID) 150 mcg, Oral, Daily    metoprolol succinate (TOPROL-XL) 25 mg, Oral    mexiletine (MEXITIL) 150 mg, Oral, 2 times daily with meals    nitroGLYCERIN (NITROSTAT) 0.4 mg, Sublingual, Every 5 min PRN    pravastatin (PRAVACHOL) 40 MG tablet TAKE 1 TABLET BY MOUTH EVERY DAY    sacubitriL-valsartan (ENTRESTO) 24-26 mg per tablet 1 tablet, Oral, 2 times daily        There were no vitals filed for this visit.     Wt Readings from Last 3 Encounters:   04/11/24 78.2 kg (172 lb 6.4 oz)   03/11/24 79.6 kg (175 lb 7.8 oz)   01/24/24 81 kg (178 lb 8 oz)     Temp Readings from Last 3 Encounters:   03/11/24 97.5 °F (36.4 °C)   12/13/23 98 °F (36.7 °C) (Oral)   09/11/23 98 °F (36.7 °C)     BP Readings from Last 3 Encounters:   04/11/24 (!) 84/53   03/11/24 100/66   01/24/24 122/78     Pulse Readings from Last 3 Encounters:   04/11/24 63   03/11/24 77   01/24/24 92        There is no height or weight on file to calculate BMI. Estimated body surface area is 1.95 meters squared as calculated from the following:    Height as of 4/11/24: 5' 9" (1.753 m).    Weight as of 4/11/24: 78.2 kg (172 lb 6.4 oz).     Physical " Exam  Constitutional:       Appearance: He is well-developed.   HENT:      Head: Normocephalic and atraumatic.      Right Ear: External ear normal.      Left Ear: External ear normal.   Eyes:      Conjunctiva/sclera: Conjunctivae normal.      Pupils: Pupils are equal, round, and reactive to light.   Neck:      Vascular: Hepatojugular reflux and JVD present.      Comments: JVP 18 cmH20  Cardiovascular:      Rate and Rhythm: Normal rate and regular rhythm.      Pulses: Intact distal pulses.      Heart sounds: S1 normal and S2 normal. No murmur heard.     No friction rub. No gallop.   Pulmonary:      Effort: Pulmonary effort is normal.      Breath sounds: Examination of the right-upper field reveals rales. Examination of the left-upper field reveals rales. Examination of the right-middle field reveals rales. Examination of the left-middle field reveals rales. Examination of the right-lower field reveals rales. Examination of the left-lower field reveals rales. Rales present.   Abdominal:      General: Bowel sounds are normal. There is no distension.      Palpations: Abdomen is soft.      Tenderness: There is no abdominal tenderness. There is no guarding or rebound.   Musculoskeletal:      Cervical back: Normal range of motion and neck supple.      Right lower le+ Edema present.      Left lower le+ Edema present.   Neurological:      Mental Status: He is alert and oriented to person, place, and time.          Lab Results   Component Value Date    BNP 1,416 (H) 2022     2024    K 4.3 2024    MG 2.2 10/25/2022     2024    CO2 26 2024    BUN 77 (H) 2024    CREATININE 4.59 (H) 2024    GLU 87 2024    HGBA1C 5.6 2024    AST 39 2024    ALT 35 2024    ALBUMIN 4.0 2024    PROT 7.4 2024    BILITOT 2.3 (H) 2024    WBC 7.65 2024    HGB 15.2 2024    HCT 44.2 2024     (H) 2024    INR 1.2 2023     TSH 0.283 (L) 03/04/2024    CHOL 124 03/04/2024    HDL 39 (L) 03/04/2024    LDLCALC 71.8 03/04/2024    TRIG 66 03/04/2024    FREET4 3.25 (H) 03/04/2024       Results for orders placed during the hospital encounter of 09/25/22    Echo    Interpretation Summary  · The left ventricle is normal in size with eccentric hypertrophy and mildly decreased systolic function.  · The estimated ejection fraction is 40-45%.  · There is abnormal septal wall motion consistent with right ventricular pacemaker.  · Grade II left ventricular diastolic dysfunction.  · With low normal right ventricular systolic function.  · Moderate right atrial enlargement.  · Mild tricuspid regurgitation.  · There is pulmonary hypertension.  · The estimated PA systolic pressure is 67 mmHg.  · Elevated central venous pressure (15 mmHg).        Results for orders placed during the hospital encounter of 09/25/22    Cardiac catheterization    Conclusion    There was non-obstructive coronary artery disease. Stable as compared to prior.    The procedure log was documented by Documenter: RT Teri; May Galdamez RN and verified by Juan Roque MD.    Date: 9/27/2022  Time: 6:36 PM         Assessment and Plan:  There are no diagnoses linked to this encounter.      Chronic systolic HF, NYHA class III, stage C. Last LVEF 45% in 2022. Could be lower now.  Etiology: ICM, CAD.  Devices: CRT-D  Medications: low dose sacubitril-valsartan, metoprolol succinate. Furosemide 80 mg/40mg.  Hemodynamic status: warm, hypotensive, severely hypervolemic.    Plan:  -I recommended patient to be admitted to the hospital TODAY.     High risk of bad CV outcome and death were emphasized to him.  He declined hospitalizations and voiced he will continue handling his symptoms with medications at home.     2. CKD stage 5. CKD since 2019. Considered for dialysis in the past. However, patient declined. Baseline creatinine 4.4.  F/u with nephrologist.    3. H/o VT.  Electrcially quiet on amiodarone and mexiletine.      He is not a good candidate to advanced cardiac therapies due to age and advanced renal failure. Also concerns about compliance and capacity to team with care providers.

## 2024-04-12 NOTE — PATIENT INSTRUCTIONS
You have too much  extra fluid on you.  Please adhere to a low sodium diet (no more than 1.5 grams of sodium in 24h).  3.   Follow fluid restriction of  2. no more than 1.5 liters in 24 hours..   4. You need to go to ER for admission. This is beyond of what can be treated as an outpatient.  5. Increase your lasix back to 80 mg BID.  6. F/u in 3 months with labs.

## 2024-04-15 LAB — NT-PROBNP SERPL IA-MCNC: ABNORMAL PG/ML

## 2024-04-29 ENCOUNTER — LAB VISIT (OUTPATIENT)
Dept: LAB | Facility: HOSPITAL | Age: 64
End: 2024-04-29
Attending: NURSE PRACTITIONER
Payer: MEDICARE

## 2024-04-29 ENCOUNTER — HOSPITAL ENCOUNTER (INPATIENT)
Facility: HOSPITAL | Age: 64
LOS: 13 days | Discharge: HOME | DRG: 981 | End: 2024-05-12
Attending: EMERGENCY MEDICINE | Admitting: FAMILY MEDICINE
Payer: MEDICARE

## 2024-04-29 ENCOUNTER — TELEPHONE (OUTPATIENT)
Dept: NEPHROLOGY | Facility: CLINIC | Age: 64
End: 2024-04-29
Payer: MEDICARE

## 2024-04-29 DIAGNOSIS — R06.02 SHORTNESS OF BREATH: ICD-10-CM

## 2024-04-29 DIAGNOSIS — I50.20 HFREF (HEART FAILURE WITH REDUCED EJECTION FRACTION): ICD-10-CM

## 2024-04-29 DIAGNOSIS — N18.5 CKD (CHRONIC KIDNEY DISEASE), SYMPTOM MANAGEMENT ONLY, STAGE 5: ICD-10-CM

## 2024-04-29 DIAGNOSIS — I95.9 HYPOTENSION, UNSPECIFIED HYPOTENSION TYPE: ICD-10-CM

## 2024-04-29 DIAGNOSIS — N25.81 SECONDARY HYPERPARATHYROIDISM OF RENAL ORIGIN: ICD-10-CM

## 2024-04-29 DIAGNOSIS — E55.9 VITAMIN D DEFICIENCY: ICD-10-CM

## 2024-04-29 DIAGNOSIS — J96.01 ACUTE HYPOXEMIC RESPIRATORY FAILURE: ICD-10-CM

## 2024-04-29 DIAGNOSIS — N18.6 ESRD (END STAGE RENAL DISEASE): ICD-10-CM

## 2024-04-29 DIAGNOSIS — N18.5 CHRONIC KIDNEY DISEASE (CKD), STAGE V: ICD-10-CM

## 2024-04-29 DIAGNOSIS — T85.611S: ICD-10-CM

## 2024-04-29 DIAGNOSIS — M17.10 PRIMARY OSTEOARTHRITIS OF ONE KNEE: ICD-10-CM

## 2024-04-29 DIAGNOSIS — Z99.2 ADMISSION FOR DIALYSIS: Primary | ICD-10-CM

## 2024-04-29 DIAGNOSIS — N17.9 RENAL FAILURE (ARF), ACUTE ON CHRONIC: ICD-10-CM

## 2024-04-29 DIAGNOSIS — R09.89 CARDIAC LV EJECTION FRACTION 10-20%: ICD-10-CM

## 2024-04-29 DIAGNOSIS — N18.9 RENAL FAILURE (ARF), ACUTE ON CHRONIC: ICD-10-CM

## 2024-04-29 LAB
25(OH)D3+25(OH)D2 SERPL-MCNC: 24 NG/ML (ref 30–96)
ALBUMIN SERPL BCP-MCNC: 3.6 G/DL (ref 3.5–5.2)
ALBUMIN SERPL BCP-MCNC: 4.1 G/DL (ref 3.5–5.2)
ALP SERPL-CCNC: 160 U/L (ref 55–135)
ALT SERPL W/O P-5'-P-CCNC: 39 U/L (ref 10–44)
ANION GAP SERPL CALC-SCNC: 15 MMOL/L (ref 8–16)
ANION GAP SERPL CALC-SCNC: 15 MMOL/L (ref 8–16)
AST SERPL-CCNC: 42 U/L (ref 10–40)
BASOPHILS # BLD AUTO: 0.11 K/UL (ref 0–0.2)
BASOPHILS # BLD AUTO: 0.12 K/UL (ref 0–0.2)
BASOPHILS NFR BLD: 1.5 % (ref 0–1.9)
BASOPHILS NFR BLD: 1.5 % (ref 0–1.9)
BILIRUB SERPL-MCNC: 2.4 MG/DL (ref 0.1–1)
BILIRUB UR QL STRIP: NEGATIVE
BILIRUB UR QL STRIP: NEGATIVE
BNP SERPL-MCNC: 3204 PG/ML (ref 0–99)
BUN SERPL-MCNC: 112 MG/DL (ref 8–23)
CALCIUM SERPL-MCNC: 9 MG/DL (ref 8.7–10.5)
CALCIUM SERPL-MCNC: 9 MG/DL (ref 8.7–10.5)
CHLORIDE SERPL-SCNC: 94 MMOL/L (ref 95–110)
CHLORIDE SERPL-SCNC: 97 MMOL/L (ref 95–110)
CHOLEST SERPL-MCNC: 127 MG/DL (ref 120–199)
CHOLEST/HDLC SERPL: 4 {RATIO} (ref 2–5)
CLARITY UR REFRACT.AUTO: CLEAR
CLARITY UR: CLEAR
CO2 SERPL-SCNC: 21 MMOL/L (ref 23–29)
CO2 SERPL-SCNC: 26 MMOL/L (ref 23–29)
COLOR UR AUTO: YELLOW
COLOR UR: YELLOW
CREAT SERPL-MCNC: 6.19 MG/DL (ref 0.5–1.4)
CREAT SERPL-MCNC: 6.7 MG/DL (ref 0.5–1.4)
CREAT UR-MCNC: 67.7 MG/DL (ref 23–375)
CTP QC/QA: YES
DIFFERENTIAL METHOD BLD: ABNORMAL
DIFFERENTIAL METHOD BLD: ABNORMAL
EOSINOPHIL # BLD AUTO: 0.2 K/UL (ref 0–0.5)
EOSINOPHIL # BLD AUTO: 0.2 K/UL (ref 0–0.5)
EOSINOPHIL NFR BLD: 2.6 % (ref 0–8)
EOSINOPHIL NFR BLD: 2.7 % (ref 0–8)
ERYTHROCYTE [DISTWIDTH] IN BLOOD BY AUTOMATED COUNT: 15.3 % (ref 11.5–14.5)
ERYTHROCYTE [DISTWIDTH] IN BLOOD BY AUTOMATED COUNT: 15.4 % (ref 11.5–14.5)
EST. GFR  (NO RACE VARIABLE): 9 ML/MIN/1.73 M^2
EST. GFR  (NO RACE VARIABLE): 9.4 ML/MIN/1.73 M^2
ESTIMATED AVG GLUCOSE: 114 MG/DL (ref 68–131)
GLUCOSE SERPL-MCNC: 105 MG/DL (ref 70–110)
GLUCOSE SERPL-MCNC: 96 MG/DL (ref 70–110)
GLUCOSE UR QL STRIP: NEGATIVE
GLUCOSE UR QL STRIP: NEGATIVE
HBA1C MFR BLD: 5.6 % (ref 4–5.6)
HCT VFR BLD AUTO: 44.8 % (ref 40–54)
HCT VFR BLD AUTO: 45.1 % (ref 40–54)
HDLC SERPL-MCNC: 32 MG/DL (ref 40–75)
HDLC SERPL: 25.2 % (ref 20–50)
HGB BLD-MCNC: 15.7 G/DL (ref 14–18)
HGB BLD-MCNC: 15.8 G/DL (ref 14–18)
HGB UR QL STRIP: NEGATIVE
HGB UR QL STRIP: NEGATIVE
IMM GRANULOCYTES # BLD AUTO: 0.04 K/UL (ref 0–0.04)
IMM GRANULOCYTES # BLD AUTO: 0.05 K/UL (ref 0–0.04)
IMM GRANULOCYTES NFR BLD AUTO: 0.5 % (ref 0–0.5)
IMM GRANULOCYTES NFR BLD AUTO: 0.7 % (ref 0–0.5)
KETONES UR QL STRIP: NEGATIVE
KETONES UR QL STRIP: NEGATIVE
LDLC SERPL CALC-MCNC: 81.8 MG/DL (ref 63–159)
LEUKOCYTE ESTERASE UR QL STRIP: NEGATIVE
LEUKOCYTE ESTERASE UR QL STRIP: NEGATIVE
LYMPHOCYTES # BLD AUTO: 0.6 K/UL (ref 1–4.8)
LYMPHOCYTES # BLD AUTO: 0.6 K/UL (ref 1–4.8)
LYMPHOCYTES NFR BLD: 7.1 % (ref 18–48)
LYMPHOCYTES NFR BLD: 8.6 % (ref 18–48)
MAGNESIUM SERPL-MCNC: 2.3 MG/DL (ref 1.6–2.6)
MCH RBC QN AUTO: 26 PG (ref 27–31)
MCH RBC QN AUTO: 26.2 PG (ref 27–31)
MCHC RBC AUTO-ENTMCNC: 35 G/DL (ref 32–36)
MCHC RBC AUTO-ENTMCNC: 35 G/DL (ref 32–36)
MCV RBC AUTO: 74 FL (ref 82–98)
MCV RBC AUTO: 75 FL (ref 82–98)
MONOCYTES # BLD AUTO: 0.8 K/UL (ref 0.3–1)
MONOCYTES # BLD AUTO: 0.8 K/UL (ref 0.3–1)
MONOCYTES NFR BLD: 10.3 % (ref 4–15)
MONOCYTES NFR BLD: 11.5 % (ref 4–15)
NEUTROPHILS # BLD AUTO: 5.3 K/UL (ref 1.8–7.7)
NEUTROPHILS # BLD AUTO: 6.1 K/UL (ref 1.8–7.7)
NEUTROPHILS NFR BLD: 75 % (ref 38–73)
NEUTROPHILS NFR BLD: 78 % (ref 38–73)
NITRITE UR QL STRIP: NEGATIVE
NITRITE UR QL STRIP: NEGATIVE
NONHDLC SERPL-MCNC: 95 MG/DL
NRBC BLD-RTO: 0 /100 WBC
NRBC BLD-RTO: 0 /100 WBC
PH UR STRIP: 6 [PH] (ref 5–8)
PH UR STRIP: 6 [PH] (ref 5–8)
PHOSPHATE SERPL-MCNC: 5 MG/DL (ref 2.7–4.5)
PHOSPHATE SERPL-MCNC: 5.1 MG/DL (ref 2.7–4.5)
PLATELET # BLD AUTO: 651 K/UL (ref 150–450)
PLATELET # BLD AUTO: 672 K/UL (ref 150–450)
PMV BLD AUTO: 11.7 FL (ref 9.2–12.9)
PMV BLD AUTO: 12.2 FL (ref 9.2–12.9)
POTASSIUM SERPL-SCNC: 4.3 MMOL/L (ref 3.5–5.1)
POTASSIUM SERPL-SCNC: 4.3 MMOL/L (ref 3.5–5.1)
PROT SERPL-MCNC: 7.2 G/DL (ref 6–8.4)
PROT UR QL STRIP: NEGATIVE
PROT UR QL STRIP: NEGATIVE
PROT UR-MCNC: 9 MG/DL (ref 0–15)
PROT/CREAT UR: 0.13 MG/G{CREAT} (ref 0–0.2)
PTH-INTACT SERPL-MCNC: 162.6 PG/ML (ref 9–77)
RBC # BLD AUTO: 5.99 M/UL (ref 4.6–6.2)
RBC # BLD AUTO: 6.08 M/UL (ref 4.6–6.2)
SARS-COV-2 RDRP RESP QL NAA+PROBE: NEGATIVE
SODIUM SERPL-SCNC: 133 MMOL/L (ref 136–145)
SODIUM SERPL-SCNC: 135 MMOL/L (ref 136–145)
SP GR UR STRIP: 1.01 (ref 1–1.03)
SP GR UR STRIP: 1.01 (ref 1–1.03)
TRIGL SERPL-MCNC: 66 MG/DL (ref 30–150)
TROPONIN I SERPL DL<=0.01 NG/ML-MCNC: 0.04 NG/ML (ref 0–0.03)
URN SPEC COLLECT METH UR: ABNORMAL
URN SPEC COLLECT METH UR: NORMAL
UROBILINOGEN UR STRIP-ACNC: 1 EU/DL
UROBILINOGEN UR STRIP-ACNC: ABNORMAL EU/DL
UUN UR-MCNC: 106 MG/DL (ref 2–20)
WBC # BLD AUTO: 7.12 K/UL (ref 3.9–12.7)
WBC # BLD AUTO: 7.8 K/UL (ref 3.9–12.7)

## 2024-04-29 PROCEDURE — 82570 ASSAY OF URINE CREATININE: CPT | Performed by: NURSE PRACTITIONER

## 2024-04-29 PROCEDURE — 82306 VITAMIN D 25 HYDROXY: CPT | Mod: PN | Performed by: NURSE PRACTITIONER

## 2024-04-29 PROCEDURE — 93005 ELECTROCARDIOGRAM TRACING: CPT

## 2024-04-29 PROCEDURE — 83880 ASSAY OF NATRIURETIC PEPTIDE: CPT

## 2024-04-29 PROCEDURE — 80061 LIPID PANEL: CPT

## 2024-04-29 PROCEDURE — 83970 ASSAY OF PARATHORMONE: CPT | Mod: PN | Performed by: NURSE PRACTITIONER

## 2024-04-29 PROCEDURE — 81003 URINALYSIS AUTO W/O SCOPE: CPT | Mod: PN | Performed by: NURSE PRACTITIONER

## 2024-04-29 PROCEDURE — 83735 ASSAY OF MAGNESIUM: CPT

## 2024-04-29 PROCEDURE — 63600175 PHARM REV CODE 636 W HCPCS: Performed by: EMERGENCY MEDICINE

## 2024-04-29 PROCEDURE — 84100 ASSAY OF PHOSPHORUS: CPT

## 2024-04-29 PROCEDURE — 85025 COMPLETE CBC W/AUTO DIFF WBC: CPT | Mod: 91

## 2024-04-29 PROCEDURE — 96374 THER/PROPH/DIAG INJ IV PUSH: CPT

## 2024-04-29 PROCEDURE — 93010 ELECTROCARDIOGRAM REPORT: CPT | Mod: ,,, | Performed by: INTERNAL MEDICINE

## 2024-04-29 PROCEDURE — 25000003 PHARM REV CODE 250

## 2024-04-29 PROCEDURE — 12000002 HC ACUTE/MED SURGE SEMI-PRIVATE ROOM

## 2024-04-29 PROCEDURE — 99285 EMERGENCY DEPT VISIT HI MDM: CPT | Mod: 25

## 2024-04-29 PROCEDURE — 81003 URINALYSIS AUTO W/O SCOPE: CPT | Mod: 91 | Performed by: EMERGENCY MEDICINE

## 2024-04-29 PROCEDURE — 85025 COMPLETE CBC W/AUTO DIFF WBC: CPT | Mod: PN | Performed by: NURSE PRACTITIONER

## 2024-04-29 PROCEDURE — 36415 COLL VENOUS BLD VENIPUNCTURE: CPT | Mod: PN | Performed by: NURSE PRACTITIONER

## 2024-04-29 PROCEDURE — 87635 SARS-COV-2 COVID-19 AMP PRB: CPT

## 2024-04-29 PROCEDURE — 63600175 PHARM REV CODE 636 W HCPCS: Performed by: INTERNAL MEDICINE

## 2024-04-29 PROCEDURE — 83036 HEMOGLOBIN GLYCOSYLATED A1C: CPT

## 2024-04-29 PROCEDURE — 80069 RENAL FUNCTION PANEL: CPT | Mod: PN | Performed by: NURSE PRACTITIONER

## 2024-04-29 PROCEDURE — 25000003 PHARM REV CODE 250: Performed by: INTERNAL MEDICINE

## 2024-04-29 PROCEDURE — 80053 COMPREHEN METABOLIC PANEL: CPT

## 2024-04-29 PROCEDURE — 84484 ASSAY OF TROPONIN QUANT: CPT

## 2024-04-29 RX ORDER — METOPROLOL SUCCINATE 25 MG/1
25 TABLET, EXTENDED RELEASE ORAL DAILY
Status: DISCONTINUED | OUTPATIENT
Start: 2024-04-30 | End: 2024-04-29

## 2024-04-29 RX ORDER — ERGOCALCIFEROL 1.25 MG/1
50000 CAPSULE ORAL
Status: DISCONTINUED | OUTPATIENT
Start: 2024-04-30 | End: 2024-05-12 | Stop reason: HOSPADM

## 2024-04-29 RX ORDER — AMIODARONE HYDROCHLORIDE 200 MG/1
400 TABLET ORAL DAILY
Status: DISCONTINUED | OUTPATIENT
Start: 2024-04-30 | End: 2024-05-12 | Stop reason: HOSPADM

## 2024-04-29 RX ORDER — ALLOPURINOL 100 MG/1
100 TABLET ORAL DAILY
Status: DISCONTINUED | OUTPATIENT
Start: 2024-04-30 | End: 2024-05-12 | Stop reason: HOSPADM

## 2024-04-29 RX ORDER — SODIUM CHLORIDE 0.9 % (FLUSH) 0.9 %
10 SYRINGE (ML) INJECTION
Status: DISCONTINUED | OUTPATIENT
Start: 2024-04-29 | End: 2024-05-12 | Stop reason: HOSPADM

## 2024-04-29 RX ORDER — ASPIRIN 81 MG/1
81 TABLET ORAL DAILY
Status: DISCONTINUED | OUTPATIENT
Start: 2024-04-30 | End: 2024-05-12 | Stop reason: HOSPADM

## 2024-04-29 RX ORDER — MEXILETINE HYDROCHLORIDE 200 MG/1
200 CAPSULE ORAL 2 TIMES DAILY WITH MEALS
Status: COMPLETED | OUTPATIENT
Start: 2024-04-29 | End: 2024-04-29

## 2024-04-29 RX ORDER — FUROSEMIDE 10 MG/ML
80 INJECTION INTRAMUSCULAR; INTRAVENOUS
Status: COMPLETED | OUTPATIENT
Start: 2024-04-29 | End: 2024-04-29

## 2024-04-29 RX ORDER — FUROSEMIDE 10 MG/ML
80 INJECTION INTRAMUSCULAR; INTRAVENOUS EVERY 12 HOURS
Status: DISCONTINUED | OUTPATIENT
Start: 2024-04-30 | End: 2024-05-03

## 2024-04-29 RX ORDER — LEVOTHYROXINE SODIUM 75 UG/1
150 TABLET ORAL DAILY
Status: DISCONTINUED | OUTPATIENT
Start: 2024-04-30 | End: 2024-05-12 | Stop reason: HOSPADM

## 2024-04-29 RX ORDER — ACETAMINOPHEN 500 MG
500 TABLET ORAL EVERY 6 HOURS PRN
COMMUNITY

## 2024-04-29 RX ORDER — FUROSEMIDE 10 MG/ML
40 INJECTION INTRAMUSCULAR; INTRAVENOUS ONCE
Status: COMPLETED | OUTPATIENT
Start: 2024-04-30 | End: 2024-04-30

## 2024-04-29 RX ORDER — FUROSEMIDE 10 MG/ML
40 INJECTION INTRAMUSCULAR; INTRAVENOUS ONCE
Status: COMPLETED | OUTPATIENT
Start: 2024-04-29 | End: 2024-04-29

## 2024-04-29 RX ORDER — HYDROCODONE BITARTRATE AND ACETAMINOPHEN 5; 325 MG/1; MG/1
1 TABLET ORAL EVERY 4 HOURS PRN
Status: DISCONTINUED | OUTPATIENT
Start: 2024-04-29 | End: 2024-05-12 | Stop reason: HOSPADM

## 2024-04-29 RX ORDER — FUROSEMIDE 10 MG/ML
40 INJECTION INTRAMUSCULAR; INTRAVENOUS
Status: DISCONTINUED | OUTPATIENT
Start: 2024-04-29 | End: 2024-04-29

## 2024-04-29 RX ORDER — FUROSEMIDE 10 MG/ML
80 INJECTION INTRAMUSCULAR; INTRAVENOUS ONCE
Status: COMPLETED | OUTPATIENT
Start: 2024-04-29 | End: 2024-04-29

## 2024-04-29 RX ORDER — ACETAMINOPHEN 325 MG/1
650 TABLET ORAL EVERY 4 HOURS PRN
Status: DISCONTINUED | OUTPATIENT
Start: 2024-04-29 | End: 2024-05-12 | Stop reason: HOSPADM

## 2024-04-29 RX ORDER — ONDANSETRON HYDROCHLORIDE 2 MG/ML
4 INJECTION, SOLUTION INTRAVENOUS EVERY 8 HOURS PRN
Status: DISCONTINUED | OUTPATIENT
Start: 2024-04-29 | End: 2024-05-12 | Stop reason: HOSPADM

## 2024-04-29 RX ORDER — HEPARIN SODIUM 5000 [USP'U]/ML
5000 INJECTION, SOLUTION INTRAVENOUS; SUBCUTANEOUS EVERY 8 HOURS
Status: DISCONTINUED | OUTPATIENT
Start: 2024-04-29 | End: 2024-05-12 | Stop reason: HOSPADM

## 2024-04-29 RX ORDER — PRAVASTATIN SODIUM 40 MG/1
40 TABLET ORAL DAILY
Status: DISCONTINUED | OUTPATIENT
Start: 2024-04-30 | End: 2024-05-12 | Stop reason: HOSPADM

## 2024-04-29 RX ORDER — METOLAZONE 10 MG/1
20 TABLET ORAL ONCE
Qty: 2 TABLET | Refills: 0 | Status: COMPLETED | OUTPATIENT
Start: 2024-04-29 | End: 2024-04-29

## 2024-04-29 RX ORDER — MEXILETINE HYDROCHLORIDE 150 MG/1
150 CAPSULE ORAL 2 TIMES DAILY WITH MEALS
Status: DISCONTINUED | OUTPATIENT
Start: 2024-04-29 | End: 2024-04-29

## 2024-04-29 RX ADMIN — METOLAZONE 20 MG: 10 TABLET ORAL at 08:04

## 2024-04-29 RX ADMIN — MEXILETINE HYDROCHLORIDE 200 MG: 200 CAPSULE ORAL at 07:04

## 2024-04-29 RX ADMIN — FUROSEMIDE 40 MG: 10 INJECTION, SOLUTION INTRAMUSCULAR; INTRAVENOUS at 11:04

## 2024-04-29 RX ADMIN — FUROSEMIDE 80 MG: 10 INJECTION, SOLUTION INTRAMUSCULAR; INTRAVENOUS at 05:04

## 2024-04-29 RX ADMIN — FUROSEMIDE 80 MG: 10 INJECTION, SOLUTION INTRAMUSCULAR; INTRAVENOUS at 07:04

## 2024-04-29 NOTE — TELEPHONE ENCOUNTER
----- Message from Yumiko Boyle sent at 4/29/2024  2:33 PM CDT -----  Contact: self  [phone]  Patient called in this afternoon stating his kidney function is getting lower and would like a port in to start dialysis. [phone]. Thanks bobby

## 2024-04-29 NOTE — ED PROVIDER NOTES
Encounter Date: 4/29/2024       History     Chief Complaint   Patient presents with    Abnormal Lab     Pt had blood work completed today and  BUN- 106/Creatine- 6.19     Shortness of Breath     SOB reports worse over the past several day, pt visibly SOB in triage, Room sats= 85%       Ar Sharma is a 63 y.o male with a past medical history of CKD, HFrEF, hypertension and hypothyroidism that presents today for worsening shortness of breath. He states two weeks ago his doctor called him after getting labs done and told him to go to the emergency department to get admitted. He states that he didn't go but then today he got his GFR results and that worried him and he came to the emergency deparmtment. He states his shortness of breath has been worsening for the past month. He states it's worse when he exercises or moves and is alleviated by rest.  He also states that his urine output has been slowly decreasing and that although he takes furosimide 80mg twice a day it doesn't help reduce his fluid overload. He denies chest pain, nausea, vomiting, dysuria, diarrhea, fever and chills.      Review of patient's allergies indicates:   Allergen Reactions    Lokelma [sodium zirconium cyclosilicate] Other (See Comments)     Fluid retention, weight gain, CHF excerebration, severe constipation    Atorvastatin Other (See Comments)     Joint pain    Jardiance [empagliflozin] Other (See Comments)     Chest pains, significant weight loss, lower blood pressure     Past Medical History:   Diagnosis Date    Cardiomyopathy     CKD (chronic kidney disease) stage 4, GFR 15-29 ml/min     Congestive heart failure (CHF) 2015    COPD (chronic obstructive pulmonary disease)     Coronary artery disease     Edema     Essential (primary) hypertension 05/18/2022    Formatting of this note might be different from the original. Converted from [employer]: Description - ESSENTIAL HYPERTENSION, BENIGN    Heart attack     HLD (hyperlipidemia)      Hypertension     Hyperuricemia     Hypocalcemia     Renal cyst, left     Secondary hyperparathyroidism     Thyroid disease     V tach     Vitamin D deficiency      Past Surgical History:   Procedure Laterality Date    ablations  2018    albations  2018    COLONOSCOPY N/A 2018    Procedure: COLONOSCOPY/suprep;  Surgeon: Ananya Morillo MD;  Location: [facility] ENDO;  Service: Endoscopy;  Laterality: N/A;    defibulater N/A     ESOPHAGOGASTRODUODENOSCOPY N/A 2018    Procedure: EGD (ESOPHAGOGASTRODUODENOSCOPY);  Surgeon: Ananya Morillo MD;  Location: [facility] ENDO;  Service: Endoscopy;  Laterality: N/A;    JOINT REPLACEMENT Bilateral 10/2016    knees, bilat    LEFT HEART CATHETERIZATION N/A 2020    Procedure: Left heart cath;  Surgeon: Shahram Stewart MD;  Location: [facility] CATH LAB/EP;  Service: Cardiology;  Laterality: N/A;    LEFT HEART CATHETERIZATION Left 2022    Procedure: Left heart cath;  Surgeon: Juan Roque MD;  Location: [facility] CATH LAB/EP;  Service: Cardiology;  Laterality: Left;    REPLACEMENT OF IMPLANTABLE CARDIOVERTER-DEFIBRILLATOR (ICD) GENERATOR Left 2023    Procedure: REPLACEMENT, ICD GENERATOR;  Surgeon: River Tenorio MD;  Location: [facility] EP LAB;  Service: Cardiology;  Laterality: Left;  ANTHONY, CRTD gen chg, MDT, MAC, DM, 3prep     Family History   Problem Relation Name Age of Onset    Cancer Mother Sharon Love     Hypertension Mother Sharon Love     Stroke Mother Sharon Love     Breast cancer Mother Sharon Love     Alcohol abuse Father Mitul Love     Kidney disease Father Mitul Love     Arthritis Sister x2     No Known Problems Brother x1     No Known Problems Daughter x1     No Known Problems Son x2      Social History     Tobacco Use    Smoking status: Former     Current packs/day: 0.00     Average packs/day: 1 pack/day for 33.2 years (33.2 ttl pk-yrs)     Types: Cigarettes     Start date: 1979     Quit date: 3/3/2012     Years since quittin.1      Passive exposure: Past    Smokeless tobacco: Never   Substance Use Topics    Alcohol use: Yes     Comment: rare    Drug use: No     Review of Systems   Constitutional:  Positive for activity change. Negative for fatigue and fever.   Respiratory:  Positive for shortness of breath.    Cardiovascular:  Positive for leg swelling.   Gastrointestinal:  Negative for abdominal pain, diarrhea, nausea and vomiting.   Genitourinary:  Positive for decreased urine volume.   Psychiatric/Behavioral:  Negative for confusion.    All other systems reviewed and are negative.      Physical Exam     Initial Vitals [04/29/24 1623]   BP Pulse Resp Temp SpO2   (!) 83/63 81 (!) 28 97.8 °F (36.6 °C) (!) 85 %      MAP       --         Physical Exam    Nursing note and vitals reviewed.  Constitutional: He appears well-developed and well-nourished.   HENT:   Head: Normocephalic and atraumatic.   Eyes: EOM are normal. Pupils are equal, round, and reactive to light.   Neck: Neck supple. JVD present.   Normal range of motion.  Cardiovascular:  Normal rate, regular rhythm, normal heart sounds and intact distal pulses.           Pulmonary/Chest: Breath sounds normal.   Abdominal: Abdomen is soft. Bowel sounds are normal. He exhibits no distension. There is no abdominal tenderness. There is no rebound and no guarding.   Musculoskeletal:         General: Edema (<1+ pitting edema to BLE) present. Normal range of motion.      Cervical back: Normal range of motion and neck supple.     Neurological: He is alert and oriented to person, place, and time.   Skin: Skin is warm and dry.   Psychiatric: He has a normal mood and affect. His behavior is normal. Judgment and thought content normal.         ED Course   Critical Care    Date/Time: 4/29/2024 7:41 PM    Performed by: Aimee Joseph MD  Authorized by: Aimee Joseph MD  Direct patient critical care time: 10 minutes  Additional history critical care time: 10 minutes  Ordering / reviewing critical care  time: 10 minutes  Documentation critical care time: 20 minutes  Consulting other physicians critical care time: 15 minutes  Consult with family critical care time: 15 minutes  Other critical care time: 15 minutes  Total critical care time (exclusive of procedural time) : 95 minutes  Critical care was necessary to treat or prevent imminent or life-threatening deterioration of the following conditions: circulatory failure, respiratory failure and renal failure.  Critical care was time spent personally by me on the following activities: development of treatment plan with patient or surrogate, discussions with consultants, evaluation of patient's response to treatment, examination of patient, obtaining history from patient or surrogate, ordering and performing treatments and interventions, ordering and review of laboratory studies, ordering and review of radiographic studies, pulse oximetry, re-evaluation of patient's condition and review of old charts.        Labs Reviewed   CBC W/ AUTO DIFFERENTIAL - Abnormal; Notable for the following components:       Result Value    MCV 75 (*)     MCH 26.2 (*)     RDW 15.4 (*)     Platelets 672 (*)     Lymph # 0.6 (*)     Gran % 78.0 (*)     Lymph % 7.1 (*)     All other components within normal limits   COMPREHENSIVE METABOLIC PANEL - Abnormal; Notable for the following components:    Sodium 133 (*)     CO2 21 (*)      (*)     Creatinine 6.7 (*)     Total Bilirubin 2.4 (*)     Alkaline Phosphatase 160 (*)     AST 42 (*)     eGFR 9 (*)     All other components within normal limits   B-TYPE NATRIURETIC PEPTIDE - Abnormal; Notable for the following components:    BNP 3,204 (*)     All other components within normal limits   TROPONIN I - Abnormal; Notable for the following components:    Troponin I 0.040 (*)     All other components within normal limits   PHOSPHORUS - Abnormal; Notable for the following components:    Phosphorus 5.0 (*)     All other components within normal  limits   MAGNESIUM   URINALYSIS, REFLEX TO URINE CULTURE   SARS-COV-2 RDRP GENE     EKG Readings: (Independently Interpreted)   Initial: 1628. Heart Rate: 70. Clinical Impression: Paced Rhythm       Imaging Results              X-Ray Chest 1 View (Final result)  Result time 04/29/24 17:35:08      Final result by Alberto Ibarra MD (04/29/24 17:35:08)                   Impression:      Cardiomegaly with pulmonary vascular congestion, suggestive of pulmonary edema secondary to CHF.  Overall findings remain stable.      Electronically signed by: Alberto Ibarra MD  Date:    04/29/2024  Time:    17:35               Narrative:    EXAMINATION:  XR CHEST 1 VIEW    CLINICAL HISTORY:  shortness of breath;    TECHNIQUE:  Single frontal view of the chest was performed.    COMPARISON:  05/09/2023.    FINDINGS:  The left-sided AICD remains in stable position.  Monitoring EKG leads are present.    The trachea is unremarkable.  There are calcifications of the aortic knob.  The cardiomediastinal silhouette is enlarged.  There is no evidence of free air beneath hemidiaphragms.  There is unchanged appearance of small bilateral pleural effusions with left greater than right.  There is no evidence of a pneumothorax.  There is no evidence of pneumomediastinum.  There is unchanged appearance of pulmonary vascular congestion.  There are degenerative changes in the osseous structures.                                       Medications   allopurinoL tablet 100 mg (has no administration in time range)   amiodarone tablet 400 mg (has no administration in time range)   aspirin EC tablet 81 mg (has no administration in time range)   ergocalciferol capsule 50,000 Units (has no administration in time range)   levothyroxine tablet 150 mcg (has no administration in time range)   metoprolol succinate (TOPROL-XL) 24 hr tablet 25 mg (has no administration in time range)   sacubitriL-valsartan 24-26 mg per tablet 1 tablet (has no administration in time  range)   pravastatin tablet 40 mg (has no administration in time range)   metOLazone tablet 20 mg (has no administration in time range)   furosemide injection 80 mg (has no administration in time range)   mexiletine capsule 200 mg (has no administration in time range)   furosemide injection 80 mg (80 mg Intravenous Given 4/29/24 1739)     Medical Decision Making  Differential Diagnosis includes, but is not limited to:  PE, MI/ACS, pneumothorax, pericardial effusion/tamonade, pneumonia, lung abscess, pericarditis/myocarditis, pleural effusion, lung mass, CHF exacerbation, asthma exacerbation, COPD exacerbation, aspirated/ingested foreign body, airway obstruction, CO poisoning, anemia, metabolic derangement, allergy/atopy, influenza, viral URI, viral syndrome.     MDM:  The patient is a 63-year-old male with shortness of breath for the past several weeks that is gradually gotten worse.  The patient was told his kidneys were failing but he opted to not be evaluated.  He has been taking Lasix 80 mg twice a day and he is having decreased urine output for the last few days.  He had his blood drawn this morning and his creatinine was markedly elevated so he came to the emergency department.    1900:  Dr. Chyna Mtaa came to the ED to see the patient. She feels that the patient is okay tonight without dialysis since his issue is not acute.  The patient is able to make some urine and will be given Lasix.  His blood pressure is chronically low.  This is according to his daughter who is a family medicine resident.  The patient has a blood pressure of 97/63 and is awake alert and feels comfortable.  He is not short of breath with his nasal cannula oxygen in place, his oxygen saturations are 92-93% on 3-4 L nasal cannula.  Due to his low blood pressure, Dr. Mata feels the patient needs to have CRT dialysis tomorrow in the ICU.  She will consult General surgery in the a.m. to place a dialysis catheter.  She does not want the  patient dialyzed tonight as his blood pressure can not handle dialysis.  [facility] hospitalist service has been consulted for admission.  I recommend the patient get admitted to the ICU.  There are no ICU beds available so the patient will remain in the emergency department.  If the patient becomes hypoxic, he will need to be intubated.     Amount and/or Complexity of Data Reviewed  Independent Historian: caregiver     Details: Daughter and wife  Labs: ordered. Decision-making details documented in ED Course.  Radiology: ordered. Decision-making details documented in ED Course.  ECG/medicine tests: ordered. Decision-making details documented in ED Course.    Risk  Prescription drug management.  Decision regarding hospitalization.  Minor surgery with identified risk factors.               ED Course as of 04/29/24 1941 Mon Apr 29, 2024   1745 CBC Auto Differential(!) [ST]   1745 Comprehensive Metabolic Panel(!) [ST]   1745 Brain natriuretic peptide(!) [ST]   1745 Troponin I(!) [ST]   1745 POCT COVID-19 Rapid Screening [ST]   1745 Magnesium [ST]   1859 The patient's daughter is a family medicine resident. She states [ST]      ED Course User Index  [ST] Aimee Joseph MD                           Clinical Impression:  Final diagnoses:  [R06.02] Shortness of breath  [Z99.2] Admission for dialysis (Primary)  [N17.9, N18.9] Renal failure (ARF), acute on chronic          ED Disposition Condition    Admit                 Aimee Joseph MD  04/29/24 1942

## 2024-04-29 NOTE — Clinical Note
Diagnosis: Renal failure (ARF), acute on chronic [405298]   Future Attending Provider: NASIR AMBRIZ [[id number]]   Reason for IP Medical Treatment  (Clinical interventions that can only be accomplished in the IP setting? ) :: CRRT   I certify that Inpatient services for greater than or equal to 2 midnights are medically necessary:: Yes   Plans for Post-Acute care--if anticipated (pick the single best option):: A. No post acute care anticipated at this time

## 2024-04-29 NOTE — TELEPHONE ENCOUNTER
Patient was called back and reports having nausea over the weekend with a decreased appetite. He denies BLE edema but does have complain of shortness of breath due to history of CHF.  He also denies any chest pains at this time.  He has decided to go to [facility] ED in [place] to have a dialysis catheter placed. He doesn't want to wait until next week to discuss.  I have not cancelled his appointment on Monday as of now.

## 2024-04-29 NOTE — TELEPHONE ENCOUNTER
If he is feeling nauseated, poor appetite, SOB with feet/leg swelling, or chest pain then he needs ot go to ER. If not I will discuss starting dialysis with him in clinic. If he is feeling well then no need to go to ER.

## 2024-04-29 NOTE — TELEPHONE ENCOUNTER
Patient called to ask about abnormal eGFR and kidney labs today. He is concerned and asked if he should go to the nearest ED at [facility]?  He was told we would reach out to Dr Simental for her response.

## 2024-04-30 ENCOUNTER — TELEPHONE (OUTPATIENT)
Dept: NEPHROLOGY | Facility: CLINIC | Age: 64
End: 2024-04-30
Payer: MEDICARE

## 2024-04-30 PROBLEM — N18.6 ESRD (END STAGE RENAL DISEASE): Status: ACTIVE | Noted: 2024-04-30

## 2024-04-30 PROBLEM — I95.9 HYPOTENSION: Status: ACTIVE | Noted: 2024-04-30

## 2024-04-30 PROBLEM — J96.01 ACUTE HYPOXEMIC RESPIRATORY FAILURE: Status: ACTIVE | Noted: 2024-04-30

## 2024-04-30 LAB
ALBUMIN SERPL BCP-MCNC: 3 G/DL (ref 3.5–5.2)
ALBUMIN SERPL BCP-MCNC: 3 G/DL (ref 3.5–5.2)
ANION GAP SERPL CALC-SCNC: 13 MMOL/L (ref 8–16)
ANION GAP SERPL CALC-SCNC: 7 MMOL/L (ref 8–16)
ANION GAP SERPL CALC-SCNC: 9 MMOL/L (ref 8–16)
ASCENDING AORTA: 2.38 CM
AV INDEX (PROSTH): 0.52
AV MEAN GRADIENT: 5 MMHG
AV PEAK GRADIENT: 8 MMHG
AV VALVE AREA BY VELOCITY RATIO: 1.48 CM²
AV VALVE AREA: 1.49 CM²
AV VELOCITY RATIO: 0.52
BASOPHILS # BLD AUTO: 0.1 K/UL (ref 0–0.2)
BASOPHILS NFR BLD: 1.3 % (ref 0–1.9)
BSA FOR ECHO PROCEDURE: 1.94 M2
BUN SERPL-MCNC: 110 MG/DL (ref 8–23)
BUN SERPL-MCNC: 78 MG/DL (ref 8–23)
BUN SERPL-MCNC: 94 MG/DL (ref 8–23)
CALCIUM SERPL-MCNC: 8.4 MG/DL (ref 8.7–10.5)
CALCIUM SERPL-MCNC: 8.6 MG/DL (ref 8.7–10.5)
CALCIUM SERPL-MCNC: 8.7 MG/DL (ref 8.7–10.5)
CHLORIDE SERPL-SCNC: 100 MMOL/L (ref 95–110)
CHLORIDE SERPL-SCNC: 100 MMOL/L (ref 95–110)
CHLORIDE SERPL-SCNC: 102 MMOL/L (ref 95–110)
CO2 SERPL-SCNC: 22 MMOL/L (ref 23–29)
CO2 SERPL-SCNC: 27 MMOL/L (ref 23–29)
CO2 SERPL-SCNC: 28 MMOL/L (ref 23–29)
CREAT SERPL-MCNC: 4.1 MG/DL (ref 0.5–1.4)
CREAT SERPL-MCNC: 4.8 MG/DL (ref 0.5–1.4)
CREAT SERPL-MCNC: 6.1 MG/DL (ref 0.5–1.4)
CV ECHO LV RWT: 0.39 CM
DIFFERENTIAL METHOD BLD: ABNORMAL
DOP CALC AO PEAK VEL: 1.43 M/S
DOP CALC AO VTI: 26.7 CM
DOP CALC LVOT AREA: 2.9 CM2
DOP CALC LVOT DIAMETER: 1.91 CM
DOP CALC LVOT PEAK VEL: 0.74 M/S
DOP CALC LVOT STROKE VOLUME: 39.81 CM3
DOP CALC MV VTI: 24.5 CM
DOP CALCLVOT PEAK VEL VTI: 13.9 CM
E WAVE DECELERATION TIME: 210.39 MSEC
E/A RATIO: 2.89
E/E' RATIO: 14.18 M/S
ECHO LV POSTERIOR WALL: 0.98 CM (ref 0.6–1.1)
EOSINOPHIL # BLD AUTO: 0.2 K/UL (ref 0–0.5)
EOSINOPHIL NFR BLD: 3.1 % (ref 0–8)
ERYTHROCYTE [DISTWIDTH] IN BLOOD BY AUTOMATED COUNT: 14.5 % (ref 11.5–14.5)
EST. GFR  (NO RACE VARIABLE): 10 ML/MIN/1.73 M^2
EST. GFR  (NO RACE VARIABLE): 13 ML/MIN/1.73 M^2
EST. GFR  (NO RACE VARIABLE): 15 ML/MIN/1.73 M^2
FRACTIONAL SHORTENING: 22 % (ref 28–44)
GLUCOSE SERPL-MCNC: 116 MG/DL (ref 70–110)
GLUCOSE SERPL-MCNC: 123 MG/DL (ref 70–110)
GLUCOSE SERPL-MCNC: 84 MG/DL (ref 70–110)
HAV IGM SERPL QL IA: NORMAL
HBV CORE AB SERPL QL IA: NORMAL
HBV SURFACE AB SER-ACNC: <3 MIU/ML
HBV SURFACE AB SER-ACNC: NORMAL M[IU]/ML
HBV SURFACE AG SERPL QL IA: NORMAL
HCT VFR BLD AUTO: 40.5 % (ref 40–54)
HGB BLD-MCNC: 14.5 G/DL (ref 14–18)
IMM GRANULOCYTES # BLD AUTO: 0.04 K/UL (ref 0–0.04)
IMM GRANULOCYTES NFR BLD AUTO: 0.5 % (ref 0–0.5)
INTERVENTRICULAR SEPTUM: 0.83 CM (ref 0.6–1.1)
IVC DIAMETER: 3.51 CM
LA MAJOR: 7.93 CM
LA MINOR: 7.08 CM
LA WIDTH: 4.3 CM
LEFT ATRIUM SIZE: 5.04 CM
LEFT ATRIUM VOLUME INDEX MOD: 50.3 ML/M2
LEFT ATRIUM VOLUME INDEX: 71.4 ML/M2
LEFT ATRIUM VOLUME MOD: 97.12 CM3
LEFT ATRIUM VOLUME: 137.81 CM3
LEFT INTERNAL DIMENSION IN SYSTOLE: 3.94 CM (ref 2.1–4)
LEFT VENTRICLE DIASTOLIC VOLUME INDEX: 62.01 ML/M2
LEFT VENTRICLE DIASTOLIC VOLUME: 119.67 ML
LEFT VENTRICLE MASS INDEX: 83 G/M2
LEFT VENTRICLE SYSTOLIC VOLUME INDEX: 35 ML/M2
LEFT VENTRICLE SYSTOLIC VOLUME: 67.63 ML
LEFT VENTRICULAR INTERNAL DIMENSION IN DIASTOLE: 5.03 CM (ref 3.5–6)
LEFT VENTRICULAR MASS: 160.97 G
LV LATERAL E/E' RATIO: 13 M/S
LV SEPTAL E/E' RATIO: 15.6 M/S
LVOT MG: 1.44 MMHG
LVOT MV: 0.58 CM/S
LYMPHOCYTES # BLD AUTO: 0.6 K/UL (ref 1–4.8)
LYMPHOCYTES NFR BLD: 8.1 % (ref 18–48)
MAGNESIUM SERPL-MCNC: 2.1 MG/DL (ref 1.6–2.6)
MAGNESIUM SERPL-MCNC: 2.1 MG/DL (ref 1.6–2.6)
MCH RBC QN AUTO: 26.4 PG (ref 27–31)
MCHC RBC AUTO-ENTMCNC: 35.8 G/DL (ref 32–36)
MCV RBC AUTO: 74 FL (ref 82–98)
MONOCYTES # BLD AUTO: 0.8 K/UL (ref 0.3–1)
MONOCYTES NFR BLD: 10.3 % (ref 4–15)
MV PEAK A VEL: 0.27 M/S
MV PEAK E VEL: 0.78 M/S
MV PEAK GRADIENT: 3 MMHG
MV STENOSIS PRESSURE HALF TIME: 61.01 MS
MV VALVE AREA BY CONTINUITY EQUATION: 1.62 CM2
MV VALVE AREA P 1/2 METHOD: 3.61 CM2
NEUTROPHILS # BLD AUTO: 5.9 K/UL (ref 1.8–7.7)
NEUTROPHILS NFR BLD: 76.7 % (ref 38–73)
NRBC BLD-RTO: 0 /100 WBC
OHS CV RV/LV RATIO: 1.31 CM
PHOSPHATE SERPL-MCNC: 3.4 MG/DL (ref 2.7–4.5)
PHOSPHATE SERPL-MCNC: 4.1 MG/DL (ref 2.7–4.5)
PHOSPHATE SERPL-MCNC: 4.9 MG/DL (ref 2.7–4.5)
PISA MRMAX VEL: 3.64 M/S
PISA TR MAX VEL: 3.49 M/S
PLATELET # BLD AUTO: 554 K/UL (ref 150–450)
PMV BLD AUTO: 11.7 FL (ref 9.2–12.9)
POTASSIUM SERPL-SCNC: 3.8 MMOL/L (ref 3.5–5.1)
POTASSIUM SERPL-SCNC: 4 MMOL/L (ref 3.5–5.1)
POTASSIUM SERPL-SCNC: 4.2 MMOL/L (ref 3.5–5.1)
PULM VEIN S/D RATIO: 0.71
PV PEAK D VEL: 0.38 M/S
PV PEAK GRADIENT: 2 MMHG
PV PEAK S VEL: 0.27 M/S
PV PEAK VELOCITY: 0.64 M/S
RA MAJOR: 7.79 CM
RA PRESSURE ESTIMATED: 15 MMHG
RA WIDTH: 4.97 CM
RBC # BLD AUTO: 5.49 M/UL (ref 4.6–6.2)
RIGHT VENTRICLE DIASTOLIC MID DIMENSION: 5.4 CM
RIGHT VENTRICULAR END-DIASTOLIC DIMENSION: 6.6 CM
RV TB RVSP: 18 MMHG
RV TISSUE DOPPLER FREE WALL SYSTOLIC VELOCITY 1 (APICAL 4 CHAMBER VIEW): 5.1 CM/S
SINUS: 3.16 CM
SODIUM SERPL-SCNC: 135 MMOL/L (ref 136–145)
SODIUM SERPL-SCNC: 136 MMOL/L (ref 136–145)
SODIUM SERPL-SCNC: 137 MMOL/L (ref 136–145)
STJ: 2.37 CM
TDI LATERAL: 0.06 M/S
TDI SEPTAL: 0.05 M/S
TDI: 0.06 M/S
TR MAX PG: 49 MMHG
TRICUSPID ANNULAR PLANE SYSTOLIC EXCURSION: 1.59 CM
TV REST PULMONARY ARTERY PRESSURE: 64 MMHG
WBC # BLD AUTO: 7.66 K/UL (ref 3.9–12.7)
Z-SCORE OF LEFT VENTRICULAR DIMENSION IN END DIASTOLE: -0.83
Z-SCORE OF LEFT VENTRICULAR DIMENSION IN END SYSTOLE: 1.25

## 2024-04-30 PROCEDURE — 86706 HEP B SURFACE ANTIBODY: CPT | Performed by: INTERNAL MEDICINE

## 2024-04-30 PROCEDURE — 90945 DIALYSIS ONE EVALUATION: CPT

## 2024-04-30 PROCEDURE — 99900035 HC TECH TIME PER 15 MIN (STAT)

## 2024-04-30 PROCEDURE — 25500020 PHARM REV CODE 255: Performed by: INTERNAL MEDICINE

## 2024-04-30 PROCEDURE — 85025 COMPLETE CBC W/AUTO DIFF WBC: CPT

## 2024-04-30 PROCEDURE — 83735 ASSAY OF MAGNESIUM: CPT

## 2024-04-30 PROCEDURE — 25000003 PHARM REV CODE 250

## 2024-04-30 PROCEDURE — 86704 HEP B CORE ANTIBODY TOTAL: CPT | Performed by: INTERNAL MEDICINE

## 2024-04-30 PROCEDURE — 86709 HEPATITIS A IGM ANTIBODY: CPT | Performed by: INTERNAL MEDICINE

## 2024-04-30 PROCEDURE — 25000003 PHARM REV CODE 250: Performed by: INTERNAL MEDICINE

## 2024-04-30 PROCEDURE — 25000003 PHARM REV CODE 250: Performed by: NURSE PRACTITIONER

## 2024-04-30 PROCEDURE — 80069 RENAL FUNCTION PANEL: CPT | Mod: 91 | Performed by: INTERNAL MEDICINE

## 2024-04-30 PROCEDURE — 99223 1ST HOSP IP/OBS HIGH 75: CPT | Mod: ,,, | Performed by: NURSE PRACTITIONER

## 2024-04-30 PROCEDURE — 87340 HEPATITIS B SURFACE AG IA: CPT | Performed by: INTERNAL MEDICINE

## 2024-04-30 PROCEDURE — 27202415 HC CARTRIDGE, CRRT

## 2024-04-30 PROCEDURE — 80048 BASIC METABOLIC PNL TOTAL CA: CPT | Performed by: INTERNAL MEDICINE

## 2024-04-30 PROCEDURE — 63600175 PHARM REV CODE 636 W HCPCS

## 2024-04-30 PROCEDURE — 63600175 PHARM REV CODE 636 W HCPCS: Performed by: INTERNAL MEDICINE

## 2024-04-30 PROCEDURE — 27000923 HC TRIALYSIS CATHETER, ANY SIZE

## 2024-04-30 PROCEDURE — 20000000 HC ICU ROOM

## 2024-04-30 PROCEDURE — 83735 ASSAY OF MAGNESIUM: CPT | Mod: 91 | Performed by: INTERNAL MEDICINE

## 2024-04-30 PROCEDURE — 63600175 PHARM REV CODE 636 W HCPCS: Performed by: STUDENT IN AN ORGANIZED HEALTH CARE EDUCATION/TRAINING PROGRAM

## 2024-04-30 PROCEDURE — 94761 N-INVAS EAR/PLS OXIMETRY MLT: CPT

## 2024-04-30 PROCEDURE — 84100 ASSAY OF PHOSPHORUS: CPT

## 2024-04-30 RX ORDER — HYDROCODONE BITARTRATE AND ACETAMINOPHEN 500; 5 MG/1; MG/1
TABLET ORAL CONTINUOUS
Status: ACTIVE | OUTPATIENT
Start: 2024-04-30 | End: 2024-05-01

## 2024-04-30 RX ORDER — HEPARIN SODIUM 1000 [USP'U]/ML
2400 INJECTION, SOLUTION INTRAVENOUS; SUBCUTANEOUS
Status: DISCONTINUED | OUTPATIENT
Start: 2024-04-30 | End: 2024-05-12 | Stop reason: HOSPADM

## 2024-04-30 RX ORDER — MEXILETINE HYDROCHLORIDE 150 MG/1
150 CAPSULE ORAL 2 TIMES DAILY WITH MEALS
Status: DISCONTINUED | OUTPATIENT
Start: 2024-04-30 | End: 2024-05-12 | Stop reason: HOSPADM

## 2024-04-30 RX ORDER — HEPARIN 100 UNIT/ML
100 SYRINGE INTRAVENOUS
Status: DISCONTINUED | OUTPATIENT
Start: 2024-04-30 | End: 2024-04-30

## 2024-04-30 RX ORDER — NOREPINEPHRINE BITARTRATE/D5W 4MG/250ML
PLASTIC BAG, INJECTION (ML) INTRAVENOUS
Status: COMPLETED
Start: 2024-04-30 | End: 2024-04-30

## 2024-04-30 RX ORDER — MUPIROCIN 20 MG/G
OINTMENT TOPICAL 2 TIMES DAILY
Status: COMPLETED | OUTPATIENT
Start: 2024-04-30 | End: 2024-05-05

## 2024-04-30 RX ORDER — HEPARIN SODIUM 10000 [USP'U]/100ML
500 INJECTION, SOLUTION INTRAVENOUS CONTINUOUS
Status: DISCONTINUED | OUTPATIENT
Start: 2024-04-30 | End: 2024-05-03

## 2024-04-30 RX ORDER — HEPARIN 100 UNIT/ML
10 SYRINGE INTRAVENOUS
Status: DISCONTINUED | OUTPATIENT
Start: 2024-04-30 | End: 2024-04-30

## 2024-04-30 RX ORDER — MIDODRINE HYDROCHLORIDE 5 MG/1
5 TABLET ORAL ONCE
Status: COMPLETED | OUTPATIENT
Start: 2024-04-30 | End: 2024-04-30

## 2024-04-30 RX ORDER — HEPARIN SODIUM 1000 [USP'U]/ML
1000 INJECTION, SOLUTION INTRAVENOUS; SUBCUTANEOUS ONCE
Status: COMPLETED | OUTPATIENT
Start: 2024-04-30 | End: 2024-04-30

## 2024-04-30 RX ORDER — NOREPINEPHRINE BITARTRATE/D5W 4MG/250ML
0-3 PLASTIC BAG, INJECTION (ML) INTRAVENOUS CONTINUOUS
Status: DISCONTINUED | OUTPATIENT
Start: 2024-04-30 | End: 2024-05-02

## 2024-04-30 RX ORDER — MAGNESIUM SULFATE HEPTAHYDRATE 40 MG/ML
2 INJECTION, SOLUTION INTRAVENOUS
Status: ACTIVE | OUTPATIENT
Start: 2024-04-30 | End: 2024-05-01

## 2024-04-30 RX ADMIN — PRAVASTATIN SODIUM 40 MG: 40 TABLET ORAL at 09:04

## 2024-04-30 RX ADMIN — HEPARIN SODIUM 5000 UNITS: 5000 INJECTION INTRAVENOUS; SUBCUTANEOUS at 09:04

## 2024-04-30 RX ADMIN — LEVOTHYROXINE SODIUM 150 MCG: 150 TABLET ORAL at 09:04

## 2024-04-30 RX ADMIN — SODIUM CHLORIDE: 9 INJECTION, SOLUTION INTRAVENOUS at 03:04

## 2024-04-30 RX ADMIN — HEPARIN SODIUM 500 UNITS/HR: 10000 INJECTION, SOLUTION INTRAVENOUS at 03:04

## 2024-04-30 RX ADMIN — HEPARIN SODIUM 2400 UNITS: 1000 INJECTION, SOLUTION INTRAVENOUS; SUBCUTANEOUS at 10:04

## 2024-04-30 RX ADMIN — NOREPINEPHRINE BITARTRATE 0.02 MCG/KG/MIN: 4 INJECTION, SOLUTION INTRAVENOUS at 09:04

## 2024-04-30 RX ADMIN — MUPIROCIN: 20 OINTMENT TOPICAL at 09:04

## 2024-04-30 RX ADMIN — MIDODRINE HYDROCHLORIDE 5 MG: 5 TABLET ORAL at 09:04

## 2024-04-30 RX ADMIN — SODIUM CHLORIDE: 9 INJECTION, SOLUTION INTRAVENOUS at 08:04

## 2024-04-30 RX ADMIN — FUROSEMIDE 80 MG: 10 INJECTION, SOLUTION INTRAVENOUS at 09:04

## 2024-04-30 RX ADMIN — SODIUM CHLORIDE: 9 INJECTION, SOLUTION INTRAVENOUS at 09:04

## 2024-04-30 RX ADMIN — FUROSEMIDE 40 MG: 10 INJECTION, SOLUTION INTRAMUSCULAR; INTRAVENOUS at 05:04

## 2024-04-30 RX ADMIN — AMIODARONE HYDROCHLORIDE 400 MG: 200 TABLET ORAL at 09:04

## 2024-04-30 RX ADMIN — MIDODRINE HYDROCHLORIDE 5 MG: 5 TABLET ORAL at 10:04

## 2024-04-30 RX ADMIN — ALLOPURINOL 100 MG: 100 TABLET ORAL at 09:04

## 2024-04-30 RX ADMIN — HEPARIN SODIUM 500 UNITS/HR: 10000 INJECTION, SOLUTION INTRAVENOUS at 08:04

## 2024-04-30 RX ADMIN — HEPARIN SODIUM 5000 UNITS: 5000 INJECTION INTRAVENOUS; SUBCUTANEOUS at 03:04

## 2024-04-30 RX ADMIN — MEXILETINE HYDROCHLORIDE 150 MG: 150 CAPSULE ORAL at 09:04

## 2024-04-30 RX ADMIN — ERGOCALCIFEROL 50000 UNITS: 1.25 CAPSULE ORAL at 09:04

## 2024-04-30 RX ADMIN — HEPARIN SODIUM 1000 UNITS: 1000 INJECTION, SOLUTION INTRAVENOUS; SUBCUTANEOUS at 02:04

## 2024-04-30 RX ADMIN — ASPIRIN 81 MG: 81 TABLET, COATED ORAL at 09:04

## 2024-04-30 RX ADMIN — HUMAN ALBUMIN MICROSPHERES AND PERFLUTREN 0.11 MG: 10; .22 INJECTION, SOLUTION INTRAVENOUS at 10:04

## 2024-04-30 RX ADMIN — MEXILETINE HYDROCHLORIDE 150 MG: 150 CAPSULE ORAL at 05:04

## 2024-04-30 NOTE — ASSESSMENT & PLAN NOTE
Congestive Heart Failure  Patient presents for re-evaluation of congestive heart failure. Patient's current complaints are dyspnea, fatigue, near-syncope, and orthopnea. He denies chest pain, claudication, exertional chest pressure/discomfort, and lower extremity edema. He states he is compliant most of the time with his medications. He states he is compliant all of the time with his diet.    --last TTE in 2023 shows EF 40%. Will repeat this admission  --on home lasix. Will convert to higher IV dosage  --will hold troprol in setting of hypotension requiring CRRT  --on CHF pathway

## 2024-04-30 NOTE — HPI
Ar Sharma is a 63M with PMHx of ESRD, HFrEF, HTN, NICM, COPD, VT s/p ICD placement, and HLD who presented to [facility] ED with CC progressively worse SOB over the last two months. He was recently diagnosed with ESRD and has been trying diet modification at home. He endorses SINGH, dizziness, frequent intermittent nausea and one episode of vomiting during this time. He denies CP, syncope, dysuria, fever or chills.     ED workup significant for: , Cr 6.7, Phos 5, Tbili 2.4, BNP 3204, trop 0.040, vit D 24, .6, and CXR: cardiomegaly w/ pulmonary vascular congestion consistent w/ pulmonary edema. All other components of CBC and chemistries were unremarkable.       Patient was seen by Dr. Mata in ED. Emergent HD not indicated.

## 2024-04-30 NOTE — NURSING
"Acupuncture/TCM Follow Up  Visit #7     PRECAUTIONS:    Â· Precautions:  Patient has Idiopathic thrombocytopenic purpura (low platelets). Labs checked, levels okay today. Â· Besides acupuncture have you had any invasive needling techniques performed on you in the past 24 hours  []  YES    [x]  NO  Â· If yes, and if you have any of the following symptoms you should seek urgent care before receiving an acupuncture treatment today. Â· Shortness of breath  Â· Unexplained cough  Â· Difficulty breathing or sharp pain with inhalation  Â· Unexplained pain in your chest especially on one side  Â· Increased heart rate  Â· Confusion and/or dizziness  Â· Blue discoloration in skin and lips  SUBJECTIVE:   Â· Chief Complaint: Ean Hill is a 45year old female who receives treatment for low platelet levels. She c/o shoulder and neck pain with headache. Stress is high d/t commitments in life. Platelet levels are good today. She is noticing that she is feeling more energetic and clearer, ""I don't feel like I'm in a fog anymore. \""  No other complaints at this time. Â· Description of Onset: pain since August  Â· Prior Treatment: none    Patient Goal(s):   1. Relieve pain  2. Relieve stress  3. Increase red blood cell count    OBJECTIVE:   Physical Inspection: Observations/Measurements  â¢ Alert x3, pale, BMI over 30  Tongue: unable to assess d/t COVID masking regulations. Pulses: deep, weak    Traditional [place] Medicine (TCM) Diagnosis:     Â· LV Qi and blood stagnation, Blood deficiency    TODAY'S ACUPUNCTURE/TCM TREATMENT:     Acupuncture: Meridians/Points utilized  â¢ DU 20, YT, LI 4, GB 20, KD 7, SP 6  â¢   Acupuncture: # of needles used: 10 # of needles out: 10    Other Modalities Utilized: none      Patient Education:   Â· N/A    TCM Prognosis:       Â· Patient would benefit from skilled Acupuncture/TCM therapy to achieve stated goals  Â· Potential Resolution of chief complaint is good.        PLAN OF CARE:   Â· Frequency/Duration: 1x/week " 0815- pt arrived to unit via stretcher, report received from OmarRN in ED. Pt aaox4. No distress noted upon arrival. Vss. Dr. Jaleesa Montiel at bedside obtaining consent for line placement.   0850- trialysis line placed to R IJ successfully by Dr. Montiel, pt tolerated well.   1024- Dr. Chyna Mata, nephrologist at bedside obtaining consent for dialysis  1039- CRRT initiated at this time.  1203- Line alarming frequently for increased in venous flow.   1255- CRRT stopped, venous flow continues to increase. Only able to Rinse back half  the blood. Pt appears stabled   1300- Dialysis nurse came to bedside and able to power flush lines, recommended heparin infusion to maintain flow. Spoke with Dr. Mata to notify  1340- CRRT orders updated.   1505- CRRT restarted with new orders in place include Heparin infusion and NS (see orders for additional specific info)    1745- pt tolerating CRRT well. Pt eating dinner meal. Vitals remain stabled. No c/o discomfort voiced. Family at bedside          6-8 weeks  Â· The plan of care and goals were established with the patient who concurs. Acupuncture/TCM Daily Billing:    Acupuncture CPT Codes reflect billing claims  Timed Procedures:  Â· Acupuncture CPT Codes  Untimed Procedures:  Â· Initial Evaluation CPT Code  Total Time per CPT Codes: 30 minutes      Integrative Therapy Outcomes    Treatment Provided  Questions Answered By: Patient    Integrative Therapy Provided: Acupuncture  (Select the primary treatment provided)    Setting: Oncology Clinic  (Outpatient includes: all hospital and clinic-based outpatient)    Primary Complaint/Reason for Treatment: Pain    Secondary Complaint/Reason for Treatment[de-identified] Stress      Pre-Treatment Assessment  Pain: 8  (Please rate your current pain on a scale of 0-10 (0 is no pain, 10 is the worst pain))    Neuropathy: 0  (Please rate your current neuropathy (numbness/tingling) on a scale of 0-10 (0 is no numbness/tingling, 10 is worst numbness/tingling))    Nausea: 0  (Please rate your current nausea on a scale 0-10 (0 is no nausea, 10 is worst nausea))    Stress: 10  (Please rate your current stress on a scale of 0-10 (0 is no stress, 10 is the worst stress))      Post-Treatment Assessment  Pain: 6  (What is your pain level after your treatment? Please rate it on a scale of 0-10 (0 is no pain, 10 is the worst pain))    Neuropathy: 0  (What is your numbness or tingling level after your treatment? Please rate it on a scale of 0-10 (0 is no numbness/tingling, 10 is the worst numbness/tingling))    Nausea: 0  (What is your nausea level after your treatment? Please rate it on a scale of 0-10 (0 is no nausea, 10 is the worst nausea))    Stress: 6  What is your stress level after your treatment?  Please rate it on a scale of 0-10 (0 is no stress, 10 is the worst stress))

## 2024-04-30 NOTE — TELEPHONE ENCOUNTER
Unita with [facility] was called back and reported speaking with Dr. Simental this morning regarding dialysis locations.

## 2024-04-30 NOTE — PLAN OF CARE
The sw was notified by Dr. Chyna Mata this pt will need to be set up with an out pt hd chair. The sw met with the pt to discuss his preferences for his location for hd. The pt lives in [place] and wants to go to the clinic on the corner by his house. The clinic the pt's interested in is [facility] [phone](Dr. Davalos). The sw spoke to Jessa at the clinic(intake)and she's not familiar with the pt's [facility] Medicare Plan. She was able to gather the insurance info from [employer]. She will reach out to the pt's insurance,her verifier and call this sw back with a determination.     3:09pm The sw received a call from Jessa from [facility] stating she verified the pt's insurance and they're not in network with the pt's insurance. She states the pt will have to pay $1400 a month to see the dr 4 times a month and to receive hd in their clinic. She states the pt will have [facility] at an [facility] clinic because it's an [facility] based plan. The pt's premiums are 0 copay as long as he goes through [facility] for all his medical care.     4:08pm The sw faxed the pt's info to [facility] at [phone]. The sw [facility] who informed the pt he will have a $3700 out of pocket cost for dialysis after that he will have a 0 copay for the rest of the year. The pt has an [facility] plan and he may have to go to [facility] in [place],[facility] or on the [place]. The pt and his wife are agreeable to go to [facility] in [place] if he can't go to [facility] or [facility] in [place]. The pt's wife states she will take off b/c she works part-time to go with the pt.         04/30/24 1322   Post-Acute Status   Post-Acute Authorization Dialysis

## 2024-04-30 NOTE — ASSESSMENT & PLAN NOTE
Chronic, controlled. Latest blood pressure and vitals reviewed-     Temp:  [97.8 °F (36.6 °C)]   Pulse:  [60-81]   Resp:  [18-29]   BP: ()/(51-66)   SpO2:  [85 %-93 %] .   Home meds for hypertension were reviewed and noted below.   Hypertension Medications               furosemide (LASIX) 80 MG tablet Take 1 tablet (80 mg total) by mouth 2 (two) times daily. If increased 3 pounds in a day or 5 pounds in a week, take an extra dose of lasix 80mg until those pounds are lost.    metoprolol succinate (TOPROL-XL) 25 MG 24 hr tablet TAKE 1 TABLET BY MOUTH EVERY DAY    nitroGLYCERIN (NITROSTAT) 0.4 MG SL tablet Place 1 tablet (0.4 mg total) under the tongue every 5 (five) minutes as needed for Chest pain.    sacubitriL-valsartan (ENTRESTO) 24-26 mg per tablet Take 1 tablet by mouth 2 (two) times daily.            While in the hospital, will manage blood pressure as follows; Adjust home antihypertensive regimen as follows- hold home bp medications in the setting of hypotension requiring CRRT    Will utilize p.r.n. blood pressure medication only if patient's blood pressure greater than 180/110 and he develops symptoms such as worsening chest pain or shortness of breath.

## 2024-04-30 NOTE — ASSESSMENT & PLAN NOTE
Creatine stable for now. BMP reviewed- noted Estimated Creatinine Clearance: 11.1 mL/min (A) (based on SCr of 6.7 mg/dL (H)). according to latest data. Based on current GFR, CKD stage is end stage.  Monitor UOP and serial BMP and adjust therapy as needed. Renally dose meds. Avoid nephrotoxic medications and procedures.    --see by Dr. Mata in ED, gen surg consulted for tunneled catheter placement in AM 4/30/24  --admit to ICU for CRRT once catheter placed  --

## 2024-04-30 NOTE — CONSULTS
[facility] - Intensive Care  Cardiology  Consult Note    Patient Name: Ar Sharma  MRN: [id number]  Admission Date: 4/29/2024  Hospital Length of Stay: 1 days  Code Status: Full Code   Attending Provider: Mamta Hassan MD   Consulting Provider: Luis Alfredo Leiva NP  Primary Care Physician: Jc Elliott MD  Principal Problem:Chronic kidney disease (CKD), stage V    Patient information was obtained from patient, past medical records, and ER records.     Inpatient consult to [facility]  Consult performed by: Luis Alfredo Leiva NP  Consult ordered by: Chyna Mata MD        Subjective:     Chief Complaint:  SOB     HPI:   Ar Sharma is a 63 year old male with ESRD, HFrEF, HTN, NICM, COPD, VT s/p ICD placement, and HLD. Patient presented to the ED with SOB x 2 months and was directed to the ED by his nephrologist. He has been trying to avoid HD with diet modification at home which has been unsuccessful. Patient reports dizziness, nausea. Patient denies CP, diaphoresis, dizziness, palpitations. He reports compliance with medications including Amio and Mexiletine.  , Cr 6.7, Phos 5, Tbili 2.4, BNP 3204, trop 0.040, vit D 24, .6, and CXR: cardiomegaly w/ pulmonary vascular congestion consistent w/ pulmonary edema. Patient was given 240 mg IV Lasix per nephrology with adequate response, HD line placed and CRRT initiated. BP marginal, home BB and Entresto on hold. Midodrine initiated.     Past Medical History:   Diagnosis Date    Cardiomyopathy     CKD (chronic kidney disease) stage 4, GFR 15-29 ml/min     Congestive heart failure (CHF) 2015    COPD (chronic obstructive pulmonary disease)     Coronary artery disease     Edema     Essential (primary) hypertension 05/18/2022    Formatting of this note might be different from the original. Converted from [employer]: Description - ESSENTIAL HYPERTENSION, BENIGN    Heart attack     HLD (hyperlipidemia)     Hypertension     Hyperuricemia      Hypocalcemia     Renal cyst, left     Secondary hyperparathyroidism     Thyroid disease     V tach     Vitamin D deficiency        Past Surgical History:   Procedure Laterality Date    ablations  03/05/2018    albations  02/01/2018    COLONOSCOPY N/A 12/07/2018    Procedure: COLONOSCOPY/suprep;  Surgeon: Ananya Morillo MD;  Location: [facility] ENDO;  Service: Endoscopy;  Laterality: N/A;    defibulater N/A 2016    ESOPHAGOGASTRODUODENOSCOPY N/A 12/07/2018    Procedure: EGD (ESOPHAGOGASTRODUODENOSCOPY);  Surgeon: Ananya Morillo MD;  Location: [facility] ENDO;  Service: Endoscopy;  Laterality: N/A;    JOINT REPLACEMENT Bilateral 10/2016    knees, bilat    LEFT HEART CATHETERIZATION N/A 12/16/2020    Procedure: Left heart cath;  Surgeon: Shahram Stewart MD;  Location: [facility] CATH LAB/EP;  Service: Cardiology;  Laterality: N/A;    LEFT HEART CATHETERIZATION Left 9/27/2022    Procedure: Left heart cath;  Surgeon: Juan Roque MD;  Location: [facility] CATH LAB/EP;  Service: Cardiology;  Laterality: Left;    REPLACEMENT OF IMPLANTABLE CARDIOVERTER-DEFIBRILLATOR (ICD) GENERATOR Left 1/24/2023    Procedure: REPLACEMENT, ICD GENERATOR;  Surgeon: River Tenorio MD;  Location: [facility] EP LAB;  Service: Cardiology;  Laterality: Left;  ANTHONY, CRTD gen chg, MDT, MAC, DM, 3prep       Review of patient's allergies indicates:   Allergen Reactions    Lokelma [sodium zirconium cyclosilicate] Other (See Comments)     Fluid retention, weight gain, CHF excerebration, severe constipation    Atorvastatin Other (See Comments)     Joint pain    Jardiance [empagliflozin] Other (See Comments)     Chest pains, significant weight loss, lower blood pressure       Current Facility-Administered Medications   Medication Dose Route Frequency Provider Last Rate Last Admin    0.9%  NaCl infusion (CRRT USE ONLY)   Intravenous Continuous Chyna Mata  mL/hr at 04/30/24 0951 Bolus from Bag at 04/30/24 0951    acetaminophen tablet 650 mg  650 mg Oral  Q4H PRN Jaida Álvarez, NP        allopurinoL tablet 100 mg  100 mg Oral Daily Jaida Álvarez, NP   100 mg at 04/30/24 0932    amiodarone tablet 400 mg  400 mg Oral Daily Jaida Álvarez, NP   400 mg at 04/30/24 0932    aspirin EC tablet 81 mg  81 mg Oral Daily Jaida Álvarez, NP   81 mg at 04/30/24 0932    ergocalciferol capsule 50,000 Units  50,000 Units Oral Q7 Days Jaida Álvarez, NP   50,000 Units at 04/30/24 0950    furosemide injection 80 mg  80 mg Intravenous Q12H Jaida Álvarez, NP   80 mg at 04/30/24 0932    heparin (porcine) injection 5,000 Units  5,000 Units Subcutaneous Q8H Jaida Álvarez, NP        HYDROcodone-acetaminophen 5-325 mg per tablet 1 tablet  1 tablet Oral Q4H PRN Jaida Álvarez, NP        levothyroxine tablet 150 mcg  150 mcg Oral Daily Jaida Álvarez NP   150 mcg at 04/30/24 0932    magnesium sulfate 2g in water 50mL IVPB (premix)  2 g Intravenous PRN Chyna Mata MD        mexiletine capsule 150 mg  150 mg Oral BID WM Luis Alfredo Leiva, NP   150 mg at 04/30/24 0941    ondansetron injection 4 mg  4 mg Intravenous Q8H PRN Jaida Álvarez NP        pravastatin tablet 40 mg  40 mg Oral Daily Jaida Álvarez NP   40 mg at 04/30/24 0932    sodium chloride 0.9% flush 10 mL  10 mL Intravenous PRN Jaida Álvarez NP        sodium chloride 0.9% flush 10 mL  10 mL Intravenous PRN Jaida Álvarez, NP        sodium phosphate 20.01 mmol in dextrose 5 % (D5W) 250 mL IVPB  20.01 mmol Intravenous PRN Chyna Mata MD        sodium phosphate 30 mmol in dextrose 5 % (D5W) 250 mL IVPB  30 mmol Intravenous PRN Chyna Mata MD        sodium phosphate 39.99 mmol in dextrose 5 % (D5W) 250 mL IVPB  39.99 mmol Intravenous PRN Chyna Mata MD         Family History       Problem Relation (Age of Onset)    Alcohol abuse Father    Arthritis Sister    Breast cancer Mother    Cancer Mother    Hypertension Mother    Kidney disease Father    No Known Problems Brother, Daughter, Son     Stroke Mother          Tobacco Use    Smoking status: Former     Current packs/day: 0.00     Average packs/day: 1 pack/day for 33.2 years (33.2 ttl pk-yrs)     Types: Cigarettes     Start date: 1979     Quit date: 3/3/2012     Years since quittin.1     Passive exposure: Past    Smokeless tobacco: Never   Substance and Sexual Activity    Alcohol use: Yes     Comment: rare    Drug use: No    Sexual activity: Yes     Partners: Female     Review of Systems   Constitutional: Negative for diaphoresis.   HENT: Negative.     Eyes: Negative.    Cardiovascular:  Positive for dyspnea on exertion. Negative for chest pain, near-syncope, orthopnea, palpitations, paroxysmal nocturnal dyspnea and syncope.   Respiratory:  Positive for shortness of breath.    Endocrine: Negative.    Hematologic/Lymphatic: Negative.    Skin: Negative.    Musculoskeletal: Negative.    Gastrointestinal:  Positive for nausea and vomiting.   Genitourinary: Negative.    Neurological: Negative.    Psychiatric/Behavioral: Negative.     Allergic/Immunologic: Negative.      Objective:     Vital Signs (Most Recent):  Temp: 97.7 °F (36.5 °C) (24 0815)  Pulse: 64 (24 1100)  Resp: 19 (24 1100)  BP: 92/63 (24 1100)  SpO2: (!) 91 % (24 1100) Vital Signs (24h Range):  Temp:  [97.7 °F (36.5 °C)-97.8 °F (36.6 °C)] 97.7 °F (36.5 °C)  Pulse:  [59-81] 64  Resp:  [16-29] 19  SpO2:  [85 %-100 %] 91 %  BP: ()/(51-66) 92/63     Weight: 77.6 kg (171 lb)  Body mass index is 25.25 kg/m².    SpO2: (!) 91 %         Intake/Output Summary (Last 24 hours) at 2024 1146  Last data filed at 2024 1100  Gross per 24 hour   Intake --   Output 2855.5 ml   Net -2855.5 ml       Lines/Drains/Airways       Central Venous Catheter Line  Duration             Trialysis (Dialysis) Catheter 24 0852 right internal jugular <1 day              Peripheral Intravenous Line  Duration                  Peripheral IV - Single Lumen 24 1707 20  "G Posterior;Right Forearm <1 day         Peripheral IV - Single Lumen 04/29/24 2035 20 G Anterior;Left Forearm <1 day                     Physical Exam  Constitutional:       General: He is not in acute distress.     Appearance: He is not diaphoretic.   HENT:      Head: Atraumatic.   Eyes:      General:         Right eye: No discharge.         Left eye: No discharge.   Cardiovascular:      Rate and Rhythm: Normal rate and regular rhythm.   Pulmonary:      Effort: Pulmonary effort is normal.      Breath sounds: No rales.   Abdominal:      General: Bowel sounds are normal.      Palpations: Abdomen is soft.   Skin:     General: Skin is warm and dry.   Neurological:      Mental Status: He is alert and oriented to person, place, and time.   Psychiatric:         Mood and Affect: Mood normal.         Behavior: Behavior normal.         Thought Content: Thought content normal.         Judgment: Judgment normal.        Significant Labs: BMP:   Recent Labs   Lab 04/29/24  0738 04/29/24  1707 04/30/24  0505   GLU 96 105 84   * 133* 135*   K 4.3 4.3 4.2   CL 94* 97 100   CO2 26 21* 22*   * 112* 110*   CREATININE 6.19* 6.7* 6.1*   CALCIUM 9.0 9.0 8.7   MG  --  2.3 2.1   , CMP   Recent Labs   Lab 04/29/24  0738 04/29/24  1707 04/30/24  0505   * 133* 135*   K 4.3 4.3 4.2   CL 94* 97 100   CO2 26 21* 22*   GLU 96 105 84   * 112* 110*   CREATININE 6.19* 6.7* 6.1*   CALCIUM 9.0 9.0 8.7   PROT  --  7.2  --    ALBUMIN 4.1 3.6  --    BILITOT  --  2.4*  --    ALKPHOS  --  160*  --    AST  --  42*  --    ALT  --  39  --    ANIONGAP 15 15 13   , CBC   Recent Labs   Lab 04/29/24  0738 04/29/24  1707 04/30/24  0505   WBC 7.12 7.80 7.66   HGB 15.8 15.7 14.5   HCT 45.1 44.8 40.5   * 672* 554*   , INR No results for input(s): "INR", "PROTIME" in the last 48 hours., Lipid Panel   Recent Labs   Lab 04/29/24 2039   CHOL 127   HDL 32*   LDLCALC 81.8   TRIG 66   CHOLHDL 25.2   , Troponin   Recent Labs   Lab " 04/29/24  1707   TROPONINI 0.040*   , and All pertinent lab results from the last 24 hours have been reviewed.    Significant Imaging: Echocardiogram: Transthoracic echo (TTE) complete (Cupid Only):   Results for orders placed or performed during the hospital encounter of 04/29/24   Echo Saline Bubble? No; Ultrasound enhancing contrast? No   Result Value Ref Range    BSA 1.94 m2    LVOT stroke volume 39.81 cm3    LVIDd 5.03 3.5 - 6.0 cm    LV Systolic Volume 67.63 mL    LV Systolic Volume Index 35.0 mL/m2    LVIDs 3.94 2.1 - 4.0 cm    LV Diastolic Volume 119.67 mL    LV Diastolic Volume Index 62.01 mL/m2    IVS 0.83 0.6 - 1.1 cm    LVOT diameter 1.91 cm    LVOT area 2.9 cm2    FS 22 (A) 28 - 44 %    Left Ventricle Relative Wall Thickness 0.39 cm    Posterior Wall 0.98 0.6 - 1.1 cm    LV mass 160.97 g    LV Mass Index 83 g/m2    MV Peak E Marvin 0.78 m/s    TDI LATERAL 0.06 m/s    TDI SEPTAL 0.05 m/s    E/E' ratio 14.18 m/s    MV Peak A Marvin 0.27 m/s    TR Max Marvin 3.49 m/s    E/A ratio 2.89     E wave deceleration time 210.39 msec    LV SEPTAL E/E' RATIO 15.60 m/s    LV LATERAL E/E' RATIO 13.00 m/s    PV Peak S Marvin 0.27 m/s    PV Peak D Marvin 0.38 m/s    Pulm vein S/D ratio 0.71     LVOT peak marvin 0.74 m/s    Left Ventricular Outflow Tract Mean Velocity 0.58 cm/s    Left Ventricular Outflow Tract Mean Gradient 1.44 mmHg    RVDD 6.60 cm    RV S' 5.10 cm/s    TAPSE 1.59 cm    RV/LV Ratio 1.31 cm    LA size 5.04 cm    Left Atrium Minor Axis 7.08 cm    Left Atrium Major Axis 7.93 cm    LA volume (mod) 97.12 cm3    LA Volume Index (Mod) 50.3 mL/m2    RA Major Axis 7.79 cm    RA Width 4.97 cm    AV mean gradient 5 mmHg    AV peak gradient 8 mmHg    Ao peak marvin 1.43 m/s    Ao VTI 26.70 cm    LVOT peak VTI 13.90 cm    AV valve area 1.49 cm²    AV Velocity Ratio 0.52     AV index (prosthetic) 0.52     MAGALI by Velocity Ratio 1.48 cm²    Mr max marvin 3.64 m/s    MV peak gradient 3 mmHg    MV stenosis pressure 1/2 time 61.01 ms    MV  valve area p 1/2 method 3.61 cm2    MV valve area by continuity eq 1.62 cm2    MV VTI 24.5 cm    Triscuspid Valve Regurgitation Peak Gradient 49 mmHg    PV PEAK VELOCITY 0.64 m/s    PV peak gradient 2 mmHg    Sinus 3.16 cm    STJ 2.37 cm    Ascending aorta 2.38 cm    IVC diameter 3.51 cm    Mean e' 0.06 m/s    ZLVIDS 1.25     ZLVIDD -0.83     LA Volume Index 71.4 mL/m2    LA volume 137.81 cm3    RV-benítez mid d 5.4 cm    LA WIDTH 4.3 cm     Assessment and Plan:     * Chronic kidney disease (CKD), stage V  Nephrology on board  CRRT initiated  Uremic on admission  Continues to make urine and had good UO with Lasix 240 mg IV ON  Dc'd Entresto and BB given marginal BP    Hypotension  SBP 80s  Holding BB and Entresto  CRRT initiated- will resume as tolerated     HFrEF (heart failure with reduced ejection fraction)  TTE 2022   The left ventricle is normal in size with eccentric hypertrophy and mildly decreased systolic function.  The estimated ejection fraction is 40-45%.  There is abnormal septal wall motion consistent with right ventricular pacemaker.  Grade II left ventricular diastolic dysfunction.  With low normal right ventricular systolic function.  Moderate right atrial enlargement.  Mild tricuspid regurgitation.  There is pulmonary hypertension.  The estimated PA systolic pressure is 67 mmHg.  Elevated central venous pressure (15 mmHg).    Repeat TTE pending  Holding BB and Entresto 2/2 hypotension- will resume if BP can tolerate  Diuresis per Nephrology given renal function         Cardiac resynchronization therapy defibrillator (CRT-D) in place  Normal device function on tele review     Mixed hyperlipidemia    Continue statin     V-tach  Continue Amio and Mexiletine   Paced on tele without VT         VTE Risk Mitigation (From admission, onward)           Ordered     heparin (porcine) injection 5,000 Units  Every 8 hours         04/29/24 1945     IP VTE HIGH RISK PATIENT  Once         04/29/24 1945     Place  sequential compression device  Until discontinued         04/29/24 1945                    Thank you for your consult. I will follow-up with patient. Please contact us if you have any additional questions.    Luis Alfredo Leiva NP  Cardiology   [facility] - Intensive Care

## 2024-04-30 NOTE — PHARMACY MED REC
"Admission Medication History     The home medication history was taken by Lyric Espinoza CPhT.    Medication history obtained from, Patient Verified    You may go to "Admission" then "Reconcile Home Medications" tabs to review and/or act upon these items.     The home medication list has been updated by the Pharmacy department.   Please read ALL comments highlighted in yellow.   Please address this information as you see fit.    Feel free to contact us if you have any questions or require assistance.      The medications listed below were removed from the home medication list.  Please reorder if appropriate:  Patient reports no longer taking the following medication(s):  Probiotic with Prebiotic         Lyric Espinoza CPhT.  Ext [phone]               .          "

## 2024-04-30 NOTE — ASSESSMENT & PLAN NOTE
--hx of AICD placement, currently 100% paced   --continue home antiarrhythmic medications  --cardiac monitoring

## 2024-04-30 NOTE — PROGRESS NOTES
This is an attestation of a separate note written by the ICU resident today. As the teaching physician, I was present for and have confirmed the key portions of the service documented in the resident's note from today. In addition to the resident's note, I add the following:     Mr. Sharma is a 62 yo M with PMHx of HFrEF (LVEF 40-45%, grade II DD), VT s/p ablation and AICD, CKD5, hypothyroidism who presented to [facility] on 4/29 with progressively worsening dyspnea over the past month in the setting of decreasing UOP despite lasix administration. Initial labs notable for Cr 6.19, , phos 5.1, BNP 3204. Patient was admitted to the MICU for initiation of dialysis in the setting of low blood pressures (SBP 80's-90's).    Plan:  Acute hypoxemic respiratory failure: Requiring 4-5L to maintain O2 sats 88-92%. CXR with evidence of pulmonary edema. LE with evidence of pitting edema.  CKD5/ESRD: Cardiorenal in etiology. UOP has been decreasing over the past month. Continuing lasix 80 mg IV BID. Placing dialysis catheter today for CRRT. If blood pressures are unable to tolerate dialysis, will likely need initiation of midodrine.  Hypotension: Appears chronic per outpatient notes. Likely due to chronic HF and ESRD. Low threshold to obtain cultures if clinical change. Afebrile with normal WBC count at this time.  VT: On home amiodarone and mexiletine.  Hypothyroidism: On home levothyroxine.  Nutrition: Will start renal diet today.  DVT ppx: Heparin 5000U q8h    45 minutes of critical care time was spent  in the critical care management of the patient. This included management of organ failures related to critical illness, titration of continuous infusions, management of mechanical ventilation, review of pertinent labs and imaging studies, discussion of the patient with consulting services, and discussion of the patients condition with the patient and family.    Vero Yadav MD  [facility] PCCM Attending  [phone]

## 2024-04-30 NOTE — CONSULTS
NEPHROLOGY CONSULT NOTE    HPI & INTERVAL HISTORY:    Past Medical History:   Diagnosis Date    Cardiomyopathy     CKD (chronic kidney disease) stage 4, GFR 15-29 ml/min     Congestive heart failure (CHF) 2015    Edema     Essential (primary) hypertension 05/18/2022    Formatting of this note might be different from the original. Converted from [employer]: Description - ESSENTIAL HYPERTENSION, BENIGN    Heart attack     HLD (hyperlipidemia)     Hypertension     Hyperuricemia     Hypocalcemia     Renal cyst, left     Secondary hyperparathyroidism     Thyroid disease     V tach     Vitamin D deficiency       Past Surgical History:   Procedure Laterality Date    ablations  03/05/2018    albations  02/01/2018    COLONOSCOPY N/A 12/07/2018    Procedure: COLONOSCOPY/suprep;  Surgeon: Ananya Morillo MD;  Location: [facility] ENDO;  Service: Endoscopy;  Laterality: N/A;    defibulater N/A 2016    ESOPHAGOGASTRODUODENOSCOPY N/A 12/07/2018    Procedure: EGD (ESOPHAGOGASTRODUODENOSCOPY);  Surgeon: Ananya Morillo MD;  Location: [facility] ENDO;  Service: Endoscopy;  Laterality: N/A;    JOINT REPLACEMENT Bilateral 10/2016    knees, bilat    LEFT HEART CATHETERIZATION N/A 12/16/2020    Procedure: Left heart cath;  Surgeon: Shahram Stewart MD;  Location: [facility] CATH LAB/EP;  Service: Cardiology;  Laterality: N/A;    LEFT HEART CATHETERIZATION Left 9/27/2022    Procedure: Left heart cath;  Surgeon: Juan Roque MD;  Location: [facility] CATH LAB/EP;  Service: Cardiology;  Laterality: Left;    REPLACEMENT OF IMPLANTABLE CARDIOVERTER-DEFIBRILLATOR (ICD) GENERATOR Left 1/24/2023    Procedure: REPLACEMENT, ICD GENERATOR;  Surgeon: River Tenorio MD;  Location: [facility] EP LAB;  Service: Cardiology;  Laterality: Left;  ANTHONY, CRTD gen chg, MDT, MAC, DM, 3prep      Review of patient's allergies indicates:   Allergen Reactions    Lokelma [sodium zirconium cyclosilicate] Other (See Comments)     Fluid retention, weight gain, CHF excerebration, severe  constipation    Atorvastatin Other (See Comments)     Joint pain    Jardiance [empagliflozin] Other (See Comments)     Chest pains, significant weight loss, lower blood pressure      (Not in a hospital admission)      Social History     Socioeconomic History    Marital status:    Tobacco Use    Smoking status: Former     Current packs/day: 0.00     Average packs/day: 1 pack/day for 33.2 years (33.2 ttl pk-yrs)     Types: Cigarettes     Start date: 1979     Quit date: 3/3/2012     Years since quittin.1     Passive exposure: Past    Smokeless tobacco: Never   Substance and Sexual Activity    Alcohol use: Yes     Comment: rare    Drug use: No    Sexual activity: Yes     Partners: Female     Social Determinants of Health     Financial Resource Strain: Low Risk  (3/3/2023)    Overall Financial Resource Strain (CARDIA)     Difficulty of Paying Living Expenses: Not very hard   Food Insecurity: No Food Insecurity (3/3/2023)    Hunger Vital Sign     Worried About Running Out of Food in the Last Year: Never true     Ran Out of Food in the Last Year: Never true   Transportation Needs: No Transportation Needs (3/3/2023)    PRAPARE - Transportation     Lack of Transportation (Medical): No     Lack of Transportation (Non-Medical): No   Physical Activity: Insufficiently Active (3/3/2023)    Exercise Vital Sign     Days of Exercise per Week: 2 days     Minutes of Exercise per Session: 40 min   Stress: No Stress Concern Present (3/3/2023)    [employer] - Occupational Stress Questionnaire     Feeling of Stress : Only a little   Recent Concern: Stress - Stress Concern Present (2023)    [place] - Occupational Stress Questionnaire     Feeling of Stress : To some extent   Social Connections: Moderately Isolated (3/3/2023)    Social Connection and Isolation Panel [NHANES]     Frequency of Communication with Friends and Family: Once a week     Frequency of  Social Gatherings with Friends and Family: Once a week     Attends Christianity Services: More than 4 times per year     Active Member of Clubs or Organizations: No     Attends Club or Organization Meetings: Never     Marital Status:    Housing Stability: Low Risk  (3/3/2023)    Housing Stability Vital Sign     Unable to Pay for Housing in the Last Year: No     Number of Places Lived in the Last Year: 1     Unstable Housing in the Last Year: No        MEDS  Current Facility-Administered Medications   Medication Dose Route Frequency    [START ON 4/30/2024] allopurinoL  100 mg Oral Daily    [START ON 4/30/2024] amiodarone  400 mg Oral Daily    [START ON 4/30/2024] aspirin  81 mg Oral Daily    [START ON 4/30/2024] ergocalciferol  50,000 Units Oral Q7 Days    furosemide (LASIX) injection  40 mg Intravenous ED 1 Time    [START ON 4/30/2024] levothyroxine  150 mcg Oral Daily    [START ON 4/30/2024] metoprolol succinate  25 mg Oral Daily    mexiletine  150 mg Oral BID WM    [START ON 4/30/2024] pravastatin  40 mg Oral Daily    sacubitriL-valsartan  1 tablet Oral BID             CONTINOUS INFUSIONS:    No intake or output data in the 24 hours ending 04/29/24 1914     HEMODYNAMICS:    Temp:  [97.8 °F (36.6 °C)] 97.8 °F (36.6 °C)  Pulse:  [60-81] 60  Resp:  [18-29] 19  SpO2:  [85 %-93 %] 92 %  BP: ()/(51-66) 87/57   General :   SOB  Cough  Nausea  Fatigue  Weakness  Decreased intake   Weight loss 20 lbs /month  No diarrhea  Cardiology : pulse 60  Pulmonary :RR 23  Pulse oximeter 92 % O2   Abdomen soft  Extremities : edema  Skin: dry  Tremor  LABS   Lab Results   Component Value Date    WBC 7.80 04/29/2024    HGB 15.7 04/29/2024    HCT 44.8 04/29/2024    MCV 75 (L) 04/29/2024     (H) 04/29/2024        Recent Labs   Lab 04/29/24  1707      CALCIUM 9.0   ALBUMIN 3.6   PROT 7.2   *   K 4.3   CO2 21*   CL 97   *   CREATININE 6.7*   ALKPHOS 160*   ALT 39   AST 42*   BILITOT 2.4*      Lab Results  "  Component Value Date    .6 (H) 04/29/2024    CALCIUM 9.0 04/29/2024    PHOS 5.0 (H) 04/29/2024      Lab Results   Component Value Date    IRON 109 10/07/2022    TIBC 394 10/07/2022    FERRITIN 676 (H) 10/07/2022        ABG  No results for input(s): "PH", "PO2", "PCO2", "HCO3", "BE" in the last 168 hours.      IMAGING:  CXR    ASSESSMENT / PLAN  CKD 5  CHF  Echo 2022  The left ventricle is normal in size with eccentric hypertrophy and mildly decreased systolic function.  The estimated ejection fraction is 40-45%.  There is abnormal septal wall motion consistent with right ventricular pacemaker.  Grade II left ventricular diastolic dysfunction.  With low normal right ventricular systolic function.  Moderate right atrial enlargement.  Mild tricuspid regurgitation.  There is pulmonary hypertension.  The estimated PA systolic pressure is 67 mmHg.  Elevated central venous pressure (15 mmHg).   CXR  Cardiomegaly with pulmonary vascular congestion, suggestive of pulmonary edema secondary to CHF.   Hypotensive  Blood pressure 87/57  Creatinine 6.7  GFR 9 cc/min  Uremia  Azotemia    Hyponatremia 133  Metabolic acidosis  Metabolic bone disease  Hyperphosphatemia  Hb 15.7.  Albumin 3.6  Weight daily.  I and O.   Avoid nephrotoxic agents, hypotension.   Discussed with patient and family about dialysis.  Explained the risks of hypotension, arrhythmia during dialysis.  Needs keara placement to initiate dialysis.  Discussed with ICU team.  "

## 2024-04-30 NOTE — H&P
[facility] Medicine  History & Physical    Patient Name: Ar Sharma  MRN: [id number]  Patient Class: IP- Inpatient  Admission Date: 4/29/2024  Attending Physician: Eloise Bello   Primary Care Provider: Jc Elliott MD         Patient information was obtained from patient, spouse/SO, relative(s), past medical records, and ER records.     Subjective:     Principal Problem:Chronic kidney disease (CKD), stage V    Chief Complaint:   Chief Complaint   Patient presents with    Abnormal Lab     Pt had blood work completed today and  BUN- 106/Creatine- 6.19     Shortness of Breath     SOB reports worse over the past several day, pt visibly SOB in triage, Room sats= 85%          HPI: Ar Sharma is a 63M with PMHx of ESRD, HFrEF, HTN, NICM, COPD, VT s/p ICD placement, and HLD who presented to [facility] ED with CC progressively worse SOB over the last two months. He was recently diagnosed with ESRD and has been trying diet modification at home. He endorses SINGH, dizziness, frequent intermittent nausea and one episode of vomiting during this time. He denies CP, syncope, dysuria, fever or chills.     ED workup significant for: , Cr 6.7, Phos 5, Tbili 2.4, BNP 3204, trop 0.040, vit D 24, .6, and CXR: cardiomegaly w/ pulmonary vascular congestion consistent w/ pulmonary edema. All other components of CBC and chemistries were unremarkable.       Patient was seen by Dr. Mata in ED. Emergent HD not indicated.     Past Medical History:   Diagnosis Date    Cardiomyopathy     CKD (chronic kidney disease) stage 4, GFR 15-29 ml/min     Congestive heart failure (CHF) 2015    COPD (chronic obstructive pulmonary disease)     Coronary artery disease     Edema     Essential (primary) hypertension 05/18/2022    Formatting of this note might be different from the original. Converted from Centricity: Description - ESSENTIAL HYPERTENSION, BENIGN    Heart attack     HLD (hyperlipidemia)      Hypertension     Hyperuricemia     Hypocalcemia     Renal cyst, left     Secondary hyperparathyroidism     Thyroid disease     V tach     Vitamin D deficiency        Past Surgical History:   Procedure Laterality Date    ablations  03/05/2018    albations  02/01/2018    COLONOSCOPY N/A 12/07/2018    Procedure: COLONOSCOPY/suprep;  Surgeon: Ananya Morillo MD;  Location: [facility] ENDO;  Service: Endoscopy;  Laterality: N/A;    defibulater N/A 2016    ESOPHAGOGASTRODUODENOSCOPY N/A 12/07/2018    Procedure: EGD (ESOPHAGOGASTRODUODENOSCOPY);  Surgeon: Ananya Morillo MD;  Location: [facility] ENDO;  Service: Endoscopy;  Laterality: N/A;    JOINT REPLACEMENT Bilateral 10/2016    knees, bilat    LEFT HEART CATHETERIZATION N/A 12/16/2020    Procedure: Left heart cath;  Surgeon: Shahram Stewart MD;  Location: [facility] CATH LAB/EP;  Service: Cardiology;  Laterality: N/A;    LEFT HEART CATHETERIZATION Left 9/27/2022    Procedure: Left heart cath;  Surgeon: Juan Roque MD;  Location: [facility] CATH LAB/EP;  Service: Cardiology;  Laterality: Left;    REPLACEMENT OF IMPLANTABLE CARDIOVERTER-DEFIBRILLATOR (ICD) GENERATOR Left 1/24/2023    Procedure: REPLACEMENT, ICD GENERATOR;  Surgeon: River Tenorio MD;  Location: [facility] EP LAB;  Service: Cardiology;  Laterality: Left;  ANTHONY, CRTD gen chg, MDT, MAC, DM, 3prep       Review of patient's allergies indicates:   Allergen Reactions    Lokelma [sodium zirconium cyclosilicate] Other (See Comments)     Fluid retention, weight gain, CHF excerebration, severe constipation    Atorvastatin Other (See Comments)     Joint pain    Jardiance [empagliflozin] Other (See Comments)     Chest pains, significant weight loss, lower blood pressure       Current Facility-Administered Medications   Medication Dose Route Frequency Provider Last Rate Last Admin    acetaminophen tablet 650 mg  650 mg Oral Q4H PRN Jaida Álvarez NP        [START ON 4/30/2024] allopurinoL tablet 100 mg  100 mg Oral Daily Preeti  GISELL Sena        [START ON 4/30/2024] amiodarone tablet 400 mg  400 mg Oral Daily Jaida Álvarez NP        [START ON 4/30/2024] aspirin EC tablet 81 mg  81 mg Oral Daily Jaida Álvarez NP        [START ON 4/30/2024] ergocalciferol capsule 50,000 Units  50,000 Units Oral Q7 Days Jaida Álvarez NP        furosemide injection 40 mg  40 mg Intravenous Once Chyna Mata MD        [START ON 4/30/2024] furosemide injection 40 mg  40 mg Intravenous Once Chyna Mata MD        [START ON 4/30/2024] furosemide injection 80 mg  80 mg Intravenous Q12H Jaida Álvarez NP        heparin (porcine) injection 5,000 Units  5,000 Units Subcutaneous Q8H Jaida Álvarez NP        HYDROcodone-acetaminophen 5-325 mg per tablet 1 tablet  1 tablet Oral Q4H PRN Jaida Álvarez NP        [START ON 4/30/2024] levothyroxine tablet 150 mcg  150 mcg Oral Daily Jaida Álvarez NP        ondansetron injection 4 mg  4 mg Intravenous Q8H PRN Jaida Álvarez NP        [START ON 4/30/2024] pravastatin tablet 40 mg  40 mg Oral Daily Jaida Álvarez NP        sodium chloride 0.9% flush 10 mL  10 mL Intravenous PRN Jaida Álvarez NP        sodium chloride 0.9% flush 10 mL  10 mL Intravenous PRN Jaida Álvarez NP         Current Outpatient Medications   Medication Sig Dispense Refill    acetaminophen (TYLENOL) 500 MG tablet Take 500 mg by mouth every 6 (six) hours as needed for Pain.      allopurinoL (ZYLOPRIM) 100 MG tablet Take 1 tablet (100 mg total) by mouth once daily. 90 tablet 3    amiodarone (PACERONE) 400 MG tablet Take 1 tablet (400 mg total) by mouth once daily. 90 tablet 3    aspirin (ECOTRIN) 81 MG EC tablet Take 1 tablet (81 mg total) by mouth once daily. 60 tablet 2    ergocalciferol (ERGOCALCIFEROL) 50,000 unit Cap TAKE 1 CAPSULE BY MOUTH ONE TIME PER WEEK (Patient taking differently: Take 50,000 Units by mouth every Monday.) 12 capsule 3    furosemide (LASIX) 80 MG tablet Take 1 tablet (80 mg total) by mouth 2  (two) times daily. If increased 3 pounds in a day or 5 pounds in a week, take an extra dose of lasix 80mg until those pounds are lost. 120 tablet 3    levothyroxine (SYNTHROID) 150 MCG tablet Take 1 tablet (150 mcg total) by mouth once daily. 90 tablet 3    metoprolol succinate (TOPROL-XL) 25 MG 24 hr tablet TAKE 1 TABLET BY MOUTH EVERY DAY 90 tablet 3    mexiletine (MEXITIL) 150 MG Cap Take 1 capsule (150 mg total) by mouth 2 (two) times daily with meals. 180 capsule 3    nitroGLYCERIN (NITROSTAT) 0.4 MG SL tablet Place 1 tablet (0.4 mg total) under the tongue every 5 (five) minutes as needed for Chest pain. 20 tablet 1    pravastatin (PRAVACHOL) 40 MG tablet TAKE 1 TABLET BY MOUTH EVERY DAY (Patient taking differently: Take 40 mg by mouth every evening.) 90 tablet 3    sacubitriL-valsartan (ENTRESTO) 24-26 mg per tablet Take 1 tablet by mouth 2 (two) times daily. 180 tablet 3     Family History       Problem Relation (Age of Onset)    Alcohol abuse Father    Arthritis Sister    Breast cancer Mother    Cancer Mother    Hypertension Mother    Kidney disease Father    No Known Problems Brother, Daughter, Son    Stroke Mother          Tobacco Use    Smoking status: Former     Current packs/day: 0.00     Average packs/day: 1 pack/day for 33.2 years (33.2 ttl pk-yrs)     Types: Cigarettes     Start date: 1979     Quit date: 3/3/2012     Years since quittin.1     Passive exposure: Past    Smokeless tobacco: Never   Substance and Sexual Activity    Alcohol use: Yes     Comment: rare    Drug use: No    Sexual activity: Yes     Partners: Female     Review of Systems   Constitutional:  Positive for fatigue. Negative for chills and fever.   HENT: Negative.     Eyes: Negative.    Respiratory:  Positive for shortness of breath. Negative for cough, chest tightness and wheezing.    Cardiovascular:  Positive for palpitations. Negative for chest pain and leg swelling.   Gastrointestinal:  Positive for nausea and vomiting.  Negative for abdominal distention, abdominal pain, blood in stool and diarrhea.   Endocrine: Positive for cold intolerance.   Genitourinary:  Positive for decreased urine volume and difficulty urinating. Negative for flank pain.   Musculoskeletal:  Positive for arthralgias.   Skin: Negative.    Allergic/Immunologic: Negative.    Neurological:  Positive for dizziness, weakness and light-headedness.   Hematological: Negative.    Psychiatric/Behavioral: Negative.       Objective:     Vital Signs (Most Recent):  Temp: 97.8 °F (36.6 °C) (04/29/24 1623)  Pulse: 60 (04/29/24 2103)  Resp: (!) 23 (04/29/24 1903)  BP: 91/60 (04/29/24 2103)  SpO2: (!) 92 % (04/29/24 2103) Vital Signs (24h Range):  Temp:  [97.8 °F (36.6 °C)] 97.8 °F (36.6 °C)  Pulse:  [60-81] 60  Resp:  [18-29] 23  SpO2:  [85 %-93 %] 92 %  BP: ()/(51-66) 91/60     Weight: 77.6 kg (171 lb)  Body mass index is 25.25 kg/m².     Physical Exam  Vitals and nursing note reviewed.   Constitutional:       General: He is not in acute distress.     Appearance: He is ill-appearing.   HENT:      Head: Normocephalic and atraumatic.      Nose: Nose normal.      Mouth/Throat:      Mouth: Mucous membranes are moist.      Pharynx: Oropharynx is clear.   Eyes:      Extraocular Movements: Extraocular movements intact.      Conjunctiva/sclera: Conjunctivae normal.      Pupils: Pupils are equal, round, and reactive to light.   Cardiovascular:      Rate and Rhythm: Normal rate and regular rhythm.      Pulses: Normal pulses.      Heart sounds: Normal heart sounds. No murmur heard.     No friction rub. No gallop.   Pulmonary:      Effort: Pulmonary effort is normal. No respiratory distress.      Breath sounds: Normal breath sounds. No wheezing, rhonchi or rales.   Abdominal:      General: Bowel sounds are normal. There is no distension.      Palpations: Abdomen is soft.      Tenderness: There is no abdominal tenderness. There is no right CVA tenderness, left CVA tenderness or  guarding.   Genitourinary:     Comments: deferred  Musculoskeletal:         General: Normal range of motion.      Cervical back: Normal range of motion and neck supple.   Skin:     General: Skin is warm and dry.      Capillary Refill: Capillary refill takes less than 2 seconds.   Neurological:      General: No focal deficit present.      Mental Status: He is alert and oriented to person, place, and time. Mental status is at baseline.   Psychiatric:         Mood and Affect: Mood normal.         Behavior: Behavior normal.         Thought Content: Thought content normal.         Judgment: Judgment normal.              CRANIAL NERVES     CN III, IV, VI   Pupils are equal, round, and reactive to light.       Significant Labs: All pertinent labs within the past 24 hours have been reviewed.  Recent Lab Results  (Last 5 results in the past 24 hours)        04/29/24 2039 04/29/24  1927 04/29/24  1719   04/29/24  1707   04/29/24  0747        Albumin       3.6         ALP       160         ALT       39         Anion Gap       15         Appearance, UA   Clear       Clear       AST       42         Baso #       0.12         Basophil %       1.5         Bilirubin (UA)   Negative       Negative       BILIRUBIN TOTAL       2.4  Comment: For infants and newborns, interpretation of results should be based  on gestational age, weight and in agreement with clinical  observations.    Premature Infant recommended reference ranges:  Up to 24 hours.............<8.0 mg/dL  Up to 48 hours............<12.0 mg/dL  3-5 days..................<15.0 mg/dL  6-29 days.................<15.0 mg/dL           BNP       3,204  Comment: Values of less than 100 pg/ml are consistent with non-CHF populations.         BUN       112         Calcium       9.0         Chloride       97         Cholesterol Total 127  Comment: The National Cholesterol Education Program (NCEP) has set the  following guidelines (reference ranges) for  Cholesterol:  Optimal.....................<200 mg/dL  Borderline High.............200-239 mg/dL  High........................> or = 240 mg/dL                 CO2       21         Color, UA   Yellow       Yellow       Creatinine       6.7         Urine Creatinine         67.7       Differential Method       Automated         eGFR       9         Eos #       0.2         Eos %       2.6         Estimated Avg Glucose 114               Glucose       105         Glucose, UA   Negative       Negative       Gran # (ANC)       6.1         Gran %       78.0         HDL 32  Comment: The National Cholesterol Education Program (NCEP) has set the  following guidelines (reference values) for HDL Cholesterol:  Low...............<40 mg/dL  Optimal...........>60 mg/dL                 HDL/Cholesterol Ratio 25.2               Hematocrit       44.8         Hemoglobin       15.7         Hemoglobin A1C External 5.6  Comment: ADA Screening Guidelines:  5.7-6.4%  Consistent with prediabetes  >or=6.5%  Consistent with diabetes    High levels of fetal hemoglobin interfere with the HbA1C  assay. Heterozygous hemoglobin variants (HbS, HgC, etc)do  not significantly interfere with this assay.   However, presence of multiple variants may affect accuracy.                 Immature Grans (Abs)       0.04  Comment: Mild elevation in immature granulocytes is non specific and   can be seen in a variety of conditions including stress response,   acute inflammation, trauma and pregnancy. Correlation with other   laboratory and clinical findings is essential.           Immature Granulocytes       0.5         Ketones, UA   Negative       Negative       LDL Cholesterol 81.8  Comment: The National Cholesterol Education Program (NCEP) has set the  following guidelines (reference values) for LDL Cholesterol:  Optimal.......................<130 mg/dL  Borderline High...............130-159 mg/dL  High..........................160-189 mg/dL  Very  High.....................>190 mg/dL                 Leukocyte Esterase, UA   Negative       Negative       Lymph #       0.6         Lymph %       7.1         Magnesium        2.3         MCH       26.2         MCHC       35.0         MCV       75         Mono #       0.8         Mono %       10.3         MPV       12.2         NITRITE UA   Negative       Negative       Non-HDL Cholesterol 95  Comment: Risk category and Non-HDL cholesterol goals:  Coronary heart disease (CHD)or equivalent (10-year risk of CHD >20%):  Non-HDL cholesterol goal     <130 mg/dL  Two or more CHD risk factors and 10-year risk of CHD <= 20%:  Non-HDL cholesterol goal     <160 mg/dL  0 to 1 CHD risk factor:  Non-HDL cholesterol goal     <190 mg/dL                 nRBC       0         Blood, UA   Negative       Negative       pH, UA   6.0       6.0       Phosphorus Level       5.0         Platelet Count       672         Potassium       4.3         Prot/Creat Ratio, Urine         0.13       PROTEIN TOTAL       7.2         Protein, UA   Negative  Comment: Recommend a 24 hour urine protein or a urine   protein/creatinine ratio if globulin induced proteinuria is  clinically suspected.         Negative  Comment: Recommend a 24 hour urine protein or a urine   protein/creatinine ratio if globulin induced proteinuria is  clinically suspected.         Urine Protein         9        Acceptable     Yes           RBC       5.99         RDW       15.4         SARS-CoV-2 RNA, Amplification, Qual     Negative           Sodium       133         Specific [place], UA   1.010       1.010       Specimen UA   Urine, Clean Catch       Urine, Clean Catch       Total Cholesterol/HDL Ratio 4.0               Triglycerides 66  Comment: The National Cholesterol Education Program (NCEP) has set the  following guidelines (reference values) for triglycerides:  Normal......................<150 mg/dL  Borderline High.............150-199  mg/dL  High........................200-499 mg/dL                 Troponin I       0.040  Comment: The reference interval for Troponin I represents the 99th percentile   cutoff   for our facility and is consistent with 3rd generation assay   performance.           UROBILINOGEN UA   2.0-3.0       1.0       WBC       7.80                                Significant Imaging: I have reviewed all pertinent imaging results/findings within the past 24 hours.  Assessment/Plan:     * Chronic kidney disease (CKD), stage V  Creatine stable for now. BMP reviewed- noted Estimated Creatinine Clearance: 11.1 mL/min (A) (based on SCr of 6.7 mg/dL (H)). according to latest data. Based on current GFR, CKD stage is end stage.  Monitor UOP and serial BMP and adjust therapy as needed. Renally dose meds. Avoid nephrotoxic medications and procedures.    --see by Dr. Mata in ED, gen surg consulted for tunneled catheter placement in AM 4/30/24  --admit to ICU for CRRT once catheter placed  --    Pulmonary emphysema, unspecified emphysema type  Patient's COPD is controlled currently.  Patient is currently off COPD Pathway. Continue scheduled inhalers Supplemental oxygen and monitor respiratory status closely.     Non-ischemic cardiomyopathy  --chronic, noted      Primary hypertension  Chronic, controlled. Latest blood pressure and vitals reviewed-     Temp:  [97.8 °F (36.6 °C)]   Pulse:  [60-81]   Resp:  [18-29]   BP: ()/(51-66)   SpO2:  [85 %-93 %] .   Home meds for hypertension were reviewed and noted below.   Hypertension Medications               furosemide (LASIX) 80 MG tablet Take 1 tablet (80 mg total) by mouth 2 (two) times daily. If increased 3 pounds in a day or 5 pounds in a week, take an extra dose of lasix 80mg until those pounds are lost.    metoprolol succinate (TOPROL-XL) 25 MG 24 hr tablet TAKE 1 TABLET BY MOUTH EVERY DAY    nitroGLYCERIN (NITROSTAT) 0.4 MG SL tablet Place 1 tablet (0.4 mg total) under the tongue every  5 (five) minutes as needed for Chest pain.    sacubitriL-valsartan (ENTRESTO) 24-26 mg per tablet Take 1 tablet by mouth 2 (two) times daily.            While in the hospital, will manage blood pressure as follows; Adjust home antihypertensive regimen as follows- hold home bp medications in the setting of hypotension requiring CRRT    Will utilize p.r.n. blood pressure medication only if patient's blood pressure greater than 180/110 and he develops symptoms such as worsening chest pain or shortness of breath.    HFrEF (heart failure with reduced ejection fraction)  Congestive Heart Failure  Patient presents for re-evaluation of congestive heart failure. Patient's current complaints are dyspnea, fatigue, near-syncope, and orthopnea. He denies chest pain, claudication, exertional chest pressure/discomfort, and lower extremity edema. He states he is compliant most of the time with his medications. He states he is compliant all of the time with his diet.    --last TTE in 2023 shows EF 40%. Will repeat this admission  --on home lasix. Will convert to higher IV dosage  --will hold troprol in setting of hypotension requiring CRRT  --on CHF pathway    Mixed hyperlipidemia  --continue home medications      Iron deficiency anemia  Patient's anemia is currently controlled. Has not received any PRBCs to date. Etiology likely d/t Iron deficiency and chronic disease due to ESRD  Current CBC reviewed-   Lab Results   Component Value Date    HGB 15.7 04/29/2024    HCT 44.8 04/29/2024     Monitor serial CBC and transfuse if patient becomes hemodynamically unstable, symptomatic or H/H drops below 7/21.    Hypothyroidism  --continue home synthroid    V-tach  --hx of AICD placement, currently 100% paced   --continue home antiarrhythmic medications  --cardiac monitoring          VTE Risk Mitigation (From admission, onward)           Ordered     heparin (porcine) injection 5,000 Units  Every 8 hours         04/29/24 1945     IP VTE HIGH  RISK PATIENT  Once         04/29/24 1945     Place sequential compression device  Until discontinued         04/29/24 1945                                    Jaida Álvarez NP  Department of Hospital Medicine  [place] - Emergency Dept

## 2024-04-30 NOTE — CONSULTS
Food & Nutrition  Education    Diet Education: Fluid and sodium restriction  Time Spent: 15 min  Learners: pt and wife      Nutrition Education provided with handouts: Fluid Restricted Nutrition Therapy and Low Sodium Restricted Therapy      Comments: Educated pt on 800 mL fluid restriction and ways to aid dry mouth. Educated pt on sodium restriction, reading nutrition labels for sodium and serving sizes, low sodium foods, and foods to avoid that are high in sodium.      All questions and concerns answered. Dietitian's contact information provided.       Follow-Up: 5/7/24    Please Re-consult as needed        Thanks!     I certify that I, Jayshree Luna RD, directed the dietetic intern in service delivery and guided them using my skilled judgment. As the cosigning dietitian, I have reviewed the dietetic interns documentation and am responsible for the treatment, assessment, and plan.

## 2024-04-30 NOTE — HOSPITAL COURSE
4/30 Tolerating HD through central line, SOB is improved  5/1/24 On pressors but tolerating CRRT  5/2 tolerating HD, off of pressors, stepping down  5/3 Tunneled catheter placed  5/4 had an episode of intradialytic hypotension, given albumin  5/5 Having hiccups, started PPI  5/6 Hiccups resolved. Gen Surgery consult for PD catheter  5/7 Awaiting for PD catheter placement  5/8 S/P PD catheter placement   5/9/24 S/P HD tolerate well  5/10/24 S/P HD oozing blood at the PD site and still requiring 4-5 L of oxygen  5/11/24 S/P HD minimal bleeding at the PD site    63M with PMHx of ESRD, HFrEF, HTN, NICM, COPD, VT s/p ICD placement, and HLD was admitted for worsening SOB. He was transferred to ICU with ESRD for CRRT once catheter placed. Nephrology was consulted and has been optimized with HD and electrolytes abnormalities monitoring. He was also found to have acute COPD exacerbation and pulmonology was consulted. He had 2D echo which showed Left Ventricle: The left ventricle is normal in size. Normal wall thickness. Regional wall motion abnormalities present. See diagram for wall motion findings. Septal motion is consistent with pacing. There is mildly reduced systolic function with a visually estimated ejection fraction of 40 - 45%. Grade III diastolic dysfunction. Cardiology was consulted for acute on chronic CHF decompensation. Due to his persistent hypotension, entresto and metoprolol have DC. He has been running hypotension throughout hospital stays. General surgery was consulted for placement PD catheter and has tolerated procedure he had intermittent Oozing blood at the PD site. However H/H has been steady. Since admitted he has been required oxygen and fail ambulatory oxygen. He will be DC home on 3-4L nasal canal oxygen. He has HD scheduled in AM and was strongly advise to follow with PCP, nephrology, cardiology and pulmonary as outpatient. He was also advised if bleeding recur to come back to to ER

## 2024-04-30 NOTE — SUBJECTIVE & OBJECTIVE
Past Medical History:   Diagnosis Date    Cardiomyopathy     CKD (chronic kidney disease) stage 4, GFR 15-29 ml/min     Congestive heart failure (CHF) 2015    COPD (chronic obstructive pulmonary disease)     Coronary artery disease     Edema     Essential (primary) hypertension 05/18/2022    Formatting of this note might be different from the original. Converted from [employer]: Description - ESSENTIAL HYPERTENSION, BENIGN    Heart attack     HLD (hyperlipidemia)     Hypertension     Hyperuricemia     Hypocalcemia     Renal cyst, left     Secondary hyperparathyroidism     Thyroid disease     V tach     Vitamin D deficiency        Past Surgical History:   Procedure Laterality Date    ablations  03/05/2018    albations  02/01/2018    COLONOSCOPY N/A 12/07/2018    Procedure: COLONOSCOPY/suprep;  Surgeon: Ananya Morillo MD;  Location: [facility] ENDO;  Service: Endoscopy;  Laterality: N/A;    defibulater N/A 2016    ESOPHAGOGASTRODUODENOSCOPY N/A 12/07/2018    Procedure: EGD (ESOPHAGOGASTRODUODENOSCOPY);  Surgeon: Ananya Morillo MD;  Location: [facility] ENDO;  Service: Endoscopy;  Laterality: N/A;    JOINT REPLACEMENT Bilateral 10/2016    knees, bilat    LEFT HEART CATHETERIZATION N/A 12/16/2020    Procedure: Left heart cath;  Surgeon: Shahram Stewart MD;  Location: [facility] CATH LAB/EP;  Service: Cardiology;  Laterality: N/A;    LEFT HEART CATHETERIZATION Left 9/27/2022    Procedure: Left heart cath;  Surgeon: Juan Roque MD;  Location: [facility] CATH LAB/EP;  Service: Cardiology;  Laterality: Left;    REPLACEMENT OF IMPLANTABLE CARDIOVERTER-DEFIBRILLATOR (ICD) GENERATOR Left 1/24/2023    Procedure: REPLACEMENT, ICD GENERATOR;  Surgeon: River Tenorio MD;  Location: [facility] EP LAB;  Service: Cardiology;  Laterality: Left;  ANTHONY, CRTD gen chg, MDT, MAC, DM, 3prep       Review of patient's allergies indicates:   Allergen Reactions    Lokelma [sodium zirconium cyclosilicate] Other (See Comments)     Fluid  retention, weight gain, CHF excerebration, severe constipation    Atorvastatin Other (See Comments)     Joint pain    Jardiance [empagliflozin] Other (See Comments)     Chest pains, significant weight loss, lower blood pressure       Current Facility-Administered Medications   Medication Dose Route Frequency Provider Last Rate Last Admin    0.9%  NaCl infusion (CRRT USE ONLY)   Intravenous Continuous Chyna Mata  mL/hr at 04/30/24 0951 Bolus from Bag at 04/30/24 0951    acetaminophen tablet 650 mg  650 mg Oral Q4H PRN Jaida Álvarez NP        allopurinoL tablet 100 mg  100 mg Oral Daily Jaida Álvarez NP   100 mg at 04/30/24 0932    amiodarone tablet 400 mg  400 mg Oral Daily Jaida Álvarez NP   400 mg at 04/30/24 0932    aspirin EC tablet 81 mg  81 mg Oral Daily Jaida Álvarez NP   81 mg at 04/30/24 0932    ergocalciferol capsule 50,000 Units  50,000 Units Oral Q7 Days Jaida Álvarez NP   50,000 Units at 04/30/24 0950    furosemide injection 80 mg  80 mg Intravenous Q12H Jaida Álavrez NP   80 mg at 04/30/24 0932    heparin (porcine) injection 5,000 Units  5,000 Units Subcutaneous Q8H Jaida Álavrez NP        HYDROcodone-acetaminophen 5-325 mg per tablet 1 tablet  1 tablet Oral Q4H PRN Jaida Álvarez NP        levothyroxine tablet 150 mcg  150 mcg Oral Daily Jaida Álvarez NP   150 mcg at 04/30/24 0932    magnesium sulfate 2g in water 50mL IVPB (premix)  2 g Intravenous PRN Chyna Mata MD        mexiletine capsule 150 mg  150 mg Oral BID WM Luis Alfredo Leiva, NP   150 mg at 04/30/24 0941    ondansetron injection 4 mg  4 mg Intravenous Q8H PRN Jaida Álvarez NP        pravastatin tablet 40 mg  40 mg Oral Daily Jaida Álvarez NP   40 mg at 04/30/24 0932    sodium chloride 0.9% flush 10 mL  10 mL Intravenous PRN Jaida Álvarez NP        sodium chloride 0.9% flush 10 mL  10 mL Intravenous PRN Jaiad Álvarez, NP        sodium phosphate 20.01 mmol in  dextrose 5 % (D5W) 250 mL IVPB  20.01 mmol Intravenous PRN Chyna Mata MD        sodium phosphate 30 mmol in dextrose 5 % (D5W) 250 mL IVPB  30 mmol Intravenous PRN Chyna Mata MD        sodium phosphate 39.99 mmol in dextrose 5 % (D5W) 250 mL IVPB  39.99 mmol Intravenous PRN Chyna Mata MD         Family History       Problem Relation (Age of Onset)    Alcohol abuse Father    Arthritis Sister    Breast cancer Mother    Cancer Mother    Hypertension Mother    Kidney disease Father    No Known Problems Brother, Daughter, Son    Stroke Mother          Tobacco Use    Smoking status: Former     Current packs/day: 0.00     Average packs/day: 1 pack/day for 33.2 years (33.2 ttl pk-yrs)     Types: Cigarettes     Start date: 1979     Quit date: 3/3/2012     Years since quittin.1     Passive exposure: Past    Smokeless tobacco: Never   Substance and Sexual Activity    Alcohol use: Yes     Comment: rare    Drug use: No    Sexual activity: Yes     Partners: Female     Review of Systems   Constitutional: Negative for diaphoresis.   HENT: Negative.     Eyes: Negative.    Cardiovascular:  Positive for dyspnea on exertion. Negative for chest pain, near-syncope, orthopnea, palpitations, paroxysmal nocturnal dyspnea and syncope.   Respiratory:  Positive for shortness of breath.    Endocrine: Negative.    Hematologic/Lymphatic: Negative.    Skin: Negative.    Musculoskeletal: Negative.    Gastrointestinal:  Positive for nausea and vomiting.   Genitourinary: Negative.    Neurological: Negative.    Psychiatric/Behavioral: Negative.     Allergic/Immunologic: Negative.      Objective:     Vital Signs (Most Recent):  Temp: 97.7 °F (36.5 °C) (24 0815)  Pulse: 64 (24 1100)  Resp: 19 (24 1100)  BP: 92/63 (24 1100)  SpO2: (!) 91 % (24 1100) Vital Signs (24h Range):  Temp:  [97.7 °F (36.5 °C)-97.8 °F (36.6 °C)] 97.7 °F (36.5 °C)  Pulse:  [59-81] 64  Resp:  [16-29] 19  SpO2:  [85  %-100 %] 91 %  BP: ()/(51-66) 92/63     Weight: 77.6 kg (171 lb)  Body mass index is 25.25 kg/m².    SpO2: (!) 91 %         Intake/Output Summary (Last 24 hours) at 4/30/2024 1146  Last data filed at 4/30/2024 1100  Gross per 24 hour   Intake --   Output 2855.5 ml   Net -2855.5 ml       Lines/Drains/Airways       Central Venous Catheter Line  Duration             Trialysis (Dialysis) Catheter 04/30/24 0852 right internal jugular <1 day              Peripheral Intravenous Line  Duration                  Peripheral IV - Single Lumen 04/29/24 1707 20 G Posterior;Right Forearm <1 day         Peripheral IV - Single Lumen 04/29/24 2035 20 G Anterior;Left Forearm <1 day                     Physical Exam  Constitutional:       General: He is not in acute distress.     Appearance: He is not diaphoretic.   HENT:      Head: Atraumatic.   Eyes:      General:         Right eye: No discharge.         Left eye: No discharge.   Cardiovascular:      Rate and Rhythm: Normal rate and regular rhythm.   Pulmonary:      Effort: Pulmonary effort is normal.      Breath sounds: No rales.   Abdominal:      General: Bowel sounds are normal.      Palpations: Abdomen is soft.   Skin:     General: Skin is warm and dry.   Neurological:      Mental Status: He is alert and oriented to person, place, and time.   Psychiatric:         Mood and Affect: Mood normal.         Behavior: Behavior normal.         Thought Content: Thought content normal.         Judgment: Judgment normal.        Significant Labs: BMP:   Recent Labs   Lab 04/29/24  0738 04/29/24  1707 04/30/24  0505   GLU 96 105 84   * 133* 135*   K 4.3 4.3 4.2   CL 94* 97 100   CO2 26 21* 22*   * 112* 110*   CREATININE 6.19* 6.7* 6.1*   CALCIUM 9.0 9.0 8.7   MG  --  2.3 2.1   , CMP   Recent Labs   Lab 04/29/24  0738 04/29/24  1707 04/30/24  0505   * 133* 135*   K 4.3 4.3 4.2   CL 94* 97 100   CO2 26 21* 22*   GLU 96 105 84   * 112* 110*   CREATININE 6.19* 6.7*  "6.1*   CALCIUM 9.0 9.0 8.7   PROT  --  7.2  --    ALBUMIN 4.1 3.6  --    BILITOT  --  2.4*  --    ALKPHOS  --  160*  --    AST  --  42*  --    ALT  --  39  --    ANIONGAP 15 15 13   , CBC   Recent Labs   Lab 04/29/24  0738 04/29/24  1707 04/30/24  0505   WBC 7.12 7.80 7.66   HGB 15.8 15.7 14.5   HCT 45.1 44.8 40.5   * 672* 554*   , INR No results for input(s): "INR", "PROTIME" in the last 48 hours., Lipid Panel   Recent Labs   Lab 04/29/24  2039   CHOL 127   HDL 32*   LDLCALC 81.8   TRIG 66   CHOLHDL 25.2   , Troponin   Recent Labs   Lab 04/29/24  1707   TROPONINI 0.040*   , and All pertinent lab results from the last 24 hours have been reviewed.    Significant Imaging: Echocardiogram: Transthoracic echo (TTE) complete (Cupid Only):   Results for orders placed or performed during the hospital encounter of 04/29/24   Echo Saline Bubble? No; Ultrasound enhancing contrast? No   Result Value Ref Range    BSA 1.94 m2    LVOT stroke volume 39.81 cm3    LVIDd 5.03 3.5 - 6.0 cm    LV Systolic Volume 67.63 mL    LV Systolic Volume Index 35.0 mL/m2    LVIDs 3.94 2.1 - 4.0 cm    LV Diastolic Volume 119.67 mL    LV Diastolic Volume Index 62.01 mL/m2    IVS 0.83 0.6 - 1.1 cm    LVOT diameter 1.91 cm    LVOT area 2.9 cm2    FS 22 (A) 28 - 44 %    Left Ventricle Relative Wall Thickness 0.39 cm    Posterior Wall 0.98 0.6 - 1.1 cm    LV mass 160.97 g    LV Mass Index 83 g/m2    MV Peak E Marvin 0.78 m/s    TDI LATERAL 0.06 m/s    TDI SEPTAL 0.05 m/s    E/E' ratio 14.18 m/s    MV Peak A Marvin 0.27 m/s    TR Max Marvin 3.49 m/s    E/A ratio 2.89     E wave deceleration time 210.39 msec    LV SEPTAL E/E' RATIO 15.60 m/s    LV LATERAL E/E' RATIO 13.00 m/s    PV Peak S Marvin 0.27 m/s    PV Peak D Marvin 0.38 m/s    Pulm vein S/D ratio 0.71     LVOT peak marvin 0.74 m/s    Left Ventricular Outflow Tract Mean Velocity 0.58 cm/s    Left Ventricular Outflow Tract Mean Gradient 1.44 mmHg    RVDD 6.60 cm    RV S' 5.10 cm/s    TAPSE 1.59 cm    RV/LV " Ratio 1.31 cm    LA size 5.04 cm    Left Atrium Minor Axis 7.08 cm    Left Atrium Major Axis 7.93 cm    LA volume (mod) 97.12 cm3    LA Volume Index (Mod) 50.3 mL/m2    RA Major Axis 7.79 cm    RA Width 4.97 cm    AV mean gradient 5 mmHg    AV peak gradient 8 mmHg    Ao peak edu 1.43 m/s    Ao VTI 26.70 cm    LVOT peak VTI 13.90 cm    AV valve area 1.49 cm²    AV Velocity Ratio 0.52     AV index (prosthetic) 0.52     MAGALI by Velocity Ratio 1.48 cm²    Mr max edu 3.64 m/s    MV peak gradient 3 mmHg    MV stenosis pressure 1/2 time 61.01 ms    MV valve area p 1/2 method 3.61 cm2    MV valve area by continuity eq 1.62 cm2    MV VTI 24.5 cm    Triscuspid Valve Regurgitation Peak Gradient 49 mmHg    PV PEAK VELOCITY 0.64 m/s    PV peak gradient 2 mmHg    Sinus 3.16 cm    STJ 2.37 cm    Ascending aorta 2.38 cm    IVC diameter 3.51 cm    Mean e' 0.06 m/s    ZLVIDS 1.25     ZLVIDD -0.83     LA Volume Index 71.4 mL/m2    LA volume 137.81 cm3    RV-benítez mid d 5.4 cm    LA WIDTH 4.3 cm

## 2024-04-30 NOTE — PROGRESS NOTES
[facility]  Progress Note    Patient Name: Ar Sharma  MRN: [id number]  Patient Class: IP- Inpatient   Admission Date: 4/29/2024  Length of Stay: 1 days  Attending Physician: Mamta Hassan MD  Primary Care Provider: Jc Elliott MD        Subjective:     Principal Problem:Chronic kidney disease (CKD), stage V        HPI:  Ar Sharma is a 63M with PMHx of ESRD, HFrEF, HTN, NICM, COPD, VT s/p ICD placement, and HLD who presented to [facility] ED with CC progressively worse SOB over the last two months. He was recently diagnosed with ESRD and has been trying diet modification at home. He endorses SINGH, dizziness, frequent intermittent nausea and one episode of vomiting during this time. He denies CP, syncope, dysuria, fever or chills.     ED workup significant for: , Cr 6.7, Phos 5, Tbili 2.4, BNP 3204, trop 0.040, vit D 24, .6, and CXR: cardiomegaly w/ pulmonary vascular congestion consistent w/ pulmonary edema. All other components of CBC and chemistries were unremarkable.       Patient was seen by Dr. Mata in ED. Emergent HD not indicated.     Overview/Hospital Course:  4/30 Tolerating HD through central line, SOB is improved    Interval History: In ICU getting CRRT, tolerating well, BP stable    Review of Systems   Constitutional:  Positive for fatigue. Negative for chills and fever.   HENT: Negative.     Eyes: Negative.    Respiratory:  Positive for shortness of breath. Negative for cough, chest tightness and wheezing.    Cardiovascular:  Negative for chest pain, palpitations and leg swelling.   Gastrointestinal:  Positive for nausea and vomiting. Negative for abdominal distention, abdominal pain, blood in stool and diarrhea.   Endocrine: Positive for cold intolerance.   Genitourinary:  Positive for decreased urine volume and difficulty urinating. Negative for flank pain.   Musculoskeletal:  Positive for arthralgias.   Skin: Negative.     Allergic/Immunologic: Negative.    Neurological:  Positive for weakness. Negative for dizziness and light-headedness.   Hematological: Negative.    Psychiatric/Behavioral: Negative.       Objective:     Vital Signs (Most Recent):  Temp: (!) 95.7 °F (35.4 °C) (currently on CRRT. extra blanket provided) (04/30/24 1545)  Pulse: 62 (04/30/24 1630)  Resp: (!) 22 (04/30/24 1630)  BP: 98/65 (04/30/24 1630)  SpO2: (!) 94 % (04/30/24 1630) Vital Signs (24h Range):  Temp:  [95.7 °F (35.4 °C)-97.7 °F (36.5 °C)] 95.7 °F (35.4 °C)  Pulse:  [59-72] 62  Resp:  [16-29] 22  SpO2:  [89 %-100 %] 94 %  BP: ()/(51-73) 98/65     Weight: 77.6 kg (171 lb)  Body mass index is 25.25 kg/m².    Intake/Output Summary (Last 24 hours) at 4/30/2024 1641  Last data filed at 4/30/2024 1600  Gross per 24 hour   Intake 240 ml   Output 4163.5 ml   Net -3923.5 ml         Physical Exam  Vitals and nursing note reviewed.   Constitutional:       General: He is not in acute distress.     Appearance: He is ill-appearing.   HENT:      Head: Normocephalic and atraumatic.      Nose: Nose normal.      Mouth/Throat:      Mouth: Mucous membranes are moist.      Pharynx: Oropharynx is clear.   Eyes:      Extraocular Movements: Extraocular movements intact.      Conjunctiva/sclera: Conjunctivae normal.      Pupils: Pupils are equal, round, and reactive to light.   Neck:      Comments: Central line in R side of neck  Cardiovascular:      Rate and Rhythm: Normal rate and regular rhythm.      Pulses: Normal pulses.      Heart sounds: Normal heart sounds. No murmur heard.     No friction rub. No gallop.   Pulmonary:      Effort: Pulmonary effort is normal. No respiratory distress.      Breath sounds: Normal breath sounds. No wheezing, rhonchi or rales.   Abdominal:      General: Bowel sounds are normal. There is no distension.      Palpations: Abdomen is soft.      Tenderness: There is no abdominal tenderness. There is no right CVA tenderness, left CVA  tenderness or guarding.   Genitourinary:     Comments: deferred  Musculoskeletal:         General: Normal range of motion.      Cervical back: Normal range of motion and neck supple.   Skin:     General: Skin is warm and dry.      Capillary Refill: Capillary refill takes less than 2 seconds.   Neurological:      General: No focal deficit present.      Mental Status: He is alert and oriented to person, place, and time. Mental status is at baseline.   Psychiatric:         Mood and Affect: Mood normal.         Behavior: Behavior normal.         Thought Content: Thought content normal.         Judgment: Judgment normal.             Significant Labs: All pertinent labs within the past 24 hours have been reviewed.  CBC:   Recent Labs   Lab 04/29/24  0738 04/29/24  1707 04/30/24  0505   WBC 7.12 7.80 7.66   HGB 15.8 15.7 14.5   HCT 45.1 44.8 40.5   * 672* 554*     CMP:   Recent Labs   Lab 04/29/24  0738 04/29/24  1707 04/30/24  0505 04/30/24  1530   * 133* 135* 136   K 4.3 4.3 4.2 3.8   CL 94* 97 100 100   CO2 26 21* 22* 27   GLU 96 105 84 123*   * 112* 110* 94*   CREATININE 6.19* 6.7* 6.1* 4.8*   CALCIUM 9.0 9.0 8.7 8.6*   PROT  --  7.2  --   --    ALBUMIN 4.1 3.6  --  3.0*   BILITOT  --  2.4*  --   --    ALKPHOS  --  160*  --   --    AST  --  42*  --   --    ALT  --  39  --   --    ANIONGAP 15 15 13 9       Significant Imaging: I have reviewed all pertinent imaging results/findings within the past 24 hours.    Assessment/Plan:      * Chronic kidney disease (CKD), stage V  Creatine stable for now. BMP reviewed- noted Estimated Creatinine Clearance: 11.1 mL/min (A) (based on SCr of 6.7 mg/dL (H)). according to latest data. Based on current GFR, CKD stage is end stage.  Monitor UOP and serial BMP and adjust therapy as needed. Renally dose meds. Avoid nephrotoxic medications and procedures.    --see by Dr. Mata in ED, gen surg consulted for tunneled catheter placement in AM 4/30/24  --admit to ICU for  CRRT once catheter placed  --    ESRD (end stage renal disease)  Creatine stable for now. BMP reviewed- noted Estimated Creatinine Clearance: 15.5 mL/min (A) (based on SCr of 4.8 mg/dL (H)). according to latest data. Based on current GFR, CKD stage is end stage.  Monitor UOP and serial BMP and adjust therapy as needed. Renally dose meds. Avoid nephrotoxic medications and procedures.    Seen by Nephrology, Trialysis placed for CRRT, tolerating    Pulmonary emphysema, unspecified emphysema type  Patient's COPD is controlled currently.  Patient is currently off COPD Pathway. Continue scheduled inhalers Supplemental oxygen and monitor respiratory status closely.     Non-ischemic cardiomyopathy  --chronic, noted      Primary hypertension  Chronic, controlled. Latest blood pressure and vitals reviewed-     Temp:  [97.8 °F (36.6 °C)]   Pulse:  [60-81]   Resp:  [18-29]   BP: ()/(51-66)   SpO2:  [85 %-93 %] .   Home meds for hypertension were reviewed and noted below.   Hypertension Medications               furosemide (LASIX) 80 MG tablet Take 1 tablet (80 mg total) by mouth 2 (two) times daily. If increased 3 pounds in a day or 5 pounds in a week, take an extra dose of lasix 80mg until those pounds are lost.    metoprolol succinate (TOPROL-XL) 25 MG 24 hr tablet TAKE 1 TABLET BY MOUTH EVERY DAY    nitroGLYCERIN (NITROSTAT) 0.4 MG SL tablet Place 1 tablet (0.4 mg total) under the tongue every 5 (five) minutes as needed for Chest pain.    sacubitriL-valsartan (ENTRESTO) 24-26 mg per tablet Take 1 tablet by mouth 2 (two) times daily.            While in the hospital, will manage blood pressure as follows; Adjust home antihypertensive regimen as follows- hold home bp medications in the setting of hypotension requiring CRRT    Will utilize p.r.n. blood pressure medication only if patient's blood pressure greater than 180/110 and he develops symptoms such as worsening chest pain or shortness of breath.    HFrEF (heart  failure with reduced ejection fraction)  Congestive Heart Failure  Patient presents for re-evaluation of congestive heart failure. Patient's current complaints are dyspnea, fatigue, near-syncope, and orthopnea. He denies chest pain, claudication, exertional chest pressure/discomfort, and lower extremity edema. He states he is compliant most of the time with his medications. He states he is compliant all of the time with his diet.    --last TTE in 2023 shows EF 40%. Will repeat this admission  --on home lasix. Will convert to higher IV dosage  --will hold troprol in setting of hypotension requiring CRRT  --on CHF pathway    Cardiac resynchronization therapy defibrillator (CRT-D) in place        Mixed hyperlipidemia  --continue home medications      Iron deficiency anemia  Patient's anemia is currently controlled. Has not received any PRBCs to date. Etiology likely d/t Iron deficiency and chronic disease due to ESRD  Current CBC reviewed-   Lab Results   Component Value Date    HGB 15.7 04/29/2024    HCT 44.8 04/29/2024     Monitor serial CBC and transfuse if patient becomes hemodynamically unstable, symptomatic or H/H drops below 7/21.    Hypothyroidism  --continue home synthroid    V-tach  --hx of AICD placement, currently 100% paced   --continue home antiarrhythmic medications  --cardiac monitoring          VTE Risk Mitigation (From admission, onward)           Ordered     heparin 25,000 units in dextrose 5% 250 mL (100 units/mL) infusion (heparin infusion - NO NOMOGRAM)  Continuous         04/30/24 1338     heparin (porcine) injection 5,000 Units  Every 8 hours         04/29/24 1945     IP VTE HIGH RISK PATIENT  Once         04/29/24 1945     Place sequential compression device  Until discontinued         04/29/24 1945                    Discharge Planning   SLAVA:      Code Status: Full Code   Is the patient medically ready for discharge?:     Reason for patient still in hospital (select all that apply): Patient  trending condition and Consult recommendations  Discharge Plan A: Home with family            Critical care time spent on the evaluation and treatment of severe organ dysfunction, review of pertinent labs and imaging studies, discussions with consulting providers and discussions with patient/family: 35 minutes.      Mamta Hassan MD  Department of [facility]   [place] - Intensive Care     [Symptom and Test Evaluation] : symptom and test evaluation [Carotid, Aortic and Peripheral Vascular Disease] : carotid, aortic and peripheral vascular disease [FreeTextEntry1] : ASHD\par  TAA \par

## 2024-04-30 NOTE — ASSESSMENT & PLAN NOTE
Patient's anemia is currently controlled. Has not received any PRBCs to date. Etiology likely d/t Iron deficiency and chronic disease due to ESRD  Current CBC reviewed-   Lab Results   Component Value Date    HGB 15.7 04/29/2024    HCT 44.8 04/29/2024     Monitor serial CBC and transfuse if patient becomes hemodynamically unstable, symptomatic or H/H drops below 7/21.

## 2024-04-30 NOTE — PROCEDURES
"Ar Sharma is a 63 y.o. male patient.    Temp: 97.7 °F (36.5 °C) (04/30/24 0815)  Pulse: 61 (04/30/24 0815)  Resp: 18 (04/30/24 0815)  BP: 107/65 (04/30/24 0815)  SpO2: (!) 94 % (04/30/24 0815)  Weight: 77.6 kg (171 lb) (04/29/24 2124)  Height: 5' 9" (175.3 cm) (04/29/24 1927)       Central Line    Date/Time: 4/30/2024 9:17 AM    Performed by: Jaleesa Montiel MD  Authorized by: Jaleesa Montiel MD    Location procedure was performed:  [facility] ICU 5TH FLOOR  Pre-operative diagnosis:  Renal Failure  Post-operative diagnosis:  Renal Failure  Consent Done ?:  Yes  Time out complete?: Verified correct patient, procedure, equipment, staff, and site/side    Indications:  Hemodialysis  Anesthesia:  Local infiltration  Local anesthetic:  Lidocaine 1% without epinephrine  Anesthetic total (ml):  5  Preparation:  Skin prepped with ChloraPrep  Skin prep agent dried: Skin prep agent completely dried prior to procedure    Sterile barriers: All five maximal sterile barriers used - gloves, gown, cap, mask and large sterile sheet    Hand hygiene: Hand hygiene performed immediately prior to central venous catheter insertion    Location:  Right internal jugular  Catheter type:  Trialysis  Catheter size:  13 Fr  Inserted Catheter Length (cm):  15  Ultrasound guidance: Yes    Vessel Caliber:  Large   patent  Comprressibility:  Normal  Doppler:  Not done  Needle advanced into vessel with real time ultrasound guidance.    Guidewire confirmed in vessel.    Steril sheath on probe.    Sterile gel used.  Manometry: No    Number of attempts:  1  Securement:  Line sutured, chlorhexidine patch, sterile dressing applied and blood return through all ports  Complications: No    Post-procedure assessment: Pending.  Adverse Events:  None    Jaleesa Montiel MD PGY VI  [employer] Pulmonary/Critical Care Fellow     4/30/2024    "

## 2024-04-30 NOTE — CONSULTS
Consult Note  [facility]    Attending: Dr. Yadav  Fellow: Dr. Montiel  Admit Date: 4/29/2024  Today's Date: 04/30/2024    SUBJECTIVE:     HPI: Ar Sharma is a 63 y.o. male with PMH of HFrEF, VT s/p ICD, NICM, ESRD, HTN, COPD (no PFTs), and HLD who presented to [facility] on 4/29/24 for worsening SOB for the past 2 months. He states that he was told by his nephrologist to go to the ED. He was recently dx with ESRD and has been trying to modify diet at home. He denies any fever, chills, chest pain, abdominal pain.     Upon admission, found to be hypotensive, 83/63 (baseline per chart review), O2 Sat of 85%. Placed on 3L O2 NC. Chest X-ray significant for patchy opacities in BL lungs reflective of pulmonary edema. Initiated Lasix 80 mg IV BID. Trialysis line place, CRRT initiated. Admitted to ICU for CRRT.     Review of patient's allergies indicates:   Allergen Reactions    Lokelma [sodium zirconium cyclosilicate] Other (See Comments)     Fluid retention, weight gain, CHF excerebration, severe constipation    Atorvastatin Other (See Comments)     Joint pain    Jardiance [empagliflozin] Other (See Comments)     Chest pains, significant weight loss, lower blood pressure       Past Medical History:   Diagnosis Date    Cardiomyopathy     CKD (chronic kidney disease) stage 4, GFR 15-29 ml/min     Congestive heart failure (CHF) 2015    COPD (chronic obstructive pulmonary disease)     Coronary artery disease     Edema     Essential (primary) hypertension 05/18/2022    Formatting of this note might be different from the original. Converted from Centricity: Description - ESSENTIAL HYPERTENSION, BENIGN    Heart attack     HLD (hyperlipidemia)     Hypertension     Hyperuricemia     Hypocalcemia     Renal cyst, left     Secondary hyperparathyroidism     Thyroid disease     V tach     Vitamin D deficiency      Past Surgical History:   Procedure Laterality Date    ablations  03/05/2018     albations  2018    COLONOSCOPY N/A 2018    Procedure: COLONOSCOPY/suprep;  Surgeon: Ananya Morillo MD;  Location: [facility] ENDO;  Service: Endoscopy;  Laterality: N/A;    defibulater N/A     ESOPHAGOGASTRODUODENOSCOPY N/A 2018    Procedure: EGD (ESOPHAGOGASTRODUODENOSCOPY);  Surgeon: Ananya Morillo MD;  Location: [facility] ENDO;  Service: Endoscopy;  Laterality: N/A;    JOINT REPLACEMENT Bilateral 10/2016    knees, bilat    LEFT HEART CATHETERIZATION N/A 2020    Procedure: Left heart cath;  Surgeon: Shahram Stewart MD;  Location: [facility] CATH LAB/EP;  Service: Cardiology;  Laterality: N/A;    LEFT HEART CATHETERIZATION Left 2022    Procedure: Left heart cath;  Surgeon: Juan Roque MD;  Location: [facility] CATH LAB/EP;  Service: Cardiology;  Laterality: Left;    REPLACEMENT OF IMPLANTABLE CARDIOVERTER-DEFIBRILLATOR (ICD) GENERATOR Left 2023    Procedure: REPLACEMENT, ICD GENERATOR;  Surgeon: River Tenorio MD;  Location: [facility] EP LAB;  Service: Cardiology;  Laterality: Left;  ANTHONY, CRTD gen chg, MDT, MAC, DM, 3prep     Family History   Problem Relation Name Age of Onset    Cancer Mother Sharon Love     Hypertension Mother Sharon Love     Stroke Mother Sharon Love     Breast cancer Mother Sharon Love     Alcohol abuse Father Mitul Love     Kidney disease Father Mitul Love     Arthritis Sister x2     No Known Problems Brother x1     No Known Problems Daughter x1     No Known Problems Son x2      Social History     Tobacco Use    Smoking status: Former     Current packs/day: 0.00     Average packs/day: 1 pack/day for 33.2 years (33.2 ttl pk-yrs)     Types: Cigarettes     Start date: 1979     Quit date: 3/3/2012     Years since quittin.1     Passive exposure: Past    Smokeless tobacco: Never   Substance Use Topics    Alcohol use: Yes     Comment: rare    Drug use: No       All medications reviewed.    OBJECTIVE:     Vital Signs Trends/Hx Reviewed  Vitals:    24 1145 24 1200  04/30/24 1215 04/30/24 1230   BP: 95/60 96/65 (!) 96/57 (!) 91/56   BP Location:       Patient Position:       Pulse: 60 60 60 60   Resp: 18 (!) 21 19 (!) 23   Temp:       TempSrc:       SpO2: (!) 92% (!) 92% (!) 92% (!) 90%   Weight:       Height:           Physical Exam:  General: NAD, cooperative & interactive.  HEENT: AT/NC, PERRL, EOMI, oral and nasal mucosa moist.   Neck: JVD present  Cardiac: normal rate, regular rhythm  Respiratory: On 5L O2 NC. Auscultation with crackles to BL lower lobes. Normal inspection. Symmetric chest rise. Some increased work of breathing noted.   Abdomen: Soft, NT/ND. +BS.   Extremities: Mild non pitting LE edema  Neuro: Grossly intact to brief exam. Oriented x3 with appropriate mood/affect to situation.     Laboratory:  Recent Labs   Lab 04/30/24  0505   WBC 7.66   RBC 5.49   HGB 14.5   HCT 40.5   *   MCV 74*   MCH 26.4*   MCHC 35.8     Recent Labs   Lab 04/30/24  0505   *   K 4.2      CO2 22*   *   CREATININE 6.1*   CALCIUM 8.7   MG 2.1       Infusions:    Current Facility-Administered Medications   Medication Dose Route Frequency Last Rate Last Admin    sodium chloride 0.9%   Intravenous Continuous 200 mL/hr at 04/30/24 0951 Bolus from Bag at 04/30/24 0951       Scheduled Medications:   Current Facility-Administered Medications   Medication Dose Route Frequency    allopurinoL  100 mg Oral Daily    alteplase  4 mg Intra-Catheter Once    amiodarone  400 mg Oral Daily    aspirin  81 mg Oral Daily    ergocalciferol  50,000 Units Oral Q7 Days    furosemide (LASIX) injection  80 mg Intravenous Q12H    heparin (porcine)  5,000 Units Subcutaneous Q8H    levothyroxine  150 mcg Oral Daily    mexiletine  150 mg Oral BID WM    pravastatin  40 mg Oral Daily       PRN Medications:     Current Facility-Administered Medications:     acetaminophen, 650 mg, Oral, Q4H PRN    HYDROcodone-acetaminophen, 1 tablet, Oral, Q4H PRN    magnesium sulfate IVPB, 2 g, Intravenous,  PRN    ondansetron, 4 mg, Intravenous, Q8H PRN    sodium chloride 0.9%, 10 mL, Intravenous, PRN    sodium chloride 0.9%, 10 mL, Intravenous, PRN    sodium phosphate 20.01 mmol in dextrose 5 % (D5W) 250 mL IVPB, 20.01 mmol, Intravenous, PRN    sodium phosphate 30 mmol in dextrose 5 % (D5W) 250 mL IVPB, 30 mmol, Intravenous, PRN    sodium phosphate 39.99 mmol in dextrose 5 % (D5W) 250 mL IVPB, 39.99 mmol, Intravenous, PRN    Assessment & Plan:   Patient Active Problem List   Diagnosis    V-tach    Hypothyroidism    Iron deficiency anemia    Mixed hyperlipidemia    Atherosclerosis of native coronary artery of native heart with angina pectoris    Cardiac resynchronization therapy defibrillator (CRT-D) in place    S/P ablation of ventricular arrhythmia    HFrEF (heart failure with reduced ejection fraction)    Cardiac LV ejection fraction 10-20%    Enlarged prostate with lower urinary tract symptoms (LUTS)    Primary osteoarthritis of one knee    Prediabetes    Hyperlipidemia    Primary hypertension    Thrombocythemia -  on 4/4/22    Hyponatremia    Abuse of herbal or folk remedies    Non-ischemic cardiomyopathy    Secondary hyperparathyroidism of renal origin    Aortic atherosclerosis    Pulmonary emphysema, unspecified emphysema type    Chronic kidney disease (CKD), stage V    ESRD (end stage renal disease)    Acute hypoxemic respiratory failure    Hypotension       ASSESSMENT & RECOMMENDATIONS     CNS/Neuro:  No acute issues    Cardiovascular:  Acute Decompensated HF  VT s/p ICD  - holding home BB and Entresto  - Echo performed, pending read, Echo 2022 with EF 40%, Grade II LV DD  - bedside US with EF ~ 40%  - cont home amiodarone 400 mg and mexiletine 150 mg BID     Hypotension 2/2 Volume overload  - patient takes Lasix 80 mg BID PO at home  - initiated on lasix 80 IV BID, good UOP  - Midodrine 5 mg initiated     Respiratory:  COPD  - no PFTs per chart review  - CT Chest 2023 - emphysematous changes  - pt has  40 pack year smoking hx, quit 15 years ago  - not on home O2    On 5L O2 NC, will attempt to wean after CRRT    GI/Metabolic:  GI Ppx: None  Diet: Renal/Cardiac    Renal:  ESRD  - trialysis catheter placed, initiated on CRRT  4/30  - will need tunneled HD catheter   - nephrology consulted    Heme:  DVT Ppx: Heparin    Endo:   Strict Glucose control with goal 140-180  A1C 5.6 on 4/29    ID:  No acute concerns    MSK:  Gout  - cont home allopurinol 100 mg daily    Code Status: Full    F: Renal/Cardiac  A: Tylenol  S: NA  T: Heparin  H: Elevated 45 degrees  U: None  G: Maintain 140-180  S: NA  B: None  I: Trialysis catheter, PIV  D: NA    Baron Adams MD  [facility], PGY-1

## 2024-04-30 NOTE — ASSESSMENT & PLAN NOTE
Creatine stable for now. BMP reviewed- noted Estimated Creatinine Clearance: 15.5 mL/min (A) (based on SCr of 4.8 mg/dL (H)). according to latest data. Based on current GFR, CKD stage is end stage.  Monitor UOP and serial BMP and adjust therapy as needed. Renally dose meds. Avoid nephrotoxic medications and procedures.    Seen by Nephrology, Trialysis placed for CRRT, tolerating

## 2024-04-30 NOTE — SUBJECTIVE & OBJECTIVE
Interval History: In ICU getting CRRT, tolerating well, BP stable    Review of Systems   Constitutional:  Positive for fatigue. Negative for chills and fever.   HENT: Negative.     Eyes: Negative.    Respiratory:  Positive for shortness of breath. Negative for cough, chest tightness and wheezing.    Cardiovascular:  Negative for chest pain, palpitations and leg swelling.   Gastrointestinal:  Positive for nausea and vomiting. Negative for abdominal distention, abdominal pain, blood in stool and diarrhea.   Endocrine: Positive for cold intolerance.   Genitourinary:  Positive for decreased urine volume and difficulty urinating. Negative for flank pain.   Musculoskeletal:  Positive for arthralgias.   Skin: Negative.    Allergic/Immunologic: Negative.    Neurological:  Positive for weakness. Negative for dizziness and light-headedness.   Hematological: Negative.    Psychiatric/Behavioral: Negative.       Objective:     Vital Signs (Most Recent):  Temp: (!) 95.7 °F (35.4 °C) (currently on CRRT. extra blanket provided) (04/30/24 1545)  Pulse: 62 (04/30/24 1630)  Resp: (!) 22 (04/30/24 1630)  BP: 98/65 (04/30/24 1630)  SpO2: (!) 94 % (04/30/24 1630) Vital Signs (24h Range):  Temp:  [95.7 °F (35.4 °C)-97.7 °F (36.5 °C)] 95.7 °F (35.4 °C)  Pulse:  [59-72] 62  Resp:  [16-29] 22  SpO2:  [89 %-100 %] 94 %  BP: ()/(51-73) 98/65     Weight: 77.6 kg (171 lb)  Body mass index is 25.25 kg/m².    Intake/Output Summary (Last 24 hours) at 4/30/2024 1641  Last data filed at 4/30/2024 1600  Gross per 24 hour   Intake 240 ml   Output 4163.5 ml   Net -3923.5 ml         Physical Exam  Vitals and nursing note reviewed.   Constitutional:       General: He is not in acute distress.     Appearance: He is ill-appearing.   HENT:      Head: Normocephalic and atraumatic.      Nose: Nose normal.      Mouth/Throat:      Mouth: Mucous membranes are moist.      Pharynx: Oropharynx is clear.   Eyes:      Extraocular Movements: Extraocular movements  intact.      Conjunctiva/sclera: Conjunctivae normal.      Pupils: Pupils are equal, round, and reactive to light.   Neck:      Comments: Central line in R side of neck  Cardiovascular:      Rate and Rhythm: Normal rate and regular rhythm.      Pulses: Normal pulses.      Heart sounds: Normal heart sounds. No murmur heard.     No friction rub. No gallop.   Pulmonary:      Effort: Pulmonary effort is normal. No respiratory distress.      Breath sounds: Normal breath sounds. No wheezing, rhonchi or rales.   Abdominal:      General: Bowel sounds are normal. There is no distension.      Palpations: Abdomen is soft.      Tenderness: There is no abdominal tenderness. There is no right CVA tenderness, left CVA tenderness or guarding.   Genitourinary:     Comments: deferred  Musculoskeletal:         General: Normal range of motion.      Cervical back: Normal range of motion and neck supple.   Skin:     General: Skin is warm and dry.      Capillary Refill: Capillary refill takes less than 2 seconds.   Neurological:      General: No focal deficit present.      Mental Status: He is alert and oriented to person, place, and time. Mental status is at baseline.   Psychiatric:         Mood and Affect: Mood normal.         Behavior: Behavior normal.         Thought Content: Thought content normal.         Judgment: Judgment normal.             Significant Labs: All pertinent labs within the past 24 hours have been reviewed.  CBC:   Recent Labs   Lab 04/29/24  0738 04/29/24  1707 04/30/24  0505   WBC 7.12 7.80 7.66   HGB 15.8 15.7 14.5   HCT 45.1 44.8 40.5   * 672* 554*     CMP:   Recent Labs   Lab 04/29/24  0738 04/29/24  1707 04/30/24  0505 04/30/24  1530   * 133* 135* 136   K 4.3 4.3 4.2 3.8   CL 94* 97 100 100   CO2 26 21* 22* 27   GLU 96 105 84 123*   * 112* 110* 94*   CREATININE 6.19* 6.7* 6.1* 4.8*   CALCIUM 9.0 9.0 8.7 8.6*   PROT  --  7.2  --   --    ALBUMIN 4.1 3.6  --  3.0*   BILITOT  --  2.4*  --   --     ALKPHOS  --  160*  --   --    AST  --  42*  --   --    ALT  --  39  --   --    ANIONGAP 15 15 13 9       Significant Imaging: I have reviewed all pertinent imaging results/findings within the past 24 hours.

## 2024-04-30 NOTE — ASSESSMENT & PLAN NOTE
TTE 2022   The left ventricle is normal in size with eccentric hypertrophy and mildly decreased systolic function.  The estimated ejection fraction is 40-45%.  There is abnormal septal wall motion consistent with right ventricular pacemaker.  Grade II left ventricular diastolic dysfunction.  With low normal right ventricular systolic function.  Moderate right atrial enlargement.  Mild tricuspid regurgitation.  There is pulmonary hypertension.  The estimated PA systolic pressure is 67 mmHg.  Elevated central venous pressure (15 mmHg).    Repeat TTE pending  Holding BB and Entresto 2/2 hypotension- will resume if BP can tolerate  Diuresis per Nephrology given renal function

## 2024-04-30 NOTE — TELEPHONE ENCOUNTER
----- Message from Cherelle Thrasher sent at 4/30/2024 12:27 PM CDT -----  Type: Needs Medical Advice  Who Called:  Jacobo with ICU   Best Call Back Number: [phone]  Additional Information: Brianmary is calling in regards to getting the centers that the provider goes to. Needs to speak to the nurse in the office. Please call back and advise. Thanks!

## 2024-04-30 NOTE — ED NOTES
"Pt placed in normal supine position, no longer in trendelenburg. HOB slightly raised 25-30 degrees for comfort.  Denies SOB.  Denies complaint "I do feel better"  "

## 2024-04-30 NOTE — HOSPITAL COURSE
04/30/2024 Per HPI   05/01/2024 Patient on CRRT, No CP. BP marginal. On Midodrine. EF 40-45% with RV dysfunction and BIAE.   05/02/2024 BP remains marginal, Midodrine up titrated. 1500 cc intake, 1775 UO + 564 cc out with CRRT. Cr 4.6. CVP and mixed venous ordered this AM. Patient denies SOB and reports feeling at baseline.

## 2024-04-30 NOTE — HPI
Ar Sharma is a 63 year old male with ESRD, HFrEF, HTN, NICM, COPD, VT s/p ICD placement, and HLD. Patient presented to the ED with SOB x 2 months and was directed to the ED by his nephrologist. He has been trying to avoid HD with diet modification at home which has been unsuccessful. Patient reports dizziness, nausea. Patient denies CP, diaphoresis, dizziness, palpitations. He reports compliance with medications including Amio and Mexiletine.  , Cr 6.7, Phos 5, Tbili 2.4, BNP 3204, trop 0.040, vit D 24, .6, and CXR: cardiomegaly w/ pulmonary vascular congestion consistent w/ pulmonary edema. Patient was given 240 mg IV Lasix per nephrology with adequate response, HD line placed and CRRT initiated. BP marginal, home BB and Entresto on hold. Midodrine initiated.

## 2024-04-30 NOTE — EICU
Intervention Initiated From:  Bedside    Margaret intervened regarding:  Time-Out    Nurse Notified:  Yes    Doctor Notified:  Yes    Comments: timeout done for trialysis line

## 2024-04-30 NOTE — PLAN OF CARE
The sw met with the pt and his daughter who was asleep at bedside during the assessment. The pt lives in [place] with Nicolette Sharma(wife)[phone]. The pt still drives but his wife will transport him home at d/c. The pt's not employed due to being disabled and receives a monthly Disability check. The pt's independent with his ADL's and has the dme listed below. The pt follows with Dr. Jose Simental in [place] for Nephrology. The sw completed the white board in the pt's room with her name and contact info. The sw gave the pt a d/c brochure with her contact info on it. The sw encouraged him to call if he has any further questions or concerns. The sw will continue to follow the pt throughout his transitions of care and will assist with any d/c needs.     [facility] - Intensive Care  Initial Discharge Assessment       Primary Care Provider: Jc Elliott MD    Admission Diagnosis: Shortness of breath [R06.02]  Admission for dialysis [Z99.2]  Renal failure (ARF), acute on chronic [N17.9, N18.9]  HFrEF (heart failure with reduced ejection fraction) [I50.20]    Admission Date: 4/29/2024  Expected Discharge Date:     Transition of Care Barriers: (P) None    Payor: [employer]ARE [employer]VANTAGE / Plan: [employer] H[employer] MCARE / Product Type: Medicare Advantage /     Extended Emergency Contact Information  Primary Emergency Contact: Nicolette Sharma  Mobile Phone: [phone]  Relation: Spouse  Preferred language: English   needed? No    Discharge Plan A: Home with family  Discharge Plan B: Home Health      [id number] [place] [place] [facility] [address] [facility]  [address] [facility] [place] [address]  Phone: [phone] Fax: [phone]    [id number] - [place] - [address]  [address] [place] [address]  Phone: [phone] Fax: [phone]      Initial Assessment (most recent)       Adult Discharge Assessment - 04/30/24 1251           Discharge Assessment    Assessment Type Discharge Planning Assessment     Confirmed/corrected address, phone number and insurance Yes     Confirmed Demographics Correct on Facesheet     Source of Information patient     When was your last doctors appointment? 04/19/24     Reason For Admission CKD 5     People in Home spouse     Do you expect to return to your current living situation? Yes     Do you have help at home or someone to help you manage your care at home? Yes     Who are your caregiver(s) and their phone number(s)? Nicolette Sharma(wife)[phone]     Prior to hospitilization cognitive status: Alert/Oriented     Current cognitive status: Alert/Oriented     Walking or Climbing Stairs Difficulty yes     Walking or Climbing Stairs ambulation difficulty, requires equipment;stair climbing difficulty, requires equipment;transferring difficulty, requires equipment     Mobility Management pt uses a straight cane prn     Dressing/Bathing Difficulty no     Home Accessibility wheelchair accessible     Home Layout Able to live on 1st floor     Equipment Currently Used at Home cane, straight     Readmission within 30 days? No     Patient currently being followed by outpatient case management? No     Do you currently have service(s) that help you manage your care at home? No     Do you take prescription medications? Yes     Do you have prescription coverage? Yes     Coverage [facility] Medicare Advantage     Do you have any problems affording any of your prescribed medications? No     Is the patient taking medications as prescribed? yes     Who is going to help you get home at discharge? Nicolette Sharma(wife)[phone]     How do you get to doctors appointments? car, drives self     Are you on dialysis? No     Do you take coumadin? No     Discharge Plan A Home with family     Discharge Plan B Home Health     DME Needed Upon Discharge  other (see comments)   TBD    Discharge Plan discussed with: Patient (P)      Transition of Care  Barriers None (P)         Physical Activity    On average, how many days per week do you engage in moderate to strenuous exercise (like a brisk walk)? 2 days (P)      On average, how many minutes do you engage in exercise at this level? 20 min (P)         Financial Resource Strain    How hard is it for you to pay for the very basics like food, housing, medical care, and heating? Not very hard (P)         Housing Stability    In the last 12 months, was there a time when you were not able to pay the mortgage or rent on time? No (P)      At any time in the past 12 months, were you homeless or living in a shelter (including now)? No (P)         Transportation Needs    In the past 12 months, has lack of transportation kept you from medical appointments or from getting medications? No (P)      In the past 12 months, has lack of transportation kept you from meetings, work, or from getting things needed for daily living? No (P)         Food Insecurity    Within the past 12 months, you worried that your food would run out before you got the money to buy more. Never true (P)      Within the past 12 months, the food you bought just didn't last and you didn't have money to get more. Never true (P)         Stress    Do you feel stress - tense, restless, nervous, or anxious, or unable to sleep at night because your mind is troubled all the time - these days? Very much (P)         Alcohol Use    Q1: How often do you have a drink containing alcohol? Never (P)      Q2: How many drinks containing alcohol do you have on a typical day when you are drinking? Patient does not drink (P)      Q3: How often do you have six or more drinks on one occasion? Never (P)         OTHER    Name(s) of People in Home Nicolette Sharma(wife)[phone] (P)

## 2024-04-30 NOTE — ASSESSMENT & PLAN NOTE
Nephrology on board  CRRT initiated  Uremic on admission  Continues to make urine and had good UO with Lasix 240 mg IV ON  Dc'd Entresto and BB given marginal BP

## 2024-04-30 NOTE — PROGRESS NOTES
Discussed with the patient.  Explained the risks of hypotension, bleeding, arrhythmia, and death during dialysis.

## 2024-04-30 NOTE — SUBJECTIVE & OBJECTIVE
Past Medical History:   Diagnosis Date    Cardiomyopathy     CKD (chronic kidney disease) stage 4, GFR 15-29 ml/min     Congestive heart failure (CHF) 2015    COPD (chronic obstructive pulmonary disease)     Coronary artery disease     Edema     Essential (primary) hypertension 05/18/2022    Formatting of this note might be different from the original. Converted from [employer]: Description - ESSENTIAL HYPERTENSION, BENIGN    Heart attack     HLD (hyperlipidemia)     Hypertension     Hyperuricemia     Hypocalcemia     Renal cyst, left     Secondary hyperparathyroidism     Thyroid disease     V tach     Vitamin D deficiency        Past Surgical History:   Procedure Laterality Date    ablations  03/05/2018    albations  02/01/2018    COLONOSCOPY N/A 12/07/2018    Procedure: COLONOSCOPY/suprep;  Surgeon: Ananya Morillo MD;  Location: [facility] ENDO;  Service: Endoscopy;  Laterality: N/A;    defibulater N/A 2016    ESOPHAGOGASTRODUODENOSCOPY N/A 12/07/2018    Procedure: EGD (ESOPHAGOGASTRODUODENOSCOPY);  Surgeon: Ananya Morillo MD;  Location: [facility] ENDO;  Service: Endoscopy;  Laterality: N/A;    JOINT REPLACEMENT Bilateral 10/2016    knees, bilat    LEFT HEART CATHETERIZATION N/A 12/16/2020    Procedure: Left heart cath;  Surgeon: Shahram Stewart MD;  Location: [facility] CATH LAB/EP;  Service: Cardiology;  Laterality: N/A;    LEFT HEART CATHETERIZATION Left 9/27/2022    Procedure: Left heart cath;  Surgeon: Juan Roque MD;  Location: [facility] CATH LAB/EP;  Service: Cardiology;  Laterality: Left;    REPLACEMENT OF IMPLANTABLE CARDIOVERTER-DEFIBRILLATOR (ICD) GENERATOR Left 1/24/2023    Procedure: REPLACEMENT, ICD GENERATOR;  Surgeon: River Tenorio MD;  Location: [facility] EP LAB;  Service: Cardiology;  Laterality: Left;  ANTHONY, CRTD gen chg, MDT, MAC, DM, 3prep       Review of patient's allergies indicates:   Allergen Reactions    Lokelma [sodium zirconium cyclosilicate] Other (See Comments)     Fluid retention, weight gain,  CHF excerebration, severe constipation    Atorvastatin Other (See Comments)     Joint pain    Jardiance [empagliflozin] Other (See Comments)     Chest pains, significant weight loss, lower blood pressure       Current Facility-Administered Medications   Medication Dose Route Frequency Provider Last Rate Last Admin    acetaminophen tablet 650 mg  650 mg Oral Q4H PRN Jaida Álvarez NP        [START ON 4/30/2024] allopurinoL tablet 100 mg  100 mg Oral Daily Jaida Álvarez NP        [START ON 4/30/2024] amiodarone tablet 400 mg  400 mg Oral Daily Jaida Álvarez NP        [START ON 4/30/2024] aspirin EC tablet 81 mg  81 mg Oral Daily Jaida Álvarez NP        [START ON 4/30/2024] ergocalciferol capsule 50,000 Units  50,000 Units Oral Q7 Days Jaida Álvarez NP        furosemide injection 40 mg  40 mg Intravenous Once Chyna Mata MD        [START ON 4/30/2024] furosemide injection 40 mg  40 mg Intravenous Once Chyna Mata MD        [START ON 4/30/2024] furosemide injection 80 mg  80 mg Intravenous Q12H Jaida Álvarez NP        heparin (porcine) injection 5,000 Units  5,000 Units Subcutaneous Q8H Jaida Álvarez NP        HYDROcodone-acetaminophen 5-325 mg per tablet 1 tablet  1 tablet Oral Q4H PRN Jaida Álvarez NP        [START ON 4/30/2024] levothyroxine tablet 150 mcg  150 mcg Oral Daily Jaida Álvarez NP        ondansetron injection 4 mg  4 mg Intravenous Q8H PRN Jaida Álvarez NP        [START ON 4/30/2024] pravastatin tablet 40 mg  40 mg Oral Daily Jaida Álvarez NP        sodium chloride 0.9% flush 10 mL  10 mL Intravenous PRN Jaida Álvarez NP        sodium chloride 0.9% flush 10 mL  10 mL Intravenous PRN Jaida lÁvarez NP         Current Outpatient Medications   Medication Sig Dispense Refill    acetaminophen (TYLENOL) 500 MG tablet Take 500 mg by mouth every 6 (six) hours as needed for Pain.      allopurinoL (ZYLOPRIM) 100 MG tablet Take 1 tablet (100 mg total) by mouth once  daily. 90 tablet 3    amiodarone (PACERONE) 400 MG tablet Take 1 tablet (400 mg total) by mouth once daily. 90 tablet 3    aspirin (ECOTRIN) 81 MG EC tablet Take 1 tablet (81 mg total) by mouth once daily. 60 tablet 2    ergocalciferol (ERGOCALCIFEROL) 50,000 unit Cap TAKE 1 CAPSULE BY MOUTH ONE TIME PER WEEK (Patient taking differently: Take 50,000 Units by mouth every Monday.) 12 capsule 3    furosemide (LASIX) 80 MG tablet Take 1 tablet (80 mg total) by mouth 2 (two) times daily. If increased 3 pounds in a day or 5 pounds in a week, take an extra dose of lasix 80mg until those pounds are lost. 120 tablet 3    levothyroxine (SYNTHROID) 150 MCG tablet Take 1 tablet (150 mcg total) by mouth once daily. 90 tablet 3    metoprolol succinate (TOPROL-XL) 25 MG 24 hr tablet TAKE 1 TABLET BY MOUTH EVERY DAY 90 tablet 3    mexiletine (MEXITIL) 150 MG Cap Take 1 capsule (150 mg total) by mouth 2 (two) times daily with meals. 180 capsule 3    nitroGLYCERIN (NITROSTAT) 0.4 MG SL tablet Place 1 tablet (0.4 mg total) under the tongue every 5 (five) minutes as needed for Chest pain. 20 tablet 1    pravastatin (PRAVACHOL) 40 MG tablet TAKE 1 TABLET BY MOUTH EVERY DAY (Patient taking differently: Take 40 mg by mouth every evening.) 90 tablet 3    sacubitriL-valsartan (ENTRESTO) 24-26 mg per tablet Take 1 tablet by mouth 2 (two) times daily. 180 tablet 3     Family History       Problem Relation (Age of Onset)    Alcohol abuse Father    Arthritis Sister    Breast cancer Mother    Cancer Mother    Hypertension Mother    Kidney disease Father    No Known Problems Brother, Daughter, Son    Stroke Mother          Tobacco Use    Smoking status: Former     Current packs/day: 0.00     Average packs/day: 1 pack/day for 33.2 years (33.2 ttl pk-yrs)     Types: Cigarettes     Start date: 1979     Quit date: 3/3/2012     Years since quittin.1     Passive exposure: Past    Smokeless tobacco: Never   Substance and Sexual Activity     Alcohol use: Yes     Comment: rare    Drug use: No    Sexual activity: Yes     Partners: Female     Review of Systems   Constitutional:  Positive for fatigue. Negative for chills and fever.   HENT: Negative.     Eyes: Negative.    Respiratory:  Positive for shortness of breath. Negative for cough, chest tightness and wheezing.    Cardiovascular:  Positive for palpitations. Negative for chest pain and leg swelling.   Gastrointestinal:  Positive for nausea and vomiting. Negative for abdominal distention, abdominal pain, blood in stool and diarrhea.   Endocrine: Positive for cold intolerance.   Genitourinary:  Positive for decreased urine volume and difficulty urinating. Negative for flank pain.   Musculoskeletal:  Positive for arthralgias.   Skin: Negative.    Allergic/Immunologic: Negative.    Neurological:  Positive for dizziness, weakness and light-headedness.   Hematological: Negative.    Psychiatric/Behavioral: Negative.       Objective:     Vital Signs (Most Recent):  Temp: 97.8 °F (36.6 °C) (04/29/24 1623)  Pulse: 60 (04/29/24 2103)  Resp: (!) 23 (04/29/24 1903)  BP: 91/60 (04/29/24 2103)  SpO2: (!) 92 % (04/29/24 2103) Vital Signs (24h Range):  Temp:  [97.8 °F (36.6 °C)] 97.8 °F (36.6 °C)  Pulse:  [60-81] 60  Resp:  [18-29] 23  SpO2:  [85 %-93 %] 92 %  BP: ()/(51-66) 91/60     Weight: 77.6 kg (171 lb)  Body mass index is 25.25 kg/m².     Physical Exam  Vitals and nursing note reviewed.   Constitutional:       General: He is not in acute distress.     Appearance: He is ill-appearing.   HENT:      Head: Normocephalic and atraumatic.      Nose: Nose normal.      Mouth/Throat:      Mouth: Mucous membranes are moist.      Pharynx: Oropharynx is clear.   Eyes:      Extraocular Movements: Extraocular movements intact.      Conjunctiva/sclera: Conjunctivae normal.      Pupils: Pupils are equal, round, and reactive to light.   Cardiovascular:      Rate and Rhythm: Normal rate and regular rhythm.      Pulses:  Normal pulses.      Heart sounds: Normal heart sounds. No murmur heard.     No friction rub. No gallop.   Pulmonary:      Effort: Pulmonary effort is normal. No respiratory distress.      Breath sounds: Normal breath sounds. No wheezing, rhonchi or rales.   Abdominal:      General: Bowel sounds are normal. There is no distension.      Palpations: Abdomen is soft.      Tenderness: There is no abdominal tenderness. There is no right CVA tenderness, left CVA tenderness or guarding.   Genitourinary:     Comments: deferred  Musculoskeletal:         General: Normal range of motion.      Cervical back: Normal range of motion and neck supple.   Skin:     General: Skin is warm and dry.      Capillary Refill: Capillary refill takes less than 2 seconds.   Neurological:      General: No focal deficit present.      Mental Status: He is alert and oriented to person, place, and time. Mental status is at baseline.   Psychiatric:         Mood and Affect: Mood normal.         Behavior: Behavior normal.         Thought Content: Thought content normal.         Judgment: Judgment normal.              CRANIAL NERVES     CN III, IV, VI   Pupils are equal, round, and reactive to light.       Significant Labs: All pertinent labs within the past 24 hours have been reviewed.  Recent Lab Results  (Last 5 results in the past 24 hours)        04/29/24  2039   04/29/24  1927   04/29/24  1719   04/29/24  1707   04/29/24  0747        Albumin       3.6         ALP       160         ALT       39         Anion Gap       15         Appearance, UA   Clear       Clear       AST       42         Baso #       0.12         Basophil %       1.5         Bilirubin (UA)   Negative       Negative       BILIRUBIN TOTAL       2.4  Comment: For infants and newborns, interpretation of results should be based  on gestational age, weight and in agreement with clinical  observations.    Premature Infant recommended reference ranges:  Up to 24 hours.............<8.0  mg/dL  Up to 48 hours............<12.0 mg/dL  3-5 days..................<15.0 mg/dL  6-29 days.................<15.0 mg/dL           BNP       3,204  Comment: Values of less than 100 pg/ml are consistent with non-CHF populations.         BUN       112         Calcium       9.0         Chloride       97         Cholesterol Total 127  Comment: The National Cholesterol Education Program (NCEP) has set the  following guidelines (reference ranges) for Cholesterol:  Optimal.....................<200 mg/dL  Borderline High.............200-239 mg/dL  High........................> or = 240 mg/dL                 CO2       21         Color, UA   Yellow       Yellow       Creatinine       6.7         Urine Creatinine         67.7       Differential Method       Automated         eGFR       9         Eos #       0.2         Eos %       2.6         Estimated Avg Glucose 114               Glucose       105         Glucose, UA   Negative       Negative       Gran # (ANC)       6.1         Gran %       78.0         HDL 32  Comment: The National Cholesterol Education Program (NCEP) has set the  following guidelines (reference values) for HDL Cholesterol:  Low...............<40 mg/dL  Optimal...........>60 mg/dL                 HDL/Cholesterol Ratio 25.2               Hematocrit       44.8         Hemoglobin       15.7         Hemoglobin A1C External 5.6  Comment: ADA Screening Guidelines:  5.7-6.4%  Consistent with prediabetes  >or=6.5%  Consistent with diabetes    High levels of fetal hemoglobin interfere with the HbA1C  assay. Heterozygous hemoglobin variants (HbS, HgC, etc)do  not significantly interfere with this assay.   However, presence of multiple variants may affect accuracy.                 Immature Grans (Abs)       0.04  Comment: Mild elevation in immature granulocytes is non specific and   can be seen in a variety of conditions including stress response,   acute inflammation, trauma and pregnancy. Correlation with other    laboratory and clinical findings is essential.           Immature Granulocytes       0.5         Ketones, UA   Negative       Negative       LDL Cholesterol 81.8  Comment: The National Cholesterol Education Program (NCEP) has set the  following guidelines (reference values) for LDL Cholesterol:  Optimal.......................<130 mg/dL  Borderline High...............130-159 mg/dL  High..........................160-189 mg/dL  Very High.....................>190 mg/dL                 Leukocyte Esterase, UA   Negative       Negative       Lymph #       0.6         Lymph %       7.1         Magnesium        2.3         MCH       26.2         MCHC       35.0         MCV       75         Mono #       0.8         Mono %       10.3         MPV       12.2         NITRITE UA   Negative       Negative       Non-HDL Cholesterol 95  Comment: Risk category and Non-HDL cholesterol goals:  Coronary heart disease (CHD)or equivalent (10-year risk of CHD >20%):  Non-HDL cholesterol goal     <130 mg/dL  Two or more CHD risk factors and 10-year risk of CHD <= 20%:  Non-HDL cholesterol goal     <160 mg/dL  0 to 1 CHD risk factor:  Non-HDL cholesterol goal     <190 mg/dL                 nRBC       0         Blood, UA   Negative       Negative       pH, UA   6.0       6.0       Phosphorus Level       5.0         Platelet Count       672         Potassium       4.3         Prot/Creat Ratio, Urine         0.13       PROTEIN TOTAL       7.2         Protein, UA   Negative  Comment: Recommend a 24 hour urine protein or a urine   protein/creatinine ratio if globulin induced proteinuria is  clinically suspected.         Negative  Comment: Recommend a 24 hour urine protein or a urine   protein/creatinine ratio if globulin induced proteinuria is  clinically suspected.         Urine Protein         9        Acceptable     Yes           RBC       5.99         RDW       15.4         SARS-CoV-2 RNA, Amplification, Qual     Negative            Sodium       133         Specific [place], UA   1.010       1.010       Specimen UA   Urine, Clean Catch       Urine, Clean Catch       Total Cholesterol/HDL Ratio 4.0               Triglycerides 66  Comment: The National Cholesterol Education Program (NCEP) has set the  following guidelines (reference values) for triglycerides:  Normal......................<150 mg/dL  Borderline High.............150-199 mg/dL  High........................200-499 mg/dL                 Troponin I       0.040  Comment: The reference interval for Troponin I represents the 99th percentile   cutoff   for our facility and is consistent with 3rd generation assay   performance.           UROBILINOGEN UA   2.0-3.0       1.0       WBC       7.80                                Significant Imaging: I have reviewed all pertinent imaging results/findings within the past 24 hours.

## 2024-05-01 ENCOUNTER — TELEPHONE (OUTPATIENT)
Dept: NEPHROLOGY | Facility: CLINIC | Age: 64
End: 2024-05-01
Payer: MEDICARE

## 2024-05-01 ENCOUNTER — ANESTHESIA EVENT (OUTPATIENT)
Dept: SURGERY | Facility: HOSPITAL | Age: 64
DRG: 981 | End: 2024-05-01
Payer: MEDICARE

## 2024-05-01 LAB
ALBUMIN SERPL BCP-MCNC: 2.9 G/DL (ref 3.5–5.2)
ANION GAP SERPL CALC-SCNC: 12 MMOL/L (ref 8–16)
ANION GAP SERPL CALC-SCNC: 13 MMOL/L (ref 8–16)
ANION GAP SERPL CALC-SCNC: 14 MMOL/L (ref 8–16)
BASOPHILS # BLD AUTO: 0.07 K/UL (ref 0–0.2)
BASOPHILS NFR BLD: 0.6 % (ref 0–1.9)
BUN SERPL-MCNC: 79 MG/DL (ref 8–23)
BUN SERPL-MCNC: 83 MG/DL (ref 8–23)
BUN SERPL-MCNC: 85 MG/DL (ref 8–23)
CALCIUM SERPL-MCNC: 8.4 MG/DL (ref 8.7–10.5)
CALCIUM SERPL-MCNC: 8.4 MG/DL (ref 8.7–10.5)
CALCIUM SERPL-MCNC: 8.5 MG/DL (ref 8.7–10.5)
CHLORIDE SERPL-SCNC: 100 MMOL/L (ref 95–110)
CHLORIDE SERPL-SCNC: 101 MMOL/L (ref 95–110)
CHLORIDE SERPL-SCNC: 102 MMOL/L (ref 95–110)
CO2 SERPL-SCNC: 20 MMOL/L (ref 23–29)
CO2 SERPL-SCNC: 23 MMOL/L (ref 23–29)
CO2 SERPL-SCNC: 23 MMOL/L (ref 23–29)
CREAT SERPL-MCNC: 4.4 MG/DL (ref 0.5–1.4)
CREAT SERPL-MCNC: 4.7 MG/DL (ref 0.5–1.4)
CREAT SERPL-MCNC: 4.8 MG/DL (ref 0.5–1.4)
DIFFERENTIAL METHOD BLD: ABNORMAL
EOSINOPHIL # BLD AUTO: 0.2 K/UL (ref 0–0.5)
EOSINOPHIL NFR BLD: 2.1 % (ref 0–8)
ERYTHROCYTE [DISTWIDTH] IN BLOOD BY AUTOMATED COUNT: 15 % (ref 11.5–14.5)
EST. GFR  (NO RACE VARIABLE): 13 ML/MIN/1.73 M^2
EST. GFR  (NO RACE VARIABLE): 13 ML/MIN/1.73 M^2
EST. GFR  (NO RACE VARIABLE): 14 ML/MIN/1.73 M^2
GLUCOSE SERPL-MCNC: 102 MG/DL (ref 70–110)
GLUCOSE SERPL-MCNC: 107 MG/DL (ref 70–110)
GLUCOSE SERPL-MCNC: 141 MG/DL (ref 70–110)
HCT VFR BLD AUTO: 41.4 % (ref 40–54)
HGB BLD-MCNC: 14.5 G/DL (ref 14–18)
IMM GRANULOCYTES # BLD AUTO: 0.08 K/UL (ref 0–0.04)
IMM GRANULOCYTES NFR BLD AUTO: 0.7 % (ref 0–0.5)
LACTATE SERPL-SCNC: 1.1 MMOL/L (ref 0.5–2.2)
LYMPHOCYTES # BLD AUTO: 0.4 K/UL (ref 1–4.8)
LYMPHOCYTES NFR BLD: 3.2 % (ref 18–48)
MAGNESIUM SERPL-MCNC: 2.1 MG/DL (ref 1.6–2.6)
MCH RBC QN AUTO: 26 PG (ref 27–31)
MCHC RBC AUTO-ENTMCNC: 35 G/DL (ref 32–36)
MCV RBC AUTO: 74 FL (ref 82–98)
MONOCYTES # BLD AUTO: 1.1 K/UL (ref 0.3–1)
MONOCYTES NFR BLD: 9.2 % (ref 4–15)
NEUTROPHILS # BLD AUTO: 9.6 K/UL (ref 1.8–7.7)
NEUTROPHILS NFR BLD: 84.2 % (ref 38–73)
NRBC BLD-RTO: 0 /100 WBC
OHS QRS DURATION: 188 MS
OHS QTC CALCULATION: 598 MS
PHOSPHATE SERPL-MCNC: 3.4 MG/DL (ref 2.7–4.5)
PHOSPHATE SERPL-MCNC: 3.4 MG/DL (ref 2.7–4.5)
PHOSPHATE SERPL-MCNC: 3.9 MG/DL (ref 2.7–4.5)
PHOSPHATE SERPL-MCNC: 3.9 MG/DL (ref 2.7–4.5)
PLATELET # BLD AUTO: 492 K/UL (ref 150–450)
PMV BLD AUTO: 12.5 FL (ref 9.2–12.9)
POTASSIUM SERPL-SCNC: 4.2 MMOL/L (ref 3.5–5.1)
POTASSIUM SERPL-SCNC: 4.3 MMOL/L (ref 3.5–5.1)
POTASSIUM SERPL-SCNC: 4.6 MMOL/L (ref 3.5–5.1)
RBC # BLD AUTO: 5.57 M/UL (ref 4.6–6.2)
SODIUM SERPL-SCNC: 136 MMOL/L (ref 136–145)
WBC # BLD AUTO: 11.41 K/UL (ref 3.9–12.7)

## 2024-05-01 PROCEDURE — 63600175 PHARM REV CODE 636 W HCPCS

## 2024-05-01 PROCEDURE — 20000000 HC ICU ROOM

## 2024-05-01 PROCEDURE — 27000221 HC OXYGEN, UP TO 24 HOURS

## 2024-05-01 PROCEDURE — 25000003 PHARM REV CODE 250: Performed by: INTERNAL MEDICINE

## 2024-05-01 PROCEDURE — 90945 DIALYSIS ONE EVALUATION: CPT

## 2024-05-01 PROCEDURE — 63600175 PHARM REV CODE 636 W HCPCS: Performed by: INTERNAL MEDICINE

## 2024-05-01 PROCEDURE — 83605 ASSAY OF LACTIC ACID: CPT

## 2024-05-01 PROCEDURE — 25000003 PHARM REV CODE 250: Performed by: STUDENT IN AN ORGANIZED HEALTH CARE EDUCATION/TRAINING PROGRAM

## 2024-05-01 PROCEDURE — 99222 1ST HOSP IP/OBS MODERATE 55: CPT | Mod: ,,, | Performed by: SURGERY

## 2024-05-01 PROCEDURE — 99900035 HC TECH TIME PER 15 MIN (STAT)

## 2024-05-01 PROCEDURE — 25000003 PHARM REV CODE 250: Performed by: NURSE PRACTITIONER

## 2024-05-01 PROCEDURE — 80069 RENAL FUNCTION PANEL: CPT | Mod: 91 | Performed by: INTERNAL MEDICINE

## 2024-05-01 PROCEDURE — 87040 BLOOD CULTURE FOR BACTERIA: CPT

## 2024-05-01 PROCEDURE — 80100008 HC CRRT DAILY MAINTENANCE

## 2024-05-01 PROCEDURE — 36415 COLL VENOUS BLD VENIPUNCTURE: CPT | Mod: XB | Performed by: INTERNAL MEDICINE

## 2024-05-01 PROCEDURE — 63600175 PHARM REV CODE 636 W HCPCS: Performed by: STUDENT IN AN ORGANIZED HEALTH CARE EDUCATION/TRAINING PROGRAM

## 2024-05-01 PROCEDURE — 84100 ASSAY OF PHOSPHORUS: CPT

## 2024-05-01 PROCEDURE — 99233 SBSQ HOSP IP/OBS HIGH 50: CPT | Mod: ,,, | Performed by: NURSE PRACTITIONER

## 2024-05-01 PROCEDURE — 94761 N-INVAS EAR/PLS OXIMETRY MLT: CPT

## 2024-05-01 PROCEDURE — 25000003 PHARM REV CODE 250

## 2024-05-01 PROCEDURE — 27202415 HC CARTRIDGE, CRRT

## 2024-05-01 PROCEDURE — 83735 ASSAY OF MAGNESIUM: CPT | Mod: 91 | Performed by: INTERNAL MEDICINE

## 2024-05-01 PROCEDURE — 85025 COMPLETE CBC W/AUTO DIFF WBC: CPT

## 2024-05-01 PROCEDURE — 83735 ASSAY OF MAGNESIUM: CPT | Mod: 91

## 2024-05-01 PROCEDURE — 36415 COLL VENOUS BLD VENIPUNCTURE: CPT

## 2024-05-01 RX ORDER — MIDODRINE HYDROCHLORIDE 2.5 MG/1
2.5 TABLET ORAL ONCE
Status: COMPLETED | OUTPATIENT
Start: 2024-05-01 | End: 2024-05-01

## 2024-05-01 RX ORDER — CALCIUM CARBONATE 200(500)MG
1000 TABLET,CHEWABLE ORAL 3 TIMES DAILY PRN
Status: DISCONTINUED | OUTPATIENT
Start: 2024-05-01 | End: 2024-05-12 | Stop reason: HOSPADM

## 2024-05-01 RX ORDER — HYDROCODONE BITARTRATE AND ACETAMINOPHEN 500; 5 MG/1; MG/1
TABLET ORAL CONTINUOUS
Status: DISCONTINUED | OUTPATIENT
Start: 2024-05-01 | End: 2024-05-03

## 2024-05-01 RX ORDER — MIDODRINE HYDROCHLORIDE 2.5 MG/1
2.5 TABLET ORAL 2 TIMES DAILY WITH MEALS
Status: DISCONTINUED | OUTPATIENT
Start: 2024-05-01 | End: 2024-05-01

## 2024-05-01 RX ORDER — MIDODRINE HYDROCHLORIDE 5 MG/1
10 TABLET ORAL ONCE
Status: COMPLETED | OUTPATIENT
Start: 2024-05-01 | End: 2024-05-01

## 2024-05-01 RX ORDER — MIDODRINE HYDROCHLORIDE 2.5 MG/1
2.5 TABLET ORAL EVERY 8 HOURS
Status: DISCONTINUED | OUTPATIENT
Start: 2024-05-01 | End: 2024-05-02

## 2024-05-01 RX ADMIN — HEPARIN SODIUM 5000 UNITS: 5000 INJECTION INTRAVENOUS; SUBCUTANEOUS at 01:05

## 2024-05-01 RX ADMIN — HEPARIN SODIUM 2400 UNITS: 1000 INJECTION, SOLUTION INTRAVENOUS; SUBCUTANEOUS at 05:05

## 2024-05-01 RX ADMIN — MIDODRINE HYDROCHLORIDE 2.5 MG: 2.5 TABLET ORAL at 01:05

## 2024-05-01 RX ADMIN — AMIODARONE HYDROCHLORIDE 400 MG: 200 TABLET ORAL at 08:05

## 2024-05-01 RX ADMIN — HEPARIN SODIUM 5000 UNITS: 5000 INJECTION INTRAVENOUS; SUBCUTANEOUS at 10:05

## 2024-05-01 RX ADMIN — ALLOPURINOL 100 MG: 100 TABLET ORAL at 08:05

## 2024-05-01 RX ADMIN — SODIUM CHLORIDE: 9 INJECTION, SOLUTION INTRAVENOUS at 12:05

## 2024-05-01 RX ADMIN — ASPIRIN 81 MG: 81 TABLET, COATED ORAL at 08:05

## 2024-05-01 RX ADMIN — FUROSEMIDE 80 MG: 10 INJECTION, SOLUTION INTRAVENOUS at 08:05

## 2024-05-01 RX ADMIN — MUPIROCIN: 20 OINTMENT TOPICAL at 08:05

## 2024-05-01 RX ADMIN — PRAVASTATIN SODIUM 40 MG: 40 TABLET ORAL at 08:05

## 2024-05-01 RX ADMIN — LEVOTHYROXINE SODIUM 150 MCG: 150 TABLET ORAL at 08:05

## 2024-05-01 RX ADMIN — MIDODRINE HYDROCHLORIDE 2.5 MG: 2.5 TABLET ORAL at 10:05

## 2024-05-01 RX ADMIN — HEPARIN SODIUM 5000 UNITS: 5000 INJECTION INTRAVENOUS; SUBCUTANEOUS at 05:05

## 2024-05-01 RX ADMIN — MIDODRINE HYDROCHLORIDE 10 MG: 5 TABLET ORAL at 10:05

## 2024-05-01 RX ADMIN — MEXILETINE HYDROCHLORIDE 150 MG: 150 CAPSULE ORAL at 08:05

## 2024-05-01 RX ADMIN — MEXILETINE HYDROCHLORIDE 150 MG: 150 CAPSULE ORAL at 05:05

## 2024-05-01 RX ADMIN — HEPARIN SODIUM 500 UNITS/HR: 10000 INJECTION, SOLUTION INTRAVENOUS at 12:05

## 2024-05-01 RX ADMIN — ONDANSETRON 4 MG: 2 INJECTION INTRAMUSCULAR; INTRAVENOUS at 06:05

## 2024-05-01 RX ADMIN — NEPHROCAP 1 CAPSULE: 1 CAP ORAL at 01:05

## 2024-05-01 RX ADMIN — NOREPINEPHRINE BITARTRATE 0.04 MCG/KG/MIN: 4 INJECTION, SOLUTION INTRAVENOUS at 10:05

## 2024-05-01 NOTE — PROGRESS NOTES
"[facility] Pulmonary / Critical Care Progress Note     Primary Attending:  Mamta Hassan MD   Consultant Attending: Dr. Yadav   Consultant Fellow: Consultant Resident: MD Donna Toledo MD     Date of Admit: 4/29/2024  Hospital day: 2    Brief Summary of Hospitalization:      PMH of ESRD, VT s/p ablation and pace maker, HFrEF with LVEF 40-45%, chronic hypotension, hypothyroidism who presented on 4/29 for worsening SOB in setting of low UO despite same lasix dose. Admitted to MICU for initiation of dialysis/CRRT in setting of hypotension and progression of CKD 5 to ESRD.     24h Events:      R internal jugular dialysis line placed yesterday with CRRT initiated overnight. Responding to IV lasix. NC weaned down to 3L.    Pt with no acute symptoms this morning other than poor sleep last night. Family members at bedside. He tolerated CRRT well last night. Discussions continue with care team, pt, family re: HD initiation now with goal to set up PD long term.     ROS:  A 10 point ROS was negative except as listed above.    OBJECTIVE DATA:      Vital Signs:  Temp:  [95.7 °F (35.4 °C)-98.1 °F (36.7 °C)]   Pulse:  [59-71]   Resp:  [16-41]   BP: ()/(37-65)   SpO2:  [87 %-98 %]     No results found for: "HTIN", "WEIGHT"    Intake / Output:    Intake/Output Summary (Last 24 hours) at 5/1/2024 1254  Last data filed at 5/1/2024 1115  Gross per 24 hour   Intake 2243.12 ml   Output 3126 ml   Net -882.88 ml       General: awake, cooperative, no acute distress  Head: Normocephalic, atraumatic  Lungs: respirations unlabored, NC 3L, sats >95%  Heart: regular rate and rhythm, radial pulses 2+  Abdomen: soft, non-tender, normal bowel sounds  Extremities: extremities normal, no tenderness noted  Skin: warm and dry, no rashes appreciated. R internal jugular line in place c/d/i  Neuro: alert and oriented     Laboratory, Microbiology, ECG(s), and Imaging:  Reviewed.     Active Medications:    Current " Facility-Administered Medications   Medication Dose Route Frequency    allopurinoL  100 mg Oral Daily    alteplase  4 mg Intra-Catheter Once    amiodarone  400 mg Oral Daily    aspirin  81 mg Oral Daily    ergocalciferol  50,000 Units Oral Q7 Days    furosemide (LASIX) injection  80 mg Intravenous Q12H    heparin (porcine)  5,000 Units Subcutaneous Q8H    levothyroxine  150 mcg Oral Daily    mexiletine  150 mg Oral BID WM    midodrine  2.5 mg Oral Q8H    midodrine  2.5 mg Oral Once    mupirocin   Nasal BID    pravastatin  40 mg Oral Daily    vitamin renal formula (B-complex-vitamin c-folic acid)  1 capsule Oral Daily        Current Facility-Administered Medications   Medication Dose Route Frequency Last Rate Last Admin    sodium chloride 0.9%   Intravenous Continuous 100 mL/hr at 05/01/24 1239 New Bag at 05/01/24 1239    heparin (porcine) in 5 % dex  500 Units/hr Intravenous Continuous 5 mL/hr at 05/01/24 1234 500 Units/hr at 05/01/24 1234    NORepinephrine bitartrate-D5W  0-3 mcg/kg/min Intravenous Continuous 5.8 mL/hr at 05/01/24 1115 0.02 mcg/kg/min at 05/01/24 1115        Current Facility-Administered Medications:     acetaminophen, 650 mg, Oral, Q4H PRN    heparin (porcine), 2,400 Units, Intra-Catheter, PRN    HYDROcodone-acetaminophen, 1 tablet, Oral, Q4H PRN    ondansetron, 4 mg, Intravenous, Q8H PRN    sodium chloride 0.9%, 10 mL, Intravenous, PRN    sodium chloride 0.9%, 10 mL, Intravenous, PRN     Assessment/Plan:     NEUROLOGICAL:  No acute issues       CARDIOVASCULAR:  Shock  - likely 2/2 to heart failure and CKD progression to ESRD - on levo 0.02  - infectious workup - UA noninfectious, no white count, BCx pending  - no Hx predisposing to neurogenic shock   - FU lactate  - continue diuresis  - will start midodrine 2.5mg q8hr    Acute decompensated HF 2/2 fluid overload  Cardiorenal syndrome   VT s/p ICD  - 2022 echo with EF 40% and Grade II LV DD,   - repeat echo with EF 40-45% and Grade III diastolic  dysfuntion  - holding home beta blocker and Entresto  - continuing home amiodarone and mexiletine  - getting fluid off with diuresis and CRRT  - follow up lactate    Chronic baseline asymptomatic hypotension   - appears chronic outpatient  - home med lasix 80mg BID PO  - continue IV lasix 80mg BID - 2800 UO yesterday   - on norepi - 0.02, will continue to wean     PULMONOLOGY:  Acute hypoxic respiratory failure  - no home O2, requiring up to 5L to maintain sats  - CXR with evidence of pulmonary edema  - inpatient goal sats 88-92%  - wean O2 as tolerated, on 3L today     ?COPD without PFTs in chart  - CT chest 2023 with emphysema  - 40py smoking Hx, quit 15 years ago   - on 3-5L NC, wean as tolerated     Pulm hypertension   - PASP on 4/30 TTE was 64mmHg, RV enlarged, RA severely dilated  - will need outpatient follow up     GI/FEN:    Fluids: restricted 1.5L, diuresing with lasix, intermittent CRRT  Electrolytes: No major abnormalities  Nutrition: enteral feeds renal diet      RENAL:   ESRD  - CRRT initiated 4/30 via trialysis line - 1400ml out   - will need tunneled HD catheter with ultimate goal of PD outpatient  - Dr. Mata with nephrology following   - CRRT per nephro     HEMATOLOGY/ONCOLOGY:  - DVT Ppx: heparin   - Transfusion threshold <7g/dl per TRICC     ENDOCRINE:  Hypothyroidism  - 3/4/24 elevated TSH 0.283, elevated free T4 3.25  - cont home synthroid 150mcg    Strict glucose control goal 140-180     INFECTIOUS DISEASE:  UA non infectious  BCx drawn for shock workup     MUSCULOSKELETAL/RHEUMATOLOGIC:  Hx gout  - cont home allopurinol 100mg daily     Attempt moving to chair BID      Feeding: enteral feeds renal/cardiac diet, fluid restriction 1.5L  Analgesia: Multimodal pain control with tylenol, norco 5  Sedation: N/A  VTE Ppx:   Anticoagulants       Ordered     Route Frequency Start Stop    04/30/24 2223  heparin (porcine)         Cath As needed (PRN) 04/30/24 2320 --    04/30/24 1338  heparin (porcine)  in 5 % dex  (heparin 25,000 units in dextrose 5% 250 mL (100 units/mL) infusion + APTT panel (does not contain nomogram))         IV Continuous 04/30/24 1445 --    04/29/24 1945  heparin (porcine)  (VTE Prophylaxis Orders - High Risk)         SubQ Every 8 hours 04/29/24 2200 --           Head of Bed: 45 degrees  Ulcer PPX: none  Glucose: Hypoglycemic precautions  SBT/SAT: N/A  Bowels: N/A  Indwelling Lines: PIV Left forearm, PIV Right forearm, and R IJ Vas-Cath  Deescalation Abx: N/A    Code Status: full     Dispo: continue treatment for shock, HF exacerbation, ESRD with CRRT initiation     Rounded with Dr. Yadav. Attestation to follow.       Donna Dunaway MD  [id number]  [facility] - Pul[facility] and [facility]

## 2024-05-01 NOTE — CONSULTS
[facility]  OUTPATIENT H&P    REASON FOR VISIT/CC: Tunneled line placement    HPI: Ar Sharma is a 63 y.o. male admitted with acute on chronic renal failure.  Has RIJ trialysis line.  Needs tunneled line.  Has not had prior HD.  Tolerating CRRT from line standpoint.    I have reviewed the patient's chart including prior progress notes, procedures and testing.     ROS:   Review of Systems   All other systems reviewed and are negative.      PROBLEM LIST:  Patient Active Problem List   Diagnosis    V-tach    Hypothyroidism    Iron deficiency anemia    Mixed hyperlipidemia    Atherosclerosis of native coronary artery of native heart with angina pectoris    Cardiac resynchronization therapy defibrillator (CRT-D) in place    S/P ablation of ventricular arrhythmia    HFrEF (heart failure with reduced ejection fraction)    Cardiac LV ejection fraction 10-20%    Enlarged prostate with lower urinary tract symptoms (LUTS)    Primary osteoarthritis of one knee    Prediabetes    Hyperlipidemia    Primary hypertension    Thrombocythemia -  on 4/4/22    Hyponatremia    Abuse of herbal or folk remedies    Non-ischemic cardiomyopathy    Secondary hyperparathyroidism of renal origin    Aortic atherosclerosis    Pulmonary emphysema, unspecified emphysema type    Chronic kidney disease (CKD), stage V    ESRD (end stage renal disease)    Acute hypoxemic respiratory failure    Hypotension         HISTORY  Past Medical History:   Diagnosis Date    Cardiomyopathy     CKD (chronic kidney disease) stage 4, GFR 15-29 ml/min     Congestive heart failure (CHF) 2015    COPD (chronic obstructive pulmonary disease)     Coronary artery disease     Edema     Essential (primary) hypertension 05/18/2022    Formatting of this note might be different from the original. Converted from [employer]: Description - ESSENTIAL HYPERTENSION, BENIGN    Heart attack     HLD (hyperlipidemia)     Hypertension     Hyperuricemia      Hypocalcemia     Renal cyst, left     Secondary hyperparathyroidism     Thyroid disease     V tach     Vitamin D deficiency        Past Surgical History:   Procedure Laterality Date    ablations  2018    albations  2018    COLONOSCOPY N/A 2018    Procedure: COLONOSCOPY/suprep;  Surgeon: Ananya Morillo MD;  Location: [facility] [place] ENDO;  Service: Endoscopy;  Laterality: N/A;    defibulater N/A     ESOPHAGOGASTRODUODENOSCOPY N/A 2018    Procedure: EGD (ESOPHAGOGASTRODUODENOSCOPY);  Surgeon: Ananya Morillo MD;  Location: [facility] [place] ENDO;  Service: Endoscopy;  Laterality: N/A;    JOINT REPLACEMENT Bilateral 10/2016    knees, bilat    LEFT HEART CATHETERIZATION N/A 2020    Procedure: Left heart cath;  Surgeon: Shahram Stewart MD;  Location: [facility] [place] CATH LAB/EP;  Service: Cardiology;  Laterality: N/A;    LEFT HEART CATHETERIZATION Left 2022    Procedure: Left heart cath;  Surgeon: Juan Roque MD;  Location: [facility] [place] CATH LAB/EP;  Service: Cardiology;  Laterality: Left;    REPLACEMENT OF IMPLANTABLE CARDIOVERTER-DEFIBRILLATOR (ICD) GENERATOR Left 2023    Procedure: REPLACEMENT, ICD GENERATOR;  Surgeon: River Tenorio MD;  Location: [facility] EP LAB;  Service: Cardiology;  Laterality: Left;  ANTHONY, CRTD gen chg, MDT, MAC, DM, 3prep       Social History     Tobacco Use    Smoking status: Former     Current packs/day: 0.00     Average packs/day: 1 pack/day for 33.2 years (33.2 ttl pk-yrs)     Types: Cigarettes     Start date: 1979     Quit date: 3/3/2012     Years since quittin.1     Passive exposure: Past    Smokeless tobacco: Never   Substance Use Topics    Alcohol use: Yes     Comment: rare    Drug use: No       Family History   Problem Relation Name Age of Onset    Cancer Mother Sharon Love     Hypertension Mother Sharon Love     Stroke Mother Sharon Love     Breast cancer Mother Sharon Love     Alcohol abuse Father Mitul Love     Kidney disease Father Mitul Love     Arthritis Sister x2      No Known Problems Brother x1     No Known Problems Daughter x1     No Known Problems Son x2          MEDS:  No current facility-administered medications on file prior to encounter.     Current Outpatient Medications on File Prior to Encounter   Medication Sig Dispense Refill    acetaminophen (TYLENOL) 500 MG tablet Take 500 mg by mouth every 6 (six) hours as needed for Pain.      allopurinoL (ZYLOPRIM) 100 MG tablet Take 1 tablet (100 mg total) by mouth once daily. 90 tablet 3    amiodarone (PACERONE) 400 MG tablet Take 1 tablet (400 mg total) by mouth once daily. 90 tablet 3    aspirin (ECOTRIN) 81 MG EC tablet Take 1 tablet (81 mg total) by mouth once daily. 60 tablet 2    ergocalciferol (ERGOCALCIFEROL) 50,000 unit Cap TAKE 1 CAPSULE BY MOUTH ONE TIME PER WEEK (Patient taking differently: Take 50,000 Units by mouth every Monday.) 12 capsule 3    furosemide (LASIX) 80 MG tablet Take 1 tablet (80 mg total) by mouth 2 (two) times daily. If increased 3 pounds in a day or 5 pounds in a week, take an extra dose of lasix 80mg until those pounds are lost. 120 tablet 3    levothyroxine (SYNTHROID) 150 MCG tablet Take 1 tablet (150 mcg total) by mouth once daily. 90 tablet 3    metoprolol succinate (TOPROL-XL) 25 MG 24 hr tablet TAKE 1 TABLET BY MOUTH EVERY DAY 90 tablet 3    mexiletine (MEXITIL) 150 MG Cap Take 1 capsule (150 mg total) by mouth 2 (two) times daily with meals. 180 capsule 3    nitroGLYCERIN (NITROSTAT) 0.4 MG SL tablet Place 1 tablet (0.4 mg total) under the tongue every 5 (five) minutes as needed for Chest pain. 20 tablet 1    pravastatin (PRAVACHOL) 40 MG tablet TAKE 1 TABLET BY MOUTH EVERY DAY (Patient taking differently: Take 40 mg by mouth every evening.) 90 tablet 3    sacubitriL-valsartan (ENTRESTO) 24-26 mg per tablet Take 1 tablet by mouth 2 (two) times daily. 180 tablet 3       ALLERGIES:  Review of patient's allergies indicates:   Allergen Reactions    Lokelma [sodium zirconium cyclosilicate]  Other (See Comments)     Fluid retention, weight gain, CHF excerebration, severe constipation    Atorvastatin Other (See Comments)     Joint pain    Jardiance [empagliflozin] Other (See Comments)     Chest pains, significant weight loss, lower blood pressure         VITALS:  Vitals:    05/01/24 1130   BP:    Pulse: 60   Resp: 18   Temp:          PHYSICAL EXAM:  Physical Exam  Constitutional:       General: He is not in acute distress.     Appearance: Normal appearance.   Pulmonary:      Effort: Pulmonary effort is normal.   Skin:     General: Skin is warm and dry.           LABS:  Lab Results   Component Value Date    WBC 11.41 05/01/2024    RBC 5.57 05/01/2024    HGB 14.5 05/01/2024    HCT 41.4 05/01/2024     (H) 05/01/2024     Lab Results   Component Value Date     (H) 05/01/2024     05/01/2024    K 4.3 05/01/2024     05/01/2024    CO2 20 (L) 05/01/2024    BUN 85 (H) 05/01/2024    CREATININE 4.8 (H) 05/01/2024    CALCIUM 8.4 (L) 05/01/2024     Lab Results   Component Value Date    ALT 39 04/29/2024    AST 42 (H) 04/29/2024    ALKPHOS 160 (H) 04/29/2024    BILITOT 2.4 (H) 04/29/2024     Lab Results   Component Value Date    MG 2.1 05/01/2024    PHOS 3.9 05/01/2024    PHOS 3.9 05/01/2024       STUDIES:  CXR images and reports were personally reviewed.        ASSESSMENT & PLAN:  63 y.o. male, will plan for tunneled line Friday morning.      Philip Perez M.D., F.A.C.S.  [facility]  [facility]

## 2024-05-01 NOTE — ASSESSMENT & PLAN NOTE
TTE 2022   The left ventricle is normal in size with eccentric hypertrophy and mildly decreased systolic function.  The estimated ejection fraction is 40-45%.  There is abnormal septal wall motion consistent with right ventricular pacemaker.  Grade II left ventricular diastolic dysfunction.  With low normal right ventricular systolic function.  Moderate right atrial enlargement.  Mild tricuspid regurgitation.  There is pulmonary hypertension.  The estimated PA systolic pressure is 67 mmHg.  Elevated central venous pressure (15 mmHg).      Holding BB and Entresto 2/2 hypotension- will resume if BP can tolerate  Diuresis per Nephrology given renal function

## 2024-05-01 NOTE — PLAN OF CARE
"  Care Plan    Pt remains in ICU at this time. CRRT d/c overnight per nephrology. Levo gtt initiated overnight.  mL.   POC reviewed with pt and family. Questions and concerns addressed. Safety and infection precautions in place. See below and flowsheets for full assessment and VS info.     Neuro:  Sarabjit Coma Scale  Best Eye Response: 4-->(E4) spontaneous  Best Motor Response: 6-->(M6) obeys commands  Best Verbal Response: 5-->(V5) oriented  [place] Coma Scale Score: 15  Assessment Qualifiers: patient not sedated/intubated  Pupil PERRLA: yes  24 hr Temp:  [95.7 °F (35.4 °C)-98.1 °F (36.7 °C)]      CV:  Rhythm: paced rhythm  DVT prophylaxis: VTE Required Core Measure: Pharmacological prophylaxis initiated/maintained    Resp:          GI/:  GI prophylaxis: yes  Diet/Nutrition Received: low saturated fat/low cholesterol, restrict fluids  Last Bowel Movement: 04/29/24  Voiding Characteristics: patient on CRRT   Intake/Output Summary (Last 24 hours) at 5/1/2024 0611  Last data filed at 5/1/2024 0529  Gross per 24 hour   Intake 2096.84 ml   Output 4602.5 ml   Net -2505.66 ml       Labs/Accuchecks:  Recent Labs   Lab 05/01/24  0139   WBC 11.41   RBC 5.57   HGB 14.5   HCT 41.4   *      Recent Labs   Lab 04/29/24  1707 04/30/24  0505 05/01/24  0139   *   < > 136   K 4.3   < > 4.3   CO2 21*   < > 20*   CL 97   < > 102   *   < > 85*   CREATININE 6.7*   < > 4.8*   ALKPHOS 160*  --   --    ALT 39  --   --    AST 42*  --   --    BILITOT 2.4*  --   --     < > = values in this interval not displayed.    No results for input(s): "PROTIME", "INR", "APTT", "HEPANTIXA" in the last 168 hours.   Recent Labs   Lab 04/29/24  1707   TROPONINI 0.040*       Electrolytes: N/A - electrolytes WDL  Accuchecks: none    Gtts/LDAs:  Current Facility-Administered Medications   Medication Dose Route Frequency Last Rate Last Admin    sodium chloride 0.9%   Intravenous Continuous   Stopped at 04/30/24 2116    heparin " (porcine) in 5 % dex  500 Units/hr Intravenous Continuous   Stopped at 04/30/24 2119    NORepinephrine bitartrate-D5W  0-3 mcg/kg/min Intravenous Continuous 5.8 mL/hr at 05/01/24 0103 0.02 mcg/kg/min at 05/01/24 0103       Lines/Drains/Airways       Central Venous Catheter Line  Duration             Trialysis (Dialysis) Catheter 04/30/24 0852 right internal jugular <1 day              Peripheral Intravenous Line  Duration                  Peripheral IV - Single Lumen 04/29/24 1707 20 G Posterior;Right Forearm 1 day         Peripheral IV - Single Lumen 04/29/24 2035 20 G Anterior;Left Forearm 1 day                    Skin/Wounds     Wounds: No  Wound care consulted: No    Consults  Consults (From admission, onward)          Status Ordering Provider     Inpatient consult to Social Work/Case Management  Once        Provider:  (Not yet assigned)    Completed SENIA SARAVIA     Inpatient consult to Registered Dietitian/Nutritionist  Once        Provider:  (Not yet assigned)    Completed SENIA SARAVIA     Inpatient consult to Nephrology-[facility] ([facility] Mcintyre [facility])  Once        Provider:  Chyna Booth MD    Acknowledged SENIA SARAVIA     Inpatient consult to Cardiology-[facility]  Once        Provider:  Juan Rqoue MD    Completed CHYNA BOOTH     Inpatient consult to General Surgery  Once        Provider:  (Not yet assigned)    Acknowledged CHYNA BOOTH

## 2024-05-01 NOTE — NURSING
2100 Notified Adolfo ALCANTAR of pt SBP sustaining low 80s, MAP sustaining 59-63, decreased UF to net even. Adolfo ALCANTAR orders for 5 mg midodrine now, give 80 mg lasix dose as ordered, and draw renal function panel now. Adolfo ALCANTAR orders to not start vasopressors at this time.     2130 Notified Adolfo ALCANTAR pt CRRT pressures  began alarming and CRRT required rinse back, MAPs sustaining 50s, SBP 70s. Adolfo ALCANTAR orders to start norepinephrine gtt now and do not restart CRRT tonight.

## 2024-05-01 NOTE — PROGRESS NOTES
[facility]  Cardiology  Progress Note    Patient Name: Ar Sharma  MRN: [id number]  Admission Date: 4/29/2024  Hospital Length of Stay: 2 days  Code Status: Full Code   Attending Physician: Mamta Hassan MD   Primary Care Physician: Jc Elliott MD  Expected Discharge Date:   Principal Problem:Chronic kidney disease (CKD), stage V    Subjective:     Hospital Course:   04/30/2024 Per HPI   05/01/2024 Patient on CRRT, No CP. BP marginal. On Midodrine. EF 40-45% with RV dysfunction and BIAE.     Past Medical History:   Diagnosis Date    Cardiomyopathy     CKD (chronic kidney disease) stage 4, GFR 15-29 ml/min     Congestive heart failure (CHF) 2015    COPD (chronic obstructive pulmonary disease)     Coronary artery disease     Edema     Essential (primary) hypertension 05/18/2022    Formatting of this note might be different from the original. Converted from [employer]: Description - ESSENTIAL HYPERTENSION, BENIGN    Heart attack     HLD (hyperlipidemia)     Hypertension     Hyperuricemia     Hypocalcemia     Renal cyst, left     Secondary hyperparathyroidism     Thyroid disease     V tach     Vitamin D deficiency        Past Surgical History:   Procedure Laterality Date    ablations  03/05/2018    albations  02/01/2018    COLONOSCOPY N/A 12/07/2018    Procedure: COLONOSCOPY/suprep;  Surgeon: Ananya Morillo MD;  Location: [facility] ENDO;  Service: Endoscopy;  Laterality: N/A;    defibulater N/A 2016    ESOPHAGOGASTRODUODENOSCOPY N/A 12/07/2018    Procedure: EGD (ESOPHAGOGASTRODUODENOSCOPY);  Surgeon: Ananya Morillo MD;  Location: [facility] ENDO;  Service: Endoscopy;  Laterality: N/A;    JOINT REPLACEMENT Bilateral 10/2016    knees, bilat    LEFT HEART CATHETERIZATION N/A 12/16/2020    Procedure: Left heart cath;  Surgeon: Shahram Stewart MD;  Location: [facility] CATH LAB/EP;  Service: Cardiology;  Laterality: N/A;    LEFT HEART CATHETERIZATION Left 9/27/2022    Procedure: Left heart cath;  Surgeon:  Juan Roque MD;  Location: [facility] CATH LAB/EP;  Service: Cardiology;  Laterality: Left;    REPLACEMENT OF IMPLANTABLE CARDIOVERTER-DEFIBRILLATOR (ICD) GENERATOR Left 1/24/2023    Procedure: REPLACEMENT, ICD GENERATOR;  Surgeon: River Tenorio MD;  Location: [facility] EP LAB;  Service: Cardiology;  Laterality: Left;  ANTHONY, CRTD gen chg, MDT, MAC, DM, 3prep       Review of patient's allergies indicates:   Allergen Reactions    Lokelma [sodium zirconium cyclosilicate] Other (See Comments)     Fluid retention, weight gain, CHF excerebration, severe constipation    Atorvastatin Other (See Comments)     Joint pain    Jardiance [empagliflozin] Other (See Comments)     Chest pains, significant weight loss, lower blood pressure       Current Facility-Administered Medications   Medication Dose Route Frequency Provider Last Rate Last Admin    acetaminophen tablet 650 mg  650 mg Oral Q4H PRN Jaida Álvarez NP        allopurinoL tablet 100 mg  100 mg Oral Daily Jaida Álvarez NP   100 mg at 05/01/24 0808    alteplase injection 4 mg  4 mg Intra-Catheter Once Chyna Mata MD        amiodarone tablet 400 mg  400 mg Oral Daily Jaida Álvarez NP   400 mg at 05/01/24 0808    aspirin EC tablet 81 mg  81 mg Oral Daily Jaida Álvarez NP   81 mg at 05/01/24 0808    ergocalciferol capsule 50,000 Units  50,000 Units Oral Q7 Days Jaida Álvarez NP   50,000 Units at 04/30/24 0950    furosemide injection 80 mg  80 mg Intravenous Q12H Jaida Álvarez NP   80 mg at 05/01/24 0812    heparin (porcine) injection 2,400 Units  2,400 Units Intra-Catheter PRN Alexandro Silveira MD   2,400 Units at 04/30/24 2232    heparin (porcine) injection 5,000 Units  5,000 Units Subcutaneous Q8H Jaida Álvarez NP   5,000 Units at 05/01/24 0511    heparin 25,000 units in dextrose 5% 250 mL (100 units/mL) infusion (heparin infusion - NO NOMOGRAM)  500 Units/hr Intravenous Continuous Chyna Mata MD   Stopped at 04/30/24 2119     HYDROcodone-acetaminophen 5-325 mg per tablet 1 tablet  1 tablet Oral Q4H PRN Jaida Álvarez NP        levothyroxine tablet 150 mcg  150 mcg Oral Daily Jaida Álvarez NP   150 mcg at 24 0808    mexiletine capsule 150 mg  150 mg Oral BID  Luis Alfredo Leiva, NP   150 mg at 24 0808    midodrine tablet 2.5 mg  2.5 mg Oral BID  Chyna Mata MD        mupirocin 2 % ointment   Nasal BID Mamta Hassan MD   Given at 24 0812    NORepinephrine 4 mg in dextrose 5% 250 mL infusion (premix)  0-3 mcg/kg/min Intravenous Continuous Chyna Mata MD 11.6 mL/hr at 24 1000 0.04 mcg/kg/min at 24 1000    ondansetron injection 4 mg  4 mg Intravenous Q8H PRN Jaida Álvarez NP        pravastatin tablet 40 mg  40 mg Oral Daily Jaida Álvarez NP   40 mg at 24 0808    sodium chloride 0.9% flush 10 mL  10 mL Intravenous PRN Jaida Álvarez NP        sodium chloride 0.9% flush 10 mL  10 mL Intravenous PRN Jaida Álvarez NP         Family History       Problem Relation (Age of Onset)    Alcohol abuse Father    Arthritis Sister    Breast cancer Mother    Cancer Mother    Hypertension Mother    Kidney disease Father    No Known Problems Brother, Daughter, Son    Stroke Mother          Tobacco Use    Smoking status: Former     Current packs/day: 0.00     Average packs/day: 1 pack/day for 33.2 years (33.2 ttl pk-yrs)     Types: Cigarettes     Start date: 1979     Quit date: 3/3/2012     Years since quittin.1     Passive exposure: Past    Smokeless tobacco: Never   Substance and Sexual Activity    Alcohol use: Yes     Comment: rare    Drug use: No    Sexual activity: Yes     Partners: Female     Review of Systems   Constitutional: Negative for diaphoresis.   HENT: Negative.     Eyes: Negative.    Cardiovascular:  Positive for dyspnea on exertion. Negative for chest pain, near-syncope, orthopnea, palpitations, paroxysmal nocturnal dyspnea and syncope.   Respiratory:  Positive  for shortness of breath.    Endocrine: Negative.    Hematologic/Lymphatic: Negative.    Skin: Negative.    Musculoskeletal: Negative.    Gastrointestinal:  Positive for nausea and vomiting.   Genitourinary: Negative.    Neurological: Negative.    Psychiatric/Behavioral: Negative.     Allergic/Immunologic: Negative.      Objective:     Vital Signs (Most Recent):  Temp: 97.4 °F (36.3 °C) (05/01/24 0715)  Pulse: 60 (05/01/24 1015)  Resp: 19 (05/01/24 1015)  BP: (!) 97/52 (05/01/24 1015)  SpO2: 96 % (05/01/24 1015) Vital Signs (24h Range):  Temp:  [95.7 °F (35.4 °C)-98.1 °F (36.7 °C)] 97.4 °F (36.3 °C)  Pulse:  [59-71] 60  Resp:  [16-41] 19  SpO2:  [87 %-98 %] 96 %  BP: ()/(37-73) 97/52     Weight: 75 kg (165 lb 5.5 oz)  Body mass index is 24.42 kg/m².    SpO2: 96 %         Intake/Output Summary (Last 24 hours) at 5/1/2024 1035  Last data filed at 5/1/2024 1000  Gross per 24 hour   Intake 2469 ml   Output 3447.5 ml   Net -978.5 ml       Lines/Drains/Airways       Central Venous Catheter Line  Duration             Trialysis (Dialysis) Catheter 04/30/24 0852 right internal jugular 1 day              Peripheral Intravenous Line  Duration                  Peripheral IV - Single Lumen 04/29/24 1707 20 G Posterior;Right Forearm 1 day         Peripheral IV - Single Lumen 04/29/24 2035 20 G Anterior;Left Forearm 1 day                     Physical Exam  Constitutional:       General: He is not in acute distress.     Appearance: He is not diaphoretic.   HENT:      Head: Atraumatic.   Eyes:      General:         Right eye: No discharge.         Left eye: No discharge.   Cardiovascular:      Rate and Rhythm: Normal rate and regular rhythm.   Pulmonary:      Effort: Pulmonary effort is normal.      Breath sounds: No rales.   Abdominal:      General: Bowel sounds are normal.      Palpations: Abdomen is soft.   Skin:     General: Skin is warm and dry.   Neurological:      Mental Status: He is alert and oriented to person, place,  "and time.   Psychiatric:         Mood and Affect: Mood normal.         Behavior: Behavior normal.         Thought Content: Thought content normal.         Judgment: Judgment normal.          Significant Labs: BMP:   Recent Labs   Lab 04/30/24  1530 04/30/24  1627 04/30/24 2117 05/01/24  0139 05/01/24  0748   *  --  116* 141*  --      --  137 136  --    K 3.8  --  4.0 4.3  --      --  102 102  --    CO2 27  --  28 20*  --    BUN 94*  --  78* 85*  --    CREATININE 4.8*  --  4.1* 4.8*  --    CALCIUM 8.6*  --  8.4* 8.4*  --    MG  --  2.1  --  2.1  2.1 2.1   , CMP   Recent Labs   Lab 04/29/24 1707 04/30/24 0505 04/30/24 1530 04/30/24 2117 05/01/24  0139   *   < > 136 137 136   K 4.3   < > 3.8 4.0 4.3   CL 97   < > 100 102 102   CO2 21*   < > 27 28 20*      < > 123* 116* 141*   *   < > 94* 78* 85*   CREATININE 6.7*   < > 4.8* 4.1* 4.8*   CALCIUM 9.0   < > 8.6* 8.4* 8.4*   PROT 7.2  --   --   --   --    ALBUMIN 3.6  --  3.0* 3.0* 2.9*   BILITOT 2.4*  --   --   --   --    ALKPHOS 160*  --   --   --   --    AST 42*  --   --   --   --    ALT 39  --   --   --   --    ANIONGAP 15   < > 9 7* 14    < > = values in this interval not displayed.   , CBC   Recent Labs   Lab 04/29/24 1707 04/30/24 0505 05/01/24 0139   WBC 7.80 7.66 11.41   HGB 15.7 14.5 14.5   HCT 44.8 40.5 41.4   * 554* 492*   , INR No results for input(s): "INR", "PROTIME" in the last 48 hours., Lipid Panel   Recent Labs   Lab 04/29/24  2039   CHOL 127   HDL 32*   LDLCALC 81.8   TRIG 66   CHOLHDL 25.2   , Troponin   Recent Labs   Lab 04/29/24  1707   TROPONINI 0.040*   , and All pertinent lab results from the last 24 hours have been reviewed.    Significant Imaging: Echocardiogram: Transthoracic echo (TTE) complete ([facility] Only):   Results for orders placed or performed during the hospital encounter of 04/29/24   Echo Saline Bubble? No; Ultrasound enhancing contrast? No   Result Value Ref Range    BSA 1.94 m2    " LVOT stroke volume 39.81 cm3    LVIDd 5.03 3.5 - 6.0 cm    LV Systolic Volume 67.63 mL    LV Systolic Volume Index 35.0 mL/m2    LVIDs 3.94 2.1 - 4.0 cm    LV Diastolic Volume 119.67 mL    LV Diastolic Volume Index 62.01 mL/m2    IVS 0.83 0.6 - 1.1 cm    LVOT diameter 1.91 cm    LVOT area 2.9 cm2    FS 22 (A) 28 - 44 %    Left Ventricle Relative Wall Thickness 0.39 cm    Posterior Wall 0.98 0.6 - 1.1 cm    LV mass 160.97 g    LV Mass Index 83 g/m2    MV Peak E Marvin 0.78 m/s    TDI LATERAL 0.06 m/s    TDI SEPTAL 0.05 m/s    E/E' ratio 14.18 m/s    MV Peak A Marvin 0.27 m/s    TR Max Marvin 3.49 m/s    E/A ratio 2.89     E wave deceleration time 210.39 msec    LV SEPTAL E/E' RATIO 15.60 m/s    LV LATERAL E/E' RATIO 13.00 m/s    PV Peak S Marvin 0.27 m/s    PV Peak D Marvin 0.38 m/s    Pulm vein S/D ratio 0.71     LVOT peak marvin 0.74 m/s    Left Ventricular Outflow Tract Mean Velocity 0.58 cm/s    Left Ventricular Outflow Tract Mean Gradient 1.44 mmHg    RVDD 6.60 cm    RV S' 5.10 cm/s    TAPSE 1.59 cm    RV/LV Ratio 1.31 cm    LA size 5.04 cm    Left Atrium Minor Axis 7.08 cm    Left Atrium Major Axis 7.93 cm    LA volume (mod) 97.12 cm3    LA Volume Index (Mod) 50.3 mL/m2    RA Major Axis 7.79 cm    RA Width 4.97 cm    AV mean gradient 5 mmHg    AV peak gradient 8 mmHg    Ao peak marvin 1.43 m/s    Ao VTI 26.70 cm    LVOT peak VTI 13.90 cm    AV valve area 1.49 cm²    AV Velocity Ratio 0.52     AV index (prosthetic) 0.52     MAGALI by Velocity Ratio 1.48 cm²    Mr max marvin 3.64 m/s    MV peak gradient 3 mmHg    MV stenosis pressure 1/2 time 61.01 ms    MV valve area p 1/2 method 3.61 cm2    MV valve area by continuity eq 1.62 cm2    MV VTI 24.5 cm    Triscuspid Valve Regurgitation Peak Gradient 49 mmHg    PV PEAK VELOCITY 0.64 m/s    PV peak gradient 2 mmHg    Sinus 3.16 cm    STJ 2.37 cm    Ascending aorta 2.38 cm    IVC diameter 3.51 cm    Mean e' 0.06 m/s    ZLVIDS 1.25     ZLVIDD -0.83     LA Volume Index 71.4 mL/m2    LA volume  137.81 cm3    RV-benítez mid d 5.4 cm    LA WIDTH 4.3 cm    TV resting pulmonary artery pressure 64 mmHg    RV TB RVSP 18 mmHg    Est. RA pres 15 mmHg    Narrative      Left Ventricle: The left ventricle is normal in size. Normal wall   thickness. Regional wall motion abnormalities present. See diagram for   wall motion findings. Septal motion is consistent with pacing. There is   mildly reduced systolic function with a visually estimated ejection   fraction of 40 - 45%. Grade III diastolic dysfunction.    Right Ventricle: Moderate to severe right ventricular enlargement.   Systolic function is reduced.TAPSE is 1.59 cm. Pacemaker lead present in   the ventricle.    Left Atrium: Left atrium is severely dilated. The left atrium volume   index is 71.4 mL/m2.    Right Atrium: Right atrium is severely dilated.    Aortic Valve: There is mild aortic valve sclerosis. There is mild to   moderate stenosis. Aortic valve area by VTI is 1.49 cm². Aortic valve peak   velocity is 1.43 m/s. Mean gradient is 5 mmHg. The dimensionless index is   0.52.    Mitral Valve: There is mild regurgitation.    Tricuspid Valve: There is mild to moderate regurgitation.    Pulmonary Artery: The estimated pulmonary artery systolic pressure is   64 mmHg.    IVC/SVC: Elevated venous pressure at 15 mmHg.       Assessment and Plan:     Brief HPI: Patient seen this morning on rounds, BP marginal. No cardiac complaints.     * Chronic kidney disease (CKD), stage V  Nephrology on board  CRRT initiated  Uremic on admission  Continues to make urine and had good UO with Lasix 240 mg IV ON  Dc'd Entresto and BB given marginal BP    Hypotension  SBP 80s  Holding BB and Entresto  On Midodrine- up titrate PRN  CRRT initiated- will resume GDMT as tolerated     HFrEF (heart failure with reduced ejection fraction)  TTE 2022   The left ventricle is normal in size with eccentric hypertrophy and mildly decreased systolic function.  The estimated ejection fraction is  40-45%.  There is abnormal septal wall motion consistent with right ventricular pacemaker.  Grade II left ventricular diastolic dysfunction.  With low normal right ventricular systolic function.  Moderate right atrial enlargement.  Mild tricuspid regurgitation.  There is pulmonary hypertension.  The estimated PA systolic pressure is 67 mmHg.  Elevated central venous pressure (15 mmHg).      Holding BB and Entresto 2/2 hypotension- will resume if BP can tolerate  Diuresis per Nephrology given renal function         Cardiac resynchronization therapy defibrillator (CRT-D) in place  Normal device function on tele review     Mixed hyperlipidemia    Continue statin     V-tach  Continue Amio and Mexiletine   Paced on tele without VT         VTE Risk Mitigation (From admission, onward)           Ordered     heparin (porcine) injection 2,400 Units  As needed (PRN)         04/30/24 2223     heparin 25,000 units in dextrose 5% 250 mL (100 units/mL) infusion (heparin infusion - NO NOMOGRAM)  Continuous         04/30/24 1338     heparin (porcine) injection 5,000 Units  Every 8 hours         04/29/24 1945     IP VTE HIGH RISK PATIENT  Once         04/29/24 1945     Place sequential compression device  Until discontinued         04/29/24 1945                    Luis Alfredo Leiva NP  Cardiology  [facility] - Intensive Care

## 2024-05-01 NOTE — SUBJECTIVE & OBJECTIVE
Past Medical History:   Diagnosis Date    Cardiomyopathy     CKD (chronic kidney disease) stage 4, GFR 15-29 ml/min     Congestive heart failure (CHF) 2015    COPD (chronic obstructive pulmonary disease)     Coronary artery disease     Edema     Essential (primary) hypertension 05/18/2022    Formatting of this note might be different from the original. Converted from [employer]: Description - ESSENTIAL HYPERTENSION, BENIGN    Heart attack     HLD (hyperlipidemia)     Hypertension     Hyperuricemia     Hypocalcemia     Renal cyst, left     Secondary hyperparathyroidism     Thyroid disease     V tach     Vitamin D deficiency        Past Surgical History:   Procedure Laterality Date    ablations  03/05/2018    albations  02/01/2018    COLONOSCOPY N/A 12/07/2018    Procedure: COLONOSCOPY/suprep;  Surgeon: Ananya Morillo MD;  Location: [facility] ENDO;  Service: Endoscopy;  Laterality: N/A;    defibulater N/A 2016    ESOPHAGOGASTRODUODENOSCOPY N/A 12/07/2018    Procedure: EGD (ESOPHAGOGASTRODUODENOSCOPY);  Surgeon: Ananya Morillo MD;  Location: [facility] ENDO;  Service: Endoscopy;  Laterality: N/A;    JOINT REPLACEMENT Bilateral 10/2016    knees, bilat    LEFT HEART CATHETERIZATION N/A 12/16/2020    Procedure: Left heart cath;  Surgeon: Shahram Stewart MD;  Location: [facility] CATH LAB/EP;  Service: Cardiology;  Laterality: N/A;    LEFT HEART CATHETERIZATION Left 9/27/2022    Procedure: Left heart cath;  Surgeon: Juan Roque MD;  Location: [facility] CATH LAB/EP;  Service: Cardiology;  Laterality: Left;    REPLACEMENT OF IMPLANTABLE CARDIOVERTER-DEFIBRILLATOR (ICD) GENERATOR Left 1/24/2023    Procedure: REPLACEMENT, ICD GENERATOR;  Surgeon: River Tenorio MD;  Location: [facility] EP LAB;  Service: Cardiology;  Laterality: Left;  ANTHONY, CRTD gen chg, MDT, MAC, DM, 3prep       Review of patient's allergies indicates:   Allergen Reactions    Lokelma [sodium zirconium cyclosilicate] Other (See Comments)     Fluid retention, weight gain,  CHF excerebration, severe constipation    Atorvastatin Other (See Comments)     Joint pain    Jardiance [empagliflozin] Other (See Comments)     Chest pains, significant weight loss, lower blood pressure       Current Facility-Administered Medications   Medication Dose Route Frequency Provider Last Rate Last Admin    acetaminophen tablet 650 mg  650 mg Oral Q4H PRN Jaida Álvarez NP        allopurinoL tablet 100 mg  100 mg Oral Daily Jaida Álvarez NP   100 mg at 05/01/24 0808    alteplase injection 4 mg  4 mg Intra-Catheter Once Chyna Mata MD        amiodarone tablet 400 mg  400 mg Oral Daily Jaida Álvarez NP   400 mg at 05/01/24 0808    aspirin EC tablet 81 mg  81 mg Oral Daily Jaida Álvarez NP   81 mg at 05/01/24 0808    ergocalciferol capsule 50,000 Units  50,000 Units Oral Q7 Days Jaida Álvarez NP   50,000 Units at 04/30/24 0950    furosemide injection 80 mg  80 mg Intravenous Q12H Jaida Álvarez NP   80 mg at 05/01/24 0812    heparin (porcine) injection 2,400 Units  2,400 Units Intra-Catheter PRN Alexandro Silveira MD   2,400 Units at 04/30/24 2232    heparin (porcine) injection 5,000 Units  5,000 Units Subcutaneous Q8H Jaida Álvarez NP   5,000 Units at 05/01/24 0511    heparin 25,000 units in dextrose 5% 250 mL (100 units/mL) infusion (heparin infusion - NO NOMOGRAM)  500 Units/hr Intravenous Continuous Chyna Mata MD   Stopped at 04/30/24 2119    HYDROcodone-acetaminophen 5-325 mg per tablet 1 tablet  1 tablet Oral Q4H PRN Jaida Álvarez NP        levothyroxine tablet 150 mcg  150 mcg Oral Daily Jaida Álvarez NP   150 mcg at 05/01/24 0808    mexiletine capsule 150 mg  150 mg Oral BID  Luis Alfredo Leiva NP   150 mg at 05/01/24 0808    midodrine tablet 2.5 mg  2.5 mg Oral BID  Chyna Mata MD        mupirocin 2 % ointment   Nasal BID Mamta Hassan MD   Given at 05/01/24 0812    NORepinephrine 4 mg in dextrose 5% 250 mL infusion (premix)  0-3 mcg/kg/min  Intravenous Continuous Chyna Mata MD 11.6 mL/hr at 24 1000 0.04 mcg/kg/min at 24 1000    ondansetron injection 4 mg  4 mg Intravenous Q8H PRN Jaida Álvarez NP        pravastatin tablet 40 mg  40 mg Oral Daily Jaida Álvarez NP   40 mg at 24 0808    sodium chloride 0.9% flush 10 mL  10 mL Intravenous PRN Jaida Álvarez NP        sodium chloride 0.9% flush 10 mL  10 mL Intravenous PRN Jaida Álvarez NP         Family History       Problem Relation (Age of Onset)    Alcohol abuse Father    Arthritis Sister    Breast cancer Mother    Cancer Mother    Hypertension Mother    Kidney disease Father    No Known Problems Brother, Daughter, Son    Stroke Mother          Tobacco Use    Smoking status: Former     Current packs/day: 0.00     Average packs/day: 1 pack/day for 33.2 years (33.2 ttl pk-yrs)     Types: Cigarettes     Start date: 1979     Quit date: 3/3/2012     Years since quittin.1     Passive exposure: Past    Smokeless tobacco: Never   Substance and Sexual Activity    Alcohol use: Yes     Comment: rare    Drug use: No    Sexual activity: Yes     Partners: Female     Review of Systems   Constitutional: Negative for diaphoresis.   HENT: Negative.     Eyes: Negative.    Cardiovascular:  Positive for dyspnea on exertion. Negative for chest pain, near-syncope, orthopnea, palpitations, paroxysmal nocturnal dyspnea and syncope.   Respiratory:  Positive for shortness of breath.    Endocrine: Negative.    Hematologic/Lymphatic: Negative.    Skin: Negative.    Musculoskeletal: Negative.    Gastrointestinal:  Positive for nausea and vomiting.   Genitourinary: Negative.    Neurological: Negative.    Psychiatric/Behavioral: Negative.     Allergic/Immunologic: Negative.      Objective:     Vital Signs (Most Recent):  Temp: 97.4 °F (36.3 °C) (24 0715)  Pulse: 60 (24 1015)  Resp: 19 (24 1015)  BP: (!) 97/52 (24 1015)  SpO2: 96 % (24 1015) Vital Signs (24h  Range):  Temp:  [95.7 °F (35.4 °C)-98.1 °F (36.7 °C)] 97.4 °F (36.3 °C)  Pulse:  [59-71] 60  Resp:  [16-41] 19  SpO2:  [87 %-98 %] 96 %  BP: ()/(37-73) 97/52     Weight: 75 kg (165 lb 5.5 oz)  Body mass index is 24.42 kg/m².    SpO2: 96 %         Intake/Output Summary (Last 24 hours) at 5/1/2024 1035  Last data filed at 5/1/2024 1000  Gross per 24 hour   Intake 2469 ml   Output 3447.5 ml   Net -978.5 ml       Lines/Drains/Airways       Central Venous Catheter Line  Duration             Trialysis (Dialysis) Catheter 04/30/24 0852 right internal jugular 1 day              Peripheral Intravenous Line  Duration                  Peripheral IV - Single Lumen 04/29/24 1707 20 G Posterior;Right Forearm 1 day         Peripheral IV - Single Lumen 04/29/24 2035 20 G Anterior;Left Forearm 1 day                     Physical Exam  Constitutional:       General: He is not in acute distress.     Appearance: He is not diaphoretic.   HENT:      Head: Atraumatic.   Eyes:      General:         Right eye: No discharge.         Left eye: No discharge.   Cardiovascular:      Rate and Rhythm: Normal rate and regular rhythm.   Pulmonary:      Effort: Pulmonary effort is normal.      Breath sounds: No rales.   Abdominal:      General: Bowel sounds are normal.      Palpations: Abdomen is soft.   Skin:     General: Skin is warm and dry.   Neurological:      Mental Status: He is alert and oriented to person, place, and time.   Psychiatric:         Mood and Affect: Mood normal.         Behavior: Behavior normal.         Thought Content: Thought content normal.         Judgment: Judgment normal.          Significant Labs: BMP:   Recent Labs   Lab 04/30/24  1530 04/30/24  1627 04/30/24  2117 05/01/24  0139 05/01/24  0748   *  --  116* 141*  --      --  137 136  --    K 3.8  --  4.0 4.3  --      --  102 102  --    CO2 27  --  28 20*  --    BUN 94*  --  78* 85*  --    CREATININE 4.8*  --  4.1* 4.8*  --    CALCIUM 8.6*  --   "8.4* 8.4*  --    MG  --  2.1  --  2.1  2.1 2.1   , CMP   Recent Labs   Lab 04/29/24  1707 04/30/24  0505 04/30/24  1530 04/30/24  2117 05/01/24  0139   *   < > 136 137 136   K 4.3   < > 3.8 4.0 4.3   CL 97   < > 100 102 102   CO2 21*   < > 27 28 20*      < > 123* 116* 141*   *   < > 94* 78* 85*   CREATININE 6.7*   < > 4.8* 4.1* 4.8*   CALCIUM 9.0   < > 8.6* 8.4* 8.4*   PROT 7.2  --   --   --   --    ALBUMIN 3.6  --  3.0* 3.0* 2.9*   BILITOT 2.4*  --   --   --   --    ALKPHOS 160*  --   --   --   --    AST 42*  --   --   --   --    ALT 39  --   --   --   --    ANIONGAP 15   < > 9 7* 14    < > = values in this interval not displayed.   , CBC   Recent Labs   Lab 04/29/24 1707 04/30/24 0505 05/01/24  0139   WBC 7.80 7.66 11.41   HGB 15.7 14.5 14.5   HCT 44.8 40.5 41.4   * 554* 492*   , INR No results for input(s): "INR", "PROTIME" in the last 48 hours., Lipid Panel   Recent Labs   Lab 04/29/24 2039   CHOL 127   HDL 32*   LDLCALC 81.8   TRIG 66   CHOLHDL 25.2   , Troponin   Recent Labs   Lab 04/29/24 1707   TROPONINI 0.040*   , and All pertinent lab results from the last 24 hours have been reviewed.    Significant Imaging: Echocardiogram: Transthoracic echo (TTE) complete (Cupid Only):   Results for orders placed or performed during the hospital encounter of 04/29/24   Echo Saline Bubble? No; Ultrasound enhancing contrast? No   Result Value Ref Range    BSA 1.94 m2    LVOT stroke volume 39.81 cm3    LVIDd 5.03 3.5 - 6.0 cm    LV Systolic Volume 67.63 mL    LV Systolic Volume Index 35.0 mL/m2    LVIDs 3.94 2.1 - 4.0 cm    LV Diastolic Volume 119.67 mL    LV Diastolic Volume Index 62.01 mL/m2    IVS 0.83 0.6 - 1.1 cm    LVOT diameter 1.91 cm    LVOT area 2.9 cm2    FS 22 (A) 28 - 44 %    Left Ventricle Relative Wall Thickness 0.39 cm    Posterior Wall 0.98 0.6 - 1.1 cm    LV mass 160.97 g    LV Mass Index 83 g/m2    MV Peak E Marvin 0.78 m/s    TDI LATERAL 0.06 m/s    TDI SEPTAL 0.05 m/s    " E/E' ratio 14.18 m/s    MV Peak A Marvin 0.27 m/s    TR Max Marvin 3.49 m/s    E/A ratio 2.89     E wave deceleration time 210.39 msec    LV SEPTAL E/E' RATIO 15.60 m/s    LV LATERAL E/E' RATIO 13.00 m/s    PV Peak S Marvin 0.27 m/s    PV Peak D Marvin 0.38 m/s    Pulm vein S/D ratio 0.71     LVOT peak marvin 0.74 m/s    Left Ventricular Outflow Tract Mean Velocity 0.58 cm/s    Left Ventricular Outflow Tract Mean Gradient 1.44 mmHg    RVDD 6.60 cm    RV S' 5.10 cm/s    TAPSE 1.59 cm    RV/LV Ratio 1.31 cm    LA size 5.04 cm    Left Atrium Minor Axis 7.08 cm    Left Atrium Major Axis 7.93 cm    LA volume (mod) 97.12 cm3    LA Volume Index (Mod) 50.3 mL/m2    RA Major Axis 7.79 cm    RA Width 4.97 cm    AV mean gradient 5 mmHg    AV peak gradient 8 mmHg    Ao peak marvin 1.43 m/s    Ao VTI 26.70 cm    LVOT peak VTI 13.90 cm    AV valve area 1.49 cm²    AV Velocity Ratio 0.52     AV index (prosthetic) 0.52     MAGALI by Velocity Ratio 1.48 cm²    Mr max marvin 3.64 m/s    MV peak gradient 3 mmHg    MV stenosis pressure 1/2 time 61.01 ms    MV valve area p 1/2 method 3.61 cm2    MV valve area by continuity eq 1.62 cm2    MV VTI 24.5 cm    Triscuspid Valve Regurgitation Peak Gradient 49 mmHg    PV PEAK VELOCITY 0.64 m/s    PV peak gradient 2 mmHg    Sinus 3.16 cm    STJ 2.37 cm    Ascending aorta 2.38 cm    IVC diameter 3.51 cm    Mean e' 0.06 m/s    ZLVIDS 1.25     ZLVIDD -0.83     LA Volume Index 71.4 mL/m2    LA volume 137.81 cm3    RV-benítez mid d 5.4 cm    LA WIDTH 4.3 cm    TV resting pulmonary artery pressure 64 mmHg    RV TB RVSP 18 mmHg    Est. RA pres 15 mmHg    Narrative      Left Ventricle: The left ventricle is normal in size. Normal wall   thickness. Regional wall motion abnormalities present. See diagram for   wall motion findings. Septal motion is consistent with pacing. There is   mildly reduced systolic function with a visually estimated ejection   fraction of 40 - 45%. Grade III diastolic dysfunction.    Right Ventricle:  Moderate to severe right ventricular enlargement.   Systolic function is reduced.TAPSE is 1.59 cm. Pacemaker lead present in   the ventricle.    Left Atrium: Left atrium is severely dilated. The left atrium volume   index is 71.4 mL/m2.    Right Atrium: Right atrium is severely dilated.    Aortic Valve: There is mild aortic valve sclerosis. There is mild to   moderate stenosis. Aortic valve area by VTI is 1.49 cm². Aortic valve peak   velocity is 1.43 m/s. Mean gradient is 5 mmHg. The dimensionless index is   0.52.    Mitral Valve: There is mild regurgitation.    Tricuspid Valve: There is mild to moderate regurgitation.    Pulmonary Artery: The estimated pulmonary artery systolic pressure is   64 mmHg.    IVC/SVC: Elevated venous pressure at 15 mmHg.

## 2024-05-01 NOTE — PLAN OF CARE
The sw received a call from Ofelia in admissions at [facility]([phone])who states the pt has been accepted to [facility] [phone] start date of 5/3/24 MWF at 10:30am but the first session will be at 10:00am. She will email the Welcome Letter to this sw today but can change the date in case the pt may not be ready for d/c Friday 5/3/24. Ofelia states the pt's insurance has been pre-verified by [facility] and they have received financial clearance from he pt's insurance company. The sw met with the pt and his dtr who was at bedside and informed them of the info mentioned above. The pt and his dtr are in agreement with the d/c plan and are glad the pt will be able to receive hd near his home.        05/01/24 1312   Post-Acute Status   Post-Acute Authorization Dialysis   Diaylsis Status Set-up Complete/Auth obtained   Discharge Plan   Discharge Plan A Home with family   Discharge Plan B Home Health

## 2024-05-01 NOTE — TELEPHONE ENCOUNTER
----- Message from Marj Joyner sent at 5/1/2024 12:29 PM CDT -----  Contact: Daughter/ Dinorah  Patient is inpatient canceling appointment

## 2024-05-01 NOTE — ASSESSMENT & PLAN NOTE
SBP 80s  Holding BB and Entresto  On Midodrine- up titrate PRN  CRRT initiated- will resume GDMT as tolerated

## 2024-05-01 NOTE — PLAN OF CARE
Problem: Adult Inpatient Plan of Care  Goal: Plan of Care Review  Outcome: Progressing     Problem: CRRT (Continuous Renal Replacement Therapy)  Goal: Safe, Effective Therapy Delivery  Outcome: Progressing    Problem: Chronic Kidney Disease  Goal: Fluid Balance  Outcome: Progressing    POC reviewed with pt and family at bedside. Levo gtt infusing to maintain MAP >65. PO midodrine initiated. CRRT restarted this shift with heparin and NS infusing pre-filter. Clotted off and blood rinsed back at 1730. Ok to hold off on restart per Dr. Mata. Plan for tunneled cath placement on Friday 3/5.

## 2024-05-01 NOTE — ASSESSMENT & PLAN NOTE
Creatine stable for now. BMP reviewed- noted Estimated Creatinine Clearance: 15.9 mL/min (A) (based on SCr of 4.7 mg/dL (H)). according to latest data. Based on current GFR, CKD stage is end stage.  Monitor UOP and serial BMP and adjust therapy as needed. Renally dose meds. Avoid nephrotoxic medications and procedures.    Seen by Nephrology, Trialysis placed for CRRT, tolerating

## 2024-05-01 NOTE — PROGRESS NOTES
This is an attestation of a separate note written by the ICU resident today. As the teaching physician, I was present for and have confirmed the key portions of the service documented in the resident's note from today. In addition to the resident's note, I add the following:      Mr. Sharma is a 64 yo M with PMHx of HFrEF (LVEF 40-45%, grade II DD), VT s/p ablation and AICD, pulmonary hypertension, CKD5, hypothyroidism who presented to [facility] on 4/29 with progressively worsening dyspnea over the past month in the setting of decreasing UOP despite lasix administration. Initial labs notable for Cr 6.19, , phos 5.1, BNP 3204. Patient was admitted to the MICU for initiation of dialysis in the setting of low blood pressures (SBP 80's-90's). Overnight, he became hypotensive requiring levophed initiation while on dialysis.     Plan:  Shock: On Levophed 0.02. High suspicion that this is related to his chronic cardiac/renal disease. Obtaining lactic acid today. Patient's SBP in the outpatient setting is usually in the 80's-90's. Agree with midodrine TID initiation and up-titrating until at MAP goal >65. Afebrile without WBC count elevation. Obtaining blood cultures today, but low suspicion for infection.  Acute hypoxemic respiratory failure: On 2L NC saturating well. Goal O2 sats 88-92%. Excellent volume removal in the past 24 hours with CRRT and lasix. Continuing lasix 80 mg IV BID today.  CKD5/ESRD: Cardiorenal in etiology. On dialysis per nephrology and lasix 80 mg IV BID.  Pulmonary HTN: Will require outpatient RHC to assess PA pressures and determine etiology. High suspicion for WHO group II PH.  VT: On home amiodarone and mexiletine.  Hypothyroidism: On home levothyroxine.  Gout: On home allopurinol.  Nutrition: Cardiac renal diet  DVT ppx: Heparin 5000U q8h     40 minutes of critical care time was spent  in the critical care management of the patient. This included management of organ failures related to  critical illness, titration of continuous infusions, management of mechanical ventilation, review of pertinent labs and imaging studies, discussion of the patient with consulting services, and discussion of the patients condition with the patient and family.     Vero Yadav MD  [facility] Attending  [phone]

## 2024-05-01 NOTE — SUBJECTIVE & OBJECTIVE
Interval History: Daughter at bedside. Denies any new complaints, on low dose norepinephrine    Review of Systems   Constitutional:  Negative for chills, fatigue and fever.   HENT: Negative.     Eyes: Negative.    Respiratory:  Negative for cough, chest tightness, shortness of breath and wheezing.    Cardiovascular:  Negative for chest pain, palpitations and leg swelling.   Gastrointestinal:  Positive for nausea and vomiting. Negative for abdominal distention, abdominal pain, blood in stool and diarrhea.   Endocrine: Positive for cold intolerance.   Genitourinary:  Positive for decreased urine volume and difficulty urinating. Negative for flank pain.   Musculoskeletal:  Positive for arthralgias.   Skin: Negative.    Allergic/Immunologic: Negative.    Neurological:  Positive for weakness. Negative for dizziness and light-headedness.   Hematological: Negative.    Psychiatric/Behavioral: Negative.       Objective:     Vital Signs (Most Recent):  Temp: 97.9 °F (36.6 °C) (05/01/24 1515)  Pulse: 60 (05/01/24 1715)  Resp: (!) 21 (05/01/24 1715)  BP: 90/61 (05/01/24 1715)  SpO2: 95 % (05/01/24 1715) Vital Signs (24h Range):  Temp:  [97.2 °F (36.2 °C)-98.1 °F (36.7 °C)] 97.9 °F (36.6 °C)  Pulse:  [59-71] 60  Resp:  [16-41] 21  SpO2:  [87 %-98 %] 95 %  BP: ()/(37-65) 90/61     Weight: 75 kg (165 lb 5.5 oz)  Body mass index is 24.42 kg/m².    Intake/Output Summary (Last 24 hours) at 5/1/2024 1718  Last data filed at 5/1/2024 1700  Gross per 24 hour   Intake 2743.43 ml   Output 2932 ml   Net -188.57 ml         Physical Exam  Vitals and nursing note reviewed.   Constitutional:       General: He is not in acute distress.     Appearance: He is ill-appearing.   HENT:      Head: Normocephalic and atraumatic.      Nose: Nose normal.      Mouth/Throat:      Mouth: Mucous membranes are moist.      Pharynx: Oropharynx is clear.   Eyes:      Extraocular Movements: Extraocular movements intact.      Conjunctiva/sclera: Conjunctivae  "normal.      Pupils: Pupils are equal, round, and reactive to light.   Neck:      Comments: Central line in R side of neck  Cardiovascular:      Rate and Rhythm: Normal rate and regular rhythm.      Pulses: Normal pulses.      Heart sounds: Normal heart sounds. No murmur heard.     No friction rub. No gallop.   Pulmonary:      Effort: Pulmonary effort is normal. No respiratory distress.      Breath sounds: Normal breath sounds. No wheezing, rhonchi or rales.   Abdominal:      General: Bowel sounds are normal. There is no distension.      Palpations: Abdomen is soft.      Tenderness: There is no abdominal tenderness. There is no right CVA tenderness, left CVA tenderness or guarding.   Genitourinary:     Comments: deferred  Musculoskeletal:         General: Normal range of motion.      Cervical back: Normal range of motion and neck supple.   Skin:     General: Skin is warm and dry.      Capillary Refill: Capillary refill takes less than 2 seconds.   Neurological:      General: No focal deficit present.      Mental Status: He is alert and oriented to person, place, and time. Mental status is at baseline.   Psychiatric:         Mood and Affect: Mood normal.         Behavior: Behavior normal.         Thought Content: Thought content normal.         Judgment: Judgment normal.             Significant Labs: All pertinent labs within the past 24 hours have been reviewed.  Blood Culture: No results for input(s): "LABBLOO" in the last 48 hours.  CBC:   Recent Labs   Lab 04/30/24  0505 05/01/24  0139   WBC 7.66 11.41   HGB 14.5 14.5   HCT 40.5 41.4   * 492*     CMP:   Recent Labs   Lab 04/30/24  2117 05/01/24  0139 05/01/24  1411    136 136   K 4.0 4.3 4.2    102 100   CO2 28 20* 23   * 141* 107   BUN 78* 85* 83*   CREATININE 4.1* 4.8* 4.7*   CALCIUM 8.4* 8.4* 8.5*   ALBUMIN 3.0* 2.9* 2.9*   ANIONGAP 7* 14 13     Lactic Acid:   Recent Labs   Lab 05/01/24  1410   LACTATE 1.1       Significant Imaging: I " have reviewed all pertinent imaging results/findings within the past 24 hours.

## 2024-05-02 DIAGNOSIS — E78.2 MIXED HYPERLIPIDEMIA: Primary | ICD-10-CM

## 2024-05-02 LAB
ALBUMIN SERPL BCP-MCNC: 2.8 G/DL (ref 3.5–5.2)
ALBUMIN SERPL BCP-MCNC: 2.9 G/DL (ref 3.5–5.2)
ALBUMIN SERPL BCP-MCNC: 3 G/DL (ref 3.5–5.2)
ANION GAP SERPL CALC-SCNC: 10 MMOL/L (ref 8–16)
ANION GAP SERPL CALC-SCNC: 11 MMOL/L (ref 8–16)
ANION GAP SERPL CALC-SCNC: 12 MMOL/L (ref 8–16)
BASOPHILS # BLD AUTO: 0.08 K/UL (ref 0–0.2)
BASOPHILS NFR BLD: 0.9 % (ref 0–1.9)
BUN SERPL-MCNC: 61 MG/DL (ref 8–23)
BUN SERPL-MCNC: 68 MG/DL (ref 8–23)
BUN SERPL-MCNC: 86 MG/DL (ref 8–23)
CALCIUM SERPL-MCNC: 8.3 MG/DL (ref 8.7–10.5)
CALCIUM SERPL-MCNC: 8.8 MG/DL (ref 8.7–10.5)
CALCIUM SERPL-MCNC: 8.9 MG/DL (ref 8.7–10.5)
CHLORIDE SERPL-SCNC: 101 MMOL/L (ref 95–110)
CHLORIDE SERPL-SCNC: 101 MMOL/L (ref 95–110)
CHLORIDE SERPL-SCNC: 102 MMOL/L (ref 95–110)
CO2 SERPL-SCNC: 22 MMOL/L (ref 23–29)
CO2 SERPL-SCNC: 23 MMOL/L (ref 23–29)
CO2 SERPL-SCNC: 25 MMOL/L (ref 23–29)
CREAT SERPL-MCNC: 3.6 MG/DL (ref 0.5–1.4)
CREAT SERPL-MCNC: 4.3 MG/DL (ref 0.5–1.4)
CREAT SERPL-MCNC: 4.6 MG/DL (ref 0.5–1.4)
DIFFERENTIAL METHOD BLD: ABNORMAL
EOSINOPHIL # BLD AUTO: 0.2 K/UL (ref 0–0.5)
EOSINOPHIL NFR BLD: 2.7 % (ref 0–8)
ERYTHROCYTE [DISTWIDTH] IN BLOOD BY AUTOMATED COUNT: 15.1 % (ref 11.5–14.5)
EST. GFR  (NO RACE VARIABLE): 13 ML/MIN/1.73 M^2
EST. GFR  (NO RACE VARIABLE): 15 ML/MIN/1.73 M^2
EST. GFR  (NO RACE VARIABLE): 18 ML/MIN/1.73 M^2
FIO2: 21 %
GLUCOSE SERPL-MCNC: 107 MG/DL (ref 70–110)
GLUCOSE SERPL-MCNC: 117 MG/DL (ref 70–110)
GLUCOSE SERPL-MCNC: 82 MG/DL (ref 70–110)
HCT VFR BLD AUTO: 41.5 % (ref 40–54)
HGB BLD-MCNC: 14.2 G/DL (ref 14–18)
IMM GRANULOCYTES # BLD AUTO: 0.06 K/UL (ref 0–0.04)
IMM GRANULOCYTES NFR BLD AUTO: 0.7 % (ref 0–0.5)
LYMPHOCYTES # BLD AUTO: 0.6 K/UL (ref 1–4.8)
LYMPHOCYTES NFR BLD: 7 % (ref 18–48)
MAGNESIUM SERPL-MCNC: 2 MG/DL (ref 1.6–2.6)
MAGNESIUM SERPL-MCNC: 2 MG/DL (ref 1.6–2.6)
MAGNESIUM SERPL-MCNC: 2.1 MG/DL (ref 1.6–2.6)
MAGNESIUM SERPL-MCNC: 2.2 MG/DL (ref 1.6–2.6)
MCH RBC QN AUTO: 25.7 PG (ref 27–31)
MCHC RBC AUTO-ENTMCNC: 34.2 G/DL (ref 32–36)
MCV RBC AUTO: 75 FL (ref 82–98)
MONOCYTES # BLD AUTO: 1.2 K/UL (ref 0.3–1)
MONOCYTES NFR BLD: 13.6 % (ref 4–15)
NEUTROPHILS # BLD AUTO: 6.8 K/UL (ref 1.8–7.7)
NEUTROPHILS NFR BLD: 75.1 % (ref 38–73)
NRBC BLD-RTO: 0 /100 WBC
PCO2 BLDA: 61.8 MMHG (ref 35–45)
PH SMN: 7.3 [PH] (ref 7.35–7.45)
PHOSPHATE SERPL-MCNC: 2.9 MG/DL (ref 2.7–4.5)
PHOSPHATE SERPL-MCNC: 3.5 MG/DL (ref 2.7–4.5)
PHOSPHATE SERPL-MCNC: 3.6 MG/DL (ref 2.7–4.5)
PHOSPHATE SERPL-MCNC: 3.9 MG/DL (ref 2.7–4.5)
PLATELET # BLD AUTO: 410 K/UL (ref 150–450)
PMV BLD AUTO: 12.4 FL (ref 9.2–12.9)
PO2 BLDA: 35.4 MMHG (ref 40–60)
POC BASE DEFICIT: 2.3 MMOL/L (ref -2–2)
POC HCO3: 30.5 MMOL/L (ref 24–28)
POC PERFORMED BY: ABNORMAL
POC SATURATED O2: 64.4 % (ref 95–100)
POTASSIUM SERPL-SCNC: 4.2 MMOL/L (ref 3.5–5.1)
POTASSIUM SERPL-SCNC: 4.4 MMOL/L (ref 3.5–5.1)
POTASSIUM SERPL-SCNC: 4.5 MMOL/L (ref 3.5–5.1)
RBC # BLD AUTO: 5.52 M/UL (ref 4.6–6.2)
SODIUM SERPL-SCNC: 135 MMOL/L (ref 136–145)
SODIUM SERPL-SCNC: 136 MMOL/L (ref 136–145)
SODIUM SERPL-SCNC: 136 MMOL/L (ref 136–145)
SPECIMEN SOURCE: ABNORMAL
WBC # BLD AUTO: 9 K/UL (ref 3.9–12.7)

## 2024-05-02 PROCEDURE — 21400001 HC TELEMETRY ROOM

## 2024-05-02 PROCEDURE — 25000003 PHARM REV CODE 250: Performed by: STUDENT IN AN ORGANIZED HEALTH CARE EDUCATION/TRAINING PROGRAM

## 2024-05-02 PROCEDURE — 80069 RENAL FUNCTION PANEL: CPT | Mod: 91 | Performed by: INTERNAL MEDICINE

## 2024-05-02 PROCEDURE — 80069 RENAL FUNCTION PANEL: CPT | Performed by: INTERNAL MEDICINE

## 2024-05-02 PROCEDURE — 94761 N-INVAS EAR/PLS OXIMETRY MLT: CPT | Mod: XB

## 2024-05-02 PROCEDURE — 99231 SBSQ HOSP IP/OBS SF/LOW 25: CPT | Mod: ,,, | Performed by: SURGERY

## 2024-05-02 PROCEDURE — 80100014 HC HEMODIALYSIS 1:1

## 2024-05-02 PROCEDURE — 27000221 HC OXYGEN, UP TO 24 HOURS

## 2024-05-02 PROCEDURE — 25000003 PHARM REV CODE 250: Performed by: NURSE PRACTITIONER

## 2024-05-02 PROCEDURE — 99900035 HC TECH TIME PER 15 MIN (STAT)

## 2024-05-02 PROCEDURE — 5A1D70Z PERFORMANCE OF URINARY FILTRATION, INTERMITTENT, LESS THAN 6 HOURS PER DAY: ICD-10-PCS | Performed by: INTERNAL MEDICINE

## 2024-05-02 PROCEDURE — 63600175 PHARM REV CODE 636 W HCPCS

## 2024-05-02 PROCEDURE — 99233 SBSQ HOSP IP/OBS HIGH 50: CPT | Mod: FS,,, | Performed by: INTERNAL MEDICINE

## 2024-05-02 PROCEDURE — 25000003 PHARM REV CODE 250

## 2024-05-02 PROCEDURE — 25000003 PHARM REV CODE 250: Performed by: INTERNAL MEDICINE

## 2024-05-02 PROCEDURE — 82803 BLOOD GASES ANY COMBINATION: CPT

## 2024-05-02 PROCEDURE — 84100 ASSAY OF PHOSPHORUS: CPT

## 2024-05-02 PROCEDURE — 83735 ASSAY OF MAGNESIUM: CPT | Mod: 91 | Performed by: INTERNAL MEDICINE

## 2024-05-02 PROCEDURE — 83735 ASSAY OF MAGNESIUM: CPT

## 2024-05-02 PROCEDURE — 85025 COMPLETE CBC W/AUTO DIFF WBC: CPT

## 2024-05-02 RX ORDER — NOREPINEPHRINE BITARTRATE/D5W 4MG/250ML
0-3 PLASTIC BAG, INJECTION (ML) INTRAVENOUS CONTINUOUS
Status: DISCONTINUED | OUTPATIENT
Start: 2024-05-02 | End: 2024-05-03

## 2024-05-02 RX ORDER — MIDODRINE HYDROCHLORIDE 5 MG/1
5 TABLET ORAL EVERY 8 HOURS
Status: DISCONTINUED | OUTPATIENT
Start: 2024-05-02 | End: 2024-05-02

## 2024-05-02 RX ORDER — SODIUM CHLORIDE 9 MG/ML
INJECTION, SOLUTION INTRAVENOUS
Status: DISCONTINUED | OUTPATIENT
Start: 2024-05-02 | End: 2024-05-12 | Stop reason: HOSPADM

## 2024-05-02 RX ORDER — MIDODRINE HYDROCHLORIDE 5 MG/1
10 TABLET ORAL EVERY 8 HOURS
Status: DISCONTINUED | OUTPATIENT
Start: 2024-05-02 | End: 2024-05-02

## 2024-05-02 RX ORDER — SODIUM CHLORIDE 9 MG/ML
INJECTION, SOLUTION INTRAVENOUS ONCE
Status: COMPLETED | OUTPATIENT
Start: 2024-05-02 | End: 2024-05-02

## 2024-05-02 RX ORDER — MIDODRINE HYDROCHLORIDE 5 MG/1
10 TABLET ORAL EVERY 8 HOURS
Status: DISCONTINUED | OUTPATIENT
Start: 2024-05-02 | End: 2024-05-03

## 2024-05-02 RX ORDER — MIDODRINE HYDROCHLORIDE 2.5 MG/1
2.5 TABLET ORAL ONCE
Status: COMPLETED | OUTPATIENT
Start: 2024-05-02 | End: 2024-05-02

## 2024-05-02 RX ADMIN — FUROSEMIDE 80 MG: 10 INJECTION, SOLUTION INTRAVENOUS at 08:05

## 2024-05-02 RX ADMIN — ONDANSETRON 4 MG: 2 INJECTION INTRAMUSCULAR; INTRAVENOUS at 05:05

## 2024-05-02 RX ADMIN — MEXILETINE HYDROCHLORIDE 150 MG: 150 CAPSULE ORAL at 05:05

## 2024-05-02 RX ADMIN — FUROSEMIDE 80 MG: 10 INJECTION, SOLUTION INTRAVENOUS at 10:05

## 2024-05-02 RX ADMIN — HEPARIN SODIUM 5000 UNITS: 5000 INJECTION INTRAVENOUS; SUBCUTANEOUS at 09:05

## 2024-05-02 RX ADMIN — MIDODRINE HYDROCHLORIDE 2.5 MG: 2.5 TABLET ORAL at 05:05

## 2024-05-02 RX ADMIN — MEXILETINE HYDROCHLORIDE 150 MG: 150 CAPSULE ORAL at 08:05

## 2024-05-02 RX ADMIN — HEPARIN SODIUM 5000 UNITS: 5000 INJECTION INTRAVENOUS; SUBCUTANEOUS at 02:05

## 2024-05-02 RX ADMIN — NEPHROCAP 1 CAPSULE: 1 CAP ORAL at 08:05

## 2024-05-02 RX ADMIN — MUPIROCIN: 20 OINTMENT TOPICAL at 08:05

## 2024-05-02 RX ADMIN — HEPARIN SODIUM 5000 UNITS: 5000 INJECTION INTRAVENOUS; SUBCUTANEOUS at 05:05

## 2024-05-02 RX ADMIN — SODIUM CHLORIDE: 9 INJECTION, SOLUTION INTRAVENOUS at 12:05

## 2024-05-02 RX ADMIN — MIDODRINE HYDROCHLORIDE 10 MG: 5 TABLET ORAL at 09:05

## 2024-05-02 RX ADMIN — MIDODRINE HYDROCHLORIDE 10 MG: 5 TABLET ORAL at 11:05

## 2024-05-02 RX ADMIN — PRAVASTATIN SODIUM 40 MG: 40 TABLET ORAL at 08:05

## 2024-05-02 RX ADMIN — AMIODARONE HYDROCHLORIDE 400 MG: 200 TABLET ORAL at 08:05

## 2024-05-02 RX ADMIN — ALLOPURINOL 100 MG: 100 TABLET ORAL at 08:05

## 2024-05-02 RX ADMIN — ASPIRIN 81 MG: 81 TABLET, COATED ORAL at 10:05

## 2024-05-02 RX ADMIN — MIDODRINE HYDROCHLORIDE 2.5 MG: 2.5 TABLET ORAL at 08:05

## 2024-05-02 RX ADMIN — LEVOTHYROXINE SODIUM 150 MCG: 150 TABLET ORAL at 08:05

## 2024-05-02 NOTE — PROGRESS NOTES
05/02/24 1411   Vital Signs   Pulse 60   Resp 19   SpO2 (!) 91 %   BP (!) 90/51   MAP (mmHg) 64   Post-Hemodialysis Assessment   Rinseback Volume (mL) 250 mL   Blood Volume Processed (Liters) 30 L   Dialyzer Clearance Lightly streaked   Duration of Treatment 120 minutes   Additional Fluid Intake (mL) 500 mL   Total UF (mL) 1100 mL   Net Fluid Removal 600   Patient Response to Treatment well   Post-Hemodialysis Comments Blood returned, lines flushed, heparin instilled into cvc, lines capped and clamped

## 2024-05-02 NOTE — ASSESSMENT & PLAN NOTE
Creatine stable for now. BMP reviewed- noted Estimated Creatinine Clearance: 20.7 mL/min (A) (based on SCr of 3.6 mg/dL (H)). according to latest data. Based on current GFR, CKD stage is end stage.  Monitor UOP and serial BMP and adjust therapy as needed. Renally dose meds. Avoid nephrotoxic medications and procedures.    Seen by Nephrology, Trialysis placed for CRRT, tolerating

## 2024-05-02 NOTE — PROGRESS NOTES
Progress Note  General Surgery    Admit Date: 4/29/2024  S/P: Procedure(s) (LRB):  INSERTION, CATHETER, HEMODIALYSIS, DUAL LUMEN (N/A)    Post-operative Day:      Hospital Day: 4    SUBJECTIVE:     No acute events overnight.   OBJECTIVE:     Vital Signs (Most Recent)  Temp: 97.9 °F (36.6 °C) (05/02/24 0305)  Pulse: 63 (05/02/24 0845)  Resp: 20 (05/02/24 0845)  BP: (!) 85/54 (05/02/24 0845)  SpO2: (!) 92 % (05/02/24 0845)    Vital Signs Range (Last 24H):  Temp:  [97.7 °F (36.5 °C)-97.9 °F (36.6 °C)]   Pulse:  [59-66]   Resp:  [17-36]   BP: ()/(37-66)   SpO2:  [88 %-100 %]     I & O (Last 24H):  Intake/Output Summary (Last 24 hours) at 5/2/2024 0858  Last data filed at 5/2/2024 0854  Gross per 24 hour   Intake 1395.19 ml   Output 2114 ml   Net -718.81 ml       Physical Exam:  Gen: NAD   HEENT: NCAT  Pulm: unlabored, symmetrical   Abd: Soft, nttp. No rebound, guarding.       Laboratory:  Labs within the past 24 hours have been reviewed.    ASSESSMENT/PLAN:     Assessment/Plan:  OR for tunneled line tomorrow    Philip Perez M.D., F.A.C.S.  [facility] and General Surgery  [facility]

## 2024-05-02 NOTE — ASSESSMENT & PLAN NOTE
Creatine stable for now. BMP reviewed- noted Estimated Creatinine Clearance: 20.7 mL/min (A) (based on SCr of 3.6 mg/dL (H)). according to latest data. Based on current GFR, CKD stage is end stage.  Monitor UOP and serial BMP and adjust therapy as needed. Renally dose meds. Avoid nephrotoxic medications and procedures.    --see by Dr. Mata in ED, gen surg consulted for tunneled catheter placement in AM 4/30/24  --admit to ICU for CRRT once catheter placed-->tolerating HD will step down today  --Plan for tunneled catheter placement in am

## 2024-05-02 NOTE — PLAN OF CARE
Problem: Adult Inpatient Plan of Care  Goal: Plan of Care Review  Outcome: Progressing     Problem: Hemodialysis  Goal: Safe, Effective Therapy Delivery  Outcome: Progressing  Goal: Effective Tissue Perfusion  Outcome: Progressing  Goal: Absence of Infection Signs and Symptoms  Outcome: Progressing     HD complete today with 600cc removed. Remains off levophed gtt. Midodrine dose increased. No complaints of pain. Safety maintained. Plan for tunneled cath placement tomorrow; NPO at midnight. POC reviewed with pt and wife at bedside.

## 2024-05-02 NOTE — ASSESSMENT & PLAN NOTE
04/2024 TTE  Left Ventricle: The left ventricle is normal in size. Normal wall   thickness. Regional wall motion abnormalities present. See diagram for   wall motion findings. Septal motion is consistent with pacing. There is   mildly reduced systolic function with a visually estimated ejection   fraction of 40 - 45%. Grade III diastolic dysfunction.    Right Ventricle: Moderate to severe right ventricular enlargement.   Systolic function is reduced.TAPSE is 1.59 cm. Pacemaker lead present in   the ventricle.    Left Atrium: Left atrium is severely dilated. The left atrium volume   index is 71.4 mL/m2.    Right Atrium: Right atrium is severely dilated.    Aortic Valve: There is mild aortic valve sclerosis. There is mild to   moderate stenosis. Aortic valve area by VTI is 1.49 cm². Aortic valve peak   velocity is 1.43 m/s. Mean gradient is 5 mmHg. The dimensionless index is   0.52.    Mitral Valve: There is mild regurgitation.    Tricuspid Valve: There is mild to moderate regurgitation.    Pulmonary Artery: The estimated pulmonary artery systolic pressure is   64 mmHg.    IVC/SVC: Elevated venous pressure at 15 mmHg.      Holding BB and Entresto 2/2 hypotension- will resume if BP can tolerate  Diuresis per Nephrology given renal function   Mixed venous and CVP ordered

## 2024-05-02 NOTE — PROGRESS NOTES
The sw received a call from Zaira Lopez( with [facility]) [phone] ext. [phone] who states they work with [facility] [employer]. The sw gave her an update on the pt's d/c plan. Zaira asked the sw to give her name and contact info to the pt b/c she will f/u with the pt after he's discharged. The sw will pass the info along to the pt,so he can expect a call from Zaira after d/c.

## 2024-05-02 NOTE — ASSESSMENT & PLAN NOTE
Nephrology on board  CRRT yesterday  Uremic on admission  Continues to make urine and had good UO with Lasix 80 mg IV BID  Dc'd Entresto and BB given marginal BP

## 2024-05-02 NOTE — PLAN OF CARE
"  Care Plan    Pt AAOx4. Afebrile overnight. VSS with levo titrated off early AM.  Sats remained >90 on 4L NC. UOP minimal with urinal, dialysis pt. No reports of pain/SOB overnight. Pt reporting nausea early in shift given ice chips and resolved prn tums ordered. Frequent checks for safety done during shift. Spouse at bedside overnight. Report given to oncoming RN.      Neuro:  [place] Coma Scale  Best Eye Response: 4-->(E4) spontaneous  Best Motor Response: 6-->(M6) obeys commands  Best Verbal Response: 5-->(V5) oriented  Sarabjit Coma Scale Score: 15  Assessment Qualifiers: patient not sedated/intubated  Pupil PERRLA: yes  24 hr Temp:  [97.4 °F (36.3 °C)-97.9 °F (36.6 °C)]      CV:  Rhythm: paced rhythm  DVT prophylaxis: VTE Required Core Measure: Pharmacological prophylaxis initiated/maintained    Resp:     Oxygen Concentration (%): 28    GI/:  GI prophylaxis: no  Diet/Nutrition Received: low saturated fat/low cholesterol  Last Bowel Movement: 04/29/24  Voiding Characteristics: patient on CRRT, oliguria   Intake/Output Summary (Last 24 hours) at 5/2/2024 0551  Last data filed at 5/2/2024 0400  Gross per 24 hour   Intake 1487.53 ml   Output 2339 ml   Net -851.47 ml       Labs/Accuchecks:  Recent Labs   Lab 05/02/24  0506   WBC 9.00   RBC 5.52   HGB 14.2   HCT 41.5         Recent Labs   Lab 04/29/24  1707 04/30/24  0505 05/02/24  0506   *   < > 136   K 4.3   < > 4.4   CO2 21*   < > 25   CL 97   < > 101   *   < > 86*   CREATININE 6.7*   < > 4.6*   ALKPHOS 160*  --   --    ALT 39  --   --    AST 42*  --   --    BILITOT 2.4*  --   --     < > = values in this interval not displayed.    No results for input(s): "PROTIME", "INR", "APTT", "HEPANTIXA" in the last 168 hours.   Recent Labs   Lab 04/29/24  1707   TROPONINI 0.040*       Electrolytes: N/A - electrolytes WDL  Accuchecks: none    Gtts/LDAs:  Current Facility-Administered Medications   Medication Dose Route Frequency Last Rate Last Admin    " sodium chloride 0.9%   Intravenous Continuous   Stopped at 05/01/24 1723    heparin (porcine) in 5 % dex  500 Units/hr Intravenous Continuous   Stopped at 05/01/24 1723    NORepinephrine bitartrate-D5W  0-3 mcg/kg/min Intravenous Continuous   Stopped at 05/02/24 0340       Lines/Drains/Airways       Central Venous Catheter Line  Duration             Trialysis (Dialysis) Catheter 04/30/24 0852 right internal jugular 1 day              Peripheral Intravenous Line  Duration                  Peripheral IV - Single Lumen 04/29/24 1707 20 G Posterior;Right Forearm 2 days         Peripheral IV - Single Lumen 04/29/24 2035 20 G Anterior;Left Forearm 2 days                    Skin/Wounds     Wounds: No  Wound care consulted: No    Consults  Consults (From admission, onward)          Status Ordering Provider     Inpatient consult to Social Work/Case Management  Once        Provider:  (Not yet assigned)    Completed SENIA SARAVIA     Inpatient consult to Registered Dietitian/Nutritionist  Once        Provider:  (Not yet assigned)    Completed SENIA SARAVIA     Inpatient consult to Nephrology-[facility] (Adolfo Mcintyre Nimkevych)  Once        Provider:  Chyna Booth MD    Acknowledged SENIA SARAVIA     Inpatient consult to Cardiology-[facility]  Once        Provider:  Juan Roque MD    Completed CHYNA BOOTH     Inpatient consult to General Surgery  Once        Provider:  Philip Perez MD    Completed CHYNA BOOTH

## 2024-05-02 NOTE — PROGRESS NOTES
[facility] - Intensive Care  Cardiology  Progress Note    Patient Name: Ar Sharma  MRN: [id number]  Admission Date: 4/29/2024  Hospital Length of Stay: 3 days  Code Status: Full Code   Attending Physician: Mamta Hassan MD   Primary Care Physician: Jc Elliott MD  Expected Discharge Date:   Principal Problem:Chronic kidney disease (CKD), stage V    Subjective:     Hospital Course:   04/30/2024 Per HPI   05/01/2024 Patient on CRRT, No CP. BP marginal. On Midodrine. EF 40-45% with RV dysfunction and BIAE.   05/02/2024 BP remains marginal, Midodrine up titrated. 1500 cc intake, 1775 UO + 564 cc out with CRRT. Cr 4.6. CVP and mixed venous ordered this AM. Patient denies SOB and reports feeling at baseline.     Past Medical History:   Diagnosis Date    Cardiomyopathy     CKD (chronic kidney disease) stage 4, GFR 15-29 ml/min     Congestive heart failure (CHF) 2015    COPD (chronic obstructive pulmonary disease)     Coronary artery disease     Edema     Essential (primary) hypertension 05/18/2022    Formatting of this note might be different from the original. Converted from [employer]ity: Description - ESSENTIAL HYPERTENSION, BENIGN    Heart attack     HLD (hyperlipidemia)     Hypertension     Hyperuricemia     Hypocalcemia     Renal cyst, left     Secondary hyperparathyroidism     Thyroid disease     V tach     Vitamin D deficiency        Past Surgical History:   Procedure Laterality Date    ablations  03/05/2018    albations  02/01/2018    COLONOSCOPY N/A 12/07/2018    Procedure: COLONOSCOPY/suprep;  Surgeon: Ananya Morillo MD;  Location: [facility] END[facility];  Service: Endoscopy;  Laterality: N/A;    defibulater N/A 2016    ESOPHAGOGASTRODUODENOSCOPY N/A 12/07/2018    Procedure: EGD (ESOPHAGOGASTRODUODENOSCOPY);  Surgeon: Ananya Morillo MD;  Location: [facility] ENDO;  Service: Endoscopy;  Laterality: N/A;    JOINT REPLACEMENT Bilateral 10/2016    knees, bilat    LEFT HEART CATHETERIZATION N/A 12/16/2020    Procedure: Left  heart cath;  Surgeon: Shahram Stewart MD;  Location: [facility] CATH LAB/EP;  Service: Cardiology;  Laterality: N/A;    LEFT HEART CATHETERIZATION Left 9/27/2022    Procedure: Left heart cath;  Surgeon: Juan Roque MD;  Location: [facility] CATH LAB/EP;  Service: Cardiology;  Laterality: Left;    REPLACEMENT OF IMPLANTABLE CARDIOVERTER-DEFIBRILLATOR (ICD) GENERATOR Left 1/24/2023    Procedure: REPLACEMENT, ICD GENERATOR;  Surgeon: River Tenorio MD;  Location: [facility] EP LAB;  Service: Cardiology;  Laterality: Left;  ANTHONY, CRTD gen chg, MDT, MAC, DM, 3prep       Review of patient's allergies indicates:   Allergen Reactions    Lokelma [sodium zirconium cyclosilicate] Other (See Comments)     Fluid retention, weight gain, CHF excerebration, severe constipation    Atorvastatin Other (See Comments)     Joint pain    Jardiance [empagliflozin] Other (See Comments)     Chest pains, significant weight loss, lower blood pressure       Current Facility-Administered Medications   Medication Dose Route Frequency Provider Last Rate Last Admin    0.9%  NaCl infusion (CRRT USE ONLY)   Intravenous Continuous Chyna Mata MD   Stopped at 05/01/24 1723    0.9%  NaCl infusion   Intravenous PRN Chyna Mata MD        0.9%  NaCl infusion   Intravenous Once Chyna Mata MD        acetaminophen tablet 650 mg  650 mg Oral Q4H PRN Jaida Álvarez NP        allopurinoL tablet 100 mg  100 mg Oral Daily Jaida Álvarez NP   100 mg at 05/02/24 0852    alteplase injection 4 mg  4 mg Intra-Catheter Once Chyna Mata MD        amiodarone tablet 400 mg  400 mg Oral Daily Jaida Álvarez NP   400 mg at 05/02/24 0852    aspirin EC tablet 81 mg  81 mg Oral Daily Jaida Álvarez NP   81 mg at 05/02/24 1012    calcium carbonate 200 mg calcium (500 mg) chewable tablet 1,000 mg  1,000 mg Oral TID PRN Alison Mccurdy MD        ergocalciferol capsule 50,000 Units  50,000 Units Oral Q7 Days Jaida Álvarez NP   50,000 Units at  04/30/24 0950    furosemide injection 80 mg  80 mg Intravenous Q12H Jaida Álvarez NP   80 mg at 05/02/24 1012    heparin (porcine) injection 2,400 Units  2,400 Units Intra-Catheter PRN Alexandro Silveira MD   2,400 Units at 05/01/24 1735    heparin (porcine) injection 5,000 Units  5,000 Units Subcutaneous Q8H Jaida Álvarez NP   5,000 Units at 05/02/24 0508    heparin 25,000 units in dextrose 5% 250 mL (100 units/mL) infusion (heparin infusion - NO NOMOGRAM)  500 Units/hr Intravenous Continuous Chyna Mata MD   Stopped at 05/01/24 1723    HYDROcodone-acetaminophen 5-325 mg per tablet 1 tablet  1 tablet Oral Q4H PRN Jaida Álvarez NP        levothyroxine tablet 150 mcg  150 mcg Oral Daily Jaida Álvarez NP   150 mcg at 05/02/24 0852    mexiletine capsule 150 mg  150 mg Oral BID  Luis Alfredo Leiva NP   150 mg at 05/02/24 0853    midodrine tablet 10 mg  10 mg Oral Q8H Mamta Hassan MD   10 mg at 05/02/24 1149    mupirocin 2 % ointment   Nasal BID Mamta Hassan MD   Given by Other at 05/02/24 0854    NORepinephrine 4 mg in dextrose 5% 250 mL infusion (premix)  0-3 mcg/kg/min Intravenous Continuous Chyna Mata MD   Stopped at 05/02/24 0340    ondansetron injection 4 mg  4 mg Intravenous Q8H PRN Jaida Álvarez NP   4 mg at 05/01/24 1827    pravastatin tablet 40 mg  40 mg Oral Daily Jaida Álvarez NP   40 mg at 05/02/24 0852    sodium chloride 0.9% bolus 250 mL 250 mL  250 mL Intravenous PRN Chyna Mata MD        sodium chloride 0.9% flush 10 mL  10 mL Intravenous PRN Jaida Álvarez NP        sodium chloride 0.9% flush 10 mL  10 mL Intravenous PRN Jaida Álvarez NP        vitamin renal formula (B-complex-vitamin c-folic acid) 1 mg per capsule 1 capsule  1 capsule Oral Daily Chyna Mata MD   1 capsule at 05/02/24 0852     Family History       Problem Relation (Age of Onset)    Alcohol abuse Father    Arthritis Sister    Breast cancer Mother    Cancer Mother     Hypertension Mother    Kidney disease Father    No Known Problems Brother, Daughter, Son    Stroke Mother          Tobacco Use    Smoking status: Former     Current packs/day: 0.00     Average packs/day: 1 pack/day for 33.2 years (33.2 ttl pk-yrs)     Types: Cigarettes     Start date: 1979     Quit date: 3/3/2012     Years since quittin.1     Passive exposure: Past    Smokeless tobacco: Never   Substance and Sexual Activity    Alcohol use: Yes     Comment: rare    Drug use: No    Sexual activity: Yes     Partners: Female     Review of Systems   Constitutional: Negative for diaphoresis.   HENT: Negative.     Eyes: Negative.    Cardiovascular:  Negative for chest pain, near-syncope, orthopnea, palpitations, paroxysmal nocturnal dyspnea and syncope.   Respiratory:  Negative for shortness of breath.    Endocrine: Negative.    Hematologic/Lymphatic: Negative.    Skin: Negative.    Musculoskeletal: Negative.    Gastrointestinal:  Positive for nausea and vomiting.   Genitourinary: Negative.    Neurological: Negative.    Psychiatric/Behavioral: Negative.     Allergic/Immunologic: Negative.      Objective:     Vital Signs (Most Recent):  Temp: 97.1 °F (36.2 °C) (24 1145)  Pulse: 60 (24 1145)  Resp: 19 (24 1145)  BP: (!) 90/54 (24 1145)  SpO2: (!) 92 % (24 1145) Vital Signs (24h Range):  Temp:  [97 °F (36.1 °C)-97.9 °F (36.6 °C)] 97.1 °F (36.2 °C)  Pulse:  [59-66] 60  Resp:  [17-36] 19  SpO2:  [85 %-100 %] 92 %  BP: ()/(45-66) 90/54     Weight: 76.5 kg (168 lb 10.4 oz)  Body mass index is 24.91 kg/m².    SpO2: (!) 92 %         Intake/Output Summary (Last 24 hours) at 2024 1157  Last data filed at 2024 0900  Gross per 24 hour   Intake 1369.25 ml   Output 1839 ml   Net -469.75 ml       Lines/Drains/Airways       Central Venous Catheter Line  Duration             Trialysis (Dialysis) Catheter 24 0852 right internal jugular 2 days              Peripheral Intravenous Line   "Duration                  Peripheral IV - Single Lumen 04/29/24 1707 20 G Posterior;Right Forearm 2 days         Peripheral IV - Single Lumen 04/29/24 2035 20 G Anterior;Left Forearm 2 days                     Physical Exam  Constitutional:       General: He is not in acute distress.     Appearance: He is not diaphoretic.   HENT:      Head: Atraumatic.   Eyes:      General:         Right eye: No discharge.         Left eye: No discharge.   Cardiovascular:      Rate and Rhythm: Normal rate and regular rhythm.   Pulmonary:      Effort: Pulmonary effort is normal.      Breath sounds: No rales.   Abdominal:      General: Bowel sounds are normal.      Palpations: Abdomen is soft.   Skin:     General: Skin is warm and dry.   Neurological:      Mental Status: He is alert and oriented to person, place, and time.   Psychiatric:         Mood and Affect: Mood normal.         Behavior: Behavior normal.         Thought Content: Thought content normal.         Judgment: Judgment normal.          Significant Labs: BMP:   Recent Labs   Lab 05/01/24  1411 05/01/24  2228 05/02/24  0506    102 117*    136 136   K 4.2 4.6 4.4    101 101   CO2 23 23 25   BUN 83* 79* 86*   CREATININE 4.7* 4.4* 4.6*   CALCIUM 8.5* 8.4* 8.3*   MG 2.1 2.1 2.2   , CMP   Recent Labs   Lab 05/01/24  1411 05/01/24 2228 05/02/24  0506    136 136   K 4.2 4.6 4.4    101 101   CO2 23 23 25    102 117*   BUN 83* 79* 86*   CREATININE 4.7* 4.4* 4.6*   CALCIUM 8.5* 8.4* 8.3*   ALBUMIN 2.9* 2.9* 2.8*   ANIONGAP 13 12 10   , CBC   Recent Labs   Lab 05/01/24  0139 05/02/24  0506   WBC 11.41 9.00   HGB 14.5 14.2   HCT 41.4 41.5   * 410   , INR No results for input(s): "INR", "PROTIME" in the last 48 hours., Lipid Panel   No results for input(s): "CHOL", "HDL", "LDLCALC", "TRIG", "CHOLHDL" in the last 48 hours.  , Troponin   No results for input(s): "TROPONINI" in the last 48 hours.  , and All pertinent lab results from the last " 24 hours have been reviewed.    Significant Imaging: Echocardiogram: Transthoracic echo (TTE) complete (Cupid Only):   Results for orders placed or performed during the hospital encounter of 04/29/24   Echo Saline Bubble? No; Ultrasound enhancing contrast? No   Result Value Ref Range    BSA 1.94 m2    LVOT stroke volume 39.81 cm3    LVIDd 5.03 3.5 - 6.0 cm    LV Systolic Volume 67.63 mL    LV Systolic Volume Index 35.0 mL/m2    LVIDs 3.94 2.1 - 4.0 cm    LV Diastolic Volume 119.67 mL    LV Diastolic Volume Index 62.01 mL/m2    IVS 0.83 0.6 - 1.1 cm    LVOT diameter 1.91 cm    LVOT area 2.9 cm2    FS 22 (A) 28 - 44 %    Left Ventricle Relative Wall Thickness 0.39 cm    Posterior Wall 0.98 0.6 - 1.1 cm    LV mass 160.97 g    LV Mass Index 83 g/m2    MV Peak E Marvin 0.78 m/s    TDI LATERAL 0.06 m/s    TDI SEPTAL 0.05 m/s    E/E' ratio 14.18 m/s    MV Peak A Marvin 0.27 m/s    TR Max Marvin 3.49 m/s    E/A ratio 2.89     E wave deceleration time 210.39 msec    LV SEPTAL E/E' RATIO 15.60 m/s    LV LATERAL E/E' RATIO 13.00 m/s    PV Peak S Marvin 0.27 m/s    PV Peak D Marvin 0.38 m/s    Pulm vein S/D ratio 0.71     LVOT peak marvin 0.74 m/s    Left Ventricular Outflow Tract Mean Velocity 0.58 cm/s    Left Ventricular Outflow Tract Mean Gradient 1.44 mmHg    RVDD 6.60 cm    RV S' 5.10 cm/s    TAPSE 1.59 cm    RV/LV Ratio 1.31 cm    LA size 5.04 cm    Left Atrium Minor Axis 7.08 cm    Left Atrium Major Axis 7.93 cm    LA volume (mod) 97.12 cm3    LA Volume Index (Mod) 50.3 mL/m2    RA Major Axis 7.79 cm    RA Width 4.97 cm    AV mean gradient 5 mmHg    AV peak gradient 8 mmHg    Ao peak marvin 1.43 m/s    Ao VTI 26.70 cm    LVOT peak VTI 13.90 cm    AV valve area 1.49 cm²    AV Velocity Ratio 0.52     AV index (prosthetic) 0.52     MAGALI by Velocity Ratio 1.48 cm²    Mr max marvin 3.64 m/s    MV peak gradient 3 mmHg    MV stenosis pressure 1/2 time 61.01 ms    MV valve area p 1/2 method 3.61 cm2    MV valve area by continuity eq 1.62 cm2    MV VTI  24.5 cm    Triscuspid Valve Regurgitation Peak Gradient 49 mmHg    PV PEAK VELOCITY 0.64 m/s    PV peak gradient 2 mmHg    Sinus 3.16 cm    STJ 2.37 cm    Ascending aorta 2.38 cm    IVC diameter 3.51 cm    Mean e' 0.06 m/s    ZLVIDS 1.25     ZLVIDD -0.83     LA Volume Index 71.4 mL/m2    LA volume 137.81 cm3    RV-benítez mid d 5.4 cm    LA WIDTH 4.3 cm    TV resting pulmonary artery pressure 64 mmHg    RV TB RVSP 18 mmHg    Est. RA pres 15 mmHg    Narrative      Left Ventricle: The left ventricle is normal in size. Normal wall   thickness. Regional wall motion abnormalities present. See diagram for   wall motion findings. Septal motion is consistent with pacing. There is   mildly reduced systolic function with a visually estimated ejection   fraction of 40 - 45%. Grade III diastolic dysfunction.    Right Ventricle: Moderate to severe right ventricular enlargement.   Systolic function is reduced.TAPSE is 1.59 cm. Pacemaker lead present in   the ventricle.    Left Atrium: Left atrium is severely dilated. The left atrium volume   index is 71.4 mL/m2.    Right Atrium: Right atrium is severely dilated.    Aortic Valve: There is mild aortic valve sclerosis. There is mild to   moderate stenosis. Aortic valve area by VTI is 1.49 cm². Aortic valve peak   velocity is 1.43 m/s. Mean gradient is 5 mmHg. The dimensionless index is   0.52.    Mitral Valve: There is mild regurgitation.    Tricuspid Valve: There is mild to moderate regurgitation.    Pulmonary Artery: The estimated pulmonary artery systolic pressure is   64 mmHg.    IVC/SVC: Elevated venous pressure at 15 mmHg.       Assessment and Plan:     Brief HPI: Patient seen this morning on rounds without cardiac complaint. BP marginal, Midodrine up titrated.     * Chronic kidney disease (CKD), stage V  Nephrology on board  CRRT yesterday  Uremic on admission  Continues to make urine and had good UO with Lasix 80 mg IV BID  Dc'd Entresto and BB given marginal  BP    Hypotension  SBP 80s  Holding BB and Entresto  On Midodrine- up titrated  Will resume GDMT as tolerated     HFrEF (heart failure with reduced ejection fraction)  04/2024 TTE  Left Ventricle: The left ventricle is normal in size. Normal wall   thickness. Regional wall motion abnormalities present. See diagram for   wall motion findings. Septal motion is consistent with pacing. There is   mildly reduced systolic function with a visually estimated ejection   fraction of 40 - 45%. Grade III diastolic dysfunction.    Right Ventricle: Moderate to severe right ventricular enlargement.   Systolic function is reduced.TAPSE is 1.59 cm. Pacemaker lead present in   the ventricle.    Left Atrium: Left atrium is severely dilated. The left atrium volume   index is 71.4 mL/m2.    Right Atrium: Right atrium is severely dilated.    Aortic Valve: There is mild aortic valve sclerosis. There is mild to   moderate stenosis. Aortic valve area by VTI is 1.49 cm². Aortic valve peak   velocity is 1.43 m/s. Mean gradient is 5 mmHg. The dimensionless index is   0.52.    Mitral Valve: There is mild regurgitation.    Tricuspid Valve: There is mild to moderate regurgitation.    Pulmonary Artery: The estimated pulmonary artery systolic pressure is   64 mmHg.    IVC/SVC: Elevated venous pressure at 15 mmHg.      Holding BB and Entresto 2/2 hypotension- will resume if BP can tolerate  Diuresis per Nephrology given renal function   Mixed venous and CVP ordered         Cardiac resynchronization therapy defibrillator (CRT-D) in place  Normal device function on tele review     Mixed hyperlipidemia    Continue statin     V-tach  Continue Amio and Mexiletine   Paced on tele without VT         VTE Risk Mitigation (From admission, onward)           Ordered     heparin (porcine) injection 2,400 Units  As needed (PRN)         04/30/24 2223     heparin 25,000 units in dextrose 5% 250 mL (100 units/mL) infusion (heparin infusion - NO NOMOGRAM)  Continuous          04/30/24 1338     heparin (porcine) injection 5,000 Units  Every 8 hours         04/29/24 1945     IP VTE HIGH RISK PATIENT  Once         04/29/24 1945     Place sequential compression device  Until discontinued         04/29/24 1945                    Luis Alfredo Leiva, NP  Cardiology  [facility] - Intensive Care

## 2024-05-02 NOTE — PROGRESS NOTES
[facility] Medicine  Progress Note    Patient Name: Ar Sharma  MRN: [id number]  Patient Class: IP- Inpatient   Admission Date: 4/29/2024  Length of Stay: 3 days  Attending Physician: Mamta Hassan MD  Primary Care Provider: Jc Elliott MD        Subjective:     Principal Problem:Chronic kidney disease (CKD), stage V        HPI:  Ar Sharma is a 63M with PMHx of ESRD, HFrEF, HTN, NICM, COPD, VT s/p ICD placement, and HLD who presented to [facility] ED with CC progressively worse SOB over the last two months. He was recently diagnosed with ESRD and has been trying diet modification at home. He endorses SINGH, dizziness, frequent intermittent nausea and one episode of vomiting during this time. He denies CP, syncope, dysuria, fever or chills.     ED workup significant for: , Cr 6.7, Phos 5, Tbili 2.4, BNP 3204, trop 0.040, vit D 24, .6, and CXR: cardiomegaly w/ pulmonary vascular congestion consistent w/ pulmonary edema. All other components of CBC and chemistries were unremarkable.       Patient was seen by Dr. Mata in ED. Emergent HD not indicated.     Overview/Hospital Course:  4/30 Tolerating HD through central line, SOB is improved  5/1/24 On pressors but tolerating CRRT  5/2 tolerating HD, off of pressors, stepping down    Interval History: Weaned off of pressors    Review of Systems   Constitutional:  Negative for chills, fatigue and fever.   HENT: Negative.     Eyes: Negative.    Respiratory:  Negative for cough, chest tightness, shortness of breath and wheezing.    Cardiovascular:  Negative for chest pain, palpitations and leg swelling.   Gastrointestinal:  Negative for abdominal distention, abdominal pain, blood in stool, diarrhea, nausea and vomiting.   Endocrine: Positive for cold intolerance.   Genitourinary:  Positive for decreased urine volume and difficulty urinating. Negative for flank pain.   Musculoskeletal:  Positive for arthralgias.   Skin:  Negative.    Allergic/Immunologic: Negative.    Neurological:  Positive for weakness. Negative for dizziness and light-headedness.   Hematological: Negative.    Psychiatric/Behavioral: Negative.       Objective:     Vital Signs (Most Recent):  Temp: 97.1 °F (36.2 °C) (05/02/24 1145)  Pulse: 70 (05/02/24 1700)  Resp: (!) 21 (05/02/24 1700)  BP: (!) 86/59 (05/02/24 1700)  SpO2: 98 % (05/02/24 1700) Vital Signs (24h Range):  Temp:  [97 °F (36.1 °C)-97.9 °F (36.6 °C)] 97.1 °F (36.2 °C)  Pulse:  [59-70] 70  Resp:  [16-35] 21  SpO2:  [85 %-100 %] 98 %  BP: ()/(45-66) 86/59     Weight: 76.5 kg (168 lb 10.4 oz)  Body mass index is 24.91 kg/m².    Intake/Output Summary (Last 24 hours) at 5/2/2024 1726  Last data filed at 5/2/2024 1645  Gross per 24 hour   Intake 1368.94 ml   Output 2635 ml   Net -1266.06 ml         Physical Exam  Vitals and nursing note reviewed.   Constitutional:       General: He is not in acute distress.     Appearance: He is ill-appearing.   HENT:      Head: Normocephalic and atraumatic.      Nose: Nose normal.      Mouth/Throat:      Mouth: Mucous membranes are moist.      Pharynx: Oropharynx is clear.   Eyes:      Extraocular Movements: Extraocular movements intact.      Conjunctiva/sclera: Conjunctivae normal.      Pupils: Pupils are equal, round, and reactive to light.   Neck:      Comments: Central line in R side of neck  Cardiovascular:      Rate and Rhythm: Normal rate and regular rhythm.      Pulses: Normal pulses.      Heart sounds: Normal heart sounds. No murmur heard.     No friction rub. No gallop.   Pulmonary:      Effort: Pulmonary effort is normal. No respiratory distress.      Breath sounds: Normal breath sounds. No wheezing, rhonchi or rales.   Abdominal:      General: Bowel sounds are normal. There is no distension.      Palpations: Abdomen is soft.      Tenderness: There is no abdominal tenderness. There is no right CVA tenderness, left CVA tenderness or guarding.    Genitourinary:     Comments: deferred  Musculoskeletal:         General: Normal range of motion.      Cervical back: Normal range of motion and neck supple.   Skin:     General: Skin is warm and dry.      Capillary Refill: Capillary refill takes less than 2 seconds.   Neurological:      General: No focal deficit present.      Mental Status: He is alert and oriented to person, place, and time. Mental status is at baseline.   Psychiatric:         Mood and Affect: Mood normal.         Behavior: Behavior normal.         Thought Content: Thought content normal.         Judgment: Judgment normal.             Significant Labs: All pertinent labs within the past 24 hours have been reviewed.  Blood Culture:   Recent Labs   Lab 05/01/24  1410 05/01/24  1411   LABBLOO No Growth to date No Growth to date     CBC:   Recent Labs   Lab 05/01/24  0139 05/02/24  0506   WBC 11.41 9.00   HGB 14.5 14.2   HCT 41.4 41.5   * 410     CMP:   Recent Labs   Lab 05/01/24  2228 05/02/24  0506 05/02/24  1426    136 136   K 4.6 4.4 4.2    101 102   CO2 23 25 23    117* 82   BUN 79* 86* 61*   CREATININE 4.4* 4.6* 3.6*   CALCIUM 8.4* 8.3* 8.8   ALBUMIN 2.9* 2.8* 2.9*   ANIONGAP 12 10 11       Significant Imaging: I have reviewed all pertinent imaging results/findings within the past 24 hours.    Assessment/Plan:      * Chronic kidney disease (CKD), stage V  Creatine stable for now. BMP reviewed- noted Estimated Creatinine Clearance: 20.7 mL/min (A) (based on SCr of 3.6 mg/dL (H)). according to latest data. Based on current GFR, CKD stage is end stage.  Monitor UOP and serial BMP and adjust therapy as needed. Renally dose meds. Avoid nephrotoxic medications and procedures.    --see by Dr. Mata in ED, gen surg consulted for tunneled catheter placement in AM 4/30/24  --admit to ICU for CRRT once catheter placed-->tolerating HD will step down today  --Plan for tunneled catheter placement in am    Hypotension  Weaned off of  pressors  Added midodrine  Goal MAP >65  Step down to floor    ESRD (end stage renal disease)  Creatine stable for now. BMP reviewed- noted Estimated Creatinine Clearance: 20.7 mL/min (A) (based on SCr of 3.6 mg/dL (H)). according to latest data. Based on current GFR, CKD stage is end stage.  Monitor UOP and serial BMP and adjust therapy as needed. Renally dose meds. Avoid nephrotoxic medications and procedures.    Seen by Nephrology, Trialysis placed for CRRT, tolerating    Pulmonary emphysema, unspecified emphysema type  Patient's COPD is controlled currently.  Patient is currently off COPD Pathway. Continue scheduled inhalers Supplemental oxygen and monitor respiratory status closely.     Non-ischemic cardiomyopathy  --chronic, noted      Primary hypertension  Chronic, controlled. Latest blood pressure and vitals reviewed-     Temp:  [97.8 °F (36.6 °C)]   Pulse:  [60-81]   Resp:  [18-29]   BP: ()/(51-66)   SpO2:  [85 %-93 %] .   Home meds for hypertension were reviewed and noted below.   Hypertension Medications               furosemide (LASIX) 80 MG tablet Take 1 tablet (80 mg total) by mouth 2 (two) times daily. If increased 3 pounds in a day or 5 pounds in a week, take an extra dose of lasix 80mg until those pounds are lost.    metoprolol succinate (TOPROL-XL) 25 MG 24 hr tablet TAKE 1 TABLET BY MOUTH EVERY DAY    nitroGLYCERIN (NITROSTAT) 0.4 MG SL tablet Place 1 tablet (0.4 mg total) under the tongue every 5 (five) minutes as needed for Chest pain.    sacubitriL-valsartan (ENTRESTO) 24-26 mg per tablet Take 1 tablet by mouth 2 (two) times daily.            While in the hospital, will manage blood pressure as follows; Adjust home antihypertensive regimen as follows- hold home bp medications in the setting of hypotension requiring CRRT    Will utilize p.r.n. blood pressure medication only if patient's blood pressure greater than 180/110 and he develops symptoms such as worsening chest pain or shortness  of breath.    HFrEF (heart failure with reduced ejection fraction)  Congestive Heart Failure  Patient presents for re-evaluation of congestive heart failure. Patient's current complaints are dyspnea, fatigue, near-syncope, and orthopnea. He denies chest pain, claudication, exertional chest pressure/discomfort, and lower extremity edema. He states he is compliant most of the time with his medications. He states he is compliant all of the time with his diet.    --last TTE in 2023 shows EF 40%. Will repeat this admission  --on home lasix. Will convert to higher IV dosage  --will hold troprol in setting of hypotension requiring CRRT  --on CHF pathway    Cardiac resynchronization therapy defibrillator (CRT-D) in place        Mixed hyperlipidemia  --continue home medications      Iron deficiency anemia  Patient's anemia is currently controlled. Has not received any PRBCs to date. Etiology likely d/t Iron deficiency and chronic disease due to ESRD  Current CBC reviewed-   Lab Results   Component Value Date    HGB 15.7 04/29/2024    HCT 44.8 04/29/2024     Monitor serial CBC and transfuse if patient becomes hemodynamically unstable, symptomatic or H/H drops below 7/21.    Hypothyroidism  --continue home synthroid    V-tach  --hx of AICD placement, currently 100% paced   --continue home antiarrhythmic medications  --cardiac monitoring          VTE Risk Mitigation (From admission, onward)           Ordered     heparin (porcine) injection 2,400 Units  As needed (PRN)         04/30/24 2223     heparin 25,000 units in dextrose 5% 250 mL (100 units/mL) infusion (heparin infusion - NO NOMOGRAM)  Continuous         04/30/24 1338     heparin (porcine) injection 5,000 Units  Every 8 hours         04/29/24 1945     IP VTE HIGH RISK PATIENT  Once         04/29/24 1945     Place sequential compression device  Until discontinued         04/29/24 1945                    Discharge Planning   SLAVA:      Code Status: Full Code   Is the  patient medically ready for discharge?:     Reason for patient still in hospital (select all that apply): Patient trending condition and Laboratory test  Discharge Plan A: Home with family            Critical care time spent on the evaluation and treatment of severe organ dysfunction, review of pertinent labs and imaging studies, discussions with consulting providers and discussions with patient/family: 34 minutes.      Mamta Hassan MD  Department of Hospital Medicine   [place] - Intensive Care

## 2024-05-02 NOTE — SUBJECTIVE & OBJECTIVE
Interval History: Weaned off of pressors    Review of Systems   Constitutional:  Negative for chills, fatigue and fever.   HENT: Negative.     Eyes: Negative.    Respiratory:  Negative for cough, chest tightness, shortness of breath and wheezing.    Cardiovascular:  Negative for chest pain, palpitations and leg swelling.   Gastrointestinal:  Negative for abdominal distention, abdominal pain, blood in stool, diarrhea, nausea and vomiting.   Endocrine: Positive for cold intolerance.   Genitourinary:  Positive for decreased urine volume and difficulty urinating. Negative for flank pain.   Musculoskeletal:  Positive for arthralgias.   Skin: Negative.    Allergic/Immunologic: Negative.    Neurological:  Positive for weakness. Negative for dizziness and light-headedness.   Hematological: Negative.    Psychiatric/Behavioral: Negative.       Objective:     Vital Signs (Most Recent):  Temp: 97.1 °F (36.2 °C) (05/02/24 1145)  Pulse: 70 (05/02/24 1700)  Resp: (!) 21 (05/02/24 1700)  BP: (!) 86/59 (05/02/24 1700)  SpO2: 98 % (05/02/24 1700) Vital Signs (24h Range):  Temp:  [97 °F (36.1 °C)-97.9 °F (36.6 °C)] 97.1 °F (36.2 °C)  Pulse:  [59-70] 70  Resp:  [16-35] 21  SpO2:  [85 %-100 %] 98 %  BP: ()/(45-66) 86/59     Weight: 76.5 kg (168 lb 10.4 oz)  Body mass index is 24.91 kg/m².    Intake/Output Summary (Last 24 hours) at 5/2/2024 1726  Last data filed at 5/2/2024 1645  Gross per 24 hour   Intake 1368.94 ml   Output 2635 ml   Net -1266.06 ml         Physical Exam  Vitals and nursing note reviewed.   Constitutional:       General: He is not in acute distress.     Appearance: He is ill-appearing.   HENT:      Head: Normocephalic and atraumatic.      Nose: Nose normal.      Mouth/Throat:      Mouth: Mucous membranes are moist.      Pharynx: Oropharynx is clear.   Eyes:      Extraocular Movements: Extraocular movements intact.      Conjunctiva/sclera: Conjunctivae normal.      Pupils: Pupils are equal, round, and reactive to  light.   Neck:      Comments: Central line in R side of neck  Cardiovascular:      Rate and Rhythm: Normal rate and regular rhythm.      Pulses: Normal pulses.      Heart sounds: Normal heart sounds. No murmur heard.     No friction rub. No gallop.   Pulmonary:      Effort: Pulmonary effort is normal. No respiratory distress.      Breath sounds: Normal breath sounds. No wheezing, rhonchi or rales.   Abdominal:      General: Bowel sounds are normal. There is no distension.      Palpations: Abdomen is soft.      Tenderness: There is no abdominal tenderness. There is no right CVA tenderness, left CVA tenderness or guarding.   Genitourinary:     Comments: deferred  Musculoskeletal:         General: Normal range of motion.      Cervical back: Normal range of motion and neck supple.   Skin:     General: Skin is warm and dry.      Capillary Refill: Capillary refill takes less than 2 seconds.   Neurological:      General: No focal deficit present.      Mental Status: He is alert and oriented to person, place, and time. Mental status is at baseline.   Psychiatric:         Mood and Affect: Mood normal.         Behavior: Behavior normal.         Thought Content: Thought content normal.         Judgment: Judgment normal.             Significant Labs: All pertinent labs within the past 24 hours have been reviewed.  Blood Culture:   Recent Labs   Lab 05/01/24  1410 05/01/24  1411   LABBLOO No Growth to date No Growth to date     CBC:   Recent Labs   Lab 05/01/24  0139 05/02/24  0506   WBC 11.41 9.00   HGB 14.5 14.2   HCT 41.4 41.5   * 410     CMP:   Recent Labs   Lab 05/01/24  2228 05/02/24  0506 05/02/24  1426    136 136   K 4.6 4.4 4.2    101 102   CO2 23 25 23    117* 82   BUN 79* 86* 61*   CREATININE 4.4* 4.6* 3.6*   CALCIUM 8.4* 8.3* 8.8   ALBUMIN 2.9* 2.8* 2.9*   ANIONGAP 12 10 11       Significant Imaging: I have reviewed all pertinent imaging results/findings within the past 24 hours.

## 2024-05-02 NOTE — SUBJECTIVE & OBJECTIVE
Past Medical History:   Diagnosis Date    Cardiomyopathy     CKD (chronic kidney disease) stage 4, GFR 15-29 ml/min     Congestive heart failure (CHF) 2015    COPD (chronic obstructive pulmonary disease)     Coronary artery disease     Edema     Essential (primary) hypertension 05/18/2022    Formatting of this note might be different from the original. Converted from [employer]: Description - ESSENTIAL HYPERTENSION, BENIGN    Heart attack     HLD (hyperlipidemia)     Hypertension     Hyperuricemia     Hypocalcemia     Renal cyst, left     Secondary hyperparathyroidism     Thyroid disease     V tach     Vitamin D deficiency        Past Surgical History:   Procedure Laterality Date    ablations  03/05/2018    albations  02/01/2018    COLONOSCOPY N/A 12/07/2018    Procedure: COLONOSCOPY/suprep;  Surgeon: Ananya Morillo MD;  Location: [facility] ENDO;  Service: Endoscopy;  Laterality: N/A;    defibulater N/A 2016    ESOPHAGOGASTRODUODENOSCOPY N/A 12/07/2018    Procedure: EGD (ESOPHAGOGASTRODUODENOSCOPY);  Surgeon: Ananya Morillo MD;  Location: [facility] ENDO;  Service: Endoscopy;  Laterality: N/A;    JOINT REPLACEMENT Bilateral 10/2016    knees, bilat    LEFT HEART CATHETERIZATION N/A 12/16/2020    Procedure: Left heart cath;  Surgeon: Shahram Stewart MD;  Location: [facility] CATH LAB/EP;  Service: Cardiology;  Laterality: N/A;    LEFT HEART CATHETERIZATION Left 9/27/2022    Procedure: Left heart cath;  Surgeon: Juan Roque MD;  Location: [facility] CATH LAB/EP;  Service: Cardiology;  Laterality: Left;    REPLACEMENT OF IMPLANTABLE CARDIOVERTER-DEFIBRILLATOR (ICD) GENERATOR Left 1/24/2023    Procedure: REPLACEMENT, ICD GENERATOR;  Surgeon: River Tenorio MD;  Location: [facility] EP LAB;  Service: Cardiology;  Laterality: Left;  ANTHONY, CRTD gen chg, MDT, MAC, DM, 3prep       Review of patient's allergies indicates:   Allergen Reactions    Lokelma [sodium zirconium cyclosilicate] Other (See Comments)     Fluid retention, weight gain,  CHF excerebration, severe constipation    Atorvastatin Other (See Comments)     Joint pain    Jardiance [empagliflozin] Other (See Comments)     Chest pains, significant weight loss, lower blood pressure       Current Facility-Administered Medications   Medication Dose Route Frequency Provider Last Rate Last Admin    0.9%  NaCl infusion (CRRT USE ONLY)   Intravenous Continuous Chyna Mata MD   Stopped at 05/01/24 1723    0.9%  NaCl infusion   Intravenous PRN Chyna Mata MD        0.9%  NaCl infusion   Intravenous Once Chyna Mata MD        acetaminophen tablet 650 mg  650 mg Oral Q4H PRN Jaida Álvarez NP        allopurinoL tablet 100 mg  100 mg Oral Daily Jaida Álvarez NP   100 mg at 05/02/24 0852    alteplase injection 4 mg  4 mg Intra-Catheter Once Chyna Mata MD        amiodarone tablet 400 mg  400 mg Oral Daily Jaida Álvarez NP   400 mg at 05/02/24 0852    aspirin EC tablet 81 mg  81 mg Oral Daily Jaida Álvarez NP   81 mg at 05/02/24 1012    calcium carbonate 200 mg calcium (500 mg) chewable tablet 1,000 mg  1,000 mg Oral TID PRN Alison Mccurdy MD        ergocalciferol capsule 50,000 Units  50,000 Units Oral Q7 Days Jaida Álvarez NP   50,000 Units at 04/30/24 0950    furosemide injection 80 mg  80 mg Intravenous Q12H Jaida Álvarez NP   80 mg at 05/02/24 1012    heparin (porcine) injection 2,400 Units  2,400 Units Intra-Catheter PRN Alexandro Silveira MD   2,400 Units at 05/01/24 1735    heparin (porcine) injection 5,000 Units  5,000 Units Subcutaneous Q8H Jaida Álvarez NP   5,000 Units at 05/02/24 0508    heparin 25,000 units in dextrose 5% 250 mL (100 units/mL) infusion (heparin infusion - NO NOMOGRAM)  500 Units/hr Intravenous Continuous Chyna Mata MD   Stopped at 05/01/24 1723    HYDROcodone-acetaminophen 5-325 mg per tablet 1 tablet  1 tablet Oral Q4H PRN Jaida Álvarez NP        levothyroxine tablet 150 mcg  150 mcg Oral Daily Jaida Álvarez, NP   150  mcg at 24 0852    mexiletine capsule 150 mg  150 mg Oral BID  Tomás Leivai L., NP   150 mg at 24 0853    midodrine tablet 10 mg  10 mg Oral Q8H Mamta Hassan MD   10 mg at 24 1149    mupirocin 2 % ointment   Nasal BID Mamta Hassan MD   Given by Other at 24 0854    NORepinephrine 4 mg in dextrose 5% 250 mL infusion (premix)  0-3 mcg/kg/min Intravenous Continuous Chyna Mata MD   Stopped at 24 0340    ondansetron injection 4 mg  4 mg Intravenous Q8H PRN Jaida Álvarez NP   4 mg at 24 1827    pravastatin tablet 40 mg  40 mg Oral Daily Jaida Álvarez NP   40 mg at 24 0852    sodium chloride 0.9% bolus 250 mL 250 mL  250 mL Intravenous PRN Chyna Mata MD        sodium chloride 0.9% flush 10 mL  10 mL Intravenous PRN Jaida Álvarez NP        sodium chloride 0.9% flush 10 mL  10 mL Intravenous PRN Jaida Álvarez NP        vitamin renal formula (B-complex-vitamin c-folic acid) 1 mg per capsule 1 capsule  1 capsule Oral Daily Chyna Mata MD   1 capsule at 24 0852     Family History       Problem Relation (Age of Onset)    Alcohol abuse Father    Arthritis Sister    Breast cancer Mother    Cancer Mother    Hypertension Mother    Kidney disease Father    No Known Problems Brother, Daughter, Son    Stroke Mother          Tobacco Use    Smoking status: Former     Current packs/day: 0.00     Average packs/day: 1 pack/day for 33.2 years (33.2 ttl pk-yrs)     Types: Cigarettes     Start date: 1979     Quit date: 3/3/2012     Years since quittin.1     Passive exposure: Past    Smokeless tobacco: Never   Substance and Sexual Activity    Alcohol use: Yes     Comment: rare    Drug use: No    Sexual activity: Yes     Partners: Female     Review of Systems   Constitutional: Negative for diaphoresis.   HENT: Negative.     Eyes: Negative.    Cardiovascular:  Negative for chest pain, near-syncope, orthopnea, palpitations, paroxysmal  nocturnal dyspnea and syncope.   Respiratory:  Negative for shortness of breath.    Endocrine: Negative.    Hematologic/Lymphatic: Negative.    Skin: Negative.    Musculoskeletal: Negative.    Gastrointestinal:  Positive for nausea and vomiting.   Genitourinary: Negative.    Neurological: Negative.    Psychiatric/Behavioral: Negative.     Allergic/Immunologic: Negative.      Objective:     Vital Signs (Most Recent):  Temp: 97.1 °F (36.2 °C) (05/02/24 1145)  Pulse: 60 (05/02/24 1145)  Resp: 19 (05/02/24 1145)  BP: (!) 90/54 (05/02/24 1145)  SpO2: (!) 92 % (05/02/24 1145) Vital Signs (24h Range):  Temp:  [97 °F (36.1 °C)-97.9 °F (36.6 °C)] 97.1 °F (36.2 °C)  Pulse:  [59-66] 60  Resp:  [17-36] 19  SpO2:  [85 %-100 %] 92 %  BP: ()/(45-66) 90/54     Weight: 76.5 kg (168 lb 10.4 oz)  Body mass index is 24.91 kg/m².    SpO2: (!) 92 %         Intake/Output Summary (Last 24 hours) at 5/2/2024 1157  Last data filed at 5/2/2024 0900  Gross per 24 hour   Intake 1369.25 ml   Output 1839 ml   Net -469.75 ml       Lines/Drains/Airways       Central Venous Catheter Line  Duration             Trialysis (Dialysis) Catheter 04/30/24 0852 right internal jugular 2 days              Peripheral Intravenous Line  Duration                  Peripheral IV - Single Lumen 04/29/24 1707 20 G Posterior;Right Forearm 2 days         Peripheral IV - Single Lumen 04/29/24 2035 20 G Anterior;Left Forearm 2 days                     Physical Exam  Constitutional:       General: He is not in acute distress.     Appearance: He is not diaphoretic.   HENT:      Head: Atraumatic.   Eyes:      General:         Right eye: No discharge.         Left eye: No discharge.   Cardiovascular:      Rate and Rhythm: Normal rate and regular rhythm.   Pulmonary:      Effort: Pulmonary effort is normal.      Breath sounds: No rales.   Abdominal:      General: Bowel sounds are normal.      Palpations: Abdomen is soft.   Skin:     General: Skin is warm and dry.  "  Neurological:      Mental Status: He is alert and oriented to person, place, and time.   Psychiatric:         Mood and Affect: Mood normal.         Behavior: Behavior normal.         Thought Content: Thought content normal.         Judgment: Judgment normal.          Significant Labs: BMP:   Recent Labs   Lab 05/01/24  1411 05/01/24  2228 05/02/24  0506    102 117*    136 136   K 4.2 4.6 4.4    101 101   CO2 23 23 25   BUN 83* 79* 86*   CREATININE 4.7* 4.4* 4.6*   CALCIUM 8.5* 8.4* 8.3*   MG 2.1 2.1 2.2   , CMP   Recent Labs   Lab 05/01/24  1411 05/01/24 2228 05/02/24  0506    136 136   K 4.2 4.6 4.4    101 101   CO2 23 23 25    102 117*   BUN 83* 79* 86*   CREATININE 4.7* 4.4* 4.6*   CALCIUM 8.5* 8.4* 8.3*   ALBUMIN 2.9* 2.9* 2.8*   ANIONGAP 13 12 10   , CBC   Recent Labs   Lab 05/01/24  0139 05/02/24  0506   WBC 11.41 9.00   HGB 14.5 14.2   HCT 41.4 41.5   * 410   , INR No results for input(s): "INR", "PROTIME" in the last 48 hours., Lipid Panel   No results for input(s): "CHOL", "HDL", "LDLCALC", "TRIG", "CHOLHDL" in the last 48 hours.  , Troponin   No results for input(s): "TROPONINI" in the last 48 hours.  , and All pertinent lab results from the last 24 hours have been reviewed.    Significant Imaging: Echocardiogram: Transthoracic echo (TTE) complete (Cupid Only):   Results for orders placed or performed during the hospital encounter of 04/29/24   Echo Saline Bubble? No; Ultrasound enhancing contrast? No   Result Value Ref Range    BSA 1.94 m2    LVOT stroke volume 39.81 cm3    LVIDd 5.03 3.5 - 6.0 cm    LV Systolic Volume 67.63 mL    LV Systolic Volume Index 35.0 mL/m2    LVIDs 3.94 2.1 - 4.0 cm    LV Diastolic Volume 119.67 mL    LV Diastolic Volume Index 62.01 mL/m2    IVS 0.83 0.6 - 1.1 cm    LVOT diameter 1.91 cm    LVOT area 2.9 cm2    FS 22 (A) 28 - 44 %    Left Ventricle Relative Wall Thickness 0.39 cm    Posterior Wall 0.98 0.6 - 1.1 cm    LV mass " 160.97 g    LV Mass Index 83 g/m2    MV Peak E Marvin 0.78 m/s    TDI LATERAL 0.06 m/s    TDI SEPTAL 0.05 m/s    E/E' ratio 14.18 m/s    MV Peak A Marvin 0.27 m/s    TR Max Marvin 3.49 m/s    E/A ratio 2.89     E wave deceleration time 210.39 msec    LV SEPTAL E/E' RATIO 15.60 m/s    LV LATERAL E/E' RATIO 13.00 m/s    PV Peak S Marvin 0.27 m/s    PV Peak D Marvin 0.38 m/s    Pulm vein S/D ratio 0.71     LVOT peak marvin 0.74 m/s    Left Ventricular Outflow Tract Mean Velocity 0.58 cm/s    Left Ventricular Outflow Tract Mean Gradient 1.44 mmHg    RVDD 6.60 cm    RV S' 5.10 cm/s    TAPSE 1.59 cm    RV/LV Ratio 1.31 cm    LA size 5.04 cm    Left Atrium Minor Axis 7.08 cm    Left Atrium Major Axis 7.93 cm    LA volume (mod) 97.12 cm3    LA Volume Index (Mod) 50.3 mL/m2    RA Major Axis 7.79 cm    RA Width 4.97 cm    AV mean gradient 5 mmHg    AV peak gradient 8 mmHg    Ao peak marvin 1.43 m/s    Ao VTI 26.70 cm    LVOT peak VTI 13.90 cm    AV valve area 1.49 cm²    AV Velocity Ratio 0.52     AV index (prosthetic) 0.52     MAGALI by Velocity Ratio 1.48 cm²    Mr max marvin 3.64 m/s    MV peak gradient 3 mmHg    MV stenosis pressure 1/2 time 61.01 ms    MV valve area p 1/2 method 3.61 cm2    MV valve area by continuity eq 1.62 cm2    MV VTI 24.5 cm    Triscuspid Valve Regurgitation Peak Gradient 49 mmHg    PV PEAK VELOCITY 0.64 m/s    PV peak gradient 2 mmHg    Sinus 3.16 cm    STJ 2.37 cm    Ascending aorta 2.38 cm    IVC diameter 3.51 cm    Mean e' 0.06 m/s    ZLVIDS 1.25     ZLVIDD -0.83     LA Volume Index 71.4 mL/m2    LA volume 137.81 cm3    RV-benítez mid d 5.4 cm    LA WIDTH 4.3 cm    TV resting pulmonary artery pressure 64 mmHg    RV TB RVSP 18 mmHg    Est. RA pres 15 mmHg    Narrative      Left Ventricle: The left ventricle is normal in size. Normal wall   thickness. Regional wall motion abnormalities present. See diagram for   wall motion findings. Septal motion is consistent with pacing. There is   mildly reduced systolic function with a  visually estimated ejection   fraction of 40 - 45%. Grade III diastolic dysfunction.    Right Ventricle: Moderate to severe right ventricular enlargement.   Systolic function is reduced.TAPSE is 1.59 cm. Pacemaker lead present in   the ventricle.    Left Atrium: Left atrium is severely dilated. The left atrium volume   index is 71.4 mL/m2.    Right Atrium: Right atrium is severely dilated.    Aortic Valve: There is mild aortic valve sclerosis. There is mild to   moderate stenosis. Aortic valve area by VTI is 1.49 cm². Aortic valve peak   velocity is 1.43 m/s. Mean gradient is 5 mmHg. The dimensionless index is   0.52.    Mitral Valve: There is mild regurgitation.    Tricuspid Valve: There is mild to moderate regurgitation.    Pulmonary Artery: The estimated pulmonary artery systolic pressure is   64 mmHg.    IVC/SVC: Elevated venous pressure at 15 mmHg.

## 2024-05-03 ENCOUNTER — ANESTHESIA (OUTPATIENT)
Dept: SURGERY | Facility: HOSPITAL | Age: 64
DRG: 981 | End: 2024-05-03
Payer: MEDICARE

## 2024-05-03 LAB
ALBUMIN SERPL BCP-MCNC: 2.9 G/DL (ref 3.5–5.2)
ANION GAP SERPL CALC-SCNC: 14 MMOL/L (ref 8–16)
BASOPHILS # BLD AUTO: 0.1 K/UL (ref 0–0.2)
BASOPHILS NFR BLD: 1 % (ref 0–1.9)
BUN SERPL-MCNC: 70 MG/DL (ref 8–23)
CALCIUM SERPL-MCNC: 8.6 MG/DL (ref 8.7–10.5)
CHLORIDE SERPL-SCNC: 99 MMOL/L (ref 95–110)
CO2 SERPL-SCNC: 22 MMOL/L (ref 23–29)
CREAT SERPL-MCNC: 4.3 MG/DL (ref 0.5–1.4)
DIFFERENTIAL METHOD BLD: ABNORMAL
EOSINOPHIL # BLD AUTO: 0.2 K/UL (ref 0–0.5)
EOSINOPHIL NFR BLD: 1.9 % (ref 0–8)
ERYTHROCYTE [DISTWIDTH] IN BLOOD BY AUTOMATED COUNT: 15.4 % (ref 11.5–14.5)
EST. GFR  (NO RACE VARIABLE): 15 ML/MIN/1.73 M^2
GLUCOSE SERPL-MCNC: 93 MG/DL (ref 70–110)
HCT VFR BLD AUTO: 40.6 % (ref 40–54)
HGB BLD-MCNC: 14.4 G/DL (ref 14–18)
IMM GRANULOCYTES # BLD AUTO: 0.08 K/UL (ref 0–0.04)
IMM GRANULOCYTES NFR BLD AUTO: 0.8 % (ref 0–0.5)
LYMPHOCYTES # BLD AUTO: 0.6 K/UL (ref 1–4.8)
LYMPHOCYTES NFR BLD: 6 % (ref 18–48)
MAGNESIUM SERPL-MCNC: 1.9 MG/DL (ref 1.6–2.6)
MAGNESIUM SERPL-MCNC: 1.9 MG/DL (ref 1.6–2.6)
MCH RBC QN AUTO: 26.6 PG (ref 27–31)
MCHC RBC AUTO-ENTMCNC: 35.5 G/DL (ref 32–36)
MCV RBC AUTO: 75 FL (ref 82–98)
MONOCYTES # BLD AUTO: 1.2 K/UL (ref 0.3–1)
MONOCYTES NFR BLD: 11.6 % (ref 4–15)
NEUTROPHILS # BLD AUTO: 8 K/UL (ref 1.8–7.7)
NEUTROPHILS NFR BLD: 78.7 % (ref 38–73)
NRBC BLD-RTO: 0 /100 WBC
PHOSPHATE SERPL-MCNC: 3.6 MG/DL (ref 2.7–4.5)
PHOSPHATE SERPL-MCNC: 3.6 MG/DL (ref 2.7–4.5)
PLATELET # BLD AUTO: 406 K/UL (ref 150–450)
PMV BLD AUTO: 12.2 FL (ref 9.2–12.9)
POTASSIUM SERPL-SCNC: 4.4 MMOL/L (ref 3.5–5.1)
RBC # BLD AUTO: 5.41 M/UL (ref 4.6–6.2)
SODIUM SERPL-SCNC: 135 MMOL/L (ref 136–145)
WBC # BLD AUTO: 10.16 K/UL (ref 3.9–12.7)

## 2024-05-03 PROCEDURE — 25500020 PHARM REV CODE 255: Performed by: SURGERY

## 2024-05-03 PROCEDURE — 25000003 PHARM REV CODE 250: Performed by: INTERNAL MEDICINE

## 2024-05-03 PROCEDURE — 25000003 PHARM REV CODE 250: Performed by: NURSE ANESTHETIST, CERTIFIED REGISTERED

## 2024-05-03 PROCEDURE — 36589 REMOVAL TUNNELED CV CATH: CPT | Mod: 51,,, | Performed by: SURGERY

## 2024-05-03 PROCEDURE — 80069 RENAL FUNCTION PANEL: CPT | Performed by: INTERNAL MEDICINE

## 2024-05-03 PROCEDURE — D9220A PRA ANESTHESIA: Mod: CRNA,,, | Performed by: NURSE ANESTHETIST, CERTIFIED REGISTERED

## 2024-05-03 PROCEDURE — 25000003 PHARM REV CODE 250: Performed by: NURSE PRACTITIONER

## 2024-05-03 PROCEDURE — 84100 ASSAY OF PHOSPHORUS: CPT

## 2024-05-03 PROCEDURE — 83735 ASSAY OF MAGNESIUM: CPT

## 2024-05-03 PROCEDURE — 25000003 PHARM REV CODE 250: Performed by: SURGERY

## 2024-05-03 PROCEDURE — 0JH63XZ INSERTION OF TUNNELED VASCULAR ACCESS DEVICE INTO CHEST SUBCUTANEOUS TISSUE AND FASCIA, PERCUTANEOUS APPROACH: ICD-10-PCS | Performed by: SURGERY

## 2024-05-03 PROCEDURE — 77001 FLUOROGUIDE FOR VEIN DEVICE: CPT | Mod: 26,,, | Performed by: SURGERY

## 2024-05-03 PROCEDURE — 94761 N-INVAS EAR/PLS OXIMETRY MLT: CPT

## 2024-05-03 PROCEDURE — 02HV33Z INSERTION OF INFUSION DEVICE INTO SUPERIOR VENA CAVA, PERCUTANEOUS APPROACH: ICD-10-PCS | Performed by: SURGERY

## 2024-05-03 PROCEDURE — 63600175 PHARM REV CODE 636 W HCPCS

## 2024-05-03 PROCEDURE — 37000008 HC ANESTHESIA 1ST 15 MINUTES: Performed by: SURGERY

## 2024-05-03 PROCEDURE — 63600175 PHARM REV CODE 636 W HCPCS: Mod: JZ,JG | Performed by: SURGERY

## 2024-05-03 PROCEDURE — 63600175 PHARM REV CODE 636 W HCPCS: Performed by: SURGERY

## 2024-05-03 PROCEDURE — 25000003 PHARM REV CODE 250

## 2024-05-03 PROCEDURE — 63600175 PHARM REV CODE 636 W HCPCS: Performed by: STUDENT IN AN ORGANIZED HEALTH CARE EDUCATION/TRAINING PROGRAM

## 2024-05-03 PROCEDURE — 36000706: Performed by: SURGERY

## 2024-05-03 PROCEDURE — C1750 CATH, HEMODIALYSIS,LONG-TERM: HCPCS | Performed by: SURGERY

## 2024-05-03 PROCEDURE — D9220A PRA ANESTHESIA: Mod: ANES,,, | Performed by: STUDENT IN AN ORGANIZED HEALTH CARE EDUCATION/TRAINING PROGRAM

## 2024-05-03 PROCEDURE — 36558 INSERT TUNNELED CV CATH: CPT | Mod: RT,,, | Performed by: SURGERY

## 2024-05-03 PROCEDURE — 37000009 HC ANESTHESIA EA ADD 15 MINS: Performed by: SURGERY

## 2024-05-03 PROCEDURE — 99900035 HC TECH TIME PER 15 MIN (STAT)

## 2024-05-03 PROCEDURE — 27000221 HC OXYGEN, UP TO 24 HOURS

## 2024-05-03 PROCEDURE — 63600175 PHARM REV CODE 636 W HCPCS: Performed by: NURSE ANESTHETIST, CERTIFIED REGISTERED

## 2024-05-03 PROCEDURE — 36000707: Performed by: SURGERY

## 2024-05-03 PROCEDURE — 83735 ASSAY OF MAGNESIUM: CPT | Performed by: INTERNAL MEDICINE

## 2024-05-03 PROCEDURE — 85025 COMPLETE CBC W/AUTO DIFF WBC: CPT

## 2024-05-03 PROCEDURE — 11000001 HC ACUTE MED/SURG PRIVATE ROOM

## 2024-05-03 DEVICE — HEMOSPLIT XK HEMODIALYSIS CATH, ST, 16 FR. 23CM
Type: IMPLANTABLE DEVICE | Site: CHEST | Status: FUNCTIONAL
Brand: HEMOSPLIT XK LONG-TERM HEMODIALYSIS CATHETER

## 2024-05-03 RX ORDER — CEFAZOLIN SODIUM 1 G/3ML
INJECTION, POWDER, FOR SOLUTION INTRAMUSCULAR; INTRAVENOUS
Status: DISCONTINUED | OUTPATIENT
Start: 2024-05-03 | End: 2024-05-03

## 2024-05-03 RX ORDER — LIDOCAINE HYDROCHLORIDE AND EPINEPHRINE 10; 10 MG/ML; UG/ML
INJECTION, SOLUTION INFILTRATION; PERINEURAL
Status: DISCONTINUED | OUTPATIENT
Start: 2024-05-03 | End: 2024-05-03 | Stop reason: HOSPADM

## 2024-05-03 RX ORDER — FUROSEMIDE 10 MG/ML
80 INJECTION INTRAMUSCULAR; INTRAVENOUS DAILY
Status: DISCONTINUED | OUTPATIENT
Start: 2024-05-04 | End: 2024-05-03

## 2024-05-03 RX ORDER — NOREPINEPHRINE BITARTRATE/D5W 4MG/250ML
0-3 PLASTIC BAG, INJECTION (ML) INTRAVENOUS CONTINUOUS
Status: DISCONTINUED | OUTPATIENT
Start: 2024-05-03 | End: 2024-05-03

## 2024-05-03 RX ORDER — BUPIVACAINE HYDROCHLORIDE 2.5 MG/ML
INJECTION, SOLUTION EPIDURAL; INFILTRATION; INTRACAUDAL
Status: DISCONTINUED | OUTPATIENT
Start: 2024-05-03 | End: 2024-05-03 | Stop reason: HOSPADM

## 2024-05-03 RX ORDER — HEPARIN SODIUM 10000 [USP'U]/ML
INJECTION, SOLUTION INTRAVENOUS; SUBCUTANEOUS
Status: DISCONTINUED | OUTPATIENT
Start: 2024-05-03 | End: 2024-05-03 | Stop reason: HOSPADM

## 2024-05-03 RX ORDER — FENTANYL CITRATE 50 UG/ML
INJECTION, SOLUTION INTRAMUSCULAR; INTRAVENOUS
Status: DISCONTINUED | OUTPATIENT
Start: 2024-05-03 | End: 2024-05-03

## 2024-05-03 RX ORDER — SODIUM CHLORIDE 9 MG/ML
INJECTION, SOLUTION INTRAVENOUS
Status: DISCONTINUED | OUTPATIENT
Start: 2024-05-03 | End: 2024-05-12 | Stop reason: HOSPADM

## 2024-05-03 RX ORDER — FUROSEMIDE 10 MG/ML
80 INJECTION INTRAMUSCULAR; INTRAVENOUS EVERY 12 HOURS
Status: DISCONTINUED | OUTPATIENT
Start: 2024-05-03 | End: 2024-05-12 | Stop reason: HOSPADM

## 2024-05-03 RX ORDER — SODIUM CHLORIDE 9 MG/ML
INJECTION, SOLUTION INTRAVENOUS ONCE
Status: DISCONTINUED | OUTPATIENT
Start: 2024-05-03 | End: 2024-05-12 | Stop reason: HOSPADM

## 2024-05-03 RX ORDER — MIDODRINE HYDROCHLORIDE 5 MG/1
15 TABLET ORAL EVERY 8 HOURS
Status: DISCONTINUED | OUTPATIENT
Start: 2024-05-03 | End: 2024-05-12 | Stop reason: HOSPADM

## 2024-05-03 RX ORDER — MIDAZOLAM HYDROCHLORIDE 1 MG/ML
INJECTION INTRAMUSCULAR; INTRAVENOUS
Status: DISCONTINUED | OUTPATIENT
Start: 2024-05-03 | End: 2024-05-03

## 2024-05-03 RX ORDER — ACETAMINOPHEN 325 MG/1
650 TABLET ORAL ONCE
Status: COMPLETED | OUTPATIENT
Start: 2024-05-03 | End: 2024-05-03

## 2024-05-03 RX ADMIN — SODIUM CHLORIDE: 0.9 INJECTION, SOLUTION INTRAVENOUS at 10:05

## 2024-05-03 RX ADMIN — MIDODRINE HYDROCHLORIDE 10 MG: 5 TABLET ORAL at 06:05

## 2024-05-03 RX ADMIN — LEVOTHYROXINE SODIUM 150 MCG: 150 TABLET ORAL at 09:05

## 2024-05-03 RX ADMIN — MIDODRINE HYDROCHLORIDE 15 MG: 5 TABLET ORAL at 08:05

## 2024-05-03 RX ADMIN — MEXILETINE HYDROCHLORIDE 150 MG: 150 CAPSULE ORAL at 05:05

## 2024-05-03 RX ADMIN — FUROSEMIDE 80 MG: 10 INJECTION, SOLUTION INTRAVENOUS at 09:05

## 2024-05-03 RX ADMIN — ASPIRIN 81 MG: 81 TABLET, COATED ORAL at 09:05

## 2024-05-03 RX ADMIN — MIDAZOLAM HYDROCHLORIDE 2 MG: 1 INJECTION, SOLUTION INTRAMUSCULAR; INTRAVENOUS at 10:05

## 2024-05-03 RX ADMIN — HEPARIN SODIUM 5000 UNITS: 5000 INJECTION INTRAVENOUS; SUBCUTANEOUS at 01:05

## 2024-05-03 RX ADMIN — MEXILETINE HYDROCHLORIDE 150 MG: 150 CAPSULE ORAL at 08:05

## 2024-05-03 RX ADMIN — ACETAMINOPHEN 650 MG: 325 TABLET ORAL at 01:05

## 2024-05-03 RX ADMIN — AMIODARONE HYDROCHLORIDE 400 MG: 200 TABLET ORAL at 09:05

## 2024-05-03 RX ADMIN — HEPARIN SODIUM 5000 UNITS: 5000 INJECTION INTRAVENOUS; SUBCUTANEOUS at 09:05

## 2024-05-03 RX ADMIN — ONDANSETRON 4 MG: 2 INJECTION INTRAMUSCULAR; INTRAVENOUS at 04:05

## 2024-05-03 RX ADMIN — ALLOPURINOL 100 MG: 100 TABLET ORAL at 09:05

## 2024-05-03 RX ADMIN — NEPHROCAP 1 CAPSULE: 1 CAP ORAL at 09:05

## 2024-05-03 RX ADMIN — MUPIROCIN: 20 OINTMENT TOPICAL at 09:05

## 2024-05-03 RX ADMIN — MIDODRINE HYDROCHLORIDE 15 MG: 5 TABLET ORAL at 01:05

## 2024-05-03 RX ADMIN — MUPIROCIN: 20 OINTMENT TOPICAL at 08:05

## 2024-05-03 RX ADMIN — PRAVASTATIN SODIUM 40 MG: 40 TABLET ORAL at 09:05

## 2024-05-03 RX ADMIN — CEFAZOLIN 2 G: 330 INJECTION, POWDER, FOR SOLUTION INTRAMUSCULAR; INTRAVENOUS at 10:05

## 2024-05-03 RX ADMIN — FENTANYL CITRATE 100 MCG: 50 INJECTION INTRAMUSCULAR; INTRAVENOUS at 10:05

## 2024-05-03 NOTE — OP NOTE
PATIENT: Ar Sharma    MRN:[id number]    DATE OF PROCEDURE:  05/03/2024    PREOPERATIVE DIAGNOSES:   Acute on chronic renal failure.    POSTOPERATIVE DIAGNOSES:  Acute on chronic renal failure.    PROCEDURE:   Insertion of right internal jugular vein permanent dialysis   catheter using ultrasound and flouroscopic guidance.  2.   Removal of temporary right internal jugular dialysis catheter     SURGEON:  Philip Perez M.D.    ASSISTANT:  none    ANESTHESIA:  MAC.    PREP:  Chlorhexidine.    IMPLANT: 16F 23cm double lumen permanent dialysis catheter    SPECIMEN:  None.    ESTIMATED BLOOD LOSS:  Minimal.    INDICATIONS:  The patient is a 62 yo AAM who was found to have   renal dysfunction with an imminent   need for hemodialysis.  The patient was agreeable to the initiation of hemodialysis.    The risks of the procedure were described to the patient including,but not limited to bleeding,   infection, pain, scarring, wound complications, potential injury to structures   in the neck or chest warranting more extensive surgery, line infection, and possible death.  The patient demonstrated understanding of these risks   and a consent form was obtained.    PROCEDURE IN DETAIL:  The patient was identified in the Preoperative Unit and   taken back to the Operating Room and laid supine on the operating room table.    IV antibiotics were administered prior to the administration of the anesthesia.    MAC anesthesia was administered without complication.  The patient was then   prepped and draped in a standard sterile fashion.  Timeout procedure was   performed in accordance with hospital protocol.  The temporary HD catheter was removed and pressure was held for five minutes.  It had been placed too high in the neck to allow for exchange over a wire.  An ultrasound was used to   identify the right internal jugular vein.  The images were interpreted by me   In real time.  The internal jugular vein was accessed using a   needle.  The wire was passed without difficulty, and we confirmed appropriate positioning  using fluoroscopic guidance.  An incision in the right upper chest was  made and the catheter was tunneled was from this incision towards the neck incision without any issue.  Once the   PermCath cuff was in the appropriate position at the chest site, the initial   dilator was passed over the wire and confirmed with fluoroscopic guidance.  The   internal jugular vein was then sequentially dilated and once the final dilator   was passed, the peel-away sheath was then inserted over the wire using Seldinger  technique.  The wire and dilator were removed.  The catheter was then placed   into the peel-away sheath and peel-away sheath was removed while the catheter   was threaded forward.  Once this was complete, final fluoroscopic image did   confirm that the catheter was in appropriate position in the distal SVC / right   atrial junction.  Both ports of the catheter withdrew and flushed very easily.    Each port was then instilled with heparinized saline.  The neck   incision was closed using vicryl suture in an interrupted fashion and dermabond.  The   chest incision was closed using 4-0 vicryl suture in an interrupted fashion.    The catheter was fixed to the right chest wall using nylon suture.  Dry   sterile dressings were then applied.  The patient tolerated the procedure well   and there were no complications and was awakened from anesthesia and returned   to the Postoperative Recovery Unit in stable condition.  At the end of the case,   hemostasis was confirmed and sponge, instrument and needle counts were correct.  I was present and scrubbed throughout the entirety of the case.   During all vein manipulation, the patient was in the Trendelenburg position.    COMPLICATIONS:  None.    CONDITION:  Stable.    Philip Perez M.D., F.A.C.S.  [id number] and General Surgery  [facility]

## 2024-05-03 NOTE — PROGRESS NOTES
[facility] Medicine  Progress Note    Patient Name: Ar Sharma  MRN: [id number]  Patient Class: IP- Inpatient   Admission Date: 4/29/2024  Length of Stay: 4 days  Attending Physician: Mamta Hassan MD  Primary Care Provider: Jc Elliott MD        Subjective:     Principal Problem:Chronic kidney disease (CKD), stage V        HPI:  Ar Sharma is a 63M with PMHx of ESRD, HFrEF, HTN, NICM, COPD, VT s/p ICD placement, and HLD who presented to [facility] ED with CC progressively worse SOB over the last two months. He was recently diagnosed with ESRD and has been trying diet modification at home. He endorses SINGH, dizziness, frequent intermittent nausea and one episode of vomiting during this time. He denies CP, syncope, dysuria, fever or chills.     ED workup significant for: , Cr 6.7, Phos 5, Tbili 2.4, BNP 3204, trop 0.040, vit D 24, .6, and CXR: cardiomegaly w/ pulmonary vascular congestion consistent w/ pulmonary edema. All other components of CBC and chemistries were unremarkable.       Patient was seen by Dr. Mata in ED. Emergent HD not indicated.     Overview/Hospital Course:  4/30 Tolerating HD through central line, SOB is improved  5/1/24 On pressors but tolerating CRRT  5/2 tolerating HD, off of pressors, stepping down  5/3 Tunneled catheter placed    Interval History: Had successful tunneled HD catheter placement    Review of Systems   Constitutional:  Negative for chills, fatigue and fever.   HENT: Negative.     Eyes: Negative.    Respiratory:  Negative for cough, chest tightness, shortness of breath and wheezing.    Cardiovascular:  Negative for chest pain, palpitations and leg swelling.   Gastrointestinal:  Negative for abdominal distention, abdominal pain, blood in stool, diarrhea, nausea and vomiting.   Endocrine: Positive for cold intolerance.   Genitourinary:  Positive for decreased urine volume and difficulty urinating. Negative for flank pain.    Musculoskeletal:  Positive for arthralgias.   Skin: Negative.    Allergic/Immunologic: Negative.    Neurological:  Positive for weakness. Negative for dizziness and light-headedness.   Hematological: Negative.    Psychiatric/Behavioral: Negative.       Objective:     Vital Signs (Most Recent):  Temp: 98 °F (36.7 °C) (05/03/24 1515)  Pulse: 60 (05/03/24 1530)  Resp: 16 (05/03/24 1530)  BP: (!) 89/52 (05/03/24 1530)  SpO2: (!) 93 % (05/03/24 1530) Vital Signs (24h Range):  Temp:  [97.7 °F (36.5 °C)-98.2 °F (36.8 °C)] 98 °F (36.7 °C)  Pulse:  [60-70] 60  Resp:  [11-47] 16  SpO2:  [89 %-99 %] 93 %  BP: ()/(44-60) 89/52     Weight: 76.5 kg (168 lb 10.4 oz)  Body mass index is 24.91 kg/m².    Intake/Output Summary (Last 24 hours) at 5/3/2024 1555  Last data filed at 5/3/2024 1313  Gross per 24 hour   Intake 517.74 ml   Output 1400 ml   Net -882.26 ml         Physical Exam  Vitals and nursing note reviewed.   Constitutional:       General: He is not in acute distress.     Appearance: He is not ill-appearing.   HENT:      Head: Normocephalic and atraumatic.      Nose: Nose normal.      Mouth/Throat:      Mouth: Mucous membranes are moist.      Pharynx: Oropharynx is clear.   Eyes:      Extraocular Movements: Extraocular movements intact.      Conjunctiva/sclera: Conjunctivae normal.      Pupils: Pupils are equal, round, and reactive to light.   Neck:      Comments: Central line in R side of neck  Cardiovascular:      Rate and Rhythm: Normal rate and regular rhythm.      Pulses: Normal pulses.      Heart sounds: Normal heart sounds. No murmur heard.     No friction rub. No gallop.   Pulmonary:      Effort: Pulmonary effort is normal. No respiratory distress.      Breath sounds: Normal breath sounds. No wheezing, rhonchi or rales.   Abdominal:      General: Bowel sounds are normal. There is no distension.      Palpations: Abdomen is soft.      Tenderness: There is no abdominal tenderness. There is no right CVA  "tenderness, left CVA tenderness or guarding.   Genitourinary:     Comments: deferred  Musculoskeletal:         General: Normal range of motion.      Cervical back: Normal range of motion and neck supple.   Skin:     General: Skin is warm and dry.      Capillary Refill: Capillary refill takes less than 2 seconds.   Neurological:      General: No focal deficit present.      Mental Status: He is alert and oriented to person, place, and time. Mental status is at baseline.   Psychiatric:         Mood and Affect: Mood normal.         Behavior: Behavior normal.         Thought Content: Thought content normal.         Judgment: Judgment normal.             Significant Labs: All pertinent labs within the past 24 hours have been reviewed.  Blood Culture: No results for input(s): "LABBLOO" in the last 48 hours.  BMP:   Recent Labs   Lab 05/03/24  0442   GLU 93   *   K 4.4   CL 99   CO2 22*   BUN 70*   CREATININE 4.3*   CALCIUM 8.6*   MG 1.9  1.9     CBC:   Recent Labs   Lab 05/02/24  0506 05/03/24  0442   WBC 9.00 10.16   HGB 14.2 14.4   HCT 41.5 40.6    406     POCT Glucose: No results for input(s): "POCTGLUCOSE" in the last 48 hours.    Significant Imaging: I have reviewed all pertinent imaging results/findings within the past 24 hours.    Assessment/Plan:      * Chronic kidney disease (CKD), stage V  Creatine stable for now. BMP reviewed- noted Estimated Creatinine Clearance: 20.7 mL/min (A) (based on SCr of 3.6 mg/dL (H)). according to latest data. Based on current GFR, CKD stage is end stage.  Monitor UOP and serial BMP and adjust therapy as needed. Renally dose meds. Avoid nephrotoxic medications and procedures.    --see by Dr. Mata in ED, gen surg consulted for tunneled catheter placement in AM 4/30/24  --admit to ICU for CRRT once catheter placed-->tolerating HD will step down today  --Plan for tunneled catheter placement in am    Hypotension  Weaned off of pressors  Added midodrine  Goal MAP " >65  Orthostatics negative, off of pressors today, Step down to floor    ESRD (end stage renal disease)  Creatine stable for now. BMP reviewed- noted Estimated Creatinine Clearance: 17.4 mL/min (A) (based on SCr of 4.3 mg/dL (H)). according to latest data. Based on current GFR, CKD stage is end stage.  Monitor UOP and serial BMP and adjust therapy as needed. Renally dose meds. Avoid nephrotoxic medications and procedures.    Seen by Nephrology, Trialysis placed for CRRT, tolerating  5/3 Tunneled catheter placed today    Pulmonary emphysema, unspecified emphysema type  Patient's COPD is controlled currently.  Patient is currently off COPD Pathway. Continue scheduled inhalers Supplemental oxygen and monitor respiratory status closely.     Non-ischemic cardiomyopathy  --chronic, noted      Primary hypertension  Chronic, controlled. Latest blood pressure and vitals reviewed-     Temp:  [97.8 °F (36.6 °C)]   Pulse:  [60-81]   Resp:  [18-29]   BP: ()/(51-66)   SpO2:  [85 %-93 %] .   Home meds for hypertension were reviewed and noted below.   Hypertension Medications               furosemide (LASIX) 80 MG tablet Take 1 tablet (80 mg total) by mouth 2 (two) times daily. If increased 3 pounds in a day or 5 pounds in a week, take an extra dose of lasix 80mg until those pounds are lost.    metoprolol succinate (TOPROL-XL) 25 MG 24 hr tablet TAKE 1 TABLET BY MOUTH EVERY DAY    nitroGLYCERIN (NITROSTAT) 0.4 MG SL tablet Place 1 tablet (0.4 mg total) under the tongue every 5 (five) minutes as needed for Chest pain.    sacubitriL-valsartan (ENTRESTO) 24-26 mg per tablet Take 1 tablet by mouth 2 (two) times daily.            While in the hospital, will manage blood pressure as follows; Adjust home antihypertensive regimen as follows- hold home bp medications in the setting of hypotension requiring CRRT    Will utilize p.r.n. blood pressure medication only if patient's blood pressure greater than 180/110 and he develops  symptoms such as worsening chest pain or shortness of breath.    HFrEF (heart failure with reduced ejection fraction)  Congestive Heart Failure  Patient presents for re-evaluation of congestive heart failure. Patient's current complaints are dyspnea, fatigue, near-syncope, and orthopnea. He denies chest pain, claudication, exertional chest pressure/discomfort, and lower extremity edema. He states he is compliant most of the time with his medications. He states he is compliant all of the time with his diet.    --last TTE in 2023 shows EF 40%. Will repeat this admission  --on home lasix. Will convert to higher IV dosage  --will hold troprol in setting of hypotension requiring CRRT  --on CHF pathway    Cardiac resynchronization therapy defibrillator (CRT-D) in place        Mixed hyperlipidemia  --continue home medications      Iron deficiency anemia  Patient's anemia is currently controlled. Has not received any PRBCs to date. Etiology likely d/t Iron deficiency and chronic disease due to ESRD  Current CBC reviewed-   Lab Results   Component Value Date    HGB 15.7 04/29/2024    HCT 44.8 04/29/2024     Monitor serial CBC and transfuse if patient becomes hemodynamically unstable, symptomatic or H/H drops below 7/21.    Hypothyroidism  --continue home synthroid    V-tach  --hx of AICD placement, currently 100% paced   --continue home antiarrhythmic medications  --cardiac monitoring          VTE Risk Mitigation (From admission, onward)           Ordered     heparin (porcine) injection 2,400 Units  As needed (PRN)         04/30/24 2223     heparin (porcine) injection 5,000 Units  Every 8 hours         04/29/24 1945     IP VTE HIGH RISK PATIENT  Once         04/29/24 1945     Place sequential compression device  Until discontinued         04/29/24 1945                    Discharge Planning   SLAVA:      Code Status: Full Code   Is the patient medically ready for discharge?:     Reason for patient still in hospital (select all  that apply): Patient trending condition, Laboratory test, Treatment, and Consult recommendations  Discharge Plan A: Home with family   Discharge Delays: None known at this time        Critical care time spent on the evaluation and treatment of severe organ dysfunction, review of pertinent labs and imaging studies, discussions with consulting providers and discussions with patient/family: 36 minutes.      Mamta Hassan MD  Department of Hospital Medicine   [facility] - Intensive Care

## 2024-05-03 NOTE — TRANSFER OF CARE
"Anesthesia Transfer of Care Note    Patient: Ar Sharma    Procedure(s) Performed: Procedure(s) (LRB):  INSERTION, CATHETER, HEMODIALYSIS, DUAL LUMEN (Right)  REMOVAL, CATHETER, HEMODIALYSIS (Right)    Patient location: ICU    Anesthesia Type: MAC    Transport from OR: Transported from OR on 2-3 L/min O2 by NC with adequate spontaneous ventilation    Post pain: adequate analgesia    Post assessment: no apparent anesthetic complications    Post vital signs: stable    Level of consciousness: awake, alert and oriented    Nausea/Vomiting: no nausea/vomiting    Complications: none    Transfer of care protocol was followed    Last vitals: Visit Vitals  BP (!) 89/55   Pulse 60   Temp 36.8 °C (98.2 °F) (Oral)   Resp 19   Ht 5' 9" (1.753 m)   Wt 76.5 kg (168 lb 10.4 oz)   SpO2 (!) 93%   BMI 24.91 kg/m²     "

## 2024-05-03 NOTE — SUBJECTIVE & OBJECTIVE
Interval History: Had successful tunneled HD catheter placement    Review of Systems   Constitutional:  Negative for chills, fatigue and fever.   HENT: Negative.     Eyes: Negative.    Respiratory:  Negative for cough, chest tightness, shortness of breath and wheezing.    Cardiovascular:  Negative for chest pain, palpitations and leg swelling.   Gastrointestinal:  Negative for abdominal distention, abdominal pain, blood in stool, diarrhea, nausea and vomiting.   Endocrine: Positive for cold intolerance.   Genitourinary:  Positive for decreased urine volume and difficulty urinating. Negative for flank pain.   Musculoskeletal:  Positive for arthralgias.   Skin: Negative.    Allergic/Immunologic: Negative.    Neurological:  Positive for weakness. Negative for dizziness and light-headedness.   Hematological: Negative.    Psychiatric/Behavioral: Negative.       Objective:     Vital Signs (Most Recent):  Temp: 98 °F (36.7 °C) (05/03/24 1515)  Pulse: 60 (05/03/24 1530)  Resp: 16 (05/03/24 1530)  BP: (!) 89/52 (05/03/24 1530)  SpO2: (!) 93 % (05/03/24 1530) Vital Signs (24h Range):  Temp:  [97.7 °F (36.5 °C)-98.2 °F (36.8 °C)] 98 °F (36.7 °C)  Pulse:  [60-70] 60  Resp:  [11-47] 16  SpO2:  [89 %-99 %] 93 %  BP: ()/(44-60) 89/52     Weight: 76.5 kg (168 lb 10.4 oz)  Body mass index is 24.91 kg/m².    Intake/Output Summary (Last 24 hours) at 5/3/2024 1555  Last data filed at 5/3/2024 1313  Gross per 24 hour   Intake 517.74 ml   Output 1400 ml   Net -882.26 ml         Physical Exam  Vitals and nursing note reviewed.   Constitutional:       General: He is not in acute distress.     Appearance: He is not ill-appearing.   HENT:      Head: Normocephalic and atraumatic.      Nose: Nose normal.      Mouth/Throat:      Mouth: Mucous membranes are moist.      Pharynx: Oropharynx is clear.   Eyes:      Extraocular Movements: Extraocular movements intact.      Conjunctiva/sclera: Conjunctivae normal.      Pupils: Pupils are equal,  "round, and reactive to light.   Neck:      Comments: Central line in R side of neck  Cardiovascular:      Rate and Rhythm: Normal rate and regular rhythm.      Pulses: Normal pulses.      Heart sounds: Normal heart sounds. No murmur heard.     No friction rub. No gallop.   Pulmonary:      Effort: Pulmonary effort is normal. No respiratory distress.      Breath sounds: Normal breath sounds. No wheezing, rhonchi or rales.   Abdominal:      General: Bowel sounds are normal. There is no distension.      Palpations: Abdomen is soft.      Tenderness: There is no abdominal tenderness. There is no right CVA tenderness, left CVA tenderness or guarding.   Genitourinary:     Comments: deferred  Musculoskeletal:         General: Normal range of motion.      Cervical back: Normal range of motion and neck supple.   Skin:     General: Skin is warm and dry.      Capillary Refill: Capillary refill takes less than 2 seconds.   Neurological:      General: No focal deficit present.      Mental Status: He is alert and oriented to person, place, and time. Mental status is at baseline.   Psychiatric:         Mood and Affect: Mood normal.         Behavior: Behavior normal.         Thought Content: Thought content normal.         Judgment: Judgment normal.             Significant Labs: All pertinent labs within the past 24 hours have been reviewed.  Blood Culture: No results for input(s): "LABBLOO" in the last 48 hours.  BMP:   Recent Labs   Lab 05/03/24  0442   GLU 93   *   K 4.4   CL 99   CO2 22*   BUN 70*   CREATININE 4.3*   CALCIUM 8.6*   MG 1.9  1.9     CBC:   Recent Labs   Lab 05/02/24  0506 05/03/24  0442   WBC 9.00 10.16   HGB 14.2 14.4   HCT 41.5 40.6    406     POCT Glucose: No results for input(s): "POCTGLUCOSE" in the last 48 hours.    Significant Imaging: I have reviewed all pertinent imaging results/findings within the past 24 hours.  "

## 2024-05-03 NOTE — PLAN OF CARE
"  Care Plan    Pt AAOx4. Afebrile overnight. VSS with BP soft requiring levo restart overngiht.  Sats remained >90 on 2L NC. UOP adequate with urinal. Pt NPO at MN for tunneled cath placement. No reports of pain/SOB/nausea.  Wife at bedside overnight. Frequent checks for safety done during shift. Report given to oncoming RN.        Neuro:  Sarabjit Coma Scale  Best Eye Response: 4-->(E4) spontaneous  Best Motor Response: 6-->(M6) obeys commands  Best Verbal Response: 5-->(V5) oriented  Thornton Coma Scale Score: 15  Assessment Qualifiers: patient not sedated/intubated  Pupil PERRLA: yes  24 hr Temp:  [97 °F (36.1 °C)-98.1 °F (36.7 °C)]      CV:  Rhythm: paced rhythm  DVT prophylaxis: VTE Required Core Measure: Pharmacological prophylaxis initiated/maintained    Resp:     Oxygen Concentration (%): 28    GI/:  GI prophylaxis: no  Diet/Nutrition Received: NPO  Last Bowel Movement: 04/29/24  Voiding Characteristics: patient on CRRT, oliguria   Intake/Output Summary (Last 24 hours) at 5/3/2024 0724  Last data filed at 5/3/2024 0721  Gross per 24 hour   Intake 1129.32 ml   Output 2425 ml   Net -1295.68 ml       Labs/Accuchecks:  Recent Labs   Lab 05/03/24  0442   WBC 10.16   RBC 5.41   HGB 14.4   HCT 40.6         Recent Labs   Lab 04/29/24  1707 04/30/24  0505 05/03/24  0442   *   < > 135*   K 4.3   < > 4.4   CO2 21*   < > 22*   CL 97   < > 99   *   < > 70*   CREATININE 6.7*   < > 4.3*   ALKPHOS 160*  --   --    ALT 39  --   --    AST 42*  --   --    BILITOT 2.4*  --   --     < > = values in this interval not displayed.    No results for input(s): "PROTIME", "INR", "APTT", "HEPANTIXA" in the last 168 hours.   Recent Labs   Lab 04/29/24  1707   TROPONINI 0.040*       Electrolytes: No replacement orders  Accuchecks: none    Gtts/LDAs:  Current Facility-Administered Medications   Medication Dose Route Frequency Last Rate Last Admin    sodium chloride 0.9%   Intravenous Continuous   Stopped at 05/01/24 " 1723    heparin (porcine) in 5 % dex  500 Units/hr Intravenous Continuous   Stopped at 05/01/24 1723    NORepinephrine bitartrate-D5W  0-3 mcg/kg/min (Dosing Weight) Intravenous Continuous 5.8 mL/hr at 05/03/24 0721 0.02 mcg/kg/min at 05/03/24 0721       Lines/Drains/Airways       Central Venous Catheter Line  Duration             Trialysis (Dialysis) Catheter 04/30/24 0852 right internal jugular 2 days              Peripheral Intravenous Line  Duration                  Peripheral IV - Single Lumen 04/29/24 1707 20 G Posterior;Right Forearm 3 days         Peripheral IV - Single Lumen 04/29/24 2035 20 G Anterior;Left Forearm 3 days                    Skin/Wounds  Bathing/Skin Care: bath, complete;dressed/undressed;linen changed (05/02/24 1715)  Wounds: No  Wound care consulted: No    Consults  Consults (From admission, onward)          Status Ordering Provider     Inpatient consult to Social Work/Case Management  Once        Provider:  (Not yet assigned)    Completed SENIA SARAVIA     Inpatient consult to Registered Dietitian/Nutritionist  Once        Provider:  (Not yet assigned)    SENIA Suarez     Inpatient consult to Nephrology-[facility] ([facility]in[facility])  Once        Provider:  Chyan Booth MD    Acknowledged SENIA SARAVIA     Inpatient consult to Cardiology-[facility]  Once        Provider:  Juan Roque MD    Completed CHYNA BOOTH     Inpatient consult to General Surgery  Once        Provider:  Philip Perez MD    Completed CHYNA BOOTH

## 2024-05-03 NOTE — ASSESSMENT & PLAN NOTE
Creatine stable for now. BMP reviewed- noted Estimated Creatinine Clearance: 17.4 mL/min (A) (based on SCr of 4.3 mg/dL (H)). according to latest data. Based on current GFR, CKD stage is end stage.  Monitor UOP and serial BMP and adjust therapy as needed. Renally dose meds. Avoid nephrotoxic medications and procedures.    Seen by Nephrology, Trialysis placed for CRRT, tolerating  5/3 Tunneled catheter placed today

## 2024-05-03 NOTE — ANESTHESIA PREPROCEDURE EVALUATION
05/03/2024  [facility]  Anesthesia Pre-Operative Evaluation         Patient Name: Ar Sharma  YOB: 1960  MRN: [id number]    SUBJECTIVE:     Pre-operative evaluation for Procedure(s) (LRB):  INSERTION, CATHETER, HEMODIALYSIS, DUAL LUMEN (N/A)     05/03/2024    Ar Sharma is a 63 y.o. male w/ a significant PMHx of HFrEF (EF 40%), RV systolic dysfunction, severe pHTN, pacer/ AICD (DDD, BiV paced), ESRD to start HD.  BP soft overnight requiring ICU admission and NE ggt and NC to maintain SpO2 > 90%.   Patient received CRRT yesterday with 600mL of UF.    Patient now presents for the above procedure(s).    TTE 4/30/24:    Left Ventricle: The left ventricle is normal in size. Normal wall thickness. Regional wall motion abnormalities present. See diagram for wall motion findings. Septal motion is consistent with pacing. There is mildly reduced systolic function with a visually estimated ejection fraction of 40 - 45%. Grade III diastolic dysfunction.    Right Ventricle: Moderate to severe right ventricular enlargement. Systolic function is reduced.TAPSE is 1.59 cm. Pacemaker lead present in the ventricle.    Left Atrium: Left atrium is severely dilated. The left atrium volume index is 71.4 mL/m2.    Right Atrium: Right atrium is severely dilated.    Aortic Valve: There is mild aortic valve sclerosis. There is mild to moderate stenosis. Aortic valve area by VTI is 1.49 cm². Aortic valve peak velocity is 1.43 m/s. Mean gradient is 5 mmHg. The dimensionless index is 0.52.    Mitral Valve: There is mild regurgitation.    Tricuspid Valve: There is mild to moderate regurgitation.    Pulmonary Artery: The estimated pulmonary artery systolic pressure is 64 mmHg.    IVC/SVC: Elevated venous pressure at 15 mmHg.    Patient Active Problem List   Diagnosis    V-tach    Hypothyroidism     Iron deficiency anemia    Mixed hyperlipidemia    Atherosclerosis of native coronary artery of native heart with angina pectoris    Cardiac resynchronization therapy defibrillator (CRT-D) in place    S/P ablation of ventricular arrhythmia    HFrEF (heart failure with reduced ejection fraction)    Cardiac LV ejection fraction 10-20%    Enlarged prostate with lower urinary tract symptoms (LUTS)    Primary osteoarthritis of one knee    Prediabetes    Hyperlipidemia    Primary hypertension    Thrombocythemia -  on 4/4/22    Hyponatremia    Abuse of herbal or folk remedies    Non-ischemic cardiomyopathy    Secondary hyperparathyroidism of renal origin    Aortic atherosclerosis    Pulmonary emphysema, unspecified emphysema type    Chronic kidney disease (CKD), stage V    ESRD (end stage renal disease)    Acute hypoxemic respiratory failure    Hypotension       Review of patient's allergies indicates:   Allergen Reactions    Lokelma [sodium zirconium cyclosilicate] Other (See Comments)     Fluid retention, weight gain, CHF excerebration, severe constipation    Atorvastatin Other (See Comments)     Joint pain    Jardiance [empagliflozin] Other (See Comments)     Chest pains, significant weight loss, lower blood pressure       Current Inpatient Medications:  Current Facility-Administered Medications   Medication Dose Route Frequency    allopurinoL  100 mg Oral Daily    alteplase  4 mg Intra-Catheter Once    amiodarone  400 mg Oral Daily    aspirin  81 mg Oral Daily    ergocalciferol  50,000 Units Oral Q7 Days    furosemide (LASIX) injection  80 mg Intravenous Q12H    heparin (porcine)  5,000 Units Subcutaneous Q8H    levothyroxine  150 mcg Oral Daily    mexiletine  150 mg Oral BID WM    midodrine  10 mg Oral Q8H    mupirocin   Nasal BID    pravastatin  40 mg Oral Daily    vitamin renal formula (B-complex-vitamin c-folic acid)  1 capsule Oral Daily       No current facility-administered medications on file prior to  encounter.     Current Outpatient Medications on File Prior to Encounter   Medication Sig Dispense Refill    acetaminophen (TYLENOL) 500 MG tablet Take 500 mg by mouth every 6 (six) hours as needed for Pain.      allopurinoL (ZYLOPRIM) 100 MG tablet Take 1 tablet (100 mg total) by mouth once daily. 90 tablet 3    amiodarone (PACERONE) 400 MG tablet Take 1 tablet (400 mg total) by mouth once daily. 90 tablet 3    aspirin (ECOTRIN) 81 MG EC tablet Take 1 tablet (81 mg total) by mouth once daily. 60 tablet 2    ergocalciferol (ERGOCALCIFEROL) 50,000 unit Cap TAKE 1 CAPSULE BY MOUTH ONE TIME PER WEEK (Patient taking differently: Take 50,000 Units by mouth every Monday.) 12 capsule 3    furosemide (LASIX) 80 MG tablet Take 1 tablet (80 mg total) by mouth 2 (two) times daily. If increased 3 pounds in a day or 5 pounds in a week, take an extra dose of lasix 80mg until those pounds are lost. 120 tablet 3    levothyroxine (SYNTHROID) 150 MCG tablet Take 1 tablet (150 mcg total) by mouth once daily. 90 tablet 3    metoprolol succinate (TOPROL-XL) 25 MG 24 hr tablet TAKE 1 TABLET BY MOUTH EVERY DAY 90 tablet 3    mexiletine (MEXITIL) 150 MG Cap Take 1 capsule (150 mg total) by mouth 2 (two) times daily with meals. 180 capsule 3    nitroGLYCERIN (NITROSTAT) 0.4 MG SL tablet Place 1 tablet (0.4 mg total) under the tongue every 5 (five) minutes as needed for Chest pain. 20 tablet 1    pravastatin (PRAVACHOL) 40 MG tablet TAKE 1 TABLET BY MOUTH EVERY DAY (Patient taking differently: Take 40 mg by mouth every evening.) 90 tablet 3    sacubitriL-valsartan (ENTRESTO) 24-26 mg per tablet Take 1 tablet by mouth 2 (two) times daily. 180 tablet 3       Past Surgical History:   Procedure Laterality Date    ablations  03/05/2018    albations  02/01/2018    COLONOSCOPY N/A 12/07/2018    Procedure: COLONOSCOPY/suprep;  Surgeon: Ananya Morillo MD;  Location: [facility];  Service: Endoscopy;  Laterality: N/A;    defibulater N/A 2016     ESOPHAGOGASTRODUODENOSCOPY N/A 12/07/2018    Procedure: EGD (ESOPHAGOGASTRODUODENOSCOPY);  Surgeon: Ananya Morillo MD;  Location: [facility] ENDO;  Service: Endoscopy;  Laterality: N/A;    JOINT REPLACEMENT Bilateral 10/2016    knees, bilat    LEFT HEART CATHETERIZATION N/A 12/16/2020    Procedure: Left heart cath;  Surgeon: Shahram Stewart MD;  Location: [facility] CATH LAB/EP;  Service: Cardiology;  Laterality: N/A;    LEFT HEART CATHETERIZATION Left 9/27/2022    Procedure: Left heart cath;  Surgeon: Juan Roque MD;  Location: [facility] CATH LAB/EP;  Service: Cardiology;  Laterality: Left;    REPLACEMENT OF IMPLANTABLE CARDIOVERTER-DEFIBRILLATOR (ICD) GENERATOR Left 1/24/2023    Procedure: REPLACEMENT, ICD GENERATOR;  Surgeon: River Tenorio MD;  Location: [facility] EP LAB;  Service: Cardiology;  Laterality: Left;  ANTHONY, CRTD gen chg, MDT, MAC, DM, 3prep       OBJECTIVE:     Vital Signs Range (Last 24H):  Temp:  [36.1 °C (97 °F)-36.7 °C (98.1 °F)]   Pulse:  [59-70]   Resp:  [13-47]   BP: ()/(44-60)   SpO2:  [85 %-99 %]       Significant Labs:  Lab Results   Component Value Date    WBC 10.16 05/03/2024    HGB 14.4 05/03/2024    HCT 40.6 05/03/2024     05/03/2024    CHOL 127 04/29/2024    TRIG 66 04/29/2024    HDL 32 (L) 04/29/2024    ALT 39 04/29/2024    AST 42 (H) 04/29/2024     (L) 05/03/2024    K 4.4 05/03/2024    CL 99 05/03/2024    CREATININE 4.3 (H) 05/03/2024    BUN 70 (H) 05/03/2024    CO2 22 (L) 05/03/2024    TSH 0.283 (L) 03/04/2024    PSA 4.2 (H) 05/04/2023    INR 1.2 01/17/2023    HGBA1C 5.6 04/29/2024       Diagnostic Studies: No relevant studies.    EKG:   Results for orders placed or performed during the hospital encounter of 04/29/24   EKG 12-lead    Collection Time: 04/29/24  4:28 PM   Result Value Ref Range    QRS Duration 188 ms    OHS QTC Calculation 598 ms    Narrative    Test Reason : R06.02,    Vent. Rate : 070 BPM     Atrial Rate : 041 BPM     P-R Int : 000 ms          QRS Dur : 188  ms      QT Int : 554 ms       P-R-T Axes : 000 262 050 degrees     QTc Int : 598 ms    AV sequential or dual chamber electronic pacemaker  When compared with ECG of 01-JUN-2023 10:10,  Vent. rate has decreased BY   6 BPM  Confirmed by Frank Hernandez MD ([id number]) on 5/1/2024 8:12:40 AM    Referred By: [email or web address]   SELF           Confirmed By:rFank Hernandez MD       ASSESSMENT/PLAN:           Pre-op Assessment    I have reviewed the Patient Summary Reports.     I have reviewed the Nursing Notes. I have reviewed the NPO Status.   I have reviewed the Medications.     Review of Systems  Anesthesia Hx:             Denies Family Hx of Anesthesia complications.    Denies Personal Hx of Anesthesia complications.                    Hematology/Oncology:  Hematology Normal   Oncology Normal                                   EENT/Dental:  EENT/Dental Normal           Cardiovascular:     Hypertension   CAD       CHF   PVD hyperlipidemia                             Pulmonary:   COPD                     Renal/:  Chronic Renal Disease, ESRD                Hepatic/GI:  Hepatic/GI Normal                 Musculoskeletal:  Arthritis               Neurological:  Neurology Normal                                      Endocrine:   Hypothyroidism              Physical Exam  General: Well nourished, Cooperative, Alert and Oriented    Airway:  Mallampati: II   Mouth Opening: Normal  TM Distance: Normal  Neck ROM: Normal ROM        Anesthesia Plan  Type of Anesthesia, risks & benefits discussed:    Anesthesia Type: MAC, Gen Natural Airway  Intra-op Monitoring Plan: Standard ASA Monitors  Post Op Pain Control Plan: multimodal analgesia  Induction:  IV  Informed Consent: Informed consent signed with the Patient and all parties understand the risks and agree with anesthesia plan.  All questions answered.   ASA Score: 4  Day of Surgery Review of History & Physical: H&P Update referred to the surgeon/provider.  Anesthesia Plan Notes:  Spoke with [employer] pacing [employer].  Magnet application will make an audible sound indicating AICD deactivation.  It will have no effect on the active pacer settings.      Ready For Surgery From Anesthesia Perspective.     .

## 2024-05-03 NOTE — ASSESSMENT & PLAN NOTE
Weaned off of pressors  Added midodrine  Goal MAP >65  Orthostatics negative, off of pressors today, Step down to floor

## 2024-05-03 NOTE — PLAN OF CARE
Orthostatic vitals    1409 sitting  BP 89/56 MAP 66  HR 64    1412 standing  BP 90/54 MAP 67  HR 63    Pt denied any dizziness, lightheadedness, unsteadiness, changes in vision while standing.

## 2024-05-03 NOTE — CARE UPDATE
No events on tele.   Diuresis per Nephrology  Cardiology to sign off. Please call with questions.

## 2024-05-03 NOTE — CHAPLAIN
05/02/24 1000   Clinical Encounter Type   Visit Type Initial Visit   Visit Category Critical Care   Visited With Patient and family together;Health care provider   Number of Family Visited 1   Length of Visit 20 Minutes   Continue Visiting No   Patient Spiritual Encounters   Comments - Patient Nurse reached out for assisstance with patient's AD/MPOA. CH assissted with completion. Document scanned into chart and the original and 3 copies were returned to the patient and family.   Advance Directives (For Healthcare)   Advance Directive  (If Adv Dir status is received, view document under Adv Dir in header or Chart Review Media tab) Advance Directive currently in Epic.

## 2024-05-03 NOTE — PROGRESS NOTES
"[facility] / Critical Care Progress Note     Primary Attending:  Mamta Hassan MD   Consultant Attending: Dr. Yadav   Consultant Fellow: : MD Donna Toledo MD     Date of Admit: 4/29/2024  Hospital day: 4    Brief Summary of Hospitalization:      PMH of ESRD, VT s/p ablation and pace maker, HFrEF with LVEF 40-45%, chronic hypotension, hypothyroidism who presented on 4/29 for worsening SOB in setting of low UO despite same lasix dose. Admitted to MICU for initiation of dialysis/CRRT in setting of hypotension and progression of CKD 5 to ESRD.     24h Events:      HD initiated yesterday, tolerated well, 1100ml out. UO 1300ml. Was off levo yesterday but restarted overnight, currently on 0.02.     Pt said he did okay during HD until afterwards when he had nausea and vomiting. He is not nauseous this morning, though he is NPO. Tunneled line placement scheduled for today. He said he has been able to sit up in the chair but feels some dizziness.     Orthostaticstoday whenb back from procedure  Bedsilde ultrawsuojnd for fluid status when back farom procedure  Hold pm dose of lasix for now   Physical therapy ?     ROS:  A 10 point ROS was negative except as listed above.    OBJECTIVE DATA:      Vital Signs:  Temp:  [97 °F (36.1 °C)-98.1 °F (36.7 °C)]   Pulse:  [59-70]   Resp:  [13-47]   BP: ()/(44-60)   SpO2:  [85 %-99 %]     No results found for: "HTIN", "WEIGHT"    Intake / Output:    Intake/Output Summary (Last 24 hours) at 5/3/2024 0842  Last data filed at 5/3/2024 0721  Gross per 24 hour   Intake 1129.32 ml   Output 2425 ml   Net -1295.68 ml       General: awake, cooperative, no acute distress  Head: Normocephalic, atraumatic  Lungs: respirations unlabored, NC 2L, sats >95%, crackles at BL bases  Heart: regular rate and rhythm, radial pulses 2+  Abdomen: soft, non-tender, normal bowel sounds  Extremities: extremities normal, no tenderness noted  Skin: warm and " dry, no rashes appreciated. R internal jugular line in place c/d/i  Neuro: alert and oriented, tremulous with movement in bed, diffuse weakness     Laboratory, Microbiology, ECG(s), and Imaging:  Reviewed.     Active Medications:    Current Facility-Administered Medications   Medication Dose Route Frequency    allopurinoL  100 mg Oral Daily    alteplase  4 mg Intra-Catheter Once    amiodarone  400 mg Oral Daily    aspirin  81 mg Oral Daily    ergocalciferol  50,000 Units Oral Q7 Days    furosemide (LASIX) injection  80 mg Intravenous Q12H    heparin (porcine)  5,000 Units Subcutaneous Q8H    levothyroxine  150 mcg Oral Daily    mexiletine  150 mg Oral BID WM    midodrine  10 mg Oral Q8H    mupirocin   Nasal BID    pravastatin  40 mg Oral Daily    vitamin renal formula (B-complex-vitamin c-folic acid)  1 capsule Oral Daily        Current Facility-Administered Medications   Medication Dose Route Frequency Last Rate Last Admin    sodium chloride 0.9%   Intravenous Continuous   Stopped at 05/01/24 1723    NORepinephrine bitartrate-D5W  0-3 mcg/kg/min (Dosing Weight) Intravenous Continuous            Current Facility-Administered Medications:     sodium chloride 0.9%, , Intravenous, PRN    acetaminophen, 650 mg, Oral, Q4H PRN    calcium carbonate, 1,000 mg, Oral, TID PRN    heparin (porcine), 2,400 Units, Intra-Catheter, PRN    HYDROcodone-acetaminophen, 1 tablet, Oral, Q4H PRN    ondansetron, 4 mg, Intravenous, Q8H PRN    sodium chloride 0.9%, 250 mL, Intravenous, PRN    sodium chloride 0.9%, 10 mL, Intravenous, PRN    sodium chloride 0.9%, 10 mL, Intravenous, PRN     Assessment/Plan:     NEUROLOGICAL:  No acute issues    CARDIOVASCULAR:  Shock  - no Hx predisposing to neurogenic shock  - likely 2/2 to heart failure and CKD progression to ESRD - intermittently on levo   - infectious workup - UA noninfectious, no white count, BCx NG at 2 days, lactate normal   - continue midodrine 2.5mg q8hr  - levo restarted overnight -  0.02, will wean this morning  - pt reported dizziness when sitting up yesterday - will do bedside ultrasound for fluid status and do orthostatics  - MAP goal >60    Chronic baseline asymptomatic hypotension   - appears chronic outpatient  - home med lasix 80mg BID PO  - consider holding PM IV lasix today pending orthostatics  - consider restarting PO home lasix rather than IV   - MAP goal >60    Acute decompensated HF 2/2 fluid overload  Cardiorenal syndrome   VT s/p ICD  - 2022 echo with EF 40% and Grade II LV DD,   - repeat echo with EF 40-45% and Grade III diastolic dysfuntion  - lactate WNL  - holding home beta blocker and Entresto  - continuing home amiodarone and mexiletine  - getting fluid off with diuresis, HD initiated 5/2 (did have CRRT prior)     PULMONOLOGY:  Acute hypoxic respiratory failure  - no home O2, requiring up to 5L to maintain sats  - CXR with evidence of pulmonary edema, crackles persist on exam   - inpatient goal sats 88-92%  - wean O2 as tolerated  - this morning - 100% at 2L, 97% at 1L    ?COPD without PFTs in chart  - CT chest 2023 with emphysema  - 40py smoking Hx, quit 15 years ago   - on 2-5L NC, wean as tolerated     Pulm hypertension   - PASP on 4/30 TTE was 64mmHg, RV enlarged, RA severely dilated  - will need outpatient follow up     GI/FEN:    Fluids: restricted 1.5L, diuresing with lasix, HD initiated 5/2  Electrolytes: No major abnormalities  Nutrition: enteral feeds renal diet      RENAL:   ESRD  - Dr. Mata with nephrology following   - CRRT initiated 4/30 via trialysis line  - HD initiated 5/2   - tunneled line placement today 5/3    HEMATOLOGY/ONCOLOGY:  - DVT Ppx: heparin   - Transfusion threshold Hb <7g/dl     ENDOCRINE:  Hypothyroidism  - 3/4/24 elevated TSH 0.283, elevated free T4 3.25  - cont home synthroid 150mcg    Strict glucose control goal 140-180     INFECTIOUS DISEASE:  UA non infectious  BCx drawn for shock workup     MUSCULOSKELETAL/RHEUMATOLOGIC:  Hx gout  -  cont home allopurinol 100mg daily     Attempt moving to chair BID as tolerated      Feeding: enteral feeds renal/cardiac diet, fluid restriction 1.5L  Analgesia: Multimodal pain control with tylenol, norco 5  Sedation: N/A  VTE Ppx:   Anticoagulants       Ordered     Route Frequency Start Stop    04/30/24 2223  heparin (porcine)         Cath As needed (PRN) 04/30/24 2320 --    04/29/24 1945  heparin (porcine)  (VTE Prophylaxis Orders - High Risk)         SubQ Every 8 hours 04/29/24 2200 --           Head of Bed: 45 degrees  Ulcer PPX: none  Glucose: Hypoglycemic precautions  SBT/SAT: N/A  Bowels: N/A  Indwelling Lines: PIV L forearm, PIV R forearm, R int jug perma cath placed 5/3  Deescalation Abx: N/A    Code Status: full     Dispo: continue treatment for shock, HF exacerbation, ESRD with HD initiation     Rounded with Dr. Yadav. Attestation to follow.       Donna Dunaway MD  [facility] HO[facility]  [facility]

## 2024-05-03 NOTE — PLAN OF CARE
The pt remains in ICU,now receiving hd. The pt has an out pt hd chair at [facility] [place] by [facility] 2nd shift. The clinic has to be called prior to the pt being d/c'd b/c his start date was 5/3/24 but this sw notified them the pt wasn't starting today.      [facility] - Intensive Care  Discharge Reassessment    Primary Care Provider: Jc Elliott MD    Expected Discharge Date:     Reassessment (most recent)       Discharge Reassessment - 05/03/24 1538          Discharge Reassessment    Assessment Type Discharge Planning Reassessment (P)      Did the patient's condition or plan change since previous assessment? Yes (P)      Discharge Plan discussed with: Patient (P)      Name(s) and Number(s) Nicolette Sharma(wife)[phone] (P)      Communicated SLAVA with patient/caregiver Date not available/Unable to determine (P)      Discharge Plan A Home with family (P)      Discharge Plan B Home Health (P)      DME Needed Upon Discharge  other (see comments) (P)    TBD    Transition of Care Barriers None (P)      Why the patient remains in the hospital Requires continued medical care (P)         Post-Acute Status    Post-Acute Authorization Dialysis (P)      Diaylsis Status Set-up Complete/Auth obtained (P)    [facility] 2ND SHIFT)    Discharge Delays None known at this time (P)

## 2024-05-03 NOTE — NURSING TRANSFER
Nursing Transfer Note      5/3/2024   4:40 PM    Nurse giving handoff:Noy REDDING   Nurse receiving handoff:Soheila REDDING    Reason patient is being transferred: Step down    Transfer To: Room 420    Transfer via wheelchair    Transfer with cardiac monitoring    Transported by RN/ Transport    Medicines sent: mexiletine capsule 150 mg (patient's home medication), and Mupirocin 2% ointment    Any special needs or follow-up needed: none    Patient belongings transferred with patient: Yes    Chart send with patient: Yes    Notified: spouse    Patient reassessed at: 5/3/2024@ 1635  1  Upon arrival to floor: cardiac monitor applied, patient oriented to room, call bell in reach, and bed in lowest position, Chart brought to nurses station, medication given to Kellie and she stated she will place them in patients bin.

## 2024-05-03 NOTE — PLAN OF CARE
Received patient from ICU this afternoon. Spouse at bedside. Up in chair upon arrival. Patient having nausea, PRN zofran given with full relief of symptoms. Contacted dietary, waiting for dinner tray. Dressing clean dry and intact to right chest. Two peripheral IV sites flushed. Plan for dialysis tomorrow.         Problem: Infection  Goal: Absence of Infection Signs and Symptoms  Outcome: Not Progressing     Problem: Skin Injury Risk Increased  Goal: Skin Health and Integrity  Outcome: Not Progressing     Problem: Chronic Kidney Disease  Goal: Optimal Coping with Chronic Illness  Outcome: Not Progressing     Problem: Chronic Kidney Disease  Goal: Optimal Coping with Chronic Illness  Outcome: Not Progressing     Problem: Chronic Kidney Disease  Goal: Optimal Coping with Chronic Illness  Outcome: Not Progressing  Goal: Electrolyte Balance  Outcome: Not Progressing  Goal: Fluid Balance  Outcome: Not Progressing  Goal: Optimal Functional Ability  Outcome: Not Progressing  Goal: Absence of Anemia Signs and Symptoms  Outcome: Not Progressing  Goal: Optimal Oral Intake  Outcome: Not Progressing  Goal: Acceptable Pain Control  Outcome: Not Progressing  Goal: Minimize Renal Failure Effects  Outcome: Not Progressing     Problem: Hemodialysis  Goal: Safe, Effective Therapy Delivery  Outcome: Not Progressing  Goal: Effective Tissue Perfusion  Outcome: Not Progressing  Goal: Absence of Infection Signs and Symptoms  Outcome: Not Progressing

## 2024-05-03 NOTE — PLAN OF CARE
The sw spoke to Jt Lerner([facility] )via phone [phone] in reference to the pt. The sw informed him the pt will not be starting today because he's still in ICU. He states he will f/u up with the sw to get an SLAVA for the pt. He states the pt has a MWF chair. The sw encouraged him to call if he has any further questions or concerns.        05/03/24 0820   Post-Acute Status   Post-Acute Authorization Dialysis   Hospice Status Set-up Complete/Auth obtained   Discharge Plan   Discharge Plan A Home with family   Discharge Plan B Home Health

## 2024-05-04 LAB
ALBUMIN SERPL BCP-MCNC: 2.9 G/DL (ref 3.5–5.2)
ANION GAP SERPL CALC-SCNC: 13 MMOL/L (ref 8–16)
BASOPHILS # BLD AUTO: 0.12 K/UL (ref 0–0.2)
BASOPHILS NFR BLD: 1 % (ref 0–1.9)
BUN SERPL-MCNC: 79 MG/DL (ref 8–23)
CALCIUM SERPL-MCNC: 8.6 MG/DL (ref 8.7–10.5)
CHLORIDE SERPL-SCNC: 97 MMOL/L (ref 95–110)
CO2 SERPL-SCNC: 20 MMOL/L (ref 23–29)
CREAT SERPL-MCNC: 4.8 MG/DL (ref 0.5–1.4)
DIFFERENTIAL METHOD BLD: ABNORMAL
EOSINOPHIL # BLD AUTO: 0.2 K/UL (ref 0–0.5)
EOSINOPHIL NFR BLD: 1.7 % (ref 0–8)
ERYTHROCYTE [DISTWIDTH] IN BLOOD BY AUTOMATED COUNT: 15 % (ref 11.5–14.5)
EST. GFR  (NO RACE VARIABLE): 13 ML/MIN/1.73 M^2
GLUCOSE SERPL-MCNC: 105 MG/DL (ref 70–110)
HCT VFR BLD AUTO: 40.7 % (ref 40–54)
HGB BLD-MCNC: 14.3 G/DL (ref 14–18)
IMM GRANULOCYTES # BLD AUTO: 0.11 K/UL (ref 0–0.04)
IMM GRANULOCYTES NFR BLD AUTO: 0.9 % (ref 0–0.5)
LYMPHOCYTES # BLD AUTO: 0.6 K/UL (ref 1–4.8)
LYMPHOCYTES NFR BLD: 5 % (ref 18–48)
MAGNESIUM SERPL-MCNC: 2 MG/DL (ref 1.6–2.6)
MCH RBC QN AUTO: 26 PG (ref 27–31)
MCHC RBC AUTO-ENTMCNC: 35.1 G/DL (ref 32–36)
MCV RBC AUTO: 74 FL (ref 82–98)
MONOCYTES # BLD AUTO: 1.2 K/UL (ref 0.3–1)
MONOCYTES NFR BLD: 10.1 % (ref 4–15)
NEUTROPHILS # BLD AUTO: 9.8 K/UL (ref 1.8–7.7)
NEUTROPHILS NFR BLD: 81.3 % (ref 38–73)
NRBC BLD-RTO: 0 /100 WBC
PHOSPHATE SERPL-MCNC: 4.3 MG/DL (ref 2.7–4.5)
PHOSPHATE SERPL-MCNC: 4.5 MG/DL (ref 2.7–4.5)
PLATELET # BLD AUTO: 453 K/UL (ref 150–450)
PMV BLD AUTO: 13 FL (ref 9.2–12.9)
POTASSIUM SERPL-SCNC: 5.8 MMOL/L (ref 3.5–5.1)
RBC # BLD AUTO: 5.5 M/UL (ref 4.6–6.2)
SODIUM SERPL-SCNC: 130 MMOL/L (ref 136–145)
URATE SERPL-MCNC: 6.3 MG/DL (ref 3.4–7)
WBC # BLD AUTO: 12.09 K/UL (ref 3.9–12.7)

## 2024-05-04 PROCEDURE — 63600175 PHARM REV CODE 636 W HCPCS: Performed by: SURGERY

## 2024-05-04 PROCEDURE — 51798 US URINE CAPACITY MEASURE: CPT

## 2024-05-04 PROCEDURE — 25000003 PHARM REV CODE 250: Performed by: SURGERY

## 2024-05-04 PROCEDURE — 83735 ASSAY OF MAGNESIUM: CPT | Performed by: SURGERY

## 2024-05-04 PROCEDURE — 63600175 PHARM REV CODE 636 W HCPCS: Mod: JZ,JG | Performed by: INTERNAL MEDICINE

## 2024-05-04 PROCEDURE — 36415 COLL VENOUS BLD VENIPUNCTURE: CPT | Performed by: INTERNAL MEDICINE

## 2024-05-04 PROCEDURE — 27000221 HC OXYGEN, UP TO 24 HOURS

## 2024-05-04 PROCEDURE — 85025 COMPLETE CBC W/AUTO DIFF WBC: CPT | Performed by: SURGERY

## 2024-05-04 PROCEDURE — P9047 ALBUMIN (HUMAN), 25%, 50ML: HCPCS | Mod: JZ,JG | Performed by: INTERNAL MEDICINE

## 2024-05-04 PROCEDURE — 94761 N-INVAS EAR/PLS OXIMETRY MLT: CPT

## 2024-05-04 PROCEDURE — 84100 ASSAY OF PHOSPHORUS: CPT | Performed by: SURGERY

## 2024-05-04 PROCEDURE — 84550 ASSAY OF BLOOD/URIC ACID: CPT | Performed by: INTERNAL MEDICINE

## 2024-05-04 PROCEDURE — 99900035 HC TECH TIME PER 15 MIN (STAT)

## 2024-05-04 PROCEDURE — 80069 RENAL FUNCTION PANEL: CPT | Performed by: INTERNAL MEDICINE

## 2024-05-04 PROCEDURE — 80100016 HC MAINTENANCE HEMODIALYSIS

## 2024-05-04 PROCEDURE — 11000001 HC ACUTE MED/SURG PRIVATE ROOM

## 2024-05-04 PROCEDURE — 25000003 PHARM REV CODE 250: Performed by: INTERNAL MEDICINE

## 2024-05-04 RX ORDER — PANTOPRAZOLE SODIUM 40 MG/1
40 TABLET, DELAYED RELEASE ORAL DAILY
Status: DISCONTINUED | OUTPATIENT
Start: 2024-05-04 | End: 2024-05-12 | Stop reason: HOSPADM

## 2024-05-04 RX ORDER — POLYETHYLENE GLYCOL 3350 17 G/17G
17 POWDER, FOR SOLUTION ORAL DAILY
Status: DISCONTINUED | OUTPATIENT
Start: 2024-05-04 | End: 2024-05-12 | Stop reason: HOSPADM

## 2024-05-04 RX ORDER — ALBUMIN HUMAN 250 G/1000ML
25 SOLUTION INTRAVENOUS ONCE
Status: COMPLETED | OUTPATIENT
Start: 2024-05-04 | End: 2024-05-04

## 2024-05-04 RX ADMIN — PRAVASTATIN SODIUM 40 MG: 40 TABLET ORAL at 08:05

## 2024-05-04 RX ADMIN — MEXILETINE HYDROCHLORIDE 150 MG: 150 CAPSULE ORAL at 04:05

## 2024-05-04 RX ADMIN — NEPHROCAP 1 CAPSULE: 1 CAP ORAL at 08:05

## 2024-05-04 RX ADMIN — HEPARIN SODIUM 2400 UNITS: 1000 INJECTION, SOLUTION INTRAVENOUS; SUBCUTANEOUS at 11:05

## 2024-05-04 RX ADMIN — MEXILETINE HYDROCHLORIDE 150 MG: 150 CAPSULE ORAL at 08:05

## 2024-05-04 RX ADMIN — HEPARIN SODIUM 5000 UNITS: 5000 INJECTION INTRAVENOUS; SUBCUTANEOUS at 05:05

## 2024-05-04 RX ADMIN — MIDODRINE HYDROCHLORIDE 15 MG: 5 TABLET ORAL at 09:05

## 2024-05-04 RX ADMIN — ALBUMIN (HUMAN) 25 G: 12.5 SOLUTION INTRAVENOUS at 12:05

## 2024-05-04 RX ADMIN — CALCIUM CARBONATE (ANTACID) CHEW TAB 500 MG 1000 MG: 500 CHEW TAB at 09:05

## 2024-05-04 RX ADMIN — AMIODARONE HYDROCHLORIDE 400 MG: 200 TABLET ORAL at 08:05

## 2024-05-04 RX ADMIN — HEPARIN SODIUM 5000 UNITS: 5000 INJECTION INTRAVENOUS; SUBCUTANEOUS at 01:05

## 2024-05-04 RX ADMIN — PANTOPRAZOLE SODIUM 40 MG: 40 TABLET, DELAYED RELEASE ORAL at 05:05

## 2024-05-04 RX ADMIN — MIDODRINE HYDROCHLORIDE 15 MG: 5 TABLET ORAL at 05:05

## 2024-05-04 RX ADMIN — POLYETHYLENE GLYCOL 3350 17 G: 17 POWDER, FOR SOLUTION ORAL at 01:05

## 2024-05-04 RX ADMIN — MUPIROCIN: 20 OINTMENT TOPICAL at 09:05

## 2024-05-04 RX ADMIN — MUPIROCIN: 20 OINTMENT TOPICAL at 08:05

## 2024-05-04 RX ADMIN — ASPIRIN 81 MG: 81 TABLET, COATED ORAL at 08:05

## 2024-05-04 RX ADMIN — LEVOTHYROXINE SODIUM 150 MCG: 150 TABLET ORAL at 08:05

## 2024-05-04 RX ADMIN — HEPARIN SODIUM 5000 UNITS: 5000 INJECTION INTRAVENOUS; SUBCUTANEOUS at 09:05

## 2024-05-04 RX ADMIN — ALLOPURINOL 100 MG: 100 TABLET ORAL at 08:05

## 2024-05-04 RX ADMIN — MIDODRINE HYDROCHLORIDE 15 MG: 5 TABLET ORAL at 01:05

## 2024-05-04 NOTE — PROGRESS NOTES
[facility]  Progress Note    Patient Name: Ar Sharma  MRN: [id number]  Patient Class: IP- Inpatient   Admission Date: 4/29/2024  Length of Stay: 5 days  Attending Physician: Mamta Hassan MD  Primary Care Provider: Jc Elliott MD        Subjective:     Principal Problem:Chronic kidney disease (CKD), stage V        HPI:  Ar Sharma is a 63M with PMHx of ESRD, HFrEF, HTN, NICM, COPD, VT s/p ICD placement, and HLD who presented to [facility] ED with CC progressively worse SOB over the last two months. He was recently diagnosed with ESRD and has been trying diet modification at home. He endorses SINGH, dizziness, frequent intermittent nausea and one episode of vomiting during this time. He denies CP, syncope, dysuria, fever or chills.     ED workup significant for: , Cr 6.7, Phos 5, Tbili 2.4, BNP 3204, trop 0.040, vit D 24, .6, and CXR: cardiomegaly w/ pulmonary vascular congestion consistent w/ pulmonary edema. All other components of CBC and chemistries were unremarkable.       Patient was seen by Dr. Mata in ED. Emergent HD not indicated.     Overview/Hospital Course:  4/30 Tolerating HD through central line, SOB is improved  5/1/24 On pressors but tolerating CRRT  5/2 tolerating HD, off of pressors, stepping down  5/3 Tunneled catheter placed  5/4 had an episode of intradialytic hypotension, given albumin    Interval History: K 5.8 this am, taken for HD. Had an episode of intradialytic hypotension given albumin    Review of Systems   Constitutional:  Negative for chills, fatigue and fever.   HENT: Negative.     Eyes: Negative.    Respiratory:  Negative for cough, chest tightness, shortness of breath and wheezing.    Cardiovascular:  Negative for chest pain, palpitations and leg swelling.   Gastrointestinal:  Negative for abdominal distention, abdominal pain, blood in stool, diarrhea, nausea and vomiting.   Endocrine: Positive for cold intolerance.    Genitourinary:  Positive for decreased urine volume and difficulty urinating. Negative for flank pain.   Musculoskeletal:  Positive for arthralgias.   Skin: Negative.    Allergic/Immunologic: Negative.    Neurological:  Positive for weakness. Negative for dizziness and light-headedness.   Hematological: Negative.    Psychiatric/Behavioral: Negative.       Objective:     Vital Signs (Most Recent):  Temp: 97.9 °F (36.6 °C) (05/04/24 1305)  Pulse: 65 (05/04/24 1305)  Resp: 20 (05/04/24 1305)  BP: 94/61 (05/04/24 1305)  SpO2: (!) 91 % (05/04/24 1305) Vital Signs (24h Range):  Temp:  [97.5 °F (36.4 °C)-98.7 °F (37.1 °C)] 97.9 °F (36.6 °C)  Pulse:  [59-80] 65  Resp:  [11-20] 20  SpO2:  [85 %-98 %] 91 %  BP: ()/(48-66) 94/61     Weight: 76.5 kg (168 lb 10.4 oz)  Body mass index is 24.91 kg/m².    Intake/Output Summary (Last 24 hours) at 5/4/2024 1425  Last data filed at 5/4/2024 1412  Gross per 24 hour   Intake 1086 ml   Output 733 ml   Net 353 ml         Physical Exam  Vitals and nursing note reviewed.   Constitutional:       General: He is not in acute distress.     Appearance: He is not ill-appearing.   HENT:      Head: Normocephalic and atraumatic.      Nose: Nose normal.      Mouth/Throat:      Mouth: Mucous membranes are moist.      Pharynx: Oropharynx is clear.   Eyes:      Extraocular Movements: Extraocular movements intact.      Conjunctiva/sclera: Conjunctivae normal.      Pupils: Pupils are equal, round, and reactive to light.   Neck:      Comments: Central line in R side of neck  Cardiovascular:      Rate and Rhythm: Normal rate and regular rhythm.      Pulses: Normal pulses.      Heart sounds: Normal heart sounds. No murmur heard.     No friction rub. No gallop.   Pulmonary:      Effort: Pulmonary effort is normal. No respiratory distress.      Breath sounds: Normal breath sounds. No wheezing, rhonchi or rales.   Abdominal:      General: Bowel sounds are normal. There is no distension.      Palpations:  "Abdomen is soft.      Tenderness: There is no abdominal tenderness. There is no right CVA tenderness, left CVA tenderness or guarding.   Genitourinary:     Comments: deferred  Musculoskeletal:         General: Normal range of motion.      Cervical back: Normal range of motion and neck supple.   Skin:     General: Skin is warm and dry.      Capillary Refill: Capillary refill takes less than 2 seconds.   Neurological:      General: No focal deficit present.      Mental Status: He is alert and oriented to person, place, and time. Mental status is at baseline.   Psychiatric:         Mood and Affect: Mood normal.         Behavior: Behavior normal.         Thought Content: Thought content normal.         Judgment: Judgment normal.             Significant Labs: All pertinent labs within the past 24 hours have been reviewed.  Blood Culture: No results for input(s): "LABBLOO" in the last 48 hours.  CBC:   Recent Labs   Lab 05/03/24  0442 05/04/24  0255   WBC 10.16 12.09   HGB 14.4 14.3   HCT 40.6 40.7    453*     CMP:   Recent Labs   Lab 05/02/24  2111 05/03/24  0442 05/04/24  0255   * 135* 130*   K 4.5 4.4 5.8*    99 97   CO2 22* 22* 20*    93 105   BUN 68* 70* 79*   CREATININE 4.3* 4.3* 4.8*   CALCIUM 8.9 8.6* 8.6*   ALBUMIN 3.0* 2.9* 2.9*   ANIONGAP 12 14 13     Lactic Acid: No results for input(s): "LACTATE" in the last 48 hours.    Significant Imaging: I have reviewed all pertinent imaging results/findings within the past 24 hours.    Assessment/Plan:      * Chronic kidney disease (CKD), stage V  Creatine stable for now. BMP reviewed- noted Estimated Creatinine Clearance: 20.7 mL/min (A) (based on SCr of 3.6 mg/dL (H)). according to latest data. Based on current GFR, CKD stage is end stage.  Monitor UOP and serial BMP and adjust therapy as needed. Renally dose meds. Avoid nephrotoxic medications and procedures.    --see by Dr. Mata in ED, gen surg consulted for tunneled catheter placement in " AM 4/30/24  --admit to ICU for CRRT once catheter placed-->tolerating HD will step down today  --Plan for tunneled catheter placement in am    Hypotension  Weaned off of pressors  Added midodrine  Goal MAP >65  Orthostatics negative, off of pressors today, Step down to floor    ESRD (end stage renal disease)  Creatine stable for now. BMP reviewed- noted Estimated Creatinine Clearance: 15.5 mL/min (A) (based on SCr of 4.8 mg/dL (H)). according to latest data. Based on current GFR, CKD stage is end stage.  Monitor UOP and serial BMP and adjust therapy as needed. Renally dose meds. Avoid nephrotoxic medications and procedures.    Seen by Nephrology, Trialysis placed for CRRT, tolerating  5/3 Tunneled catheter placed 5/3  Had some intradialytic hypotension    Pulmonary emphysema, unspecified emphysema type  Patient's COPD is controlled currently.  Patient is currently off COPD Pathway. Continue scheduled inhalers Supplemental oxygen and monitor respiratory status closely.     Non-ischemic cardiomyopathy  Echo    Left Ventricle: The left ventricle is normal in size. Normal wall thickness. Regional wall motion abnormalities present. See diagram for wall motion findings. Septal motion is consistent with pacing. There is mildly reduced systolic function with a visually estimated ejection fraction of 40 - 45%. Grade III diastolic dysfunction.    Right Ventricle: Moderate to severe right ventricular enlargement. Systolic function is reduced.TAPSE is 1.59 cm. Pacemaker lead present in the ventricle.    Left Atrium: Left atrium is severely dilated. The left atrium volume index is 71.4 mL/m2.    Right Atrium: Right atrium is severely dilated.    Aortic Valve: There is mild aortic valve sclerosis. There is mild to moderate stenosis. Aortic valve area by VTI is 1.49 cm². Aortic valve peak velocity is 1.43 m/s. Mean gradient is 5 mmHg. The dimensionless index is 0.52.    Mitral Valve: There is mild regurgitation.    Tricuspid  Valve: There is mild to moderate regurgitation.    Pulmonary Artery: The estimated pulmonary artery systolic pressure is 64 mmHg.    IVC/SVC: Elevated venous pressure at 15 mmHg.         Primary hypertension  Chronic, controlled. Latest blood pressure and vitals reviewed-     Temp:  [97.8 °F (36.6 °C)]   Pulse:  [60-81]   Resp:  [18-29]   BP: ()/(51-66)   SpO2:  [85 %-93 %] .   Home meds for hypertension were reviewed and noted below.   Hypertension Medications               furosemide (LASIX) 80 MG tablet Take 1 tablet (80 mg total) by mouth 2 (two) times daily. If increased 3 pounds in a day or 5 pounds in a week, take an extra dose of lasix 80mg until those pounds are lost.    metoprolol succinate (TOPROL-XL) 25 MG 24 hr tablet TAKE 1 TABLET BY MOUTH EVERY DAY    nitroGLYCERIN (NITROSTAT) 0.4 MG SL tablet Place 1 tablet (0.4 mg total) under the tongue every 5 (five) minutes as needed for Chest pain.    sacubitriL-valsartan (ENTRESTO) 24-26 mg per tablet Take 1 tablet by mouth 2 (two) times daily.            While in the hospital, will manage blood pressure as follows; Adjust home antihypertensive regimen as follows- hold home bp medications in the setting of hypotension requiring CRRT    Will utilize p.r.n. blood pressure medication only if patient's blood pressure greater than 180/110 and he develops symptoms such as worsening chest pain or shortness of breath.    HFrEF (heart failure with reduced ejection fraction)  Congestive Heart Failure  Patient presents for re-evaluation of congestive heart failure. Patient's current complaints are dyspnea, fatigue, near-syncope, and orthopnea. He denies chest pain, claudication, exertional chest pressure/discomfort, and lower extremity edema. He states he is compliant most of the time with his medications. He states he is compliant all of the time with his diet.    --last TTE in 2023 shows EF 40%. Will repeat this admission  --on home lasix. Will convert to higher  IV dosage  --will hold troprol in setting of hypotension requiring CRRT  --on CHF pathway    Cardiac resynchronization therapy defibrillator (CRT-D) in place        Mixed hyperlipidemia  --continue home medications      Iron deficiency anemia  Patient's anemia is currently controlled. Has not received any PRBCs to date. Etiology likely d/t Iron deficiency and chronic disease due to ESRD  Current CBC reviewed-   Lab Results   Component Value Date    HGB 15.7 04/29/2024    HCT 44.8 04/29/2024     Monitor serial CBC and transfuse if patient becomes hemodynamically unstable, symptomatic or H/H drops below 7/21.    Hypothyroidism  --continue home synthroid    V-tach  --hx of AICD placement, currently 100% paced   --continue home antiarrhythmic medications  --cardiac monitoring          VTE Risk Mitigation (From admission, onward)           Ordered     heparin (porcine) injection 2,400 Units  As needed (PRN)         04/30/24 2223     heparin (porcine) injection 5,000 Units  Every 8 hours         04/29/24 1945     IP VTE HIGH RISK PATIENT  Once         04/29/24 1945     Place sequential compression device  Until discontinued         04/29/24 1945                    Discharge Planning   SLAVA:      Code Status: Full Code   Is the patient medically ready for discharge?:     Reason for patient still in hospital (select all that apply): Patient new problem and Patient trending condition  Discharge Plan A: Home with family   Discharge Delays: None known at this time              Mamta Hassan MD  Department of Hospital Medicine   [facility]

## 2024-05-04 NOTE — NURSING
Rapid Response Nurse Follow-up Note     Followed up with patient for proactive rounding. Most recent /55, MAP 73.  No acute issues at this time. Reviewed plan of care with Bedside nurse, Janiya.     Team will continue to follow.  Please call Rapid Response RN, Maria Elena Holman RN with any questions or concerns at [phone].

## 2024-05-04 NOTE — NURSING
Rapid Response Nurse Follow-up Note     Followed up with patient for proactive rounding.   No acute issues at this time. Chart and labs reviewed. Patient runs hypotensive, baseline, mentation is well. Nursing communication in place for BP goals.  Reviewed plan of care with charge RNViolet .   Team will continue to follow.  Please call Rapid Response RNJolly RN with any questions or concerns at [phone].

## 2024-05-04 NOTE — PLAN OF CARE
Plan of care reviewed with patient this morning. Pt back from dialysis this afternoon. Spouse and son at bedside. Patient voided today in urinal, output charted in flowsheet. Sat edge of bed for breakfast and dinner. Reported hiccups for past two days, started on oral PPI as first choice.         Problem: CRRT (Continuous Renal Replacement Therapy)  Goal: Safe, Effective Therapy Delivery  5/4/2024 1733 by Soheila Bardales RN  Outcome: Met  5/4/2024 1732 by Soheila Bardlaes RN  Outcome: Progressing  Goal: Hemodynamic Stability  5/4/2024 1733 by Soheila Bardales RN  Outcome: Met  5/4/2024 1732 by Soheila Bardales RN  Outcome: Progressing  Goal: Body Temperature Maintained in Desired Range  5/4/2024 1733 by Soheila Bardales RN  Outcome: Met  5/4/2024 1732 by Soheila Bardales RN  Outcome: Progressing  Goal: Absence of Infection Signs and Symptoms  5/4/2024 1733 by Soheila Bardales RN  Outcome: Met  5/4/2024 1732 by Soheila Bardales RN  Outcome: Progressing     Problem: Infection  Goal: Absence of Infection Signs and Symptoms  5/4/2024 1733 by Soheila Bardales RN  Outcome: Progressing  5/4/2024 1732 by Soheila Bardales RN  Outcome: Progressing     Problem: Skin Injury Risk Increased  Goal: Skin Health and Integrity  5/4/2024 1733 by oSheila Bardales RN  Outcome: Progressing  5/4/2024 1732 by Soheila Bardales RN  Outcome: Progressing     Problem: Chronic Kidney Disease  Goal: Optimal Coping with Chronic Illness  5/4/2024 1733 by Soheila Bardales RN  Outcome: Progressing  5/4/2024 1732 by Soheila Bardales RN  Outcome: Progressing  Goal: Electrolyte Balance  5/4/2024 1733 by Soheila Bardales RN  Outcome: Progressing  5/4/2024 1732 by Soheila Bardales RN  Outcome: Progressing  Goal: Fluid Balance  5/4/2024 1733 by Soheila Bardales RN  Outcome: Progressing  5/4/2024 1732 by Soheila Bardales RN  Outcome: Progressing  Goal: Optimal Functional Ability  5/4/2024 1733 by Jeffy, Soheila, RN  Outcome: Progressing  5/4/2024 1732 by Soheila Bardales,  RN  Outcome: Progressing  Goal: Absence of Anemia Signs and Symptoms  5/4/2024 1733 by Soheila Bardales RN  Outcome: Progressing  5/4/2024 1732 by Soheila Bardales RN  Outcome: Progressing  Goal: Optimal Oral Intake  5/4/2024 1733 by Soheila Bardales RN  Outcome: Progressing  5/4/2024 1732 by Soheila Bardales RN  Outcome: Progressing  Goal: Acceptable Pain Control  5/4/2024 1733 by Soheila Bardales RN  Outcome: Progressing  5/4/2024 1732 by Soheila Bardales RN  Outcome: Progressing  Goal: Minimize Renal Failure Effects  5/4/2024 1733 by Soheila Bardales RN  Outcome: Progressing  5/4/2024 1732 by Soheila Bardales RN  Outcome: Progressing     Problem: Hemodialysis  Goal: Safe, Effective Therapy Delivery  5/4/2024 1733 by Soheila Bardales RN  Outcome: Progressing  5/4/2024 1732 by Soheila Bardales RN  Outcome: Progressing  Goal: Effective Tissue Perfusion  5/4/2024 1733 by Soheila Bardales RN  Outcome: Progressing  5/4/2024 1732 by Soheila Bardales RN  Outcome: Progressing  Goal: Absence of Infection Signs and Symptoms  5/4/2024 1733 by Soheila Bardales RN  Outcome: Progressing  5/4/2024 1732 by Soheila Bardales RN  Outcome: Progressing     Problem: Comorbidity Management  Goal: Maintenance of COPD Symptom Control  5/4/2024 1733 by Soheila Bardales RN  Outcome: Progressing  5/4/2024 1732 by Soheila Bardales RN  Outcome: Progressing  Goal: Maintenance of Heart Failure Symptom Control  5/4/2024 1733 by Soheila Bardales RN  Outcome: Progressing  5/4/2024 1732 by Soheila Bardales RN  Outcome: Progressing  Goal: Blood Pressure in Desired Range  5/4/2024 1733 by Soheila Bardales RN  Outcome: Progressing  5/4/2024 1732 by Soheila Bardales RN  Outcome: Progressing     Problem: Fall Injury Risk  Goal: Absence of Fall and Fall-Related Injury  5/4/2024 1733 by Soheila Bardales RN  Outcome: Progressing  5/4/2024 1732 by Soheila Bardales RN  Outcome: Progressing

## 2024-05-04 NOTE — NURSING
RAPID RESPONSE NURSE PROACTIVE ROUNDING NOTE       Time of Visit:     Admit Date: 2024  LOS: 4  Code Status: Full Code   Date of Visit: 2024  : 1960  Age: 63 y.o.  Sex: male  Race: Black or   Bed: K402/K402 A:   MRN: [id number]  Was the patient discharged from an ICU this admission? No   Was the patient discharged from a PACU within last 24 hours? No   Did the patient receive conscious sedation/general anesthesia in last 24 hours? No   Was the patient in the ED within the past 24 hours? No   Was the patient on NIPPV within the past 24 hours? No   Attending Physician: Mamta Hassan MD  Primary Service: Internal Medicine,Hospitalist   Time spent at the bedside: 45-60 mins    SITUATION    Notified by bedside nurse during rounds  Reason for alert: Hypotension, scheduled meds    Diagnosis: Chronic kidney disease (CKD), stage V   has a past medical history of Cardiomyopathy, CKD (chronic kidney disease) stage 4, GFR 15-29 ml/min, Congestive heart failure (CHF), COPD (chronic obstructive pulmonary disease), Coronary artery disease, Edema, Essential (primary) hypertension, Heart attack, HLD (hyperlipidemia), Hypertension, Hyperuricemia, Hypocalcemia, Renal cyst, left, Secondary hyperparathyroidism, Thyroid disease, V tach, and Vitamin D deficiency.    Last Vitals:  Temp: 98.6 °F (37 °C) (1950)  Pulse: 60 (2018)  Resp: 16 (1950)  BP: 90/55 (1950)  SpO2: 93 % (2018)    24 Hour Vitals Range:  Temp:  [97.7 °F (36.5 °C)-98.6 °F (37 °C)]   Pulse:  [60-70]   Resp:  [11-47]   BP: ()/(44-60)   SpO2:  [85 %-99 %]     Clinical Issues: Circulatory    ASSESSMENT/INTERVENTIONS    Chart reviewed with Bedside nurseJaniya. Pt with notable hypotension throughout the day, team aware. MAP goal is 60. Pt's last BP 90/55, MAP 68. Reported from bedside nurse, that midodrine 15 mg, PO, was recently administered.    On assessment pt alert and oriented x4, calm and  uriel. Family at bedside. On auscultation notable fine crackles to bases. Pt denies SOB, or chest pain.    Repeat BP now 80/53, MAP 61. Pt is baseline hypotensive, noted in chart. Will follow up with team.    RECOMMENDATIONS    Followed up with team regarding MAP goal and scheduled meds.     Discussed plan of care with Dr. Giraldo and RICCI Denson.   OK to give Lasix 80mg, IVP, per team.     Will follow up on BP in 1 hour.    PROVIDER ESCALATION    Physician escalation: Yes    Orders received and case discussed with Dr. Giraldo .    Disposition:Remain in room 402    FOLLOW UP    Call back the Rapid Response NurseMaria Elena at [phone] for additional questions or concerns.

## 2024-05-04 NOTE — PROCEDURES
Seen and examined on dialysis.  Tolerating procedure well    Anemia chronic kidney disease-hemoglobin is at goal, no Epogen, iron is at goal  Recent Labs   Lab 05/02/24  0506 05/03/24  0442 05/04/24  0255   WBC 9.00 10.16 12.09   HGB 14.2 14.4 14.3   HCT 41.5 40.6 40.7    406 453*       Lab Results   Component Value Date    IRON 109 10/07/2022    TIBC 394 10/07/2022    FERRITIN 676 (H) 10/07/2022       Metabolic bone disease secondary hyperparathyroidism of renal origin - continue ergocalciferol 33413 units weekly    Phosphorus is at goal not on binders    Lab Results   Component Value Date    .6 (H) 04/29/2024    CALCIUM 8.6 (L) 05/04/2024    PHOS 4.3 05/04/2024    PHOS 4.5 05/04/2024     Recent Labs   Lab 05/02/24  2111 05/03/24  0442 05/04/24  0255   MG 2.0 1.9  1.9 2.0     Lab Results   Component Value Date    [id number] 24 (L) 04/29/2024     Bicarbonate bath adjusted the 35  Lab Results   Component Value Date    CO2 20 (L) 05/04/2024       Intra dialytic hypotension-on midodrine 15q8, blood pressure on dialysis currently 84/56.  We will try 25 g 25% albumin      Thank you for consult, will follow  With any question please call [phone]  Brittney Chicas    [facility]    ELISHA Mata MD,   MD CORINNE Pollack MD E. V. Harmon, NP  [address]  [place], [address] [phone]

## 2024-05-04 NOTE — PROGRESS NOTES
05/04/24 1240 05/04/24 1245   Vital Signs   Pulse 60 60   BP 90/64 99/66   Pre-Hemodialysis Assessment   Treatment Status Completed  --    During Hemodialysis Assessment   Blood Flow Rate (mL/min) 250 mL/min  --    Dialysate Flow Rate (mL/min) 500 ml/min  --    Ultrafiltration Rate (mL/Hr) 50 mL/Hr  --    Arteriovenous Lines Secure Yes  --    Arterial Pressure (mmHg) -76 mmHg  --    Venous Pressure (mmHg) 130  --    Blood Volume Processed (Liters) 0 L  --    UF Removed (mL) 408 mL  --    TMP -14  --    Venous Line in Air Detector Yes  --    Intake (mL) 0 mL  --    Transducer Dry Yes  --    Access Visible Yes  --     notified of access issue? N/A  --    Heparin given? N/A  --    Intra-Hemodialysis Comments tx complete  --    Post-Hemodialysis Assessment   Rinseback Volume (mL) 250 mL  --    Blood Volume Processed (Liters) 46.6 L  --    Dialyzer Clearance Lightly streaked  --    Duration of Treatment 180 minutes  --    Additional Fluid Intake (mL) 850 mL  --    Total UF (mL) 408 mL  --    Net Fluid Removal 400  --    Patient Response to Treatment Pt had hypotension throughout tx  --    Post-Hemodialysis Comments CVC flushed, blood returned and Heparin placed. CVC capped and calmped  --

## 2024-05-04 NOTE — SUBJECTIVE & OBJECTIVE
Interval History: K 5.8 this am, taken for HD. Had an episode of intradialytic hypotension given albumin    Review of Systems   Constitutional:  Negative for chills, fatigue and fever.   HENT: Negative.     Eyes: Negative.    Respiratory:  Negative for cough, chest tightness, shortness of breath and wheezing.    Cardiovascular:  Negative for chest pain, palpitations and leg swelling.   Gastrointestinal:  Negative for abdominal distention, abdominal pain, blood in stool, diarrhea, nausea and vomiting.   Endocrine: Positive for cold intolerance.   Genitourinary:  Positive for decreased urine volume and difficulty urinating. Negative for flank pain.   Musculoskeletal:  Positive for arthralgias.   Skin: Negative.    Allergic/Immunologic: Negative.    Neurological:  Positive for weakness. Negative for dizziness and light-headedness.   Hematological: Negative.    Psychiatric/Behavioral: Negative.       Objective:     Vital Signs (Most Recent):  Temp: 97.9 °F (36.6 °C) (05/04/24 1305)  Pulse: 65 (05/04/24 1305)  Resp: 20 (05/04/24 1305)  BP: 94/61 (05/04/24 1305)  SpO2: (!) 91 % (05/04/24 1305) Vital Signs (24h Range):  Temp:  [97.5 °F (36.4 °C)-98.7 °F (37.1 °C)] 97.9 °F (36.6 °C)  Pulse:  [59-80] 65  Resp:  [11-20] 20  SpO2:  [85 %-98 %] 91 %  BP: ()/(48-66) 94/61     Weight: 76.5 kg (168 lb 10.4 oz)  Body mass index is 24.91 kg/m².    Intake/Output Summary (Last 24 hours) at 5/4/2024 1425  Last data filed at 5/4/2024 1412  Gross per 24 hour   Intake 1086 ml   Output 733 ml   Net 353 ml         Physical Exam  Vitals and nursing note reviewed.   Constitutional:       General: He is not in acute distress.     Appearance: He is not ill-appearing.   HENT:      Head: Normocephalic and atraumatic.      Nose: Nose normal.      Mouth/Throat:      Mouth: Mucous membranes are moist.      Pharynx: Oropharynx is clear.   Eyes:      Extraocular Movements: Extraocular movements intact.      Conjunctiva/sclera: Conjunctivae  "normal.      Pupils: Pupils are equal, round, and reactive to light.   Neck:      Comments: Central line in R side of neck  Cardiovascular:      Rate and Rhythm: Normal rate and regular rhythm.      Pulses: Normal pulses.      Heart sounds: Normal heart sounds. No murmur heard.     No friction rub. No gallop.   Pulmonary:      Effort: Pulmonary effort is normal. No respiratory distress.      Breath sounds: Normal breath sounds. No wheezing, rhonchi or rales.   Abdominal:      General: Bowel sounds are normal. There is no distension.      Palpations: Abdomen is soft.      Tenderness: There is no abdominal tenderness. There is no right CVA tenderness, left CVA tenderness or guarding.   Genitourinary:     Comments: deferred  Musculoskeletal:         General: Normal range of motion.      Cervical back: Normal range of motion and neck supple.   Skin:     General: Skin is warm and dry.      Capillary Refill: Capillary refill takes less than 2 seconds.   Neurological:      General: No focal deficit present.      Mental Status: He is alert and oriented to person, place, and time. Mental status is at baseline.   Psychiatric:         Mood and Affect: Mood normal.         Behavior: Behavior normal.         Thought Content: Thought content normal.         Judgment: Judgment normal.             Significant Labs: All pertinent labs within the past 24 hours have been reviewed.  Blood Culture: No results for input(s): "LABBLOO" in the last 48 hours.  CBC:   Recent Labs   Lab 05/03/24  0442 05/04/24  0255   WBC 10.16 12.09   HGB 14.4 14.3   HCT 40.6 40.7    453*     CMP:   Recent Labs   Lab 05/02/24  2111 05/03/24  0442 05/04/24  0255   * 135* 130*   K 4.5 4.4 5.8*    99 97   CO2 22* 22* 20*    93 105   BUN 68* 70* 79*   CREATININE 4.3* 4.3* 4.8*   CALCIUM 8.9 8.6* 8.6*   ALBUMIN 3.0* 2.9* 2.9*   ANIONGAP 12 14 13     Lactic Acid: No results for input(s): "LACTATE" in the last 48 hours.    Significant " Imaging: I have reviewed all pertinent imaging results/findings within the past 24 hours.

## 2024-05-04 NOTE — ASSESSMENT & PLAN NOTE
Creatine stable for now. BMP reviewed- noted Estimated Creatinine Clearance: 15.5 mL/min (A) (based on SCr of 4.8 mg/dL (H)). according to latest data. Based on current GFR, CKD stage is end stage.  Monitor UOP and serial BMP and adjust therapy as needed. Renally dose meds. Avoid nephrotoxic medications and procedures.    Seen by Nephrology, Trialysis placed for CRRT, tolerating  5/3 Tunneled catheter placed 5/3  Had some intradialytic hypotension

## 2024-05-04 NOTE — PLAN OF CARE
Pt on room air with documented SpO2. Pt in no apparent distress. Will continue to monitor.     Risks/benefits discussed with patient/surrogate

## 2024-05-05 LAB
ANION GAP SERPL CALC-SCNC: 10 MMOL/L (ref 8–16)
BASOPHILS # BLD AUTO: 0.12 K/UL (ref 0–0.2)
BASOPHILS NFR BLD: 1 % (ref 0–1.9)
BUN SERPL-MCNC: 55 MG/DL (ref 8–23)
CALCIUM SERPL-MCNC: 8.5 MG/DL (ref 8.7–10.5)
CHLORIDE SERPL-SCNC: 97 MMOL/L (ref 95–110)
CO2 SERPL-SCNC: 24 MMOL/L (ref 23–29)
CREAT SERPL-MCNC: 4.1 MG/DL (ref 0.5–1.4)
DIFFERENTIAL METHOD BLD: ABNORMAL
EOSINOPHIL # BLD AUTO: 0.2 K/UL (ref 0–0.5)
EOSINOPHIL NFR BLD: 1.7 % (ref 0–8)
ERYTHROCYTE [DISTWIDTH] IN BLOOD BY AUTOMATED COUNT: 15.4 % (ref 11.5–14.5)
EST. GFR  (NO RACE VARIABLE): 15 ML/MIN/1.73 M^2
GLUCOSE SERPL-MCNC: 98 MG/DL (ref 70–110)
HCT VFR BLD AUTO: 40.5 % (ref 40–54)
HGB BLD-MCNC: 13.9 G/DL (ref 14–18)
IMM GRANULOCYTES # BLD AUTO: 0.08 K/UL (ref 0–0.04)
IMM GRANULOCYTES NFR BLD AUTO: 0.7 % (ref 0–0.5)
LYMPHOCYTES # BLD AUTO: 0.6 K/UL (ref 1–4.8)
LYMPHOCYTES NFR BLD: 5.2 % (ref 18–48)
MAGNESIUM SERPL-MCNC: 1.9 MG/DL (ref 1.6–2.6)
MCH RBC QN AUTO: 25.7 PG (ref 27–31)
MCHC RBC AUTO-ENTMCNC: 34.3 G/DL (ref 32–36)
MCV RBC AUTO: 75 FL (ref 82–98)
MONOCYTES # BLD AUTO: 1.2 K/UL (ref 0.3–1)
MONOCYTES NFR BLD: 10.5 % (ref 4–15)
NEUTROPHILS # BLD AUTO: 9.3 K/UL (ref 1.8–7.7)
NEUTROPHILS NFR BLD: 80.9 % (ref 38–73)
NRBC BLD-RTO: 0 /100 WBC
PHOSPHATE SERPL-MCNC: 3.1 MG/DL (ref 2.7–4.5)
PLATELET # BLD AUTO: 456 K/UL (ref 150–450)
PMV BLD AUTO: 12.4 FL (ref 9.2–12.9)
POTASSIUM SERPL-SCNC: 4.4 MMOL/L (ref 3.5–5.1)
RBC # BLD AUTO: 5.4 M/UL (ref 4.6–6.2)
SODIUM SERPL-SCNC: 131 MMOL/L (ref 136–145)
WBC # BLD AUTO: 11.47 K/UL (ref 3.9–12.7)

## 2024-05-05 PROCEDURE — 11000001 HC ACUTE MED/SURG PRIVATE ROOM

## 2024-05-05 PROCEDURE — 63600175 PHARM REV CODE 636 W HCPCS: Performed by: SURGERY

## 2024-05-05 PROCEDURE — 99900035 HC TECH TIME PER 15 MIN (STAT)

## 2024-05-05 PROCEDURE — 83735 ASSAY OF MAGNESIUM: CPT | Performed by: SURGERY

## 2024-05-05 PROCEDURE — 25000003 PHARM REV CODE 250: Performed by: SURGERY

## 2024-05-05 PROCEDURE — 80048 BASIC METABOLIC PNL TOTAL CA: CPT | Performed by: INTERNAL MEDICINE

## 2024-05-05 PROCEDURE — 94761 N-INVAS EAR/PLS OXIMETRY MLT: CPT

## 2024-05-05 PROCEDURE — 25000003 PHARM REV CODE 250: Performed by: INTERNAL MEDICINE

## 2024-05-05 PROCEDURE — 27000221 HC OXYGEN, UP TO 24 HOURS

## 2024-05-05 PROCEDURE — 85025 COMPLETE CBC W/AUTO DIFF WBC: CPT | Performed by: SURGERY

## 2024-05-05 PROCEDURE — 36415 COLL VENOUS BLD VENIPUNCTURE: CPT | Performed by: SURGERY

## 2024-05-05 PROCEDURE — 63600175 PHARM REV CODE 636 W HCPCS: Performed by: STUDENT IN AN ORGANIZED HEALTH CARE EDUCATION/TRAINING PROGRAM

## 2024-05-05 PROCEDURE — 84100 ASSAY OF PHOSPHORUS: CPT | Performed by: SURGERY

## 2024-05-05 RX ORDER — SODIUM CHLORIDE 9 MG/ML
INJECTION, SOLUTION INTRAVENOUS ONCE
Status: DISCONTINUED | OUTPATIENT
Start: 2024-05-05 | End: 2024-05-12 | Stop reason: HOSPADM

## 2024-05-05 RX ADMIN — MIDODRINE HYDROCHLORIDE 15 MG: 5 TABLET ORAL at 10:05

## 2024-05-05 RX ADMIN — MIDODRINE HYDROCHLORIDE 15 MG: 5 TABLET ORAL at 05:05

## 2024-05-05 RX ADMIN — HEPARIN SODIUM 5000 UNITS: 5000 INJECTION INTRAVENOUS; SUBCUTANEOUS at 02:05

## 2024-05-05 RX ADMIN — CALCIUM CARBONATE (ANTACID) CHEW TAB 500 MG 1000 MG: 500 CHEW TAB at 12:05

## 2024-05-05 RX ADMIN — MUPIROCIN: 20 OINTMENT TOPICAL at 08:05

## 2024-05-05 RX ADMIN — ALLOPURINOL 100 MG: 100 TABLET ORAL at 08:05

## 2024-05-05 RX ADMIN — NEPHROCAP 1 CAPSULE: 1 CAP ORAL at 08:05

## 2024-05-05 RX ADMIN — HEPARIN SODIUM 5000 UNITS: 5000 INJECTION INTRAVENOUS; SUBCUTANEOUS at 10:05

## 2024-05-05 RX ADMIN — MEXILETINE HYDROCHLORIDE 150 MG: 150 CAPSULE ORAL at 08:05

## 2024-05-05 RX ADMIN — LEVOTHYROXINE SODIUM 150 MCG: 150 TABLET ORAL at 08:05

## 2024-05-05 RX ADMIN — FUROSEMIDE 80 MG: 10 INJECTION, SOLUTION INTRAVENOUS at 08:05

## 2024-05-05 RX ADMIN — MEXILETINE HYDROCHLORIDE 150 MG: 150 CAPSULE ORAL at 05:05

## 2024-05-05 RX ADMIN — PANTOPRAZOLE SODIUM 40 MG: 40 TABLET, DELAYED RELEASE ORAL at 08:05

## 2024-05-05 RX ADMIN — POLYETHYLENE GLYCOL 3350 17 G: 17 POWDER, FOR SOLUTION ORAL at 08:05

## 2024-05-05 RX ADMIN — AMIODARONE HYDROCHLORIDE 400 MG: 200 TABLET ORAL at 08:05

## 2024-05-05 RX ADMIN — ASPIRIN 81 MG: 81 TABLET, COATED ORAL at 08:05

## 2024-05-05 RX ADMIN — ONDANSETRON 4 MG: 2 INJECTION INTRAMUSCULAR; INTRAVENOUS at 04:05

## 2024-05-05 RX ADMIN — PRAVASTATIN SODIUM 40 MG: 40 TABLET ORAL at 08:05

## 2024-05-05 RX ADMIN — MIDODRINE HYDROCHLORIDE 15 MG: 5 TABLET ORAL at 02:05

## 2024-05-05 RX ADMIN — HEPARIN SODIUM 5000 UNITS: 5000 INJECTION INTRAVENOUS; SUBCUTANEOUS at 05:05

## 2024-05-05 NOTE — PLAN OF CARE
Patient alert and awake. Up to chair with standby assist. On O2 at 3L/NC. No c/o pain or discomfort. Charge nurse aware pt is awaiting HD. Call light within reach.

## 2024-05-05 NOTE — NURSING
RAPID RESPONSE NURSE PROACTIVE ROUNDING NOTE         Last Vitals:  Temp: 97.8 °F (36.6 °C) (05/05 0451)  Pulse: 60 (05/05 0740)  Resp: 16 (05/05 0451)  BP: 90/56 (05/05 0740)  SpO2: 93 % (05/05 0740)    24 Hour Vitals Range:  Temp:  [97.5 °F (36.4 °C)-98.1 °F (36.7 °C)]   Pulse:  [59-80]   Resp:  [16-20]   BP: ()/(48-66)   SpO2:  [85 %-100 %]     FOLLOW UP  Followed up via chart check for proactive rounding. VSS on room air. Labs reviewed.     Call back the Rapid Response NurseСветлана at [phone] for additional questions or concerns.

## 2024-05-05 NOTE — PROGRESS NOTES
Progress Note  Nephrology      Consult Requested By: Mamta Hassan MD  Reason for Consult:  End-stage renal disease    SUBJECTIVE:     Review of Systems   Constitutional:  Negative for chills and fever.   Respiratory:  Negative for cough and shortness of breath.    Cardiovascular:  Negative for chest pain and leg swelling.   Gastrointestinal:  Negative for nausea.     Patient Active Problem List   Diagnosis    V-tach    Hypothyroidism    Iron deficiency anemia    Mixed hyperlipidemia    Atherosclerosis of native coronary artery of native heart with angina pectoris    Cardiac resynchronization therapy defibrillator (CRT-D) in place    S/P ablation of ventricular arrhythmia    HFrEF (heart failure with reduced ejection fraction)    Cardiac LV ejection fraction 10-20%    Enlarged prostate with lower urinary tract symptoms (LUTS)    Primary osteoarthritis of one knee    Prediabetes    Hyperlipidemia    Primary hypertension    Thrombocythemia -  on 4/4/22    Hyponatremia    Abuse of herbal or folk remedies    Non-ischemic cardiomyopathy    Secondary hyperparathyroidism of renal origin    Aortic atherosclerosis    Pulmonary emphysema, unspecified emphysema type    Chronic kidney disease (CKD), stage V    ESRD (end stage renal disease)    Acute hypoxemic respiratory failure    Hypotension       OBJECTIVE:     Medications:  Current Facility-Administered Medications   Medication Dose Route Frequency    sodium chloride 0.9%   Intravenous Once    allopurinoL  100 mg Oral Daily    alteplase  4 mg Intra-Catheter Once    amiodarone  400 mg Oral Daily    aspirin  81 mg Oral Daily    ergocalciferol  50,000 Units Oral Q7 Days    furosemide (LASIX) injection  80 mg Intravenous Q12H    heparin (porcine)  5,000 Units Subcutaneous Q8H    levothyroxine  150 mcg Oral Daily    mexiletine  150 mg Oral BID WM    midodrine  15 mg Oral Q8H    pantoprazole  40 mg Oral Daily    polyethylene glycol  17 g Oral Daily    pravastatin  40  mg Oral Daily    vitamin renal formula (B-complex-vitamin c-folic acid)  1 capsule Oral Daily     Current Facility-Administered Medications   Medication Dose Route Frequency Last Rate Last Admin     Vitals:    05/05/24 1220   BP:    Pulse: (!) 59   Resp:    Temp:      I/O last 3 completed shifts:  In: 1086 [P.O.:236; Other:850]  Out: 1133 [Urine:725; Other:408]  Physical Exam  Constitutional:       General: He is not in acute distress.     Appearance: He is well-developed. He is not diaphoretic.   HENT:      Head: Normocephalic and atraumatic.   Eyes:      General: No scleral icterus.  Neck:      Vascular: No JVD.   Cardiovascular:      Rate and Rhythm: Regular rhythm.      Heart sounds: No murmur heard.     No friction rub.   Pulmonary:      Effort: Pulmonary effort is normal. No respiratory distress.      Breath sounds: Normal breath sounds. No wheezing or rales.   Abdominal:      General: Bowel sounds are normal. There is no distension.      Palpations: Abdomen is soft.      Tenderness: There is no abdominal tenderness.   Musculoskeletal:      Cervical back: Neck supple.   Skin:     General: Skin is warm and dry.      Findings: No erythema or rash.   Neurological:      Mental Status: He is alert and oriented to person, place, and time.       Laboratory:  Recent Labs   Lab 05/03/24 0442 05/04/24  0255 05/05/24  0352   WBC 10.16 12.09 11.47   HGB 14.4 14.3 13.9*   HCT 40.6 40.7 40.5    453* 456*   MONO 11.6  1.2* 10.1  1.2* 10.5  1.2*   EOSINOPHIL 1.9 1.7 1.7     Recent Labs   Lab 05/03/24 0442 05/04/24  0255 05/05/24  0352   * 130* 131*   K 4.4 5.8* 4.4   CL 99 97 97   CO2 22* 20* 24   BUN 70* 79* 55*   CREATININE 4.3* 4.8* 4.1*   CALCIUM 8.6* 8.6* 8.5*   PHOS 3.6  3.6 4.3  4.5 3.1     Labs reviewed  Diagnostic Results:  X-Ray: Reviewed  US: Reviewed  Echo: Reviewed      ASSESSMENT/PLAN:   1. End-stage renal disease-new dialysis start, [facility]  Already has chair  for Monday Wednesday Friday, plan for dialysis tomorrow and then keep to Monday Wednesday Friday schedule  Recent Labs   Lab 05/03/24 0442 05/04/24 0255 05/05/24 0352   * 130* 131*   K 4.4 5.8* 4.4   BUN 70* 79* 55*   CREATININE 4.3* 4.8* 4.1*       2. HTN (I10) -     Temp:  [97.8 °F (36.6 °C)]   Pulse:  [59-60]   Resp:  [16-17]   BP: (88-95)/(52-60)   SpO2:  [91 %-94 %]     3. Anemia of chronic kidney disease treated with AWAIS (N18.9 D63.1) - both iron and hemoglobin is currently at goal no Epogen   Recent Labs   Lab 05/03/24 0442 05/04/24 0255 05/05/24 0352   HGB 14.4 14.3 13.9*   HCT 40.6 40.7 40.5    453* 456*     Lab Results   Component Value Date    IRON 109 10/07/2022    TIBC 394 10/07/2022    FERRITIN 676 (H) 10/07/2022       4. MBD (E88.9 M90.80) -  continue ergocalciferol 90528 units weekly     Phosphorus is at goal not on binders  Recent Labs   Lab 04/29/24  0738 04/29/24  1707 05/05/24 0352   .6*  --   --    CALCIUM 9.0   < > 8.5*   PHOS 5.1*   < > 3.1    < > = values in this interval not displayed.     Recent Labs   Lab 05/03/24 0442 05/04/24 0255 05/05/24 0352   MG 1.9  1.9 2.0 1.9     Lab Results   Component Value Date    [id number] 24 (L) 04/29/2024     5. Acidosis    Recent Labs   Lab 05/03/24 0442 05/04/24 0255 05/05/24 0352   CL 99 97 97   CO2 22* 20* 24       6. Hyperuricemia   Lab Results   Component Value Date    URICACID 6.3 05/04/2024       7. Nutrition/Hypoalbuminemia (E88.09) -   Recent Labs   Lab 05/03/24  0442 05/04/24  0255   ALBUMIN 2.9* 2.9*     Nepro with meals TID. Renal vitamins daily    Thank you for allowing me to participate in the care of your patients  With any question please call [phone]  Brittney Chicas    [facility]  PATY Mcintyre MD, ELISHA WILLARD MD,   MD MARI Pollack, NP  [address]  [place], [address]  [phone]

## 2024-05-05 NOTE — ANESTHESIA POSTPROCEDURE EVALUATION
Anesthesia Post Evaluation    Patient: Ar Sharma    Procedure(s) Performed: Procedure(s) (LRB):  INSERTION, CATHETER, HEMODIALYSIS, DUAL LUMEN (Right)  REMOVAL, CATHETER, HEMODIALYSIS (Right)    Final Anesthesia Type: MAC      Patient location during evaluation: floor  Patient participation: Yes- Able to Participate  Level of consciousness: awake and alert, oriented and awake  Post-procedure vital signs: reviewed and stable  Pain management: adequate  Airway patency: patent  KAZ mitigation strategies: Multimodal analgesia  PONV status at discharge: No PONV  Anesthetic complications: no      Cardiovascular status: blood pressure returned to baseline and hemodynamically stable  Respiratory status: unassisted and spontaneous ventilation  Hydration status: euvolemic  Follow-up not needed.              Vitals Value Taken Time   BP 95/60 05/05/24 1141   Temp 36.6 °C (97.8 °F) 05/05/24 1141   Pulse 59 05/05/24 1220   Resp 17 05/05/24 1141   SpO2 94 % 05/05/24 1159         No case tracking events are documented in the log.      Pain/Gina Score: No data recorded

## 2024-05-05 NOTE — NURSING
Patient's BP is low, pt asymptomatic, Manual BP done, MD Museedromain informed, no new orders noted.

## 2024-05-06 LAB
BACTERIA BLD CULT: NORMAL
BACTERIA BLD CULT: NORMAL
BASOPHILS # BLD AUTO: 0.1 K/UL (ref 0–0.2)
BASOPHILS NFR BLD: 0.9 % (ref 0–1.9)
DIFFERENTIAL METHOD BLD: ABNORMAL
EOSINOPHIL # BLD AUTO: 0.2 K/UL (ref 0–0.5)
EOSINOPHIL NFR BLD: 2 % (ref 0–8)
ERYTHROCYTE [DISTWIDTH] IN BLOOD BY AUTOMATED COUNT: 14.9 % (ref 11.5–14.5)
HCT VFR BLD AUTO: 39.1 % (ref 40–54)
HGB BLD-MCNC: 13.8 G/DL (ref 14–18)
IMM GRANULOCYTES # BLD AUTO: 0.08 K/UL (ref 0–0.04)
IMM GRANULOCYTES NFR BLD AUTO: 0.7 % (ref 0–0.5)
LYMPHOCYTES # BLD AUTO: 0.7 K/UL (ref 1–4.8)
LYMPHOCYTES NFR BLD: 6 % (ref 18–48)
MAGNESIUM SERPL-MCNC: 1.9 MG/DL (ref 1.6–2.6)
MCH RBC QN AUTO: 25.9 PG (ref 27–31)
MCHC RBC AUTO-ENTMCNC: 35.3 G/DL (ref 32–36)
MCV RBC AUTO: 74 FL (ref 82–98)
MONOCYTES # BLD AUTO: 1 K/UL (ref 0.3–1)
MONOCYTES NFR BLD: 9.6 % (ref 4–15)
NEUTROPHILS # BLD AUTO: 8.8 K/UL (ref 1.8–7.7)
NEUTROPHILS NFR BLD: 80.8 % (ref 38–73)
NRBC BLD-RTO: 0 /100 WBC
PHOSPHATE SERPL-MCNC: 3.2 MG/DL (ref 2.7–4.5)
PLATELET # BLD AUTO: 479 K/UL (ref 150–450)
PMV BLD AUTO: 12 FL (ref 9.2–12.9)
RBC # BLD AUTO: 5.32 M/UL (ref 4.6–6.2)
WBC # BLD AUTO: 10.89 K/UL (ref 3.9–12.7)

## 2024-05-06 PROCEDURE — 25000003 PHARM REV CODE 250: Performed by: SURGERY

## 2024-05-06 PROCEDURE — 11000001 HC ACUTE MED/SURG PRIVATE ROOM

## 2024-05-06 PROCEDURE — 84100 ASSAY OF PHOSPHORUS: CPT | Performed by: SURGERY

## 2024-05-06 PROCEDURE — 83735 ASSAY OF MAGNESIUM: CPT | Performed by: SURGERY

## 2024-05-06 PROCEDURE — 27000221 HC OXYGEN, UP TO 24 HOURS

## 2024-05-06 PROCEDURE — 94799 UNLISTED PULMONARY SVC/PX: CPT

## 2024-05-06 PROCEDURE — 25000003 PHARM REV CODE 250: Performed by: INTERNAL MEDICINE

## 2024-05-06 PROCEDURE — 36415 COLL VENOUS BLD VENIPUNCTURE: CPT | Performed by: SURGERY

## 2024-05-06 PROCEDURE — 94761 N-INVAS EAR/PLS OXIMETRY MLT: CPT

## 2024-05-06 PROCEDURE — 99900035 HC TECH TIME PER 15 MIN (STAT)

## 2024-05-06 PROCEDURE — 99024 POSTOP FOLLOW-UP VISIT: CPT | Mod: ,,, | Performed by: STUDENT IN AN ORGANIZED HEALTH CARE EDUCATION/TRAINING PROGRAM

## 2024-05-06 PROCEDURE — 85025 COMPLETE CBC W/AUTO DIFF WBC: CPT | Performed by: SURGERY

## 2024-05-06 PROCEDURE — 63600175 PHARM REV CODE 636 W HCPCS: Performed by: SURGERY

## 2024-05-06 PROCEDURE — 63600175 PHARM REV CODE 636 W HCPCS: Performed by: STUDENT IN AN ORGANIZED HEALTH CARE EDUCATION/TRAINING PROGRAM

## 2024-05-06 PROCEDURE — 80100016 HC MAINTENANCE HEMODIALYSIS

## 2024-05-06 PROCEDURE — 97162 PT EVAL MOD COMPLEX 30 MIN: CPT

## 2024-05-06 PROCEDURE — 97530 THERAPEUTIC ACTIVITIES: CPT

## 2024-05-06 RX ORDER — LACTULOSE 10 G/15ML
20 SOLUTION ORAL ONCE
Status: COMPLETED | OUTPATIENT
Start: 2024-05-06 | End: 2024-05-06

## 2024-05-06 RX ORDER — LACTULOSE 10 G/15ML
20 SOLUTION ORAL DAILY PRN
Status: DISCONTINUED | OUTPATIENT
Start: 2024-05-06 | End: 2024-05-12 | Stop reason: HOSPADM

## 2024-05-06 RX ORDER — LACTULOSE 10 G/15ML
20 SOLUTION ORAL ONCE
Status: DISCONTINUED | OUTPATIENT
Start: 2024-05-06 | End: 2024-05-06

## 2024-05-06 RX ADMIN — MIDODRINE HYDROCHLORIDE 15 MG: 5 TABLET ORAL at 02:05

## 2024-05-06 RX ADMIN — HEPARIN SODIUM 5000 UNITS: 5000 INJECTION INTRAVENOUS; SUBCUTANEOUS at 10:05

## 2024-05-06 RX ADMIN — LEVOTHYROXINE SODIUM 150 MCG: 150 TABLET ORAL at 09:05

## 2024-05-06 RX ADMIN — HEPARIN SODIUM 2400 UNITS: 1000 INJECTION, SOLUTION INTRAVENOUS; SUBCUTANEOUS at 01:05

## 2024-05-06 RX ADMIN — PANTOPRAZOLE SODIUM 40 MG: 40 TABLET, DELAYED RELEASE ORAL at 09:05

## 2024-05-06 RX ADMIN — MEXILETINE HYDROCHLORIDE 150 MG: 150 CAPSULE ORAL at 04:05

## 2024-05-06 RX ADMIN — LACTULOSE 20 G: 20 SOLUTION ORAL at 02:05

## 2024-05-06 RX ADMIN — AMIODARONE HYDROCHLORIDE 400 MG: 200 TABLET ORAL at 09:05

## 2024-05-06 RX ADMIN — POLYETHYLENE GLYCOL 3350 17 G: 17 POWDER, FOR SOLUTION ORAL at 09:05

## 2024-05-06 RX ADMIN — ASPIRIN 81 MG: 81 TABLET, COATED ORAL at 09:05

## 2024-05-06 RX ADMIN — NEPHROCAP 1 CAPSULE: 1 CAP ORAL at 09:05

## 2024-05-06 RX ADMIN — PRAVASTATIN SODIUM 40 MG: 40 TABLET ORAL at 09:05

## 2024-05-06 RX ADMIN — MEXILETINE HYDROCHLORIDE 150 MG: 150 CAPSULE ORAL at 09:05

## 2024-05-06 RX ADMIN — ALLOPURINOL 100 MG: 100 TABLET ORAL at 09:05

## 2024-05-06 RX ADMIN — HEPARIN SODIUM 5000 UNITS: 5000 INJECTION INTRAVENOUS; SUBCUTANEOUS at 02:05

## 2024-05-06 RX ADMIN — HEPARIN SODIUM 5000 UNITS: 5000 INJECTION INTRAVENOUS; SUBCUTANEOUS at 06:05

## 2024-05-06 RX ADMIN — FUROSEMIDE 80 MG: 10 INJECTION, SOLUTION INTRAVENOUS at 09:05

## 2024-05-06 RX ADMIN — MIDODRINE HYDROCHLORIDE 15 MG: 5 TABLET ORAL at 06:05

## 2024-05-06 RX ADMIN — MIDODRINE HYDROCHLORIDE 15 MG: 5 TABLET ORAL at 10:05

## 2024-05-06 NOTE — ASSESSMENT & PLAN NOTE
Creatine stable for now. BMP reviewed- noted Estimated Creatinine Clearance: 18.2 mL/min (A) (based on SCr of 4.1 mg/dL (H)). according to latest data. Based on current GFR, CKD stage is end stage.  Monitor UOP and serial BMP and adjust therapy as needed. Renally dose meds. Avoid nephrotoxic medications and procedures.    Seen by Nephrology, Trialysis placed for CRRT, tolerating  5/3 Tunneled catheter placed 5/3  Had some intradialytic hypotension

## 2024-05-06 NOTE — ASSESSMENT & PLAN NOTE
Creatine stable for now. BMP reviewed- noted Estimated Creatinine Clearance: 18.2 mL/min (A) (based on SCr of 4.1 mg/dL (H)). according to latest data. Based on current GFR, CKD stage is end stage.  Monitor UOP and serial BMP and adjust therapy as needed. Renally dose meds. Avoid nephrotoxic medications and procedures.    --see by Dr. Mata in ED, gen surg consulted for tunneled catheter placement in AM 4/30/24  --admit to ICU for CRRT once catheter placed-->tolerating HD will step down today  --Plan for tunneled catheter placement in am

## 2024-05-06 NOTE — PLAN OF CARE
Problem: Physical Therapy  Goal: Physical Therapy Goal  Description: Goals to be met by: 24     Patient will increase functional independence with mobility by performin. Supine to sit with Modified [place]  2. Sit to supine with Modified [place]  3. Sit to stand transfer with Modified [place] with LRAD.   4. Bed to chair transfer with Stand-by Assistance using LRAD.  5. Gait  x 100 feet with Stand-by Assistance using LRAD.     Outcome: Progressing    PT evaluation completed. Pt's PLOF: MOD I with prn use of SPC. Pt at this time requires SBA with mobility and MIN A with transfers to standing and few lateral steps along bedside. Therapy recommendations TBD with further progress of ambulation. Therapy will continue to progress pt as able.

## 2024-05-06 NOTE — HPI
Ar Sharma is a 63M with PMHx of ESRD, HFrEF, HTN, NICM, COPD, VT s/p ICD placement, and HLD who presented to [facility] [place]- ED with CC progressively worse SOB over the last two months. He still makes urine, but his kidney function has rapidly declined recently, now requiring dialysis. Dr. Perez placed a tunneled HD line on 5/3. He has been receiving HD since then, but has had issues with hypotension during HD sessions requiring midodrine. General Surgery re consulted for PD cath placement in light of his hypotension during HD. Mr. Sharma is amenable to a PD catheter. He has never had abdominal surgery before. No therapeutic anticoagulants on board. Last echo with an EF of 40%, mild/mod AS, and a PA pressure of 64. No fevers, leukocytosis, or other concerns for infection.

## 2024-05-06 NOTE — PLAN OF CARE
Problem: Adult Inpatient Plan of Care  Goal: Plan of Care Review  Outcome: Progressing  Goal: Patient-Specific Goal (Individualized)  Outcome: Progressing  Goal: Absence of Hospital-Acquired Illness or Injury  Outcome: Progressing  Goal: Optimal Comfort and Wellbeing  Outcome: Progressing  Goal: Readiness for Transition of Care  Outcome: Progressing     Problem: Infection  Goal: Absence of Infection Signs and Symptoms  Outcome: Progressing     Problem: Skin Injury Risk Increased  Goal: Skin Health and Integrity  Outcome: Progressing     Problem: Chronic Kidney Disease  Goal: Optimal Coping with Chronic Illness  Outcome: Progressing     Problem: Fall Injury Risk  Goal: Absence of Fall and Fall-Related Injury  Outcome: Progressing    POC reviewed with patient. Patient verbalized of understanding. Paced rhythm on tele monitoring with no red alarms noted. MD aware of patient's low BP. Scheduled PO midodrine given per ordered. Patient denied of SOB, chest pain, dizziness, N/V, or other discomforts throughout the shift. Spouse at bedside. Bed on lowest position. Bed alarm on. Side railsx2 are up. Call light within reach.

## 2024-05-06 NOTE — CONSULTS
[place] [facility]  General Surgery  Consult Note    Patient Name: Ar Sharma  MRN: [id number]  Code Status: Full Code  Admission Date: 4/29/2024  Hospital Length of Stay: 7 days  Attending Physician: Mamta Hassan MD  Primary Care Provider: Jc Elliott MD    Patient information was obtained from patient and past medical records.     Inpatient consult to General Surgery  Consult performed by: Colin Mosquera MD  Consult ordered by: Brittney Chicas MD        Subjective:     Principal Problem: Chronic kidney disease (CKD), stage V    History of Present Illness: Ar Sharma is a 63M with PMHx of ESRD, HFrEF, HTN, NICM, COPD, VT s/p ICD placement, and HLD who presented to [facility] ED with CC progressively worse SOB over the last two months. He still makes urine, but his kidney function has rapidly declined recently, now requiring dialysis. Dr. Perez placed a tunneled HD line on 5/3. He has been receiving HD since then, but has had issues with hypotension during HD sessions requiring midodrine. General Surgery re consulted for PD cath placement in light of his hypotension during HD. Mr. Sharma is amenable to a PD catheter. He has never had abdominal surgery before. No therapeutic anticoagulants on board. Last echo with an EF of 40%, mild/mod AS, and a PA pressure of 64. No fevers, leukocytosis, or other concerns for infection.    Current Facility-Administered Medications   Medication Dose Route Frequency Provider Last Rate Last Admin    0.9%  NaCl infusion   Intravenous PRN Philip Perez MD        0.9%  NaCl infusion   Intravenous PRN Chyna Mata MD        0.9%  NaCl infusion   Intravenous Once Chyna Mata MD        0.9%  NaCl infusion   Intravenous Once Brittney Chicas MD        acetaminophen tablet 650 mg  650 mg Oral Q4H PRN Philip Perez MD        allopurinoL tablet 100 mg  100 mg Oral Daily Philip Perez MD   100 mg at 05/06/24 0914    alteplase injection 4 mg  4 mg  Intra-Catheter Once Philip Perez MD        amiodarone tablet 400 mg  400 mg Oral Daily Philip Perez MD   400 mg at 05/06/24 0914    aspirin EC tablet 81 mg  81 mg Oral Daily Philip Perez MD   81 mg at 05/06/24 0914    calcium carbonate 200 mg calcium (500 mg) chewable tablet 1,000 mg  1,000 mg Oral TID PRN Philip Perez MD   1,000 mg at 05/05/24 0006    ergocalciferol capsule 50,000 Units  50,000 Units Oral Q7 Days Philip Perez MD   50,000 Units at 04/30/24 0950    furosemide injection 80 mg  80 mg Intravenous Q12H Vero Yadav MD   80 mg at 05/06/24 0918    heparin (porcine) injection 2,400 Units  2,400 Units Intra-Catheter PRN Philip Perez MD   2,400 Units at 05/04/24 1147    heparin (porcine) injection 5,000 Units  5,000 Units Subcutaneous Q8H Philip Perez MD   5,000 Units at 05/06/24 0601    HYDROcodone-acetaminophen 5-325 mg per tablet 1 tablet  1 tablet Oral Q4H PRN Philip Perez MD        lactulose 20 gram/30 mL solution Soln 20 g  20 g Oral Daily PRN Mamta Hassan MD        lactulose 20 gram/30 mL solution Soln 20 g  20 g Oral Once Mamta Hassan MD        levothyroxine tablet 150 mcg  150 mcg Oral Daily Philip Perez MD   150 mcg at 05/06/24 0913    mexiletine capsule 150 mg  150 mg Oral BID WM Philip Perez MD   150 mg at 05/06/24 0914    midodrine tablet 15 mg  15 mg Oral Q8H Philip Perez MD   15 mg at 05/06/24 0601    ondansetron injection 4 mg  4 mg Intravenous Q8H PRN Philip Perez MD   4 mg at 05/05/24 1656    pantoprazole EC tablet 40 mg  40 mg Oral Daily Mamta Hassan MD   40 mg at 05/06/24 0914    polyethylene glycol packet 17 g  17 g Oral Daily Mamta Hassan MD   17 g at 05/06/24 0914    pravastatin tablet 40 mg  40 mg Oral Daily Philip Perez MD   40 mg at 05/06/24 0914    sodium chloride 0.9% bolus 250 mL 250 mL  250 mL Intravenous PRN Philip Perez MD        sodium chloride 0.9% bolus 250 mL 250 mL  250 mL  Intravenous PRN Chyna Mata MD        sodium chloride 0.9% bolus 250 mL 250 mL  250 mL Intravenous PRN Brittney Chicas MD        sodium chloride 0.9% flush 10 mL  10 mL Intravenous PRN Philip Perez MD        sodium chloride 0.9% flush 10 mL  10 mL Intravenous PRN Philip Perez MD        vitamin renal formula (B-complex-vitamin c-folic acid) 1 mg per capsule 1 capsule  1 capsule Oral Daily Philip Perez MD   1 capsule at 05/06/24 0913       Review of patient's allergies indicates:   Allergen Reactions    Lokelma [sodium zirconium cyclosilicate] Other (See Comments)     Fluid retention, weight gain, CHF excerebration, severe constipation    Atorvastatin Other (See Comments)     Joint pain    Jardiance [empagliflozin] Other (See Comments)     Chest pains, significant weight loss, lower blood pressure       Past Medical History:   Diagnosis Date    Cardiomyopathy     CKD (chronic kidney disease) stage 4, GFR 15-29 ml/min     Congestive heart failure (CHF) 2015    COPD (chronic obstructive pulmonary disease)     Coronary artery disease     Edema     Essential (primary) hypertension 05/18/2022    Formatting of this note might be different from the original. Converted from Centricity: Description - ESSENTIAL HYPERTENSION, BENIGN    Heart attack     HLD (hyperlipidemia)     Hypertension     Hyperuricemia     Hypocalcemia     Renal cyst, left     Secondary hyperparathyroidism     Thyroid disease     V tach     Vitamin D deficiency      Past Surgical History:   Procedure Laterality Date    ablations  03/05/2018    albations  02/01/2018    COLONOSCOPY N/A 12/07/2018    Procedure: COLONOSCOPY/suprep;  Surgeon: Ananya Morillo MD;  Location: [facility];  Service: Endoscopy;  Laterality: N/A;    defibulater N/A 2016    ESOPHAGOGASTRODUODENOSCOPY N/A 12/07/2018    Procedure: EGD (ESOPHAGOGASTRODUODENOSCOPY);  Surgeon: Ananya Morillo MD;  Location: [facility];  Service: Endoscopy;  Laterality: N/A;     INSERTION OF TUNNELED CENTRAL VENOUS HEMODIALYSIS CATHETER Right 5/3/2024    Procedure: INSERTION, CATHETER, HEMODIALYSIS, DUAL LUMEN;  Surgeon: Philip Perez MD;  Location: [facility] OR;  Service: General;  Laterality: Right;    JOINT REPLACEMENT Bilateral 10/2016    knees, bilat    LEFT HEART CATHETERIZATION N/A 2020    Procedure: Left heart cath;  Surgeon: Shahram Stewart MD;  Location: [facility] CATH LAB/EP;  Service: Cardiology;  Laterality: N/A;    LEFT HEART CATHETERIZATION Left 2022    Procedure: Left heart cath;  Surgeon: Juan Roque MD;  Location: [facility] CATH LAB/EP;  Service: Cardiology;  Laterality: Left;    NH HEMODIALYSIS, ONE EVALUATION  2024    REMOVAL OF HEMODIALYSIS CATHETER Right 5/3/2024    Procedure: REMOVAL, CATHETER, HEMODIALYSIS;  Surgeon: Philip Perez MD;  Location: [facility] OR;  Service: General;  Laterality: Right;    REPLACEMENT OF IMPLANTABLE CARDIOVERTER-DEFIBRILLATOR (ICD) GENERATOR Left 2023    Procedure: REPLACEMENT, ICD GENERATOR;  Surgeon: River Tenorio MD;  Location: [facility] EP LAB;  Service: Cardiology;  Laterality: Left;  ANTHONY, CRTD gen chg, MDT, MAC, DM, 3prep     Family History       Problem Relation (Age of Onset)    Alcohol abuse Father    Arthritis Sister    Breast cancer Mother    Cancer Mother    Hypertension Mother    Kidney disease Father    No Known Problems Brother, Daughter, Son    Stroke Mother          Tobacco Use    Smoking status: Former     Current packs/day: 0.00     Average packs/day: 1 pack/day for 33.2 years (33.2 ttl pk-yrs)     Types: Cigarettes     Start date: 1979     Quit date: 3/3/2012     Years since quittin.1     Passive exposure: Past    Smokeless tobacco: Never   Substance and Sexual Activity    Alcohol use: Yes     Comment: rare    Drug use: No    Sexual activity: Yes     Partners: Female     Review of Systems   Constitutional:  Negative for chills and fever.   Respiratory:  Positive for shortness of breath. Negative  for cough.    Cardiovascular:  Negative for chest pain.   Gastrointestinal:  Negative for abdominal pain, constipation, diarrhea, nausea and vomiting.   Genitourinary:  Negative for dysuria.   Neurological:  Negative for dizziness and light-headedness.     Objective:     Vital Signs (Most Recent):  Temp: 97.5 °F (36.4 °C) (05/06/24 0803)  Pulse: 62 (05/06/24 0803)  Resp: 18 (05/06/24 0803)  BP: 93/60 (05/06/24 0803)  SpO2: (!) 94 % (05/06/24 1118) Vital Signs (24h Range):  Temp:  [97.5 °F (36.4 °C)-97.9 °F (36.6 °C)] 97.5 °F (36.4 °C)  Pulse:  [59-83] 62  Resp:  [17-18] 18  SpO2:  [84 %-97 %] 94 %  BP: (84-96)/(46-60) 93/60     Weight: 76.5 kg (168 lb 10.4 oz)  Body mass index is 24.91 kg/m².     Physical Exam  Vitals reviewed.   Constitutional:       Appearance: Normal appearance.   Cardiovascular:      Rate and Rhythm: Normal rate and regular rhythm.   Pulmonary:      Effort: No respiratory distress.   Abdominal:      General: There is no distension.      Palpations: Abdomen is soft.      Tenderness: There is no abdominal tenderness. There is no guarding.      Comments: No scars   Skin:     General: Skin is warm.   Neurological:      General: No focal deficit present.      Mental Status: He is alert and oriented to person, place, and time.            I have reviewed all pertinent lab results within the past 24 hours.  CBC:   Recent Labs   Lab 05/06/24  0230   WBC 10.89   RBC 5.32   HGB 13.8*   HCT 39.1*   *   MCV 74*   MCH 25.9*   MCHC 35.3     CMP:   Recent Labs   Lab 04/29/24  1707 04/30/24  0505 05/04/24  0255 05/05/24  0352      < > 105 98   CALCIUM 9.0   < > 8.6* 8.5*   ALBUMIN 3.6   < > 2.9*  --    PROT 7.2  --   --   --    *   < > 130* 131*   K 4.3   < > 5.8* 4.4   CO2 21*   < > 20* 24   CL 97   < > 97 97   *   < > 79* 55*   CREATININE 6.7*   < > 4.8* 4.1*   ALKPHOS 160*  --   --   --    ALT 39  --   --   --    AST 42*  --   --   --    BILITOT 2.4*  --   --   --     < > = values  in this interval not displayed.       Significant Diagnostics:  I have reviewed all pertinent imaging results/findings within the past 24 hours.    Assessment/Plan:     ESRD (end stage renal disease)  63 yoM with ESRD now requiring dialysis. Patient hypotensive during HD sessions. General Surgery consulted for PD catheter placement. Patient is amenable to PD catheter. We discussed the risks and benefits of surgery, especially given his heart failure, and he would like to proceed.    - Case discussed with Dr. Ruiz  - We will plan for PD catheter placement while inpatient, no definitive date yet  - Ok for a diet  - Ok to continue prophylactic heparin  - Surgery to follow      VTE Risk Mitigation (From admission, onward)           Ordered     heparin (porcine) injection 2,400 Units  As needed (PRN)         04/30/24 2223     heparin (porcine) injection 5,000 Units  Every 8 hours         04/29/24 1945     IP VTE HIGH RISK PATIENT  Once         04/29/24 1945     Place sequential compression device  Until discontinued         04/29/24 1945                    Thank you for your consult. I will follow-up with patient. Please contact us if you have any additional questions.    Colin Mosquera MD  General Surgery  [place] - Telemetry

## 2024-05-06 NOTE — PT/OT/SLP EVAL
Physical Therapy Evaluation and Treatment    Patient Name:  Ar Sharma   MRN:  [id number]    Recommendations:     Discharge Recommendations:  (TBD; pending progress with therapy/ambulation)   Discharge Equipment Recommendations:  (TBD)   Barriers to discharge:  limited functional endurance; requires physical assistance with standing mobility    Assessment:     Ar Sharma is a 63 y.o. male admitted with a medical diagnosis of Chronic kidney disease (CKD), stage V.  He presents with the following impairments/functional limitations: weakness, impaired endurance, impaired self care skills, impaired functional mobility, gait instability, impaired balance, decreased lower extremity function, impaired cardiopulmonary response to activity.    PT evaluation completed. Pt's PLOF: MOD I with prn use of SPC. Pt at this time requires SBA with mobility and MIN A with transfers to standing and few lateral steps along bedside. Therapy recommendations TBD with further progress of ambulation. Therapy will continue to progress pt as able.     Rehab Prognosis: Good; patient would benefit from acute skilled PT services to address these deficits and reach maximum level of function.    Recent Surgery: Procedure(s) (LRB):  INSERTION, CATHETER, HEMODIALYSIS, DUAL LUMEN (Right)  REMOVAL, CATHETER, HEMODIALYSIS (Right) 3 Days Post-Op    Plan:     During this hospitalization, patient to be seen 5 x/week to address the identified rehab impairments via gait training, therapeutic activities, therapeutic exercises, neuromuscular re-education and progress toward the following goals:    Plan of Care Expires:  06/06/24    Subjective     Chief Complaint: no complaints  Patient/Family Comments/goals: to return to PLOF  Pain/Comfort:  Pain Rating 1: 0/10  Pain Rating Post-Intervention 1: 0/10    Patients cultural, spiritual, Sabianism conflicts given the current situation: no    Living Environment:  Lives with spouse in 1 story home with no  steps to enter. WIS.   Prior to admission, patients level of function was MOD I with prn use of SPC.  Equipment used at home: cane, straight.  DME owned (not currently used): none.  Upon discharge, patient will have assistance from wife.    Objective:     Communicated with Nurse prior to session.  Patient found HOB elevated with bed alarm, oxygen, telemetry  upon PT entry to room.    General Precautions: Standard, fall  Orthopedic Precautions:N/A   Braces: N/A  Respiratory Status: Nasal cannula, flow 3 L/min    Exams:  Cognitive Exam:  Patient is oriented to Person, Place, Time, and Situation  Sensation:    -       Intact  light/touch to BLE  RLE ROM: WFL  RLE Strength: 3-/5 hip flexion and knee extension; 3/5 ankle PF/DF  LLE ROM: WFL  LLE Strength: 3-/5 hip flexion and knee extension; 3/5 ankle PF/DF    Functional Mobility:  Bed Mobility:     Supine to Sit: stand by assistance  Sit to Supine: stand by assistance  Transfers:     Sit to Stand:  minimum assistance with no AD  Gait: ~4 BLE lateral steps along bedside with MOD/MIN A with no AD.       AM-PAC 6 CLICK MOBILITY  Total Score:18       Treatment & Education:  Pt educated on role of PT.   Pt reports no dizziness with positional changes; only general weakness/fatigue.   BUE tremors/shakes noted; which pt states has been going on for a couple of weeks.   Pt requires MOD/MIN A with standing mobility with no AD; noted incomplete knee extension in stance.   Pt's BP at end of session 93/57, HR: 64bpm and SpO2 on 3L O2 (90-92%); nursing notified; pt reports no dizziness throughout entire session.      Patient left HOB elevated with all lines intact, call button in reach, bed alarm on, Nurse notified, and wife present.    GOALS:   Multidisciplinary Problems       Physical Therapy Goals          Problem: Physical Therapy    Goal Priority Disciplines Outcome Goal Variances Interventions   Physical Therapy Goal     PT, PT/OT Progressing     Description: Goals to be met  by: 24     Patient will increase functional independence with mobility by performin. Supine to sit with Modified [place]  2. Sit to supine with Modified [place]  3. Sit to stand transfer with Modified [place] with LRAD.   4. Bed to chair transfer with Stand-by Assistance using LRAD.  5. Gait  x 100 feet with Stand-by Assistance using LRAD.                          History:     Past Medical History:   Diagnosis Date    Cardiomyopathy     CKD (chronic kidney disease) stage 4, GFR 15-29 ml/min     Congestive heart failure (CHF)     COPD (chronic obstructive pulmonary disease)     Coronary artery disease     Edema     Essential (primary) hypertension 2022    Formatting of this note might be different from the original. Converted from Centricity: Description - ESSENTIAL HYPERTENSION, BENIGN    Heart attack     HLD (hyperlipidemia)     Hypertension     Hyperuricemia     Hypocalcemia     Renal cyst, left     Secondary hyperparathyroidism     Thyroid disease     V tach     Vitamin D deficiency        Past Surgical History:   Procedure Laterality Date    ablations  2018    albations  2018    COLONOSCOPY N/A 2018    Procedure: COLONOSCOPY/suprep;  Surgeon: Ananya Morillo MD;  Location: [facility] ENDO;  Service: Endoscopy;  Laterality: N/A;    defibulater N/A     ESOPHAGOGASTRODUODENOSCOPY N/A 2018    Procedure: EGD (ESOPHAGOGASTRODUODENOSCOPY);  Surgeon: Ananya Morillo MD;  Location: [facility] ENDO;  Service: Endoscopy;  Laterality: N/A;    INSERTION OF TUNNELED CENTRAL VENOUS HEMODIALYSIS CATHETER Right 5/3/2024    Procedure: INSERTION, CATHETER, HEMODIALYSIS, DUAL LUMEN;  Surgeon: Philip Perez MD;  Location: [facility] OR;  Service: General;  Laterality: Right;    JOINT REPLACEMENT Bilateral 10/2016    knees, bilat    LEFT HEART CATHETERIZATION N/A 2020    Procedure: Left heart cath;  Surgeon: Shahram Stewart MD;  Location: [facility] CATH LAB/EP;  Service:  Cardiology;  Laterality: N/A;    LEFT HEART CATHETERIZATION Left 9/27/2022    Procedure: Left heart cath;  Surgeon: Juan Roque MD;  Location: [facility] CATH LAB/EP;  Service: Cardiology;  Laterality: Left;    PA HEMODIALYSIS, ONE EVALUATION  5/6/2024    REMOVAL OF HEMODIALYSIS CATHETER Right 5/3/2024    Procedure: REMOVAL, CATHETER, HEMODIALYSIS;  Surgeon: Philip Perez MD;  Location: [facility] OR;  Service: General;  Laterality: Right;    REPLACEMENT OF IMPLANTABLE CARDIOVERTER-DEFIBRILLATOR (ICD) GENERATOR Left 1/24/2023    Procedure: REPLACEMENT, ICD GENERATOR;  Surgeon: River Tenorio MD;  Location: [facility] EP LAB;  Service: Cardiology;  Laterality: Left;  ANTHONY, CRTD gen chg, MDT, MAC, DM, 3prep       Time Tracking:     PT Received On: 05/06/24  PT Start Time: 1530     PT Stop Time: 1548  PT Total Time (min): 18 min     Billable Minutes: Evaluation 10 and Therapeutic Activity 8      05/06/2024

## 2024-05-06 NOTE — SUBJECTIVE & OBJECTIVE
Interval History: Having hiccups, mild improvement with PPI, discussion option of very small dose of gabapentin if it doesn't get better    Review of Systems   Constitutional:  Negative for chills, fatigue and fever.   HENT: Negative.     Eyes: Negative.    Respiratory:  Negative for cough, chest tightness, shortness of breath and wheezing.    Cardiovascular:  Negative for chest pain, palpitations and leg swelling.   Gastrointestinal:  Negative for abdominal distention, abdominal pain, blood in stool, diarrhea, nausea and vomiting.   Endocrine: Positive for cold intolerance.   Genitourinary:  Positive for decreased urine volume and difficulty urinating. Negative for flank pain.   Musculoskeletal:  Positive for arthralgias.   Skin: Negative.    Allergic/Immunologic: Negative.    Neurological:  Positive for weakness. Negative for dizziness and light-headedness.   Hematological: Negative.    Psychiatric/Behavioral: Negative.       Objective:     Vital Signs (Most Recent):  Temp: 97.7 °F (36.5 °C) (05/05/24 1922)  Pulse: 60 (05/05/24 1922)  Resp: 18 (05/05/24 1922)  BP: (!) 96/57 (05/05/24 1922)  SpO2: 97 % (05/05/24 1922) Vital Signs (24h Range):  Temp:  [97.5 °F (36.4 °C)-98.1 °F (36.7 °C)] 97.7 °F (36.5 °C)  Pulse:  [59-62] 60  Resp:  [16-18] 18  SpO2:  [85 %-97 %] 97 %  BP: (84-96)/(46-60) 96/57     Weight: 76.5 kg (168 lb 10.4 oz)  Body mass index is 24.91 kg/m².    Intake/Output Summary (Last 24 hours) at 5/5/2024 1953  Last data filed at 5/5/2024 1054  Gross per 24 hour   Intake --   Output 400 ml   Net -400 ml         Physical Exam  Vitals and nursing note reviewed.   Constitutional:       General: He is not in acute distress.     Appearance: He is not ill-appearing.   HENT:      Head: Normocephalic and atraumatic.      Nose: Nose normal.      Mouth/Throat:      Mouth: Mucous membranes are moist.      Pharynx: Oropharynx is clear.   Eyes:      Extraocular Movements: Extraocular movements intact.       "Conjunctiva/sclera: Conjunctivae normal.      Pupils: Pupils are equal, round, and reactive to light.   Neck:      Comments: Central line in R side of neck  Cardiovascular:      Rate and Rhythm: Normal rate and regular rhythm.      Pulses: Normal pulses.      Heart sounds: Normal heart sounds. No murmur heard.     No friction rub. No gallop.   Pulmonary:      Effort: Pulmonary effort is normal. No respiratory distress.      Breath sounds: Normal breath sounds. No wheezing, rhonchi or rales.   Abdominal:      General: Bowel sounds are normal. There is no distension.      Palpations: Abdomen is soft.      Tenderness: There is no abdominal tenderness. There is no right CVA tenderness, left CVA tenderness or guarding.   Genitourinary:     Comments: deferred  Musculoskeletal:         General: Normal range of motion.      Cervical back: Normal range of motion and neck supple.   Skin:     General: Skin is warm and dry.      Capillary Refill: Capillary refill takes less than 2 seconds.   Neurological:      General: No focal deficit present.      Mental Status: He is alert and oriented to person, place, and time. Mental status is at baseline.   Psychiatric:         Mood and Affect: Mood normal.         Behavior: Behavior normal.         Thought Content: Thought content normal.         Judgment: Judgment normal.             Significant Labs: All pertinent labs within the past 24 hours have been reviewed.  Blood Culture: No results for input(s): "LABBLOO" in the last 48 hours.  CBC:   Recent Labs   Lab 05/04/24 0255 05/05/24  0352   WBC 12.09 11.47   HGB 14.3 13.9*   HCT 40.7 40.5   * 456*     CMP:   Recent Labs   Lab 05/04/24 0255 05/05/24  0352   * 131*   K 5.8* 4.4   CL 97 97   CO2 20* 24    98   BUN 79* 55*   CREATININE 4.8* 4.1*   CALCIUM 8.6* 8.5*   ALBUMIN 2.9*  --    ANIONGAP 13 10     POCT Glucose: No results for input(s): "POCTGLUCOSE" in the last 48 hours.    Significant Imaging: I have " reviewed all pertinent imaging results/findings within the past 24 hours.

## 2024-05-06 NOTE — PT/OT/SLP PROGRESS
Physical Therapy      Patient Name:  Ar Sharma   MRN:  [id number]    Patient not seen today secondary to Dialysis.    5/6/24- 13:24

## 2024-05-06 NOTE — PT/OT/SLP PROGRESS
Physical Therapy      Patient Name:  Ar Sharma   MRN:  [id number]    Patient not seen today secondary to Dialysis. Will follow-up as able.    5/6/24- 10:31

## 2024-05-06 NOTE — PROCEDURES
Seen and examined on dialysis.  Tolerating procedure well      Discussed with the patient, due to his intra dialytic hypotension, already on midodrine and additionally requires albumin, severe decline in EF patient will be benefitting the most from peritoneal dialysis.  Additionally this is his modality choice even without hypotension.  For his quality of life.  We will consult Dr. Deal to place the PD catheter        Anemia chronic kidney disease-hemoglobin is at goal, no Epogen, iron is at goal  Recent Labs   Lab 05/04/24  0255 05/05/24  0352 05/06/24  0230   WBC 12.09 11.47 10.89   HGB 14.3 13.9* 13.8*   HCT 40.7 40.5 39.1*   * 456* 479*       Lab Results   Component Value Date    IRON 109 10/07/2022    TIBC 394 10/07/2022    FERRITIN 676 (H) 10/07/2022       Metabolic bone disease secondary hyperparathyroidism of renal origin - continue ergocalciferol 30487 units weekly    Phosphorus is at goal not on binders    Lab Results   Component Value Date    .6 (H) 04/29/2024    CALCIUM 8.5 (L) 05/05/2024    PHOS 3.2 05/06/2024     Recent Labs   Lab 05/04/24  0255 05/05/24  0352 05/06/24  0230   MG 2.0 1.9 1.9     Lab Results   Component Value Date    VPMMOHNA29QM 24 (L) 04/29/2024     Bicarbonate bath adjusted the 35  Lab Results   Component Value Date    CO2 24 05/05/2024       Intra dialytic hypotension-on midodrine 15q8, blood pressure on dialysis currently 84/56.  We will try 25 g 25% albumin      Thank you for consult, will follow  With any question please call [phone]  Brittney Chicas    [facility]    ELISHA Mata MD,   OMD CORINNE Mosley MD E[facility] V[facility] [place], NP  [id number]  [place], [address] [phone]

## 2024-05-06 NOTE — ASSESSMENT & PLAN NOTE
Echo    Left Ventricle: The left ventricle is normal in size. Normal wall thickness. Regional wall motion abnormalities present. See diagram for wall motion findings. Septal motion is consistent with pacing. There is mildly reduced systolic function with a visually estimated ejection fraction of 40 - 45%. Grade III diastolic dysfunction.    Right Ventricle: Moderate to severe right ventricular enlargement. Systolic function is reduced.TAPSE is 1.59 cm. Pacemaker lead present in the ventricle.    Left Atrium: Left atrium is severely dilated. The left atrium volume index is 71.4 mL/m2.    Right Atrium: Right atrium is severely dilated.    Aortic Valve: There is mild aortic valve sclerosis. There is mild to moderate stenosis. Aortic valve area by VTI is 1.49 cm². Aortic valve peak velocity is 1.43 m/s. Mean gradient is 5 mmHg. The dimensionless index is 0.52.    Mitral Valve: There is mild regurgitation.    Tricuspid Valve: There is mild to moderate regurgitation.    Pulmonary Artery: The estimated pulmonary artery systolic pressure is 64 mmHg.    IVC/SVC: Elevated venous pressure at 15 mmHg.

## 2024-05-06 NOTE — PLAN OF CARE
rounded on patient with MD team. PCC and Cardiology follow-up appointments noted to be scheduled. Patient is set up with [facility]) phone [phone]. Patient still on oxygen and not ready for discharge yet. PT/OT consulted will follow-up on recommendations. Patient encouraged to call with any questions or concerns.  will continue to follow patient through transitions of care and assist with any discharge needs.     Patient Contacts    Name Relation Home Work Nicolette Emmanuel   [phone]        Future Appointments   Date Time Provider Department Center   5/8/2024  2:00 PM Juan Roque MD [facility] CARDIO [place] [facility]   5/14/2024 11:00 AM Fatmata Johansen MD [facility] IMPRI [place] [facility]   5/16/2024  3:00 PM Shanell José PA-C [facility] FAM MED [facility]   9/4/2024  9:40 [facility]   9/11/2024  2:00 PM Jc Elliott MD [facility] FAM MED [facility]                  Name Relationship Specialty Phone Fax Address Order                Juan Roque MD  Cardiology Interventional Cardiology [phone] [address] [place] [address]     Next Steps: Follow up on 5/8/2024  Instructions: 2:00pm        Fatmata Johansen MD  Internal Medicine [phone] [address] [place] [address]     Next Steps: Follow up on 5/14/2024  Instructions: 11:00am [facility] DX:New hd pt        Shanell José PA-C  Family Medicine [phone] [address] [place] [address] [place] [address]     Next Steps: Follow up on 5/16/2024  Instructions: 3:00pm        [facility]   [phone] [address] [facility] [place] [place] [address]     Next Steps: Follow up  Instructions: Dialysis Facility               05/06/24 1037   Discharge Reassessment   Assessment Type Discharge Planning Reassessment   Did the patient's condition or plan change since previous  assessment? No   Discharge Plan discussed with: Patient   Discharge Plan A Home with family   Discharge Plan B Home with family;Home Health   DME Needed Upon Discharge  none   Transition of Care Barriers None   Why the patient remains in the hospital Requires continued medical care   Post-Acute Status   Post-Acute Authorization Dialysis   Diaylsis Status Pending medical clearance/testing   Hospital Resources/Appts/Education Provided Appointments scheduled and added to AVS   Discharge Delays None known at this time     Isha Hidalgo RN    [phone]

## 2024-05-06 NOTE — ASSESSMENT & PLAN NOTE
Creatine stable for now. BMP reviewed- noted Estimated Creatinine Clearance: 18.2 mL/min (A) (based on SCr of 4.1 mg/dL (H)). according to latest data. Based on current GFR, CKD stage is end stage.  Monitor UOP and serial BMP and adjust therapy as needed. Renally dose meds. Avoid nephrotoxic medications and procedures.    Seen by Nephrology, Trialysis placed for CRRT, tolerating  5/3 Tunneled catheter placed 5/3  Had some intradialytic hypotension, required albumin in addition to midodrine  Will consult General Surgery for PD catheter placement

## 2024-05-06 NOTE — PROGRESS NOTES
05/06/24 1350   Vital Signs   Temp 97.8 °F (36.6 °C)   Temp Source Temporal   Pulse 60   Heart Rate Source Right;Brachial;NIBP   Resp 18   Flow (L/min) (Oxygen Therapy) 3   Device (Oxygen Therapy) nasal cannula   BP 91/61   Post-Hemodialysis Assessment   Rinseback Volume (mL) 250 mL   Blood Volume Processed (Liters) 73.5 L   Dialyzer Clearance Lightly streaked   Duration of Treatment 210 minutes   Additional Fluid Intake (mL) 500 mL   Total UF (mL) 2000 mL   Net Fluid Removal 1500   Patient Response to Treatment well   Post-Hemodialysis Comments Blood returned, l/minh flushed, heparin instilled into cvc, lines capped and clamped.

## 2024-05-06 NOTE — NURSING
Patient back to room after dialysis. BP 86/55,  MAP 66, HR 60. Patient asymptomatic and denies of SOB or other discomforts at this time. Scheduled 15mg PO midodrine tablet will be given by pharmacist Clemente Bah. Dr. Hassan notified and aware.  No new orders received at this time. Tele box in place. O2 at 3L via NC. No respiratory/acute distress noted at this time. Spouse at bedside. Lunch tray warmed up and brought back to patient. Bed in lowest position with bed alarm on. HOB elevated and patient about to eat his late lunch. Side railsx2 are up. Call light within reach and instructed patient to call for assistance.

## 2024-05-06 NOTE — SUBJECTIVE & OBJECTIVE
Current Facility-Administered Medications   Medication Dose Route Frequency Provider Last Rate Last Admin    0.9%  NaCl infusion   Intravenous PRN Philip Perez MD        0.9%  NaCl infusion   Intravenous PRN Chyna Mata MD        0.9%  NaCl infusion   Intravenous Once Chyna Mata MD        0.9%  NaCl infusion   Intravenous Once Brittney Chicas MD        acetaminophen tablet 650 mg  650 mg Oral Q4H PRN Philip Perez MD        allopurinoL tablet 100 mg  100 mg Oral Daily Philip Perez MD   100 mg at 05/06/24 0914    alteplase injection 4 mg  4 mg Intra-Catheter Once Philip Perez MD        amiodarone tablet 400 mg  400 mg Oral Daily Philip Perez MD   400 mg at 05/06/24 0914    aspirin EC tablet 81 mg  81 mg Oral Daily Philip Perez MD   81 mg at 05/06/24 0914    calcium carbonate 200 mg calcium (500 mg) chewable tablet 1,000 mg  1,000 mg Oral TID PRN Philip Perez MD   1,000 mg at 05/05/24 0006    ergocalciferol capsule 50,000 Units  50,000 Units Oral Q7 Days Philip Perez MD   50,000 Units at 04/30/24 0950    furosemide injection 80 mg  80 mg Intravenous Q12H Vero Yadav MD   80 mg at 05/06/24 0918    heparin (porcine) injection 2,400 Units  2,400 Units Intra-Catheter PRN Philip Perez MD   2,400 Units at 05/04/24 1147    heparin (porcine) injection 5,000 Units  5,000 Units Subcutaneous Q8H Philip Perez MD   5,000 Units at 05/06/24 0601    HYDROcodone-acetaminophen 5-325 mg per tablet 1 tablet  1 tablet Oral Q4H PRN Philip Perez MD        lactulose 20 gram/30 mL solution Soln 20 g  20 g Oral Daily PRN Mamta Hassan MD        lactulose 20 gram/30 mL solution Soln 20 g  20 g Oral Once Mamta Hassan MD        levothyroxine tablet 150 mcg  150 mcg Oral Daily Philip Perez MD   150 mcg at 05/06/24 0913    mexiletine capsule 150 mg  150 mg Oral BID  Philip Perez MD   150 mg at 05/06/24 0914    midodrine tablet 15 mg  15 mg Oral Q8H  Philip Perez MD   15 mg at 05/06/24 0601    ondansetron injection 4 mg  4 mg Intravenous Q8H PRN Philip Perez MD   4 mg at 05/05/24 1656    pantoprazole EC tablet 40 mg  40 mg Oral Daily Mamta Hassan MD   40 mg at 05/06/24 0914    polyethylene glycol packet 17 g  17 g Oral Daily Mamta Hassan MD   17 g at 05/06/24 0914    pravastatin tablet 40 mg  40 mg Oral Daily Philip Perez MD   40 mg at 05/06/24 0914    sodium chloride 0.9% bolus 250 mL 250 mL  250 mL Intravenous PRN Philip Perez MD        sodium chloride 0.9% bolus 250 mL 250 mL  250 mL Intravenous PRN Chyna Mata MD        sodium chloride 0.9% bolus 250 mL 250 mL  250 mL Intravenous PRN Brittney Chicas MD        sodium chloride 0.9% flush 10 mL  10 mL Intravenous PRN Philip Perez MD        sodium chloride 0.9% flush 10 mL  10 mL Intravenous PRN Philip Perez MD        vitamin renal formula (B-complex-vitamin c-folic acid) 1 mg per capsule 1 capsule  1 capsule Oral Daily Philip Perez MD   1 capsule at 05/06/24 0913       Review of patient's allergies indicates:   Allergen Reactions    Lokelma [sodium zirconium cyclosilicate] Other (See Comments)     Fluid retention, weight gain, CHF excerebration, severe constipation    Atorvastatin Other (See Comments)     Joint pain    Jardiance [empagliflozin] Other (See Comments)     Chest pains, significant weight loss, lower blood pressure       Past Medical History:   Diagnosis Date    Cardiomyopathy     CKD (chronic kidney disease) stage 4, GFR 15-29 ml/min     Congestive heart failure (CHF) 2015    COPD (chronic obstructive pulmonary disease)     Coronary artery disease     Edema     Essential (primary) hypertension 05/18/2022    Formatting of this note might be different from the original. Converted from [employer]: Description - ESSENTIAL HYPERTENSION, BENIGN    Heart attack     HLD (hyperlipidemia)     Hypertension     Hyperuricemia     Hypocalcemia     Renal  cyst, left     Secondary hyperparathyroidism     Thyroid disease     V tach     Vitamin D deficiency      Past Surgical History:   Procedure Laterality Date    ablations  03/05/2018    albations  02/01/2018    COLONOSCOPY N/A 12/07/2018    Procedure: COLONOSCOPY/suprep;  Surgeon: Ananya Morillo MD;  Location: [facility] ENDO;  Service: Endoscopy;  Laterality: N/A;    defibulater N/A 2016    ESOPHAGOGASTRODUODENOSCOPY N/A 12/07/2018    Procedure: EGD (ESOPHAGOGASTRODUODENOSCOPY);  Surgeon: Ananya Morillo MD;  Location: [facility] ENDO;  Service: Endoscopy;  Laterality: N/A;    INSERTION OF TUNNELED CENTRAL VENOUS HEMODIALYSIS CATHETER Right 5/3/2024    Procedure: INSERTION, CATHETER, HEMODIALYSIS, DUAL LUMEN;  Surgeon: Philip Perez MD;  Location: [facility] OR;  Service: General;  Laterality: Right;    JOINT REPLACEMENT Bilateral 10/2016    knees, bilat    LEFT HEART CATHETERIZATION N/A 12/16/2020    Procedure: Left heart cath;  Surgeon: Shahram Stewart MD;  Location: [facility] CATH LAB/EP;  Service: Cardiology;  Laterality: N/A;    LEFT HEART CATHETERIZATION Left 9/27/2022    Procedure: Left heart cath;  Surgeon: Juan Roque MD;  Location: [facility] CATH LAB/EP;  Service: Cardiology;  Laterality: Left;    [place] HEMODIALYSIS, ONE EVALUATION  5/6/2024    REMOVAL OF HEMODIALYSIS CATHETER Right 5/3/2024    Procedure: REMOVAL, CATHETER, HEMODIALYSIS;  Surgeon: Philip Perez MD;  Location: [facility] OR;  Service: General;  Laterality: Right;    REPLACEMENT OF IMPLANTABLE CARDIOVERTER-DEFIBRILLATOR (ICD) GENERATOR Left 1/24/2023    Procedure: REPLACEMENT, ICD GENERATOR;  Surgeon: River Tenorio MD;  Location: [facility] EP LAB;  Service: Cardiology;  Laterality: Left;  ANTHONY, CRTD gen chg, MDT, MAC, DM, 3prep     Family History       Problem Relation (Age of Onset)    Alcohol abuse Father    Arthritis Sister    Breast cancer Mother    Cancer Mother    Hypertension Mother    Kidney disease Father    No Known Problems Brother, Daughter, Son     Stroke Mother          Tobacco Use    Smoking status: Former     Current packs/day: 0.00     Average packs/day: 1 pack/day for 33.2 years (33.2 ttl pk-yrs)     Types: Cigarettes     Start date: 1979     Quit date: 3/3/2012     Years since quittin.1     Passive exposure: Past    Smokeless tobacco: Never   Substance and Sexual Activity    Alcohol use: Yes     Comment: rare    Drug use: No    Sexual activity: Yes     Partners: Female     Review of Systems   Constitutional:  Negative for chills and fever.   Respiratory:  Positive for shortness of breath. Negative for cough.    Cardiovascular:  Negative for chest pain.   Gastrointestinal:  Negative for abdominal pain, constipation, diarrhea, nausea and vomiting.   Genitourinary:  Negative for dysuria.   Neurological:  Negative for dizziness and light-headedness.     Objective:     Vital Signs (Most Recent):  Temp: 97.5 °F (36.4 °C) (24 0803)  Pulse: 62 (24 0803)  Resp: 18 (24 0803)  BP: 93/60 (24 0803)  SpO2: (!) 94 % (24 1118) Vital Signs (24h Range):  Temp:  [97.5 °F (36.4 °C)-97.9 °F (36.6 °C)] 97.5 °F (36.4 °C)  Pulse:  [59-83] 62  Resp:  [17-18] 18  SpO2:  [84 %-97 %] 94 %  BP: (84-96)/(46-60) 93/60     Weight: 76.5 kg (168 lb 10.4 oz)  Body mass index is 24.91 kg/m².     Physical Exam  Vitals reviewed.   Constitutional:       Appearance: Normal appearance.   Cardiovascular:      Rate and Rhythm: Normal rate and regular rhythm.   Pulmonary:      Effort: No respiratory distress.   Abdominal:      General: There is no distension.      Palpations: Abdomen is soft.      Tenderness: There is no abdominal tenderness. There is no guarding.      Comments: No scars   Skin:     General: Skin is warm.   Neurological:      General: No focal deficit present.      Mental Status: He is alert and oriented to person, place, and time.            I have reviewed all pertinent lab results within the past 24 hours.  CBC:   Recent Labs   Lab  05/06/24  0230   WBC 10.89   RBC 5.32   HGB 13.8*   HCT 39.1*   *   MCV 74*   MCH 25.9*   MCHC 35.3     CMP:   Recent Labs   Lab 04/29/24  1707 04/30/24  0505 05/04/24  0255 05/05/24  0352      < > 105 98   CALCIUM 9.0   < > 8.6* 8.5*   ALBUMIN 3.6   < > 2.9*  --    PROT 7.2  --   --   --    *   < > 130* 131*   K 4.3   < > 5.8* 4.4   CO2 21*   < > 20* 24   CL 97   < > 97 97   *   < > 79* 55*   CREATININE 6.7*   < > 4.8* 4.1*   ALKPHOS 160*  --   --   --    ALT 39  --   --   --    AST 42*  --   --   --    BILITOT 2.4*  --   --   --     < > = values in this interval not displayed.       Significant Diagnostics:  I have reviewed all pertinent imaging results/findings within the past 24 hours.

## 2024-05-06 NOTE — SUBJECTIVE & OBJECTIVE
Interval History: Sitting in bed, hiccups are better. Hasn't been out of bed. Agreeable to a PT/OT consult    Review of Systems   Constitutional:  Negative for chills, fatigue and fever.   HENT: Negative.     Eyes: Negative.    Respiratory:  Negative for cough, chest tightness, shortness of breath and wheezing.    Cardiovascular:  Negative for chest pain, palpitations and leg swelling.   Gastrointestinal:  Negative for abdominal distention, abdominal pain, blood in stool, diarrhea, nausea and vomiting.   Endocrine: Positive for cold intolerance.   Genitourinary:  Positive for decreased urine volume. Negative for difficulty urinating and flank pain.   Musculoskeletal:  Positive for arthralgias.   Skin: Negative.    Allergic/Immunologic: Negative.    Neurological:  Positive for weakness. Negative for dizziness and light-headedness.   Hematological: Negative.    Psychiatric/Behavioral: Negative.       Objective:     Vital Signs (Most Recent):  Temp: 97.5 °F (36.4 °C) (05/06/24 1432)  Pulse: 60 (05/06/24 1432)  Resp: 18 (05/06/24 1432)  BP: (!) 86/55 (Pt asymptomatic. Dr. Hassan notifed and aware.) (05/06/24 1432)  SpO2: (!) 94 % (05/06/24 1505) Vital Signs (24h Range):  Temp:  [97.5 °F (36.4 °C)-97.9 °F (36.6 °C)] 97.5 °F (36.4 °C)  Pulse:  [59-83] 60  Resp:  [17-18] 18  SpO2:  [84 %-97 %] 94 %  BP: ()/(46-69) 86/55     Weight: 76.5 kg (168 lb 10.4 oz)  Body mass index is 24.91 kg/m².    Intake/Output Summary (Last 24 hours) at 5/6/2024 1641  Last data filed at 5/6/2024 1350  Gross per 24 hour   Intake 500 ml   Output 2400 ml   Net -1900 ml         Physical Exam  Vitals and nursing note reviewed.   Constitutional:       General: He is not in acute distress.     Appearance: He is not ill-appearing.   HENT:      Head: Normocephalic and atraumatic.      Nose: Nose normal.      Mouth/Throat:      Mouth: Mucous membranes are moist.      Pharynx: Oropharynx is clear.   Eyes:      Extraocular Movements: Extraocular  "movements intact.      Conjunctiva/sclera: Conjunctivae normal.      Pupils: Pupils are equal, round, and reactive to light.   Neck:      Comments: Central line in R side of neck  Cardiovascular:      Rate and Rhythm: Normal rate and regular rhythm.      Pulses: Normal pulses.      Heart sounds: Normal heart sounds. No murmur heard.     No friction rub. No gallop.   Pulmonary:      Effort: Pulmonary effort is normal. No respiratory distress.      Breath sounds: Normal breath sounds. No wheezing, rhonchi or rales.   Abdominal:      General: Bowel sounds are normal. There is no distension.      Palpations: Abdomen is soft.      Tenderness: There is no abdominal tenderness. There is no right CVA tenderness, left CVA tenderness or guarding.   Genitourinary:     Comments: deferred  Musculoskeletal:         General: Normal range of motion.      Cervical back: Normal range of motion and neck supple.   Skin:     General: Skin is warm and dry.      Capillary Refill: Capillary refill takes less than 2 seconds.   Neurological:      General: No focal deficit present.      Mental Status: He is alert and oriented to person, place, and time. Mental status is at baseline.   Psychiatric:         Mood and Affect: Mood normal.         Behavior: Behavior normal.         Thought Content: Thought content normal.         Judgment: Judgment normal.             Significant Labs: All pertinent labs within the past 24 hours have been reviewed.  CBC:   Recent Labs   Lab 05/05/24  0352 05/06/24  0230   WBC 11.47 10.89   HGB 13.9* 13.8*   HCT 40.5 39.1*   * 479*     CMP:   Recent Labs   Lab 05/05/24  0352   *   K 4.4   CL 97   CO2 24   GLU 98   BUN 55*   CREATININE 4.1*   CALCIUM 8.5*   ANIONGAP 10     Cardiac Markers: No results for input(s): "CKMB", "MYOGLOBIN", "BNP", "TROPISTAT" in the last 48 hours.    Significant Imaging: I have reviewed all pertinent imaging results/findings within the past 24 hours.  "

## 2024-05-06 NOTE — ASSESSMENT & PLAN NOTE
63 yoM with ESRD now requiring dialysis. Patient hypotensive during HD sessions. General Surgery consulted for PD catheter placement. Patient is amenable to PD catheter. We discussed the risks and benefits of surgery, especially given his heart failure, and he would like to proceed.    - Case discussed with Dr. Ruiz  - We will plan for PD catheter placement while inpatient, no definitive date yet  - Ok for a diet  - Ok to continue prophylactic heparin  - Surgery to follow

## 2024-05-06 NOTE — PROGRESS NOTES
[facility]  Progress Note    Patient Name: Ar Sharma  MRN: [id number]  Patient Class: IP- Inpatient   Admission Date: 4/29/2024  Length of Stay: 7 days  Attending Physician: Mamta Hassan MD  Primary Care Provider: Jc Elliott MD        Subjective:     Principal Problem:Chronic kidney disease (CKD), stage V        HPI:  Ar Sharma is a 63M with PMHx of ESRD, HFrEF, HTN, NICM, COPD, VT s/p ICD placement, and HLD who presented to [facility] ED with CC progressively worse SOB over the last two months. He was recently diagnosed with ESRD and has been trying diet modification at home. He endorses SINGH, dizziness, frequent intermittent nausea and one episode of vomiting during this time. He denies CP, syncope, dysuria, fever or chills.     ED workup significant for: , Cr 6.7, Phos 5, Tbili 2.4, BNP 3204, trop 0.040, vit D 24, .6, and CXR: cardiomegaly w/ pulmonary vascular congestion consistent w/ pulmonary edema. All other components of CBC and chemistries were unremarkable.       Patient was seen by Dr. Mata in ED. Emergent HD not indicated.     Overview/Hospital Course:  4/30 Tolerating HD through central line, SOB is improved  5/1/24 On pressors but tolerating CRRT  5/2 tolerating HD, off of pressors, stepping down  5/3 Tunneled catheter placed  5/4 had an episode of intradialytic hypotension, given albumin  5/5 Having hiccups, started PPI  5/6 Hiccups resolved. Gen Surgery consult for PD catheter    Interval History: Sitting in bed, hiccups are better. Hasn't been out of bed. Agreeable to a PT/OT consult    Review of Systems   Constitutional:  Negative for chills, fatigue and fever.   HENT: Negative.     Eyes: Negative.    Respiratory:  Negative for cough, chest tightness, shortness of breath and wheezing.    Cardiovascular:  Negative for chest pain, palpitations and leg swelling.   Gastrointestinal:  Negative for abdominal distention, abdominal pain, blood in  stool, diarrhea, nausea and vomiting.   Endocrine: Positive for cold intolerance.   Genitourinary:  Positive for decreased urine volume. Negative for difficulty urinating and flank pain.   Musculoskeletal:  Positive for arthralgias.   Skin: Negative.    Allergic/Immunologic: Negative.    Neurological:  Positive for weakness. Negative for dizziness and light-headedness.   Hematological: Negative.    Psychiatric/Behavioral: Negative.       Objective:     Vital Signs (Most Recent):  Temp: 97.5 °F (36.4 °C) (05/06/24 1432)  Pulse: 60 (05/06/24 1432)  Resp: 18 (05/06/24 1432)  BP: (!) 86/55 (Pt asymptomatic. Dr. Hassan notifed and aware.) (05/06/24 1432)  SpO2: (!) 94 % (05/06/24 1505) Vital Signs (24h Range):  Temp:  [97.5 °F (36.4 °C)-97.9 °F (36.6 °C)] 97.5 °F (36.4 °C)  Pulse:  [59-83] 60  Resp:  [17-18] 18  SpO2:  [84 %-97 %] 94 %  BP: ()/(46-69) 86/55     Weight: 76.5 kg (168 lb 10.4 oz)  Body mass index is 24.91 kg/m².    Intake/Output Summary (Last 24 hours) at 5/6/2024 1641  Last data filed at 5/6/2024 1350  Gross per 24 hour   Intake 500 ml   Output 2400 ml   Net -1900 ml         Physical Exam  Vitals and nursing note reviewed.   Constitutional:       General: He is not in acute distress.     Appearance: He is not ill-appearing.   HENT:      Head: Normocephalic and atraumatic.      Nose: Nose normal.      Mouth/Throat:      Mouth: Mucous membranes are moist.      Pharynx: Oropharynx is clear.   Eyes:      Extraocular Movements: Extraocular movements intact.      Conjunctiva/sclera: Conjunctivae normal.      Pupils: Pupils are equal, round, and reactive to light.   Neck:      Comments: Central line in R side of neck  Cardiovascular:      Rate and Rhythm: Normal rate and regular rhythm.      Pulses: Normal pulses.      Heart sounds: Normal heart sounds. No murmur heard.     No friction rub. No gallop.   Pulmonary:      Effort: Pulmonary effort is normal. No respiratory distress.      Breath sounds:  "Normal breath sounds. No wheezing, rhonchi or rales.   Abdominal:      General: Bowel sounds are normal. There is no distension.      Palpations: Abdomen is soft.      Tenderness: There is no abdominal tenderness. There is no right CVA tenderness, left CVA tenderness or guarding.   Genitourinary:     Comments: deferred  Musculoskeletal:         General: Normal range of motion.      Cervical back: Normal range of motion and neck supple.   Skin:     General: Skin is warm and dry.      Capillary Refill: Capillary refill takes less than 2 seconds.   Neurological:      General: No focal deficit present.      Mental Status: He is alert and oriented to person, place, and time. Mental status is at baseline.   Psychiatric:         Mood and Affect: Mood normal.         Behavior: Behavior normal.         Thought Content: Thought content normal.         Judgment: Judgment normal.             Significant Labs: All pertinent labs within the past 24 hours have been reviewed.  CBC:   Recent Labs   Lab 05/05/24  0352 05/06/24  0230   WBC 11.47 10.89   HGB 13.9* 13.8*   HCT 40.5 39.1*   * 479*     CMP:   Recent Labs   Lab 05/05/24  0352   *   K 4.4   CL 97   CO2 24   GLU 98   BUN 55*   CREATININE 4.1*   CALCIUM 8.5*   ANIONGAP 10     Cardiac Markers: No results for input(s): "CKMB", "MYOGLOBIN", "BNP", "TROPISTAT" in the last 48 hours.    Significant Imaging: I have reviewed all pertinent imaging results/findings within the past 24 hours.    Assessment/Plan:      * Chronic kidney disease (CKD), stage V  Creatine stable for now. BMP reviewed- noted Estimated Creatinine Clearance: 18.2 mL/min (A) (based on SCr of 4.1 mg/dL (H)). according to latest data. Based on current GFR, CKD stage is end stage.  Monitor UOP and serial BMP and adjust therapy as needed. Renally dose meds. Avoid nephrotoxic medications and procedures.    --see by Dr. Mata in ED, gen surg consulted for tunneled catheter placement in AM 4/30/24  --admit " to ICU for CRRT once catheter placed-->tolerating HD will step down today  --Plan for tunneled catheter placement in am    Hypotension  Weaned off of pressors  Added midodrine  Goal MAP >65  Orthostatics negative, off of pressors today, Step down to floor    ESRD (end stage renal disease)  Creatine stable for now. BMP reviewed- noted Estimated Creatinine Clearance: 18.2 mL/min (A) (based on SCr of 4.1 mg/dL (H)). according to latest data. Based on current GFR, CKD stage is end stage.  Monitor UOP and serial BMP and adjust therapy as needed. Renally dose meds. Avoid nephrotoxic medications and procedures.    Seen by Nephrology, Trialysis placed for CRRT, tolerating  5/3 Tunneled catheter placed 5/3  Had some intradialytic hypotension, required albumin in addition to midodrine  Will consult General Surgery for PD catheter placement    Pulmonary emphysema, unspecified emphysema type  Patient's COPD is controlled currently.  Patient is currently off COPD Pathway. Continue scheduled inhalers Supplemental oxygen and monitor respiratory status closely.     Non-ischemic cardiomyopathy  Echo    Left Ventricle: The left ventricle is normal in size. Normal wall thickness. Regional wall motion abnormalities present. See diagram for wall motion findings. Septal motion is consistent with pacing. There is mildly reduced systolic function with a visually estimated ejection fraction of 40 - 45%. Grade III diastolic dysfunction.    Right Ventricle: Moderate to severe right ventricular enlargement. Systolic function is reduced.TAPSE is 1.59 cm. Pacemaker lead present in the ventricle.    Left Atrium: Left atrium is severely dilated. The left atrium volume index is 71.4 mL/m2.    Right Atrium: Right atrium is severely dilated.    Aortic Valve: There is mild aortic valve sclerosis. There is mild to moderate stenosis. Aortic valve area by VTI is 1.49 cm². Aortic valve peak velocity is 1.43 m/s. Mean gradient is 5 mmHg. The dimensionless  index is 0.52.    Mitral Valve: There is mild regurgitation.    Tricuspid Valve: There is mild to moderate regurgitation.    Pulmonary Artery: The estimated pulmonary artery systolic pressure is 64 mmHg.    IVC/SVC: Elevated venous pressure at 15 mmHg.         Abuse of herbal or folk remedies        Primary hypertension  Chronic, controlled. Latest blood pressure and vitals reviewed-     Temp:  [97.8 °F (36.6 °C)]   Pulse:  [60-81]   Resp:  [18-29]   BP: ()/(51-66)   SpO2:  [85 %-93 %] .   Home meds for hypertension were reviewed and noted below.   Hypertension Medications               furosemide (LASIX) 80 MG tablet Take 1 tablet (80 mg total) by mouth 2 (two) times daily. If increased 3 pounds in a day or 5 pounds in a week, take an extra dose of lasix 80mg until those pounds are lost.    metoprolol succinate (TOPROL-XL) 25 MG 24 hr tablet TAKE 1 TABLET BY MOUTH EVERY DAY    nitroGLYCERIN (NITROSTAT) 0.4 MG SL tablet Place 1 tablet (0.4 mg total) under the tongue every 5 (five) minutes as needed for Chest pain.    sacubitriL-valsartan (ENTRESTO) 24-26 mg per tablet Take 1 tablet by mouth 2 (two) times daily.            While in the hospital, will manage blood pressure as follows; Adjust home antihypertensive regimen as follows- hold home bp medications in the setting of hypotension requiring CRRT    Will utilize p.r.n. blood pressure medication only if patient's blood pressure greater than 180/110 and he develops symptoms such as worsening chest pain or shortness of breath.    HFrEF (heart failure with reduced ejection fraction)  Congestive Heart Failure  Patient presents for re-evaluation of congestive heart failure. Patient's current complaints are dyspnea, fatigue, near-syncope, and orthopnea. He denies chest pain, claudication, exertional chest pressure/discomfort, and lower extremity edema. He states he is compliant most of the time with his medications. He states he is compliant all of the time with  his diet.    --last TTE in 2023 shows EF 40%. Will repeat this admission  --on home lasix. Will convert to higher IV dosage  --will hold troprol in setting of hypotension requiring CRRT  --on CHF pathway    Cardiac resynchronization therapy defibrillator (CRT-D) in place        Mixed hyperlipidemia  --continue home medications      Iron deficiency anemia  Patient's anemia is currently controlled. Has not received any PRBCs to date. Etiology likely d/t Iron deficiency and chronic disease due to ESRD  Current CBC reviewed-   Lab Results   Component Value Date    HGB 15.7 04/29/2024    HCT 44.8 04/29/2024     Monitor serial CBC and transfuse if patient becomes hemodynamically unstable, symptomatic or H/H drops below 7/21.    Hypothyroidism  --continue home synthroid    V-tach  --hx of AICD placement, currently 100% paced   --continue home antiarrhythmic medications  --cardiac monitoring          VTE Risk Mitigation (From admission, onward)           Ordered     heparin (porcine) injection 2,400 Units  As needed (PRN)         04/30/24 2223     heparin (porcine) injection 5,000 Units  Every 8 hours         04/29/24 1945     IP VTE HIGH RISK PATIENT  Once         04/29/24 1945     Place sequential compression device  Until discontinued         04/29/24 1945                    Discharge Planning   SLAVA:      Code Status: Full Code   Is the patient medically ready for discharge?:     Reason for patient still in hospital (select all that apply): Consult recommendations and PT / OT recommendations  Discharge Plan A: Home with family   Discharge Delays: None known at this time              Mamta Hassan MD  Department of Hospital Medicine   [facility]

## 2024-05-06 NOTE — PROGRESS NOTES
[facility]  Progress Note    Patient Name: Ar Sharma  MRN: [id number]  Patient Class: IP- Inpatient   Admission Date: 4/29/2024  Length of Stay: 6 days  Attending Physician: Mamta Hassan MD  Primary Care Provider: Jc Elliott MD        Subjective:     Principal Problem:Chronic kidney disease (CKD), stage V        HPI:  Ar Sharma is a 63M with PMHx of ESRD, HFrEF, HTN, NICM, COPD, VT s/p ICD placement, and HLD who presented to [facility] ED with CC progressively worse SOB over the last two months. He was recently diagnosed with ESRD and has been trying diet modification at home. He endorses SINGH, dizziness, frequent intermittent nausea and one episode of vomiting during this time. He denies CP, syncope, dysuria, fever or chills.     ED workup significant for: , Cr 6.7, Phos 5, Tbili 2.4, BNP 3204, trop 0.040, vit D 24, .6, and CXR: cardiomegaly w/ pulmonary vascular congestion consistent w/ pulmonary edema. All other components of CBC and chemistries were unremarkable.       Patient was seen by Dr. Mata in ED. Emergent HD not indicated.     Overview/Hospital Course:  4/30 Tolerating HD through central line, SOB is improved  5/1/24 On pressors but tolerating CRRT  5/2 tolerating HD, off of pressors, stepping down  5/3 Tunneled catheter placed  5/4 had an episode of intradialytic hypotension, given albumin  5/5 Having hiccups, started PPI    Interval History: Having hiccups, mild improvement with PPI, discussion option of very small dose of gabapentin if it doesn't get better    Review of Systems   Constitutional:  Negative for chills, fatigue and fever.   HENT: Negative.     Eyes: Negative.    Respiratory:  Negative for cough, chest tightness, shortness of breath and wheezing.    Cardiovascular:  Negative for chest pain, palpitations and leg swelling.   Gastrointestinal:  Negative for abdominal distention, abdominal pain, blood in stool, diarrhea, nausea and  vomiting.   Endocrine: Positive for cold intolerance.   Genitourinary:  Positive for decreased urine volume and difficulty urinating. Negative for flank pain.   Musculoskeletal:  Positive for arthralgias.   Skin: Negative.    Allergic/Immunologic: Negative.    Neurological:  Positive for weakness. Negative for dizziness and light-headedness.   Hematological: Negative.    Psychiatric/Behavioral: Negative.       Objective:     Vital Signs (Most Recent):  Temp: 97.7 °F (36.5 °C) (05/05/24 1922)  Pulse: 60 (05/05/24 1922)  Resp: 18 (05/05/24 1922)  BP: (!) 96/57 (05/05/24 1922)  SpO2: 97 % (05/05/24 1922) Vital Signs (24h Range):  Temp:  [97.5 °F (36.4 °C)-98.1 °F (36.7 °C)] 97.7 °F (36.5 °C)  Pulse:  [59-62] 60  Resp:  [16-18] 18  SpO2:  [85 %-97 %] 97 %  BP: (84-96)/(46-60) 96/57     Weight: 76.5 kg (168 lb 10.4 oz)  Body mass index is 24.91 kg/m².    Intake/Output Summary (Last 24 hours) at 5/5/2024 1953  Last data filed at 5/5/2024 1054  Gross per 24 hour   Intake --   Output 400 ml   Net -400 ml         Physical Exam  Vitals and nursing note reviewed.   Constitutional:       General: He is not in acute distress.     Appearance: He is not ill-appearing.   HENT:      Head: Normocephalic and atraumatic.      Nose: Nose normal.      Mouth/Throat:      Mouth: Mucous membranes are moist.      Pharynx: Oropharynx is clear.   Eyes:      Extraocular Movements: Extraocular movements intact.      Conjunctiva/sclera: Conjunctivae normal.      Pupils: Pupils are equal, round, and reactive to light.   Neck:      Comments: Central line in R side of neck  Cardiovascular:      Rate and Rhythm: Normal rate and regular rhythm.      Pulses: Normal pulses.      Heart sounds: Normal heart sounds. No murmur heard.     No friction rub. No gallop.   Pulmonary:      Effort: Pulmonary effort is normal. No respiratory distress.      Breath sounds: Normal breath sounds. No wheezing, rhonchi or rales.   Abdominal:      General: Bowel sounds are  "normal. There is no distension.      Palpations: Abdomen is soft.      Tenderness: There is no abdominal tenderness. There is no right CVA tenderness, left CVA tenderness or guarding.   Genitourinary:     Comments: deferred  Musculoskeletal:         General: Normal range of motion.      Cervical back: Normal range of motion and neck supple.   Skin:     General: Skin is warm and dry.      Capillary Refill: Capillary refill takes less than 2 seconds.   Neurological:      General: No focal deficit present.      Mental Status: He is alert and oriented to person, place, and time. Mental status is at baseline.   Psychiatric:         Mood and Affect: Mood normal.         Behavior: Behavior normal.         Thought Content: Thought content normal.         Judgment: Judgment normal.             Significant Labs: All pertinent labs within the past 24 hours have been reviewed.  Blood Culture: No results for input(s): "LABBLOO" in the last 48 hours.  CBC:   Recent Labs   Lab 05/04/24 0255 05/05/24 0352   WBC 12.09 11.47   HGB 14.3 13.9*   HCT 40.7 40.5   * 456*     CMP:   Recent Labs   Lab 05/04/24 0255 05/05/24  0352   * 131*   K 5.8* 4.4   CL 97 97   CO2 20* 24    98   BUN 79* 55*   CREATININE 4.8* 4.1*   CALCIUM 8.6* 8.5*   ALBUMIN 2.9*  --    ANIONGAP 13 10     POCT Glucose: No results for input(s): "POCTGLUCOSE" in the last 48 hours.    Significant Imaging: I have reviewed all pertinent imaging results/findings within the past 24 hours.    Assessment/Plan:      * Chronic kidney disease (CKD), stage V  Creatine stable for now. BMP reviewed- noted Estimated Creatinine Clearance: 18.2 mL/min (A) (based on SCr of 4.1 mg/dL (H)). according to latest data. Based on current GFR, CKD stage is end stage.  Monitor UOP and serial BMP and adjust therapy as needed. Renally dose meds. Avoid nephrotoxic medications and procedures.    --see by Dr. Mata in ED, gen surg consulted for tunneled catheter placement in AM " 4/30/24  --admit to ICU for CRRT once catheter placed-->tolerating HD will step down today  --Plan for tunneled catheter placement in am    Hypotension  Weaned off of pressors  Added midodrine  Goal MAP >65  Orthostatics negative, off of pressors today, Step down to floor    ESRD (end stage renal disease)  Creatine stable for now. BMP reviewed- noted Estimated Creatinine Clearance: 18.2 mL/min (A) (based on SCr of 4.1 mg/dL (H)). according to latest data. Based on current GFR, CKD stage is end stage.  Monitor UOP and serial BMP and adjust therapy as needed. Renally dose meds. Avoid nephrotoxic medications and procedures.    Seen by Nephrology, Trialysis placed for CRRT, tolerating  5/3 Tunneled catheter placed 5/3  Had some intradialytic hypotension    Pulmonary emphysema, unspecified emphysema type  Patient's COPD is controlled currently.  Patient is currently off COPD Pathway. Continue scheduled inhalers Supplemental oxygen and monitor respiratory status closely.     Non-ischemic cardiomyopathy  Echo    Left Ventricle: The left ventricle is normal in size. Normal wall thickness. Regional wall motion abnormalities present. See diagram for wall motion findings. Septal motion is consistent with pacing. There is mildly reduced systolic function with a visually estimated ejection fraction of 40 - 45%. Grade III diastolic dysfunction.    Right Ventricle: Moderate to severe right ventricular enlargement. Systolic function is reduced.TAPSE is 1.59 cm. Pacemaker lead present in the ventricle.    Left Atrium: Left atrium is severely dilated. The left atrium volume index is 71.4 mL/m2.    Right Atrium: Right atrium is severely dilated.    Aortic Valve: There is mild aortic valve sclerosis. There is mild to moderate stenosis. Aortic valve area by VTI is 1.49 cm². Aortic valve peak velocity is 1.43 m/s. Mean gradient is 5 mmHg. The dimensionless index is 0.52.    Mitral Valve: There is mild regurgitation.    Tricuspid Valve:  There is mild to moderate regurgitation.    Pulmonary Artery: The estimated pulmonary artery systolic pressure is 64 mmHg.    IVC/SVC: Elevated venous pressure at 15 mmHg.         Abuse of herbal or folk remedies        Primary hypertension  Chronic, controlled. Latest blood pressure and vitals reviewed-     Temp:  [97.8 °F (36.6 °C)]   Pulse:  [60-81]   Resp:  [18-29]   BP: ()/(51-66)   SpO2:  [85 %-93 %] .   Home meds for hypertension were reviewed and noted below.   Hypertension Medications               furosemide (LASIX) 80 MG tablet Take 1 tablet (80 mg total) by mouth 2 (two) times daily. If increased 3 pounds in a day or 5 pounds in a week, take an extra dose of lasix 80mg until those pounds are lost.    metoprolol succinate (TOPROL-XL) 25 MG 24 hr tablet TAKE 1 TABLET BY MOUTH EVERY DAY    nitroGLYCERIN (NITROSTAT) 0.4 MG SL tablet Place 1 tablet (0.4 mg total) under the tongue every 5 (five) minutes as needed for Chest pain.    sacubitriL-valsartan (ENTRESTO) 24-26 mg per tablet Take 1 tablet by mouth 2 (two) times daily.            While in the hospital, will manage blood pressure as follows; Adjust home antihypertensive regimen as follows- hold home bp medications in the setting of hypotension requiring CRRT    Will utilize p.r.n. blood pressure medication only if patient's blood pressure greater than 180/110 and he develops symptoms such as worsening chest pain or shortness of breath.    HFrEF (heart failure with reduced ejection fraction)  Congestive Heart Failure  Patient presents for re-evaluation of congestive heart failure. Patient's current complaints are dyspnea, fatigue, near-syncope, and orthopnea. He denies chest pain, claudication, exertional chest pressure/discomfort, and lower extremity edema. He states he is compliant most of the time with his medications. He states he is compliant all of the time with his diet.    --last TTE in 2023 shows EF 40%. Will repeat this admission  --on  home lasix. Will convert to higher IV dosage  --will hold troprol in setting of hypotension requiring CRRT  --on CHF pathway    Cardiac resynchronization therapy defibrillator (CRT-D) in place        Mixed hyperlipidemia  --continue home medications      Iron deficiency anemia  Patient's anemia is currently controlled. Has not received any PRBCs to date. Etiology likely d/t Iron deficiency and chronic disease due to ESRD  Current CBC reviewed-   Lab Results   Component Value Date    HGB 15.7 04/29/2024    HCT 44.8 04/29/2024     Monitor serial CBC and transfuse if patient becomes hemodynamically unstable, symptomatic or H/H drops below 7/21.    Hypothyroidism  --continue home synthroid    V-tach  --hx of AICD placement, currently 100% paced   --continue home antiarrhythmic medications  --cardiac monitoring          VTE Risk Mitigation (From admission, onward)           Ordered     heparin (porcine) injection 2,400 Units  As needed (PRN)         04/30/24 2223     heparin (porcine) injection 5,000 Units  Every 8 hours         04/29/24 1945     IP VTE HIGH RISK PATIENT  Once         04/29/24 1945     Place sequential compression device  Until discontinued         04/29/24 1945                    Discharge Planning   SLAVA:      Code Status: Full Code   Is the patient medically ready for discharge?:     Reason for patient still in hospital (select all that apply): Patient trending condition and Laboratory test  Discharge Plan A: Home with family   Discharge Delays: None known at this time              Mamta Hassan MD  Department of Hospital Medicine   [facility]

## 2024-05-07 LAB
ALBUMIN SERPL BCP-MCNC: 3 G/DL (ref 3.5–5.2)
ANION GAP SERPL CALC-SCNC: 13 MMOL/L (ref 8–16)
BASOPHILS # BLD AUTO: 0.12 K/UL (ref 0–0.2)
BASOPHILS NFR BLD: 1.2 % (ref 0–1.9)
BUN SERPL-MCNC: 36 MG/DL (ref 8–23)
CALCIUM SERPL-MCNC: 8.7 MG/DL (ref 8.7–10.5)
CHLORIDE SERPL-SCNC: 97 MMOL/L (ref 95–110)
CO2 SERPL-SCNC: 24 MMOL/L (ref 23–29)
CREAT SERPL-MCNC: 3.6 MG/DL (ref 0.5–1.4)
DIFFERENTIAL METHOD BLD: ABNORMAL
EOSINOPHIL # BLD AUTO: 0.2 K/UL (ref 0–0.5)
EOSINOPHIL NFR BLD: 1.6 % (ref 0–8)
ERYTHROCYTE [DISTWIDTH] IN BLOOD BY AUTOMATED COUNT: 15.6 % (ref 11.5–14.5)
EST. GFR  (NO RACE VARIABLE): 18 ML/MIN/1.73 M^2
GLUCOSE SERPL-MCNC: 87 MG/DL (ref 70–110)
HCT VFR BLD AUTO: 39.4 % (ref 40–54)
HGB BLD-MCNC: 13.7 G/DL (ref 14–18)
IMM GRANULOCYTES # BLD AUTO: 0.12 K/UL (ref 0–0.04)
IMM GRANULOCYTES NFR BLD AUTO: 1.2 % (ref 0–0.5)
LYMPHOCYTES # BLD AUTO: 0.6 K/UL (ref 1–4.8)
LYMPHOCYTES NFR BLD: 6.2 % (ref 18–48)
MAGNESIUM SERPL-MCNC: 2 MG/DL (ref 1.6–2.6)
MCH RBC QN AUTO: 25.8 PG (ref 27–31)
MCHC RBC AUTO-ENTMCNC: 34.8 G/DL (ref 32–36)
MCV RBC AUTO: 74 FL (ref 82–98)
MONOCYTES # BLD AUTO: 1.3 K/UL (ref 0.3–1)
MONOCYTES NFR BLD: 12.4 % (ref 4–15)
NEUTROPHILS # BLD AUTO: 8 K/UL (ref 1.8–7.7)
NEUTROPHILS NFR BLD: 77.4 % (ref 38–73)
NRBC BLD-RTO: 0 /100 WBC
PHOSPHATE SERPL-MCNC: 2.8 MG/DL (ref 2.7–4.5)
PLATELET # BLD AUTO: 488 K/UL (ref 150–450)
PMV BLD AUTO: 12.9 FL (ref 9.2–12.9)
POTASSIUM SERPL-SCNC: 4.1 MMOL/L (ref 3.5–5.1)
RBC # BLD AUTO: 5.31 M/UL (ref 4.6–6.2)
SODIUM SERPL-SCNC: 134 MMOL/L (ref 136–145)
WBC # BLD AUTO: 10.28 K/UL (ref 3.9–12.7)

## 2024-05-07 PROCEDURE — 63600175 PHARM REV CODE 636 W HCPCS: Performed by: SURGERY

## 2024-05-07 PROCEDURE — 63600175 PHARM REV CODE 636 W HCPCS: Performed by: STUDENT IN AN ORGANIZED HEALTH CARE EDUCATION/TRAINING PROGRAM

## 2024-05-07 PROCEDURE — 83735 ASSAY OF MAGNESIUM: CPT | Performed by: SURGERY

## 2024-05-07 PROCEDURE — 80069 RENAL FUNCTION PANEL: CPT | Performed by: INTERNAL MEDICINE

## 2024-05-07 PROCEDURE — 25000003 PHARM REV CODE 250: Performed by: SURGERY

## 2024-05-07 PROCEDURE — 11000001 HC ACUTE MED/SURG PRIVATE ROOM

## 2024-05-07 PROCEDURE — 94761 N-INVAS EAR/PLS OXIMETRY MLT: CPT

## 2024-05-07 PROCEDURE — 27000221 HC OXYGEN, UP TO 24 HOURS

## 2024-05-07 PROCEDURE — 25000003 PHARM REV CODE 250: Performed by: INTERNAL MEDICINE

## 2024-05-07 PROCEDURE — 36415 COLL VENOUS BLD VENIPUNCTURE: CPT | Performed by: INTERNAL MEDICINE

## 2024-05-07 PROCEDURE — 97116 GAIT TRAINING THERAPY: CPT

## 2024-05-07 PROCEDURE — 99900035 HC TECH TIME PER 15 MIN (STAT)

## 2024-05-07 PROCEDURE — 25000003 PHARM REV CODE 250: Performed by: FAMILY MEDICINE

## 2024-05-07 PROCEDURE — 97530 THERAPEUTIC ACTIVITIES: CPT

## 2024-05-07 PROCEDURE — 85025 COMPLETE CBC W/AUTO DIFF WBC: CPT | Performed by: SURGERY

## 2024-05-07 RX ORDER — MIRTAZAPINE 7.5 MG/1
7.5 TABLET, FILM COATED ORAL NIGHTLY
Status: DISCONTINUED | OUTPATIENT
Start: 2024-05-07 | End: 2024-05-12 | Stop reason: HOSPADM

## 2024-05-07 RX ADMIN — ERGOCALCIFEROL 50000 UNITS: 1.25 CAPSULE ORAL at 09:05

## 2024-05-07 RX ADMIN — NEPHROCAP 1 CAPSULE: 1 CAP ORAL at 09:05

## 2024-05-07 RX ADMIN — ONDANSETRON 4 MG: 2 INJECTION INTRAMUSCULAR; INTRAVENOUS at 02:05

## 2024-05-07 RX ADMIN — MEXILETINE HYDROCHLORIDE 150 MG: 150 CAPSULE ORAL at 09:05

## 2024-05-07 RX ADMIN — MIDODRINE HYDROCHLORIDE 15 MG: 5 TABLET ORAL at 03:05

## 2024-05-07 RX ADMIN — HEPARIN SODIUM 5000 UNITS: 5000 INJECTION INTRAVENOUS; SUBCUTANEOUS at 09:05

## 2024-05-07 RX ADMIN — AMIODARONE HYDROCHLORIDE 400 MG: 200 TABLET ORAL at 09:05

## 2024-05-07 RX ADMIN — ALLOPURINOL 100 MG: 100 TABLET ORAL at 09:05

## 2024-05-07 RX ADMIN — FUROSEMIDE 80 MG: 10 INJECTION, SOLUTION INTRAVENOUS at 10:05

## 2024-05-07 RX ADMIN — FUROSEMIDE 80 MG: 10 INJECTION, SOLUTION INTRAVENOUS at 09:05

## 2024-05-07 RX ADMIN — LEVOTHYROXINE SODIUM 150 MCG: 150 TABLET ORAL at 09:05

## 2024-05-07 RX ADMIN — MIDODRINE HYDROCHLORIDE 15 MG: 5 TABLET ORAL at 06:05

## 2024-05-07 RX ADMIN — ASPIRIN 81 MG: 81 TABLET, COATED ORAL at 09:05

## 2024-05-07 RX ADMIN — PANTOPRAZOLE SODIUM 40 MG: 40 TABLET, DELAYED RELEASE ORAL at 09:05

## 2024-05-07 RX ADMIN — MIRTAZAPINE 7.5 MG: 7.5 TABLET ORAL at 10:05

## 2024-05-07 RX ADMIN — MIDODRINE HYDROCHLORIDE 15 MG: 5 TABLET ORAL at 09:05

## 2024-05-07 RX ADMIN — HEPARIN SODIUM 5000 UNITS: 5000 INJECTION INTRAVENOUS; SUBCUTANEOUS at 02:05

## 2024-05-07 RX ADMIN — MEXILETINE HYDROCHLORIDE 150 MG: 150 CAPSULE ORAL at 04:05

## 2024-05-07 RX ADMIN — HEPARIN SODIUM 5000 UNITS: 5000 INJECTION INTRAVENOUS; SUBCUTANEOUS at 06:05

## 2024-05-07 RX ADMIN — PRAVASTATIN SODIUM 40 MG: 40 TABLET ORAL at 09:05

## 2024-05-07 NOTE — PLAN OF CARE
left message for Jt at [facility] in regards to patient having new PD cath placed. Patient was previously set up for HD at their facility ([facility]) phone [phone]. Nurse at facility stated PD nurse comes there at times, but need to speak with Jt.    -- spoke with PD nurse Soheila at [facility] Phone#[phone]. I asked her process of new PD patient's starting as well. She did tell me that a PD nurse does go to [facility], but MD would have to accept. They are not there all the time, just depends on patient and PD needs. She will send an email to her PD facilitator to notify them of patient possibly needing PD. However, she said I need to contact [facility] staff again since patient is being set up at that location. Dr. Ruiz consulted for PD placement. Unsure of anticipated date yet.     -- attempted to contact staff at [facility] again, unable to reach anyone.     [id number]-- spoke with Rebeca at [facility] (coordinates PD). She was made aware of patient needing PD as an outpatient. She sees that tomorrow PD cath to be placed. Rebeca just would need to know if patient's catheter would need to heal before starting PD, or it would be an urgent start. She needs to ensure PD nurse would be at facility. I will update her once I know more. Rebeca has EPIC access as well. She says CM just needs to call HD facility and change his modality.    -- met with patient and spouse at bedside to discuss discharge plans. They were updated on above information. All questions answered. Referrals to be sent to Home Health via [employer].    Patient Contacts    Name Relation Home Work Mobile   Nicolette Sharma Spouse   [phone]         05/07/24 2080   Discharge Reassessment   Assessment Type Discharge Planning Reassessment   Did the patient's condition or plan change since previous assessment? No   Discharge Plan discussed with:  Spouse/sig other;Patient   Discharge Plan A Home with family;Home Health   Discharge Plan B Home with family   DME Needed Upon Discharge  walker, rolling;bedside commode   Transition of Care Barriers None   Why the patient remains in the hospital Requires continued medical care   Post-Acute Status   Post-Acute Authorization Dialysis;Home Health   Home Health Status Referrals Sent   Diaylsis Status Pending medical clearance/testing   Hospital Resources/Appts/Education Provided Appointments scheduled and added to AVS   Discharge Delays None known at this time     Isha Hidalgo RN    [phone]

## 2024-05-07 NOTE — PROGRESS NOTES
Progress Note  Nephrology      Consult Requested By: Lon Bowles MD  Reason for Consult:  End-stage renal disease    SUBJECTIVE:     Review of Systems   Constitutional:  Negative for chills and fever.   Respiratory:  Negative for cough and shortness of breath.    Cardiovascular:  Negative for chest pain and leg swelling.   Gastrointestinal:  Negative for nausea.     Patient Active Problem List   Diagnosis    V-tach    Hypothyroidism    Iron deficiency anemia    Mixed hyperlipidemia    Atherosclerosis of native coronary artery of native heart with angina pectoris    Cardiac resynchronization therapy defibrillator (CRT-D) in place    S/P ablation of ventricular arrhythmia    HFrEF (heart failure with reduced ejection fraction)    Cardiac LV ejection fraction 10-20%    Enlarged prostate with lower urinary tract symptoms (LUTS)    Primary osteoarthritis of one knee    Prediabetes    Hyperlipidemia    Primary hypertension    Thrombocythemia -  on 4/4/22    Hyponatremia    Abuse of herbal or folk remedies    Non-ischemic cardiomyopathy    Secondary hyperparathyroidism of renal origin    Aortic atherosclerosis    Pulmonary emphysema, unspecified emphysema type    Chronic kidney disease (CKD), stage V    ESRD (end stage renal disease)    Acute hypoxemic respiratory failure    Hypotension       OBJECTIVE:     Medications:   sodium chloride 0.9%   Intravenous Once    sodium chloride 0.9%   Intravenous Once    allopurinoL  100 mg Oral Daily    alteplase  4 mg Intra-Catheter Once    amiodarone  400 mg Oral Daily    aspirin  81 mg Oral Daily    ergocalciferol  50,000 Units Oral Q7 Days    furosemide (LASIX) injection  80 mg Intravenous Q12H    heparin (porcine)  5,000 Units Subcutaneous Q8H    levothyroxine  150 mcg Oral Daily    mexiletine  150 mg Oral BID WM    midodrine  15 mg Oral Q8H    mirtazapine  7.5 mg Oral QHS    pantoprazole  40 mg Oral Daily    polyethylene glycol  17 g Oral Daily    pravastatin  40 mg  Oral Daily    vitamin renal formula (B-complex-vitamin c-folic acid)  1 capsule Oral Daily         Vitals:    05/07/24 1205   BP:    Pulse: 60   Resp:    Temp:      I/O last 3 completed shifts:  In: 500 [Other:500]  Out: 2700 [Urine:700; Other:2000]  Physical Exam  Constitutional:       General: He is not in acute distress.     Appearance: He is well-developed. He is not diaphoretic.   HENT:      Head: Normocephalic and atraumatic.   Eyes:      General: No scleral icterus.  Neck:      Vascular: No JVD.   Cardiovascular:      Rate and Rhythm: Regular rhythm.      Heart sounds: No murmur heard.     No friction rub.   Pulmonary:      Effort: Pulmonary effort is normal. No respiratory distress.      Breath sounds: Normal breath sounds. No wheezing or rales.   Abdominal:      General: Bowel sounds are normal. There is no distension.      Palpations: Abdomen is soft.      Tenderness: There is no abdominal tenderness.   Musculoskeletal:      Cervical back: Neck supple.   Skin:     General: Skin is warm and dry.      Findings: No erythema or rash.   Neurological:      Mental Status: He is alert and oriented to person, place, and time.       Laboratory:  Recent Labs   Lab 05/05/24  0352 05/06/24  0230 05/07/24  0219   WBC 11.47 10.89 10.28   HGB 13.9* 13.8* 13.7*   HCT 40.5 39.1* 39.4*   * 479* 488*   MONO 10.5  1.2* 9.6  1.0 12.4  1.3*   EOSINOPHIL 1.7 2.0 1.6     Recent Labs   Lab 05/04/24  0255 05/05/24  0352 05/06/24  0230 05/07/24  0219   * 131*  --  134*   K 5.8* 4.4  --  4.1   CL 97 97  --  97   CO2 20* 24  --  24   BUN 79* 55*  --  36*   CREATININE 4.8* 4.1*  --  3.6*   CALCIUM 8.6* 8.5*  --  8.7   PHOS 4.3  4.5 3.1 3.2 2.8     Labs reviewed  Diagnostic Results:  X-Ray: Reviewed  US: Reviewed  Echo: Reviewed      ASSESSMENT/PLAN:   1. End-stage renal disease-new dialysis start, set up with Dr. Phillips in [facility]  Already has chair for Monday Wednesday Friday, however, not tolerating dialysis  very well intra dialytic hypotension, already on midodrine and additionally requires albumin,  decline in EF patient will be benefitting the most from peritoneal dialysis.  Additionally this is his modality choice even without hypotension, for his quality of life.  Consult Dr. Deal to place the PD catheter   Recent Labs   Lab 05/04/24 0255 05/05/24 0352 05/07/24 0219   * 131* 134*   K 5.8* 4.4 4.1   BUN 79* 55* 36*   CREATININE 4.8* 4.1* 3.6*       2. Hypotension-on midodrine 15 t.i.d.    EF 40 - 45%. Grade III diastolic dysfunction   Pulmonary hypertension with PA pressure by echo 64 mmHg  Temp:  [97.4 °F (36.3 °C)-98.1 °F (36.7 °C)]   Pulse:  [59-61]   Resp:  [16-18]   BP: (91-99)/(58-64)   SpO2:  [93 %-94 %]     3. Anemia of chronic kidney disease treated with AWAIS (N18.9 D63.1) - both iron and hemoglobin is currently at goal no Epogen   Recent Labs   Lab 05/05/24 0352 05/06/24 0230 05/07/24 0219   HGB 13.9* 13.8* 13.7*   HCT 40.5 39.1* 39.4*   * 479* 488*     Lab Results   Component Value Date    IRON 109 10/07/2022    TIBC 394 10/07/2022    FERRITIN 676 (H) 10/07/2022       4. 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 Acidosis    Recent Labs   Lab 05/04/24 0255 05/05/24 0352 05/07/24 0219   CL 97 97 97   CO2 20* 24 24       6. Hyperuricemia   Lab Results   Component Value Date    URICACID 6.3 05/04/2024       7. Nutrition/Hypoalbuminemia (E88.09) -   Recent Labs   Lab 05/04/24  0255 05/07/24  0219   ALBUMIN 2.9* 3.0*     Nepro with meals TID. Renal vitamins daily    Thank you for allowing me to participate in the care of your patients  With any question please call [phone]  Brittney Chicas    [facility]  PATY Mcintyre MD, ELISHA WILLARD MD,   MD MARI Pollack, NP  [address]  [place], [address] [phone]

## 2024-05-07 NOTE — ASSESSMENT & PLAN NOTE
Creatine stable for now. BMP reviewed- noted Estimated Creatinine Clearance: 20.7 mL/min (A) (based on SCr of 3.6 mg/dL (H)). according to latest data. Based on current GFR, CKD stage is end stage.  Monitor UOP and serial BMP and adjust therapy as needed. Renally dose meds. Avoid nephrotoxic medications and procedures.    --see by Dr. Mata in ED, gen surg consulted for tunneled catheter placement in AM 4/30/24  --admit to ICU for CRRT once catheter placed-->tolerating HD will step down today  --Plan for PD catheter placement  -case management for DC planning

## 2024-05-07 NOTE — PT/OT/SLP PROGRESS
Physical Therapy Treatment    Patient Name:  Ar Sharma   MRN:  [id number]    Recommendations:     Discharge Recommendations: Low Intensity Therapy (with assistance from spouse)  Discharge Equipment Recommendations: walker, rolling, bedside commode  Barriers to discharge:  limited functional endurance; requires assistance with standing mobility    Assessment:     Ar Sharma is a 63 y.o. male admitted with a medical diagnosis of Chronic kidney disease (CKD), stage V.  He presents with the following impairments/functional limitations: weakness, impaired endurance, impaired self care skills, impaired functional mobility, gait instability, impaired balance, decreased lower extremity function, impaired cardiopulmonary response to activity.    Pt participates in bed mobility, transfers to standing, standing therapeutic exercises and ambulation with use of RW and MIN A. Therapy will continue to progress pt as able.    Rehab Prognosis: Good; patient would benefit from acute skilled PT services to address these deficits and reach maximum level of function.    Recent Surgery: Procedure(s) (LRB):  INSERTION, CATHETER, HEMODIALYSIS, DUAL LUMEN (Right)  REMOVAL, CATHETER, HEMODIALYSIS (Right) 4 Days Post-Op    Plan:     During this hospitalization, patient to be seen 5 x/week to address the identified rehab impairments via gait training, therapeutic activities, therapeutic exercises, neuromuscular re-education and progress toward the following goals:    Plan of Care Expires:  06/06/24    Subjective     Chief Complaint: nausea/vomiting at end of session  Patient/Family Comments/goals: to increase independence/mobility  Pain/Comfort:  Pain Rating 1: 0/10  Pain Rating Post-Intervention 1: 0/10      Objective:     Communicated with Nurse prior to session.  Patient found HOB elevated with bed alarm, oxygen, telemetry upon PT entry to room.     General Precautions: Standard, fall  Orthopedic Precautions: N/A  Braces:  N/A  Respiratory Status: Nasal cannula, flow 3 L/min     Functional Mobility:  Bed Mobility:     Supine to Sit: stand by assistance  Transfers:     Sit to Stand:  minimum assistance with (trialed with RW and without AD)  Bed to Chair: minimum assistance with  hand-held assist  using  Stand Pivot  Gait: ~15ft with use of RW; 2x trials with seated rest break in between due SOB. Pt's SpO2 fluctuates between 88-92% during session; nursing notified.       AM-PAC 6 CLICK MOBILITY  Turning over in bed (including adjusting bedclothes, sheets and blankets)?: 4  Sitting down on and standing up from a chair with arms (e.g., wheelchair, bedside commode, etc.): 3  Moving from lying on back to sitting on the side of the bed?: 3  Moving to and from a bed to a chair (including a wheelchair)?: 3  Need to walk in hospital room?: 3  Climbing 3-5 steps with a railing?: 2  Basic Mobility Total Score: 18       Treatment & Education:  Pt participates in transfer to bedside chair with MIN A/HHA; then able to progress ambulation distance ~15ft with use of RW and MIN A; seated rest breaks in between gait trials due to limited functional endurance.   Pt able to perform x7 mini squats in standing with use of RW and MIN A.   Pt seated in bedside chair at end of session; BP 92/55; SpO2 90%. At end of session pt has nausea/vomiting; resolved prior to therapist leaving room and given mouth wash/water to rinse mouth of taste of vomit. Nursing notified and made present at end of session.     Patient left up in chair with all lines intact, call button in reach, chair alarm on, Nurse notified, and Nurse present.    GOALS:   Multidisciplinary Problems       Physical Therapy Goals          Problem: Physical Therapy    Goal Priority Disciplines Outcome Goal Variances Interventions   Physical Therapy Goal     PT, PT/OT Progressing     Description: Goals to be met by: 6/6/24     Patient will increase functional independence with mobility by  performin. Supine to sit with Modified [place]  2. Sit to supine with Modified [place]  3. Sit to stand transfer with Modified [place] with LRAD.   4. Bed to chair transfer with Stand-by Assistance using LRAD.  5. Gait  x 100 feet with Stand-by Assistance using LRAD.                          Time Tracking:     PT Received On: 24  PT Start Time: 1318     PT Stop Time: 1353  PT Total Time (min): 35 min     Billable Minutes: Gait Training 20 and Therapeutic Activity 15    Treatment Type: Treatment  PT/PTA: PT     Number of PTA visits since last PT visit: 0     2024

## 2024-05-07 NOTE — PLAN OF CARE
Problem: Physical Therapy  Goal: Physical Therapy Goal  Description: Goals to be met by: 24     Patient will increase functional independence with mobility by performin. Supine to sit with Modified [place]  2. Sit to supine with Modified [place]  3. Sit to stand transfer with Modified [place] with LRAD.   4. Bed to chair transfer with Stand-by Assistance using LRAD.  5. Gait  x 100 feet with Stand-by Assistance using LRAD.     Outcome: Progressing     Pt participates in bed mobility, transfers to standing, standing therapeutic exercises and ambulation with use of RW and MIN A. Therapy will continue to progress pt as able.

## 2024-05-07 NOTE — SUBJECTIVE & OBJECTIVE
Interval History:   Seen and examined stated feeling okay with notion of fatigue, difficulty sleeping since started to take midodrine. Still on nasal canula oxygen, No abdominal pain, no fever, nor CP    Review of Systems   Constitutional:  Positive for activity change, appetite change and fatigue. Negative for fever.   Respiratory:  Positive for shortness of breath. Negative for chest tightness.    Cardiovascular:  Negative for chest pain and palpitations.   Gastrointestinal:  Negative for abdominal distention, abdominal pain, nausea and vomiting.   Musculoskeletal:  Negative for gait problem.   Neurological:  Negative for dizziness, speech difficulty and headaches.   Psychiatric/Behavioral:  Negative for agitation, confusion and hallucinations.    All other systems reviewed and are negative.    Objective:     Vital Signs (Most Recent):  Temp: 98.1 °F (36.7 °C) (05/07/24 1149)  Pulse: 60 (05/07/24 1149)  Resp: 18 (05/07/24 1149)  BP: (!) 91/58 (05/07/24 1149)  SpO2: (!) 94 % (05/07/24 1149) Vital Signs (24h Range):  Temp:  [97.4 °F (36.3 °C)-98.1 °F (36.7 °C)] 98.1 °F (36.7 °C)  Pulse:  [59-61] 60  Resp:  [16-18] 18  SpO2:  [92 %-94 %] 94 %  BP: (85-99)/(52-64) 91/58     Weight: 76.5 kg (168 lb 10.4 oz)  Body mass index is 24.91 kg/m².    Intake/Output Summary (Last 24 hours) at 5/7/2024 1151  Last data filed at 5/7/2024 0621  Gross per 24 hour   Intake 500 ml   Output 2300 ml   Net -1800 ml         Physical Exam  Vitals and nursing note reviewed.   Constitutional:       General: He is not in acute distress.     Appearance: He is not toxic-appearing.   HENT:      Head: Normocephalic and atraumatic.      Mouth/Throat:      Mouth: Mucous membranes are moist.      Pharynx: No oropharyngeal exudate.   Eyes:      Extraocular Movements: Extraocular movements intact.      Pupils: Pupils are equal, round, and reactive to light.   Cardiovascular:      Rate and Rhythm: Normal rate.      Heart sounds: No murmur heard.     No  "friction rub. No gallop.   Pulmonary:      Effort: Pulmonary effort is normal.      Breath sounds: No rhonchi or rales.   Chest:      Chest wall: No tenderness.   Abdominal:      General: Bowel sounds are normal. There is no distension.      Palpations: Abdomen is soft.      Tenderness: There is no abdominal tenderness. There is no guarding or rebound.   Musculoskeletal:      Cervical back: No rigidity or tenderness.      Right lower leg: No edema.      Left lower leg: No edema.   Neurological:      General: No focal deficit present.      Mental Status: He is alert and oriented to person, place, and time.      Motor: No weakness.      Coordination: Coordination normal.      Gait: Gait normal.   Psychiatric:         Thought Content: Thought content normal.             Significant Labs: All pertinent labs within the past 24 hours have been reviewed.  A1C:   Recent Labs   Lab 03/04/24  1018 04/29/24 2039   HGBA1C 5.6 5.6     Blood Culture: No results for input(s): "LABBLOO" in the last 48 hours.  BMP:   Recent Labs   Lab 05/07/24 0219   GLU 87   *   K 4.1   CL 97   CO2 24   BUN 36*   CREATININE 3.6*   CALCIUM 8.7   MG 2.0     CBC:   Recent Labs   Lab 05/06/24  0230 05/07/24 0219   WBC 10.89 10.28   HGB 13.8* 13.7*   HCT 39.1* 39.4*   * 488*     CMP:   Recent Labs   Lab 05/07/24 0219   *   K 4.1   CL 97   CO2 24   GLU 87   BUN 36*   CREATININE 3.6*   CALCIUM 8.7   ALBUMIN 3.0*   ANIONGAP 13     Cardiac Markers: No results for input(s): "CKMB", "MYOGLOBIN", "BNP", "TROPISTAT" in the last 48 hours.  Coagulation: No results for input(s): "PT", "INR", "APTT" in the last 48 hours.  Lactic Acid: No results for input(s): "LACTATE" in the last 48 hours.  Lipase: No results for input(s): "LIPASE" in the last 48 hours.  Lipid Panel: No results for input(s): "CHOL", "HDL", "LDLCALC", "TRIG", "CHOLHDL" in the last 48 hours.  Magnesium:   Recent Labs   Lab 05/06/24  0230 05/07/24  0219   MG 1.9 2.0     POCT " "Glucose: No results for input(s): "POCTGLUCOSE" in the last 48 hours.  Respiratory Culture: No results for input(s): "GSRESP", "RESPIRATORYC" in the last 48 hours.  Troponin: No results for input(s): "TROPONINI", "TROPONINIHS" in the last 48 hours.  TSH:   Recent Labs   Lab 03/04/24  1018   TSH 0.283*     Urine Culture: No results for input(s): "LABURIN" in the last 48 hours.  Urine Studies: No results for input(s): "COLORU", "APPEARANCEUA", "PHUR", "SPECGRAV", "PROTEINUA", "GLUCUA", "KETONESU", "BILIRUBINUA", "OCCULTUA", "NITRITE", "UROBILINOGEN", "LEUKOCYTESUR", "RBCUA", "WBCUA", "BACTERIA", "SQUAMEPITHEL", "HYALINECASTS" in the last 48 hours.    Invalid input(s): "WRIGHTSUR"  Recent Lab Results         05/07/24  0219        Albumin 3.0       Anion Gap 13       Baso # 0.12       Basophil % 1.2       BUN 36       Calcium 8.7       Chloride 97       CO2 24       Creatinine 3.6       Differential Method Automated       eGFR 18       Eos # 0.2       Eos % 1.6       Glucose 87       Gran # (ANC) 8.0       Gran % 77.4       Hematocrit 39.4       Hemoglobin 13.7       Immature Grans (Abs) 0.12  Comment: Mild elevation in immature granulocytes is non specific and   can be seen in a variety of conditions including stress response,   acute inflammation, trauma and pregnancy. Correlation with other   laboratory and clinical findings is essential.         Immature Granulocytes 1.2       Lymph # 0.6       Lymph % 6.2       Magnesium  2.0       MCH 25.8       MCHC 34.8       MCV 74       Mono # 1.3       Mono % 12.4       MPV 12.9       nRBC 0       Phosphorus Level 2.8       Platelet Count 488       Potassium 4.1       RBC 5.31       RDW 15.6       Sodium 134       WBC 10.28               Significant Imaging: I have reviewed all pertinent imaging results/findings within the past 24 hours.  "

## 2024-05-07 NOTE — PROGRESS NOTES
[facility] Medicine  Progress Note    Patient Name: Ar Sharma  MRN: [id number]  Patient Class: IP- Inpatient   Admission Date: 4/29/2024  Length of Stay: 8 days  Attending Physician: Lon Bowles MD  Primary Care Provider: Jc Elliott MD        Subjective:     Principal Problem:Chronic kidney disease (CKD), stage V        HPI:  Ar Sharma is a 63M with PMHx of ESRD, HFrEF, HTN, NICM, COPD, VT s/p ICD placement, and HLD who presented to [facility] ED with CC progressively worse SOB over the last two months. He was recently diagnosed with ESRD and has been trying diet modification at home. He endorses SINGH, dizziness, frequent intermittent nausea and one episode of vomiting during this time. He denies CP, syncope, dysuria, fever or chills.     ED workup significant for: , Cr 6.7, Phos 5, Tbili 2.4, BNP 3204, trop 0.040, vit D 24, .6, and CXR: cardiomegaly w/ pulmonary vascular congestion consistent w/ pulmonary edema. All other components of CBC and chemistries were unremarkable.       Patient was seen by Dr. Mata in ED. Emergent HD not indicated.     Overview/Hospital Course:  4/30 Tolerating HD through central line, SOB is improved  5/1/24 On pressors but tolerating CRRT  5/2 tolerating HD, off of pressors, stepping down  5/3 Tunneled catheter placed  5/4 had an episode of intradialytic hypotension, given albumin  5/5 Having hiccups, started PPI  5/6 Hiccups resolved. Gen Surgery consult for PD catheter  5/7 Awaiting for PD catheter placement    Interval History:   Seen and examined stated feeling okay with notion of fatigue, difficulty sleeping since started to take midodrine. Still on nasal canula oxygen, No abdominal pain, no fever, nor CP    Review of Systems   Constitutional:  Positive for activity change, appetite change and fatigue. Negative for fever.   Respiratory:  Positive for shortness of breath. Negative for chest tightness.    Cardiovascular:  Negative for  chest pain and palpitations.   Gastrointestinal:  Negative for abdominal distention, abdominal pain, nausea and vomiting.   Musculoskeletal:  Negative for gait problem.   Neurological:  Negative for dizziness, speech difficulty and headaches.   Psychiatric/Behavioral:  Negative for agitation, confusion and hallucinations.    All other systems reviewed and are negative.    Objective:     Vital Signs (Most Recent):  Temp: 98.1 °F (36.7 °C) (05/07/24 1149)  Pulse: 60 (05/07/24 1149)  Resp: 18 (05/07/24 1149)  BP: (!) 91/58 (05/07/24 1149)  SpO2: (!) 94 % (05/07/24 1149) Vital Signs (24h Range):  Temp:  [97.4 °F (36.3 °C)-98.1 °F (36.7 °C)] 98.1 °F (36.7 °C)  Pulse:  [59-61] 60  Resp:  [16-18] 18  SpO2:  [92 %-94 %] 94 %  BP: (85-99)/(52-64) 91/58     Weight: 76.5 kg (168 lb 10.4 oz)  Body mass index is 24.91 kg/m².    Intake/Output Summary (Last 24 hours) at 5/7/2024 1151  Last data filed at 5/7/2024 0621  Gross per 24 hour   Intake 500 ml   Output 2300 ml   Net -1800 ml         Physical Exam  Vitals and nursing note reviewed.   Constitutional:       General: He is not in acute distress.     Appearance: He is not toxic-appearing.   HENT:      Head: Normocephalic and atraumatic.      Mouth/Throat:      Mouth: Mucous membranes are moist.      Pharynx: No oropharyngeal exudate.   Eyes:      Extraocular Movements: Extraocular movements intact.      Pupils: Pupils are equal, round, and reactive to light.   Cardiovascular:      Rate and Rhythm: Normal rate.      Heart sounds: No murmur heard.     No friction rub. No gallop.   Pulmonary:      Effort: Pulmonary effort is normal.      Breath sounds: No rhonchi or rales.   Chest:      Chest wall: No tenderness.   Abdominal:      General: Bowel sounds are normal. There is no distension.      Palpations: Abdomen is soft.      Tenderness: There is no abdominal tenderness. There is no guarding or rebound.   Musculoskeletal:      Cervical back: No rigidity or tenderness.      Right  "lower leg: No edema.      Left lower leg: No edema.   Neurological:      General: No focal deficit present.      Mental Status: He is alert and oriented to person, place, and time.      Motor: No weakness.      Coordination: Coordination normal.      Gait: Gait normal.   Psychiatric:         Thought Content: Thought content normal.             Significant Labs: All pertinent labs within the past 24 hours have been reviewed.  A1C:   Recent Labs   Lab 03/04/24  1018 04/29/24  2039   HGBA1C 5.6 5.6     Blood Culture: No results for input(s): "LABBLOO" in the last 48 hours.  BMP:   Recent Labs   Lab 05/07/24  0219   GLU 87   *   K 4.1   CL 97   CO2 24   BUN 36*   CREATININE 3.6*   CALCIUM 8.7   MG 2.0     CBC:   Recent Labs   Lab 05/06/24  0230 05/07/24  0219   WBC 10.89 10.28   HGB 13.8* 13.7*   HCT 39.1* 39.4*   * 488*     CMP:   Recent Labs   Lab 05/07/24  0219   *   K 4.1   CL 97   CO2 24   GLU 87   BUN 36*   CREATININE 3.6*   CALCIUM 8.7   ALBUMIN 3.0*   ANIONGAP 13     Cardiac Markers: No results for input(s): "CKMB", "MYOGLOBIN", "BNP", "TROPISTAT" in the last 48 hours.  Coagulation: No results for input(s): "PT", "INR", "APTT" in the last 48 hours.  Lactic Acid: No results for input(s): "LACTATE" in the last 48 hours.  Lipase: No results for input(s): "LIPASE" in the last 48 hours.  Lipid Panel: No results for input(s): "CHOL", "HDL", "LDLCALC", "TRIG", "CHOLHDL" in the last 48 hours.  Magnesium:   Recent Labs   Lab 05/06/24  0230 05/07/24  0219   MG 1.9 2.0     POCT Glucose: No results for input(s): "POCTGLUCOSE" in the last 48 hours.  Respiratory Culture: No results for input(s): "GSRESP", "RESPIRATORYC" in the last 48 hours.  Troponin: No results for input(s): "TROPONINI", "TROPONINIHS" in the last 48 hours.  TSH:   Recent Labs   Lab 03/04/24  1018   TSH 0.283*     Urine Culture: No results for input(s): "LABURIN" in the last 48 hours.  Urine Studies: No results for input(s): "COLORU", " ""APPEARANCEUA", "PHUR", "SPECGRAV", "PROTEINUA", "GLUCUA", "KETONESU", "BILIRUBINUA", "OCCULTUA", "NITRITE", "UROBILINOGEN", "LEUKOCYTESUR", "RBCUA", "WBCUA", "BACTERIA", "SQUAMEPITHEL", "HYALINECASTS" in the last 48 hours.    Invalid input(s): "WRIGHTSUR"  Recent Lab Results         05/07/24  0219        Albumin 3.0       Anion Gap 13       Baso # 0.12       Basophil % 1.2       BUN 36       Calcium 8.7       Chloride 97       CO2 24       Creatinine 3.6       Differential Method Automated       eGFR 18       Eos # 0.2       Eos % 1.6       Glucose 87       Gran # (ANC) 8.0       Gran % 77.4       Hematocrit 39.4       Hemoglobin 13.7       Immature Grans (Abs) 0.12  Comment: Mild elevation in immature granulocytes is non specific and   can be seen in a variety of conditions including stress response,   acute inflammation, trauma and pregnancy. Correlation with other   laboratory and clinical findings is essential.         Immature Granulocytes 1.2       Lymph # 0.6       Lymph % 6.2       Magnesium  2.0       MCH 25.8       MCHC 34.8       MCV 74       Mono # 1.3       Mono % 12.4       MPV 12.9       nRBC 0       Phosphorus Level 2.8       Platelet Count 488       Potassium 4.1       RBC 5.31       RDW 15.6       Sodium 134       WBC 10.28               Significant Imaging: I have reviewed all pertinent imaging results/findings within the past 24 hours.    Assessment/Plan:      * Chronic kidney disease (CKD), stage V  Creatine stable for now. BMP reviewed- noted Estimated Creatinine Clearance: 20.7 mL/min (A) (based on SCr of 3.6 mg/dL (H)). according to latest data. Based on current GFR, CKD stage is end stage.  Monitor UOP and serial BMP and adjust therapy as needed. Renally dose meds. Avoid nephrotoxic medications and procedures.    --see by Dr. Mata in ED, gen surg consulted for tunneled catheter placement in AM 4/30/24  --admit to ICU for CRRT once catheter placed-->tolerating HD will step down " today  --Plan for PD catheter placement  -case management for DC planning    Hypotension  Weaned off of pressors  Added midodrine  Goal MAP >65  Orthostatics negative, off of pressors today, Step down to floor    ESRD (end stage renal disease)  Creatine stable for now. BMP reviewed- noted Estimated Creatinine Clearance: 18.2 mL/min (A) (based on SCr of 4.1 mg/dL (H)). according to latest data. Based on current GFR, CKD stage is end stage.  Monitor UOP and serial BMP and adjust therapy as needed. Renally dose meds. Avoid nephrotoxic medications and procedures.    Seen by Nephrology, Trialysis placed for CRRT, tolerating  5/3 Tunneled catheter placed 5/3  Had some intradialytic hypotension, required albumin in addition to midodrine  Will consult General Surgery for PD catheter placement    Pulmonary emphysema, unspecified emphysema type  Patient's COPD is controlled currently.  Patient is currently off COPD Pathway. Continue scheduled inhalers Supplemental oxygen and monitor respiratory status closely.     Non-ischemic cardiomyopathy  Echo    Left Ventricle: The left ventricle is normal in size. Normal wall thickness. Regional wall motion abnormalities present. See diagram for wall motion findings. Septal motion is consistent with pacing. There is mildly reduced systolic function with a visually estimated ejection fraction of 40 - 45%. Grade III diastolic dysfunction.    Right Ventricle: Moderate to severe right ventricular enlargement. Systolic function is reduced.TAPSE is 1.59 cm. Pacemaker lead present in the ventricle.    Left Atrium: Left atrium is severely dilated. The left atrium volume index is 71.4 mL/m2.    Right Atrium: Right atrium is severely dilated.    Aortic Valve: There is mild aortic valve sclerosis. There is mild to moderate stenosis. Aortic valve area by VTI is 1.49 cm². Aortic valve peak velocity is 1.43 m/s. Mean gradient is 5 mmHg. The dimensionless index is 0.52.    Mitral Valve: There is mild  regurgitation.    Tricuspid Valve: There is mild to moderate regurgitation.    Pulmonary Artery: The estimated pulmonary artery systolic pressure is 64 mmHg.    IVC/SVC: Elevated venous pressure at 15 mmHg.         Abuse of herbal or folk remedies        Primary hypertension  Chronic, controlled. Latest blood pressure and vitals reviewed-     Temp:  [97.4 °F (36.3 °C)-98.1 °F (36.7 °C)]   Pulse:  [59-61]   Resp:  [16-18]   BP: (85-99)/(52-64)   SpO2:  [92 %-94 %] .   Home meds for hypertension were reviewed and noted below.   Hypertension Medications               furosemide (LASIX) 80 MG tablet Take 1 tablet (80 mg total) by mouth 2 (two) times daily. If increased 3 pounds in a day or 5 pounds in a week, take an extra dose of lasix 80mg until those pounds are lost.    metoprolol succinate (TOPROL-XL) 25 MG 24 hr tablet TAKE 1 TABLET BY MOUTH EVERY DAY    nitroGLYCERIN (NITROSTAT) 0.4 MG SL tablet Place 1 tablet (0.4 mg total) under the tongue every 5 (five) minutes as needed for Chest pain.    sacubitriL-valsartan (ENTRESTO) 24-26 mg per tablet Take 1 tablet by mouth 2 (two) times daily.            While in the hospital, will manage blood pressure as follows; Adjust home antihypertensive regimen as follows- hold home bp medications in the setting of hypotension requiring CRRT    Will utilize p.r.n. blood pressure medication only if patient's blood pressure greater than 180/110 and he develops symptoms such as worsening chest pain or shortness of breath.    HFrEF (heart failure with reduced ejection fraction)  Congestive Heart Failure  Patient presents for re-evaluation of congestive heart failure. Patient's current complaints are dyspnea, fatigue, near-syncope, and orthopnea. He denies chest pain, claudication, exertional chest pressure/discomfort, and lower extremity edema. He states he is compliant most of the time with his medications. He states he is compliant all of the time with his diet.    --last TTE in  2023 shows EF 40%. Will repeat this admission  --on home lasix. Will convert to higher IV dosage  --will hold troprol in setting of hypotension requiring CRRT  --on CHF pathway    Cardiac resynchronization therapy defibrillator (CRT-D) in place        Mixed hyperlipidemia  --continue home medications      Iron deficiency anemia  Patient's anemia is currently controlled. Has not received any PRBCs to date. Etiology likely d/t Iron deficiency and chronic disease due to ESRD  Current CBC reviewed-   Lab Results   Component Value Date    HGB 13.7 (L) 05/07/2024    HCT 39.4 (L) 05/07/2024     Monitor serial CBC and transfuse if patient becomes hemodynamically unstable, symptomatic or H/H drops below 7/21.    Hypothyroidism  --continue home synthroid    V-tach  --hx of AICD placement, currently 100% paced   --continue home antiarrhythmic medications  --cardiac monitoring          VTE Risk Mitigation (From admission, onward)           Ordered     heparin (porcine) injection 2,400 Units  As needed (PRN)         04/30/24 2223     heparin (porcine) injection 5,000 Units  Every 8 hours         04/29/24 1945     IP VTE HIGH RISK PATIENT  Once         04/29/24 1945     Place sequential compression device  Until discontinued         04/29/24 1945                    Discharge Planning   SLAVA:      Code Status: Full Code   Is the patient medically ready for discharge?:     Reason for patient still in hospital (select all that apply): Patient trending condition and Pending disposition  Discharge Plan A: Home with family, Home Health   Discharge Delays: None known at this time    Time spent > 35 min          Lon Bowles MD  Department of Hospital Medicine   [facility]

## 2024-05-07 NOTE — ASSESSMENT & PLAN NOTE
Chronic, controlled. Latest blood pressure and vitals reviewed-     Temp:  [97.4 °F (36.3 °C)-98.1 °F (36.7 °C)]   Pulse:  [59-61]   Resp:  [16-18]   BP: (85-99)/(52-64)   SpO2:  [92 %-94 %] .   Home meds for hypertension were reviewed and noted below.   Hypertension Medications               furosemide (LASIX) 80 MG tablet Take 1 tablet (80 mg total) by mouth 2 (two) times daily. If increased 3 pounds in a day or 5 pounds in a week, take an extra dose of lasix 80mg until those pounds are lost.    metoprolol succinate (TOPROL-XL) 25 MG 24 hr tablet TAKE 1 TABLET BY MOUTH EVERY DAY    nitroGLYCERIN (NITROSTAT) 0.4 MG SL tablet Place 1 tablet (0.4 mg total) under the tongue every 5 (five) minutes as needed for Chest pain.    sacubitriL-valsartan (ENTRESTO) 24-26 mg per tablet Take 1 tablet by mouth 2 (two) times daily.            While in the hospital, will manage blood pressure as follows; Adjust home antihypertensive regimen as follows- hold home bp medications in the setting of hypotension requiring CRRT    Will utilize p.r.n. blood pressure medication only if patient's blood pressure greater than 180/110 and he develops symptoms such as worsening chest pain or shortness of breath.

## 2024-05-07 NOTE — ASSESSMENT & PLAN NOTE
Patient's anemia is currently controlled. Has not received any PRBCs to date. Etiology likely d/t Iron deficiency and chronic disease due to ESRD  Current CBC reviewed-   Lab Results   Component Value Date    HGB 13.7 (L) 05/07/2024    HCT 39.4 (L) 05/07/2024     Monitor serial CBC and transfuse if patient becomes hemodynamically unstable, symptomatic or H/H drops below 7/21.

## 2024-05-08 ENCOUNTER — ANESTHESIA (OUTPATIENT)
Dept: SURGERY | Facility: HOSPITAL | Age: 64
DRG: 981 | End: 2024-05-08
Payer: MEDICARE

## 2024-05-08 ENCOUNTER — ANESTHESIA EVENT (OUTPATIENT)
Dept: SURGERY | Facility: HOSPITAL | Age: 64
DRG: 981 | End: 2024-05-08
Payer: MEDICARE

## 2024-05-08 LAB
ALBUMIN SERPL BCP-MCNC: 3.1 G/DL (ref 3.5–5.2)
ANION GAP SERPL CALC-SCNC: 7 MMOL/L (ref 8–16)
BASOPHILS # BLD AUTO: 0.11 K/UL (ref 0–0.2)
BASOPHILS NFR BLD: 1.1 % (ref 0–1.9)
BUN SERPL-MCNC: 46 MG/DL (ref 8–23)
CALCIUM SERPL-MCNC: 9 MG/DL (ref 8.7–10.5)
CHLORIDE SERPL-SCNC: 96 MMOL/L (ref 95–110)
CO2 SERPL-SCNC: 28 MMOL/L (ref 23–29)
CREAT SERPL-MCNC: 4.5 MG/DL (ref 0.5–1.4)
DIFFERENTIAL METHOD BLD: ABNORMAL
EOSINOPHIL # BLD AUTO: 0.3 K/UL (ref 0–0.5)
EOSINOPHIL NFR BLD: 2.6 % (ref 0–8)
ERYTHROCYTE [DISTWIDTH] IN BLOOD BY AUTOMATED COUNT: 15.7 % (ref 11.5–14.5)
EST. GFR  (NO RACE VARIABLE): 14 ML/MIN/1.73 M^2
GLUCOSE SERPL-MCNC: 93 MG/DL (ref 70–110)
HCT VFR BLD AUTO: 39.6 % (ref 40–54)
HGB BLD-MCNC: 13.8 G/DL (ref 14–18)
IMM GRANULOCYTES # BLD AUTO: 0.13 K/UL (ref 0–0.04)
IMM GRANULOCYTES NFR BLD AUTO: 1.3 % (ref 0–0.5)
LYMPHOCYTES # BLD AUTO: 0.7 K/UL (ref 1–4.8)
LYMPHOCYTES NFR BLD: 6.5 % (ref 18–48)
MAGNESIUM SERPL-MCNC: 2 MG/DL (ref 1.6–2.6)
MCH RBC QN AUTO: 26.3 PG (ref 27–31)
MCHC RBC AUTO-ENTMCNC: 34.8 G/DL (ref 32–36)
MCV RBC AUTO: 75 FL (ref 82–98)
MONOCYTES # BLD AUTO: 1.1 K/UL (ref 0.3–1)
MONOCYTES NFR BLD: 11.1 % (ref 4–15)
NEUTROPHILS # BLD AUTO: 7.9 K/UL (ref 1.8–7.7)
NEUTROPHILS NFR BLD: 77.4 % (ref 38–73)
NRBC BLD-RTO: 0 /100 WBC
PHOSPHATE SERPL-MCNC: 3.3 MG/DL (ref 2.7–4.5)
PLATELET # BLD AUTO: 475 K/UL (ref 150–450)
PMV BLD AUTO: 12.1 FL (ref 9.2–12.9)
POTASSIUM SERPL-SCNC: 3.8 MMOL/L (ref 3.5–5.1)
RBC # BLD AUTO: 5.25 M/UL (ref 4.6–6.2)
SODIUM SERPL-SCNC: 131 MMOL/L (ref 136–145)
WBC # BLD AUTO: 10.24 K/UL (ref 3.9–12.7)

## 2024-05-08 PROCEDURE — 25000003 PHARM REV CODE 250: Performed by: INTERNAL MEDICINE

## 2024-05-08 PROCEDURE — 5A0935A ASSISTANCE WITH RESPIRATORY VENTILATION, LESS THAN 24 CONSECUTIVE HOURS, HIGH NASAL FLOW/VELOCITY: ICD-10-PCS | Performed by: FAMILY MEDICINE

## 2024-05-08 PROCEDURE — 63600175 PHARM REV CODE 636 W HCPCS: Mod: JZ,JG | Performed by: STUDENT IN AN ORGANIZED HEALTH CARE EDUCATION/TRAINING PROGRAM

## 2024-05-08 PROCEDURE — 71000033 HC RECOVERY, INTIAL HOUR: Performed by: STUDENT IN AN ORGANIZED HEALTH CARE EDUCATION/TRAINING PROGRAM

## 2024-05-08 PROCEDURE — 80069 RENAL FUNCTION PANEL: CPT | Performed by: INTERNAL MEDICINE

## 2024-05-08 PROCEDURE — 63600175 PHARM REV CODE 636 W HCPCS: Performed by: NURSE ANESTHETIST, CERTIFIED REGISTERED

## 2024-05-08 PROCEDURE — D9220A PRA ANESTHESIA: Mod: CRNA,,, | Performed by: NURSE ANESTHETIST, CERTIFIED REGISTERED

## 2024-05-08 PROCEDURE — 25000003 PHARM REV CODE 250: Performed by: SURGERY

## 2024-05-08 PROCEDURE — C1750 CATH, HEMODIALYSIS,LONG-TERM: HCPCS | Performed by: STUDENT IN AN ORGANIZED HEALTH CARE EDUCATION/TRAINING PROGRAM

## 2024-05-08 PROCEDURE — D9220A PRA ANESTHESIA: Mod: ANES,,, | Performed by: ANESTHESIOLOGY

## 2024-05-08 PROCEDURE — 27100171 HC OXYGEN HIGH FLOW UP TO 24 HOURS

## 2024-05-08 PROCEDURE — 11000001 HC ACUTE MED/SURG PRIVATE ROOM

## 2024-05-08 PROCEDURE — 25000003 PHARM REV CODE 250: Performed by: FAMILY MEDICINE

## 2024-05-08 PROCEDURE — 63600175 PHARM REV CODE 636 W HCPCS: Performed by: STUDENT IN AN ORGANIZED HEALTH CARE EDUCATION/TRAINING PROGRAM

## 2024-05-08 PROCEDURE — 85025 COMPLETE CBC W/AUTO DIFF WBC: CPT | Performed by: SURGERY

## 2024-05-08 PROCEDURE — 37000008 HC ANESTHESIA 1ST 15 MINUTES: Performed by: STUDENT IN AN ORGANIZED HEALTH CARE EDUCATION/TRAINING PROGRAM

## 2024-05-08 PROCEDURE — 36000708 HC OR TIME LEV III 1ST 15 MIN: Performed by: STUDENT IN AN ORGANIZED HEALTH CARE EDUCATION/TRAINING PROGRAM

## 2024-05-08 PROCEDURE — 25000003 PHARM REV CODE 250: Performed by: NURSE ANESTHETIST, CERTIFIED REGISTERED

## 2024-05-08 PROCEDURE — 99900035 HC TECH TIME PER 15 MIN (STAT)

## 2024-05-08 PROCEDURE — 37000009 HC ANESTHESIA EA ADD 15 MINS: Performed by: STUDENT IN AN ORGANIZED HEALTH CARE EDUCATION/TRAINING PROGRAM

## 2024-05-08 PROCEDURE — 94761 N-INVAS EAR/PLS OXIMETRY MLT: CPT

## 2024-05-08 PROCEDURE — 63600175 PHARM REV CODE 636 W HCPCS: Performed by: SURGERY

## 2024-05-08 PROCEDURE — 27000221 HC OXYGEN, UP TO 24 HOURS

## 2024-05-08 PROCEDURE — 0WHG43Z INSERTION OF INFUSION DEVICE INTO PERITONEAL CAVITY, PERCUTANEOUS ENDOSCOPIC APPROACH: ICD-10-PCS | Performed by: STUDENT IN AN ORGANIZED HEALTH CARE EDUCATION/TRAINING PROGRAM

## 2024-05-08 PROCEDURE — 83735 ASSAY OF MAGNESIUM: CPT | Performed by: SURGERY

## 2024-05-08 PROCEDURE — 90935 HEMODIALYSIS ONE EVALUATION: CPT

## 2024-05-08 PROCEDURE — 36000709 HC OR TIME LEV III EA ADD 15 MIN: Performed by: STUDENT IN AN ORGANIZED HEALTH CARE EDUCATION/TRAINING PROGRAM

## 2024-05-08 DEVICE — PERITONEAL DIALYSIS CATHETER KIT, CURL CATH, 2 CUFFS
Type: IMPLANTABLE DEVICE | Site: ABDOMEN | Status: FUNCTIONAL
Brand: ARGYLE

## 2024-05-08 RX ORDER — HYDROMORPHONE HYDROCHLORIDE 2 MG/ML
0.2 INJECTION, SOLUTION INTRAMUSCULAR; INTRAVENOUS; SUBCUTANEOUS EVERY 5 MIN PRN
Status: DISCONTINUED | OUTPATIENT
Start: 2024-05-08 | End: 2024-05-12 | Stop reason: HOSPADM

## 2024-05-08 RX ORDER — HEPARIN SODIUM 10000 [USP'U]/ML
INJECTION, SOLUTION INTRAVENOUS; SUBCUTANEOUS
Status: DISCONTINUED | OUTPATIENT
Start: 2024-05-08 | End: 2024-05-08 | Stop reason: HOSPADM

## 2024-05-08 RX ORDER — SODIUM CHLORIDE 0.9 % (FLUSH) 0.9 %
3 SYRINGE (ML) INJECTION
Status: DISCONTINUED | OUTPATIENT
Start: 2024-05-08 | End: 2024-05-12 | Stop reason: HOSPADM

## 2024-05-08 RX ORDER — PROPOFOL 10 MG/ML
VIAL (ML) INTRAVENOUS
Status: DISCONTINUED | OUTPATIENT
Start: 2024-05-08 | End: 2024-05-08

## 2024-05-08 RX ORDER — NEOSTIGMINE METHYLSULFATE 1 MG/ML
INJECTION, SOLUTION INTRAVENOUS
Status: DISCONTINUED | OUTPATIENT
Start: 2024-05-08 | End: 2024-05-08

## 2024-05-08 RX ORDER — ONDANSETRON HYDROCHLORIDE 2 MG/ML
INJECTION, SOLUTION INTRAMUSCULAR; INTRAVENOUS
Status: DISCONTINUED | OUTPATIENT
Start: 2024-05-08 | End: 2024-05-08

## 2024-05-08 RX ORDER — MEPERIDINE HYDROCHLORIDE 50 MG/ML
12.5 INJECTION INTRAMUSCULAR; INTRAVENOUS; SUBCUTANEOUS ONCE AS NEEDED
Status: ACTIVE | OUTPATIENT
Start: 2024-05-08 | End: 2024-05-09

## 2024-05-08 RX ORDER — ONDANSETRON HYDROCHLORIDE 2 MG/ML
4 INJECTION, SOLUTION INTRAVENOUS ONCE AS NEEDED
Status: DISCONTINUED | OUTPATIENT
Start: 2024-05-08 | End: 2024-05-12 | Stop reason: HOSPADM

## 2024-05-08 RX ORDER — BUPIVACAINE HYDROCHLORIDE 2.5 MG/ML
INJECTION, SOLUTION EPIDURAL; INFILTRATION; INTRACAUDAL
Status: DISCONTINUED | OUTPATIENT
Start: 2024-05-08 | End: 2024-05-08 | Stop reason: HOSPADM

## 2024-05-08 RX ORDER — FENTANYL CITRATE 50 UG/ML
INJECTION, SOLUTION INTRAMUSCULAR; INTRAVENOUS
Status: DISCONTINUED | OUTPATIENT
Start: 2024-05-08 | End: 2024-05-08

## 2024-05-08 RX ORDER — LIDOCAINE HYDROCHLORIDE 20 MG/ML
INJECTION INTRAVENOUS
Status: DISCONTINUED | OUTPATIENT
Start: 2024-05-08 | End: 2024-05-08

## 2024-05-08 RX ORDER — EPHEDRINE SULFATE 50 MG/ML
INJECTION, SOLUTION INTRAVENOUS
Status: DISCONTINUED | OUTPATIENT
Start: 2024-05-08 | End: 2024-05-08

## 2024-05-08 RX ORDER — MIDAZOLAM HYDROCHLORIDE 1 MG/ML
INJECTION INTRAMUSCULAR; INTRAVENOUS
Status: DISCONTINUED | OUTPATIENT
Start: 2024-05-08 | End: 2024-05-08

## 2024-05-08 RX ORDER — DEXAMETHASONE SODIUM PHOSPHATE 4 MG/ML
INJECTION, SOLUTION INTRA-ARTICULAR; INTRALESIONAL; INTRAMUSCULAR; INTRAVENOUS; SOFT TISSUE
Status: DISCONTINUED | OUTPATIENT
Start: 2024-05-08 | End: 2024-05-08

## 2024-05-08 RX ORDER — ROCURONIUM BROMIDE 10 MG/ML
INJECTION, SOLUTION INTRAVENOUS
Status: DISCONTINUED | OUTPATIENT
Start: 2024-05-08 | End: 2024-05-08

## 2024-05-08 RX ORDER — CEFAZOLIN SODIUM 1 G/3ML
INJECTION, POWDER, FOR SOLUTION INTRAMUSCULAR; INTRAVENOUS
Status: DISCONTINUED | OUTPATIENT
Start: 2024-05-08 | End: 2024-05-08

## 2024-05-08 RX ADMIN — CEFAZOLIN 2 G: 330 INJECTION, POWDER, FOR SOLUTION INTRAMUSCULAR; INTRAVENOUS at 12:05

## 2024-05-08 RX ADMIN — ASPIRIN 81 MG: 81 TABLET, COATED ORAL at 03:05

## 2024-05-08 RX ADMIN — Medication 30 MG: at 12:05

## 2024-05-08 RX ADMIN — MEXILETINE HYDROCHLORIDE 150 MG: 150 CAPSULE ORAL at 05:05

## 2024-05-08 RX ADMIN — AMIODARONE HYDROCHLORIDE 400 MG: 200 TABLET ORAL at 03:05

## 2024-05-08 RX ADMIN — MIDODRINE HYDROCHLORIDE 15 MG: 5 TABLET ORAL at 09:05

## 2024-05-08 RX ADMIN — EPHEDRINE SULFATE 15 MG: 50 INJECTION, SOLUTION INTRAMUSCULAR; INTRAVENOUS; SUBCUTANEOUS at 12:05

## 2024-05-08 RX ADMIN — FUROSEMIDE 80 MG: 10 INJECTION, SOLUTION INTRAVENOUS at 09:05

## 2024-05-08 RX ADMIN — Medication 40 MG: at 12:05

## 2024-05-08 RX ADMIN — PANTOPRAZOLE SODIUM 40 MG: 40 TABLET, DELAYED RELEASE ORAL at 03:05

## 2024-05-08 RX ADMIN — FENTANYL CITRATE 100 MCG: 0.05 INJECTION, SOLUTION INTRAMUSCULAR; INTRAVENOUS at 12:05

## 2024-05-08 RX ADMIN — ROCURONIUM BROMIDE 40 MG: 10 INJECTION, SOLUTION INTRAVENOUS at 12:05

## 2024-05-08 RX ADMIN — MIDODRINE HYDROCHLORIDE 15 MG: 5 TABLET ORAL at 03:05

## 2024-05-08 RX ADMIN — DEXAMETHASONE SODIUM PHOSPHATE 4 MG: 4 INJECTION, SOLUTION INTRA-ARTICULAR; INTRALESIONAL; INTRAMUSCULAR; INTRAVENOUS; SOFT TISSUE at 12:05

## 2024-05-08 RX ADMIN — MIRTAZAPINE 7.5 MG: 7.5 TABLET ORAL at 09:05

## 2024-05-08 RX ADMIN — HEPARIN SODIUM 5000 UNITS: 5000 INJECTION INTRAVENOUS; SUBCUTANEOUS at 09:05

## 2024-05-08 RX ADMIN — POLYETHYLENE GLYCOL 3350 17 G: 17 POWDER, FOR SOLUTION ORAL at 03:05

## 2024-05-08 RX ADMIN — SODIUM CHLORIDE: 0.9 INJECTION, SOLUTION INTRAVENOUS at 11:05

## 2024-05-08 RX ADMIN — PRAVASTATIN SODIUM 40 MG: 40 TABLET ORAL at 03:05

## 2024-05-08 RX ADMIN — NEPHROCAP 1 CAPSULE: 1 CAP ORAL at 03:05

## 2024-05-08 RX ADMIN — GLYCOPYRROLATE 0.6 MG: 0.2 INJECTION, SOLUTION INTRAMUSCULAR; INTRAVITREAL at 12:05

## 2024-05-08 RX ADMIN — EPHEDRINE SULFATE 10 MG: 50 INJECTION, SOLUTION INTRAMUSCULAR; INTRAVENOUS; SUBCUTANEOUS at 12:05

## 2024-05-08 RX ADMIN — NEOSTIGMINE METHYLSULFATE 5 MG: 1 INJECTION INTRAVENOUS at 12:05

## 2024-05-08 RX ADMIN — EPHEDRINE SULFATE 5 MG: 50 INJECTION, SOLUTION INTRAMUSCULAR; INTRAVENOUS; SUBCUTANEOUS at 12:05

## 2024-05-08 RX ADMIN — ALLOPURINOL 100 MG: 100 TABLET ORAL at 03:05

## 2024-05-08 RX ADMIN — ONDANSETRON 4 MG: 2 INJECTION INTRAMUSCULAR; INTRAVENOUS at 12:05

## 2024-05-08 RX ADMIN — LIDOCAINE HYDROCHLORIDE 75 MG: 20 INJECTION, SOLUTION INTRAVENOUS at 12:05

## 2024-05-08 RX ADMIN — MIDAZOLAM HYDROCHLORIDE 1 MG: 1 INJECTION, SOLUTION INTRAMUSCULAR; INTRAVENOUS at 11:05

## 2024-05-08 NOTE — PT/OT/SLP PROGRESS
Physical Therapy      Patient Name:  Ar Sharma   MRN:  [id number]    Patient not seen today secondary to Dialysis (pt in dialysis and then to sx as per nsg(1300)). Will follow-up tomorrow.

## 2024-05-08 NOTE — PROCEDURES
Seen and examined on dialysis.  Tolerating procedure well       Dr. Deal placing PD catheter today    Anemia chronic kidney disease-hemoglobin is at goal, no Epogen, iron is at goal  Recent Labs   Lab 05/06/24  0230 05/07/24  0219 05/08/24  0307   WBC 10.89 10.28 10.24   HGB 13.8* 13.7* 13.8*   HCT 39.1* 39.4* 39.6*   * 488* 475*       Lab Results   Component Value Date    IRON 109 10/07/2022    TIBC 394 10/07/2022    FERRITIN 676 (H) 10/07/2022       Metabolic bone disease secondary hyperparathyroidism of renal origin - continue ergocalciferol 64891 units weekly    Phosphorus is at goal not on binders    Lab Results   Component Value Date    .6 (H) 04/29/2024    CALCIUM 9.0 05/08/2024    PHOS 3.3 05/08/2024     Recent Labs   Lab 05/06/24  0230 05/07/24  0219 05/08/24  0307   MG 1.9 2.0 2.0     Lab Results   Component Value Date    [id number] 24 (L) 04/29/2024     Bicarbonate bath adjusted the 35  Lab Results   Component Value Date    CO2 28 05/08/2024       Intra dialytic hypotension-on midodrine 15q8,     Thank you for consult, will follow  With any question please call [phone]  Brittney Chicas    [facility]    ELISHA Mata MD,   MD CORINNE Pollack MD E. V. Harmon, NP  [address]  [place], [address] [phone]

## 2024-05-08 NOTE — ANESTHESIA PREPROCEDURE EVALUATION
05/08/2024  Ar Sharma is a 63 y.o., male.      Pre-op Assessment    I have reviewed the Patient Summary Reports.     I have reviewed the Nursing Notes.    I have reviewed the Medications.     Review of Systems  Anesthesia Hx:             Denies Family Hx of Anesthesia complications.    Denies Personal Hx of Anesthesia complications.                    Procedure: INSERTION, CATHETER, DIALYSIS, PERITONEAL, LAPAROSCOPIC (N/A)         Patient Active Problem List   Diagnosis    V-tach    Hypothyroidism    Iron deficiency anemia    Mixed hyperlipidemia    Atherosclerosis of native coronary artery of native heart with angina pectoris    Cardiac resynchronization therapy defibrillator (CRT-D) in place    S/P ablation of ventricular arrhythmia    HFrEF (heart failure with reduced ejection fraction)    Cardiac LV ejection fraction 10-20%    Enlarged prostate with lower urinary tract symptoms (LUTS)    Primary osteoarthritis of one knee    Prediabetes    Hyperlipidemia    Primary hypertension    Thrombocythemia -  on 4/4/22    Hyponatremia    Abuse of herbal or folk remedies    Non-ischemic cardiomyopathy    Secondary hyperparathyroidism of renal origin    Aortic atherosclerosis    Pulmonary emphysema, unspecified emphysema type    Chronic kidney disease (CKD), stage V    ESRD (end stage renal disease)    Acute hypoxemic respiratory failure    Hypotension       Past Medical History:   Diagnosis Date    Cardiomyopathy     CKD (chronic kidney disease) stage 4, GFR 15-29 ml/min     Congestive heart failure (CHF) 2015    COPD (chronic obstructive pulmonary disease)     Coronary artery disease     Edema     Essential (primary) hypertension 05/18/2022    Formatting of this note might be different from the original. Converted from [employer]: Description - ESSENTIAL HYPERTENSION, BENIGN    Heart attack     HLD  (hyperlipidemia)     Hypertension     Hyperuricemia     Hypocalcemia     Renal cyst, left     Secondary hyperparathyroidism     Thyroid disease     V tach     Vitamin D deficiency        ECHO: Results for orders placed during the hospital encounter of 04/29/24    Echo Saline Bubble? No; Ultrasound enhancing contrast? No    Interpretation Summary    Left Ventricle: The left ventricle is normal in size. Normal wall thickness. Regional wall motion abnormalities present. See diagram for wall motion findings. Septal motion is consistent with pacing. There is mildly reduced systolic function with a visually estimated ejection fraction of 40 - 45%. Grade III diastolic dysfunction.    Right Ventricle: Moderate to severe right ventricular enlargement. Systolic function is reduced.TAPSE is 1.59 cm. Pacemaker lead present in the ventricle.    Left Atrium: Left atrium is severely dilated. The left atrium volume index is 71.4 mL/m2.    Right Atrium: Right atrium is severely dilated.    Aortic Valve: There is mild aortic valve sclerosis. There is mild to moderate stenosis. Aortic valve area by VTI is 1.49 cm². Aortic valve peak velocity is 1.43 m/s. Mean gradient is 5 mmHg. The dimensionless index is 0.52.    Mitral Valve: There is mild regurgitation.    Tricuspid Valve: There is mild to moderate regurgitation.    Pulmonary Artery: The estimated pulmonary artery systolic pressure is 64 mmHg.    IVC/SVC: Elevated venous pressure at 15 mmHg.      Body mass index is 24.91 kg/m².    Tobacco Use: Medium Risk (4/29/2024)    Patient History     Smoking Tobacco Use: Former     Smokeless Tobacco Use: Never     Passive Exposure: Past       Social History     Substance and Sexual Activity   Drug Use No        Alcohol Use: Not At Risk (4/30/2024)    AUDIT-C     Frequency of Alcohol Consumption: Never     Average Number of Drinks: Patient does not drink     Frequency of Binge Drinking: Never       Review of patient's allergies indicates:    Allergen Reactions    Lokelma [sodium zirconium cyclosilicate] Other (See Comments)     Fluid retention, weight gain, CHF excerebration, severe constipation    Atorvastatin Other (See Comments)     Joint pain    Jardiance [empagliflozin] Other (See Comments)     Chest pains, significant weight loss, lower blood pressure         Airway:  No value filed.     Physical Exam  General: Well nourished, Cooperative, Alert and Oriented    Airway:  Mallampati: II   Mouth Opening: Normal  TM Distance: Normal  Neck ROM: Normal ROM        Anesthesia Plan  Type of Anesthesia, risks & benefits discussed:    Anesthesia Type: Gen ETT  Intra-op Monitoring Plan: Standard ASA Monitors  Post Op Pain Control Plan: multimodal analgesia  Induction:  IV  Airway Plan: Video  Informed Consent: Informed consent signed with the Patient and all parties understand the risks and agree with anesthesia plan.  All questions answered.   ASA Score: 4  Day of Surgery Review of History & Physical: H&P Update referred to the surgeon/provider.    Ready For Surgery From Anesthesia Perspective.     .

## 2024-05-08 NOTE — ASSESSMENT & PLAN NOTE
persistent  Weaned off of pressors  cont midodrine  Goal MAP >65  Orthostatics negative, off of pressors, Step down to floor  S/P PD placement seems tolerate procedure well  Monitor BP

## 2024-05-08 NOTE — ANESTHESIA POSTPROCEDURE EVALUATION
Anesthesia Post Evaluation    Patient: Ar Sharma    Procedure(s) Performed: Procedure(s) (LRB):  INSERTION, CATHETER, DIALYSIS, PERITONEAL, LAPAROSCOPIC (N/A)    Final Anesthesia Type: general      Patient location during evaluation: PACU  Patient participation: Yes- Able to Participate  Level of consciousness: awake and alert  Post-procedure vital signs: reviewed and stable  Pain management: adequate  Airway patency: patent    PONV status at discharge: No PONV  Anesthetic complications: no      Cardiovascular status: stable  Respiratory status: spontaneous ventilation  Hydration status: euvolemic  Follow-up not needed.              Vitals Value Taken Time   /68 05/08/24 1324   Temp  05/08/24 1329   Pulse 64 05/08/24 1328   Resp 21 05/08/24 1328   SpO2 91 % 05/08/24 1328   Vitals shown include unfiled device data.      No case tracking events are documented in the log.      Pain/Gina Score: No data recorded

## 2024-05-08 NOTE — SUBJECTIVE & OBJECTIVE
Interval History: NAEON. AVSS. Getting HD this morning. Lab work stable.    Medications:  Continuous Infusions:  Scheduled Meds:   sodium chloride 0.9%   Intravenous Once    sodium chloride 0.9%   Intravenous Once    allopurinoL  100 mg Oral Daily    alteplase  4 mg Intra-Catheter Once    amiodarone  400 mg Oral Daily    aspirin  81 mg Oral Daily    ergocalciferol  50,000 Units Oral Q7 Days    furosemide (LASIX) injection  80 mg Intravenous Q12H    heparin (porcine)  5,000 Units Subcutaneous Q8H    levothyroxine  150 mcg Oral Daily    mexiletine  150 mg Oral BID WM    midodrine  15 mg Oral Q8H    mirtazapine  7.5 mg Oral QHS    pantoprazole  40 mg Oral Daily    polyethylene glycol  17 g Oral Daily    pravastatin  40 mg Oral Daily    vitamin renal formula (B-complex-vitamin c-folic acid)  1 capsule Oral Daily     PRN Meds:  Current Facility-Administered Medications:     sodium chloride 0.9%, , Intravenous, PRN    sodium chloride 0.9%, , Intravenous, PRN    acetaminophen, 650 mg, Oral, Q4H PRN    calcium carbonate, 1,000 mg, Oral, TID PRN    heparin (porcine), 2,400 Units, Intra-Catheter, PRN    HYDROcodone-acetaminophen, 1 tablet, Oral, Q4H PRN    lactulose, 20 g, Oral, Daily PRN    ondansetron, 4 mg, Intravenous, Q8H PRN    sodium chloride 0.9%, 250 mL, Intravenous, PRN    sodium chloride 0.9%, 250 mL, Intravenous, PRN    sodium chloride 0.9%, 250 mL, Intravenous, PRN    sodium chloride 0.9%, 10 mL, Intravenous, PRN    sodium chloride 0.9%, 10 mL, Intravenous, PRN     Review of patient's allergies indicates:   Allergen Reactions    Lokelma [sodium zirconium cyclosilicate] Other (See Comments)     Fluid retention, weight gain, CHF excerebration, severe constipation    Atorvastatin Other (See Comments)     Joint pain    Jardiance [empagliflozin] Other (See Comments)     Chest pains, significant weight loss, lower blood pressure     Objective:     Vital Signs (Most Recent):  Temp: 98 °F (36.7 °C) (05/08/24  0800)  Pulse: 60 (05/08/24 0800)  Resp: 20 (05/08/24 0800)  BP: 97/63 (05/08/24 0800)  SpO2: 97 % (05/08/24 0800) Vital Signs (24h Range):  Temp:  [97.3 °F (36.3 °C)-98.1 °F (36.7 °C)] 98 °F (36.7 °C)  Pulse:  [60-61] 60  Resp:  [18-20] 20  SpO2:  [91 %-97 %] 97 %  BP: ()/(55-69) 97/63     Weight: 76.5 kg (168 lb 10.4 oz)  Body mass index is 24.91 kg/m².    Intake/Output - Last 3 Shifts         05/06 0700 05/07 0659 05/07 0700 05/08 0659 05/08 0700 05/09 0659    Other 500      Total Intake(mL/kg) 500 (6.5)      Urine (mL/kg/hr) 300 (0.2) 200 (0.1)     Other 2000      Total Output 2300 200     Net -1800 -200            Stool Occurrence 1 x               Physical Exam  Vitals reviewed.   Constitutional:       Appearance: Normal appearance.   Cardiovascular:      Rate and Rhythm: Normal rate and regular rhythm.   Pulmonary:      Effort: Pulmonary effort is normal. No respiratory distress.   Abdominal:      Palpations: Abdomen is soft.      Tenderness: There is no abdominal tenderness.   Skin:     General: Skin is warm.   Neurological:      General: No focal deficit present.      Mental Status: He is alert and oriented to person, place, and time.          Significant Labs:  I have reviewed all pertinent lab results within the past 24 hours.  CBC:   Recent Labs   Lab 05/08/24  0307   WBC 10.24   RBC 5.25   HGB 13.8*   HCT 39.6*   *   MCV 75*   MCH 26.3*   MCHC 34.8     CMP:   Recent Labs   Lab 05/08/24  0307   GLU 93   CALCIUM 9.0   ALBUMIN 3.1*   *   K 3.8   CO2 28   CL 96   BUN 46*   CREATININE 4.5*       Significant Diagnostics:  I have reviewed all pertinent imaging results/findings within the past 24 hours.

## 2024-05-08 NOTE — ASSESSMENT & PLAN NOTE
Patient has hyponatremia which is controlled,We will aim to correct the sodium by 4-6mEq in 24 hours. We will monitor sodium Daily. The hyponatremia is due to renal insufficiency. We will obtain the following studies: NA. We will treat the hyponatremia with Fluid restriction of:  1.5 liter per day and Hemodialysis. The patient's sodium results have been reviewed and are listed below.  Recent Labs   Lab 05/08/24  0307   *

## 2024-05-08 NOTE — TRANSFER OF CARE
"Anesthesia Transfer of Care Note    Patient: Ar Sharma    Procedure(s) Performed: Procedure(s) (LRB):  INSERTION, CATHETER, DIALYSIS, PERITONEAL, LAPAROSCOPIC (N/A)    Patient location: PACU    Anesthesia Type: general    Transport from OR: Transported from OR on 6-10 L/min O2 by face mask with adequate spontaneous ventilation    Post pain: adequate analgesia    Post assessment: no apparent anesthetic complications and tolerated procedure well    Post vital signs: stable    Level of consciousness: responds to stimulation    Nausea/Vomiting: no nausea/vomiting    Complications: none    Transfer of care protocol was followed      Last vitals: Visit Vitals  BP 97/63   Pulse (!) 59   Temp 36.7 °C (98 °F)   Resp 20   Ht 5' 9" (1.753 m)   Wt 76.5 kg (168 lb 10.4 oz)   SpO2 97%   BMI 24.91 kg/m²     "

## 2024-05-08 NOTE — ASSESSMENT & PLAN NOTE
63 yoM with ESRD now requiring dialysis. Patient hypotensive during HD sessions. General Surgery consulted for PD catheter placement. Patient is amenable to PD catheter. We discussed the risks and benefits of surgery, especially given his heart failure, and he would like to proceed.    - OR today for laparoscopic PD catheter placement  - Consent signed and in the paper chart  - Keep NPO  - Other care per primary team

## 2024-05-08 NOTE — PROGRESS NOTES
[facility]  General Surgery  Progress Note    Subjective:     History of Present Illness:  Ar Sharma is a 63M with PMHx of ESRD, HFrEF, HTN, NICM, COPD, VT s/p ICD placement, and HLD who presented to [facility] ED with CC progressively worse SOB over the last two months. He still makes urine, but his kidney function has rapidly declined recently, now requiring dialysis. Dr. Perez placed a tunneled HD line on 5/3. He has been receiving HD since then, but has had issues with hypotension during HD sessions requiring midodrine. General Surgery re consulted for PD cath placement in light of his hypotension during HD. Mr. Sharma is amenable to a PD catheter. He has never had abdominal surgery before. No therapeutic anticoagulants on board. Last echo with an EF of 40%, mild/mod AS, and a PA pressure of 64. No fevers, leukocytosis, or other concerns for infection.    Post-Op Info:  Procedure(s) (LRB):  INSERTION, CATHETER, HEMODIALYSIS, DUAL LUMEN (Right)  REMOVAL, CATHETER, HEMODIALYSIS (Right)   5 Days Post-Op     Interval History: NAEON. AVSS. Getting HD this morning. Lab work stable.    Medications:  Continuous Infusions:  Scheduled Meds:   sodium chloride 0.9%   Intravenous Once    sodium chloride 0.9%   Intravenous Once    allopurinoL  100 mg Oral Daily    alteplase  4 mg Intra-Catheter Once    amiodarone  400 mg Oral Daily    aspirin  81 mg Oral Daily    ergocalciferol  50,000 Units Oral Q7 Days    furosemide (LASIX) injection  80 mg Intravenous Q12H    heparin (porcine)  5,000 Units Subcutaneous Q8H    levothyroxine  150 mcg Oral Daily    mexiletine  150 mg Oral BID WM    midodrine  15 mg Oral Q8H    mirtazapine  7.5 mg Oral QHS    pantoprazole  40 mg Oral Daily    polyethylene glycol  17 g Oral Daily    pravastatin  40 mg Oral Daily    vitamin renal formula (B-complex-vitamin c-folic acid)  1 capsule Oral Daily     PRN Meds:  Current Facility-Administered Medications:     sodium chloride 0.9%, ,  Intravenous, PRN    sodium chloride 0.9%, , Intravenous, PRN    acetaminophen, 650 mg, Oral, Q4H PRN    calcium carbonate, 1,000 mg, Oral, TID PRN    heparin (porcine), 2,400 Units, Intra-Catheter, PRN    HYDROcodone-acetaminophen, 1 tablet, Oral, Q4H PRN    lactulose, 20 g, Oral, Daily PRN    ondansetron, 4 mg, Intravenous, Q8H PRN    sodium chloride 0.9%, 250 mL, Intravenous, PRN    sodium chloride 0.9%, 250 mL, Intravenous, PRN    sodium chloride 0.9%, 250 mL, Intravenous, PRN    sodium chloride 0.9%, 10 mL, Intravenous, PRN    sodium chloride 0.9%, 10 mL, Intravenous, PRN     Review of patient's allergies indicates:   Allergen Reactions    Lokelma [sodium zirconium cyclosilicate] Other (See Comments)     Fluid retention, weight gain, CHF excerebration, severe constipation    Atorvastatin Other (See Comments)     Joint pain    Jardiance [empagliflozin] Other (See Comments)     Chest pains, significant weight loss, lower blood pressure     Objective:     Vital Signs (Most Recent):  Temp: 98 °F (36.7 °C) (05/08/24 0800)  Pulse: 60 (05/08/24 0800)  Resp: 20 (05/08/24 0800)  BP: 97/63 (05/08/24 0800)  SpO2: 97 % (05/08/24 0800) Vital Signs (24h Range):  Temp:  [97.3 °F (36.3 °C)-98.1 °F (36.7 °C)] 98 °F (36.7 °C)  Pulse:  [60-61] 60  Resp:  [18-20] 20  SpO2:  [91 %-97 %] 97 %  BP: ()/(55-69) 97/63     Weight: 76.5 kg (168 lb 10.4 oz)  Body mass index is 24.91 kg/m².    Intake/Output - Last 3 Shifts         05/06 0700  05/07 0659 05/07 0700 05/08 0659 05/08 0700  05/09 0659    Other 500      Total Intake(mL/kg) 500 (6.5)      Urine (mL/kg/hr) 300 (0.2) 200 (0.1)     Other 2000      Total Output 2300 200     Net -1800 -200            Stool Occurrence 1 x               Physical Exam  Vitals reviewed.   Constitutional:       Appearance: Normal appearance.   Cardiovascular:      Rate and Rhythm: Normal rate and regular rhythm.   Pulmonary:      Effort: Pulmonary effort is normal. No respiratory distress.    Abdominal:      Palpations: Abdomen is soft.      Tenderness: There is no abdominal tenderness.   Skin:     General: Skin is warm.   Neurological:      General: No focal deficit present.      Mental Status: He is alert and oriented to person, place, and time.          Significant Labs:  I have reviewed all pertinent lab results within the past 24 hours.  CBC:   Recent Labs   Lab 05/08/24  0307   WBC 10.24   RBC 5.25   HGB 13.8*   HCT 39.6*   *   MCV 75*   MCH 26.3*   MCHC 34.8     CMP:   Recent Labs   Lab 05/08/24  0307   GLU 93   CALCIUM 9.0   ALBUMIN 3.1*   *   K 3.8   CO2 28   CL 96   BUN 46*   CREATININE 4.5*       Significant Diagnostics:  I have reviewed all pertinent imaging results/findings within the past 24 hours.  Assessment/Plan:     ESRD (end stage renal disease)  63 yoM with ESRD now requiring dialysis. Patient hypotensive during HD sessions. General Surgery consulted for PD catheter placement. Patient is amenable to PD catheter. We discussed the risks and benefits of surgery, especially given his heart failure, and he would like to proceed.    - OR today for laparoscopic PD catheter placement  - Consent signed and in the paper chart  - Keep NPO  - Other care per primary team        Colin Mosquera MD  General Surgery  [place] - Telemetry

## 2024-05-08 NOTE — OP NOTE
DATE OF PROCEDURE: 05/08/2024    PREOPERATIVE DIAGNOSIS: CKD V    POSTOPERATIVE DIAGNOSIS: CKD V    PROCEDURE: Laparoscopic placement of peritoneal dialysis catheter.    SURGEON: Tyree Ruiz M.D.    ASSISTANT: Live Mosquera PGY3    ANESTHESIA: General endotracheal prep chlorhexidine.    ESTIMATED BLOOD LOSS: Minimal.    SPECIMENS: None.    INDICATIONS: The patient is an 63 y.o. male who has CKD V and is planning to initiate peritoneal dialysis. The risks of surgery were discussed with   the patient including bleeding, infection, pain, scar, wound complications,   injury to local structures, wanting more extensive surgery, and potential need   for further surgery. The patient demonstrated understanding of these risks and   a consent form was obtained.    PROCEDURE: The patient was identified in the Preoperative Unit and taken back    to the Operating Room, laid supine on the operating room table. IV antibiotics    were administered prior to the induction of general anesthesia. General    anesthesia was induced without complication. The patient was then prepped and    draped in a standard sterile fashion. Local anesthesia was infiltrated into the  skin and subcutaneous tissues to all the incision site. A 5 mm incision was    made in the Left upper quadrant with an #11 blade scalpel. visiport entrance was    Performed without injury.  There was no intraabdominal pathology noted.  An 8mm trocar was placed in the left lateral infra-umbilical location. It was tunneled  In the posterior rectus sheath for a few centimeters before entry into the peritoneum.    The catheter was then Positioned into the deep pelvis space.    It then tunneled in the subcutaneous space to a site superior and    lateral. The 2 cuffs were confirmed to be in the posterior rectus sheath and in    the subcutaneous space. The catheter was flushed and aspirated and then    positioned to gravity and good back flow was appreciated.   He had very  minimal omentum that did not reach into the pelvis so omentopexy was not performed.   The skin incisions were  closed using 4-0 Monocryl suture in interrupted fashion. Dermabond was applied  and sterile dressing was applied to the catheter. The patient was awakened    from general anesthesia without complication and returned to the Postoperative    Recovery Unit in stable condition. At the end of the case, sponge, instrument    and needle counts were correct on 2 occasions. I was present and scrubbed    throughout the entirety of the case.        COMPLICATIONS: None.    CONDITION: Stable.    IMPLANTS: Peritoneal dialysis catheter 62cm.

## 2024-05-08 NOTE — ASSESSMENT & PLAN NOTE
Creatine stable for now. BMP reviewed- noted Estimated Creatinine Clearance: 16.6 mL/min (A) (based on SCr of 4.5 mg/dL (H)). according to latest data. Based on current GFR, CKD stage is end stage.  Monitor UOP and serial BMP and adjust therapy as needed. Renally dose meds. Avoid nephrotoxic medications and procedures.    Seen by Nephrology, Trialysis placed for CRRT, tolerating  5/3 Tunneled catheter placed 5/3  Had some intradialytic hypotension, required albumin in addition to midodrine  S/P PD catheter placement  Case management for DC planning

## 2024-05-08 NOTE — PLAN OF CARE
Patient is getting his PD cath placed today.  spoke with nurse Martha at [facility] to update. She took my number down for Jt to call me back. She reports Jt would need to know as well.  will also update at Rebeca [phone] at [facility] who coordinates the PD nurses. .    [facility] Phone#[phone]    Jennifer with [facility] updated on patient.     Patient Contacts    Name Relation Home Work Nicolette Emmanuel   [phone]        Future Appointments   Date Time Provider Department Center   5/14/2024 11:00 AM Fatmata Johansen MD [facility] IMPR[facility]   5/16/2024  3:00 PM Shanell José PA-C [facility] MED [facility]   5/22/2024  2:20 PM Tyree Ruiz MD [facility] GENSUR [facility]   9/4/2024  9:40 AM SAME DAY LAB, [facility] RVPH LAB [facility]   9/11/2024  2:00 PM Jc Elliott MD [facility] MED [facility]        Fatmata Johansen MD  Internal Medicine [phone] [address] [place] [address]      Next Steps: Follow up on 5/14/2024  Instructions: 11:00am [facility] DX:New hd pt    Shanell José PA-C  Family Medicine [phone] [address] [place] [address] [place] [address]     Next Steps: Follow up on 5/16/2024  Instructions: 3:00pm    [facility] DIALYSIS   [phone] [address] [facility] [place] [address]     Next Steps: Follow up  Instructions: Dialysis Facility        05/08/24 1404   Discharge Reassessment   Assessment Type Discharge Planning Reassessment   Did the patient's condition or plan change since previous assessment? No   Discharge Plan A Home with family;Home Health   Discharge Plan B Home with family   DME Needed Upon Discharge  walker, rolling   Transition of Care Barriers None   Why the patient remains in the hospital Requires continued medical care   Post-Acute Status   Post-Acute Authorization Home Health;HME   HME Status Pending post-acute provider  review/more information requested   Home Health Status Pending medical clearance/testing   Diaylsis Status Pending medical clearance/testing   Hospital Resources/Appts/Education Provided Appointments scheduled and added to AVS   Discharge Delays None known at this time     Isha Hidalgo RN    [phone]

## 2024-05-08 NOTE — NURSING
Small amount bloody drainage on PD cath dressing throughout the day noted, but now bloody drain seeping down dressing and dressing also saturated with blood. Dressing reinforced with gauze and tape. Called and notified Dr Sara MD ordered to place abd binder on pt.

## 2024-05-08 NOTE — PLAN OF CARE
Problem: Adult Inpatient Plan of Care  Goal: Plan of Care Review  Outcome: Progressing  Goal: Optimal Comfort and Wellbeing  Outcome: Progressing     Problem: Hemodialysis  Goal: Safe, Effective Therapy Delivery  Outcome: Progressing     Problem: Fall Injury Risk  Goal: Absence of Fall and Fall-Related Injury  Outcome: Progressing     Problem: Constipation  Goal: Effective Bowel Elimination  Outcome: Progressing

## 2024-05-08 NOTE — PROGRESS NOTES
[facility] Medicine  Progress Note    Patient Name: Ar Sharma  MRN: [id number]  Patient Class: IP- Inpatient   Admission Date: 4/29/2024  Length of Stay: 9 days  Attending Physician: Lon Bowles MD  Primary Care Provider: Jc Elliott MD        Subjective:     Principal Problem:Chronic kidney disease (CKD), stage V        HPI:  Ar Sharma is a 63M with PMHx of ESRD, HFrEF, HTN, NICM, COPD, VT s/p ICD placement, and HLD who presented to [facility] ED with CC progressively worse SOB over the last two months. He was recently diagnosed with ESRD and has been trying diet modification at home. He endorses SINGH, dizziness, frequent intermittent nausea and one episode of vomiting during this time. He denies CP, syncope, dysuria, fever or chills.     ED workup significant for: , Cr 6.7, Phos 5, Tbili 2.4, BNP 3204, trop 0.040, vit D 24, .6, and CXR: cardiomegaly w/ pulmonary vascular congestion consistent w/ pulmonary edema. All other components of CBC and chemistries were unremarkable.       Patient was seen by Dr. Mata in ED. Emergent HD not indicated.     Overview/Hospital Course:  4/30 Tolerating HD through central line, SOB is improved  5/1/24 On pressors but tolerating CRRT  5/2 tolerating HD, off of pressors, stepping down  5/3 Tunneled catheter placed  5/4 had an episode of intradialytic hypotension, given albumin  5/5 Having hiccups, started PPI  5/6 Hiccups resolved. Gen Surgery consult for PD catheter  5/7 Awaiting for PD catheter placement  5/8 S/P PD catheter placement     Interval History:   Seen and examined post PD catheter placement, dressing with minimal oozing blood, denies abdominal pain, nor distension, no nausea, nor vomiting, no spike of fever.    Review of Systems   Constitutional:  Negative for activity change and fever.   Respiratory:  Negative for chest tightness, shortness of breath and wheezing.    Cardiovascular:  Negative for chest pain and  palpitations.   Gastrointestinal:  Negative for abdominal distention, abdominal pain and nausea.   Neurological:  Negative for speech difficulty and weakness.   Psychiatric/Behavioral:  Negative for agitation, confusion and hallucinations.    All other systems reviewed and are negative.    Objective:     Vital Signs (Most Recent):  Temp: 97 °F (36.1 °C) (05/08/24 1420)  Pulse: (!) 59 (05/08/24 1420)  Resp: 18 (05/08/24 1355)  BP: (!) 94/58 (05/08/24 1420)  SpO2: (!) 89 % (pt returned from OR; pt arousable and opens eyes to voice but is sleepy; sats up to 89% on 5 L NC. called respiratory to place pt on high flow nc to keep o2 above 92%.) (05/08/24 1420) Vital Signs (24h Range):  Temp:  [97 °F (36.1 °C)-98.4 °F (36.9 °C)] 97 °F (36.1 °C)  Pulse:  [59-70] 59  Resp:  [18-21] 18  SpO2:  [89 %-97 %] 89 %  BP: ()/(55-70) 94/58     Weight: 76.5 kg (168 lb 10.4 oz)  Body mass index is 24.91 kg/m².    Intake/Output Summary (Last 24 hours) at 5/8/2024 1432  Last data filed at 5/8/2024 1130  Gross per 24 hour   Intake 500 ml   Output 1800 ml   Net -1300 ml         Physical Exam  Vitals and nursing note reviewed.   Constitutional:       General: He is not in acute distress.     Appearance: He is not toxic-appearing.   HENT:      Head: Normocephalic and atraumatic.      Mouth/Throat:      Mouth: Mucous membranes are moist.      Pharynx: No oropharyngeal exudate.   Eyes:      Extraocular Movements: Extraocular movements intact.      Pupils: Pupils are equal, round, and reactive to light.   Cardiovascular:      Rate and Rhythm: Bradycardia present.      Heart sounds: No murmur heard.     No friction rub. No gallop.   Pulmonary:      Effort: Pulmonary effort is normal. No respiratory distress.      Breath sounds: No wheezing or rales.   Chest:      Chest wall: No tenderness.   Abdominal:      General: Bowel sounds are normal. There is no distension.      Palpations: Abdomen is soft.      Tenderness: There is no abdominal  "tenderness. There is no guarding or rebound.   Musculoskeletal:         General: No swelling or tenderness.      Cervical back: No rigidity or tenderness.      Right lower leg: No edema.      Left lower leg: No edema.   Skin:     Comments: S/P PD catheter placement   Neurological:      General: No focal deficit present.      Mental Status: He is alert and oriented to person, place, and time.      Coordination: Coordination normal.      Gait: Gait normal.   Psychiatric:         Thought Content: Thought content normal.             Significant Labs: All pertinent labs within the past 24 hours have been reviewed.  A1C:   Recent Labs   Lab 03/04/24  1018 04/29/24  2039   HGBA1C 5.6 5.6     BMP:   Recent Labs   Lab 05/08/24  0307   GLU 93   *   K 3.8   CL 96   CO2 28   BUN 46*   CREATININE 4.5*   CALCIUM 9.0   MG 2.0     CBC:   Recent Labs   Lab 05/07/24  0219 05/08/24  0307   WBC 10.28 10.24   HGB 13.7* 13.8*   HCT 39.4* 39.6*   * 475*     CMP:   Recent Labs   Lab 05/07/24  0219 05/08/24  0307   * 131*   K 4.1 3.8   CL 97 96   CO2 24 28   GLU 87 93   BUN 36* 46*   CREATININE 3.6* 4.5*   CALCIUM 8.7 9.0   ALBUMIN 3.0* 3.1*   ANIONGAP 13 7*     Cardiac Markers: No results for input(s): "CKMB", "MYOGLOBIN", "BNP", "TROPISTAT" in the last 48 hours.  Coagulation: No results for input(s): "PT", "INR", "APTT" in the last 48 hours.  Lactic Acid: No results for input(s): "LACTATE" in the last 48 hours.  Respiratory Culture: No results for input(s): "GSRESP", "RESPIRATORYC" in the last 48 hours.  Troponin: No results for input(s): "TROPONINI", "TROPONINIHS" in the last 48 hours.  TSH:   Recent Labs   Lab 03/04/24  1018   TSH 0.283*     Urine Culture: No results for input(s): "LABURIN" in the last 48 hours.  Urine Studies: No results for input(s): "COLORU", "APPEARANCEUA", "PHUR", "SPECGRAV", "PROTEINUA", "GLUCUA", "KETONESU", "BILIRUBINUA", "OCCULTUA", "NITRITE", "UROBILINOGEN", "LEUKOCYTESUR", "RBCUA", "WBCUA", " ""BACTERIA", "SQUAMEPITHEL", "HYALINECASTS" in the last 48 hours.    Invalid input(s): "WRIGHTSUR"  Recent Lab Results         05/08/24  0307        Albumin 3.1       Anion Gap 7       Baso # 0.11       Basophil % 1.1       BUN 46       Calcium 9.0       Chloride 96       CO2 28       Creatinine 4.5       Differential Method Automated       eGFR 14       Eos # 0.3       Eos % 2.6       Glucose 93       Gran # (ANC) 7.9       Gran % 77.4       Hematocrit 39.6       Hemoglobin 13.8       Immature Grans (Abs) 0.13  Comment: Mild elevation in immature granulocytes is non specific and   can be seen in a variety of conditions including stress response,   acute inflammation, trauma and pregnancy. Correlation with other   laboratory and clinical findings is essential.         Immature Granulocytes 1.3       Lymph # 0.7       Lymph % 6.5       Magnesium  2.0       MCH 26.3       MCHC 34.8       MCV 75       Mono # 1.1       Mono % 11.1       MPV 12.1       nRBC 0       Phosphorus Level 3.3       Platelet Count 475       Potassium 3.8       RBC 5.25       RDW 15.7       Sodium 131       WBC 10.24               Significant Imaging: I have reviewed all pertinent imaging results/findings within the past 24 hours.    Assessment/Plan:      * Chronic kidney disease (CKD), stage V  Creatine stable for now. BMP reviewed- noted Estimated Creatinine Clearance: 20.7 mL/min (A) (based on SCr of 3.6 mg/dL (H)). according to latest data. Based on current GFR, CKD stage is end stage.  Monitor UOP and serial BMP and adjust therapy as needed. Renally dose meds. Avoid nephrotoxic medications and procedures.    --see by Dr. Mata in ED, gen surg consulted for tunneled catheter placement in AM 4/30/24  --admit to ICU for CRRT once catheter placed-->tolerating HD will step down today  --Plan for PD catheter placement  -case management for DC planning    Hypotension  persistent  Weaned off of pressors  cont midodrine  Goal MAP >65  Orthostatics " negative, off of pressors, Step down to floor  S/P PD placement seems tolerate procedure well  Monitor BP    ESRD (end stage renal disease)  Creatine stable for now. BMP reviewed- noted Estimated Creatinine Clearance: 16.6 mL/min (A) (based on SCr of 4.5 mg/dL (H)). according to latest data. Based on current GFR, CKD stage is end stage.  Monitor UOP and serial BMP and adjust therapy as needed. Renally dose meds. Avoid nephrotoxic medications and procedures.    Seen by Nephrology, Trialysis placed for CRRT, tolerating  5/3 Tunneled catheter placed 5/3  Had some intradialytic hypotension, required albumin in addition to midodrine  S/P PD catheter placement  Case management for DC planning    Pulmonary emphysema, unspecified emphysema type  Patient's COPD is controlled currently.  Patient is currently off COPD Pathway. Continue scheduled inhalers Supplemental oxygen and monitor respiratory status closely.     Non-ischemic cardiomyopathy  Echo    Left Ventricle: The left ventricle is normal in size. Normal wall thickness. Regional wall motion abnormalities present. See diagram for wall motion findings. Septal motion is consistent with pacing. There is mildly reduced systolic function with a visually estimated ejection fraction of 40 - 45%. Grade III diastolic dysfunction.    Right Ventricle: Moderate to severe right ventricular enlargement. Systolic function is reduced.TAPSE is 1.59 cm. Pacemaker lead present in the ventricle.    Left Atrium: Left atrium is severely dilated. The left atrium volume index is 71.4 mL/m2.    Right Atrium: Right atrium is severely dilated.    Aortic Valve: There is mild aortic valve sclerosis. There is mild to moderate stenosis. Aortic valve area by VTI is 1.49 cm². Aortic valve peak velocity is 1.43 m/s. Mean gradient is 5 mmHg. The dimensionless index is 0.52.    Mitral Valve: There is mild regurgitation.    Tricuspid Valve: There is mild to moderate regurgitation.    Pulmonary Artery: The  estimated pulmonary artery systolic pressure is 64 mmHg.    IVC/SVC: Elevated venous pressure at 15 mmHg.         Abuse of herbal or folk remedies        Hyponatremia  Patient has hyponatremia which is controlled,We will aim to correct the sodium by 4-6mEq in 24 hours. We will monitor sodium Daily. The hyponatremia is due to renal insufficiency. We will obtain the following studies: NA. We will treat the hyponatremia with Fluid restriction of:  1.5 liter per day and Hemodialysis. The patient's sodium results have been reviewed and are listed below.  Recent Labs   Lab 05/08/24  0307   *       Primary hypertension  Chronic, controlled. Latest blood pressure and vitals reviewed-     Temp:  [97.4 °F (36.3 °C)-98.1 °F (36.7 °C)]   Pulse:  [59-61]   Resp:  [16-18]   BP: (85-99)/(52-64)   SpO2:  [92 %-94 %] .   Home meds for hypertension were reviewed and noted below.   Hypertension Medications               furosemide (LASIX) 80 MG tablet Take 1 tablet (80 mg total) by mouth 2 (two) times daily. If increased 3 pounds in a day or 5 pounds in a week, take an extra dose of lasix 80mg until those pounds are lost.    metoprolol succinate (TOPROL-XL) 25 MG 24 hr tablet TAKE 1 TABLET BY MOUTH EVERY DAY    nitroGLYCERIN (NITROSTAT) 0.4 MG SL tablet Place 1 tablet (0.4 mg total) under the tongue every 5 (five) minutes as needed for Chest pain.    sacubitriL-valsartan (ENTRESTO) 24-26 mg per tablet Take 1 tablet by mouth 2 (two) times daily.            While in the hospital, will manage blood pressure as follows; Adjust home antihypertensive regimen as follows- hold home bp medications in the setting of hypotension requiring CRRT    Will utilize p.r.n. blood pressure medication only if patient's blood pressure greater than 180/110 and he develops symptoms such as worsening chest pain or shortness of breath.    HFrEF (heart failure with reduced ejection fraction)  Congestive Heart Failure  Patient presents for re-evaluation of  congestive heart failure. Patient's current complaints are dyspnea, fatigue, near-syncope, and orthopnea. He denies chest pain, claudication, exertional chest pressure/discomfort, and lower extremity edema. He states he is compliant most of the time with his medications. He states he is compliant all of the time with his diet.    --last TTE in 2023 shows EF 40%. Will repeat this admission  --on home lasix. Will convert to higher IV dosage  --will hold troprol in setting of hypotension requiring CRRT  --on CHF pathway    Cardiac resynchronization therapy defibrillator (CRT-D) in place        Mixed hyperlipidemia  --continue home medications      Iron deficiency anemia  Patient's anemia is currently controlled. Has not received any PRBCs to date. Etiology likely d/t Iron deficiency and chronic disease due to ESRD  Current CBC reviewed-   Lab Results   Component Value Date    HGB 13.7 (L) 05/07/2024    HCT 39.4 (L) 05/07/2024     Monitor serial CBC and transfuse if patient becomes hemodynamically unstable, symptomatic or H/H drops below 7/21.    Hypothyroidism  --continue home synthroid    V-tach  --hx of AICD placement, currently 100% paced   --continue home antiarrhythmic medications  --cardiac monitoring          VTE Risk Mitigation (From admission, onward)           Ordered     heparin (porcine) injection 2,400 Units  As needed (PRN)         04/30/24 2223     heparin (porcine) injection 5,000 Units  Every 8 hours         04/29/24 1945     IP VTE HIGH RISK PATIENT  Once         04/29/24 1945     Place sequential compression device  Until discontinued         04/29/24 1945                    Discharge Planning   SLAVA:      Code Status: Full Code   Is the patient medically ready for discharge?:     Reason for patient still in hospital (select all that apply): Patient trending condition, Treatment, and Consult recommendations  Discharge Plan A: Home with family, Home Health   Discharge Delays: None known at this  time        Time spent > 35 min      Lon Bowles MD  Department of Hospital Medicine   [place] - Telemetry

## 2024-05-08 NOTE — SUBJECTIVE & OBJECTIVE
Interval History:   Seen and examined post PD catheter placement, dressing with minimal oozing blood, denies abdominal pain, nor distension, no nausea, nor vomiting, no spike of fever.    Review of Systems   Constitutional:  Negative for activity change and fever.   Respiratory:  Negative for chest tightness, shortness of breath and wheezing.    Cardiovascular:  Negative for chest pain and palpitations.   Gastrointestinal:  Negative for abdominal distention, abdominal pain and nausea.   Neurological:  Negative for speech difficulty and weakness.   Psychiatric/Behavioral:  Negative for agitation, confusion and hallucinations.    All other systems reviewed and are negative.    Objective:     Vital Signs (Most Recent):  Temp: 97 °F (36.1 °C) (05/08/24 1420)  Pulse: (!) 59 (05/08/24 1420)  Resp: 18 (05/08/24 1355)  BP: (!) 94/58 (05/08/24 1420)  SpO2: (!) 89 % (pt returned from OR; pt arousable and opens eyes to voice but is sleepy; sats up to 89% on 5 L NC. called respiratory to place pt on high flow nc to keep o2 above 92%.) (05/08/24 1420) Vital Signs (24h Range):  Temp:  [97 °F (36.1 °C)-98.4 °F (36.9 °C)] 97 °F (36.1 °C)  Pulse:  [59-70] 59  Resp:  [18-21] 18  SpO2:  [89 %-97 %] 89 %  BP: ()/(55-70) 94/58     Weight: 76.5 kg (168 lb 10.4 oz)  Body mass index is 24.91 kg/m².    Intake/Output Summary (Last 24 hours) at 5/8/2024 1432  Last data filed at 5/8/2024 1130  Gross per 24 hour   Intake 500 ml   Output 1800 ml   Net -1300 ml         Physical Exam  Vitals and nursing note reviewed.   Constitutional:       General: He is not in acute distress.     Appearance: He is not toxic-appearing.   HENT:      Head: Normocephalic and atraumatic.      Mouth/Throat:      Mouth: Mucous membranes are moist.      Pharynx: No oropharyngeal exudate.   Eyes:      Extraocular Movements: Extraocular movements intact.      Pupils: Pupils are equal, round, and reactive to light.   Cardiovascular:      Rate and Rhythm: Bradycardia  "present.      Heart sounds: No murmur heard.     No friction rub. No gallop.   Pulmonary:      Effort: Pulmonary effort is normal. No respiratory distress.      Breath sounds: No wheezing or rales.   Chest:      Chest wall: No tenderness.   Abdominal:      General: Bowel sounds are normal. There is no distension.      Palpations: Abdomen is soft.      Tenderness: There is no abdominal tenderness. There is no guarding or rebound.   Musculoskeletal:         General: No swelling or tenderness.      Cervical back: No rigidity or tenderness.      Right lower leg: No edema.      Left lower leg: No edema.   Skin:     Comments: S/P PD catheter placement   Neurological:      General: No focal deficit present.      Mental Status: He is alert and oriented to person, place, and time.      Coordination: Coordination normal.      Gait: Gait normal.   Psychiatric:         Thought Content: Thought content normal.             Significant Labs: All pertinent labs within the past 24 hours have been reviewed.  A1C:   Recent Labs   Lab 03/04/24  1018 04/29/24  2039   HGBA1C 5.6 5.6     BMP:   Recent Labs   Lab 05/08/24  0307   GLU 93   *   K 3.8   CL 96   CO2 28   BUN 46*   CREATININE 4.5*   CALCIUM 9.0   MG 2.0     CBC:   Recent Labs   Lab 05/07/24  0219 05/08/24  0307   WBC 10.28 10.24   HGB 13.7* 13.8*   HCT 39.4* 39.6*   * 475*     CMP:   Recent Labs   Lab 05/07/24  0219 05/08/24  0307   * 131*   K 4.1 3.8   CL 97 96   CO2 24 28   GLU 87 93   BUN 36* 46*   CREATININE 3.6* 4.5*   CALCIUM 8.7 9.0   ALBUMIN 3.0* 3.1*   ANIONGAP 13 7*     Cardiac Markers: No results for input(s): "CKMB", "MYOGLOBIN", "BNP", "TROPISTAT" in the last 48 hours.  Coagulation: No results for input(s): "PT", "INR", "APTT" in the last 48 hours.  Lactic Acid: No results for input(s): "LACTATE" in the last 48 hours.  Respiratory Culture: No results for input(s): "GSRESP", "RESPIRATORYC" in the last 48 hours.  Troponin: No results for input(s): " ""TROPONINI", "TROPONINIHS" in the last 48 hours.  TSH:   Recent Labs   Lab 03/04/24  1018   TSH 0.283*     Urine Culture: No results for input(s): "LABURIN" in the last 48 hours.  Urine Studies: No results for input(s): "COLORU", "APPEARANCEUA", "PHUR", "SPECGRAV", "PROTEINUA", "GLUCUA", "KETONESU", "BILIRUBINUA", "OCCULTUA", "NITRITE", "UROBILINOGEN", "LEUKOCYTESUR", "RBCUA", "WBCUA", "BACTERIA", "SQUAMEPITHEL", "HYALINECASTS" in the last 48 hours.    Invalid input(s): "WRIGHTSUR"  Recent Lab Results         05/08/24  0307        Albumin 3.1       Anion Gap 7       Baso # 0.11       Basophil % 1.1       BUN 46       Calcium 9.0       Chloride 96       CO2 28       Creatinine 4.5       Differential Method Automated       eGFR 14       Eos # 0.3       Eos % 2.6       Glucose 93       Gran # (ANC) 7.9       Gran % 77.4       Hematocrit 39.6       Hemoglobin 13.8       Immature Grans (Abs) 0.13  Comment: Mild elevation in immature granulocytes is non specific and   can be seen in a variety of conditions including stress response,   acute inflammation, trauma and pregnancy. Correlation with other   laboratory and clinical findings is essential.         Immature Granulocytes 1.3       Lymph # 0.7       Lymph % 6.5       Magnesium  2.0       MCH 26.3       MCHC 34.8       MCV 75       Mono # 1.1       Mono % 11.1       MPV 12.1       nRBC 0       Phosphorus Level 3.3       Platelet Count 475       Potassium 3.8       RBC 5.25       RDW 15.7       Sodium 131       WBC 10.24               Significant Imaging: I have reviewed all pertinent imaging results/findings within the past 24 hours.  "

## 2024-05-09 LAB
ALBUMIN SERPL BCP-MCNC: 3.1 G/DL (ref 3.5–5.2)
ANION GAP SERPL CALC-SCNC: 15 MMOL/L (ref 8–16)
BASOPHILS # BLD AUTO: 0.06 K/UL (ref 0–0.2)
BASOPHILS NFR BLD: 0.5 % (ref 0–1.9)
BUN SERPL-MCNC: 34 MG/DL (ref 8–23)
CALCIUM SERPL-MCNC: 9 MG/DL (ref 8.7–10.5)
CHLORIDE SERPL-SCNC: 94 MMOL/L (ref 95–110)
CO2 SERPL-SCNC: 22 MMOL/L (ref 23–29)
CREAT SERPL-MCNC: 4.3 MG/DL (ref 0.5–1.4)
DIFFERENTIAL METHOD BLD: ABNORMAL
EOSINOPHIL # BLD AUTO: 0 K/UL (ref 0–0.5)
EOSINOPHIL NFR BLD: 0.1 % (ref 0–8)
ERYTHROCYTE [DISTWIDTH] IN BLOOD BY AUTOMATED COUNT: 15.9 % (ref 11.5–14.5)
EST. GFR  (NO RACE VARIABLE): 15 ML/MIN/1.73 M^2
GLUCOSE SERPL-MCNC: 112 MG/DL (ref 70–110)
HCT VFR BLD AUTO: 40.4 % (ref 40–54)
HGB BLD-MCNC: 14.2 G/DL (ref 14–18)
IMM GRANULOCYTES # BLD AUTO: 0.12 K/UL (ref 0–0.04)
IMM GRANULOCYTES NFR BLD AUTO: 1 % (ref 0–0.5)
LYMPHOCYTES # BLD AUTO: 0.4 K/UL (ref 1–4.8)
LYMPHOCYTES NFR BLD: 2.9 % (ref 18–48)
MAGNESIUM SERPL-MCNC: 1.9 MG/DL (ref 1.6–2.6)
MCH RBC QN AUTO: 26.3 PG (ref 27–31)
MCHC RBC AUTO-ENTMCNC: 35.1 G/DL (ref 32–36)
MCV RBC AUTO: 75 FL (ref 82–98)
MONOCYTES # BLD AUTO: 0.8 K/UL (ref 0.3–1)
MONOCYTES NFR BLD: 6.2 % (ref 4–15)
NEUTROPHILS # BLD AUTO: 10.8 K/UL (ref 1.8–7.7)
NEUTROPHILS NFR BLD: 89.3 % (ref 38–73)
NRBC BLD-RTO: 0 /100 WBC
PHOSPHATE SERPL-MCNC: 3.8 MG/DL (ref 2.7–4.5)
PLATELET # BLD AUTO: 483 K/UL (ref 150–450)
PMV BLD AUTO: 12.6 FL (ref 9.2–12.9)
POTASSIUM SERPL-SCNC: 4.2 MMOL/L (ref 3.5–5.1)
RBC # BLD AUTO: 5.4 M/UL (ref 4.6–6.2)
SODIUM SERPL-SCNC: 131 MMOL/L (ref 136–145)
WBC # BLD AUTO: 12.05 K/UL (ref 3.9–12.7)

## 2024-05-09 PROCEDURE — 25000003 PHARM REV CODE 250: Performed by: INTERNAL MEDICINE

## 2024-05-09 PROCEDURE — 99900035 HC TECH TIME PER 15 MIN (STAT)

## 2024-05-09 PROCEDURE — 80069 RENAL FUNCTION PANEL: CPT | Performed by: INTERNAL MEDICINE

## 2024-05-09 PROCEDURE — 3E1M39Z IRRIGATION OF PERITONEAL CAVITY USING DIALYSATE, PERCUTANEOUS APPROACH: ICD-10-PCS | Performed by: INTERNAL MEDICINE

## 2024-05-09 PROCEDURE — 83735 ASSAY OF MAGNESIUM: CPT | Performed by: SURGERY

## 2024-05-09 PROCEDURE — 94761 N-INVAS EAR/PLS OXIMETRY MLT: CPT

## 2024-05-09 PROCEDURE — 27100171 HC OXYGEN HIGH FLOW UP TO 24 HOURS

## 2024-05-09 PROCEDURE — 25000003 PHARM REV CODE 250: Performed by: SURGERY

## 2024-05-09 PROCEDURE — 97110 THERAPEUTIC EXERCISES: CPT | Mod: CQ

## 2024-05-09 PROCEDURE — 63600175 PHARM REV CODE 636 W HCPCS: Performed by: SURGERY

## 2024-05-09 PROCEDURE — 11000001 HC ACUTE MED/SURG PRIVATE ROOM

## 2024-05-09 PROCEDURE — 90945 DIALYSIS ONE EVALUATION: CPT

## 2024-05-09 PROCEDURE — 63600175 PHARM REV CODE 636 W HCPCS: Performed by: STUDENT IN AN ORGANIZED HEALTH CARE EDUCATION/TRAINING PROGRAM

## 2024-05-09 PROCEDURE — 90935 HEMODIALYSIS ONE EVALUATION: CPT

## 2024-05-09 PROCEDURE — 36415 COLL VENOUS BLD VENIPUNCTURE: CPT | Performed by: INTERNAL MEDICINE

## 2024-05-09 PROCEDURE — 97116 GAIT TRAINING THERAPY: CPT | Mod: CQ

## 2024-05-09 PROCEDURE — 85025 COMPLETE CBC W/AUTO DIFF WBC: CPT | Performed by: SURGERY

## 2024-05-09 RX ORDER — SODIUM CHLORIDE 9 MG/ML
INJECTION, SOLUTION INTRAVENOUS ONCE
Status: DISCONTINUED | OUTPATIENT
Start: 2024-05-09 | End: 2024-05-12 | Stop reason: HOSPADM

## 2024-05-09 RX ADMIN — FUROSEMIDE 80 MG: 10 INJECTION, SOLUTION INTRAVENOUS at 09:05

## 2024-05-09 RX ADMIN — ASPIRIN 81 MG: 81 TABLET, COATED ORAL at 08:05

## 2024-05-09 RX ADMIN — PRAVASTATIN SODIUM 40 MG: 40 TABLET ORAL at 08:05

## 2024-05-09 RX ADMIN — MIDODRINE HYDROCHLORIDE 15 MG: 5 TABLET ORAL at 09:05

## 2024-05-09 RX ADMIN — MEXILETINE HYDROCHLORIDE 150 MG: 150 CAPSULE ORAL at 08:05

## 2024-05-09 RX ADMIN — ALLOPURINOL 100 MG: 100 TABLET ORAL at 08:05

## 2024-05-09 RX ADMIN — LEVOTHYROXINE SODIUM 150 MCG: 150 TABLET ORAL at 08:05

## 2024-05-09 RX ADMIN — FUROSEMIDE 80 MG: 10 INJECTION, SOLUTION INTRAVENOUS at 08:05

## 2024-05-09 RX ADMIN — MEXILETINE HYDROCHLORIDE 150 MG: 150 CAPSULE ORAL at 05:05

## 2024-05-09 RX ADMIN — MIDODRINE HYDROCHLORIDE 15 MG: 5 TABLET ORAL at 05:05

## 2024-05-09 RX ADMIN — HEPARIN SODIUM 5000 UNITS: 5000 INJECTION INTRAVENOUS; SUBCUTANEOUS at 05:05

## 2024-05-09 RX ADMIN — HEPARIN SODIUM 5000 UNITS: 5000 INJECTION INTRAVENOUS; SUBCUTANEOUS at 02:05

## 2024-05-09 RX ADMIN — AMIODARONE HYDROCHLORIDE 400 MG: 200 TABLET ORAL at 08:05

## 2024-05-09 RX ADMIN — NEPHROCAP 1 CAPSULE: 1 CAP ORAL at 08:05

## 2024-05-09 RX ADMIN — POLYETHYLENE GLYCOL 3350 17 G: 17 POWDER, FOR SOLUTION ORAL at 08:05

## 2024-05-09 RX ADMIN — MIDODRINE HYDROCHLORIDE 15 MG: 5 TABLET ORAL at 02:05

## 2024-05-09 RX ADMIN — HEPARIN SODIUM 5000 UNITS: 5000 INJECTION INTRAVENOUS; SUBCUTANEOUS at 09:05

## 2024-05-09 RX ADMIN — PANTOPRAZOLE SODIUM 40 MG: 40 TABLET, DELAYED RELEASE ORAL at 08:05

## 2024-05-09 NOTE — PT/OT/SLP PROGRESS
Physical Therapy Treatment    Patient Name:  Ar Sharma   MRN:  [id number]    Recommendations:     Discharge Recommendations: Low Intensity Therapy  Discharge Equipment Recommendations: walker, rolling  Barriers to discharge:  decreased mobility,strength and endurance    Assessment:     Ar Sharma is a 63 y.o. male admitted with a medical diagnosis of Chronic kidney disease (CKD), stage V.  He presents with the following impairments/functional limitations: weakness, impaired endurance, impaired functional mobility, gait instability, impaired balance, decreased lower extremity function, impaired coordination, impaired skin, impaired cardiopulmonary response to activity,pt with improving status and will benefit from low intensity therapy  and a RW upon discharge The mobility limitation cannot be sufficiently resolved by the use of a cane.   Patient's functional mobility deficit can be sufficiently resolved with the use of a rolling walker. Patient's mobility limitation significantly impairs their ability to participate in one of more activities of daily living. The use of a rolling walker will significantly improve the patient's ability to participate in MRADLS and the patient will use it on regular basis in the home.   .    Rehab Prognosis: Good; patient would benefit from acute skilled PT services to address these deficits and reach maximum level of function.    Recent Surgery: Procedure(s) (LRB):  INSERTION, CATHETER, DIALYSIS, PERITONEAL, LAPAROSCOPIC (N/A) 1 Day Post-Op    Plan:     During this hospitalization, patient to be seen 5 x/week to address the identified rehab impairments via gait training, therapeutic activities, therapeutic exercises, neuromuscular re-education and progress toward the following goals:    Plan of Care Expires:  06/06/24    Subjective     Chief Complaint: n/a  Patient/Family Comments/goals: pt agreeable to up in chair.  Pain/Comfort:  Pain Rating 1:  (no c/o's)      Objective:      Communicated with nsg prior to session.  Patient found supine with bed alarm, oxygen, telemetry upon PT entry to room.     General Precautions: Standard, fall  Orthopedic Precautions: N/A  Braces: N/A  Respiratory Status:  supplemental O2     Functional Mobility:  Bed Mobility:     Supine to Sit: modified independence and supervision  Transfers:     Sit to Stand:  supervision with rolling walker  Bed to Chair: stand by assistance with  rolling walker  using  ambulation  Gait: amb ~60' with RW and SBA with O2 donned  Balance: fair standing balance with RW      AM-PAC 6 CLICK MOBILITY  Turning over in bed (including adjusting bedclothes, sheets and blankets)?: 4  Sitting down on and standing up from a chair with arms (e.g., wheelchair, bedside commode, etc.): 3  Moving from lying on back to sitting on the side of the bed?: 3  Moving to and from a bed to a chair (including a wheelchair)?: 3  Need to walk in hospital room?: 3  Climbing 3-5 steps with a railing?: 2  Basic Mobility Total Score: 18       Treatment & Education: le seated ex's X 10-12 reps inc: ap,laq's and hip flex,pt's needs and requests met.      Patient left up in chair with all lines intact, call button in reach, and nsg notified..    GOALS: see general POC  Multidisciplinary Problems       Physical Therapy Goals          Problem: Physical Therapy    Goal Priority Disciplines Outcome Goal Variances Interventions   Physical Therapy Goal     PT, PT/OT Progressing     Description: Goals to be met by: 24     Patient will increase functional independence with mobility by performin. Supine to sit with Modified Edwards  2. Sit to supine with Modified Edwards  3. Sit to stand transfer with Modified Edwards with LRAD.   4. Bed to chair transfer with Stand-by Assistance using LRAD.  5. Gait  x 100 feet with Stand-by Assistance using LRAD.                          Time Tracking:     PT Received On: 24  PT Start Time: 824     PT  Stop Time: 0849  PT Total Time (min): 25 min     Billable Minutes: Gait Training 15 and Therapeutic Exercise 10    Treatment Type: Treatment  PT/PTA: PTA     Number of PTA visits since last PT visit: 1 05/09/2024

## 2024-05-09 NOTE — ASSESSMENT & PLAN NOTE
Chronic, controlled. Latest blood pressure and vitals reviewed-     Temp:  [97.4 °F (36.3 °C)-98.6 °F (37 °C)]   Pulse:  [59-68]   Resp:  [16-20]   BP: ()/(55-69)   SpO2:  [90 %-96 %] .   Home meds for hypertension were reviewed and noted below.   Hypertension Medications               furosemide (LASIX) 80 MG tablet Take 1 tablet (80 mg total) by mouth 2 (two) times daily. If increased 3 pounds in a day or 5 pounds in a week, take an extra dose of lasix 80mg until those pounds are lost.    metoprolol succinate (TOPROL-XL) 25 MG 24 hr tablet TAKE 1 TABLET BY MOUTH EVERY DAY    nitroGLYCERIN (NITROSTAT) 0.4 MG SL tablet Place 1 tablet (0.4 mg total) under the tongue every 5 (five) minutes as needed for Chest pain.    sacubitriL-valsartan (ENTRESTO) 24-26 mg per tablet Take 1 tablet by mouth 2 (two) times daily.            While in the hospital, will manage blood pressure as follows; Adjust home antihypertensive regimen as follows- hold home bp medications in the setting of hypotension requiring CRRT    Will utilize p.r.n. blood pressure medication only if patient's blood pressure greater than 180/110 and he develops symptoms such as worsening chest pain or shortness of breath.

## 2024-05-09 NOTE — PLAN OF CARE
Received pt on documented 02; no distress noted. Will cont to wean and monitor   Problem: Adult Inpatient Plan of Care  Goal: Plan of Care Review  Outcome: Progressing

## 2024-05-09 NOTE — PROGRESS NOTES
"[facility] - Telemetry  Adult Nutrition  Progress Note    SUMMARY       Recommendations    Recommendation:   1. Add novasource renal BID.   2. Encourage intake at meals.   3. Monitor wt and labs.  Goals:   Pt will consume 50-75% intake at meals by RD follow up.  Nutrition Goal Status: new  Communication of RD Recs: other (comment) (POC)    Assessment and Plan  Nutrition Problem  Inadequate energy intake    Related to (etiology):   Dx/hx    Signs and Symptoms (as evidenced by):    Recent NPO status    Interventions (treatment strategy):  Commercial beverage  Collaboration with other providers    Nutrition Diagnosis Status:   New    Malnutrition Assessment   Weight Loss (Malnutrition):  (8% in 6 months)       Reason for Assessment  Reason For Assessment: length of stay  Diagnosis:  (CKD stage 5)  Relevant Medical History: CHF, HTN, thyroid disease, HLD, heart attack, V Tach, renal cyst, CKD, secondary hyperparathyroidism, vitamin D deficiency, hyperuricemia, edema, essential HTN, cardiomyopathy, COPD, CAD  General Information Comments: Pt admitted 4/29 for abnormal labs and SOB. S/P PD placement 5/8, pt receiving HD until PD healed. Pt recieving renal diet with 0% reported intake. Recent wt loss of 15 lb in 6 months. Unable to complete NFPE secondary to pt off unit at visit. Nick 20- S/P PD placement. Educated pt on 4/30 on fluid and sodium restriction.  Nutrition Discharge Planning: pt to d/c on renal diet    Nutrition Risk Screen  Nutrition Risk Screen: no indicators present    Nutrition/Diet History  Food Preferences: no Buddhism or cultural preferences  Spiritual, Cultural Beliefs, Protestant Practices, Values that Affect Care: no  Factors Affecting Nutritional Intake: None identified at this time    Anthropometrics  Temp: 97.4 °F (36.3 °C)  Height Method: Stated  Height: 5' 9" (175.3 cm)  Height (inches): 69 in  Weight Method: Standard Scale  Weight: 76.5 kg (168 lb 10.4 oz)  Weight (lb): 168.65 lb  Ideal Body " Weight (IBW), Male: 160 lb  % Ideal Body Weight, Male (lb): 105.41 %  BMI (Calculated): 24.9  BMI Grade: 18.5-24.9 - normal  Usual Body Weight (UBW), k.3 kg (1213)  % Usual Body Weight: 92.03  % Weight Change From Usual Weight: -8.16 %     Lab/Procedures/Meds  Pertinent Labs Reviewed: reviewed  Pertinent Labs Comments: Na 131 L, Cl 94 L, BUN 34 H, creatinine 4.3 H, glucose 112 H, albumin 3.1 L  Pertinent Medications Reviewed: reviewed  Pertinent Medications Comments: ergocalciferol, lasix, vitamin renal formula    Estimated/Assessed Needs  Weight Used For Calorie Calculations: 72.6 kg (160 lb) (IBW)  Energy Calorie Requirements (kcal): 2177 kcal (30 kcal/kg IBW)  Energy Need Method: Kcal/kg  Protein Requirements: 87 gm (1.2 gm/kg IBW)  Weight Used For Protein Calculations: 72.6 kg (160 lb)     Estimated Fluid Requirement Method: RDA Method  RDA Method (mL): 2177     Nutrition Prescription Ordered  Current Diet Order: renal    Evaluation of Received Nutrient/Fluid Intake  I/O: 120/0  Energy Calories Required: not meeting needs  Protein Required: not meeting needs  Fluid Required: not meeting needs  Comments: LBM 5/6  % Intake of Estimated Energy Needs: 0 - 25 %  % Meal Intake: 0 - 25 %    Nutrition Risk  Level of Risk/Frequency of Follow-up:  (2x weekly)     Monitor and Evaluation  Food and Nutrient Intake: food and beverage intake  Food and Nutrient Adminstration: diet order  Physical Activity and Function: nutrition-related ADLs and IADLs  Nutrition-Focused Physical Findings: overall appearance     Nutrition Follow-Up  RD Follow-up?: Yes

## 2024-05-09 NOTE — PROGRESS NOTES
05/09/24 1157        Hemodialysis Catheter 05/03/24 1115 right subclavian   Placement Date/Time: 05/03/24 1115   Present Prior to Hospital Arrival?: No  Hand Hygiene: Performed  Barrier Precautions: Performed  Skin Antisepsis: ChloraPrep  Hemodialysis Catheter Type: Tunneled catheter  Location: right subclavian  Catheter Size...   Line Necessity Review CRRT/HD   Site Assessment No drainage;No redness;No swelling;No warmth   Line Securement Device Secured with sutureless device   Dressing Type CHG impregnated dressing/sponge;Central line dressing;Transparent (Tegaderm)   Dressing Status Clean;Dry;Intact   Dressing Intervention Integrity maintained   Date on Dressing 05/08/24   Dressing Due to be Changed 05/15/24   Venous Patency/Care flushed w/o difficulty;blood return present;intermittent infusion cap changed;normal saline locked;deaccessed   Arterial Patency/Care flushed w/o difficulty;blood return present;intermittent infusion cap changed;normal saline locked;deaccessed   Waveform Not being transduced   Post-Hemodialysis Assessment   Blood Volume Processed (Liters) 40 L   Dialyzer Clearance Lightly streaked   Duration of Treatment 120 minutes   Additional Fluid Intake (mL) 500 mL   Total UF (mL) 1500 mL   Net Fluid Removal 1000   Patient Response to Treatment Tx completed, tolerated well, lines flushed and then packed with saline to filling volume with new end caps   Post-Treatment Weight 101.4 kg (223 lb 8.7 oz)   Treatment Weight Change 101.4   Post-Hemodialysis Comments no distress noted

## 2024-05-09 NOTE — SUBJECTIVE & OBJECTIVE
Interval History:   Stated feeling tired post HD, still requiring 5.5 L of oxygen and saturated in the 90s. No tachypnea, denies CP, nor palpitation    Review of Systems   Constitutional:  Positive for activity change and appetite change. Negative for fever.   HENT:  Positive for congestion.    Respiratory:  Positive for shortness of breath. Negative for cough, chest tightness and wheezing.    Cardiovascular:  Negative for chest pain, palpitations and leg swelling.   Gastrointestinal:  Negative for abdominal pain, diarrhea, nausea and vomiting.   Musculoskeletal:  Positive for gait problem.   Neurological:  Positive for weakness. Negative for speech difficulty.   Psychiatric/Behavioral:  Negative for agitation and confusion.    All other systems reviewed and are negative.    Objective:     Vital Signs (Most Recent):  Temp: 97.4 °F (36.3 °C) (05/09/24 0727)  Pulse: (!) 59 (05/09/24 1146)  Resp: 17 (05/09/24 0754)  BP: 98/65 (05/09/24 0727)  SpO2: (!) 91 % (05/09/24 1219) Vital Signs (24h Range):  Temp:  [97.4 °F (36.3 °C)-98.6 °F (37 °C)] 97.4 °F (36.3 °C)  Pulse:  [59-68] 59  Resp:  [16-20] 17  SpO2:  [90 %-96 %] 91 %  BP: ()/(55-69) 98/65     Weight: 76.5 kg (168 lb 10.4 oz)  Body mass index is 24.91 kg/m².    Intake/Output Summary (Last 24 hours) at 5/9/2024 1522  Last data filed at 5/9/2024 1157  Gross per 24 hour   Intake 800 ml   Output 1500 ml   Net -700 ml         Physical Exam  Vitals and nursing note reviewed.   Constitutional:       General: He is not in acute distress.     Appearance: He is not toxic-appearing.   HENT:      Head: Normocephalic and atraumatic.      Mouth/Throat:      Mouth: Mucous membranes are moist.      Pharynx: No oropharyngeal exudate.   Eyes:      Extraocular Movements: Extraocular movements intact.      Pupils: Pupils are equal, round, and reactive to light.   Cardiovascular:      Rate and Rhythm: Regular rhythm. Bradycardia present.      Heart sounds: No murmur heard.     No  "friction rub. No gallop.   Pulmonary:      Effort: Respiratory distress present.      Breath sounds: Rhonchi present.   Chest:      Chest wall: No tenderness.   Abdominal:      General: Bowel sounds are normal. There is no distension.      Palpations: Abdomen is soft.      Tenderness: There is no abdominal tenderness. There is no guarding or rebound.      Comments: PD catheter in place clean   Musculoskeletal:      Right lower leg: No edema.      Left lower leg: No edema.   Skin:     General: Skin is warm.      Findings: No erythema.   Neurological:      General: No focal deficit present.      Mental Status: He is alert and oriented to person, place, and time.      Cranial Nerves: No cranial nerve deficit.      Motor: Weakness present.      Coordination: Coordination normal.      Gait: Gait normal.   Psychiatric:         Thought Content: Thought content normal.             Significant Labs: All pertinent labs within the past 24 hours have been reviewed.  BMP:   Recent Labs   Lab 05/09/24  0153   *   *   K 4.2   CL 94*   CO2 22*   BUN 34*   CREATININE 4.3*   CALCIUM 9.0   MG 1.9     CBC:   Recent Labs   Lab 05/08/24  0307 05/09/24  0153   WBC 10.24 12.05   HGB 13.8* 14.2   HCT 39.6* 40.4   * 483*     CMP:   Recent Labs   Lab 05/08/24  0307 05/09/24  0153   * 131*   K 3.8 4.2   CL 96 94*   CO2 28 22*   GLU 93 112*   BUN 46* 34*   CREATININE 4.5* 4.3*   CALCIUM 9.0 9.0   ALBUMIN 3.1* 3.1*   ANIONGAP 7* 15     Cardiac Markers: No results for input(s): "CKMB", "MYOGLOBIN", "BNP", "TROPISTAT" in the last 48 hours.  Prealbumin: No results for input(s): "PREALBUMIN" in the last 48 hours.  Respiratory Culture: No results for input(s): "GSRESP", "RESPIRATORYC" in the last 48 hours.  Troponin: No results for input(s): "TROPONINI", "TROPONINIHS" in the last 48 hours.  TSH:   Recent Labs   Lab 03/04/24  1018   TSH 0.283*     Urine Culture: No results for input(s): "LABURIN" in the last 48 hours.  Urine " "Studies: No results for input(s): "COLORU", "APPEARANCEUA", "PHUR", "SPECGRAV", "PROTEINUA", "GLUCUA", "KETONESU", "BILIRUBINUA", "OCCULTUA", "NITRITE", "UROBILINOGEN", "LEUKOCYTESUR", "RBCUA", "WBCUA", "BACTERIA", "SQUAMEPITHEL", "HYALINECASTS" in the last 48 hours.    Invalid input(s): "WRIGHTSUR"  Recent Lab Results         05/09/24  0153        Albumin 3.1       Anion Gap 15       Baso # 0.06       Basophil % 0.5       BUN 34       Calcium 9.0       Chloride 94       CO2 22       Creatinine 4.3       Differential Method Automated       eGFR 15       Eos # 0.0       Eos % 0.1       Glucose 112       Gran # (ANC) 10.8       Gran % 89.3       Hematocrit 40.4       Hemoglobin 14.2       Immature Grans (Abs) 0.12  Comment: Mild elevation in immature granulocytes is non specific and   can be seen in a variety of conditions including stress response,   acute inflammation, trauma and pregnancy. Correlation with other   laboratory and clinical findings is essential.         Immature Granulocytes 1.0       Lymph # 0.4       Lymph % 2.9       Magnesium  1.9       MCH 26.3       MCHC 35.1       MCV 75       Mono # 0.8       Mono % 6.2       MPV 12.6       nRBC 0       Phosphorus Level 3.8       Platelet Count 483       Potassium 4.2       RBC 5.40       RDW 15.9       Sodium 131       WBC 12.05               Significant Imaging: I have reviewed all pertinent imaging results/findings within the past 24 hours.  "

## 2024-05-09 NOTE — PROGRESS NOTES
Pre-procedure teaching reinforced.   [facility] Medicine  Progress Note    Patient Name: Ar Sharma  MRN: [id number]  Patient Class: IP- Inpatient   Admission Date: 4/29/2024  Length of Stay: 10 days  Attending Physician: Lon Bowles MD  Primary Care Provider: Jc Elliott MD        Subjective:     Principal Problem:Chronic kidney disease (CKD), stage V        HPI:  Ar Sharma is a 63M with PMHx of ESRD, HFrEF, HTN, NICM, COPD, VT s/p ICD placement, and HLD who presented to [facility] ED with CC progressively worse SOB over the last two months. He was recently diagnosed with ESRD and has been trying diet modification at home. He endorses SINGH, dizziness, frequent intermittent nausea and one episode of vomiting during this time. He denies CP, syncope, dysuria, fever or chills.     ED workup significant for: , Cr 6.7, Phos 5, Tbili 2.4, BNP 3204, trop 0.040, vit D 24, .6, and CXR: cardiomegaly w/ pulmonary vascular congestion consistent w/ pulmonary edema. All other components of CBC and chemistries were unremarkable.       Patient was seen by Dr. Mata in ED. Emergent HD not indicated.     Overview/Hospital Course:  4/30 Tolerating HD through central line, SOB is improved  5/1/24 On pressors but tolerating CRRT  5/2 tolerating HD, off of pressors, stepping down  5/3 Tunneled catheter placed  5/4 had an episode of intradialytic hypotension, given albumin  5/5 Having hiccups, started PPI  5/6 Hiccups resolved. Gen Surgery consult for PD catheter  5/7 Awaiting for PD catheter placement  5/8 S/P PD catheter placement   5/9/24 S/P HD tolerate well    Interval History:   Stated feeling tired post HD, still requiring 5.5 L of oxygen and saturated in the 90s. No tachypnea, denies CP, nor palpitation    Review of Systems   Constitutional:  Positive for activity change and appetite change. Negative for fever.   HENT:  Positive for congestion.    Respiratory:  Positive for shortness of breath. Negative for cough, chest  tightness and wheezing.    Cardiovascular:  Negative for chest pain, palpitations and leg swelling.   Gastrointestinal:  Negative for abdominal pain, diarrhea, nausea and vomiting.   Musculoskeletal:  Positive for gait problem.   Neurological:  Positive for weakness. Negative for speech difficulty.   Psychiatric/Behavioral:  Negative for agitation and confusion.    All other systems reviewed and are negative.    Objective:     Vital Signs (Most Recent):  Temp: 97.4 °F (36.3 °C) (05/09/24 0727)  Pulse: (!) 59 (05/09/24 1146)  Resp: 17 (05/09/24 0754)  BP: 98/65 (05/09/24 0727)  SpO2: (!) 91 % (05/09/24 1219) Vital Signs (24h Range):  Temp:  [97.4 °F (36.3 °C)-98.6 °F (37 °C)] 97.4 °F (36.3 °C)  Pulse:  [59-68] 59  Resp:  [16-20] 17  SpO2:  [90 %-96 %] 91 %  BP: ()/(55-69) 98/65     Weight: 76.5 kg (168 lb 10.4 oz)  Body mass index is 24.91 kg/m².    Intake/Output Summary (Last 24 hours) at 5/9/2024 1522  Last data filed at 5/9/2024 1157  Gross per 24 hour   Intake 800 ml   Output 1500 ml   Net -700 ml         Physical Exam  Vitals and nursing note reviewed.   Constitutional:       General: He is not in acute distress.     Appearance: He is not toxic-appearing.   HENT:      Head: Normocephalic and atraumatic.      Mouth/Throat:      Mouth: Mucous membranes are moist.      Pharynx: No oropharyngeal exudate.   Eyes:      Extraocular Movements: Extraocular movements intact.      Pupils: Pupils are equal, round, and reactive to light.   Cardiovascular:      Rate and Rhythm: Regular rhythm. Bradycardia present.      Heart sounds: No murmur heard.     No friction rub. No gallop.   Pulmonary:      Effort: Respiratory distress present.      Breath sounds: Rhonchi present.   Chest:      Chest wall: No tenderness.   Abdominal:      General: Bowel sounds are normal. There is no distension.      Palpations: Abdomen is soft.      Tenderness: There is no abdominal tenderness. There is no guarding or rebound.      Comments: PD  "catheter in place clean   Musculoskeletal:      Right lower leg: No edema.      Left lower leg: No edema.   Skin:     General: Skin is warm.      Findings: No erythema.   Neurological:      General: No focal deficit present.      Mental Status: He is alert and oriented to person, place, and time.      Cranial Nerves: No cranial nerve deficit.      Motor: Weakness present.      Coordination: Coordination normal.      Gait: Gait normal.   Psychiatric:         Thought Content: Thought content normal.             Significant Labs: All pertinent labs within the past 24 hours have been reviewed.  BMP:   Recent Labs   Lab 05/09/24  0153   *   *   K 4.2   CL 94*   CO2 22*   BUN 34*   CREATININE 4.3*   CALCIUM 9.0   MG 1.9     CBC:   Recent Labs   Lab 05/08/24  0307 05/09/24  0153   WBC 10.24 12.05   HGB 13.8* 14.2   HCT 39.6* 40.4   * 483*     CMP:   Recent Labs   Lab 05/08/24  0307 05/09/24  0153   * 131*   K 3.8 4.2   CL 96 94*   CO2 28 22*   GLU 93 112*   BUN 46* 34*   CREATININE 4.5* 4.3*   CALCIUM 9.0 9.0   ALBUMIN 3.1* 3.1*   ANIONGAP 7* 15     Cardiac Markers: No results for input(s): "CKMB", "MYOGLOBIN", "BNP", "TROPISTAT" in the last 48 hours.  Prealbumin: No results for input(s): "PREALBUMIN" in the last 48 hours.  Respiratory Culture: No results for input(s): "GSRESP", "RESPIRATORYC" in the last 48 hours.  Troponin: No results for input(s): "TROPONINI", "TROPONINIHS" in the last 48 hours.  TSH:   Recent Labs   Lab 03/04/24  1018   TSH 0.283*     Urine Culture: No results for input(s): "LABURIN" in the last 48 hours.  Urine Studies: No results for input(s): "COLORU", "APPEARANCEUA", "PHUR", "SPECGRAV", "PROTEINUA", "GLUCUA", "KETONESU", "BILIRUBINUA", "OCCULTUA", "NITRITE", "UROBILINOGEN", "LEUKOCYTESUR", "RBCUA", "WBCUA", "BACTERIA", "SQUAMEPITHEL", "HYALINECASTS" in the last 48 hours.    Invalid input(s): "CLARENCE"  Recent Lab Results         05/09/24  0153        Albumin 3.1       " Anion Gap 15       Baso # 0.06       Basophil % 0.5       BUN 34       Calcium 9.0       Chloride 94       CO2 22       Creatinine 4.3       Differential Method Automated       eGFR 15       Eos # 0.0       Eos % 0.1       Glucose 112       Gran # (ANC) 10.8       Gran % 89.3       Hematocrit 40.4       Hemoglobin 14.2       Immature Grans (Abs) 0.12  Comment: Mild elevation in immature granulocytes is non specific and   can be seen in a variety of conditions including stress response,   acute inflammation, trauma and pregnancy. Correlation with other   laboratory and clinical findings is essential.         Immature Granulocytes 1.0       Lymph # 0.4       Lymph % 2.9       Magnesium  1.9       MCH 26.3       MCHC 35.1       MCV 75       Mono # 0.8       Mono % 6.2       MPV 12.6       nRBC 0       Phosphorus Level 3.8       Platelet Count 483       Potassium 4.2       RBC 5.40       RDW 15.9       Sodium 131       WBC 12.05               Significant Imaging: I have reviewed all pertinent imaging results/findings within the past 24 hours.    Assessment/Plan:      * Chronic kidney disease (CKD), stage V  Creatine stable for now. BMP reviewed- noted Estimated Creatinine Clearance: 20.7 mL/min (A) (based on SCr of 3.6 mg/dL (H)). according to latest data. Based on current GFR, CKD stage is end stage.  Monitor UOP and serial BMP and adjust therapy as needed. Renally dose meds. Avoid nephrotoxic medications and procedures.    --see by Dr. Mata in ED, gen surg consulted for tunneled catheter placement in AM 4/30/24  --admit to ICU for CRRT once catheter placed-->tolerating HD will step down today  --Plan for PD catheter placement  -case management for DC planning    Hypotension  persistent  Weaned off of pressors  cont midodrine  Goal MAP >65  Orthostatics negative, off of pressors, Step down to floor  S/P PD placement seems tolerate procedure well  Monitor BP    Acute hypoxemic respiratory failure  Patient with Hypoxic  Respiratory failure which is Acute.  he is not on home oxygen. Supplemental oxygen was provided and noted-      .   Signs/symptoms of respiratory failure include- increased work of breathing, respiratory distress, and lethargy. Contributing diagnoses includes - CHF Labs and images were reviewed. Patient Has not had a recent ABG. Will treat underlying causes and adjust management of respiratory failure as follows- cont O2 at 5L weaning down to 3L if can tolerate prior DC    ESRD (end stage renal disease)  Creatine stable for now. BMP reviewed- noted Estimated Creatinine Clearance: 17.4 mL/min (A) (based on SCr of 4.3 mg/dL (H)). according to latest data. Based on current GFR, CKD stage is end stage.  Monitor UOP and serial BMP and adjust therapy as needed. Renally dose meds. Avoid nephrotoxic medications and procedures.    Seen by Nephrology, Trialysis placed for CRRT, tolerating  5/3 Tunneled catheter placed 5/3  Had some intradialytic hypotension, required albumin in addition to midodrine  S/P PD catheter placement plan for another 2 weeks HD and then will start PD  Case management for DC planning    Pulmonary emphysema, unspecified emphysema type  Patient's COPD is controlled currently.  Patient is currently off COPD Pathway. Continue scheduled inhalers Supplemental oxygen and monitor respiratory status closely.     Non-ischemic cardiomyopathy  Echo    Left Ventricle: The left ventricle is normal in size. Normal wall thickness. Regional wall motion abnormalities present. See diagram for wall motion findings. Septal motion is consistent with pacing. There is mildly reduced systolic function with a visually estimated ejection fraction of 40 - 45%. Grade III diastolic dysfunction.    Right Ventricle: Moderate to severe right ventricular enlargement. Systolic function is reduced.TAPSE is 1.59 cm. Pacemaker lead present in the ventricle.    Left Atrium: Left atrium is severely dilated. The left atrium volume index is  71.4 mL/m2.    Right Atrium: Right atrium is severely dilated.    Aortic Valve: There is mild aortic valve sclerosis. There is mild to moderate stenosis. Aortic valve area by VTI is 1.49 cm². Aortic valve peak velocity is 1.43 m/s. Mean gradient is 5 mmHg. The dimensionless index is 0.52.    Mitral Valve: There is mild regurgitation.    Tricuspid Valve: There is mild to moderate regurgitation.    Pulmonary Artery: The estimated pulmonary artery systolic pressure is 64 mmHg.    IVC/SVC: Elevated venous pressure at 15 mmHg.         Abuse of herbal or folk remedies        Hyponatremia  Patient has hyponatremia which is controlled,We will aim to correct the sodium by 4-6mEq in 24 hours. We will monitor sodium Daily. The hyponatremia is due to renal insufficiency. We will obtain the following studies: NA. We will treat the hyponatremia with Fluid restriction of:  1.5 liter per day and Hemodialysis. The patient's sodium results have been reviewed and are listed below.  Recent Labs   Lab 05/09/24  0153   *         Primary hypertension  Chronic, controlled. Latest blood pressure and vitals reviewed-     Temp:  [97.4 °F (36.3 °C)-98.6 °F (37 °C)]   Pulse:  [59-68]   Resp:  [16-20]   BP: ()/(55-69)   SpO2:  [90 %-96 %] .   Home meds for hypertension were reviewed and noted below.   Hypertension Medications               furosemide (LASIX) 80 MG tablet Take 1 tablet (80 mg total) by mouth 2 (two) times daily. If increased 3 pounds in a day or 5 pounds in a week, take an extra dose of lasix 80mg until those pounds are lost.    metoprolol succinate (TOPROL-XL) 25 MG 24 hr tablet TAKE 1 TABLET BY MOUTH EVERY DAY    nitroGLYCERIN (NITROSTAT) 0.4 MG SL tablet Place 1 tablet (0.4 mg total) under the tongue every 5 (five) minutes as needed for Chest pain.    sacubitriL-valsartan (ENTRESTO) 24-26 mg per tablet Take 1 tablet by mouth 2 (two) times daily.            While in the hospital, will manage blood pressure as  follows; Adjust home antihypertensive regimen as follows- hold home bp medications in the setting of hypotension requiring CRRT    Will utilize p.r.n. blood pressure medication only if patient's blood pressure greater than 180/110 and he develops symptoms such as worsening chest pain or shortness of breath.    HFrEF (heart failure with reduced ejection fraction)  Congestive Heart Failure  Patient presents for re-evaluation of congestive heart failure. Patient's current complaints are dyspnea, fatigue, near-syncope, and orthopnea. He denies chest pain, claudication, exertional chest pressure/discomfort, and lower extremity edema. He states he is compliant most of the time with his medications. He states he is compliant all of the time with his diet.    --last TTE in 2023 shows EF 40%. Will repeat this admission  --on home lasix. Will convert to higher IV dosage  --will hold troprol in setting of hypotension requiring CRRT  --on CHF pathway    Cardiac resynchronization therapy defibrillator (CRT-D) in place        Mixed hyperlipidemia  --continue home medications      Iron deficiency anemia  Patient's anemia is currently controlled. Has not received any PRBCs to date. Etiology likely d/t Iron deficiency and chronic disease due to ESRD  Current CBC reviewed-   Lab Results   Component Value Date    HGB 14.2 05/09/2024    HCT 40.4 05/09/2024     Monitor serial CBC and transfuse if patient becomes hemodynamically unstable, symptomatic or H/H drops below 7/21.    Hypothyroidism  --continue home synthroid    V-tach  --hx of AICD placement, currently 100% paced   --continue home antiarrhythmic medications  --cardiac monitoring          VTE Risk Mitigation (From admission, onward)           Ordered     heparin (porcine) injection 2,400 Units  As needed (PRN)         04/30/24 2223     heparin (porcine) injection 5,000 Units  Every 8 hours         04/29/24 1945     IP VTE HIGH RISK PATIENT  Once         04/29/24 1945     Place  sequential compression device  Until discontinued         04/29/24 1945                    Discharge Planning   SLAVA:      Code Status: Full Code   Is the patient medically ready for discharge?:     Reason for patient still in hospital (select all that apply): Patient trending condition, Treatment, Consult recommendations, and Pending disposition  Discharge Plan A: Skilled Nursing Facility   Discharge Delays: None known at this time        Time spent > 35 min      Lon Bowles MD  Department of [facility] Medicine   [facility]

## 2024-05-09 NOTE — PLAN OF CARE
Recommendation:   1. Add novasource renal BID.   2. Encourage intake at meals.   3. Monitor wt and labs.  Goals:   Pt will consume 50-75% intake at meals by RD follow up.  Nutrition Goal Status: new  Communication of RD Recs: other (comment) (POC)

## 2024-05-09 NOTE — PLAN OF CARE
Problem: Physical Therapy  Goal: Physical Therapy Goal  Description: Goals to be met by: 24     Patient will increase functional independence with mobility by performin. Supine to sit with Modified [place]  2. Sit to supine with Modified [place]  3. Sit to stand transfer with Modified [place] with LRAD.   4. Bed to chair transfer with Stand-by Assistance using LRAD.  5. Gait  x 100 feet with Stand-by Assistance using LRAD.     Outcome: Progressing

## 2024-05-09 NOTE — PROGRESS NOTES
[facility]  General Surgery  Progress Note    Subjective:     History of Present Illness:  Ar Sharma is a 63M with PMHx of ESRD, HFrEF, HTN, NICM, COPD, VT s/p ICD placement, and HLD who presented to [facility] ED with CC progressively worse SOB over the last two months. He still makes urine, but his kidney function has rapidly declined recently, now requiring dialysis. Dr. Perez placed a tunneled HD line on 5/3. He has been receiving HD since then, but has had issues with hypotension during HD sessions requiring midodrine. General Surgery re consulted for PD cath placement in light of his hypotension during HD. Mr. Sharma is amenable to a PD catheter. He has never had abdominal surgery before. No therapeutic anticoagulants on board. Last echo with an EF of 40%, mild/mod AS, and a PA pressure of 64. No fevers, leukocytosis, or other concerns for infection.    Post-Op Info:  Procedure(s) (LRB):  INSERTION, CATHETER, DIALYSIS, PERITONEAL, LAPAROSCOPIC (N/A)   1 Day Post-Op     Interval History: NAEON. Feeling well this morning. Some bleeding around PD cath exit site overnight, now stopped with pressure dressing. AVSS. Lab work stable.    Medications:  Continuous Infusions:  Scheduled Meds:   sodium chloride 0.9%   Intravenous Once    sodium chloride 0.9%   Intravenous Once    sodium chloride 0.9%   Intravenous Once    allopurinoL  100 mg Oral Daily    alteplase  4 mg Intra-Catheter Once    amiodarone  400 mg Oral Daily    aspirin  81 mg Oral Daily    ergocalciferol  50,000 Units Oral Q7 Days    furosemide (LASIX) injection  80 mg Intravenous Q12H    heparin (porcine)  5,000 Units Subcutaneous Q8H    levothyroxine  150 mcg Oral Daily    mexiletine  150 mg Oral BID WM    midodrine  15 mg Oral Q8H    mirtazapine  7.5 mg Oral QHS    pantoprazole  40 mg Oral Daily    polyethylene glycol  17 g Oral Daily    pravastatin  40 mg Oral Daily    vitamin renal formula (B-complex-vitamin c-folic acid)  1 capsule Oral Daily      PRN Meds:  Current Facility-Administered Medications:     sodium chloride 0.9%, , Intravenous, PRN    sodium chloride 0.9%, , Intravenous, PRN    acetaminophen, 650 mg, Oral, Q4H PRN    calcium carbonate, 1,000 mg, Oral, TID PRN    heparin (porcine), 2,400 Units, Intra-Catheter, PRN    HYDROcodone-acetaminophen, 1 tablet, Oral, Q4H PRN    HYDROmorphone, 0.2 mg, Intravenous, Q5 Min PRN    lactulose, 20 g, Oral, Daily PRN    meperidine, 12.5 mg, Intravenous, Once PRN    ondansetron, 4 mg, Intravenous, Q8H PRN    ondansetron, 4 mg, Intravenous, Once PRN    sodium chloride 0.9%, 250 mL, Intravenous, PRN    sodium chloride 0.9%, 250 mL, Intravenous, PRN    sodium chloride 0.9%, 250 mL, Intravenous, PRN    sodium chloride 0.9%, 250 mL, Intravenous, PRN    sodium chloride 0.9%, 10 mL, Intravenous, PRN    sodium chloride 0.9%, 10 mL, Intravenous, PRN    sodium chloride 0.9%, 3 mL, Intravenous, PRN     Review of patient's allergies indicates:   Allergen Reactions    Lokelma [sodium zirconium cyclosilicate] Other (See Comments)     Fluid retention, weight gain, CHF excerebration, severe constipation    Atorvastatin Other (See Comments)     Joint pain    Jardiance [empagliflozin] Other (See Comments)     Chest pains, significant weight loss, lower blood pressure     Objective:     Vital Signs (Most Recent):  Temp: 97.5 °F (36.4 °C) (05/09/24 0519)  Pulse: 64 (05/09/24 0519)  Resp: 16 (05/09/24 0519)  BP: 105/61 (05/09/24 0519)  SpO2: (!) 94 % (05/09/24 0519) Vital Signs (24h Range):  Temp:  [97 °F (36.1 °C)-98.6 °F (37 °C)] 97.5 °F (36.4 °C)  Pulse:  [59-70] 64  Resp:  [16-21] 16  SpO2:  [89 %-97 %] 94 %  BP: ()/(55-70) 105/61     Weight: 76.5 kg (168 lb 10.4 oz)  Body mass index is 24.91 kg/m².    Intake/Output - Last 3 Shifts         05/07 0700 05/08 0659 05/08 0700 05/09 0659 05/09 0700  05/10 0659    P.O.  180     Other  500     Total Intake(mL/kg)  680 (8.9)     Urine (mL/kg/hr) 200 (0.1)      Other  1800      Total Output 200 1800     Net -200 -1120            Urine Occurrence  1 x              Physical Exam  Vitals reviewed.   Constitutional:       Appearance: Normal appearance.   Cardiovascular:      Rate and Rhythm: Normal rate and regular rhythm.   Pulmonary:      Effort: Pulmonary effort is normal. No respiratory distress.   Abdominal:      Palpations: Abdomen is soft.      Tenderness: There is no abdominal tenderness.      Comments: Incision sites healing well. Had some oozing that saturated the PD cath dressing. Dressing changed this morning and hemostatic.   Skin:     General: Skin is warm.   Neurological:      General: No focal deficit present.      Mental Status: He is alert and oriented to person, place, and time.          Significant Labs:  I have reviewed all pertinent lab results within the past 24 hours.  CBC:   Recent Labs   Lab 05/09/24  0153   WBC 12.05   RBC 5.40   HGB 14.2   HCT 40.4   *   MCV 75*   MCH 26.3*   MCHC 35.1     CMP:   Recent Labs   Lab 05/09/24  0153   *   CALCIUM 9.0   ALBUMIN 3.1*   *   K 4.2   CO2 22*   CL 94*   BUN 34*   CREATININE 4.3*       Significant Diagnostics:  I have reviewed all pertinent imaging results/findings within the past 24 hours.  Assessment/Plan:     ESRD (end stage renal disease)  63 yoM with ESRD now requiring dialysis. Patient hypotensive during HD sessions. Now s/p PD catheter placement on 5/8.    - Ok to use PD catheter today  - Follow up in clinic for removal of HD line in 1 month to ensure PD catheter working well  - Ok for diet  - Other care per primary team        Colin Mosquera MD  General Surgery  [place] - Telemetry

## 2024-05-09 NOTE — ASSESSMENT & PLAN NOTE
63 yoM with ESRD now requiring dialysis. Patient hypotensive during HD sessions. Now s/p PD catheter placement on 5/8.    - Ok to use PD catheter today  - Follow up in clinic for removal of HD line in 1 month to ensure PD catheter working well  - Ok for diet  - Other care per primary team

## 2024-05-09 NOTE — PROGRESS NOTES
05/08/24 1420   Vital Signs   Temp 97 °F (36.1 °C)   Temp Source Axillary   Pulse (!) 59   Heart Rate Source Monitor   Resp 16   SpO2 (!) 89 %  (pt returned from OR; pt arousable and opens eyes to voice but is sleepy; sats up to 89% on 5 L NC. called respiratory to place pt on high flow nc to keep o2 above 92%.)   Flow (L/min) (Oxygen Therapy) 5   Device (Oxygen Therapy) nasal cannula   BP (!) 94/58   BP Location Right arm   BP Method Automatic   Patient Position Lying   Vitals   Systolic Average 100.67   Diastolic Average 64.33   Heart Rate/Pulse Average 61     Dressing to PD cath with small amount of bloody drain, marked.

## 2024-05-09 NOTE — PROGRESS NOTES
05/09/24 1150        Peritoneal Dialysis Catheter 05/08/24 Left lower abdomen   Placement Date: 05/08/24   Present Prior to Hospital Arrival?: No  Inserted by: MD  Catheter Location: Left lower abdomen   Site Assessment Clean;Dry;Intact;No redness;No swelling   Dressing Intervention Sterile dressing change   Status Flushed   Dressing Status Clean;Dry;Intact   Dressing Gauze   Securement secured to abdomen with tape   Clamp Status clamped   Exchange completed   Peritoneal Dialysis   Exchange Type Manual   Peritoneal Treatment Status Completed   Dianeal Solution Other (Comment)  (Normal Saline)   Manual Peritoneal Dialysis   Manual Fill Volume (mL) 500 mL   Manual Drain Volume (mL) 500 mL   Effluent Appearance Pink   Manual Treatment Comments Explained plan of care and answered questions, pt in agreement with treatment plan and consent signed, filled and drained 500 mls of NS, tolerated well, no distress noted

## 2024-05-09 NOTE — ASSESSMENT & PLAN NOTE
Creatine stable for now. BMP reviewed- noted Estimated Creatinine Clearance: 17.4 mL/min (A) (based on SCr of 4.3 mg/dL (H)). according to latest data. Based on current GFR, CKD stage is end stage.  Monitor UOP and serial BMP and adjust therapy as needed. Renally dose meds. Avoid nephrotoxic medications and procedures.    Seen by Nephrology, Trialysis placed for CRRT, tolerating  5/3 Tunneled catheter placed 5/3  Had some intradialytic hypotension, required albumin in addition to midodrine  S/P PD catheter placement plan for another 2 weeks HD and then will start PD  Case management for DC planning

## 2024-05-09 NOTE — PLAN OF CARE
left message for Jt again at [facility]. Informed them have been trying to get in touch since Monday, but have not received call back. CM wanted to ensure he knows patient will be getting a PD catheter.     -- spoke with Dr. Chicas. She clarified with  regarding patient's PD. She told me everything is taken care of. Patient will be doing HD for 2 weeks so that his PD catheter can heal. She has spoken to anatoly and Rebeca (PD) and they are updated. She told me patient can start on Monday since HD today.  contacted Roula at [facility]. She said Jt just got in, she will relay him the info and get back with me.      went to meet with patient to discuss discharge plans with him. Patient made aware will do HD for 2 weeks then be transitioned to PD once catheter is healed. Patient is refusing any DME at [facility], but he is agreeable to home health at discharge. Patient is still on 5L of oxygen, so not ready to discharge.     [facility] Phone#[phone]     Patient Contacts    Name Relation Home Work Mobile   Nicolette Sharma   [phone]     Future Appointments   Date Time Provider Department Center   5/14/2024 11:00 AM Fatmata Johansen MD [facility]   5/16/2024  3:00 PM Shanell José PA-C [facility] FAM MED [facility]   5/22/2024  2:20 PM Tyree Ruiz MD [facility]   9/4/2024  9:40 AM SAME DAY LAB, [facility]   9/11/2024  2:00 PM Jc Elliott MD [facility] FAM MED [facility]                  Name Relationship Specialty Phone Fax Address Order                Fatmata Johansen MD  Internal Medicine [phone] [address] [address] [facility] [address]     Next Steps: Follow up on 5/14/2024  Instructions: 11:00am HSP DX:New hd pt        Shanell José PA-C  Family Medicine [phone] [address] [address]     Next  Steps: Follow up on 5/16/2024  Instructions: 3:00pm        [facility]   [phone] [address] [facility] [address]     Next Steps: Follow up  Instructions: Dialysis Facility             05/09/24 1022   Discharge Reassessment   Assessment Type Discharge Planning Reassessment   Did the patient's condition or plan change since previous assessment? No   Discharge Plan discussed with: Patient   Discharge Plan A Skilled Nursing Facility   Discharge Plan B Home with family;Home Health   DME Needed Upon Discharge  none   Transition of Care Barriers None   Why the patient remains in the hospital Requires continued medical care   Post-Acute Status   Post-Acute Authorization Home Health;Dialysis   Home Health Status Pending medical clearance/testing   Diaylsis Status Set-up Complete/Auth obtained   Hospital Resources/Appts/Education Provided Appointments scheduled and added to AVS   Discharge Delays None known at this time     Isha Hidalgo RN    [phone]

## 2024-05-09 NOTE — SUBJECTIVE & OBJECTIVE
Interval History: NAEON. Feeling well this morning. Some bleeding around PD cath exit site overnight, now stopped with pressure dressing. AVSS. Lab work stable.    Medications:  Continuous Infusions:  Scheduled Meds:   sodium chloride 0.9%   Intravenous Once    sodium chloride 0.9%   Intravenous Once    sodium chloride 0.9%   Intravenous Once    allopurinoL  100 mg Oral Daily    alteplase  4 mg Intra-Catheter Once    amiodarone  400 mg Oral Daily    aspirin  81 mg Oral Daily    ergocalciferol  50,000 Units Oral Q7 Days    furosemide (LASIX) injection  80 mg Intravenous Q12H    heparin (porcine)  5,000 Units Subcutaneous Q8H    levothyroxine  150 mcg Oral Daily    mexiletine  150 mg Oral BID WM    midodrine  15 mg Oral Q8H    mirtazapine  7.5 mg Oral QHS    pantoprazole  40 mg Oral Daily    polyethylene glycol  17 g Oral Daily    pravastatin  40 mg Oral Daily    vitamin renal formula (B-complex-vitamin c-folic acid)  1 capsule Oral Daily     PRN Meds:  Current Facility-Administered Medications:     sodium chloride 0.9%, , Intravenous, PRN    sodium chloride 0.9%, , Intravenous, PRN    acetaminophen, 650 mg, Oral, Q4H PRN    calcium carbonate, 1,000 mg, Oral, TID PRN    heparin (porcine), 2,400 Units, Intra-Catheter, PRN    HYDROcodone-acetaminophen, 1 tablet, Oral, Q4H PRN    HYDROmorphone, 0.2 mg, Intravenous, Q5 Min PRN    lactulose, 20 g, Oral, Daily PRN    meperidine, 12.5 mg, Intravenous, Once PRN    ondansetron, 4 mg, Intravenous, Q8H PRN    ondansetron, 4 mg, Intravenous, Once PRN    sodium chloride 0.9%, 250 mL, Intravenous, PRN    sodium chloride 0.9%, 250 mL, Intravenous, PRN    sodium chloride 0.9%, 250 mL, Intravenous, PRN    sodium chloride 0.9%, 250 mL, Intravenous, PRN    sodium chloride 0.9%, 10 mL, Intravenous, PRN    sodium chloride 0.9%, 10 mL, Intravenous, PRN    sodium chloride 0.9%, 3 mL, Intravenous, PRN     Review of patient's allergies indicates:   Allergen Reactions    Lokelma [sodium  zirconium cyclosilicate] Other (See Comments)     Fluid retention, weight gain, CHF excerebration, severe constipation    Atorvastatin Other (See Comments)     Joint pain    Jardiance [empagliflozin] Other (See Comments)     Chest pains, significant weight loss, lower blood pressure     Objective:     Vital Signs (Most Recent):  Temp: 97.5 °F (36.4 °C) (05/09/24 0519)  Pulse: 64 (05/09/24 0519)  Resp: 16 (05/09/24 0519)  BP: 105/61 (05/09/24 0519)  SpO2: (!) 94 % (05/09/24 0519) Vital Signs (24h Range):  Temp:  [97 °F (36.1 °C)-98.6 °F (37 °C)] 97.5 °F (36.4 °C)  Pulse:  [59-70] 64  Resp:  [16-21] 16  SpO2:  [89 %-97 %] 94 %  BP: ()/(55-70) 105/61     Weight: 76.5 kg (168 lb 10.4 oz)  Body mass index is 24.91 kg/m².    Intake/Output - Last 3 Shifts         05/07 0700  05/08 0659 05/08 0700  05/09 0659 05/09 0700  05/10 0659    P.O.  180     Other  500     Total Intake(mL/kg)  680 (8.9)     Urine (mL/kg/hr) 200 (0.1)      Other  1800     Total Output 200 1800     Net -200 -1120            Urine Occurrence  1 x              Physical Exam  Vitals reviewed.   Constitutional:       Appearance: Normal appearance.   Cardiovascular:      Rate and Rhythm: Normal rate and regular rhythm.   Pulmonary:      Effort: Pulmonary effort is normal. No respiratory distress.   Abdominal:      Palpations: Abdomen is soft.      Tenderness: There is no abdominal tenderness.      Comments: Incision sites healing well. Had some oozing that saturated the PD cath dressing. Dressing changed this morning and hemostatic.   Skin:     General: Skin is warm.   Neurological:      General: No focal deficit present.      Mental Status: He is alert and oriented to person, place, and time.          Significant Labs:  I have reviewed all pertinent lab results within the past 24 hours.  CBC:   Recent Labs   Lab 05/09/24  0153   WBC 12.05   RBC 5.40   HGB 14.2   HCT 40.4   *   MCV 75*   MCH 26.3*   MCHC 35.1     CMP:   Recent Labs   Lab  05/09/24  0153   *   CALCIUM 9.0   ALBUMIN 3.1*   *   K 4.2   CO2 22*   CL 94*   BUN 34*   CREATININE 4.3*       Significant Diagnostics:  I have reviewed all pertinent imaging results/findings within the past 24 hours.

## 2024-05-09 NOTE — PROCEDURES
Seen and examined on dialysis.  Tolerating procedure well       Dr. Deal laser PD catheter, we will flush tomorrow after hemodialysis    From Nephrology standpoint patient is okay to go home after dialysis today and come to [facility] in [place] on Monday.     Anemia chronic kidney disease-hemoglobin is at goal, no Epogen, iron is at goal  Recent Labs   Lab 05/07/24  0219 05/08/24  0307 05/09/24  0153   WBC 10.28 10.24 12.05   HGB 13.7* 13.8* 14.2   HCT 39.4* 39.6* 40.4   * 475* 483*       Lab Results   Component Value Date    IRON 109 10/07/2022    TIBC 394 10/07/2022    FERRITIN 676 (H) 10/07/2022       Metabolic bone disease secondary hyperparathyroidism of renal origin - continue ergocalciferol 12398 units weekly    Phosphorus is at goal not on binders    Lab Results   Component Value Date    .6 (H) 04/29/2024    CALCIUM 9.0 05/09/2024    PHOS 3.8 05/09/2024     Recent Labs   Lab 05/07/24  0219 05/08/24  0307 05/09/24  0153   MG 2.0 2.0 1.9     Lab Results   Component Value Date    [id number] 24 (L) 04/29/2024     Bicarbonate bath adjusted the 35  Lab Results   Component Value Date    CO2 22 (L) 05/09/2024       Intra dialytic hypotension-on midodrine 15q8,     Thank you for consult, will follow  With any question please call [phone]  Brittney Chicas    [facility]    ELISHA Mata MD,   MD CORINNE Pollack MD E. V. Harmon, NP  [address]  [place], [address] [phone]

## 2024-05-09 NOTE — ASSESSMENT & PLAN NOTE
Patient has hyponatremia which is controlled,We will aim to correct the sodium by 4-6mEq in 24 hours. We will monitor sodium Daily. The hyponatremia is due to renal insufficiency. We will obtain the following studies: NA. We will treat the hyponatremia with Fluid restriction of:  1.5 liter per day and Hemodialysis. The patient's sodium results have been reviewed and are listed below.  Recent Labs   Lab 05/09/24  0153   *

## 2024-05-09 NOTE — ASSESSMENT & PLAN NOTE
Patient's anemia is currently controlled. Has not received any PRBCs to date. Etiology likely d/t Iron deficiency and chronic disease due to ESRD  Current CBC reviewed-   Lab Results   Component Value Date    HGB 14.2 05/09/2024    HCT 40.4 05/09/2024     Monitor serial CBC and transfuse if patient becomes hemodynamically unstable, symptomatic or H/H drops below 7/21.

## 2024-05-09 NOTE — ASSESSMENT & PLAN NOTE
Patient with Hypoxic Respiratory failure which is Acute.  he is not on home oxygen. Supplemental oxygen was provided and noted-      .   Signs/symptoms of respiratory failure include- increased work of breathing, respiratory distress, and lethargy. Contributing diagnoses includes - CHF Labs and images were reviewed. Patient Has not had a recent ABG. Will treat underlying causes and adjust management of respiratory failure as follows- cont O2 at 5L weaning down to 3L if can tolerate prior DC

## 2024-05-10 PROBLEM — T85.611S: Status: ACTIVE | Noted: 2024-05-10

## 2024-05-10 LAB
ALBUMIN SERPL BCP-MCNC: 3 G/DL (ref 3.5–5.2)
ANION GAP SERPL CALC-SCNC: 11 MMOL/L (ref 8–16)
ANION GAP SERPL CALC-SCNC: 11 MMOL/L (ref 8–16)
BASOPHILS # BLD AUTO: 0.12 K/UL (ref 0–0.2)
BASOPHILS NFR BLD: 1.1 % (ref 0–1.9)
BUN SERPL-MCNC: 34 MG/DL (ref 8–23)
BUN SERPL-MCNC: 34 MG/DL (ref 8–23)
CALCIUM SERPL-MCNC: 8.7 MG/DL (ref 8.7–10.5)
CALCIUM SERPL-MCNC: 8.7 MG/DL (ref 8.7–10.5)
CHLORIDE SERPL-SCNC: 97 MMOL/L (ref 95–110)
CHLORIDE SERPL-SCNC: 97 MMOL/L (ref 95–110)
CO2 SERPL-SCNC: 23 MMOL/L (ref 23–29)
CO2 SERPL-SCNC: 23 MMOL/L (ref 23–29)
CREAT SERPL-MCNC: 4.3 MG/DL (ref 0.5–1.4)
CREAT SERPL-MCNC: 4.3 MG/DL (ref 0.5–1.4)
DIFFERENTIAL METHOD BLD: ABNORMAL
EOSINOPHIL # BLD AUTO: 0.3 K/UL (ref 0–0.5)
EOSINOPHIL NFR BLD: 2.6 % (ref 0–8)
ERYTHROCYTE [DISTWIDTH] IN BLOOD BY AUTOMATED COUNT: 16.2 % (ref 11.5–14.5)
EST. GFR  (NO RACE VARIABLE): 15 ML/MIN/1.73 M^2
EST. GFR  (NO RACE VARIABLE): 15 ML/MIN/1.73 M^2
GLUCOSE SERPL-MCNC: 87 MG/DL (ref 70–110)
GLUCOSE SERPL-MCNC: 87 MG/DL (ref 70–110)
HCT VFR BLD AUTO: 41.7 % (ref 40–54)
HGB BLD-MCNC: 14.3 G/DL (ref 14–18)
IMM GRANULOCYTES # BLD AUTO: 0.13 K/UL (ref 0–0.04)
IMM GRANULOCYTES NFR BLD AUTO: 1.2 % (ref 0–0.5)
LYMPHOCYTES # BLD AUTO: 0.6 K/UL (ref 1–4.8)
LYMPHOCYTES NFR BLD: 5.6 % (ref 18–48)
MAGNESIUM SERPL-MCNC: 1.9 MG/DL (ref 1.6–2.6)
MCH RBC QN AUTO: 25.5 PG (ref 27–31)
MCHC RBC AUTO-ENTMCNC: 34.3 G/DL (ref 32–36)
MCV RBC AUTO: 75 FL (ref 82–98)
MONOCYTES # BLD AUTO: 1.1 K/UL (ref 0.3–1)
MONOCYTES NFR BLD: 10 % (ref 4–15)
NEUTROPHILS # BLD AUTO: 8.4 K/UL (ref 1.8–7.7)
NEUTROPHILS NFR BLD: 79.5 % (ref 38–73)
NRBC BLD-RTO: 0 /100 WBC
PHOSPHATE SERPL-MCNC: 3.6 MG/DL (ref 2.7–4.5)
PLATELET # BLD AUTO: 484 K/UL (ref 150–450)
PMV BLD AUTO: 12.7 FL (ref 9.2–12.9)
POTASSIUM SERPL-SCNC: 4.3 MMOL/L (ref 3.5–5.1)
POTASSIUM SERPL-SCNC: 4.3 MMOL/L (ref 3.5–5.1)
RBC # BLD AUTO: 5.6 M/UL (ref 4.6–6.2)
SODIUM SERPL-SCNC: 131 MMOL/L (ref 136–145)
SODIUM SERPL-SCNC: 131 MMOL/L (ref 136–145)
WBC # BLD AUTO: 10.57 K/UL (ref 3.9–12.7)

## 2024-05-10 PROCEDURE — 36415 COLL VENOUS BLD VENIPUNCTURE: CPT | Performed by: INTERNAL MEDICINE

## 2024-05-10 PROCEDURE — 94799 UNLISTED PULMONARY SVC/PX: CPT

## 2024-05-10 PROCEDURE — 25000003 PHARM REV CODE 250: Performed by: SURGERY

## 2024-05-10 PROCEDURE — 94761 N-INVAS EAR/PLS OXIMETRY MLT: CPT

## 2024-05-10 PROCEDURE — 80069 RENAL FUNCTION PANEL: CPT | Performed by: INTERNAL MEDICINE

## 2024-05-10 PROCEDURE — 63600175 PHARM REV CODE 636 W HCPCS: Performed by: STUDENT IN AN ORGANIZED HEALTH CARE EDUCATION/TRAINING PROGRAM

## 2024-05-10 PROCEDURE — 63600175 PHARM REV CODE 636 W HCPCS: Performed by: SURGERY

## 2024-05-10 PROCEDURE — 11000001 HC ACUTE MED/SURG PRIVATE ROOM

## 2024-05-10 PROCEDURE — 25000003 PHARM REV CODE 250: Performed by: INTERNAL MEDICINE

## 2024-05-10 PROCEDURE — 99900035 HC TECH TIME PER 15 MIN (STAT)

## 2024-05-10 PROCEDURE — 83735 ASSAY OF MAGNESIUM: CPT | Performed by: SURGERY

## 2024-05-10 PROCEDURE — 27000221 HC OXYGEN, UP TO 24 HOURS

## 2024-05-10 PROCEDURE — 25000003 PHARM REV CODE 250: Performed by: FAMILY MEDICINE

## 2024-05-10 PROCEDURE — 85025 COMPLETE CBC W/AUTO DIFF WBC: CPT | Performed by: SURGERY

## 2024-05-10 RX ORDER — POLYETHYLENE GLYCOL 3350 17 G/17G
17 POWDER, FOR SOLUTION ORAL DAILY
Qty: 30 PACKET | Refills: 0 | Status: SHIPPED | OUTPATIENT
Start: 2024-05-11

## 2024-05-10 RX ORDER — PANTOPRAZOLE SODIUM 40 MG/1
40 TABLET, DELAYED RELEASE ORAL DAILY
Qty: 90 TABLET | Refills: 3 | Status: SHIPPED | OUTPATIENT
Start: 2024-05-11 | End: 2025-05-11

## 2024-05-10 RX ORDER — MIRTAZAPINE 7.5 MG/1
7.5 TABLET, FILM COATED ORAL NIGHTLY
Qty: 30 TABLET | Refills: 1 | Status: ON HOLD | OUTPATIENT
Start: 2024-05-10 | End: 2024-06-06 | Stop reason: HOSPADM

## 2024-05-10 RX ORDER — MIDODRINE HYDROCHLORIDE 5 MG/1
15 TABLET ORAL EVERY 8 HOURS
Qty: 270 TABLET | Refills: 1 | Status: SHIPPED | OUTPATIENT
Start: 2024-05-10 | End: 2025-05-10

## 2024-05-10 RX ADMIN — PANTOPRAZOLE SODIUM 40 MG: 40 TABLET, DELAYED RELEASE ORAL at 12:05

## 2024-05-10 RX ADMIN — ALLOPURINOL 100 MG: 100 TABLET ORAL at 12:05

## 2024-05-10 RX ADMIN — MEXILETINE HYDROCHLORIDE 150 MG: 150 CAPSULE ORAL at 12:05

## 2024-05-10 RX ADMIN — NEPHROCAP 1 CAPSULE: 1 CAP ORAL at 12:05

## 2024-05-10 RX ADMIN — MIDODRINE HYDROCHLORIDE 15 MG: 5 TABLET ORAL at 01:05

## 2024-05-10 RX ADMIN — MEXILETINE HYDROCHLORIDE 150 MG: 150 CAPSULE ORAL at 04:05

## 2024-05-10 RX ADMIN — PRAVASTATIN SODIUM 40 MG: 40 TABLET ORAL at 09:05

## 2024-05-10 RX ADMIN — MIDODRINE HYDROCHLORIDE 15 MG: 5 TABLET ORAL at 09:05

## 2024-05-10 RX ADMIN — AMIODARONE HYDROCHLORIDE 400 MG: 200 TABLET ORAL at 12:05

## 2024-05-10 RX ADMIN — MIRTAZAPINE 7.5 MG: 7.5 TABLET ORAL at 09:05

## 2024-05-10 RX ADMIN — FUROSEMIDE 80 MG: 10 INJECTION, SOLUTION INTRAVENOUS at 09:05

## 2024-05-10 RX ADMIN — ASPIRIN 81 MG: 81 TABLET, COATED ORAL at 12:05

## 2024-05-10 RX ADMIN — HEPARIN SODIUM 2400 UNITS: 1000 INJECTION, SOLUTION INTRAVENOUS; SUBCUTANEOUS at 11:05

## 2024-05-10 RX ADMIN — HEPARIN SODIUM 5000 UNITS: 5000 INJECTION INTRAVENOUS; SUBCUTANEOUS at 09:05

## 2024-05-10 RX ADMIN — HEPARIN SODIUM 5000 UNITS: 5000 INJECTION INTRAVENOUS; SUBCUTANEOUS at 05:05

## 2024-05-10 RX ADMIN — MIDODRINE HYDROCHLORIDE 15 MG: 5 TABLET ORAL at 05:05

## 2024-05-10 RX ADMIN — POLYETHYLENE GLYCOL 3350 17 G: 17 POWDER, FOR SOLUTION ORAL at 12:05

## 2024-05-10 NOTE — NURSING
Home Oxygen Evaluation    Date Performed: 5/10/2024    1) Patient's Home O2 Sat on room air, while at rest: 82%        If O2 sats on room air at rest are 88% or below, patient qualifies. No additional testing needed. Document N/A in steps 2 and 3. If 89% or above, complete steps 2.      2) Patient's O2 Sat on room air while exercising: N/A        If O2 sats on room air while exercising remain 89% or above patient does not qualify, no further testing needed Document N/A in step 3. If O2 sats on room air while exercising are 88% or below, continue to step 3.      3) Patient's O2 Sat while exercising on O2: N/A at N/A LPM         (Must show improvement from #2 for patients to qualify)    If O2 sats improve on oxygen, patient qualifies for portable oxygen. If not, the patient does not qualify.

## 2024-05-10 NOTE — PROGRESS NOTES
05/10/24 1148   Vital Signs   Temp 97 °F (36.1 °C)   Temp Source Temporal   Pulse 60   Heart Rate Source Monitor   Resp 20   BP (!) 97/56   During Hemodialysis Assessment   Blood Flow Rate (mL/min) 350 mL/min   Dialysate Flow Rate (mL/min) 600 ml/min   Ultrafiltration Rate (mL/Hr) 570 mL/Hr   Arteriovenous Lines Secure Yes   Arterial Pressure (mmHg) -112 mmHg   Venous Pressure (mmHg) 143   Blood Volume Processed (Liters) 72.6 L   UF Removed (mL) 2000 mL   TMP 1   Venous Line in Air Detector Yes   Intake (mL) 250 mL   Transducer Dry Yes   Access Visible Yes   Intra-Hemodialysis Comments HD completed

## 2024-05-10 NOTE — PLAN OF CARE
Virtual Nurse note: Patient chart, labs, and vitals reviewed. Care plan and goals updated as needed.   VN to continue to be available as needed.      Problem: Adult Inpatient Plan of Care  Goal: Plan of Care Review  Outcome: Progressing  Goal: Patient-Specific Goal (Individualized)  Outcome: Progressing

## 2024-05-10 NOTE — PLAN OF CARE
[facility] - Telemetry      HOME HEALTH ORDERS  FACE TO FACE ENCOUNTER    Patient Name: Ar Sharma  YOB: 1960    PCP: Jc Elliott MD   PCP Address: [address] [place] [address] / [place] [address]  PCP Phone Number: [phone]  PCP Fax: [phone]    Encounter Date: 4/29/24    Admit to Home Health    Diagnoses:  Active Hospital Problems    Diagnosis  POA    *Chronic kidney disease (CKD), stage V [N18.5]  Yes    ESRD (end stage renal disease) [N18.6]  Yes    Acute hypoxemic respiratory failure [J96.01]  Yes    Hypotension [I95.9]  Yes    Pulmonary emphysema, unspecified emphysema type [J43.9]  Yes    Non-ischemic cardiomyopathy [I42.8]  Yes    Hyponatremia [E87.1]  Yes    Primary hypertension [I10]  Yes     Formatting of this note might be different from the original.  Converted from [employer]:  Description - ESSENTIAL HYPERTENSION, BENIGN      HFrEF (heart failure with reduced ejection fraction) [I50.20]  Yes    Cardiac resynchronization therapy defibrillator (CRT-D) in place [Z95.810]  Yes    Hypothyroidism [E03.9]  Yes    Iron deficiency anemia [D50.9]  Yes    Mixed hyperlipidemia [E78.2]  Yes    V-tach [I47.20]  Yes      Resolved Hospital Problems   No resolved problems to display.       Follow Up Appointments:  Future Appointments   Date Time Provider Department Center   5/14/2024 11:00 AM Fatmata Johansen MD [facility]   5/16/2024  3:00 PM Shanell José PA-C [facility] MED [facility]   5/22/2024  2:20 PM Tyree Ruiz MD [facility] [place] [facility]   6/5/2024  2:00 PM Tyree Ruiz MD [facility]   9/4/2024  9:40 AM SAME DAY LAB, [facility] LAB [facility]   9/11/2024  2:00 PM Jc Elliott MD [facility] FAM MED [facility]       Allergies:  Review of patient's allergies indicates:   Allergen Reactions    Lokelma [sodium zirconium cyclosilicate] Other (See Comments)     Fluid retention, weight gain, CHF excerebration, severe constipation     Atorvastatin Other (See Comments)     Joint pain    Jardiance [empagliflozin] Other (See Comments)     Chest pains, significant weight loss, lower blood pressure       Medications: Review discharge medications with patient and family and provide education.    Current Facility-Administered Medications   Medication Dose Route Frequency Provider Last Rate Last Admin    0.9%  NaCl infusion   Intravenous PRN Philip Perez MD        0.9%  NaCl infusion   Intravenous PRN Chyna Mata MD        0.9%  NaCl infusion   Intravenous Once Chyna Maat MD        0.9%  NaCl infusion   Intravenous Once Brittney Chicas MD        0.9%  NaCl infusion   Intravenous Once Brittney Chicas MD        acetaminophen tablet 650 mg  650 mg Oral Q4H PRN Philip Perez MD        allopurinoL tablet 100 mg  100 mg Oral Daily Philip Perez MD   100 mg at 05/10/24 1217    alteplase injection 4 mg  4 mg Intra-Catheter Once Philip Perez MD        amiodarone tablet 400 mg  400 mg Oral Daily Philip Perez MD   400 mg at 05/10/24 1217    aspirin EC tablet 81 mg  81 mg Oral Daily Philip Perez MD   81 mg at 05/10/24 1217    calcium carbonate 200 mg calcium (500 mg) chewable tablet 1,000 mg  1,000 mg Oral TID PRN Philip Perez MD   1,000 mg at 05/05/24 0006    ergocalciferol capsule 50,000 Units  50,000 Units Oral Q7 Days Philip Perez MD   50,000 Units at 05/07/24 0903    furosemide injection 80 mg  80 mg Intravenous Q12H Vero Yadav MD   80 mg at 05/09/24 2114    heparin (porcine) injection 2,400 Units  2,400 Units Intra-Catheter PRN Philip Perez MD   2,400 Units at 05/10/24 1148    heparin (porcine) injection 5,000 Units  5,000 Units Subcutaneous Q8H Philip Perez MD   5,000 Units at 05/10/24 0530    HYDROcodone-acetaminophen 5-325 mg per tablet 1 tablet  1 tablet Oral Q4H PRN Philip Perez MD        HYDROmorphone (PF) injection 0.2 mg  0.2 mg Intravenous Q5 Min PRN Brittney, Juan M. Jr., MD         lactulose 20 gram/30 mL solution Soln 20 g  20 g Oral Daily PRN Mamta Hassan MD        levothyroxine tablet 150 mcg  150 mcg Oral Daily Philip Perez MD   150 mcg at 05/09/24 0817    mexiletine capsule 150 mg  150 mg Oral BID WM Philip Perez MD   150 mg at 05/10/24 1218    midodrine tablet 15 mg  15 mg Oral Q8H Philip Perez MD   15 mg at 05/10/24 1332    mirtazapine tablet 7.5 mg  7.5 mg Oral QHS Lon Bowles MD   7.5 mg at 05/08/24 2157    ondansetron injection 4 mg  4 mg Intravenous Q8H PRN Philip Perez MD   4 mg at 05/07/24 1404    ondansetron injection 4 mg  4 mg Intravenous Once PRN Juan Lugo Jr., MD        pantoprazole EC tablet 40 mg  40 mg Oral Daily Mamta Hassan MD   40 mg at 05/10/24 1217    polyethylene glycol packet 17 g  17 g Oral Daily Mamta Hassan MD   17 g at 05/10/24 1218    pravastatin tablet 40 mg  40 mg Oral Daily Philip Perez MD   40 mg at 05/09/24 0817    sodium chloride 0.9% bolus 250 mL 250 mL  250 mL Intravenous PRN Philip Perez MD        sodium chloride 0.9% bolus 250 mL 250 mL  250 mL Intravenous PRN Chyna Mata MD        sodium chloride 0.9% bolus 250 mL 250 mL  250 mL Intravenous PRN Brittney Chicas MD        sodium chloride 0.9% bolus 250 mL 250 mL  250 mL Intravenous PRN Brittney Chicas MD        sodium chloride 0.9% flush 10 mL  10 mL Intravenous PRN Philip Perez MD        sodium chloride 0.9% flush 10 mL  10 mL Intravenous PRN Philip Perez MD        sodium chloride 0.9% flush 3 mL  3 mL Intravenous PRN Juan Lugo Jr., MD        vitamin renal formula (B-complex-vitamin c-folic acid) 1 mg per capsule 1 capsule  1 capsule Oral Daily Philip Perez MD   1 capsule at 05/10/24 1217     Current Discharge Medication List        START taking these medications    Details   midodrine (PROAMATINE) 5 MG Tab Take 3 tablets (15 mg total) by mouth every 8 (eight) hours.  Qty: 270 tablet, Refills: 1       mirtazapine (REMERON) 7.5 MG Tab Take 1 tablet (7.5 mg total) by mouth every evening.  Qty: 30 tablet, Refills: 1      pantoprazole (PROTONIX) 40 MG tablet Take 1 tablet (40 mg total) by mouth once daily.  Qty: 90 tablet, Refills: 3      polyethylene glycol (GLYCOLAX) 17 gram PwPk Take 17 g by mouth once daily.  Qty: 30 packet, Refills: 0      vitamin renal formula, B-complex-vitamin c-folic acid, (NEPHROCAP) 1 mg Cap Take 1 capsule by mouth once daily.  Qty: 30 capsule, Refills: 0           CONTINUE these medications which have NOT CHANGED    Details   acetaminophen (TYLENOL) 500 MG tablet Take 500 mg by mouth every 6 (six) hours as needed for Pain.      allopurinoL (ZYLOPRIM) 100 MG tablet Take 1 tablet (100 mg total) by mouth once daily.  Qty: 90 tablet, Refills: 3      amiodarone (PACERONE) 400 MG tablet Take 1 tablet (400 mg total) by mouth once daily.  Qty: 90 tablet, Refills: 3      aspirin (ECOTRIN) 81 MG EC tablet Take 1 tablet (81 mg total) by mouth once daily.  Qty: 60 tablet, Refills: 2      ergocalciferol (ERGOCALCIFEROL) 50,000 unit Cap TAKE 1 CAPSULE BY MOUTH ONE TIME PER WEEK  Qty: 12 capsule, Refills: 3      furosemide (LASIX) 80 MG tablet Take 1 tablet (80 mg total) by mouth 2 (two) times daily. If increased 3 pounds in a day or 5 pounds in a week, take an extra dose of lasix 80mg until those pounds are lost.  Qty: 120 tablet, Refills: 3    Comments: If increased 3 pounds in a day or 5 pounds in a week take an extra dose of lasix 80mg until those pounds are lost.      levothyroxine (SYNTHROID) 150 MCG tablet Take 1 tablet (150 mcg total) by mouth once daily.  Qty: 90 tablet, Refills: 3    Associated Diagnoses: Hypothyroidism, unspecified type      metoprolol succinate (TOPROL-XL) 25 MG 24 hr tablet TAKE 1 TABLET BY MOUTH EVERY DAY  Qty: 90 tablet, Refills: 3    Comments: .      mexiletine (MEXITIL) 150 MG Cap Take 1 capsule (150 mg total) by mouth 2 (two) times daily with meals.  Qty: 180  capsule, Refills: 3    Associated Diagnoses: V-tach      nitroGLYCERIN (NITROSTAT) 0.4 MG SL tablet Place 1 tablet (0.4 mg total) under the tongue every 5 (five) minutes as needed for Chest pain.  Qty: 20 tablet, Refills: 1    Associated Diagnoses: Coronary artery disease involving native coronary artery of native heart without angina pectoris      pravastatin (PRAVACHOL) 40 MG tablet TAKE 1 TABLET BY MOUTH EVERY DAY  Qty: 90 tablet, Refills: 3    Associated Diagnoses: Pure hypercholesterolemia      sacubitriL-valsartan (ENTRESTO) 24-26 mg per tablet Take 1 tablet by mouth 2 (two) times daily.  Qty: 180 tablet, Refills: 3               I have seen and examined this patient within the last 30 days. My clinical findings that support the need for the home health skilled services and home bound status are the following:no   Weakness/numbness causing balance and gait disturbance due to Heart Failure, COPD Exacerbation, and ESRD making it taxing to leave home.  Requiring assistive device to leave home due to unsteady gait caused by  Heart Failure, COPD Exacerbation, and ESRD.  Patient with medication mismanagement issues requiring home bound status as evidenced by  Unstable vital signs (blood pressure, heart rate), Poor understanding of medication regimen/dosage, and Poor adherence to medication regimen/dosage.     Diet:   cardiac diet and renal diet    Labs:  Report Lab results to PCP.    Referrals/ Consults  Physical Therapy to evaluate and treat. Evaluate for home safety and equipment needs; Establish/upgrade home exercise program. Perform / instruct on therapeutic exercises, gait training, transfer training, and Range of Motion.  Occupational Therapy to evaluate and treat. Evaluate home environment for safety and equipment needs. Perform/Instruct on transfers, ADL training, ROM, and therapeutic exercises.   to evaluate for community resources/long-range planning.    Activities:   activity as  tolerated    Nursing:   Agency to admit patient within 24 hours of hospital discharge unless specified on physician order or at patient request    SN to complete comprehensive assessment including routine vital signs. Instruct on disease process and s/s of complications to report to MD. Review/verify medication list sent home with the patient at time of discharge  and instruct patient/caregiver as needed. Frequency may be adjusted depending on start of care date.     Skilled nurse to perform up to 3 visits PRN for symptoms related to diagnosis    Notify MD if SBP > 160 or < 90; DBP > 90 or < 50; HR > 120 or < 50; Temp > 101; O2 < 88%;     Ok to schedule additional visits based on staff availability and patient request on consecutive days within the home health episode.    When multiple disciplines ordered:    Start of Care occurs on Sunday - Wednesday schedule remaining discipline evaluations as ordered on separate consecutive days following the start of care.    Thursday SOC -schedule subsequent evaluations Friday and Monday the following week.     Friday - Saturday SOC - schedule subsequent discipline evaluations on consecutive days starting Monday of the following week.    For all post-discharge communication and subsequent orders please contact patient's primary care physician. If unable to reach primary care physician or do not receive response within 30 minutes, please contact SW and PCP for clinical staff order clarification    Miscellaneous   Routine Skin for Bedridden Patients: Instruct patient/caregiver to apply moisture barrier cream to all skin folds and wet areas in perineal area daily and after baths and all bowel movements.  Home Oxygen:  Oxygen at 3 L/min nasal canula to be used:  Continuously., Notify physician if oxygen saturation less than 88%, and Consult respiratory therapy for instruction on home oxygen use    Home Health Aide:  Nursing Three times weekly, Physical Therapy Three times weekly,  and Occupational Therapy Three times weekly    Wound Care Orders  PD catheter care    I certify that this patient is confined to his home and needs intermittent skilled nursing care, physical therapy, and occupational therapy.

## 2024-05-10 NOTE — PLAN OF CARE
Problem: Adult Inpatient Plan of Care  Goal: Plan of Care Review  Outcome: Progressing     Problem: Skin Injury Risk Increased  Goal: Skin Health and Integrity  Outcome: Progressing     Problem: Chronic Kidney Disease  Goal: Optimal Coping with Chronic Illness  Outcome: Progressing

## 2024-05-10 NOTE — SUBJECTIVE & OBJECTIVE
Interval History:   S/p HD seems tolerate well, with oozing at the PD catheter, still on nasal canula oxygen 4-5L no acute resp distress. Denies abdominal pain, no CP, no fever.    Review of Systems   Constitutional:  Positive for activity change, appetite change and fatigue. Negative for fever.   HENT:  Negative for congestion.    Respiratory:  Positive for shortness of breath. Negative for chest tightness and wheezing.    Cardiovascular:  Negative for chest pain, palpitations and leg swelling.   Gastrointestinal:  Positive for abdominal distention. Negative for abdominal pain, nausea and vomiting.   Musculoskeletal:  Negative for gait problem and joint swelling.   Neurological:  Negative for dizziness, speech difficulty, weakness and headaches.   Psychiatric/Behavioral:  Negative for agitation, confusion and hallucinations.    All other systems reviewed and are negative.    Objective:     Vital Signs (Most Recent):  Temp: 98.7 °F (37.1 °C) (05/10/24 0705)  Pulse: 60 (05/10/24 1207)  Resp: 19 (05/10/24 0705)  BP: 97/61 (05/10/24 0705)  SpO2: (!) 94 % (05/10/24 0749) Vital Signs (24h Range):  Temp:  [97.8 °F (36.6 °C)-98.7 °F (37.1 °C)] 98.7 °F (37.1 °C)  Pulse:  [42-62] 60  Resp:  [18-20] 19  SpO2:  [92 %-95 %] 94 %  BP: ()/(55-63) 97/61     Weight: 76.5 kg (168 lb 10.4 oz)  Body mass index is 24.91 kg/m².    Intake/Output Summary (Last 24 hours) at 5/10/2024 1309  Last data filed at 5/9/2024 2000  Gross per 24 hour   Intake 358 ml   Output 50 ml   Net 308 ml         Physical Exam  Vitals and nursing note reviewed.   Constitutional:       General: He is not in acute distress.     Appearance: He is not toxic-appearing.   HENT:      Head: Normocephalic and atraumatic.      Mouth/Throat:      Mouth: Mucous membranes are moist.      Pharynx: No oropharyngeal exudate.   Eyes:      Extraocular Movements: Extraocular movements intact.      Pupils: Pupils are equal, round, and reactive to light.   Cardiovascular:      " Rate and Rhythm: Normal rate.      Heart sounds: No murmur heard.     No friction rub. No gallop.   Pulmonary:      Effort: Pulmonary effort is normal. No respiratory distress.      Breath sounds: No rhonchi or rales.   Chest:      Chest wall: No tenderness.   Abdominal:      General: Bowel sounds are normal. There is distension.      Palpations: Abdomen is soft.      Tenderness: There is no abdominal tenderness. There is no guarding or rebound.   Musculoskeletal:         General: No swelling.      Cervical back: No rigidity or tenderness.      Right lower leg: No edema.      Left lower leg: No edema.   Skin:     General: Skin is warm.      Findings: No erythema, lesion or rash.   Neurological:      General: No focal deficit present.      Mental Status: He is alert and oriented to person, place, and time.      Cranial Nerves: No cranial nerve deficit.      Motor: Weakness present.      Coordination: Coordination normal.      Gait: Gait normal.   Psychiatric:         Thought Content: Thought content normal.             Significant Labs: All pertinent labs within the past 24 hours have been reviewed.  Blood Culture: No results for input(s): "LABBLOO" in the last 48 hours.  BMP:   Recent Labs   Lab 05/10/24  0338   GLU 87  87   *  131*   K 4.3  4.3   CL 97  97   CO2 23  23   BUN 34*  34*   CREATININE 4.3*  4.3*   CALCIUM 8.7  8.7   MG 1.9     CBC:   Recent Labs   Lab 05/09/24 0153 05/10/24  0338   WBC 12.05 10.57   HGB 14.2 14.3   HCT 40.4 41.7   * 484*     CMP:   Recent Labs   Lab 05/09/24 0153 05/10/24  0338   * 131*  131*   K 4.2 4.3  4.3   CL 94* 97  97   CO2 22* 23  23   * 87  87   BUN 34* 34*  34*   CREATININE 4.3* 4.3*  4.3*   CALCIUM 9.0 8.7  8.7   ALBUMIN 3.1* 3.0*   ANIONGAP 15 11  11     Cardiac Markers: No results for input(s): "CKMB", "MYOGLOBIN", "BNP", "TROPISTAT" in the last 48 hours.  Coagulation: No results for input(s): "PT", "INR", "APTT" in the last 48 " "hours.  Respiratory Culture: No results for input(s): "GSRESP", "RESPIRATORYC" in the last 48 hours.  Troponin: No results for input(s): "TROPONINI", "TROPONINIHS" in the last 48 hours.  TSH:   Recent Labs   Lab 03/04/24  1018   TSH 0.283*     Urine Culture: No results for input(s): "LABURIN" in the last 48 hours.  Urine Studies: No results for input(s): "COLORU", "APPEARANCEUA", "PHUR", "SPECGRAV", "PROTEINUA", "GLUCUA", "KETONESU", "BILIRUBINUA", "OCCULTUA", "NITRITE", "UROBILINOGEN", "LEUKOCYTESUR", "RBCUA", "WBCUA", "BACTERIA", "SQUAMEPITHEL", "HYALINECASTS" in the last 48 hours.    Invalid input(s): "WRIGHTSUR"  Recent Lab Results         05/10/24  0338        Albumin 3.0       Anion Gap 11        11       Baso # 0.12       Basophil % 1.1       BUN 34        34       Calcium 8.7        8.7       Chloride 97        97       CO2 23        23       Creatinine 4.3        4.3       Differential Method Automated       eGFR 15        15       Eos # 0.3       Eos % 2.6       Glucose 87        87       Gran # (ANC) 8.4       Gran % 79.5       Hematocrit 41.7       Hemoglobin 14.3       Immature Grans (Abs) 0.13  Comment: Mild elevation in immature granulocytes is non specific and   can be seen in a variety of conditions including stress response,   acute inflammation, trauma and pregnancy. Correlation with other   laboratory and clinical findings is essential.         Immature Granulocytes 1.2       Lymph # 0.6       Lymph % 5.6       Magnesium  1.9       MCH 25.5       MCHC 34.3       MCV 75       Mono # 1.1       Mono % 10.0       MPV 12.7       nRBC 0       Phosphorus Level 3.6       Platelet Count 484       Potassium 4.3        4.3       RBC 5.60       RDW 16.2       Sodium 131        131       WBC 10.57               Significant Imaging: I have reviewed all pertinent imaging results/findings within the past 24 hours.  "

## 2024-05-10 NOTE — ASSESSMENT & PLAN NOTE
Oozing blood from PD site likely from anticoagulant during dialysis  Cont compression dressing   Will get abdominal X-ray to R/O hematoma  Monitor bleeding

## 2024-05-10 NOTE — PT/OT/SLP PROGRESS
Physical Therapy      Patient Name:  Ar Sharma   MRN:  [id number]    Patient not seen today secondary to Dialysis (Pt with increased fatigue this PM attempt from dialysis(1310)). Will follow-up. .

## 2024-05-10 NOTE — PLAN OF CARE
went to meet with patient. Patient off floor in HD. I met with patient's spouse Nicolette to discuss discharge plans. Patient refuses any DME. [facility] Will send Home Health orders when available. Dr. Mata stated ok to discharge after HD if no issues. Nurse Nima aware home oxygen assessment ordered.     [facility] Phone#[phone] [id number]--Cheryl with [facility] notified of oxygen order placed. I also spoke with Martha at [facility] to update her on discharge plans. Patient will go to his next HD session on Monday at 10:00 am. I met with wife and patient and updated them. Home Health orders in system as well.    [id number]--Cheryl with [facility] stated patient is approved for 1 e-tank. Oxygen delivered to bedside. Wife signed. She knows to call for concentrator to be delivered to home. Home Health has already called her and will see patient Sunday.     Patient Contacts    Name Relation Home Work Mobile   LoveNicolette Spouse   [phone]     Future Appointments   Date Time Provider Department Center   5/14/2024 11:00 AM [facility]   5/16/2024  3:00 PM Shanell José PA-C [facility]   5/22/2024  2:20 PM Tyree Ruiz MD [facility]   6/5/2024  2:00 PM Tyree Ruiz MD [facility]   9/4/2024  9:40 AM SAME DAY LAB, [facility]   9/11/2024  2:00 PM Jc Elliott MD [facility]                  Name Relationship Specialty Phone Fax Address Order                Fatmata Johansen MD  Internal Medicine [phone] [address] [facility] [address] [place] [address]     Next Steps: Follow up on 5/14/2024  Instructions: 11:00am HSP DX:New hd pt        Shanell José PA-C  Family Medicine [phone] [address] [address]     Next Steps: Follow up on 5/16/2024  Instructions:  3:00pm        [facility]   [phone] [address] [place] [address]     Next Steps: Follow up on 5/13/2024  Instructions: Your first scheduled HD date.          05/10/24 1345   Discharge Reassessment   Assessment Type Discharge Planning Reassessment   Did the patient's condition or plan change since previous assessment? No   Discharge Plan discussed with: Spouse/sig other;Patient   Discharge Plan A Home;Home Health   Discharge Plan B Home with family;Home Health   DME Needed Upon Discharge  other (see comments);oxygen  (Patient refused rolling walker.)   Transition of Care Barriers None   Why the patient remains in the hospital Requires continued medical care   Post-Acute Status   Post-Acute Authorization HME;Home Health;Dialysis   HME Status Pending post-acute provider review/more information requested   Home Health Status Set-up Complete/Auth obtained   Diaylsis Status Set-up Complete/Auth obtained   Hospital Resources/Appts/Education Provided Appointments scheduled and added to AVS   Discharge Delays None known at this time     Isha Hidalgo RN    [phone]

## 2024-05-10 NOTE — PROGRESS NOTES
[facility] Medicine  Progress Note    Patient Name: Ar Sharma  MRN: [id number]  Patient Class: IP- Inpatient   Admission Date: 4/29/2024  Length of Stay: 11 days  Attending Physician: Lon Bowles MD  Primary Care Provider: Jc Elliott MD        Subjective:     Principal Problem:Chronic kidney disease (CKD), stage V        HPI:  Ar Sharma is a 63M with PMHx of ESRD, HFrEF, HTN, NICM, COPD, VT s/p ICD placement, and HLD who presented to [facility] ED with CC progressively worse SOB over the last two months. He was recently diagnosed with ESRD and has been trying diet modification at home. He endorses SINGH, dizziness, frequent intermittent nausea and one episode of vomiting during this time. He denies CP, syncope, dysuria, fever or chills.     ED workup significant for: , Cr 6.7, Phos 5, Tbili 2.4, BNP 3204, trop 0.040, vit D 24, .6, and CXR: cardiomegaly w/ pulmonary vascular congestion consistent w/ pulmonary edema. All other components of CBC and chemistries were unremarkable.       Patient was seen by Dr. Mata in ED. Emergent HD not indicated.     Overview/Hospital Course:  4/30 Tolerating HD through central line, SOB is improved  5/1/24 On pressors but tolerating CRRT  5/2 tolerating HD, off of pressors, stepping down  5/3 Tunneled catheter placed  5/4 had an episode of intradialytic hypotension, given albumin  5/5 Having hiccups, started PPI  5/6 Hiccups resolved. Gen Surgery consult for PD catheter  5/7 Awaiting for PD catheter placement  5/8 S/P PD catheter placement   5/9/24 S/P HD tolerate well  5/10/24 S/P HD oozing blood at the PD site and still requiring 4-5 L of oxygen    Interval History:   S/p HD seems tolerate well, with oozing at the PD catheter, still on nasal canula oxygen 4-5L no acute resp distress. Denies abdominal pain, no CP, no fever.    Review of Systems   Constitutional:  Positive for activity change, appetite change and fatigue. Negative for  fever.   HENT:  Negative for congestion.    Respiratory:  Positive for shortness of breath. Negative for chest tightness and wheezing.    Cardiovascular:  Negative for chest pain, palpitations and leg swelling.   Gastrointestinal:  Positive for abdominal distention. Negative for abdominal pain, nausea and vomiting.   Musculoskeletal:  Negative for gait problem and joint swelling.   Neurological:  Negative for dizziness, speech difficulty, weakness and headaches.   Psychiatric/Behavioral:  Negative for agitation, confusion and hallucinations.    All other systems reviewed and are negative.    Objective:     Vital Signs (Most Recent):  Temp: 98.7 °F (37.1 °C) (05/10/24 0705)  Pulse: 60 (05/10/24 1207)  Resp: 19 (05/10/24 0705)  BP: 97/61 (05/10/24 0705)  SpO2: (!) 94 % (05/10/24 0749) Vital Signs (24h Range):  Temp:  [97.8 °F (36.6 °C)-98.7 °F (37.1 °C)] 98.7 °F (37.1 °C)  Pulse:  [42-62] 60  Resp:  [18-20] 19  SpO2:  [92 %-95 %] 94 %  BP: ()/(55-63) 97/61     Weight: 76.5 kg (168 lb 10.4 oz)  Body mass index is 24.91 kg/m².    Intake/Output Summary (Last 24 hours) at 5/10/2024 1309  Last data filed at 5/9/2024 2000  Gross per 24 hour   Intake 358 ml   Output 50 ml   Net 308 ml         Physical Exam  Vitals and nursing note reviewed.   Constitutional:       General: He is not in acute distress.     Appearance: He is not toxic-appearing.   HENT:      Head: Normocephalic and atraumatic.      Mouth/Throat:      Mouth: Mucous membranes are moist.      Pharynx: No oropharyngeal exudate.   Eyes:      Extraocular Movements: Extraocular movements intact.      Pupils: Pupils are equal, round, and reactive to light.   Cardiovascular:      Rate and Rhythm: Normal rate.      Heart sounds: No murmur heard.     No friction rub. No gallop.   Pulmonary:      Effort: Pulmonary effort is normal. No respiratory distress.      Breath sounds: No rhonchi or rales.   Chest:      Chest wall: No tenderness.   Abdominal:      General:  "Bowel sounds are normal. There is distension.      Palpations: Abdomen is soft.      Tenderness: There is no abdominal tenderness. There is no guarding or rebound.   Musculoskeletal:         General: No swelling.      Cervical back: No rigidity or tenderness.      Right lower leg: No edema.      Left lower leg: No edema.   Skin:     General: Skin is warm.      Findings: No erythema, lesion or rash.   Neurological:      General: No focal deficit present.      Mental Status: He is alert and oriented to person, place, and time.      Cranial Nerves: No cranial nerve deficit.      Motor: Weakness present.      Coordination: Coordination normal.      Gait: Gait normal.   Psychiatric:         Thought Content: Thought content normal.             Significant Labs: All pertinent labs within the past 24 hours have been reviewed.  Blood Culture: No results for input(s): "LABBLOO" in the last 48 hours.  BMP:   Recent Labs   Lab 05/10/24  0338   GLU 87  87   *  131*   K 4.3  4.3   CL 97  97   CO2 23  23   BUN 34*  34*   CREATININE 4.3*  4.3*   CALCIUM 8.7  8.7   MG 1.9     CBC:   Recent Labs   Lab 05/09/24 0153 05/10/24  0338   WBC 12.05 10.57   HGB 14.2 14.3   HCT 40.4 41.7   * 484*     CMP:   Recent Labs   Lab 05/09/24  0153 05/10/24  0338   * 131*  131*   K 4.2 4.3  4.3   CL 94* 97  97   CO2 22* 23  23   * 87  87   BUN 34* 34*  34*   CREATININE 4.3* 4.3*  4.3*   CALCIUM 9.0 8.7  8.7   ALBUMIN 3.1* 3.0*   ANIONGAP 15 11  11     Cardiac Markers: No results for input(s): "CKMB", "MYOGLOBIN", "BNP", "TROPISTAT" in the last 48 hours.  Coagulation: No results for input(s): "PT", "INR", "APTT" in the last 48 hours.  Respiratory Culture: No results for input(s): "GSRESP", "RESPIRATORYC" in the last 48 hours.  Troponin: No results for input(s): "TROPONINI", "TROPONINIHS" in the last 48 hours.  TSH:   Recent Labs   Lab 03/04/24  1018   TSH 0.283*     Urine Culture: No results for input(s): " ""LABURIN" in the last 48 hours.  Urine Studies: No results for input(s): "COLORU", "APPEARANCEUA", "PHUR", "SPECGRAV", "PROTEINUA", "GLUCUA", "KETONESU", "BILIRUBINUA", "OCCULTUA", "NITRITE", "UROBILINOGEN", "LEUKOCYTESUR", "RBCUA", "WBCUA", "BACTERIA", "SQUAMEPITHEL", "HYALINECASTS" in the last 48 hours.    Invalid input(s): "WRIGHTSUR"  Recent Lab Results         05/10/24  0338        Albumin 3.0       Anion Gap 11        11       Baso # 0.12       Basophil % 1.1       BUN 34        34       Calcium 8.7        8.7       Chloride 97        97       CO2 23        23       Creatinine 4.3        4.3       Differential Method Automated       eGFR 15        15       Eos # 0.3       Eos % 2.6       Glucose 87        87       Gran # (ANC) 8.4       Gran % 79.5       Hematocrit 41.7       Hemoglobin 14.3       Immature Grans (Abs) 0.13  Comment: Mild elevation in immature granulocytes is non specific and   can be seen in a variety of conditions including stress response,   acute inflammation, trauma and pregnancy. Correlation with other   laboratory and clinical findings is essential.         Immature Granulocytes 1.2       Lymph # 0.6       Lymph % 5.6       Magnesium  1.9       MCH 25.5       MCHC 34.3       MCV 75       Mono # 1.1       Mono % 10.0       MPV 12.7       nRBC 0       Phosphorus Level 3.6       Platelet Count 484       Potassium 4.3        4.3       RBC 5.60       RDW 16.2       Sodium 131        131       WBC 10.57               Significant Imaging: I have reviewed all pertinent imaging results/findings within the past 24 hours.    Assessment/Plan:      * Chronic kidney disease (CKD), stage V  Creatine stable for now. BMP reviewed- noted Estimated Creatinine Clearance: 20.7 mL/min (A) (based on SCr of 3.6 mg/dL (H)). according to latest data. Based on current GFR, CKD stage is end stage.  Monitor UOP and serial BMP and adjust therapy as needed. Renally dose meds. Avoid nephrotoxic medications and " procedures.    --see by Dr. Mata in ED, gen surg consulted for tunneled catheter placement in AM 4/30/24  --admit to ICU for CRRT once catheter placed-->tolerating HD will step down today  --Plan for PD catheter placement  -case management for DC planning    PD catheter dysfunction, sequela  Oozing blood from PD site likely from anticoagulant during dialysis  Cont compression dressing   Will get abdominal X-ray to R/O hematoma  Monitor bleeding      Hypotension  persistent  Weaned off of pressors  cont midodrine  Goal MAP >65  Orthostatics negative, off of pressors, Step down to floor  S/P PD placement seems tolerate procedure well  Monitor BP    Acute hypoxemic respiratory failure  Patient with Hypoxic Respiratory failure which is Acute.  he is not on home oxygen. Supplemental oxygen was provided and noted-      .   Signs/symptoms of respiratory failure include- increased work of breathing, respiratory distress, and lethargy. Contributing diagnoses includes - CHF Labs and images were reviewed. Patient Has not had a recent ABG. Will treat underlying causes and adjust management of respiratory failure as follows- cont O2 at 5L weaning down to 3L if can tolerate prior DC    ESRD (end stage renal disease)  Creatine stable for now. BMP reviewed- noted Estimated Creatinine Clearance: 17.4 mL/min (A) (based on SCr of 4.3 mg/dL (H)). according to latest data. Based on current GFR, CKD stage is end stage.  Monitor UOP and serial BMP and adjust therapy as needed. Renally dose meds. Avoid nephrotoxic medications and procedures.    Seen by Nephrology, Trialysis placed for CRRT, tolerating  5/3 Tunneled catheter placed 5/3  Had some intradialytic hypotension, required albumin in addition to midodrine  S/P PD catheter placement plan for another 2 weeks HD and then will start PD  Case management for DC planning    Pulmonary emphysema, unspecified emphysema type  Patient's COPD is controlled currently.  Patient is currently off  COPD Pathway. Continue scheduled inhalers Supplemental oxygen and monitor respiratory status closely.     Non-ischemic cardiomyopathy  Echo    Left Ventricle: The left ventricle is normal in size. Normal wall thickness. Regional wall motion abnormalities present. See diagram for wall motion findings. Septal motion is consistent with pacing. There is mildly reduced systolic function with a visually estimated ejection fraction of 40 - 45%. Grade III diastolic dysfunction.    Right Ventricle: Moderate to severe right ventricular enlargement. Systolic function is reduced.TAPSE is 1.59 cm. Pacemaker lead present in the ventricle.    Left Atrium: Left atrium is severely dilated. The left atrium volume index is 71.4 mL/m2.    Right Atrium: Right atrium is severely dilated.    Aortic Valve: There is mild aortic valve sclerosis. There is mild to moderate stenosis. Aortic valve area by VTI is 1.49 cm². Aortic valve peak velocity is 1.43 m/s. Mean gradient is 5 mmHg. The dimensionless index is 0.52.    Mitral Valve: There is mild regurgitation.    Tricuspid Valve: There is mild to moderate regurgitation.    Pulmonary Artery: The estimated pulmonary artery systolic pressure is 64 mmHg.    IVC/SVC: Elevated venous pressure at 15 mmHg.         Abuse of herbal or folk remedies        Hyponatremia  Patient has hyponatremia which is controlled,We will aim to correct the sodium by 4-6mEq in 24 hours. We will monitor sodium Daily. The hyponatremia is due to renal insufficiency. We will obtain the following studies: NA. We will treat the hyponatremia with Fluid restriction of:  1.5 liter per day and Hemodialysis. The patient's sodium results have been reviewed and are listed below.  Recent Labs   Lab 05/09/24  0153   *         Primary hypertension  Chronic, controlled. Latest blood pressure and vitals reviewed-     Temp:  [97.4 °F (36.3 °C)-98.6 °F (37 °C)]   Pulse:  [59-68]   Resp:  [16-20]   BP: ()/(55-69)   SpO2:  [90  %-96 %] .   Home meds for hypertension were reviewed and noted below.   Hypertension Medications               furosemide (LASIX) 80 MG tablet Take 1 tablet (80 mg total) by mouth 2 (two) times daily. If increased 3 pounds in a day or 5 pounds in a week, take an extra dose of lasix 80mg until those pounds are lost.    metoprolol succinate (TOPROL-XL) 25 MG 24 hr tablet TAKE 1 TABLET BY MOUTH EVERY DAY    nitroGLYCERIN (NITROSTAT) 0.4 MG SL tablet Place 1 tablet (0.4 mg total) under the tongue every 5 (five) minutes as needed for Chest pain.    sacubitriL-valsartan (ENTRESTO) 24-26 mg per tablet Take 1 tablet by mouth 2 (two) times daily.            While in the hospital, will manage blood pressure as follows; Adjust home antihypertensive regimen as follows- hold home bp medications in the setting of hypotension requiring CRRT    Will utilize p.r.n. blood pressure medication only if patient's blood pressure greater than 180/110 and he develops symptoms such as worsening chest pain or shortness of breath.    HFrEF (heart failure with reduced ejection fraction)  Congestive Heart Failure  Patient presents for re-evaluation of congestive heart failure. Patient's current complaints are dyspnea, fatigue, near-syncope, and orthopnea. He denies chest pain, claudication, exertional chest pressure/discomfort, and lower extremity edema. He states he is compliant most of the time with his medications. He states he is compliant all of the time with his diet.    --last TTE in 2023 shows EF 40%. Will repeat this admission  --on home lasix. Will convert to higher IV dosage  --will hold troprol in setting of hypotension requiring CRRT  --on CHF pathway    Cardiac resynchronization therapy defibrillator (CRT-D) in place        Mixed hyperlipidemia  --continue home medications      Iron deficiency anemia  Patient's anemia is currently controlled. Has not received any PRBCs to date. Etiology likely d/t Iron deficiency and chronic  disease due to ESRD  Current CBC reviewed-   Lab Results   Component Value Date    HGB 14.2 05/09/2024    HCT 40.4 05/09/2024     Monitor serial CBC and transfuse if patient becomes hemodynamically unstable, symptomatic or H/H drops below 7/21.    Hypothyroidism  --continue home synthroid    V-tach  --hx of AICD placement, currently 100% paced   --continue home antiarrhythmic medications  --cardiac monitoring          VTE Risk Mitigation (From admission, onward)           Ordered     heparin (porcine) injection 2,400 Units  As needed (PRN)         04/30/24 2223     heparin (porcine) injection 5,000 Units  Every 8 hours         04/29/24 1945     IP VTE HIGH RISK PATIENT  Once         04/29/24 1945     Place sequential compression device  Until discontinued         04/29/24 1945                    Discharge Planning   SLAVA:      Code Status: Full Code   Is the patient medically ready for discharge?:     Reason for patient still in hospital (select all that apply): Patient trending condition and Pending disposition  Discharge Plan A: Home, Home Health   Discharge Delays: None known at this time      Time spent > 35 min        Lon Bowles MD  Department of Hospital Medicine   [facility], [facility]–1983

## 2024-05-11 LAB
ALBUMIN SERPL BCP-MCNC: 3 G/DL (ref 3.5–5.2)
ANION GAP SERPL CALC-SCNC: 11 MMOL/L (ref 8–16)
BACTERIA #/AREA URNS HPF: ABNORMAL /HPF
BASOPHILS # BLD AUTO: 0.12 K/UL (ref 0–0.2)
BASOPHILS NFR BLD: 1 % (ref 0–1.9)
BILIRUB UR QL STRIP: ABNORMAL
BILIRUB UR QL STRIP: ABNORMAL
BUN SERPL-MCNC: 26 MG/DL (ref 8–23)
CALCIUM SERPL-MCNC: 8.6 MG/DL (ref 8.7–10.5)
CHLORIDE SERPL-SCNC: 100 MMOL/L (ref 95–110)
CLARITY UR: ABNORMAL
CLARITY UR: ABNORMAL
CO2 SERPL-SCNC: 21 MMOL/L (ref 23–29)
COLOR UR: YELLOW
COLOR UR: YELLOW
CREAT SERPL-MCNC: 3.9 MG/DL (ref 0.5–1.4)
DIFFERENTIAL METHOD BLD: ABNORMAL
EOSINOPHIL # BLD AUTO: 0.3 K/UL (ref 0–0.5)
EOSINOPHIL NFR BLD: 2.4 % (ref 0–8)
ERYTHROCYTE [DISTWIDTH] IN BLOOD BY AUTOMATED COUNT: 16.2 % (ref 11.5–14.5)
EST. GFR  (NO RACE VARIABLE): 16 ML/MIN/1.73 M^2
GLUCOSE SERPL-MCNC: 92 MG/DL (ref 70–110)
GLUCOSE UR QL STRIP: NEGATIVE
GLUCOSE UR QL STRIP: NEGATIVE
HCT VFR BLD AUTO: 39.2 % (ref 40–54)
HGB BLD-MCNC: 13.5 G/DL (ref 14–18)
HGB UR QL STRIP: NEGATIVE
HGB UR QL STRIP: NEGATIVE
HYALINE CASTS #/AREA URNS LPF: 20 /LPF
IMM GRANULOCYTES # BLD AUTO: 0.21 K/UL (ref 0–0.04)
IMM GRANULOCYTES NFR BLD AUTO: 1.8 % (ref 0–0.5)
KETONES UR QL STRIP: NEGATIVE
KETONES UR QL STRIP: NEGATIVE
LEUKOCYTE ESTERASE UR QL STRIP: ABNORMAL
LEUKOCYTE ESTERASE UR QL STRIP: ABNORMAL
LYMPHOCYTES # BLD AUTO: 0.8 K/UL (ref 1–4.8)
LYMPHOCYTES NFR BLD: 6.4 % (ref 18–48)
MAGNESIUM SERPL-MCNC: 2 MG/DL (ref 1.6–2.6)
MCH RBC QN AUTO: 25.9 PG (ref 27–31)
MCHC RBC AUTO-ENTMCNC: 34.4 G/DL (ref 32–36)
MCV RBC AUTO: 75 FL (ref 82–98)
MICROSCOPIC COMMENT: ABNORMAL
MONOCYTES # BLD AUTO: 1.3 K/UL (ref 0.3–1)
MONOCYTES NFR BLD: 11.1 % (ref 4–15)
NEUTROPHILS # BLD AUTO: 9.1 K/UL (ref 1.8–7.7)
NEUTROPHILS NFR BLD: 77.3 % (ref 38–73)
NITRITE UR QL STRIP: NEGATIVE
NITRITE UR QL STRIP: NEGATIVE
NRBC BLD-RTO: 0 /100 WBC
PH UR STRIP: 5 [PH] (ref 5–8)
PH UR STRIP: 5 [PH] (ref 5–8)
PHOSPHATE SERPL-MCNC: 2.9 MG/DL (ref 2.7–4.5)
PLATELET # BLD AUTO: 483 K/UL (ref 150–450)
PMV BLD AUTO: 12.8 FL (ref 9.2–12.9)
POTASSIUM SERPL-SCNC: 4.2 MMOL/L (ref 3.5–5.1)
PROT UR QL STRIP: ABNORMAL
PROT UR QL STRIP: ABNORMAL
RBC # BLD AUTO: 5.22 M/UL (ref 4.6–6.2)
RBC #/AREA URNS HPF: 6 /HPF (ref 0–4)
SODIUM SERPL-SCNC: 132 MMOL/L (ref 136–145)
SP GR UR STRIP: 1.02 (ref 1–1.03)
SP GR UR STRIP: 1.02 (ref 1–1.03)
URN SPEC COLLECT METH UR: ABNORMAL
URN SPEC COLLECT METH UR: ABNORMAL
UROBILINOGEN UR STRIP-ACNC: ABNORMAL EU/DL
UROBILINOGEN UR STRIP-ACNC: ABNORMAL EU/DL
WBC # BLD AUTO: 11.78 K/UL (ref 3.9–12.7)
WBC #/AREA URNS HPF: 25 /HPF (ref 0–5)
WBC CLUMPS URNS QL MICRO: ABNORMAL

## 2024-05-11 PROCEDURE — 25000003 PHARM REV CODE 250: Performed by: SURGERY

## 2024-05-11 PROCEDURE — 94761 N-INVAS EAR/PLS OXIMETRY MLT: CPT

## 2024-05-11 PROCEDURE — 85025 COMPLETE CBC W/AUTO DIFF WBC: CPT | Performed by: SURGERY

## 2024-05-11 PROCEDURE — 63600175 PHARM REV CODE 636 W HCPCS: Performed by: SURGERY

## 2024-05-11 PROCEDURE — 83735 ASSAY OF MAGNESIUM: CPT | Performed by: SURGERY

## 2024-05-11 PROCEDURE — 90935 HEMODIALYSIS ONE EVALUATION: CPT

## 2024-05-11 PROCEDURE — 25000003 PHARM REV CODE 250: Performed by: FAMILY MEDICINE

## 2024-05-11 PROCEDURE — 25000003 PHARM REV CODE 250: Performed by: INTERNAL MEDICINE

## 2024-05-11 PROCEDURE — 97116 GAIT TRAINING THERAPY: CPT | Mod: CQ

## 2024-05-11 PROCEDURE — 63600175 PHARM REV CODE 636 W HCPCS: Performed by: STUDENT IN AN ORGANIZED HEALTH CARE EDUCATION/TRAINING PROGRAM

## 2024-05-11 PROCEDURE — 27000221 HC OXYGEN, UP TO 24 HOURS

## 2024-05-11 PROCEDURE — 99900035 HC TECH TIME PER 15 MIN (STAT)

## 2024-05-11 PROCEDURE — 36415 COLL VENOUS BLD VENIPUNCTURE: CPT | Performed by: SURGERY

## 2024-05-11 PROCEDURE — 87086 URINE CULTURE/COLONY COUNT: CPT | Performed by: INTERNAL MEDICINE

## 2024-05-11 PROCEDURE — 97530 THERAPEUTIC ACTIVITIES: CPT | Mod: CQ

## 2024-05-11 PROCEDURE — 80069 RENAL FUNCTION PANEL: CPT | Performed by: INTERNAL MEDICINE

## 2024-05-11 PROCEDURE — 81000 URINALYSIS NONAUTO W/SCOPE: CPT | Performed by: INTERNAL MEDICINE

## 2024-05-11 PROCEDURE — 11000001 HC ACUTE MED/SURG PRIVATE ROOM

## 2024-05-11 RX ORDER — SODIUM CHLORIDE 9 MG/ML
INJECTION, SOLUTION INTRAVENOUS
Status: DISCONTINUED | OUTPATIENT
Start: 2024-05-11 | End: 2024-05-12 | Stop reason: HOSPADM

## 2024-05-11 RX ORDER — MIDODRINE HYDROCHLORIDE 5 MG/1
15 TABLET ORAL ONCE
Status: COMPLETED | OUTPATIENT
Start: 2024-05-11 | End: 2024-05-11

## 2024-05-11 RX ORDER — SODIUM CHLORIDE 9 MG/ML
INJECTION, SOLUTION INTRAVENOUS ONCE
Status: DISCONTINUED | OUTPATIENT
Start: 2024-05-11 | End: 2024-05-12 | Stop reason: HOSPADM

## 2024-05-11 RX ADMIN — MIDODRINE HYDROCHLORIDE 15 MG: 5 TABLET ORAL at 03:05

## 2024-05-11 RX ADMIN — NEPHROCAP 1 CAPSULE: 1 CAP ORAL at 10:05

## 2024-05-11 RX ADMIN — PRAVASTATIN SODIUM 40 MG: 40 TABLET ORAL at 09:05

## 2024-05-11 RX ADMIN — PANTOPRAZOLE SODIUM 40 MG: 40 TABLET, DELAYED RELEASE ORAL at 10:05

## 2024-05-11 RX ADMIN — FUROSEMIDE 80 MG: 10 INJECTION, SOLUTION INTRAVENOUS at 09:05

## 2024-05-11 RX ADMIN — HEPARIN SODIUM 5000 UNITS: 5000 INJECTION INTRAVENOUS; SUBCUTANEOUS at 09:05

## 2024-05-11 RX ADMIN — HEPARIN SODIUM 2400 UNITS: 1000 INJECTION, SOLUTION INTRAVENOUS; SUBCUTANEOUS at 02:05

## 2024-05-11 RX ADMIN — AMIODARONE HYDROCHLORIDE 400 MG: 200 TABLET ORAL at 10:05

## 2024-05-11 RX ADMIN — HEPARIN SODIUM 5000 UNITS: 5000 INJECTION INTRAVENOUS; SUBCUTANEOUS at 03:05

## 2024-05-11 RX ADMIN — LEVOTHYROXINE SODIUM 150 MCG: 150 TABLET ORAL at 10:05

## 2024-05-11 RX ADMIN — MEXILETINE HYDROCHLORIDE 150 MG: 150 CAPSULE ORAL at 10:05

## 2024-05-11 RX ADMIN — MIDODRINE HYDROCHLORIDE 15 MG: 5 TABLET ORAL at 09:05

## 2024-05-11 RX ADMIN — FUROSEMIDE 80 MG: 10 INJECTION, SOLUTION INTRAVENOUS at 10:05

## 2024-05-11 RX ADMIN — MEXILETINE HYDROCHLORIDE 150 MG: 150 CAPSULE ORAL at 04:05

## 2024-05-11 RX ADMIN — ALLOPURINOL 100 MG: 100 TABLET ORAL at 10:05

## 2024-05-11 RX ADMIN — HEPARIN SODIUM 5000 UNITS: 5000 INJECTION INTRAVENOUS; SUBCUTANEOUS at 05:05

## 2024-05-11 RX ADMIN — MIDODRINE HYDROCHLORIDE 15 MG: 5 TABLET ORAL at 11:05

## 2024-05-11 RX ADMIN — ASPIRIN 81 MG: 81 TABLET, COATED ORAL at 10:05

## 2024-05-11 RX ADMIN — MIDODRINE HYDROCHLORIDE 15 MG: 5 TABLET ORAL at 05:05

## 2024-05-11 NOTE — PLAN OF CARE
Problem: Physical Therapy  Goal: Physical Therapy Goal  Description: Goals to be met by: 24     Patient will increase functional independence with mobility by performin. Supine to sit with Modified [place]  2. Sit to supine with Modified [place]  3. Sit to stand transfer with Modified [place] with LRAD.   4. Bed to chair transfer with Stand-by Assistance using LRAD.  5. Gait  x 100 feet with Stand-by Assistance using LRAD.     Outcome: Progressing   Pt continues to work toward all goals. Able to perform 2 ambulation training trials ~80 ft each with RW, with 3 L of O2 donned requiring SBA.

## 2024-05-11 NOTE — PLAN OF CARE
Problem: Adult Inpatient Plan of Care  Goal: Plan of Care Review  Outcome: Progressing  Goal: Patient-Specific Goal (Individualized)  Outcome: Progressing  Goal: Absence of Hospital-Acquired Illness or Injury  Outcome: Progressing  Goal: Optimal Comfort and Wellbeing  Outcome: Progressing  Goal: Readiness for Transition of Care  Outcome: Progressing     Problem: Infection  Goal: Absence of Infection Signs and Symptoms  Outcome: Progressing     Problem: Skin Injury Risk Increased  Goal: Skin Health and Integrity  Outcome: Progressing     Problem: Chronic Kidney Disease  Goal: Optimal Coping with Chronic Illness  Outcome: Progressing  Goal: Electrolyte Balance  Outcome: Progressing  Goal: Fluid Balance  Outcome: Progressing  Goal: Optimal Functional Ability  Outcome: Progressing  Goal: Absence of Anemia Signs and Symptoms  Outcome: Progressing  Goal: Optimal Oral Intake  Outcome: Progressing  Goal: Acceptable Pain Control  Outcome: Progressing  Goal: Minimize Renal Failure Effects  Outcome: Progressing     Problem: Hemodialysis  Goal: Safe, Effective Therapy Delivery  Outcome: Progressing  Goal: Effective Tissue Perfusion  Outcome: Progressing  Goal: Absence of Infection Signs and Symptoms  Outcome: Progressing     Problem: Comorbidity Management  Goal: Maintenance of COPD Symptom Control  Outcome: Progressing  Goal: Maintenance of Heart Failure Symptom Control  Outcome: Progressing  Goal: Blood Pressure in Desired Range  Outcome: Progressing     Problem: Fall Injury Risk  Goal: Absence of Fall and Fall-Related Injury  Outcome: Progressing     Problem: Constipation  Goal: Effective Bowel Elimination  Outcome: Progressing     Problem: Wound  Goal: Optimal Coping  Outcome: Progressing  Goal: Optimal Functional Ability  Outcome: Progressing  Goal: Absence of Infection Signs and Symptoms  Outcome: Progressing  Goal: Improved Oral Intake  Outcome: Progressing  Goal: Optimal Pain Control and Function  Outcome:  Progressing  Goal: Skin Health and Integrity  Outcome: Progressing  Goal: Optimal Wound Healing  Outcome: Progressing

## 2024-05-11 NOTE — PROGRESS NOTES
05/11/24 1418   Handoff Report   Given To CLEMENTE Pisano RN   Vital Signs   Temp 96.6 °F (35.9 °C)   Temp Source Temporal   Pulse 60   Heart Rate Source Monitor   Resp 17   Flow (L/min) (Oxygen Therapy) 2   Device (Oxygen Therapy) nasal cannula   /73   BP Location Right arm   BP Method Automatic   Patient Position Lying        Hemodialysis Catheter 05/03/24 1115 right subclavian   Placement Date/Time: 05/03/24 1115   Present Prior to Hospital Arrival?: No  Hand Hygiene: Performed  Barrier Precautions: Performed  Skin Antisepsis: ChloraPrep  Hemodialysis Catheter Type: Tunneled catheter  Location: right subclavian  Catheter Size...   Dressing Type CHG impregnated dressing/sponge;Central line dressing   Dressing Status Clean;Dry;Intact   Dressing Intervention Integrity maintained   Date on Dressing 05/08/24   Dressing Due to be Changed 05/15/24   Venous Patency/Care heparin locked   Arterial Patency/Care heparin locked        Hemodialysis Catheter 05/08/24 1237    Placement Date/Time: 05/08/24 1237   Present Prior to Hospital Arrival?: No  Hand Hygiene: Performed  Barrier Precautions: Performed  Skin Antisepsis: ChloraPrep  Hemodialysis Catheter Type: (c) Temporary catheter  Location: (c)   Catheter Size (Fr): ...   $ Dialysis Supplies   (PD catheter)   Site Assessment Bleeding  (very slow ooze)   Dressing Type Gauze   Dressing Status Old drainage  (small amount dark sanguinous to perimeter of insertion site)   Dressing Intervention Sterile dressing change   Date on Dressing   (changed Q 24 hours)   Dressing Due to be Changed   (daily)   Post-Hemodialysis Assessment   Rinseback Volume (mL) 250 mL   Blood Volume Processed (Liters) 30.6 L   Dialyzer Clearance Moderately streaked   Duration of Treatment 120 minutes   Additional Fluid Intake (mL) 250 mL   Total UF (mL) 1500 mL   Net Fluid Removal 1000   Patient Response to Treatment tolerated well   Post-Hemodialysis Comments Lines dc'd. CVC flushed and locked per P  and P. VSS. Denies complaints. No changes in status. PD site observations reported to Dr. Mata.

## 2024-05-11 NOTE — PLAN OF CARE
Problem: Adult Inpatient Plan of Care  Goal: Plan of Care Review  Outcome: Progressing  Chart check complete. Vitals, orders, labs, and progress notes reviewed. Care plan updated. Will monitor.

## 2024-05-11 NOTE — ASSESSMENT & PLAN NOTE
Creatine stable for now. BMP reviewed- noted Estimated Creatinine Clearance: 19.1 mL/min (A) (based on SCr of 3.9 mg/dL (H)). according to latest data. Based on current GFR, CKD stage is end stage.  Monitor UOP and serial BMP and adjust therapy as needed. Renally dose meds. Avoid nephrotoxic medications and procedures.    Seen by Nephrology, Trialysis placed for CRRT, tolerating  5/3 Tunneled catheter placed 5/3  Had some intradialytic hypotension, required albumin in addition to midodrine  Post HD 5/11 tolerate well  S/P PD catheter placement plan for another 2 weeks HD and then will start PD  Case management for DC planning

## 2024-05-11 NOTE — PT/OT/SLP PROGRESS
"Physical Therapy Treatment    Patient Name:  Ar Sharma   MRN:  [id number]    Recommendations:     Discharge Recommendations: Low Intensity Therapy  Discharge Equipment Recommendations: walker, rolling  Barriers to discharge: None    Assessment:     Ar Sharma is a 63 y.o. male admitted with a medical diagnosis of Chronic kidney disease (CKD), stage V.  He presents with the following impairments/functional limitations: weakness, impaired endurance, impaired self care skills, impaired functional mobility, gait instability, impaired balance, decreased coordination, decreased upper extremity function, decreased lower extremity function, decreased safety awareness, pain, decreased ROM, impaired coordination, impaired cardiopulmonary response to activity. Pt able to perform 2 ambulation training trials ~80 ft each with RW, 3L of O2 donned, requiring SBA. Recommending low intensity therapy upon discharge.    Rehab Prognosis: Good; patient would benefit from acute skilled PT services to address these deficits and reach maximum level of function.    Recent Surgery: Procedure(s) (LRB):  INSERTION, CATHETER, DIALYSIS, PERITONEAL, LAPAROSCOPIC (N/A) 3 Days Post-Op    Plan:     During this hospitalization, patient to be seen 5 x/week to address the identified rehab impairments via gait training, therapeutic activities, neuromuscular re-education and progress toward the following goals:    Plan of Care Expires:  06/06/24    Subjective     Chief Complaint: None expressed  Patient/Family Comments/goals: "Why do I have to have dialysis again today? That will be 4 times in a row."  Pain/Comfort:  Pain Rating 1: 0/10  Pain Rating Post-Intervention 1: 0/10      Objective:     Communicated with nurse prior to session.  Patient found supine with bed alarm, oxygen, telemetry upon PT entry to room.     General Precautions: Standard, fall  Orthopedic Precautions: N/A  Braces: N/A  Respiratory Status: Nasal cannula, flow 3 L/min   "   Functional Mobility:  Bed Mobility:     Rolling Right: modified independence  Scooting: modified independence  Supine to Sit: modified independence  Sit to Supine: modified independence  Transfers:     Sit to Stand:  supervision with rolling walker  Gait: 2 trials ~80 ft each with RW, 3L of O2 donned, requiring SBA.      AM-PAC 6 CLICK MOBILITY  Turning over in bed (including adjusting bedclothes, sheets and blankets)?: 4  Sitting down on and standing up from a chair with arms (e.g., wheelchair, bedside commode, etc.): 3  Moving from lying on back to sitting on the side of the bed?: 3  Moving to and from a bed to a chair (including a wheelchair)?: 3  Need to walk in hospital room?: 3  Climbing 3-5 steps with a railing?: 2  Basic Mobility Total Score: 18       Treatment & Education:  Upon entering room pt and wife inquiring why pt had to have dialysis again today. Nurse (May) called into room to explain. During session doctor called and spoke to pt's wife to further explain the consecutive days of dialysis. Pt sat at EOB and instructed in glut isometrics prior to performing sit to stand transfer. Pt also able to perform 2 ambulation training trials ~80 ft each with RW, 3L of O2 donned, requiring SBA. Standing rest break ~2 minutes looking out hallway window talking with PTA and wife.    Patient left HOB elevated with all lines intact, call button in reach, bed alarm on, nurse notified, and wife present..    GOALS:   Multidisciplinary Problems       Physical Therapy Goals          Problem: Physical Therapy    Goal Priority Disciplines Outcome Goal Variances Interventions   Physical Therapy Goal     PT, PT/OT Progressing     Description: Goals to be met by: 24     Patient will increase functional independence with mobility by performin. Supine to sit with Modified [place]  2. Sit to supine with Modified [place]  3. Sit to stand transfer with Modified [place] with LRAD.   4. Bed to chair  transfer with Stand-by Assistance using LRAD.  5. Gait  x 100 feet with Stand-by Assistance using LRAD.                          Time Tracking:     PT Received On: 05/11/24  PT Start Time: 0743     PT Stop Time: 0826  PT Total Time (min): 43 min minus ~5 minutes for nurse speaking with pt and his wife regarding dialysis. PT total time = 38 min    Billable Minutes: Gait Training 23 and Therapeutic Activity 15    Treatment Type: Treatment  PT/PTA: PTA     Number of PTA visits since last PT visit: 2     05/11/2024

## 2024-05-11 NOTE — PROGRESS NOTES
[facility] Medicine  Progress Note    Patient Name: Ar Sharma  MRN: [id number]  Patient Class: IP- Inpatient   Admission Date: 4/29/2024  Length of Stay: 12 days  Attending Physician: Lon Bowles MD  Primary Care Provider: Jc Elliott MD        Subjective:     Principal Problem:Chronic kidney disease (CKD), stage V        HPI:  Ar Sharma is a 63M with PMHx of ESRD, HFrEF, HTN, NICM, COPD, VT s/p ICD placement, and HLD who presented to [facility] ED with CC progressively worse SOB over the last two months. He was recently diagnosed with ESRD and has been trying diet modification at home. He endorses SINGH, dizziness, frequent intermittent nausea and one episode of vomiting during this time. He denies CP, syncope, dysuria, fever or chills.     ED workup significant for: , Cr 6.7, Phos 5, Tbili 2.4, BNP 3204, trop 0.040, vit D 24, .6, and CXR: cardiomegaly w/ pulmonary vascular congestion consistent w/ pulmonary edema. All other components of CBC and chemistries were unremarkable.       Patient was seen by Dr. Mata in ED. Emergent HD not indicated.     Overview/Hospital Course:  4/30 Tolerating HD through central line, SOB is improved  5/1/24 On pressors but tolerating CRRT  5/2 tolerating HD, off of pressors, stepping down  5/3 Tunneled catheter placed  5/4 had an episode of intradialytic hypotension, given albumin  5/5 Having hiccups, started PPI  5/6 Hiccups resolved. Gen Surgery consult for PD catheter  5/7 Awaiting for PD catheter placement  5/8 S/P PD catheter placement   5/9/24 S/P HD tolerate well  5/10/24 S/P HD oozing blood at the PD site and still requiring 4-5 L of oxygen  5/11/24 S/P HD minimal bleeding at the PD site  The mobility limitation cannot be sufficiently resolved by the use of a cane. Patient's functional mobility deficit can be sufficiently resolved with the use of a (Rolling Walker or Walker). Patient's mobility limitation significantly impairs  their ability to participate in one of more activities of daily living. The use of a (RW or Walker) will significantly improve the patient's ability to participate in MRADLS and the patient will use it on regular basis in the home.     Interval History:   Still feeling very weak, s/p HD with minimal oozing bleeding at the PD site, no acute abdomen, no spike, no worsening SOB.    Review of Systems   Constitutional:  Positive for activity change and appetite change. Negative for fever.   Respiratory:  Positive for shortness of breath. Negative for chest tightness and wheezing.    Cardiovascular:  Negative for chest pain, palpitations and leg swelling.   Gastrointestinal:  Negative for abdominal pain, nausea and vomiting.   Musculoskeletal:  Positive for gait problem.   Neurological:  Positive for weakness. Negative for dizziness, speech difficulty and headaches.   Psychiatric/Behavioral:  Negative for agitation, confusion and hallucinations.    All other systems reviewed and are negative.    Objective:     Vital Signs (Most Recent):  Temp: 97.7 °F (36.5 °C) (05/11/24 1458)  Pulse: 60 (05/11/24 1458)  Resp: 16 (05/11/24 1458)  BP: 109/60 (05/11/24 1458)  SpO2: (!) 93 % (05/11/24 1458) Vital Signs (24h Range):  Temp:  [96.6 °F (35.9 °C)-98.3 °F (36.8 °C)] 97.7 °F (36.5 °C)  Pulse:  [59-67] 60  Resp:  [16-22] 16  SpO2:  [91 %-94 %] 93 %  BP: ()/(50-82) 109/60     Weight: 76.5 kg (168 lb 10.4 oz)  Body mass index is 24.91 kg/m².    Intake/Output Summary (Last 24 hours) at 5/11/2024 1533  Last data filed at 5/11/2024 1418  Gross per 24 hour   Intake 250 ml   Output 1650 ml   Net -1400 ml         Physical Exam  Vitals and nursing note reviewed.   Constitutional:       General: He is not in acute distress.     Appearance: He is ill-appearing. He is not toxic-appearing.   HENT:      Head: Normocephalic and atraumatic.      Mouth/Throat:      Mouth: Mucous membranes are moist.   Eyes:      Extraocular Movements:  Extraocular movements intact.      Pupils: Pupils are equal, round, and reactive to light.   Cardiovascular:      Rate and Rhythm: Normal rate.      Heart sounds: No murmur heard.     No friction rub. No gallop.   Pulmonary:      Effort: Pulmonary effort is normal.      Breath sounds: No rales.   Chest:      Chest wall: No tenderness.   Abdominal:      General: Bowel sounds are normal. There is no distension.      Palpations: Abdomen is soft.      Tenderness: There is no abdominal tenderness. There is no guarding or rebound.      Comments: PD site minimal oozing of blood, no excessive secretion   Musculoskeletal:      Cervical back: No rigidity or tenderness.      Right lower leg: No edema.      Left lower leg: No edema.   Skin:     Findings: Erythema present.   Neurological:      General: No focal deficit present.      Mental Status: He is alert and oriented to person, place, and time.      Motor: Weakness present.      Coordination: Coordination normal.      Gait: Gait abnormal.   Psychiatric:         Thought Content: Thought content normal.             Significant Labs: All pertinent labs within the past 24 hours have been reviewed.  BMP:   Recent Labs   Lab 05/11/24  0453   GLU 92   *   K 4.2      CO2 21*   BUN 26*   CREATININE 3.9*   CALCIUM 8.6*   MG 2.0     CBC:   Recent Labs   Lab 05/10/24  0338 05/11/24  0452   WBC 10.57 11.78   HGB 14.3 13.5*   HCT 41.7 39.2*   * 483*     CMP:   Recent Labs   Lab 05/10/24  0338 05/11/24  0453   *  131* 132*   K 4.3  4.3 4.2   CL 97  97 100   CO2 23  23 21*   GLU 87  87 92   BUN 34*  34* 26*   CREATININE 4.3*  4.3* 3.9*   CALCIUM 8.7  8.7 8.6*   ALBUMIN 3.0* 3.0*   ANIONGAP 11  11 11     Recent Lab Results         05/11/24  1153   05/11/24  0453   05/11/24  0452        Albumin   3.0         Anion Gap   11         Appearance, UA Cloudy            Cloudy           Bacteria, UA Moderate           Baso #     0.12       Basophil %     1.0        Bilirubin (UA) 1+  Comment: Positive urine bilirubin is not confirmed. Correlate with   serum bilirubin and clinical presentation.              1+  Comment: Positive urine bilirubin is not confirmed. Correlate with   serum bilirubin and clinical presentation.             BUN   26         Calcium   8.6         Chloride   100         CO2   21         Color, UA Yellow            Yellow           Creatinine   3.9         Differential Method     Automated       eGFR   16         Eos #     0.3       Eos %     2.4       Glucose   92         Glucose, UA Negative            Negative           Gran # (ANC)     9.1       Gran %     77.3       Hematocrit     39.2       Hemoglobin     13.5       Hyaline Casts, UA 20           Immature Grans (Abs)     0.21  Comment: Mild elevation in immature granulocytes is non specific and   can be seen in a variety of conditions including stress response,   acute inflammation, trauma and pregnancy. Correlation with other   laboratory and clinical findings is essential.         Immature Granulocytes     1.8       Ketones, UA Negative            Negative           Leukocyte Esterase, UA 1+            1+           Lymph #     0.8       Lymph %     6.4       Magnesium    2.0         MCH     25.9       MCHC     34.4       MCV     75       Microscopic Comment SEE COMMENT  Comment: Other formed elements not mentioned in the report are not   present in the microscopic examination.              Mono #     1.3       Mono %     11.1       MPV     12.8       NITRITE UA Negative            Negative           nRBC     0       Blood, UA Negative            Negative           pH, UA 5.0            5.0           Phosphorus Level   2.9         Platelet Count     483       Potassium   4.2         Protein, UA 1+  Comment: Recommend a 24 hour urine protein or a urine   protein/creatinine ratio if globulin induced proteinuria is  clinically suspected.              1+  Comment: Recommend a 24 hour urine protein or a  urine   protein/creatinine ratio if globulin induced proteinuria is  clinically suspected.             RBC     5.22       RBC, UA 6           RDW     16.2       Sodium   132         Specific Gravity, UA 1.020            1.020           Specimen UA Urine, Clean Catch            Urine, Clean Catch           UROBILINOGEN UA 2.0-3.0            2.0-3.0           WBC Clumps, UA Few           WBC, UA 25           WBC     11.78               Significant Imaging: I have reviewed all pertinent imaging results/findings within the past 24 hours.    Assessment/Plan:      * Chronic kidney disease (CKD), stage V  Creatine stable for now. BMP reviewed- noted Estimated Creatinine Clearance: 20.7 mL/min (A) (based on SCr of 3.6 mg/dL (H)). according to latest data. Based on current GFR, CKD stage is end stage.  Monitor UOP and serial BMP and adjust therapy as needed. Renally dose meds. Avoid nephrotoxic medications and procedures.    --see by Dr. Mata in ED, gen surg consulted for tunneled catheter placement in AM 4/30/24  --admit to ICU for CRRT once catheter placed-->tolerating HD will step down today  --Plan for PD catheter placement  -case management for DC planning    PD catheter dysfunction, sequela  Oozing blood from PD site improved, cont monitor  Cont compression dressing    abdominal X-ray and CT abdomen no peritoneal hematoma  Monitor bleeding      Hypotension  improving  Weaned off of pressors  cont midodrine  Goal MAP >65  Orthostatics negative, off of pressors, Step down to floor  S/P PD placement seems tolerate procedure well  Monitor BP    Acute hypoxemic respiratory failure  Patient with Hypoxic Respiratory failure which is Acute.  he is not on home oxygen. Supplemental oxygen was provided and noted-      .   Signs/symptoms of respiratory failure include- increased work of breathing, respiratory distress, and lethargy. Contributing diagnoses includes - CHF Labs and images were reviewed. Patient Has not had a recent  ABG. Will treat underlying causes and adjust management of respiratory failure as follows- cont O2 at 5L weaning down to 3L if can tolerate prior DC    ESRD (end stage renal disease)  Creatine stable for now. BMP reviewed- noted Estimated Creatinine Clearance: 19.1 mL/min (A) (based on SCr of 3.9 mg/dL (H)). according to latest data. Based on current GFR, CKD stage is end stage.  Monitor UOP and serial BMP and adjust therapy as needed. Renally dose meds. Avoid nephrotoxic medications and procedures.    Seen by Nephrology, Trialysis placed for CRRT, tolerating  5/3 Tunneled catheter placed 5/3  Had some intradialytic hypotension, required albumin in addition to midodrine  Post HD 5/11 tolerate well  S/P PD catheter placement plan for another 2 weeks HD and then will start PD  Case management for DC planning    Pulmonary emphysema, unspecified emphysema type  Patient's COPD is controlled currently.  Patient is currently off COPD Pathway. Continue scheduled inhalers Supplemental oxygen and monitor respiratory status closely.   Will DC on home O2 3L    Non-ischemic cardiomyopathy  Echo    Left Ventricle: The left ventricle is normal in size. Normal wall thickness. Regional wall motion abnormalities present. See diagram for wall motion findings. Septal motion is consistent with pacing. There is mildly reduced systolic function with a visually estimated ejection fraction of 40 - 45%. Grade III diastolic dysfunction.    Right Ventricle: Moderate to severe right ventricular enlargement. Systolic function is reduced.TAPSE is 1.59 cm. Pacemaker lead present in the ventricle.    Left Atrium: Left atrium is severely dilated. The left atrium volume index is 71.4 mL/m2.    Right Atrium: Right atrium is severely dilated.    Aortic Valve: There is mild aortic valve sclerosis. There is mild to moderate stenosis. Aortic valve area by VTI is 1.49 cm². Aortic valve peak velocity is 1.43 m/s. Mean gradient is 5 mmHg. The dimensionless  index is 0.52.    Mitral Valve: There is mild regurgitation.    Tricuspid Valve: There is mild to moderate regurgitation.    Pulmonary Artery: The estimated pulmonary artery systolic pressure is 64 mmHg.    IVC/SVC: Elevated venous pressure at 15 mmHg.         Abuse of herbal or folk remedies        Hyponatremia  Patient has hyponatremia which is controlled,We will aim to correct the sodium by 4-6mEq in 24 hours. We will monitor sodium Daily. The hyponatremia is due to renal insufficiency. We will obtain the following studies: NA. We will treat the hyponatremia with Fluid restriction of:  1.5 liter per day and Hemodialysis. The patient's sodium results have been reviewed and are listed below.  Recent Labs   Lab 05/09/24  0153   *         Primary hypertension  Chronic, controlled. Latest blood pressure and vitals reviewed-     Temp:  [97.4 °F (36.3 °C)-98.6 °F (37 °C)]   Pulse:  [59-68]   Resp:  [16-20]   BP: ()/(55-69)   SpO2:  [90 %-96 %] .   Home meds for hypertension were reviewed and noted below.   Hypertension Medications               furosemide (LASIX) 80 MG tablet Take 1 tablet (80 mg total) by mouth 2 (two) times daily. If increased 3 pounds in a day or 5 pounds in a week, take an extra dose of lasix 80mg until those pounds are lost.    metoprolol succinate (TOPROL-XL) 25 MG 24 hr tablet TAKE 1 TABLET BY MOUTH EVERY DAY    nitroGLYCERIN (NITROSTAT) 0.4 MG SL tablet Place 1 tablet (0.4 mg total) under the tongue every 5 (five) minutes as needed for Chest pain.    sacubitriL-valsartan (ENTRESTO) 24-26 mg per tablet Take 1 tablet by mouth 2 (two) times daily.            While in the hospital, will manage blood pressure as follows; Adjust home antihypertensive regimen as follows- hold home bp medications in the setting of hypotension requiring CRRT    Will utilize p.r.n. blood pressure medication only if patient's blood pressure greater than 180/110 and he develops symptoms such as worsening chest  pain or shortness of breath.    HFrEF (heart failure with reduced ejection fraction)  Congestive Heart Failure  Patient presents for re-evaluation of congestive heart failure. Patient's current complaints are dyspnea, fatigue, near-syncope, and orthopnea. He denies chest pain, claudication, exertional chest pressure/discomfort, and lower extremity edema. He states he is compliant most of the time with his medications. He states he is compliant all of the time with his diet.    --last TTE in 2023 shows EF 40%. Will repeat this admission  --on home lasix. Will convert to higher IV dosage  --will hold troprol in setting of hypotension requiring CRRT  --on CHF pathway    Cardiac resynchronization therapy defibrillator (CRT-D) in place        Mixed hyperlipidemia  --continue home medications      Iron deficiency anemia  Patient's anemia is currently controlled. Has not received any PRBCs to date. Etiology likely d/t Iron deficiency and chronic disease due to ESRD  Current CBC reviewed-   Lab Results   Component Value Date    HGB 14.2 05/09/2024    HCT 40.4 05/09/2024     Monitor serial CBC and transfuse if patient becomes hemodynamically unstable, symptomatic or H/H drops below 7/21.    Hypothyroidism  --continue home synthroid    V-tach  --hx of AICD placement, currently 100% paced   --continue home antiarrhythmic medications  --cardiac monitoring          VTE Risk Mitigation (From admission, onward)           Ordered     heparin (porcine) injection 2,400 Units  As needed (PRN)         04/30/24 2223     heparin (porcine) injection 5,000 Units  Every 8 hours         04/29/24 1945     IP VTE HIGH RISK PATIENT  Once         04/29/24 1945     Place sequential compression device  Until discontinued         04/29/24 1945                    Discharge Planning   SLAVA:      Code Status: Full Code   Is the patient medically ready for discharge?:     Reason for patient still in hospital (select all that apply): Patient trending  condition and Pending disposition  Discharge Plan A: Home, Home Health   Discharge Delays: None known at this time        Time spent > 35 min      Lon Bowles MD  Department of Hospital Medicine   [facility]

## 2024-05-11 NOTE — ASSESSMENT & PLAN NOTE
Oozing blood from PD site improved, cont monitor  Cont compression dressing    abdominal X-ray and CT abdomen no peritoneal hematoma  Monitor bleeding

## 2024-05-11 NOTE — ASSESSMENT & PLAN NOTE
improving  Weaned off of pressors  cont midodrine  Goal MAP >65  Orthostatics negative, off of pressors, Step down to floor  S/P PD placement seems tolerate procedure well  Monitor BP

## 2024-05-11 NOTE — ASSESSMENT & PLAN NOTE
Patient's COPD is controlled currently.  Patient is currently off COPD Pathway. Continue scheduled inhalers Supplemental oxygen and monitor respiratory status closely.   Will DC on home O2 3L

## 2024-05-11 NOTE — SUBJECTIVE & OBJECTIVE
Interval History:   Still feeling very weak, s/p HD with minimal oozing bleeding at the PD site, no acute abdomen, no spike, no worsening SOB.    Review of Systems   Constitutional:  Positive for activity change and appetite change. Negative for fever.   Respiratory:  Positive for shortness of breath. Negative for chest tightness and wheezing.    Cardiovascular:  Negative for chest pain, palpitations and leg swelling.   Gastrointestinal:  Negative for abdominal pain, nausea and vomiting.   Musculoskeletal:  Positive for gait problem.   Neurological:  Positive for weakness. Negative for dizziness, speech difficulty and headaches.   Psychiatric/Behavioral:  Negative for agitation, confusion and hallucinations.    All other systems reviewed and are negative.    Objective:     Vital Signs (Most Recent):  Temp: 97.7 °F (36.5 °C) (05/11/24 1458)  Pulse: 60 (05/11/24 1458)  Resp: 16 (05/11/24 1458)  BP: 109/60 (05/11/24 1458)  SpO2: (!) 93 % (05/11/24 1458) Vital Signs (24h Range):  Temp:  [96.6 °F (35.9 °C)-98.3 °F (36.8 °C)] 97.7 °F (36.5 °C)  Pulse:  [59-67] 60  Resp:  [16-22] 16  SpO2:  [91 %-94 %] 93 %  BP: ()/(50-82) 109/60     Weight: 76.5 kg (168 lb 10.4 oz)  Body mass index is 24.91 kg/m².    Intake/Output Summary (Last 24 hours) at 5/11/2024 1533  Last data filed at 5/11/2024 1418  Gross per 24 hour   Intake 250 ml   Output 1650 ml   Net -1400 ml         Physical Exam  Vitals and nursing note reviewed.   Constitutional:       General: He is not in acute distress.     Appearance: He is ill-appearing. He is not toxic-appearing.   HENT:      Head: Normocephalic and atraumatic.      Mouth/Throat:      Mouth: Mucous membranes are moist.   Eyes:      Extraocular Movements: Extraocular movements intact.      Pupils: Pupils are equal, round, and reactive to light.   Cardiovascular:      Rate and Rhythm: Normal rate.      Heart sounds: No murmur heard.     No friction rub. No gallop.   Pulmonary:      Effort:  Pulmonary effort is normal.      Breath sounds: No rales.   Chest:      Chest wall: No tenderness.   Abdominal:      General: Bowel sounds are normal. There is no distension.      Palpations: Abdomen is soft.      Tenderness: There is no abdominal tenderness. There is no guarding or rebound.      Comments: PD site minimal oozing of blood, no excessive secretion   Musculoskeletal:      Cervical back: No rigidity or tenderness.      Right lower leg: No edema.      Left lower leg: No edema.   Skin:     Findings: Erythema present.   Neurological:      General: No focal deficit present.      Mental Status: He is alert and oriented to person, place, and time.      Motor: Weakness present.      Coordination: Coordination normal.      Gait: Gait abnormal.   Psychiatric:         Thought Content: Thought content normal.             Significant Labs: All pertinent labs within the past 24 hours have been reviewed.  BMP:   Recent Labs   Lab 05/11/24  0453   GLU 92   *   K 4.2      CO2 21*   BUN 26*   CREATININE 3.9*   CALCIUM 8.6*   MG 2.0     CBC:   Recent Labs   Lab 05/10/24  0338 05/11/24  0452   WBC 10.57 11.78   HGB 14.3 13.5*   HCT 41.7 39.2*   * 483*     CMP:   Recent Labs   Lab 05/10/24  0338 05/11/24  0453   *  131* 132*   K 4.3  4.3 4.2   CL 97  97 100   CO2 23  23 21*   GLU 87  87 92   BUN 34*  34* 26*   CREATININE 4.3*  4.3* 3.9*   CALCIUM 8.7  8.7 8.6*   ALBUMIN 3.0* 3.0*   ANIONGAP 11  11 11     Recent Lab Results         05/11/24  1153   05/11/24  0453   05/11/24  0452        Albumin   3.0         Anion Gap   11         Appearance, UA Cloudy            Cloudy           Bacteria, UA Moderate           Baso #     0.12       Basophil %     1.0       Bilirubin (UA) 1+  Comment: Positive urine bilirubin is not confirmed. Correlate with   serum bilirubin and clinical presentation.              1+  Comment: Positive urine bilirubin is not confirmed. Correlate with   serum bilirubin and  clinical presentation.             BUN   26         Calcium   8.6         Chloride   100         CO2   21         Color, UA Yellow            Yellow           Creatinine   3.9         Differential Method     Automated       eGFR   16         Eos #     0.3       Eos %     2.4       Glucose   92         Glucose, UA Negative            Negative           Gran # (ANC)     9.1       Gran %     77.3       Hematocrit     39.2       Hemoglobin     13.5       Hyaline Casts, UA 20           Immature Grans (Abs)     0.21  Comment: Mild elevation in immature granulocytes is non specific and   can be seen in a variety of conditions including stress response,   acute inflammation, trauma and pregnancy. Correlation with other   laboratory and clinical findings is essential.         Immature Granulocytes     1.8       Ketones, UA Negative            Negative           Leukocyte Esterase, UA 1+            1+           Lymph #     0.8       Lymph %     6.4       Magnesium    2.0         MCH     25.9       MCHC     34.4       MCV     75       Microscopic Comment SEE COMMENT  Comment: Other formed elements not mentioned in the report are not   present in the microscopic examination.              Mono #     1.3       Mono %     11.1       MPV     12.8       NITRITE UA Negative            Negative           nRBC     0       Blood, UA Negative            Negative           pH, UA 5.0            5.0           Phosphorus Level   2.9         Platelet Count     483       Potassium   4.2         Protein, UA 1+  Comment: Recommend a 24 hour urine protein or a urine   protein/creatinine ratio if globulin induced proteinuria is  clinically suspected.              1+  Comment: Recommend a 24 hour urine protein or a urine   protein/creatinine ratio if globulin induced proteinuria is  clinically suspected.             RBC     5.22       RBC, UA 6           RDW     16.2       Sodium   132         Specific Gravity, UA 1.020            1.020            Specimen UA Urine, Clean Catch            Urine, Clean Catch           UROBILINOGEN UA 2.0-3.0            2.0-3.0           WBC Clumps, UA Few           WBC, UA 25           WBC     11.78               Significant Imaging: I have reviewed all pertinent imaging results/findings within the past 24 hours.

## 2024-05-11 NOTE — PLAN OF CARE
SW made aware of possible dc today. Pts wife is at bedside and will provide transport home.     Per Isha note pts declined all dme upon dc.   Pt was delivered portable O2 tank to bedside on yesterday. Pts wife will call [facility] for concentrator to be delivered upon dc. Pts wife has number.   Pt will dc with  services. HH agency has already contacted pts wife for first appointment time/date.     Per Isha note: I also spoke with Martha at [facility] to update her on discharge plans. Patient will go to his next HD session on Monday at 10:00 am. (Pts wife confirmed she is aware of information).      No additional cm needs at this time.     Cleared from CM . Bedside Nurse and VN notified.    Future Appointments   Date Time Provider Department Center   5/14/2024 11:00 AM Fatmata Johansen MD [facility] IMPRI [place] [facility]   5/16/2024  3:00 PM Shanell José PA-C [facility] MED [facility]   5/22/2024  2:20 PM Tyree Ruiz MD [facility] GENSUR [place] [facility]   6/5/2024  2:00 PM Tyree Ruiz MD [facility] GENSUR [place] [facility]   9/4/2024  9:40 AM SAME DAY LAB, [facility] RV LAB [facility]   9/11/2024  2:00 PM Jc Elliott MD [facility] FAM MED [facility]        05/11/24 1338   Final Note   Assessment Type Final Discharge Note   Anticipated Discharge Disposition Home-Health   Hospital Resources/Appts/Education Provided Appointments scheduled and added to AVS   Post-Acute Status   Discharge Delays None known at this time

## 2024-05-12 ENCOUNTER — CLINICAL SUPPORT (OUTPATIENT)
Dept: CARDIOLOGY | Facility: HOSPITAL | Age: 64
End: 2024-05-12
Attending: INTERNAL MEDICINE
Payer: MEDICARE

## 2024-05-12 ENCOUNTER — CLINICAL SUPPORT (OUTPATIENT)
Dept: CARDIOLOGY | Facility: HOSPITAL | Age: 64
End: 2024-05-12
Payer: MEDICARE

## 2024-05-12 VITALS
SYSTOLIC BLOOD PRESSURE: 84 MMHG | DIASTOLIC BLOOD PRESSURE: 56 MMHG | BODY MASS INDEX: 24.98 KG/M2 | HEIGHT: 69 IN | HEART RATE: 62 BPM | TEMPERATURE: 97 F | WEIGHT: 168.63 LBS | OXYGEN SATURATION: 93 % | RESPIRATION RATE: 18 BRPM

## 2024-05-12 DIAGNOSIS — Z95.810 PRESENCE OF AUTOMATIC (IMPLANTABLE) CARDIAC DEFIBRILLATOR: ICD-10-CM

## 2024-05-12 LAB
BASOPHILS # BLD AUTO: 0.14 K/UL (ref 0–0.2)
BASOPHILS NFR BLD: 1.2 % (ref 0–1.9)
DIFFERENTIAL METHOD BLD: ABNORMAL
EOSINOPHIL # BLD AUTO: 0.3 K/UL (ref 0–0.5)
EOSINOPHIL NFR BLD: 2.7 % (ref 0–8)
ERYTHROCYTE [DISTWIDTH] IN BLOOD BY AUTOMATED COUNT: 16.5 % (ref 11.5–14.5)
HCT VFR BLD AUTO: 40.2 % (ref 40–54)
HGB BLD-MCNC: 13.9 G/DL (ref 14–18)
IMM GRANULOCYTES # BLD AUTO: 0.13 K/UL (ref 0–0.04)
IMM GRANULOCYTES NFR BLD AUTO: 1.1 % (ref 0–0.5)
LYMPHOCYTES # BLD AUTO: 0.7 K/UL (ref 1–4.8)
LYMPHOCYTES NFR BLD: 6.2 % (ref 18–48)
MAGNESIUM SERPL-MCNC: 2 MG/DL (ref 1.6–2.6)
MCH RBC QN AUTO: 26 PG (ref 27–31)
MCHC RBC AUTO-ENTMCNC: 34.6 G/DL (ref 32–36)
MCV RBC AUTO: 75 FL (ref 82–98)
MONOCYTES # BLD AUTO: 1.1 K/UL (ref 0.3–1)
MONOCYTES NFR BLD: 9.5 % (ref 4–15)
NEUTROPHILS # BLD AUTO: 9.4 K/UL (ref 1.8–7.7)
NEUTROPHILS NFR BLD: 79.3 % (ref 38–73)
NRBC BLD-RTO: 0 /100 WBC
OHS QRS DURATION: 212 MS
OHS QTC CALCULATION: 517 MS
PLATELET # BLD AUTO: 395 K/UL (ref 150–450)
PMV BLD AUTO: 11.9 FL (ref 9.2–12.9)
RBC # BLD AUTO: 5.34 M/UL (ref 4.6–6.2)
WBC # BLD AUTO: 11.84 K/UL (ref 3.9–12.7)

## 2024-05-12 PROCEDURE — 63600175 PHARM REV CODE 636 W HCPCS: Performed by: FAMILY MEDICINE

## 2024-05-12 PROCEDURE — 36415 COLL VENOUS BLD VENIPUNCTURE: CPT | Performed by: SURGERY

## 2024-05-12 PROCEDURE — 25000003 PHARM REV CODE 250: Performed by: SURGERY

## 2024-05-12 PROCEDURE — 25000003 PHARM REV CODE 250: Performed by: FAMILY MEDICINE

## 2024-05-12 PROCEDURE — 83735 ASSAY OF MAGNESIUM: CPT | Performed by: SURGERY

## 2024-05-12 PROCEDURE — 25000003 PHARM REV CODE 250: Performed by: INTERNAL MEDICINE

## 2024-05-12 PROCEDURE — 85025 COMPLETE CBC W/AUTO DIFF WBC: CPT | Performed by: SURGERY

## 2024-05-12 PROCEDURE — 93296 REM INTERROG EVL PM/IDS: CPT | Performed by: INTERNAL MEDICINE

## 2024-05-12 PROCEDURE — 93295 DEV INTERROG REMOTE 1/2/MLT: CPT | Mod: ,,, | Performed by: INTERNAL MEDICINE

## 2024-05-12 PROCEDURE — 27000221 HC OXYGEN, UP TO 24 HOURS

## 2024-05-12 PROCEDURE — 99900035 HC TECH TIME PER 15 MIN (STAT)

## 2024-05-12 PROCEDURE — 63600175 PHARM REV CODE 636 W HCPCS: Performed by: STUDENT IN AN ORGANIZED HEALTH CARE EDUCATION/TRAINING PROGRAM

## 2024-05-12 PROCEDURE — 94761 N-INVAS EAR/PLS OXIMETRY MLT: CPT

## 2024-05-12 PROCEDURE — 63600175 PHARM REV CODE 636 W HCPCS: Performed by: SURGERY

## 2024-05-12 RX ORDER — CEFDINIR 300 MG/1
300 CAPSULE ORAL 2 TIMES DAILY
Qty: 20 CAPSULE | Refills: 0 | Status: SHIPPED | OUTPATIENT
Start: 2024-05-12 | End: 2024-05-22

## 2024-05-12 RX ADMIN — AMIODARONE HYDROCHLORIDE 400 MG: 200 TABLET ORAL at 09:05

## 2024-05-12 RX ADMIN — LEVOTHYROXINE SODIUM 150 MCG: 150 TABLET ORAL at 09:05

## 2024-05-12 RX ADMIN — MIDODRINE HYDROCHLORIDE 15 MG: 5 TABLET ORAL at 01:05

## 2024-05-12 RX ADMIN — NEPHROCAP 1 CAPSULE: 1 CAP ORAL at 09:05

## 2024-05-12 RX ADMIN — FUROSEMIDE 80 MG: 10 INJECTION, SOLUTION INTRAVENOUS at 09:05

## 2024-05-12 RX ADMIN — CEFTRIAXONE SODIUM 1 G: 1 INJECTION, POWDER, FOR SOLUTION INTRAMUSCULAR; INTRAVENOUS at 09:05

## 2024-05-12 RX ADMIN — PANTOPRAZOLE SODIUM 40 MG: 40 TABLET, DELAYED RELEASE ORAL at 09:05

## 2024-05-12 RX ADMIN — ALLOPURINOL 100 MG: 100 TABLET ORAL at 09:05

## 2024-05-12 RX ADMIN — HEPARIN SODIUM 5000 UNITS: 5000 INJECTION INTRAVENOUS; SUBCUTANEOUS at 01:05

## 2024-05-12 RX ADMIN — MIDODRINE HYDROCHLORIDE 15 MG: 5 TABLET ORAL at 06:05

## 2024-05-12 RX ADMIN — ASPIRIN 81 MG: 81 TABLET, COATED ORAL at 09:05

## 2024-05-12 RX ADMIN — MEXILETINE HYDROCHLORIDE 150 MG: 150 CAPSULE ORAL at 09:05

## 2024-05-12 NOTE — PROGRESS NOTES
[facility] - Telemetry  Discharge Final Note    Primary Care Provider: Jc Elliott MD    Expected Discharge Date: 5/12/2024    Final Discharge Note (most recent)       Final Note - 05/11/24 1338          Final Note    Assessment Type Final Discharge Note     Anticipated Discharge Disposition Home-Health Care [facility]     Hospital Resources/Appts/Education Provided Appointments scheduled and added to AVS        Post-Acute Status    Discharge Delays None known at this time                     Important Message from Medicare             Contact Info       Fatmata Johansen MD   Specialty: Internal Medicine    [address]  [address]  [facility] [address]   Phone: [phone]       Next Steps: Follow up on 5/14/2024    Instructions: 11:00am HSP DX:New hd pt    Shanell José PA-C   Specialty: Family Medicine    [address]  [facility] [address]   Phone: [phone]       Next Steps: Follow up on 5/16/2024    Instructions: 3:00pm    [facility] DIALYSIS    [address] [facility] [place] [address]   Phone: [phone]       Next Steps: Follow up on 5/13/2024    Instructions: Your first scheduled HD date.    [facility]   Specialty: Home Health Services, Home Therapy Services, Home Living Aide Services    [address] [facility] [place] [address]   Phone: [phone]       Next Steps: Follow up    Instructions: HOME HEALTH- will contact the patient to intiate services        Patient is cleared for discharge per CM.Patient's wife will provide discharge transportation home

## 2024-05-12 NOTE — PLAN OF CARE
VN note: Chart review completed. Patient labs and notes reviewed. Care plan and goals updated. VN available to intervene as needed.      Problem: Adult Inpatient Plan of Care  Goal: Plan of Care Review  Outcome: Progressing  Goal: Patient-Specific Goal (Individualized)  Outcome: Progressing

## 2024-05-12 NOTE — PLAN OF CARE
05/12/24 1612   Post-Acute Status   Post-Acute Authorization HME   HME Status   (DMe will be delivered to the patient's home)

## 2024-05-12 NOTE — DISCHARGE SUMMARY
[facility] Medicine  Discharge Summary      Patient Name: Ar Sharma  MRN: [id number]  KAREEM: [id number]  Patient Class: IP- Inpatient  Admission Date: 4/29/2024  Hospital Length of Stay: 13 days  Discharge Date and Time: 5/12/2024  3:13 PM  Attending Physician: Lon Bowles MD   Discharging Provider: Lon Bowles MD  Primary Care Provider: Jc Elliott MD    Primary Care Team: Networked reference to record PCT     HPI:   Ar Sharma is a 63M with PMHx of ESRD, HFrEF, HTN, NICM, COPD, VT s/p ICD placement, and HLD who presented to [facility] ED with CC progressively worse SOB over the last two months. He was recently diagnosed with ESRD and has been trying diet modification at home. He endorses SINGH, dizziness, frequent intermittent nausea and one episode of vomiting during this time. He denies CP, syncope, dysuria, fever or chills.     ED workup significant for: , Cr 6.7, Phos 5, Tbili 2.4, BNP 3204, trop 0.040, vit D 24, .6, and CXR: cardiomegaly w/ pulmonary vascular congestion consistent w/ pulmonary edema. All other components of CBC and chemistries were unremarkable.       Patient was seen by Dr. Mata in ED. Emergent HD not indicated.     Procedure(s) (LRB):  INSERTION, CATHETER, DIALYSIS, PERITONEAL, LAPAROSCOPIC (N/A)      Hospital Course:   4/30 Tolerating HD through central line, SOB is improved  5/1/24 On pressors but tolerating CRRT  5/2 tolerating HD, off of pressors, stepping down  5/3 Tunneled catheter placed  5/4 had an episode of intradialytic hypotension, given albumin  5/5 Having hiccups, started PPI  5/6 Hiccups resolved. Gen Surgery consult for PD catheter  5/7 Awaiting for PD catheter placement  5/8 S/P PD catheter placement   5/9/24 S/P HD tolerate well  5/10/24 S/P HD oozing blood at the PD site and still requiring 4-5 L of oxygen  5/11/24 S/P HD minimal bleeding at the PD site    63M with PMHx of ESRD, HFrEF, HTN, NICM, COPD, VT s/p ICD placement,  and HLD was admitted for worsening SOB. He was transferred to ICU with ESRD for CRRT once catheter placed. Nephrology was consulted and has been optimized with HD and electrolytes abnormalities monitoring. He was also found to have acute COPD exacerbation and pulmonology was consulted. He had 2D echo which showed Left Ventricle: The left ventricle is normal in size. Normal wall thickness. Regional wall motion abnormalities present. See diagram for wall motion findings. Septal motion is consistent with pacing. There is mildly reduced systolic function with a visually estimated ejection fraction of 40 - 45%. Grade III diastolic dysfunction. Cardiology was consulted for acute on chronic CHF decompensation. Due to his persistent hypotension, entresto and metoprolol have DC. He has been running hypotension throughout hospital stays. General surgery was consulted for placement PD catheter and has tolerated procedure he had intermittent Oozing blood at the PD site. However H/H has been steady. Since admitted he has been required oxygen and fail ambulatory oxygen. He will be DC home on 3-4L nasal canal oxygen. He has HD scheduled in AM and was strongly advise to follow with PCP, nephrology, cardiology and pulmonary as outpatient. He was also advised if bleeding recur to come back to to ER       Goals of Care Treatment Preferences:  Code Status: Full Code      Consults:   Consults (From admission, onward)          Status Ordering Provider     Inpatient consult to General Surgery  Once        Provider:  Tyree Ruiz MD    Acknowledged YARIEL GUZMAN     Inpatient consult to General Surgery  Once        Provider:  Tyree Ruiz MD    Completed SIMON SANON     Inpatient consult to Social Work/Case Management  Once        Provider:  (Not yet assigned)    Completed SENIA SARAVIA     Inpatient consult to Registered Dietitian/Nutritionist  Once        Provider:  (Not yet assigned)    Completed MANJIT  SENIA     Inpatient consult to Nephrology-[facility] (Adolfo Mcintyre, Juan Francisco)  Once        Provider:  Chyna Booth MD    Acknowledged TERA PEREZ     Inpatient consult to Cardiology-[facility]  Once        Provider:  Juan Roque MD    Completed CHYNA BOOTH     Inpatient consult to General Surgery  Once        Provider:  Tera Perez MD    Completed CHYNA BOOHT            No new Assessment & Plan notes have been filed under this hospital service since the last note was generated.  Service: Hospital Medicine    Final Active Diagnoses:    Diagnosis Date Noted POA    PRINCIPAL PROBLEM:  Chronic kidney disease (CKD), stage V [N18.5] 09/15/2023 Yes    PD catheter dysfunction, sequela [T85.611S] 05/10/2024 Not Applicable    ESRD (end stage renal disease) [N18.6] 04/30/2024 Yes    Acute hypoxemic respiratory failure [J96.01] 04/30/2024 Yes    Hypotension [I95.9] 04/30/2024 Yes    Pulmonary emphysema, unspecified emphysema type [J43.9] 09/15/2023 Yes    Non-ischemic cardiomyopathy [I42.8] 09/28/2022 Yes    Hyponatremia [E87.1] 09/25/2022 Yes    Primary hypertension [I10] 05/18/2022 Yes    HFrEF (heart failure with reduced ejection fraction) [I50.20] 12/10/2020 Yes    Cardiac resynchronization therapy defibrillator (CRT-D) in place [Z95.810] 10/25/2018 Yes    Hypothyroidism [E03.9] 10/16/2018 Yes    Iron deficiency anemia [D50.9] 10/16/2018 Yes    Mixed hyperlipidemia [E78.2] 10/16/2018 Yes    V-tach [I47.20] 10/16/2018 Yes      Problems Resolved During this Admission:       Discharged Condition: fair    Disposition: Home or Self Care    Follow Up:   Follow-up Information       Fatmata Johansen MD Follow up on 5/14/2024.    Specialty: Internal Medicine  Why: 11:00am HSP DX:New hd pt  Contact information:  [address]  [address]  [facility] [id number] [phone]               Shanell José PA-C Follow up on 5/16/2024.    Specialty: Family Medicine  Why: 3:00pm  Contact  "information:  [address] [place], [place] [address] [place] [address]  [phone]               [facility] DIALYSIS Follow up on 5/13/2024.    Why: Your first scheduled HD date.  Contact information:  [address] [place]  [facility] [address]  [phone]             [facility] Follow up.    Specialties: Home Health Services, Home Therapy Services, Home Living Aide Services  Why: HOME HEALTH- will contact the patient to intiate services  Contact information:  [address], [place]  [facility] [address]  [phone]                         Patient Instructions:      OXYGEN FOR HOME USE     Order Specific Question Answer Comments   Liter Flow 3    Duration Continuous    Qualifying Test Performed at: Rest    Oxygen saturation: 82    Portable mode: continuous    Route nasal cannula    Device: home concentrator with portable tanks    Length of need (in months): 99 mos    Patient condition with qualifying saturation COPD ESRD   Height: 5' 9" (1.753 m)    Weight: 76.5 kg (168 lb 10.4 oz)    Alternative treatment measures have been tried or considered and deemed clinically ineffective. Yes      Diet Cardiac     Diet renal     Notify your health care provider if you experience any of the following:  temperature >100.4     Notify your health care provider if you experience any of the following:  persistent nausea and vomiting or diarrhea     Notify your health care provider if you experience any of the following:  severe uncontrolled pain     Notify your health care provider if you experience any of the following:  redness, tenderness, or signs of infection (pain, swelling, redness, odor or green/yellow discharge around incision site)     Notify your health care provider if you experience any of the following:  difficulty breathing or increased cough     Notify your health care provider if you experience any of the following:  severe persistent headache     Notify your health care " "provider if you experience any of the following:  worsening rash     Notify your health care provider if you experience any of the following:  persistent dizziness, light-headedness, or visual disturbances     Notify your health care provider if you experience any of the following:  increased confusion or weakness     Activity as tolerated       Significant Diagnostic Studies: Labs: BMP:   Recent Labs   Lab 05/12/24  0340   MG 2.0   , CMP No results for input(s): "NA", "K", "CL", "CO2", "GLU", "BUN", "CREATININE", "CALCIUM", "PROT", "ALBUMIN", "BILITOT", "ALKPHOS", "AST", "ALT", "ANIONGAP", "ESTGFRAFRICA", "EGFRNONAA" in the last 48 hours., CBC   Recent Labs   Lab 05/12/24  0340   WBC 11.84   HGB 13.9*   HCT 40.2      , Lipid Panel   Lab Results   Component Value Date    CHOL 127 04/29/2024    HDL 32 (L) 04/29/2024    LDLCALC 81.8 04/29/2024    TRIG 66 04/29/2024    CHOLHDL 25.2 04/29/2024   , Troponin No results for input(s): "TROPONINI" in the last 168 hours., A1C:   Recent Labs   Lab 03/04/24  1018 04/29/24 2039   HGBA1C 5.6 5.6   , and All labs within the past 24 hours have been reviewed  Microbiology: Blood Culture   Lab Results   Component Value Date    LABBLOO No growth after 5 days. 05/01/2024     Radiology: X-Ray: CXR: X-Ray Chest PA and Lateral (CXR): No results found for this visit on 04/29/24.    Pending Diagnostic Studies:       None           Medications:  Reconciled Home Medications:      Medication List        START taking these medications      cefdinir 300 MG capsule  Commonly known as: OMNICEF  Take 1 capsule (300 mg total) by mouth 2 (two) times daily. for 10 days     midodrine 5 MG Tab  Commonly known as: PROAMATINE  Take 3 tablets (15 mg total) by mouth every 8 (eight) hours.     mirtazapine 7.5 MG Tab  Commonly known as: REMERON  Take 1 tablet (7.5 mg total) by mouth every evening.     pantoprazole 40 MG tablet  Commonly known as: PROTONIX  Take 1 tablet (40 mg total) by mouth once " daily.     polyethylene glycol 17 gram Pwpk  Commonly known as: GLYCOLAX  Take 17 g by mouth once daily.     vitamin renal formula (B-complex-vitamin c-folic acid) 1 mg Cap  Commonly known as: NEPHROCAP  Take 1 capsule by mouth once daily.            CHANGE how you take these medications      ergocalciferol 50,000 unit Cap  Commonly known as: ERGOCALCIFEROL  TAKE 1 CAPSULE BY MOUTH ONE TIME PER WEEK  What changed: when to take this     pravastatin 40 MG tablet  Commonly known as: PRAVACHOL  TAKE 1 TABLET BY MOUTH EVERY DAY  What changed: when to take this            CONTINUE taking these medications      acetaminophen 500 MG tablet  Commonly known as: TYLENOL  Take 500 mg by mouth every 6 (six) hours as needed for Pain.     allopurinoL 100 MG tablet  Commonly known as: ZYLOPRIM  Take 1 tablet (100 mg total) by mouth once daily.     amiodarone 400 MG tablet  Commonly known as: PACERONE  Take 1 tablet (400 mg total) by mouth once daily.     aspirin 81 MG EC tablet  Commonly known as: ECOTRIN  Take 1 tablet (81 mg total) by mouth once daily.     furosemide 80 MG tablet  Commonly known as: LASIX  Take 1 tablet (80 mg total) by mouth 2 (two) times daily. If increased 3 pounds in a day or 5 pounds in a week, take an extra dose of lasix 80mg until those pounds are lost.     levothyroxine 150 MCG tablet  Commonly known as: SYNTHROID  Take 1 tablet (150 mcg total) by mouth once daily.     mexiletine 150 MG Cap  Commonly known as: MEXITIL  Take 1 capsule (150 mg total) by mouth 2 (two) times daily with meals.     nitroGLYCERIN 0.4 MG SL tablet  Commonly known as: NITROSTAT  Place 1 tablet (0.4 mg total) under the tongue every 5 (five) minutes as needed for Chest pain.            STOP taking these medications      ENTRESTO 24-26 mg per tablet  Generic drug: sacubitriL-valsartan     metoprolol succinate 25 MG 24 hr tablet  Commonly known as: TOPROL-XL              Indwelling Lines/Drains at time of discharge:    Lines/Drains/Airways       Central Venous Catheter Line  Duration                  Hemodialysis Catheter 05/03/24 1115 right subclavian 9 days                    Time spent on the discharge of patient: >35 minutes         Lon Bowles MD  Department of Hospital Medicine  [facility]

## 2024-05-12 NOTE — PLAN OF CARE
Pt received on HFNC 6L, @ time of assessment reports no dyspnea, only on exertion since he just ambulated to use restroom. SPO2 92% on documented flow, will continue to monitor and wean as tolerated.

## 2024-05-12 NOTE — PLAN OF CARE
Pt weaned off 6L HFNC to 2L NC, is comfortable and no complaints of dyspnea. Monitored and Spo2 maintained 98%. Reports increased comfort. Will continue to monitor.

## 2024-05-12 NOTE — PROGRESS NOTES
Discharge orders noted. Additional clinical references attached.    Patient's discharge instructions given by bedside RN and reviewed via this VN with patient.    Education provided on new medication, diagnosis, and follow-up appointments.   Teach back method used.  All questions answered.  Patient  feels he needs RW for home use. MD  and CM updated. Patient verbalized understanding.  Patient would like it delivered too home if approved.   Transport to [facility] requested. Floor nurse notified.      05/12/24 0665   AVS Confirmation   Discharge instructions and AVS given to and reviewed with patient and/or significant other. Yes

## 2024-05-12 NOTE — PLAN OF CARE
05/12/24 1532   Post-Acute Status   Post-Acute Authorization HME   E Status Referrals Sent  (referral sent to [facility] for a RW for home delivery)

## 2024-05-13 ENCOUNTER — TELEPHONE (OUTPATIENT)
Dept: FAMILY MEDICINE | Facility: CLINIC | Age: 64
End: 2024-05-13
Payer: MEDICARE

## 2024-05-13 ENCOUNTER — PATIENT OUTREACH (OUTPATIENT)
Dept: ADMINISTRATIVE | Facility: CLINIC | Age: 64
End: 2024-05-13
Payer: MEDICARE

## 2024-05-13 ENCOUNTER — PATIENT MESSAGE (OUTPATIENT)
Dept: FAMILY MEDICINE | Facility: CLINIC | Age: 64
End: 2024-05-13
Payer: MEDICARE

## 2024-05-13 PROBLEM — I50.43 ACUTE ON CHRONIC COMBINED SYSTOLIC (CONGESTIVE) AND DIASTOLIC (CONGESTIVE) HEART FAILURE: Status: ACTIVE | Noted: 2020-12-10

## 2024-05-13 PROBLEM — E87.1 HYPONATREMIA: Status: RESOLVED | Noted: 2022-09-25 | Resolved: 2024-05-13

## 2024-05-13 PROBLEM — N18.5 CHRONIC KIDNEY DISEASE (CKD), STAGE V: Status: RESOLVED | Noted: 2023-09-15 | Resolved: 2024-05-13

## 2024-05-13 PROBLEM — E03.9 HYPOTHYROIDISM: Status: RESOLVED | Noted: 2018-10-16 | Resolved: 2024-05-13

## 2024-05-13 PROBLEM — Z99.2 DIALYSIS PATIENT: Status: ACTIVE | Noted: 2024-05-13

## 2024-05-13 LAB — BACTERIA UR CULT: NO GROWTH

## 2024-05-13 NOTE — TELEPHONE ENCOUNTER
----- Message from Rebekah Lance sent at 5/13/2024 11:08 AM CDT -----  Regarding: paperwork  Contact: nicolette / wife/ walked in  Checking on paperwork lefted a week ago.  2 sheet of paper for Insurance.  Please call Nicolette Sharma at [phone]

## 2024-05-13 NOTE — TELEPHONE ENCOUNTER
This is on mj's desk. They need to fill out dates and return back to me. He has not been in clinic in 2 months so I am unclear what incident the paperwork is referring to me

## 2024-05-13 NOTE — PROGRESS NOTES
[facility]   New Visit Progress Note   Recent Hospital Discharge     PRESENTING HISTORY     Chief Complaint/Reason for Admission:  Follow up Hospital Discharge   PCP: Jc Elliott MD    History of Present Illness:  Mr. Ar Sharma is a 63 y.o. male who was recently admitted to the hospital.    [facility]  Discharge Summary        Patient Name: Ar Sharma  MRN: [id number]  KAREEM: [id number]  Patient Class: IP- Inpatient  Admission Date: 4/29/2024  Hospital Length of Stay: 13 days  Discharge Date and Time: 5/12/24   Attending Physician: Lon Bowles MD   Discharging Provider: Lon Bowles MD  Primary Care Provider: Jc Elliott MD    ___________________________________________________________________    Today:  [facility] follow up.  Recently hospitalized for management of ESRD and decompensated heart failure, in need of dialysis initiation.   Admitted to [facility] service in ICU level of care.  Required CRRT and vasopressor support.   Albumin administered.  Transitioned to regular hemodialysis.   General Surgery team placed tunneled catheter and peritoneal dialysis catheter.  TTE with EF 40-45% and Grade III diastolic dysfunction.   Entresto and Metoprolol discontinued due to marginal blood pressure.  Outpatient dialysis arranged at [facility]-<> will have hemodialysis ~ 2 weeks then transition to peritoneal dialysis.   [facility] arranged.  Patient discharged to home.     Patient accompanied today by family.  In transport chair but ambulatory without assistive device in home setting.  Wearing 3 L NC supplemental oxygen.  Did not bring medication bottles for review but reports compliance with medication list in EPIC.  Does not have Nephrocap- [facility] was unable to dispense- I have re-routed to [facility] for  today.    HD MWF at [facility].    Review of Systems  General ROS:  negative for chills, fever or weight loss  Psychological ROS: negative for hallucination, depression or suicidal ideation  Ophthalmic ROS: negative for blurry vision, photophobia or eye pain  ENT ROS: negative for epistaxis, sore throat or rhinorrhea  Respiratory ROS: no cough, shortness of breath, or wheezing  Cardiovascular ROS: no chest pain or dyspnea on exertion  Gastrointestinal ROS: no abdominal pain, change in bowel habits, or black/ bloody stools  Genito-Urinary ROS: no dysuria, trouble voiding, or hematuria  Musculoskeletal ROS: negative for gait disturbance or muscular weakness  Neurological ROS: no syncope or seizures; no ataxia  Dermatological ROS: negative for pruritis, rash and jaundice      PAST HISTORY:     Past Medical History:   Diagnosis Date    Cardiomyopathy     CKD (chronic kidney disease) stage 4, GFR 15-29 ml/min     Congestive heart failure (CHF) 2015    COPD (chronic obstructive pulmonary disease)     Coronary artery disease     Edema     Essential (primary) hypertension 05/18/2022    Formatting of this note might be different from the original. Converted from Centricity: Description - ESSENTIAL HYPERTENSION, BENIGN    Heart attack     HLD (hyperlipidemia)     Hypertension     Hyperuricemia     Hypocalcemia     Renal cyst, left     Secondary hyperparathyroidism     Thyroid disease     V tach     Vitamin D deficiency        Past Surgical History:   Procedure Laterality Date    ablations  03/05/2018    albations  02/01/2018    COLONOSCOPY N/A 12/07/2018    Procedure: COLONOSCOPY/suprep;  Surgeon: Ananya Morillo MD;  Location: [facility];  Service: Endoscopy;  Laterality: N/A;    defibulater N/A 2016    ESOPHAGOGASTRODUODENOSCOPY N/A 12/07/2018    Procedure: EGD (ESOPHAGOGASTRODUODENOSCOPY);  Surgeon: Ananya Morillo MD;  Location: [facility];  Service: Endoscopy;  Laterality: N/A;    INSERTION OF TUNNELED CENTRAL VENOUS HEMODIALYSIS CATHETER Right 5/3/2024    Procedure: INSERTION, CATHETER,  HEMODIALYSIS, DUAL LUMEN;  Surgeon: Philip Perez MD;  Location: [facility] OR;  Service: General;  Laterality: Right;    INSERTION, CATHETER, DIALYSIS, PERITONEAL, LAPAROSCOPIC N/A 5/8/2024    Procedure: INSERTION, CATHETER, DIALYSIS, PERITONEAL, LAPAROSCOPIC;  Surgeon: Tyree Ruiz MD;  Location: [facility] OR;  Service: General;  Laterality: N/A;    JOINT REPLACEMENT Bilateral 10/2016    knees, bilat    LEFT HEART CATHETERIZATION N/A 12/16/2020    Procedure: Left heart cath;  Surgeon: Shahram Stewart MD;  Location: [facility] CATH LAB/EP;  Service: Cardiology;  Laterality: N/A;    LEFT HEART CATHETERIZATION Left 9/27/2022    Procedure: Left heart cath;  Surgeon: Juan Roque MD;  Location: [facility] CATH LAB/EP;  Service: Cardiology;  Laterality: Left;    TN HEMODIALYSIS, ONE EVALUATION  5/6/2024    REMOVAL OF HEMODIALYSIS CATHETER Right 5/3/2024    Procedure: REMOVAL, CATHETER, HEMODIALYSIS;  Surgeon: Philip Perez MD;  Location: [facility] OR;  Service: General;  Laterality: Right;    REPLACEMENT OF IMPLANTABLE CARDIOVERTER-DEFIBRILLATOR (ICD) GENERATOR Left 1/24/2023    Procedure: REPLACEMENT, ICD GENERATOR;  Surgeon: River Tenorio MD;  Location: [facility] EP LAB;  Service: Cardiology;  Laterality: Left;  ANTHONY, CRTD gen chg, MDT, MAC, DM, 3prep       Family History   Problem Relation Name Age of Onset    Cancer Mother Sharon Love     Hypertension Mother Sharon Love     Stroke Mother Sharon Love     Breast cancer Mother Sharon Love     Alcohol abuse Father Mitul Love     Kidney disease Father Mitul Love     Arthritis Sister x2     No Known Problems Brother x1     No Known Problems Daughter x1     No Known Problems Son x2          MEDICATIONS & ALLERGIES:     Current Outpatient Medications on File Prior to Visit   Medication Sig Dispense Refill    acetaminophen (TYLENOL) 500 MG tablet Take 500 mg by mouth every 6 (six) hours as needed for Pain.      allopurinoL (ZYLOPRIM) 100 MG tablet Take 1 tablet (100 mg total) by mouth  once daily. 90 tablet 3    amiodarone (PACERONE) 400 MG tablet Take 1 tablet (400 mg total) by mouth once daily. 90 tablet 3    aspirin (ECOTRIN) 81 MG EC tablet Take 1 tablet (81 mg total) by mouth once daily. 60 tablet 2    cefdinir (OMNICEF) 300 MG capsule Take 1 capsule (300 mg total) by mouth 2 (two) times daily. for 10 days 20 capsule 0    ergocalciferol (ERGOCALCIFEROL) 50,000 unit Cap TAKE 1 CAPSULE BY MOUTH ONE TIME PER WEEK (Patient taking differently: Take 50,000 Units by mouth every Monday.) 12 capsule 3    furosemide (LASIX) 80 MG tablet Take 1 tablet (80 mg total) by mouth 2 (two) times daily. If increased 3 pounds in a day or 5 pounds in a week, take an extra dose of lasix 80mg until those pounds are lost. 120 tablet 3    levothyroxine (SYNTHROID) 150 MCG tablet Take 1 tablet (150 mcg total) by mouth once daily. 90 tablet 3    mexiletine (MEXITIL) 150 MG Cap Take 1 capsule (150 mg total) by mouth 2 (two) times daily with meals. 180 capsule 3    midodrine (PROAMATINE) 5 MG Tab Take 3 tablets (15 mg total) by mouth every 8 (eight) hours. 270 tablet 1    mirtazapine (REMERON) 7.5 MG Tab Take 1 tablet (7.5 mg total) by mouth every evening. 30 tablet 1    nitroGLYCERIN (NITROSTAT) 0.4 MG SL tablet Place 1 tablet (0.4 mg total) under the tongue every 5 (five) minutes as needed for Chest pain. 20 tablet 1    pantoprazole (PROTONIX) 40 MG tablet Take 1 tablet (40 mg total) by mouth once daily. 90 tablet 3    polyethylene glycol (GLYCOLAX) 17 gram PwPk Take 17 g by mouth once daily. 30 packet 0    pravastatin (PRAVACHOL) 40 MG tablet TAKE 1 TABLET BY MOUTH EVERY DAY (Patient taking differently: Take 40 mg by mouth every evening.) 90 tablet 3    vitamin renal formula, B-complex-vitamin c-folic acid, (NEPHROCAP) 1 mg Cap Take 1 capsule by mouth once daily. (Patient not taking: Reported on 5/13/2024) 30 capsule 0     Current Facility-Administered Medications on File Prior to Visit   Medication Dose Route  Frequency Provider Last Rate Last Admin    [DISCONTINUED] 0.9%  NaCl infusion   Intravenous PRN Philip Perez MD        [DISCONTINUED] 0.9%  NaCl infusion   Intravenous PRN Chyna Mata MD        [DISCONTINUED] 0.9%  NaCl infusion   Intravenous Once Chyna Mata MD        [DISCONTINUED] 0.9%  NaCl infusion   Intravenous Once Brittney Chicas MD        [DISCONTINUED] 0.9%  NaCl infusion   Intravenous Once Brittney Chicas MD        [DISCONTINUED] 0.9%  NaCl infusion   Intravenous PRN Chyna Mata MD        [DISCONTINUED] 0.9%  NaCl infusion   Intravenous Once Chyna Mata MD        [DISCONTINUED] acetaminophen tablet 650 mg  650 mg Oral Q4H PRN Philip Perez MD        [DISCONTINUED] allopurinoL tablet 100 mg  100 mg Oral Daily Philip Perez MD   100 mg at 05/12/24 0912    [DISCONTINUED] alteplase injection 4 mg  4 mg Intra-Catheter Once Philip Perez MD        [DISCONTINUED] amiodarone tablet 400 mg  400 mg Oral Daily Philip Perez MD   400 mg at 05/12/24 0912    [DISCONTINUED] aspirin EC tablet 81 mg  81 mg Oral Daily Philip Perez MD   81 mg at 05/12/24 0912    [DISCONTINUED] calcium carbonate 200 mg calcium (500 mg) chewable tablet 1,000 mg  1,000 mg Oral TID PRN Philip Perez MD   1,000 mg at 05/05/24 0006    [DISCONTINUED] cefTRIAXone (Rocephin) 1 g in dextrose 5 % in water (D5W) 100 mL IVPB (MB+)  1 g Intravenous Q24H Lon Bowles MD   Stopped at 05/12/24 0947    [DISCONTINUED] ergocalciferol capsule 50,000 Units  50,000 Units Oral Q7 Days Philip Perez MD   50,000 Units at 05/07/24 0903    [DISCONTINUED] furosemide injection 80 mg  80 mg Intravenous Q12H Vero Yadav MD   80 mg at 05/12/24 0912    [DISCONTINUED] heparin (porcine) injection 2,400 Units  2,400 Units Intra-Catheter PRN Philip Perez MD   2,400 Units at 05/11/24 1402    [DISCONTINUED] heparin (porcine) injection 5,000 Units  5,000 Units Subcutaneous Q8H Philip Perez MD    5,000 Units at 05/12/24 1300    [DISCONTINUED] HYDROcodone-acetaminophen 5-325 mg per tablet 1 tablet  1 tablet Oral Q4H PRN Philip Perez MD        [DISCONTINUED] HYDROmorphone (PF) injection 0.2 mg  0.2 mg Intravenous Q5 Min PRN Juan Lugo Jr., MD        [DISCONTINUED] lactulose 20 gram/30 mL solution Soln 20 g  20 g Oral Daily PRN Mamta Hassan MD        [DISCONTINUED] levothyroxine tablet 150 mcg  150 mcg Oral Daily Philip Perez MD   150 mcg at 05/12/24 0912    [DISCONTINUED] mexiletine capsule 150 mg  150 mg Oral BID WM Philip Perez MD   150 mg at 05/12/24 0912    [DISCONTINUED] midodrine tablet 15 mg  15 mg Oral Q8H Philip Perez MD   15 mg at 05/12/24 1300    [DISCONTINUED] mirtazapine tablet 7.5 mg  7.5 mg Oral QHS Lon Bowles MD   7.5 mg at 05/10/24 2143    [DISCONTINUED] ondansetron injection 4 mg  4 mg Intravenous Q8H PRN Philip Preez MD   4 mg at 05/07/24 1404    [DISCONTINUED] ondansetron injection 4 mg  4 mg Intravenous Once PRN Juan Lugo Jr., MD        [DISCONTINUED] pantoprazole EC tablet 40 mg  40 mg Oral Daily Mamta Hassan MD   40 mg at 05/12/24 0912    [DISCONTINUED] polyethylene glycol packet 17 g  17 g Oral Daily Mamta Hassan MD   17 g at 05/10/24 1218    [DISCONTINUED] pravastatin tablet 40 mg  40 mg Oral Daily Philip Perez MD   40 mg at 05/11/24 2154    [DISCONTINUED] sodium chloride 0.9% bolus 250 mL 250 mL  250 mL Intravenous PRN Philip Perez MD        [DISCONTINUED] sodium chloride 0.9% bolus 250 mL 250 mL  250 mL Intravenous PRN Chyna Mata MD        [DISCONTINUED] sodium chloride 0.9% bolus 250 mL 250 mL  250 mL Intravenous PRN Brittney Chicas MD        [DISCONTINUED] sodium chloride 0.9% bolus 250 mL 250 mL  250 mL Intravenous PRN Brittney Chicas MD        [DISCONTINUED] sodium chloride 0.9% bolus 250 mL 250 mL  250 mL Intravenous PRN Chyna Mata MD        [DISCONTINUED] sodium chloride 0.9% flush 10  mL  10 mL Intravenous PRN Philip Perez MD        [DISCONTINUED] sodium chloride 0.9% flush 10 mL  10 mL Intravenous PRN Philip Perez MD        [DISCONTINUED] sodium chloride 0.9% flush 3 mL  3 mL Intravenous PRN Juan Lugo Jr., MD        [DISCONTINUED] vitamin renal formula (B-complex-vitamin c-folic acid) 1 mg per capsule 1 capsule  1 capsule Oral Daily Philip Perez MD   1 capsule at 05/12/24 0912        Review of patient's allergies indicates:   Allergen Reactions    Lokelma [sodium zirconium cyclosilicate] Other (See Comments)     Fluid retention, weight gain, CHF excerebration, severe constipation    Atorvastatin Other (See Comments)     Joint pain    Jardiance [empagliflozin] Other (See Comments)     Chest pains, significant weight loss, lower blood pressure       OBJECTIVE:     Vital Signs:  BP (!) 86/56 (BP Location: Left arm, Patient Position: Sitting, BP Method: Small (Automatic))   Pulse 63   SpO2 (!) 92%   Wt Readings from Last 3 Encounters:   05/09/24 1055 76.5 kg (168 lb 10.4 oz)   05/03/24 0600 76.5 kg (168 lb 10.4 oz)   05/02/24 0600 76.5 kg (168 lb 10.4 oz)   05/01/24 0600 75 kg (165 lb 5.5 oz)   04/30/24 0946 77.6 kg (171 lb)   04/30/24 0815 77 kg (169 lb 12.1 oz)   04/29/24 2124 77.6 kg (171 lb)   04/29/24 1623 79.4 kg (175 lb)   04/12/24 1113 78.2 kg (172 lb 6.4 oz)   04/11/24 1114 78.2 kg (172 lb 6.4 oz)     There is no height or weight on file to calculate BMI.        Physical Exam:  BP (!) 86/56 (BP Location: Left arm, Patient Position: Sitting, BP Method: Small (Automatic))   Pulse 63   SpO2 (!) 92%   General appearance: alert, cooperative, no distress  Constitutional:Oriented to person, place, and time  + appears well-developed and well-nourished.   HEENT: Normocephalic, atraumatic, neck symmetrical, no nasal discharge   Eyes: conjunctivae/corneas clear, PERRL, EOM's intact  Lungs: clear to auscultation bilaterally, no dullness to percussion bilaterally  + tunneled HD  catheter R chest wall  Heart: regular rate and rhythm without rub; no displacement of the PMI   Abdomen: soft, non-tender; bowel sounds normoactive; no organomegaly  + peritoneal dialysis catheter in place   Extremities: extremities symmetric; no clubbing, cyanosis, + trace pretibial edema bilateral LE   Integument: Skin color, texture, turgor normal; no rashes; hair distrubution normal  Neurologic: Alert and oriented X 3, normal strength, normal coordination   Psychiatric: no pressured speech; normal affect; no evidence of impaired cognition     Laboratory  Lab Results   Component Value Date    WBC 11.84 05/12/2024    HGB 13.9 (L) 05/12/2024    HCT 40.2 05/12/2024    MCV 75 (L) 05/12/2024     05/12/2024     BMP  Lab Results   Component Value Date     (L) 05/11/2024    K 4.2 05/11/2024     05/11/2024    CO2 21 (L) 05/11/2024    BUN 26 (H) 05/11/2024    CREATININE 3.9 (H) 05/11/2024    CALCIUM 8.6 (L) 05/11/2024    ANIONGAP 11 05/11/2024    EGFRNORACEVR 16 (A) 05/11/2024     Lab Results   Component Value Date    ALT 39 04/29/2024    AST 42 (H) 04/29/2024    ALKPHOS 160 (H) 04/29/2024    BILITOT 2.4 (H) 04/29/2024     Lab Results   Component Value Date    INR 1.2 01/17/2023     Lab Results   Component Value Date    HGBA1C 5.6 04/29/2024       Diagnostic Results:    2 D echo 4/29/24:    Left Ventricle: The left ventricle is normal in size. Normal wall thickness. Regional wall motion abnormalities present. See diagram for wall motion findings. Septal motion is consistent with pacing. There is mildly reduced systolic function with a visually estimated ejection fraction of 40 - 45%. Grade III diastolic dysfunction.    Right Ventricle: Moderate to severe right ventricular enlargement. Systolic function is reduced.TAPSE is 1.59 cm. Pacemaker lead present in the ventricle.    Left Atrium: Left atrium is severely dilated. The left atrium volume index is 71.4 mL/m2.    Right Atrium: Right atrium is severely  dilated.    Aortic Valve: There is mild aortic valve sclerosis. There is mild to moderate stenosis. Aortic valve area by VTI is 1.49 cm². Aortic valve peak velocity is 1.43 m/s. Mean gradient is 5 mmHg. The dimensionless index is 0.52.    Mitral Valve: There is mild regurgitation.    Tricuspid Valve: There is mild to moderate regurgitation.    Pulmonary Artery: The estimated pulmonary artery systolic pressure is 64 mmHg.    IVC/SVC: Elevated venous pressure at 15 mmHg.    CT ABD Pelvis 5/10/24:  1. There is free air in the abdomen, noting patient has peritoneal dialysis catheter, air within the abdomen is suspected to be related to peritoneal dialysis rather than perforated viscus although the latter cannot be excluded.  Clinical correlation is advised.  Follow-up recommended.  2. Large amount of stool within the right colon suggesting constipation.  3. Small bilateral pleural effusions and body wall anasarca suggest volume overload.  4. Hepatomegaly, correlation with LFTs recommended.  Correlation with any history of iron overload or amiodarone use.  5. Splenomegaly.  6. Please see above for several additional findings.        TRANSITION OF CARE:     [facility] Contact Note: 5/13/24    Family and/or Caretaker present at visit?  Yes.  Diagnostic tests reviewed/disposition: I have reviewed all completed as well as pending diagnostic tests at the time of discharge.  Disease/illness education: Yes  Home health/community services discussion/referrals: Patient has home health established at [facility]  .   Establishment or re-establishment of referral orders for community resources: No other necessary community resources.   Discussion with other health care providers: No discussion with other health care providers necessary.     ASSESSMENT & PLAN:       ESRD (end stage renal disease)  Dialysis patient  - recent hospitalization as above  - new to hemodialysis, attends HD on Mon, Wed, Fri at [facility]  - has  newly placed PD catheter- has not yet started training for home PD  - see General Surgery team 5/22/24 to check PD catheter and plan for tunneled catheter removal   -     vitamin renal formula, B-complex-vitamin c-folic acid, (NEPHROCAP) 1 mg Cap; Take 1 capsule by mouth once daily.  Dispense: 90 capsule; Refill: 3    Acute on chronic combined systolic (congestive) and diastolic (congestive) heart failure  - unable to tolerate GDMT due to marginal blood pressure; Entresto and Metoprolol discontinued by hospital team  - blood pressure remains low today but he is asymptomatic   - see Cardiology 5/28/24; his former cardiologist has left the [facility] system   - established with Dr Tenorio for  care       Patient will be released from hospital follow up clinic.  He will see his PCP, Dr Jc Elliott 9/11/24.     Instructions for the patient:      Scheduled Follow-up :  Future Appointments   Date Time Provider Department Center   5/16/2024  3:00 PM Shanell Joés PA-C [facility] FAM MED [facility]   5/22/2024  2:20 PM Tyree Ruiz MD [facility]R [facility]   5/28/2024  9:15 AM Frank uMñiz MD [facility]  [facility]   6/5/2024  2:00 [facility]   9/4/2024  9:40 AM SAME DAY LAB, [facility] LAB [facility]   9/11/2024  2:00 PM Jc Elliott MD [facility] FAM MED [facility]       Post Visit Medication List:     Medication List            Accurate as of May 14, 2024 11:54 AM. If you have any questions, ask your nurse or doctor.                CHANGE how you take these medications      ergocalciferol 50,000 unit Cap  Commonly known as: ERGOCALCIFEROL  TAKE 1 CAPSULE BY MOUTH ONE TIME PER WEEK  What changed: when to take this     pravastatin 40 MG tablet  Commonly known as: PRAVACHOL  TAKE 1 TABLET BY MOUTH EVERY DAY  What changed: when to take this            CONTINUE taking these medications      acetaminophen 500 MG tablet  Commonly known as: TYLENOL     allopurinoL  100 MG tablet  Commonly known as: ZYLOPRIM  Take 1 tablet (100 mg total) by mouth once daily.     amiodarone 400 MG tablet  Commonly known as: PACERONE  Take 1 tablet (400 mg total) by mouth once daily.     aspirin 81 MG EC tablet  Commonly known as: ECOTRIN  Take 1 tablet (81 mg total) by mouth once daily.     cefdinir 300 MG capsule  Commonly known as: OMNICEF  Take 1 capsule (300 mg total) by mouth 2 (two) times daily. for 10 days     furosemide 80 MG tablet  Commonly known as: LASIX  Take 1 tablet (80 mg total) by mouth 2 (two) times daily. If increased 3 pounds in a day or 5 pounds in a week, take an extra dose of lasix 80mg until those pounds are lost.     levothyroxine 150 MCG tablet  Commonly known as: SYNTHROID  Take 1 tablet (150 mcg total) by mouth once daily.     mexiletine 150 MG Cap  Commonly known as: MEXITIL  Take 1 capsule (150 mg total) by mouth 2 (two) times daily with meals.     midodrine 5 MG Tab  Commonly known as: PROAMATINE  Take 3 tablets (15 mg total) by mouth every 8 (eight) hours.     mirtazapine 7.5 MG Tab  Commonly known as: REMERON  Take 1 tablet (7.5 mg total) by mouth every evening.     nitroGLYCERIN 0.4 MG SL tablet  Commonly known as: NITROSTAT  Place 1 tablet (0.4 mg total) under the tongue every 5 (five) minutes as needed for Chest pain.     pantoprazole 40 MG tablet  Commonly known as: PROTONIX  Take 1 tablet (40 mg total) by mouth once daily.     polyethylene glycol 17 gram Pwpk  Commonly known as: GLYCOLAX  Take 17 g by mouth once daily.     TRIPHROCAPS 1 mg Cap  Generic drug: vitamin renal formula (B-complex-vitamin c-folic acid)  Take 1 capsule by mouth once daily.               Where to Get Your Medications        These medications were sent to [facility] [address], [place] [address]      Hours: Mon-Fri, 8a-5:30p Phone: [phone]   TRIPHROCAPS 1 mg Cap         Signing Physician:  Fatmata Johansen MD

## 2024-05-13 NOTE — PROGRESS NOTES
C3 nurse spoke with Ar Sharma  for a TCC post hospital discharge follow up call. The patient has a scheduled HOSFU appointment with DEMAR Ko on 05/14/2024 @ 1100.    Message sent to PCP staff

## 2024-05-14 ENCOUNTER — OFFICE VISIT (OUTPATIENT)
Dept: PRIMARY CARE CLINIC | Facility: CLINIC | Age: 64
End: 2024-05-14
Payer: MEDICARE

## 2024-05-14 ENCOUNTER — TELEPHONE (OUTPATIENT)
Dept: ADMINISTRATIVE | Facility: CLINIC | Age: 64
End: 2024-05-14
Payer: MEDICARE

## 2024-05-14 VITALS — DIASTOLIC BLOOD PRESSURE: 56 MMHG | OXYGEN SATURATION: 92 % | SYSTOLIC BLOOD PRESSURE: 86 MMHG | HEART RATE: 63 BPM

## 2024-05-14 DIAGNOSIS — Z99.2 DIALYSIS PATIENT: ICD-10-CM

## 2024-05-14 DIAGNOSIS — I50.43 ACUTE ON CHRONIC COMBINED SYSTOLIC (CONGESTIVE) AND DIASTOLIC (CONGESTIVE) HEART FAILURE: ICD-10-CM

## 2024-05-14 DIAGNOSIS — N18.6 ESRD (END STAGE RENAL DISEASE): Primary | ICD-10-CM

## 2024-05-14 LAB
OHS CV AF BURDEN PERCENT: < 1
OHS CV BIV PACING PERCENT: 99.99 %
OHS CV DC REMOTE DEVICE TYPE: NORMAL
OHS CV RV PACING PERCENT: 99.99 %

## 2024-05-14 PROCEDURE — 1111F DSCHRG MED/CURRENT MED MERGE: CPT | Mod: CPTII,S$GLB,, | Performed by: INTERNAL MEDICINE

## 2024-05-14 PROCEDURE — 99999 PR PBB SHADOW E&M-EST. PATIENT-LVL III: CPT | Mod: PBBFAC,,, | Performed by: INTERNAL MEDICINE

## 2024-05-14 PROCEDURE — 3066F NEPHROPATHY DOC TX: CPT | Mod: CPTII,S$GLB,, | Performed by: INTERNAL MEDICINE

## 2024-05-14 PROCEDURE — 1160F RVW MEDS BY RX/DR IN RCRD: CPT | Mod: CPTII,S$GLB,, | Performed by: INTERNAL MEDICINE

## 2024-05-14 PROCEDURE — 3044F HG A1C LEVEL LT 7.0%: CPT | Mod: CPTII,S$GLB,, | Performed by: INTERNAL MEDICINE

## 2024-05-14 PROCEDURE — 3074F SYST BP LT 130 MM HG: CPT | Mod: CPTII,S$GLB,, | Performed by: INTERNAL MEDICINE

## 2024-05-14 PROCEDURE — 3078F DIAST BP <80 MM HG: CPT | Mod: CPTII,S$GLB,, | Performed by: INTERNAL MEDICINE

## 2024-05-14 PROCEDURE — 99496 TRANSJ CARE MGMT HIGH F2F 7D: CPT | Mod: S$GLB,,, | Performed by: INTERNAL MEDICINE

## 2024-05-14 PROCEDURE — 1159F MED LIST DOCD IN RCRD: CPT | Mod: CPTII,S$GLB,, | Performed by: INTERNAL MEDICINE

## 2024-05-14 NOTE — TELEPHONE ENCOUNTER
Called pt, informed pt I was calling to remind pt of his in office EAWV on 5/16/24; clinic location provided to patient; pt confirmed appointment

## 2024-05-16 ENCOUNTER — OFFICE VISIT (OUTPATIENT)
Dept: FAMILY MEDICINE | Facility: CLINIC | Age: 64
End: 2024-05-16
Payer: MEDICARE

## 2024-05-16 VITALS
HEART RATE: 76 BPM | SYSTOLIC BLOOD PRESSURE: 90 MMHG | TEMPERATURE: 98 F | WEIGHT: 170.63 LBS | DIASTOLIC BLOOD PRESSURE: 62 MMHG | HEIGHT: 69 IN | BODY MASS INDEX: 25.27 KG/M2 | OXYGEN SATURATION: 82 %

## 2024-05-16 DIAGNOSIS — Z98.890 S/P ABLATION OF VENTRICULAR ARRHYTHMIA: ICD-10-CM

## 2024-05-16 DIAGNOSIS — I10 PRIMARY HYPERTENSION: ICD-10-CM

## 2024-05-16 DIAGNOSIS — I95.9 HYPOTENSION, UNSPECIFIED HYPOTENSION TYPE: ICD-10-CM

## 2024-05-16 DIAGNOSIS — E78.2 MIXED HYPERLIPIDEMIA: ICD-10-CM

## 2024-05-16 DIAGNOSIS — N40.1 BENIGN PROSTATIC HYPERPLASIA WITH LOWER URINARY TRACT SYMPTOMS, SYMPTOM DETAILS UNSPECIFIED: ICD-10-CM

## 2024-05-16 DIAGNOSIS — Z95.810 CARDIAC RESYNCHRONIZATION THERAPY DEFIBRILLATOR (CRT-D) IN PLACE: ICD-10-CM

## 2024-05-16 DIAGNOSIS — I70.0 AORTIC ATHEROSCLEROSIS: ICD-10-CM

## 2024-05-16 DIAGNOSIS — M17.10 PRIMARY OSTEOARTHRITIS OF ONE KNEE: ICD-10-CM

## 2024-05-16 DIAGNOSIS — I47.20 V-TACH: ICD-10-CM

## 2024-05-16 DIAGNOSIS — Z00.00 ENCOUNTER FOR MEDICARE ANNUAL WELLNESS EXAM: ICD-10-CM

## 2024-05-16 DIAGNOSIS — N18.6 ESRD (END STAGE RENAL DISEASE): ICD-10-CM

## 2024-05-16 DIAGNOSIS — Z86.79 S/P ABLATION OF VENTRICULAR ARRHYTHMIA: ICD-10-CM

## 2024-05-16 DIAGNOSIS — J43.9 PULMONARY EMPHYSEMA, UNSPECIFIED EMPHYSEMA TYPE: ICD-10-CM

## 2024-05-16 DIAGNOSIS — R73.03 PREDIABETES: ICD-10-CM

## 2024-05-16 DIAGNOSIS — J96.01 ACUTE HYPOXEMIC RESPIRATORY FAILURE: ICD-10-CM

## 2024-05-16 DIAGNOSIS — E78.5 HYPERLIPIDEMIA, UNSPECIFIED HYPERLIPIDEMIA TYPE: ICD-10-CM

## 2024-05-16 DIAGNOSIS — N25.81 SECONDARY HYPERPARATHYROIDISM OF RENAL ORIGIN: ICD-10-CM

## 2024-05-16 DIAGNOSIS — I50.43 ACUTE ON CHRONIC COMBINED SYSTOLIC (CONGESTIVE) AND DIASTOLIC (CONGESTIVE) HEART FAILURE: ICD-10-CM

## 2024-05-16 DIAGNOSIS — I25.119 ATHEROSCLEROSIS OF NATIVE CORONARY ARTERY OF NATIVE HEART WITH ANGINA PECTORIS: ICD-10-CM

## 2024-05-16 DIAGNOSIS — Z87.891 FORMER CIGARETTE SMOKER: ICD-10-CM

## 2024-05-16 DIAGNOSIS — D50.8 OTHER IRON DEFICIENCY ANEMIA: ICD-10-CM

## 2024-05-16 DIAGNOSIS — Z00.00 ENCOUNTER FOR PREVENTIVE HEALTH EXAMINATION: Primary | ICD-10-CM

## 2024-05-16 PROBLEM — N18.4 CHRONIC KIDNEY DISEASE (CKD), STAGE IV (SEVERE): Status: RESOLVED | Noted: 2018-12-17 | Resolved: 2024-05-16

## 2024-05-16 PROCEDURE — 3074F SYST BP LT 130 MM HG: CPT | Mod: CPTII,S$GLB,, | Performed by: PHYSICIAN ASSISTANT

## 2024-05-16 PROCEDURE — 3078F DIAST BP <80 MM HG: CPT | Mod: CPTII,S$GLB,, | Performed by: PHYSICIAN ASSISTANT

## 2024-05-16 PROCEDURE — 1159F MED LIST DOCD IN RCRD: CPT | Mod: CPTII,S$GLB,, | Performed by: PHYSICIAN ASSISTANT

## 2024-05-16 PROCEDURE — 1160F RVW MEDS BY RX/DR IN RCRD: CPT | Mod: CPTII,S$GLB,, | Performed by: PHYSICIAN ASSISTANT

## 2024-05-16 PROCEDURE — 3044F HG A1C LEVEL LT 7.0%: CPT | Mod: CPTII,S$GLB,, | Performed by: PHYSICIAN ASSISTANT

## 2024-05-16 PROCEDURE — 3066F NEPHROPATHY DOC TX: CPT | Mod: CPTII,S$GLB,, | Performed by: PHYSICIAN ASSISTANT

## 2024-05-16 PROCEDURE — G0439 PPPS, SUBSEQ VISIT: HCPCS | Mod: S$GLB,,, | Performed by: PHYSICIAN ASSISTANT

## 2024-05-16 NOTE — PATIENT INSTRUCTIONS
Counseling and Referral of Other Preventative  (Italic type indicates deductible and co-insurance are waived)    Patient Name: Ar Sharma  Today's Date: 5/16/2024    Health Maintenance       Date Due Completion Date    RSV Vaccine (Age 60+ and Pregnant patients) (1 - 1-dose 60+ series) Never done ---    COVID-19 Vaccine (4 - 2023-24 season) 09/01/2023 1/12/2022    LDCT Lung Screen 05/30/2024 5/30/2023    PROSTATE-SPECIFIC ANTIGEN 02/12/2025 2/12/2024    Hemoglobin A1c (Prediabetes) 04/29/2025 4/29/2024    TETANUS VACCINE 10/16/2028 10/16/2018 (Done)    Override on 10/16/2018: Done    Colorectal Cancer Screening 12/07/2028 12/7/2018    Lipid Panel 04/29/2029 4/29/2024        No orders of the defined types were placed in this encounter.      The following information is provided to all patients.  This information is to help you find resources for any of the problems found today that may be affecting your health:                  Living healthy guide: www.[facility].[place].gov      Understanding Diabetes: www.diabetes.org      Eating healthy: www.cdc.gov/healthyweight      CDC home safety checklist: www.cdc.gov/steadi/patient.html      Agency on Aging: www.goea.[place].gov      Alcoholics anonymous (AA): www.aa.org      Physical Activity: www.claudia.nih.gov/rn7bgtw      Tobacco use: www.quitwithusla.org

## 2024-05-16 NOTE — PROGRESS NOTES
"  Ar Sharma presented for a follow-up Medicare AWV today. The following components were reviewed and updated:    Medical history  Family History  Social history  Allergies and Current Medications  Health Risk Assessment  Health Maintenance  Care Team    **See Completed Assessments for Annual Wellness visit with in the encounter summary    The following assessments were completed:  Depression Screening  Cognitive function Screening  Timed Get Up Test  Whisper Test      Opioid documentation:      Patient does not have a current opioid prescription.          Vitals:    05/16/24 1457   BP: 90/62   BP Location: Left arm   Patient Position: Sitting   Pulse: 76   Temp: 98.4 °F (36.9 °C)   SpO2: (!) 82%   Weight: 77.4 kg (170 lb 10.2 oz)   Height: 5' 9" (1.753 m)     Body mass index is 25.2 kg/m².       Physical Exam  Constitutional:       General: He is not in acute distress.     Appearance: Normal appearance.      Comments: Tired appearing; ambulating with walker   HENT:      Head: Normocephalic and atraumatic.   Eyes:      General: Lids are normal. Gaze aligned appropriately.      Pupils: Pupils are equal, round, and reactive to light.   Neck:      Trachea: Trachea normal.   Cardiovascular:      Rate and Rhythm: Normal rate and regular rhythm.      Heart sounds: S1 normal and S2 normal.   Pulmonary:      Effort: Pulmonary effort is normal.      Breath sounds: Normal breath sounds.   Abdominal:      General: Bowel sounds are normal. There is no distension.      Palpations: Abdomen is soft.      Tenderness: There is no abdominal tenderness.      Comments: New fistula present to abdomen and right chest   Musculoskeletal:      Cervical back: Neck supple.      Right lower leg: No edema.      Left lower leg: No edema.   Lymphadenopathy:      Cervical: No cervical adenopathy.   Skin:     General: Skin is cool and dry.   Neurological:      Mental Status: He is alert and oriented to person, place, and time.   Psychiatric:         " Attention and Perception: Attention normal.         Mood and Affect: Mood normal.         Behavior: Behavior is cooperative.         Thought Content: Thought content normal.           Diagnoses and health risks identified today and associated recommendations/orders:    1. Encounter for Medicare annual wellness exam  - Ambulatory Referral/Consult to Enhanced Annual Wellness Visit (eAWV)  - Chart reviewed. Problem list updated. Discussed current medical diagnosis, current medications, medical/surgical/family/social history; updated provider list; documented vital signs; identified any cognitive impairment; and updated risk factor list. Addressed any outstanding health maintenance. Provided patient with personalized health advice. Continue to follow up with PCP and any specialists.     2. Encounter for preventive health examination  - Chart reviewed. Problem list updated. Discussed current medical diagnosis, current medications, medical/surgical/family/social history; updated provider list; documented vital signs; identified any cognitive impairment; and updated risk factor list. Addressed any outstanding health maintenance. Provided patient with personalized health advice. Continue to follow up with PCP and any specialists.     3. Former cigarette smoker  Orders  - CT Chest Lung Screening Low Dose; Future    4. Acute on chronic combined systolic (congestive) and diastolic (congestive) heart failure  - Chronic, stable  - Continue amiodarone, aspirin, lasix  - Follow with cardiology  Orders  - OXYGEN FOR HOME USE    5. ESRD (end stage renal disease)  - Newly diagnosed following hospitalization 2 weeks ago  - Continue dialysis MWF  - Follow with nephrology  Orders  - OXYGEN FOR HOME USE    6. Pulmonary emphysema, unspecified emphysema type  - Chronic, stable  - Continue oxygen   - Follow with PCP  Orders  - OXYGEN FOR HOME USE    7. Acute hypoxemic respiratory failure  - Chronic, stable  - Continue medications  - Follow  with PCP    8. V-tach  - Chronic, stable  - Continue medications  - Follow with PCP    9. Mixed hyperlipidemia  - Chronic, stable  - Continue pravastatin  - Follow with PCP    10. Atherosclerosis of native coronary artery of native heart with angina pectoris  - Chronic, stable  - Continue BP and lipid control  - Follow with PCP    11. Cardiac resynchronization therapy defibrillator (CRT-D) in place  - Chronic, stable  - Continue current therapies  - Follow with cardiology    12. S/P ablation of ventricular arrhythmia  - Chronic, stable  - Continue medications  - Follow with cardiology    13. Hyperlipidemia, unspecified hyperlipidemia type  - Chronic, stable  - Continue pravastatin  - Follow with PCP    14. Primary hypertension  - Chronic, stable  - Continue medications  - Follow with PCP    15. Aortic atherosclerosis  - Chronic, stable  - Continue medications  - Follow with PCP    16. Hypotension, unspecified hypotension type  - Chronic, stable  - Continue medications  - Follow with PCP    17. Benign prostatic hyperplasia with lower urinary tract symptoms, symptom details unspecified  - Chronic, stable  - Continue medications  - Follow with PCP    18. Other iron deficiency anemia  - Chronic, stable  - Continue OTC iron  - Follow with PCP    19. Secondary hyperparathyroidism of renal origin  - Chronic, stable  - Continue medications  - Follow with nephrology    20. Prediabetes  - Chronic, stable  - Continue medications  - Follow with PCP    21. Primary osteoarthritis of one knee  - Chronic, stable  - Continue medications  - Follow with PCP      Provided Ar with a 5-10 year written screening schedule and personal prevention plan. Recommendations were developed using the USPSTF age appropriate recommendations. Education, counseling, and referrals were provided as needed.  After Visit Summary printed and given to patient which includes a list of additional screenings\tests needed.    No follow-ups on file.      Shanell  YANCY José      Advance Care Planning   I offered to discuss advanced care planning, including how to pick a person who would make decisions for you if you were unable to make them for yourself, called a health care power of , and what kind of decisions you might make such as use of life sustaining treatments such as ventilators and tube feeding when faced with a life limiting illness recorded on a living will that they will need to know. (How you want to be cared for as you near the end of your natural life)     X  Patient has advanced directives written and agrees to provide copies to the institution.

## 2024-05-23 ENCOUNTER — HOSPITAL ENCOUNTER (OUTPATIENT)
Dept: RADIOLOGY | Facility: HOSPITAL | Age: 64
Discharge: HOME | End: 2024-05-23
Attending: PHYSICIAN ASSISTANT
Payer: MEDICARE

## 2024-05-23 DIAGNOSIS — Z87.891 FORMER CIGARETTE SMOKER: ICD-10-CM

## 2024-05-23 PROCEDURE — 71271 CT THORAX LUNG CANCER SCR C-: CPT | Mod: 26,,, | Performed by: RADIOLOGY

## 2024-05-23 PROCEDURE — 71271 CT THORAX LUNG CANCER SCR C-: CPT | Mod: TC,PN

## 2024-06-04 ENCOUNTER — HOSPITAL ENCOUNTER (OUTPATIENT)
Facility: HOSPITAL | Age: 64
Discharge: HOME | End: 2024-06-06
Attending: HOSPITALIST | Admitting: HOSPITALIST
Payer: MEDICARE

## 2024-06-04 DIAGNOSIS — T82.41XA HEMODIALYSIS CATHETER DYSFUNCTION, INITIAL ENCOUNTER: Primary | ICD-10-CM

## 2024-06-04 DIAGNOSIS — R07.9 CHEST PAIN: ICD-10-CM

## 2024-06-04 DIAGNOSIS — R09.02 HYPOXIA: ICD-10-CM

## 2024-06-04 DIAGNOSIS — Z78.9 PROBLEM WITH VASCULAR ACCESS: ICD-10-CM

## 2024-06-04 LAB
ALBUMIN SERPL BCP-MCNC: 3.2 G/DL (ref 3.5–5.2)
ALP SERPL-CCNC: 158 U/L (ref 55–135)
ALT SERPL W/O P-5'-P-CCNC: 27 U/L (ref 10–44)
ANION GAP SERPL CALC-SCNC: 15 MMOL/L (ref 8–16)
AST SERPL-CCNC: 56 U/L (ref 10–40)
BASOPHILS # BLD AUTO: 0.11 K/UL (ref 0–0.2)
BASOPHILS NFR BLD: 1.3 % (ref 0–1.9)
BILIRUB SERPL-MCNC: 1.9 MG/DL (ref 0.1–1)
BUN SERPL-MCNC: 56 MG/DL (ref 8–23)
CALCIUM SERPL-MCNC: 9.2 MG/DL (ref 8.7–10.5)
CHLORIDE SERPL-SCNC: 95 MMOL/L (ref 95–110)
CO2 SERPL-SCNC: 29 MMOL/L (ref 23–29)
CREAT SERPL-MCNC: 5.6 MG/DL (ref 0.5–1.4)
DIFFERENTIAL METHOD BLD: ABNORMAL
EOSINOPHIL # BLD AUTO: 0.2 K/UL (ref 0–0.5)
EOSINOPHIL NFR BLD: 2.6 % (ref 0–8)
ERYTHROCYTE [DISTWIDTH] IN BLOOD BY AUTOMATED COUNT: 17.7 % (ref 11.5–14.5)
EST. GFR  (NO RACE VARIABLE): 11 ML/MIN/1.73 M^2
GLUCOSE SERPL-MCNC: 89 MG/DL (ref 70–110)
HCT VFR BLD AUTO: 36.2 % (ref 40–54)
HGB BLD-MCNC: 12.6 G/DL (ref 14–18)
IMM GRANULOCYTES # BLD AUTO: 0.06 K/UL (ref 0–0.04)
IMM GRANULOCYTES NFR BLD AUTO: 0.7 % (ref 0–0.5)
LYMPHOCYTES # BLD AUTO: 0.8 K/UL (ref 1–4.8)
LYMPHOCYTES NFR BLD: 9.5 % (ref 18–48)
MAGNESIUM SERPL-MCNC: 1.9 MG/DL (ref 1.6–2.6)
MCH RBC QN AUTO: 26.4 PG (ref 27–31)
MCHC RBC AUTO-ENTMCNC: 34.8 G/DL (ref 32–36)
MCV RBC AUTO: 76 FL (ref 82–98)
MONOCYTES # BLD AUTO: 0.8 K/UL (ref 0.3–1)
MONOCYTES NFR BLD: 10.1 % (ref 4–15)
NEUTROPHILS # BLD AUTO: 6.3 K/UL (ref 1.8–7.7)
NEUTROPHILS NFR BLD: 75.8 % (ref 38–73)
NRBC BLD-RTO: 0 /100 WBC
PHOSPHATE SERPL-MCNC: 3.8 MG/DL (ref 2.7–4.5)
PLATELET # BLD AUTO: 426 K/UL (ref 150–450)
PMV BLD AUTO: 12.2 FL (ref 9.2–12.9)
POCT GLUCOSE: 93 MG/DL (ref 70–110)
POTASSIUM SERPL-SCNC: 4.2 MMOL/L (ref 3.5–5.1)
PROT SERPL-MCNC: 7.1 G/DL (ref 6–8.4)
RBC # BLD AUTO: 4.78 M/UL (ref 4.6–6.2)
SODIUM SERPL-SCNC: 139 MMOL/L (ref 136–145)
WBC # BLD AUTO: 8.35 K/UL (ref 3.9–12.7)

## 2024-06-04 PROCEDURE — 85025 COMPLETE CBC W/AUTO DIFF WBC: CPT | Performed by: PHYSICIAN ASSISTANT

## 2024-06-04 PROCEDURE — 93010 ELECTROCARDIOGRAM REPORT: CPT | Mod: ,,, | Performed by: INTERNAL MEDICINE

## 2024-06-04 PROCEDURE — 93005 ELECTROCARDIOGRAM TRACING: CPT

## 2024-06-04 PROCEDURE — 82962 GLUCOSE BLOOD TEST: CPT

## 2024-06-04 PROCEDURE — G0378 HOSPITAL OBSERVATION PER HR: HCPCS

## 2024-06-04 PROCEDURE — 99285 EMERGENCY DEPT VISIT HI MDM: CPT | Mod: 25

## 2024-06-04 PROCEDURE — 80053 COMPREHEN METABOLIC PANEL: CPT | Performed by: PHYSICIAN ASSISTANT

## 2024-06-04 PROCEDURE — 84100 ASSAY OF PHOSPHORUS: CPT | Performed by: PHYSICIAN ASSISTANT

## 2024-06-04 PROCEDURE — 25000003 PHARM REV CODE 250: Performed by: STUDENT IN AN ORGANIZED HEALTH CARE EDUCATION/TRAINING PROGRAM

## 2024-06-04 PROCEDURE — 83735 ASSAY OF MAGNESIUM: CPT | Performed by: PHYSICIAN ASSISTANT

## 2024-06-04 RX ORDER — AMIODARONE HYDROCHLORIDE 200 MG/1
400 TABLET ORAL DAILY
Status: DISCONTINUED | OUTPATIENT
Start: 2024-06-05 | End: 2024-06-06 | Stop reason: HOSPADM

## 2024-06-04 RX ORDER — POLYETHYLENE GLYCOL 3350 17 G/17G
17 POWDER, FOR SOLUTION ORAL DAILY
Status: DISCONTINUED | OUTPATIENT
Start: 2024-06-05 | End: 2024-06-06 | Stop reason: HOSPADM

## 2024-06-04 RX ORDER — MIRTAZAPINE 7.5 MG/1
7.5 TABLET, FILM COATED ORAL NIGHTLY
Status: DISCONTINUED | OUTPATIENT
Start: 2024-06-04 | End: 2024-06-06 | Stop reason: HOSPADM

## 2024-06-04 RX ORDER — MEXILETINE HYDROCHLORIDE 150 MG/1
150 CAPSULE ORAL 2 TIMES DAILY WITH MEALS
Status: DISCONTINUED | OUTPATIENT
Start: 2024-06-04 | End: 2024-06-06 | Stop reason: HOSPADM

## 2024-06-04 RX ORDER — IBUPROFEN 200 MG
24 TABLET ORAL
Status: DISCONTINUED | OUTPATIENT
Start: 2024-06-04 | End: 2024-06-06 | Stop reason: HOSPADM

## 2024-06-04 RX ORDER — PANTOPRAZOLE SODIUM 40 MG/1
40 TABLET, DELAYED RELEASE ORAL DAILY
Status: DISCONTINUED | OUTPATIENT
Start: 2024-06-05 | End: 2024-06-06 | Stop reason: HOSPADM

## 2024-06-04 RX ORDER — GLUCAGON 1 MG
1 KIT INJECTION
Status: DISCONTINUED | OUTPATIENT
Start: 2024-06-04 | End: 2024-06-06 | Stop reason: HOSPADM

## 2024-06-04 RX ORDER — SODIUM CHLORIDE 0.9 % (FLUSH) 0.9 %
10 SYRINGE (ML) INJECTION EVERY 12 HOURS PRN
Status: DISCONTINUED | OUTPATIENT
Start: 2024-06-04 | End: 2024-06-06 | Stop reason: HOSPADM

## 2024-06-04 RX ORDER — FUROSEMIDE 40 MG/1
80 TABLET ORAL 2 TIMES DAILY
Status: DISCONTINUED | OUTPATIENT
Start: 2024-06-04 | End: 2024-06-06 | Stop reason: HOSPADM

## 2024-06-04 RX ORDER — METOLAZONE 10 MG/1
10 TABLET ORAL SEE ADMIN INSTRUCTIONS
COMMUNITY
Start: 2024-05-24

## 2024-06-04 RX ORDER — ACETAMINOPHEN 500 MG
500 TABLET ORAL EVERY 6 HOURS PRN
Status: DISCONTINUED | OUTPATIENT
Start: 2024-06-04 | End: 2024-06-06 | Stop reason: HOSPADM

## 2024-06-04 RX ORDER — LEVOTHYROXINE SODIUM 75 UG/1
150 TABLET ORAL
Status: DISCONTINUED | OUTPATIENT
Start: 2024-06-05 | End: 2024-06-06 | Stop reason: HOSPADM

## 2024-06-04 RX ORDER — MIDODRINE HYDROCHLORIDE 5 MG/1
15 TABLET ORAL EVERY 8 HOURS
Status: DISCONTINUED | OUTPATIENT
Start: 2024-06-04 | End: 2024-06-06 | Stop reason: HOSPADM

## 2024-06-04 RX ORDER — NALOXONE HCL 0.4 MG/ML
0.02 VIAL (ML) INJECTION
Status: DISCONTINUED | OUTPATIENT
Start: 2024-06-04 | End: 2024-06-06 | Stop reason: HOSPADM

## 2024-06-04 RX ORDER — PRAVASTATIN SODIUM 40 MG/1
40 TABLET ORAL NIGHTLY
Status: DISCONTINUED | OUTPATIENT
Start: 2024-06-04 | End: 2024-06-06 | Stop reason: HOSPADM

## 2024-06-04 RX ORDER — IBUPROFEN 200 MG
16 TABLET ORAL
Status: DISCONTINUED | OUTPATIENT
Start: 2024-06-04 | End: 2024-06-06 | Stop reason: HOSPADM

## 2024-06-04 RX ORDER — ALLOPURINOL 100 MG/1
100 TABLET ORAL DAILY
Status: DISCONTINUED | OUTPATIENT
Start: 2024-06-05 | End: 2024-06-06 | Stop reason: HOSPADM

## 2024-06-04 RX ORDER — ASPIRIN 81 MG/1
81 TABLET ORAL DAILY
Status: DISCONTINUED | OUTPATIENT
Start: 2024-06-05 | End: 2024-06-06 | Stop reason: HOSPADM

## 2024-06-04 RX ADMIN — MIDODRINE HYDROCHLORIDE 15 MG: 5 TABLET ORAL at 09:06

## 2024-06-04 RX ADMIN — FUROSEMIDE 80 MG: 40 TABLET ORAL at 09:06

## 2024-06-04 RX ADMIN — MEXILETINE HYDROCHLORIDE 150 MG: 150 CAPSULE ORAL at 10:06

## 2024-06-04 RX ADMIN — PRAVASTATIN SODIUM 40 MG: 40 TABLET ORAL at 09:06

## 2024-06-04 NOTE — FIRST PROVIDER EVALUATION
Emergency Department TeleTriage Encounter Note      CHIEF COMPLAINT    Chief Complaint   Patient presents with    Vascular Access Problem     Patient presents to ED from home for vascular access problem. Patient states this morning he noticed his vascular access to his R chest well had broken off. Patient states he was last dialyzed yesterday. Does dialysis MWF, but states he also has PD access. Denies chest pain or SOB. O2 sat 88% on RA, states he wears 3L NC @ home. Patient speaking in full sentences, does not appear to be in respiratory distress.        VITAL SIGNS   Initial Vitals [06/04/24 1341]   BP Pulse Resp Temp SpO2   (!) 90/56 61 18 98.2 °F (36.8 °C) (!) 89 %      MAP       --            ALLERGIES    Review of patient's allergies indicates:   Allergen Reactions    Lokelma [sodium zirconium cyclosilicate] Other (See Comments)     Fluid retention, weight gain, CHF excerebration, severe constipation    Atorvastatin Other (See Comments)     Joint pain    Jardiance [empagliflozin] Other (See Comments)     Chest pains, significant weight loss, lower blood pressure       PROVIDER TRIAGE NOTE  Patient presents with complaint of pulled out HD catheter. denid any complaints.       Phy:   Constitutional: well nourished, well developed, appearing stated age, NAD        Initial orders will be placed and care will be transferred to an alternate provider when patient is roomed for a full evaluation. Any additional orders and the final disposition will be determined by that provider.        ORDERS  Labs Reviewed   CBC W/ AUTO DIFFERENTIAL   COMPREHENSIVE METABOLIC PANEL   MAGNESIUM   PHOSPHORUS   POCT GLUCOSE       ED Orders (720h ago, onward)      Start Ordered     Status Ordering Provider    06/04/24 1348 06/04/24 1347  Vital signs  Every 15 min         Ordered SARAH WEINER    06/04/24 1348 06/04/24 1347  Cardiac Monitoring - Adult  Continuous        Comments: Notify Physician If:    Ordered  SARAH WEINER    06/04/24 1348 06/04/24 1347  Pulse Oximetry Continuous  Continuous         Ordered SARAH WEINER    06/04/24 1348 06/04/24 1347  Diet NPO  Diet effective now         Ordered SARAH WEINER    06/04/24 1348 06/04/24 1347  Saline lock IV  Once         Ordered SARAH WEINER    06/04/24 1348 06/04/24 1347  EKG 12-lead  Once        Comments: Do not perform if previously done during this visit/ triage    Ordered SARAH WEINER    06/04/24 1348 06/04/24 1347  CBC auto differential  STAT         Ordered SARAH WEINER    06/04/24 1348 06/04/24 1347  Comprehensive metabolic panel  STAT         Ordered SARAH WEINER    06/04/24 1348 06/04/24 1347  Magnesium  STAT         Ordered SARAH WEINER    06/04/24 1348 06/04/24 1347  Phosphorus  Once         Ordered SARAH WEINER    06/04/24 1347 06/04/24 1347  X-Ray Chest AP Portable  1 time imaging         Ordered SARAH WEINER    06/04/24 1343 06/04/24 1343  POCT glucose  Once         Final result EMERGENCY, DEPT PHYSICIAN              Virtual Visit Note: The provider triage portion of this emergency department evaluation and documentation was performed via [employer], a HIPAA-compliant telemedicine application, in concert with a tele-presenter in the room. A face to face patient evaluation with one of my colleagues will occur once the patient is placed in an emergency department room.      DISCLAIMER: This note was prepared with codetag voice recognition transcription software. Garbled syntax, mangled pronouns, and other bizarre constructions may be attributed to that software system.

## 2024-06-04 NOTE — ED TRIAGE NOTES
Pt presents to ED today reports he received dialysis yesterday without difficulty   Upon waking up this am noted catheter was dislodged   Voices no complaints at this time   NAD noted

## 2024-06-05 ENCOUNTER — ANESTHESIA (OUTPATIENT)
Dept: SURGERY | Facility: HOSPITAL | Age: 64
End: 2024-06-05
Payer: MEDICARE

## 2024-06-05 ENCOUNTER — ANESTHESIA EVENT (OUTPATIENT)
Dept: SURGERY | Facility: HOSPITAL | Age: 64
End: 2024-06-05
Payer: MEDICARE

## 2024-06-05 PROBLEM — T82.41XA HEMODIALYSIS CATHETER DYSFUNCTION: Status: ACTIVE | Noted: 2024-06-05

## 2024-06-05 LAB
ALBUMIN SERPL BCP-MCNC: 3 G/DL (ref 3.5–5.2)
ALP SERPL-CCNC: 138 U/L (ref 55–135)
ALT SERPL W/O P-5'-P-CCNC: 26 U/L (ref 10–44)
ANION GAP SERPL CALC-SCNC: 15 MMOL/L (ref 8–16)
AST SERPL-CCNC: 45 U/L (ref 10–40)
BILIRUB SERPL-MCNC: 1.8 MG/DL (ref 0.1–1)
BUN SERPL-MCNC: 63 MG/DL (ref 8–23)
CALCIUM SERPL-MCNC: 9 MG/DL (ref 8.7–10.5)
CHLORIDE SERPL-SCNC: 98 MMOL/L (ref 95–110)
CO2 SERPL-SCNC: 29 MMOL/L (ref 23–29)
CREAT SERPL-MCNC: 6 MG/DL (ref 0.5–1.4)
ERYTHROCYTE [DISTWIDTH] IN BLOOD BY AUTOMATED COUNT: 17.8 % (ref 11.5–14.5)
EST. GFR  (NO RACE VARIABLE): 10 ML/MIN/1.73 M^2
GLUCOSE SERPL-MCNC: 81 MG/DL (ref 70–110)
HCT VFR BLD AUTO: 34.3 % (ref 40–54)
HGB BLD-MCNC: 11.9 G/DL (ref 14–18)
MCH RBC QN AUTO: 25.9 PG (ref 27–31)
MCHC RBC AUTO-ENTMCNC: 34.7 G/DL (ref 32–36)
MCV RBC AUTO: 75 FL (ref 82–98)
PLATELET # BLD AUTO: 369 K/UL (ref 150–450)
PMV BLD AUTO: 12.6 FL (ref 9.2–12.9)
POTASSIUM SERPL-SCNC: 4.1 MMOL/L (ref 3.5–5.1)
PROT SERPL-MCNC: 6.4 G/DL (ref 6–8.4)
RBC # BLD AUTO: 4.59 M/UL (ref 4.6–6.2)
SODIUM SERPL-SCNC: 142 MMOL/L (ref 136–145)
WBC # BLD AUTO: 8.09 K/UL (ref 3.9–12.7)

## 2024-06-05 PROCEDURE — 36558 INSERT TUNNELED CV CATH: CPT | Mod: RT,,, | Performed by: STUDENT IN AN ORGANIZED HEALTH CARE EDUCATION/TRAINING PROGRAM

## 2024-06-05 PROCEDURE — G0378 HOSPITAL OBSERVATION PER HR: HCPCS

## 2024-06-05 PROCEDURE — 99900035 HC TECH TIME PER 15 MIN (STAT)

## 2024-06-05 PROCEDURE — 80053 COMPREHEN METABOLIC PANEL: CPT

## 2024-06-05 PROCEDURE — D9220A PRA ANESTHESIA: Mod: ANES,,, | Performed by: STUDENT IN AN ORGANIZED HEALTH CARE EDUCATION/TRAINING PROGRAM

## 2024-06-05 PROCEDURE — 25000003 PHARM REV CODE 250: Performed by: STUDENT IN AN ORGANIZED HEALTH CARE EDUCATION/TRAINING PROGRAM

## 2024-06-05 PROCEDURE — 63600175 PHARM REV CODE 636 W HCPCS: Mod: JZ,JG | Performed by: STUDENT IN AN ORGANIZED HEALTH CARE EDUCATION/TRAINING PROGRAM

## 2024-06-05 PROCEDURE — 96372 THER/PROPH/DIAG INJ SC/IM: CPT | Performed by: STUDENT IN AN ORGANIZED HEALTH CARE EDUCATION/TRAINING PROGRAM

## 2024-06-05 PROCEDURE — 36415 COLL VENOUS BLD VENIPUNCTURE: CPT

## 2024-06-05 PROCEDURE — 25000003 PHARM REV CODE 250: Performed by: NURSE ANESTHETIST, CERTIFIED REGISTERED

## 2024-06-05 PROCEDURE — 77001 FLUOROGUIDE FOR VEIN DEVICE: CPT | Mod: 26,,, | Performed by: STUDENT IN AN ORGANIZED HEALTH CARE EDUCATION/TRAINING PROGRAM

## 2024-06-05 PROCEDURE — D9220A PRA ANESTHESIA: Mod: CRNA,,, | Performed by: NURSE ANESTHETIST, CERTIFIED REGISTERED

## 2024-06-05 PROCEDURE — 99204 OFFICE O/P NEW MOD 45 MIN: CPT | Mod: ,,, | Performed by: STUDENT IN AN ORGANIZED HEALTH CARE EDUCATION/TRAINING PROGRAM

## 2024-06-05 PROCEDURE — 36000706: Performed by: STUDENT IN AN ORGANIZED HEALTH CARE EDUCATION/TRAINING PROGRAM

## 2024-06-05 PROCEDURE — 37000008 HC ANESTHESIA 1ST 15 MINUTES: Performed by: STUDENT IN AN ORGANIZED HEALTH CARE EDUCATION/TRAINING PROGRAM

## 2024-06-05 PROCEDURE — C1750 CATH, HEMODIALYSIS,LONG-TERM: HCPCS | Performed by: STUDENT IN AN ORGANIZED HEALTH CARE EDUCATION/TRAINING PROGRAM

## 2024-06-05 PROCEDURE — 85027 COMPLETE CBC AUTOMATED: CPT

## 2024-06-05 PROCEDURE — 63600175 PHARM REV CODE 636 W HCPCS: Performed by: STUDENT IN AN ORGANIZED HEALTH CARE EDUCATION/TRAINING PROGRAM

## 2024-06-05 PROCEDURE — C1769 GUIDE WIRE: HCPCS | Performed by: STUDENT IN AN ORGANIZED HEALTH CARE EDUCATION/TRAINING PROGRAM

## 2024-06-05 PROCEDURE — 63600175 PHARM REV CODE 636 W HCPCS: Performed by: NURSE ANESTHETIST, CERTIFIED REGISTERED

## 2024-06-05 PROCEDURE — 36000707: Performed by: STUDENT IN AN ORGANIZED HEALTH CARE EDUCATION/TRAINING PROGRAM

## 2024-06-05 PROCEDURE — 94761 N-INVAS EAR/PLS OXIMETRY MLT: CPT

## 2024-06-05 PROCEDURE — 71000033 HC RECOVERY, INTIAL HOUR: Performed by: STUDENT IN AN ORGANIZED HEALTH CARE EDUCATION/TRAINING PROGRAM

## 2024-06-05 PROCEDURE — 37000009 HC ANESTHESIA EA ADD 15 MINS: Performed by: STUDENT IN AN ORGANIZED HEALTH CARE EDUCATION/TRAINING PROGRAM

## 2024-06-05 PROCEDURE — 27000221 HC OXYGEN, UP TO 24 HOURS

## 2024-06-05 DEVICE — CATH DURA-MAX 15.5F X 24CM: Type: IMPLANTABLE DEVICE | Site: CHEST | Status: FUNCTIONAL

## 2024-06-05 DEVICE — SET CATH HEMODIALYSIS 15.5FR: Type: IMPLANTABLE DEVICE | Site: CHEST | Status: FUNCTIONAL

## 2024-06-05 RX ORDER — PROPOFOL 10 MG/ML
VIAL (ML) INTRAVENOUS CONTINUOUS PRN
Status: DISCONTINUED | OUTPATIENT
Start: 2024-06-05 | End: 2024-06-05

## 2024-06-05 RX ORDER — VASOPRESSIN 20 [USP'U]/ML
INJECTION, SOLUTION INTRAMUSCULAR; SUBCUTANEOUS
Status: DISCONTINUED | OUTPATIENT
Start: 2024-06-05 | End: 2024-06-05

## 2024-06-05 RX ORDER — PHENYLEPHRINE HYDROCHLORIDE 10 MG/ML
INJECTION INTRAVENOUS
Status: DISCONTINUED | OUTPATIENT
Start: 2024-06-05 | End: 2024-06-05

## 2024-06-05 RX ORDER — MIDAZOLAM HYDROCHLORIDE 1 MG/ML
INJECTION INTRAMUSCULAR; INTRAVENOUS
Status: DISCONTINUED | OUTPATIENT
Start: 2024-06-05 | End: 2024-06-05

## 2024-06-05 RX ORDER — FENTANYL CITRATE 50 UG/ML
INJECTION, SOLUTION INTRAMUSCULAR; INTRAVENOUS
Status: DISCONTINUED | OUTPATIENT
Start: 2024-06-05 | End: 2024-06-05

## 2024-06-05 RX ORDER — HEPARIN SODIUM 10000 [USP'U]/ML
INJECTION, SOLUTION INTRAVENOUS; SUBCUTANEOUS
Status: DISCONTINUED | OUTPATIENT
Start: 2024-06-05 | End: 2024-06-05 | Stop reason: HOSPADM

## 2024-06-05 RX ORDER — LIDOCAINE HYDROCHLORIDE 10 MG/ML
INJECTION INFILTRATION; PERINEURAL
Status: DISCONTINUED | OUTPATIENT
Start: 2024-06-05 | End: 2024-06-05 | Stop reason: HOSPADM

## 2024-06-05 RX ORDER — LIDOCAINE HYDROCHLORIDE 20 MG/ML
INJECTION INTRAVENOUS
Status: DISCONTINUED | OUTPATIENT
Start: 2024-06-05 | End: 2024-06-05

## 2024-06-05 RX ORDER — MUPIROCIN 20 MG/G
OINTMENT TOPICAL 2 TIMES DAILY
Status: DISCONTINUED | OUTPATIENT
Start: 2024-06-05 | End: 2024-06-06 | Stop reason: HOSPADM

## 2024-06-05 RX ORDER — ONDANSETRON HYDROCHLORIDE 2 MG/ML
4 INJECTION, SOLUTION INTRAVENOUS DAILY PRN
Status: DISCONTINUED | OUTPATIENT
Start: 2024-06-05 | End: 2024-06-06 | Stop reason: HOSPADM

## 2024-06-05 RX ORDER — HEPARIN SODIUM 5000 [USP'U]/ML
5000 INJECTION, SOLUTION INTRAVENOUS; SUBCUTANEOUS EVERY 8 HOURS
Status: DISCONTINUED | OUTPATIENT
Start: 2024-06-05 | End: 2024-06-06 | Stop reason: HOSPADM

## 2024-06-05 RX ORDER — SODIUM CHLORIDE 9 MG/ML
INJECTION, SOLUTION INTRAVENOUS
Status: DISCONTINUED | OUTPATIENT
Start: 2024-06-05 | End: 2024-06-06 | Stop reason: HOSPADM

## 2024-06-05 RX ORDER — BUPIVACAINE HYDROCHLORIDE 2.5 MG/ML
INJECTION, SOLUTION EPIDURAL; INFILTRATION; INTRACAUDAL
Status: DISCONTINUED | OUTPATIENT
Start: 2024-06-05 | End: 2024-06-05 | Stop reason: HOSPADM

## 2024-06-05 RX ORDER — SODIUM CHLORIDE 9 MG/ML
INJECTION, SOLUTION INTRAVENOUS ONCE
Status: DISCONTINUED | OUTPATIENT
Start: 2024-06-05 | End: 2024-06-06 | Stop reason: HOSPADM

## 2024-06-05 RX ADMIN — MEXILETINE HYDROCHLORIDE 150 MG: 150 CAPSULE ORAL at 08:06

## 2024-06-05 RX ADMIN — FENTANYL CITRATE 25 MCG: 50 INJECTION INTRAMUSCULAR; INTRAVENOUS at 01:06

## 2024-06-05 RX ADMIN — ASPIRIN 81 MG: 81 TABLET, COATED ORAL at 08:06

## 2024-06-05 RX ADMIN — MEXILETINE HYDROCHLORIDE 150 MG: 150 CAPSULE ORAL at 04:06

## 2024-06-05 RX ADMIN — LEVOTHYROXINE SODIUM 150 MCG: 75 TABLET ORAL at 05:06

## 2024-06-05 RX ADMIN — PANTOPRAZOLE SODIUM 40 MG: 40 TABLET, DELAYED RELEASE ORAL at 08:06

## 2024-06-05 RX ADMIN — PRAVASTATIN SODIUM 40 MG: 40 TABLET ORAL at 09:06

## 2024-06-05 RX ADMIN — GLYCOPYRROLATE 0.1 MG: 0.2 INJECTION, SOLUTION INTRAMUSCULAR; INTRAVITREAL at 01:06

## 2024-06-05 RX ADMIN — HEPARIN SODIUM 5000 UNITS: 5000 INJECTION INTRAVENOUS; SUBCUTANEOUS at 09:06

## 2024-06-05 RX ADMIN — PHENYLEPHRINE HYDROCHLORIDE 100 MCG: 10 INJECTION INTRAVENOUS at 01:06

## 2024-06-05 RX ADMIN — MIDODRINE HYDROCHLORIDE 15 MG: 5 TABLET ORAL at 09:06

## 2024-06-05 RX ADMIN — FUROSEMIDE 80 MG: 40 TABLET ORAL at 09:06

## 2024-06-05 RX ADMIN — MIDAZOLAM HYDROCHLORIDE 1 MG: 1 INJECTION, SOLUTION INTRAMUSCULAR; INTRAVENOUS at 01:06

## 2024-06-05 RX ADMIN — FENTANYL CITRATE 25 MCG: 50 INJECTION INTRAMUSCULAR; INTRAVENOUS at 02:06

## 2024-06-05 RX ADMIN — ALLOPURINOL 100 MG: 100 TABLET ORAL at 08:06

## 2024-06-05 RX ADMIN — MUPIROCIN: 20 OINTMENT TOPICAL at 11:06

## 2024-06-05 RX ADMIN — MUPIROCIN: 20 OINTMENT TOPICAL at 09:06

## 2024-06-05 RX ADMIN — AMIODARONE HYDROCHLORIDE 400 MG: 200 TABLET ORAL at 08:06

## 2024-06-05 RX ADMIN — SODIUM CHLORIDE: 0.9 INJECTION, SOLUTION INTRAVENOUS at 01:06

## 2024-06-05 RX ADMIN — ACETAMINOPHEN 500 MG: 500 TABLET ORAL at 05:06

## 2024-06-05 RX ADMIN — LIDOCAINE HYDROCHLORIDE 50 MG: 20 INJECTION, SOLUTION INTRAVENOUS at 01:06

## 2024-06-05 RX ADMIN — FENTANYL CITRATE 50 MCG: 50 INJECTION INTRAMUSCULAR; INTRAVENOUS at 01:06

## 2024-06-05 RX ADMIN — MIDODRINE HYDROCHLORIDE 15 MG: 5 TABLET ORAL at 04:06

## 2024-06-05 RX ADMIN — FUROSEMIDE 80 MG: 40 TABLET ORAL at 08:06

## 2024-06-05 RX ADMIN — Medication 1 CAPSULE: at 08:06

## 2024-06-05 RX ADMIN — PROPOFOL 75 MCG/KG/MIN: 10 INJECTION, EMULSION INTRAVENOUS at 01:06

## 2024-06-05 RX ADMIN — VASOPRESSIN 1 UNITS: 20 INJECTION INTRAVENOUS at 01:06

## 2024-06-05 RX ADMIN — MIDODRINE HYDROCHLORIDE 15 MG: 5 TABLET ORAL at 05:06

## 2024-06-05 NOTE — ANESTHESIA POSTPROCEDURE EVALUATION
Anesthesia Post Evaluation    Patient: Ar Sharma    Procedure(s) Performed: Procedure(s) (LRB):  Insertion,central venous access device (Right)    Final Anesthesia Type: general      Patient location during evaluation: PACU  Patient participation: Yes- Able to Participate  Level of consciousness: awake and alert  Post-procedure vital signs: reviewed and stable  Pain management: adequate  Airway patency: patent    PONV status at discharge: No PONV  Anesthetic complications: no      Cardiovascular status: stable  Respiratory status: room air  Hydration status: euvolemic  Follow-up not needed.              Vitals Value Taken Time   /64 06/05/24 1622   Temp 36.7 °C (98.1 °F) 06/05/24 1622   Pulse 60 06/05/24 1622   Resp 18 06/05/24 1622   SpO2 92 % 06/05/24 1622         Event Time   Out of Recovery 06/05/2024 15:17:27         Pain/Gina Score: Pain Rating Prior to Med Admin: 3 (6/5/2024  5:19 AM)  Pain Rating Post Med Admin: 0 (6/5/2024  6:19 AM)  Gina Score: 10 (6/5/2024  3:15 PM)

## 2024-06-05 NOTE — SUBJECTIVE & OBJECTIVE
Past Medical History:   Diagnosis Date    Cardiomyopathy     CKD (chronic kidney disease) stage 4, GFR 15-29 ml/min     Congestive heart failure (CHF) 2015    COPD (chronic obstructive pulmonary disease)     Coronary artery disease     Edema     Essential (primary) hypertension 05/18/2022    Formatting of this note might be different from the original. Converted from [employer]: Description - ESSENTIAL HYPERTENSION, BENIGN    Heart attack     HLD (hyperlipidemia)     Hypertension     Hyperuricemia     Hypocalcemia     Renal cyst, left     Secondary hyperparathyroidism     Thyroid disease     V tach     Vitamin D deficiency        Past Surgical History:   Procedure Laterality Date    ablations  03/05/2018    albations  02/01/2018    COLONOSCOPY N/A 12/07/2018    Procedure: COLONOSCOPY/suprep;  Surgeon: Ananya Morillo MD;  Location: [facility] ENDO;  Service: Endoscopy;  Laterality: N/A;    defibulater N/A 2016    ESOPHAGOGASTRODUODENOSCOPY N/A 12/07/2018    Procedure: EGD (ESOPHAGOGASTRODUODENOSCOPY);  Surgeon: Ananya Morillo MD;  Location: [facility] ENDO;  Service: Endoscopy;  Laterality: N/A;    INSERTION OF TUNNELED CENTRAL VENOUS HEMODIALYSIS CATHETER Right 5/3/2024    Procedure: INSERTION, CATHETER, HEMODIALYSIS, DUAL LUMEN;  Surgeon: Philip Perez MD;  Location: [facility] OR;  Service: General;  Laterality: Right;    INSERTION, CATHETER, DIALYSIS, PERITONEAL, LAPAROSCOPIC N/A 5/8/2024    Procedure: INSERTION, CATHETER, DIALYSIS, PERITONEAL, LAPAROSCOPIC;  Surgeon: Tyree Ruiz MD;  Location: [facility] OR;  Service: General;  Laterality: N/A;    JOINT REPLACEMENT Bilateral 10/2016    knees, bilat    LEFT HEART CATHETERIZATION N/A 12/16/2020    Procedure: Left heart cath;  Surgeon: Shahram Stewart MD;  Location: [facility] CATH LAB/EP;  Service: Cardiology;  Laterality: N/A;    LEFT HEART CATHETERIZATION Left 9/27/2022    Procedure: Left heart cath;  Surgeon: Juan Roque MD;  Location: [facility] CATH LAB/EP;  Service:  Cardiology;  Laterality: Left;    WI HEMODIALYSIS, ONE EVALUATION  5/6/2024    REMOVAL OF HEMODIALYSIS CATHETER Right 5/3/2024    Procedure: REMOVAL, CATHETER, HEMODIALYSIS;  Surgeon: Philip Perez MD;  Location: [facility] OR;  Service: General;  Laterality: Right;    REPLACEMENT OF IMPLANTABLE CARDIOVERTER-DEFIBRILLATOR (ICD) GENERATOR Left 1/24/2023    Procedure: REPLACEMENT, ICD GENERATOR;  Surgeon: River Tenorio MD;  Location: [facility] EP LAB;  Service: Cardiology;  Laterality: Left;  ANTHONY, CRTD gen chg, MDT, MAC, DM, 3prep       Review of patient's allergies indicates:   Allergen Reactions    Lokelma [sodium zirconium cyclosilicate] Other (See Comments)     Fluid retention, weight gain, CHF excerebration, severe constipation    Atorvastatin Other (See Comments)     Joint pain    Jardiance [empagliflozin] Other (See Comments)     Chest pains, significant weight loss, lower blood pressure       No current facility-administered medications on file prior to encounter.     Current Outpatient Medications on File Prior to Encounter   Medication Sig    acetaminophen (TYLENOL) 500 MG tablet Take 500 mg by mouth every 6 (six) hours as needed for Pain.    allopurinoL (ZYLOPRIM) 100 MG tablet Take 1 tablet (100 mg total) by mouth once daily.    amiodarone (PACERONE) 400 MG tablet Take 1 tablet (400 mg total) by mouth once daily.    aspirin (ECOTRIN) 81 MG EC tablet Take 1 tablet (81 mg total) by mouth once daily.    ergocalciferol (ERGOCALCIFEROL) 50,000 unit Cap TAKE 1 CAPSULE BY MOUTH ONE TIME PER WEEK (Patient taking differently: Take 50,000 Units by mouth every Monday.)    furosemide (LASIX) 80 MG tablet Take 1 tablet (80 mg total) by mouth 2 (two) times daily. If increased 3 pounds in a day or 5 pounds in a week, take an extra dose of lasix 80mg until those pounds are lost.    levothyroxine (SYNTHROID) 150 MCG tablet Take 1 tablet (150 mcg total) by mouth once daily.    mexiletine (MEXITIL) 150 MG Cap Take 1 capsule (150  mg total) by mouth 2 (two) times daily with meals.    midodrine (PROAMATINE) 5 MG Tab Take 3 tablets (15 mg total) by mouth every 8 (eight) hours.    mirtazapine (REMERON) 7.5 MG Tab Take 1 tablet (7.5 mg total) by mouth every evening.    nitroGLYCERIN (NITROSTAT) 0.4 MG SL tablet Place 1 tablet (0.4 mg total) under the tongue every 5 (five) minutes as needed for Chest pain.    pantoprazole (PROTONIX) 40 MG tablet Take 1 tablet (40 mg total) by mouth once daily.    polyethylene glycol (GLYCOLAX) 17 gram PwPk Take 17 g by mouth once daily.    pravastatin (PRAVACHOL) 40 MG tablet TAKE 1 TABLET BY MOUTH EVERY DAY (Patient taking differently: Take 40 mg by mouth every evening.)    vitamin renal formula, B-complex-vitamin c-folic acid, (NEPHROCAP) 1 mg Cap Take 1 capsule by mouth once daily.     Family History       Problem Relation (Age of Onset)    Alcohol abuse Father    Arthritis Sister    Breast cancer Mother    Cancer Mother    Hypertension Mother    Kidney disease Father    No Known Problems Brother, Daughter, Son    Stroke Mother          Tobacco Use    Smoking status: Former     Current packs/day: 0.00     Average packs/day: 1 pack/day for 33.2 years (33.2 ttl pk-yrs)     Types: Cigarettes     Start date: 1979     Quit date: 3/3/2012     Years since quittin.2     Passive exposure: Past    Smokeless tobacco: Never   Substance and Sexual Activity    Alcohol use: Not Currently     Comment: rare    Drug use: No    Sexual activity: Yes     Partners: Female     Review of Systems 12 point ROS negative except for HPI  Objective:     Vital Signs (Most Recent):  Temp: 98 °F (36.7 °C) (24)  Pulse: 60 (24)  Resp: 16 (24)  BP: 115/74 (24)  SpO2: 97 % (24) Vital Signs (24h Range):  Temp:  [98 °F (36.7 °C)-98.2 °F (36.8 °C)] 98 °F (36.7 °C)  Pulse:  [60-67] 60  Resp:  [16-18] 16  SpO2:  [89 %-97 %] 97 %  BP: ()/(56-74) 115/74     Weight: 77.1 kg (170  lb)  Body mass index is 25.1 kg/m².     Physical Exam  Constitutional:       Appearance: He is well-developed.   Neck:      Vascular: No JVD.   Cardiovascular:      Rate and Rhythm: Normal rate and regular rhythm.      Heart sounds: No murmur heard.     No friction rub. No gallop.      Comments: Gauze noted where previous HD cath was.   Pulmonary:      Effort: Pulmonary effort is normal.      Breath sounds: Normal breath sounds. No wheezing or rales.      Comments: 3L NC  Abdominal:      General: Bowel sounds are normal.      Palpations: Abdomen is soft.      Tenderness: There is no abdominal tenderness.      Comments: Gauze present near PD cath site   Musculoskeletal:      Cervical back: Normal range of motion and neck supple.   Skin:     General: Skin is warm and dry.   Neurological:      Mental Status: He is alert and oriented to person, place, and time.                Significant Labs: All pertinent labs within the past 24 hours have been reviewed.    Significant Imaging: I have reviewed all pertinent imaging results/findings within the past 24 hours.

## 2024-06-05 NOTE — ASSESSMENT & PLAN NOTE
- Nephrology consulted, appreciate rec's  --HD today vs tomorrow, no significant volume overload at this time  -- Continue Lasix 80 PO BID to keep residual function   -- Daily Renal Function Panel  -- Renally dose all meds  -- Flush PD cath weekly

## 2024-06-05 NOTE — PROGRESS NOTES
[facility] Medicine  Progress Note    Patient Name: Ar Sharma  MRN: [id number]  Patient Class: OP- Observation   Admission Date: 6/4/2024  Length of Stay: 0 days  Attending Physician: Rigoberto Massey,*  Primary Care Provider: Jc Elliott MD        Subjective:     Principal Problem:Problem with vascular access        HPI:  Mr. Ar Sharma is a a 63M with PMHx of ESRD (HD MWF and learning PD), HFrEF, HTN, NICM, COPD, VT s/p ICD placement, chronic home O2 3L and HLD who presented to [facility] ED stating his HD cath fell out. Currently getting HD MWF while he learns how to do PD. Last dialysis session 6/3/2024.  No acute complaints including shortness of breath, chest pain, nausea, vomiting, fever, chills, abdominal pain.    In the ED, cxry confirmed right HD cath removal w/ no fragment remaining.     Admitted to observation to have HD cath replaced.     Overview/Hospital Course:  No notes on file    Interval History: Nursing notes reviewed and no acute events overnight. VSS. SpO2 96% on 3LNC. Pt w/ no complaints at this time. Surgery replaced right IJ HD cath, no significant electrolyte abnormalities or volume overload, HD today vs. Tomorrow. Nephrology on board.    Review of Systems   Constitutional:  Negative for chills and fever.   Respiratory:  Negative for cough and shortness of breath.    Cardiovascular:  Negative for chest pain.   Gastrointestinal:  Negative for abdominal pain, constipation, diarrhea, nausea and vomiting.   Genitourinary:  Negative for dysuria.   Neurological:  Negative for dizziness and light-headedness.     Objective:     Vital Signs (Most Recent):  Temp: 97.5 °F (36.4 °C) (06/05/24 1531)  Pulse: 60 (06/05/24 1531)  Resp: 18 (06/05/24 1531)  BP: 104/62 (06/05/24 1531)  SpO2: 96 % (06/05/24 1531) Vital Signs (24h Range):  Temp:  [97.3 °F (36.3 °C)-98.4 °F (36.9 °C)] 97.5 °F (36.4 °C)  Pulse:  [59-67] 60  Resp:  [13-20] 18  SpO2:  [89 %-97 %] 96 %  BP:  ()/(60-81) 104/62     Weight: 70.8 kg (156 lb 1.4 oz)  Body mass index is 23.05 kg/m².    Intake/Output Summary (Last 24 hours) at 6/5/2024 1540  Last data filed at 6/5/2024 1514  Gross per 24 hour   Intake 150 ml   Output 600 ml   Net -450 ml         Physical Exam  Constitutional:       General: He is not in acute distress.     Appearance: He is well-developed.   HENT:      Head: Normocephalic and atraumatic.      Mouth/Throat:      Mouth: Mucous membranes are moist.   Eyes:      Extraocular Movements: Extraocular movements intact.   Neck:      Vascular: No JVD.   Cardiovascular:      Rate and Rhythm: Normal rate and regular rhythm.      Heart sounds: Normal heart sounds. No murmur heard.     No friction rub. No gallop.      Comments: Gauze noted where previous HD cath was.   Pulmonary:      Effort: Pulmonary effort is normal.      Breath sounds: Normal breath sounds. No wheezing or rales.      Comments: 3L NC  Abdominal:      General: Bowel sounds are normal.      Palpations: Abdomen is soft.      Tenderness: There is no abdominal tenderness.      Comments: Gauze present near PD cath site   Musculoskeletal:      Cervical back: Normal range of motion and neck supple.      Right lower leg: No edema.      Left lower leg: No edema.   Skin:     General: Skin is warm and dry.   Neurological:      Mental Status: He is alert and oriented to person, place, and time.             Significant Labs: All pertinent labs within the past 24 hours have been reviewed.    Significant Imaging: I have reviewed all pertinent imaging results/findings within the past 24 hours.    Assessment/Plan:      * Problem with vascular access  HD cath. Replaced in right IJ   -appreciate Surgery assistance    Hemodialysis catheter dysfunction        Hypotension  Continue midodrine      ESRD (end stage renal disease)  - Nephrology consulted, appreciate rec's  --HD today vs tomorrow, no significant volume overload at this time  -- Continue Lasix  80 PO BID to keep residual function   -- Daily Renal Function Panel  -- Renally dose all meds  -- Flush PD cath weekly    Hyperlipidemia  Continue pravastatin      Hypothyroidism  Continue levothyroxine.      V-tach  Continue amiodarone and mexiletine        VTE Risk Mitigation (From admission, onward)           Ordered     heparin (porcine) injection 5,000 Units  Every 8 hours         06/05/24 0040     IP VTE HIGH RISK PATIENT  Once         06/04/24 1856     Place sequential compression device  Until discontinued         06/04/24 1856                    Discharge Planning   SLAVA:      Code Status: Full Code   Is the patient medically ready for discharge?:     Reason for patient still in hospital (select all that apply): Treatment and Consult recommendations  Discharge Plan A: Home, Home with family, Home Health                  Jason Paula PA-C  Department of Hospital Medicine   [facility]

## 2024-06-05 NOTE — ED PROVIDER NOTES
Encounter Date: 6/4/2024       History     Chief Complaint   Patient presents with    Vascular Access Problem     Patient presents to ED from home for vascular access problem. Patient states this morning he noticed his vascular access to his R chest well had broken off. Patient states he was last dialyzed yesterday. Does dialysis MWF, but states he also has PD access. Denies chest pain or SOB. O2 sat 88% on RA, states he wears 3L NC @ home. Patient speaking in full sentences, does not appear to be in respiratory distress.      Patient is a 63-year-old male with a past medical history of cardiomyopathy, CKD stage 4, congestive heart failure, COPD, CAD, hypertension, HLD, hyperparathyroidism, thyroid disease, and vitamin-D deficiency who presents to emergency room for dislodgement of hemodialysis catheter.  Patient states that he noticed this morning that his hemodialysis catheter had come out.  Does not remember any injury or inciting event.  He currently has HD and PD.  He was being trained on PD last week.  However, his nurse went on vacation and he was not able to receive it this week.  He has been using his HD for dialysis.  He currently goes MWF.  He did receive dialysis yesterday.  Denies chest pain, shortness of breath, headache, visual changes, weakness, numbness, tingling, abdominal pain, or others at this time.  Feels at baseline.  He wears 3 L O2 at home.    The history is provided by the patient. No  was used.     Review of patient's allergies indicates:   Allergen Reactions    Lokelma [sodium zirconium cyclosilicate] Other (See Comments)     Fluid retention, weight gain, CHF excerebration, severe constipation    Atorvastatin Other (See Comments)     Joint pain    Jardiance [empagliflozin] Other (See Comments)     Chest pains, significant weight loss, lower blood pressure     Past Medical History:   Diagnosis Date    Cardiomyopathy     CKD (chronic kidney disease) stage 4, GFR 15-29  ml/min     Congestive heart failure (CHF) 2015    COPD (chronic obstructive pulmonary disease)     Coronary artery disease     Edema     Essential (primary) hypertension 05/18/2022    Formatting of this note might be different from the original. Converted from [employer]: Description - ESSENTIAL HYPERTENSION, BENIGN    Heart attack     HLD (hyperlipidemia)     Hypertension     Hyperuricemia     Hypocalcemia     Renal cyst, left     Secondary hyperparathyroidism     Thyroid disease     V tach     Vitamin D deficiency      Past Surgical History:   Procedure Laterality Date    ablations  03/05/2018    albations  02/01/2018    COLONOSCOPY N/A 12/07/2018    Procedure: COLONOSCOPY/suprep;  Surgeon: Ananya Morillo MD;  Location: [facility] ENDO;  Service: Endoscopy;  Laterality: N/A;    defibulater N/A 2016    ESOPHAGOGASTRODUODENOSCOPY N/A 12/07/2018    Procedure: EGD (ESOPHAGOGASTRODUODENOSCOPY);  Surgeon: Ananya Morillo MD;  Location: [facility] ENDO;  Service: Endoscopy;  Laterality: N/A;    INSERTION OF TUNNELED CENTRAL VENOUS HEMODIALYSIS CATHETER Right 5/3/2024    Procedure: INSERTION, CATHETER, HEMODIALYSIS, DUAL LUMEN;  Surgeon: Philip Perez MD;  Location: [facility] OR;  Service: General;  Laterality: Right;    INSERTION, CATHETER, DIALYSIS, PERITONEAL, LAPAROSCOPIC N/A 5/8/2024    Procedure: INSERTION, CATHETER, DIALYSIS, PERITONEAL, LAPAROSCOPIC;  Surgeon: Tyree Ruiz MD;  Location: [facility] OR;  Service: General;  Laterality: N/A;    JOINT REPLACEMENT Bilateral 10/2016    knees, bilat    LEFT HEART CATHETERIZATION N/A 12/16/2020    Procedure: Left heart cath;  Surgeon: Shahram Stewart MD;  Location: [facility] CATH LAB/EP;  Service: Cardiology;  Laterality: N/A;    LEFT HEART CATHETERIZATION Left 9/27/2022    Procedure: Left heart cath;  Surgeon: Juan Roque MD;  Location: [facility] CATH LAB/EP;  Service: Cardiology;  Laterality: Left;    CA HEMODIALYSIS, ONE EVALUATION  5/6/2024    REMOVAL OF HEMODIALYSIS CATHETER  Right 5/3/2024    Procedure: REMOVAL, CATHETER, HEMODIALYSIS;  Surgeon: Philip Perez MD;  Location: [facility] OR;  Service: General;  Laterality: Right;    REPLACEMENT OF IMPLANTABLE CARDIOVERTER-DEFIBRILLATOR (ICD) GENERATOR Left 2023    Procedure: REPLACEMENT, ICD GENERATOR;  Surgeon: River Tenorio MD;  Location: [facility] EP LAB;  Service: Cardiology;  Laterality: Left;  ANTHONY, CRTD gen chg, MDT, MAC, DM, 3prep     Family History   Problem Relation Name Age of Onset    Cancer Mother Sharon Love     Hypertension Mother Sharon Love     Stroke Mother Sharon Love     Breast cancer Mother Sharon Love     Alcohol abuse Father Mitul Love     Kidney disease Father Mitul Love     Arthritis Sister x2     No Known Problems Brother x1     No Known Problems Daughter x1     No Known Problems Son x2      Social History     Tobacco Use    Smoking status: Former     Current packs/day: 0.00     Average packs/day: 1 pack/day for 33.2 years (33.2 ttl pk-yrs)     Types: Cigarettes     Start date: 1979     Quit date: 3/3/2012     Years since quittin.2     Passive exposure: Past    Smokeless tobacco: Never   Substance Use Topics    Alcohol use: Not Currently     Comment: rare    Drug use: No     Review of Systems   Constitutional:  Negative for chills, diaphoresis, fatigue and fever.   HENT:  Negative for congestion, sore throat and trouble swallowing.    Respiratory:  Negative for cough and shortness of breath.    Cardiovascular:  Negative for chest pain and palpitations.   Gastrointestinal:  Negative for abdominal pain, blood in stool, constipation, diarrhea, nausea and vomiting.   Musculoskeletal:  Negative for back pain and myalgias.   Skin:  Negative for rash and wound.        + Vascular access malfunction   Neurological:  Negative for weakness, light-headedness, numbness and headaches.       Physical Exam     Initial Vitals [24 1341]   BP Pulse Resp Temp SpO2   (!) 90/56 61 18 98.2 °F (36.8 °C) (!) 89 %      MAP        --         Physical Exam    Nursing note and vitals reviewed.  Constitutional: He appears well-developed and well-nourished. He is not diaphoretic. No distress.   Patient frail-appearing.  Awake and alert.  No acute distress.  Maintaining airway appropriately.  Speaking in complete sentences.  Nasal cannula in place.   HENT:   Head: Normocephalic and atraumatic.   Right Ear: External ear normal.   Left Ear: External ear normal.   Eyes: Conjunctivae and EOM are normal.   Neck: Neck supple.   Normal range of motion.  Pulmonary/Chest: No respiratory distress.     Abdominal: Abdomen is soft. He exhibits no distension. There is no abdominal tenderness.   PD catheter in place.  No surrounding induration or erythema. There is no rebound and no guarding.   Musculoskeletal:         General: No tenderness or edema. Normal range of motion.      Cervical back: Normal range of motion and neck supple.     Neurological: He is alert and oriented to person, place, and time. He has normal strength.   Skin: Skin is warm. Capillary refill takes less than 2 seconds.   Psychiatric: He has a normal mood and affect. His behavior is normal. Thought content normal.         ED Course   Procedures  Labs Reviewed   CBC W/ AUTO DIFFERENTIAL - Abnormal; Notable for the following components:       Result Value    Hemoglobin 12.6 (*)     Hematocrit 36.2 (*)     MCV 76 (*)     MCH 26.4 (*)     RDW 17.7 (*)     Immature Granulocytes 0.7 (*)     Immature Grans (Abs) 0.06 (*)     Lymph # 0.8 (*)     Gran % 75.8 (*)     Lymph % 9.5 (*)     All other components within normal limits   COMPREHENSIVE METABOLIC PANEL - Abnormal; Notable for the following components:    BUN 56 (*)     Creatinine 5.6 (*)     Albumin 3.2 (*)     Total Bilirubin 1.9 (*)     Alkaline Phosphatase 158 (*)     AST 56 (*)     eGFR 11 (*)     All other components within normal limits   MAGNESIUM   PHOSPHORUS   POCT GLUCOSE          Imaging Results              X-Ray Chest AP  Portable (Final result)  Result time 06/04/24 14:57:16      Final result by Alberto Ibarra MD (06/04/24 14:57:16)                   Impression:      Interval removal of the previous right-sided central access catheter.  No residual catheter fragment remain.    Improved aeration of the lung fields.      Electronically signed by: Alberto Ibarra MD  Date:    06/04/2024  Time:    14:57               Narrative:    EXAMINATION:  XR CHEST AP PORTABLE    CLINICAL HISTORY:  Other specified health status    TECHNIQUE:  Single frontal view of the chest was performed.    COMPARISON:  05/09/2024.    FINDINGS:  There has been interval removal of the previous right-sided central access catheter.  The left-sided AICD remains in stable position.    The trachea is unremarkable.  There are calcifications of the aortic knob.  There is unchanged enlargement of the cardiac silhouette.  There is no evidence of free air beneath the hemidiaphragms.  There are stable trace bilateral pleural effusions with left greater than right.  There is no evidence of a pneumothorax.  There is no evidence of pneumomediastinum.  There is decrease in the bilateral pulmonary interstitial opacities.  There is no new airspace disease.  There are degenerative changes in the osseous structures.                                       Medications   sodium chloride 0.9% flush 10 mL (has no administration in time range)   naloxone 0.4 mg/mL injection 0.02 mg (has no administration in time range)   glucose chewable tablet 16 g (has no administration in time range)   glucose chewable tablet 24 g (has no administration in time range)   glucagon (human recombinant) injection 1 mg (has no administration in time range)   dextrose 10% bolus 125 mL 125 mL (has no administration in time range)   dextrose 10% bolus 250 mL 250 mL (has no administration in time range)   acetaminophen tablet 500 mg (has no administration in time range)   allopurinoL tablet 100 mg (has no  administration in time range)   amiodarone tablet 400 mg (has no administration in time range)   aspirin EC tablet 81 mg (has no administration in time range)   furosemide tablet 80 mg (has no administration in time range)   levothyroxine tablet 150 mcg (has no administration in time range)   mexiletine capsule 150 mg (has no administration in time range)   midodrine tablet 15 mg (has no administration in time range)   mirtazapine tablet 7.5 mg (has no administration in time range)   pantoprazole EC tablet 40 mg (has no administration in time range)   polyethylene glycol packet 17 g (has no administration in time range)   pravastatin tablet 40 mg (has no administration in time range)   vitamin renal formula (B-complex-vitamin c-folic acid) 1 mg per capsule 1 capsule (has no administration in time range)     Medical Decision Making  Patient presents to emergency room for HD catheter dislodgement.  Vital signs stable and within normal limits.  Physical exam as stated above.    Differential diagnosis includes but is not limited to cellulitis, vascular access dislodgement, vascular access malfunction, among others.  Chest x-ray confirming dislodgement.  Lab work relatively unremarkable.  Discussed patient with general surgery, Dr. Perez, who performed surgery on 05/03/2024.  He does not believe patient warrants hospitalization for intervention at this time.  However, patient is unable to receive peritoneal dialysis at this time.  Hemodialysis catheter we will need to be replaced.    All available findings were reviewed with the patient/family in detail along with the indications for hospitalization in order to receive SURGICAL evaluation.  All remaining questions and concerns were addressed at this time and the patient/family communicates understanding and agrees to proceed accordingly.  Similarly, all pertinent details of the encounter were discussed with Dr. Espinoza, [facility], who agrees to receive the  patient at [facility] for further care as outlined above.     Problems Addressed:  Hemodialysis catheter dysfunction, initial encounter: acute illness or injury  Hypoxia: chronic illness or injury  Problem with vascular access: acute illness or injury    Amount and/or Complexity of Data Reviewed  External Data Reviewed: notes.     Details: See workup tab for procedure note.   Labs: ordered. Decision-making details documented in ED Course.  Radiology: ordered. Decision-making details documented in ED Course.  ECG/medicine tests: ordered. Decision-making details documented in ED Course.    Risk  Decision regarding hospitalization.  Risk Details: Comorbidities taken into consideration during the patient's evaluation and treatment include cardiomyopathy, CKD stage 4, congestive heart failure, COPD, CAD, hypertension, HLD, hyperparathyroidism, thyroid disease, and vitamin-D deficiency.    Social determinants of health taken into consideration during development of our treatment plan include difficulty in obtaining follow-up, obtaining medications, health literacy, access to healthy options for preventative/conservative management, and/or support systems due to, but not limited to, transportation limitations, socioeconomic status, and environmental factors.                ED Course as of 06/04/24 1937 Tue Jun 04, 2024   1426 POCT glucose  Within normal limits. [BJ]   1509 Per chart review, patient had hemodialysis catheter placed by general surgery, Dr. Perez, on 05/03/24.  [BJ]   1509 CBC auto differential(!)  CBC relatively unremarkable.  No increase in white blood cells.  H/H 12.6/36.2.  Microcytic anemia which is seen on previous lab work.  Platelet count within normal limits. [BJ]   1814 Comprehensive metabolic panel(!)  CMP relatively unremarkable.  Electrolytes within normal limits.  BUN and creatinine elevated to 56 and 5.6 respectively.  Slightly elevated from baseline.  LFTs slightly elevated with a  bilirubin of 1.9. [BJ]   1815 Magnesium  Magnesium within normal limits. [BJ]   1815 Phosphorus  Phosphorus within normal limits. [BJ]   1817 X-Ray Chest AP Portable  Impression:     Interval removal of the previous right-sided central access catheter.  No residual catheter fragment remain.     Improved aeration of the lung fields. [BJ]   1826 Message left with Dr. Perez.  [BJ]   1848 DATE OF PROCEDURE:  05/03/2024     PREOPERATIVE DIAGNOSES:   Acute on chronic renal failure.     POSTOPERATIVE DIAGNOSES:  Acute on chronic renal failure.     PROCEDURE:   1. Insertion of right internal jugular vein permanent dialysis   catheter using ultrasound and flouroscopic guidance.  2.   Removal of temporary right internal jugular dialysis catheter      SURGEON:  Philip Perez M.D.     ASSISTANT:  none     ANESTHESIA:  MAC.     PREP:  Chlorhexidine.     IMPLANT: 16F 23cm double lumen permanent dialysis catheter [BJ]   1848 Discussed patient with Dr. Perez, general surgery, who does not believe that patient warrants replacement of HD catheter at this time. Recommending patient follow up with nephrology outpatient.  [BJ]   1849 Discussed PD with patient who states that he is unable to do it at this time due to nursing shortage. He is using HD this week. Will need replacement.  [BJ]   1851 Discussed patient with [facility], Dr. Espinoza, who will accept patient for HD replacement tomorrow and possible dialysis.  [BJ]   1855 EKG 12-lead  Independent interpretation of EKG with sinus rhythm at 60 beats per minute.  Paced rhythm.  Evidence of right bundle-branch block.  No ST elevations. [BJ]      ED Course User Index  [BJ] Alivia Barahona PA-C                           Clinical Impression:  Final diagnoses:  [Z78.9] Problem with vascular access  [R09.02] Hypoxia          ED Disposition Condition    Observation                This note was partially created using iodine Voice Recognition software. Typographical and  content errors may occur with this process. While efforts are made to detect and correct such errors, in some cases errors will persist. For this reason, wording in this document should be considered in the proper context and not strictly verbatim.        Alivia Barahona PA-C  06/04/24 1938

## 2024-06-05 NOTE — ASSESSMENT & PLAN NOTE
63 yoM s/p PD catheter placement 1 month ago whose tunneled HD catheter was inadvertently removed following last HD session on 6/3. While he is undergoing PD catheter training, he still requires HD. Now s/p tunneled HD line replacement via the R IJ on 6/6.    - Ok for diet  - HD today prior to discharge  - Please call with any questions   no

## 2024-06-05 NOTE — CONSULTS
Nephrology Consult  H&P      Consult Requested By: Rigoberto Massey,*  Reason for Consult: ESRD      SUBJECTIVE:     History of Present Illness:  Ar Sharma is a 63 y.o.   male who  has a past medical history of Cardiomyopathy, CKD (chronic kidney disease) stage 4, GFR 15-29 ml/min, Congestive heart failure (CHF) (2015), COPD (chronic obstructive pulmonary disease), Coronary artery disease, Edema, Essential (primary) hypertension (05/18/2022), Heart attack, HLD (hyperlipidemia), Hypertension, Hyperuricemia, Hypocalcemia, Renal cyst, left, Secondary hyperparathyroidism, Thyroid disease, V tach, and Vitamin D deficiency.. The patient presented to the [facility] on 6/4/2024 with a primary complaint of CVC catheter dislodged. Patient was receiving HD MWF at [facility] follows with me till get PD training complete, unfortunately he did not complete his training and need new Permcath to resume Hemodialysis.   ?    Review of Systems   Constitutional:  Negative for chills and fever.   HENT:  Negative for congestion and sore throat.    Eyes:  Negative for blurred vision, double vision and photophobia.   Respiratory:  Positive for shortness of breath (chronic on  O2 at home 3L). Negative for cough.    Cardiovascular:  Negative for chest pain, palpitations and leg swelling.   Gastrointestinal:  Negative for abdominal pain, diarrhea, nausea and vomiting.   Genitourinary:  Negative for dysuria and urgency.   Musculoskeletal:  Negative for joint pain and myalgias.   Skin:  Negative for itching and rash.   Neurological:  Negative for dizziness, sensory change, weakness and headaches.   Endo/Heme/Allergies:  Negative for polydipsia. Does not bruise/bleed easily.   Psychiatric/Behavioral:  Negative for depression.        Past Medical History:   Diagnosis Date    Cardiomyopathy     CKD (chronic kidney disease) stage 4, GFR 15-29 ml/min     Congestive heart failure (CHF) 2015    COPD (chronic obstructive  pulmonary disease)     Coronary artery disease     Edema     Essential (primary) hypertension 05/18/2022    Formatting of this note might be different from the original. Converted from Centricity: Description - ESSENTIAL HYPERTENSION, BENIGN    Heart attack     HLD (hyperlipidemia)     Hypertension     Hyperuricemia     Hypocalcemia     Renal cyst, left     Secondary hyperparathyroidism     Thyroid disease     V tach     Vitamin D deficiency      Past Surgical History:   Procedure Laterality Date    ablations  03/05/2018    albations  02/01/2018    COLONOSCOPY N/A 12/07/2018    Procedure: COLONOSCOPY/suprep;  Surgeon: Ananya Morillo MD;  Location: [facility] ENDO;  Service: Endoscopy;  Laterality: N/A;    defibulater N/A 2016    ESOPHAGOGASTRODUODENOSCOPY N/A 12/07/2018    Procedure: EGD (ESOPHAGOGASTRODUODENOSCOPY);  Surgeon: Ananya Morillo MD;  Location: [facility] ENDO;  Service: Endoscopy;  Laterality: N/A;    INSERTION OF TUNNELED CENTRAL VENOUS HEMODIALYSIS CATHETER Right 5/3/2024    Procedure: INSERTION, CATHETER, HEMODIALYSIS, DUAL LUMEN;  Surgeon: Philip Perez MD;  Location: [facility] OR;  Service: General;  Laterality: Right;    INSERTION, CATHETER, DIALYSIS, PERITONEAL, LAPAROSCOPIC N/A 5/8/2024    Procedure: INSERTION, CATHETER, DIALYSIS, PERITONEAL, LAPAROSCOPIC;  Surgeon: Tyree Ruiz MD;  Location: [facility] OR;  Service: General;  Laterality: N/A;    JOINT REPLACEMENT Bilateral 10/2016    knees, bilat    LEFT HEART CATHETERIZATION N/A 12/16/2020    Procedure: Left heart cath;  Surgeon: Shahram Stewart MD;  Location: [facility] CATH LAB/EP;  Service: Cardiology;  Laterality: N/A;    LEFT HEART CATHETERIZATION Left 9/27/2022    Procedure: Left heart cath;  Surgeon: Juan Roque MD;  Location: [facility] CATH LAB/EP;  Service: Cardiology;  Laterality: Left;    [place] HEMODIALYSIS, ONE EVALUATION  5/6/2024    REMOVAL OF HEMODIALYSIS CATHETER Right 5/3/2024    Procedure: REMOVAL, CATHETER, HEMODIALYSIS;  Surgeon:  "Philip Perez MD;  Location: [facility] OR;  Service: General;  Laterality: Right;    REPLACEMENT OF IMPLANTABLE CARDIOVERTER-DEFIBRILLATOR (ICD) GENERATOR Left 2023    Procedure: REPLACEMENT, ICD GENERATOR;  Surgeon: River Tenorio MD;  Location: [facility] EP LAB;  Service: Cardiology;  Laterality: Left;  ANTHONY, CRTD gen chg, MDT, MAC, DM, 3prep     Family History   Problem Relation Name Age of Onset    Cancer Mother Sharon Love     Hypertension Mother Sharon Love     Stroke Mother Sharon Love     Breast cancer Mother Sharon Love     Alcohol abuse Father Mitul Love     Kidney disease Father Mitul Love     Arthritis Sister x2     No Known Problems Brother x1     No Known Problems Daughter x1     No Known Problems Son x2      Social History     Tobacco Use    Smoking status: Former     Current packs/day: 0.00     Average packs/day: 1 pack/day for 33.2 years (33.2 ttl pk-yrs)     Types: Cigarettes     Start date: 1979     Quit date: 3/3/2012     Years since quittin.2     Passive exposure: Past    Smokeless tobacco: Never   Substance Use Topics    Alcohol use: Not Currently     Comment: rare    Drug use: No       Review of patient's allergies indicates:   Allergen Reactions    Lokelma [sodium zirconium cyclosilicate] Other (See Comments)     Fluid retention, weight gain, CHF excerebration, severe constipation    Atorvastatin Other (See Comments)     Joint pain    Jardiance [empagliflozin] Other (See Comments)     Chest pains, significant weight loss, lower blood pressure            OBJECTIVE:     Vital Signs (Most Recent)  Vitals:    24 0409 24 0439 24 0725 24 0808   BP:  101/65  122/81   BP Location:  Right arm  Left arm   Patient Position:  Lying  Lying   Pulse: (!) 59 60  62   Resp:  18  20   Temp:  97.6 °F (36.4 °C)  98.4 °F (36.9 °C)   TempSrc:  Oral  Oral   SpO2:  95%  96%   Weight:   70.8 kg (156 lb 1.4 oz)    Height:   5' 9" (1.753 m)                  Medications:   allopurinoL  100 mg " Oral Daily    amiodarone  400 mg Oral Daily    aspirin  81 mg Oral Daily    furosemide  80 mg Oral BID    heparin (porcine)  5,000 Units Subcutaneous Q8H    levothyroxine  150 mcg Oral Before breakfast    mexiletine  150 mg Oral BID WM    midodrine  15 mg Oral Q8H    mirtazapine  7.5 mg Oral QHS    pantoprazole  40 mg Oral Daily    polyethylene glycol  17 g Oral Daily    pravastatin  40 mg Oral QHS    vitamin renal formula (B-complex-vitamin c-folic acid)  1 capsule Oral Daily           Physical Exam  Vitals and nursing note reviewed.   Constitutional:       General: He is not in acute distress.     Appearance: He is not diaphoretic.   HENT:      Head: Normocephalic and atraumatic.      Mouth/Throat:      Pharynx: No oropharyngeal exudate.   Eyes:      General: No scleral icterus.     Conjunctiva/sclera: Conjunctivae normal.      Pupils: Pupils are equal, round, and reactive to light.   Cardiovascular:      Rate and Rhythm: Normal rate and regular rhythm.      Heart sounds: Normal heart sounds. No murmur heard.  Pulmonary:      Effort: Pulmonary effort is normal. No respiratory distress.      Breath sounds: Normal breath sounds.      Comments: O2 NC   Abdominal:      General: Bowel sounds are normal. There is no distension.      Palpations: Abdomen is soft.      Tenderness: There is no abdominal tenderness.      Comments: PD catheter preset    Musculoskeletal:         General: No swelling. Normal range of motion.      Cervical back: Normal range of motion and neck supple.      Right lower leg: No edema.      Left lower leg: No edema.   Skin:     General: Skin is warm and dry.      Findings: No erythema.   Neurological:      Mental Status: He is alert and oriented to person, place, and time.      Cranial Nerves: No cranial nerve deficit.   Psychiatric:         Mood and Affect: Affect normal.         Cognition and Memory: Memory normal.         Judgment: Judgment normal.         Laboratory:  Recent Labs   Lab  06/04/24  1440   WBC 8.35   HGB 12.6*   HCT 36.2*      MONO 10.1  0.8   EOSINOPHIL 2.6     Recent Labs   Lab 06/04/24  1440      K 4.2   CL 95   CO2 29   BUN 56*   CREATININE 5.6*   CALCIUM 9.2   PHOS 3.8       Diagnostic Results:  X-Ray: Reviewed  US: Reviewed  Echo: Reviewed  ASSESSMENT/PLAN:      ESRD MWF at [facility] via Permcath   -- CVC catheter dislodged. Patient was receiving HD MWF   follows with me till get PD training complete, unfortunately he did not complete his training and need new Permcath to resume Hemodialysis.   -- Discussed with Gen Surgery -- appreciate assistance   HD after Permcath today vs tomorrow labs acceptable no volume issues   -- Continue Lasix 80 PO BID to keep residual function   -- Daily Renal Function Panel  -- Renally dose all meds  -- Flush PD cath weekly        Hypotension    -  controlled with Midodrine      Anemia of chronic kidney disease treated with AWAIS    -- No need for EPogen   at the moment   Recent Labs   Lab 06/04/24  1440   HGB 12.6*   HCT 36.2*          Iron - check levels   Lab Results   Component Value Date    IRON 109 10/07/2022    TIBC 394 10/07/2022    FERRITIN 676 (H) 10/07/2022        MBD      Recent Labs   Lab 06/04/24  1440   CALCIUM 9.2   PHOS 3.8     Recent Labs   Lab 06/04/24  1440   MG 1.9     -- PTH at goal   Lab Results   Component Value Date    .6 (H) 04/29/2024    CALCIUM 9.2 06/04/2024    PHOS 3.8 06/04/2024     Lab Results   Component Value Date    [id number] 24 (L) 04/29/2024     Acidosis   -- At goal   Lab Results   Component Value Date    CO2 29 06/04/2024      Nutrition/Hypoalbuminemia   Recent Labs   Lab 06/04/24  1440   ALBUMIN 3.2*     Nepro with meals TID. Renal vitamins daily      Thank you for allowing me to participate in care of your patient  With any question please call   Taylor Phillips MD     [facility]  ELISHA Mata MD,   OMD CORINNE Mosley MD E. V. Harmon, NP  [id number]   [place], [address]  [phone]  After hours answering service: [phone]

## 2024-06-05 NOTE — TRANSFER OF CARE
"Anesthesia Transfer of Care Note    Patient: Ar Sharma    Procedure(s) Performed: Procedure(s) (LRB):  Insertion,central venous access device (Right)    Patient location: PACU    Anesthesia Type: general    Transport from OR: Transported from OR on 6-10 L/min O2 by face mask with adequate spontaneous ventilation    Post pain: adequate analgesia    Post assessment: no apparent anesthetic complications and tolerated procedure well    Post vital signs: stable    Level of consciousness: awake    Nausea/Vomiting: no nausea/vomiting    Complications: none    Transfer of care protocol was followed      Last vitals: Visit Vitals  /66 (BP Location: Left arm, Patient Position: Lying)   Pulse 64   Temp 36.9 °C (98.4 °F) (Oral)   Resp 18   Ht 5' 9" (1.753 m)   Wt 70.8 kg (156 lb 1.4 oz)   SpO2 97%   BMI 23.05 kg/m²     "

## 2024-06-05 NOTE — CONSULTS
[facility] - Telemetry  General Surgery  Consult Note    Patient Name: Ar Sharma  MRN: [id number]  Code Status: Full Code  Admission Date: 6/4/2024  Hospital Length of Stay: 0 days  Attending Physician: Rigoberto Massey,*  Primary Care Provider: Jc Elliott MD    Patient information was obtained from patient, past medical records, and ER records.     Inpatient consult to General Surgery  Consult performed by: Colin Mosquera MD  Consult ordered by: Roberto Espinoza MD        Subjective:     Principal Problem: Problem with vascular access    History of Present Illness: Mr. Sharma is a pleasant 63yoM with PMH including ESRD who is s/p tunneled HD line placement with Dr. Perez on 5/3/24, and PD cath placement with Dr. Ruiz on 5/8/24. He has been finishing his PD cath training at home and is set to transition to PD soon, but has been still receiving HD MWF. His HD catheter was inadvertently removed at home following his last session on 6/3, leading to his presentation to the hospital. General surgery consulted for consideration of replacement of the tunneled HD line. He is otherwise feeling well with no other clinical change since last hospitalization.    No current facility-administered medications on file prior to encounter.     Current Outpatient Medications on File Prior to Encounter   Medication Sig    acetaminophen (TYLENOL) 500 MG tablet Take 500 mg by mouth every 6 (six) hours as needed for Pain.    allopurinoL (ZYLOPRIM) 100 MG tablet Take 1 tablet (100 mg total) by mouth once daily.    amiodarone (PACERONE) 400 MG tablet Take 1 tablet (400 mg total) by mouth once daily.    aspirin (ECOTRIN) 81 MG EC tablet Take 1 tablet (81 mg total) by mouth once daily.    ergocalciferol (ERGOCALCIFEROL) 50,000 unit Cap TAKE 1 CAPSULE BY MOUTH ONE TIME PER WEEK (Patient taking differently: Take 50,000 Units by mouth every Monday.)    furosemide (LASIX) 80 MG tablet Take 1 tablet (80 mg total) by mouth 2  (two) times daily. If increased 3 pounds in a day or 5 pounds in a week, take an extra dose of lasix 80mg until those pounds are lost. (Patient taking differently: Take 360 mg by mouth 2 (two) times daily.)    levothyroxine (SYNTHROID) 150 MCG tablet Take 1 tablet (150 mcg total) by mouth once daily.    metOLazone (ZAROXOLYN) 10 MG tablet Take 10 mg by mouth As instructed (Take I tablet by mouth Tuesday, Thursday, Saturday, Sunday). Take I tablet by mouth Tuesday, Thursday, Saturday, Sunday    mexiletine (MEXITIL) 150 MG Cap Take 1 capsule (150 mg total) by mouth 2 (two) times daily with meals.    midodrine (PROAMATINE) 5 MG Tab Take 3 tablets (15 mg total) by mouth every 8 (eight) hours.    mirtazapine (REMERON) 7.5 MG Tab Take 1 tablet (7.5 mg total) by mouth every evening. (Patient taking differently: Take 7.5 mg by mouth daily as needed.)    pantoprazole (PROTONIX) 40 MG tablet Take 1 tablet (40 mg total) by mouth once daily.    polyethylene glycol (GLYCOLAX) 17 gram PwPk Take 17 g by mouth once daily.    pravastatin (PRAVACHOL) 40 MG tablet TAKE 1 TABLET BY MOUTH EVERY DAY (Patient taking differently: Take 40 mg by mouth every evening.)    nitroGLYCERIN (NITROSTAT) 0.4 MG SL tablet Place 1 tablet (0.4 mg total) under the tongue every 5 (five) minutes as needed for Chest pain.       Review of patient's allergies indicates:   Allergen Reactions    Lokelma [sodium zirconium cyclosilicate] Other (See Comments)     Fluid retention, weight gain, CHF excerebration, severe constipation    Atorvastatin Other (See Comments)     Joint pain    Jardiance [empagliflozin] Other (See Comments)     Chest pains, significant weight loss, lower blood pressure       Past Medical History:   Diagnosis Date    Cardiomyopathy     CKD (chronic kidney disease) stage 4, GFR 15-29 ml/min     Congestive heart failure (CHF) 2015    COPD (chronic obstructive pulmonary disease)     Coronary artery disease     Edema     Essential (primary)  hypertension 05/18/2022    Formatting of this note might be different from the original. Converted from Centricity: Description - ESSENTIAL HYPERTENSION, BENIGN    Heart attack     HLD (hyperlipidemia)     Hypertension     Hyperuricemia     Hypocalcemia     Renal cyst, left     Secondary hyperparathyroidism     Thyroid disease     V tach     Vitamin D deficiency      Past Surgical History:   Procedure Laterality Date    ablations  03/05/2018    albations  02/01/2018    COLONOSCOPY N/A 12/07/2018    Procedure: COLONOSCOPY/suprep;  Surgeon: Ananya Morillo MD;  Location: [facility] ENDO;  Service: Endoscopy;  Laterality: N/A;    defibulater N/A 2016    ESOPHAGOGASTRODUODENOSCOPY N/A 12/07/2018    Procedure: EGD (ESOPHAGOGASTRODUODENOSCOPY);  Surgeon: Ananya Morillo MD;  Location: [facility] ENDO;  Service: Endoscopy;  Laterality: N/A;    INSERTION OF TUNNELED CENTRAL VENOUS HEMODIALYSIS CATHETER Right 5/3/2024    Procedure: INSERTION, CATHETER, HEMODIALYSIS, DUAL LUMEN;  Surgeon: Philip Perez MD;  Location: [facility];  Service: General;  Laterality: Right;    INSERTION, CATHETER, DIALYSIS, PERITONEAL, LAPAROSCOPIC N/A 5/8/2024    Procedure: INSERTION, CATHETER, DIALYSIS, PERITONEAL, LAPAROSCOPIC;  Surgeon: Tyree Ruiz MD;  Location: [facility] OR;  Service: General;  Laterality: N/A;    JOINT REPLACEMENT Bilateral 10/2016    knees, bilat    LEFT HEART CATHETERIZATION N/A 12/16/2020    Procedure: Left heart cath;  Surgeon: Shahram Stewart MD;  Location: [facility] CATH LAB/EP;  Service: Cardiology;  Laterality: N/A;    LEFT HEART CATHETERIZATION Left 9/27/2022    Procedure: Left heart cath;  Surgeon: Juan Roque MD;  Location: [facility] CATH LAB/EP;  Service: Cardiology;  Laterality: Left;    [place] HEMODIALYSIS, ONE EVALUATION  5/6/2024    REMOVAL OF HEMODIALYSIS CATHETER Right 5/3/2024    Procedure: REMOVAL, CATHETER, HEMODIALYSIS;  Surgeon: Philip Perez MD;  Location: [facility] OR;  Service: General;  Laterality: Right;     REPLACEMENT OF IMPLANTABLE CARDIOVERTER-DEFIBRILLATOR (ICD) GENERATOR Left 2023    Procedure: REPLACEMENT, ICD GENERATOR;  Surgeon: River Tenorio MD;  Location: [facility];  Service: Cardiology;  Laterality: Left;  ANTHONY, CRTD gen chg, MDT, MAC, DM, 3prep     Family History       Problem Relation (Age of Onset)    Alcohol abuse Father    Arthritis Sister    Breast cancer Mother    Cancer Mother    Hypertension Mother    Kidney disease Father    No Known Problems Brother, Daughter, Son    Stroke Mother          Tobacco Use    Smoking status: Former     Current packs/day: 0.00     Average packs/day: 1 pack/day for 33.2 years (33.2 ttl pk-yrs)     Types: Cigarettes     Start date: 1979     Quit date: 3/3/2012     Years since quittin.2     Passive exposure: Past    Smokeless tobacco: Never   Substance and Sexual Activity    Alcohol use: Not Currently     Comment: rare    Drug use: No    Sexual activity: Yes     Partners: Female     Review of Systems   Constitutional:  Negative for chills and fever.   Respiratory:  Negative for cough and shortness of breath.    Cardiovascular:  Negative for chest pain.   Gastrointestinal:  Negative for abdominal pain, constipation, diarrhea, nausea and vomiting.   Genitourinary:  Negative for dysuria.   Neurological:  Negative for dizziness and light-headedness.     Objective:     Vital Signs (Most Recent):  Temp: 98.4 °F (36.9 °C) (24 0808)  Pulse: 62 (24 0808)  Resp: 20 (24 0808)  BP: 122/81 (24 0808)  SpO2: 96 % (24 0808) Vital Signs (24h Range):  Temp:  [97.6 °F (36.4 °C)-98.4 °F (36.9 °C)] 98.4 °F (36.9 °C)  Pulse:  [59-67] 62  Resp:  [16-20] 20  SpO2:  [89 %-97 %] 96 %  BP: ()/(56-81) 122/81     Weight: 70.8 kg (156 lb 1.4 oz)  Body mass index is 23.05 kg/m².     Physical Exam  Vitals reviewed.   Constitutional:       Appearance: Normal appearance.   Cardiovascular:      Rate and Rhythm: Normal rate and regular rhythm.    Pulmonary:      Effort: Pulmonary effort is normal. No respiratory distress.   Abdominal:      Palpations: Abdomen is soft.      Tenderness: There is no abdominal tenderness.      Comments: PD catheter in place   Skin:     General: Skin is warm.   Neurological:      General: No focal deficit present.      Mental Status: He is alert and oriented to person, place, and time.            I have reviewed all pertinent lab results within the past 24 hours.  CBC:   Recent Labs   Lab 06/04/24  1440   WBC 8.35   RBC 4.78   HGB 12.6*   HCT 36.2*      MCV 76*   MCH 26.4*   MCHC 34.8     CMP:   Recent Labs   Lab 06/04/24  1440   GLU 89   CALCIUM 9.2   ALBUMIN 3.2*   PROT 7.1      K 4.2   CO2 29   CL 95   BUN 56*   CREATININE 5.6*   ALKPHOS 158*   ALT 27   AST 56*   BILITOT 1.9*       Significant Diagnostics:  I have reviewed all pertinent imaging results/findings within the past 24 hours.    Assessment/Plan:     ESRD (end stage renal disease)  63 yoM s/p PD catheter placement 1 month ago whose tunneled HD catheter was inadvertently removed following last HD session on 6/3. While he is undergoing PD catheter training, he still requires HD.    - Keep NPO  - Consent in the paper chart  - We will perform right, possible left tunneled HD line placement today  - Please call with any questions      VTE Risk Mitigation (From admission, onward)           Ordered     heparin (porcine) injection 5,000 Units  Every 8 hours         06/05/24 0040     IP VTE HIGH RISK PATIENT  Once         06/04/24 1856     Place sequential compression device  Until discontinued         06/04/24 1856                    Thank you for your consult. I will sign off. Please contact us if you have any additional questions.    Colin Mosquera MD  General Surgery  [place] [facility]

## 2024-06-05 NOTE — SUBJECTIVE & OBJECTIVE
Interval History: Nursing notes reviewed and no acute events overnight. VSS. SpO2 96% on 3LNC. Pt w/ no complaints at this time. Surgery replaced right IJ HD cath, no significant electrolyte abnormalities or volume overload, HD today vs. Tomorrow. Nephrology on board.    Review of Systems   Constitutional:  Negative for chills and fever.   Respiratory:  Negative for cough and shortness of breath.    Cardiovascular:  Negative for chest pain.   Gastrointestinal:  Negative for abdominal pain, constipation, diarrhea, nausea and vomiting.   Genitourinary:  Negative for dysuria.   Neurological:  Negative for dizziness and light-headedness.     Objective:     Vital Signs (Most Recent):  Temp: 97.5 °F (36.4 °C) (06/05/24 1531)  Pulse: 60 (06/05/24 1531)  Resp: 18 (06/05/24 1531)  BP: 104/62 (06/05/24 1531)  SpO2: 96 % (06/05/24 1531) Vital Signs (24h Range):  Temp:  [97.3 °F (36.3 °C)-98.4 °F (36.9 °C)] 97.5 °F (36.4 °C)  Pulse:  [59-67] 60  Resp:  [13-20] 18  SpO2:  [89 %-97 %] 96 %  BP: ()/(60-81) 104/62     Weight: 70.8 kg (156 lb 1.4 oz)  Body mass index is 23.05 kg/m².    Intake/Output Summary (Last 24 hours) at 6/5/2024 1540  Last data filed at 6/5/2024 1514  Gross per 24 hour   Intake 150 ml   Output 600 ml   Net -450 ml         Physical Exam  Constitutional:       General: He is not in acute distress.     Appearance: He is well-developed.   HENT:      Head: Normocephalic and atraumatic.      Mouth/Throat:      Mouth: Mucous membranes are moist.   Eyes:      Extraocular Movements: Extraocular movements intact.   Neck:      Vascular: No JVD.   Cardiovascular:      Rate and Rhythm: Normal rate and regular rhythm.      Heart sounds: Normal heart sounds. No murmur heard.     No friction rub. No gallop.      Comments: Gauze noted where previous HD cath was.   Pulmonary:      Effort: Pulmonary effort is normal.      Breath sounds: Normal breath sounds. No wheezing or rales.      Comments: 3L NC  Abdominal:       General: Bowel sounds are normal.      Palpations: Abdomen is soft.      Tenderness: There is no abdominal tenderness.      Comments: Gauze present near PD cath site   Musculoskeletal:      Cervical back: Normal range of motion and neck supple.      Right lower leg: No edema.      Left lower leg: No edema.   Skin:     General: Skin is warm and dry.   Neurological:      Mental Status: He is alert and oriented to person, place, and time.             Significant Labs: All pertinent labs within the past 24 hours have been reviewed.    Significant Imaging: I have reviewed all pertinent imaging results/findings within the past 24 hours.

## 2024-06-05 NOTE — H&P
[facility] Medicine  History & Physical    Patient Name: Ar Sharma  MRN: [id number]  Patient Class: OP- Observation  Admission Date: 6/4/2024  Attending Physician: Roberto Espinoza MD  Primary Care Provider: Jc Elliott MD         Patient information was obtained from patient, spouse/SO, past medical records, and ER records.     Subjective:     Principal Problem:Problem with vascular access    Chief Complaint:   Chief Complaint   Patient presents with    Vascular Access Problem     Patient presents to ED from home for vascular access problem. Patient states this morning he noticed his vascular access to his R chest well had broken off. Patient states he was last dialyzed yesterday. Does dialysis MWF, but states he also has PD access. Denies chest pain or SOB. O2 sat 88% on RA, states he wears 3L NC @ home. Patient speaking in full sentences, does not appear to be in respiratory distress.         HPI: Mr. Ar Sharma is a a 63M with PMHx of ESRD (HD MWF and learning PD), HFrEF, HTN, NICM, COPD, VT s/p ICD placement, chronic home O2 3L and HLD who presented to [facility] ED stating his HD cath fell out. Currently getting HD MWF while he learns how to do PD. Last dialysis session 6/3/2024.  No acute complaints including shortness of breath, chest pain, nausea, vomiting, fever, chills, abdominal pain.    In the ED, cxry confirmed right HD cath removal w/ no fragment remaining.     Admitted to observation to have HD cath replaced.     Past Medical History:   Diagnosis Date    Cardiomyopathy     CKD (chronic kidney disease) stage 4, GFR 15-29 ml/min     Congestive heart failure (CHF) 2015    COPD (chronic obstructive pulmonary disease)     Coronary artery disease     Edema     Essential (primary) hypertension 05/18/2022    Formatting of this note might be different from the original. Converted from [employer]: Description - ESSENTIAL HYPERTENSION, BENIGN    Heart attack     HLD  (hyperlipidemia)     Hypertension     Hyperuricemia     Hypocalcemia     Renal cyst, left     Secondary hyperparathyroidism     Thyroid disease     V tach     Vitamin D deficiency        Past Surgical History:   Procedure Laterality Date    ablations  03/05/2018    albations  02/01/2018    COLONOSCOPY N/A 12/07/2018    Procedure: COLONOSCOPY/suprep;  Surgeon: Ananya Morillo MD;  Location: [facility] ENDO;  Service: Endoscopy;  Laterality: N/A;    defibulater N/A 2016    ESOPHAGOGASTRODUODENOSCOPY N/A 12/07/2018    Procedure: EGD (ESOPHAGOGASTRODUODENOSCOPY);  Surgeon: Ananya Morillo MD;  Location: [facility] ENDO;  Service: Endoscopy;  Laterality: N/A;    INSERTION OF TUNNELED CENTRAL VENOUS HEMODIALYSIS CATHETER Right 5/3/2024    Procedure: INSERTION, CATHETER, HEMODIALYSIS, DUAL LUMEN;  Surgeon: Philip Perez MD;  Location[facility];  Service: General;  Laterality: Right;    INSERTION, CATHETER, DIALYSIS, PERITONEAL, LAPAROSCOPIC N/A 5/8/2024    Procedure: INSERTION, CATHETER, DIALYSIS, PERITONEAL, LAPAROSCOPIC;  Surgeon: Tyree Ruiz MD;  Location: [facility] OR;  Service: General;  Laterality: N/A;    JOINT REPLACEMENT Bilateral 10/2016    knees, bilat    LEFT HEART CATHETERIZATION N/A 12/16/2020    Procedure: Left heart cath;  Surgeon: Shahram Stewart MD;  Location: [facility] CATH LAB/EP;  Service: Cardiology;  Laterality: N/A;    LEFT HEART CATHETERIZATION Left 9/27/2022    Procedure: Left heart cath;  Surgeon: Juan Roque MD;  Location: [facility] CATH LAB/EP;  Service: Cardiology;  Laterality: Left;    MO HEMODIALYSIS, ONE EVALUATION  5/6/2024    REMOVAL OF HEMODIALYSIS CATHETER Right 5/3/2024    Procedure: REMOVAL, CATHETER, HEMODIALYSIS;  Surgeon: Philip Perez MD;  Location: [facility] OR;  Service: General;  Laterality: Right;    REPLACEMENT OF IMPLANTABLE CARDIOVERTER-DEFIBRILLATOR (ICD) GENERATOR Left 1/24/2023    Procedure: REPLACEMENT, ICD GENERATOR;  Surgeon: River Tenorio MD;  Location: [facility] EP LAB;   Service: Cardiology;  Laterality: Left;  ANTHONY, CRTD gen chg, MDT, MAC, DM, 3prep       Review of patient's allergies indicates:   Allergen Reactions    Lokelma [sodium zirconium cyclosilicate] Other (See Comments)     Fluid retention, weight gain, CHF excerebration, severe constipation    Atorvastatin Other (See Comments)     Joint pain    Jardiance [empagliflozin] Other (See Comments)     Chest pains, significant weight loss, lower blood pressure       No current facility-administered medications on file prior to encounter.     Current Outpatient Medications on File Prior to Encounter   Medication Sig    acetaminophen (TYLENOL) 500 MG tablet Take 500 mg by mouth every 6 (six) hours as needed for Pain.    allopurinoL (ZYLOPRIM) 100 MG tablet Take 1 tablet (100 mg total) by mouth once daily.    amiodarone (PACERONE) 400 MG tablet Take 1 tablet (400 mg total) by mouth once daily.    aspirin (ECOTRIN) 81 MG EC tablet Take 1 tablet (81 mg total) by mouth once daily.    ergocalciferol (ERGOCALCIFEROL) 50,000 unit Cap TAKE 1 CAPSULE BY MOUTH ONE TIME PER WEEK (Patient taking differently: Take 50,000 Units by mouth every Monday.)    furosemide (LASIX) 80 MG tablet Take 1 tablet (80 mg total) by mouth 2 (two) times daily. If increased 3 pounds in a day or 5 pounds in a week, take an extra dose of lasix 80mg until those pounds are lost.    levothyroxine (SYNTHROID) 150 MCG tablet Take 1 tablet (150 mcg total) by mouth once daily.    mexiletine (MEXITIL) 150 MG Cap Take 1 capsule (150 mg total) by mouth 2 (two) times daily with meals.    midodrine (PROAMATINE) 5 MG Tab Take 3 tablets (15 mg total) by mouth every 8 (eight) hours.    mirtazapine (REMERON) 7.5 MG Tab Take 1 tablet (7.5 mg total) by mouth every evening.    nitroGLYCERIN (NITROSTAT) 0.4 MG SL tablet Place 1 tablet (0.4 mg total) under the tongue every 5 (five) minutes as needed for Chest pain.    pantoprazole (PROTONIX) 40 MG tablet Take 1 tablet (40 mg total) by  mouth once daily.    polyethylene glycol (GLYCOLAX) 17 gram PwPk Take 17 g by mouth once daily.    pravastatin (PRAVACHOL) 40 MG tablet TAKE 1 TABLET BY MOUTH EVERY DAY (Patient taking differently: Take 40 mg by mouth every evening.)    vitamin renal formula, B-complex-vitamin c-folic acid, (NEPHROCAP) 1 mg Cap Take 1 capsule by mouth once daily.     Family History       Problem Relation (Age of Onset)    Alcohol abuse Father    Arthritis Sister    Breast cancer Mother    Cancer Mother    Hypertension Mother    Kidney disease Father    No Known Problems Brother, Daughter, Son    Stroke Mother          Tobacco Use    Smoking status: Former     Current packs/day: 0.00     Average packs/day: 1 pack/day for 33.2 years (33.2 ttl pk-yrs)     Types: Cigarettes     Start date: 1979     Quit date: 3/3/2012     Years since quittin.2     Passive exposure: Past    Smokeless tobacco: Never   Substance and Sexual Activity    Alcohol use: Not Currently     Comment: rare    Drug use: No    Sexual activity: Yes     Partners: Female     Review of Systems 12 point ROS negative except for HPI  Objective:     Vital Signs (Most Recent):  Temp: 98 °F (36.7 °C) (24)  Pulse: 60 (24)  Resp: 16 (24)  BP: 115/74 (24)  SpO2: 97 % (24) Vital Signs (24h Range):  Temp:  [98 °F (36.7 °C)-98.2 °F (36.8 °C)] 98 °F (36.7 °C)  Pulse:  [60-67] 60  Resp:  [16-18] 16  SpO2:  [89 %-97 %] 97 %  BP: ()/(56-74) 115/74     Weight: 77.1 kg (170 lb)  Body mass index is 25.1 kg/m².     Physical Exam  Constitutional:       Appearance: He is well-developed.   Neck:      Vascular: No JVD.   Cardiovascular:      Rate and Rhythm: Normal rate and regular rhythm.      Heart sounds: No murmur heard.     No friction rub. No gallop.      Comments: Gauze noted where previous HD cath was.   Pulmonary:      Effort: Pulmonary effort is normal.      Breath sounds: Normal breath sounds. No wheezing or rales.       Comments: 3L NC  Abdominal:      General: Bowel sounds are normal.      Palpations: Abdomen is soft.      Tenderness: There is no abdominal tenderness.      Comments: Gauze present near PD cath site   Musculoskeletal:      Cervical back: Normal range of motion and neck supple.   Skin:     General: Skin is warm and dry.   Neurological:      Mental Status: He is alert and oriented to person, place, and time.                Significant Labs: All pertinent labs within the past 24 hours have been reviewed.    Significant Imaging: I have reviewed all pertinent imaging results/findings within the past 24 hours.  Assessment/Plan:     * Problem with vascular access  - Surgery consulted to replaced HD cath.     Hypotension  Continue midodrine      ESRD (end stage renal disease)  - Nephrology consulted to resume dialysis.     Hyperlipidemia  Continue pravastatin      Hypothyroidism  Continue levothyroxine.      V-tach  Continue amiodarone and mexiletine        VTE Risk Mitigation (From admission, onward)           Ordered     IP VTE HIGH RISK PATIENT  Once         06/04/24 1856     Place sequential compression device  Until discontinued         06/04/24 1856                       On 06/04/2024, patient should be placed in hospital observation services under my care.             Roberto Espinoza MD  Department of Hospital Medicine  [place] - Emergency Dept

## 2024-06-05 NOTE — ASSESSMENT & PLAN NOTE
63 yoM s/p PD catheter placement 1 month ago whose tunneled HD catheter was inadvertently removed following last HD session on 6/3. He is far enough out from PD catheter placement that he can transition to peritoneal dialysis now. He has already received several sessions of PD training at home and was scheduled to have his HD catheter removed.    - Nephrology consulted  - Recommend inpatient transition to PD without replacement of the HD line   - Recommend assessment of patient and patient's family ability to effectively perform PD prior to discharge  - Please call with any questions or change in status

## 2024-06-05 NOTE — HPI
Mr. Sharma is a pleasant 63yoM with PMH including ESRD who is s/p tunneled HD line placement with Dr. Perez on 5/3/24, and PD cath placement with Dr. Ruiz on 5/8/24. He has been finishing his PD cath training at home and is set to transition to PD soon, but has been still receiving HD MWF. His HD catheter was inadvertently removed at home following his last session on 6/3, leading to his presentation to the hospital. General surgery consulted for consideration of replacement of the tunneled HD line. He is otherwise feeling well with no other clinical change since last hospitalization.

## 2024-06-05 NOTE — PLAN OF CARE
CM met with pt and spouse at bedside to discuss discharge planning. He lives at home with his spouse. Drives self to dialysis at [facility] on M-W-F. Pt is independent with ADL's. Pt has 02, WC and a RW. He has HH services with [facility] . Upon discharge, pts family member Nicolette Sharma (spouse) [phone]  will provide transport home. Plan is for new HD access on tomorrow. Pt and spouse is in training for PD and supplies scheduled to be delivered on Friday at their home, to start home training. CM put name and number on white board. Pt made aware to contact CM with any questions or concerns.Cm will continue to follow and assist with any discharge needs.  Future Appointments   Date Time Provider Department Center   9/4/2024  9:40 AM SAME DAY LAB, [facility]   9/11/2024  2:00 PM Jc Elliott MD [facility] [place] - Telemetry  Initial Discharge Assessment       Primary Care Provider: Jc Elliott MD    Admission Diagnosis: Hypoxia [R09.02]  Chest pain [R07.9]  Hemodialysis catheter dysfunction, initial encounter [T82.41XA]  Problem with vascular access [Z78.9]    Admission Date: 6/4/2024  Expected Discharge Date:     Transition of Care Barriers: (P) None    Payor: [employer] MEDICARE ADVANTAGE / Plan: [employer] MCARE / Product Type: Medicare Advantage /     Extended Emergency Contact Information  Primary Emergency Contact: Nicolette Sharma  Mobile Phone: [phone]  Relation: Spouse  Preferred language: English   needed? No    Discharge Plan A: (P) Home, Home with family, Home Health         [id number] [place] - [address] [facility]  [address] [facility] [address]  Phone: [phone] Fax: [phone]    [id number] [place] - [address]  [address] [place] [address]  Phone: [phone] Fax: [phone]    [facility]  [address]  [address]  [place] [address]  Phone: [phone] Fax: [phone]      Initial Assessment (most recent)       Adult Discharge Assessment - 06/05/24 1149          Discharge Assessment    Assessment Type Discharge Planning Assessment     Confirmed/corrected address, phone number and insurance Yes     Confirmed Demographics Correct on Facesheet     Source of Information patient     When was your last doctors appointment? 05/16/24 (P)      Does patient/caregiver understand observation status Yes (P)      Communicated SLAVA with patient/caregiver Yes (P)      Reason For Admission Need new Vascular Access (P)      People in Home spouse (P)      Do you expect to return to your current living situation? Yes (P)      Do you have help at home or someone to help you manage your care at home? Yes (P)      Who are your caregiver(s) and their phone number(s)? Nicolette Sharma (spouse) [phone] (P)      Prior to hospitilization cognitive status: Alert/Oriented (P)      Current cognitive status: Alert/Oriented (P)      Walking or Climbing Stairs Difficulty no (P)      Dressing/Bathing Difficulty no (P)      Equipment Currently Used at Home oxygen;walker, rolling;wheelchair (P)      Readmission within 30 days? No (P)      Patient currently being followed by outpatient case management? Yes (P)      If yes, name of outpatient case management following: [facility] (P)      Do you currently have service(s) that help you manage your care at home? Yes (P)      Name and Contact number of agency [facility] [place] (P)      Is the pt/caregiver preference to resume services with current agency Yes (P)      Do you take prescription medications? Yes (P)      Do you have prescription coverage? Yes (P)      Coverage [employer] Medicare (P)      Do you have any problems affording any of your prescribed medications? No (P)      Is the patient taking medications as prescribed? yes (P)      Who is going to help you get home at  discharge? Nicolette Sharma (spouse) [phone] (P)      How do you get to doctors appointments? car, drives self (P)      Are you on dialysis? Yes (P)      Dialysis Name and Scheduled days [facility] [place] M-W-F (P)      Do you take coumadin? No (P)      Discharge Plan A Home;Home with family;Home Health (P)      DME Needed Upon Discharge  none (P)      Discharge Plan discussed with: Patient;Spouse/sig other (P)      Name(s) and Number(s) Nicolette Sharma (spouse) [phone] (P)      Transition of Care Barriers None (P)         Physical Activity    On average, how many days per week do you engage in moderate to strenuous exercise (like a brisk walk)? 2 days (P)      On average, how many minutes do you engage in exercise at this level? 10 min (P)         Financial Resource Strain    How hard is it for you to pay for the very basics like food, housing, medical care, and heating? Not hard at all (P)         Housing Stability    In the last 12 months, was there a time when you were not able to pay the mortgage or rent on time? No (P)      At any time in the past 12 months, were you homeless or living in a shelter (including now)? No (P)         Transportation Needs    Has the lack of transportation kept you from medical appointments, meetings, work or from getting things needed for daily living? No (P)         Food Insecurity    Within the past 12 months, you worried that your food would run out before you got the money to buy more. Never true (P)      Within the past 12 months, the food you bought just didn't last and you didn't have money to get more. Never true (P)         Stress    Do you feel stress - tense, restless, nervous, or anxious, or unable to sleep at night because your mind is troubled all the time - these days? To some extent (P)         Social Isolation    How often do you feel lonely or isolated from those around you?  Never (P)         Alcohol Use    Q1: How often do you have a drink containing alcohol?  Never (P)      Q2: How many drinks containing alcohol do you have on a typical day when you are drinking? Patient does not drink (P)      Q3: How often do you have six or more drinks on one occasion? Never (P)         Utilities    In the past 12 months has the electric, gas, oil, or water company threatened to shut off services in your home? No (P)         Health Literacy    How often do you need to have someone help you when you read instructions, pamphlets, or other written material from your doctor or pharmacy? Never (P)         OTHER    Name(s) of People in Home Nicolette Sharma (spouse) [phone] (P)

## 2024-06-05 NOTE — OP NOTE
DATE OF PROCEDURE:  06/05/2024    PREOPERATIVE DIAGNOSES:   Acute on chronic renal failure.    POSTOPERATIVE DIAGNOSES:  Acute on chronic renal failure.    PROCEDURE:  Insertion of right internal jugular vein permanent dialysis   Catheter using ultrasound and flouroscopic guidance.    SURGEON:  Tyree Ruiz M.D.    ASSISTANT:  Live Mosquera PGY3    ANESTHESIA:  MAC.    PREP:  Chlorhexidine.    IMPLANT: Angiodynamics 16F 28cm double lumen permanent dialysis catheter    SPECIMEN:  None.    ESTIMATED BLOOD LOSS:  Minimal.    INDICATIONS:  The patient is an 63m who was found to have   an imminent   need for hemodialysis.  The patient was agreeable to the initiation of hemodialysis.    The risks of the procedure were described to the patient including bleeding,   infection, pain, scarring, wound complications, potential injury to structures   in the neck or chest warranting more extensive surgery and potential need for   further interventions.  The patient demonstrated understanding of these risks   and a consent form was obtained.    PROCEDURE IN DETAIL:  The patient was identified in the Preoperative Unit and   taken back to the Operating Room and laid supine on the operating room table.    IV antibiotics were administered prior to the administration of the anesthesia.    MAC anesthesia was administered without complication.  The patient was then   prepped and draped in a standard sterile fashion.  Timeout procedure was   performed in accordance with hospital protocol.    An ultrasound was used to   identify the right internal jugular vein.  He was found to have a clot in the right IJ so we turned our attention to the left IJ.  The images were interpreted by me   and stored for further review.    The left IJ was access using a needle. With some difficulty we were able to get the wire to pass into the SVC However we were unable to get the dilator with sheath to pass into the SVC. At this point we decided to abort  the left side due to being unable to safely pass the catheter.    The right internal jugular vein was accessed using a   needle. The wire was passed without difficulty, we confirmed appropriate positioning  Using fluoroscopic guidance.  An incision in the right upper chest was   made approximately 0.5 cm using a #15 blade scalpel.  The tunneler was passed   from this incision towards the neck incision without any issue.  Once the   PermCath cuff was in the appropriate position at the chest site, the initial   dilator was passed over the wire and confirmed with fluoroscopic guidance.  The   internal jugular vein was then sequentially dilated and once the final dilator   was passed, the peel-away sheath was then inserted over the wire using Seldinger   technique.  The wire and dilator were removed.  The catheter was then placed   into the peel-away sheath and peel-away sheath was removed while the catheter   was threaded forward.  Once this was complete, final fluoroscopic image did   confirm that the catheter was in appropriate position in the distal SVC right   atrial junction.  Both ports of the catheter withdrew and flushed very easily.    Each port was then locked with 2.5 mL of 1000 units per mL IV heparin.  The neck   incision was closed using 5-0 Monocryl suture in an interrupted fashion.  The   chest incision was closed using 5-0 Monocryl suture in an interrupted fashion.    The catheter was fixed to the right chest wall using 3-0 nylon suture.  Dry   sterile dressings were then applied.  The patient tolerated the procedure well   and there were no complications.  He was awakened from anesthesia and returned   to the Postoperative Recovery Unit in stable condition.  At the end of the case,   hemostasis was confirmed and sponge, instrument and needle counts were correct   on 2 occasions.  I was present and scrubbed throughout the entirety of the case.    During all vein manipulation, the patient was in the  Trendelenburg position.    COMPLICATIONS:  None.    CONDITION:  Stable.

## 2024-06-05 NOTE — SUBJECTIVE & OBJECTIVE
No current facility-administered medications on file prior to encounter.     Current Outpatient Medications on File Prior to Encounter   Medication Sig    acetaminophen (TYLENOL) 500 MG tablet Take 500 mg by mouth every 6 (six) hours as needed for Pain.    allopurinoL (ZYLOPRIM) 100 MG tablet Take 1 tablet (100 mg total) by mouth once daily.    amiodarone (PACERONE) 400 MG tablet Take 1 tablet (400 mg total) by mouth once daily.    aspirin (ECOTRIN) 81 MG EC tablet Take 1 tablet (81 mg total) by mouth once daily.    ergocalciferol (ERGOCALCIFEROL) 50,000 unit Cap TAKE 1 CAPSULE BY MOUTH ONE TIME PER WEEK (Patient taking differently: Take 50,000 Units by mouth every Monday.)    furosemide (LASIX) 80 MG tablet Take 1 tablet (80 mg total) by mouth 2 (two) times daily. If increased 3 pounds in a day or 5 pounds in a week, take an extra dose of lasix 80mg until those pounds are lost. (Patient taking differently: Take 360 mg by mouth 2 (two) times daily.)    levothyroxine (SYNTHROID) 150 MCG tablet Take 1 tablet (150 mcg total) by mouth once daily.    metOLazone (ZAROXOLYN) 10 MG tablet Take 10 mg by mouth As instructed (Take I tablet by mouth Tuesday, Thursday, Saturday, Sunday). Take I tablet by mouth Tuesday, Thursday, Saturday, Sunday    mexiletine (MEXITIL) 150 MG Cap Take 1 capsule (150 mg total) by mouth 2 (two) times daily with meals.    midodrine (PROAMATINE) 5 MG Tab Take 3 tablets (15 mg total) by mouth every 8 (eight) hours.    mirtazapine (REMERON) 7.5 MG Tab Take 1 tablet (7.5 mg total) by mouth every evening. (Patient taking differently: Take 7.5 mg by mouth daily as needed.)    pantoprazole (PROTONIX) 40 MG tablet Take 1 tablet (40 mg total) by mouth once daily.    polyethylene glycol (GLYCOLAX) 17 gram PwPk Take 17 g by mouth once daily.    pravastatin (PRAVACHOL) 40 MG tablet TAKE 1 TABLET BY MOUTH EVERY DAY (Patient taking differently: Take 40 mg by mouth every evening.)    nitroGLYCERIN (NITROSTAT) 0.4  MG SL tablet Place 1 tablet (0.4 mg total) under the tongue every 5 (five) minutes as needed for Chest pain.       Review of patient's allergies indicates:   Allergen Reactions    Lokelma [sodium zirconium cyclosilicate] Other (See Comments)     Fluid retention, weight gain, CHF excerebration, severe constipation    Atorvastatin Other (See Comments)     Joint pain    Jardiance [empagliflozin] Other (See Comments)     Chest pains, significant weight loss, lower blood pressure       Past Medical History:   Diagnosis Date    Cardiomyopathy     CKD (chronic kidney disease) stage 4, GFR 15-29 ml/min     Congestive heart failure (CHF) 2015    COPD (chronic obstructive pulmonary disease)     Coronary artery disease     Edema     Essential (primary) hypertension 05/18/2022    Formatting of this note might be different from the original. Converted from [employer]: Description - ESSENTIAL HYPERTENSION, BENIGN    Heart attack     HLD (hyperlipidemia)     Hypertension     Hyperuricemia     Hypocalcemia     Renal cyst, left     Secondary hyperparathyroidism     Thyroid disease     V tach     Vitamin D deficiency      Past Surgical History:   Procedure Laterality Date    ablations  03/05/2018    albations  02/01/2018    COLONOSCOPY N/A 12/07/2018    Procedure: COLONOSCOPY/suprep;  Surgeon: Ananya Morillo MD;  Location: [facility] ENDO;  Service: Endoscopy;  Laterality: N/A;    defibulater N/A 2016    ESOPHAGOGASTRODUODENOSCOPY N/A 12/07/2018    Procedure: EGD (ESOPHAGOGASTRODUODENOSCOPY);  Surgeon: Ananya Morillo MD;  Location: [facility] ENDO;  Service: Endoscopy;  Laterality: N/A;    INSERTION OF TUNNELED CENTRAL VENOUS HEMODIALYSIS CATHETER Right 5/3/2024    Procedure: INSERTION, CATHETER, HEMODIALYSIS, DUAL LUMEN;  Surgeon: Philip Perez MD;  Location: [facility] OR;  Service: General;  Laterality: Right;    INSERTION, CATHETER, DIALYSIS, PERITONEAL, LAPAROSCOPIC N/A 5/8/2024    Procedure: INSERTION, CATHETER, DIALYSIS, PERITONEAL,  LAPAROSCOPIC;  Surgeon: Tyree Ruiz MD;  Location: [facility] OR;  Service: General;  Laterality: N/A;    JOINT REPLACEMENT Bilateral 10/2016    knees, bilat    LEFT HEART CATHETERIZATION N/A 2020    Procedure: Left heart cath;  Surgeon: Shahram Stewart MD;  Location: [facility] CATH LAB/EP;  Service: Cardiology;  Laterality: N/A;    LEFT HEART CATHETERIZATION Left 2022    Procedure: Left heart cath;  Surgeon: Juan Roque MD;  Location: [facility] CATH LAB/EP;  Service: Cardiology;  Laterality: Left;    KS HEMODIALYSIS, ONE EVALUATION  2024    REMOVAL OF HEMODIALYSIS CATHETER Right 5/3/2024    Procedure: REMOVAL, CATHETER, HEMODIALYSIS;  Surgeon: Philip Perez MD;  Location: [facility] OR;  Service: General;  Laterality: Right;    REPLACEMENT OF IMPLANTABLE CARDIOVERTER-DEFIBRILLATOR (ICD) GENERATOR Left 2023    Procedure: REPLACEMENT, ICD GENERATOR;  Surgeon: River Tenorio MD;  Location: [facility] EP LAB;  Service: Cardiology;  Laterality: Left;  ANTHONY, CRTD gen chg, MDT, MAC, DM, 3prep     Family History       Problem Relation (Age of Onset)    Alcohol abuse Father    Arthritis Sister    Breast cancer Mother    Cancer Mother    Hypertension Mother    Kidney disease Father    No Known Problems Brother, Daughter, Son    Stroke Mother          Tobacco Use    Smoking status: Former     Current packs/day: 0.00     Average packs/day: 1 pack/day for 33.2 years (33.2 ttl pk-yrs)     Types: Cigarettes     Start date: 1979     Quit date: 3/3/2012     Years since quittin.2     Passive exposure: Past    Smokeless tobacco: Never   Substance and Sexual Activity    Alcohol use: Not Currently     Comment: rare    Drug use: No    Sexual activity: Yes     Partners: Female     Review of Systems   Constitutional:  Negative for chills and fever.   Respiratory:  Negative for cough and shortness of breath.    Cardiovascular:  Negative for chest pain.   Gastrointestinal:  Negative for abdominal pain, constipation,  diarrhea, nausea and vomiting.   Genitourinary:  Negative for dysuria.   Neurological:  Negative for dizziness and light-headedness.     Objective:     Vital Signs (Most Recent):  Temp: 98.4 °F (36.9 °C) (06/05/24 0808)  Pulse: 62 (06/05/24 0808)  Resp: 20 (06/05/24 0808)  BP: 122/81 (06/05/24 0808)  SpO2: 96 % (06/05/24 0808) Vital Signs (24h Range):  Temp:  [97.6 °F (36.4 °C)-98.4 °F (36.9 °C)] 98.4 °F (36.9 °C)  Pulse:  [59-67] 62  Resp:  [16-20] 20  SpO2:  [89 %-97 %] 96 %  BP: ()/(56-81) 122/81     Weight: 70.8 kg (156 lb 1.4 oz)  Body mass index is 23.05 kg/m².     Physical Exam  Vitals reviewed.   Constitutional:       Appearance: Normal appearance.   Cardiovascular:      Rate and Rhythm: Normal rate and regular rhythm.   Pulmonary:      Effort: Pulmonary effort is normal. No respiratory distress.   Abdominal:      Palpations: Abdomen is soft.      Tenderness: There is no abdominal tenderness.      Comments: PD catheter in place   Skin:     General: Skin is warm.   Neurological:      General: No focal deficit present.      Mental Status: He is alert and oriented to person, place, and time.            I have reviewed all pertinent lab results within the past 24 hours.  CBC:   Recent Labs   Lab 06/04/24  1440   WBC 8.35   RBC 4.78   HGB 12.6*   HCT 36.2*      MCV 76*   MCH 26.4*   MCHC 34.8     CMP:   Recent Labs   Lab 06/04/24  1440   GLU 89   CALCIUM 9.2   ALBUMIN 3.2*   PROT 7.1      K 4.2   CO2 29   CL 95   BUN 56*   CREATININE 5.6*   ALKPHOS 158*   ALT 27   AST 56*   BILITOT 1.9*       Significant Diagnostics:  I have reviewed all pertinent imaging results/findings within the past 24 hours.

## 2024-06-05 NOTE — HPI
Mr. Ar Sharma is a a 63M with PMHx of ESRD (HD MWF and learning PD), HFrEF, HTN, NICM, COPD, VT s/p ICD placement, chronic home O2 3L and HLD who presented to [facility] ED stating his HD cath fell out. Currently getting HD MWF while he learns how to do PD. Last dialysis session 6/3/2024.  No acute complaints including shortness of breath, chest pain, nausea, vomiting, fever, chills, abdominal pain.    In the ED, cxry confirmed right HD cath removal w/ no fragment remaining.     Admitted to observation to have HD cath replaced.

## 2024-06-05 NOTE — ANESTHESIA PREPROCEDURE EVALUATION
06/05/2024  Ar Sharma is a 63 y.o., male here for vascular catheter insertion for esrd.       Pre-op Assessment    I have reviewed the Patient Summary Reports.     I have reviewed the Nursing Notes. I have reviewed the NPO Status.   I have reviewed the Medications.     Review of Systems  Anesthesia Hx:  No problems with previous Anesthesia               Denies Personal Hx of Anesthesia complications.                    Social:  No Alcohol Use, Former Smoker       Cardiovascular:     Hypertension  Past MI CAD      Angina CHF                                 Pulmonary:   COPD                     Renal/:  Chronic Renal Disease, ESRD                Hepatic/GI:  Hepatic/GI Normal                 Musculoskeletal:  Arthritis               Neurological:  Neurology Normal                                      Endocrine:   Hypothyroidism              Physical Exam  General: Cooperative, Alert and Oriented    Airway:  Mallampati: II   Mouth Opening: Normal  TM Distance: Normal  Tongue: Normal  Neck ROM: Normal ROM    Dental:  Intact        Anesthesia Plan  Type of Anesthesia, risks & benefits discussed:    Anesthesia Type: Gen Natural Airway  Intra-op Monitoring Plan: Standard ASA Monitors  Post Op Pain Control Plan: multimodal analgesia  Induction:  IV  Airway Plan: , Post-Induction  Informed Consent: Informed consent signed with the Patient and all parties understand the risks and agree with anesthesia plan.  All questions answered.   ASA Score: 3  Day of Surgery Review of History & Physical: H&P Update referred to the surgeon/provider.    Ready For Surgery From Anesthesia Perspective.     .

## 2024-06-05 NOTE — PLAN OF CARE
06/04/24 2125   Admission   Initial VN Admission Questions Complete   Communication Issues? None   Shift   Virtual Nurse - Rounding Complete   Virtual Nurse - Patient Verbalized Approval Of Camera Use;VN Rounding   Type of Frequent Check   Type Patient Rounds;Telemetry Monitoring   Safety/Activity   Patient Rounds bed in low position;bed wheels locked;call light in patient/parent reach;clutter free environment maintained;ID band on;visualized patient;placement of personal items at bedside   Safety Promotion/Fall Prevention assistive device/personal item within reach;bed alarm set;Fall Risk reviewed with patient/family;medications reviewed;instructed to call staff for mobility   Positioning   Body Position supine   Head of Bed (HOB) Positioning HOB elevated   Pain/Comfort/Sleep   Comfort/Acceptable Pain Level 2   Cardiac   Cardiac/Telemetry Monitor On Yes     Admission questions completed. Introduced patient to VIP model, patient verbalized understanding. Educated patient on fall prevention protocol, updated on plan of care. Opportunity given for pt's questions. All questions answered. Denies needs at this time.

## 2024-06-06 VITALS
BODY MASS INDEX: 23.12 KG/M2 | RESPIRATION RATE: 16 BRPM | HEART RATE: 65 BPM | OXYGEN SATURATION: 92 % | SYSTOLIC BLOOD PRESSURE: 111 MMHG | WEIGHT: 156.06 LBS | TEMPERATURE: 98 F | HEIGHT: 69 IN | DIASTOLIC BLOOD PRESSURE: 71 MMHG

## 2024-06-06 PROBLEM — T82.41XA HEMODIALYSIS CATHETER DYSFUNCTION: Status: RESOLVED | Noted: 2024-06-05 | Resolved: 2024-06-06

## 2024-06-06 PROBLEM — Z78.9 PROBLEM WITH VASCULAR ACCESS: Status: RESOLVED | Noted: 2024-06-04 | Resolved: 2024-06-06

## 2024-06-06 LAB
ALBUMIN SERPL BCP-MCNC: 3.1 G/DL (ref 3.5–5.2)
ALP SERPL-CCNC: 141 U/L (ref 55–135)
ALT SERPL W/O P-5'-P-CCNC: 20 U/L (ref 10–44)
ANION GAP SERPL CALC-SCNC: 15 MMOL/L (ref 8–16)
AST SERPL-CCNC: 44 U/L (ref 10–40)
BILIRUB SERPL-MCNC: 1.7 MG/DL (ref 0.1–1)
BUN SERPL-MCNC: 67 MG/DL (ref 8–23)
CALCIUM SERPL-MCNC: 9.1 MG/DL (ref 8.7–10.5)
CHLORIDE SERPL-SCNC: 95 MMOL/L (ref 95–110)
CO2 SERPL-SCNC: 26 MMOL/L (ref 23–29)
CREAT SERPL-MCNC: 5.7 MG/DL (ref 0.5–1.4)
ERYTHROCYTE [DISTWIDTH] IN BLOOD BY AUTOMATED COUNT: 17.2 % (ref 11.5–14.5)
EST. GFR  (NO RACE VARIABLE): 10 ML/MIN/1.73 M^2
GLUCOSE SERPL-MCNC: 98 MG/DL (ref 70–110)
HBV SURFACE AG SERPL QL IA: NORMAL
HCT VFR BLD AUTO: 35.7 % (ref 40–54)
HGB BLD-MCNC: 12.3 G/DL (ref 14–18)
MCH RBC QN AUTO: 25.5 PG (ref 27–31)
MCHC RBC AUTO-ENTMCNC: 34.5 G/DL (ref 32–36)
MCV RBC AUTO: 74 FL (ref 82–98)
PLATELET # BLD AUTO: 433 K/UL (ref 150–450)
PMV BLD AUTO: 11.8 FL (ref 9.2–12.9)
POTASSIUM SERPL-SCNC: 4.4 MMOL/L (ref 3.5–5.1)
PROT SERPL-MCNC: 6.9 G/DL (ref 6–8.4)
RBC # BLD AUTO: 4.83 M/UL (ref 4.6–6.2)
SODIUM SERPL-SCNC: 136 MMOL/L (ref 136–145)
WBC # BLD AUTO: 9 K/UL (ref 3.9–12.7)

## 2024-06-06 PROCEDURE — G0378 HOSPITAL OBSERVATION PER HR: HCPCS

## 2024-06-06 PROCEDURE — 87340 HEPATITIS B SURFACE AG IA: CPT | Performed by: STUDENT IN AN ORGANIZED HEALTH CARE EDUCATION/TRAINING PROGRAM

## 2024-06-06 PROCEDURE — 36415 COLL VENOUS BLD VENIPUNCTURE: CPT | Performed by: STUDENT IN AN ORGANIZED HEALTH CARE EDUCATION/TRAINING PROGRAM

## 2024-06-06 PROCEDURE — 86706 HEP B SURFACE ANTIBODY: CPT | Performed by: STUDENT IN AN ORGANIZED HEALTH CARE EDUCATION/TRAINING PROGRAM

## 2024-06-06 PROCEDURE — 80053 COMPREHEN METABOLIC PANEL: CPT

## 2024-06-06 PROCEDURE — 85027 COMPLETE CBC AUTOMATED: CPT

## 2024-06-06 PROCEDURE — 63600175 PHARM REV CODE 636 W HCPCS: Performed by: STUDENT IN AN ORGANIZED HEALTH CARE EDUCATION/TRAINING PROGRAM

## 2024-06-06 PROCEDURE — 96372 THER/PROPH/DIAG INJ SC/IM: CPT | Performed by: STUDENT IN AN ORGANIZED HEALTH CARE EDUCATION/TRAINING PROGRAM

## 2024-06-06 PROCEDURE — 25000003 PHARM REV CODE 250: Performed by: STUDENT IN AN ORGANIZED HEALTH CARE EDUCATION/TRAINING PROGRAM

## 2024-06-06 PROCEDURE — 36415 COLL VENOUS BLD VENIPUNCTURE: CPT

## 2024-06-06 RX ADMIN — MIDODRINE HYDROCHLORIDE 15 MG: 5 TABLET ORAL at 01:06

## 2024-06-06 RX ADMIN — PANTOPRAZOLE SODIUM 40 MG: 40 TABLET, DELAYED RELEASE ORAL at 12:06

## 2024-06-06 RX ADMIN — MUPIROCIN: 20 OINTMENT TOPICAL at 12:06

## 2024-06-06 RX ADMIN — ASPIRIN 81 MG: 81 TABLET, COATED ORAL at 12:06

## 2024-06-06 RX ADMIN — AMIODARONE HYDROCHLORIDE 400 MG: 200 TABLET ORAL at 12:06

## 2024-06-06 RX ADMIN — ACETAMINOPHEN 500 MG: 500 TABLET ORAL at 06:06

## 2024-06-06 RX ADMIN — FUROSEMIDE 80 MG: 40 TABLET ORAL at 12:06

## 2024-06-06 RX ADMIN — Medication 1 CAPSULE: at 12:06

## 2024-06-06 RX ADMIN — MIDODRINE HYDROCHLORIDE 15 MG: 5 TABLET ORAL at 06:06

## 2024-06-06 RX ADMIN — POLYETHYLENE GLYCOL 3350 17 G: 17 POWDER, FOR SOLUTION ORAL at 12:06

## 2024-06-06 RX ADMIN — ACETAMINOPHEN 500 MG: 500 TABLET ORAL at 12:06

## 2024-06-06 RX ADMIN — HEPARIN SODIUM 5000 UNITS: 5000 INJECTION INTRAVENOUS; SUBCUTANEOUS at 06:06

## 2024-06-06 RX ADMIN — ALLOPURINOL 100 MG: 100 TABLET ORAL at 12:06

## 2024-06-06 RX ADMIN — LEVOTHYROXINE SODIUM 150 MCG: 75 TABLET ORAL at 06:06

## 2024-06-06 NOTE — PROCEDURES
Patient seen and examined on dialysis. Tolerating HD well  Ok with discharge after   Vitals:    06/06/24 0022 06/06/24 0044 06/06/24 0354 06/06/24 0449   BP:  111/73  115/75   BP Location:  Right arm  Right arm   Patient Position:  Lying  Lying   Pulse: 62 (!) 59 61 60   Resp:  18  18   Temp:  98.1 °F (36.7 °C)  97.9 °F (36.6 °C)   TempSrc:  Oral  Oral   SpO2:  95%  (!) 93%   Weight:       Height:           With any question please call  [phone]  NEREIDA Phillips MD    [facility]     ELISHA Mata MD,   MD NEREIDA Pollack MD E. V. Harmon, NP I.Goldvarg-Abud, NP    [address]  [place], [address]

## 2024-06-06 NOTE — PROGRESS NOTES
[facility] - Counts include 234 beds at the [facility]  General Surgery  Progress Note    Subjective:     History of Present Illness:  Mr. Sharma is a pleasant 63yoM with PMH including ESRD who is s/p tunneled HD line placement with Dr. Perez on 5/3/24, and PD cath placement with Dr. Ruiz on 5/8/24. He has been finishing his PD cath training at home and is set to transition to PD soon, but has been still receiving HD MWF. His HD catheter was inadvertently removed at home following his last session on 6/3, leading to his presentation to the hospital. General surgery consulted for consideration of replacement of the tunneled HD line. He is otherwise feeling well with no other clinical change since last hospitalization.    Post-Op Info:  Procedure(s) (LRB):  Insertion,central venous access device (Right)   1 Day Post-Op     Interval History: NAEON. Some pain with tunneled line relieved by tylenol. AVSS. Lab work stable.    Medications:  Continuous Infusions:  Scheduled Meds:   sodium chloride 0.9%   Intravenous Once    allopurinoL  100 mg Oral Daily    amiodarone  400 mg Oral Daily    aspirin  81 mg Oral Daily    furosemide  80 mg Oral BID    heparin (porcine)  5,000 Units Subcutaneous Q8H    levothyroxine  150 mcg Oral Before breakfast    mexiletine  150 mg Oral BID WM    midodrine  15 mg Oral Q8H    mirtazapine  7.5 mg Oral QHS    mupirocin   Nasal BID    pantoprazole  40 mg Oral Daily    polyethylene glycol  17 g Oral Daily    pravastatin  40 mg Oral QHS    vitamin renal formula (B-complex-vitamin c-folic acid)  1 capsule Oral Daily     PRN Meds:  Current Facility-Administered Medications:     sodium chloride 0.9%, , Intravenous, PRN    acetaminophen, 500 mg, Oral, Q6H PRN    dextrose 10%, 12.5 g, Intravenous, PRN    dextrose 10%, 25 g, Intravenous, PRN    glucagon (human recombinant), 1 mg, Intramuscular, PRN    glucose, 16 g, Oral, PRN    glucose, 24 g, Oral, PRN    naloxone, 0.02 mg, Intravenous, PRN    ondansetron, 4 mg, Intravenous, Daily  PRN    sodium chloride 0.9%, 250 mL, Intravenous, PRN    sodium chloride 0.9%, 10 mL, Intravenous, Q12H PRN     Review of patient's allergies indicates:   Allergen Reactions    Lokelma [sodium zirconium cyclosilicate] Other (See Comments)     Fluid retention, weight gain, CHF excerebration, severe constipation    Atorvastatin Other (See Comments)     Joint pain    Jardiance [empagliflozin] Other (See Comments)     Chest pains, significant weight loss, lower blood pressure     Objective:     Vital Signs (Most Recent):  Temp: 97.9 °F (36.6 °C) (06/06/24 0449)  Pulse: 60 (06/06/24 0449)  Resp: 18 (06/06/24 0449)  BP: 115/75 (06/06/24 0449)  SpO2: (!) 93 % (06/06/24 0449) Vital Signs (24h Range):  Temp:  [97.3 °F (36.3 °C)-98.4 °F (36.9 °C)] 97.9 °F (36.6 °C)  Pulse:  [59-64] 60  Resp:  [13-20] 18  SpO2:  [92 %-97 %] 93 %  BP: (101-122)/(62-81) 115/75     Weight: 70.8 kg (156 lb 1.4 oz)  Body mass index is 23.05 kg/m².    Intake/Output - Last 3 Shifts         06/04 0700  06/05 0659 06/05 0700  06/06 0659    P.O.  0    IV Piggyback  150    Total Intake(mL/kg)  150 (2.1)    Urine (mL/kg/hr) 250 1200 (0.7)    Total Output 250 1200    Net -250 -1050                   Physical Exam  Vitals reviewed.   Constitutional:       Appearance: Normal appearance.   Neck:      Comments: Tunneled R IJ HD catheter in place with dressing CDI.  Cardiovascular:      Rate and Rhythm: Normal rate and regular rhythm.   Pulmonary:      Effort: Pulmonary effort is normal. No respiratory distress.   Abdominal:      Palpations: Abdomen is soft.      Tenderness: There is no abdominal tenderness.      Comments: PD cath in place   Skin:     General: Skin is warm.   Neurological:      General: No focal deficit present.      Mental Status: He is alert and oriented to person, place, and time.          Significant Labs:  I have reviewed all pertinent lab results within the past 24 hours.  CBC:   Recent Labs   Lab 06/06/24  0223   WBC 9.00   RBC 4.83    HGB 12.3*   HCT 35.7*      MCV 74*   MCH 25.5*   MCHC 34.5     CMP:   Recent Labs   Lab 06/06/24  0223   GLU 98   CALCIUM 9.1   ALBUMIN 3.1*   PROT 6.9      K 4.4   CO2 26   CL 95   BUN 67*   CREATININE 5.7*   ALKPHOS 141*   ALT 20   AST 44*   BILITOT 1.7*       Significant Diagnostics:  I have reviewed all pertinent imaging results/findings within the past 24 hours.  Assessment/Plan:     ESRD (end stage renal disease)  63 yoM s/p PD catheter placement 1 month ago whose tunneled HD catheter was inadvertently removed following last HD session on 6/3. While he is undergoing PD catheter training, he still requires HD. Now s/p tunneled HD line replacement via the R IJ on 6/6.    - Ok for diet  - HD today prior to discharge  - Please call with any questions        Colin Mosquera MD  General Surgery  [facility] - Telemetry

## 2024-06-06 NOTE — PROGRESS NOTES
06/06/24 1156        Hemodialysis Catheter 05/03/24 1115 right subclavian   Placement Date/Time: 05/03/24 1115   Present Prior to Hospital Arrival?: No  Hand Hygiene: Performed  Barrier Precautions: Performed  Skin Antisepsis: ChloraPrep  Hemodialysis Catheter Type: Tunneled catheter  Location: right subclavian  Catheter Size...   Line Necessity Review CRRT/HD   Site Assessment No drainage;No redness;No swelling;No warmth   Line Securement Device Secured with sutureless device   Dressing Type CHG impregnated dressing/sponge;Transparent (Tegaderm);Central line dressing   Dressing Status Clean;Dry;Intact   Dressing Intervention Integrity maintained   Date on Dressing 06/05/24   Dressing Due to be Changed 06/12/24   Venous Patency/Care flushed w/o difficulty;blood return present;intermittent infusion cap changed;normal saline locked;deaccessed   Arterial Patency/Care flushed w/o difficulty;deaccessed;intermittent infusion cap changed;blood return present;normal saline locked   Waveform Not being transduced   Post-Hemodialysis Assessment   Blood Volume Processed (Liters) 84.2 L   Dialyzer Clearance Lightly streaked   Duration of Treatment 240 minutes   Additional Fluid Intake (mL) 500 mL   Total UF (mL) 1500 mL   Net Fluid Removal 1000   Patient Response to Treatment Tx completed, tolerated well, lines flushed and then packed with saline to filling volume with new end caps applied   Post-Treatment Weight 71.4 kg (157 lb 6.5 oz)   Treatment Weight Change 71.4   Post-Hemodialysis Comments no distress noted

## 2024-06-06 NOTE — PROGRESS NOTES
Discharge orders noted. Additional clinical references attached. Patient's discharge instructions given by bedside RN. Virtual nurse cued into room and reviewed discharge instructions. Education provided on new medication, diagnosis, and follow-up appointments. Teach back method used. Patient verbalized understanding. All questions answered. Transport to [facility] requested. Bedside nurse updated on patient status and transportation request.      06/06/24 5989   AVS Confirmation   Discharge instructions and AVS given to and reviewed with patient and/or significant other. Yes

## 2024-06-06 NOTE — PROGRESS NOTES
PD Manual Note   06/06/24 0750   Manual Peritoneal Dialysis   Manual Fill Volume (mL) 1000 mL   Manual Drain Volume (mL) 20 mL   Effluent Appearance [place]   Post Treatment Output (mL) 1000 mL   Manual PD Total UF (mL) 1020 mL   Manual Treatment Comments Patient connected and capped using aseptic technique. no issues noted.Initial drain pink-yellow color noted.

## 2024-06-06 NOTE — PLAN OF CARE
Problem: Adult Inpatient Plan of Care  Goal: Plan of Care Review  Outcome: Progressing  Goal: Absence of Hospital-Acquired Illness or Injury  Outcome: Progressing  Goal: Optimal Comfort and Wellbeing  Outcome: Progressing

## 2024-06-06 NOTE — SUBJECTIVE & OBJECTIVE
Interval History: NAEON. Some pain with tunneled line relieved by tylenol. AVSS. Lab work stable.    Medications:  Continuous Infusions:  Scheduled Meds:   sodium chloride 0.9%   Intravenous Once    allopurinoL  100 mg Oral Daily    amiodarone  400 mg Oral Daily    aspirin  81 mg Oral Daily    furosemide  80 mg Oral BID    heparin (porcine)  5,000 Units Subcutaneous Q8H    levothyroxine  150 mcg Oral Before breakfast    mexiletine  150 mg Oral BID WM    midodrine  15 mg Oral Q8H    mirtazapine  7.5 mg Oral QHS    mupirocin   Nasal BID    pantoprazole  40 mg Oral Daily    polyethylene glycol  17 g Oral Daily    pravastatin  40 mg Oral QHS    vitamin renal formula (B-complex-vitamin c-folic acid)  1 capsule Oral Daily     PRN Meds:  Current Facility-Administered Medications:     sodium chloride 0.9%, , Intravenous, PRN    acetaminophen, 500 mg, Oral, Q6H PRN    dextrose 10%, 12.5 g, Intravenous, PRN    dextrose 10%, 25 g, Intravenous, PRN    glucagon (human recombinant), 1 mg, Intramuscular, PRN    glucose, 16 g, Oral, PRN    glucose, 24 g, Oral, PRN    naloxone, 0.02 mg, Intravenous, PRN    ondansetron, 4 mg, Intravenous, Daily PRN    sodium chloride 0.9%, 250 mL, Intravenous, PRN    sodium chloride 0.9%, 10 mL, Intravenous, Q12H PRN     Review of patient's allergies indicates:   Allergen Reactions    Lokelma [sodium zirconium cyclosilicate] Other (See Comments)     Fluid retention, weight gain, CHF excerebration, severe constipation    Atorvastatin Other (See Comments)     Joint pain    Jardiance [empagliflozin] Other (See Comments)     Chest pains, significant weight loss, lower blood pressure     Objective:     Vital Signs (Most Recent):  Temp: 97.9 °F (36.6 °C) (06/06/24 0449)  Pulse: 60 (06/06/24 0449)  Resp: 18 (06/06/24 0449)  BP: 115/75 (06/06/24 0449)  SpO2: (!) 93 % (06/06/24 0449) Vital Signs (24h Range):  Temp:  [97.3 °F (36.3 °C)-98.4 °F (36.9 °C)] 97.9 °F (36.6 °C)  Pulse:  [59-64] 60  Resp:  [13-20]  18  SpO2:  [92 %-97 %] 93 %  BP: (101-122)/(62-81) 115/75     Weight: 70.8 kg (156 lb 1.4 oz)  Body mass index is 23.05 kg/m².    Intake/Output - Last 3 Shifts         06/04 0700  06/05 0659 06/05 0700  06/06 0659    P.O.  0    IV Piggyback  150    Total Intake(mL/kg)  150 (2.1)    Urine (mL/kg/hr) 250 1200 (0.7)    Total Output 250 1200    Net -250 -1050                   Physical Exam  Vitals reviewed.   Constitutional:       Appearance: Normal appearance.   Neck:      Comments: Tunneled R IJ HD catheter in place with dressing CDI.  Cardiovascular:      Rate and Rhythm: Normal rate and regular rhythm.   Pulmonary:      Effort: Pulmonary effort is normal. No respiratory distress.   Abdominal:      Palpations: Abdomen is soft.      Tenderness: There is no abdominal tenderness.      Comments: PD cath in place   Skin:     General: Skin is warm.   Neurological:      General: No focal deficit present.      Mental Status: He is alert and oriented to person, place, and time.          Significant Labs:  I have reviewed all pertinent lab results within the past 24 hours.  CBC:   Recent Labs   Lab 06/06/24 0223   WBC 9.00   RBC 4.83   HGB 12.3*   HCT 35.7*      MCV 74*   MCH 25.5*   MCHC 34.5     CMP:   Recent Labs   Lab 06/06/24 0223   GLU 98   CALCIUM 9.1   ALBUMIN 3.1*   PROT 6.9      K 4.4   CO2 26   CL 95   BUN 67*   CREATININE 5.7*   ALKPHOS 141*   ALT 20   AST 44*   BILITOT 1.7*       Significant Diagnostics:  I have reviewed all pertinent imaging results/findings within the past 24 hours.

## 2024-06-06 NOTE — PLAN OF CARE
Pt clear to D/C home today. Pt spouse to transport Pt home. Pt to resume HHS with [facility]. F/U appts have been requested and scheduled. No other D/C needs identified. Pt clear from CM.     Future Appointments   Date Time Provider Department Center   9/4/2024  9:40 AM SAME DAY LAB, [facility] RV[facility] LAB [facility]   9/11/2024  2:00 PM Jc Elliott MD [facility] [place] [facility] - Telemetry  Discharge Final Note    Primary Care Provider: Jc Elliott MD    Expected Discharge Date: 6/6/2024    Final Discharge Note (most recent)       Final Note - 06/06/24 1459          Final Note    Assessment Type Final Discharge Note     Anticipated Discharge Disposition Home-Health Care [facility]     What phone number can be called within the next 1-3 days to see how you are doing after discharge? [phone]     Hospital Resources/Appts/Education Provided Appointments scheduled and added to AVS        Post-Acute Status    Post-Acute Authorization Home Health     Home Health Status Set-up Complete/Auth obtained     Discharge Delays None known at this time                     Important Message from Medicare                   Natalie Francis LMSW  Phone: [phone]  [facility]

## 2024-06-06 NOTE — PLAN OF CARE
Problem: Adult Inpatient Plan of Care  Goal: Plan of Care Review  Outcome: Progressing   Chart reviewed and care plan updated

## 2024-06-07 NOTE — DISCHARGE SUMMARY
[facility] Medicine  Discharge Summary      Patient Name: Ar Sharma  MRN: [id number]  KAREEM: [id number]  Patient Class: OP- Observation  Admission Date: 6/4/2024  Hospital Length of Stay: 0 days  Discharge Date and Time: 6/6/2024  5:13 PM  Attending Physician: GENI CASILLAS    Discharging Provider: Jason Paula PA-C  Primary Care Provider: Jc Elliott MD    Primary Care Team: Networked reference to record PCT     HPI:   Mr. Ar Sharma is a a 63M with PMHx of ESRD (HD MWF and learning PD), HFrEF, HTN, NICM, COPD, VT s/p ICD placement, chronic home O2 3L and HLD who presented to [facility] ED stating his HD cath fell out. Currently getting HD MWF while he learns how to do PD. Last dialysis session 6/3/2024.  No acute complaints including shortness of breath, chest pain, nausea, vomiting, fever, chills, abdominal pain.    In the ED, cxry confirmed right HD cath removal w/ no fragment remaining.     Admitted to observation to have HD cath replaced.     Procedure(s) (LRB):  Insertion,central venous access device (Right)      Hospital Course:   The patient was admitted to the Hospital Medicine service for evaluation and management of his displaced HD catheter. He was placed NPO and his CBC and CMP were trended. General Surgery and Nephrology were consulted. General Surgery successfully placed a new tunneled HD catheter in his right IJ without complication. The patient then underwent HD and 1L was removed. He tolerated a diet and was discharged to home in stable condition with instructions to follow up with his PCP and Nephrology on an outpatient basis.      Goals of Care Treatment Preferences:  Code Status: Full Code    Living Will: Yes              Consults:   Consults (From admission, onward)          Status Ordering Provider     Inpatient consult to General Surgery  Once        Provider:  (Not yet assigned)    CAROLINE Ram     Inpatient consult to Nephrology-Kidney  Consultants (Adolfo Mcintyre, Juan Francisco)  Once        Provider:  (Not yet assigned)    Completed CAROLINE ELLIS            Cardiac/Vascular  * Problem with vascular access-resolved as of 6/6/2024  HD tunneled cath. Replaced in right IJ   -appreciate Surgery assistance    Hypotension  Continue midodrine      Hyperlipidemia  Continue pravastatin      V-tach  Continue amiodarone and mexiletine      Renal/  ESRD (end stage renal disease)  - Nephrology consulted, appreciate rec's  --HD performed and 1L removed  -- Continue Lasix 80 PO BID to keep residual function   -- Daily Renal Function Panel  -- Renally dose all meds  -- Flush PD cath weekly    Hemodialysis catheter dysfunction-resolved as of 6/6/2024  See problem with vascular access      Endocrine  Hypothyroidism  Continue levothyroxine.        Final Active Diagnoses:    Diagnosis Date Noted POA    ESRD (end stage renal disease) [N18.6] 04/30/2024 Yes    Hypotension [I95.9] 04/30/2024 Yes    Hyperlipidemia [E78.5] 05/18/2022 Yes    V-tach [I47.20] 10/16/2018 Yes    Hypothyroidism [E03.9] 10/16/2018 Yes      Problems Resolved During this Admission:    Diagnosis Date Noted Date Resolved POA    PRINCIPAL PROBLEM:  Problem with vascular access [Z78.9] 06/04/2024 06/06/2024 Yes    Hemodialysis catheter dysfunction [T82.41XA] 06/05/2024 06/06/2024 Unknown       Discharged Condition: stable    Disposition: Home or Self Care    Follow Up:   Follow-up Information       Jc Elliott MD Follow up.    Specialty: Internal Medicine  Why: Requested. Please check my chart and or expect a phone call from scheduling within 48 hours.  Contact information:  [address] [address] [phone]               Nephrology Follow Up Follow up.    Why: Requested. Please check my chart and or expect a phone call from scheduling within 48 hours.             [facility] Follow up.    Contact information:  [address] [facility]  [place] [address]  [phone]                         Patient Instructions:      Diet Cardiac     Diet renal     Activity as tolerated       Significant Diagnostic Studies: N/A    Pending Diagnostic Studies:       Procedure Component Value Units Date/Time    Hepatitis B Surface Antibody, Qual/Quant [[id number]] Collected: 06/06/24 0815    Order Status: Sent Lab Status: In process Updated: 06/06/24 1317    Specimen: Blood            Medications:  Reconciled Home Medications:      Medication List        CHANGE how you take these medications      ergocalciferol 50,000 unit Cap  Commonly known as: ERGOCALCIFEROL  TAKE 1 CAPSULE BY MOUTH ONE TIME PER WEEK  What changed: when to take this     pravastatin 40 MG tablet  Commonly known as: PRAVACHOL  TAKE 1 TABLET BY MOUTH EVERY DAY  What changed: when to take this            CONTINUE taking these medications      acetaminophen 500 MG tablet  Commonly known as: TYLENOL  Take 500 mg by mouth every 6 (six) hours as needed for Pain.     allopurinoL 100 MG tablet  Commonly known as: ZYLOPRIM  Take 1 tablet (100 mg total) by mouth once daily.     amiodarone 400 MG tablet  Commonly known as: PACERONE  Take 1 tablet (400 mg total) by mouth once daily.     aspirin 81 MG EC tablet  Commonly known as: ECOTRIN  Take 1 tablet (81 mg total) by mouth once daily.     furosemide 80 MG tablet  Commonly known as: LASIX  Take 1 tablet (80 mg total) by mouth 2 (two) times daily. If increased 3 pounds in a day or 5 pounds in a week, take an extra dose of lasix 80mg until those pounds are lost.     levothyroxine 150 MCG tablet  Commonly known as: SYNTHROID  Take 1 tablet (150 mcg total) by mouth once daily.     metOLazone 10 MG tablet  Commonly known as: ZAROXOLYN  Take 10 mg by mouth As instructed (Take I tablet by mouth Tuesday, Thursday, Saturday, Sunday). Take I tablet by mouth Tuesday, Thursday, Saturday, Sunday     mexiletine 150 MG Cap  Commonly known as: MEXITIL  Take 1 capsule (150  mg total) by mouth 2 (two) times daily with meals.     midodrine 5 MG Tab  Commonly known as: PROAMATINE  Take 3 tablets (15 mg total) by mouth every 8 (eight) hours.     nitroGLYCERIN 0.4 MG SL tablet  Commonly known as: NITROSTAT  Place 1 tablet (0.4 mg total) under the tongue every 5 (five) minutes as needed for Chest pain.     pantoprazole 40 MG tablet  Commonly known as: PROTONIX  Take 1 tablet (40 mg total) by mouth once daily.     polyethylene glycol 17 gram Pwpk  Commonly known as: GLYCOLAX  Take 17 g by mouth once daily.            STOP taking these medications      mirtazapine 7.5 MG Tab  Commonly known as: REMERON              Indwelling Lines/Drains at time of discharge:   Lines/Drains/Airways       Central Venous Catheter Line  Duration                  Hemodialysis Catheter 05/03/24 1115 right subclavian 35 days    Percutaneous Central Line - Double Lumen  06/05/24 1425 Internal Jugular Right 1 day                    Time spent on the discharge of patient: 20 minutes         Jason Paula PA-C  Department of [facility]  [place] [facility]

## 2024-06-07 NOTE — ASSESSMENT & PLAN NOTE
- Nephrology consulted, appreciate rec's  --HD performed and 1L removed  -- Continue Lasix 80 PO BID to keep residual function   -- Daily Renal Function Panel  -- Renally dose all meds  -- Flush PD cath weekly

## 2024-06-07 NOTE — HOSPITAL COURSE
The patient was admitted to the Hospital Medicine service for evaluation and management of his displaced HD catheter. He was placed NPO and his CBC and CMP were trended. General Surgery and Nephrology were consulted. General Surgery successfully placed a new tunneled HD catheter in his right IJ without complication. The patient then underwent HD and 1L was removed. He tolerated a diet and was discharged to home in stable condition with instructions to follow up with his PCP and Nephrology on an outpatient basis.

## 2024-06-10 ENCOUNTER — TELEPHONE (OUTPATIENT)
Dept: FAMILY MEDICINE | Facility: CLINIC | Age: 64
End: 2024-06-10
Payer: MEDICARE

## 2024-06-10 LAB
HBV SURFACE AB SER QL IA: NEGATIVE
HBV SURFACE AB SERPL IA-ACNC: <3 MIU/ML

## 2024-06-10 NOTE — TELEPHONE ENCOUNTER
----- Message from Jsoe Padilla sent at 6/10/2024 11:07 AM CDT -----  Contact: Pt  .Type:  Needs Medical Advice    Who Called: pt. Wife Nicolette  Symptoms (please be specific):  hospital f/u   Would the patient rather a call back or a response via [facility]?  Call back  Best Call Back Number: [phone]  Additional Information:  Pt. Has an appt. For Tuesday 06/11/2024 with the PA and needs a later time after 12  because of Dialysis.

## 2024-06-11 LAB
OHS QRS DURATION: 208 MS
OHS QTC CALCULATION: 578 MS

## 2024-06-17 ENCOUNTER — TELEPHONE (OUTPATIENT)
Dept: FAMILY MEDICINE | Facility: CLINIC | Age: 64
End: 2024-06-17

## 2024-06-17 ENCOUNTER — OFFICE VISIT (OUTPATIENT)
Dept: FAMILY MEDICINE | Facility: CLINIC | Age: 64
End: 2024-06-17
Payer: MEDICARE

## 2024-06-17 VITALS
WEIGHT: 153.69 LBS | HEIGHT: 69 IN | HEART RATE: 67 BPM | OXYGEN SATURATION: 82 % | DIASTOLIC BLOOD PRESSURE: 72 MMHG | TEMPERATURE: 97 F | BODY MASS INDEX: 22.76 KG/M2 | SYSTOLIC BLOOD PRESSURE: 108 MMHG

## 2024-06-17 DIAGNOSIS — Z09 HOSPITAL DISCHARGE FOLLOW-UP: Primary | ICD-10-CM

## 2024-06-17 DIAGNOSIS — I50.43 ACUTE ON CHRONIC COMBINED SYSTOLIC (CONGESTIVE) AND DIASTOLIC (CONGESTIVE) HEART FAILURE: ICD-10-CM

## 2024-06-17 DIAGNOSIS — J43.9 PULMONARY EMPHYSEMA, UNSPECIFIED EMPHYSEMA TYPE: ICD-10-CM

## 2024-06-17 DIAGNOSIS — N18.6 ESRD (END STAGE RENAL DISEASE): ICD-10-CM

## 2024-06-17 DIAGNOSIS — R09.02 HYPOXEMIA: ICD-10-CM

## 2024-06-17 RX ORDER — FUROSEMIDE 80 MG/1
360 TABLET ORAL 2 TIMES DAILY
Qty: 180 TABLET | Refills: 3 | Status: SHIPPED | OUTPATIENT
Start: 2024-06-17

## 2024-06-17 RX ORDER — B COMPLEX, C NO.20/FOLIC ACID 1 MG
1 CAPSULE ORAL DAILY
Qty: 90 CAPSULE | Refills: 3 | Status: SHIPPED | OUTPATIENT
Start: 2024-06-17

## 2024-06-17 NOTE — TELEPHONE ENCOUNTER
----- Message from Shanell José PA-C sent at 6/17/2024 11:15 AM CDT -----  Please print and fax home oxygen orders. Thanks

## 2024-06-17 NOTE — PROGRESS NOTES
Patient ID: Ar Sharma is a 63 y.o. male.     Chief Complaint: Follow-up (HFU)    Pleasant 63 year old gentleman with history of CHF, COPD, ESRD, previously on HD MWF while learning PD, presents to clinic for hospital discharge follow up. On 6/4 pt noticed HD access had fallen out. He presented to ED where he was admitted inpatient. Access successfully re-established surgically. Pt started PD 2 nights ago, with admitted success. Pt to continue home meds. Increased dose of lasix 160 BID (320 daily). Discharged in stable condition on 6/6/2024. As stated in previous hospitalization, pt requiring continuous home oxygen at 3L.         Review of Systems  Review of Systems   Constitutional:  Negative for fever.   HENT:  Negative for ear pain and sinus pain.    Eyes:  Negative for discharge.   Respiratory:  Positive for shortness of breath. Negative for cough and wheezing.    Cardiovascular:  Negative for chest pain and leg swelling.   Gastrointestinal:  Negative for diarrhea, nausea and vomiting.   Genitourinary:  Negative for urgency.   Musculoskeletal:  Negative for myalgias.   Skin:  Negative for rash.   Neurological:  Negative for weakness and headaches.   Psychiatric/Behavioral:  Negative for depression.        Currently Medications  Current Outpatient Medications on File Prior to Visit   Medication Sig Dispense Refill    acetaminophen (TYLENOL) 500 MG tablet Take 500 mg by mouth every 6 (six) hours as needed for Pain.      allopurinoL (ZYLOPRIM) 100 MG tablet Take 1 tablet (100 mg total) by mouth once daily. 90 tablet 3    amiodarone (PACERONE) 400 MG tablet Take 1 tablet (400 mg total) by mouth once daily. 90 tablet 3    aspirin (ECOTRIN) 81 MG EC tablet Take 1 tablet (81 mg total) by mouth once daily. 60 tablet 2    ergocalciferol (ERGOCALCIFEROL) 50,000 unit Cap TAKE 1 CAPSULE BY MOUTH ONE TIME PER WEEK (Patient taking differently: Take 50,000 Units by mouth every Monday.) 12 capsule 3    levothyroxine  "(SYNTHROID) 150 MCG tablet Take 1 tablet (150 mcg total) by mouth once daily. 90 tablet 3    metOLazone (ZAROXOLYN) 10 MG tablet Take 10 mg by mouth As instructed (Take I tablet by mouth Tuesday, Thursday, Saturday, Sunday). Take I tablet by mouth Tuesday, Thursday, Saturday, Sunday      mexiletine (MEXITIL) 150 MG Cap Take 1 capsule (150 mg total) by mouth 2 (two) times daily with meals. 180 capsule 3    midodrine (PROAMATINE) 5 MG Tab Take 3 tablets (15 mg total) by mouth every 8 (eight) hours. 270 tablet 1    nitroGLYCERIN (NITROSTAT) 0.4 MG SL tablet Place 1 tablet (0.4 mg total) under the tongue every 5 (five) minutes as needed for Chest pain. 20 tablet 1    pantoprazole (PROTONIX) 40 MG tablet Take 1 tablet (40 mg total) by mouth once daily. 90 tablet 3    polyethylene glycol (GLYCOLAX) 17 gram PwPk Take 17 g by mouth once daily. 30 packet 0    pravastatin (PRAVACHOL) 40 MG tablet TAKE 1 TABLET BY MOUTH EVERY DAY (Patient taking differently: Take 40 mg by mouth every evening.) 90 tablet 3    [DISCONTINUED] furosemide (LASIX) 80 MG tablet Take 1 tablet (80 mg total) by mouth 2 (two) times daily. If increased 3 pounds in a day or 5 pounds in a week, take an extra dose of lasix 80mg until those pounds are lost. (Patient taking differently: Take 360 mg by mouth 2 (two) times daily.) 120 tablet 3     No current facility-administered medications on file prior to visit.       Physical  Exam  Vitals:    06/17/24 1038 06/17/24 1115   BP: 108/72    BP Location: Left arm    Patient Position: Sitting    Pulse: 67    Temp: 97.4 °F (36.3 °C)    SpO2: (!) 93% (!) 82%  Comment: WITH ACTIVITY ON ROOM AIR   Weight: 69.7 kg (153 lb 10.6 oz)    Height: 5' 9" (1.753 m)       Body mass index is 22.69 kg/m².  Wt Readings from Last 3 Encounters:   06/17/24 69.7 kg (153 lb 10.6 oz)   06/05/24 70.8 kg (156 lb 1.4 oz)   05/16/24 77.4 kg (170 lb 10.2 oz)       Physical Exam  Vitals and nursing note reviewed.   Constitutional:       " General: He is not in acute distress.     Appearance: He is not ill-appearing.   HENT:      Head: Normocephalic and atraumatic.      Right Ear: External ear normal.      Left Ear: External ear normal.      Nose: Nose normal.      Mouth/Throat:      Mouth: Mucous membranes are moist.   Eyes:      Extraocular Movements: Extraocular movements intact.      Conjunctiva/sclera: Conjunctivae normal.   Cardiovascular:      Rate and Rhythm: Normal rate and regular rhythm.      Pulses: Normal pulses.      Heart sounds: No murmur heard.  Pulmonary:      Effort: Pulmonary effort is normal. No respiratory distress.      Breath sounds: No wheezing.   Chest:       Abdominal:      General: There is no distension.      Palpations: Abdomen is soft. There is no mass.      Tenderness: There is no abdominal tenderness.       Musculoskeletal:         General: No swelling.      Cervical back: Normal range of motion.   Skin:     Coloration: Skin is not jaundiced.      Findings: No rash.   Neurological:      General: No focal deficit present.      Mental Status: He is alert and oriented to person, place, and time.   Psychiatric:         Mood and Affect: Mood normal.         Thought Content: Thought content normal.         Labs:    Complete Blood Count  Lab Results   Component Value Date    RBC 4.83 06/06/2024    HGB 12.3 (L) 06/06/2024    HCT 35.7 (L) 06/06/2024    MCV 74 (L) 06/06/2024    MCH 25.5 (L) 06/06/2024    MCHC 34.5 06/06/2024    RDW 17.2 (H) 06/06/2024     06/06/2024    MPV 11.8 06/06/2024    GRAN 6.3 06/04/2024    GRAN 75.8 (H) 06/04/2024    LYMPH 0.8 (L) 06/04/2024    LYMPH 9.5 (L) 06/04/2024    MONO 0.8 06/04/2024    MONO 10.1 06/04/2024    EOS 0.2 06/04/2024    BASO 0.11 06/04/2024    EOSINOPHIL 2.6 06/04/2024    BASOPHIL 1.3 06/04/2024    DIFFMETHOD Automated 06/04/2024       Comprehensive Metabolic Panel  Lab Results   Component Value Date    GLU 98 06/06/2024    BUN 67 (H) 06/06/2024    CREATININE 5.7 (H) 06/06/2024      06/06/2024    K 4.4 06/06/2024    CL 95 06/06/2024    PROT 6.9 06/06/2024    ALBUMIN 3.1 (L) 06/06/2024    BILITOT 1.7 (H) 06/06/2024    AST 44 (H) 06/06/2024    ALKPHOS 141 (H) 06/06/2024    CO2 26 06/06/2024    ALT 20 06/06/2024    ANIONGAP 15 06/06/2024       TSH  Lab Results   Component Value Date    TSH 0.283 (L) 03/04/2024       Imaging:  SURG FL Surgery Fluoro Usage  See OP Notes for results.     IMPRESSION: See OP Notes for results.     This procedure was auto-finalized by: Virtual Radiologist      Assessment/Plan:    1. Hospital discharge follow-up  Comments:  - Continue medication as directed  - Follow with nephrology, cardiology    2. ESRD (end stage renal disease)  Comments:  - Continue medication as directed  - Follow with nephrology, cardiology  Orders:  -     vitamin renal formula, B-complex-vitamin c-folic acid, (TRIPHROCAPS) 1 mg Cap; Take 1 capsule by mouth once daily.  Dispense: 90 capsule; Refill: 3  -     furosemide (LASIX) 80 MG tablet; Take 4.5 tablets (360 mg total) by mouth 2 (two) times daily.  Dispense: 180 tablet; Refill: 3  -     Cancel: OXYGEN FOR HOME USE  -     OXYGEN FOR HOME USE    3. Hypoxemia  Comments:  - Continue medication as directed  - Ordered home oxygen again  - Follow with nephrology, cardiology  Orders:  -     Cancel: OXYGEN FOR HOME USE    4. Pulmonary emphysema, unspecified emphysema type  -     OXYGEN FOR HOME USE    5. Acute on chronic combined systolic (congestive) and diastolic (congestive) heart failure  -     furosemide (LASIX) 80 MG tablet; Take 4.5 tablets (360 mg total) by mouth 2 (two) times daily.  Dispense: 180 tablet; Refill: 3  -     Cancel: OXYGEN FOR HOME USE  -     OXYGEN FOR HOME USE         Discussed how to stay healthy including: diet, exercise, refraining from smoking and discussed screening exams / tests needed for age, sex and family Hx.    RTC every 3-6 months with PCP, or PRN    Shanell José PA-C

## 2024-06-18 RX ORDER — ALLOPURINOL 100 MG/1
100 TABLET ORAL
Qty: 90 TABLET | Refills: 3 | Status: SHIPPED | OUTPATIENT
Start: 2024-06-18

## 2024-06-18 NOTE — TELEPHONE ENCOUNTER
No care due was identified.  [facility] Embedded Care Due Messages. Reference number: [id number].   6/18/2024 12:23:05 PM CDT

## 2024-06-26 ENCOUNTER — TELEPHONE (OUTPATIENT)
Dept: FAMILY MEDICINE | Facility: CLINIC | Age: 64
End: 2024-06-26
Payer: MEDICARE

## 2024-06-26 NOTE — TELEPHONE ENCOUNTER
----- Message from Stacy James sent at 6/26/2024  3:49 PM CDT -----  Type:  Needs Medical Advice    Who Called: Nicolette  Symptoms (please be specific): cold in both eyes    How long has patient had these symptoms:  a few weeks   Pharmacy name and phone #:  [id number] - [address] AT [facility] [facility]   Phone: [phone]  Fax: [phone]  Would the patient rather a call back or a response via [facility]? Call back   Best Call Back Number: [phone]  Additional Information: pt states when first started it was not that much but now it's all day and irritating and hard to keep eye clear... last night he put warm towels on his eyes and it did not help

## 2024-06-30 NOTE — PROGRESS NOTES
[facility] Cardiology [address]     SUBJECTIVE:     History of Present Illness:  Patient is a 63 y.o. male presents to establish cardiac care. Seen by specialists (, )     Primary Diagnosis:   Hypertension: positive  DM: none  Smoker: quit 15 years   Family history of early CAD  Heart disease : V tach ablated twice in [place]   CKD 5 on  dialysis  CAD  Nonischemic  cardiomyopathy   CRT-D Medtronic upgrade 2016 from ICD  ROS  No chest pains  No shortness of breath; no PND or orthopnea  No syncope  No ICD shocks  Blood pressures: 90's is his norm  Activity: limited since on dialysis   Review of patient's allergies indicates:   Allergen Reactions    Lokelma [sodium zirconium cyclosilicate] Other (See Comments)     Fluid retention, weight gain, CHF excerebration, severe constipation    Atorvastatin Other (See Comments)     Joint pain    Jardiance [empagliflozin] Other (See Comments)     Chest pains, significant weight loss, lower blood pressure       Past Medical History:   Diagnosis Date    Cardiomyopathy     CKD (chronic kidney disease) stage 4, GFR 15-29 ml/min     Congestive heart failure (CHF) 2015    COPD (chronic obstructive pulmonary disease)     Coronary artery disease     Edema     Essential (primary) hypertension 05/18/2022    Formatting of this note might be different from the original. Converted from [employer]: Description - ESSENTIAL HYPERTENSION, BENIGN    Heart attack     HLD (hyperlipidemia)     Hypertension     Hyperuricemia     Hypocalcemia     Renal cyst, left     Secondary hyperparathyroidism     Thyroid disease     V tach     Vitamin D deficiency        Past Surgical History:   Procedure Laterality Date    ablations  03/05/2018    albations  02/01/2018    COLONOSCOPY N/A 12/07/2018    Procedure: COLONOSCOPY/suprep;  Surgeon: Ananya Morillo MD;  Location: [facility];  Service: Endoscopy;  Laterality: N/A;    defibulater N/A 2016    ESOPHAGOGASTRODUODENOSCOPY N/A 12/07/2018    Procedure: EGD  (ESOPHAGOGASTRODUODENOSCOPY);  Surgeon: Ananya Morillo MD;  Location: [facility] ENDO;  Service: Endoscopy;  Laterality: N/A;    INSERTION OF TUNNELED CENTRAL VENOUS HEMODIALYSIS CATHETER Right 5/3/2024    Procedure: INSERTION, CATHETER, HEMODIALYSIS, DUAL LUMEN;  Surgeon: Philip Perez MD;  Location: [facility] OR;  Service: General;  Laterality: Right;    INSERTION, CATHETER, DIALYSIS, PERITONEAL, LAPAROSCOPIC N/A 5/8/2024    Procedure: INSERTION, CATHETER, DIALYSIS, PERITONEAL, LAPAROSCOPIC;  Surgeon: Tyree Ruiz MD;  Location: [facility] OR;  Service: General;  Laterality: N/A;    INSERTION, CENTRAL VENOUS ACCESS DEVICE Right 6/5/2024    Procedure: Insertion,central venous access device;  Surgeon: Tyree Ruiz MD;  Location: [facility] OR;  Service: General;  Laterality: Right;    JOINT REPLACEMENT Bilateral 10/2016    knees, bilat    LEFT HEART CATHETERIZATION N/A 12/16/2020    Procedure: Left heart cath;  Surgeon: Shahram Stewart MD;  Location: [facility] CATH LAB/EP;  Service: Cardiology;  Laterality: N/A;    LEFT HEART CATHETERIZATION Left 9/27/2022    Procedure: Left heart cath;  Surgeon: Juan Roque MD;  Location: [facility] CATH LAB/EP;  Service: Cardiology;  Laterality: Left;    NM HEMODIALYSIS, ONE EVALUATION  5/6/2024    REMOVAL OF HEMODIALYSIS CATHETER Right 5/3/2024    Procedure: REMOVAL, CATHETER, HEMODIALYSIS;  Surgeon: Philip Perez MD;  Location: [facility] OR;  Service: General;  Laterality: Right;    REPLACEMENT OF IMPLANTABLE CARDIOVERTER-DEFIBRILLATOR (ICD) GENERATOR Left 1/24/2023    Procedure: REPLACEMENT, ICD GENERATOR;  Surgeon: River Tenorio MD;  Location: [facility] EP LAB;  Service: Cardiology;  Laterality: Left;  ANTHONY, CRTD gen chg, MDT, MAC, DM, 3prep           Past Hospitalization:     6/24: treatment of HD cath displacement     Cardiac meds:  Pravastatin 40 mg  Midodrine 15 mg TID  Mexiletene 150 mg BID  Metolazone ?   Thyroid  Lasix 180  mg BID  ASA 81 mg  Amiodarone 400 mg daily  "      OBJECTIVE:     Vital Signs (Most Recent)  Vitals:    24 1017   BP: (!) 89/56   Pulse: 65   SpO2: (!) 91%   Weight: 69.3 kg (152 lb 10.7 oz)   Height: 5' 9" (1.753 m)         Physical Exam:  Neck: normal carotids, no bruits; normal JVP  Lungs :clear  Heart: RR, normal S1,S2, systolic ejection murmur LSB, no gallops  Abd: no masses; no bruits;   Exts: normal DP and PT pulses bilaterally, normal radials; no edema noted               Labs  : abnormal LFT's    Diagnostic Results:    1.EK/24: AV pacing noted   2.Echo: : EF 40-45%; diastolic dysfunction; RVE, mild reduction RV function; LAE, OPAL, mild to moderate aortic stenosis; mild MR, mild to moderate TR; PAS 64; akinetic apex   3. Stress test  4. Cath: : nonobstructive disease   5. Device: CRT-D : medtronics; last generator change : remote check : no events     Chart review:        ASSESSMENT/PLAN:     Cardiomyopathy: nonischemic with improved EF from 30 % to 40-45%. Markedly limited in medications due to low blood pressures  CKD on dialysis  Episodes of Vtach and V fib : followed by      Plan: continue the same medications  Close followup with   Return 3 months     Frank Muñiz MD        "

## 2024-07-01 ENCOUNTER — OFFICE VISIT (OUTPATIENT)
Dept: CARDIOLOGY | Facility: CLINIC | Age: 64
End: 2024-07-01
Payer: MEDICARE

## 2024-07-01 VITALS
HEIGHT: 69 IN | DIASTOLIC BLOOD PRESSURE: 56 MMHG | WEIGHT: 152.69 LBS | BODY MASS INDEX: 22.62 KG/M2 | SYSTOLIC BLOOD PRESSURE: 89 MMHG | HEART RATE: 65 BPM | OXYGEN SATURATION: 91 %

## 2024-07-01 DIAGNOSIS — I50.20 HFREF (HEART FAILURE WITH REDUCED EJECTION FRACTION): ICD-10-CM

## 2024-07-01 DIAGNOSIS — Z95.810 PRESENCE OF AUTOMATIC (IMPLANTABLE) CARDIAC DEFIBRILLATOR: Primary | ICD-10-CM

## 2024-07-01 DIAGNOSIS — N18.5 CHRONIC KIDNEY DISEASE (CKD), STAGE V: ICD-10-CM

## 2024-07-01 PROCEDURE — 99999 PR PBB SHADOW E&M-EST. PATIENT-LVL III: CPT | Mod: PBBFAC,,, | Performed by: INTERNAL MEDICINE

## 2024-07-01 RX ORDER — AMIODARONE HYDROCHLORIDE 400 MG/1
400 TABLET ORAL DAILY
Qty: 90 TABLET | Refills: 3 | Status: SHIPPED | OUTPATIENT
Start: 2024-07-01 | End: 2025-07-01

## 2024-07-08 ENCOUNTER — TELEPHONE (OUTPATIENT)
Dept: FAMILY MEDICINE | Facility: CLINIC | Age: 64
End: 2024-07-08
Payer: MEDICARE

## 2024-07-08 NOTE — TELEPHONE ENCOUNTER
----- Message from Stacy James sent at 7/8/2024 12:55 PM CDT -----  Type:  Needs Medical Advice    Who Called:  Cam / Home Health Nurse   Symptoms (please be specific): pain   Would the patient rather a call back or a response via [employer]? Call back  Best Call Back Number: [phone]   Additional Information: pt wants to know if he can take ibuprofen  or aleve for pain now that he is taking dialysis .. He was not able to take it before

## 2024-08-06 RX ORDER — POLYETHYLENE GLYCOL 3350 17 G/17G
17 POWDER, FOR SOLUTION ORAL DAILY
Qty: 30 PACKET | Refills: 11 | Status: SHIPPED | OUTPATIENT
Start: 2024-08-06

## 2024-08-09 RX ORDER — MIDODRINE HYDROCHLORIDE 5 MG/1
15 TABLET ORAL EVERY 8 HOURS
Qty: 270 TABLET | Refills: 1 | Status: SHIPPED | OUTPATIENT
Start: 2024-08-09 | End: 2025-08-09

## 2024-08-10 ENCOUNTER — EXTERNAL HOME HEALTH (OUTPATIENT)
Dept: HOME HEALTH SERVICES | Facility: HOSPITAL | Age: 64
End: 2024-08-10
Payer: MEDICARE

## 2024-08-11 ENCOUNTER — CLINICAL SUPPORT (OUTPATIENT)
Dept: CARDIOLOGY | Facility: HOSPITAL | Age: 64
End: 2024-08-11
Payer: MEDICARE

## 2024-08-11 ENCOUNTER — CLINICAL SUPPORT (OUTPATIENT)
Dept: CARDIOLOGY | Facility: HOSPITAL | Age: 64
End: 2024-08-11
Attending: INTERNAL MEDICINE
Payer: MEDICARE

## 2024-08-11 DIAGNOSIS — Z95.810 PRESENCE OF AUTOMATIC (IMPLANTABLE) CARDIAC DEFIBRILLATOR: ICD-10-CM

## 2024-08-11 PROCEDURE — 93295 DEV INTERROG REMOTE 1/2/MLT: CPT | Mod: ,,, | Performed by: INTERNAL MEDICINE

## 2024-08-11 PROCEDURE — 93296 REM INTERROG EVL PM/IDS: CPT | Performed by: INTERNAL MEDICINE

## 2024-08-12 PROBLEM — J96.01 ACUTE HYPOXEMIC RESPIRATORY FAILURE: Status: RESOLVED | Noted: 2024-04-30 | Resolved: 2024-08-12

## 2024-08-17 LAB
OHS CV AF BURDEN PERCENT: < 1
OHS CV BIV PACING PERCENT: 99.91 %
OHS CV DC REMOTE DEVICE TYPE: NORMAL
OHS CV RV PACING PERCENT: 99.95 %

## 2024-08-20 RX ORDER — ASPIRIN 81 MG/1
81 TABLET ORAL
Qty: 90 TABLET | Refills: 1 | Status: SHIPPED | OUTPATIENT
Start: 2024-08-20

## 2024-09-04 ENCOUNTER — LAB VISIT (OUTPATIENT)
Dept: LAB | Facility: HOSPITAL | Age: 64
End: 2024-09-04
Attending: STUDENT IN AN ORGANIZED HEALTH CARE EDUCATION/TRAINING PROGRAM
Payer: MEDICARE

## 2024-09-04 DIAGNOSIS — E03.9 HYPOTHYROIDISM, UNSPECIFIED TYPE: ICD-10-CM

## 2024-09-04 DIAGNOSIS — I50.20 HFREF (HEART FAILURE WITH REDUCED EJECTION FRACTION): ICD-10-CM

## 2024-09-04 DIAGNOSIS — E78.2 MIXED HYPERLIPIDEMIA: ICD-10-CM

## 2024-09-04 DIAGNOSIS — N18.5 CHRONIC KIDNEY DISEASE (CKD), STAGE V: ICD-10-CM

## 2024-09-04 LAB
ALBUMIN SERPL BCP-MCNC: 4.8 G/DL (ref 3.5–5.2)
ALP SERPL-CCNC: 160 U/L (ref 38–126)
ALT SERPL W/O P-5'-P-CCNC: 68 U/L (ref 10–44)
ANION GAP SERPL CALC-SCNC: 15 MMOL/L (ref 8–16)
AST SERPL-CCNC: 99 U/L (ref 15–46)
BASOPHILS # BLD AUTO: 0.13 K/UL (ref 0–0.2)
BASOPHILS NFR BLD: 1.4 % (ref 0–1.9)
BILIRUB SERPL-MCNC: 1.7 MG/DL (ref 0.1–1)
CALCIUM SERPL-MCNC: 9.2 MG/DL (ref 8.7–10.5)
CHLORIDE SERPL-SCNC: 94 MMOL/L (ref 95–110)
CHOLEST SERPL-MCNC: 189 MG/DL (ref 120–199)
CHOLEST/HDLC SERPL: 3.5 {RATIO} (ref 2–5)
CO2 SERPL-SCNC: 32 MMOL/L (ref 23–29)
CREAT SERPL-MCNC: 2.93 MG/DL (ref 0.5–1.4)
DIFFERENTIAL METHOD BLD: ABNORMAL
EOSINOPHIL # BLD AUTO: 0.3 K/UL (ref 0–0.5)
EOSINOPHIL NFR BLD: 2.9 % (ref 0–8)
ERYTHROCYTE [DISTWIDTH] IN BLOOD BY AUTOMATED COUNT: 17.5 % (ref 11.5–14.5)
EST. GFR  (NO RACE VARIABLE): 23.1 ML/MIN/1.73 M^2
GLUCOSE SERPL-MCNC: 117 MG/DL (ref 70–110)
HCT VFR BLD AUTO: 43.9 % (ref 40–54)
HDLC SERPL-MCNC: 54 MG/DL (ref 40–75)
HDLC SERPL: 28.6 % (ref 20–50)
HGB BLD-MCNC: 14.4 G/DL (ref 14–18)
IMM GRANULOCYTES # BLD AUTO: 0.1 K/UL (ref 0–0.04)
IMM GRANULOCYTES NFR BLD AUTO: 1.1 % (ref 0–0.5)
LDLC SERPL CALC-MCNC: 101.6 MG/DL (ref 63–159)
LYMPHOCYTES # BLD AUTO: 1 K/UL (ref 1–4.8)
LYMPHOCYTES NFR BLD: 10.8 % (ref 18–48)
MCH RBC QN AUTO: 26 PG (ref 27–31)
MCHC RBC AUTO-ENTMCNC: 32.8 G/DL (ref 32–36)
MCV RBC AUTO: 79 FL (ref 82–98)
MONOCYTES # BLD AUTO: 0.8 K/UL (ref 0.3–1)
MONOCYTES NFR BLD: 8 % (ref 4–15)
NEUTROPHILS # BLD AUTO: 7.2 K/UL (ref 1.8–7.7)
NEUTROPHILS NFR BLD: 75.8 % (ref 38–73)
NONHDLC SERPL-MCNC: 135 MG/DL
NRBC BLD-RTO: 0 /100 WBC
PLATELET # BLD AUTO: 578 K/UL (ref 150–450)
PMV BLD AUTO: 11.4 FL (ref 9.2–12.9)
POTASSIUM SERPL-SCNC: 3.8 MMOL/L (ref 3.5–5.1)
PROT SERPL-MCNC: 9 G/DL (ref 6–8.4)
RBC # BLD AUTO: 5.54 M/UL (ref 4.6–6.2)
SODIUM SERPL-SCNC: 141 MMOL/L (ref 136–145)
TRIGL SERPL-MCNC: 167 MG/DL (ref 30–150)
TSH SERPL DL<=0.005 MIU/L-ACNC: 1.74 UIU/ML (ref 0.4–4)
UUN UR-MCNC: 31 MG/DL (ref 2–20)
WBC # BLD AUTO: 9.47 K/UL (ref 3.9–12.7)

## 2024-09-04 PROCEDURE — 80061 LIPID PANEL: CPT | Performed by: STUDENT IN AN ORGANIZED HEALTH CARE EDUCATION/TRAINING PROGRAM

## 2024-09-04 PROCEDURE — 80053 COMPREHEN METABOLIC PANEL: CPT | Mod: PN | Performed by: STUDENT IN AN ORGANIZED HEALTH CARE EDUCATION/TRAINING PROGRAM

## 2024-09-04 PROCEDURE — 36415 COLL VENOUS BLD VENIPUNCTURE: CPT | Mod: PN | Performed by: STUDENT IN AN ORGANIZED HEALTH CARE EDUCATION/TRAINING PROGRAM

## 2024-09-04 PROCEDURE — 85025 COMPLETE CBC W/AUTO DIFF WBC: CPT | Mod: PN | Performed by: STUDENT IN AN ORGANIZED HEALTH CARE EDUCATION/TRAINING PROGRAM

## 2024-09-04 PROCEDURE — 84443 ASSAY THYROID STIM HORMONE: CPT | Mod: PN | Performed by: STUDENT IN AN ORGANIZED HEALTH CARE EDUCATION/TRAINING PROGRAM

## 2024-09-10 ENCOUNTER — OFFICE VISIT (OUTPATIENT)
Dept: SURGERY | Facility: CLINIC | Age: 64
End: 2024-09-10
Payer: MEDICARE

## 2024-09-10 DIAGNOSIS — N18.6 ESRD (END STAGE RENAL DISEASE): Primary | ICD-10-CM

## 2024-09-10 PROCEDURE — 3044F HG A1C LEVEL LT 7.0%: CPT | Mod: CPTII,S$GLB,, | Performed by: STUDENT IN AN ORGANIZED HEALTH CARE EDUCATION/TRAINING PROGRAM

## 2024-09-10 PROCEDURE — 99213 OFFICE O/P EST LOW 20 MIN: CPT | Mod: S$GLB,,, | Performed by: STUDENT IN AN ORGANIZED HEALTH CARE EDUCATION/TRAINING PROGRAM

## 2024-09-10 PROCEDURE — 1160F RVW MEDS BY RX/DR IN RCRD: CPT | Mod: CPTII,S$GLB,, | Performed by: STUDENT IN AN ORGANIZED HEALTH CARE EDUCATION/TRAINING PROGRAM

## 2024-09-10 PROCEDURE — 1159F MED LIST DOCD IN RCRD: CPT | Mod: CPTII,S$GLB,, | Performed by: STUDENT IN AN ORGANIZED HEALTH CARE EDUCATION/TRAINING PROGRAM

## 2024-09-10 PROCEDURE — 99999 PR PBB SHADOW E&M-EST. PATIENT-LVL III: CPT | Mod: PBBFAC,,, | Performed by: STUDENT IN AN ORGANIZED HEALTH CARE EDUCATION/TRAINING PROGRAM

## 2024-09-10 PROCEDURE — 3066F NEPHROPATHY DOC TX: CPT | Mod: CPTII,S$GLB,, | Performed by: STUDENT IN AN ORGANIZED HEALTH CARE EDUCATION/TRAINING PROGRAM

## 2024-09-15 NOTE — PROGRESS NOTES
Patient ID: Ar Sharma is a 63 y.o. male.    Chief Complaint: No chief complaint on file.      HPI:  HPI  63M known to me from previous placement of PD catheter 3 months ago. Working well but he feels SOB, bloated. No pain. Prefers to switch to HD. Has working [facility] tunneled HD line   Sees dr faustin in October.      Review of Systems   Constitutional:  Negative for chills, diaphoresis and fever.   HENT:  Negative for trouble swallowing.    Respiratory:  Negative for cough, shortness of breath, wheezing and stridor.    Cardiovascular:  Negative for chest pain and palpitations.   Gastrointestinal:  Negative for abdominal distention, abdominal pain, blood in stool, diarrhea, nausea and vomiting.   Endocrine: Negative for cold intolerance and heat intolerance.   Genitourinary:  Negative for difficulty urinating.   Musculoskeletal:  Negative for back pain.   Skin:  Negative for rash.   Allergic/Immunologic: Negative for immunocompromised state.   Neurological:  Negative for dizziness, syncope and numbness.   Hematological:  Negative for adenopathy.   Psychiatric/Behavioral:  Negative for agitation.        Current Outpatient Medications   Medication Sig Dispense Refill    acetaminophen (TYLENOL) 500 MG tablet Take 500 mg by mouth every 6 (six) hours as needed for Pain.      allopurinoL (ZYLOPRIM) 100 MG tablet TAKE 1 TABLET BY MOUTH EVERY DAY 90 tablet 3    amiodarone (PACERONE) 400 MG tablet Take 1 tablet (400 mg total) by mouth once daily. 90 tablet 3    aspirin (ECOTRIN) 81 MG EC tablet TAKE 1 TABLET BY MOUTH EVERY DAY 90 tablet 1    ergocalciferol (ERGOCALCIFEROL) 50,000 unit Cap TAKE 1 CAPSULE BY MOUTH ONE TIME PER WEEK (Patient taking differently: Take 50,000 Units by mouth every Monday.) 12 capsule 3    furosemide (LASIX) 80 MG tablet Take 4.5 tablets (360 mg total) by mouth 2 (two) times daily. 180 tablet 3    levothyroxine (SYNTHROID) 150 MCG tablet Take 1 tablet (150 mcg total) by mouth once daily. 90 tablet  3    metOLazone (ZAROXOLYN) 10 MG tablet Take 10 mg by mouth As instructed (Take I tablet by mouth Tuesday, Thursday, Saturday, Sunday). Take I tablet by mouth Tuesday, Thursday, Saturday, Sunday      mexiletine (MEXITIL) 150 MG Cap Take 1 capsule (150 mg total) by mouth 2 (two) times daily with meals. 180 capsule 3    midodrine (PROAMATINE) 5 MG Tab Take 3 tablets (15 mg total) by mouth every 8 (eight) hours. 270 tablet 1    nitroGLYCERIN (NITROSTAT) 0.4 MG SL tablet Place 1 tablet (0.4 mg total) under the tongue every 5 (five) minutes as needed for Chest pain. 20 tablet 1    pantoprazole (PROTONIX) 40 MG tablet Take 1 tablet (40 mg total) by mouth once daily. 90 tablet 3    polyethylene glycol (GLYCOLAX) 17 gram PwPk Take 17 g by mouth once daily. 30 packet 11    pravastatin (PRAVACHOL) 40 MG tablet TAKE 1 TABLET BY MOUTH EVERY DAY (Patient taking differently: Take 40 mg by mouth every evening.) 90 tablet 3    vitamin renal formula, B-complex-vitamin c-folic acid, (TRIPHROCAPS) 1 mg Cap Take 1 capsule by mouth once daily. 90 capsule 3     No current facility-administered medications for this visit.       Review of patient's allergies indicates:   Allergen Reactions    Lokelma [sodium zirconium cyclosilicate] Other (See Comments)     Fluid retention, weight gain, CHF excerebration, severe constipation    Atorvastatin Other (See Comments)     Joint pain    Jardiance [empagliflozin] Other (See Comments)     Chest pains, significant weight loss, lower blood pressure       Past Medical History:   Diagnosis Date    Cardiomyopathy     CKD (chronic kidney disease) stage 4, GFR 15-29 ml/min     Congestive heart failure (CHF) 2015    COPD (chronic obstructive pulmonary disease)     Coronary artery disease     Edema     Essential (primary) hypertension 05/18/2022    Formatting of this note might be different from the original. Converted from [employer]: Description - ESSENTIAL HYPERTENSION, BENIGN    Heart attack     HLD  (hyperlipidemia)     Hypertension     Hyperuricemia     Hypocalcemia     Renal cyst, left     Secondary hyperparathyroidism     Thyroid disease     V tach     Vitamin D deficiency        Past Surgical History:   Procedure Laterality Date    ablations  03/05/2018    albations  02/01/2018    COLONOSCOPY N/A 12/07/2018    Procedure: COLONOSCOPY/suprep;  Surgeon: Ananya Morillo MD;  Location: [facility] ENDO;  Service: Endoscopy;  Laterality: N/A;    defibulater N/A 2016    ESOPHAGOGASTRODUODENOSCOPY N/A 12/07/2018    Procedure: EGD (ESOPHAGOGASTRODUODENOSCOPY);  Surgeon: Ananya Morillo MD;  Location: [facility] ENDO;  Service: Endoscopy;  Laterality: N/A;    INSERTION OF TUNNELED CENTRAL VENOUS HEMODIALYSIS CATHETER Right 5/3/2024    Procedure: INSERTION, CATHETER, HEMODIALYSIS, DUAL LUMEN;  Surgeon: Philip Perez MD;  Location: [facility] OR;  Service: General;  Laterality: Right;    INSERTION, CATHETER, DIALYSIS, PERITONEAL, LAPAROSCOPIC N/A 5/8/2024    Procedure: INSERTION, CATHETER, DIALYSIS, PERITONEAL, LAPAROSCOPIC;  Surgeon: Tyree Ruiz MD;  Location: [facility] OR;  Service: General;  Laterality: N/A;    INSERTION, CENTRAL VENOUS ACCESS DEVICE Right 6/5/2024    Procedure: Insertion,central venous access device;  Surgeon: Tyree Ruiz MD;  Location: [facility];  Service: General;  Laterality: Right;    JOINT REPLACEMENT Bilateral 10/2016    knees, bilat    LEFT HEART CATHETERIZATION N/A 12/16/2020    Procedure: Left heart cath;  Surgeon: Shahram Stewart MD;  Location: [facility] CATH LAB/EP;  Service: Cardiology;  Laterality: N/A;    LEFT HEART CATHETERIZATION Left 9/27/2022    Procedure: Left heart cath;  Surgeon: Juan Roque MD;  Location: [facility] CATH LAB/EP;  Service: Cardiology;  Laterality: Left;    VA HEMODIALYSIS, ONE EVALUATION  5/6/2024    REMOVAL OF HEMODIALYSIS CATHETER Right 5/3/2024    Procedure: REMOVAL, CATHETER, HEMODIALYSIS;  Surgeon: Philip Perez MD;  Location: [facility];  Service: General;   Laterality: Right;    REPLACEMENT OF IMPLANTABLE CARDIOVERTER-DEFIBRILLATOR (ICD) GENERATOR Left 2023    Procedure: REPLACEMENT, ICD GENERATOR;  Surgeon: River Tenorio MD;  Location: [facility];  Service: Cardiology;  Laterality: Left;  ANTHONY, CRTD gen chg, MDT, MAC, DM, 3prep       Social History     Socioeconomic History    Marital status:    Tobacco Use    Smoking status: Former     Current packs/day: 0.00     Average packs/day: 1 pack/day for 33.2 years (33.2 ttl pk-yrs)     Types: Cigarettes     Start date: 1979     Quit date: 3/3/2012     Years since quittin.5     Passive exposure: Past    Smokeless tobacco: Never   Substance and Sexual Activity    Alcohol use: Not Currently     Comment: rare    Drug use: No    Sexual activity: Yes     Partners: Female     Social Determinants of Health     Financial Resource Strain: Low Risk  (2024)    Overall Financial Resource Strain (CARDIA)     Difficulty of Paying Living Expenses: Not hard at all   Food Insecurity: No Food Insecurity (2024)    Hunger Vital Sign     Worried About Running Out of Food in the Last Year: Never true     Ran Out of Food in the Last Year: Never true   Transportation Needs: No Transportation Needs (2024)    TRANSPORTATION NEEDS     Transportation : No   Physical Activity: Insufficiently Active (2024)    Exercise Vital Sign     Days of Exercise per Week: 2 days     Minutes of Exercise per Session: 10 min   Stress: Stress Concern Present (2024)    [employer] [place] [employer] - Occupational Stress Questionnaire     Feeling of Stress : To some extent   Housing Stability: Low Risk  (2024)    Housing Stability Vital Sign     Unable to Pay for Housing in the Last Year: No     Homeless in the Last Year: No       Vitals:    09/10/24 1438   BP: (!) (P) 92/56   Pulse: (P) 73       Physical Exam  Constitutional:       General: He is not in acute distress.  HENT:      Head: Normocephalic and  atraumatic.   Eyes:      General: No scleral icterus.  Cardiovascular:      Rate and Rhythm: Normal rate.   Pulmonary:      Effort: Pulmonary effort is normal. No respiratory distress.      Breath sounds: No stridor.   Abdominal:      Palpations: Abdomen is soft.      Tenderness: There is no abdominal tenderness.   Lymphadenopathy:      Cervical: No cervical adenopathy.   Skin:     General: Skin is warm.      Findings: No erythema.   Neurological:      Mental Status: He is alert and oriented to person, place, and time.   Psychiatric:         Behavior: Behavior normal.     PD site looks fine. No drainage.     Assessment & Plan:  63m with esrd on HD via [facility] tunneled line  PD catheter in place  Can removed in OR. Will have him call after he sees vascular. Can pick date for removal over the phone.

## 2024-09-29 NOTE — PROGRESS NOTES
[facility] Cardiology [id number]     SUBJECTIVE:     History of Present Illness:  Patient is a 63 y.o. male presents to establish cardiac care. Seen by specialists (, ) . Went to the ER 9/30/24 for heart rate of 120 from dialysis. HR in triage was down to 88. Was referred back to dialysis to finish and was discharged from the ER. Took load of amiodarone prior to the ER and felt better     Primary Diagnosis:   Hypertension: positive  DM: none  Smoker: quit 15 years   Family history of early CAD  Heart disease : V tach ablated twice in [place]   CKD 5 on  dialysis  CAD  Nonischemic  cardiomyopathy   CRT-D Med[employer] upgrade 2016 from ICD  ROS  Since last visit 7/24  No chest pains  No shortness of breath; no PND or orthopnea  No syncope  No ICD shocks  Blood pressures: 90's is his norm  Activity: limited since on dialysis   Review of patient's allergies indicates:   Allergen Reactions    Lokelma [sodium zirconium cyclosilicate] Other (See Comments)     Fluid retention, weight gain, CHF excerebration, severe constipation    Atorvastatin Other (See Comments)     Joint pain    Jardiance [empagliflozin] Other (See Comments)     Chest pains, significant weight loss, lower blood pressure       Past Medical History:   Diagnosis Date    Cardiomyopathy     CKD (chronic kidney disease) stage 4, GFR 15-29 ml/min     Congestive heart failure (CHF) 2015    COPD (chronic obstructive pulmonary disease)     Coronary artery disease     Edema     Essential (primary) hypertension 05/18/2022    Formatting of this note might be different from the original. Converted from Centricity: Description - ESSENTIAL HYPERTENSION, BENIGN    Heart attack     HLD (hyperlipidemia)     Hypertension     Hyperuricemia     Hypocalcemia     Renal cyst, left     Secondary hyperparathyroidism     Thyroid disease     V tach     Vitamin D deficiency        Past Surgical History:   Procedure Laterality Date    ablations  03/05/2018    albations  02/01/2018     COLONOSCOPY N/A 12/07/2018    Procedure: COLONOSCOPY/suprep;  Surgeon: Ananya Morillo MD;  Location: [facility] ENDO;  Service: Endoscopy;  Laterality: N/A;    defibulater N/A 2016    ESOPHAGOGASTRODUODENOSCOPY N/A 12/07/2018    Procedure: EGD (ESOPHAGOGASTRODUODENOSCOPY);  Surgeon: Ananya Morillo MD;  Location: [facility] ENDO;  Service: Endoscopy;  Laterality: N/A;    INSERTION OF TUNNELED CENTRAL VENOUS HEMODIALYSIS CATHETER Right 5/3/2024    Procedure: INSERTION, CATHETER, HEMODIALYSIS, DUAL LUMEN;  Surgeon: Philip Perez MD;  Location: [facility] OR;  Service: General;  Laterality: Right;    INSERTION, CATHETER, DIALYSIS, PERITONEAL, LAPAROSCOPIC N/A 5/8/2024    Procedure: INSERTION, CATHETER, DIALYSIS, PERITONEAL, LAPAROSCOPIC;  Surgeon: Tyree Ruiz MD;  Location: [facility] OR;  Service: General;  Laterality: N/A;    INSERTION, CENTRAL VENOUS ACCESS DEVICE Right 6/5/2024    Procedure: Insertion,central venous access device;  Surgeon: Tyree Ruiz MD;  Location: [facility] OR;  Service: General;  Laterality: Right;    JOINT REPLACEMENT Bilateral 10/2016    knees, bilat    LEFT HEART CATHETERIZATION N/A 12/16/2020    Procedure: Left heart cath;  Surgeon: Shahram Stewart MD;  Location: [facility] CATH LAB/EP;  Service: Cardiology;  Laterality: N/A;    LEFT HEART CATHETERIZATION Left 9/27/2022    Procedure: Left heart cath;  Surgeon: Juan Roque MD;  Location: [facility] CATH LAB/EP;  Service: Cardiology;  Laterality: Left;    ND HEMODIALYSIS, ONE EVALUATION  5/6/2024    REMOVAL OF HEMODIALYSIS CATHETER Right 5/3/2024    Procedure: REMOVAL, CATHETER, HEMODIALYSIS;  Surgeon: Philip Perez MD;  Location: [facility] OR;  Service: General;  Laterality: Right;    REPLACEMENT OF IMPLANTABLE CARDIOVERTER-DEFIBRILLATOR (ICD) GENERATOR Left 1/24/2023    Procedure: REPLACEMENT, ICD GENERATOR;  Surgeon: River Tenorio MD;  Location: [facility] EP LAB;  Service: Cardiology;  Laterality: Left;  ANTHONY, CRTD gen chg, MDT, MAC, DM, 3prep  "          Past Hospitalization:     : treatment of HD cath displacement     Cardiac meds:  Pravastatin 40 mg  Midodrine 15 mg TID  Mexiletene 150 mg BID  Metolazone ?   Thyroid  Lasix 180  mg BID  ASA 81 mg  Amiodarone 400 mg daily       OBJECTIVE:     Vital Signs (Most Recent)  Vitals:    10/01/24 1318   BP: (!) 86/53   Pulse: 74   SpO2: (!) 91%   Weight: 71.2 kg (156 lb 13.7 oz)   Height: 5' 9" (1.753 m)           Physical Exam:  Neck: normal carotids, no bruits; normal JVP  Lungs :clear  Heart: RR, normal S1,S2, systolic ejection murmur LSB, no gallops  Abd: no masses; no bruits;   Exts: normal DP and PT pulses bilaterally, normal radials; no edema noted               Labs  : abnormal LFT's    Diagnostic Results:    1.EK/24: AV pacing noted   1b. EK24: ventricular paced rhythm   2.Echo: : EF 40-45%; diastolic dysfunction; RVE, mild reduction RV function; LAE, OPAL, mild to moderate aortic stenosis; mild MR, mild to moderate TR; PAS 64; akinetic apex   3. Stress test  4. Cath: : nonobstructive disease   5. Device: CRT-D : medtronics; last generator change : remote check : no events ; remote check : ANTHONY 5 years; 99% paced; impedances normal; no events noted.           Chart review:        ASSESSMENT/PLAN:     Cardiomyopathy: nonischemic with improved EF from 30 % to 40-45%. Markedly limited in medications due to low blood pressures  CKD on dialysis  Episodes of Vtach and V fib : followed by    Episode yesterday of heart rate 120 - sent message to 's staff to evaluate the device if any alert sent     Plan: continue the same medications  Close followup with   Return 3 months     Frank Muñiz MD          "

## 2024-09-30 ENCOUNTER — HOSPITAL ENCOUNTER (EMERGENCY)
Facility: HOSPITAL | Age: 64
Discharge: HOME | End: 2024-09-30
Attending: EMERGENCY MEDICINE
Payer: MEDICARE

## 2024-09-30 ENCOUNTER — NURSE TRIAGE (OUTPATIENT)
Dept: ADMINISTRATIVE | Facility: CLINIC | Age: 64
End: 2024-09-30
Payer: MEDICARE

## 2024-09-30 VITALS
BODY MASS INDEX: 22.96 KG/M2 | DIASTOLIC BLOOD PRESSURE: 78 MMHG | SYSTOLIC BLOOD PRESSURE: 119 MMHG | TEMPERATURE: 98 F | OXYGEN SATURATION: 100 % | WEIGHT: 155 LBS | HEART RATE: 62 BPM | HEIGHT: 69 IN | RESPIRATION RATE: 18 BRPM

## 2024-09-30 DIAGNOSIS — N18.6 ESRD NEEDING DIALYSIS: Primary | ICD-10-CM

## 2024-09-30 DIAGNOSIS — Z99.2 ESRD NEEDING DIALYSIS: Primary | ICD-10-CM

## 2024-09-30 DIAGNOSIS — R00.0 RAPID HEART BEAT: ICD-10-CM

## 2024-09-30 LAB
ALBUMIN SERPL BCP-MCNC: 3.6 G/DL (ref 3.5–5.2)
ALP SERPL-CCNC: 122 U/L (ref 55–135)
ALT SERPL W/O P-5'-P-CCNC: 56 U/L (ref 10–44)
ANION GAP SERPL CALC-SCNC: 13 MMOL/L (ref 8–16)
AST SERPL-CCNC: 56 U/L (ref 10–40)
BASOPHILS # BLD AUTO: 0.14 K/UL (ref 0–0.2)
BASOPHILS NFR BLD: 1.5 % (ref 0–1.9)
BILIRUB SERPL-MCNC: 1.1 MG/DL (ref 0.1–1)
BUN SERPL-MCNC: 62 MG/DL (ref 8–23)
CALCIUM SERPL-MCNC: 9.4 MG/DL (ref 8.7–10.5)
CHLORIDE SERPL-SCNC: 100 MMOL/L (ref 95–110)
CO2 SERPL-SCNC: 26 MMOL/L (ref 23–29)
CREAT SERPL-MCNC: 5.8 MG/DL (ref 0.5–1.4)
DIFFERENTIAL METHOD BLD: ABNORMAL
EOSINOPHIL # BLD AUTO: 0.3 K/UL (ref 0–0.5)
EOSINOPHIL NFR BLD: 3.5 % (ref 0–8)
ERYTHROCYTE [DISTWIDTH] IN BLOOD BY AUTOMATED COUNT: 17.4 % (ref 11.5–14.5)
EST. GFR  (NO RACE VARIABLE): 10 ML/MIN/1.73 M^2
GLUCOSE SERPL-MCNC: 91 MG/DL (ref 70–110)
HCT VFR BLD AUTO: 41.2 % (ref 40–54)
HGB BLD-MCNC: 14.1 G/DL (ref 14–18)
IMM GRANULOCYTES # BLD AUTO: 0.09 K/UL (ref 0–0.04)
IMM GRANULOCYTES NFR BLD AUTO: 1 % (ref 0–0.5)
LYMPHOCYTES # BLD AUTO: 1 K/UL (ref 1–4.8)
LYMPHOCYTES NFR BLD: 10.4 % (ref 18–48)
MAGNESIUM SERPL-MCNC: 2.1 MG/DL (ref 1.6–2.6)
MCH RBC QN AUTO: 26.9 PG (ref 27–31)
MCHC RBC AUTO-ENTMCNC: 34.2 G/DL (ref 32–36)
MCV RBC AUTO: 79 FL (ref 82–98)
MONOCYTES # BLD AUTO: 0.8 K/UL (ref 0.3–1)
MONOCYTES NFR BLD: 8.7 % (ref 4–15)
NEUTROPHILS # BLD AUTO: 6.9 K/UL (ref 1.8–7.7)
NEUTROPHILS NFR BLD: 74.9 % (ref 38–73)
NRBC BLD-RTO: 0 /100 WBC
OHS QRS DURATION: 208 MS
OHS QTC CALCULATION: 608 MS
PHOSPHATE SERPL-MCNC: 4.8 MG/DL (ref 2.7–4.5)
PLATELET # BLD AUTO: 509 K/UL (ref 150–450)
PMV BLD AUTO: 11.7 FL (ref 9.2–12.9)
POTASSIUM SERPL-SCNC: 4.3 MMOL/L (ref 3.5–5.1)
PROT SERPL-MCNC: 7.7 G/DL (ref 6–8.4)
RBC # BLD AUTO: 5.25 M/UL (ref 4.6–6.2)
SODIUM SERPL-SCNC: 139 MMOL/L (ref 136–145)
WBC # BLD AUTO: 9.17 K/UL (ref 3.9–12.7)

## 2024-09-30 PROCEDURE — 87340 HEPATITIS B SURFACE AG IA: CPT | Performed by: INTERNAL MEDICINE

## 2024-09-30 PROCEDURE — 83735 ASSAY OF MAGNESIUM: CPT | Performed by: EMERGENCY MEDICINE

## 2024-09-30 PROCEDURE — 63600175 PHARM REV CODE 636 W HCPCS: Performed by: INTERNAL MEDICINE

## 2024-09-30 PROCEDURE — 96374 THER/PROPH/DIAG INJ IV PUSH: CPT

## 2024-09-30 PROCEDURE — 85025 COMPLETE CBC W/AUTO DIFF WBC: CPT | Performed by: EMERGENCY MEDICINE

## 2024-09-30 PROCEDURE — 93005 ELECTROCARDIOGRAM TRACING: CPT

## 2024-09-30 PROCEDURE — 80053 COMPREHEN METABOLIC PANEL: CPT | Performed by: EMERGENCY MEDICINE

## 2024-09-30 PROCEDURE — 25000003 PHARM REV CODE 250: Performed by: INTERNAL MEDICINE

## 2024-09-30 PROCEDURE — 93010 ELECTROCARDIOGRAM REPORT: CPT | Mod: ,,, | Performed by: INTERNAL MEDICINE

## 2024-09-30 PROCEDURE — 99285 EMERGENCY DEPT VISIT HI MDM: CPT | Mod: 25

## 2024-09-30 PROCEDURE — G0257 UNSCHED DIALYSIS ESRD PT HOS: HCPCS

## 2024-09-30 PROCEDURE — 84100 ASSAY OF PHOSPHORUS: CPT | Performed by: EMERGENCY MEDICINE

## 2024-09-30 PROCEDURE — 86706 HEP B SURFACE ANTIBODY: CPT | Performed by: INTERNAL MEDICINE

## 2024-09-30 RX ORDER — HEPARIN 100 UNIT/ML
4 SYRINGE INTRAVENOUS
Status: DISCONTINUED | OUTPATIENT
Start: 2024-09-30 | End: 2024-09-30

## 2024-09-30 RX ORDER — SODIUM CHLORIDE 9 MG/ML
INJECTION, SOLUTION INTRAVENOUS
Status: CANCELLED | OUTPATIENT
Start: 2024-09-30

## 2024-09-30 RX ORDER — HEPARIN SODIUM 1000 [USP'U]/ML
4000 INJECTION, SOLUTION INTRAVENOUS; SUBCUTANEOUS
Status: DISCONTINUED | OUTPATIENT
Start: 2024-09-30 | End: 2024-09-30 | Stop reason: HOSPADM

## 2024-09-30 RX ORDER — MIDODRINE HYDROCHLORIDE 5 MG/1
15 TABLET ORAL ONCE AS NEEDED
Status: COMPLETED | OUTPATIENT
Start: 2024-09-30 | End: 2024-09-30

## 2024-09-30 RX ORDER — MIDODRINE HYDROCHLORIDE 5 MG/1
5 TABLET ORAL ONCE AS NEEDED
Status: DISCONTINUED | OUTPATIENT
Start: 2024-09-30 | End: 2024-09-30

## 2024-09-30 RX ORDER — SODIUM CHLORIDE 9 MG/ML
INJECTION, SOLUTION INTRAVENOUS ONCE
Status: CANCELLED | OUTPATIENT
Start: 2024-09-30 | End: 2024-09-30

## 2024-09-30 RX ORDER — MUPIROCIN 20 MG/G
OINTMENT TOPICAL 2 TIMES DAILY
Status: CANCELLED | OUTPATIENT
Start: 2024-09-30 | End: 2024-10-05

## 2024-09-30 RX ADMIN — MIDODRINE HYDROCHLORIDE 15 MG: 5 TABLET ORAL at 05:09

## 2024-09-30 RX ADMIN — HEPARIN SODIUM 4000 UNITS: 1000 INJECTION, SOLUTION INTRAVENOUS; SUBCUTANEOUS at 07:09

## 2024-09-30 NOTE — CONSULTS
NEPHROLOGY CONSULT NOTE    HPI & INTERVAL HISTORY:    Past Medical History:   Diagnosis Date    Cardiomyopathy     CKD (chronic kidney disease) stage 4, GFR 15-29 ml/min     Congestive heart failure (CHF) 2015    COPD (chronic obstructive pulmonary disease)     Coronary artery disease     Edema     Essential (primary) hypertension 05/18/2022    Formatting of this note might be different from the original. Converted from [employer]ricity: Description - ESSENTIAL HYPERTENSION, BENIGN    Heart attack     HLD (hyperlipidemia)     Hypertension     Hyperuricemia     Hypocalcemia     Renal cyst, left     Secondary hyperparathyroidism     Thyroid disease     V tach     Vitamin D deficiency       Past Surgical History:   Procedure Laterality Date    ablations  03/05/2018    albations  02/01/2018    COLONOSCOPY N/A 12/07/2018    Procedure: COLONOSCOPY/suprep;  Surgeon: Ananya Morillo MD;  Location: [facility] ENDO;  Service: Endoscopy;  Laterality: N/A;    defibulater N/A 2016    ESOPHAGOGASTRODUODENOSCOPY N/A 12/07/2018    Procedure: EGD (ESOPHAGOGASTRODUODENOSCOPY);  Surgeon: Ananya Morillo MD;  Location: [facility] ENDO;  Service: Endoscopy;  Laterality: N/A;    INSERTION OF TUNNELED CENTRAL VENOUS HEMODIALYSIS CATHETER Right 5/3/2024    Procedure: INSERTION, CATHETER, HEMODIALYSIS, DUAL LUMEN;  Surgeon: Philip Perez MD;  Location: [facility] OR;  Service: General;  Laterality: Right;    INSERTION, CATHETER, DIALYSIS, PERITONEAL, LAPAROSCOPIC N/A 5/8/2024    Procedure: INSERTION, CATHETER, DIALYSIS, PERITONEAL, LAPAROSCOPIC;  Surgeon: Tyree Ruiz MD;  Location: [facility] OR;  Service: General;  Laterality: N/A;    INSERTION, CENTRAL VENOUS ACCESS DEVICE Right 6/5/2024    Procedure: Insertion,central venous access device;  Surgeon: Tyree Ruiz MD;  Location: [facility] OR;  Service: General;  Laterality: Right;    JOINT REPLACEMENT Bilateral 10/2016    knees, bilat    LEFT HEART CATHETERIZATION N/A 12/16/2020    Procedure: Left  heart cath;  Surgeon: Shahram Stewart MD;  Location: [facility] CATH LAB/EP;  Service: Cardiology;  Laterality: N/A;    LEFT HEART CATHETERIZATION Left 2022    Procedure: Left heart cath;  Surgeon: Juan Roque MD;  Location: [facility] CATH LAB/EP;  Service: Cardiology;  Laterality: Left;    MT HEMODIALYSIS, ONE EVALUATION  2024    REMOVAL OF HEMODIALYSIS CATHETER Right 5/3/2024    Procedure: REMOVAL, CATHETER, HEMODIALYSIS;  Surgeon: Philip Perez MD;  Location: [facility] OR;  Service: General;  Laterality: Right;    REPLACEMENT OF IMPLANTABLE CARDIOVERTER-DEFIBRILLATOR (ICD) GENERATOR Left 2023    Procedure: REPLACEMENT, ICD GENERATOR;  Surgeon: River Tenorio MD;  Location: [facility] EP LAB;  Service: Cardiology;  Laterality: Left;  ANTHONY, CRTD gen chg, MDT, MAC, DM, 3prep      Review of patient's allergies indicates:   Allergen Reactions    Lokelma [sodium zirconium cyclosilicate] Other (See Comments)     Fluid retention, weight gain, CHF excerebration, severe constipation    Atorvastatin Other (See Comments)     Joint pain    Jardiance [empagliflozin] Other (See Comments)     Chest pains, significant weight loss, lower blood pressure      (Not in a hospital admission)      Social History     Socioeconomic History    Marital status:    Tobacco Use    Smoking status: Former     Current packs/day: 0.00     Average packs/day: 1 pack/day for 33.2 years (33.2 ttl pk-yrs)     Types: Cigarettes     Start date: 1979     Quit date: 3/3/2012     Years since quittin.5     Passive exposure: Past    Smokeless tobacco: Never   Substance and Sexual Activity    Alcohol use: Not Currently     Comment: rare    Drug use: No    Sexual activity: Yes     Partners: Female     Social Drivers of Health     Financial Resource Strain: Low Risk  (2024)    Overall Financial Resource Strain (CARDIA)     Difficulty of Paying Living Expenses: Not hard at all   Food Insecurity: No Food Insecurity (2024)    Hunger  "Vital Sign     Worried About Running Out of Food in the Last Year: Never true     Ran Out of Food in the Last Year: Never true   Transportation Needs: No Transportation Needs (6/5/2024)    TRANSPORTATION NEEDS     Transportation : No   Physical Activity: Insufficiently Active (6/5/2024)    Exercise Vital Sign     Days of Exercise per Week: 2 days     Minutes of Exercise per Session: 10 min   Stress: Stress Concern Present (6/5/2024)    [employer] [place] - Occupational Stress Questionnaire     Feeling of Stress : To some extent   Housing Stability: Low Risk  (6/5/2024)    Housing Stability Vital Sign     Unable to Pay for Housing in the Last Year: No     Homeless in the Last Year: No        MEDS            CONTINOUS INFUSIONS:    No intake or output data in the 24 hours ending 09/30/24 1230     HEMODYNAMICS:    Temp:  [97.7 °F (36.5 °C)] 97.7 °F (36.5 °C)  Pulse:  [65-88] 66  Resp:  [19-20] 19  SpO2:  [94 %-95 %] 94 %  BP: (93-95)/(58-62) 93/62   General:    No CP   No SOB   No cough   No fever   No chills   No nausea   No vomiting   Cardiology: pulse 65   Pulmonary : RR 19   Pulse oximeter 94 % O2    Abdomen soft   Peritoneal dialysis catheter in place   Extremities no edema    Skin: dry    LABS   Lab Results   Component Value Date    WBC 9.17 09/30/2024    HGB 14.1 09/30/2024    HCT 41.2 09/30/2024    MCV 79 (L) 09/30/2024     (H) 09/30/2024        Recent Labs   Lab 09/30/24  1100   GLU 91   CALCIUM 9.4   ALBUMIN 3.6   PROT 7.7      K 4.3   CO2 26      BUN 62*   CREATININE 5.8*   ALKPHOS 122   ALT 56*   AST 56*   BILITOT 1.1*      Lab Results   Component Value Date    .6 (H) 04/29/2024    CALCIUM 9.4 09/30/2024    PHOS 4.8 (H) 09/30/2024      Lab Results   Component Value Date    IRON 109 10/07/2022    TIBC 394 10/07/2022    FERRITIN 676 (H) 10/07/2022        ABG  No results for input(s): "PH", "PO2", "PCO2", "HCO3", "BE" in the last 168 " hours.      IMAGING:  CXR    ASSESSMENT / PLAN  ESRD  K 4.3  Right IJ tunneled catheter  Metabolic bone disease  Hb 14  Echo 2024  Left Ventricle: The left ventricle is normal in size. Normal wall thickness. Regional wall motion abnormalities present. See diagram for wall motion findings. Septal motion is consistent with pacing. There is mildly reduced systolic function with a visually estimated ejection fraction of 40 - 45%. Grade III diastolic dysfunction.    Right Ventricle: Moderate to severe right ventricular enlargement. Systolic function is reduced.TAPSE is 1.59 cm. Pacemaker lead present in the ventricle.    Left Atrium: Left atrium is severely dilated. The left atrium volume index is 71.4 mL/m2.    Right Atrium: Right atrium is severely dilated.    Aortic Valve: There is mild aortic valve sclerosis. There is mild to moderate stenosis. Aortic valve area by VTI is 1.49 cm². Aortic valve peak velocity is 1.43 m/s. Mean gradient is 5 mmHg. The dimensionless index is 0.52.    Mitral Valve: There is mild regurgitation.    Tricuspid Valve: There is mild to moderate regurgitation.    Pulmonary Artery: The estimated pulmonary artery systolic pressure is 64 mmHg.    IVC/SVC: Elevated venous pressure at 15 mmHg.   Blood pressure 93/62  Renal diet as tolerated  Weight daily  I and O   Hemodialysis today  Follow up with peritoneal dialysis nurse for outpatient PD training

## 2024-09-30 NOTE — ED PROVIDER NOTES
Encounter Date: 9/30/2024       History     Chief Complaint   Patient presents with    Tachycardia     C/o increased HR x4 days. Pt went to dialysis today and was referred to ED for rate in 120's. Pt took 1 400mg Amiodarone 30mins PTA. HR in triage 88. Last dialysis treatment Friday 9/27     Patient is a 63-year-old male with end-stage renal disease sent from his dialysis unit where he is noted to be tachycardic.  Heart rate was reportedly in the 120s.  He says he only received 1 hour of his treatment.  He was last dialyzed 3 days ago.  He took 400 mg of amiodarone prior to arrival.  Presenting heart rate here in the ED is 88.  He denies shortness of breath.      Review of patient's allergies indicates:   Allergen Reactions    Lokelma [sodium zirconium cyclosilicate] Other (See Comments)     Fluid retention, weight gain, CHF excerebration, severe constipation    Atorvastatin Other (See Comments)     Joint pain    Jardiance [empagliflozin] Other (See Comments)     Chest pains, significant weight loss, lower blood pressure     Past Medical History:   Diagnosis Date    Cardiomyopathy     CKD (chronic kidney disease) stage 4, GFR 15-29 ml/min     Congestive heart failure (CHF) 2015    COPD (chronic obstructive pulmonary disease)     Coronary artery disease     Edema     Essential (primary) hypertension 05/18/2022    Formatting of this note might be different from the original. Converted from [employer]: Description - ESSENTIAL HYPERTENSION, BENIGN    Heart attack     HLD (hyperlipidemia)     Hypertension     Hyperuricemia     Hypocalcemia     Renal cyst, left     Secondary hyperparathyroidism     Thyroid disease     V tach     Vitamin D deficiency      Past Surgical History:   Procedure Laterality Date    ablations  03/05/2018    albations  02/01/2018    COLONOSCOPY N/A 12/07/2018    Procedure: COLONOSCOPY/suprep;  Surgeon: Ananya Morillo MD;  Location: [facility] ENDO;  Service: Endoscopy;  Laterality: N/A;    defibulater  N/A 2016    ESOPHAGOGASTRODUODENOSCOPY N/A 12/07/2018    Procedure: EGD (ESOPHAGOGASTRODUODENOSCOPY);  Surgeon: Ananya Morillo MD;  Location: [facility] ENDO;  Service: Endoscopy;  Laterality: N/A;    INSERTION OF TUNNELED CENTRAL VENOUS HEMODIALYSIS CATHETER Right 5/3/2024    Procedure: INSERTION, CATHETER, HEMODIALYSIS, DUAL LUMEN;  Surgeon: Philip Perez MD;  Location: [facility] OR;  Service: General;  Laterality: Right;    INSERTION, CATHETER, DIALYSIS, PERITONEAL, LAPAROSCOPIC N/A 5/8/2024    Procedure: INSERTION, CATHETER, DIALYSIS, PERITONEAL, LAPAROSCOPIC;  Surgeon: Tyree Ruiz MD;  Location: [facility] OR;  Service: General;  Laterality: N/A;    INSERTION, CENTRAL VENOUS ACCESS DEVICE Right 6/5/2024    Procedure: Insertion,central venous access device;  Surgeon: Tyree Ruiz MD;  Location: [facility] OR;  Service: General;  Laterality: Right;    JOINT REPLACEMENT Bilateral 10/2016    knees, bilat    LEFT HEART CATHETERIZATION N/A 12/16/2020    Procedure: Left heart cath;  Surgeon: Shahram Stewart MD;  Location: [facility] CATH LAB/EP;  Service: Cardiology;  Laterality: N/A;    LEFT HEART CATHETERIZATION Left 9/27/2022    Procedure: Left heart cath;  Surgeon: Juan Roque MD;  Location: [facility] CATH LAB/EP;  Service: Cardiology;  Laterality: Left;    TX HEMODIALYSIS, ONE EVALUATION  5/6/2024    REMOVAL OF HEMODIALYSIS CATHETER Right 5/3/2024    Procedure: REMOVAL, CATHETER, HEMODIALYSIS;  Surgeon: Philip Perez MD;  Location: [facility] OR;  Service: General;  Laterality: Right;    REPLACEMENT OF IMPLANTABLE CARDIOVERTER-DEFIBRILLATOR (ICD) GENERATOR Left 1/24/2023    Procedure: REPLACEMENT, ICD GENERATOR;  Surgeon: River Tenorio MD;  Location: [facility] EP LAB;  Service: Cardiology;  Laterality: Left;  ANTHONY, CRTD gen chg, MDT, MAC, DM, 3prep     Family History   Problem Relation Name Age of Onset    Cancer Mother Sharon Love     Hypertension Mother Sharon Love     Stroke Mother Sharon Love     Breast cancer Mother  Sharon Sharma     Alcohol abuse Father Mitul Sharma     Kidney disease Father Mitul Sharma     Arthritis Sister x2     No Known Problems Brother x1     No Known Problems Daughter x1     No Known Problems Son x2      Social History     Tobacco Use    Smoking status: Former     Current packs/day: 0.00     Average packs/day: 1 pack/day for 33.2 years (33.2 ttl pk-yrs)     Types: Cigarettes     Start date: 1979     Quit date: 3/3/2012     Years since quittin.5     Passive exposure: Past    Smokeless tobacco: Never   Substance Use Topics    Alcohol use: Not Currently     Comment: rare    Drug use: No     Review of Systems   Constitutional:  Negative for fever.   Respiratory:  Negative for shortness of breath.    Gastrointestinal:  Negative for vomiting.   Neurological:  Negative for dizziness.   All other systems reviewed and are negative.      Physical Exam     Initial Vitals [24 1029]   BP Pulse Resp Temp SpO2   (!) 95/58 88 20 97.7 °F (36.5 °C) 95 %      MAP       --         Physical Exam    Nursing note and vitals reviewed.  Constitutional: No distress.   HENT:   Head: Atraumatic.   Cardiovascular:  Normal rate, regular rhythm and normal heart sounds.           Pulmonary/Chest: Breath sounds normal.   Abdominal: Abdomen is soft. There is no abdominal tenderness.   Musculoskeletal:         General: No edema.     Neurological: He is alert and oriented to person, place, and time.   Skin: Skin is warm and dry.         ED Course   Procedures  Labs Reviewed   CBC W/ AUTO DIFFERENTIAL - Abnormal       Result Value    WBC 9.17      RBC 5.25      Hemoglobin 14.1      Hematocrit 41.2      MCV 79 (*)     MCH 26.9 (*)     MCHC 34.2      RDW 17.4 (*)     Platelets 509 (*)     MPV 11.7      Immature Granulocytes 1.0 (*)     Gran # (ANC) 6.9      Immature Grans (Abs) 0.09 (*)     Lymph # 1.0      Mono # 0.8      Eos # 0.3      Baso # 0.14      nRBC 0      Gran % 74.9 (*)     Lymph % 10.4 (*)     Mono % 8.7      Eosinophil %  3.5      Basophil % 1.5      Differential Method Automated     COMPREHENSIVE METABOLIC PANEL - Abnormal    Sodium 139      Potassium 4.3      Chloride 100      CO2 26      Glucose 91      BUN 62 (*)     Creatinine 5.8 (*)     Calcium 9.4      Total Protein 7.7      Albumin 3.6      Total Bilirubin 1.1 (*)     Alkaline Phosphatase 122      AST 56 (*)     ALT 56 (*)     eGFR 10 (*)     Anion Gap 13     PHOSPHORUS - Abnormal    Phosphorus 4.8 (*)    MAGNESIUM    Magnesium 2.1     HEPATITIS B SURFACE ANTIGEN    Hepatitis B Surface Ag Non-reactive      Narrative:     Collection has been rescheduled by [email or web address] at 09/30/2024 17:30 Reason:   Got it   HEPATITIS B SURFACE ANTIBODY, QUAL/QUANT    Narrative:     Collection has been rescheduled by [email or web address] at 09/30/2024 17:30 Reason:   Got it        ECG Results              EKG 12-lead (Rapid Heart Beat / Palpitations) Age > 50 (Final result)        Collection Time Result Time QRS Duration OHS QTC Calculation    09/30/24 10:25:51 09/30/24 15:53:36 208 608                     Final result by Interface, Lab In [facility] (09/30/24 15:53:46)                   Narrative:    Test Reason : R00.0,    Vent. Rate : 089 BPM     Atrial Rate : 117 BPM     P-R Int : 000 ms          QRS Dur : 208 ms      QT Int : 500 ms       P-R-T Axes : 000 219 059 degrees     QTc Int : 608 ms    AV sequential or dual chamber electronic pacemaker  When compared with ECG of 04-JUN-2024 18:34,  Vent. rate has increased BY  29 BPM  Confirmed by Juan Roque MD ([id number]) on 9/30/2024 3:53:35 PM    Referred By: [email or web address]   SELF           Confirmed By:Juan Roque MD                                  Imaging Results              X-Ray Chest 1 View (Final result)  Result time 09/30/24 12:07:51      Final result by Tarun Wilkinson MD (09/30/24 12:07:51)                   Impression:      1. Chronic appearing interstitial findings, underlying edema is a consideration.  Obscuration of the left costophrenic angle may  reflect small effusion peer      Electronically signed by: Tarun Wilkinson MD  Date:    09/30/2024  Time:    12:07               Narrative:    EXAMINATION:  XR CHEST 1 VIEW    CLINICAL HISTORY:  ESRD;    TECHNIQUE:  Single frontal view of the chest was performed.    COMPARISON:  06/04/2024    FINDINGS:  Right central venous catheter tip projects over the distal SVC.  Left chest wall pacer noted, stable.  The cardiomediastinal silhouette is prominent, similar in configuration to the previous exam noting calcification of the aorta..  There is obscuration of the left costophrenic angle may reflect small effusion versus pleural thickening..  The trachea is midline.  The lungs are symmetrically expanded bilaterally with coarse interstitial attenuation bilaterally..  No large focal consolidation seen.  There is no pneumothorax.  The osseous structures are remarkable for degenerative change..                                       Medications   midodrine tablet 15 mg (15 mg Oral Given 9/30/24 1708)     Medical Decision Making  Emergent evaluation of a 63-year-old male who was noted with an increased heart rate at his dialysis unit today and referred to the ED for further evaluation.  Patient has not been tachycardic while in the ED. I have discussed his symptoms as well as pertinent lab values with the nephrologist, Dr. Mata, who has ordered dialysis with the patient.  He will return to the emergency department after his treatment.  Anticipate discharge at that point.    Amount and/or Complexity of Data Reviewed  Labs: ordered.     Details: CBC unremarkable.  CMP with a normal potassium of 4.3, BUN of 62 and creatinine of 5.8.  Radiology: ordered.  Discussion of management or test interpretation with external provider(s): I have discussed the patient's vital signs, chest x-ray results and pertinent lab values with Dr. Mata.               ED Course as of 10/01/24 0611   Mon Sep 30, 2024   1237 Patient discussed with   Chyna Mata, who will order dialysis. [RL]      ED Course User Index  [RL] Vincenzo Nguyen MD                           Clinical Impression:  Final diagnoses:  [R00.0] Rapid heart beat  [N18.6, Z99.2] ESRD needing dialysis (Primary)          ED Disposition Condition    Discharge Stable          ED Prescriptions    None       Follow-up Information       Follow up With Specialties Details Why Contact Info    Jc Elliott MD Internal Medicine  As needed [address] [address] [phone]               Vincenzo Nguyen MD  10/01/24 0611

## 2024-09-30 NOTE — ED NOTES
Patient presenting to ED with tachycardia x4 days. Reports increasing Amiodarone to 400mg  himself x3 days with some relief. Pt went to dialysis this morning and was ordered to go to ED d/t HR in the 120s. Endorses episodes of palpitations. Denies any CP or SOB at this time. HR currently in the 90s, RK.

## 2024-09-30 NOTE — TELEPHONE ENCOUNTER
[place]    PCP:  Dr. Jc Elliott    Spoke with Wife, Nicolette Sharma, on pts behalf.  C/O tachycardia (110), skipped/exta beats, fatigue, and irregular heartbeat.  He is currently at/on dialysis.  Denies SOB, CP, dizziness, and sweating.  BP: 100/68 after taking Midodrine.  He does have an AICD but hasn't received a shock from the AICD.  Wife reports pt has an appt with Dr. Muñiz tomorrow.  Per protocol, care advised is go to ED now.  Wife VU.  Advised to call for worsening/questions/concerns.  VU.    Reason for Disposition   Heart beating very rapidly (e.g., > 140 / minute) and present now  (Exception: During exercise.)    Additional Information   Negative: Passed out (i.e., lost consciousness, collapsed and was not responding)   Negative: Shock suspected (e.g., cold/pale/clammy skin, too weak to stand, low BP, rapid pulse)   Negative: Difficult to awaken or acting confused (e.g., disoriented, slurred speech)   Negative: Visible sweat on face or sweat dripping down face   Negative: Unable to walk, or can only walk with assistance (e.g., requires support)   Negative: Received SHOCK from implantable cardiac defibrillator and has persisting symptoms (i.e., palpitations, lightheadedness)   Negative: Dizziness, lightheadedness, or weakness and heart beating very rapidly (e.g., > 140 / minute)   Negative: Dizziness, lightheadedness, or weakness and heart beating very slowly (e.g., < 50 / minute)   Negative: Sounds like a life-threatening emergency to the triager   Negative: Difficulty breathing   Negative: Dizziness, lightheadedness, or weakness    Protocols used: Heart Rate and Heartbeat [id number]

## 2024-10-01 ENCOUNTER — TELEPHONE (OUTPATIENT)
Dept: CARDIOLOGY | Facility: HOSPITAL | Age: 64
End: 2024-10-01
Payer: MEDICARE

## 2024-10-01 ENCOUNTER — TELEPHONE (OUTPATIENT)
Dept: SURGERY | Facility: CLINIC | Age: 64
End: 2024-10-01
Payer: MEDICARE

## 2024-10-01 ENCOUNTER — OFFICE VISIT (OUTPATIENT)
Dept: CARDIOLOGY | Facility: CLINIC | Age: 64
End: 2024-10-01
Payer: MEDICARE

## 2024-10-01 VITALS
SYSTOLIC BLOOD PRESSURE: 86 MMHG | BODY MASS INDEX: 23.24 KG/M2 | HEIGHT: 69 IN | HEART RATE: 74 BPM | OXYGEN SATURATION: 91 % | DIASTOLIC BLOOD PRESSURE: 53 MMHG | WEIGHT: 156.88 LBS

## 2024-10-01 DIAGNOSIS — N18.5 CHRONIC KIDNEY DISEASE (CKD), STAGE V: ICD-10-CM

## 2024-10-01 DIAGNOSIS — N18.6 ESRD (END STAGE RENAL DISEASE): Primary | ICD-10-CM

## 2024-10-01 DIAGNOSIS — I50.20 HFREF (HEART FAILURE WITH REDUCED EJECTION FRACTION): ICD-10-CM

## 2024-10-01 DIAGNOSIS — Z95.810 PRESENCE OF AUTOMATIC (IMPLANTABLE) CARDIAC DEFIBRILLATOR: Primary | ICD-10-CM

## 2024-10-01 DIAGNOSIS — I25.10 CORONARY ARTERY DISEASE INVOLVING NATIVE CORONARY ARTERY OF NATIVE HEART WITHOUT ANGINA PECTORIS: ICD-10-CM

## 2024-10-01 LAB — HBV SURFACE AG SERPL QL IA: NORMAL

## 2024-10-01 PROCEDURE — 99999 PR PBB SHADOW E&M-EST. PATIENT-LVL III: CPT | Mod: PBBFAC,,, | Performed by: INTERNAL MEDICINE

## 2024-10-01 RX ORDER — NITROGLYCERIN 0.4 MG/1
0.4 TABLET SUBLINGUAL EVERY 5 MIN PRN
Qty: 20 TABLET | Refills: 6 | Status: ON HOLD | OUTPATIENT
Start: 2024-10-01

## 2024-10-01 NOTE — TELEPHONE ENCOUNTER
Called pt to have him send a manual transmission from his remote home monitor as we have not received a transmission since 08/11/2024.  No answer, I left a voice message for him to return my call. I left the direct number to the device clinic.   ----- Message from Mateo Boo sent at 10/1/2024  1:39 PM CDT -----    ----- Message -----  From: Frank Muñiz MD  Sent: 10/1/2024   1:33 PM CDT  To: Coby BASURTO Staff    can you please see if he has any transmission from yesterday or two days ago. had heart rates of 120's and took an extra amiodarone. EKG in ER [place] ventricular paced; could not see clear P waves

## 2024-10-01 NOTE — TELEPHONE ENCOUNTER
"Pt returned the call.  Pt getting error code "3230" "[id number]" on his monitor.  Pt given contact # for [facility] [employer] to call for assistance as more than likely pt will need a new reader.  Pt to call the Device Clinic back with outcome of the call.  Understanding was verbalized.   "

## 2024-10-01 NOTE — TELEPHONE ENCOUNTER
----- Message from Anastasiia sent at 10/1/2024  3:54 PM CDT -----  Type:  Patient Returning Call    Who Called: Pt   Does the patient know what this is regarding?: returning missed call   Would the patient rather a call back or a response via [employer]? Call   Best Call Back Number:[phone]   Additional Information:

## 2024-10-01 NOTE — PROGRESS NOTES
09/30/24 1900   Vital Signs   Pulse 62   Resp 18   /78   MAP (mmHg) 89   Post-Hemodialysis Assessment   Rinseback Volume (mL) 250 mL   Blood Volume Processed (Liters) 45.2 L   Dialyzer Clearance Lightly streaked   Duration of Treatment 240 minutes   Total UF (mL) 1502 mL   Net Fluid Removal 1000   Patient Response to Treatment Tenzin well   Post-Hemodialysis Comments Blood returned. CVC lines hep lock. CVC dressing changed. Tenzin well.

## 2024-10-03 ENCOUNTER — ANESTHESIA EVENT (OUTPATIENT)
Dept: SURGERY | Facility: HOSPITAL | Age: 64
End: 2024-10-03
Payer: MEDICARE

## 2024-10-03 PROBLEM — I95.9 HYPOTENSION: Status: RESOLVED | Noted: 2024-04-30 | Resolved: 2024-10-03

## 2024-10-03 PROBLEM — I50.42 CHRONIC COMBINED SYSTOLIC AND DIASTOLIC HEART FAILURE: Status: ACTIVE | Noted: 2020-12-10

## 2024-10-03 PROBLEM — T85.611S: Status: RESOLVED | Noted: 2024-05-10 | Resolved: 2024-10-03

## 2024-10-03 PROBLEM — E44.0 MODERATE PROTEIN-CALORIE MALNUTRITION: Status: ACTIVE | Noted: 2024-01-01

## 2024-10-03 PROBLEM — R09.89 CARDIAC LV EJECTION FRACTION 10-20%: Status: RESOLVED | Noted: 2017-02-02 | Resolved: 2024-10-03

## 2024-10-03 PROBLEM — E78.5 HYPERLIPIDEMIA: Status: RESOLVED | Noted: 2022-05-18 | Resolved: 2024-10-03

## 2024-10-03 PROBLEM — S72.001A CLOSED RIGHT HIP FRACTURE, INITIAL ENCOUNTER: Status: ACTIVE | Noted: 2024-01-01

## 2024-10-03 LAB
HBV SURFACE AB SER QL IA: NEGATIVE
HBV SURFACE AB SERPL IA-ACNC: <3 MIU/ML

## 2024-10-03 NOTE — NURSING
[facility]  Nurses Note -- 4 Eyes      10/3/2024       Skin assessed on: Admit      [x] No Pressure Injuries Present    [x]Prevention Measures Documented    [] Yes LDA  for Pressure Injury Previously documented     [] Yes New Pressure Injury Discovered   [] LDA for New Pressure Injury Added    Pt with PD cath to L abd, R tunnel cath    Attending RN:  Genet Coreas RN     Second RN:  ITALO Pike

## 2024-10-03 NOTE — CARE UPDATE
63 y.o. AAM with h/o ESRD on HD (MWF), HTN, HLD, CAD, CHF admitted on 10/03/24 for Right intertrochanteric Hip fracture after a fall at home. No head trauma or LOC. CXR with interstitial edema.  No evidence of pneumothorax.  X-ray of the right hip with minimally displaced intertrochanteric fracture of the right proximal femur.  Orthopedic surgery consulted-plan for surgery on 10/04.  Echo ordered.  Cardiology consulted for cardiac clearance-cleared for surgery at moderate cardiac risk.  Nephrology consulted for dialysis.    NPO at MN.  Continue symptomatic management and supportive care.  We will need PT/OT postoperatively.    I reviewed the patient's medications, past medical/surgical history and the H&P note. I agree with the physical exam and assessment and plan.

## 2024-10-03 NOTE — ED PROVIDER NOTES
Encounter Date: 10/2/2024       History     Chief Complaint   Patient presents with    Fall     64 y/o M to ER after mechanical fall today landing on right side 5:45 om- getting worse throughout the day. Pt unable to bare weight - 70 mcg Fentanyl per EMS.      The patient is a 63-year-old male who came to the emergency department after falling in his driveway.  The patient states he tripped over his tool box.  He is unable to bear weight on his right leg due to pain in his right hip.  The patient was brought in by EMS.  He denies hitting his head, any head injury and he denies any neck pain.      Review of patient's allergies indicates:   Allergen Reactions    Lokelma [sodium zirconium cyclosilicate] Other (See Comments)     Fluid retention, weight gain, CHF excerebration, severe constipation    Atorvastatin Other (See Comments)     Joint pain    Jardiance [empagliflozin] Other (See Comments)     Chest pains, significant weight loss, lower blood pressure     Past Medical History:   Diagnosis Date    Cardiomyopathy     CKD (chronic kidney disease) stage 4, GFR 15-29 ml/min     Congestive heart failure (CHF) 2015    COPD (chronic obstructive pulmonary disease)     Coronary artery disease     Edema     Essential (primary) hypertension 05/18/2022    Formatting of this note might be different from the original. Converted from Centricity: Description - ESSENTIAL HYPERTENSION, BENIGN    Heart attack     HLD (hyperlipidemia)     Hypertension     Hyperuricemia     Hypocalcemia     Renal cyst, left     Secondary hyperparathyroidism     Thyroid disease     V tach     Vitamin D deficiency      Past Surgical History:   Procedure Laterality Date    ablations  03/05/2018    albations  02/01/2018    COLONOSCOPY N/A 12/07/2018    Procedure: COLONOSCOPY/suprep;  Surgeon: Ananya Morillo MD;  Location: [facility];  Service: Endoscopy;  Laterality: N/A;    defibulater N/A 2016    ESOPHAGOGASTRODUODENOSCOPY N/A 12/07/2018    Procedure:  EGD (ESOPHAGOGASTRODUODENOSCOPY);  Surgeon: Ananya Morillo MD;  Location: [facility] ENDO;  Service: Endoscopy;  Laterality: N/A;    INSERTION OF TUNNELED CENTRAL VENOUS HEMODIALYSIS CATHETER Right 5/3/2024    Procedure: INSERTION, CATHETER, HEMODIALYSIS, DUAL LUMEN;  Surgeon: Philip Perez MD;  Location: [facility] OR;  Service: General;  Laterality: Right;    INSERTION, CATHETER, DIALYSIS, PERITONEAL, LAPAROSCOPIC N/A 5/8/2024    Procedure: INSERTION, CATHETER, DIALYSIS, PERITONEAL, LAPAROSCOPIC;  Surgeon: Tyree Ruiz MD;  Location: [facility] OR;  Service: General;  Laterality: N/A;    INSERTION, CENTRAL VENOUS ACCESS DEVICE Right 6/5/2024    Procedure: Insertion,central venous access device;  Surgeon: Tyree Ruiz MD;  Location: [facility] OR;  Service: General;  Laterality: Right;    JOINT REPLACEMENT Bilateral 10/2016    knees, bilat    LEFT HEART CATHETERIZATION N/A 12/16/2020    Procedure: Left heart cath;  Surgeon: Shahram Stewart MD;  Location: [facility] CATH LAB/EP;  Service: Cardiology;  Laterality: N/A;    LEFT HEART CATHETERIZATION Left 9/27/2022    Procedure: Left heart cath;  Surgeon: Juan Roque MD;  Location: [facility] CATH LAB/EP;  Service: Cardiology;  Laterality: Left;    MT HEMODIALYSIS, ONE EVALUATION  5/6/2024    REMOVAL OF HEMODIALYSIS CATHETER Right 5/3/2024    Procedure: REMOVAL, CATHETER, HEMODIALYSIS;  Surgeon: Philip Perez MD;  Location: [facility] OR;  Service: General;  Laterality: Right;    REPLACEMENT OF IMPLANTABLE CARDIOVERTER-DEFIBRILLATOR (ICD) GENERATOR Left 1/24/2023    Procedure: REPLACEMENT, ICD GENERATOR;  Surgeon: River Tenorio MD;  Location: [facility] EP LAB;  Service: Cardiology;  Laterality: Left;  ANTHONY, CRTD gen chg, MDT, MAC, DM, 3prep     Family History   Problem Relation Name Age of Onset    Cancer Mother Sharon Love     Hypertension Mother Sharon Love     Stroke Mother Sharon Love     Breast cancer Mother Sharon Love     Alcohol abuse Father Mitul Love     Kidney disease  Father Mitul Love     Arthritis Sister x2     No Known Problems Brother x1     No Known Problems Daughter x1     No Known Problems Son x2      Social History     Tobacco Use    Smoking status: Former     Current packs/day: 0.00     Average packs/day: 1 pack/day for 33.2 years (33.2 ttl pk-yrs)     Types: Cigarettes     Start date: 1979     Quit date: 3/3/2012     Years since quittin.5     Passive exposure: Past    Smokeless tobacco: Never   Substance Use Topics    Alcohol use: Not Currently     Comment: rare    Drug use: No     Review of Systems   All other systems reviewed and are negative.      Physical Exam     Initial Vitals [10/02/24 2020]   BP Pulse Resp Temp SpO2   104/70 105 16 97.5 °F (36.4 °C) 98 %      MAP       --         Physical Exam    Nursing note and vitals reviewed.  Constitutional: He appears well-developed and well-nourished.   HENT:   Head: Normocephalic and atraumatic.   Eyes: EOM are normal. Pupils are equal, round, and reactive to light.   Neck: Neck supple.   Normal range of motion.  Cardiovascular:  Normal rate, regular rhythm, normal heart sounds and intact distal pulses.           Pulmonary/Chest: Breath sounds normal.   Abdominal: Abdomen is soft. Bowel sounds are normal. He exhibits no distension. There is no abdominal tenderness. There is no rebound and no guarding.   Musculoskeletal:         General: Tenderness (to the right hip) present. No edema.      Cervical back: Normal range of motion and neck supple.      Comments: Legs are neurovascularly intact.  The patient has surgical scars to his bilateral knees and severe arthritic changes to his bilateral knees     Neurological: He is alert and oriented to person, place, and time.   Skin: Skin is warm and dry.   Psychiatric: He has a normal mood and affect. His behavior is normal. Judgment and thought content normal.         ED Course   Procedures  Labs Reviewed   CBC W/ AUTO DIFFERENTIAL - Abnormal       Result Value    WBC  9.71      RBC 4.74      Hemoglobin 12.9 (*)     Hematocrit 37.3 (*)     MCV 79 (*)     MCH 27.2      MCHC 34.6      RDW 17.0 (*)     Platelets 416      MPV 11.3      Immature Granulocytes 0.8 (*)     Gran # (ANC) 8.0 (*)     Immature Grans (Abs) 0.08 (*)     Lymph # 0.5 (*)     Mono # 0.8      Eos # 0.2      Baso # 0.11      nRBC 0      Gran % 82.6 (*)     Lymph % 5.6 (*)     Mono % 7.9      Eosinophil % 2.0      Basophil % 1.1      Differential Method Automated     COMPREHENSIVE METABOLIC PANEL - Abnormal    Sodium 138      Potassium 3.5      Chloride 101      CO2 24      Glucose 102      BUN 30 (*)     Creatinine 4.2 (*)     Calcium 8.9      Total Protein 6.7      Albumin 3.5      Total Bilirubin 1.4 (*)     Alkaline Phosphatase 114      AST 48 (*)     ALT 52 (*)     eGFR 15 (*)     Anion Gap 13     PROTIME-INR - Abnormal    Prothrombin Time 14.2 (*)     INR 1.3 (*)           Imaging Results              X-Ray Hip 2 or 3 views Right with Pelvis when performed (Final result)  Result time 10/02/24 21:35:02      Final result by Sheldon Joseph DO (10/02/24 21:35:02)                   Impression:      Right intertrochanteric fracture.      Electronically signed by: Sheldon Joseph  Date:    10/02/2024  Time:    21:35               Narrative:    EXAMINATION:  XR HIP WITH PELVIS WHEN PERFORMED 2 OR 3 VIEWS RIGHT    CLINICAL HISTORY:  Pain in right hip    TECHNIQUE:  AP view of the pelvis and frog leg lateral view of the right hip were performed.    COMPARISON:  None    FINDINGS:  There is a minimally displaced intertrochanteric fracture of the right proximal femur.  No additional fractures are seen.  There is osteopenia.  The femoral heads are well seated in the acetabula.  There is mild joint space narrowing of the bilateral femoroacetabular joints and the pubic symphysis.  There is a peritoneal dialysis catheter overlying the pelvis.                                       Medications   morphine injection 4 mg (4 mg  Intravenous Given 10/2/24 2205)   ondansetron injection 4 mg (4 mg Intravenous Given 10/2/24 2204)     Medical Decision Making  This includes, but it is not limited to: OA, RA, arthritis, septic joint, gout/pseudogout, ligament sprain or tear, tendonitis, tendon strain, trauma, fracture      MDM:  The patient has an intertrochanteric hip fracture on the right.  We are on orthopedic diversion at [facility] and the transfer center was notified regarding transferring the patient to a facility with Orthopedics.    [address]: [facility]     Amount and/or Complexity of Data Reviewed  Labs: ordered.  Radiology: ordered.    Risk  Prescription drug management.    .                                  Clinical Impression:  Final diagnoses:  [W19.XXXA] Fall  [S72.144A] Closed nondisplaced intertrochanteric fracture of right femur, initial encounter (Primary)          ED Disposition Condition    Transfer to Another Facility Stable                Aimee Joseph MD  10/02/24 1190

## 2024-10-03 NOTE — PLAN OF CARE
[facility] - Med Surg  Initial Discharge Assessment       Primary Care Provider: Jc Elliott MD  Case Management Assessment     PCP: See above  Pharmacy: [facility] [address]    Patient Arrived From: home with spouse  Existing Help at Home: spouse    Barriers to Discharge: none    Discharge Plan:    A. home health (Pt will be evaluated by PT/OT post op)   B. SNF  Resume dialysis as previously scheduled.    Discharge planning assessment completed with patient's  assistance with spouse at the bedside.  Patient is from home with spouse  and independent with assistive devices     When medically stable, patient will discharge to  most appropriate setting based on PT/OT eval. Patient's wife will transport home.  Patient will need followup appointments as ordered by the discharging provider.  Case Management will continue to follow and assist with discharge planning.      Admission Diagnosis: Closed right hip fracture [S72.001A]    Admission Date: 10/3/2024  Expected Discharge Date:     Transition of Care Barriers: None    Payor: [employer] MEDICARE ADVANTAGE / Plan: [employer] / Product Type: Medicare Advantage /     Extended Emergency Contact Information  Primary Emergency Contact: Nicolette Sharma  Mobile Phone: [phone]  Relation: Spouse  Preferred language: English   needed? No    Discharge Plan A: Home Health (Patient will receive Post OP eval by PT/OT.)  Discharge Plan B: Skilled Nursing Facility      [id number] [place] [facility] [place] - [address] [facility] [address]NER OF [address] [place] [address]  Phone: [phone] Fax: [phone]    [id number] [place] - [address]  [address] [place] [address]  Phone: [phone] Fax: [phone]    [facility]  [address]  [place] [address]  Phone: [phone] Fax: [phone]      Initial Assessment (most recent)       Adult  Discharge Assessment - 10/03/24 0921          Discharge Assessment    Assessment Type Discharge Planning Assessment     Confirmed/corrected address, phone number and insurance Yes     Confirmed Demographics Correct on Facesheet     Source of Information patient;health record     Reason For Admission Closed Right Hip Fx     People in Home spouse     Facility Arrived From: Home with spouse     Do you expect to return to your current living situation? Yes     Do you have help at home or someone to help you manage your care at home? Yes     Who are your caregiver(s) and their phone number(s)? wife:  Nicolette Sharma  [phone]     Prior to hospitilization cognitive status: Alert/Oriented     Current cognitive status: Alert/Oriented     Walking or Climbing Stairs Difficulty yes     Walking or Climbing Stairs ambulation difficulty, requires equipment     Mobility Management Cane, RW, Wheelchar     Dressing/Bathing Difficulty no     Home Accessibility wheelchair accessible     Equipment Currently Used at Home cane, straight;walker, rolling;wheelchair;oxygen   Patient has the following equipment available at home.    Readmission within 30 days? No     Patient currently being followed by outpatient case management? No     Do you currently have service(s) that help you manage your care at home? No     Do you take prescription medications? Yes     Do you have prescription coverage? Yes     Coverage Payor: [employer] MEDICARE ADVANTAGE - [facility] [employer] -     Do you have any problems affording any of your prescribed medications? No     Is the patient taking medications as prescribed? yes     Who is going to help you get home at discharge? wife     How do you get to doctors appointments? car, drives self;family or friend will provide     Are you on dialysis? Yes     Dialysis Name and Scheduled days [facility] [address] [facility]     Discharge Plan A Home Health   Patient will receive Post OP eval by PT/OT.     Discharge Plan B Skilled Nursing Facility     DME Needed Upon Discharge  none     Discharge Plan discussed with: Spouse/sig other;Patient     Transition of Care Barriers None

## 2024-10-03 NOTE — NURSING
Pt back to unit from cardio by stretcher via transport. IV access in place, saline locked. NAD or pain noted.

## 2024-10-03 NOTE — NURSING
[facility]  Nurses Note -- 4 Eyes      10/3/2024       Skin assessed on: Q Shift      [x] No Pressure Injuries Present    [x]Prevention Measures Documented    [] Yes LDA  for Pressure Injury Previously documented     [] Yes New Pressure Injury Discovered   [] LDA for New Pressure Injury Added      Attending RN:  Lilibeth Santos RN     Second RN:  MILADIS De León

## 2024-10-03 NOTE — SUBJECTIVE & OBJECTIVE
Past Medical History:   Diagnosis Date    Cardiomyopathy     CKD (chronic kidney disease) stage 4, GFR 15-29 ml/min     Congestive heart failure (CHF) 2015    COPD (chronic obstructive pulmonary disease)     Coronary artery disease     Edema     Essential (primary) hypertension 05/18/2022    Formatting of this note might be different from the original. Converted from Centricity: Description - ESSENTIAL HYPERTENSION, BENIGN    Heart attack     HLD (hyperlipidemia)     Hypertension     Hyperuricemia     Hypocalcemia     Renal cyst, left     Secondary hyperparathyroidism     Thyroid disease     V tach     Vitamin D deficiency        Past Surgical History:   Procedure Laterality Date    ablations  03/05/2018    albations  02/01/2018    COLONOSCOPY N/A 12/07/2018    Procedure: COLONOSCOPY/suprep;  Surgeon: Ananya Morillo MD;  Location: [facility] ENDO;  Service: Endoscopy;  Laterality: N/A;    defibulater N/A 2016    ESOPHAGOGASTRODUODENOSCOPY N/A 12/07/2018    Procedure: EGD (ESOPHAGOGASTRODUODENOSCOPY);  Surgeon: Ananya Morillo MD;  Location: [facility] ENDO;  Service: Endoscopy;  Laterality: N/A;    INSERTION OF TUNNELED CENTRAL VENOUS HEMODIALYSIS CATHETER Right 5/3/2024    Procedure: INSERTION, CATHETER, HEMODIALYSIS, DUAL LUMEN;  Surgeon: Philip Perez MD;  Location: [facility] OR;  Service: General;  Laterality: Right;    INSERTION, CATHETER, DIALYSIS, PERITONEAL, LAPAROSCOPIC N/A 5/8/2024    Procedure: INSERTION, CATHETER, DIALYSIS, PERITONEAL, LAPAROSCOPIC;  Surgeon: Tyree Ruiz MD;  Location: [facility] OR;  Service: General;  Laterality: N/A;    INSERTION, CENTRAL VENOUS ACCESS DEVICE Right 6/5/2024    Procedure: Insertion,central venous access device;  Surgeon: Tyree Ruiz MD;  Location: [facility] OR;  Service: General;  Laterality: Right;    JOINT REPLACEMENT Bilateral 10/2016    knees, bilat    LEFT HEART CATHETERIZATION N/A 12/16/2020    Procedure: Left heart cath;  Surgeon: Shahram Stewart MD;  Location:  [facility] CATH LAB/EP;  Service: Cardiology;  Laterality: N/A;    LEFT HEART CATHETERIZATION Left 9/27/2022    Procedure: Left heart cath;  Surgeon: Juan Roque MD;  Location: [facility] CATH LAB/EP;  Service: Cardiology;  Laterality: Left;    WA HEMODIALYSIS, ONE EVALUATION  5/6/2024    REMOVAL OF HEMODIALYSIS CATHETER Right 5/3/2024    Procedure: REMOVAL, CATHETER, HEMODIALYSIS;  Surgeon: Philip Perez MD;  Location: [facility] OR;  Service: General;  Laterality: Right;    REPLACEMENT OF IMPLANTABLE CARDIOVERTER-DEFIBRILLATOR (ICD) GENERATOR Left 1/24/2023    Procedure: REPLACEMENT, ICD GENERATOR;  Surgeon: River Tenorio MD;  Location: [facility] EP LAB;  Service: Cardiology;  Laterality: Left;  ANTHONY, CRTD gen chg, MDT, MAC, DM, 3prep       Review of patient's allergies indicates:   Allergen Reactions    Lokelma [sodium zirconium cyclosilicate] Other (See Comments)     Fluid retention, weight gain, CHF excerebration, severe constipation    Atorvastatin Other (See Comments)     Joint pain    Jardiance [empagliflozin] Other (See Comments)     Chest pains, significant weight loss, lower blood pressure     Current Facility-Administered Medications   Medication Frequency    acetaminophen tablet 650 mg Q4H PRN    allopurinoL tablet 100 mg Daily    amiodarone tablet 400 mg Daily    docusate sodium capsule 100 mg Daily    [START ON 10/5/2024] furosemide tablet 360 mg See admin instructions    hydrALAZINE injection 10 mg Q6H PRN    HYDROcodone-acetaminophen  mg per tablet 1 tablet Q4H PRN    HYDROcodone-acetaminophen 7.5-325 mg per tablet 1 tablet Q4H PRN    HYDROmorphone injection 1 mg Q4H PRN    levalbuterol nebulizer solution 1.25 mg Q4H PRN    levothyroxine tablet 150 mcg Before breakfast    melatonin tablet 6 mg Nightly PRN    [START ON 10/5/2024] metOLazone tablet 10 mg See admin instructions    mexiletine capsule 150 mg BID WM    midodrine tablet 15 mg Q8H    mupirocin 2 % ointment BID    naloxone 0.4 mg/mL injection 0.4  mg PRN    nitroGLYCERIN SL tablet 0.4 mg Q5 Min PRN    ondansetron injection 4 mg Q6H PRN    polyethylene glycol packet 17 g BID PRN    polyethylene glycol packet 17 g Daily    pravastatin tablet 40 mg QHS    simethicone chewable tablet 80 mg QID PRN    sodium chloride 0.9% flush 10 mL Q12H PRN     Family History       Problem Relation (Age of Onset)    Alcohol abuse Father    Arthritis Sister    Breast cancer Mother    Cancer Mother    Hypertension Mother    Kidney disease Father    No Known Problems Brother, Daughter, Son    Stroke Mother          Tobacco Use    Smoking status: Former     Current packs/day: 0.00     Average packs/day: 1 pack/day for 33.2 years (33.2 ttl pk-yrs)     Types: Cigarettes     Start date: 1979     Quit date: 3/3/2012     Years since quittin.5     Passive exposure: Past    Smokeless tobacco: Never   Substance and Sexual Activity    Alcohol use: Not Currently     Comment: rare    Drug use: No    Sexual activity: Not Currently     Partners: Female     Review of Systems   Constitutional:  Negative for chills and fever.   HENT:  Negative for rhinorrhea and sore throat.    Eyes:  Negative for pain and visual disturbance.   Respiratory:  Negative for cough and shortness of breath.    Cardiovascular:  Negative for chest pain and palpitations.   Gastrointestinal:  Negative for abdominal pain, constipation, diarrhea and nausea.   Endocrine: Negative for cold intolerance, heat intolerance and polyuria.   Genitourinary:  Negative for dysuria and flank pain.   Musculoskeletal:  Positive for arthralgias, joint swelling and myalgias. Negative for back pain and gait problem.   Allergic/Immunologic: Negative for immunocompromised state.   Neurological:  Negative for light-headedness, numbness and headaches.     Objective:     Vital Signs (Most Recent):  Temp: 98.3 °F (36.8 °C) (10/03/24 1134)  Pulse: 60 (10/03/24 1134)  Resp: 17 (10/03/24 1134)  BP: (!) 92/52 (10/03/24 1134)  SpO2: (!) 93 %  (10/03/24 1134) Vital Signs (24h Range):  Temp:  [97.5 °F (36.4 °C)-98.8 °F (37.1 °C)] 98.3 °F (36.8 °C)  Pulse:  [] 60  Resp:  [16-20] 17  SpO2:  [93 %-98 %] 93 %  BP: ()/(52-70) 92/52     Weight: 69.6 kg (153 lb 7 oz) (10/03/24 0145)  Body mass index is 22.66 kg/m².  Body surface area is 1.84 meters squared.    No intake/output data recorded.     Physical Exam  Constitutional:       General: He is not in acute distress.     Appearance: He is not ill-appearing or toxic-appearing.   HENT:      Head: Normocephalic and atraumatic.      Nose: Nose normal. No congestion.      Mouth/Throat:      Mouth: Mucous membranes are moist.      Pharynx: No oropharyngeal exudate.   Eyes:      Pupils: Pupils are equal, round, and reactive to light.   Cardiovascular:      Comments: Right chest wall permcath, nontender and without stigmata of infection  Pulmonary:      Effort: Pulmonary effort is normal. No respiratory distress.      Breath sounds: No rhonchi.   Musculoskeletal:         General: Tenderness present. Normal range of motion.      Cervical back: Normal range of motion.      Right lower leg: No edema.      Left lower leg: No edema.   Neurological:      Mental Status: He is alert.          Significant Labs:  All labs within the past 24 hours have been reviewed.    Significant Imaging:  Labs: Reviewed

## 2024-10-03 NOTE — ASSESSMENT & PLAN NOTE
Malnutrition Type:  Context: acute illness or injury  Level: mild    Related to (etiology):   ESRD    Signs and Symptoms (as evidenced by):   Unintended weight loss     Malnutrition Characteristic Summary:  Weight Loss (Malnutrition): greater than 10% in 6 months  Subcutaneous Fat (Malnutrition): mild depletion  Muscle Mass (Malnutrition): mild depletion      Interventions:      Nutrition Diagnosis Status:   New

## 2024-10-03 NOTE — PLAN OF CARE
Recommendation:  1. Add Novasource Renal TID  2. Encourage intake at meals  3. Monitor weight/labs  4. RD to follow to monitor PO intake    Goals:  Pt will consume 50-75% intake at meals by RD follow-up

## 2024-10-03 NOTE — NURSING
Pt arrive to the unit by stretcher accompanied by [place] and wife. Assisted pt to bed. Tele monitoring initiated. Admit assessment initiated.  Pt is AAO.

## 2024-10-03 NOTE — HPI
HPI: 63 y.o. AAM with h/o ESRD on HD (for 2 months but prior was on PD for about a month-[facility] MWF via a tunneled HD catheter on the right, Essential HTN, HLD, CAD,  h/o systolic(EF 40-45%) and diastolic grade 3 CHF due to non-ischemic cardiomyopathy with a Medtronic CRT-D implant 2016 (sees Dr. Hernandez) uses 3L supplemental oxygen at night, and h/o VT arhythmia (follows with Dr. Tenorio at [facility]) s/p ablation and on Mexiletin 150mg PO BID and Amiodarone 400mg daily present to the ER with c/o right hip pain after a fall at home.     Went to HD today and was feeling alright after. Was moving his tool box when he tripped over his feet and fell to the ground. Was unable to get up or put weight on his right leg.      Work up in the ED at [place] noted normal WBC and Stable H/H.  INR 1.3.  Lytes stable and c/w post-HD with no indications for emergent HD tonight.      Right hip/pelvis x-ray FINDINGS:  There is a minimally displaced intertrochanteric fracture of the right proximal femur.  No additional fractures are seen.  There is osteopenia.  The femoral heads are well seated in the acetabula.  There is mild joint space narrowing of the bilateral femoroacetabular joints and the pubic symphysis.  There is a peritoneal dialysis catheter overlying the pelvis.     Impression:     Right intertrochanteric fracture.        Transfer center contacted us for admission due to [facility] on Ortho-diversion. They spoke with Dr. Mcclendon who agreed to see the patient in consultation. We have been asked to accept for further pre-op planning. Last time he took his ASA was yesterday am.        Denies CP or SOB  Reports some recent heart racing caesar improved after he took an extra amiodarone  EKG AV paced    Echo 4/30/24    Left Ventricle: The left ventricle is normal in size. Normal wall thickness. Regional wall motion abnormalities present. See diagram for wall motion findings. Septal motion is consistent  with pacing. There is mildly reduced systolic function with a visually estimated ejection fraction of 40 - 45%. Grade III diastolic dysfunction.    Right Ventricle: Moderate to severe right ventricular enlargement. Systolic function is reduced.TAPSE is 1.59 cm. Pacemaker lead present in the ventricle.    Left Atrium: Left atrium is severely dilated. The left atrium volume index is 71.4 mL/m2.    Right Atrium: Right atrium is severely dilated.    Aortic Valve: There is mild aortic valve sclerosis. There is mild to moderate stenosis. Aortic valve area by VTI is 1.49 cm². Aortic valve peak velocity is 1.43 m/s. Mean gradient is 5 mmHg. The dimensionless index is 0.52.    Mitral Valve: There is mild regurgitation.    Tricuspid Valve: There is mild to moderate regurgitation.    Pulmonary Artery: The estimated pulmonary artery systolic pressure is 64 mmHg.    IVC/SVC: Elevated venous pressure at 15 mmHg.     [facility] 9/27/22   There was non-obstructive coronary artery disease. Stable as compared to prior.     Followed by Dr Muñiz and Dr Tenorio  Patient is a 63 y.o. male presents to establish cardiac care. Seen by specialists (, ) . Went to the ER 9/30/24 for heart rate of 120 from dialysis. HR in triage was down to 88. Was referred back to dialysis to finish and was discharged from the ER. Took load of amiodarone prior to the ER and felt better      Primary Diagnosis:   Hypertension: positive  DM: none  Smoker: quit 15 years   Family history of early CAD  Heart disease : V tach ablated twice in [place]   CKD 5 on  dialysis  CAD  Nonischemic  cardiomyopathy   CRT-D Medtronic upgrade 2016 from ICD

## 2024-10-03 NOTE — ED NOTES
Transfer of care with required documents done to [facility] ambulance service staff with Ms. Kelsey RN.

## 2024-10-03 NOTE — PT/OT/SLP PROGRESS
Physical Therapy      Patient Name:  Ar Sharma   MRN:  [id number]    Patient not seen today for PT eval secondary to (Pt admitted today with R hip fx, pending cardiology clearance and ortho consult.). PT orders will be discontinued. Please re-consult acute skilled PT services when pt is appropriate. Thank you.

## 2024-10-03 NOTE — ASSESSMENT & PLAN NOTE
Goes MWF and went yesterday. F/u with nephrology consult for routine HD. NO indications at this time for emergent HD. Will f/u on repeat labs.

## 2024-10-03 NOTE — ASSESSMENT & PLAN NOTE
F/u with ortho consult. Last took asa yesterday am. No other anticoagulation noted. Will hold on DVT prophylaxis at this time. Pt. With risk factors for surgery specifically cardiac and ESRD; however, these chronic conditions appear to be stable with no signs of exacerbation. Medically cleared for surgery pending cardiology clearance and AM labs. NO need for emergent HD based on OSH labs and current physical exam and CXR. NPO for now.

## 2024-10-03 NOTE — ASSESSMENT & PLAN NOTE
NICM. EF 40-45%. Volume status appears controlled. Has CRT-D followed at [facility]. Cleared for ortho surgery at moderate cardiac risk. Repeat echo. Watch volume status in the william-op period

## 2024-10-03 NOTE — PROGRESS NOTES
Consult received.  Pt has multiple medical problems and a Rt IT femur fx  Medical optimization and cardiac clearance pending.  Pt has an [employer] health plan and may want an [facility] MD to provide his care, to avoid an uncovered medical expense if treated by me. Will discuss with patient.

## 2024-10-03 NOTE — H&P
[facility] Medicine  History & Physical    Patient Name: Ar Sharma  MRN: [id number]  Patient Class: IP- Inpatient  Admission Date: 10/3/2024  Attending Physician: Dr. Sumaya Burciaga   Primary Care Provider: Jc lEliott MD  Patient information was obtained from patient, past medical records, and ER records.     Subjective:     Principal Problem:Right intertrochanteric Hip fracture     Chief Complaint: right hip pain s/p fall at home.      HPI: 63 y.o. AAM with h/o ESRD on HD (for 2 months but prior was on PD for about a month-[facility] MWF via a tunneled HD catheter on the right, Essential HTN, HLD, CAD,  h/o systolic(EF 40-45%) and diastolic grade 3 CHF due to non-ischemic cardiomyopathy with a Medtronic CRT-D implant 2016 (sees Dr. Hernandez) uses 3L supplemental oxygen at night, and h/o VT arhythmia (follows with Dr. Tenorio at [facility]) s/p ablation and on Mexiletin 150mg PO BID and Amiodarone 400mg daily present to the ER with c/o right hip pain after a fall at home.     Went to HD today and was feeling alright after. Was moving his tool box when he tripped over his feet and fell to the ground. Was unable to get up or put weight on his right leg.     Work up in the ED at [place] noted normal WBC and Stable H/H.  INR 1.3.  Lytes stable and c/w post-HD with no indications for emergent HD tonight.     Right hip/pelvis x-ray FINDINGS:  There is a minimally displaced intertrochanteric fracture of the right proximal femur.  No additional fractures are seen.  There is osteopenia.  The femoral heads are well seated in the acetabula.  There is mild joint space narrowing of the bilateral femoroacetabular joints and the pubic symphysis.  There is a peritoneal dialysis catheter overlying the pelvis.     Impression:     Right intertrochanteric fracture.      Transfer center contacted us for admission due to [facility] on Ortho-diversion. They spoke with Dr. Mcclendon who  agreed to see the patient in consultation. We have been asked to accept for further pre-op planning. Last time he took his ASA was yesterday am.       Results for orders placed during the hospital encounter of 04/29/24    Echo Saline Bubble? No; Ultrasound enhancing contrast? No    Interpretation Summary    Left Ventricle: The left ventricle is normal in size. Normal wall thickness. Regional wall motion abnormalities present. See diagram for wall motion findings. Septal motion is consistent with pacing. There is mildly reduced systolic function with a visually estimated ejection fraction of 40 - 45%. Grade III diastolic dysfunction.    Right Ventricle: Moderate to severe right ventricular enlargement. Systolic function is reduced.TAPSE is 1.59 cm. Pacemaker lead present in the ventricle.    Left Atrium: Left atrium is severely dilated. The left atrium volume index is 71.4 mL/m2.    Right Atrium: Right atrium is severely dilated.    Aortic Valve: There is mild aortic valve sclerosis. There is mild to moderate stenosis. Aortic valve area by VTI is 1.49 cm². Aortic valve peak velocity is 1.43 m/s. Mean gradient is 5 mmHg. The dimensionless index is 0.52.    Mitral Valve: There is mild regurgitation.    Tricuspid Valve: There is mild to moderate regurgitation.    Pulmonary Artery: The estimated pulmonary artery systolic pressure is 64 mmHg.    IVC/SVC: Elevated venous pressure at 15 mmHg.      Past Medical History:   Diagnosis Date    Cardiomyopathy     CKD (chronic kidney disease) stage 4, GFR 15-29 ml/min     Congestive heart failure (CHF) 2015    COPD (chronic obstructive pulmonary disease)     Coronary artery disease     Edema     Essential (primary) hypertension 05/18/2022    Formatting of this note might be different from the original. Converted from [employer]: Description - ESSENTIAL HYPERTENSION, BENIGN    Heart attack     HLD (hyperlipidemia)     Hypertension     Hyperuricemia     Hypocalcemia     Renal cyst,  left     Secondary hyperparathyroidism     Thyroid disease     V tach     Vitamin D deficiency        Past Surgical History:   Procedure Laterality Date    ablations  03/05/2018    albations  02/01/2018    COLONOSCOPY N/A 12/07/2018    Procedure: COLONOSCOPY/suprep;  Surgeon: Ananya Morillo MD;  Location: [facility] ENDO;  Service: Endoscopy;  Laterality: N/A;    defibulater N/A 2016    ESOPHAGOGASTRODUODENOSCOPY N/A 12/07/2018    Procedure: EGD (ESOPHAGOGASTRODUODENOSCOPY);  Surgeon: Ananya Morillo MD;  Location: [facility] ENDO;  Service: Endoscopy;  Laterality: N/A;    INSERTION OF TUNNELED CENTRAL VENOUS HEMODIALYSIS CATHETER Right 5/3/2024    Procedure: INSERTION, CATHETER, HEMODIALYSIS, DUAL LUMEN;  Surgeon: Philip Perez MD;  Location: [facility] OR;  Service: General;  Laterality: Right;    INSERTION, CATHETER, DIALYSIS, PERITONEAL, LAPAROSCOPIC N/A 5/8/2024    Procedure: INSERTION, CATHETER, DIALYSIS, PERITONEAL, LAPAROSCOPIC;  Surgeon: Tyree Ruiz MD;  Location: [facility] OR;  Service: General;  Laterality: N/A;    INSERTION, CENTRAL VENOUS ACCESS DEVICE Right 6/5/2024    Procedure: Insertion,central venous access device;  Surgeon: Tyree Ruiz MD;  Location: [facility] OR;  Service: General;  Laterality: Right;    JOINT REPLACEMENT Bilateral 10/2016    knees, bilat    LEFT HEART CATHETERIZATION N/A 12/16/2020    Procedure: Left heart cath;  Surgeon: Shahram Stewart MD;  Location: [facility] CATH LAB/EP;  Service: Cardiology;  Laterality: N/A;    LEFT HEART CATHETERIZATION Left 9/27/2022    Procedure: Left heart cath;  Surgeon: Juan Roque MD;  Location: [facility] CATH LAB/EP;  Service: Cardiology;  Laterality: Left;    MI HEMODIALYSIS, ONE EVALUATION  5/6/2024    REMOVAL OF HEMODIALYSIS CATHETER Right 5/3/2024    Procedure: REMOVAL, CATHETER, HEMODIALYSIS;  Surgeon: Philip Perez MD;  Location: [facility] OR;  Service: General;  Laterality: Right;    REPLACEMENT OF IMPLANTABLE CARDIOVERTER-DEFIBRILLATOR (ICD)  GENERATOR Left 1/24/2023    Procedure: REPLACEMENT, ICD GENERATOR;  Surgeon: River Tenorio MD;  Location: [facility] LAB;  Service: Cardiology;  Laterality: Left;  ANTHONY, CRTD gen chg, MDT, MAC, DM, 3prep       Review of patient's allergies indicates:   Allergen Reactions    Lokelma [sodium zirconium cyclosilicate] Other (See Comments)     Fluid retention, weight gain, CHF excerebration, severe constipation    Atorvastatin Other (See Comments)     Joint pain    Jardiance [empagliflozin] Other (See Comments)     Chest pains, significant weight loss, lower blood pressure       Current Facility-Administered Medications on File Prior to Encounter   Medication    [COMPLETED] morphine injection 4 mg    [COMPLETED] ondansetron injection 4 mg     Current Outpatient Medications on File Prior to Encounter   Medication Sig    acetaminophen (TYLENOL) 500 MG tablet Take 500 mg by mouth every 6 (six) hours as needed for Pain.    allopurinoL (ZYLOPRIM) 100 MG tablet TAKE 1 TABLET BY MOUTH EVERY DAY    amiodarone (PACERONE) 400 MG tablet Take 1 tablet (400 mg total) by mouth once daily.    aspirin (ECOTRIN) 81 MG EC tablet TAKE 1 TABLET BY MOUTH EVERY DAY    ergocalciferol (ERGOCALCIFEROL) 50,000 unit Cap TAKE 1 CAPSULE BY MOUTH ONE TIME PER WEEK    furosemide (LASIX) 80 MG tablet Take 4.5 tablets (360 mg total) by mouth 2 (two) times daily.    levothyroxine (SYNTHROID) 150 MCG tablet Take 1 tablet (150 mcg total) by mouth once daily.    metOLazone (ZAROXOLYN) 10 MG tablet Take 10 mg by mouth As instructed (Take I tablet by mouth Tuesday, Thursday, Saturday, Sunday). Take I tablet by mouth Tuesday, Thursday, Saturday, Sunday    mexiletine (MEXITIL) 150 MG Cap Take 1 capsule (150 mg total) by mouth 2 (two) times daily with meals.    midodrine (PROAMATINE) 5 MG Tab Take 3 tablets (15 mg total) by mouth every 8 (eight) hours.    nitroGLYCERIN (NITROSTAT) 0.4 MG SL tablet Place 1 tablet (0.4 mg total) under the tongue every 5 (five)  minutes as needed for Chest pain.    pantoprazole (PROTONIX) 40 MG tablet Take 1 tablet (40 mg total) by mouth once daily.    polyethylene glycol (GLYCOLAX) 17 gram PwPk Take 17 g by mouth once daily.    pravastatin (PRAVACHOL) 40 MG tablet TAKE 1 TABLET BY MOUTH EVERY DAY    vitamin renal formula, B-complex-vitamin c-folic acid, (TRIPHROCAPS) 1 mg Cap Take 1 capsule by mouth once daily.     Family History       Problem Relation (Age of Onset)    Alcohol abuse Father    Arthritis Sister    Breast cancer Mother    Cancer Mother    Hypertension Mother    Kidney disease Father    No Known Problems Brother, Daughter, Son    Stroke Mother          Tobacco Use    Smoking status: Former     Current packs/day: 0.00     Average packs/day: 1 pack/day for 33.2 years (33.2 ttl pk-yrs)     Types: Cigarettes     Start date: 1979     Quit date: 3/3/2012     Years since quittin.5     Passive exposure: Past    Smokeless tobacco: Never   Substance and Sexual Activity    Alcohol use: Not Currently     Comment: rare    Drug use: No    Sexual activity: Not Currently     Partners: Female     Review of Systems   Constitutional:  Negative for activity change, appetite change, chills, diaphoresis, fatigue, fever and unexpected weight change.   HENT:  Negative for congestion, postnasal drip and sore throat.    Eyes:  Negative for visual disturbance.   Respiratory:  Negative for cough, chest tightness, shortness of breath and wheezing.    Cardiovascular:  Negative for chest pain, palpitations and leg swelling.   Gastrointestinal:  Negative for abdominal distention, abdominal pain, blood in stool, constipation, diarrhea and nausea.   Genitourinary:  Negative for dysuria.   Musculoskeletal:  Negative for arthralgias and myalgias.   Neurological:  Negative for dizziness, syncope, light-headedness and headaches.   Psychiatric/Behavioral:  The patient is not nervous/anxious.      Objective:     Vital Signs (Most Recent):  Temp: 98.3 °F  (36.8 °C) (10/03/24 0145)  Pulse: (!) 59 (10/03/24 0333)  Resp: 18 (10/03/24 0145)  BP: (!) 94/58 (10/03/24 0145)  SpO2: (!) 94 % (10/03/24 0145) Vital Signs (24h Range):  Temp:  [97.5 °F (36.4 °C)-98.3 °F (36.8 °C)] 98.3 °F (36.8 °C)  Pulse:  [] 59  Resp:  [16-20] 18  SpO2:  [93 %-98 %] 94 %  BP: ()/(58-70) 94/58     Weight: 69.6 kg (153 lb 7 oz)  Body mass index is 22.66 kg/m².     Physical Exam  Vitals and nursing note reviewed.   Constitutional:       General: He is not in acute distress.     Appearance: Normal appearance. He is normal weight. He is not ill-appearing, toxic-appearing or diaphoretic.   HENT:      Head: Normocephalic and atraumatic.      Nose: Nose normal. No congestion or rhinorrhea.      Mouth/Throat:      Mouth: Mucous membranes are dry.      Pharynx: Oropharynx is clear. No oropharyngeal exudate or posterior oropharyngeal erythema.   Eyes:      General: No scleral icterus.     Extraocular Movements: Extraocular movements intact.      Conjunctiva/sclera: Conjunctivae normal.      Pupils: Pupils are equal, round, and reactive to light.   Neck:      Vascular: No carotid bruit.   Cardiovascular:      Rate and Rhythm: Normal rate and regular rhythm.      Pulses: Normal pulses.      Heart sounds: No murmur heard.     No friction rub. No gallop.   Pulmonary:      Effort: Pulmonary effort is normal. No respiratory distress.      Breath sounds: Normal breath sounds. No wheezing, rhonchi or rales.   Abdominal:      General: Bowel sounds are normal. There is no distension.      Palpations: Abdomen is soft.      Tenderness: There is no abdominal tenderness. There is no guarding or rebound.   Musculoskeletal:         General: No swelling.      Cervical back: Normal range of motion and neck supple.      Right lower leg: No edema.      Left lower leg: No edema.      Comments: Decreased ROM on the right leg due to hip fracture. FROM on the left lower and bilateral upper extremities.   Noted  tunneled HD cath on right upper chest.   Debrillator noted on left upper chest      Skin:     General: Skin is warm and dry.      Capillary Refill: Capillary refill takes less than 2 seconds.   Neurological:      General: No focal deficit present.      Mental Status: He is alert and oriented to person, place, and time.      Cranial Nerves: No cranial nerve deficit.      Motor: No weakness.      Coordination: Coordination normal.   Psychiatric:         Mood and Affect: Mood normal.         Behavior: Behavior normal.              CRANIAL NERVES     CN III, IV, VI   Pupils are equal, round, and reactive to light.    Recent Results (from the past 24 hours)   CBC auto differential    Collection Time: 10/02/24 10:24 PM   Result Value Ref Range    WBC 9.71 3.90 - 12.70 K/uL    RBC 4.74 4.60 - 6.20 M/uL    Hemoglobin 12.9 (L) 14.0 - 18.0 g/dL    Hematocrit 37.3 (L) 40.0 - 54.0 %    MCV 79 (L) 82 - 98 fL    MCH 27.2 27.0 - 31.0 pg    MCHC 34.6 32.0 - 36.0 g/dL    RDW 17.0 (H) 11.5 - 14.5 %    Platelets 416 150 - 450 K/uL    MPV 11.3 9.2 - 12.9 fL    Immature Granulocytes 0.8 (H) 0.0 - 0.5 %    Gran # (ANC) 8.0 (H) 1.8 - 7.7 K/uL    Immature Grans (Abs) 0.08 (H) 0.00 - 0.04 K/uL    Lymph # 0.5 (L) 1.0 - 4.8 K/uL    Mono # 0.8 0.3 - 1.0 K/uL    Eos # 0.2 0.0 - 0.5 K/uL    Baso # 0.11 0.00 - 0.20 K/uL    nRBC 0 0 /100 WBC    Gran % 82.6 (H) 38.0 - 73.0 %    Lymph % 5.6 (L) 18.0 - 48.0 %    Mono % 7.9 4.0 - 15.0 %    Eosinophil % 2.0 0.0 - 8.0 %    Basophil % 1.1 0.0 - 1.9 %    Differential Method Automated    Comprehensive metabolic panel    Collection Time: 10/02/24 10:24 PM   Result Value Ref Range    Sodium 138 136 - 145 mmol/L    Potassium 3.5 3.5 - 5.1 mmol/L    Chloride 101 95 - 110 mmol/L    CO2 24 23 - 29 mmol/L    Glucose 102 70 - 110 mg/dL    BUN 30 (H) 8 - 23 mg/dL    Creatinine 4.2 (H) 0.5 - 1.4 mg/dL    Calcium 8.9 8.7 - 10.5 mg/dL    Total Protein 6.7 6.0 - 8.4 g/dL    Albumin 3.5 3.5 - 5.2 g/dL    Total Bilirubin  1.4 (H) 0.1 - 1.0 mg/dL    Alkaline Phosphatase 114 55 - 135 U/L    AST 48 (H) 10 - 40 U/L    ALT 52 (H) 10 - 44 U/L    eGFR 15 (A) >60 mL/min/1.73 m^2    Anion Gap 13 8 - 16 mmol/L   Protime-INR    Collection Time: 10/02/24 10:24 PM   Result Value Ref Range    Prothrombin Time 14.2 (H) 9.0 - 12.5 sec    INR 1.3 (H) 0.8 - 1.2       Microbiology Results (last 7 days)       ** No results found for the last 168 hours. **                  Assessment/Plan:     * Closed right hip fracture, initial encounter  F/u with ortho consult. Last took asa yesterday am. No other anticoagulation noted. Will hold on DVT prophylaxis at this time. Pt. With risk factors for surgery specifically cardiac and ESRD; however, these chronic conditions appear to be stable with no signs of exacerbation. Medically cleared for surgery pending cardiology clearance and AM labs. NO need for emergent HD based on OSH labs and current physical exam and CXR. NPO for now.       Chronic combined systolic and diastolic heart failure  NO signs of acute exacerbation. Echo as stated in HPI above. F/u with cardiology consult.     ESRD (end stage renal disease)  Goes MWF and went yesterday. F/u with nephrology consult for routine HD. NO indications at this time for emergent HD. Will f/u on repeat labs.     S/P ablation of ventricular arrhythmia  Monitor on tele and f/u on labs. Replace K/Mg as needed. OSH labs stable. Cardiology consulted for cardiac clearance. Resume home Mexiletin 150mg PO BID and amiodarone 400mg PO daily. ICD in place       Pulmonary emphysema, unspecified emphysema type  COPD is controlled on his home 3L oxygen at night. NO signs of acute exacerbation on exam. PRN duonebs ordered.     Primary hypertension  Chronic, controlled. Latest blood pressure and vitals reviewed-     Temp:  [97.5 °F (36.4 °C)-98.3 °F (36.8 °C)]   Pulse:  []   Resp:  [16-20]   BP: ()/(58-70)   SpO2:  [93 %-98 %] .   Home meds for hypertension were reviewed  and noted below.   Hypertension Medications               furosemide (LASIX) 80 MG tablet Take 4.5 tablets (360 mg total) by mouth 2 (two) times daily.    metOLazone (ZAROXOLYN) 10 MG tablet Take 10 mg by mouth As instructed (Take I tablet by mouth Tuesday, Thursday, Saturday, Sunday). Take I tablet by mouth Tuesday, Thursday, Saturday, Sunday    nitroGLYCERIN (NITROSTAT) 0.4 MG SL tablet Place 1 tablet (0.4 mg total) under the tongue every 5 (five) minutes as needed for Chest pain.            While in the hospital, will manage blood pressure as follows; Continue home antihypertensive regimen    Will utilize p.r.n. blood pressure medication only if patient's blood pressure greater than  180/90  and he develops symptoms such as worsening chest pain or shortness of breath.      VTE Risk Mitigation (From admission, onward)           Ordered     IP VTE HIGH RISK PATIENT  Once         10/03/24 0154     Place sequential compression device  Until discontinued         10/03/24 0154     Reason for No Pharmacological VTE Prophylaxis  Once        Comments: Right hip fracture needs surgical eval   Question:  Reasons:  Answer:  Physician Provided (leave comment)    10/03/24 0154                     CODE STATUS: FULL CODE                Sumaya Burciaga MD  Department of Hospital Medicine  [facility] Surg

## 2024-10-03 NOTE — HPI
63 y.o. AAM with h/o ESRD on HD (for 2 months but prior was on PD for about a month-[facility] MWF via a tunneled HD catheter on the right, Essential HTN, HLD, CAD,  h/o systolic(EF 40-45%) and diastolic grade 3 CHF due to non-ischemic cardiomyopathy with a [employer] CRT-D implant 2016 (sees Dr. Hernandez) uses 3L supplemental oxygen at night, and h/o VT arhythmia (follows with Dr. Tenorio at [facility]) s/p ablation and on Mexiletin 150mg PO BID and Amiodarone 400mg daily present to the ER with c/o right hip pain after a fall at home.     Went to HD today and was feeling alright after. Was moving his tool box when he tripped over his feet and fell to the ground. Was unable to get up or put weight on his right leg.     Work up in the ED at [place] noted normal WBC and Stable H/H.  INR 1.3.  Lytes stable and c/w post-HD with no indications for emergent HD tonight.     Right hip/pelvis x-ray FINDINGS:  There is a minimally displaced intertrochanteric fracture of the right proximal femur.  No additional fractures are seen.  There is osteopenia.  The femoral heads are well seated in the acetabula.  There is mild joint space narrowing of the bilateral femoroacetabular joints and the pubic symphysis.  There is a peritoneal dialysis catheter overlying the pelvis.     Impression:     Right intertrochanteric fracture.      Transfer center contacted us for admission due to [facility] on Ortho-diversion. They spoke with Dr. Mcclendon who agreed to see the patient in consultation. We have been asked to accept for further pre-op planning. Last time he took his ASA was yesterday am.       Results for orders placed during the hospital encounter of 04/29/24    Echo Saline Bubble? No; Ultrasound enhancing contrast? No    Interpretation Summary    Left Ventricle: The left ventricle is normal in size. Normal wall thickness. Regional wall motion abnormalities present. See diagram for wall motion findings. Septal motion  is consistent with pacing. There is mildly reduced systolic function with a visually estimated ejection fraction of 40 - 45%. Grade III diastolic dysfunction.    Right Ventricle: Moderate to severe right ventricular enlargement. Systolic function is reduced.TAPSE is 1.59 cm. Pacemaker lead present in the ventricle.    Left Atrium: Left atrium is severely dilated. The left atrium volume index is 71.4 mL/m2.    Right Atrium: Right atrium is severely dilated.    Aortic Valve: There is mild aortic valve sclerosis. There is mild to moderate stenosis. Aortic valve area by VTI is 1.49 cm². Aortic valve peak velocity is 1.43 m/s. Mean gradient is 5 mmHg. The dimensionless index is 0.52.    Mitral Valve: There is mild regurgitation.    Tricuspid Valve: There is mild to moderate regurgitation.    Pulmonary Artery: The estimated pulmonary artery systolic pressure is 64 mmHg.    IVC/SVC: Elevated venous pressure at 15 mmHg.

## 2024-10-03 NOTE — SUBJECTIVE & OBJECTIVE
Past Medical History:   Diagnosis Date    Cardiomyopathy     CKD (chronic kidney disease) stage 4, GFR 15-29 ml/min     Congestive heart failure (CHF) 2015    COPD (chronic obstructive pulmonary disease)     Coronary artery disease     Edema     Essential (primary) hypertension 05/18/2022    Formatting of this note might be different from the original. Converted from Centricity: Description - ESSENTIAL HYPERTENSION, BENIGN    Heart attack     HLD (hyperlipidemia)     Hypertension     Hyperuricemia     Hypocalcemia     Renal cyst, left     Secondary hyperparathyroidism     Thyroid disease     V tach     Vitamin D deficiency        Past Surgical History:   Procedure Laterality Date    ablations  03/05/2018    albations  02/01/2018    COLONOSCOPY N/A 12/07/2018    Procedure: COLONOSCOPY/suprep;  Surgeon: Ananya Morillo MD;  Location: [facility] ENDO;  Service: Endoscopy;  Laterality: N/A;    defibulater N/A 2016    ESOPHAGOGASTRODUODENOSCOPY N/A 12/07/2018    Procedure: EGD (ESOPHAGOGASTRODUODENOSCOPY);  Surgeon: Ananya Morillo MD;  Location: [facility] ENDO;  Service: Endoscopy;  Laterality: N/A;    INSERTION OF TUNNELED CENTRAL VENOUS HEMODIALYSIS CATHETER Right 5/3/2024    Procedure: INSERTION, CATHETER, HEMODIALYSIS, DUAL LUMEN;  Surgeon: Philip Perez MD;  Location: [facility] OR;  Service: General;  Laterality: Right;    INSERTION, CATHETER, DIALYSIS, PERITONEAL, LAPAROSCOPIC N/A 5/8/2024    Procedure: INSERTION, CATHETER, DIALYSIS, PERITONEAL, LAPAROSCOPIC;  Surgeon: Tyree Ruiz MD;  Location: [facility] OR;  Service: General;  Laterality: N/A;    INSERTION, CENTRAL VENOUS ACCESS DEVICE Right 6/5/2024    Procedure: Insertion,central venous access device;  Surgeon: Tyree Ruiz MD;  Location: [facility] OR;  Service: General;  Laterality: Right;    JOINT REPLACEMENT Bilateral 10/2016    knees, bilat    LEFT HEART CATHETERIZATION N/A 12/16/2020    Procedure: Left heart cath;  Surgeon: Shahram Stewart MD;  Location:  [facility] CATH LAB/EP;  Service: Cardiology;  Laterality: N/A;    LEFT HEART CATHETERIZATION Left 9/27/2022    Procedure: Left heart cath;  Surgeon: Juan Roque MD;  Location: [facility] CATH LAB/EP;  Service: Cardiology;  Laterality: Left;    CA HEMODIALYSIS, ONE EVALUATION  5/6/2024    REMOVAL OF HEMODIALYSIS CATHETER Right 5/3/2024    Procedure: REMOVAL, CATHETER, HEMODIALYSIS;  Surgeon: Philip Perez MD;  Location: [facility] OR;  Service: General;  Laterality: Right;    REPLACEMENT OF IMPLANTABLE CARDIOVERTER-DEFIBRILLATOR (ICD) GENERATOR Left 1/24/2023    Procedure: REPLACEMENT, ICD GENERATOR;  Surgeon: River Tenorio MD;  Location: [facility] EP LAB;  Service: Cardiology;  Laterality: Left;  ANTHONY, CRTD gen chg, MDT, MAC, DM, 3prep       Review of patient's allergies indicates:   Allergen Reactions    Lokelma [sodium zirconium cyclosilicate] Other (See Comments)     Fluid retention, weight gain, CHF excerebration, severe constipation    Atorvastatin Other (See Comments)     Joint pain    Jardiance [empagliflozin] Other (See Comments)     Chest pains, significant weight loss, lower blood pressure       Current Facility-Administered Medications on File Prior to Encounter   Medication    [COMPLETED] morphine injection 4 mg    [COMPLETED] ondansetron injection 4 mg     Current Outpatient Medications on File Prior to Encounter   Medication Sig    acetaminophen (TYLENOL) 500 MG tablet Take 500 mg by mouth every 6 (six) hours as needed for Pain.    allopurinoL (ZYLOPRIM) 100 MG tablet TAKE 1 TABLET BY MOUTH EVERY DAY    amiodarone (PACERONE) 400 MG tablet Take 1 tablet (400 mg total) by mouth once daily.    aspirin (ECOTRIN) 81 MG EC tablet TAKE 1 TABLET BY MOUTH EVERY DAY    ergocalciferol (ERGOCALCIFEROL) 50,000 unit Cap TAKE 1 CAPSULE BY MOUTH ONE TIME PER WEEK    furosemide (LASIX) 80 MG tablet Take 4.5 tablets (360 mg total) by mouth 2 (two) times daily.    levothyroxine (SYNTHROID) 150 MCG tablet Take 1 tablet (150 mcg  total) by mouth once daily.    metOLazone (ZAROXOLYN) 10 MG tablet Take 10 mg by mouth As instructed (Take I tablet by mouth Tuesday, Thursday, Saturday, ). Take I tablet by mouth Tuesday, Thursday, Saturday,     mexiletine (MEXITIL) 150 MG Cap Take 1 capsule (150 mg total) by mouth 2 (two) times daily with meals.    midodrine (PROAMATINE) 5 MG Tab Take 3 tablets (15 mg total) by mouth every 8 (eight) hours.    nitroGLYCERIN (NITROSTAT) 0.4 MG SL tablet Place 1 tablet (0.4 mg total) under the tongue every 5 (five) minutes as needed for Chest pain.    pantoprazole (PROTONIX) 40 MG tablet Take 1 tablet (40 mg total) by mouth once daily.    polyethylene glycol (GLYCOLAX) 17 gram PwPk Take 17 g by mouth once daily.    pravastatin (PRAVACHOL) 40 MG tablet TAKE 1 TABLET BY MOUTH EVERY DAY    vitamin renal formula, B-complex-vitamin c-folic acid, (TRIPHROCAPS) 1 mg Cap Take 1 capsule by mouth once daily.     Family History       Problem Relation (Age of Onset)    Alcohol abuse Father    Arthritis Sister    Breast cancer Mother    Cancer Mother    Hypertension Mother    Kidney disease Father    No Known Problems Brother, Daughter, Son    Stroke Mother          Tobacco Use    Smoking status: Former     Current packs/day: 0.00     Average packs/day: 1 pack/day for 33.2 years (33.2 ttl pk-yrs)     Types: Cigarettes     Start date: 1979     Quit date: 3/3/2012     Years since quittin.5     Passive exposure: Past    Smokeless tobacco: Never   Substance and Sexual Activity    Alcohol use: Not Currently     Comment: rare    Drug use: No    Sexual activity: Not Currently     Partners: Female     Review of Systems   Constitutional:  Negative for activity change, appetite change, chills, diaphoresis, fatigue, fever and unexpected weight change.   HENT:  Negative for congestion, postnasal drip and sore throat.    Eyes:  Negative for visual disturbance.   Respiratory:  Negative for cough, chest tightness, shortness  of breath and wheezing.    Cardiovascular:  Negative for chest pain, palpitations and leg swelling.   Gastrointestinal:  Negative for abdominal distention, abdominal pain, blood in stool, constipation, diarrhea and nausea.   Genitourinary:  Negative for dysuria.   Musculoskeletal:  Negative for arthralgias and myalgias.   Neurological:  Negative for dizziness, syncope, light-headedness and headaches.   Psychiatric/Behavioral:  The patient is not nervous/anxious.      Objective:     Vital Signs (Most Recent):  Temp: 98.3 °F (36.8 °C) (10/03/24 0145)  Pulse: (!) 59 (10/03/24 0333)  Resp: 18 (10/03/24 0145)  BP: (!) 94/58 (10/03/24 0145)  SpO2: (!) 94 % (10/03/24 0145) Vital Signs (24h Range):  Temp:  [97.5 °F (36.4 °C)-98.3 °F (36.8 °C)] 98.3 °F (36.8 °C)  Pulse:  [] 59  Resp:  [16-20] 18  SpO2:  [93 %-98 %] 94 %  BP: ()/(58-70) 94/58     Weight: 69.6 kg (153 lb 7 oz)  Body mass index is 22.66 kg/m².     Physical Exam  Vitals and nursing note reviewed.   Constitutional:       General: He is not in acute distress.     Appearance: Normal appearance. He is normal weight. He is not ill-appearing, toxic-appearing or diaphoretic.   HENT:      Head: Normocephalic and atraumatic.      Nose: Nose normal. No congestion or rhinorrhea.      Mouth/Throat:      Mouth: Mucous membranes are dry.      Pharynx: Oropharynx is clear. No oropharyngeal exudate or posterior oropharyngeal erythema.   Eyes:      General: No scleral icterus.     Extraocular Movements: Extraocular movements intact.      Conjunctiva/sclera: Conjunctivae normal.      Pupils: Pupils are equal, round, and reactive to light.   Neck:      Vascular: No carotid bruit.   Cardiovascular:      Rate and Rhythm: Normal rate and regular rhythm.      Pulses: Normal pulses.      Heart sounds: No murmur heard.     No friction rub. No gallop.   Pulmonary:      Effort: Pulmonary effort is normal. No respiratory distress.      Breath sounds: Normal breath sounds. No  wheezing, rhonchi or rales.   Abdominal:      General: Bowel sounds are normal. There is no distension.      Palpations: Abdomen is soft.      Tenderness: There is no abdominal tenderness. There is no guarding or rebound.   Musculoskeletal:         General: No swelling.      Cervical back: Normal range of motion and neck supple.      Right lower leg: No edema.      Left lower leg: No edema.      Comments: Decreased ROM on the right leg due to hip fracture. FROM on the left lower and bilateral upper extremities.   Noted tunneled HD cath on right upper chest.   Debrillator noted on left upper chest      Skin:     General: Skin is warm and dry.      Capillary Refill: Capillary refill takes less than 2 seconds.   Neurological:      General: No focal deficit present.      Mental Status: He is alert and oriented to person, place, and time.      Cranial Nerves: No cranial nerve deficit.      Motor: No weakness.      Coordination: Coordination normal.   Psychiatric:         Mood and Affect: Mood normal.         Behavior: Behavior normal.              CRANIAL NERVES     CN III, IV, VI   Pupils are equal, round, and reactive to light.    Recent Results (from the past 24 hours)   CBC auto differential    Collection Time: 10/02/24 10:24 PM   Result Value Ref Range    WBC 9.71 3.90 - 12.70 K/uL    RBC 4.74 4.60 - 6.20 M/uL    Hemoglobin 12.9 (L) 14.0 - 18.0 g/dL    Hematocrit 37.3 (L) 40.0 - 54.0 %    MCV 79 (L) 82 - 98 fL    MCH 27.2 27.0 - 31.0 pg    MCHC 34.6 32.0 - 36.0 g/dL    RDW 17.0 (H) 11.5 - 14.5 %    Platelets 416 150 - 450 K/uL    MPV 11.3 9.2 - 12.9 fL    Immature Granulocytes 0.8 (H) 0.0 - 0.5 %    Gran # (ANC) 8.0 (H) 1.8 - 7.7 K/uL    Immature Grans (Abs) 0.08 (H) 0.00 - 0.04 K/uL    Lymph # 0.5 (L) 1.0 - 4.8 K/uL    Mono # 0.8 0.3 - 1.0 K/uL    Eos # 0.2 0.0 - 0.5 K/uL    Baso # 0.11 0.00 - 0.20 K/uL    nRBC 0 0 /100 WBC    Gran % 82.6 (H) 38.0 - 73.0 %    Lymph % 5.6 (L) 18.0 - 48.0 %    Mono % 7.9 4.0 - 15.0 %     Eosinophil % 2.0 0.0 - 8.0 %    Basophil % 1.1 0.0 - 1.9 %    Differential Method Automated    Comprehensive metabolic panel    Collection Time: 10/02/24 10:24 PM   Result Value Ref Range    Sodium 138 136 - 145 mmol/L    Potassium 3.5 3.5 - 5.1 mmol/L    Chloride 101 95 - 110 mmol/L    CO2 24 23 - 29 mmol/L    Glucose 102 70 - 110 mg/dL    BUN 30 (H) 8 - 23 mg/dL    Creatinine 4.2 (H) 0.5 - 1.4 mg/dL    Calcium 8.9 8.7 - 10.5 mg/dL    Total Protein 6.7 6.0 - 8.4 g/dL    Albumin 3.5 3.5 - 5.2 g/dL    Total Bilirubin 1.4 (H) 0.1 - 1.0 mg/dL    Alkaline Phosphatase 114 55 - 135 U/L    AST 48 (H) 10 - 40 U/L    ALT 52 (H) 10 - 44 U/L    eGFR 15 (A) >60 mL/min/1.73 m^2    Anion Gap 13 8 - 16 mmol/L   Protime-INR    Collection Time: 10/02/24 10:24 PM   Result Value Ref Range    Prothrombin Time 14.2 (H) 9.0 - 12.5 sec    INR 1.3 (H) 0.8 - 1.2       Microbiology Results (last 7 days)       ** No results found for the last 168 hours. **

## 2024-10-03 NOTE — CONSULTS
[facility] Surg  Nephrology  Consult Note    Patient Name: Ar Sharma  MRN: [id number]  Admission Date: 10/3/2024  Hospital Length of Stay: 0 days  Attending Provider: Jv Barnes DO   Primary Care Physician: Jc Elliott MD  Principal Problem:Closed right hip fracture, initial encounter    Inpatient consult to Nephrology  Consult performed by: Shashank Brown MD  Consult ordered by: Jv Barnes DO        Subjective:     HPI: 63 year old man with a history of ESRD (on PD initially and recently transition to HD), VT with CRT-D, HFrEF (40-45% with G3DD) presents after falling at home sp dialysis and found to have fracture of right prox femur.    He has dialysis MWF and had attended his dialysis session on 10/2. He was feeling fine and was working in his garage when he had a mechanical trip over his tool box. He was unable to get up and after presenting to the ED a XR of hip/pelvis revealed a right intertrochanteric fracture.     Outpatient ESRD  Hemodialysis  Outpatient nephrologist: Dr. Mtaa  Outpatient center: [facility]  Dialysis schedule: MWF  Last treatment: 10/2/2024  Anuric: Yes  Dry weight: 156 lbs  Access: right chest wall cathter, had PD cath as well      Past Medical History:   Diagnosis Date    Cardiomyopathy     CKD (chronic kidney disease) stage 4, GFR 15-29 ml/min     Congestive heart failure (CHF) 2015    COPD (chronic obstructive pulmonary disease)     Coronary artery disease     Edema     Essential (primary) hypertension 05/18/2022    Formatting of this note might be different from the original. Converted from Centricity: Description - ESSENTIAL HYPERTENSION, BENIGN    Heart attack     HLD (hyperlipidemia)     Hypertension     Hyperuricemia     Hypocalcemia     Renal cyst, left     Secondary hyperparathyroidism     Thyroid disease     V tach     Vitamin D deficiency        Past Surgical History:   Procedure Laterality Date    ablations  03/05/2018    albations  02/01/2018     COLONOSCOPY N/A 12/07/2018    Procedure: COLONOSCOPY/suprep;  Surgeon: Ananya Morillo MD;  Location: [facility] ENDO;  Service: Endoscopy;  Laterality: N/A;    defibulater N/A 2016    ESOPHAGOGASTRODUODENOSCOPY N/A 12/07/2018    Procedure: EGD (ESOPHAGOGASTRODUODENOSCOPY);  Surgeon: Ananya Morillo MD;  Location: [facility] ENDO;  Service: Endoscopy;  Laterality: N/A;    INSERTION OF TUNNELED CENTRAL VENOUS HEMODIALYSIS CATHETER Right 5/3/2024    Procedure: INSERTION, CATHETER, HEMODIALYSIS, DUAL LUMEN;  Surgeon: Philip Perez MD;  Location: [facility] OR;  Service: General;  Laterality: Right;    INSERTION, CATHETER, DIALYSIS, PERITONEAL, LAPAROSCOPIC N/A 5/8/2024    Procedure: INSERTION, CATHETER, DIALYSIS, PERITONEAL, LAPAROSCOPIC;  Surgeon: Tyree Ruiz MD;  Location: [facility] OR;  Service: General;  Laterality: N/A;    INSERTION, CENTRAL VENOUS ACCESS DEVICE Right 6/5/2024    Procedure: Insertion,central venous access device;  Surgeon: Tyree Ruiz MD;  Location: [facility] OR;  Service: General;  Laterality: Right;    JOINT REPLACEMENT Bilateral 10/2016    knees, bilat    LEFT HEART CATHETERIZATION N/A 12/16/2020    Procedure: Left heart cath;  Surgeon: Shahram Stewart MD;  Location: [facility] CATH LAB/EP;  Service: Cardiology;  Laterality: N/A;    LEFT HEART CATHETERIZATION Left 9/27/2022    Procedure: Left heart cath;  Surgeon: Juan Roque MD;  Location: [facility] CATH LAB/EP;  Service: Cardiology;  Laterality: Left;    NH HEMODIALYSIS, ONE EVALUATION  5/6/2024    REMOVAL OF HEMODIALYSIS CATHETER Right 5/3/2024    Procedure: REMOVAL, CATHETER, HEMODIALYSIS;  Surgeon: Philip Perez MD;  Location: [facility] OR;  Service: General;  Laterality: Right;    REPLACEMENT OF IMPLANTABLE CARDIOVERTER-DEFIBRILLATOR (ICD) GENERATOR Left 1/24/2023    Procedure: REPLACEMENT, ICD GENERATOR;  Surgeon: River Tenorio MD;  Location: [facility] EP LAB;  Service: Cardiology;  Laterality: Left;  ANTHONY, CRTD gen chg, MDT, MAC, DM, 3prep        Review of patient's allergies indicates:   Allergen Reactions    Lokelma [sodium zirconium cyclosilicate] Other (See Comments)     Fluid retention, weight gain, CHF excerebration, severe constipation    Atorvastatin Other (See Comments)     Joint pain    Jardiance [empagliflozin] Other (See Comments)     Chest pains, significant weight loss, lower blood pressure     Current Facility-Administered Medications   Medication Frequency    acetaminophen tablet 650 mg Q4H PRN    allopurinoL tablet 100 mg Daily    amiodarone tablet 400 mg Daily    docusate sodium capsule 100 mg Daily    [START ON 10/5/2024] furosemide tablet 360 mg See admin instructions    hydrALAZINE injection 10 mg Q6H PRN    HYDROcodone-acetaminophen  mg per tablet 1 tablet Q4H PRN    HYDROcodone-acetaminophen 7.5-325 mg per tablet 1 tablet Q4H PRN    HYDROmorphone injection 1 mg Q4H PRN    levalbuterol nebulizer solution 1.25 mg Q4H PRN    levothyroxine tablet 150 mcg Before breakfast    melatonin tablet 6 mg Nightly PRN    [START ON 10/5/2024] metOLazone tablet 10 mg See admin instructions    mexiletine capsule 150 mg BID WM    midodrine tablet 15 mg Q8H    mupirocin 2 % ointment BID    naloxone 0.4 mg/mL injection 0.4 mg PRN    nitroGLYCERIN SL tablet 0.4 mg Q5 Min PRN    ondansetron injection 4 mg Q6H PRN    polyethylene glycol packet 17 g BID PRN    polyethylene glycol packet 17 g Daily    pravastatin tablet 40 mg QHS    simethicone chewable tablet 80 mg QID PRN    sodium chloride 0.9% flush 10 mL Q12H PRN     Family History       Problem Relation (Age of Onset)    Alcohol abuse Father    Arthritis Sister    Breast cancer Mother    Cancer Mother    Hypertension Mother    Kidney disease Father    No Known Problems Brother, Daughter, Son    Stroke Mother          Tobacco Use    Smoking status: Former     Current packs/day: 0.00     Average packs/day: 1 pack/day for 33.2 years (33.2 ttl pk-yrs)     Types: Cigarettes     Start date: 1/1/1979      Quit date: 3/3/2012     Years since quittin.5     Passive exposure: Past    Smokeless tobacco: Never   Substance and Sexual Activity    Alcohol use: Not Currently     Comment: rare    Drug use: No    Sexual activity: Not Currently     Partners: Female     Review of Systems   Constitutional:  Negative for chills and fever.   HENT:  Negative for rhinorrhea and sore throat.    Eyes:  Negative for pain and visual disturbance.   Respiratory:  Negative for cough and shortness of breath.    Cardiovascular:  Negative for chest pain and palpitations.   Gastrointestinal:  Negative for abdominal pain, constipation, diarrhea and nausea.   Endocrine: Negative for cold intolerance, heat intolerance and polyuria.   Genitourinary:  Negative for dysuria and flank pain.   Musculoskeletal:  Positive for arthralgias, joint swelling and myalgias. Negative for back pain and gait problem.   Allergic/Immunologic: Negative for immunocompromised state.   Neurological:  Negative for light-headedness, numbness and headaches.     Objective:     Vital Signs (Most Recent):  Temp: 98.3 °F (36.8 °C) (10/03/24 1134)  Pulse: 60 (10/03/24 1134)  Resp: 17 (10/03/24 1134)  BP: (!) 92/52 (10/03/24 1134)  SpO2: (!) 93 % (10/03/24 1134) Vital Signs (24h Range):  Temp:  [97.5 °F (36.4 °C)-98.8 °F (37.1 °C)] 98.3 °F (36.8 °C)  Pulse:  [] 60  Resp:  [16-20] 17  SpO2:  [93 %-98 %] 93 %  BP: ()/(52-70) 92/52     Weight: 69.6 kg (153 lb 7 oz) (10/03/24 0145)  Body mass index is 22.66 kg/m².  Body surface area is 1.84 meters squared.    No intake/output data recorded.     Physical Exam  Constitutional:       General: He is not in acute distress.     Appearance: He is not ill-appearing or toxic-appearing.   HENT:      Head: Normocephalic and atraumatic.      Nose: Nose normal. No congestion.      Mouth/Throat:      Mouth: Mucous membranes are moist.      Pharynx: No oropharyngeal exudate.   Eyes:      Pupils: Pupils are equal, round, and  reactive to light.   Cardiovascular:      Comments: Right chest wall permcath, nontender and without stigmata of infection  Pulmonary:      Effort: Pulmonary effort is normal. No respiratory distress.      Breath sounds: No rhonchi.   Musculoskeletal:         General: Tenderness present. Normal range of motion.      Cervical back: Normal range of motion.      Right lower leg: No edema.      Left lower leg: No edema.   Neurological:      Mental Status: He is alert.          Significant Labs:  All labs within the past 24 hours have been reviewed.    Significant Imaging:  Labs: Reviewed  Assessment/Plan:     Renal/  ESRD (end stage renal disease)  ESRD MWF    Plan/recommendation  HD MWF as indicated  HD tomorrow  -Keep MAP > 65  -Keep hemoglobin > 7  -Strict ins and outs  -Avoid nephrotoxic agents if possible and renally dose medications  -Avoid drastic hemodynamic changes if possible    Secondary hyperparathyroidism  Calcium   Date Value Ref Range Status   10/03/2024 8.9 8.7 - 10.5 mg/dL Final     Phosphorus   Date Value Ref Range Status   10/03/2024 4.1 2.7 - 4.5 mg/dL Final     PTH, Intact   Date Value Ref Range Status   04/29/2024 162.6 (H) 9.0 - 77.0 pg/mL Final     Continue ergocal 50k units weekly    Anemia of chronic renal disease  Goal hemoglobin in ESRD is 10-12  Hemoglobin   Date Value Ref Range Status   10/03/2024 13.2 (L) 14.0 - 18.0 g/dL Final     Iron   Date Value Ref Range Status   10/07/2022 109 45 - 160 ug/dL Final     Transferrin   Date Value Ref Range Status   10/07/2022 266 200 - 375 mg/dL Final     TIBC   Date Value Ref Range Status   10/07/2022 394 250 - 450 ug/dL Final     Saturated Iron   Date Value Ref Range Status   10/07/2022 28 20 - 50 % Final     Ferritin   Date Value Ref Range Status   10/07/2022 676 (H) 20.0 - 300.0 ng/mL Final       Repeat iron labs and ferritin        Thank you for your consult. I will follow-up with patient. Please contact us if you have any additional  questions.    Shashank Brown MD  Nephrology  [facility] Surg

## 2024-10-03 NOTE — PROGRESS NOTES
Insurance situation discussed with the patient. He does not have out of network benefits and does not want to receive a separate bill from us for uncovered services, which he is unable to pay. Will consult Dr Begum or Derek

## 2024-10-03 NOTE — HPI
Following for ESRD. Patient receives HD MWF via [facility] at [facility] under the c/o Dr. Chicas. He was initiated on HD 4/2024. He remains with PD catheter.

## 2024-10-03 NOTE — ED NOTES
Call received from transfer center about bed assignment. Patient is allocated to [facility], room 430 Bed A.

## 2024-10-03 NOTE — CONSULTS
Orthopedics Consult Note     CC: R hip pain    HPI: 63 y.o. male s/p fall from standing at home with R intertroch fracture. PMH includes ESRD (on HD), CHF, CAD, HTN, HLD, on 3L supplemental O2 at night at home. Denies numbnes sin tingling, no other pain complaints    Cardiac clearance obtained today   Echo pending  Plan for HD tomorrow     Past Medical History:   Diagnosis Date    Cardiomyopathy     CKD (chronic kidney disease) stage 4, GFR 15-29 ml/min     Congestive heart failure (CHF) 2015    COPD (chronic obstructive pulmonary disease)     Coronary artery disease     Edema     Essential (primary) hypertension 05/18/2022    Formatting of this note might be different from the original. Converted from [employer]ity: Description - ESSENTIAL HYPERTENSION, BENIGN    Heart attack     HLD (hyperlipidemia)     Hypertension     Hyperuricemia     Hypocalcemia     Renal cyst, left     Secondary hyperparathyroidism     Thyroid disease     V tach     Vitamin D deficiency      Past Surgical History:   Procedure Laterality Date    ablations  03/05/2018    albations  02/01/2018    COLONOSCOPY N/A 12/07/2018    Procedure: COLONOSCOPY/suprep;  Surgeon: Ananya Morillo MD;  Location: [facility] ENDO;  Service: Endoscopy;  Laterality: N/A;    defibulater N/A 2016    ESOPHAGOGASTRODUODENOSCOPY N/A 12/07/2018    Procedure: EGD (ESOPHAGOGASTRODUODENOSCOPY);  Surgeon: Ananya Morillo MD;  Location: [facility];  Service: Endoscopy;  Laterality: N/A;    INSERTION OF TUNNELED CENTRAL VENOUS HEMODIALYSIS CATHETER Right 5/3/2024    Procedure: INSERTION, CATHETER, HEMODIALYSIS, DUAL LUMEN;  Surgeon: Philip Perez MD;  Location: [facility] OR;  Service: General;  Laterality: Right;    INSERTION, CATHETER, DIALYSIS, PERITONEAL, LAPAROSCOPIC N/A 5/8/2024    Procedure: INSERTION, CATHETER, DIALYSIS, PERITONEAL, LAPAROSCOPIC;  Surgeon: Tyree Ruiz MD;  Location: [facility] OR;  Service: General;  Laterality: N/A;    INSERTION, CENTRAL VENOUS ACCESS  DEVICE Right 2024    Procedure: Insertion,central venous access device;  Surgeon: Tyree Ruiz MD;  Location: [facility] OR;  Service: General;  Laterality: Right;    JOINT REPLACEMENT Bilateral 10/2016    knees, bilat    LEFT HEART CATHETERIZATION N/A 2020    Procedure: Left heart cath;  Surgeon: Shahram Stewart MD;  Location: [facility] CATH LAB/EP;  Service: Cardiology;  Laterality: N/A;    LEFT HEART CATHETERIZATION Left 2022    Procedure: Left heart cath;  Surgeon: Juan Roque MD;  Location: [facility] CATH LAB/EP;  Service: Cardiology;  Laterality: Left;    MA HEMODIALYSIS, ONE EVALUATION  2024    REMOVAL OF HEMODIALYSIS CATHETER Right 5/3/2024    Procedure: REMOVAL, CATHETER, HEMODIALYSIS;  Surgeon: Philip Perez MD;  Location: [facility] OR;  Service: General;  Laterality: Right;    REPLACEMENT OF IMPLANTABLE CARDIOVERTER-DEFIBRILLATOR (ICD) GENERATOR Left 2023    Procedure: REPLACEMENT, ICD GENERATOR;  Surgeon: River Tenorio MD;  Location: [facility] EP LAB;  Service: Cardiology;  Laterality: Left;  ANTHONY, CRTD gen chg, MDT, MAC, DM, 3prep     Social History     Tobacco Use    Smoking status: Former     Current packs/day: 0.00     Average packs/day: 1 pack/day for 33.2 years (33.2 ttl pk-yrs)     Types: Cigarettes     Start date: 1979     Quit date: 3/3/2012     Years since quittin.5     Passive exposure: Past    Smokeless tobacco: Never   Substance Use Topics    Alcohol use: Not Currently     Comment: rare    Drug use: No     Family History   Problem Relation Name Age of Onset    Cancer Mother Sharon Love     Hypertension Mother Sharon Love     Stroke Mother Sharon Love     Breast cancer Mother Sharon Love     Alcohol abuse Father Mitul Love     Kidney disease Father Mitul Love     Arthritis Sister x2     No Known Problems Brother x1     No Known Problems Daughter x1     No Known Problems Son x2          Current Facility-Administered Medications:     acetaminophen tablet 650 mg, 650 mg,  Oral, Q4H PRN, Sumaya Burcaiga MD    allopurinoL tablet 100 mg, 100 mg, Oral, Daily, Sumaya Burciaga MD, 100 mg at 10/03/24 0847    amiodarone tablet 400 mg, 400 mg, Oral, Daily, Sumaya Burciaga MD    docusate sodium capsule 100 mg, 100 mg, Oral, Daily, Sumaya Burciaga MD, 100 mg at 10/03/24 0844    [START ON 10/5/2024] furosemide tablet 360 mg, 360 mg, Oral, See admin instructions, Sumaya Burciaga MD    heparin (porcine) injection 5,000 Units, 5,000 Units, Subcutaneous, Q8H, Jv Barnes T., DO, 5,000 Units at 10/03/24 1341    hydrALAZINE injection 10 mg, 10 mg, Intravenous, Q6H PRN, Sumaya Burciaga MD    HYDROmorphone injection 1 mg, 1 mg, Intravenous, Q4H PRN, Jv Barnes T., DO    levalbuterol nebulizer solution 1.25 mg, 1.25 mg, Nebulization, Q4H PRN, Sumaya Bruciaga MD    levothyroxine tablet 150 mcg, 150 mcg, Oral, Before breakfast, Sumaya Burciaga MD, 150 mcg at 10/03/24 0503    melatonin tablet 6 mg, 6 mg, Oral, Nightly PRN, Sumaya Burciaga MD    [START ON 10/5/2024] metOLazone tablet 10 mg, 10 mg, Oral, See admin instructions, Sumaya Burciaga MD    mexiletine capsule 150 mg, 150 mg, Oral, BID WM, Sumaya Burciaga MD, 150 mg at 10/03/24 0844    midodrine tablet 15 mg, 15 mg, Oral, Q8H, Sumaya Burciaga MD, 15 mg at 10/03/24 1341    mupirocin 2 % ointment, , Nasal, BID, Jv Barnes., DO, Given at 10/03/24 0923    naloxone 0.4 mg/mL injection 0.4 mg, 0.4 mg, Intravenous, PRN, Sumaya Burciaga MD    nitroGLYCERIN SL tablet 0.4 mg, 0.4 mg, Sublingual, Q5 Min PRN, Sumaya Burciaga MD    ondansetron injection 4 mg, 4 mg, Intravenous, Q6H PRN, Sumaya Burciaga MD    oxyCODONE-acetaminophen  mg per tablet 1 tablet, 1 tablet, Oral, Q4H PRN, Jv Barnes, , 1 tablet at 10/03/24 1341    oxyCODONE-acetaminophen 5-325 mg per tablet 1 tablet, 1 tablet, Oral, Q4H PRN, Jv Barnes,     polyethylene glycol packet 17 g, 17 g, Oral, BID PRN,  Sumaya Burciaga MD    polyethylene glycol packet 17 g, 17 g, Oral, Daily, Sumaya Burciaga MD    pravastatin tablet 40 mg, 40 mg, Oral, QHS, Sumaya Burciaga MD    simethicone chewable tablet 80 mg, 1 tablet, Oral, QID PRN, Sumaya Burciaga MD    sodium chloride 0.9% flush 10 mL, 10 mL, Intravenous, Q12H PRN, Sumaya Burciaga MD    Physical Exam:    Temp:  [97.5 °F (36.4 °C)-98.8 °F (37.1 °C)] 98.3 °F (36.8 °C)  Pulse:  [] 60  Resp:  [16-20] 17  SpO2:  [93 %-98 %] 93 %  BP: ()/(52-70) 92/52    General: Patient is  alert, awake and oriented to time, place and person. Mood and affect are appropriate.  Patient does not appear to be in any distress, denies any constitutional symptoms and appears stated age.   HEENT: Pupils are equal and round, sclera are not injected. External examination of ears and nose reveals no abnormalities. Cranial nerves II-X are grossly intact  Skin:  no rashes, abrasions or open wounds on the affected extremity   Resp: No respiratory distress or audible wheezing   CV: 2+  pulses, all extremities warm and well perfused   Right Hip  Leg shortened and ER  No abrasions or lacerations over the hip   Ltsi s/s/sp/dp/t  + ehl/fhl/ta/gs  2+ DP      Imaging:  Two views of the right hip show a displaced intertrochanteric femur fracture.  No subtrochanteric involvement    Hemoglobin   Date Value Ref Range Status   10/03/2024 13.2 (L) 14.0 - 18.0 g/dL Final     Hematocrit   Date Value Ref Range Status   10/03/2024 40.2 40.0 - 54.0 % Final     Platelets   Date Value Ref Range Status   10/03/2024 389 150 - 450 K/uL Final     Sodium   Date Value Ref Range Status   10/03/2024 138 136 - 145 mmol/L Final     Potassium   Date Value Ref Range Status   10/03/2024 3.8 3.5 - 5.1 mmol/L Final     Chloride   Date Value Ref Range Status   10/03/2024 101 95 - 110 mmol/L Final     BUN   Date Value Ref Range Status   10/03/2024 34 (H) 8 - 23 mg/dL Final     Creatinine   Date Value Ref Range  Status   10/03/2024 4.5 (H) 0.5 - 1.4 mg/dL Final     Phosphorus   Date Value Ref Range Status   10/03/2024 4.1 2.7 - 4.5 mg/dL Final     Magnesium   Date Value Ref Range Status   10/03/2024 1.9 1.6 - 2.6 mg/dL Final     Glucose   Date Value Ref Range Status   10/03/2024 93 70 - 110 mg/dL Final         A/P: 63 y.o. male with multiple medical comorbidities, right intertroch fracture  - we will plan for OR tomorrow.  We will coordinate with Nephrology regarding his need for hemodialysis.  - NPO after midnight      - The spectrum of treatment options were discussed with the patient, including nonoperative and operative options.  After thorough discussion, the patient has elected to undergo surgical treatment to include:    Right femur short intramedullary nail    We have discussed the surgery and anticipated recovery.  The patient understands that there may be limited mobility up to several weeks after surgery depending on procedures that are performed at the time of surgery.    The details of the surgical procedure were explained, including the location of probable incisions and a description of likely hardware and/or grafts to be used.  The patient understands the likely convalescence after surgery, in particular the expected postop rehab and recovery course. Alternatives both operative and non-operative with associated risks and benefits discussed. The outlined risks and potential complications of the proposed procedure include but are not limited to: bleeding, infection, vessel and/or nerve damage, pain, numbness, tingling, compartment syndrome, need for additional surgery, failure to return to pre-injury and/or preoperative functional status, inability to return to work, scar sensitivity, delayed healing, complex regional pain syndrome, weakness,  partial and/or incomplete relief of symptoms, persistence of and/or worsening of symptoms, hardware and/or surgical failure, prominent and/or symptomatic hardware  possibly necessitating future removal, osteomyelitis, amputation, loss of function, stiffness, hardware loosening or other complications functional debility, dysfunction, decreased  strength, need for prolonged postoperative rehabilitation, malunion, nonunion, deep venous thrombosis, pulmonary embolism, arthritis and death.  The patient states an understanding and wishes to proceed with surgery.   All questions were answered.  No guarantees were implied or stated.  Written informed consent was obtained.    The patient was also informed and understands the risks of surgery are greater for patients with a current condition or history of heart disease, obesity, clotting disorders, recurrent infections, steroid use, current or past smoking, and factors such as sedentary lifestyle and noncompliance with medications, therapy or follow-up. The degree of the increased risk is hard to estimate with any degree of precision.    All questions were answered. The patient has verbalized understanding of these issues and wishes to proceed as discussed.

## 2024-10-03 NOTE — ASSESSMENT & PLAN NOTE
Cleared for surgery at moderate cardiac risk   Rx was filled for a 30 day supply due to labs not being completed yet.   Spoke with patient about this and she did not know she needed blood work. Patient states she will have to be out of medication until her appointment with Dr. Gtz on 5/10/23 because she is not able to get those labs done.   Please advise if ok to send refill to get patient to appointment on 5/10/23 or if labs must be completed. Thank you.

## 2024-10-03 NOTE — PROVIDER TRANSFER
Outside Transfer Acceptance Note / Regional Referral Center    Referring facility: [facility]   Referring provider: GENI CASILLAS, MARY AKBAR  Accepting facility: [facility]  Accepting provider: CRISTIN GARCES, GENA TURNER  Admitting provider: Irvin  Reason for transfer:    Transfer diagnosis:   Transfer specialty requested: Cardiology  Vascular Surgery  Orthopedic Surgery  Transfer specialty notified: Yes  Transfer level: NUMBER 1-5: 2  Bed type requested: med surg  Isolation status: No active isolations   Admission class or status: IP- Inpatient  OP- Observation  OP- Observation      Narrative     Patient is a 63-year-old male with a past medical history of ESRD on HD MWF, CHF status post ICD, COPD, coronary artery disease, hypertension, MI, dyslipidemia, secondary hyperparathyroidism that presented to [facility] because of right hip pain. Approximately 1 hour prior to presentation, patient tripped onto his toolbox at home landing directly onto the right hip. There was immediate severe pain. Patient unable to ambulate. Due to worsening symptoms, patient presented to the Emergency department where X-ray of the right hip shows right intertrochanteric fracture. Patient was hypotensive with systolic blood pressure ranging between 86- 101. White count 5. Hemoglobin 12. MCV slightly low at 79. Normal platelets of 416. CMP shows creatinine 4.2 consistent with ESRD on HD otherwise no significant electrolyte abnormalities. INR slightly elevated to 1.3. Patient was given pain medication. [facility] is on orthopedic diversion and so we were consulted. Per discussion with transfer team, Dr. Mcclendon, orthopedics is aware of patient. We accept patient.     Objective     Vitals: Temp: 97.5 °F (36.4 °C) (10/02/24 2020)  Pulse: 60 (10/02/24 2312)  Resp: 20 (10/02/24 2205)  BP: (!)  101/59 (10/02/24 2301)  SpO2: 95 % (10/02/24 2312)  Recent Labs: All pertinent labs within the past 24 hours have been reviewed.  Recent imaging: see chart   Airway:     Vent settings:         IV access:        Peripheral IV - Single Lumen 10/02/24 1900 20 G Anterior;Left Forearm (Active)   Site Assessment Clean;Dry;Intact 10/02/24 2112   Extremity Assessment Distal to IV No abnormal discoloration;No redness;No swelling 10/02/24 2112   Dressing Status Clean;Dry;Intact 10/02/24 2112   Dressing Intervention Integrity maintained 10/02/24 2112            Peripheral IV - Single Lumen 10/02/24 2215 20 G Anterior;Distal;Left Upper Arm (Active)   Site Assessment Clean;Dry;Intact 10/02/24 2225   Extremity Assessment Distal to IV No abnormal discoloration;No redness;No swelling 10/02/24 2225   Dressing Status Clean;Dry;Intact 10/02/24 2225   Dressing Intervention Integrity maintained 10/02/24 2225     Infusions: see chart  Allergies:   Review of patient's allergies indicates:   Allergen Reactions    Lokelma [sodium zirconium cyclosilicate] Other (See Comments)     Fluid retention, weight gain, CHF excerebration, severe constipation    Atorvastatin Other (See Comments)     Joint pain    Jardiance [empagliflozin] Other (See Comments)     Chest pains, significant weight loss, lower blood pressure      NPO: No    Anticoagulation:   Anticoagulants       None             Instructions      [facility]  Admit to Hospital Medicine  Upon patient arrival to floor, please contact Hospital Medicine on call.

## 2024-10-03 NOTE — ASSESSMENT & PLAN NOTE
Chronic, controlled. Latest blood pressure and vitals reviewed-     Temp:  [97.5 °F (36.4 °C)-98.3 °F (36.8 °C)]   Pulse:  []   Resp:  [16-20]   BP: ()/(58-70)   SpO2:  [93 %-98 %] .   Home meds for hypertension were reviewed and noted below.   Hypertension Medications               furosemide (LASIX) 80 MG tablet Take 4.5 tablets (360 mg total) by mouth 2 (two) times daily.    metOLazone (ZAROXOLYN) 10 MG tablet Take 10 mg by mouth As instructed (Take I tablet by mouth Tuesday, Thursday, Saturday, Sunday). Take I tablet by mouth Tuesday, Thursday, Saturday, Sunday    nitroGLYCERIN (NITROSTAT) 0.4 MG SL tablet Place 1 tablet (0.4 mg total) under the tongue every 5 (five) minutes as needed for Chest pain.            While in the hospital, will manage blood pressure as follows; Continue home antihypertensive regimen    Will utilize p.r.n. blood pressure medication only if patient's blood pressure greater than  180/90  and he develops symptoms such as worsening chest pain or shortness of breath.

## 2024-10-03 NOTE — NURSING
Patient safely transferred onto stretcher and transported off unit to cardiology, resp even and unlabored ay this time.

## 2024-10-03 NOTE — ASSESSMENT & PLAN NOTE
COPD is controlled on his home 3L oxygen at night. NO signs of acute exacerbation on exam. PRN duonebs ordered.

## 2024-10-03 NOTE — PROGRESS NOTES
[facility] - [place], [place] - Med Surg  Adult Nutrition  Progress Note    SUMMARY       Recommendations    Recommendation:  1. Add Novasource Renal TID  2. [place] intake at meals  3. Monitor weight/labs  4. RD to follow to monitor PO intake    Goals:  Pt will consume 50-75% intake at meals by RD follow-up  Nutrition Goal Status: new  Communication of RD Recs:  (POC)    Assessment and Plan  Endocrine  Moderate protein-calorie malnutrition  Malnutrition Type:  Context: acute illness or injury  Level: mild    Related to (etiology):   ESRD    Signs and Symptoms (as evidenced by):   Unintended weight loss     Malnutrition Characteristic Summary:  Weight Loss (Malnutrition): greater than 10% in 6 months  Subcutaneous Fat (Malnutrition): mild depletion  Muscle Mass (Malnutrition): mild depletion    Interventions:  Collaboration with other providers  Commercial beverage    Nutrition Diagnosis Status:   New    Malnutrition Assessment  Malnutrition Context: acute illness or injury  Malnutrition Level: mild  Weight Loss (Malnutrition): greater than 10% in 6 months  Subcutaneous Fat (Malnutrition): mild depletion  Muscle Mass (Malnutrition): mild depletion   Orbital Region (Subcutaneous Fat Loss): mild depletion  Upper Arm Region (Subcutaneous Fat Loss): mild depletion   [place] Region (Muscle Loss): mild depletion  Clavicle Bone Region (Muscle Loss): moderate depletion  Clavicle and Acromion Bone Region (Muscle Loss): moderate depletion  Scapular Bone Region (Muscle Loss): mild depletion  Dorsal Hand (Muscle Loss): mild depletion  Patellar Region (Muscle Loss): mild depletion  Anterior Thigh Region (Muscle Loss): mild depletion  Posterior Calf Region (Muscle Loss): mild depletion     Reason for Assessment  Reason For Assessment: identified at risk by screening criteria (MST)  Diagnosis: other (see comments) (closed right hip fracture)  General Information Comments: Pt admitted s/p fall, diagnosed with closed right hip fracture. Pt currently on  "renal diet. Pt c/o chronic constipation. Nick 20-skin intact. NFPE assessed at this time 10/3, mild wasting througout. Recent wt changes of 18.92lb wt loss x 6 months. (PMH: ESRD on HD(MWF), CHF s/p ICD, COPD, CAD, HTN)  Nutrition Discharge Planning: d/c on renal diet    Nutrition Risk Screen  Nutrition Risk Screen: no indicators present    Nutrition/Diet History  Spiritual, Cultural Beliefs, Shinto Practices, Values that Affect Care: no    Anthropometrics  Temp: 97.4 °F (36.3 °C)  Height Method: Stated  Height: 5' 9" (175.3 cm)  Height (inches): 69 in  Weight Method: Bed Scale  Weight: 69.6 kg (153 lb 7 oz)  Weight (lb): 153.44 lb  Ideal Body Weight (IBW), Male: 160 lb  % Ideal Body Weight, Male (lb): 95.9 %  BMI (Calculated): 22.6  Usual Body Weight (UBW), k.2 kg (24)  % Usual Body Weight: 89.19  % Weight Change From Usual Weight: -11 %    Lab/Procedures/Meds  Pertinent Labs Reviewed: reviewed  Pertinent Labs Comments: BUN 24(H), creatinine 4.5(H), eGFR 17.1(L), albumin 3.4(L), bilirubin 1.6(H), AST 47(H), ALT 51(H)  Pertinent Medications Reviewed: reviewed  Pertinent Medications Comments: furosemide, levothyroxine, midodrine, polyethylene glycol, pravastatin    Nutrition Related Social Determinants of Health:  SDOH: Adequate food in home environment    Estimated/Assessed Needs  Weight Used For Calorie Calculations: 69.6 kg (153 lb 7 oz)  Energy Calorie Requirements (kcal): 9406-1593 kcal (30-35 kcal/kg)  Energy Need Method: Kcal/kg  Protein Requirements: 83-97g (1.2-1.4 g/kg)  Weight Used For Protein Calculations: 69.6 kg (153 lb 7 oz)  Estimated Fluid Requirement Method: RDA Method  RDA Method (mL):     Nutrition Prescription Ordered  Current Diet Order: Renal diet    Evaluation of Received Nutrient/Fluid Intake  I/O: 0/150  Energy Calories Required: not meeting needs  Protein Required: not meeting needs  Fluid Required: not meeting needs  Comments: LBM: 10/2  Tolerance: not tolerating  % " Intake of Estimated Energy Needs: 0 - 25 %  % Meal Intake: 0 - 25 %    Nutrition Risk  Level of Risk/Frequency of Follow-up:  (1x weekly)     Monitor and Evaluation  Food and Nutrient Intake: energy intake  Food and Nutrient Adminstration: diet order  Physical Activity and Function: nutrition-related ADLs and IADLs  Anthropometric Measurements: weight  Biochemical Data, Medical Tests and Procedures: electrolyte and renal panel, inflammatory profile  Nutrition-Focused Physical Findings: overall appearance     Nutrition Follow-Up  RD Follow-up?: Yes

## 2024-10-03 NOTE — CONSULTS
[facility]  Cardiology  Consult Note    Patient Name: Ar Sharma  MRN: [id number]  Admission Date: 10/3/2024  Hospital Length of Stay: 0 days  Code Status: Full Code   Attending Provider: Jv Barnes DO   Consulting Provider: Bhavin Cheng MD  Primary Care Physician: Jc Elliott MD  Principal Problem:Closed right hip fracture, initial encounter    Patient information was obtained from patient and ER records.     Inpatient consult to Cardiology  Consult performed by: Bhavin Cheng MD  Consult ordered by: Sumaya Burciaga MD        Subjective:     Chief Complaint:  Pre-op evaluation            HPI: 63 y.o. AAM with h/o ESRD on HD (for 2 months but prior was on PD for about a month-Follows with [facility]) MWF via a tunneled HD catheter on the right, Essential HTN, HLD, CAD,  h/o systolic(EF 40-45%) and diastolic grade 3 CHF due to non-ischemic cardiomyopathy with a Medtronic CRT-D implant 2016 (sees Dr. Hernandez) uses 3L supplemental oxygen at night, and h/o VT arhythmia (follows with Dr. Tenorio at [facility]) s/p ablation and on Mexiletin 150mg PO BID and Amiodarone 400mg daily present to the ER with c/o right hip pain after a fall at home.     Went to HD today and was feeling alright after. Was moving his tool box when he tripped over his feet and fell to the ground. Was unable to get up or put weight on his right leg.      Work up in the ED at [facility] noted normal WBC and Stable H/H.  INR 1.3.  Lytes stable and c/w post-HD with no indications for emergent HD tonight.      Right hip/pelvis x-ray FINDINGS:  There is a minimally displaced intertrochanteric fracture of the right proximal femur.  No additional fractures are seen.  There is osteopenia.  The femoral heads are well seated in the acetabula.  There is mild joint space narrowing of the bilateral femoroacetabular joints and the pubic symphysis.  There is a peritoneal dialysis catheter overlying the pelvis.     Impression:      Right intertrochanteric fracture.        Transfer center contacted us for admission due to [facility] on Ortho-diversion. They spoke with Dr. Mcclendon who agreed to see the patient in consultation. We have been asked to accept for further pre-op planning. Last time he took his ASA was yesterday am.        Denies CP or SOB  Reports some recent heart racing caesar improved after he took an extra amiodarone  EKG AV paced    Echo 4/30/24    Left Ventricle: The left ventricle is normal in size. Normal wall thickness. Regional wall motion abnormalities present. See diagram for wall motion findings. Septal motion is consistent with pacing. There is mildly reduced systolic function with a visually estimated ejection fraction of 40 - 45%. Grade III diastolic dysfunction.    Right Ventricle: Moderate to severe right ventricular enlargement. Systolic function is reduced.TAPSE is 1.59 cm. Pacemaker lead present in the ventricle.    Left Atrium: Left atrium is severely dilated. The left atrium volume index is 71.4 mL/m2.    Right Atrium: Right atrium is severely dilated.    Aortic Valve: There is mild aortic valve sclerosis. There is mild to moderate stenosis. Aortic valve area by VTI is 1.49 cm². Aortic valve peak velocity is 1.43 m/s. Mean gradient is 5 mmHg. The dimensionless index is 0.52.    Mitral Valve: There is mild regurgitation.    Tricuspid Valve: There is mild to moderate regurgitation.    Pulmonary Artery: The estimated pulmonary artery systolic pressure is 64 mmHg.    IVC/SVC: Elevated venous pressure at 15 mmHg.     [facility] 9/27/22   There was non-obstructive coronary artery disease. Stable as compared to prior.     Followed by Dr Muñiz and Dr Tenorio  Patient is a 63 y.o. male presents to establish cardiac care. Seen by specialists (, ) . Went to the ER 9/30/24 for heart rate of 120 from dialysis. HR in triage was down to 88. Was referred back to dialysis to finish and was discharged from the  ER. Took load of amiodarone prior to the ER and felt better      Primary Diagnosis:   Hypertension: positive  DM: none  Smoker: quit 15 years   Family history of early CAD  Heart disease : V tach ablated twice in [place]   CKD 5 on  dialysis  CAD  Nonischemic  cardiomyopathy   CRT-D Medtronic upgrade 2016 from ICD    Past Medical History:   Diagnosis Date    Cardiomyopathy     CKD (chronic kidney disease) stage 4, GFR 15-29 ml/min     Congestive heart failure (CHF) 2015    COPD (chronic obstructive pulmonary disease)     Coronary artery disease     Edema     Essential (primary) hypertension 05/18/2022    Formatting of this note might be different from the original. Converted from [employer]: Description - ESSENTIAL HYPERTENSION, BENIGN    Heart attack     HLD (hyperlipidemia)     Hypertension     Hyperuricemia     Hypocalcemia     Renal cyst, left     Secondary hyperparathyroidism     Thyroid disease     V tach     Vitamin D deficiency        Past Surgical History:   Procedure Laterality Date    ablations  03/05/2018    albations  02/01/2018    COLONOSCOPY N/A 12/07/2018    Procedure: COLONOSCOPY/suprep;  Surgeon: Ananya Morillo MD;  Location: [facility] ENDO;  Service: Endoscopy;  Laterality: N/A;    defibulater N/A 2016    ESOPHAGOGASTRODUODENOSCOPY N/A 12/07/2018    Procedure: EGD (ESOPHAGOGASTRODUODENOSCOPY);  Surgeon: Ananya Morillo MD;  Location: [facility] ENDO;  Service: Endoscopy;  Laterality: N/A;    INSERTION OF TUNNELED CENTRAL VENOUS HEMODIALYSIS CATHETER Right 5/3/2024    Procedure: INSERTION, CATHETER, HEMODIALYSIS, DUAL LUMEN;  Surgeon: Philip Perez MD;  Location: [facility] OR;  Service: General;  Laterality: Right;    INSERTION, CATHETER, DIALYSIS, PERITONEAL, LAPAROSCOPIC N/A 5/8/2024    Procedure: INSERTION, CATHETER, DIALYSIS, PERITONEAL, LAPAROSCOPIC;  Surgeon: Tyree Ruiz MD;  Location: [facility] OR;  Service: General;  Laterality: N/A;    INSERTION, CENTRAL VENOUS ACCESS DEVICE Right 6/5/2024     Procedure: Insertion,central venous access device;  Surgeon: Tyree Ruiz MD;  Location: [facility] OR;  Service: General;  Laterality: Right;    JOINT REPLACEMENT Bilateral 10/2016    knees, bilat    LEFT HEART CATHETERIZATION N/A 12/16/2020    Procedure: Left heart cath;  Surgeon: Shahram Stewart MD;  Location: [facility] CATH LAB/EP;  Service: Cardiology;  Laterality: N/A;    LEFT HEART CATHETERIZATION Left 9/27/2022    Procedure: Left heart cath;  Surgeon: Juan Roque MD;  Location: [facility] CATH LAB/EP;  Service: Cardiology;  Laterality: Left;    NH HEMODIALYSIS, ONE EVALUATION  5/6/2024    REMOVAL OF HEMODIALYSIS CATHETER Right 5/3/2024    Procedure: REMOVAL, CATHETER, HEMODIALYSIS;  Surgeon: Philip Perez MD;  Location: [facility] OR;  Service: General;  Laterality: Right;    REPLACEMENT OF IMPLANTABLE CARDIOVERTER-DEFIBRILLATOR (ICD) GENERATOR Left 1/24/2023    Procedure: REPLACEMENT, ICD GENERATOR;  Surgeon: River Tenorio MD;  Location: [facility] EP LAB;  Service: Cardiology;  Laterality: Left;  ANTHONY, CRTD gen chg, MDT, MAC, DM, 3prep       Review of patient's allergies indicates:   Allergen Reactions    Lokelma [sodium zirconium cyclosilicate] Other (See Comments)     Fluid retention, weight gain, CHF excerebration, severe constipation    Atorvastatin Other (See Comments)     Joint pain    Jardiance [empagliflozin] Other (See Comments)     Chest pains, significant weight loss, lower blood pressure       Current Facility-Administered Medications on File Prior to Encounter   Medication    [COMPLETED] morphine injection 4 mg    [COMPLETED] ondansetron injection 4 mg     Current Outpatient Medications on File Prior to Encounter   Medication Sig    acetaminophen (TYLENOL) 500 MG tablet Take 500 mg by mouth every 6 (six) hours as needed for Pain.    allopurinoL (ZYLOPRIM) 100 MG tablet TAKE 1 TABLET BY MOUTH EVERY DAY    amiodarone (PACERONE) 400 MG tablet Take 1 tablet (400 mg total) by mouth once daily.    aspirin  (ECOTRIN) 81 MG EC tablet TAKE 1 TABLET BY MOUTH EVERY DAY    ergocalciferol (ERGOCALCIFEROL) 50,000 unit Cap TAKE 1 CAPSULE BY MOUTH ONE TIME PER WEEK    furosemide (LASIX) 80 MG tablet Take 4.5 tablets (360 mg total) by mouth 2 (two) times daily.    levothyroxine (SYNTHROID) 150 MCG tablet Take 1 tablet (150 mcg total) by mouth once daily.    metOLazone (ZAROXOLYN) 10 MG tablet Take 10 mg by mouth As instructed (Take I tablet by mouth Tuesday, Thursday, Saturday, ). Take I tablet by mouth Tuesday, Thursday, Saturday,     mexiletine (MEXITIL) 150 MG Cap Take 1 capsule (150 mg total) by mouth 2 (two) times daily with meals.    midodrine (PROAMATINE) 5 MG Tab Take 3 tablets (15 mg total) by mouth every 8 (eight) hours.    nitroGLYCERIN (NITROSTAT) 0.4 MG SL tablet Place 1 tablet (0.4 mg total) under the tongue every 5 (five) minutes as needed for Chest pain.    pantoprazole (PROTONIX) 40 MG tablet Take 1 tablet (40 mg total) by mouth once daily.    polyethylene glycol (GLYCOLAX) 17 gram PwPk Take 17 g by mouth once daily.    pravastatin (PRAVACHOL) 40 MG tablet TAKE 1 TABLET BY MOUTH EVERY DAY    vitamin renal formula, B-complex-vitamin c-folic acid, (TRIPHROCAPS) 1 mg Cap Take 1 capsule by mouth once daily.     Family History       Problem Relation (Age of Onset)    Alcohol abuse Father    Arthritis Sister    Breast cancer Mother    Cancer Mother    Hypertension Mother    Kidney disease Father    No Known Problems Brother, Daughter, Son    Stroke Mother          Tobacco Use    Smoking status: Former     Current packs/day: 0.00     Average packs/day: 1 pack/day for 33.2 years (33.2 ttl pk-yrs)     Types: Cigarettes     Start date: 1979     Quit date: 3/3/2012     Years since quittin.5     Passive exposure: Past    Smokeless tobacco: Never   Substance and Sexual Activity    Alcohol use: Not Currently     Comment: rare    Drug use: No    Sexual activity: Not Currently     Partners: Female      Review of Systems   Constitutional: Negative for decreased appetite.   HENT:  Negative for ear discharge.    Eyes:  Negative for blurred vision.   Endocrine: Negative for polyphagia.   Skin:  Negative for nail changes.   Genitourinary:  Negative for bladder incontinence.   Neurological:  Negative for aphonia.   Psychiatric/Behavioral:  Negative for hallucinations.    Allergic/Immunologic: Negative for hives.     Objective:     Vital Signs (Most Recent):  Temp: 98.8 °F (37.1 °C) (10/03/24 0815)  Pulse: 60 (10/03/24 0815)  Resp: 18 (10/03/24 0924)  BP: (!) 91/57 (10/03/24 0815)  SpO2: (!) 94 % (10/03/24 0815) Vital Signs (24h Range):  Temp:  [97.5 °F (36.4 °C)-98.8 °F (37.1 °C)] 98.8 °F (37.1 °C)  Pulse:  [] 60  Resp:  [16-20] 18  SpO2:  [93 %-98 %] 94 %  BP: ()/(57-70) 91/57     Weight: 69.6 kg (153 lb 7 oz)  Body mass index is 22.66 kg/m².    SpO2: (!) 94 %         Intake/Output Summary (Last 24 hours) at 10/3/2024 1110  Last data filed at 10/3/2024 0903  Gross per 24 hour   Intake 0 ml   Output 150 ml   Net -150 ml       Lines/Drains/Airways       Central Venous Catheter Line  Duration                  Hemodialysis Catheter 05/03/24 1115 right subclavian 152 days    Percutaneous Central Line - Double Lumen  06/05/24 1425 Internal Jugular Right 119 days              Peripheral Intravenous Line  Duration                  Peripheral IV - Single Lumen 10/03/24 0213 Anterior;Distal;Left Upper Arm <1 day         Peripheral IV - Single Lumen 10/03/24 0213 Left;Posterior Hand <1 day                     Physical Exam  Constitutional:       Appearance: He is well-developed.   HENT:      Head: Normocephalic and atraumatic.   Eyes:      Conjunctiva/sclera: Conjunctivae normal.      Pupils: Pupils are equal, round, and reactive to light.   Cardiovascular:      Rate and Rhythm: Normal rate.      Pulses: Intact distal pulses.      Heart sounds: Normal heart sounds.   Pulmonary:      Effort: Pulmonary effort is  normal.      Breath sounds: Normal breath sounds.   Abdominal:      General: Bowel sounds are normal.      Palpations: Abdomen is soft.   Musculoskeletal:         General: Normal range of motion.      Cervical back: Normal range of motion and neck supple.   Skin:     General: Skin is warm and dry.   Neurological:      Mental Status: He is alert and oriented to person, place, and time.          Significant Labs: All pertinent lab results from the last 24 hours have been reviewed.    Significant Imaging: Echocardiogram: 2D echo with color flow doppler:   Results for orders placed or performed during the hospital encounter of 10/02/18   2D echo with color flow doppler   Result Value Ref Range    EF + QEF 30 (A) 55 - 65    Mitral Valve Regurgitation MILD     Diastolic Dysfunction Yes (A)     Aortic Valve Regurgitation TRIVIAL     Est. PA Systolic Pressure 64 (A)     Mitral Valve Mobility NORMAL     Tricuspid Valve Regurgitation MILD     Narrative    Date of Procedure: 10/02/2018        TEST DESCRIPTION   Technical Quality: This is a technically adequate study.     Aorta: The aortic root is normal in size, measuring 2.6 cm at sinotubular junction and 2.6 cm at Sinuses of Valsalva.     Left Atrium: The left atrial volume index is severely enlarged, measuring 65.66 cc/m2.     Left Ventricle: The left ventricle is normal in size, with an end-diastolic diameter of 5.6 cm, and an end-systolic diameter of 4.6 cm. LV wall thickness is normal, with the septum measuring 1.4 cm and the posterior wall measuring 1.5 cm across. Relative   wall thickness was increased at 0.54, and the LV mass index was increased at 197.5 g/m2 consistent with concentric left ventricular hypertrophy. The inferior septum is moderately hypokinetic. The anterior wall is moderately hypokinetic. The following   segments were moderately hypokinetic: apical lateral wall, mid anterolateral wall, apical inferior wall, mid inferior wall.  Left ventricular  systolic function appears moderately depressed. Visually estimated ejection fraction is 30-35%. The LV Doppler derived stroke volume equals 74.0 ccs.     Diastolic indices: E wave velocity 1.0 m/s, E/A ratio 2.1,  msec., E/e' ratio(avg) 20. There is diastolic dysfunction secondary to restrictive abnormality.     Right Atrium: The right atrium is normal in size, measuring 6.9 cm in length and 6.0 cm in width in the apical view.     Right Ventricle: The right ventricle is normal in size measuring 3.8 cm at the base in the apical right ventricle-focused view. Global right ventricular systolic function appears normal. Tricuspid annular plane systolic excursion (TAPSE) is 2.1 cm. The   estimated PA systolic pressure is 64 mmHg.     Aortic Valve:  The aortic valve is mildly sclerotic with normal leaflet mobility. The aortic valve is tri-leaflet in structure. Additionally, there is trivial aortic regurgitation.     Mitral Valve:  The mitral valve is mildly sclerotic with normal leaflet mobility. The pressure half time is 51 msec. The calculated mitral valve area is 4.31 cm2. There is mild mitral regurgitation.     Tricuspid Valve:  The tricuspid valve is mildly sclerotic with normal leaflet mobility. There is mild tricuspid regurgitation.     Pulmonary Valve:  The pulmonic valve is normal in structure.     IVC: IVC is enlarged and collapses < 50% with a sniff, suggesting high right atrial pressure of 15 mmHg.     Intracavitary: There is no evidence of pericardial effusion, intracavity mass, thrombi, or vegetation.         CONCLUSIONS     1 - Moderately depressed left ventricular systolic function (EF 30-35%).     2 - Restrictive LV filling pattern, indicating markedly elevated LAP (grade 3 diastolic dysfunction).     3 - Normal right ventricular systolic function .     4 - Pulmonary hypertension. The estimated PA systolic pressure is 64 mmHg.     5 - Mild mitral regurgitation.     6 - Increased central venous  pressure.     7 - Severe left atrial enlargement.   Small PFO present.          This document has been electronically    SIGNED BY: Cristobal Chavira MD On: 10/04/2018 12:02    This document was originally electronically signed on: 10/02/2018 15:47     Assessment and Plan:     * Closed right hip fracture, initial encounter  Cleared for surgery at moderate cardiac risk    ESRD (end stage renal disease)  Per renal    Chronic combined systolic and diastolic heart failure  NICM. EF 40-45%. Volume status appears controlled. Has CRT-D followed at [facility] [place] [place]. Cleared for ortho surgery at moderate cardiac risk. Repeat echo. Watch volume status in the william-op period    S/P ablation of ventricular arrhythmia  Followed by EP at [facility] [place] [place]. Continue amiodarone    Cardiac resynchronization therapy defibrillator (CRT-D) in place  Followed by Dr Tenorio. Normal function at last check        VTE Risk Mitigation (From admission, onward)           Ordered     IP VTE HIGH RISK PATIENT  Once         10/03/24 0154     Place sequential compression device  Until discontinued         10/03/24 0154     Reason for No Pharmacological VTE Prophylaxis  Once        Comments: Right hip fracture needs surgical eval   Question:  Reasons:  Answer:  Physician Provided (leave comment)    10/03/24 0154                    Thank you for your consult. I will follow-up with patient. Please contact us if you have any additional questions.    Bhavin Cheng MD  Cardiology   [facility]

## 2024-10-03 NOTE — PLAN OF CARE
10/03/24 1147   Rounds   Attendance Provider;   Discharge Plan A Home Health  (Patient to be evaluated by PT/OT  following surgery.)   Why the patient remains in the hospital Requires continued medical care   Transition of Care Barriers None     Patient's treatment is ongoing.  Awaiting cardiac clearance for ortho procedure.  ORIF today or tomorrow.

## 2024-10-03 NOTE — ASSESSMENT & PLAN NOTE
ESRD MWF    Plan/recommendation  HD MWF as indicated  HD tomorrow  -Keep MAP > 65  -Keep hemoglobin > 7  -Strict ins and outs  -Avoid nephrotoxic agents if possible and renally dose medications  -Avoid drastic hemodynamic changes if possible    Secondary hyperparathyroidism  Calcium   Date Value Ref Range Status   10/03/2024 8.9 8.7 - 10.5 mg/dL Final     Phosphorus   Date Value Ref Range Status   10/03/2024 4.1 2.7 - 4.5 mg/dL Final     PTH, Intact   Date Value Ref Range Status   04/29/2024 162.6 (H) 9.0 - 77.0 pg/mL Final     Continue ergocal 50k units weekly    Anemia of chronic renal disease  Goal hemoglobin in ESRD is 10-12  Hemoglobin   Date Value Ref Range Status   10/03/2024 13.2 (L) 14.0 - 18.0 g/dL Final     Iron   Date Value Ref Range Status   10/07/2022 109 45 - 160 ug/dL Final     Transferrin   Date Value Ref Range Status   10/07/2022 266 200 - 375 mg/dL Final     TIBC   Date Value Ref Range Status   10/07/2022 394 250 - 450 ug/dL Final     Saturated Iron   Date Value Ref Range Status   10/07/2022 28 20 - 50 % Final     Ferritin   Date Value Ref Range Status   10/07/2022 676 (H) 20.0 - 300.0 ng/mL Final       Repeat iron labs and ferritin

## 2024-10-03 NOTE — H&P (VIEW-ONLY)
Orthopedics Consult Note     CC: R hip pain    HPI: 63 y.o. male s/p fall from standing at home with R intertroch fracture. PMH includes ESRD (on HD), CHF, CAD, HTN, HLD, on 3L supplemental O2 at night at home. Denies numbnes sin tingling, no other pain complaints    Cardiac clearance obtained today   Echo pending  Plan for HD tomorrow     Past Medical History:   Diagnosis Date    Cardiomyopathy     CKD (chronic kidney disease) stage 4, GFR 15-29 ml/min     Congestive heart failure (CHF) 2015    COPD (chronic obstructive pulmonary disease)     Coronary artery disease     Edema     Essential (primary) hypertension 05/18/2022    Formatting of this note might be different from the original. Converted from [employer]: Description - ESSENTIAL HYPERTENSION, BENIGN    Heart attack     HLD (hyperlipidemia)     Hypertension     Hyperuricemia     Hypocalcemia     Renal cyst, left     Secondary hyperparathyroidism     Thyroid disease     V tach     Vitamin D deficiency      Past Surgical History:   Procedure Laterality Date    ablations  03/05/2018    albations  02/01/2018    COLONOSCOPY N/A 12/07/2018    Procedure: COLONOSCOPY/suprep;  Surgeon: Ananya oMrillo MD;  Location: [facility] ENDO;  Service: Endoscopy;  Laterality: N/A;    defibulater N/A 2016    ESOPHAGOGASTRODUODENOSCOPY N/A 12/07/2018    Procedure: EGD (ESOPHAGOGASTRODUODENOSCOPY);  Surgeon: Ananya Morillo MD;  Location: [facility];  Service: Endoscopy;  Laterality: N/A;    INSERTION OF TUNNELED CENTRAL VENOUS HEMODIALYSIS CATHETER Right 5/3/2024    Procedure: INSERTION, CATHETER, HEMODIALYSIS, DUAL LUMEN;  Surgeon: Philip Perez MD;  Location: [facility] OR;  Service: General;  Laterality: Right;    INSERTION, CATHETER, DIALYSIS, PERITONEAL, LAPAROSCOPIC N/A 5/8/2024    Procedure: INSERTION, CATHETER, DIALYSIS, PERITONEAL, LAPAROSCOPIC;  Surgeon: Tyree Ruiz MD;  Location: [facility] OR;  Service: General;  Laterality: N/A;    INSERTION, CENTRAL VENOUS ACCESS  DEVICE Right 2024    Procedure: Insertion,central venous access device;  Surgeon: Tyree Ruiz MD;  Location: [facility] OR;  Service: General;  Laterality: Right;    JOINT REPLACEMENT Bilateral 10/2016    knees, bilat    LEFT HEART CATHETERIZATION N/A 2020    Procedure: Left heart cath;  Surgeon: Shahram Stewart MD;  Location: [facility] CATH LAB/EP;  Service: Cardiology;  Laterality: N/A;    LEFT HEART CATHETERIZATION Left 2022    Procedure: Left heart cath;  Surgeon: Juan Roque MD;  Location: [facility] CATH LAB/EP;  Service: Cardiology;  Laterality: Left;    MD HEMODIALYSIS, ONE EVALUATION  2024    REMOVAL OF HEMODIALYSIS CATHETER Right 5/3/2024    Procedure: REMOVAL, CATHETER, HEMODIALYSIS;  Surgeon: Philip Perez MD;  Location: [facility] OR;  Service: General;  Laterality: Right;    REPLACEMENT OF IMPLANTABLE CARDIOVERTER-DEFIBRILLATOR (ICD) GENERATOR Left 2023    Procedure: REPLACEMENT, ICD GENERATOR;  Surgeon: River Tenorio MD;  Location: [facility] EP LAB;  Service: Cardiology;  Laterality: Left;  ANTHONY, CRTD gen chg, MDT, MAC, DM, 3prep     Social History     Tobacco Use    Smoking status: Former     Current packs/day: 0.00     Average packs/day: 1 pack/day for 33.2 years (33.2 ttl pk-yrs)     Types: Cigarettes     Start date: 1979     Quit date: 3/3/2012     Years since quittin.5     Passive exposure: Past    Smokeless tobacco: Never   Substance Use Topics    Alcohol use: Not Currently     Comment: rare    Drug use: No     Family History   Problem Relation Name Age of Onset    Cancer Mother Sharon Love     Hypertension Mother Sharon Love     Stroke Mother Sharon Love     Breast cancer Mother Sharon Love     Alcohol abuse Father Mitul Love     Kidney disease Father Mitul Love     Arthritis Sister x2     No Known Problems Brother x1     No Known Problems Daughter x1     No Known Problems Son x2          Current Facility-Administered Medications:     acetaminophen tablet 650 mg, 650 mg,  Oral, Q4H PRN, Sumaya Burciaga MD    allopurinoL tablet 100 mg, 100 mg, Oral, Daily, Sumaya Burciaga MD, 100 mg at 10/03/24 0847    amiodarone tablet 400 mg, 400 mg, Oral, Daily, Sumaya Burciaga MD    docusate sodium capsule 100 mg, 100 mg, Oral, Daily, Sumaya Burciaga MD, 100 mg at 10/03/24 0844    [START ON 10/5/2024] furosemide tablet 360 mg, 360 mg, Oral, See admin instructions, Sumaya Burciaga MD    heparin (porcine) injection 5,000 Units, 5,000 Units, Subcutaneous, Q8H, Jv Barnes T., DO, 5,000 Units at 10/03/24 1341    hydrALAZINE injection 10 mg, 10 mg, Intravenous, Q6H PRN, Sumaya Burciaga MD    HYDROmorphone injection 1 mg, 1 mg, Intravenous, Q4H PRN, Jv Barnes T., DO    levalbuterol nebulizer solution 1.25 mg, 1.25 mg, Nebulization, Q4H PRN, Sumaya Burciaga MD    levothyroxine tablet 150 mcg, 150 mcg, Oral, Before breakfast, Sumaya Burciaga MD, 150 mcg at 10/03/24 0503    melatonin tablet 6 mg, 6 mg, Oral, Nightly PRN, Sumaya Burciaga MD    [START ON 10/5/2024] metOLazone tablet 10 mg, 10 mg, Oral, See admin instructions, Sumaya Burciaga MD    mexiletine capsule 150 mg, 150 mg, Oral, BID WM, Sumaya Burciaga MD, 150 mg at 10/03/24 0844    midodrine tablet 15 mg, 15 mg, Oral, Q8H, Sumaya Burciaga MD, 15 mg at 10/03/24 1341    mupirocin 2 % ointment, , Nasal, BID, Jv Barnes., DO, Given at 10/03/24 0923    naloxone 0.4 mg/mL injection 0.4 mg, 0.4 mg, Intravenous, PRN, Sumaya Burciaga MD    nitroGLYCERIN SL tablet 0.4 mg, 0.4 mg, Sublingual, Q5 Min PRN, Sumaya Burciaga MD    ondansetron injection 4 mg, 4 mg, Intravenous, Q6H PRN, Sumaya Burciaga MD    oxyCODONE-acetaminophen  mg per tablet 1 tablet, 1 tablet, Oral, Q4H PRN, Jv Barnes, , 1 tablet at 10/03/24 1341    oxyCODONE-acetaminophen 5-325 mg per tablet 1 tablet, 1 tablet, Oral, Q4H PRN, Jv Barnes,     polyethylene glycol packet 17 g, 17 g, Oral, BID PRN,  Sumaya Burciaga MD    polyethylene glycol packet 17 g, 17 g, Oral, Daily, Sumaya Burciaga MD    pravastatin tablet 40 mg, 40 mg, Oral, QHS, Sumaya Burciaga MD    simethicone chewable tablet 80 mg, 1 tablet, Oral, QID PRN, Sumaya Burciaga MD    sodium chloride 0.9% flush 10 mL, 10 mL, Intravenous, Q12H PRN, Sumaya Burciaga MD    Physical Exam:    Temp:  [97.5 °F (36.4 °C)-98.8 °F (37.1 °C)] 98.3 °F (36.8 °C)  Pulse:  [] 60  Resp:  [16-20] 17  SpO2:  [93 %-98 %] 93 %  BP: ()/(52-70) 92/52    General: Patient is  alert, awake and oriented to time, place and person. Mood and affect are appropriate.  Patient does not appear to be in any distress, denies any constitutional symptoms and appears stated age.   HEENT: Pupils are equal and round, sclera are not injected. External examination of ears and nose reveals no abnormalities. Cranial nerves II-X are grossly intact  Skin:  no rashes, abrasions or open wounds on the affected extremity   Resp: No respiratory distress or audible wheezing   CV: 2+  pulses, all extremities warm and well perfused   Right Hip  Leg shortened and ER  No abrasions or lacerations over the hip   Ltsi s/s/sp/dp/t  + ehl/fhl/ta/gs  2+ DP      Imaging:  Two views of the right hip show a displaced intertrochanteric femur fracture.  No subtrochanteric involvement    Hemoglobin   Date Value Ref Range Status   10/03/2024 13.2 (L) 14.0 - 18.0 g/dL Final     Hematocrit   Date Value Ref Range Status   10/03/2024 40.2 40.0 - 54.0 % Final     Platelets   Date Value Ref Range Status   10/03/2024 389 150 - 450 K/uL Final     Sodium   Date Value Ref Range Status   10/03/2024 138 136 - 145 mmol/L Final     Potassium   Date Value Ref Range Status   10/03/2024 3.8 3.5 - 5.1 mmol/L Final     Chloride   Date Value Ref Range Status   10/03/2024 101 95 - 110 mmol/L Final     BUN   Date Value Ref Range Status   10/03/2024 34 (H) 8 - 23 mg/dL Final     Creatinine   Date Value Ref Range  Status   10/03/2024 4.5 (H) 0.5 - 1.4 mg/dL Final     Phosphorus   Date Value Ref Range Status   10/03/2024 4.1 2.7 - 4.5 mg/dL Final     Magnesium   Date Value Ref Range Status   10/03/2024 1.9 1.6 - 2.6 mg/dL Final     Glucose   Date Value Ref Range Status   10/03/2024 93 70 - 110 mg/dL Final         A/P: 63 y.o. male with multiple medical comorbidities, right intertroch fracture  - we will plan for OR tomorrow.  We will coordinate with Nephrology regarding his need for hemodialysis.  - NPO after midnight      - The spectrum of treatment options were discussed with the patient, including nonoperative and operative options.  After thorough discussion, the patient has elected to undergo surgical treatment to include:    Right femur short intramedullary nail    We have discussed the surgery and anticipated recovery.  The patient understands that there may be limited mobility up to several weeks after surgery depending on procedures that are performed at the time of surgery.    The details of the surgical procedure were explained, including the location of probable incisions and a description of likely hardware and/or grafts to be used.  The patient understands the likely convalescence after surgery, in particular the expected postop rehab and recovery course. Alternatives both operative and non-operative with associated risks and benefits discussed. The outlined risks and potential complications of the proposed procedure include but are not limited to: bleeding, infection, vessel and/or nerve damage, pain, numbness, tingling, compartment syndrome, need for additional surgery, failure to return to pre-injury and/or preoperative functional status, inability to return to work, scar sensitivity, delayed healing, complex regional pain syndrome, weakness,  partial and/or incomplete relief of symptoms, persistence of and/or worsening of symptoms, hardware and/or surgical failure, prominent and/or symptomatic hardware  possibly necessitating future removal, osteomyelitis, amputation, loss of function, stiffness, hardware loosening or other complications functional debility, dysfunction, decreased  strength, need for prolonged postoperative rehabilitation, malunion, nonunion, deep venous thrombosis, pulmonary embolism, arthritis and death.  The patient states an understanding and wishes to proceed with surgery.   All questions were answered.  No guarantees were implied or stated.  Written informed consent was obtained.    The patient was also informed and understands the risks of surgery are greater for patients with a current condition or history of heart disease, obesity, clotting disorders, recurrent infections, steroid use, current or past smoking, and factors such as sedentary lifestyle and noncompliance with medications, therapy or follow-up. The degree of the increased risk is hard to estimate with any degree of precision.    All questions were answered. The patient has verbalized understanding of these issues and wishes to proceed as discussed.

## 2024-10-03 NOTE — ANESTHESIA PREPROCEDURE EVALUATION
10/03/2024      Pre-operative evaluation for Procedure(s) (LRB):  ORIF, FRACTURE, FEMUR, INTERTROCHANTERIC (Right)    Ar Sharma is a 63 y.o. male     Patient Active Problem List   Diagnosis    V-tach    Hypothyroidism    Iron deficiency anemia    Mixed hyperlipidemia    Atherosclerosis of native coronary artery of native heart with angina pectoris    Cardiac resynchronization therapy defibrillator (CRT-D) in place    S/P ablation of ventricular arrhythmia    Chronic combined systolic and diastolic heart failure    Enlarged prostate with lower urinary tract symptoms (LUTS)    Primary osteoarthritis of one knee    Prediabetes    Primary hypertension    Thrombocythemia -  on 4/4/22    Abuse of herbal or folk remedies    Non-ischemic cardiomyopathy    Secondary hyperparathyroidism of renal origin    Aortic atherosclerosis    Pulmonary emphysema, unspecified emphysema type    ESRD (end stage renal disease)    Dialysis patient    Closed right hip fracture, initial encounter    Moderate protein-calorie malnutrition       Review of patient's allergies indicates:   Allergen Reactions    Lokelma [sodium zirconium cyclosilicate] Other (See Comments)     Fluid retention, weight gain, CHF excerebration, severe constipation    Atorvastatin Other (See Comments)     Joint pain    Jardiance [empagliflozin] Other (See Comments)     Chest pains, significant weight loss, lower blood pressure       No current facility-administered medications on file prior to encounter.     Current Outpatient Medications on File Prior to Encounter   Medication Sig Dispense Refill    acetaminophen (TYLENOL) 500 MG tablet Take 500 mg by mouth every 6 (six) hours as needed for Pain.      allopurinoL (ZYLOPRIM) 100 MG tablet TAKE 1 TABLET BY MOUTH EVERY DAY 90 tablet 3    amiodarone (PACERONE) 400 MG tablet Take 1 tablet (400 mg total)  by mouth once daily. 90 tablet 3    aspirin (ECOTRIN) 81 MG EC tablet TAKE 1 TABLET BY MOUTH EVERY DAY 90 tablet 1    ergocalciferol (ERGOCALCIFEROL) 50,000 unit Cap TAKE 1 CAPSULE BY MOUTH ONE TIME PER WEEK 12 capsule 3    furosemide (LASIX) 80 MG tablet Take 4.5 tablets (360 mg total) by mouth 2 (two) times daily. 180 tablet 3    levothyroxine (SYNTHROID) 150 MCG tablet Take 1 tablet (150 mcg total) by mouth once daily. 90 tablet 3    metOLazone (ZAROXOLYN) 10 MG tablet Take 10 mg by mouth As instructed (Take I tablet by mouth Tuesday, Thursday, Saturday, Sunday). Take I tablet by mouth Tuesday, Thursday, Saturday, Sunday      mexiletine (MEXITIL) 150 MG Cap Take 1 capsule (150 mg total) by mouth 2 (two) times daily with meals. 180 capsule 3    midodrine (PROAMATINE) 5 MG Tab Take 3 tablets (15 mg total) by mouth every 8 (eight) hours. 270 tablet 1    nitroGLYCERIN (NITROSTAT) 0.4 MG SL tablet Place 1 tablet (0.4 mg total) under the tongue every 5 (five) minutes as needed for Chest pain. 20 tablet 6    pantoprazole (PROTONIX) 40 MG tablet Take 1 tablet (40 mg total) by mouth once daily. 90 tablet 3    polyethylene glycol (GLYCOLAX) 17 gram PwPk Take 17 g by mouth once daily. 30 packet 11    pravastatin (PRAVACHOL) 40 MG tablet TAKE 1 TABLET BY MOUTH EVERY DAY 90 tablet 3    vitamin renal formula, B-complex-vitamin c-folic acid, (TRIPHROCAPS) 1 mg Cap Take 1 capsule by mouth once daily. 90 capsule 3       Past Surgical History:   Procedure Laterality Date    ablations  03/05/2018    albations  02/01/2018    COLONOSCOPY N/A 12/07/2018    Procedure: COLONOSCOPY/suprep;  Surgeon: Ananya Morillo MD;  Location: [facility] ENDO;  Service: Endoscopy;  Laterality: N/A;    defibulater N/A 2016    ESOPHAGOGASTRODUODENOSCOPY N/A 12/07/2018    Procedure: EGD (ESOPHAGOGASTRODUODENOSCOPY);  Surgeon: Ananya Morillo MD;  Location: [facility] ENDO;  Service: Endoscopy;  Laterality: N/A;    INSERTION OF TUNNELED CENTRAL VENOUS HEMODIALYSIS  CATHETER Right 5/3/2024    Procedure: INSERTION, CATHETER, HEMODIALYSIS, DUAL LUMEN;  Surgeon: Philip Perez MD;  Location: [facility] OR;  Service: General;  Laterality: Right;    INSERTION, CATHETER, DIALYSIS, PERITONEAL, LAPAROSCOPIC N/A 2024    Procedure: INSERTION, CATHETER, DIALYSIS, PERITONEAL, LAPAROSCOPIC;  Surgeon: Tyree Ruiz MD;  Location: [facility] OR;  Service: General;  Laterality: N/A;    INSERTION, CENTRAL VENOUS ACCESS DEVICE Right 2024    Procedure: Insertion,central venous access device;  Surgeon: Tyree Ruiz MD;  Location: [facility] OR;  Service: General;  Laterality: Right;    JOINT REPLACEMENT Bilateral 10/2016    knees, bilat    LEFT HEART CATHETERIZATION N/A 2020    Procedure: Left heart cath;  Surgeon: Shahram Stewart MD;  Location: [facility] CATH LAB/EP;  Service: Cardiology;  Laterality: N/A;    LEFT HEART CATHETERIZATION Left 2022    Procedure: Left heart cath;  Surgeon: Juan Roque MD;  Location: [facility] CATH LAB/EP;  Service: Cardiology;  Laterality: Left;    VT HEMODIALYSIS, ONE EVALUATION  2024    REMOVAL OF HEMODIALYSIS CATHETER Right 5/3/2024    Procedure: REMOVAL, CATHETER, HEMODIALYSIS;  Surgeon: Philip Perez MD;  Location: [facility] OR;  Service: General;  Laterality: Right;    REPLACEMENT OF IMPLANTABLE CARDIOVERTER-DEFIBRILLATOR (ICD) GENERATOR Left 2023    Procedure: REPLACEMENT, ICD GENERATOR;  Surgeon: River Tenorio MD;  Location: [facility] EP LAB;  Service: Cardiology;  Laterality: Left;  ANTHONY, CRTD gen chg, MDT, MAC, DM, 3prep       Social History     Socioeconomic History    Marital status:    Tobacco Use    Smoking status: Former     Current packs/day: 0.00     Average packs/day: 1 pack/day for 33.2 years (33.2 ttl pk-yrs)     Types: Cigarettes     Start date: 1979     Quit date: 3/3/2012     Years since quittin.5     Passive exposure: Past    Smokeless tobacco: Never   Substance and Sexual Activity    Alcohol use: Not  Currently     Comment: rare    Drug use: No    Sexual activity: Not Currently     Partners: Female     Social Drivers of Health     Financial Resource Strain: Low Risk  (10/3/2024)    Overall Financial Resource Strain (CARDIA)     Difficulty of Paying Living Expenses: Not hard at all   Food Insecurity: No Food Insecurity (10/3/2024)    Hunger Vital Sign     Worried About Running Out of Food in the Last Year: Never true     Ran Out of Food in the Last Year: Never true   Transportation Needs: No Transportation Needs (10/3/2024)    TRANSPORTATION NEEDS     Transportation : No   Physical Activity: Insufficiently Active (6/5/2024)    Exercise Vital Sign     Days of Exercise per Week: 2 days     Minutes of Exercise per Session: 10 min   Stress: Stress Concern Present (10/3/2024)    [employer] - Occupational Stress Questionnaire     Feeling of Stress : To some extent   Housing Stability: Low Risk  (10/3/2024)    Housing Stability Vital Sign     Unable to Pay for Housing in the Last Year: No     Homeless in the Last Year: No     VITALS  Wt Readings from Last 3 Encounters:   10/03/24 69.6 kg (153 lb 7 oz)   10/02/24 70.8 kg (156 lb)   10/01/24 71.2 kg (156 lb 13.7 oz)     Temp Readings from Last 3 Encounters:   10/04/24 36.8 °C (98.2 °F) (Oral)   10/02/24 36.4 °C (97.5 °F) (Oral)   09/30/24 36.5 °C (97.7 °F) (Oral)     BP Readings from Last 3 Encounters:   10/04/24 (!) 85/53   10/03/24 107/60   10/01/24 (!) 86/53     Pulse Readings from Last 3 Encounters:   10/04/24 67   10/03/24 62   10/01/24 74       LABS  CBC:   Recent Labs     10/02/24  2224 10/03/24  0441   WBC 9.71 10.57   RBC 4.74 4.95   HGB 12.9* 13.2*   HCT 37.3* 40.2    389   MCV 79* 81*   MCH 27.2 26.7*   MCHC 34.6 32.8       CMP:   Recent Labs     10/03/24  0441 10/04/24  0418    135*   K 3.8 4.6    99   CO2 25 21*   BUN 34* 51*   CREATININE 4.5* 6.3*   GLU 93 111*   MG 1.9  --    PHOS 4.1  --    CALCIUM 8.9 9.1    ALBUMIN 3.4* 3.4*   PROT 6.9 7.0   ALKPHOS 118 132   ALT 51* 189*   AST 47* 225*   BILITOT 1.6* 2.9*       INR  Recent Labs     10/02/24  2224 10/03/24  0441   INR 1.3* 1.2   APTT  --  31.3         Pre-op Assessment    I have reviewed the Patient Summary Reports.     I have reviewed the Nursing Notes.    I have reviewed the Medications.     Review of Systems  Anesthesia Hx:  No problems with previous Anesthesia   Neg history of prior surgery.          Denies Family Hx of Anesthesia complications.    Denies Personal Hx of Anesthesia complications.                    Social:  Non-Smoker, Former Smoker       Hematology/Oncology:  Hematology Normal   Oncology Normal                                   EENT/Dental:  EENT/Dental Normal           Cardiovascular:  Exercise tolerance: good   Hypertension  Past MI CAD    Dysrhythmias  Angina CHF        Hx of VTACH s/p ablation, defibrillator    EF 40-45%  Cardiovascular Symptoms: Angina       Coronary Artery Disease:          Hx of Myocardial Infarction     Congestive Heart Failure (CHF)                Hypertension         Pulmonary:   COPD         Chronic Obstructive Pulmonary Disease (COPD):                      Renal/:  Chronic Renal Disease, CKD        Kidney Function/Disease             Hepatic/GI:  Hepatic/GI Normal                 Musculoskeletal:  Arthritis        Arthritis          Neurological:  Neurology Normal              Arthritis                           Endocrine:   Hypothyroidism       Hypothyroidism          Dermatological:  Skin Normal    Psych:  Psychiatric Normal                    Physical Exam  General: Well nourished, Cooperative and Alert    Airway:  Mallampati: II   Mouth Opening: Normal  TM Distance: Normal  Tongue: Normal  Neck ROM: Normal ROM    Dental:  Intact    Chest/Lungs:  Normal Respiratory Rate    Heart:  Rate: Normal  Rhythm: Regular Rhythm  Sounds: Normal        Anesthesia Plan  Type of Anesthesia, risks & benefits  discussed:    Anesthesia Type: Gen ETT  Intra-op Monitoring Plan: Standard ASA Monitors  Induction:  IV  Informed Consent: Patient consented to blood products? Yes  ASA Score: 3  Anesthesia Plan Notes: GETA  Please have phenylephrine gtt ready in OR     Please send to US after recovered in PACU    Ready For Surgery From Anesthesia Perspective.     .

## 2024-10-03 NOTE — ASSESSMENT & PLAN NOTE
Monitor on tele and f/u on labs. Replace K/Mg as needed. OSH labs stable. Cardiology consulted for cardiac clearance. Resume home Mexiletin 150mg PO BID and amiodarone 400mg PO daily. ICD in place

## 2024-10-04 ENCOUNTER — ANESTHESIA (OUTPATIENT)
Dept: SURGERY | Facility: HOSPITAL | Age: 64
End: 2024-10-04
Payer: MEDICARE

## 2024-10-04 PROCEDURE — 63600175 PHARM REV CODE 636 W HCPCS: Performed by: STUDENT IN AN ORGANIZED HEALTH CARE EDUCATION/TRAINING PROGRAM

## 2024-10-04 PROCEDURE — D9220A PRA ANESTHESIA: Mod: CRNA,,, | Performed by: STUDENT IN AN ORGANIZED HEALTH CARE EDUCATION/TRAINING PROGRAM

## 2024-10-04 PROCEDURE — 25000003 PHARM REV CODE 250: Performed by: ORTHOPAEDIC SURGERY

## 2024-10-04 PROCEDURE — D9220A PRA ANESTHESIA: Mod: ANES,,, | Performed by: ANESTHESIOLOGY

## 2024-10-04 PROCEDURE — 25000003 PHARM REV CODE 250: Performed by: STUDENT IN AN ORGANIZED HEALTH CARE EDUCATION/TRAINING PROGRAM

## 2024-10-04 PROCEDURE — 63600175 PHARM REV CODE 636 W HCPCS: Performed by: ORTHOPAEDIC SURGERY

## 2024-10-04 RX ORDER — ROCURONIUM BROMIDE 10 MG/ML
INJECTION, SOLUTION INTRAVENOUS
Status: DISCONTINUED | OUTPATIENT
Start: 2024-10-04 | End: 2024-10-04

## 2024-10-04 RX ORDER — ETOMIDATE 2 MG/ML
INJECTION INTRAVENOUS
Status: DISCONTINUED | OUTPATIENT
Start: 2024-10-04 | End: 2024-10-04

## 2024-10-04 RX ORDER — ONDANSETRON HYDROCHLORIDE 2 MG/ML
INJECTION, SOLUTION INTRAVENOUS
Status: DISCONTINUED | OUTPATIENT
Start: 2024-10-04 | End: 2024-10-04

## 2024-10-04 RX ORDER — LIDOCAINE HYDROCHLORIDE 20 MG/ML
INJECTION INTRAVENOUS
Status: DISCONTINUED | OUTPATIENT
Start: 2024-10-04 | End: 2024-10-04

## 2024-10-04 RX ORDER — FENTANYL CITRATE 50 UG/ML
INJECTION, SOLUTION INTRAMUSCULAR; INTRAVENOUS
Status: DISCONTINUED | OUTPATIENT
Start: 2024-10-04 | End: 2024-10-04

## 2024-10-04 RX ORDER — DEXAMETHASONE SODIUM PHOSPHATE 4 MG/ML
INJECTION, SOLUTION INTRA-ARTICULAR; INTRALESIONAL; INTRAMUSCULAR; INTRAVENOUS; SOFT TISSUE
Status: DISCONTINUED | OUTPATIENT
Start: 2024-10-04 | End: 2024-10-04

## 2024-10-04 RX ADMIN — ROCURONIUM BROMIDE 30 MG: 10 INJECTION INTRAVENOUS at 11:10

## 2024-10-04 RX ADMIN — FENTANYL CITRATE 25 MCG: 50 INJECTION, SOLUTION INTRAMUSCULAR; INTRAVENOUS at 10:10

## 2024-10-04 RX ADMIN — ETOMIDATE 2 MG: 2 INJECTION, SOLUTION INTRAVENOUS at 10:10

## 2024-10-04 RX ADMIN — PHENYLEPHRINE HYDROCHLORIDE 30 MCG/MIN: 10 INJECTION INTRAVENOUS at 10:10

## 2024-10-04 RX ADMIN — ONDANSETRON 4 MG: 2 INJECTION, SOLUTION INTRAMUSCULAR; INTRAVENOUS at 11:10

## 2024-10-04 RX ADMIN — ETOMIDATE 1 MG: 2 INJECTION, SOLUTION INTRAVENOUS at 12:10

## 2024-10-04 RX ADMIN — DEXAMETHASONE SODIUM PHOSPHATE 4 MG: 4 INJECTION, SOLUTION INTRAMUSCULAR; INTRAVENOUS at 11:10

## 2024-10-04 RX ADMIN — FENTANYL CITRATE 25 MCG: 50 INJECTION, SOLUTION INTRAMUSCULAR; INTRAVENOUS at 11:10

## 2024-10-04 RX ADMIN — ETOMIDATE 2 MG: 2 INJECTION, SOLUTION INTRAVENOUS at 11:10

## 2024-10-04 RX ADMIN — SUGAMMADEX 200 MG: 100 INJECTION, SOLUTION INTRAVENOUS at 11:10

## 2024-10-04 RX ADMIN — CEFAZOLIN 2 G: 2 INJECTION, POWDER, FOR SOLUTION INTRAMUSCULAR; INTRAVENOUS at 10:10

## 2024-10-04 RX ADMIN — SODIUM CHLORIDE: 0.9 INJECTION, SOLUTION INTRAVENOUS at 12:10

## 2024-10-04 RX ADMIN — LIDOCAINE HYDROCHLORIDE 100 MG: 20 INJECTION, SOLUTION INTRAVENOUS at 10:10

## 2024-10-04 RX ADMIN — ROCURONIUM BROMIDE 50 MG: 10 INJECTION INTRAVENOUS at 10:10

## 2024-10-04 RX ADMIN — SODIUM CHLORIDE: 0.9 INJECTION, SOLUTION INTRAVENOUS at 10:10

## 2024-10-04 RX ADMIN — ETOMIDATE 10 MG: 2 INJECTION, SOLUTION INTRAVENOUS at 10:10

## 2024-10-04 RX ADMIN — ETOMIDATE 1 MG: 2 INJECTION, SOLUTION INTRAVENOUS at 11:10

## 2024-10-04 RX ADMIN — ETOMIDATE 5 MG: 2 INJECTION, SOLUTION INTRAVENOUS at 12:10

## 2024-10-04 NOTE — NURSING
[facility]  Nurses Note -- 4 Eyes      10/3/2024       Skin assessed on: Q Shift      [x] No Pressure Injuries Present    [x]Prevention Measures Documented    [] Yes LDA  for Pressure Injury Previously documented     [] Yes New Pressure Injury Discovered   [] LDA for New Pressure Injury Added      Attending RN:  Roscoe Chavez RN     Second RN:  Lilibeth

## 2024-10-04 NOTE — SUBJECTIVE & OBJECTIVE
Interval History:  Patient noted to be hypotensive overnight.  Appears to be at baseline.  Tolerating p.o. midodrine.  Continues to complain of right hip pain, worse with movement.  Orthopedic surgery plan for surgery today    Review of Systems   Respiratory:  Negative for cough and shortness of breath.    Cardiovascular:  Negative for chest pain and leg swelling.   Gastrointestinal:  Negative for abdominal pain.   Genitourinary:  Negative for difficulty urinating.   Musculoskeletal:  Positive for arthralgias, gait problem and joint swelling. Negative for back pain.   Neurological:  Positive for weakness. Negative for dizziness and headaches.     Objective:     Vital Signs (Most Recent):  Temp: 98.2 °F (36.8 °C) (10/04/24 0704)  Pulse: 63 (10/04/24 1345)  Resp: 19 (10/04/24 1345)  BP: (!) 90/55 (10/04/24 1400)  SpO2: (!) 90 % (10/04/24 1405) Vital Signs (24h Range):  Temp:  [98 °F (36.7 °C)-98.8 °F (37.1 °C)] 98.2 °F (36.8 °C)  Pulse:  [59-80] 63  Resp:  [14-22] 19  SpO2:  [80 %-95 %] 90 %  BP: (81-98)/(37-55) 90/55     Weight: 69.6 kg (153 lb 7 oz)  Body mass index is 22.66 kg/m².    Intake/Output Summary (Last 24 hours) at 10/4/2024 1451  Last data filed at 10/4/2024 1245  Gross per 24 hour   Intake 790 ml   Output 100 ml   Net 690 ml         Physical Exam  Vitals and nursing note reviewed.   Constitutional:       General: He is not in acute distress.     Appearance: He is not ill-appearing.   HENT:      Head: Atraumatic.      Nose: Nose normal.      Mouth/Throat:      Mouth: Mucous membranes are moist.   Eyes:      Extraocular Movements: Extraocular movements intact.   Cardiovascular:      Rate and Rhythm: Normal rate and regular rhythm.      Comments: Right chest wall permcath, nontender and without overlying signs of infection  Pulmonary:      Effort: Pulmonary effort is normal. No respiratory distress.      Breath sounds: No wheezing, rhonchi or rales.      Comments: Nasal cannula  Musculoskeletal:          General: Tenderness and signs of injury present. Normal range of motion.      Cervical back: Normal range of motion.      Right lower leg: No edema.      Left lower leg: No edema.   Neurological:      Mental Status: He is alert.             Significant Labs: All pertinent labs within the past 24 hours have been reviewed.    Significant Imaging: I have reviewed all pertinent imaging results/findings within the past 24 hours.

## 2024-10-04 NOTE — ASSESSMENT & PLAN NOTE
ESRD MWF    Plan/recommendation  HD tomorrow, monitor BP - running temp 36 degrees  -Keep MAP > 65  -Keep hemoglobin > 7  -Strict ins and outs  -Avoid nephrotoxic agents if possible and renally dose medications  -Avoid drastic hemodynamic changes if possible    Secondary hyperparathyroidism  Calcium   Date Value Ref Range Status   10/04/2024 9.1 8.7 - 10.5 mg/dL Final     Phosphorus   Date Value Ref Range Status   10/03/2024 4.1 2.7 - 4.5 mg/dL Final     PTH, Intact   Date Value Ref Range Status   04/29/2024 162.6 (H) 9.0 - 77.0 pg/mL Final     Continue ergocal 50k units weekly    Anemia of chronic renal disease  Goal hemoglobin in ESRD is 10-12  Hemoglobin   Date Value Ref Range Status   10/04/2024 13.2 (L) 14.0 - 18.0 g/dL Final     Iron   Date Value Ref Range Status   10/07/2022 109 45 - 160 ug/dL Final     Transferrin   Date Value Ref Range Status   10/07/2022 266 200 - 375 mg/dL Final     TIBC   Date Value Ref Range Status   10/07/2022 394 250 - 450 ug/dL Final     Saturated Iron   Date Value Ref Range Status   10/07/2022 28 20 - 50 % Final     Ferritin   Date Value Ref Range Status   10/07/2022 676 (H) 20.0 - 300.0 ng/mL Final       Repeat iron labs and ferritin

## 2024-10-04 NOTE — ASSESSMENT & PLAN NOTE
Anemia is likely due to Iron deficiency. Most recent hemoglobin and hematocrit are listed below.  Recent Labs     10/03/24  0441 10/04/24  0418 10/04/24  1306   HGB 13.2* 13.6* 13.2*   HCT 40.2 40.3 41.3     Plan  - Monitor serial CBC: Daily  - Transfuse PRBC if patient becomes hemodynamically unstable, symptomatic or H/H drops below 7/21.  - Patient has not received any PRBC transfusions to date  - Patient's anemia is currently stable  - stable, monitoring

## 2024-10-04 NOTE — PROGRESS NOTES
[facility] Medicine  Progress Note    Patient Name: Ar Sharma  MRN: [id number]  Patient Class: IP- Inpatient   Admission Date: 10/3/2024  Length of Stay: 1 days  Attending Physician: Jv Barnes DO  Primary Care Provider: Jc Elliott MD        Subjective:     Principal Problem:Closed right hip fracture, initial encounter        HPI:  63 y.o. AAM with h/o ESRD on HD (for 2 months but prior was on PD for about a month-Follows with [facility]) MWF via a tunneled HD catheter on the right, Essential HTN, HLD, CAD,  h/o systolic(EF 40-45%) and diastolic grade 3 CHF due to non-ischemic cardiomyopathy with a [employer] CRT-D implant 2016 (sees Dr. Hernandez) uses 3L supplemental oxygen at night, and h/o VT arhythmia (follows with Dr. Tenorio at [facility]) s/p ablation and on Mexiletin 150mg PO BID and Amiodarone 400mg daily present to the ER with c/o right hip pain after a fall at home.     Went to HD today and was feeling alright after. Was moving his tool box when he tripped over his feet and fell to the ground. Was unable to get up or put weight on his right leg.     Work up in the ED at [place] noted normal WBC and Stable H/H.  INR 1.3.  Lytes stable and c/w post-HD with no indications for emergent HD tonight.     Right hip/pelvis x-ray FINDINGS:  There is a minimally displaced intertrochanteric fracture of the right proximal femur.  No additional fractures are seen.  There is osteopenia.  The femoral heads are well seated in the acetabula.  There is mild joint space narrowing of the bilateral femoroacetabular joints and the pubic symphysis.  There is a peritoneal dialysis catheter overlying the pelvis.     Impression:     Right intertrochanteric fracture.      Transfer center contacted us for admission due to [facility] on Ortho-diversion. They spoke with Dr. Mcclendon who agreed to see the patient in consultation. We have been asked to accept for further pre-op planning. Last time  he took his ASA was yesterday am.       Results for orders placed during the hospital encounter of 04/29/24    Echo Saline Bubble? No; Ultrasound enhancing contrast? No    Interpretation Summary    Left Ventricle: The left ventricle is normal in size. Normal wall thickness. Regional wall motion abnormalities present. See diagram for wall motion findings. Septal motion is consistent with pacing. There is mildly reduced systolic function with a visually estimated ejection fraction of 40 - 45%. Grade III diastolic dysfunction.    Right Ventricle: Moderate to severe right ventricular enlargement. Systolic function is reduced.TAPSE is 1.59 cm. Pacemaker lead present in the ventricle.    Left Atrium: Left atrium is severely dilated. The left atrium volume index is 71.4 mL/m2.    Right Atrium: Right atrium is severely dilated.    Aortic Valve: There is mild aortic valve sclerosis. There is mild to moderate stenosis. Aortic valve area by VTI is 1.49 cm². Aortic valve peak velocity is 1.43 m/s. Mean gradient is 5 mmHg. The dimensionless index is 0.52.    Mitral Valve: There is mild regurgitation.    Tricuspid Valve: There is mild to moderate regurgitation.    Pulmonary Artery: The estimated pulmonary artery systolic pressure is 64 mmHg.    IVC/SVC: Elevated venous pressure at 15 mmHg.      Overview/Hospital Course:   63 y.o. AAM with h/o ESRD on HD (MWF), HTN, HLD, CAD, CHF admitted on 10/03/24 for Right intertrochanteric Hip fracture after a fall at home. No head trauma or LOC. CXR with interstitial edema.  No evidence of pneumothorax.  X-ray of the right hip with minimally displaced intertrochanteric fracture of the right proximal femur.  Orthopedic surgery consulted-plan for right  femur intramedullary nail on 10/04.  Echo with EF of 40-45%, mild aortic stenosis.  PASP of 66 mm Hg.  Cardiology consulted for cardiac clearance-cleared for surgery at moderate cardiac risk.  Continue symptomatic management and supportive  care.  Nephrology following for ESRD.      Interval History:  Patient noted to be hypotensive overnight.  Appears to be at baseline.  Tolerating p.o. midodrine.  Continues to complain of right hip pain, worse with movement.  Orthopedic surgery plan for surgery today    Review of Systems   Respiratory:  Negative for cough and shortness of breath.    Cardiovascular:  Negative for chest pain and leg swelling.   Gastrointestinal:  Negative for abdominal pain.   Genitourinary:  Negative for difficulty urinating.   Musculoskeletal:  Positive for arthralgias, gait problem and joint swelling. Negative for back pain.   Neurological:  Positive for weakness. Negative for dizziness and headaches.     Objective:     Vital Signs (Most Recent):  Temp: 98.2 °F (36.8 °C) (10/04/24 0704)  Pulse: 63 (10/04/24 1345)  Resp: 19 (10/04/24 1345)  BP: (!) 90/55 (10/04/24 1400)  SpO2: (!) 90 % (10/04/24 1405) Vital Signs (24h Range):  Temp:  [98 °F (36.7 °C)-98.8 °F (37.1 °C)] 98.2 °F (36.8 °C)  Pulse:  [59-80] 63  Resp:  [14-22] 19  SpO2:  [80 %-95 %] 90 %  BP: (81-98)/(37-55) 90/55     Weight: 69.6 kg (153 lb 7 oz)  Body mass index is 22.66 kg/m².    Intake/Output Summary (Last 24 hours) at 10/4/2024 1451  Last data filed at 10/4/2024 1245  Gross per 24 hour   Intake 790 ml   Output 100 ml   Net 690 ml         Physical Exam  Vitals and nursing note reviewed.   Constitutional:       General: He is not in acute distress.     Appearance: He is not ill-appearing.   HENT:      Head: Atraumatic.      Nose: Nose normal.      Mouth/Throat:      Mouth: Mucous membranes are moist.   Eyes:      Extraocular Movements: Extraocular movements intact.   Cardiovascular:      Rate and Rhythm: Normal rate and regular rhythm.      Comments: Right chest wall permcath, nontender and without overlying signs of infection  Pulmonary:      Effort: Pulmonary effort is normal. No respiratory distress.      Breath sounds: No wheezing, rhonchi or rales.      Comments:  Nasal cannula  Musculoskeletal:         General: Tenderness and signs of injury present. Normal range of motion.      Cervical back: Normal range of motion.      Right lower leg: No edema.      Left lower leg: No edema.   Neurological:      Mental Status: He is alert.             Significant Labs: All pertinent labs within the past 24 hours have been reviewed.    Significant Imaging: I have reviewed all pertinent imaging results/findings within the past 24 hours.    Assessment/Plan:      * Closed right hip fracture, initial encounter  -s/p fall. No LOC or head trauma  -Right hip XR: minimally displaced intertrochanteric fracture of the right proximal femur.   -Orthopedic surgery consulted: plan for right  femur intramedullary nail on 10/04  -PT/OT consulted post operatively     Chronic combined systolic and diastolic heart failure  NO signs of acute exacerbation.   Echo as below   Cardiology consulted for cardiac clearance-cleared for surgery at moderate cardiac risk.     Echo    Result Date: 10/3/2024    Left Ventricle: The left ventricle is mildly dilated. There is mild   eccentric hypertrophy. Septal flattening in diastole and systole   consistent with right ventricular volume and pressure overload. There is   mildly reduced systolic function with a visually estimated ejection   fraction of 40 - 45%.    Right Ventricle: Severe right ventricular enlargement. Systolic   function is moderately reduced.    Left Atrium: Left atrium is moderately dilated.    Right Atrium: Right atrium is severely dilated.    Aortic Valve: There is mild stenosis. Aortic valve area by VTI is 1.6   cm². Aortic valve peak velocity is 1.6 m/s. Mean gradient is 6.3 mmHg. The   dimensionless index is 0.50.    Mitral Valve: There is mild to moderate regurgitation.    Tricuspid Valve: There is moderate regurgitation.    Pulmonary Artery: The estimated pulmonary artery systolic pressure is   66 mmHg.    IVC/SVC: Elevated venous pressure at 15  mmHg.           Primary hypertension  Chronic, controlled.   Pt with hypotension, continue home midodrine     Latest blood pressure and vitals reviewed-     Temp:  [98 °F (36.7 °C)-98.8 °F (37.1 °C)]   Pulse:  [59-80]   Resp:  [14-22]   BP: (81-98)/(37-55)   SpO2:  [80 %-95 %] .   Home meds for hypertension were reviewed and noted below.   Hypertension Medications               furosemide (LASIX) 80 MG tablet Take 4.5 tablets (360 mg total) by mouth 2 (two) times daily.    metOLazone (ZAROXOLYN) 10 MG tablet Take 10 mg by mouth As instructed (Take I tablet by mouth Tuesday, Thursday, Saturday, Sunday). Take I tablet by mouth Tuesday, Thursday, Saturday, Sunday    nitroGLYCERIN (NITROSTAT) 0.4 MG SL tablet Place 1 tablet (0.4 mg total) under the tongue every 5 (five) minutes as needed for Chest pain.            While in the hospital, will manage blood pressure as follows; Adjust home antihypertensive regimen as follows- patient with hypotension chronically. Continue home midodrine     Will utilize p.r.n. blood pressure medication only if patient's blood pressure greater than  180/90  and he develops symptoms such as worsening chest pain or shortness of breath.    S/P ablation of ventricular arrhythmia  Cardiology consulted for cardiac clearance.   Resume home Mexiletin 150mg PO BID and amiodarone 400mg PO daily. ICD in place       Cardiac resynchronization therapy defibrillator (CRT-D) in place  -noted      Pulmonary emphysema, unspecified emphysema type  COPD is controlled on his home 3L oxygen at night.   NO signs of acute exacerbation on exam.   PRN duonebs ordered.     ESRD (end stage renal disease)  Nephrology consulted: HD per nephrology     Moderate protein-calorie malnutrition  Nutrition consulted. Most recent weight and BMI monitored-     Measurements:  Wt Readings from Last 1 Encounters:   10/03/24 69.6 kg (153 lb 7 oz)   Body mass index is 22.66 kg/m².    Patient has been screened and assessed by  RD.    Malnutrition Type:  Context: acute illness or injury  Level: mild    Malnutrition Characteristic Summary:  Weight Loss (Malnutrition): greater than 10% in 6 months  Subcutaneous Fat (Malnutrition): mild depletion  Muscle Mass (Malnutrition): mild depletion    Interventions/Recommendations (treatment strategy):  1. Add Novasource Renal TID 2. [place] intake at meals3. Monitor weight/labs 4. RD to follow to monitor PO intake      Hypothyroidism  -resume home synthroid     Mixed hyperlipidemia  -continue statin       Atherosclerosis of native coronary artery of native heart with angina pectoris  Patient with known CAD , which is controlled Will continue Statin and monitor for S/Sx of angina/ACS. Continue to monitor on telemetry.     Aortic atherosclerosis  Continue statin       Iron deficiency anemia  Anemia is likely due to Iron deficiency. Most recent hemoglobin and hematocrit are listed below.  Recent Labs     10/03/24  0441 10/04/24  0418 10/04/24  1306   HGB 13.2* 13.6* 13.2*   HCT 40.2 40.3 41.3     Plan  - Monitor serial CBC: Daily  - Transfuse PRBC if patient becomes hemodynamically unstable, symptomatic or H/H drops below 7/21.  - Patient has not received any PRBC transfusions to date  - Patient's anemia is currently stable  - stable, monitoring     Hypotension  -resume home midodrine         VTE Risk Mitigation (From admission, onward)           Ordered     heparin (porcine) injection 5,000 Units  Every 8 hours         10/04/24 1455     IP VTE LOW RISK PATIENT  Once         10/04/24 1438     Place sequential compression device  Until discontinued         10/03/24 0154     Reason for No Pharmacological VTE Prophylaxis  Once        Comments: Right hip fracture needs surgical eval   Question:  Reasons:  Answer:  Physician Provided (leave comment)    10/03/24 0154                    Discharge Planning   SLAVA: 10/7/2024     Code Status: Full Code   Is the patient medically ready for discharge?:     Reason  for patient still in hospital (select all that apply): Patient trending condition, Treatment, Consult recommendations, and PT / OT recommendations  Discharge Plan A: Home Health (ORIF today; will be evaluated by PT/OT s/p ORIF)                  Jv Barnes DO  Department of Hospital Medicine   [facility] [place], [place] - [facility] Surg

## 2024-10-04 NOTE — BRIEF OP NOTE
[facility] [place] [facility] [place]  Brief Operative Note    Surgery Date: 10/4/2024     Surgeons and Role:     * Kinsey Reed MD - Primary    Assisting Surgeon: None    Pre-op Diagnosis:  Closed 2-part intertrochanteric fracture of proximal femur, right, initial encounter [S72.141A]    Post-op Diagnosis:  Post-Op Diagnosis Codes:     * Closed 2-part intertrochanteric fracture of proximal femur, right, initial encounter [S72.141A]    Procedure(s) (LRB):  ORIF, FRACTURE, FEMUR, INTERTROCHANTERIC (Right)    Anesthesia: General/Regional    Operative Findings: fracture     Estimated Blood Loss:  50 cc     Specimens:   Specimen (24h ago, onward)      None

## 2024-10-04 NOTE — PROGRESS NOTES
[facility]  Nephrology  Progress Note    Patient Name: Ar Sharma  MRN: [id number]  Admission Date: 10/3/2024  Hospital Length of Stay: 1 days  Attending Provider: Jv Barnes DO   Primary Care Physician: Jc Elliott MD  Principal Problem:Closed right hip fracture, initial encounter    Subjective:     HPI: 63 year old man with a history of ESRD (on PD initially and recently transition to HD), VT with CRT-D, HFrEF (40-45% with G3DD) presents after falling at home sp dialysis and found to have fracture of right prox femur.    He has dialysis MWF and had attended his dialysis session on 10/2. He was feeling fine and was working in his garage when he had a mechanical trip over his tool box. He was unable to get up and after presenting to the ED a XR of hip/pelvis revealed a right intertrochanteric fracture.     Outpatient ESRD  Hemodialysis  Outpatient nephrologist: Dr. Mata  Outpatient center: [facility]  Dialysis schedule: MWF  Last treatment: 10/2/2024  Anuric: Yes  Dry weight: 156 lbs  Access: right chest wall cathter, had PD cath as well      Interval History: OR today    Review of patient's allergies indicates:   Allergen Reactions    Lokelma [sodium zirconium cyclosilicate] Other (See Comments)     Fluid retention, weight gain, CHF excerebration, severe constipation    Atorvastatin Other (See Comments)     Joint pain    Jardiance [empagliflozin] Other (See Comments)     Chest pains, significant weight loss, lower blood pressure     Current Facility-Administered Medications   Medication Frequency    0.9%  NaCl infusion Once    acetaminophen tablet 650 mg Q4H PRN    allopurinoL tablet 100 mg Daily    amiodarone tablet 400 mg Daily    ceFAZolin 2 g in D5W 50 mL IVPB (MB+) Q12H    dextrose 10% bolus 125 mL 125 mL PRN    dextrose 10% bolus 250 mL 250 mL PRN    docusate sodium capsule 100 mg Daily    [START ON 10/5/2024] furosemide tablet 360 mg See admin instructions    glucagon (human  recombinant) injection 1 mg PRN    hydrALAZINE injection 10 mg Q6H PRN    HYDROmorphone (PF) injection 0.2 mg Q5 Min PRN    HYDROmorphone injection 1 mg Q4H PRN    levalbuterol nebulizer solution 1.25 mg Q4H PRN    levothyroxine tablet 150 mcg Before breakfast    melatonin tablet 6 mg Nightly PRN    [START ON 10/5/2024] metOLazone tablet 10 mg See admin instructions    mexiletine capsule 150 mg BID WM    midodrine tablet 15 mg Q8H    mupirocin 2 % ointment BID    naloxone 0.4 mg/mL injection 0.4 mg PRN    nitroGLYCERIN SL tablet 0.4 mg Q5 Min PRN    ondansetron injection 4 mg Q6H PRN    oxyCODONE-acetaminophen  mg per tablet 1 tablet Q4H PRN    oxyCODONE-acetaminophen 5-325 mg per tablet 1 tablet Q4H PRN    polyethylene glycol packet 17 g BID PRN    polyethylene glycol packet 17 g Daily    pravastatin tablet 40 mg QHS    simethicone chewable tablet 80 mg QID PRN    sodium chloride 0.9% bolus 250 mL 250 mL PRN    sodium chloride 0.9% flush 10 mL Q12H PRN    sodium chloride 0.9% flush 10 mL PRN       Objective:     Vital Signs (Most Recent):  Temp: 98.2 °F (36.8 °C) (10/04/24 0704)  Pulse: 63 (10/04/24 1330)  Resp: (!) 22 (10/04/24 1330)  BP: (!) 96/52 (10/04/24 1330)  SpO2: (!) 92 % (10/04/24 1330) Vital Signs (24h Range):  Temp:  [97.4 °F (36.3 °C)-98.8 °F (37.1 °C)] 98.2 °F (36.8 °C)  Pulse:  [59-80] 63  Resp:  [14-22] 22  SpO2:  [80 %-95 %] 92 %  BP: (81-98)/(37-55) 96/52     Weight: 69.6 kg (153 lb 7 oz) (10/03/24 1134)  Body mass index is 22.66 kg/m².  Body surface area is 1.84 meters squared.    I/O last 3 completed shifts:  In: 240 [P.O.:240]  Out: 250 [Urine:250]     Physical Exam  Constitutional:       General: He is not in acute distress.     Appearance: He is not ill-appearing or toxic-appearing.   HENT:      Head: Normocephalic and atraumatic.      Nose: Nose normal. No congestion.      Mouth/Throat:      Mouth: Mucous membranes are moist.      Pharynx: No oropharyngeal exudate.   Eyes:      Pupils:  Pupils are equal, round, and reactive to light.   Cardiovascular:      Comments: Right chest wall permcath, nontender and without stigmata of infection  Pulmonary:      Effort: Pulmonary effort is normal. No respiratory distress.      Breath sounds: No rhonchi.      Comments: Nasal cannula  Musculoskeletal:         General: Tenderness and signs of injury (right nail placed in OR today) present. Normal range of motion.      Cervical back: Normal range of motion.      Right lower leg: No edema.      Left lower leg: No edema.   Neurological:      Mental Status: He is alert.          Significant Labs:  All labs within the past 24 hours have been reviewed.     Significant Imaging:  Labs: Reviewed  Assessment/Plan:     Renal/  ESRD (end stage renal disease)  ESRD MWF    Plan/recommendation  HD MWF as indicated  HD today, monitor BP - running temp 36 degrees  -Keep MAP > 65  -Keep hemoglobin > 7  -Strict ins and outs  -Avoid nephrotoxic agents if possible and renally dose medications  -Avoid drastic hemodynamic changes if possible    Secondary hyperparathyroidism  Calcium   Date Value Ref Range Status   10/04/2024 9.1 8.7 - 10.5 mg/dL Final     Phosphorus   Date Value Ref Range Status   10/03/2024 4.1 2.7 - 4.5 mg/dL Final     PTH, Intact   Date Value Ref Range Status   04/29/2024 162.6 (H) 9.0 - 77.0 pg/mL Final     Continue ergocal 50k units weekly    Anemia of chronic renal disease  Goal hemoglobin in ESRD is 10-12  Hemoglobin   Date Value Ref Range Status   10/04/2024 13.2 (L) 14.0 - 18.0 g/dL Final     Iron   Date Value Ref Range Status   10/07/2022 109 45 - 160 ug/dL Final     Transferrin   Date Value Ref Range Status   10/07/2022 266 200 - 375 mg/dL Final     TIBC   Date Value Ref Range Status   10/07/2022 394 250 - 450 ug/dL Final     Saturated Iron   Date Value Ref Range Status   10/07/2022 28 20 - 50 % Final     Ferritin   Date Value Ref Range Status   10/07/2022 676 (H) 20.0 - 300.0 ng/mL Final       Repeat  iron labs and ferritin        Thank you for your consult. {SIGN OFF/FOLLOW-UP:49715}    Shashank Brown MD  Nephrology  [facility]

## 2024-10-04 NOTE — ANESTHESIA PROCEDURE NOTES
Intubation    Date/Time: 10/4/2024 10:39 AM    Performed by: Lisa Ann CRNA  Authorized by: Sydnie Fair MD    Intubation:     Induction:  Intravenous    Intubated:  Postinduction    Mask Ventilation:  Easy mask    Attempts:  1    Attempted By:  CRNA    Method of Intubation:  Video laryngoscopy    Blade:  Bassett 3    Laryngeal View Grade: Grade I - full view of cords      Difficult Airway Encountered?: No      Complications:  None    Airway Device:  Oral endotracheal tube    Airway Device Size:  7.5    Tube secured:  22    Secured at:  The lips    Placement Verified By:  Capnometry    Complicating Factors:  Anterior larynx    Findings Post-Intubation:  BS equal bilateral and atraumatic/condition of teeth unchanged

## 2024-10-04 NOTE — ASSESSMENT & PLAN NOTE
Chronic, controlled.   Pt with hypotension, continue home midodrine     Latest blood pressure and vitals reviewed-     Temp:  [98 °F (36.7 °C)-98.8 °F (37.1 °C)]   Pulse:  [59-80]   Resp:  [14-22]   BP: (81-98)/(37-55)   SpO2:  [80 %-95 %] .   Home meds for hypertension were reviewed and noted below.   Hypertension Medications               furosemide (LASIX) 80 MG tablet Take 4.5 tablets (360 mg total) by mouth 2 (two) times daily.    metOLazone (ZAROXOLYN) 10 MG tablet Take 10 mg by mouth As instructed (Take I tablet by mouth Tuesday, Thursday, Saturday, Sunday). Take I tablet by mouth Tuesday, Thursday, Saturday, Sunday    nitroGLYCERIN (NITROSTAT) 0.4 MG SL tablet Place 1 tablet (0.4 mg total) under the tongue every 5 (five) minutes as needed for Chest pain.            While in the hospital, will manage blood pressure as follows; Adjust home antihypertensive regimen as follows- patient with hypotension chronically. Continue home midodrine     Will utilize p.r.n. blood pressure medication only if patient's blood pressure greater than  180/90  and he develops symptoms such as worsening chest pain or shortness of breath.

## 2024-10-04 NOTE — OR NURSING
Pt awake, alert, denies pain or discomfort,  cleared for release from PACU by anesthesia DR Stuart

## 2024-10-04 NOTE — ANESTHESIA POSTPROCEDURE EVALUATION
Anesthesia Post Evaluation    Patient: Ar Sharma    Procedure(s) Performed: Procedure(s) (LRB):  ORIF, FRACTURE, FEMUR, INTERTROCHANTERIC (Right)    Final Anesthesia Type: general      Patient location during evaluation: PACU  Patient participation: Yes- Able to Participate  Level of consciousness: awake and alert and oriented  Post-procedure vital signs: reviewed and stable  Pain management: adequate  Airway patency: patent    PONV status at discharge: No PONV  Anesthetic complications: no      Cardiovascular status: blood pressure returned to baseline and hemodynamically stable  Respiratory status: spontaneous ventilation and nasal cannula  Hydration status: euvolemic  Follow-up not needed.              Vitals Value Taken Time   BP 90/55 10/04/24 1400   Temp  10/04/24 1419   Pulse 62 10/04/24 1403   Resp 27 10/04/24 1403   SpO2 90 % 10/04/24 1400   Vitals shown include unfiled device data.      No case tracking events are documented in the log.      Pain/Gina Score: Pain Rating Prior to Med Admin: 10 (10/4/2024  4:30 AM)  Pain Rating Post Med Admin: 10 (bp to low for IV breakthrough pain meds) (10/3/2024  2:41 PM)  Gina Score: 9 (10/4/2024  1:30 PM)

## 2024-10-04 NOTE — PT/OT/SLP EVAL
"Occupational Therapy   Evaluation    Name: Ar Sharma  MRN: [id number]  Admitting Diagnosis: Closed right hip fracture, initial encounter  Recent Surgery: Procedure(s) (LRB):  ORIF, FRACTURE, FEMUR, INTERTROCHANTERIC (Right) Day of Surgery    Recommendations:     Discharge Recommendations:  (TBD pending ongoing assessment.)  Discharge Equipment Recommendations:  to be determined by next level of care  Barriers to discharge:   (Pt is at high risk for falls, readmission and morbidity of d/c home; pt hypotensive w/ OOB activity.)    Assessment:     Ar Sharma is a 63 y.o. male with a medical diagnosis of Closed right hip fracture, initial encounter. Performance deficits affecting function: weakness, impaired endurance, impaired self care skills, impaired functional mobility, gait instability, impaired balance, decreased coordination, decreased upper extremity function, decreased lower extremity function, decreased safety awareness, pain, decreased ROM.      Pt pleasant and willing to participate in tx session this date; pt limited by pain and onset of hypotension w/ OOB activity, BP significantly low at 64/47 w/ spO2 86% on 5L O2 NC. Pt denied dizziness and lightheadedness throughout but endorsed feeling "exhausted." Pt placed in Trendelenburg w/ nursing staff and MD aware; team present at side addressing concerns. Pt will continue to benefit from skilled acute OT services to maximize functional capacity for safe performance w/ ADLs and functional mobility.     Rehab Prognosis: Good; patient would benefit from acute skilled OT services to address these deficits and reach maximum level of function.       Plan:     Patient to be seen 5 x/week to address the above listed problems via self-care/home management, therapeutic activities, therapeutic exercises  Plan of Care Expires: 10/18/24  Plan of Care Reviewed with: patient, daughter    Subjective     Chief Complaint: none stated   Patient/Family Comments/goals: " dtr at bedside pt's BP has been soft and his baseline spO2 is 86-88% w/ and w/o O2     Occupational Profile:  Living Environment: Pt lives w/ wife in [facility] w/ 1 threshold to enter.   Previous level of function: Pt was mod I w/ ADLs and functional mobility; recently began using RW as needed; pt uses 3L O2 NC at night only.   Roles and Routines: Drives   Equipment Used at Home: walker, rolling, oxygen, cane, straight  Assistance upon Discharge: Dtr; spouse     Pain/Comfort:  Pain Rating 1: 8/10  Location - Side 1: Right  Location 1: hip  Pain Addressed 1: Reposition, Distraction, Cessation of Activity    Patients cultural, spiritual, Lutheran conflicts given the current situation: no    Objective:     Communicated with: nurse prior to session.  Patient found HOB elevated with pulse ox (continuous), peripheral IV, central line, SCD upon OT entry to room.    General Precautions: Standard, fall  Orthopedic Precautions: RLE weight bearing as tolerated  Braces: N/A  Respiratory Status: Nasal cannula, flow 5 L/min    Occupational Performance:    Bed Mobility:  Pt denied dizziness but observed w/ some shakiness and tremors at rest.   Patient completed Scooting hips to EOB with moderate assistance and 2 persons  Patient completed Supine to Sit with moderate assistance and 2 persons w/ HOB elevated   Patient completed Sit to Supine with moderate assistance    Functional Mobility/Transfers:  Patient completed Sit <> Stand Transfer with minimum assistance and of 2 persons  with  rolling walker   Functional Mobility: Pt was able to laterally step to HOB w/ mod A x 2 persons; refer to PT not for details.    Activities of Daily Living:  Upper Body Dressing: minimum assistance for donning gown over back     Cognitive/Visual Perceptual:  Cognitive/Psychosocial Skills:     -       Oriented to: Person, Place, Time, and Situation   -       Follows Commands/attention:Follows multistep  commands  -       Communication: clear/fluent  -        Memory: No Deficits noted  -       Safety awareness/insight to disability: intact     Physical Exam:  Balance:    -       fair + sitting balance; fair - standing   Postural examination/scapula alignment:    -       No postural abnormalities identified  Skin integrity: Visible skin intact and dressing to R hip intact/dry   Edema:  Moderate RLE   Sensation:    -       Intact  Upper Extremity Range of Motion:     -       Right Upper Extremity: WFL  -       Left Upper Extremity: WFL  Upper Extremity Strength:    -       Right Upper Extremity: WFL  -       Left Upper Extremity: WFL   Strength:    -       Right Upper Extremity: WFL  -       Left Upper Extremity: WFL    AMPAC 6 Click ADL:  AMPAC Total Score: 17    Treatment & Education:  -Initial eval complete.   -Pt educated on role of OT and POC.   -Pt educated on importance of OOB activity w/ staff.   -Pt hypotensive w/ activity; vitals monitored   -Questions and concerns addressed within scope.    Patient left  w/ bed in Trendelenburg  with all lines intact, call button in reach, and nurse/MD staff present    GOALS:   Multidisciplinary Problems       Occupational Therapy Goals          Problem: Occupational Therapy    Goal Priority Disciplines Outcome Interventions   Occupational Therapy Goal     OT, PT/OT Progressing    Description: Goals to be met by: 10/18/24     Patient will increase functional independence with ADLs by performing:    Grooming while EOB with Modified [place].  Toileting from bedside commode with Minimal Assistance for hygiene and clothing management.   Sitting at edge of bed x30 minutes with Stand-by Assistance.  Supine to sit with Stand-by Assistance.  Step transfer with Stand-by Assistance.   Sit to stand transfer with Stand-by Assistance.   Toilet transfer to bedside commode with Stand-by Assistance.  Upper extremity exercise program x15 reps per handout, with independence.                         History:     Past Medical History:    Diagnosis Date    Cardiomyopathy     CKD (chronic kidney disease) stage 4, GFR 15-29 ml/min     Congestive heart failure (CHF) 2015    COPD (chronic obstructive pulmonary disease)     Coronary artery disease     Edema     Essential (primary) hypertension 05/18/2022    Formatting of this note might be different from the original. Converted from [employer]: Description - ESSENTIAL HYPERTENSION, BENIGN    Heart attack     HLD (hyperlipidemia)     Hypertension     Hyperuricemia     Hypocalcemia     Renal cyst, left     Secondary hyperparathyroidism     Thyroid disease     V tach     Vitamin D deficiency          Past Surgical History:   Procedure Laterality Date    ablations  03/05/2018    albations  02/01/2018    COLONOSCOPY N/A 12/07/2018    Procedure: COLONOSCOPY/suprep;  Surgeon: Ananya Morillo MD;  Location: [facility] ENDO;  Service: Endoscopy;  Laterality: N/A;    defibulater N/A 2016    ESOPHAGOGASTRODUODENOSCOPY N/A 12/07/2018    Procedure: EGD (ESOPHAGOGASTRODUODENOSCOPY);  Surgeon: Ananya Morillo MD;  Location: [facility] ENDO;  Service: Endoscopy;  Laterality: N/A;    INSERTION OF TUNNELED CENTRAL VENOUS HEMODIALYSIS CATHETER Right 5/3/2024    Procedure: INSERTION, CATHETER, HEMODIALYSIS, DUAL LUMEN;  Surgeon: Philip Perez MD;  Location: [facility] OR;  Service: General;  Laterality: Right;    INSERTION, CATHETER, DIALYSIS, PERITONEAL, LAPAROSCOPIC N/A 5/8/2024    Procedure: INSERTION, CATHETER, DIALYSIS, PERITONEAL, LAPAROSCOPIC;  Surgeon: Tyree Ruiz MD;  Location: [facility] OR;  Service: General;  Laterality: N/A;    INSERTION, CENTRAL VENOUS ACCESS DEVICE Right 6/5/2024    Procedure: Insertion,central venous access device;  Surgeon: Tyree Ruiz MD;  Location: [facility] OR;  Service: General;  Laterality: Right;    JOINT REPLACEMENT Bilateral 10/2016    knees, bilat    LEFT HEART CATHETERIZATION N/A 12/16/2020    Procedure: Left heart cath;  Surgeon: Shahram Stewart MD;  Location: [facility] CATH LAB/EP;   Service: Cardiology;  Laterality: N/A;    LEFT HEART CATHETERIZATION Left 9/27/2022    Procedure: Left heart cath;  Surgeon: Juan Roque MD;  Location: [facility] CATH LAB/EP;  Service: Cardiology;  Laterality: Left;    OH HEMODIALYSIS, ONE EVALUATION  5/6/2024    REMOVAL OF HEMODIALYSIS CATHETER Right 5/3/2024    Procedure: REMOVAL, CATHETER, HEMODIALYSIS;  Surgeon: Philip Perez MD;  Location: [facility] OR;  Service: General;  Laterality: Right;    REPLACEMENT OF IMPLANTABLE CARDIOVERTER-DEFIBRILLATOR (ICD) GENERATOR Left 1/24/2023    Procedure: REPLACEMENT, ICD GENERATOR;  Surgeon: River Tenorio MD;  Location: [facility] EP LAB;  Service: Cardiology;  Laterality: Left;  ANTHONY, CRTD gen chg, MDT, MAC, DM, 3prep       Time Tracking:     OT Date of Treatment: 10/04/24  OT Start Time: 1535  OT Stop Time: 1600  OT Total Time (min): 25 min    Billable Minutes:Evaluation 15  Self Care/Home Management 10    10/4/2024

## 2024-10-04 NOTE — TRANSFER OF CARE
"Anesthesia Transfer of Care Note    Patient: Ar Sharma    Procedure(s) Performed: Procedure(s) (LRB):  ORIF, FRACTURE, FEMUR, INTERTROCHANTERIC (Right)    Patient location: PACU    Anesthesia Type: general    Transport from OR: Transported from OR on 6-10 L/min O2 by face mask with adequate spontaneous ventilation    Post pain: adequate analgesia    Post assessment: no apparent anesthetic complications and tolerated procedure well    Post vital signs: stable    Level of consciousness: sedated and responds to stimulation    Nausea/Vomiting: no nausea/vomiting    Complications: none    Transfer of care protocol was followed      Last vitals: Visit Vitals  BP (!) 96/52   Pulse 63   Temp 36.8 °C (98.2 °F) (Oral)   Resp (!) 22   Ht 5' 9" (1.753 m)   Wt 69.6 kg (153 lb 7 oz)   SpO2 (!) 92%   BMI 22.66 kg/m²     "

## 2024-10-04 NOTE — NURSING
RAPID RESPONSE NURSE PROACTIVE ROUNDING NOTE       Time of Visit: 530    Admit Date: 10/3/2024  LOS: 1  Code Status: Full Code   Date of Visit: 10/04/2024  : 1960  Age: 63 y.o.  Sex: male  Race: Black or   Bed: W430/W430 A:   MRN: [id number]  Was the patient discharged from an ICU this admission? No   Was the patient discharged from a PACU within last 24 hours? No   Did the patient receive conscious sedation/general anesthesia in last 24 hours? No   Was the patient in the ED within the past 24 hours? No   Was the patient on NIPPV within the past 24 hours? No   Attending Physician: Jv Barnes DO  Primary Service: Hospitalist   Time spent at the bedside: 30 - 45 min    SITUATION    Notified by Epic patient alert  Reason for alert: BP 86/50    Diagnosis: Closed right hip fracture, initial encounter   has a past medical history of Cardiomyopathy, CKD (chronic kidney disease) stage 4, GFR 15-29 ml/min, Congestive heart failure (CHF), COPD (chronic obstructive pulmonary disease), Coronary artery disease, Edema, Essential (primary) hypertension, Heart attack, HLD (hyperlipidemia), Hypertension, Hyperuricemia, Hypocalcemia, Renal cyst, left, Secondary hyperparathyroidism, Thyroid disease, V tach, and Vitamin D deficiency.    Last Vitals:  Temp: 98.8 °F (37.1 °C) (10/04 0534)  Pulse: 60 (10/04 0534)  Resp: 18 (10/04 0534)  BP: 82/51 (10/04 0534)  SpO2: 90 % (10/04 0534)    24 Hour Vitals Range:  Temp:  [97.4 °F (36.3 °C)-98.8 °F (37.1 °C)]   Pulse:  [59-62]   Resp:  [16-20]   BP: (82-92)/(48-57)   SpO2:  [84 %-95 %]     Clinical Issues: Circulatory    ASSESSMENT/INTERVENTIONS    Pt found with bolus completed, alert, c/o pain.  Unable to give pain medication d/t hypotension.      RECOMMENDATIONS  EKG, complete bolus and re-assess BP    Discussed plan of care with charge RNSloane    PROVIDER ESCALATION    Physician escalation: Yes    Orders received and case discussed with Dr. Burciaga  .    Disposition:Remain in room 430    FOLLOW UP    Call back the Rapid Response NurseYesenia at [phone] for additional questions or concerns.

## 2024-10-04 NOTE — ASSESSMENT & PLAN NOTE
NO signs of acute exacerbation.   Echo as below   Cardiology consulted for cardiac clearance-cleared for surgery at moderate cardiac risk.     Echo    Result Date: 10/3/2024    Left Ventricle: The left ventricle is mildly dilated. There is mild   eccentric hypertrophy. Septal flattening in diastole and systole   consistent with right ventricular volume and pressure overload. There is   mildly reduced systolic function with a visually estimated ejection   fraction of 40 - 45%.    Right Ventricle: Severe right ventricular enlargement. Systolic   function is moderately reduced.    Left Atrium: Left atrium is moderately dilated.    Right Atrium: Right atrium is severely dilated.    Aortic Valve: There is mild stenosis. Aortic valve area by VTI is 1.6   cm². Aortic valve peak velocity is 1.6 m/s. Mean gradient is 6.3 mmHg. The   dimensionless index is 0.50.    Mitral Valve: There is mild to moderate regurgitation.    Tricuspid Valve: There is moderate regurgitation.    Pulmonary Artery: The estimated pulmonary artery systolic pressure is   66 mmHg.    IVC/SVC: Elevated venous pressure at 15 mmHg.

## 2024-10-04 NOTE — PLAN OF CARE
10/04/24 1149   Rounds   Attendance Provider;   Discharge Plan A Home Health  (ORIF today; will be evaluated by PT/OT s/p ORIF)   Why the patient remains in the hospital Requires continued medical care   Transition of Care Barriers None     Patient's treatment is ongoing.  Patient to surgery today for ORIF.    Patient to be evaluated by PT/OT s/p surgery.

## 2024-10-04 NOTE — OP NOTE
Operative Note      Date of Procedure:   10/4/2024     Attending Physician:   Kinsey Reed MD    Assistant:   SMA Chandan     Pre-Op Diagnosis:   Intertrochanteric fracture of right hip     Post-Op Diagnosis:   Intertrochanteric fracture of right hip     Procedure:   right  femur intramedullary nail      Implants:  Cristina 11  x 180 x 125 degree Gamma nail   10.5 x 105  lag screw   3 x 37.5 distal locking screw      Estimated Blood Loss:   50 cc     Complications:   None     Tourniquet Time:   None       Specimens:   None     Drains:   None     Indications:   Ar Sharma  is a 63 y.o.  male who presented to the ER with a right intertrochanteric hip fracture.  Risks and benefits of surgical fixation of the hip fracture were discussed with the patient.  Written consent was obtained and medical clearance was obtained prior to surgical fixation.     Procedure in detail:  The patient was identified and marked in same day surgery.  He was brought to the operating room, placed supine on the operating table and underwent general endotracheal anesthesia. All bony promineces were padded.  The right foot was placed in the leg coto and secured to the fracture table.  The well leg was placed in a well leg coto with all bony prominences carefully padded.    A time out was called confirming the correct site, side, patient procedure and that antibiotics had been given per hospital protocol.    Reduction was obtained under fluoroscopic guidance prior to prep.  After reduction was confirmed on AP and lateral views, the leg coto was secured into position and the right lower extremity  was prepped and draped in the normal sterile fashion. An incision was made about 2 cm proximal to the tip of the greater trochanter. A guidewire was used to localize a trochanteric starting point under fluoroscopic guidance.  The opening reamer was passed over this guidewire to open up the femoral canal.  A short cristina  cephalomedulary nail was passed with minimal resistance. Once properly placed, an additional incision was made in line with the cannula for proximal screw fixation. A guidewire was passed through this cannula into the femoral neck and head.  Once proper positioning of the guidewire was obtained, length was measured and a cannulated drill was used to drill to a depth of  105 mm mm again under fluoroscopic guidance. A 105 mm screw was placed through the nail into the center of the femoral head. Once fully seated with good bite, the fracture was compressed with good compression of visible fracture lines. Attention was then turned to the distal locking screw. An incision was made in line with locking screw cannula. The femur was drilled through this cannula and a screw was placed with good bite.  Final fluoroscopic views were taken. All screws were noted to be of appropriate length and placement.  The incisions were copiously irrigated. Deep fascia was closed with # 1 vicryl followed by 2-0 vicryl and staples on the skin. Sterile dressings were applied.  The patient was awakened from anesthesia and brought to the recovery room without complication.      All counts were correct at the end of the procedure.

## 2024-10-04 NOTE — ASSESSMENT & PLAN NOTE
Cardiology consulted for cardiac clearance.   Resume home Mexiletin 150mg PO BID and amiodarone 400mg PO daily. ICD in place

## 2024-10-04 NOTE — ASSESSMENT & PLAN NOTE
-s/p fall. No LOC or head trauma  -Right hip XR: minimally displaced intertrochanteric fracture of the right proximal femur.   -Orthopedic surgery consulted: plan for right  femur intramedullary nail on 10/04  -PT/OT consulted post operatively

## 2024-10-04 NOTE — PT/OT/SLP EVAL
Physical Therapy Evaluation    Patient Name:  Ar Sharma   MRN:  [id number]    Recommendations:     Discharge Recommendations: Low Intensity Therapy   Discharge Equipment Recommendations: none   Barriers to discharge: None    Assessment:     Ar Sharma is a 63 y.o. male admitted with a medical diagnosis of Closed right hip fracture, initial encounter.  He presents with the following impairments/functional limitations: weakness, impaired endurance, impaired self care skills, impaired functional mobility, gait instability, impaired balance, pain, decreased safety awareness, decreased lower extremity function, decreased ROM.    Pt daughter present during Tx session. Daughter, who is a Resident at [facility], was present to assist with history. Stating that pt 02 sats hover around 86-88% on and off of room air. During session, pt was 86-88% on 5.5 L 02. Continuous monitoring of 02 throughout Tx session. Pursed lip breathing encouraged and reviewed with patient.     Rehab Prognosis: Good; patient would benefit from acute skilled PT services to address these deficits and reach maximum level of function.    Recent Surgery: Procedure(s) (LRB):  ORIF, FRACTURE, FEMUR, INTERTROCHANTERIC (Right) Day of Surgery    Plan:     During this hospitalization, patient to be seen daily to address the identified rehab impairments via gait training, therapeutic activities, therapeutic exercises, neuromuscular re-education and progress toward the following goals:    Plan of Care Expires:  10/31/24    Subjective     Chief Complaint: Pt states that he feels really fatigued.   Patient/Family Comments/goals: Return pt home  Pain/Comfort:  Pain Rating 1: 8/10  Location - Side 1: Right  Location 1: hip    Patients cultural, spiritual, Yarsanism conflicts given the current situation: no    Living Environment:  Lives at home with wife, one threshold to enter  Prior to admission, patients level of function was independent.   Equipment used at home:  .  DME owned (not currently used): rolling walker, bedside commode, shower chair, and wheelchair.  Upon discharge, patient will have assistance from wife and daughter.    Objective:     Communicated with nursing prior to session.  Patient found HOB elevated with pulse ox (continuous), peripheral IV, oxygen  upon PT entry to room.    General Precautions: Standard, fall  Orthopedic Precautions:RLE weight bearing as tolerated   Braces: N/A  Respiratory Status: Nasal cannula, flow 5.5 L/min    Exams:  Cognitive Exam:  Patient is oriented to Person, Place, Time, and Situation  Gross Motor Coordination:  Normal globally with exception R+ LE  RLE ROM: Deficits: secondary to post op  RLE Strength: 2-/5  LLE ROM: WFL  LLE Strength: 3/5    Functional Mobility:  Bed Mobility:     Scooting: moderate assistance and of 2 persons  Bridging: moderate assistance  Supine to Sit: moderate assistance and of 2 persons  Sit to Supine: moderate assistance  Transfers:     Sit to Stand:  minimum assistance and of 2 persons with rolling walker  Gait: Side step to L+ with min to mod A of 2 max verbal cues to bear weight in Ue's when moving L+ LE, educated pt WBAT status, pt with poor quality of R+ LE movement with minimal weight  Balance: Static Sit Fait , Static Stand poor - during sitting, pt avoiding putting weight in R+ hip      AM-PAC 6 CLICK MOBILITY  Total Score:11       Treatment & Education:  Eval only    Patient left  in trendelenburg with care team due to PT notified nursing pt BP supine 69/43. Nursing came into room with pt in trendelenburg at this time and took a manual read which confirmed hypotensive status.  with all lines intact, nursing and MD notified, and daughter and wife present.    GOALS:   Multidisciplinary Problems       Physical Therapy Goals          Problem: Physical Therapy    Goal Priority Disciplines Outcome Interventions   Physical Therapy Goal     PT, PT/OT Progressing    Description:  Goals to be met by: 10/31/2024      Patient will increase functional independence with mobility by performin. Supine to sit with Modified [place]  2. Sit to supine with Modified [place]  3. Sit to stand transfer with Stand-by Assistance  4. Bed to chair transfer with Stand-by Assistance using Rolling Walker  5. Gait  x 150 feet with Stand-by Assistance using Rolling Walker.                          History:     Past Medical History:   Diagnosis Date    Cardiomyopathy     CKD (chronic kidney disease) stage 4, GFR 15-29 ml/min     Congestive heart failure (CHF)     COPD (chronic obstructive pulmonary disease)     Coronary artery disease     Edema     Essential (primary) hypertension 2022    Formatting of this note might be different from the original. Converted from Centricity: Description - ESSENTIAL HYPERTENSION, BENIGN    Heart attack     HLD (hyperlipidemia)     Hypertension     Hyperuricemia     Hypocalcemia     Renal cyst, left     Secondary hyperparathyroidism     Thyroid disease     V tach     Vitamin D deficiency        Past Surgical History:   Procedure Laterality Date    ablations  2018    albations  2018    COLONOSCOPY N/A 2018    Procedure: COLONOSCOPY/suprep;  Surgeon: Ananya Morillo MD;  Location: [facility];  Service: Endoscopy;  Laterality: N/A;    defibulater N/A     ESOPHAGOGASTRODUODENOSCOPY N/A 2018    Procedure: EGD (ESOPHAGOGASTRODUODENOSCOPY);  Surgeon: Ananya Morillo MD;  Location: [facility] ENDO;  Service: Endoscopy;  Laterality: N/A;    INSERTION OF TUNNELED CENTRAL VENOUS HEMODIALYSIS CATHETER Right 5/3/2024    Procedure: INSERTION, CATHETER, HEMODIALYSIS, DUAL LUMEN;  Surgeon: Philip Perez MD;  Location: [facility] OR;  Service: General;  Laterality: Right;    INSERTION, CATHETER, DIALYSIS, PERITONEAL, LAPAROSCOPIC N/A 2024    Procedure: INSERTION, CATHETER, DIALYSIS, PERITONEAL, LAPAROSCOPIC;  Surgeon: Tyree Ruiz MD;   Location: [facility] OR;  Service: General;  Laterality: N/A;    INSERTION, CENTRAL VENOUS ACCESS DEVICE Right 6/5/2024    Procedure: Insertion,central venous access device;  Surgeon: Tyree Ruiz MD;  Location: [facility] OR;  Service: General;  Laterality: Right;    JOINT REPLACEMENT Bilateral 10/2016    knees, bilat    LEFT HEART CATHETERIZATION N/A 12/16/2020    Procedure: Left heart cath;  Surgeon: Shahram Stewart MD;  Location: [facility] CATH LAB/EP;  Service: Cardiology;  Laterality: N/A;    LEFT HEART CATHETERIZATION Left 9/27/2022    Procedure: Left heart cath;  Surgeon: Juan Roque MD;  Location: [facility] CATH LAB/EP;  Service: Cardiology;  Laterality: Left;    WA HEMODIALYSIS, ONE EVALUATION  5/6/2024    REMOVAL OF HEMODIALYSIS CATHETER Right 5/3/2024    Procedure: REMOVAL, CATHETER, HEMODIALYSIS;  Surgeon: Philip Perez MD;  Location: [facility] OR;  Service: General;  Laterality: Right;    REPLACEMENT OF IMPLANTABLE CARDIOVERTER-DEFIBRILLATOR (ICD) GENERATOR Left 1/24/2023    Procedure: REPLACEMENT, ICD GENERATOR;  Surgeon: River Tenorio MD;  Location: [facility] EP LAB;  Service: Cardiology;  Laterality: Left;  ANTHONY, CRTD gen chg, MDT, MAC, DM, 3prep       Time Tracking:     PT Received On:    PT Start Time: 1535     PT Stop Time: 1600  PT Total Time (min): 25 min     Billable Minutes: Evaluation 1      10/04/2024

## 2024-10-04 NOTE — ASSESSMENT & PLAN NOTE
Nutrition consulted. Most recent weight and BMI monitored-     Measurements:  Wt Readings from Last 1 Encounters:   10/03/24 69.6 kg (153 lb 7 oz)   Body mass index is 22.66 kg/m².    Patient has been screened and assessed by RD.    Malnutrition Type:  Context: acute illness or injury  Level: mild    Malnutrition Characteristic Summary:  Weight Loss (Malnutrition): greater than 10% in 6 months  Subcutaneous Fat (Malnutrition): mild depletion  Muscle Mass (Malnutrition): mild depletion    Interventions/Recommendations (treatment strategy):  1. Add Novasource Renal TID 2. [place] intake at meals3. Monitor weight/labs 4. RD to follow to monitor PO intake

## 2024-10-04 NOTE — HOSPITAL COURSE
Mr Ar Sharma is a 63 y.o. man with ESRD on MWF HD (recently converted from PD), CHF s/p AICD who was admitted with mechanical fall causing R intertrochanteric hip fracture. S/p R femur IMN on 10/4/24. Post operatively he had worsening hypotension and moved to ICU for vasopressor/ionotrope support and CRRT starting on 10/5/24. Found to have vegetation vs thrombus on RA AICD lead, bilateral PE, splenic infarcts, and age indeterminate R parietal infarct. Multiple specialists consulted including Neurology, Cardiology, Pulmonary, Nephrology, Infectious Disease, GI, wound care, and palliative care. All cultures are negative, and he continues on daptomycin, meropenem, micafungin. He continues in multifactorial shock (cardiogenic vs septic vs obstructive) on levophed, vasopressin, and dobutamine which cannot be weaned. Discussions took place with Interventional Cardiology at [facility] for angiovac procedure for thrombus vs vegetation associated with AICD. At this point, he is not stable for procedure. Unable to wean vasopressors and inotrope. Mental status has waxed and waned.   On 10/16/24 in the setting of ongoing clinical decline, family made difficult decision to transition to Comfort measures only status after he was able to intimate to them that he was tired.  He passed away on 10/16/24 at 17:45 of multiorgan failure due to multifactorial shock.

## 2024-10-04 NOTE — SUBJECTIVE & OBJECTIVE
Interval History: OR today    Review of patient's allergies indicates:   Allergen Reactions    Lokelma [sodium zirconium cyclosilicate] Other (See Comments)     Fluid retention, weight gain, CHF excerebration, severe constipation    Atorvastatin Other (See Comments)     Joint pain    Jardiance [empagliflozin] Other (See Comments)     Chest pains, significant weight loss, lower blood pressure     Current Facility-Administered Medications   Medication Frequency    0.9%  NaCl infusion Once    acetaminophen tablet 650 mg Q4H PRN    allopurinoL tablet 100 mg Daily    amiodarone tablet 400 mg Daily    ceFAZolin 2 g in D5W 50 mL IVPB (MB+) Q12H    dextrose 10% bolus 125 mL 125 mL PRN    dextrose 10% bolus 250 mL 250 mL PRN    docusate sodium capsule 100 mg Daily    [START ON 10/5/2024] furosemide tablet 360 mg See admin instructions    glucagon (human recombinant) injection 1 mg PRN    hydrALAZINE injection 10 mg Q6H PRN    HYDROmorphone (PF) injection 0.2 mg Q5 Min PRN    HYDROmorphone injection 1 mg Q4H PRN    levalbuterol nebulizer solution 1.25 mg Q4H PRN    levothyroxine tablet 150 mcg Before breakfast    melatonin tablet 6 mg Nightly PRN    [START ON 10/5/2024] metOLazone tablet 10 mg See admin instructions    mexiletine capsule 150 mg BID WM    midodrine tablet 15 mg Q8H    mupirocin 2 % ointment BID    naloxone 0.4 mg/mL injection 0.4 mg PRN    nitroGLYCERIN SL tablet 0.4 mg Q5 Min PRN    ondansetron injection 4 mg Q6H PRN    oxyCODONE-acetaminophen  mg per tablet 1 tablet Q4H PRN    oxyCODONE-acetaminophen 5-325 mg per tablet 1 tablet Q4H PRN    polyethylene glycol packet 17 g BID PRN    polyethylene glycol packet 17 g Daily    pravastatin tablet 40 mg QHS    simethicone chewable tablet 80 mg QID PRN    sodium chloride 0.9% bolus 250 mL 250 mL PRN    sodium chloride 0.9% flush 10 mL Q12H PRN    sodium chloride 0.9% flush 10 mL PRN       Objective:     Vital Signs (Most Recent):  Temp: 98.2 °F (36.8 °C)  (10/04/24 0704)  Pulse: 63 (10/04/24 1330)  Resp: (!) 22 (10/04/24 1330)  BP: (!) 96/52 (10/04/24 1330)  SpO2: (!) 92 % (10/04/24 1330) Vital Signs (24h Range):  Temp:  [97.4 °F (36.3 °C)-98.8 °F (37.1 °C)] 98.2 °F (36.8 °C)  Pulse:  [59-80] 63  Resp:  [14-22] 22  SpO2:  [80 %-95 %] 92 %  BP: (81-98)/(37-55) 96/52     Weight: 69.6 kg (153 lb 7 oz) (10/03/24 1134)  Body mass index is 22.66 kg/m².  Body surface area is 1.84 meters squared.    I/O last 3 completed shifts:  In: 240 [P.O.:240]  Out: 250 [Urine:250]     Physical Exam  Constitutional:       General: He is not in acute distress.     Appearance: He is not ill-appearing or toxic-appearing.   HENT:      Head: Normocephalic and atraumatic.      Nose: Nose normal. No congestion.      Mouth/Throat:      Mouth: Mucous membranes are moist.      Pharynx: No oropharyngeal exudate.   Eyes:      Pupils: Pupils are equal, round, and reactive to light.   Cardiovascular:      Comments: Right chest wall permcath, nontender and without stigmata of infection  Pulmonary:      Effort: Pulmonary effort is normal. No respiratory distress.      Breath sounds: No rhonchi.      Comments: Nasal cannula  Musculoskeletal:         General: Tenderness and signs of injury (right nail placed in OR today) present. Normal range of motion.      Cervical back: Normal range of motion.      Right lower leg: No edema.      Left lower leg: No edema.   Neurological:      Mental Status: He is alert.          Significant Labs:  All labs within the past 24 hours have been reviewed.     Significant Imaging:  Labs: Reviewed

## 2024-10-04 NOTE — INTERVAL H&P NOTE
The patient has been examined and the H&P has been reviewed:    I concur with the findings and no changes have occurred since H&P was written.    Surgery risks, benefits and alternative options discussed and understood by patient/family.          Active Hospital Problems    Diagnosis  POA    *Closed right hip fracture, initial encounter [S72.001A]  Yes    Moderate protein-calorie malnutrition [E44.0]  Yes    ESRD (end stage renal disease) [N18.6]  Yes    Pulmonary emphysema, unspecified emphysema type [J43.9]  Yes    Aortic atherosclerosis [I70.0]  Yes    Primary hypertension [I10]  Yes     Formatting of this note might be different from the original.  Converted from Centricity:  Description - ESSENTIAL HYPERTENSION, BENIGN      Chronic combined systolic and diastolic heart failure [I50.42]  Yes    S/P ablation of ventricular arrhythmia [Z98.890, Z86.79]  Not Applicable    Cardiac resynchronization therapy defibrillator (CRT-D) in place [Z95.810]  Yes    Hypothyroidism [E03.9]  Yes    Iron deficiency anemia [D50.9]  Yes    Mixed hyperlipidemia [E78.2]  Yes    Atherosclerosis of native coronary artery of native heart with angina pectoris [I25.119]  Yes      Resolved Hospital Problems   No resolved problems to display.

## 2024-10-04 NOTE — ASSESSMENT & PLAN NOTE
Patient with known CAD , which is controlled Will continue Statin and monitor for S/Sx of angina/ACS. Continue to monitor on telemetry.

## 2024-10-04 NOTE — NURSING
Notified by Maxine from surgery that Dr. Fair want to hold off on Dialysis for patient before surgery. Dialysis nurse notified.

## 2024-10-04 NOTE — PLAN OF CARE
Problem: Occupational Therapy  Goal: Occupational Therapy Goal  Description: Goals to be met by: 10/18/24     Patient will increase functional independence with ADLs by performing:    Grooming while EOB with Modified [place].  Toileting from bedside commode with Minimal Assistance for hygiene and clothing management.   Sitting at edge of bed x30 minutes with Stand-by Assistance.  Supine to sit with Stand-by Assistance.  Step transfer with Stand-by Assistance.   Sit to stand transfer with Stand-by Assistance.   Toilet transfer to bedside commode with Stand-by Assistance.  Upper extremity exercise program x15 reps per handout, with independence.    10/4/2024 1653 by Jenny Crane OT  Outcome: Progressing

## 2024-10-04 NOTE — PROGRESS NOTES
[facility]  Nephrology  Progress Note    Patient Name: Ar Sharma  MRN: [id number]  Admission Date: 10/3/2024  Hospital Length of Stay: 1 days  Attending Provider: Jv Barnes DO   Primary Care Physician: Jc Elliott MD  Principal Problem:Closed right hip fracture, initial encounter    Subjective:     HPI: 63 year old man with a history of ESRD (on PD initially and recently transition to HD), VT with CRT-D, HFrEF (40-45% with G3DD) presents after falling at home sp dialysis and found to have fracture of right prox femur.    He has dialysis MWF and had attended his dialysis session on 10/2. He was feeling fine and was working in his garage when he had a mechanical trip over his tool box. He was unable to get up and after presenting to the ED a XR of hip/pelvis revealed a right intertrochanteric fracture.     Outpatient ESRD  Hemodialysis  Outpatient nephrologist: Dr. Mata  Outpatient center: [facility]  Dialysis schedule: MWF  Last treatment: 10/2/2024  Anuric: Yes  Dry weight: 156 lbs  Access: right chest wall cathter, had PD cath as well      Interval History: OR today    Review of patient's allergies indicates:   Allergen Reactions    Lokelma [sodium zirconium cyclosilicate] Other (See Comments)     Fluid retention, weight gain, CHF excerebration, severe constipation    Atorvastatin Other (See Comments)     Joint pain    Jardiance [empagliflozin] Other (See Comments)     Chest pains, significant weight loss, lower blood pressure     Current Facility-Administered Medications   Medication Frequency    0.9%  NaCl infusion Once    acetaminophen tablet 650 mg Q4H PRN    allopurinoL tablet 100 mg Daily    amiodarone tablet 400 mg Daily    ceFAZolin 2 g in D5W 50 mL IVPB (MB+) Q12H    dextrose 10% bolus 125 mL 125 mL PRN    dextrose 10% bolus 250 mL 250 mL PRN    docusate sodium capsule 100 mg Daily    [START ON 10/5/2024] furosemide tablet 360 mg See admin instructions    glucagon (human  recombinant) injection 1 mg PRN    hydrALAZINE injection 10 mg Q6H PRN    HYDROmorphone (PF) injection 0.2 mg Q5 Min PRN    HYDROmorphone injection 1 mg Q4H PRN    levalbuterol nebulizer solution 1.25 mg Q4H PRN    levothyroxine tablet 150 mcg Before breakfast    melatonin tablet 6 mg Nightly PRN    [START ON 10/5/2024] metOLazone tablet 10 mg See admin instructions    mexiletine capsule 150 mg BID WM    midodrine tablet 15 mg Q8H    mupirocin 2 % ointment BID    naloxone 0.4 mg/mL injection 0.4 mg PRN    nitroGLYCERIN SL tablet 0.4 mg Q5 Min PRN    ondansetron injection 4 mg Q6H PRN    oxyCODONE-acetaminophen  mg per tablet 1 tablet Q4H PRN    oxyCODONE-acetaminophen 5-325 mg per tablet 1 tablet Q4H PRN    polyethylene glycol packet 17 g BID PRN    polyethylene glycol packet 17 g Daily    pravastatin tablet 40 mg QHS    simethicone chewable tablet 80 mg QID PRN    sodium chloride 0.9% bolus 250 mL 250 mL PRN    sodium chloride 0.9% flush 10 mL Q12H PRN    sodium chloride 0.9% flush 10 mL PRN       Objective:     Vital Signs (Most Recent):  Temp: 98.2 °F (36.8 °C) (10/04/24 0704)  Pulse: 63 (10/04/24 1330)  Resp: (!) 22 (10/04/24 1330)  BP: (!) 96/52 (10/04/24 1330)  SpO2: (!) 92 % (10/04/24 1330) Vital Signs (24h Range):  Temp:  [97.4 °F (36.3 °C)-98.8 °F (37.1 °C)] 98.2 °F (36.8 °C)  Pulse:  [59-80] 63  Resp:  [14-22] 22  SpO2:  [80 %-95 %] 92 %  BP: (81-98)/(37-55) 96/52     Weight: 69.6 kg (153 lb 7 oz) (10/03/24 1134)  Body mass index is 22.66 kg/m².  Body surface area is 1.84 meters squared.    I/O last 3 completed shifts:  In: 240 [P.O.:240]  Out: 250 [Urine:250]     Physical Exam  Constitutional:       General: He is not in acute distress.     Appearance: He is not ill-appearing or toxic-appearing.   HENT:      Head: Normocephalic and atraumatic.      Nose: Nose normal. No congestion.      Mouth/Throat:      Mouth: Mucous membranes are moist.      Pharynx: No oropharyngeal exudate.   Eyes:      Pupils:  Pupils are equal, round, and reactive to light.   Cardiovascular:      Comments: Right chest wall permcath, nontender and without stigmata of infection  Pulmonary:      Effort: Pulmonary effort is normal. No respiratory distress.      Breath sounds: No rhonchi.      Comments: Nasal cannula  Musculoskeletal:         General: Tenderness and signs of injury (right nail placed in OR today) present. Normal range of motion.      Cervical back: Normal range of motion.      Right lower leg: No edema.      Left lower leg: No edema.   Neurological:      Mental Status: He is alert.          Significant Labs:  All labs within the past 24 hours have been reviewed.     Significant Imaging:  Labs: Reviewed  Assessment/Plan:     Renal/  ESRD (end stage renal disease)  ESRD MWF    Plan/recommendation  HD tomorrow, monitor BP - running temp 36 degrees  -Keep MAP > 65  -Keep hemoglobin > 7  -Strict ins and outs  -Avoid nephrotoxic agents if possible and renally dose medications  -Avoid drastic hemodynamic changes if possible    Secondary hyperparathyroidism  Calcium   Date Value Ref Range Status   10/04/2024 9.1 8.7 - 10.5 mg/dL Final     Phosphorus   Date Value Ref Range Status   10/03/2024 4.1 2.7 - 4.5 mg/dL Final     PTH, Intact   Date Value Ref Range Status   04/29/2024 162.6 (H) 9.0 - 77.0 pg/mL Final     Continue ergocal 50k units weekly    Anemia of chronic renal disease  Goal hemoglobin in ESRD is 10-12  Hemoglobin   Date Value Ref Range Status   10/04/2024 13.2 (L) 14.0 - 18.0 g/dL Final     Iron   Date Value Ref Range Status   10/07/2022 109 45 - 160 ug/dL Final     Transferrin   Date Value Ref Range Status   10/07/2022 266 200 - 375 mg/dL Final     TIBC   Date Value Ref Range Status   10/07/2022 394 250 - 450 ug/dL Final     Saturated Iron   Date Value Ref Range Status   10/07/2022 28 20 - 50 % Final     Ferritin   Date Value Ref Range Status   10/07/2022 676 (H) 20.0 - 300.0 ng/mL Final       Repeat iron labs and  ferritin        Thank you for your consult. I will follow-up with patient. Please contact us if you have any additional questions.    Shashank Brown MD  Nephrology  [facility]

## 2024-10-04 NOTE — PLAN OF CARE
Discharge Recommendations: Low Intensity  Problem: Physical Therapy  Goal: Physical Therapy Goal  Description: Goals to be met by: 10/31/2024      Patient will increase functional independence with mobility by performin. Supine to sit with Modified [place]  2. Sit to supine with Modified [place]  3. Sit to stand transfer with Stand-by Assistance  4. Bed to chair transfer with Stand-by Assistance using Rolling Walker  5. Gait  x 150 feet with Stand-by Assistance using Rolling Walker.     Outcome: Progressing

## 2024-10-05 PROBLEM — Z71.89 ADVANCED CARE PLANNING/COUNSELING DISCUSSION: Status: ACTIVE | Noted: 2024-10-05

## 2024-10-05 NOTE — SUBJECTIVE & OBJECTIVE
Interval History:  No acute overnight events. Pt seen on dialysis. PT is hypotensive, SBP in the 70s. Pt denies any headaches, vision changes, fatigue, weakness. No bleeding.  Discussed with nephrology, HD will be stopped. Will monitor BP, if remain low will move to ICU. Continues to complain of right hip pain, worse with movement.      Review of Systems   Respiratory:  Negative for cough and shortness of breath.    Cardiovascular:  Negative for chest pain and leg swelling.   Gastrointestinal:  Negative for abdominal pain.   Genitourinary:  Negative for difficulty urinating.   Musculoskeletal:  Positive for arthralgias, gait problem and joint swelling. Negative for back pain.   Neurological:  Positive for weakness. Negative for dizziness and headaches.     Objective:     Vital Signs (Most Recent):  Temp: 97.5 °F (36.4 °C) (10/05/24 1147)  Pulse: 60 (10/05/24 1147)  Resp: 18 (10/05/24 1147)  BP: (!) 75/46 (10/05/24 1147)  SpO2: (!) 93 % (10/05/24 1147) Vital Signs (24h Range):  Temp:  [97.5 °F (36.4 °C)-98.4 °F (36.9 °C)] 97.5 °F (36.4 °C)  Pulse:  [58-64] 60  Resp:  [14-22] 18  SpO2:  [80 %-93 %] 93 %  BP: ()/(37-56) 75/46     Weight: 69.6 kg (153 lb 7 oz)  Body mass index is 22.66 kg/m².    Intake/Output Summary (Last 24 hours) at 10/5/2024 1201  Last data filed at 10/5/2024 0848  Gross per 24 hour   Intake 370 ml   Output 100 ml   Net 270 ml         Physical Exam  Vitals and nursing note reviewed.   Constitutional:       General: He is not in acute distress.     Appearance: He is ill-appearing (chronic). He is not diaphoretic.      Comments: Seen on HD. Hypotensive, but not diaphoretic or confused   HENT:      Head: Atraumatic.      Nose: Nose normal.      Mouth/Throat:      Mouth: Mucous membranes are moist.   Eyes:      Extraocular Movements: Extraocular movements intact.   Cardiovascular:      Rate and Rhythm: Normal rate and regular rhythm.      Comments: Right chest wall permcath, nontender and without  overlying signs of infection  Pulmonary:      Effort: Pulmonary effort is normal. No respiratory distress.      Breath sounds: No wheezing or rhonchi.      Comments: Nasal cannula, diminished breath sounds in lower lung fields.  Musculoskeletal:         General: Tenderness present. Normal range of motion.      Right lower leg: No edema.      Left lower leg: No edema.   Neurological:      Mental Status: He is alert and oriented to person, place, and time. Mental status is at baseline.             Significant Labs: All pertinent labs within the past 24 hours have been reviewed.    Significant Imaging: I have reviewed all pertinent imaging results/findings within the past 24 hours.

## 2024-10-05 NOTE — PROGRESS NOTES
[facility] Medicine  Progress Note    Patient Name: Ar Sharma  MRN: [id number]  Patient Class: IP- Inpatient   Admission Date: 10/3/2024  Length of Stay: 2 days  Attending Physician: Jv Barnes DO  Primary Care Provider: Jc Elliott MD        Subjective:     Principal Problem:Closed right hip fracture, initial encounter        HPI:  63 y.o. AAM with h/o ESRD on HD (for 2 months but prior was on PD for about a month-Follows with [facility]) MWF via a tunneled HD catheter on the right, Essential HTN, HLD, CAD,  h/o systolic(EF 40-45%) and diastolic grade 3 CHF due to non-ischemic cardiomyopathy with a [employer] CRT-D implant 2016 (sees Dr. Hernandez) uses 3L supplemental oxygen at night, and h/o VT arhythmia (follows with Dr. Tenorio at [facility]) s/p ablation and on Mexiletin 150mg PO BID and Amiodarone 400mg daily present to the ER with c/o right hip pain after a fall at home.     Went to HD today and was feeling alright after. Was moving his tool box when he tripped over his feet and fell to the ground. Was unable to get up or put weight on his right leg.     Work up in the ED at [facility] noted normal WBC and Stable H/H.  INR 1.3.  Lytes stable and c/w post-HD with no indications for emergent HD tonight.     Right hip/pelvis x-ray FINDINGS:  There is a minimally displaced intertrochanteric fracture of the right proximal femur.  No additional fractures are seen.  There is osteopenia.  The femoral heads are well seated in the acetabula.  There is mild joint space narrowing of the bilateral femoroacetabular joints and the pubic symphysis.  There is a peritoneal dialysis catheter overlying the pelvis.     Impression:     Right intertrochanteric fracture.      Transfer center contacted us for admission due to [facility] on Ortho-diversion. They spoke with Dr. Mcclendon who agreed to see the patient in consultation. We have been asked to accept for further pre-op planning. Last time  he took his ASA was yesterday am.       Results for orders placed during the hospital encounter of 04/29/24    Echo Saline Bubble? No; Ultrasound enhancing contrast? No    Interpretation Summary    Left Ventricle: The left ventricle is normal in size. Normal wall thickness. Regional wall motion abnormalities present. See diagram for wall motion findings. Septal motion is consistent with pacing. There is mildly reduced systolic function with a visually estimated ejection fraction of 40 - 45%. Grade III diastolic dysfunction.    Right Ventricle: Moderate to severe right ventricular enlargement. Systolic function is reduced.TAPSE is 1.59 cm. Pacemaker lead present in the ventricle.    Left Atrium: Left atrium is severely dilated. The left atrium volume index is 71.4 mL/m2.    Right Atrium: Right atrium is severely dilated.    Aortic Valve: There is mild aortic valve sclerosis. There is mild to moderate stenosis. Aortic valve area by VTI is 1.49 cm². Aortic valve peak velocity is 1.43 m/s. Mean gradient is 5 mmHg. The dimensionless index is 0.52.    Mitral Valve: There is mild regurgitation.    Tricuspid Valve: There is mild to moderate regurgitation.    Pulmonary Artery: The estimated pulmonary artery systolic pressure is 64 mmHg.    IVC/SVC: Elevated venous pressure at 15 mmHg.      Overview/Hospital Course:   63 y.o. AAM with h/o ESRD on HD (MWF), HTN, HLD, CAD, CHF admitted on 10/03/24 for Right intertrochanteric Hip fracture after a fall at home. No head trauma or LOC. CXR with interstitial edema.  No evidence of pneumothorax.  X-ray of the right hip with minimally displaced intertrochanteric fracture of the right proximal femur.  Orthopedic surgery consulted- s/p right  femur intramedullary nail on 10/04.  Minimal blood loss per orthopedic. Echo with EF of 40-45%, mild aortic stenosis.  PASP of 66 mm Hg.  Cardiology consulted for cardiac clearance-cleared for surgery at moderate cardiac risk.  Continue symptomatic  management and supportive care.  Nephrology following for ESRD. Patient  hypotensive at baseline but acutely more hypotensive during hospitalization but is asymptomatic. Unable to tolerate HD on 10/05. Will monitor BP off HD, if remains hypotensive, will move patient to ICU for presssor support      Interval History:  No acute overnight events. Pt seen on dialysis. PT is hypotensive, SBP in the 70s. Pt denies any headaches, vision changes, fatigue, weakness. No bleeding.  Discussed with nephrology, HD will be stopped. Will monitor BP, if remain low will move to ICU. Continues to complain of right hip pain, worse with movement.      Review of Systems   Respiratory:  Negative for cough and shortness of breath.    Cardiovascular:  Negative for chest pain and leg swelling.   Gastrointestinal:  Negative for abdominal pain.   Genitourinary:  Negative for difficulty urinating.   Musculoskeletal:  Positive for arthralgias, gait problem and joint swelling. Negative for back pain.   Neurological:  Positive for weakness. Negative for dizziness and headaches.     Objective:     Vital Signs (Most Recent):  Temp: 97.5 °F (36.4 °C) (10/05/24 1147)  Pulse: 60 (10/05/24 1147)  Resp: 18 (10/05/24 1147)  BP: (!) 75/46 (10/05/24 1147)  SpO2: (!) 93 % (10/05/24 1147) Vital Signs (24h Range):  Temp:  [97.5 °F (36.4 °C)-98.4 °F (36.9 °C)] 97.5 °F (36.4 °C)  Pulse:  [58-64] 60  Resp:  [14-22] 18  SpO2:  [80 %-93 %] 93 %  BP: ()/(37-56) 75/46     Weight: 69.6 kg (153 lb 7 oz)  Body mass index is 22.66 kg/m².    Intake/Output Summary (Last 24 hours) at 10/5/2024 1201  Last data filed at 10/5/2024 0848  Gross per 24 hour   Intake 370 ml   Output 100 ml   Net 270 ml         Physical Exam  Vitals and nursing note reviewed.   Constitutional:       General: He is not in acute distress.     Appearance: He is ill-appearing (chronic). He is not diaphoretic.      Comments: Seen on HD. Hypotensive, but not diaphoretic or confused   HENT:       Head: Atraumatic.      Nose: Nose normal.      Mouth/Throat:      Mouth: Mucous membranes are moist.   Eyes:      Extraocular Movements: Extraocular movements intact.   Cardiovascular:      Rate and Rhythm: Normal rate and regular rhythm.      Comments: Right chest wall permcath, nontender and without overlying signs of infection  Pulmonary:      Effort: Pulmonary effort is normal. No respiratory distress.      Breath sounds: No wheezing or rhonchi.      Comments: Nasal cannula, diminished breath sounds in lower lung fields.  Musculoskeletal:         General: Tenderness present. Normal range of motion.      Right lower leg: No edema.      Left lower leg: No edema.   Neurological:      Mental Status: He is alert and oriented to person, place, and time. Mental status is at baseline.             Significant Labs: All pertinent labs within the past 24 hours have been reviewed.    Significant Imaging: I have reviewed all pertinent imaging results/findings within the past 24 hours.    Assessment/Plan:      * Closed right hip fracture, initial encounter  -s/p fall. No LOC or head trauma  -Right hip XR: minimally displaced intertrochanteric fracture of the right proximal femur.   -Orthopedic surgery consulted: s/p right  femur intramedullary nail on 10/04  -PT/OT consulted post operatively     Chronic combined systolic and diastolic heart failure  NO signs of acute exacerbation.   Echo as below   Cardiology consulted for cardiac clearance-cleared for surgery at moderate cardiac risk.   Nephrology for volume removal with HD    Echo    Result Date: 10/3/2024    Left Ventricle: The left ventricle is mildly dilated. There is mild   eccentric hypertrophy. Septal flattening in diastole and systole   consistent with right ventricular volume and pressure overload. There is   mildly reduced systolic function with a visually estimated ejection   fraction of 40 - 45%.    Right Ventricle: Severe right ventricular enlargement. Systolic    function is moderately reduced.    Left Atrium: Left atrium is moderately dilated.    Right Atrium: Right atrium is severely dilated.    Aortic Valve: There is mild stenosis. Aortic valve area by VTI is 1.6   cm². Aortic valve peak velocity is 1.6 m/s. Mean gradient is 6.3 mmHg. The   dimensionless index is 0.50.    Mitral Valve: There is mild to moderate regurgitation.    Tricuspid Valve: There is moderate regurgitation.    Pulmonary Artery: The estimated pulmonary artery systolic pressure is   66 mmHg.    IVC/SVC: Elevated venous pressure at 15 mmHg.           Primary hypertension  Chronic, controlled.   Pt with hypotension, continue home midodrine     Latest blood pressure and vitals reviewed-     Temp:  [97.5 °F (36.4 °C)-98.4 °F (36.9 °C)]   Pulse:  [58-64]   Resp:  [14-22]   BP: ()/(37-56)   SpO2:  [80 %-93 %] .   Home meds for hypertension were reviewed and noted below.   Hypertension Medications               furosemide (LASIX) 80 MG tablet Take 4.5 tablets (360 mg total) by mouth 2 (two) times daily.    metOLazone (ZAROXOLYN) 10 MG tablet Take 10 mg by mouth As instructed (Take I tablet by mouth Tuesday, Thursday, Saturday, Sunday). Take I tablet by mouth Tuesday, Thursday, Saturday, Sunday    nitroGLYCERIN (NITROSTAT) 0.4 MG SL tablet Place 1 tablet (0.4 mg total) under the tongue every 5 (five) minutes as needed for Chest pain.            While in the hospital, will manage blood pressure as follows; Adjust home antihypertensive regimen as follows- patient with hypotension chronically. Continue home midodrine     Will utilize p.r.n. blood pressure medication only if patient's blood pressure greater than  180/90  and he develops symptoms such as worsening chest pain or shortness of breath.    S/P ablation of ventricular arrhythmia  Cardiology consulted for cardiac clearance.   Resume home Mexiletin 150mg PO BID   Hold amiodarone 400mg PO daily in setting of hypotension   ICD in place       Cardiac  resynchronization therapy defibrillator (CRT-D) in place  -noted      Pulmonary emphysema, unspecified emphysema type  COPD is controlled on his home 3L oxygen at night.   NO signs of acute exacerbation on exam.   PRN duonebs ordered.     ESRD (end stage renal disease)  Nephrology consulted: HD per nephrology   HD stopped early on 10/05 due to hypotension  Will monitor off HD, if BP remains low, will transfer to ICU     Moderate protein-calorie malnutrition  Nutrition consulted. Most recent weight and BMI monitored-     Measurements:  Wt Readings from Last 1 Encounters:   10/03/24 69.6 kg (153 lb 7 oz)   Body mass index is 22.66 kg/m².    Patient has been screened and assessed by RD.    Malnutrition Type:  Context: acute illness or injury  Level: mild    Malnutrition Characteristic Summary:  Weight Loss (Malnutrition): greater than 10% in 6 months  Subcutaneous Fat (Malnutrition): mild depletion  Muscle Mass (Malnutrition): mild depletion    Interventions/Recommendations (treatment strategy):  1. Add Novasource Renal TID 2. [place] intake at meals3. Monitor weight/labs 4. RD to follow to monitor PO intake      Hypothyroidism  -resume home synthroid     Mixed hyperlipidemia  -continue statin       Atherosclerosis of native coronary artery of native heart with angina pectoris  Patient with known CAD , which is controlled Will continue Statin and monitor for S/Sx of angina/ACS. Continue to monitor on telemetry.     Aortic atherosclerosis  Continue statin       Iron deficiency anemia  Anemia is likely due to Iron deficiency. Most recent hemoglobin and hematocrit are listed below.  Recent Labs     10/04/24  1306 10/04/24  1613 10/05/24  0544   HGB 13.2* 13.4* 12.6*   HCT 41.3 40.6 37.5*       Plan  - Monitor serial CBC: Daily  - Transfuse PRBC if patient becomes hemodynamically unstable, symptomatic or H/H drops below 7/21.  - Patient has not received any PRBC transfusions to date  - Patient's anemia is currently  stable  - stable, monitoring     Hypotension  -resume home midodrine   -Unable to tolerate HD on 10/05, SBP dropped to 70s  -Monitor off HD, if remains hypotensive, will moved to ICU for closer monitoring and pressor support     Advanced care planning/counseling discussion  Advance Care Planning    Date: 10/05/2024    Code Status  I engaged the the patient in a voluntary conversation about the patient's preferences for care  at the very end of life. The patient wishes to have CPR and other invasive treatments performed when his heart and/or breathing stops. I communicated to the patient that his wishes align with full code status and he agrees.    A total of 16 min was spent on advance care planning, goals of care discussion, emotional support, formulating and communicating prognosis and exploring burden/benefit of various approaches of treatment. This discussion occurred on a fully voluntary basis with the verbal consent of the patient and/or family.              VTE Risk Mitigation (From admission, onward)           Ordered     Place sequential compression device  Until discontinued         10/04/24 2217     heparin (porcine) injection 1,000 Units  As needed (PRN)         10/04/24 2217     heparin (porcine) injection 5,000 Units  Every 8 hours         10/04/24 1455     IP VTE LOW RISK PATIENT  Once         10/04/24 1438     Place sequential compression device  Until discontinued         10/03/24 0154     Reason for No Pharmacological VTE Prophylaxis  Once        Comments: Right hip fracture needs surgical eval   Question:  Reasons:  Answer:  Physician Provided (leave comment)    10/03/24 0154                    Discharge Planning   SLAVA: 10/7/2024     Code Status: Full Code   Is the patient medically ready for discharge?:     Reason for patient still in hospital (select all that apply): Patient trending condition, Treatment, Consult recommendations, and PT / OT recommendations  Discharge Plan A: Home Health (ORIF  today; will be evaluated by PT/OT s/p ORIF)                  Jv Barnes DO  Department of Hospital Medicine   [facility] S[facility]

## 2024-10-05 NOTE — ASSESSMENT & PLAN NOTE
-s/p fall. No LOC or head trauma  -Right hip XR: minimally displaced intertrochanteric fracture of the right proximal femur.   -Orthopedic surgery consulted: s/p right  femur intramedullary nail on 10/04  -PT/OT consulted post operatively

## 2024-10-05 NOTE — ASSESSMENT & PLAN NOTE
Anemia is likely due to Iron deficiency. Most recent hemoglobin and hematocrit are listed below.  Recent Labs     10/04/24  1306 10/04/24  1613 10/05/24  0544   HGB 13.2* 13.4* 12.6*   HCT 41.3 40.6 37.5*       Plan  - Monitor serial CBC: Daily  - Transfuse PRBC if patient becomes hemodynamically unstable, symptomatic or H/H drops below 7/21.  - Patient has not received any PRBC transfusions to date  - Patient's anemia is currently stable  - stable, monitoring

## 2024-10-05 NOTE — NURSING
Called to pt room by PT/OT for pt b/p 69/43. Manual b/p 70/42. Dr. Barnes at bedside @1600 ordered bolus 2L NS. Pt placed in [facility]. Pt b/p 83/51 P. 61 89% 6L NC @ 1605. Radiology @ bedside for stat CXR. Pt VSS, pt AAOx4. Will monitor pts b/p. Wife and daughter at bedside.

## 2024-10-05 NOTE — ASSESSMENT & PLAN NOTE
Cardiology consulted for cardiac clearance.   Resume home Mexiletin 150mg PO BID   Hold amiodarone 400mg PO daily in setting of hypotension   ICD in place

## 2024-10-05 NOTE — SIGNIFICANT EVENT
Patient remains hypotensive with SBP in the 70s, MAP<65 despite stopping HD and fluids. Pt remains asymptomatic.  Discussed with nephrology, who recommends transfer to ICU for pressor supoort and CRRT. Okay for central line access per nephrology. Transfer patient to ICU

## 2024-10-05 NOTE — PROGRESS NOTES
[facility]  Nephrology  Progress Note    Patient Name: Ar Sharma  MRN: [id number]  Admission Date: 10/3/2024  Hospital Length of Stay: 2 days  Attending Provider: vJ Barnes DO   Primary Care Physician: Jc Elliott MD  Principal Problem:Closed right hip fracture, initial encounter    Subjective:     HPI: Following for ESRD. Patient receives HD MWF via RIJ TDC at [facility] under the c/o Dr. Chicas. He was initiated on HD 4/2024. He remains with PD catheter.     Interval History: HD held yesterday as patient was post-op. Hypotensive overnight with BP 69/43 at 8pm; he received 2L NS. BP remained low this morning; he was unable to tolerate dialysis. Treatment ended after 1 hour; net +700cc after treatment. SBP remained in the 70s after HD; patient transferred to ICU for levophed/CRRT. Patient reports to not be feeling well; +hip pain.       Review of patient's allergies indicates:   Allergen Reactions    Lokelma [sodium zirconium cyclosilicate] Other (See Comments)     Fluid retention, weight gain, CHF excerebration, severe constipation    Atorvastatin Other (See Comments)     Joint pain    Jardiance [empagliflozin] Other (See Comments)     Chest pains, significant weight loss, lower blood pressure     Current Facility-Administered Medications   Medication Frequency    0.9%  NaCl infusion Once    0.9%  NaCl infusion Once    acetaminophen tablet 650 mg Q4H PRN    albuterol-ipratropium 2.5 mg-0.5 mg/3 mL nebulizer solution 3 mL Q4H WAKE    allopurinoL tablet 100 mg Daily    chlorproMAZINE (THORAZINE) 25 mg in 0.9% NaCl 500 mL IVPB (for N/V, hiccups, tetanus) Once    docusate sodium capsule 100 mg Daily    furosemide tablet 360 mg See admin instructions    heparin (porcine) injection 1,000 Units PRN    heparin (porcine) injection 5,000 Units Q8H    hydrALAZINE injection 10 mg Q6H PRN    HYDROmorphone injection 1 mg Q4H PRN    hydrOXYzine pamoate capsule 25 mg Q8H PRN    ketorolac  injection 15 mg Q6H PRN    levalbuterol nebulizer solution 1.25 mg Q4H PRN    levothyroxine tablet 150 mcg Before breakfast    melatonin tablet 6 mg Nightly PRN    metOLazone tablet 10 mg See admin instructions    mexiletine capsule 150 mg BID WM    midodrine tablet 15 mg Q8H    mupirocin 2 % ointment BID    naloxone 0.4 mg/mL injection 0.4 mg PRN    nitroGLYCERIN SL tablet 0.4 mg Q5 Min PRN    NORepinephrine 4 mg in dextrose 5% 250 mL infusion (premix) Continuous    ondansetron injection 4 mg Q6H PRN    oxyCODONE-acetaminophen  mg per tablet 1 tablet Q4H PRN    oxyCODONE-acetaminophen 5-325 mg per tablet 1 tablet Q4H PRN    polyethylene glycol packet 17 g BID PRN    polyethylene glycol packet 17 g Daily    pravastatin tablet 40 mg QHS    simethicone chewable tablet 80 mg QID PRN    sodium chloride 0.9% bolus 1,000 mL 1,000 mL Once    sodium chloride 0.9% bolus 250 mL 250 mL PRN    sodium chloride 0.9% flush 10 mL Q12H PRN    sodium chloride 0.9% flush 10 mL PRN    triamcinolone acetonide 0.1% cream BID       Objective:     Vital Signs (Most Recent):  Temp: 97.5 °F (36.4 °C) (10/05/24 1147)  Pulse: 60 (10/05/24 1309)  Resp: 18 (10/05/24 1147)  BP: (!) 81/50 (10/05/24 1309)  SpO2: (!) 89 % (10/05/24 1309) Vital Signs (24h Range):  Temp:  [97.5 °F (36.4 °C)-98.4 °F (36.9 °C)] 97.5 °F (36.4 °C)  Pulse:  [58-63] 60  Resp:  [16-22] 18  SpO2:  [87 %-93 %] 89 %  BP: ()/(42-56) 81/50     Weight: 69.6 kg (153 lb 7 oz) (10/03/24 1134)  Body mass index is 22.66 kg/m².  Body surface area is 1.84 meters squared.    I/O last 3 completed shifts:  In: 550 [IV Piggyback:550]  Out: 100 [Urine:100]     Physical Exam  Vitals and nursing note reviewed.   Constitutional:       General: He is awake. He is not in acute distress.     Appearance: Normal appearance. He is well-developed.   HENT:      Head: Normocephalic and atraumatic.      Nose: Nose normal.      Mouth/Throat:      Mouth: Mucous membranes are moist.   Eyes:       Extraocular Movements: Extraocular movements intact.      Conjunctiva/sclera: Conjunctivae normal.   Cardiovascular:      Rate and Rhythm: Normal rate and regular rhythm.      Comments: RIJ TDC  Pulmonary:      Effort: Pulmonary effort is normal.      Breath sounds: Normal breath sounds.   Abdominal:      General: There is distension.      Palpations: Abdomen is soft.      Comments: PD catheter to LLQ   Musculoskeletal:         General: Tenderness present.      Right lower leg: No edema.      Left lower leg: No edema.   Skin:     General: Skin is warm and dry.      Findings: No erythema or rash.   Neurological:      General: No focal deficit present.      Mental Status: He is alert. Mental status is at baseline.   Psychiatric:         Mood and Affect: Mood normal.         Behavior: Behavior normal.          Significant Labs:  CBC:   Recent Labs   Lab 10/05/24  0544   WBC 19.83*   RBC 4.65   HGB 12.6*   HCT 37.5*      MCV 81*   MCH 27.1   MCHC 33.6     CMP:   Recent Labs   Lab 10/05/24  0544   *   CALCIUM 8.6*   ALBUMIN 3.1*   PROT 6.5   *   K 4.8   CO2 19*   CL 99   BUN 65*   CREATININE 7.8*   ALKPHOS 117   *   *   BILITOT 2.1*     All labs within the past 24 hours have been reviewed.     Significant Imaging:  Labs: Reviewed    Assessment/Plan:     ESRD  - receives HD MWF at [facility] under the c/o Dr. Chicas  - HD held yesterday  - didn't tolerate HD this AM due to hypotension  - patient net positive 2.7L over the last 24 hours. SBP in the 70s. Worsening hypxoia.   - recommended transfer to the ICU for CRRT and pressors. UF rate 100cc/hr for goal net negative 2.4L/24 hours  - labs q8h    Hypotension  - baseline SBP 90-100s  - SBP now 70-80s; likely post-op related (anesthesia, pain medications) in ESRD patient  - CRRT as above  - patient also with PD catheter; unsure when this was last used. Checking blood cultures    Anemia of CKD  - hgb at goal; no need for AWAIS at this  time    Secondary HPTH  - CCa normal; will check phos  - CRRT as above       Critical care time spent on the evaluation and treatment of severe organ dysfunction, review of pertinent labs and imaging studies, discussions with Dr. Barnes and nursing staff, and discussions with patient/family: 45 minutes.      Thank you for your consult. I will follow-up with patient. Please contact us if you have any additional questions.    Lizzette Cain MD  Nephrology  [facility] - Intensive Care

## 2024-10-05 NOTE — PT/OT/SLP PROGRESS
Physical Therapy        Patient Name:  Ar Sharma   MRN:  [id number]     Patient not seen today secondary to pt BLAKE to dialysis . Will follow-up as able.     10/5/2024

## 2024-10-05 NOTE — PT/OT/SLP PROGRESS
Occupational Therapy      Patient Name:  Ar Sharma   MRN:  [id number]    Patient not seen today secondary to pt BLAKE to dialysis . Will follow-up as able.    10/5/2024

## 2024-10-05 NOTE — ASSESSMENT & PLAN NOTE
NO signs of acute exacerbation.   Echo as below   Cardiology consulted for cardiac clearance-cleared for surgery at moderate cardiac risk.   Nephrology for volume removal with HD    Echo    Result Date: 10/3/2024    Left Ventricle: The left ventricle is mildly dilated. There is mild   eccentric hypertrophy. Septal flattening in diastole and systole   consistent with right ventricular volume and pressure overload. There is   mildly reduced systolic function with a visually estimated ejection   fraction of 40 - 45%.    Right Ventricle: Severe right ventricular enlargement. Systolic   function is moderately reduced.    Left Atrium: Left atrium is moderately dilated.    Right Atrium: Right atrium is severely dilated.    Aortic Valve: There is mild stenosis. Aortic valve area by VTI is 1.6   cm². Aortic valve peak velocity is 1.6 m/s. Mean gradient is 6.3 mmHg. The   dimensionless index is 0.50.    Mitral Valve: There is mild to moderate regurgitation.    Tricuspid Valve: There is moderate regurgitation.    Pulmonary Artery: The estimated pulmonary artery systolic pressure is   66 mmHg.    IVC/SVC: Elevated venous pressure at 15 mmHg.

## 2024-10-05 NOTE — ASSESSMENT & PLAN NOTE
Nephrology consulted: HD per nephrology   HD stopped early on 10/05 due to hypotension  Will monitor off HD, if BP remains low, will transfer to ICU

## 2024-10-05 NOTE — ASSESSMENT & PLAN NOTE
Advance Care Planning     Date: 10/05/2024    Code Status  I engaged the the patient in a voluntary conversation about the patient's preferences for care  at the very end of life. The patient wishes to have CPR and other invasive treatments performed when his heart and/or breathing stops. I communicated to the patient that his wishes align with full code status and he agrees.    A total of 16 min was spent on advance care planning, goals of care discussion, emotional support, formulating and communicating prognosis and exploring burden/benefit of various approaches of treatment. This discussion occurred on a fully voluntary basis with the verbal consent of the patient and/or family.

## 2024-10-05 NOTE — NURSING
[facility]  Nurses Note -- 4 Eyes      10/5/2024       Skin assessed on: Transfer      [x] No Pressure Injuries Present    [x]Prevention Measures Documented    [] Yes LDA  for Pressure Injury Previously documented     [] Yes New Pressure Injury Discovered   [] LDA for New Pressure Injury Added      Attending RN:  Eagle Loera RN     Second RN:  Janny BENZ RN

## 2024-10-05 NOTE — NURSING
Pt transported to ICU by bed on 4L O2 NC for hypotension. AAOx4, Resp even and unlabored. Drsg to right hip CDI. Report given to Jackie.

## 2024-10-05 NOTE — SUBJECTIVE & OBJECTIVE
Interval History: HD held yesterday as patient was post-op. Hypotensive overnight with BP 69/43 at 8pm; he received 2L NS. BP remained low this morning; he was unable to tolerate dialysis. Treatment ended after 1 hour; net +700cc after treatment. SBP remained in the 70s after HD; patient transferred to ICU for levophed/CRRT. Patient reports to not be feeling well; +hip pain.       Review of patient's allergies indicates:   Allergen Reactions    Lokelma [sodium zirconium cyclosilicate] Other (See Comments)     Fluid retention, weight gain, CHF excerebration, severe constipation    Atorvastatin Other (See Comments)     Joint pain    Jardiance [empagliflozin] Other (See Comments)     Chest pains, significant weight loss, lower blood pressure     Current Facility-Administered Medications   Medication Frequency    0.9%  NaCl infusion Once    0.9%  NaCl infusion Once    acetaminophen tablet 650 mg Q4H PRN    albuterol-ipratropium 2.5 mg-0.5 mg/3 mL nebulizer solution 3 mL Q4H WAKE    allopurinoL tablet 100 mg Daily    chlorproMAZINE (THORAZINE) 25 mg in 0.9% NaCl 500 mL IVPB (for N/V, hiccups, tetanus) Once    docusate sodium capsule 100 mg Daily    furosemide tablet 360 mg See admin instructions    heparin (porcine) injection 1,000 Units PRN    heparin (porcine) injection 5,000 Units Q8H    hydrALAZINE injection 10 mg Q6H PRN    HYDROmorphone injection 1 mg Q4H PRN    hydrOXYzine pamoate capsule 25 mg Q8H PRN    ketorolac injection 15 mg Q6H PRN    levalbuterol nebulizer solution 1.25 mg Q4H PRN    levothyroxine tablet 150 mcg Before breakfast    melatonin tablet 6 mg Nightly PRN    metOLazone tablet 10 mg See admin instructions    mexiletine capsule 150 mg BID WM    midodrine tablet 15 mg Q8H    mupirocin 2 % ointment BID    naloxone 0.4 mg/mL injection 0.4 mg PRN    nitroGLYCERIN SL tablet 0.4 mg Q5 Min PRN    NORepinephrine 4 mg in dextrose 5% 250 mL infusion (premix) Continuous    ondansetron injection 4 mg Q6H PRN     oxyCODONE-acetaminophen  mg per tablet 1 tablet Q4H PRN    oxyCODONE-acetaminophen 5-325 mg per tablet 1 tablet Q4H PRN    polyethylene glycol packet 17 g BID PRN    polyethylene glycol packet 17 g Daily    pravastatin tablet 40 mg QHS    simethicone chewable tablet 80 mg QID PRN    sodium chloride 0.9% bolus 1,000 mL 1,000 mL Once    sodium chloride 0.9% bolus 250 mL 250 mL PRN    sodium chloride 0.9% flush 10 mL Q12H PRN    sodium chloride 0.9% flush 10 mL PRN    triamcinolone acetonide 0.1% cream BID       Objective:     Vital Signs (Most Recent):  Temp: 97.5 °F (36.4 °C) (10/05/24 1147)  Pulse: 60 (10/05/24 1309)  Resp: 18 (10/05/24 1147)  BP: (!) 81/50 (10/05/24 1309)  SpO2: (!) 89 % (10/05/24 1309) Vital Signs (24h Range):  Temp:  [97.5 °F (36.4 °C)-98.4 °F (36.9 °C)] 97.5 °F (36.4 °C)  Pulse:  [58-63] 60  Resp:  [16-22] 18  SpO2:  [87 %-93 %] 89 %  BP: ()/(42-56) 81/50     Weight: 69.6 kg (153 lb 7 oz) (10/03/24 1134)  Body mass index is 22.66 kg/m².  Body surface area is 1.84 meters squared.    I/O last 3 completed shifts:  In: 550 [IV Piggyback:550]  Out: 100 [Urine:100]     Physical Exam  Vitals and nursing note reviewed.   Constitutional:       General: He is awake. He is not in acute distress.     Appearance: Normal appearance. He is well-developed.   HENT:      Head: Normocephalic and atraumatic.      Nose: Nose normal.      Mouth/Throat:      Mouth: Mucous membranes are moist.   Eyes:      Extraocular Movements: Extraocular movements intact.      Conjunctiva/sclera: Conjunctivae normal.   Cardiovascular:      Rate and Rhythm: Normal rate and regular rhythm.      Comments: RI TDC  Pulmonary:      Effort: Pulmonary effort is normal.      Breath sounds: Normal breath sounds.   Abdominal:      General: There is distension.      Palpations: Abdomen is soft.      Comments: PD catheter to LLQ   Musculoskeletal:         General: Tenderness present.      Right lower leg: No edema.      Left  lower leg: No edema.   Skin:     General: Skin is warm and dry.      Findings: No erythema or rash.   Neurological:      General: No focal deficit present.      Mental Status: He is alert. Mental status is at baseline.   Psychiatric:         Mood and Affect: Mood normal.         Behavior: Behavior normal.          Significant Labs:  CBC:   Recent Labs   Lab 10/05/24  0544   WBC 19.83*   RBC 4.65   HGB 12.6*   HCT 37.5*      MCV 81*   MCH 27.1   MCHC 33.6     CMP:   Recent Labs   Lab 10/05/24  0544   *   CALCIUM 8.6*   ALBUMIN 3.1*   PROT 6.5   *   K 4.8   CO2 19*   CL 99   BUN 65*   CREATININE 7.8*   ALKPHOS 117   *   *   BILITOT 2.1*     All labs within the past 24 hours have been reviewed.     Significant Imaging:  Labs: Reviewed

## 2024-10-05 NOTE — ASSESSMENT & PLAN NOTE
Chronic, controlled.   Pt with hypotension, continue home midodrine     Latest blood pressure and vitals reviewed-     Temp:  [97.5 °F (36.4 °C)-98.4 °F (36.9 °C)]   Pulse:  [58-64]   Resp:  [14-22]   BP: ()/(37-56)   SpO2:  [80 %-93 %] .   Home meds for hypertension were reviewed and noted below.   Hypertension Medications               furosemide (LASIX) 80 MG tablet Take 4.5 tablets (360 mg total) by mouth 2 (two) times daily.    metOLazone (ZAROXOLYN) 10 MG tablet Take 10 mg by mouth As instructed (Take I tablet by mouth Tuesday, Thursday, Saturday, Sunday). Take I tablet by mouth Tuesday, Thursday, Saturday, Sunday    nitroGLYCERIN (NITROSTAT) 0.4 MG SL tablet Place 1 tablet (0.4 mg total) under the tongue every 5 (five) minutes as needed for Chest pain.            While in the hospital, will manage blood pressure as follows; Adjust home antihypertensive regimen as follows- patient with hypotension chronically. Continue home midodrine     Will utilize p.r.n. blood pressure medication only if patient's blood pressure greater than  180/90  and he develops symptoms such as worsening chest pain or shortness of breath.

## 2024-10-05 NOTE — PLAN OF CARE
Problem: Adult Inpatient Plan of Care  Goal: Plan of Care Review  Outcome: Progressing  Flowsheets (Taken 10/5/2024 1134)  Plan of Care Reviewed With: patient  Goal: Absence of Hospital-Acquired Illness or Injury  Outcome: Progressing  Intervention: Identify and Manage Fall Risk  Flowsheets (Taken 10/5/2024 1134)  Safety Promotion/Fall Prevention:   family to remain at bedside   assistive device/personal item within reach   bed alarm set   medications reviewed   nonskid shoes/socks when out of bed   room near unit station   side rails raised x 2  Intervention: Prevent Skin Injury  Flowsheets (Taken 10/5/2024 1134)  Device Skin Pressure Protection:   tubing/devices free from skin contact   adhesive use limited

## 2024-10-05 NOTE — NURSING
Pt arrived to unit from surgery via bed on 5L NC. Drsg to right hip CDI. Pt rates pain 3/10. Pt remains hypotensive with no complaints of dizziness or lightheadedness. Plan of care ongoing.

## 2024-10-05 NOTE — AI DETERIORATION ALERT
Artificial Intelligence Notification  [facility] [place]  [place] [id number]  Phone: [phone]    This documentation was triggered by an Artificial Intelligence Notification:    Admit Date: 10/3/2024   LOS: 2  Code Status: Full Code  : 1960  Age: 63 y.o.  Weight:   Wt Readings from Last 1 Encounters:   10/03/24 69.6 kg (153 lb 7 oz)        Sex: male  Bed[facility] A  MRN: [id number]  Attending Physician: Jv Barnes DO     Date of Alert: 10/05/2024  Time AI Alert Received: 11:48am            Vitals:    10/05/24 1147   BP: (!) 75/46   Pulse: 60   Resp: 18   Temp: 97.5 °F (36.4 °C)     SpO2: (!) 93 %      Artificial Intelligence alert discussed with Provider:     Name: Jv Barnes   Date/Time of Provider Notification: 11:48am      Patient Condition: patient in no acute distress. Patient hypotensive on dialysis with SBP in the 70s. Not symptomatic at this time. However, dialysis treatment had to be discontinued. discussed with nephrology. Will monitor blood pressure off dialysis and see. If no improvement, low threshold to move patient to ICU for pressor support for HD

## 2024-10-05 NOTE — ASSESSMENT & PLAN NOTE
-resume home midodrine   -Unable to tolerate HD on 10/05, SBP dropped to 70s  -Monitor off HD, if remains hypotensive, will moved to ICU for closer monitoring and pressor support

## 2024-10-06 PROBLEM — D72.829 LEUKOCYTOSIS: Status: ACTIVE | Noted: 2024-01-01

## 2024-10-06 PROBLEM — J96.21 ACUTE ON CHRONIC HYPOXIC RESPIRATORY FAILURE: Status: ACTIVE | Noted: 2024-01-01

## 2024-10-06 NOTE — PROGRESS NOTES
[facility] Medicine  Progress Note    Patient Name: Ar Sharma  MRN: [id number]  Patient Class: IP- Inpatient   Admission Date: 10/3/2024  Length of Stay: 3 days  Attending Physician: Jv Barnes DO  Primary Care Provider: Jc Elliott MD        Subjective:     Principal Problem:Closed right hip fracture, initial encounter        HPI:  63 y.o. AAM with h/o ESRD on HD (for 2 months but prior was on PD for about a month-Follows with [facility]) MWF via a tunneled HD catheter on the right, Essential HTN, HLD, CAD,  h/o systolic(EF 40-45%) and diastolic grade 3 CHF due to non-ischemic cardiomyopathy with a [employer] CRT-D implant 2016 (sees Dr. Hernandez) uses 3L supplemental oxygen at night, and h/o VT arhythmia (follows with Dr. Tenorio at [facility]) s/p ablation and on Mexiletin 150mg PO BID and Amiodarone 400mg daily present to the ER with c/o right hip pain after a fall at home.     Went to HD today and was feeling alright after. Was moving his tool box when he tripped over his feet and fell to the ground. Was unable to get up or put weight on his right leg.     Work up in the ED at [facility] noted normal WBC and Stable H/H.  INR 1.3.  Lytes stable and c/w post-HD with no indications for emergent HD tonight.     Right hip/pelvis x-ray FINDINGS:  There is a minimally displaced intertrochanteric fracture of the right proximal femur.  No additional fractures are seen.  There is osteopenia.  The femoral heads are well seated in the acetabula.  There is mild joint space narrowing of the bilateral femoroacetabular joints and the pubic symphysis.  There is a peritoneal dialysis catheter overlying the pelvis.     Impression:     Right intertrochanteric fracture.      Transfer center contacted us for admission due to [facility] on Ortho-diversion. They spoke with Dr. Mcclendon who agreed to see the patient in consultation. We have been asked to accept for further pre-op planning. Last  time he took his ASA was yesterday am.       Results for orders placed during the hospital encounter of 04/29/24    Echo Saline Bubble? No; Ultrasound enhancing contrast? No    Interpretation Summary    Left Ventricle: The left ventricle is normal in size. Normal wall thickness. Regional wall motion abnormalities present. See diagram for wall motion findings. Septal motion is consistent with pacing. There is mildly reduced systolic function with a visually estimated ejection fraction of 40 - 45%. Grade III diastolic dysfunction.    Right Ventricle: Moderate to severe right ventricular enlargement. Systolic function is reduced.TAPSE is 1.59 cm. Pacemaker lead present in the ventricle.    Left Atrium: Left atrium is severely dilated. The left atrium volume index is 71.4 mL/m2.    Right Atrium: Right atrium is severely dilated.    Aortic Valve: There is mild aortic valve sclerosis. There is mild to moderate stenosis. Aortic valve area by VTI is 1.49 cm². Aortic valve peak velocity is 1.43 m/s. Mean gradient is 5 mmHg. The dimensionless index is 0.52.    Mitral Valve: There is mild regurgitation.    Tricuspid Valve: There is mild to moderate regurgitation.    Pulmonary Artery: The estimated pulmonary artery systolic pressure is 64 mmHg.    IVC/SVC: Elevated venous pressure at 15 mmHg.      Overview/Hospital Course:   63 y.o. AAM with h/o ESRD on HD (MWF), HTN, HLD, CAD, CHF admitted on 10/03/24 for Right intertrochanteric Hip fracture after a fall at home. No head trauma or LOC. CXR with interstitial edema.  No evidence of pneumothorax.  X-ray of the right hip with minimally displaced intertrochanteric fracture of the right proximal femur.  Orthopedic surgery consulted- s/p right  femur intramedullary nail on 10/04.  Minimal blood loss per orthopedic. Echo with EF of 40-45%, mild aortic stenosis.  PASP of 66 mm Hg.  Cardiology consulted for cardiac clearance-cleared for surgery at moderate cardiac risk.  Continue  symptomatic management and supportive care.  Nephrology following for ESRD. Patient  hypotensive at baseline but acutely more hypotensive during hospitalization but is asymptomatic. Unable to tolerate HD on 10/05. Persistently hypotensive. patient transferred to ICU for presssor support and CRRT on 10/05      Interval History:  CRRT started yesterday evening. However, had flow issues and now pt net positive 1.5L in the last 24 hrs. Worsening hypoxia overnight; required up to 15L HFNC. Down to 8L this AM. Pt denies any headaches, vision changes, fatigue, weakness. No bleeding. Complains of right hip pain that is worse with movement.       Review of Systems   Respiratory:  Positive for shortness of breath. Negative for cough.    Cardiovascular:  Negative for chest pain and leg swelling.   Gastrointestinal:  Negative for abdominal pain.   Genitourinary:  Negative for difficulty urinating.   Musculoskeletal:  Positive for arthralgias, gait problem and joint swelling. Negative for back pain.   Neurological:  Positive for weakness. Negative for dizziness and headaches.     Objective:     Vital Signs (Most Recent):  Temp: 97.4 °F (36.3 °C) (10/06/24 0705)  Pulse: 64 (10/06/24 1130)  Resp: 17 (10/06/24 1130)  BP: (!) 89/51 (10/06/24 1130)  SpO2: 96 % (10/06/24 1130) Vital Signs (24h Range):  Temp:  [97 °F (36.1 °C)-97.9 °F (36.6 °C)] 97.4 °F (36.3 °C)  Pulse:  [59-80] 64  Resp:  [11-31] 17  SpO2:  [77 %-100 %] 96 %  BP: ()/(37-69) 89/51     Weight: 74.6 kg (164 lb 7.4 oz)  Body mass index is 24.29 kg/m².    Intake/Output Summary (Last 24 hours) at 10/6/2024 1143  Last data filed at 10/6/2024 1142  Gross per 24 hour   Intake 4466.14 ml   Output 2065 ml   Net 2401.14 ml         Physical Exam  Vitals and nursing note reviewed.   Constitutional:       General: He is not in acute distress.     Appearance: He is ill-appearing (chronic). He is not diaphoretic.   HENT:      Head: Atraumatic.      Nose: Nose normal.       Mouth/Throat:      Mouth: Mucous membranes are moist.   Eyes:      Extraocular Movements: Extraocular movements intact.   Cardiovascular:      Rate and Rhythm: Normal rate and regular rhythm.      Comments: Right chest wall permcath, nontender and without overlying signs of infection  Pulmonary:      Effort: Pulmonary effort is normal. No respiratory distress.      Breath sounds: Rales present. No wheezing or rhonchi.      Comments: On 8L HFNC. diminished breath sounds in lower lung fields.  Abdominal:      Palpations: Abdomen is soft.      Comments: PD catheter to LLQ    Musculoskeletal:         General: Swelling (right thigh swelling) and tenderness present. Normal range of motion.      Right lower leg: Edema present.      Left lower leg: Edema present.      Comments: Trace edema in bilateral LE   Neurological:      Mental Status: He is alert and oriented to person, place, and time. Mental status is at baseline.             Significant Labs: All pertinent labs within the past 24 hours have been reviewed.    Significant Imaging: I have reviewed all pertinent imaging results/findings within the past 24 hours.    Assessment/Plan:      * Closed right hip fracture, initial encounter  -s/p fall. No LOC or head trauma  -Right hip XR: minimally displaced intertrochanteric fracture of the right proximal femur.   -Orthopedic surgery consulted: s/p right  femur intramedullary nail on 10/04  -PT/OT consulted post operatively     Acute on chronic hypoxic respiratory failure  Patient with Hypoxic Respiratory failure which is Acute on chronic.  he is on home oxygen at 3 LPM. Supplemental oxygen was provided and noted-      .   Signs/symptoms of respiratory failure include- tachypnea and increased work of breathing. Contributing diagnoses includes - CHF and COPD Labs and images were reviewed. Patient Has recent ABG, which has been reviewed. Will treat underlying causes and adjust management of respiratory failure as follows-      -pt on home 3L NC. Hx of COPD/CHF  -Worsening hypoxia overnight 10/05; required up to 15L HFNC.  -acute on chronic hypoxia secondary to volume overload  -Pt unable to tolerate HD and CRRT, net positive 1.5L on 10/06  -CXR with pulmonary edema   -Pulmonology consulted  -Nephrology following  -Wean O2 as tolerated     Chronic combined systolic and diastolic heart failure  NO signs of acute exacerbation.   Echo as below   Cardiology consulted for cardiac clearance-cleared for surgery at moderate cardiac risk.   Nephrology for volume removal with HD    Echo    Result Date: 10/3/2024    Left Ventricle: The left ventricle is mildly dilated. There is mild   eccentric hypertrophy. Septal flattening in diastole and systole   consistent with right ventricular volume and pressure overload. There is   mildly reduced systolic function with a visually estimated ejection   fraction of 40 - 45%.    Right Ventricle: Severe right ventricular enlargement. Systolic   function is moderately reduced.    Left Atrium: Left atrium is moderately dilated.    Right Atrium: Right atrium is severely dilated.    Aortic Valve: There is mild stenosis. Aortic valve area by VTI is 1.6   cm². Aortic valve peak velocity is 1.6 m/s. Mean gradient is 6.3 mmHg. The   dimensionless index is 0.50.    Mitral Valve: There is mild to moderate regurgitation.    Tricuspid Valve: There is moderate regurgitation.    Pulmonary Artery: The estimated pulmonary artery systolic pressure is   66 mmHg.    IVC/SVC: Elevated venous pressure at 15 mmHg.           Primary hypertension  Chronic, controlled. Pt now with hypotension   Pt with hypotension, continue home midodrine   Currently on pressors to keep MAP>65    Latest blood pressure and vitals reviewed-     Temp:  [97 °F (36.1 °C)-97.9 °F (36.6 °C)]   Pulse:  [59-80]   Resp:  [11-31]   BP: ()/(37-69)   SpO2:  [77 %-100 %] .   Home meds for hypertension were reviewed and noted below.   Hypertension Medications                furosemide (LASIX) 80 MG tablet Take 4.5 tablets (360 mg total) by mouth 2 (two) times daily.    metOLazone (ZAROXOLYN) 10 MG tablet Take 10 mg by mouth As instructed (Take I tablet by mouth Tuesday, Thursday, Saturday, Sunday). Take I tablet by mouth Tuesday, Thursday, Saturday, Sunday    nitroGLYCERIN (NITROSTAT) 0.4 MG SL tablet Place 1 tablet (0.4 mg total) under the tongue every 5 (five) minutes as needed for Chest pain.            While in the hospital, will manage blood pressure as follows; Adjust home antihypertensive regimen as follows- patient with hypotension chronically. Continue home midodrine     Will utilize p.r.n. blood pressure medication only if patient's blood pressure greater than  180/90  and he develops symptoms such as worsening chest pain or shortness of breath.    S/P ablation of ventricular arrhythmia  Cardiology consulted for cardiac clearance.   Resume home Mexiletin 150mg PO BID   Hold amiodarone 400mg PO daily in setting of hypotension   ICD in place       Cardiac resynchronization therapy defibrillator (CRT-D) in place  -noted      Pulmonary emphysema, unspecified emphysema type  COPD is controlled on his home 3L oxygen at night.   No wheezing on exam  Now volume overloaded and requiring more O2  PRN duonebs ordered.   Wean to home O2 as tolerated    ESRD (end stage renal disease)  Nephrology consulted: HD per nephrology   HD stopped early on 10/05 due to hypotension  Persistently hypotensive, transfer to ICU for CRRT and pressor support on 10/05  CRRT started on 10/05    Moderate protein-calorie malnutrition  Nutrition consulted. Most recent weight and BMI monitored-     Measurements:  Wt Readings from Last 1 Encounters:   10/06/24 74.6 kg (164 lb 7.4 oz)   Body mass index is 24.29 kg/m².    Patient has been screened and assessed by RD.    Malnutrition Type:  Context: acute illness or injury  Level: mild    Malnutrition Characteristic Summary:  Weight Loss  (Malnutrition): greater than 10% in 6 months  Subcutaneous Fat (Malnutrition): mild depletion  Muscle Mass (Malnutrition): mild depletion    Interventions/Recommendations (treatment strategy):  1. Add Novasource Renal TID 2. [place] intake at meals3. Monitor weight/labs 4. RD to follow to monitor PO intake      Hypothyroidism  -resume home synthroid     Mixed hyperlipidemia  -continue statin       Atherosclerosis of native coronary artery of native heart with angina pectoris  Patient with known CAD , which is controlled Will continue Statin and monitor for S/Sx of angina/ACS. Continue to monitor on telemetry.     Aortic atherosclerosis  Continue statin       Iron deficiency anemia  Anemia is likely due to Iron deficiency. Most recent hemoglobin and hematocrit are listed below.  Recent Labs     10/04/24  1613 10/05/24  0544 10/06/24  0439   HGB 13.4* 12.6* 12.2*   HCT 40.6 37.5* 35.8*       Plan  - Monitor serial CBC: Daily  - Transfuse PRBC if patient becomes hemodynamically unstable, symptomatic or H/H drops below 7/21.  - Patient has not received any PRBC transfusions to date  - Patient's anemia is currently stable  - stable, monitoring     Hypotension  -resume home midodrine   -Unable to tolerate HD on 10/05, SBP dropped to 70s  -Monitor off HD, if remains hypotensive, will moved to ICU for closer monitoring and pressor support     Leukocytosis  -WBC# trending up, 20k on 10/06. Pt remained afebrile.   -initially suspected reactive given fracture and recent surgery  -However, pt also with PD cath in LLQ. No overlying signs of infection  -Blood cx ordered, no growth so far  -Monitor off abx at this time     Advanced care planning/counseling discussion  Advance Care Planning    Date: 10/05/2024    Code Status  I engaged the the patient in a voluntary conversation about the patient's preferences for care  at the very end of life. The patient wishes to have CPR and other invasive treatments performed when his heart  and/or breathing stops. I communicated to the patient that his wishes align with full code status and he agrees.    A total of 16 min was spent on advance care planning, goals of care discussion, emotional support, formulating and communicating prognosis and exploring burden/benefit of various approaches of treatment. This discussion occurred on a fully voluntary basis with the verbal consent of the patient and/or family.              VTE Risk Mitigation (From admission, onward)           Ordered     heparin (porcine) injection 5,000 Units  Use PRN         10/05/24 1830     Place sequential compression device  Until discontinued         10/04/24 2217     heparin (porcine) injection 1,000 Units  As needed (PRN)         10/04/24 2217     heparin (porcine) injection 5,000 Units  Every 8 hours         10/04/24 1455     IP VTE LOW RISK PATIENT  Once         10/04/24 1438     Place sequential compression device  Until discontinued         10/03/24 0154     Reason for No Pharmacological VTE Prophylaxis  Once        Comments: Right hip fracture needs surgical eval   Question:  Reasons:  Answer:  Physician Provided (leave comment)    10/03/24 0154                    Discharge Planning   SLAVA: 10/7/2024     Code Status: Full Code   Is the patient medically ready for discharge?:     Reason for patient still in hospital (select all that apply): Patient trending condition, Treatment, Consult recommendations, and PT / OT recommendations  Discharge Plan A: Home Health (ORIF today; will be evaluated by PT/OT s/p ORIF)            Critical care time spent on the evaluation and treatment of severe organ dysfunction, review of pertinent labs and imaging studies, discussions with consulting providers and discussions with patient/family: 35 minutes.      Jv Barnes DO  Department of Hospital Medicine   [facility] - Intensive Care     See HPI

## 2024-10-06 NOTE — PT/OT/SLP PROGRESS
Physical Therapy      Patient Name:  Ar Sharma   MRN:  [id number]    Patient not seen today for PT tx secondary to (Pt transferred to ICU with low BP and increased O2 needs. Pt currently on 7L HFNC and having issues with dialysis.). Will follow-up tomorrow.

## 2024-10-06 NOTE — EICU
Intervention Initiated From:  Bedside    Margaret intervened regarding:  Medication and Other    Doctor Notified:  Yes  Comments: Dr. Richardson notified that bedside RN called eICU reporting pt vomited--had given zofran--reporting zofran not effective for nausea & requesting additional medication.

## 2024-10-06 NOTE — CONSULTS
[facility] Consult Note    Patient Name: Ar Sharma  MRN: [id number]  Admission Date: 10/3/2024  Hospital Length of Stay: 3 days  Code Status: Full Code   Consulting Provider: Chepe Sotelo MD  Primary Care Physician: Jc Elliott MD  Principal Problem:Closed right hip fracture, initial encounter    Inpatient consult to Gastroenterology  Consult performed by: Chepe Sotelo MD  Consult ordered by: Emperatriz Barnes-Sanjuanita ESPARZA,           HPI:  63 y.o. male with pmhx of ESRD on HD, HLD, CAD, CHF, VT arhythmia  s/p ablation, is on 3L supplemental 02 at night. Initially presented after having ground level fall, found to have closed right hip fracture and sp right femur intramedullary nail on 10/04.    GI consulted for evaluation of elevated liver enzymes. LFT's trended up during this admission. Patient denies any known previous history of liver disease. No family hx of liver disease.     Imaging:  RUQ US 10/4/24:  Hepatosplenomegaly.  Cholelithiasis with gallbladder sludge.    CT abd/pelvis w/o contrast 5/10/24:  4. Hepatomegaly, correlation with LFTs recommended.  Correlation with any history of iron overload or amiodarone use.  5. Splenomegaly.       Medical History:  has a past medical history of Cardiomyopathy, CKD (chronic kidney disease) stage 4, GFR 15-29 ml/min, Congestive heart failure (CHF) (2015), COPD (chronic obstructive pulmonary disease), Coronary artery disease, Edema, Essential (primary) hypertension (05/18/2022), Heart attack, HLD (hyperlipidemia), Hypertension, Hyperuricemia, Hypocalcemia, Renal cyst, left, Secondary hyperparathyroidism, Thyroid disease, V tach, and Vitamin D deficiency.    Surgical History:  has a past surgical history that includes defibulater (N/A, 2016); albations (02/01/2018); ablations (03/05/2018); Joint replacement (Bilateral, 10/2016); Colonoscopy (N/A, 12/07/2018); Esophagogastroduodenoscopy (N/A, 12/07/2018); Left heart catheterization (N/A, 12/16/2020); Left heart  catheterization (Left, 9/27/2022); replacement of implantable cardioverter-defibrillator (icd) generator (Left, 1/24/2023); pr hemodialysis, one evaluation (5/6/2024); Insertion of tunneled central venous hemodialysis catheter (Right, 5/3/2024); Removal of hemodialysis catheter (Right, 5/3/2024); insertion, catheter, dialysis, peritoneal, laparoscopic (N/A, 5/8/2024); and insertion, central venous access device (Right, 6/5/2024).    Family History: family history includes Alcohol abuse in his father; Arthritis in his sister; Breast cancer in his mother; Cancer in his mother; Hypertension in his mother; Kidney disease in his father; No Known Problems in his brother, daughter, and son; Stroke in his mother..     Social History:  reports that he quit smoking about 12 years ago. His smoking use included cigarettes. He started smoking about 45 years ago. He has a 33.2 pack-year smoking history. He has been exposed to tobacco smoke. He has never used smokeless tobacco. He reports that he does not currently use alcohol. He reports that he does not use drugs.    No current facility-administered medications on file prior to encounter.     Current Outpatient Medications on File Prior to Encounter   Medication Sig Dispense Refill    acetaminophen (TYLENOL) 500 MG tablet Take 500 mg by mouth every 6 (six) hours as needed for Pain.      allopurinoL (ZYLOPRIM) 100 MG tablet TAKE 1 TABLET BY MOUTH EVERY DAY 90 tablet 3    amiodarone (PACERONE) 400 MG tablet Take 1 tablet (400 mg total) by mouth once daily. 90 tablet 3    aspirin (ECOTRIN) 81 MG EC tablet TAKE 1 TABLET BY MOUTH EVERY DAY 90 tablet 1    ergocalciferol (ERGOCALCIFEROL) 50,000 unit Cap TAKE 1 CAPSULE BY MOUTH ONE TIME PER WEEK 12 capsule 3    furosemide (LASIX) 80 MG tablet Take 4.5 tablets (360 mg total) by mouth 2 (two) times daily. 180 tablet 3    levothyroxine (SYNTHROID) 150 MCG tablet Take 1 tablet (150 mcg total) by mouth once daily. 90 tablet 3    metOLazone  "(ZAROXOLYN) 10 MG tablet Take 10 mg by mouth As instructed (Take I tablet by mouth Tuesday, Thursday, Saturday, Sunday). Take I tablet by mouth Tuesday, Thursday, Saturday, Sunday      mexiletine (MEXITIL) 150 MG Cap Take 1 capsule (150 mg total) by mouth 2 (two) times daily with meals. 180 capsule 3    midodrine (PROAMATINE) 5 MG Tab Take 3 tablets (15 mg total) by mouth every 8 (eight) hours. 270 tablet 1    nitroGLYCERIN (NITROSTAT) 0.4 MG SL tablet Place 1 tablet (0.4 mg total) under the tongue every 5 (five) minutes as needed for Chest pain. 20 tablet 6    pantoprazole (PROTONIX) 40 MG tablet Take 1 tablet (40 mg total) by mouth once daily. 90 tablet 3    polyethylene glycol (GLYCOLAX) 17 gram PwPk Take 17 g by mouth once daily. 30 packet 11    pravastatin (PRAVACHOL) 40 MG tablet TAKE 1 TABLET BY MOUTH EVERY DAY 90 tablet 3    vitamin renal formula, B-complex-vitamin c-folic acid, (TRIPHROCAPS) 1 mg Cap Take 1 capsule by mouth once daily. 90 capsule 3       Review of patient's allergies indicates:   Allergen Reactions    Lokelma [sodium zirconium cyclosilicate] Other (See Comments)     Fluid retention, weight gain, CHF excerebration, severe constipation    Atorvastatin Other (See Comments)     Joint pain    Jardiance [empagliflozin] Other (See Comments)     Chest pains, significant weight loss, lower blood pressure          Objective Findings:    Vital Signs:  BP (!) 95/49   Pulse 60   Temp 97.4 °F (36.3 °C) (Axillary)   Resp 15   Ht 5' 9" (1.753 m)   Wt 74.6 kg (164 lb 7.4 oz)   SpO2 97%   BMI 24.29 kg/m²   Body mass index is 24.29 kg/m².    Physical Exam:  General Appearance: Well appearing in no acute distress  Head: Normocephalic, without obvious abnormality  Eyes: No scleral icterus    Lungs: Non-labored breathing  Abdomen: Soft, non tender, non distended     Laboratory:    MELD 3.0: 26 at 10/5/2024  3:56 PM  MELD-Na: 27 at 10/5/2024  3:56 PM  Calculated from:  Serum Creatinine: 6.4 mg/dL (Using " max of 3 mg/dL) at 10/5/2024  3:56 PM  Serum Sodium: 133 mmol/L at 10/5/2024  3:56 PM  Total Bilirubin: 2.1 mg/dL at 10/5/2024  5:44 AM  Serum Albumin: 3 g/dL at 10/5/2024  3:56 PM  INR(ratio): 1.2 at 10/3/2024  4:41 AM  Age at listing (hypothetical): 63 years  Sex: Male at 10/5/2024  3:56 PM      Recent Labs   Lab 10/04/24  1613 10/05/24  0544 10/06/24  0439   HGB 13.4* 12.6* 12.2*       Lab Results   Component Value Date    WBC 20.85 (H) 10/06/2024    HGB 12.2 (L) 10/06/2024    HCT 35.8 (L) 10/06/2024    MCV 79 (L) 10/06/2024     10/06/2024       Lab Results   Component Value Date     (L) 10/06/2024     (L) 10/06/2024    K 3.7 10/06/2024    K 3.7 10/06/2024     10/06/2024     10/06/2024    CO2 18 (L) 10/06/2024    CO2 18 (L) 10/06/2024    BUN 44 (H) 10/06/2024    BUN 44 (H) 10/06/2024    CREATININE 5.4 (H) 10/06/2024    CREATININE 5.4 (H) 10/06/2024    CALCIUM 8.4 (L) 10/06/2024    CALCIUM 8.4 (L) 10/06/2024    ANIONGAP 14 10/06/2024    ANIONGAP 14 10/06/2024    ESTGFRAFRICA 17.1 (A) 07/07/2022    EGFRNONAA 14.8 (A) 07/07/2022       Lab Results   Component Value Date    ALT 88 (H) 10/06/2024     (H) 10/06/2024    ALKPHOS 115 10/06/2024    BILITOT 2.0 (H) 10/06/2024       Lab Results   Component Value Date    INR 1.2 10/03/2024    INR 1.3 (H) 10/02/2024    INR 1.2 01/17/2023       Assessment:  Elevated liver enzymes (primarily hepatocellular pattern)  -Iron studies 10/5/24: iron 22, TIBC 306, sat iron 7, transferrin 207, ferritin 338  -Hepatitis serology for A, B, and C negative 10/4/24  Hepatosplenomegaly on imaging  Cholelithiasis       Recommendations:  - Patient sp right femur intramedullary nail on 10/04 and currently on pressors. LDH is elevated, this generally occurs in ischemic hepatopathy for which treatment is supportive care. Drug induced liver injury also possible  - Checking RUQ US with duplex to rule out PVT  - Will complete additional CLD workup: DAYA, anti-SMA,  AMA, alpha 1 antitrypsin  - Trend CMP/INR    Thank you for involving us in the care of Ar Sharma. Please call with any additional questions, concerns or changes in the patient's clinical status.     Chepe Sotelo MD  [facility]

## 2024-10-06 NOTE — ASSESSMENT & PLAN NOTE
Nutrition consulted. Most recent weight and BMI monitored-     Measurements:  Wt Readings from Last 1 Encounters:   10/06/24 74.6 kg (164 lb 7.4 oz)   Body mass index is 24.29 kg/m².    Patient has been screened and assessed by RD.    Malnutrition Type:  Context: acute illness or injury  Level: mild    Malnutrition Characteristic Summary:  Weight Loss (Malnutrition): greater than 10% in 6 months  Subcutaneous Fat (Malnutrition): mild depletion  Muscle Mass (Malnutrition): mild depletion    Interventions/Recommendations (treatment strategy):  1. Add Novasource Renal TID 2. Encorage intake at meals3. Monitor weight/labs 4. RD to follow to monitor PO intake

## 2024-10-06 NOTE — ASSESSMENT & PLAN NOTE
COPD is controlled on his home 3L oxygen at night.   No wheezing on exam  Now volume overloaded and requiring more O2  PRN duonebs ordered.   Wean to home O2 as tolerated

## 2024-10-06 NOTE — ASSESSMENT & PLAN NOTE
Nephrology consulted: HD per nephrology   HD stopped early on 10/05 due to hypotension  Persistently hypotensive, transfer to ICU for CRRT and pressor support on 10/05  CRRT started on 10/05

## 2024-10-06 NOTE — PLAN OF CARE
Patient remains in ICU on CRRT. CRRT has been running sporadically, set changed x 2 last night due to venous access issues. Currently running well. CXR, ABG due to desat issues and unable to obtain accurate O2 sat.

## 2024-10-06 NOTE — PROGRESS NOTES
[facility] Intensive [facility]  Nephrology  Progress Note    Patient Name: Ar Sharma  MRN: [id number]  Admission Date: 10/3/2024  Hospital Length of Stay: 3 days  Attending Provider: Jv Barnes DO   Primary Care Physician: Jc Elliott MD  Principal Problem:Closed right hip fracture, initial encounter    Subjective:     HPI: Following for ESRD. Patient receives HD MWF via [facility] TDC at [facility] under the c/o Dr. Chicas. He was initiated on HD 4/2024. He remains with PD catheter.     Interval History: CRRT started yesterday evening. Flow issues overnight; UF goal not met. Patient net positive 1.5L/24h. Worsening hypoxia overnight; required up to 15L HFNC. Down to 8L this AM.       Review of patient's allergies indicates:   Allergen Reactions    Lokelma [sodium zirconium cyclosilicate] Other (See Comments)     Fluid retention, weight gain, CHF excerebration, severe constipation    Atorvastatin Other (See Comments)     Joint pain    Jardiance [empagliflozin] Other (See Comments)     Chest pains, significant weight loss, lower blood pressure     Current Facility-Administered Medications   Medication Frequency    0.9%  NaCl infusion (CRRT USE ONLY) Continuous    acetaminophen tablet 650 mg Q4H PRN    albuterol-ipratropium 2.5 mg-0.5 mg/3 mL nebulizer solution 3 mL Q4H WAKE    allopurinoL tablet 100 mg Daily    ceFEPIme (MAXIPIME) 1 g in D5W 100 mL IVPB (MB+) Q12H    chlorproMAZINE (THORAZINE) 25 mg in 0.9% NaCl 500 mL IVPB (for N/V, hiccups, tetanus) Once    docusate sodium capsule 100 mg Daily    furosemide tablet 360 mg See admin instructions    heparin (porcine) injection 1,000 Units PRN    heparin (porcine) injection 5,000 Units Q8H    heparin (porcine) injection 5,000 Units PRN    hydrALAZINE injection 10 mg Q6H PRN    HYDROmorphone injection 1 mg Q4H PRN    hydrOXYzine pamoate capsule 25 mg Q8H PRN    ketorolac injection 15 mg Q6H PRN    levalbuterol nebulizer solution 1.25 mg Q4H PRN     levothyroxine tablet 150 mcg Before breakfast    magnesium sulfate 2g in water 50mL IVPB (premix) PRN    melatonin tablet 6 mg Nightly PRN    metOLazone tablet 10 mg See admin instructions    mexiletine capsule 150 mg BID WM    midodrine tablet 15 mg Q8H    mupirocin 2 % ointment BID    naloxone 0.4 mg/mL injection 0.4 mg PRN    nitroGLYCERIN SL tablet 0.4 mg Q5 Min PRN    NORepinephrine 4 mg in dextrose 5% 250 mL infusion (premix) Continuous    ondansetron injection 4 mg Q6H PRN    oxyCODONE-acetaminophen  mg per tablet 1 tablet Q4H PRN    oxyCODONE-acetaminophen 5-325 mg per tablet 1 tablet Q4H PRN    polyethylene glycol packet 17 g BID PRN    polyethylene glycol packet 17 g Daily    pravastatin tablet 40 mg QHS    promethazine (PHENERGAN) 6.25 mg in 0.9% NaCl 50 mL IVPB Q6H PRN    simethicone chewable tablet 80 mg QID PRN    sodium chloride 0.9% bolus 250 mL 250 mL PRN    sodium chloride 0.9% flush 10 mL Q12H PRN    sodium chloride 0.9% flush 10 mL PRN    sodium phosphate 20.01 mmol in D5W 250 mL IVPB PRN    sodium phosphate 30 mmol in D5W 250 mL IVPB PRN    sodium phosphate 39.99 mmol in D5W 250 mL IVPB PRN    triamcinolone acetonide 0.1% cream BID    vancomycin - pharmacy to dose pharmacy to manage frequency       Objective:     Vital Signs (Most Recent):  Temp: 97.4 °F (36.3 °C) (10/06/24 0705)  Pulse: 65 (10/06/24 1028)  Resp: 15 (10/06/24 1028)  BP: (!) 93/57 (10/06/24 1015)  SpO2: 96 % (10/06/24 1028) Vital Signs (24h Range):  Temp:  [97 °F (36.1 °C)-97.9 °F (36.6 °C)] 97.4 °F (36.3 °C)  Pulse:  [59-80] 65  Resp:  [11-31] 15  SpO2:  [77 %-100 %] 96 %  BP: ()/(37-69) 93/57     Weight: 74.6 kg (164 lb 7.4 oz) (10/06/24 0300)  Body mass index is 24.29 kg/m².  Body surface area is 1.91 meters squared.    I/O last 3 completed shifts:  In: 3230 [P.O.:120; I.V.:1978.5; Other:200; IV Piggyback:931.5]  Out: 1700 [Urine:100; Other:1600]     Physical Exam  Vitals and nursing note reviewed.    Constitutional:       General: He is awake. He is not in acute distress.     Appearance: Normal appearance. He is well-developed.   HENT:      Head: Normocephalic and atraumatic.      Nose: Nose normal.      Mouth/Throat:      Mouth: Mucous membranes are moist.   Eyes:      Extraocular Movements: Extraocular movements intact.      Conjunctiva/sclera: Conjunctivae normal.   Cardiovascular:      Rate and Rhythm: Normal rate and regular rhythm.      Comments: RIJ TDC  Pulmonary:      Effort: Pulmonary effort is normal.      Breath sounds: Rales present.   Abdominal:      General: There is no distension.      Palpations: Abdomen is soft.      Comments: PD catheter to LLQ   Musculoskeletal:         General: Tenderness present.      Right lower leg: Edema present.      Left lower leg: Edema present.      Comments: Trace pitting edema   Skin:     General: Skin is warm and dry.      Findings: No erythema or rash.   Neurological:      Mental Status: He is alert.   Psychiatric:         Mood and Affect: Mood normal.         Behavior: Behavior normal.          Significant Labs:  CBC:   Recent Labs   Lab 10/06/24  0439   WBC 20.85*   RBC 4.52*   HGB 12.2*   HCT 35.8*      MCV 79*   MCH 27.0   MCHC 34.1     CMP:   Recent Labs   Lab 10/06/24  0439   *  118*   CALCIUM 8.4*  8.4*   ALBUMIN 3.0*  3.0*   PROT 6.4   *  135*   K 3.7  3.7   CO2 18*  18*     103   BUN 44*  44*   CREATININE 5.4*  5.4*   ALKPHOS 115   ALT 88*   *   BILITOT 2.0*     All labs within the past 24 hours have been reviewed.     Significant Imaging:  Labs: Reviewed    Assessment/Plan:     ESRD  - receives HD MWF at [facility] under the c/o Dr. Chicas  - also with PD catheter; patient planning to transition back to PD soon. Will arrange for PD catheter flush on Monday  - CRRT initiated yesterday due to hypotension and need for fluid removal; unfortunately patient is net +1.5L today  - catheter flow issues noted;  CathFlo x1 this AM  - will restart CRRT with UF rate 200-300 cc/hr. If unable to tolerate UF goal of at least 200cc/hr, recommend stopping CRRT to prevent worsening hypervolemia  - labs q8h    Hypotension  - baseline SBP 90-100s  - SBP in the 70s yesterday; HD stopped and CRRT started  - continue levophed to allow for volume removal with CRRT    Anemia of CKD  - hgb at goal; no need for AWAIS at this time    Secondary HPTH  - CCa and phos normal  - renal diet    Critical care time spent on the evaluation and treatment of severe organ dysfunction, review of pertinent labs and imaging studies, discussions with Dr. Hale, Dr. Barnes, and nursing staff, and discussions with patient/family: 35 minutes.      Thank you for your consult. I will follow-up with patient. Please contact us if you have any additional questions.    Lizzette Cain MD  Nephrology  [facility] - Intensive Care

## 2024-10-06 NOTE — ASSESSMENT & PLAN NOTE
Chronic, controlled. Pt now with hypotension   Pt with hypotension, continue home midodrine   Currently on pressors to keep MAP>65    Latest blood pressure and vitals reviewed-     Temp:  [97 °F (36.1 °C)-97.9 °F (36.6 °C)]   Pulse:  [59-80]   Resp:  [11-31]   BP: ()/(37-69)   SpO2:  [77 %-100 %] .   Home meds for hypertension were reviewed and noted below.   Hypertension Medications               furosemide (LASIX) 80 MG tablet Take 4.5 tablets (360 mg total) by mouth 2 (two) times daily.    metOLazone (ZAROXOLYN) 10 MG tablet Take 10 mg by mouth As instructed (Take I tablet by mouth Tuesday, Thursday, Saturday, Sunday). Take I tablet by mouth Tuesday, Thursday, Saturday, Sunday    nitroGLYCERIN (NITROSTAT) 0.4 MG SL tablet Place 1 tablet (0.4 mg total) under the tongue every 5 (five) minutes as needed for Chest pain.            While in the hospital, will manage blood pressure as follows; Adjust home antihypertensive regimen as follows- patient with hypotension chronically. Continue home midodrine     Will utilize p.r.n. blood pressure medication only if patient's blood pressure greater than  180/90  and he develops symptoms such as worsening chest pain or shortness of breath.

## 2024-10-06 NOTE — SUBJECTIVE & OBJECTIVE
Interval History: CRRT started yesterday evening. Flow issues overnight; UF goal not met. Patient net positive 1.5L/24h. Worsening hypoxia overnight; required up to 15L HFNC. Down to 8L this AM.       Review of patient's allergies indicates:   Allergen Reactions    Lokelma [sodium zirconium cyclosilicate] Other (See Comments)     Fluid retention, weight gain, CHF excerebration, severe constipation    Atorvastatin Other (See Comments)     Joint pain    Jardiance [empagliflozin] Other (See Comments)     Chest pains, significant weight loss, lower blood pressure     Current Facility-Administered Medications   Medication Frequency    0.9%  NaCl infusion (CRRT USE ONLY) Continuous    acetaminophen tablet 650 mg Q4H PRN    albuterol-ipratropium 2.5 mg-0.5 mg/3 mL nebulizer solution 3 mL Q4H WAKE    allopurinoL tablet 100 mg Daily    ceFEPIme (MAXIPIME) 1 g in D5W 100 mL IVPB (MB+) Q12H    chlorproMAZINE (THORAZINE) 25 mg in 0.9% NaCl 500 mL IVPB (for N/V, hiccups, tetanus) Once    docusate sodium capsule 100 mg Daily    furosemide tablet 360 mg See admin instructions    heparin (porcine) injection 1,000 Units PRN    heparin (porcine) injection 5,000 Units Q8H    heparin (porcine) injection 5,000 Units PRN    hydrALAZINE injection 10 mg Q6H PRN    HYDROmorphone injection 1 mg Q4H PRN    hydrOXYzine pamoate capsule 25 mg Q8H PRN    ketorolac injection 15 mg Q6H PRN    levalbuterol nebulizer solution 1.25 mg Q4H PRN    levothyroxine tablet 150 mcg Before breakfast    magnesium sulfate 2g in water 50mL IVPB (premix) PRN    melatonin tablet 6 mg Nightly PRN    metOLazone tablet 10 mg See admin instructions    mexiletine capsule 150 mg BID WM    midodrine tablet 15 mg Q8H    mupirocin 2 % ointment BID    naloxone 0.4 mg/mL injection 0.4 mg PRN    nitroGLYCERIN SL tablet 0.4 mg Q5 Min PRN    NORepinephrine 4 mg in dextrose 5% 250 mL infusion (premix) Continuous    ondansetron injection 4 mg Q6H PRN    oxyCODONE-acetaminophen   mg per tablet 1 tablet Q4H PRN    oxyCODONE-acetaminophen 5-325 mg per tablet 1 tablet Q4H PRN    polyethylene glycol packet 17 g BID PRN    polyethylene glycol packet 17 g Daily    pravastatin tablet 40 mg QHS    promethazine (PHENERGAN) 6.25 mg in 0.9% NaCl 50 mL IVPB Q6H PRN    simethicone chewable tablet 80 mg QID PRN    sodium chloride 0.9% bolus 250 mL 250 mL PRN    sodium chloride 0.9% flush 10 mL Q12H PRN    sodium chloride 0.9% flush 10 mL PRN    sodium phosphate 20.01 mmol in D5W 250 mL IVPB PRN    sodium phosphate 30 mmol in D5W 250 mL IVPB PRN    sodium phosphate 39.99 mmol in D5W 250 mL IVPB PRN    triamcinolone acetonide 0.1% cream BID    vancomycin - pharmacy to dose pharmacy to manage frequency       Objective:     Vital Signs (Most Recent):  Temp: 97.4 °F (36.3 °C) (10/06/24 0705)  Pulse: 65 (10/06/24 1028)  Resp: 15 (10/06/24 1028)  BP: (!) 93/57 (10/06/24 1015)  SpO2: 96 % (10/06/24 1028) Vital Signs (24h Range):  Temp:  [97 °F (36.1 °C)-97.9 °F (36.6 °C)] 97.4 °F (36.3 °C)  Pulse:  [59-80] 65  Resp:  [11-31] 15  SpO2:  [77 %-100 %] 96 %  BP: ()/(37-69) 93/57     Weight: 74.6 kg (164 lb 7.4 oz) (10/06/24 0300)  Body mass index is 24.29 kg/m².  Body surface area is 1.91 meters squared.    I/O last 3 completed shifts:  In: 3230 [P.O.:120; I.V.:1978.5; Other:200; IV Piggyback:931.5]  Out: 1700 [Urine:100; Other:1600]     Physical Exam  Vitals and nursing note reviewed.   Constitutional:       General: He is awake. He is not in acute distress.     Appearance: Normal appearance. He is well-developed.   HENT:      Head: Normocephalic and atraumatic.      Nose: Nose normal.      Mouth/Throat:      Mouth: Mucous membranes are moist.   Eyes:      Extraocular Movements: Extraocular movements intact.      Conjunctiva/sclera: Conjunctivae normal.   Cardiovascular:      Rate and Rhythm: Normal rate and regular rhythm.      Comments: MARIBEL TDC  Pulmonary:      Effort: Pulmonary effort is normal.       Breath sounds: Rales present.   Abdominal:      General: There is no distension.      Palpations: Abdomen is soft.      Comments: PD catheter to LLQ   Musculoskeletal:         General: Tenderness present.      Right lower leg: Edema present.      Left lower leg: Edema present.      Comments: Trace pitting edema   Skin:     General: Skin is warm and dry.      Findings: No erythema or rash.   Neurological:      Mental Status: He is alert.   Psychiatric:         Mood and Affect: Mood normal.         Behavior: Behavior normal.          Significant Labs:  CBC:   Recent Labs   Lab 10/06/24  0439   WBC 20.85*   RBC 4.52*   HGB 12.2*   HCT 35.8*      MCV 79*   MCH 27.0   MCHC 34.1     CMP:   Recent Labs   Lab 10/06/24  0439   *  118*   CALCIUM 8.4*  8.4*   ALBUMIN 3.0*  3.0*   PROT 6.4   *  135*   K 3.7  3.7   CO2 18*  18*     103   BUN 44*  44*   CREATININE 5.4*  5.4*   ALKPHOS 115   ALT 88*   *   BILITOT 2.0*     All labs within the past 24 hours have been reviewed.     Significant Imaging:  Labs: Reviewed

## 2024-10-06 NOTE — SUBJECTIVE & OBJECTIVE
Interval History:  CRRT started yesterday evening. However, had flow issues and now pt net positive 1.5L in the last 24 hrs. Worsening hypoxia overnight; required up to 15L HFNC. Down to 8L this AM. Pt denies any headaches, vision changes, fatigue, weakness. No bleeding. Complains of right hip pain that is worse with movement.       Review of Systems   Respiratory:  Positive for shortness of breath. Negative for cough.    Cardiovascular:  Negative for chest pain and leg swelling.   Gastrointestinal:  Negative for abdominal pain.   Genitourinary:  Negative for difficulty urinating.   Musculoskeletal:  Positive for arthralgias, gait problem and joint swelling. Negative for back pain.   Neurological:  Positive for weakness. Negative for dizziness and headaches.     Objective:     Vital Signs (Most Recent):  Temp: 97.4 °F (36.3 °C) (10/06/24 0705)  Pulse: 64 (10/06/24 1130)  Resp: 17 (10/06/24 1130)  BP: (!) 89/51 (10/06/24 1130)  SpO2: 96 % (10/06/24 1130) Vital Signs (24h Range):  Temp:  [97 °F (36.1 °C)-97.9 °F (36.6 °C)] 97.4 °F (36.3 °C)  Pulse:  [59-80] 64  Resp:  [11-31] 17  SpO2:  [77 %-100 %] 96 %  BP: ()/(37-69) 89/51     Weight: 74.6 kg (164 lb 7.4 oz)  Body mass index is 24.29 kg/m².    Intake/Output Summary (Last 24 hours) at 10/6/2024 1143  Last data filed at 10/6/2024 1142  Gross per 24 hour   Intake 4466.14 ml   Output 2065 ml   Net 2401.14 ml         Physical Exam  Vitals and nursing note reviewed.   Constitutional:       General: He is not in acute distress.     Appearance: He is ill-appearing (chronic). He is not diaphoretic.   HENT:      Head: Atraumatic.      Nose: Nose normal.      Mouth/Throat:      Mouth: Mucous membranes are moist.   Eyes:      Extraocular Movements: Extraocular movements intact.   Cardiovascular:      Rate and Rhythm: Normal rate and regular rhythm.      Comments: Right chest wall permcath, nontender and without overlying signs of infection  Pulmonary:      Effort:  Pulmonary effort is normal. No respiratory distress.      Breath sounds: Rales present. No wheezing or rhonchi.      Comments: On 8L HFNC. diminished breath sounds in lower lung fields.  Abdominal:      Palpations: Abdomen is soft.      Comments: PD catheter to LLQ    Musculoskeletal:         General: Swelling (right thigh swelling) and tenderness present. Normal range of motion.      Right lower leg: Edema present.      Left lower leg: Edema present.      Comments: Trace edema in bilateral LE   Neurological:      Mental Status: He is alert and oriented to person, place, and time. Mental status is at baseline.             Significant Labs: All pertinent labs within the past 24 hours have been reviewed.    Significant Imaging: I have reviewed all pertinent imaging results/findings within the past 24 hours.

## 2024-10-06 NOTE — PROGRESS NOTES
Pharmacokinetic Initial Assessment: IV Vancomycin    Assessment/Plan:    Initiate intravenous vancomycin with loading dose of 1500 mg once with subsequent doses when random concentrations are less than 20 mcg/mL  Desired empiric serum trough concentration is 10 to 20 mcg/mL  Draw vancomycin random level on 10/7 at 0300.  Pharmacy will continue to follow and monitor vancomycin.      Please contact pharmacy at extension [phone] with any questions regarding this assessment.     Thank you for the consult,   Madi KOTHARIReece Ce       Patient brief summary:  Ar Sharma is a 63 y.o. male initiated on antimicrobial therapy with IV Vancomycin for treatment of suspected lower respiratory infection    Drug Allergies:   Review of patient's allergies indicates:   Allergen Reactions    Lokelma [sodium zirconium cyclosilicate] Other (See Comments)     Fluid retention, weight gain, CHF excerebration, severe constipation    Atorvastatin Other (See Comments)     Joint pain    Jardiance [empagliflozin] Other (See Comments)     Chest pains, significant weight loss, lower blood pressure       Actual Body Weight:   74.6 kg    Renal Function:   Estimated Creatinine Clearance: 13.8 mL/min (A) (based on SCr of 5.4 mg/dL (H)).,     Dialysis Method (if applicable):  CRRT    CBC (last 72 hours):  Recent Labs   Lab Result Units 10/04/24  0418 10/04/24  1306 10/04/24  1613 10/05/24  0544 10/06/24  0439   WBC K/uL 14.87*  --  19.72* 19.83* 20.85*   Hemoglobin g/dL 13.6* 13.2* 13.4* 12.6* 12.2*   Hematocrit % 40.3 41.3 40.6 37.5* 35.8*   Platelets K/uL 492*  --  450 407 294   Gran % % 82.5*  --  90.5* 85.8* 89.9*   Lymph % % 4.7*  --  1.7* 2.5* 1.8*   Mono % % 10.2  --  6.0 10.2 6.1   Eosinophil % % 0.8  --  0.1 0.2 0.7   Basophil % % 0.9  --  0.5 0.3 0.4   Differential Method  Automated  --  Automated Automated Automated       Metabolic Panel (last 72 hours):  Recent Labs   Lab Result Units 10/04/24  0418 10/04/24  1612 10/05/24  0544  "10/05/24  1556 10/06/24  0045 10/06/24  0439   Sodium mmol/L 135* 137 133* 133* 134* 135*  135*   Potassium mmol/L 4.6 4.4 4.8 4.4 3.7 3.7  3.7   Chloride mmol/L 99 97 99 101 103 103  103   CO2 mmol/L 21* 22* 19* 18* 18* 18*  18*   Glucose mg/dL 111* 147* 117* 125* 146* 118*  118*   BUN mg/dL 51* 57* 65* 50* 43* 44*  44*   Creatinine mg/dL 6.3* 7.3* 7.8* 6.4* 5.2* 5.4*  5.4*   Albumin g/dL 3.4* 3.2* 3.1* 3.0* 2.8* 3.0*  3.0*   Total Bilirubin mg/dL 2.9* 2.7* 2.1*  --   --  2.0*   Alkaline Phosphatase U/L 132 123 117  --   --  115   AST U/L 225* 322* 367*  --   --  240*   ALT U/L 189* 298* 271*  --   --  88*   Magnesium mg/dL  --   --   --  1.9 1.8  --    Phosphorus mg/dL  --   --   --  5.4* 4.7* 4.8*       Drug levels (last 3 results):  No results for input(s): "VANCOMYCINRA", "VANCORANDOM", "VANCOMYCINPE", "VANCOPEAK", "VANCOMYCINTR", "VANCOTROUGH" in the last 72 hours.    Microbiologic Results:  Microbiology Results (last 7 days)       Procedure Component Value Units Date/Time    Blood culture [[id number]] Collected: 10/05/24 1612    Order Status: Completed Specimen: Blood from Peripheral, Hand, Left Updated: 10/05/24 2312     Blood Culture, Routine No Growth to date    Blood culture [[id number]] Collected: 10/05/24 1556    Order Status: Completed Specimen: Blood from Peripheral, Wrist, Left Updated: 10/05/24 2312     Blood Culture, Routine No Growth to date            "

## 2024-10-06 NOTE — ASSESSMENT & PLAN NOTE
Anemia is likely due to Iron deficiency. Most recent hemoglobin and hematocrit are listed below.  Recent Labs     10/04/24  1613 10/05/24  0544 10/06/24  0439   HGB 13.4* 12.6* 12.2*   HCT 40.6 37.5* 35.8*       Plan  - Monitor serial CBC: Daily  - Transfuse PRBC if patient becomes hemodynamically unstable, symptomatic or H/H drops below 7/21.  - Patient has not received any PRBC transfusions to date  - Patient's anemia is currently stable  - stable, monitoring

## 2024-10-06 NOTE — EICU
[facility] Note :    Called by the [facility]:    Problem: Zofran not working needs something else     Pertinent History and labs reviewed : 62 y/o   ESRD. Patient receives HD MWF via [facility] at [facility] the c/o Dr. Cronin   Treatment /Intervention given: Phenergan 6.25 mg IVP        Qing Driscoll M.D  eICU Physician   2:00 AM , O2 sats not picking up . Probe changed , and it is reading 98%

## 2024-10-06 NOTE — ASSESSMENT & PLAN NOTE
-WBC# trending up, 20k on 10/06. Pt remained afebrile.   -initially suspected reactive given fracture and recent surgery  -However, pt also with PD cath in LLQ. No overlying signs of infection  -Blood cx ordered, no growth so far  -Monitor off abx at this time

## 2024-10-06 NOTE — EICU
Intervention Initiated From:  Bedside    Margaret intervened regarding:  Medication    Doctor Notified:  Yes  Comments: Dr. Richardson notified that bedside RN is requesting change in norepinephrine IV drip concentration to peripheral IV concentration dose.

## 2024-10-06 NOTE — NURSING
1000- CRRT terminated at this time due to venous port clotted. Levophed changed back to low concentration to infuse through LUE PIV. Heparin instilled into arterial port.     1030-Dr. Hale and Dr. Nguyen in patient room- poc discussed with patient and family.     1157-Cathflo instilled into venous port of RCW tunnel cath.     1235- Unsuccessful attempt to draw back cathflo from venous port, still clotted. Will allow more time to dwell- per cath marta instructions.    1815-Successful attempt to draw blood from venous port. CRRT restarted. Levo infusing at low dose to assist with hypotension.

## 2024-10-06 NOTE — NURSING
[facility]  Nurses Note -- 4 Eyes      10/6/2024       Skin assessed on: Q Shift      [x] No Pressure Injuries Present    [x]Prevention Measures Documented    [] Yes LDA  for Pressure Injury Previously documented     [] Yes New Pressure Injury Discovered   [] LDA for New Pressure Injury Added      Attending RN:  Eagle Loera RN     Second RN:  Angi REDDING

## 2024-10-06 NOTE — RESPIRATORY THERAPY
Latest Reference Range & Units 10/06/24 03:09   POC PH 7.35 - 7.45  7.391   POC PCO2 35 - 45 mmHg 36.1   POC PO2 80 - 100 mmHg 53 (LL)   POC HCO3 24 - 28 mmol/L 21.9 (L)   POC SATURATED O2 95 - 100 % 87   Sample  ARTERIAL   POC TCO2 23 - 27 mmol/L 23   POC BE -2 to 2 mmol/L -3 (L)   Flow  12   DelSys  Nasal Can   Site  LR   Mode  SPONT   (LL): Data is critically low  (L): Data is abnormally low

## 2024-10-06 NOTE — ASSESSMENT & PLAN NOTE
NO signs of acute exacerbation.   Echo as below   Cardiology consulted for cardiac clearance-cleared for surgery at moderate cardiac risk.   Nephrology for volume removal with HD    Echo    Result Date: 10/3/2024    Left Ventricle: The left ventricle is mildly dilated. There is mild   eccentric hypertrophy. Septal flattening in diastole and systole   consistent with right ventricular volume and pressure overload. There is   mildly reduced systolic function with a visually estimated ejection   fraction of 40 - 45%.    Right Ventricle: Severe right ventricular enlargement. Systolic   function is moderately reduced.    Left Atrium: Left atrium is moderately dilated.    Right Atrium: Right atrium is severely dilated.    Aortic Valve: There is mild stenosis. Aortic valve area by VTI is 1.6   cm². Aortic valve peak velocity is 1.6 m/s. Mean gradient is 6.3 mmHg. The   dimensionless index is 0.50.    Mitral Valve: There is mild to moderate regurgitation.    Tricuspid Valve: There is moderate regurgitation.    Pulmonary Artery: The estimated pulmonary artery systolic pressure is   66 mmHg.    IVC/SVC: Elevated venous pressure at 15 mmHg.

## 2024-10-06 NOTE — RESPIRATORY THERAPY
0005 Called by nurse due to change in status of decrease in oxygen saturation. Upon arrival patient was on 4 lpm HFNC. SP02 86%. Increased to 8 lpm. Sp02 94%    0300 Oxygen saturation ranging between 79-86% when called. Increased HFNC to 12 lpm. EICU notified. ABG ordered , obtained and analyzed. Results reported to Dr. Richardson, Qing.      0318 Patient is now on HF 15 LPM. Will continue to monitor.   Latest Reference Range & Units 10/06/24 03:09   POC PH 7.35 - 7.45  7.391   POC PCO2 35 - 45 mmHg 36.1   POC PO2 80 - 100 mmHg 53 (LL)   POC HCO3 24 - 28 mmol/L 21.9 (L)   POC SATURATED O2 95 - 100 % 87   Sample  ARTERIAL   POC TCO2 23 - 27 mmol/L 23   POC BE -2 to 2 mmol/L -3 (L)   Flow  12   DelSys  Nasal Can   Site  LR   Mode  SPONT   (LL): Data is critically low  (L): Data is abnormally low

## 2024-10-06 NOTE — ASSESSMENT & PLAN NOTE
Patient with Hypoxic Respiratory failure which is Acute on chronic.  he is on home oxygen at 3 LPM. Supplemental oxygen was provided and noted-      .   Signs/symptoms of respiratory failure include- tachypnea and increased work of breathing. Contributing diagnoses includes - CHF and COPD Labs and images were reviewed. Patient Has recent ABG, which has been reviewed. Will treat underlying causes and adjust management of respiratory failure as follows-     -pt on home 3L NC. Hx of COPD/CHF  -Worsening hypoxia overnight 10/05; required up to 15L HFNC.  -acute on chronic hypoxia secondary to volume overload  -Pt unable to tolerate HD and CRRT, net positive 1.5L on 10/06  -CXR with pulmonary edema   -Pulmonology consulted  -Nephrology following  -Wean O2 as tolerated

## 2024-10-07 DIAGNOSIS — R79.89 ELEVATED LFTS: Primary | ICD-10-CM

## 2024-10-07 PROBLEM — I51.3 RIGHT ATRIAL THROMBUS: Status: ACTIVE | Noted: 2024-01-01

## 2024-10-07 PROBLEM — L30.8 DERMATITIS ASSOCIATED WITH MOISTURE: Status: ACTIVE | Noted: 2024-01-01

## 2024-10-07 NOTE — SUBJECTIVE & OBJECTIVE
Review of Systems   Gastrointestinal:  Negative for melena.   Genitourinary:  Negative for hematuria.     Objective:     Vital Signs (Most Recent):  Temp: 97.5 °F (36.4 °C) (10/07/24 1200)  Pulse: 61 (10/07/24 1215)  Resp: 16 (10/07/24 1215)  BP: (!) 105/52 (10/07/24 1215)  SpO2: (!) 94 % (10/07/24 1215) Vital Signs (24h Range):  Temp:  [97.5 °F (36.4 °C)-98.5 °F (36.9 °C)] 97.5 °F (36.4 °C)  Pulse:  [60-66] 61  Resp:  [12-42] 16  SpO2:  [92 %-100 %] 94 %  BP: ()/(45-72) 105/52     Weight: 80.1 kg (176 lb 9.4 oz)  Body mass index is 26.08 kg/m².     SpO2: (!) 94 %         Intake/Output Summary (Last 24 hours) at 10/7/2024 1259  Last data filed at 10/7/2024 1200  Gross per 24 hour   Intake 1028.15 ml   Output 1302 ml   Net -273.85 ml       Lines/Drains/Airways       Central Venous Catheter Line  Duration                  Hemodialysis Catheter 05/03/24 1115 right subclavian 157 days              Peripheral Intravenous Line  Duration                  Peripheral IV - Single Lumen 10/06/24 0620 20 G Anterior;Left Upper Arm 1 day         Peripheral IV - Single Lumen 10/06/24 1315 20 G No Anterior;Left;Proximal Forearm <1 day                       Physical Exam  Constitutional:       General: He is not in acute distress.     Appearance: He is well-developed. He is not ill-appearing, toxic-appearing or diaphoretic.   HENT:      Head: Normocephalic and atraumatic.   Eyes:      General: No scleral icterus.     Extraocular Movements: Extraocular movements intact.      Conjunctiva/sclera: Conjunctivae normal.      Pupils: Pupils are equal, round, and reactive to light.   Neck:      Thyroid: No thyromegaly.      Vascular: No JVD.      Trachea: No tracheal deviation.   Cardiovascular:      Rate and Rhythm: Normal rate and regular rhythm.      Heart sounds: S1 normal and S2 normal. No murmur heard.     No friction rub. No gallop.   Pulmonary:      Effort: Pulmonary effort is normal. No respiratory distress.      Breath  sounds: Normal breath sounds. No stridor. No wheezing, rhonchi or rales.   Chest:      Chest wall: No tenderness.   Abdominal:      General: There is no distension.      Palpations: Abdomen is soft.   Musculoskeletal:         General: No swelling or tenderness. Normal range of motion.      Cervical back: Normal range of motion and neck supple. No rigidity.      Right lower leg: No edema.      Left lower leg: No edema.   Skin:     General: Skin is warm and dry.      Coloration: Skin is not jaundiced.   Neurological:      General: No focal deficit present.      Mental Status: He is alert and oriented to person, place, and time.      Cranial Nerves: No cranial nerve deficit.   Psychiatric:         Mood and Affect: Mood normal.         Behavior: Behavior normal.            Current Medications:   allopurinoL  100 mg Oral Daily    [START ON 10/8/2024] ceFEPime IV (PEDS and ADULTS)  1 g Intravenous Q24H    chlorproMAZINE  25 mg Intravenous Once    docusate sodium  100 mg Oral Daily    enoxparin  1 mg/kg Subcutaneous Q24H (prophylaxis, 1700)    levothyroxine  150 mcg Oral Before breakfast    mexiletine  150 mg Oral BID WM    midodrine  15 mg Oral Q8H    mupirocin   Nasal BID    polyethylene glycol  17 g Oral Daily    pravastatin  40 mg Oral QHS    triamcinolone acetonide 0.1%   Topical (Top) BID      NORepinephrine bitartrate-D5W  0-3 mcg/kg/min Intravenous Continuous 5.6 mL/hr at 10/07/24 1200 0.02 mcg/kg/min at 10/07/24 1200       Current Facility-Administered Medications:     acetaminophen, 650 mg, Oral, Q4H PRN    furosemide, 360 mg, Oral, See admin instructions    heparin (porcine), 1,000 Units, Intra-Catheter, PRN    heparin (porcine), 5,000 Units, Intra-Catheter, PRN    hydrALAZINE, 10 mg, Intravenous, Q6H PRN    HYDROmorphone, 1 mg, Intravenous, Q4H PRN    hydrOXYzine pamoate, 25 mg, Oral, Q8H PRN    levalbuterol, 1.25 mg, Nebulization, Q4H PRN    magnesium sulfate IVPB, 2 g, Intravenous, PRN    melatonin, 6 mg,  Oral, Nightly PRN    metOLazone, 10 mg, Oral, See admin instructions    naloxone, 0.4 mg, Intravenous, PRN    nitroGLYCERIN, 0.4 mg, Sublingual, Q5 Min PRN    ondansetron, 4 mg, Intravenous, Q6H PRN    oxyCODONE-acetaminophen, 1 tablet, Oral, Q4H PRN    oxyCODONE-acetaminophen, 1 tablet, Oral, Q4H PRN    polyethylene glycol, 17 g, Oral, BID PRN    promethazine (PHENERGAN) 6.25 mg in 0.9% NaCl 50 mL IVPB, 6.25 mg, Intravenous, Q6H PRN    simethicone, 1 tablet, Oral, QID PRN    sodium chloride 0.9%, 250 mL, Intravenous, PRN    sodium chloride 0.9%, 10 mL, Intravenous, Q12H PRN    sodium chloride 0.9%, 10 mL, Intravenous, PRN    sodium phosphate 20.01 mmol in D5W 250 mL IVPB, 20.01 mmol, Intravenous, PRN    sodium phosphate 30 mmol in D5W 250 mL IVPB, 30 mmol, Intravenous, PRN    sodium phosphate 39.99 mmol in D5W 250 mL IVPB, 39.99 mmol, Intravenous, PRN    Pharmacy to dose Vancomycin consult, , , Once **AND** vancomycin - pharmacy to dose, , Intravenous, pharmacy to manage frequency    Laboratory (all labs reviewed):  CBC:  Recent Labs   Lab 10/04/24  0418 10/04/24  1306 10/04/24  1613 10/05/24  0544 10/06/24  0439 10/07/24  0219   WBC 14.87 H  --  19.72 H 19.83 H 20.85 H 15.41 H   Hemoglobin 13.6 L 13.2 L 13.4 L 12.6 L 12.2 L 11.5 L   Hematocrit 40.3 41.3 40.6 37.5 L 35.8 L 33.6 L   Platelets 492 H  --  450 407 294 320       CHEMISTRIES:  Recent Labs   Lab 01/10/22  0753 02/07/22  0745 04/04/22  1255 06/09/22  0740 07/07/22  0746 09/06/22  0741 09/30/24  1100 10/02/24  2224 10/03/24  0441 10/04/24  0418 10/05/24  1556 10/06/24  0045 10/06/24  0439 10/06/24  2249 10/07/24  0219   Glucose 103 86 92 90 92   < > 91   < > 93   < > 125 H 146 H 118 H  118 H 133 H 120 H   Sodium 136 135 L 137 139 136   < > 139   < > 138   < > 133 L 134 L 135 L  135 L 134 L 135 L   Potassium 4.4 4.5 4.8 4.8 4.3   < > 4.3   < > 3.8   < > 4.4 3.7 3.7  3.7 3.9 4.1   BUN 72 H 72 H 68 H 81 H 76 H   < > 62 H   < > 34 H   < > 50 H 43 H 44 H  44  H 37 H 36 H   Creatinine 4.83 H 4.30 H 4.08 H 4.64 H 4.06 H   < > 5.8 H   < > 4.5 H   < > 6.4 H 5.2 H 5.4 H  5.4 H 4.5 H 4.3 H   eGFR if non  12.0 A 13.8 A 14.7 A 12.6 A 14.8 A  --   --   --   --   --   --   --   --   --   --    eGFR  --   --   --   --   --    < > 10 A   < > 14 A   < > 9 A 12 A 11 A  11 A 14 A 15 A   Calcium 8.8 9.1 9.1 8.7 9.5   < > 9.4   < > 8.9   < > 8.5 L 8.2 L 8.4 L  8.4 L 8.5 L 8.5 L   Magnesium  --   --   --   --   --    < > 2.1  --  1.9  --  1.9 1.8  --  1.8  --     < > = values in this interval not displayed.       CARDIAC BIOMARKERS:  Recent Labs   Lab 09/25/22  1638 04/29/24  1707 10/04/24  0418   Troponin I 0.015 0.040 H 0.045 H       COAGS:  Recent Labs   Lab 01/17/23  0741 10/02/24  2224 10/03/24  0441   INR 1.2 1.3 H 1.2       LIPIDS/LFTS:  Recent Labs   Lab 05/04/23  1129 09/01/23  0836 03/04/24  1018 04/12/24  1056 04/29/24  2039 06/04/24  1440 09/04/24  1201 09/30/24  1100 10/04/24  0418 10/04/24  1612 10/05/24  0544 10/06/24  0439 10/07/24  0219   Cholesterol 151 139 124  --  127  --  189  --   --   --   --   --   --    Triglycerides 79 104 66  --  66  --  167 H  --   --   --   --   --   --    HDL 41 35 L 39 L  --  32 L  --  54  --   --   --   --   --   --    LDL Cholesterol 94.2 83.2 71.8  --  81.8  --  101.6  --   --   --   --   --   --    Non-HDL Cholesterol 110 104 85  --  95  --  135  --   --   --   --   --   --    AST 45 51 H 39   < >  --    < > 99 H   < > 225 H 322 H 367 H 240 H 152 H   ALT 56 H 57 H 35   < >  --    < > 68 H   < > 189 H 298 H 271 H 88 H 33    < > = values in this interval not displayed.       BNP:  Recent Labs   Lab 09/25/22  1638 09/28/22  1545 04/12/24  1056 04/29/24  1707   BNP 2,304 H 1,416 H 2,549 H 3,204 H       TSH:  Recent Labs   Lab 09/29/22  0448 12/20/22  1052 05/04/23  1129 03/04/24  1018 09/04/24  1201   TSH 3.045 1.480 1.160 0.283 L 1.740       Free T4:  Recent Labs   Lab 11/11/21  0727 03/04/24  1018   Free T4 1.48 3.25 H        Diagnostic Results:  ECG (personally reviewed and interpreted tracing(s)):  10/6/24 0720 AP-BiVP 60    Chest X-Ray (personally reviewed and interpreted image(s)): 10/6/24 CMeg, L ICD 3 leads, R HD catheter    Echo: 10/7/24 (images personally reviewed and interpreted).  Compared with report/images 10/3/24, RA and LA masses are new, LV WMA is old.  ?Thrombus in transit across PFO.    Left Ventricle: The left ventricle is normal in size. Mildly increased wall thickness. There is mild concentric hypertrophy. Regional wall motion abnormalities present (severe distal anteroapical hypokinesis). There is mildly reduced systolic function with a visually estimated ejection fraction of 40 - 45%.    Right Ventricle: Severe right ventricular enlargement. Systolic function is severely reduced. Pacemaker lead present in the ventricle and appears abnormal with possible vegetation on lead in RA (2.7x2.0cm).    Left Atrium: There is a large mobile frondlike mass, appears to be adherent to LA septum.    Right Atrium: Right atrium is severely dilated. Multiple leads present in the right atrium with possible vegetation adherent to lead in RA.    Aortic Valve: The aortic valve is a trileaflet valve. There is mild aortic valve sclerosis.    Tricuspid Valve: There is mild regurgitation.    Pulmonary Artery: The estimated pulmonary artery systolic pressure is 67 mmHg.    RA mass 2.7x2.0 cm      LA mass 2.2x0.5cm      Cath: 9/27/22   There was non-obstructive coronary artery disease. Stable as compared to prior.   Left Main   The vessel was visualized by angiography, is moderate in size and is angiographically normal.      Left Anterior Descending   Mid LAD lesion was 20% stenosed.      Left Circumflex   The vessel was visualized by angiography, is moderate in size and is angiographically normal.      Right Coronary Artery   Prox RCA to Mid RCA lesion was 20% stenosed.

## 2024-10-07 NOTE — PROGRESS NOTES
Pharmacist Renal Dose Adjustment Note    Ar Sharma is a 63 y.o. male being treated with the medication Cefepime    Patient Data:    Vital Signs (Most Recent):  Temp: 97.8 °F (36.6 °C) (10/07/24 0400)  Pulse: 60 (10/07/24 0809)  Resp: 20 (10/07/24 0809)  BP: (!) 92/53 (10/07/24 0715)  SpO2: 98 % (10/07/24 0809) Vital Signs (72h Range):  Temp:  [97 °F (36.1 °C)-98.4 °F (36.9 °C)]   Pulse:  [58-80]   Resp:  [11-31]   BP: ()/(37-72)   SpO2:  [77 %-100 %]      Recent Labs   Lab 10/06/24  0439 10/06/24  2249 10/07/24  0219   CREATININE 5.4*  5.4* 4.5* 4.3*     Serum creatinine: 4.3 mg/dL (H) 10/07/24 0219  Estimated creatinine clearance: 17.4 mL/min (A)    Medication:Cefepime 1 gram every 12 hours will be changed to Cefepime 1 gram every 24 hours    Pharmacist's Name: Krystian Patel Jr  Pharmacist's Extension: [phone]

## 2024-10-07 NOTE — PROGRESS NOTES
Pharmacokinetic Assessment Follow Up: IV Vancomycin    Vancomycin serum concentration assessment(s):    The random level was drawn correctly and can be used to guide therapy at this time. The measurement is below the desired definitive target range of 10 to 20 mcg/mL.    Vancomycin Regimen Plan:    Give Vancomycin 1000 mg x1 dose today and obtain random level 10/8 at 0300    Drug levels (last 3 results):  Recent Labs   Lab Result Units 10/07/24  0219   Vancomycin-Trough ug/mL 9.5*       Pharmacy will continue to follow and monitor vancomycin.    Please contact pharmacy at extension [phone] for questions regarding this assessment.    Thank you for the consult,   Madi Encinas       Patient brief summary:  Ar Sharma is a 63 y.o. male initiated on antimicrobial therapy with IV Vancomycin for treatment of lower respiratory infection    Drug Allergies:   Review of patient's allergies indicates:   Allergen Reactions    Lokelma [sodium zirconium cyclosilicate] Other (See Comments)     Fluid retention, weight gain, CHF excerebration, severe constipation    Atorvastatin Other (See Comments)     Joint pain    Jardiance [empagliflozin] Other (See Comments)     Chest pains, significant weight loss, lower blood pressure       Actual Body Weight:   74.6 kg    Renal Function:   Estimated Creatinine Clearance: 17.4 mL/min (A) (based on SCr of 4.3 mg/dL (H)).,     Dialysis Method (if applicable):  CRRT    CBC (last 72 hours):  Recent Labs   Lab Result Units 10/04/24  0418 10/04/24  1306 10/04/24  1613 10/05/24  0544 10/06/24  0439 10/07/24  0219   WBC K/uL 14.87*  --  19.72* 19.83* 20.85* 15.41*   Hemoglobin g/dL 13.6* 13.2* 13.4* 12.6* 12.2* 11.5*   Hematocrit % 40.3 41.3 40.6 37.5* 35.8* 33.6*   Platelets K/uL 492*  --  450 407 294 320   Gran % % 82.5*  --  90.5* 85.8* 89.9* 80.2*   Lymph % % 4.7*  --  1.7* 2.5* 1.8* 3.4*   Mono % % 10.2  --  6.0 10.2 6.1 12.8   Eosinophil % % 0.8  --  0.1 0.2 0.7 2.0   Basophil % %  0.9  --  0.5 0.3 0.4 0.6   Differential Method  Automated  --  Automated Automated Automated Automated       Metabolic Panel (last 72 hours):  Recent Labs   Lab Result Units 10/04/24  0418 10/04/24  1612 10/05/24  0544 10/05/24  1556 10/06/24  0045 10/06/24  0439 10/06/24  2249 10/07/24  0219   Sodium mmol/L 135* 137 133* 133* 134* 135*  135* 134* 135*   Potassium mmol/L 4.6 4.4 4.8 4.4 3.7 3.7  3.7 3.9 4.1   Chloride mmol/L 99 97 99 101 103 103  103 103 103   CO2 mmol/L 21* 22* 19* 18* 18* 18*  18* 20* 19*   Glucose mg/dL 111* 147* 117* 125* 146* 118*  118* 133* 120*   BUN mg/dL 51* 57* 65* 50* 43* 44*  44* 37* 36*   Creatinine mg/dL 6.3* 7.3* 7.8* 6.4* 5.2* 5.4*  5.4* 4.5* 4.3*   Albumin g/dL 3.4* 3.2* 3.1* 3.0* 2.8* 3.0*  3.0* 2.7* 2.8*   Total Bilirubin mg/dL 2.9* 2.7* 2.1*  --   --  2.0*  --  2.1*   Alkaline Phosphatase U/L 132 123 117  --   --  115  --  149*   AST U/L 225* 322* 367*  --   --  240*  --  152*   ALT U/L 189* 298* 271*  --   --  88*  --  33   Magnesium mg/dL  --   --   --  1.9 1.8  --  1.8  --    Phosphorus mg/dL  --   --   --  5.4* 4.7* 4.8* 3.3  --        Vancomycin Administrations:  vancomycin given in the last 96 hours                     vancomycin 1,500 mg in D5W 250 mL IVPB (admixture device) (mg) 1,500 mg New Bag 10/06/24 0532                    Microbiologic Results:  Microbiology Results (last 7 days)       Procedure Component Value Units Date/Time    Blood culture [[id number]] Collected: 10/05/24 1612    Order Status: Completed Specimen: Blood from Peripheral, Hand, Left Updated: 10/06/24 1703     Blood Culture, Routine No Growth to date      No Growth to date    Blood culture [[id number]] Collected: 10/05/24 1556    Order Status: Completed Specimen: Blood from Peripheral, Wrist, Left Updated: 10/06/24 1703     Blood Culture, Routine No Growth to date      No Growth to date

## 2024-10-07 NOTE — CONSULTS
[facility] - Intensive Care  Pulmonology  Consult Note    Patient Name: Ar Sharma  MRN: [id number]  Admission Date: 10/3/2024  Hospital Length of Stay: 3 days  Code Status: Full Code  Attending Physician: Jv Barnes DO  Primary Care Provider: Jc Elliott MD   Principal Problem: Closed right hip fracture, initial encounter    Inpatient consult to Pulmonology  Consult performed by: Jeffry Hale MD  Consult ordered by: Jv Barnes DO        Subjective:     HPI:  63-year-old male with ESRD on HD MWF for past 2 months but was previously on PD, HTN, hyperlipidemia, combined systolic and diastolic heart failure 2/2 ischemic cardiomyopathy s/p [employer] CRT-D implant 2016 history of VT s/p ablation on mexiletine and amiodarone presenting after mechanical fall at home striking his right hip.  Right hip films showing minimally displaced intertrochanteric fracture of the right femur.  He underwent ORIF 10/4.  As reported, patient has chronic with systolic blood pressures in the 90s.  Here, he was hypotensive prior to dialysis and was transferred to the ICU for vasopressors and CRRT.  Pulmonary has been consulted possible central line placement    Past Medical History:   Diagnosis Date    Cardiomyopathy     CKD (chronic kidney disease) stage 4, GFR 15-29 ml/min     Congestive heart failure (CHF) 2015    COPD (chronic obstructive pulmonary disease)     Coronary artery disease     Edema     Essential (primary) hypertension 05/18/2022    Formatting of this note might be different from the original. Converted from [employer]: Description - ESSENTIAL HYPERTENSION, BENIGN    Heart attack     HLD (hyperlipidemia)     Hypertension     Hyperuricemia     Hypocalcemia     Renal cyst, left     Secondary hyperparathyroidism     Thyroid disease     V tach     Vitamin D deficiency        Past Surgical History:   Procedure Laterality Date    ablations  03/05/2018    albations  02/01/2018    COLONOSCOPY N/A 12/07/2018     Procedure: COLONOSCOPY/suprep;  Surgeon: Ananya Morillo MD;  Location: [facility] ENDO;  Service: Endoscopy;  Laterality: N/A;    defibulater N/A 2016    ESOPHAGOGASTRODUODENOSCOPY N/A 12/07/2018    Procedure: EGD (ESOPHAGOGASTRODUODENOSCOPY);  Surgeon: Ananya Morillo MD;  Location: [facility] ENDO;  Service: Endoscopy;  Laterality: N/A;    INSERTION OF TUNNELED CENTRAL VENOUS HEMODIALYSIS CATHETER Right 5/3/2024    Procedure: INSERTION, CATHETER, HEMODIALYSIS, DUAL LUMEN;  Surgeon: Philip Perez MD;  Location: [facility] OR;  Service: General;  Laterality: Right;    INSERTION, CATHETER, DIALYSIS, PERITONEAL, LAPAROSCOPIC N/A 5/8/2024    Procedure: INSERTION, CATHETER, DIALYSIS, PERITONEAL, LAPAROSCOPIC;  Surgeon: Tyree Ruiz MD;  Location: [facility] OR;  Service: General;  Laterality: N/A;    INSERTION, CENTRAL VENOUS ACCESS DEVICE Right 6/5/2024    Procedure: Insertion,central venous access device;  Surgeon: Tyree Ruiz MD;  Location: [facility] OR;  Service: General;  Laterality: Right;    JOINT REPLACEMENT Bilateral 10/2016    knees, bilat    LEFT HEART CATHETERIZATION N/A 12/16/2020    Procedure: Left heart cath;  Surgeon: Shahram Stewart MD;  Location: [facility] CATH LAB/EP;  Service: Cardiology;  Laterality: N/A;    LEFT HEART CATHETERIZATION Left 9/27/2022    Procedure: Left heart cath;  Surgeon: Juan Roque MD;  Location: [facility] CATH LAB/EP;  Service: Cardiology;  Laterality: Left;    HI HEMODIALYSIS, ONE EVALUATION  5/6/2024    REMOVAL OF HEMODIALYSIS CATHETER Right 5/3/2024    Procedure: REMOVAL, CATHETER, HEMODIALYSIS;  Surgeon: Philip Perez MD;  Location: [facility] OR;  Service: General;  Laterality: Right;    REPLACEMENT OF IMPLANTABLE CARDIOVERTER-DEFIBRILLATOR (ICD) GENERATOR Left 1/24/2023    Procedure: REPLACEMENT, ICD GENERATOR;  Surgeon: River Tenorio MD;  Location: [facility] EP LAB;  Service: Cardiology;  Laterality: Left;  ANTHONY, CRTD gen chg, MDT, MAC, DM, 3prep       Review of patient's  allergies indicates:   Allergen Reactions    Lokelma [sodium zirconium cyclosilicate] Other (See Comments)     Fluid retention, weight gain, CHF excerebration, severe constipation    Atorvastatin Other (See Comments)     Joint pain    Jardiance [empagliflozin] Other (See Comments)     Chest pains, significant weight loss, lower blood pressure       Family History       Problem Relation (Age of Onset)    Alcohol abuse Father    Arthritis Sister    Breast cancer Mother    Cancer Mother    Hypertension Mother    Kidney disease Father    No Known Problems Brother, Daughter, Son    Stroke Mother          Tobacco Use    Smoking status: Former     Current packs/day: 0.00     Average packs/day: 1 pack/day for 33.2 years (33.2 ttl pk-yrs)     Types: Cigarettes     Start date: 1979     Quit date: 3/3/2012     Years since quittin.6     Passive exposure: Past    Smokeless tobacco: Never   Substance and Sexual Activity    Alcohol use: Not Currently     Comment: rare    Drug use: No    Sexual activity: Not Currently     Partners: Female           Objective:     Vital Signs (Most Recent):  Temp: 98 °F (36.7 °C) (10/06/24 1901)  Pulse: 60 (10/06/24 2004)  Resp: 16 (10/06/24 2004)  BP: (!) 95/46 (10/06/24 2000)  SpO2: 96 % (10/06/24 2004) Vital Signs (24h Range):  Temp:  [97 °F (36.1 °C)-98 °F (36.7 °C)] 98 °F (36.7 °C)  Pulse:  [59-80] 60  Resp:  [11-31] 16  SpO2:  [77 %-100 %] 96 %  BP: ()/(43-69) 95/46     Weight: 74.6 kg (164 lb 7.4 oz)  Body mass index is 24.29 kg/m².      Intake/Output Summary (Last 24 hours) at 10/6/2024 2023  Last data filed at 10/6/2024 2000  Gross per 24 hour   Intake 3316.69 ml   Output 1619 ml   Net 1697.69 ml        Physical Exam  Vitals and nursing note reviewed.   Constitutional:       General: He is not in acute distress.     Appearance: He is ill-appearing (chronically). He is not toxic-appearing or diaphoretic.      Interventions: Nasal cannula in place.   HENT:      Head:  Normocephalic and atraumatic.      Nose: No rhinorrhea.      Mouth/Throat:      Mouth: Mucous membranes are moist.   Eyes:      General: No scleral icterus.     Extraocular Movements: Extraocular movements intact.   Neck:      Comments: Right IJ tunneled dialysis catheter  Cardiovascular:      Rate and Rhythm: Normal rate and regular rhythm.      Heart sounds: No murmur heard.  Pulmonary:      Effort: No tachypnea, accessory muscle usage, respiratory distress or retractions.   Abdominal:      General: There is no distension.      Comments: PD catheter present   Skin:     General: Skin is warm and dry.      Coloration: Skin is not jaundiced.      Findings: No rash.   Neurological:      General: No focal deficit present.      Mental Status: He is alert. Mental status is at baseline.          Vents:       Lines/Drains/Airways       Central Venous Catheter Line  Duration                  Hemodialysis Catheter 05/03/24 1115 right subclavian 156 days              Peripheral Intravenous Line  Duration                  Peripheral IV - Single Lumen 10/06/24 0620 20 G Anterior;Left Upper Arm <1 day         Peripheral IV - Single Lumen 10/06/24 1315 20 G No Anterior;Left;Proximal Forearm <1 day                    Significant Labs:    CBC/Anemia Profile:  Recent Labs   Lab 10/05/24  0544 10/06/24  0439   WBC 19.83* 20.85*   HGB 12.6* 12.2*   HCT 37.5* 35.8*    294   MCV 81* 79*   RDW 16.8* 16.4*   IRON 22*  --    FERRITIN 338*  --         Chemistries:  Recent Labs   Lab 10/05/24  0544 10/05/24  1556 10/06/24  0045 10/06/24  0439   * 133* 134* 135*  135*   K 4.8 4.4 3.7 3.7  3.7   CL 99 101 103 103  103   CO2 19* 18* 18* 18*  18*   BUN 65* 50* 43* 44*  44*   CREATININE 7.8* 6.4* 5.2* 5.4*  5.4*   CALCIUM 8.6* 8.5* 8.2* 8.4*  8.4*   ALBUMIN 3.1* 3.0* 2.8* 3.0*  3.0*   PROT 6.5  --   --  6.4   BILITOT 2.1*  --   --  2.0*   ALKPHOS 117  --   --  115   *  --   --  88*   *  --   --  240*   MG  --   1.9 1.8  --    PHOS  --  5.4* 4.7* 4.8*       All pertinent labs within the past 24 hours have been reviewed.    Significant Imaging:   I have reviewed all pertinent imaging results/findings within the past 24 hours.    ABG  Recent Labs   Lab 10/06/24  0309   PH 7.391   PO2 53*   PCO2 36.1   HCO3 21.9*   BE -3*     Assessment/Plan:     Cardiac/Vascular  Hypotension  Per chart review, patient chronically with systolic blood pressures in the 90s.  Remains afebrile.  No leukocytosis on presentation, but has been increasing with worsening left shift.  CXR appears consistent with CHF  - certainly may have some brewing infection given worsening leukocytosis, however patient's systolic blood pressures are not far from baseline.  Agree with covering broadly pending further culture data, and Bld Cx so far are no growth to date.  Would obtain PCT and if it is negative (given its high negative predictive value in the setting of renal failure) would favor discontinuing ABx  - given that the only objective abnormality as his imaging, would add atypical coverage from possible PNA.  Obtain RIP and Resp Cx if possible  - On midodrine 15 TID.  Currently only needing vasopressors while on CRRT.  A MAP goal of 60 in his chronically hypotensive patient seems reasonable  - given relatively low dose of vasopressor requirements and an US placed IV that draws back in flushes, and given his chronic vasculopathy, recommend deferring central line placement at this time and use peripheral vasopressor dosing.  - if vasopressor requirements increase or access becomes an issue, please consult us again      Critical Care Time: 50 minutes  Critical care was time spent personally by me on the following activities: evaluating this patient's organ dysfunction, development of treatment plan, discussing treatment plan with patient or surrogate and bedside caregivers, discussions with consultants, evaluation of patient's response to treatment,  examination of patient, ordering and performing treatments and interventions, ordering and review of laboratory studies, ordering and review of radiographic studies, re-evaluation of patient's condition. This critical care time did not overlap with that of any other provider or involve time for any procedures.        Thank you for your consult. I will sign off. Please contact us if you have any additional questions.     Jeffry Hale MD  Pulmonology  [facility] - [place] - Intensive Care

## 2024-10-07 NOTE — PROGRESS NOTES
[facility]  Cardiology  Progress Note    Patient Name: Ar Sharma  MRN: [id number]  Admission Date: 10/3/2024  Hospital Length of Stay: 4 days  Code Status: Full Code   Attending Physician: Farhat Correia MD   Primary Care Physician: Jc Elliott MD  Expected Discharge Date: 10/7/2024  Principal Problem:Closed right hip fracture, initial encounter    Subjective:     Interval Hx: Seen by Dr. Cheng earlier this adm for preop CV eval.  Cardiology now re-consulted for abnl echo.  Pt got RUQ US today noting large RA mass.  Repeat echo with large RA mass (?thrombus adherent to one of his PPM leads) and large fronndlike mass in LA (?adherent to septum).  Both of these findings are new vs echo performed 10/3/24.  Patient is seen in the intensive care unit.  Case discussed with Dr. Correia and with Dr. Delacruz ([facility] echo staff).  The patient denies any angina or dyspnea.  He does describe some chills but no fevers.  He denies any stroke or TIA symptoms.  Review of echo images suggest large RA thrombus with possible stalk adherent to interatrial septum and passing through a patent foramen ovale with frondlike features in the left atrium.  Can not exclude thrombus in transit or vegetation.    Tele:  AV paced 60.  (personally reviewed and interpreted)        Review of Systems   Gastrointestinal:  Negative for melena.   Genitourinary:  Negative for hematuria.     Objective:     Vital Signs (Most Recent):  Temp: 97.5 °F (36.4 °C) (10/07/24 1200)  Pulse: 61 (10/07/24 1215)  Resp: 16 (10/07/24 1215)  BP: (!) 105/52 (10/07/24 1215)  SpO2: (!) 94 % (10/07/24 1215) Vital Signs (24h Range):  Temp:  [97.5 °F (36.4 °C)-98.5 °F (36.9 °C)] 97.5 °F (36.4 °C)  Pulse:  [60-66] 61  Resp:  [12-42] 16  SpO2:  [92 %-100 %] 94 %  BP: ()/(45-72) 105/52     Weight: 80.1 kg (176 lb 9.4 oz)  Body mass index is 26.08 kg/m².     SpO2: (!) 94 %         Intake/Output Summary (Last 24 hours) at 10/7/2024 1257  Last  data filed at 10/7/2024 1200  Gross per 24 hour   Intake 1028.15 ml   Output 1302 ml   Net -273.85 ml       Lines/Drains/Airways       Central Venous Catheter Line  Duration                  Hemodialysis Catheter 05/03/24 1115 right subclavian 157 days              Peripheral Intravenous Line  Duration                  Peripheral IV - Single Lumen 10/06/24 0620 20 G Anterior;Left Upper Arm 1 day         Peripheral IV - Single Lumen 10/06/24 1315 20 G No Anterior;Left;Proximal Forearm <1 day                       Physical Exam  Constitutional:       General: He is not in acute distress.     Appearance: He is well-developed. He is not ill-appearing, toxic-appearing or diaphoretic.   HENT:      Head: Normocephalic and atraumatic.   Eyes:      General: No scleral icterus.     Extraocular Movements: Extraocular movements intact.      Conjunctiva/sclera: Conjunctivae normal.      Pupils: Pupils are equal, round, and reactive to light.   Neck:      Thyroid: No thyromegaly.      Vascular: No JVD.      Trachea: No tracheal deviation.   Cardiovascular:      Rate and Rhythm: Normal rate and regular rhythm.      Heart sounds: S1 normal and S2 normal. No murmur heard.     No friction rub. No gallop.   Pulmonary:      Effort: Pulmonary effort is normal. No respiratory distress.      Breath sounds: Normal breath sounds. No stridor. No wheezing, rhonchi or rales.   Chest:      Chest wall: No tenderness.   Abdominal:      General: There is no distension.      Palpations: Abdomen is soft.   Musculoskeletal:         General: No swelling or tenderness. Normal range of motion.      Cervical back: Normal range of motion and neck supple. No rigidity.      Right lower leg: No edema.      Left lower leg: No edema.   Skin:     General: Skin is warm and dry.      Coloration: Skin is not jaundiced.   Neurological:      General: No focal deficit present.      Mental Status: He is alert and oriented to person, place, and time.      Cranial  Nerves: No cranial nerve deficit.   Psychiatric:         Mood and Affect: Mood normal.         Behavior: Behavior normal.            Current Medications:   allopurinoL  100 mg Oral Daily    [START ON 10/8/2024] ceFEPime IV (PEDS and ADULTS)  1 g Intravenous Q24H    chlorproMAZINE  25 mg Intravenous Once    docusate sodium  100 mg Oral Daily    enoxparin  1 mg/kg Subcutaneous Q24H (prophylaxis, 1700)    levothyroxine  150 mcg Oral Before breakfast    mexiletine  150 mg Oral BID WM    midodrine  15 mg Oral Q8H    mupirocin   Nasal BID    polyethylene glycol  17 g Oral Daily    pravastatin  40 mg Oral QHS    triamcinolone acetonide 0.1%   Topical (Top) BID      NORepinephrine bitartrate-D5W  0-3 mcg/kg/min Intravenous Continuous 5.6 mL/hr at 10/07/24 1200 0.02 mcg/kg/min at 10/07/24 1200       Current Facility-Administered Medications:     acetaminophen, 650 mg, Oral, Q4H PRN    furosemide, 360 mg, Oral, See admin instructions    heparin (porcine), 1,000 Units, Intra-Catheter, PRN    heparin (porcine), 5,000 Units, Intra-Catheter, PRN    hydrALAZINE, 10 mg, Intravenous, Q6H PRN    HYDROmorphone, 1 mg, Intravenous, Q4H PRN    hydrOXYzine pamoate, 25 mg, Oral, Q8H PRN    levalbuterol, 1.25 mg, Nebulization, Q4H PRN    magnesium sulfate IVPB, 2 g, Intravenous, PRN    melatonin, 6 mg, Oral, Nightly PRN    metOLazone, 10 mg, Oral, See admin instructions    naloxone, 0.4 mg, Intravenous, PRN    nitroGLYCERIN, 0.4 mg, Sublingual, Q5 Min PRN    ondansetron, 4 mg, Intravenous, Q6H PRN    oxyCODONE-acetaminophen, 1 tablet, Oral, Q4H PRN    oxyCODONE-acetaminophen, 1 tablet, Oral, Q4H PRN    polyethylene glycol, 17 g, Oral, BID PRN    promethazine (PHENERGAN) 6.25 mg in 0.9% NaCl 50 mL IVPB, 6.25 mg, Intravenous, Q6H PRN    simethicone, 1 tablet, Oral, QID PRN    sodium chloride 0.9%, 250 mL, Intravenous, PRN    sodium chloride 0.9%, 10 mL, Intravenous, Q12H PRN    sodium chloride 0.9%, 10 mL, Intravenous, PRN    sodium phosphate  20.01 mmol in D5W 250 mL IVPB, 20.01 mmol, Intravenous, PRN    sodium phosphate 30 mmol in D5W 250 mL IVPB, 30 mmol, Intravenous, PRN    sodium phosphate 39.99 mmol in D5W 250 mL IVPB, 39.99 mmol, Intravenous, PRN    Pharmacy to dose Vancomycin consult, , , Once **AND** vancomycin - pharmacy to dose, , Intravenous, pharmacy to manage frequency    Laboratory (all labs reviewed):  CBC:  Recent Labs   Lab 10/04/24  0418 10/04/24  1306 10/04/24  1613 10/05/24  0544 10/06/24  0439 10/07/24  0219   WBC 14.87 H  --  19.72 H 19.83 H 20.85 H 15.41 H   Hemoglobin 13.6 L 13.2 L 13.4 L 12.6 L 12.2 L 11.5 L   Hematocrit 40.3 41.3 40.6 37.5 L 35.8 L 33.6 L   Platelets 492 H  --  450 407 294 320       CHEMISTRIES:  Recent Labs   Lab 01/10/22  0753 02/07/22  0745 04/04/22  1255 06/09/22  0740 07/07/22  0746 09/06/22  0741 09/30/24  1100 10/02/24  2224 10/03/24  0441 10/04/24  0418 10/05/24  1556 10/06/24  0045 10/06/24  0439 10/06/24  2249 10/07/24  0219   Glucose 103 86 92 90 92   < > 91   < > 93   < > 125 H 146 H 118 H  118 H 133 H 120 H   Sodium 136 135 L 137 139 136   < > 139   < > 138   < > 133 L 134 L 135 L  135 L 134 L 135 L   Potassium 4.4 4.5 4.8 4.8 4.3   < > 4.3   < > 3.8   < > 4.4 3.7 3.7  3.7 3.9 4.1   BUN 72 H 72 H 68 H 81 H 76 H   < > 62 H   < > 34 H   < > 50 H 43 H 44 H  44 H 37 H 36 H   Creatinine 4.83 H 4.30 H 4.08 H 4.64 H 4.06 H   < > 5.8 H   < > 4.5 H   < > 6.4 H 5.2 H 5.4 H  5.4 H 4.5 H 4.3 H   eGFR if non  12.0 A 13.8 A 14.7 A 12.6 A 14.8 A  --   --   --   --   --   --   --   --   --   --    eGFR  --   --   --   --   --    < > 10 A   < > 14 A   < > 9 A 12 A 11 A  11 A 14 A 15 A   Calcium 8.8 9.1 9.1 8.7 9.5   < > 9.4   < > 8.9   < > 8.5 L 8.2 L 8.4 L  8.4 L 8.5 L 8.5 L   Magnesium  --   --   --   --   --    < > 2.1  --  1.9  --  1.9 1.8  --  1.8  --     < > = values in this interval not displayed.       CARDIAC BIOMARKERS:  Recent Labs   Lab 09/25/22  1638 04/29/24  1707 10/04/24  0418    Troponin I 0.015 0.040 H 0.045 H       COAGS:  Recent Labs   Lab 01/17/23  0741 10/02/24  2224 10/03/24  0441   INR 1.2 1.3 H 1.2       LIPIDS/LFTS:  Recent Labs   Lab 05/04/23  1129 09/01/23  0836 03/04/24  1018 04/12/24  1056 04/29/24  2039 06/04/24  1440 09/04/24  1201 09/30/24  1100 10/04/24  0418 10/04/24  1612 10/05/24  0544 10/06/24  0439 10/07/24  0219   Cholesterol 151 139 124  --  127  --  189  --   --   --   --   --   --    Triglycerides 79 104 66  --  66  --  167 H  --   --   --   --   --   --    HDL 41 35 L 39 L  --  32 L  --  54  --   --   --   --   --   --    LDL Cholesterol 94.2 83.2 71.8  --  81.8  --  101.6  --   --   --   --   --   --    Non-HDL Cholesterol 110 104 85  --  95  --  135  --   --   --   --   --   --    AST 45 51 H 39   < >  --    < > 99 H   < > 225 H 322 H 367 H 240 H 152 H   ALT 56 H 57 H 35   < >  --    < > 68 H   < > 189 H 298 H 271 H 88 H 33    < > = values in this interval not displayed.       BNP:  Recent Labs   Lab 09/25/22  1638 09/28/22  1545 04/12/24  1056 04/29/24  1707   BNP 2,304 H 1,416 H 2,549 H 3,204 H       TSH:  Recent Labs   Lab 09/29/22  0448 12/20/22  1052 05/04/23  1129 03/04/24  1018 09/04/24  1201   TSH 3.045 1.480 1.160 0.283 L 1.740       Free T4:  Recent Labs   Lab 11/11/21  0727 03/04/24  1018   Free T4 1.48 3.25 H       Diagnostic Results:  ECG (personally reviewed and interpreted tracing(s)):  10/6/24 0720 AP-BiVP 60    Chest X-Ray (personally reviewed and interpreted image(s)): 10/6/24 CMeg, L ICD 3 leads, R HD catheter    Echo: 10/7/24 (images personally reviewed and interpreted).  Compared with report/images 10/3/24, RA and LA masses are new, LV WMA is old.  ?Thrombus in transit across PFO.    Left Ventricle: The left ventricle is normal in size. Mildly increased wall thickness. There is mild concentric hypertrophy. Regional wall motion abnormalities present (severe distal anteroapical hypokinesis). There is mildly reduced systolic function with a  visually estimated ejection fraction of 40 - 45%.    Right Ventricle: Severe right ventricular enlargement. Systolic function is severely reduced. Pacemaker lead present in the ventricle and appears abnormal with possible vegetation on lead in RA (2.7x2.0cm).    Left Atrium: There is a large mobile frondlike mass, appears to be adherent to LA septum.    Right Atrium: Right atrium is severely dilated. Multiple leads present in the right atrium with possible vegetation adherent to lead in RA.    Aortic Valve: The aortic valve is a trileaflet valve. There is mild aortic valve sclerosis.    Tricuspid Valve: There is mild regurgitation.    Pulmonary Artery: The estimated pulmonary artery systolic pressure is 67 mmHg.    RA mass 2.7x2.0 cm      LA mass 2.2x0.5cm      Cath: 9/27/22   There was non-obstructive coronary artery disease. Stable as compared to prior.   Left Main   The vessel was visualized by angiography, is moderate in size and is angiographically normal.      Left Anterior Descending   Mid LAD lesion was 20% stenosed.      Left Circumflex   The vessel was visualized by angiography, is moderate in size and is angiographically normal.      Right Coronary Artery   Prox RCA to Mid RCA lesion was 20% stenosed.          Assessment and Plan:     * Closed right hip fracture, initial encounter  Status post ORIF.    Right atrial thrombus  Incidental finding of right atrial mass noted on right upper quadrant ultrasound (this was performed for elevated LFTs).   Echocardiogram performed today notes 2.5 cm mass in the right atrium which appears to have a stalk adherent to the intra-atrial septum and potentially passing through a PFO with frondlike features within the left atrium.  This suggests thrombus in transit.  Can not exclude endocarditis.  Can not exclude adherence to 1 of his right atrial leads.  Recommend blood cultures and initiation of empiric antibiotics as well as initiation of anticoagulation.  Consider ID  evaluation.  No need for SEGUN.  Repeat echo in 1 month to assess for resolution.    Chronic combined systolic and diastolic heart failure  NICM. EF 40-45%. Volume status appears controlled. Has CRT-D followed at [facility] [place].   Neg cath 2022.  Hx VT s/p ablation    Mixed hyperlipidemia  Resume statin when LFTs normalized.      Cardiac resynchronization therapy defibrillator (CRT-D) in place  Followed by Dr Tenorio. Normal function at last check    ESRD (end stage renal disease)  Per renal    S/P ablation of ventricular arrhythmia  Followed by EP at [facility] [place]. Continue amiodarone        VTE Risk Mitigation (From admission, onward)           Ordered     apixaban tablet 5 mg  2 times daily         10/07/24 1319     heparin (porcine) injection 5,000 Units  Use PRN         10/05/24 1830     Place sequential compression device  Until discontinued         10/04/24 2217     heparin (porcine) injection 1,000 Units  As needed (PRN)         10/04/24 2217     IP VTE LOW RISK PATIENT  Once         10/04/24 1438     Place sequential compression device  Until discontinued         10/03/24 0154     Reason for No Pharmacological VTE Prophylaxis  Once        Comments: Right hip fracture needs surgical eval   Question:  Reasons:  Answer:  Physician Provided (leave comment)    10/03/24 0154                  Pt seen in ICU, critical care time 45min.    Mason Lynn MD  Cardiology  [facility] [place] - Intensive Care

## 2024-10-07 NOTE — ASSESSMENT & PLAN NOTE
Anemia is likely due to Iron deficiency. Most recent hemoglobin and hematocrit are listed below.  Recent Labs     10/05/24  0544 10/06/24  0439 10/07/24  0219   HGB 12.6* 12.2* 11.5*   HCT 37.5* 35.8* 33.6*       Plan  - Monitor serial CBC: Daily  - Transfuse PRBC if patient becomes hemodynamically unstable, symptomatic or H/H drops below 7/21.  - Patient has not received any PRBC transfusions to date  - Patient's anemia is currently stable  - stable, monitoring

## 2024-10-07 NOTE — PROGRESS NOTES
Patient seen and examined on CVVHD.  /53, HR 60. , DFR 2.5L. AP -36,  108.  Tolerating treatment well. He appears improved compared to yesterday. Oxygenation has improved.         ESRD - net +1.1L yesterday. CRRT today with UF goal 200-300cc/hr for goal net negative ~3-4L within the next 24 hours. Once euvolemic, will transition back to HD.     Hypotension - improving; continue levophed with CRRT until excess volume has been removed.     Anemia of CKD - hgb at goal; no need for AWAIS.    Secondary HPTH - acceptable; will trend.      Thank you for allowing me to participate in the care of this patient. Please call with any questions.     Lizzette Cain MD  [facility]  [phone]

## 2024-10-07 NOTE — SUBJECTIVE & OBJECTIVE
Past Medical History:   Diagnosis Date    Cardiomyopathy     CKD (chronic kidney disease) stage 4, GFR 15-29 ml/min     Congestive heart failure (CHF) 2015    COPD (chronic obstructive pulmonary disease)     Coronary artery disease     Edema     Essential (primary) hypertension 05/18/2022    Formatting of this note might be different from the original. Converted from [employer]: Description - ESSENTIAL HYPERTENSION, BENIGN    Heart attack     HLD (hyperlipidemia)     Hypertension     Hyperuricemia     Hypocalcemia     Renal cyst, left     Secondary hyperparathyroidism     Thyroid disease     V tach     Vitamin D deficiency        Past Surgical History:   Procedure Laterality Date    ablations  03/05/2018    albations  02/01/2018    COLONOSCOPY N/A 12/07/2018    Procedure: COLONOSCOPY/suprep;  Surgeon: Ananya Morillo MD;  Location: [facility] ENDO;  Service: Endoscopy;  Laterality: N/A;    defibulater N/A 2016    ESOPHAGOGASTRODUODENOSCOPY N/A 12/07/2018    Procedure: EGD (ESOPHAGOGASTRODUODENOSCOPY);  Surgeon: Ananya Morillo MD;  Location: [facility] ENDO;  Service: Endoscopy;  Laterality: N/A;    INSERTION OF TUNNELED CENTRAL VENOUS HEMODIALYSIS CATHETER Right 5/3/2024    Procedure: INSERTION, CATHETER, HEMODIALYSIS, DUAL LUMEN;  Surgeon: Philip Perez MD;  Location: [facility] OR;  Service: General;  Laterality: Right;    INSERTION, CATHETER, DIALYSIS, PERITONEAL, LAPAROSCOPIC N/A 5/8/2024    Procedure: INSERTION, CATHETER, DIALYSIS, PERITONEAL, LAPAROSCOPIC;  Surgeon: Tyree Ruiz MD;  Location: [facility] OR;  Service: General;  Laterality: N/A;    INSERTION, CENTRAL VENOUS ACCESS DEVICE Right 6/5/2024    Procedure: Insertion,central venous access device;  Surgeon: Tyree Ruiz MD;  Location: [facility] OR;  Service: General;  Laterality: Right;    JOINT REPLACEMENT Bilateral 10/2016    knees, bilat    LEFT HEART CATHETERIZATION N/A 12/16/2020    Procedure: Left heart cath;  Surgeon: Shahram Stewart MD;  Location:  [facility] CATH LAB/EP;  Service: Cardiology;  Laterality: N/A;    LEFT HEART CATHETERIZATION Left 2022    Procedure: Left heart cath;  Surgeon: Juan Roque MD;  Location: [facility] CATH LAB/EP;  Service: Cardiology;  Laterality: Left;    VA HEMODIALYSIS, ONE EVALUATION  2024    REMOVAL OF HEMODIALYSIS CATHETER Right 5/3/2024    Procedure: REMOVAL, CATHETER, HEMODIALYSIS;  Surgeon: Philip Perez MD;  Location: [facility] OR;  Service: General;  Laterality: Right;    REPLACEMENT OF IMPLANTABLE CARDIOVERTER-DEFIBRILLATOR (ICD) GENERATOR Left 2023    Procedure: REPLACEMENT, ICD GENERATOR;  Surgeon: River Tenorio MD;  Location: [facility] EP LAB;  Service: Cardiology;  Laterality: Left;  ANTHONY, CRTD gen chg, MDT, MAC, DM, 3prep       Review of patient's allergies indicates:   Allergen Reactions    Lokelma [sodium zirconium cyclosilicate] Other (See Comments)     Fluid retention, weight gain, CHF excerebration, severe constipation    Atorvastatin Other (See Comments)     Joint pain    Jardiance [empagliflozin] Other (See Comments)     Chest pains, significant weight loss, lower blood pressure       Family History       Problem Relation (Age of Onset)    Alcohol abuse Father    Arthritis Sister    Breast cancer Mother    Cancer Mother    Hypertension Mother    Kidney disease Father    No Known Problems Brother, Daughter, Son    Stroke Mother          Tobacco Use    Smoking status: Former     Current packs/day: 0.00     Average packs/day: 1 pack/day for 33.2 years (33.2 ttl pk-yrs)     Types: Cigarettes     Start date: 1979     Quit date: 3/3/2012     Years since quittin.6     Passive exposure: Past    Smokeless tobacco: Never   Substance and Sexual Activity    Alcohol use: Not Currently     Comment: rare    Drug use: No    Sexual activity: Not Currently     Partners: Female           Objective:     Vital Signs (Most Recent):  Temp: 98 °F (36.7 °C) (10/06/24 1901)  Pulse: 60 (10/06/24 2004)  Resp: 16 (10/06/24  2004)  BP: (!) 95/46 (10/06/24 2000)  SpO2: 96 % (10/06/24 2004) Vital Signs (24h Range):  Temp:  [97 °F (36.1 °C)-98 °F (36.7 °C)] 98 °F (36.7 °C)  Pulse:  [59-80] 60  Resp:  [11-31] 16  SpO2:  [77 %-100 %] 96 %  BP: ()/(43-69) 95/46     Weight: 74.6 kg (164 lb 7.4 oz)  Body mass index is 24.29 kg/m².      Intake/Output Summary (Last 24 hours) at 10/6/2024 2023  Last data filed at 10/6/2024 2000  Gross per 24 hour   Intake 3316.69 ml   Output 1619 ml   Net 1697.69 ml        Physical Exam  Vitals and nursing note reviewed.   Constitutional:       General: He is not in acute distress.     Appearance: He is ill-appearing (chronically). He is not toxic-appearing or diaphoretic.      Interventions: Nasal cannula in place.   HENT:      Head: Normocephalic and atraumatic.      Nose: No rhinorrhea.      Mouth/Throat:      Mouth: Mucous membranes are moist.   Eyes:      General: No scleral icterus.     Extraocular Movements: Extraocular movements intact.   Neck:      Comments: Right IJ tunneled dialysis catheter  Cardiovascular:      Rate and Rhythm: Normal rate and regular rhythm.      Heart sounds: No murmur heard.  Pulmonary:      Effort: No tachypnea, accessory muscle usage, respiratory distress or retractions.   Abdominal:      General: There is no distension.      Comments: PD catheter present   Skin:     General: Skin is warm and dry.      Coloration: Skin is not jaundiced.      Findings: No rash.   Neurological:      General: No focal deficit present.      Mental Status: He is alert. Mental status is at baseline.          Vents:       Lines/Drains/Airways       Central Venous Catheter Line  Duration                  Hemodialysis Catheter 05/03/24 1115 right subclavian 156 days              Peripheral Intravenous Line  Duration                  Peripheral IV - Single Lumen 10/06/24 0620 20 G Anterior;Left Upper Arm <1 day         Peripheral IV - Single Lumen 10/06/24 1315 20 G No Anterior;Left;Proximal Forearm  <1 day                    Significant Labs:    CBC/Anemia Profile:  Recent Labs   Lab 10/05/24  0544 10/06/24  0439   WBC 19.83* 20.85*   HGB 12.6* 12.2*   HCT 37.5* 35.8*    294   MCV 81* 79*   RDW 16.8* 16.4*   IRON 22*  --    FERRITIN 338*  --         Chemistries:  Recent Labs   Lab 10/05/24  0544 10/05/24  1556 10/06/24  0045 10/06/24  0439   * 133* 134* 135*  135*   K 4.8 4.4 3.7 3.7  3.7   CL 99 101 103 103  103   CO2 19* 18* 18* 18*  18*   BUN 65* 50* 43* 44*  44*   CREATININE 7.8* 6.4* 5.2* 5.4*  5.4*   CALCIUM 8.6* 8.5* 8.2* 8.4*  8.4*   ALBUMIN 3.1* 3.0* 2.8* 3.0*  3.0*   PROT 6.5  --   --  6.4   BILITOT 2.1*  --   --  2.0*   ALKPHOS 117  --   --  115   *  --   --  88*   *  --   --  240*   MG  --  1.9 1.8  --    PHOS  --  5.4* 4.7* 4.8*       All pertinent labs within the past 24 hours have been reviewed.    Significant Imaging:   I have reviewed all pertinent imaging results/findings within the past 24 hours.

## 2024-10-07 NOTE — NURSING
[facility] [place]  Nurses Note -- 4 Eyes      10/7/2024       Skin assessed on: Q Shift      [] No Pressure Injuries Present    [x]Prevention Measures Documented    [x] Yes LDA  for Pressure Injury Previously documented     [] Yes New Pressure Injury Discovered   [] LDA for New Pressure Injury Added      Attending RN:  Divina Do RN     Second RN:  MILADIS Tang

## 2024-10-07 NOTE — NURSING
[facility], [facility] [place]  Nurses Note -- 4 Eyes      10/7/2024       Skin assessed on: Q Shift      [x] No Pressure Injuries Present    [x]Prevention Measures Documented    [] Yes LDA  for Pressure Injury Previously documented     [] Yes New Pressure Injury Discovered   [] LDA for New Pressure Injury Added      Attending RN:  Kitty Betts RN     Second RN:  MILADIS Murcia

## 2024-10-07 NOTE — HOSPITAL COURSE
Interval Hx: pt seen in ICU.  No cp/sob.  Case d/w RN staff and family at bedside.    Tele:  TEZ Cleary paced 60.  (personally reviewed and interpreted)

## 2024-10-07 NOTE — HPI
Mr Ar Sharma is a 63 year old male with ESRD on HD (MWF) for the last 2 months, prior PD, HTN, HLD, combined systolic and diastolic HF (EF 40-45% and grade III DD), ischemic cardiomyopathy, s/p medtronic CRT-D implant in 2016, history of VT s/p ablation on mexiletine and amiodarone, who presents after a mechanical fall resulting in a right femur fracture.  S/p ORIF 10/4.  He is chronically hypotensive and had issues following surgery tolerating HD.  Moved to the ICU for pressor support for CRRT.  Pulm previously consulted for CVC placement (which occurred on 10/9/2024), but now following due to persistent and elevated pressor requirements.

## 2024-10-07 NOTE — ASSESSMENT & PLAN NOTE
NICM. EF 40-45%. Volume status appears controlled. Has CRT-D followed at [facility] [place].   Neg cath 2022.  Hx VT s/p ablation

## 2024-10-07 NOTE — PLAN OF CARE
Problem: Adult Inpatient Plan of Care  Goal: Plan of Care Review  Outcome: Progressing  Goal: Patient-Specific Goal (Individualized)  Outcome: Progressing  Goal: Absence of Hospital-Acquired Illness or Injury  Outcome: Progressing  Goal: Optimal Comfort and Wellbeing  Outcome: Progressing  Goal: Readiness for Transition of Care  Outcome: Progressing     Problem: Infection  Goal: Absence of Infection Signs and Symptoms  Outcome: Progressing     Problem: Wound  Goal: Optimal Coping  Outcome: Progressing  Goal: Optimal Functional Ability  Outcome: Progressing  Goal: Absence of Infection Signs and Symptoms  Outcome: Progressing  Goal: Improved Oral Intake  Outcome: Progressing  Goal: Optimal Pain Control and Function  Outcome: Progressing  Goal: Skin Health and Integrity  Outcome: Progressing  Goal: Optimal Wound Healing  Outcome: Progressing     Problem: Skin Injury Risk Increased  Goal: Skin Health and Integrity  Outcome: Progressing     Problem: Hemodialysis  Goal: Safe, Effective Therapy Delivery  Outcome: Progressing  Goal: Effective Tissue Perfusion  Outcome: Progressing  Goal: Absence of Infection Signs and Symptoms  Outcome: Progressing     Problem: Orthopaedic Fracture  Goal: Absence of Bleeding  Outcome: Progressing  Goal: Bowel Elimination  Outcome: Progressing  Goal: Absence of Embolism Signs and Symptoms  Outcome: Progressing  Goal: Fracture Stability  Outcome: Progressing  Goal: Optimal Functional Ability  Outcome: Progressing  Goal: Absence of Infection Signs and Symptoms  Outcome: Progressing  Goal: Effective Tissue Perfusion  Outcome: Progressing  Goal: Optimal Pain Control and Function  Outcome: Progressing  Goal: Effective Oxygenation and Ventilation  Outcome: Progressing     Problem: Fall Injury Risk  Goal: Absence of Fall and Fall-Related Injury  Outcome: Progressing     Problem: CRRT (Continuous Renal Replacement Therapy)  Goal: Safe, Effective Therapy Delivery  Outcome: Progressing  Goal:  Hemodynamic Stability  Outcome: Progressing  Goal: Body Temperature Maintained in Desired Range  Outcome: Progressing  Goal: Absence of Infection Signs and Symptoms  Outcome: Progressing

## 2024-10-07 NOTE — ASSESSMENT & PLAN NOTE
-s/p fall. No LOC or head trauma  -Right hip XR: minimally displaced intertrochanteric fracture of the right proximal femur.   -Orthopedic surgery consulted: s/p right  femur intramedullary nail on 10/04  -PT/OT once more stable.

## 2024-10-07 NOTE — ASSESSMENT & PLAN NOTE
Per chart review, patient chronically with systolic blood pressures in the 90s.  Remains afebrile.  No leukocytosis on presentation, but has been increasing with worsening left shift.  CXR appears consistent with CHF  - certainly may have some brewing infection given worsening leukocytosis, however patient's systolic blood pressures are not far from baseline.  Agree with covering broadly pending further culture data, and Bld Cx so far are no growth to date.  Would obtain PCT and if it is negative (given its high negative predictive value in the setting of renal failure) would favor discontinuing ABx  - given that the only objective abnormality as his imaging, would add atypical coverage from possible PNA.  Obtain RIP and Resp Cx if possible  - On midodrine 15 TID.  Currently only needing vasopressors while on CRRT.  A MAP goal of 60 in his chronically hypotensive patient seems reasonable  - given relatively low dose of vasopressor requirements and an US placed IV that draws back in flushes, and given his chronic vasculopathy, recommend deferring central line placement at this time and use peripheral vasopressor dosing.  - if vasopressor requirements increase or access becomes an issue, please consult us again

## 2024-10-07 NOTE — PT/OT/SLP PROGRESS
Occupational Therapy      Patient Name:  Ar Sharma   MRN:  [id number]    Patient not seen today secondary to dialysis  . Will follow-up as able.    10/7/2024

## 2024-10-07 NOTE — ASSESSMENT & PLAN NOTE
Incidental finding of right atrial mass noted on right upper quadrant ultrasound (this was performed for elevated LFTs).   Echocardiogram performed today notes 2.5 cm mass in the right atrium which appears to have a stalk adherent to the intra-atrial septum and potentially passing through a PFO with frondlike features within the left atrium.  This suggests thrombus in transit.  Can not exclude endocarditis.  Can not exclude adherence to 1 of his right atrial leads.  Recommend blood cultures and initiation of empiric antibiotics as well as initiation of anticoagulation.  Consider ID evaluation.  No need for SEGUN.  Repeat echo in 1 month to assess for resolution.

## 2024-10-07 NOTE — PROGRESS NOTES
[facility] Medicine  Progress Note    Patient Name: Ar Sharma  MRN: [id number]  Patient Class: IP- Inpatient   Admission Date: 10/3/2024  Length of Stay: 4 days  Attending Physician: Farhat Correia MD  Primary Care Provider: Jc Elliott MD        Subjective:     Principal Problem:Closed right hip fracture, initial encounter        HPI:  63 y.o. AAM with h/o ESRD on HD (for 2 months but prior was on PD for about a month-Follows with [facility]) MWF via a tunneled HD catheter on the right, Essential HTN, HLD, CAD,  h/o systolic(EF 40-45%) and diastolic grade 3 CHF due to non-ischemic cardiomyopathy with a [employer] CRT-D implant 2016 (sees Dr. Hernandez) uses 3L supplemental oxygen at night, and h/o VT arhythmia (follows with Dr. Tenoroi at [facility]) s/p ablation and on Mexiletin 150mg PO BID and Amiodarone 400mg daily present to the ER with c/o right hip pain after a fall at home.     Went to HD today and was feeling alright after. Was moving his tool box when he tripped over his feet and fell to the ground. Was unable to get up or put weight on his right leg.     Work up in the ED at [facility] noted normal WBC and Stable H/H.  INR 1.3.  Lytes stable and c/w post-HD with no indications for emergent HD tonight.     Right hip/pelvis x-ray FINDINGS:  There is a minimally displaced intertrochanteric fracture of the right proximal femur.  No additional fractures are seen.  There is osteopenia.  The femoral heads are well seated in the acetabula.  There is mild joint space narrowing of the bilateral femoroacetabular joints and the pubic symphysis.  There is a peritoneal dialysis catheter overlying the pelvis.     Impression:     Right intertrochanteric fracture.      Transfer center contacted us for admission due to [facility] on Ortho-diversion. They spoke with Dr. Mcclendon who agreed to see the patient in consultation. We have been asked to accept for further pre-op planning. Last  time he took his ASA was yesterday am.       Results for orders placed during the hospital encounter of 04/29/24    Echo Saline Bubble? No; Ultrasound enhancing contrast? No    Interpretation Summary    Left Ventricle: The left ventricle is normal in size. Normal wall thickness. Regional wall motion abnormalities present. See diagram for wall motion findings. Septal motion is consistent with pacing. There is mildly reduced systolic function with a visually estimated ejection fraction of 40 - 45%. Grade III diastolic dysfunction.    Right Ventricle: Moderate to severe right ventricular enlargement. Systolic function is reduced.TAPSE is 1.59 cm. Pacemaker lead present in the ventricle.    Left Atrium: Left atrium is severely dilated. The left atrium volume index is 71.4 mL/m2.    Right Atrium: Right atrium is severely dilated.    Aortic Valve: There is mild aortic valve sclerosis. There is mild to moderate stenosis. Aortic valve area by VTI is 1.49 cm². Aortic valve peak velocity is 1.43 m/s. Mean gradient is 5 mmHg. The dimensionless index is 0.52.    Mitral Valve: There is mild regurgitation.    Tricuspid Valve: There is mild to moderate regurgitation.    Pulmonary Artery: The estimated pulmonary artery systolic pressure is 64 mmHg.    IVC/SVC: Elevated venous pressure at 15 mmHg.      Overview/Hospital Course:   63 y.o. AAM with h/o ESRD on HD (MWF), HTN, HLD, CAD, CHF admitted on 10/03/24 for Right intertrochanteric Hip fracture after a fall at home. No head trauma or LOC. CXR with interstitial edema.  No evidence of pneumothorax.  X-ray of the right hip with minimally displaced intertrochanteric fracture of the right proximal femur.  Orthopedic surgery consulted- s/p right  femur intramedullary nail on 10/04.  Minimal blood loss per orthopedic. Echo with EF of 40-45%, mild aortic stenosis.  PASP of 66 mm Hg.  Cardiology consulted for cardiac clearance-cleared for surgery at moderate cardiac risk.  Continue  symptomatic management and supportive care.  Nephrology following for ESRD. Patient  hypotensive at baseline but acutely more hypotensive during hospitalization but is asymptomatic. Unable to tolerate HD on 10/05. Persistently hypotensive. patient transferred to ICU for presssor support and CRRT on 10/05.  Tolerating CRRT.        Interval History: feeling better with no new complaints.    Review of Systems   HENT:  Negative for ear discharge and ear pain.    Eyes:  Negative for discharge and itching.   Endocrine: Negative for cold intolerance and heat intolerance.   Neurological:  Negative for seizures and syncope.     Objective:     Vital Signs (Most Recent):  Temp: 97.5 °F (36.4 °C) (10/07/24 1200)  Pulse: 63 (10/07/24 1500)  Resp: 18 (10/07/24 1500)  BP: (!) 116/53 (10/07/24 1500)  SpO2: 95 % (10/07/24 1500) Vital Signs (24h Range):  Temp:  [97.5 °F (36.4 °C)-98.5 °F (36.9 °C)] 97.5 °F (36.4 °C)  Pulse:  [60-66] 63  Resp:  [12-42] 18  SpO2:  [92 %-100 %] 95 %  BP: ()/(45-72) 116/53     Weight: 80.1 kg (176 lb 9.4 oz)  Body mass index is 26.08 kg/m².    Intake/Output Summary (Last 24 hours) at 10/7/2024 1514  Last data filed at 10/7/2024 1500  Gross per 24 hour   Intake 1269.26 ml   Output 1714 ml   Net -444.74 ml         Physical Exam  Vitals and nursing note reviewed.   Constitutional:       General: He is not in acute distress.     Appearance: He is ill-appearing (chronically). He is not toxic-appearing or diaphoretic.      Interventions: Nasal cannula in place.   HENT:      Head: Normocephalic and atraumatic.      Nose: No rhinorrhea.      Mouth/Throat:      Mouth: Mucous membranes are moist.   Eyes:      General: No scleral icterus.     Extraocular Movements: Extraocular movements intact.   Neck:      Comments: Right IJ tunneled dialysis catheter  Cardiovascular:      Rate and Rhythm: Normal rate and regular rhythm.      Heart sounds: No murmur heard.  Pulmonary:      Effort: No tachypnea, accessory  muscle usage, respiratory distress or retractions.   Abdominal:      General: There is no distension.      Comments: PD catheter present   Skin:     General: Skin is warm and dry.      Coloration: Skin is not jaundiced.      Findings: No rash.   Neurological:      General: No focal deficit present.      Mental Status: He is alert. Mental status is at baseline.             Significant Labs: All pertinent labs within the past 24 hours have been reviewed.  BMP:   Recent Labs   Lab 10/07/24  0219 10/07/24  1418   *  --    *  --    K 4.1  --      --    CO2 19*  --    BUN 36*  --    CREATININE 4.3*  --    CALCIUM 8.5*  --    MG  --  1.8     CBC:   Recent Labs   Lab 10/06/24  0439 10/07/24  0219   WBC 20.85* 15.41*   HGB 12.2* 11.5*   HCT 35.8* 33.6*    320       Significant Imaging: I have reviewed all pertinent imaging results/findings within the past 24 hours.    Assessment/Plan:      * Closed right hip fracture, initial encounter  -s/p fall. No LOC or head trauma  -Right hip XR: minimally displaced intertrochanteric fracture of the right proximal femur.   -Orthopedic surgery consulted: s/p right  femur intramedullary nail on 10/04  -PT/OT once more stable.    Leukocytosis  -WBC# trending up, 20k on 10/06. Pt remained afebrile.   -initially suspected reactive given fracture and recent surgery  -However, pt also with PD cath in LLQ.   BCX negative.  Empirically started on aBX's    Acute on chronic hypoxic respiratory failure  Patient with Hypoxic Respiratory failure which is Acute on chronic.  he is on home oxygen at 3 LPM. Supplemental oxygen was provided and noted-      .   Signs/symptoms of respiratory failure include- tachypnea and increased work of breathing. Contributing diagnoses includes - CHF and COPD Labs and images were reviewed. Patient Has recent ABG, which has been reviewed. Will treat underlying causes and adjust management of respiratory failure as follows-     -pt on home 3L NC.  Hx of COPD/CHF  -Worsening hypoxia overnight 10/05; required up to 15L HFNC.  -acute on chronic hypoxia secondary to volume overload  -Pt unable to tolerate HD and CRRT, net positive 1.5L on 10/06  -CXR with pulmonary edema   -Pulmonology consulted  -Nephrology following  -Wean O2 as tolerated     Advanced care planning/counseling discussion  Advance Care Planning    Date: 10/05/2024    Code Status  I engaged the the patient in a voluntary conversation about the patient's preferences for care  at the very end of life. The patient wishes to have CPR and other invasive treatments performed when his heart and/or breathing stops. I communicated to the patient that his wishes align with full code status and he agrees.    A total of 16 min was spent on advance care planning, goals of care discussion, emotional support, formulating and communicating prognosis and exploring burden/benefit of various approaches of treatment. This discussion occurred on a fully voluntary basis with the verbal consent of the patient and/or family.            Moderate protein-calorie malnutrition  Nutrition consulted. Most recent weight and BMI monitored-     Measurements:  Wt Readings from Last 1 Encounters:   10/06/24 74.6 kg (164 lb 7.4 oz)   Body mass index is 24.29 kg/m².    Patient has been screened and assessed by RD.    Malnutrition Type:  Context: acute illness or injury  Level: mild    Malnutrition Characteristic Summary:  Weight Loss (Malnutrition): greater than 10% in 6 months  Subcutaneous Fat (Malnutrition): mild depletion  Muscle Mass (Malnutrition): mild depletion    Interventions/Recommendations (treatment strategy):  1. Add Novasource Renal TID 2. [place] intake at meals3. Monitor weight/labs 4. RD to follow to monitor PO intake      Hypotension  -resume home midodrine   -Unable to tolerate HD on 10/05, SBP dropped to 70s  -now on CRRT    ESRD (end stage renal disease)  Nephrology consulted: HD per nephrology   HD stopped  early on 10/05 due to hypotension  Persistently hypotensive, transfer to ICU for CRRT and pressor support on 10/05  CRRT started on 10/05    Pulmonary emphysema, unspecified emphysema type  COPD is controlled on his home 3L oxygen at night.   No wheezing on exam  Now volume overloaded and requiring more O2  PRN duonebs ordered.   Wean to home O2 as tolerated    Aortic atherosclerosis  Continue statin       Primary hypertension  Chronic, controlled. Pt now with hypotension   Pt with hypotension, continue home midodrine   Currently on pressors to keep MAP>65    Latest blood pressure and vitals reviewed-     Temp:  [97.5 °F (36.4 °C)-98.5 °F (36.9 °C)]   Pulse:  [60-66]   Resp:  [12-42]   BP: ()/(45-72)   SpO2:  [92 %-100 %] .   Home meds for hypertension were reviewed and noted below.   Hypertension Medications               furosemide (LASIX) 80 MG tablet Take 4.5 tablets (360 mg total) by mouth 2 (two) times daily.    metOLazone (ZAROXOLYN) 10 MG tablet Take 10 mg by mouth As instructed (Take I tablet by mouth Tuesday, Thursday, Saturday, Sunday). Take I tablet by mouth Tuesday, Thursday, Saturday, Sunday    nitroGLYCERIN (NITROSTAT) 0.4 MG SL tablet Place 1 tablet (0.4 mg total) under the tongue every 5 (five) minutes as needed for Chest pain.            While in the hospital, will manage blood pressure as follows; Adjust home antihypertensive regimen as follows- patient with hypotension chronically. Continue home midodrine     Will utilize p.r.n. blood pressure medication only if patient's blood pressure greater than  180/90  and he develops symptoms such as worsening chest pain or shortness of breath.    Chronic combined systolic and diastolic heart failure  NO signs of acute exacerbation.   Echo as below   Cardiology consulted for cardiac clearance-cleared for surgery at moderate cardiac risk.   Nephrology for volume removal with HD    Echo    Result Date: 10/7/2024    Left Ventricle: The left ventricle is  normal in size. Mildly increased   wall thickness. There is mild concentric hypertrophy. Regional wall motion   abnormalities present (severe distal anteroapical hypokinesis). There is   mildly reduced systolic function with a visually estimated ejection   fraction of 40 - 45%.    Right Ventricle: Severe right ventricular enlargement. Systolic   function is severely reduced. Pacemaker lead present in the ventricle and   appears abnormal with possible vegetation on lead in RA (2.7x2.0cm).    Left Atrium: There is a large mobile frondlike mass, appears to be   adherent to LA septum.    Right Atrium: Right atrium is severely dilated. Multiple leads present   in the right atrium with possible vegetation adherent to lead in RA.    Aortic Valve: The aortic valve is a trileaflet valve. There is mild   aortic valve sclerosis.    Tricuspid Valve: There is mild regurgitation.    Pulmonary Artery: The estimated pulmonary artery systolic pressure is   67 mmHg.        Echo    Result Date: 10/3/2024    Left Ventricle: The left ventricle is mildly dilated. There is mild   eccentric hypertrophy. Septal flattening in diastole and systole   consistent with right ventricular volume and pressure overload. There is   mildly reduced systolic function with a visually estimated ejection   fraction of 40 - 45%.    Right Ventricle: Severe right ventricular enlargement. Systolic   function is moderately reduced.    Left Atrium: Left atrium is moderately dilated.    Right Atrium: Right atrium is severely dilated.    Aortic Valve: There is mild stenosis. Aortic valve area by VTI is 1.6   cm². Aortic valve peak velocity is 1.6 m/s. Mean gradient is 6.3 mmHg. The   dimensionless index is 0.50.    Mitral Valve: There is mild to moderate regurgitation.    Tricuspid Valve: There is moderate regurgitation.    Pulmonary Artery: The estimated pulmonary artery systolic pressure is   66 mmHg.    IVC/SVC: Elevated venous pressure at 15 mmHg.           S/P  ablation of ventricular arrhythmia  Cardiology consulted for cardiac clearance.   Resume home Mexiletin 150mg PO BID   Hold amiodarone 400mg PO daily in setting of hypotension   ICD in place       Cardiac resynchronization therapy defibrillator (CRT-D) in place  -noted      Atherosclerosis of native coronary artery of native heart with angina pectoris  Patient with known CAD , which is controlled Will continue Statin and monitor for S/Sx of angina/ACS. Continue to monitor on telemetry.     Mixed hyperlipidemia  -continue statin       Iron deficiency anemia  Anemia is likely due to Iron deficiency. Most recent hemoglobin and hematocrit are listed below.  Recent Labs     10/05/24  0544 10/06/24  0439 10/07/24  0219   HGB 12.6* 12.2* 11.5*   HCT 37.5* 35.8* 33.6*       Plan  - Monitor serial CBC: Daily  - Transfuse PRBC if patient becomes hemodynamically unstable, symptomatic or H/H drops below 7/21.  - Patient has not received any PRBC transfusions to date  - Patient's anemia is currently stable  - stable, monitoring     Hypothyroidism  -resume home synthroid       VTE Risk Mitigation (From admission, onward)           Ordered     apixaban tablet 5 mg  2 times daily         10/07/24 1319     heparin (porcine) injection 5,000 Units  Use PRN         10/05/24 1830     Place sequential compression device  Until discontinued         10/04/24 2217     heparin (porcine) injection 1,000 Units  As needed (PRN)         10/04/24 2217     IP VTE LOW RISK PATIENT  Once         10/04/24 1438     Place sequential compression device  Until discontinued         10/03/24 0154     Reason for No Pharmacological VTE Prophylaxis  Once        Comments: Right hip fracture needs surgical eval   Question:  Reasons:  Answer:  Physician Provided (leave comment)    10/03/24 0154                    Discharge Planning   SLAVA:      Code Status: Full Code   Is the patient medically ready for discharge?:     Reason for patient still in hospital (select  all that apply): Patient unstable  Discharge Plan A:  (pending PT/OT eval)            Critical care time spent on the evaluation and treatment of severe organ dysfunction, review of pertinent labs and imaging studies, discussions with consulting providers and discussions with patient/family: 35 minutes.      Farhat Correia MD  Department of Hospital Medicine   [facility] - Intensive Care

## 2024-10-07 NOTE — PLAN OF CARE
Reassessment checklist:    Plan A:  pending PT/OT eval (10/4 PT = Low intensity therapy.  OT unable to assess)  Plan B:  pending PT/OT eval (Assessment delayed due to other medical treatments in progress)    Changes in medical condition or discharge plan: Cardiology consulted for abnormal ECHO    Does patient need new DME? pending PT/OT eval     Follow up appts needed: Hepatology for LFT's    Medically stable:  NO    Estimated Discharge Date: Unknown       10/07/24 1340   Discharge Reassessment   Assessment Type Discharge Planning Reassessment   Did the patient's condition or plan change since previous assessment? Yes   Discharge Plan discussed with:   (in MDT SIBR rounds this am)   Communicated SLAVA with patient/caregiver Date not available/Unable to determine   Discharge Plan A   (pending PT/OT eval)   Discharge Plan B   (pending PT/OT eval)   DME Needed Upon Discharge    (pending PT/OT eval)   Transition of Care Barriers None   Why the patient remains in the hospital Requires continued medical care

## 2024-10-07 NOTE — CONSULTS
[facility] - Intensive Care  Wound Care  [facility]    Patient Name:  Ar Sharma   MRN:  [id number]  Date: 10/7/2024  Diagnosis: Closed right hip fracture, initial encounter    History:     Past Medical History:   Diagnosis Date    Cardiomyopathy     CKD (chronic kidney disease) stage 4, GFR 15-29 ml/min     Congestive heart failure (CHF)     COPD (chronic obstructive pulmonary disease)     Coronary artery disease     Edema     Essential (primary) hypertension 2022    Formatting of this note might be different from the original. Converted from [employer]: Description - ESSENTIAL HYPERTENSION, BENIGN    Heart attack     HLD (hyperlipidemia)     Hypertension     Hyperuricemia     Hypocalcemia     Renal cyst, left     Secondary hyperparathyroidism     Thyroid disease     V tach     Vitamin D deficiency        Social History     Socioeconomic History    Marital status:    Tobacco Use    Smoking status: Former     Current packs/day: 0.00     Average packs/day: 1 pack/day for 33.2 years (33.2 ttl pk-yrs)     Types: Cigarettes     Start date: 1979     Quit date: 3/3/2012     Years since quittin.6     Passive exposure: Past    Smokeless tobacco: Never   Substance and Sexual Activity    Alcohol use: Not Currently     Comment: rare    Drug use: No    Sexual activity: Not Currently     Partners: Female     Social Drivers of Health     Financial Resource Strain: Low Risk  (10/3/2024)    Overall Financial Resource Strain (CARDIA)     Difficulty of Paying Living Expenses: Not hard at all   Food Insecurity: No Food Insecurity (10/3/2024)    Hunger Vital Sign     Worried About Running Out of Food in the Last Year: Never true     Ran Out of Food in the Last Year: Never true   Transportation Needs: No Transportation Needs (10/3/2024)    TRANSPORTATION NEEDS     Transportation : No   Physical Activity: Insufficiently Active (2024)    Exercise Vital Sign     Days of Exercise per Week: 2 days     Minutes of  Exercise per Session: 10 min   Stress: Stress Concern Present (10/3/2024)    [employer] [place] [employer] - Occupational Stress Questionnaire     Feeling of Stress : To some extent   Housing Stability: Low Risk  (10/3/2024)    Housing Stability Vital Sign     Unable to Pay for Housing in the Last Year: No     Homeless in the Last Year: No       Precautions:     Allergies as of 10/02/2024 - Reviewed 10/02/2024   Allergen Reaction Noted    Lokelma [sodium zirconium cyclosilicate] Other (See Comments) 01/12/2022    Atorvastatin Other (See Comments) 10/02/2018    Jardiance [empagliflozin] Other (See Comments) 07/17/2023       WOC Assessment Details/Treatment     Active Problem List with Overview Notes    Diagnosis Date Noted    Acute on chronic hypoxic respiratory failure 10/06/2024    Leukocytosis 10/06/2024    Advanced care planning/counseling discussion 10/05/2024    Closed right hip fracture, initial encounter 10/03/2024    Moderate protein-calorie malnutrition 10/03/2024    Dialysis patient 05/13/2024    ESRD (end stage renal disease) 04/30/2024    Hypotension 04/30/2024    Aortic atherosclerosis 09/15/2023    Pulmonary emphysema, unspecified emphysema type 09/15/2023    Secondary hyperparathyroidism of renal origin 11/02/2022    Non-ischemic cardiomyopathy 09/28/2022    Abuse of herbal or folk remedies 09/26/2022    Primary hypertension 05/18/2022     Formatting of this note might be different from the original.  Converted from [employer]:  Description - ESSENTIAL HYPERTENSION, BENIGN      Thrombocythemia -  on 4/4/22 05/18/2022    Chronic combined systolic and diastolic heart failure 12/10/2020    Cardiac resynchronization therapy defibrillator (CRT-D) in place 10/25/2018    S/P ablation of ventricular arrhythmia 10/25/2018    V-tach 10/16/2018    Hypothyroidism 10/16/2018    Iron deficiency anemia 10/16/2018    Mixed hyperlipidemia 10/16/2018    Atherosclerosis of native coronary artery of  native heart with angina pectoris     Prediabetes 04/30/2018    Enlarged prostate with lower urinary tract symptoms (LUTS) 06/09/2014     Formatting of this note might be different from the original.  Converted from [employer]:  Description - BENIGN PROSTATIC HYPERTROPHY, WITH OBSTRUCTION      Primary osteoarthritis of one knee 02/05/2013     Formatting of this note might be different from the original.  Converted from [employer]:  Description - OSTEOARTHRITIS PRIMARY KNEE        Nursing consult for altered skin integrity anal region  A 63 year old male admitted 10/3/24 from home with complaint of right hip pain S/P fall at home while moving his tool box.  10/4 S/P Right femur intramedullary nail per Dr. Reed for intertrochanteric fracture of right hip  10/7 WBC 15.41 Hgb 11.5 Hct 33.6 Alb 2.8 Weight 80.1 kg   4/29 A1C 5.6  On Isolibrium mattress; Nick score 16  10/3 3:55 am 4 Eyes Skin Assessment- No Pressure Injuries POA  10/5 Transfer to ICU for hypotension. 10/6 4 Eyes Skin Assessment- No Pressure Injuries Present  10/7 0400 Nursing identified MASD gluteal cleft  Photodocumentation (from Media)    Gluteal cleft- MASD, 2 cm linear area of skin breakdown  Treatment/Plan:  Local wound care per nursing to gluteal cleft with Remedy products- skin cleanser and moisture barrier every shift and prn. Avoid foam dressing due to MASD.   Nursing to continue Pressure Injury Prevention Interventions.  Instructed patient and wife on doing small pressure shifts for pressure redistribution.  Recommendations made to primary team. Orders placed.     10/07/2024

## 2024-10-07 NOTE — PT/OT/SLP PROGRESS
Physical Therapy      Patient Name:  Ar Sharma   MRN:  [id number]    Patient not seen today secondary to Dialysis. Will follow-up 10/7/2024.     External genitalia is normal. +b/l cremaster reflex intact

## 2024-10-07 NOTE — PROGRESS NOTES
GI PLAN OF CARE NOTE    LFTs down trending. Ok to d/c from GI standpoint. Rec f/u with hepatology outpatient.     Assessment:  Elevated liver enzymes (primarily hepatocellular pattern)  -Iron studies 10/5/24: iron 22, TIBC 306, sat iron 7, transferrin 207, ferritin 338  -Hepatitis serology for A, B, and C negative 10/4/24  Hepatosplenomegaly on imaging  Cholelithiasis        Recommendations:  - Patient sp right femur intramedullary nail on 10/04 and currently on pressors. LDH is elevated, this generally occurs in ischemic hepatopathy for which treatment is supportive care. Drug induced liver injury also possible  -  RUQ US with duplex -negative  -  additional CLD workup in process: DAYA, anti-SMA, AMA, alpha 1 antitrypsin  -rec f/u with hepatology outpatient    Nina Jimenes NP

## 2024-10-07 NOTE — CARE UPDATE
Pts CRRT machine alarming.  Venous pressure increasing.  Dialysis RN Al called.  Dialysis RN returning blood and replacing cartridge to restart CRRT therapy plan.  MD Nguyen made aware.

## 2024-10-07 NOTE — ASSESSMENT & PLAN NOTE
NO signs of acute exacerbation.   Echo as below   Cardiology consulted for cardiac clearance-cleared for surgery at moderate cardiac risk.   Nephrology for volume removal with HD    Echo    Result Date: 10/7/2024    Left Ventricle: The left ventricle is normal in size. Mildly increased   wall thickness. There is mild concentric hypertrophy. Regional wall motion   abnormalities present (severe distal anteroapical hypokinesis). There is   mildly reduced systolic function with a visually estimated ejection   fraction of 40 - 45%.    Right Ventricle: Severe right ventricular enlargement. Systolic   function is severely reduced. Pacemaker lead present in the ventricle and   appears abnormal with possible vegetation on lead in RA (2.7x2.0cm).    Left Atrium: There is a large mobile frondlike mass, appears to be   adherent to LA septum.    Right Atrium: Right atrium is severely dilated. Multiple leads present   in the right atrium with possible vegetation adherent to lead in RA.    Aortic Valve: The aortic valve is a trileaflet valve. There is mild   aortic valve sclerosis.    Tricuspid Valve: There is mild regurgitation.    Pulmonary Artery: The estimated pulmonary artery systolic pressure is   67 mmHg.        Echo    Result Date: 10/3/2024    Left Ventricle: The left ventricle is mildly dilated. There is mild   eccentric hypertrophy. Septal flattening in diastole and systole   consistent with right ventricular volume and pressure overload. There is   mildly reduced systolic function with a visually estimated ejection   fraction of 40 - 45%.    Right Ventricle: Severe right ventricular enlargement. Systolic   function is moderately reduced.    Left Atrium: Left atrium is moderately dilated.    Right Atrium: Right atrium is severely dilated.    Aortic Valve: There is mild stenosis. Aortic valve area by VTI is 1.6   cm². Aortic valve peak velocity is 1.6 m/s. Mean gradient is 6.3 mmHg. The   dimensionless index  is 0.50.    Mitral Valve: There is mild to moderate regurgitation.    Tricuspid Valve: There is moderate regurgitation.    Pulmonary Artery: The estimated pulmonary artery systolic pressure is   66 mmHg.    IVC/SVC: Elevated venous pressure at 15 mmHg.

## 2024-10-07 NOTE — NURSING
[facility] [place]  Nurses Note -- 4 Eyes      10/6/2024       Skin assessed on: Q Shift      [x] No Pressure Injuries Present    [x]Prevention Measures Documented    [] Yes LDA  for Pressure Injury Previously documented     [] Yes New Pressure Injury Discovered   [] LDA for New Pressure Injury Added      Attending RN:  Divina Do RN     Second RN:  MILADIS Guillermo

## 2024-10-07 NOTE — ASSESSMENT & PLAN NOTE
-WBC# trending up, 20k on 10/06. Pt remained afebrile.   -initially suspected reactive given fracture and recent surgery  -However, pt also with PD cath in LLQ.   BCX negative.  Empirically started on aBX's

## 2024-10-07 NOTE — PLAN OF CARE
Pt remains in the ICU on 3L NC.  Paced rhythm on the monitor.  Afebrile.  Aox4.  CRRT machine dialyzing per order.  Levophed infusing per order.  Urinal at the bedside, 100mL claimed this shift.  R hip incision site clean, dry and intact.  Plan of care reviewed.  No injuries, falls or skin breakdown occurred this shift.

## 2024-10-07 NOTE — ASSESSMENT & PLAN NOTE
Chronic, controlled. Pt now with hypotension   Pt with hypotension, continue home midodrine   Currently on pressors to keep MAP>65    Latest blood pressure and vitals reviewed-     Temp:  [97.5 °F (36.4 °C)-98.5 °F (36.9 °C)]   Pulse:  [60-66]   Resp:  [12-42]   BP: ()/(45-72)   SpO2:  [92 %-100 %] .   Home meds for hypertension were reviewed and noted below.   Hypertension Medications               furosemide (LASIX) 80 MG tablet Take 4.5 tablets (360 mg total) by mouth 2 (two) times daily.    metOLazone (ZAROXOLYN) 10 MG tablet Take 10 mg by mouth As instructed (Take I tablet by mouth Tuesday, Thursday, Saturday, Sunday). Take I tablet by mouth Tuesday, Thursday, Saturday, Sunday    nitroGLYCERIN (NITROSTAT) 0.4 MG SL tablet Place 1 tablet (0.4 mg total) under the tongue every 5 (five) minutes as needed for Chest pain.            While in the hospital, will manage blood pressure as follows; Adjust home antihypertensive regimen as follows- patient with hypotension chronically. Continue home midodrine     Will utilize p.r.n. blood pressure medication only if patient's blood pressure greater than  180/90  and he develops symptoms such as worsening chest pain or shortness of breath.

## 2024-10-07 NOTE — SUBJECTIVE & OBJECTIVE
Interval History: feeling better with no new complaints.    Review of Systems   HENT:  Negative for ear discharge and ear pain.    Eyes:  Negative for discharge and itching.   Endocrine: Negative for cold intolerance and heat intolerance.   Neurological:  Negative for seizures and syncope.     Objective:     Vital Signs (Most Recent):  Temp: 97.5 °F (36.4 °C) (10/07/24 1200)  Pulse: 63 (10/07/24 1500)  Resp: 18 (10/07/24 1500)  BP: (!) 116/53 (10/07/24 1500)  SpO2: 95 % (10/07/24 1500) Vital Signs (24h Range):  Temp:  [97.5 °F (36.4 °C)-98.5 °F (36.9 °C)] 97.5 °F (36.4 °C)  Pulse:  [60-66] 63  Resp:  [12-42] 18  SpO2:  [92 %-100 %] 95 %  BP: ()/(45-72) 116/53     Weight: 80.1 kg (176 lb 9.4 oz)  Body mass index is 26.08 kg/m².    Intake/Output Summary (Last 24 hours) at 10/7/2024 1514  Last data filed at 10/7/2024 1500  Gross per 24 hour   Intake 1269.26 ml   Output 1714 ml   Net -444.74 ml         Physical Exam  Vitals and nursing note reviewed.   Constitutional:       General: He is not in acute distress.     Appearance: He is ill-appearing (chronically). He is not toxic-appearing or diaphoretic.      Interventions: Nasal cannula in place.   HENT:      Head: Normocephalic and atraumatic.      Nose: No rhinorrhea.      Mouth/Throat:      Mouth: Mucous membranes are moist.   Eyes:      General: No scleral icterus.     Extraocular Movements: Extraocular movements intact.   Neck:      Comments: Right IJ tunneled dialysis catheter  Cardiovascular:      Rate and Rhythm: Normal rate and regular rhythm.      Heart sounds: No murmur heard.  Pulmonary:      Effort: No tachypnea, accessory muscle usage, respiratory distress or retractions.   Abdominal:      General: There is no distension.      Comments: PD catheter present   Skin:     General: Skin is warm and dry.      Coloration: Skin is not jaundiced.      Findings: No rash.   Neurological:      General: No focal deficit present.      Mental Status: He is alert.  Mental status is at baseline.             Significant Labs: All pertinent labs within the past 24 hours have been reviewed.  BMP:   Recent Labs   Lab 10/07/24  0219 10/07/24  1418   *  --    *  --    K 4.1  --      --    CO2 19*  --    BUN 36*  --    CREATININE 4.3*  --    CALCIUM 8.5*  --    MG  --  1.8     CBC:   Recent Labs   Lab 10/06/24  0439 10/07/24  0219   WBC 20.85* 15.41*   HGB 12.2* 11.5*   HCT 35.8* 33.6*    320       Significant Imaging: I have reviewed all pertinent imaging results/findings within the past 24 hours.

## 2024-10-08 PROBLEM — I51.89 ATRIAL MASS: Status: ACTIVE | Noted: 2024-01-01

## 2024-10-08 NOTE — ASSESSMENT & PLAN NOTE
Incidental finding of right atrial mass noted on right upper quadrant ultrasound (this was performed for elevated LFTs).   Echocardiogram performed 10/7/24 notes 2.5 cm mass in the right atrium which appears to have a stalk adherent to the inter-atrial septum and potentially passing through a PFO with frondlike features within the left atrium.  This suggests thrombus in transit.  Can not exclude endocarditis.  Can not exclude adherence of RA mass to one of his right atrial leads.  Recommend blood cultures and initiation of empiric antibiotics as well as initiation of anticoagulation.  Consider ID evaluation.  No need for SEGUN.  Repeat echo in 1 month to assess for resolution.

## 2024-10-08 NOTE — NURSING
[facility] [place]  Nurses Note -- 4 Eyes      10/8/2024       Skin assessed on: Q Shift      [x] No Pressure Injuries Present    [x]Prevention Measures Documented    [] Yes LDA  for Pressure Injury Previously documented     [] Yes New Pressure Injury Discovered   [] LDA for New Pressure Injury Added      Attending RN:  Kitty Betts RN     Second RN:  MILADIS Murcia

## 2024-10-08 NOTE — PLAN OF CARE
Pt remains in the ICU on 3L NC.  Paced rhythm on the monitor.  Afebrile.  AOX4.  No urine output this shift.  CRRT dialyzing per order.  CRRT cartridge replaced by dialysis RN.  Levophed infusing per order. R hip incision dressing clean, dry and intact.  Plan of care reviewed.  No injuries, falls or skin breakdown occurred this shift.        Problem: Adult Inpatient Plan of Care  Goal: Plan of Care Review  Outcome: Progressing  Goal: Patient-Specific Goal (Individualized)  Outcome: Progressing  Goal: Absence of Hospital-Acquired Illness or Injury  Outcome: Progressing  Goal: Optimal Comfort and Wellbeing  Outcome: Progressing  Goal: Readiness for Transition of Care  Outcome: Progressing     Problem: Infection  Goal: Absence of Infection Signs and Symptoms  Outcome: Progressing     Problem: Wound  Goal: Optimal Coping  Outcome: Progressing  Goal: Optimal Functional Ability  Outcome: Progressing  Goal: Absence of Infection Signs and Symptoms  Outcome: Progressing  Goal: Improved Oral Intake  Outcome: Progressing  Goal: Optimal Pain Control and Function  Outcome: Progressing  Goal: Skin Health and Integrity  Outcome: Progressing  Goal: Optimal Wound Healing  Outcome: Progressing     Problem: Skin Injury Risk Increased  Goal: Skin Health and Integrity  Outcome: Progressing     Problem: Hemodialysis  Goal: Safe, Effective Therapy Delivery  Outcome: Progressing  Goal: Effective Tissue Perfusion  Outcome: Progressing  Goal: Absence of Infection Signs and Symptoms  Outcome: Progressing     Problem: Orthopaedic Fracture  Goal: Absence of Bleeding  Outcome: Progressing  Goal: Bowel Elimination  Outcome: Progressing  Goal: Absence of Embolism Signs and Symptoms  Outcome: Progressing  Goal: Fracture Stability  Outcome: Progressing  Goal: Optimal Functional Ability  Outcome: Progressing  Goal: Absence of Infection Signs and Symptoms  Outcome: Progressing  Goal: Effective Tissue Perfusion  Outcome: Progressing  Goal: Optimal Pain  Control and Function  Outcome: Progressing  Goal: Effective Oxygenation and Ventilation  Outcome: Progressing     Problem: Fall Injury Risk  Goal: Absence of Fall and Fall-Related Injury  Outcome: Progressing     Problem: CRRT (Continuous Renal Replacement Therapy)  Goal: Safe, Effective Therapy Delivery  Outcome: Progressing  Goal: Hemodynamic Stability  Outcome: Progressing  Goal: Body Temperature Maintained in Desired Range  Outcome: Progressing  Goal: Absence of Infection Signs and Symptoms  Outcome: Progressing

## 2024-10-08 NOTE — ASSESSMENT & PLAN NOTE
Echo. Show ,  Left Atrium: There is a large mobile frondlike mass, appears to be adherent to LA septum.    Right Atrium: Right atrium is severely dilated. Multiple leads present in the right atrium with possible vegetation adherent to lead in RA.  Cardiology is consulted,recommended anticoagulations and IV Abx for possible endocarditis ,ID is consulted.mo growth in blood culture.

## 2024-10-08 NOTE — SUBJECTIVE & OBJECTIVE
Review of Systems   Gastrointestinal:  Negative for melena.   Genitourinary:  Negative for hematuria.     Objective:     Vital Signs (Most Recent):  Temp: 97.6 °F (36.4 °C) (10/08/24 0400)  Pulse: 60 (10/08/24 0715)  Resp: 15 (10/08/24 0715)  BP: (!) 121/58 (10/08/24 0715)  SpO2: 100 % (10/08/24 0715) Vital Signs (24h Range):  Temp:  [97.5 °F (36.4 °C)-98.5 °F (36.9 °C)] 97.6 °F (36.4 °C)  Pulse:  [60-66] 60  Resp:  [12-42] 15  SpO2:  [90 %-100 %] 100 %  BP: ()/(41-59) 121/58     Weight: 78.1 kg (172 lb 2.9 oz)  Body mass index is 25.43 kg/m².     SpO2: 100 %         Intake/Output Summary (Last 24 hours) at 10/8/2024 0741  Last data filed at 10/8/2024 0701  Gross per 24 hour   Intake 1025.01 ml   Output 3542 ml   Net -2516.99 ml       Lines/Drains/Airways       Central Venous Catheter Line  Duration                  Hemodialysis Catheter 05/03/24 1115 right subclavian 157 days              Peripheral Intravenous Line  Duration                  Peripheral IV - Single Lumen 10/06/24 0620 20 G Anterior;Left Upper Arm 2 days                     Exam unchanged vs 10/7/24  Physical Exam  Constitutional:       General: He is not in acute distress.     Appearance: He is well-developed. He is not ill-appearing, toxic-appearing or diaphoretic.   HENT:      Head: Normocephalic and atraumatic.   Eyes:      General: No scleral icterus.     Extraocular Movements: Extraocular movements intact.      Conjunctiva/sclera: Conjunctivae normal.      Pupils: Pupils are equal, round, and reactive to light.   Neck:      Thyroid: No thyromegaly.      Vascular: No JVD.      Trachea: No tracheal deviation.   Cardiovascular:      Rate and Rhythm: Normal rate and regular rhythm.      Heart sounds: S1 normal and S2 normal. No murmur heard.     No friction rub. No gallop.   Pulmonary:      Effort: Pulmonary effort is normal. No respiratory distress.      Breath sounds: Normal breath sounds. No stridor. No wheezing, rhonchi or rales.    Chest:      Chest wall: No tenderness.   Abdominal:      General: There is no distension.      Palpations: Abdomen is soft.   Musculoskeletal:         General: No swelling or tenderness. Normal range of motion.      Cervical back: Normal range of motion and neck supple. No rigidity.      Right lower leg: No edema.      Left lower leg: No edema.   Skin:     General: Skin is warm and dry.      Coloration: Skin is not jaundiced.   Neurological:      General: No focal deficit present.      Mental Status: He is alert and oriented to person, place, and time.      Cranial Nerves: No cranial nerve deficit.   Psychiatric:         Mood and Affect: Mood normal.         Behavior: Behavior normal.            Current Medications:   allopurinoL  100 mg Oral Daily    apixaban  5 mg Oral BID    ceFEPime IV (PEDS and ADULTS)  1 g Intravenous Q24H    chlorproMAZINE  25 mg Intravenous Once    docusate sodium  100 mg Oral Daily    levothyroxine  150 mcg Oral Before breakfast    mexiletine  150 mg Oral BID WM    midodrine  15 mg Oral Q8H    mupirocin   Nasal BID    polyethylene glycol  17 g Oral Daily    pravastatin  40 mg Oral QHS    triamcinolone acetonide 0.1%   Topical (Top) BID      NORepinephrine bitartrate-D5W  0-3 mcg/kg/min Intravenous Continuous 11.2 mL/hr at 10/08/24 0701 0.04 mcg/kg/min at 10/08/24 0701       Current Facility-Administered Medications:     acetaminophen, 650 mg, Oral, Q4H PRN    furosemide, 360 mg, Oral, See admin instructions    heparin (porcine), 1,000 Units, Intra-Catheter, PRN    heparin (porcine), 5,000 Units, Intra-Catheter, PRN    hydrALAZINE, 10 mg, Intravenous, Q6H PRN    HYDROmorphone, 1 mg, Intravenous, Q4H PRN    hydrOXYzine pamoate, 25 mg, Oral, Q8H PRN    levalbuterol, 1.25 mg, Nebulization, Q4H PRN    magnesium sulfate IVPB, 2 g, Intravenous, PRN    melatonin, 6 mg, Oral, Nightly PRN    metOLazone, 10 mg, Oral, See admin instructions    naloxone, 0.4 mg, Intravenous, PRN    nitroGLYCERIN, 0.4  mg, Sublingual, Q5 Min PRN    ondansetron, 4 mg, Intravenous, Q6H PRN    oxyCODONE-acetaminophen, 1 tablet, Oral, Q4H PRN    oxyCODONE-acetaminophen, 1 tablet, Oral, Q4H PRN    polyethylene glycol, 17 g, Oral, BID PRN    promethazine (PHENERGAN) 6.25 mg in 0.9% NaCl 50 mL IVPB, 6.25 mg, Intravenous, Q6H PRN    simethicone, 1 tablet, Oral, QID PRN    sodium chloride 0.9%, 250 mL, Intravenous, PRN    sodium chloride 0.9%, 10 mL, Intravenous, Q12H PRN    sodium chloride 0.9%, 10 mL, Intravenous, PRN    sodium phosphate 20.01 mmol in D5W 250 mL IVPB, 20.01 mmol, Intravenous, PRN    sodium phosphate 30 mmol in D5W 250 mL IVPB, 30 mmol, Intravenous, PRN    sodium phosphate 39.99 mmol in D5W 250 mL IVPB, 39.99 mmol, Intravenous, PRN    Pharmacy to dose Vancomycin consult, , , Once **AND** vancomycin - pharmacy to dose, , Intravenous, pharmacy to manage frequency    Laboratory (all labs reviewed):  CBC:  Recent Labs   Lab 10/04/24  1613 10/05/24  0544 10/06/24  0439 10/07/24  0219 10/08/24  0427   WBC 19.72 H 19.83 H 20.85 H 15.41 H 18.99 H   Hemoglobin 13.4 L 12.6 L 12.2 L 11.5 L 10.7 L   Hematocrit 40.6 37.5 L 35.8 L 33.6 L 32.4 L   Platelets 450 407 294 320 288       CHEMISTRIES:  Recent Labs   Lab 01/10/22  0753 02/07/22  0745 04/04/22  1255 06/09/22  0740 07/07/22  0746 09/06/22  0741 10/05/24  1556 10/06/24  0045 10/06/24  0439 10/06/24  2249 10/07/24  0219 10/07/24  1418 10/07/24  2313 10/08/24  0427   Glucose 103 86 92 90 92   < > 125 H 146 H   < > 133 H 120 H 119 H 151 H 110  110   Sodium 136 135 L 137 139 136   < > 133 L 134 L   < > 134 L 135 L 136 135 L 135 L  135 L   Potassium 4.4 4.5 4.8 4.8 4.3   < > 4.4 3.7   < > 3.9 4.1 4.1 4.1 4.8  4.8   BUN 72 H 72 H 68 H 81 H 76 H   < > 50 H 43 H   < > 37 H 36 H 33 H 27 H 25 H  25 H   Creatinine 4.83 H 4.30 H 4.08 H 4.64 H 4.06 H   < > 6.4 H 5.2 H   < > 4.5 H 4.3 H 4.1 H 3.5 H 3.3 H  3.3 H   eGFR if non  12.0 A 13.8 A 14.7 A 12.6 A 14.8 A  --   --    --   --   --   --   --   --   --    eGFR  --   --   --   --   --    < > 9 A 12 A   < > 14 A 15 A 15 A 19 A 20 A  20 A   Calcium 8.8 9.1 9.1 8.7 9.5   < > 8.5 L 8.2 L   < > 8.5 L 8.5 L 8.6 L 8.3 L 8.8  8.8   Magnesium  --   --   --   --   --    < > 1.9 1.8  --  1.8  --  1.8 1.8  --     < > = values in this interval not displayed.       CARDIAC BIOMARKERS:  Recent Labs   Lab 09/25/22  1638 04/29/24  1707 10/04/24  0418   Troponin I 0.015 0.040 H 0.045 H       COAGS:  Recent Labs   Lab 01/17/23  0741 10/02/24  2224 10/03/24  0441   INR 1.2 1.3 H 1.2       LIPIDS/LFTS:  Recent Labs   Lab 05/04/23  1129 09/01/23  0836 03/04/24  1018 04/12/24  1056 04/29/24  2039 06/04/24  1440 09/04/24  1201 09/30/24  1100 10/04/24  1612 10/05/24  0544 10/06/24  0439 10/07/24  0219 10/08/24  0427   Cholesterol 151 139 124  --  127  --  189  --   --   --   --   --   --    Triglycerides 79 104 66  --  66  --  167 H  --   --   --   --   --   --    HDL 41 35 L 39 L  --  32 L  --  54  --   --   --   --   --   --    LDL Cholesterol 94.2 83.2 71.8  --  81.8  --  101.6  --   --   --   --   --   --    Non-HDL Cholesterol 110 104 85  --  95  --  135  --   --   --   --   --   --    AST 45 51 H 39   < >  --    < > 99 H   < > 322 H 367 H 240 H 152 H 115 H   ALT 56 H 57 H 35   < >  --    < > 68 H   < > 298 H 271 H 88 H 33 19    < > = values in this interval not displayed.       BNP:  Recent Labs   Lab 09/25/22  1638 09/28/22  1545 04/12/24  1056 04/29/24  1707   BNP 2,304 H 1,416 H 2,549 H 3,204 H       TSH:  Recent Labs   Lab 09/29/22  0448 12/20/22  1052 05/04/23  1129 03/04/24  1018 09/04/24  1201   TSH 3.045 1.480 1.160 0.283 L 1.740       Free T4:  Recent Labs   Lab 11/11/21  0727 03/04/24  1018   Free T4 1.48 3.25 H       Diagnostic Results:  ECG (personally reviewed and interpreted tracing(s)):  10/6/24 0720 AP-BiVP 60    Chest X-Ray (personally reviewed and interpreted image(s)): 10/6/24 CMeg, L ICD 3 leads, R HD catheter    Echo: 10/7/24  (images personally reviewed and interpreted).  Compared with report/images 10/3/24, RA and LA masses are new, LV WMA is old.  ?Thrombus in transit across PFO.    Left Ventricle: The left ventricle is normal in size. Mildly increased wall thickness. There is mild concentric hypertrophy. Regional wall motion abnormalities present (severe distal anteroapical hypokinesis). There is mildly reduced systolic function with a visually estimated ejection fraction of 40 - 45%.    Right Ventricle: Severe right ventricular enlargement. Systolic function is severely reduced. Pacemaker lead present in the ventricle and appears abnormal with possible vegetation on lead in RA (2.7x2.0cm).    Left Atrium: There is a large mobile frondlike mass, appears to be adherent to LA septum.    Right Atrium: Right atrium is severely dilated. Multiple leads present in the right atrium with possible vegetation adherent to lead in RA.    Aortic Valve: The aortic valve is a trileaflet valve. There is mild aortic valve sclerosis.    Tricuspid Valve: There is mild regurgitation.    Pulmonary Artery: The estimated pulmonary artery systolic pressure is 67 mmHg.    RA mass 2.7x2.0 cm      LA mass 2.2x0.5cm      Cath: 9/27/22   There was non-obstructive coronary artery disease. Stable as compared to prior.   Left Main   The vessel was visualized by angiography, is moderate in size and is angiographically normal.      Left Anterior Descending   Mid LAD lesion was 20% stenosed.      Left Circumflex   The vessel was visualized by angiography, is moderate in size and is angiographically normal.      Right Coronary Artery   Prox RCA to Mid RCA lesion was 20% stenosed.

## 2024-10-08 NOTE — PT/OT/SLP PROGRESS
Physical Therapy      Patient Name:  Ar Sharma   MRN:  [id number]    Patient not seen today secondary to MD hold (Comment). Will follow-up throughout day. In the physicians PN, it states to hold PT until pt is medically stable

## 2024-10-08 NOTE — PROGRESS NOTES
S: Transferred to ICU on 1-/5 for hypotension. Unable to tolerate HD because of this. Has been on CRRT, pressors  EBL in surgery 50 cc and hypotension was present prior to procedure   Has not been able to get up out of bed to participate with PT due to CRRT  Complaining of pain to right hip    O:    Temp:  [97.5 °F (36.4 °C)-98.5 °F (36.9 °C)] 97.6 °F (36.4 °C)  Pulse:  [60-66] 61  Resp:  [12-42] 25  SpO2:  [90 %-100 %] 99 %  BP: ()/(41-72) 113/46    PE:  NAD  right lower extremity:  Minimal amount of sanguinous drainage to dressings  No NV changes    Laboratory:  Most Recent Data:  CBC:   Lab Results   Component Value Date    WBC 15.41 (H) 10/07/2024    HGB 11.5 (L) 10/07/2024    HCT 33.6 (L) 10/07/2024     10/07/2024    MCV 80 (L) 10/07/2024    RDW 16.8 (H) 10/07/2024     BMP:   Lab Results   Component Value Date     10/07/2024    K 4.1 10/07/2024     10/07/2024    CO2 23 10/07/2024    BUN 33 (H) 10/07/2024    CREATININE 4.1 (H) 10/07/2024     (H) 10/07/2024    CALCIUM 8.6 (L) 10/07/2024    MG 1.8 10/07/2024    PHOS 2.5 (L) 10/07/2024       A/P: 63 y.o.male  3 Days Post-Op R IMN for intertrochanteric femur fracture   - PT OT as soon as he is safely able to participate   - Pain control   - WBAT RLE   - DVT: SCDs, heparin  - Leukocytosis present pre op, initially worsened to 20 but has trended back down to 15 today. On vanco, blood cultures negative. Echo done today with concern for vegetations.   - Will follow, plan on dressing change tomorrow.

## 2024-10-08 NOTE — CARE UPDATE
1527- CRRT alarming high venous pressure.  Unable to aspirate from venous line.  Dialysis RN notified.    1534- Dialysis RN at bedside to return blood and replace cartridge

## 2024-10-08 NOTE — PROGRESS NOTES
[facility] Medicine  Progress Note    Patient Name: Ar Sharma  MRN: [id number]  Patient Class: IP- Inpatient   Admission Date: 10/3/2024  Length of Stay: 5 days  Attending Physician: Delmy Agarwal, *  Primary Care Provider: Jc Elliott MD        Subjective:     Principal Problem:Closed right hip fracture, initial encounter        HPI:  63 y.o. AAM with h/o ESRD on HD (for 2 months but prior was on PD for about a month-Follows with [facility]) MWF via a tunneled HD catheter on the right, Essential HTN, HLD, CAD,  h/o systolic(EF 40-45%) and diastolic grade 3 CHF due to non-ischemic cardiomyopathy with a [employer] CRT-D implant 2016 (sees Dr. Hernandez) uses 3L supplemental oxygen at night, and h/o VT arhythmia (follows with Dr. Tenorio at [facility]) s/p ablation and on Mexiletin 150mg PO BID and Amiodarone 400mg daily present to the ER with c/o right hip pain after a fall at home.     Went to HD today and was feeling alright after. Was moving his tool box when he tripped over his feet and fell to the ground. Was unable to get up or put weight on his right leg.     Work up in the ED at [place] noted normal WBC and Stable H/H.  INR 1.3.  Lytes stable and c/w post-HD with no indications for emergent HD tonight.     Right hip/pelvis x-ray FINDINGS:  There is a minimally displaced intertrochanteric fracture of the right proximal femur.  No additional fractures are seen.  There is osteopenia.  The femoral heads are well seated in the acetabula.  There is mild joint space narrowing of the bilateral femoroacetabular joints and the pubic symphysis.  There is a peritoneal dialysis catheter overlying the pelvis.     Impression:     Right intertrochanteric fracture.      Transfer center contacted us for admission due to [facility] on Ortho-diversion. They spoke with Dr. Mcclendon who agreed to see the patient in consultation. We have been asked to accept for further pre-op planning.  Last time he took his ASA was yesterday am.       Results for orders placed during the hospital encounter of 04/29/24    Echo Saline Bubble? No; Ultrasound enhancing contrast? No    Interpretation Summary    Left Ventricle: The left ventricle is normal in size. Normal wall thickness. Regional wall motion abnormalities present. See diagram for wall motion findings. Septal motion is consistent with pacing. There is mildly reduced systolic function with a visually estimated ejection fraction of 40 - 45%. Grade III diastolic dysfunction.    Right Ventricle: Moderate to severe right ventricular enlargement. Systolic function is reduced.TAPSE is 1.59 cm. Pacemaker lead present in the ventricle.    Left Atrium: Left atrium is severely dilated. The left atrium volume index is 71.4 mL/m2.    Right Atrium: Right atrium is severely dilated.    Aortic Valve: There is mild aortic valve sclerosis. There is mild to moderate stenosis. Aortic valve area by VTI is 1.49 cm². Aortic valve peak velocity is 1.43 m/s. Mean gradient is 5 mmHg. The dimensionless index is 0.52.    Mitral Valve: There is mild regurgitation.    Tricuspid Valve: There is mild to moderate regurgitation.    Pulmonary Artery: The estimated pulmonary artery systolic pressure is 64 mmHg.    IVC/SVC: Elevated venous pressure at 15 mmHg.      Overview/Hospital Course:   63 y.o. AAM with h/o ESRD on HD (MWF), HTN, HLD, CAD, CHF admitted on 10/03/24 for Right intertrochanteric Hip fracture after a fall at home. No head trauma or LOC. CXR with interstitial edema.  No evidence of pneumothorax.  X-ray of the right hip with minimally displaced intertrochanteric fracture of the right proximal femur.  Orthopedic surgery consulted- s/p right  femur intramedullary nail on 10/04.  Minimal blood loss per orthopedic. Echo with EF of 40-45%, mild aortic stenosis.  PASP of 66 mm Hg.  Cardiology consulted for cardiac clearance-cleared for surgery at moderate cardiac risk.  Continue  symptomatic management and supportive care.  Nephrology following for ESRD. Patient  hypotensive at baseline but acutely more hypotensive during hospitalization but is asymptomatic. Unable to tolerate HD on 10/05. Persistently hypotensive. patient transferred to ICU for presssor support and CRRT on 10/05.  Tolerating CRRT.    Echo. Show ,  Left Atrium: There is a large mobile frondlike mass, appears to be adherent to LA septum.    Right Atrium: Right atrium is severely dilated. Multiple leads present in the right atrium with possible vegetation adherent to lead in RA.  Cardiology is consulted,recommended anticoagulations and IV Abx for possible endocarditis ,ID is consulted.mo growth in blood culture.    Interval History: feeling better with no new complaints.  Echo. Show ,  Left Atrium: There is a large mobile frondlike mass, appears to be adherent to LA septum.    Right Atrium: Right atrium is severely dilated. Multiple leads present in the right atrium with possible vegetation adherent to lead in RA.  Cardiology is consulted,recommended anticoagulations and IV Abx for possible endocarditis ,ID is consulted.mo growth in blood culture.    Review of Systems   HENT:  Negative for ear discharge and ear pain.    Eyes:  Negative for discharge and itching.   Endocrine: Negative for cold intolerance and heat intolerance.   Neurological:  Negative for seizures and syncope.     Objective:     Vital Signs (Most Recent):  Temp: 97.7 °F (36.5 °C) (10/08/24 0800)  Pulse: 60 (10/08/24 1115)  Resp: 11 (10/08/24 1115)  BP: (!) 102/48 (10/08/24 1115)  SpO2: 99 % (10/08/24 1115) Vital Signs (24h Range):  Temp:  [97.5 °F (36.4 °C)-98.1 °F (36.7 °C)] 97.7 °F (36.5 °C)  Pulse:  [60-66] 60  Resp:  [11-29] 11  SpO2:  [90 %-100 %] 99 %  BP: ()/(41-60) 102/48     Weight: 78.1 kg (172 lb 2.9 oz)  Body mass index is 25.43 kg/m².    Intake/Output Summary (Last 24 hours) at 10/8/2024 1142  Last data filed at 10/8/2024 1100  Gross per 24  hour   Intake 1108.32 ml   Output 4961 ml   Net -3852.68 ml         Physical Exam  Vitals and nursing note reviewed.   Constitutional:       General: He is not in acute distress.     Appearance: He is ill-appearing (chronically). He is not toxic-appearing or diaphoretic.      Interventions: Nasal cannula in place.   HENT:      Head: Normocephalic and atraumatic.      Nose: No rhinorrhea.      Mouth/Throat:      Mouth: Mucous membranes are moist.   Eyes:      General: No scleral icterus.     Extraocular Movements: Extraocular movements intact.   Neck:      Comments: Right IJ tunneled dialysis catheter  Cardiovascular:      Rate and Rhythm: Normal rate and regular rhythm.      Heart sounds: No murmur heard.  Pulmonary:      Effort: No tachypnea, accessory muscle usage, respiratory distress or retractions.   Abdominal:      General: There is no distension.      Comments: PD catheter present   Skin:     General: Skin is warm and dry.      Coloration: Skin is not jaundiced.      Findings: No rash.   Neurological:      General: No focal deficit present.      Mental Status: He is alert. Mental status is at baseline.             Significant Labs: All pertinent labs within the past 24 hours have been reviewed.  BMP:   Recent Labs   Lab 10/07/24  2313 10/08/24  0427   * 110  110   * 135*  135*   K 4.1 4.8  4.8    104  104   CO2 24 24 24   BUN 27* 25*  25*   CREATININE 3.5* 3.3*  3.3*   CALCIUM 8.3* 8.8  8.8   MG 1.8  --      CBC:   Recent Labs   Lab 10/07/24  0219 10/08/24  0427   WBC 15.41* 18.99*   HGB 11.5* 10.7*   HCT 33.6* 32.4*    288       Significant Imaging: I have reviewed all pertinent imaging results/findings within the past 24 hours.    Assessment/Plan:      * Closed right hip fracture, initial encounter  -s/p fall. No LOC or head trauma  -Right hip XR: minimally displaced intertrochanteric fracture of the right proximal femur.   -Orthopedic surgery consulted: s/p right  femur  intramedullary nail on 10/04  -PT/OT once more stable.    Atrial mass    Echo. Show ,  Left Atrium: There is a large mobile frondlike mass, appears to be adherent to LA septum.    Right Atrium: Right atrium is severely dilated. Multiple leads present in the right atrium with possible vegetation adherent to lead in RA.  Cardiology is consulted,recommended anticoagulations and IV Abx for possible endocarditis ,ID is consulted.mo growth in blood culture.    Leukocytosis  -WBC# trending up, 20k on 10/06. Pt remained afebrile.   -initially suspected reactive given fracture and recent surgery  -However, pt also with PD cath in LLQ.   BCX negative.  Empirically started on aBX's    Acute on chronic hypoxic respiratory failure  Patient with Hypoxic Respiratory failure which is Acute on chronic.  he is on home oxygen at 3 LPM. Supplemental oxygen was provided and noted-      .   Signs/symptoms of respiratory failure include- tachypnea and increased work of breathing. Contributing diagnoses includes - CHF and COPD Labs and images were reviewed. Patient Has recent ABG, which has been reviewed. Will treat underlying causes and adjust management of respiratory failure as follows-     -pt on home 3L NC. Hx of COPD/CHF  -Worsening hypoxia overnight 10/05; required up to 15L HFNC.  -acute on chronic hypoxia secondary to volume overload  -Pt unable to tolerate HD and CRRT, net positive 1.5L on 10/06  -CXR with pulmonary edema   -Pulmonology consulted  -Nephrology following  -Wean O2 as tolerated     Advanced care planning/counseling discussion  Advance Care Planning    Date: 10/05/2024    Code Status  I engaged the the patient in a voluntary conversation about the patient's preferences for care  at the very end of life. The patient wishes to have CPR and other invasive treatments performed when his heart and/or breathing stops. I communicated to the patient that his wishes align with full code status and he agrees.    A total of 16  min was spent on advance care planning, goals of care discussion, emotional support, formulating and communicating prognosis and exploring burden/benefit of various approaches of treatment. This discussion occurred on a fully voluntary basis with the verbal consent of the patient and/or family.            Moderate protein-calorie malnutrition  Nutrition consulted. Most recent weight and BMI monitored-     Measurements:  Wt Readings from Last 1 Encounters:   10/06/24 74.6 kg (164 lb 7.4 oz)   Body mass index is 24.29 kg/m².    Patient has been screened and assessed by RD.    Malnutrition Type:  Context: acute illness or injury  Level: mild    Malnutrition Characteristic Summary:  Weight Loss (Malnutrition): greater than 10% in 6 months  Subcutaneous Fat (Malnutrition): mild depletion  Muscle Mass (Malnutrition): mild depletion    Interventions/Recommendations (treatment strategy):  1. Add Novasource Renal TID 2. [place] intake at meals3. Monitor weight/labs 4. RD to follow to monitor PO intake      Hypotension  -resume home midodrine   -Unable to tolerate HD on 10/05, SBP dropped to 70s  -now on CRRT    ESRD (end stage renal disease)  Nephrology consulted: HD per nephrology   HD stopped early on 10/05 due to hypotension  Persistently hypotensive, transfer to ICU for CRRT and pressor support on 10/05  CRRT started on 10/05    Pulmonary emphysema, unspecified emphysema type  COPD is controlled on his home 3L oxygen at night.   No wheezing on exam  Now volume overloaded and requiring more O2  PRN duonebs ordered.   Wean to home O2 as tolerated    Aortic atherosclerosis  Continue statin       Primary hypertension  Chronic, controlled. Pt now with hypotension   Pt with hypotension, continue home midodrine   Currently on pressors to keep MAP>65    Latest blood pressure and vitals reviewed-     Temp:  [97.5 °F (36.4 °C)-98.5 °F (36.9 °C)]   Pulse:  [60-66]   Resp:  [12-42]   BP: ()/(45-72)   SpO2:  [92 %-100 %] .    Home meds for hypertension were reviewed and noted below.   Hypertension Medications               furosemide (LASIX) 80 MG tablet Take 4.5 tablets (360 mg total) by mouth 2 (two) times daily.    metOLazone (ZAROXOLYN) 10 MG tablet Take 10 mg by mouth As instructed (Take I tablet by mouth Tuesday, Thursday, Saturday, Sunday). Take I tablet by mouth Tuesday, Thursday, Saturday, Sunday    nitroGLYCERIN (NITROSTAT) 0.4 MG SL tablet Place 1 tablet (0.4 mg total) under the tongue every 5 (five) minutes as needed for Chest pain.            While in the hospital, will manage blood pressure as follows; Adjust home antihypertensive regimen as follows- patient with hypotension chronically. Continue home midodrine     Will utilize p.r.n. blood pressure medication only if patient's blood pressure greater than  180/90  and he develops symptoms such as worsening chest pain or shortness of breath.    Chronic combined systolic and diastolic heart failure  NO signs of acute exacerbation.   Echo as below   Cardiology consulted for cardiac clearance-cleared for surgery at moderate cardiac risk.   Nephrology for volume removal with HD    Echo    Result Date: 10/7/2024    Left Ventricle: The left ventricle is normal in size. Mildly increased   wall thickness. There is mild concentric hypertrophy. Regional wall motion   abnormalities present (severe distal anteroapical hypokinesis). There is   mildly reduced systolic function with a visually estimated ejection   fraction of 40 - 45%.    Right Ventricle: Severe right ventricular enlargement. Systolic   function is severely reduced. Pacemaker lead present in the ventricle and   appears abnormal with possible vegetation on lead in RA (2.7x2.0cm).    Left Atrium: There is a large mobile frondlike mass, appears to be   adherent to LA septum.    Right Atrium: Right atrium is severely dilated. Multiple leads present   in the right atrium with possible vegetation adherent to lead in RA.     Aortic Valve: The aortic valve is a trileaflet valve. There is mild   aortic valve sclerosis.    Tricuspid Valve: There is mild regurgitation.    Pulmonary Artery: The estimated pulmonary artery systolic pressure is   67 mmHg.        Echo    Result Date: 10/3/2024    Left Ventricle: The left ventricle is mildly dilated. There is mild   eccentric hypertrophy. Septal flattening in diastole and systole   consistent with right ventricular volume and pressure overload. There is   mildly reduced systolic function with a visually estimated ejection   fraction of 40 - 45%.    Right Ventricle: Severe right ventricular enlargement. Systolic   function is moderately reduced.    Left Atrium: Left atrium is moderately dilated.    Right Atrium: Right atrium is severely dilated.    Aortic Valve: There is mild stenosis. Aortic valve area by VTI is 1.6   cm². Aortic valve peak velocity is 1.6 m/s. Mean gradient is 6.3 mmHg. The   dimensionless index is 0.50.    Mitral Valve: There is mild to moderate regurgitation.    Tricuspid Valve: There is moderate regurgitation.    Pulmonary Artery: The estimated pulmonary artery systolic pressure is   66 mmHg.    IVC/SVC: Elevated venous pressure at 15 mmHg.           S/P ablation of ventricular arrhythmia  Cardiology consulted for cardiac clearance.   Resume home Mexiletin 150mg PO BID   Hold amiodarone 400mg PO daily in setting of hypotension   ICD in place       Cardiac resynchronization therapy defibrillator (CRT-D) in place  -noted      Atherosclerosis of native coronary artery of native heart with angina pectoris  Patient with known CAD , which is controlled Will continue Statin and monitor for S/Sx of angina/ACS. Continue to monitor on telemetry.     Mixed hyperlipidemia  -continue statin       Iron deficiency anemia  Anemia is likely due to Iron deficiency. Most recent hemoglobin and hematocrit are listed below.  Recent Labs     10/05/24  0544 10/06/24  0439 10/07/24  0219   HGB 12.6*  12.2* 11.5*   HCT 37.5* 35.8* 33.6*       Plan  - Monitor serial CBC: Daily  - Transfuse PRBC if patient becomes hemodynamically unstable, symptomatic or H/H drops below 7/21.  - Patient has not received any PRBC transfusions to date  - Patient's anemia is currently stable  - stable, monitoring     Hypothyroidism  -resume home synthroid       VTE Risk Mitigation (From admission, onward)           Ordered     apixaban tablet 5 mg  2 times daily         10/07/24 1319     heparin (porcine) injection 5,000 Units  Use PRN         10/05/24 1830     Place sequential compression device  Until discontinued         10/04/24 2217     heparin (porcine) injection 1,000 Units  As needed (PRN)         10/04/24 2217     IP VTE LOW RISK PATIENT  Once         10/04/24 1438     Place sequential compression device  Until discontinued         10/03/24 0154     Reason for No Pharmacological VTE Prophylaxis  Once        Comments: Right hip fracture needs surgical eval   Question:  Reasons:  Answer:  Physician Provided (leave comment)    10/03/24 0154                    Discharge Planning   SLAVA:      Code Status: Full Code   Is the patient medically ready for discharge?:     Reason for patient still in hospital (select all that apply): Patient trending condition  Discharge Plan A:  (pending PT/OT eval)            Critical care time spent on the evaluation and treatment of severe organ dysfunction, review of pertinent labs and imaging studies, discussions with consulting providers and discussions with patient/family:  over 45  minutes.      Delmy Agarwal MD  Department of Hospital Medicine   [facility] - Intensive Care

## 2024-10-08 NOTE — PROGRESS NOTES
[facility] Intensive Care  Cardiology  Progress Note    Patient Name: Ar Sharma  MRN: [id number]  Admission Date: 10/3/2024  Hospital Length of Stay: 5 days  Code Status: Full Code   Attending Physician: Delmy Agarwal, *   Primary Care Physician: Jc Elliott MD  Expected Discharge Date:   Principal Problem:Closed right hip fracture, initial encounter    Subjective:     Interval Hx: pt seen in ICU.  Some chills, no cp/sob.  On CRRT.    Tele:  AV paced 60.  (personally reviewed and interpreted)        Review of Systems   Gastrointestinal:  Negative for melena.   Genitourinary:  Negative for hematuria.     Objective:     Vital Signs (Most Recent):  Temp: 97.6 °F (36.4 °C) (10/08/24 0400)  Pulse: 60 (10/08/24 0715)  Resp: 15 (10/08/24 0715)  BP: (!) 121/58 (10/08/24 0715)  SpO2: 100 % (10/08/24 0715) Vital Signs (24h Range):  Temp:  [97.5 °F (36.4 °C)-98.5 °F (36.9 °C)] 97.6 °F (36.4 °C)  Pulse:  [60-66] 60  Resp:  [12-42] 15  SpO2:  [90 %-100 %] 100 %  BP: ()/(41-59) 121/58     Weight: 78.1 kg (172 lb 2.9 oz)  Body mass index is 25.43 kg/m².     SpO2: 100 %         Intake/Output Summary (Last 24 hours) at 10/8/2024 0741  Last data filed at 10/8/2024 0701  Gross per 24 hour   Intake 1025.01 ml   Output 3542 ml   Net -2516.99 ml       Lines/Drains/Airways       Central Venous Catheter Line  Duration                  Hemodialysis Catheter 05/03/24 1115 right subclavian 157 days              Peripheral Intravenous Line  Duration                  Peripheral IV - Single Lumen 10/06/24 0620 20 G Anterior;Left Upper Arm 2 days                     Exam unchanged vs 10/7/24  Physical Exam  Constitutional:       General: He is not in acute distress.     Appearance: He is well-developed. He is not ill-appearing, toxic-appearing or diaphoretic.   HENT:      Head: Normocephalic and atraumatic.   Eyes:      General: No scleral icterus.     Extraocular Movements: Extraocular movements intact.       Conjunctiva/sclera: Conjunctivae normal.      Pupils: Pupils are equal, round, and reactive to light.   Neck:      Thyroid: No thyromegaly.      Vascular: No JVD.      Trachea: No tracheal deviation.   Cardiovascular:      Rate and Rhythm: Normal rate and regular rhythm.      Heart sounds: S1 normal and S2 normal. No murmur heard.     No friction rub. No gallop.   Pulmonary:      Effort: Pulmonary effort is normal. No respiratory distress.      Breath sounds: Normal breath sounds. No stridor. No wheezing, rhonchi or rales.   Chest:      Chest wall: No tenderness.   Abdominal:      General: There is no distension.      Palpations: Abdomen is soft.   Musculoskeletal:         General: No swelling or tenderness. Normal range of motion.      Cervical back: Normal range of motion and neck supple. No rigidity.      Right lower leg: No edema.      Left lower leg: No edema.   Skin:     General: Skin is warm and dry.      Coloration: Skin is not jaundiced.   Neurological:      General: No focal deficit present.      Mental Status: He is alert and oriented to person, place, and time.      Cranial Nerves: No cranial nerve deficit.   Psychiatric:         Mood and Affect: Mood normal.         Behavior: Behavior normal.            Current Medications:   allopurinoL  100 mg Oral Daily    apixaban  5 mg Oral BID    ceFEPime IV (PEDS and ADULTS)  1 g Intravenous Q24H    chlorproMAZINE  25 mg Intravenous Once    docusate sodium  100 mg Oral Daily    levothyroxine  150 mcg Oral Before breakfast    mexiletine  150 mg Oral BID WM    midodrine  15 mg Oral Q8H    mupirocin   Nasal BID    polyethylene glycol  17 g Oral Daily    pravastatin  40 mg Oral QHS    triamcinolone acetonide 0.1%   Topical (Top) BID      NORepinephrine bitartrate-D5W  0-3 mcg/kg/min Intravenous Continuous 11.2 mL/hr at 10/08/24 0701 0.04 mcg/kg/min at 10/08/24 0701       Current Facility-Administered Medications:     acetaminophen, 650 mg, Oral, Q4H PRN     furosemide, 360 mg, Oral, See admin instructions    heparin (porcine), 1,000 Units, Intra-Catheter, PRN    heparin (porcine), 5,000 Units, Intra-Catheter, PRN    hydrALAZINE, 10 mg, Intravenous, Q6H PRN    HYDROmorphone, 1 mg, Intravenous, Q4H PRN    hydrOXYzine pamoate, 25 mg, Oral, Q8H PRN    levalbuterol, 1.25 mg, Nebulization, Q4H PRN    magnesium sulfate IVPB, 2 g, Intravenous, PRN    melatonin, 6 mg, Oral, Nightly PRN    metOLazone, 10 mg, Oral, See admin instructions    naloxone, 0.4 mg, Intravenous, PRN    nitroGLYCERIN, 0.4 mg, Sublingual, Q5 Min PRN    ondansetron, 4 mg, Intravenous, Q6H PRN    oxyCODONE-acetaminophen, 1 tablet, Oral, Q4H PRN    oxyCODONE-acetaminophen, 1 tablet, Oral, Q4H PRN    polyethylene glycol, 17 g, Oral, BID PRN    promethazine (PHENERGAN) 6.25 mg in 0.9% NaCl 50 mL IVPB, 6.25 mg, Intravenous, Q6H PRN    simethicone, 1 tablet, Oral, QID PRN    sodium chloride 0.9%, 250 mL, Intravenous, PRN    sodium chloride 0.9%, 10 mL, Intravenous, Q12H PRN    sodium chloride 0.9%, 10 mL, Intravenous, PRN    sodium phosphate 20.01 mmol in D5W 250 mL IVPB, 20.01 mmol, Intravenous, PRN    sodium phosphate 30 mmol in D5W 250 mL IVPB, 30 mmol, Intravenous, PRN    sodium phosphate 39.99 mmol in D5W 250 mL IVPB, 39.99 mmol, Intravenous, PRN    Pharmacy to dose Vancomycin consult, , , Once **AND** vancomycin - pharmacy to dose, , Intravenous, pharmacy to manage frequency    Laboratory (all labs reviewed):  CBC:  Recent Labs   Lab 10/04/24  1613 10/05/24  0544 10/06/24  0439 10/07/24  0219 10/08/24  0427   WBC 19.72 H 19.83 H 20.85 H 15.41 H 18.99 H   Hemoglobin 13.4 L 12.6 L 12.2 L 11.5 L 10.7 L   Hematocrit 40.6 37.5 L 35.8 L 33.6 L 32.4 L   Platelets 450 407 294 320 288       CHEMISTRIES:  Recent Labs   Lab 01/10/22  0753 02/07/22  0745 04/04/22  1255 06/09/22  0740 07/07/22  0746 09/06/22  0741 10/05/24  1556 10/06/24  0045 10/06/24  0439 10/06/24  2249 10/07/24  0219 10/07/24  1418 10/07/24  2313  10/08/24  0427   Glucose 103 86 92 90 92   < > 125 H 146 H   < > 133 H 120 H 119 H 151 H 110  110   Sodium 136 135 L 137 139 136   < > 133 L 134 L   < > 134 L 135 L 136 135 L 135 L  135 L   Potassium 4.4 4.5 4.8 4.8 4.3   < > 4.4 3.7   < > 3.9 4.1 4.1 4.1 4.8  4.8   BUN 72 H 72 H 68 H 81 H 76 H   < > 50 H 43 H   < > 37 H 36 H 33 H 27 H 25 H  25 H   Creatinine 4.83 H 4.30 H 4.08 H 4.64 H 4.06 H   < > 6.4 H 5.2 H   < > 4.5 H 4.3 H 4.1 H 3.5 H 3.3 H  3.3 H   eGFR if non  12.0 A 13.8 A 14.7 A 12.6 A 14.8 A  --   --   --   --   --   --   --   --   --    eGFR  --   --   --   --   --    < > 9 A 12 A   < > 14 A 15 A 15 A 19 A 20 A  20 A   Calcium 8.8 9.1 9.1 8.7 9.5   < > 8.5 L 8.2 L   < > 8.5 L 8.5 L 8.6 L 8.3 L 8.8  8.8   Magnesium  --   --   --   --   --    < > 1.9 1.8  --  1.8  --  1.8 1.8  --     < > = values in this interval not displayed.       CARDIAC BIOMARKERS:  Recent Labs   Lab 09/25/22  1638 04/29/24  1707 10/04/24  0418   Troponin I 0.015 0.040 H 0.045 H       COAGS:  Recent Labs   Lab 01/17/23  0741 10/02/24  2224 10/03/24  0441   INR 1.2 1.3 H 1.2       LIPIDS/LFTS:  Recent Labs   Lab 05/04/23  1129 09/01/23  0836 03/04/24  1018 04/12/24  1056 04/29/24  2039 06/04/24  1440 09/04/24  1201 09/30/24  1100 10/04/24  1612 10/05/24  0544 10/06/24  0439 10/07/24  0219 10/08/24  0427   Cholesterol 151 139 124  --  127  --  189  --   --   --   --   --   --    Triglycerides 79 104 66  --  66  --  167 H  --   --   --   --   --   --    HDL 41 35 L 39 L  --  32 L  --  54  --   --   --   --   --   --    LDL Cholesterol 94.2 83.2 71.8  --  81.8  --  101.6  --   --   --   --   --   --    Non-HDL Cholesterol 110 104 85  --  95  --  135  --   --   --   --   --   --    AST 45 51 H 39   < >  --    < > 99 H   < > 322 H 367 H 240 H 152 H 115 H   ALT 56 H 57 H 35   < >  --    < > 68 H   < > 298 H 271 H 88 H 33 19    < > = values in this interval not displayed.       BNP:  Recent Labs   Lab 09/25/22  3696  09/28/22  1545 04/12/24  1056 04/29/24  1707   BNP 2,304 H 1,416 H 2,549 H 3,204 H       TSH:  Recent Labs   Lab 09/29/22  0448 12/20/22  1052 05/04/23  1129 03/04/24  1018 09/04/24  1201   TSH 3.045 1.480 1.160 0.283 L 1.740       Free T4:  Recent Labs   Lab 11/11/21  0727 03/04/24  1018   Free T4 1.48 3.25 H       Diagnostic Results:  ECG (personally reviewed and interpreted tracing(s)):  10/6/24 0720 AP-BiVP 60    Chest X-Ray (personally reviewed and interpreted image(s)): 10/6/24 CMeg, L ICD 3 leads, R HD catheter    Echo: 10/7/24 (images personally reviewed and interpreted).  Compared with report/images 10/3/24, RA and LA masses are new, LV WMA is old.  ?Thrombus in transit across PFO.    Left Ventricle: The left ventricle is normal in size. Mildly increased wall thickness. There is mild concentric hypertrophy. Regional wall motion abnormalities present (severe distal anteroapical hypokinesis). There is mildly reduced systolic function with a visually estimated ejection fraction of 40 - 45%.    Right Ventricle: Severe right ventricular enlargement. Systolic function is severely reduced. Pacemaker lead present in the ventricle and appears abnormal with possible vegetation on lead in RA (2.7x2.0cm).    Left Atrium: There is a large mobile frondlike mass, appears to be adherent to LA septum.    Right Atrium: Right atrium is severely dilated. Multiple leads present in the right atrium with possible vegetation adherent to lead in RA.    Aortic Valve: The aortic valve is a trileaflet valve. There is mild aortic valve sclerosis.    Tricuspid Valve: There is mild regurgitation.    Pulmonary Artery: The estimated pulmonary artery systolic pressure is 67 mmHg.    RA mass 2.7x2.0 cm      LA mass 2.2x0.5cm      Cath: 9/27/22   There was non-obstructive coronary artery disease. Stable as compared to prior.   Left Main   The vessel was visualized by angiography, is moderate in size and is angiographically normal.      Left  Anterior Descending   Mid LAD lesion was 20% stenosed.      Left Circumflex   The vessel was visualized by angiography, is moderate in size and is angiographically normal.      Right Coronary Artery   Prox RCA to Mid RCA lesion was 20% stenosed.          Assessment and Plan:     * Closed right hip fracture, initial encounter  Status post ORIF.    Right atrial thrombus  Incidental finding of right atrial mass noted on right upper quadrant ultrasound (this was performed for elevated LFTs).   Echocardiogram performed 10/7/24 notes 2.5 cm mass in the right atrium which appears to have a stalk adherent to the inter-atrial septum and potentially passing through a PFO with frondlike features within the left atrium.  This suggests thrombus in transit.  Can not exclude endocarditis.  Can not exclude adherence of RA mass to one of his right atrial leads.  Recommend blood cultures and initiation of empiric antibiotics as well as initiation of anticoagulation.  Consider ID evaluation.  No need for SEGUN.  Repeat echo in 1 month to assess for resolution.    Chronic combined systolic and diastolic heart failure  NICM. EF 40-45%. Volume status appears controlled. Has CRT[place]D [place] [facility] [place].   Neg cath 2022.  Hx VT s/p ablation    Mixed hyperlipidemia  Resume statin when LFTs normalized.      Cardiac resynchronization therapy defibrillator (CRT-D) in place  Followed by Dr Tenorio. Normal function at last check    ESRD (end stage renal disease)  Per renal    S/P ablation of ventricular arrhythmia  Followed by EP at [facility] [place]. Continue amiodarone        VTE Risk Mitigation (From admission, onward)           Ordered     apixaban tablet 5 mg  2 times daily         10/07/24 1319     heparin (porcine) injection 5,000 Units  Use PRN         10/05/24 1830     Place sequential compression device  Until discontinued         10/04/24 2217     heparin (porcine) injection 1,000 Units  As needed (PRN)         10/04/24 2217     IP VTE LOW RISK PATIENT  Once          10/04/24 1438     Place sequential compression device  Until discontinued         10/03/24 0154     Reason for No Pharmacological VTE Prophylaxis  Once        Comments: Right hip fracture needs surgical eval   Question:  Reasons:  Answer:  Physician Provided (leave comment)    10/03/24 0154                  Pt seen in ICU, critical care time 30min.    Mason Lynn MD  Cardiology  [facility] [place] - Intensive Care

## 2024-10-08 NOTE — NURSING
CRRT machine alarming, venous pressure increasing. CRRT stopped @ 10:45 pm, and  restarted @12:10am.

## 2024-10-08 NOTE — PROGRESS NOTES
S:  Complaining of pain to right hip    O:    Temp:  [97.5 °F (36.4 °C)-98.1 °F (36.7 °C)] 97.7 °F (36.5 °C)  Pulse:  [60-66] 60  Resp:  [12-29] 14  SpO2:  [90 %-100 %] 99 %  BP: ()/(41-60) 115/56    PE:  NAD  right lower extremity:  Minimal amount of sanguinous drainage to dressings - no change from yesterdays exam  Dressings changed. No expressible drainage from wounds. No erythema or sign of active infection   No NV changes    Laboratory:  Most Recent Data:  CBC:   Lab Results   Component Value Date    WBC 18.99 (H) 10/08/2024    HGB 10.7 (L) 10/08/2024    HCT 32.4 (L) 10/08/2024     10/08/2024    MCV 80 (L) 10/08/2024    RDW 16.6 (H) 10/08/2024     BMP:   Lab Results   Component Value Date     (L) 10/08/2024     (L) 10/08/2024    K 4.8 10/08/2024    K 4.8 10/08/2024     10/08/2024     10/08/2024    CO2 24 10/08/2024    CO2 24 10/08/2024    BUN 25 (H) 10/08/2024    BUN 25 (H) 10/08/2024    CREATININE 3.3 (H) 10/08/2024    CREATININE 3.3 (H) 10/08/2024     10/08/2024     10/08/2024    CALCIUM 8.8 10/08/2024    CALCIUM 8.8 10/08/2024    MG 1.8 10/07/2024    PHOS 2.3 (L) 10/08/2024       A/P: 63 y.o.male  4 Days Post-Op R IMN for intertrochanteric femur fracture   - PT OT as soon as he is safely able to participate   - Pain control   - WBAT RLE   - DVT: SCDs, heparin  - no sign of wound infection  - Will follow

## 2024-10-08 NOTE — PROGRESS NOTES
Patient seen and examined during CVVHD this morning.  /40, HR 60. , DFR 2.5. AP -46,.  131.  Tolerating treatment well. Net negative 2.7L yesterday. Remains on levophed. TTE with CVP 15.      ESRD - +5.6L over weekend; negative 2.7L yesterday. Still have ~3 more liters to go. Continue CVVHD for one more day; UF rate 300cc/hr. Will stop CRRT tomorrow morning and then transition back to HD MWF.    Hypotension - continue levophed to allow for volume removal. Can stop tomorrow.    Anemia of CKD - hgb at goal; will start AWAIS if hgb < 10.    Secondary HPTH - CCa normal; phos being replaced.        Thank you for allowing me to participate in the care of this patient. Please call with any questions.     Lizzette Cain MD  [facility]  [phone]

## 2024-10-08 NOTE — PROGRESS NOTES
Pharmacokinetic Assessment Follow Up: IV Vancomycin    Vancomycin serum concentration assessment(s):    The random level was drawn correctly and can be used to guide therapy at this time. The measurement is within the desired definitive target range of 10 to 20 mcg/mL.    Vancomycin Regimen Plan:    Give Vancomycin 1000 mg x1 dose and obtain random level 10/9 at 0300    Drug levels (last 3 results):  Recent Labs   Lab Result Units 10/07/24  0219 10/08/24  0427   Vancomycin, Random ug/mL  --  12.9   Vancomycin-Trough ug/mL 9.5*  --        Pharmacy will continue to follow and monitor vancomycin.    Please contact pharmacy at extension [phone] for questions regarding this assessment.    Thank you for the consult,   Madi Encinas       Patient brief summary:  Ar Sharma is a 63 y.o. male initiated on antimicrobial therapy with IV Vancomycin for treatment of lower respiratory infection    Drug Allergies:   Review of patient's allergies indicates:   Allergen Reactions    Lokelma [sodium zirconium cyclosilicate] Other (See Comments)     Fluid retention, weight gain, CHF excerebration, severe constipation    Atorvastatin Other (See Comments)     Joint pain    Jardiance [empagliflozin] Other (See Comments)     Chest pains, significant weight loss, lower blood pressure       Actual Body Weight:   80.1 kg    Renal Function:   Estimated Creatinine Clearance: 22.6 mL/min (A) (based on SCr of 3.3 mg/dL (H)).,     Dialysis Method (if applicable):  CRRT    CBC (last 72 hours):  Recent Labs   Lab Result Units 10/05/24  0544 10/06/24  0439 10/07/24  0219 10/08/24  0427   WBC K/uL 19.83* 20.85* 15.41* 18.99*   Hemoglobin g/dL 12.6* 12.2* 11.5* 10.7*   Hematocrit % 37.5* 35.8* 33.6* 32.4*   Platelets K/uL 407 294 320 288   Gran % % 85.8* 89.9* 80.2* 78.8*   Lymph % % 2.5* 1.8* 3.4* 3.4*   Mono % % 10.2 6.1 12.8 13.4   Eosinophil % % 0.2 0.7 2.0 2.2   Basophil % % 0.3 0.4 0.6 0.6   Differential Method  Automated Automated  Automated Automated       Metabolic Panel (last 72 hours):  Recent Labs   Lab Result Units 10/05/24  0544 10/05/24  1556 10/06/24  0045 10/06/24  0439 10/06/24  2249 10/07/24  0219 10/07/24  1418 10/07/24  2313 10/08/24  0427   Sodium mmol/L 133* 133* 134* 135*  135* 134* 135* 136 135* 135*  135*   Potassium mmol/L 4.8 4.4 3.7 3.7  3.7 3.9 4.1 4.1 4.1 4.8  4.8   Chloride mmol/L 99 101 103 103  103 103 103 104 103 104  104   CO2 mmol/L 19* 18* 18* 18*  18* 20* 19* 23 24 24 24   Glucose mg/dL 117* 125* 146* 118*  118* 133* 120* 119* 151* 110  110   BUN mg/dL 65* 50* 43* 44*  44* 37* 36* 33* 27* 25*  25*   Creatinine mg/dL 7.8* 6.4* 5.2* 5.4*  5.4* 4.5* 4.3* 4.1* 3.5* 3.3*  3.3*   Albumin g/dL 3.1* 3.0* 2.8* 3.0*  3.0* 2.7* 2.8* 2.6* 2.6* 2.8*  2.8*   Total Bilirubin mg/dL 2.1*  --   --  2.0*  --  2.1*  --   --  2.2*   Alkaline Phosphatase U/L 117  --   --  115  --  149*  --   --  165*   AST U/L 367*  --   --  240*  --  152*  --   --  115*   ALT U/L 271*  --   --  88*  --  33  --   --  19   Magnesium mg/dL  --  1.9 1.8  --  1.8  --  1.8 1.8  --    Phosphorus mg/dL  --  5.4* 4.7* 4.8* 3.3  --  2.5* 2.1* 2.3*       Vancomycin Administrations:  vancomycin given in the last 96 hours                     vancomycin (VANCOCIN) 1,000 mg in D5W 250 mL IVPB (admixture device) (mg) 1,000 mg New Bag 10/07/24 0417    vancomycin 1,500 mg in D5W 250 mL IVPB (admixture device) (mg) 1,500 mg New Bag 10/06/24 0532                    Microbiologic Results:  Microbiology Results (last 7 days)       Procedure Component Value Units Date/Time    Blood culture [[id number]] Collected: 10/05/24 1612    Order Status: Completed Specimen: Blood from Peripheral, Hand, Left Updated: 10/07/24 1703     Blood Culture, Routine No Growth to date      No Growth to date      No Growth to date    Blood culture [[id number]] Collected: 10/05/24 1556    Order Status: Completed Specimen: Blood from Peripheral, Wrist, Left Updated: 10/07/24 1703      Blood Culture, Routine No Growth to date      No Growth to date      No Growth to date

## 2024-10-08 NOTE — PT/OT/SLP PROGRESS
"Occupational Therapy      Patient Name:  Ar Sharma   MRN:  [id number]    Patient not seen today secondary to hold at this time; per MD note, PT/OT to treat "once more stable." Will follow-up as medically appropriate.     10/8/2024  "

## 2024-10-08 NOTE — PLAN OF CARE
10/08/24 0900   Rounds   Attendance Provider;;Assigned nurse;Charge nurse;Physical therapist;Pharmacist   Discharge Plan A Home Health   Why the patient remains in the hospital Requires continued medical care   Transition of Care Barriers None     Patient transferred to ICU on 10/5/2024 due to hypotension.  Patient's treatment is ongoing.    Nephrology and Cardiology consulted.  Patient on Levophed.  Will stop CRRT tomorrow morning and then transition back to HD MWF.   Ortho following s/p ORIF on 10/04/2024.    Discharge plan:  HH/resume OP HD.

## 2024-10-09 NOTE — SUBJECTIVE & OBJECTIVE
Past Medical History:   Diagnosis Date    Cardiomyopathy     CKD (chronic kidney disease) stage 4, GFR 15-29 ml/min     Congestive heart failure (CHF) 2015    COPD (chronic obstructive pulmonary disease)     Coronary artery disease     Edema     Essential (primary) hypertension 05/18/2022    Formatting of this note might be different from the original. Converted from [employer]: Description - ESSENTIAL HYPERTENSION, BENIGN    Heart attack     HLD (hyperlipidemia)     Hypertension     Hyperuricemia     Hypocalcemia     Renal cyst, left     Secondary hyperparathyroidism     Thyroid disease     V tach     Vitamin D deficiency        Past Surgical History:   Procedure Laterality Date    ablations  03/05/2018    albations  02/01/2018    COLONOSCOPY N/A 12/07/2018    Procedure: COLONOSCOPY/suprep;  Surgeon: Anayna Morillo MD;  Location: [facility] ENDO;  Service: Endoscopy;  Laterality: N/A;    defibulater N/A 2016    ESOPHAGOGASTRODUODENOSCOPY N/A 12/07/2018    Procedure: EGD (ESOPHAGOGASTRODUODENOSCOPY);  Surgeon: Ananya Morillo MD;  Location: [facility] ENDO;  Service: Endoscopy;  Laterality: N/A;    INSERTION OF TUNNELED CENTRAL VENOUS HEMODIALYSIS CATHETER Right 5/3/2024    Procedure: INSERTION, CATHETER, HEMODIALYSIS, DUAL LUMEN;  Surgeon: Philip Perez MD;  Location: [facility] OR;  Service: General;  Laterality: Right;    INSERTION, CATHETER, DIALYSIS, PERITONEAL, LAPAROSCOPIC N/A 5/8/2024    Procedure: INSERTION, CATHETER, DIALYSIS, PERITONEAL, LAPAROSCOPIC;  Surgeon: Tyree Ruiz MD;  Location: [facility] OR;  Service: General;  Laterality: N/A;    INSERTION, CENTRAL VENOUS ACCESS DEVICE Right 6/5/2024    Procedure: Insertion,central venous access device;  Surgeon: Tyree Ruiz MD;  Location: [facility] OR;  Service: General;  Laterality: Right;    JOINT REPLACEMENT Bilateral 10/2016    knees, bilat    LEFT HEART CATHETERIZATION N/A 12/16/2020    Procedure: Left heart cath;  Surgeon: Shahram Stewart MD;  Location:  [facility] CATH LAB/EP;  Service: Cardiology;  Laterality: N/A;    LEFT HEART CATHETERIZATION Left 9/27/2022    Procedure: Left heart cath;  Surgeon: Juan Roque MD;  Location: [facility] CATH LAB/EP;  Service: Cardiology;  Laterality: Left;    OPEN REDUCTION AND INTERNAL FIXATION (ORIF) OF INTERTROCHANTERIC FRACTURE OF FEMUR Right 10/4/2024    Procedure: ORIF, FRACTURE, FEMUR, INTERTROCHANTERIC;  Surgeon: Kinsey Reed MD;  Location: [facility] OR;  Service: Orthopedics;  Laterality: Right;  rCistina Del Real notified- N[place]    WI HEMODIALYSIS, ONE EVALUATION  5/6/2024    REMOVAL OF HEMODIALYSIS CATHETER Right 5/3/2024    Procedure: REMOVAL, CATHETER, HEMODIALYSIS;  Surgeon: Philip Perez MD;  Location: [facility] OR;  Service: General;  Laterality: Right;    REPLACEMENT OF IMPLANTABLE CARDIOVERTER-DEFIBRILLATOR (ICD) GENERATOR Left 1/24/2023    Procedure: REPLACEMENT, ICD GENERATOR;  Surgeon: River Tenorio MD;  Location: [facility] EP LAB;  Service: Cardiology;  Laterality: Left;  ANTHONY, CRTD gen chg, MDT, MAC, DM, 3prep       Review of patient's allergies indicates:   Allergen Reactions    Lokelma [sodium zirconium cyclosilicate] Other (See Comments)     Fluid retention, weight gain, CHF excerebration, severe constipation    Atorvastatin Other (See Comments)     Joint pain    Jardiance [empagliflozin] Other (See Comments)     Chest pains, significant weight loss, lower blood pressure       Medications:  Medications Prior to Admission   Medication Sig    acetaminophen (TYLENOL) 500 MG tablet Take 500 mg by mouth every 6 (six) hours as needed for Pain.    allopurinoL (ZYLOPRIM) 100 MG tablet TAKE 1 TABLET BY MOUTH EVERY DAY    amiodarone (PACERONE) 400 MG tablet Take 1 tablet (400 mg total) by mouth once daily.    aspirin (ECOTRIN) 81 MG EC tablet TAKE 1 TABLET BY MOUTH EVERY DAY    ergocalciferol (ERGOCALCIFEROL) 50,000 unit Cap TAKE 1 CAPSULE BY MOUTH ONE TIME PER WEEK    furosemide (LASIX) 80 MG tablet Take 4.5 tablets (360 mg  "total) by mouth 2 (two) times daily.    levothyroxine (SYNTHROID) 150 MCG tablet Take 1 tablet (150 mcg total) by mouth once daily.    metOLazone (ZAROXOLYN) 10 MG tablet Take 10 mg by mouth As instructed (Take I tablet by mouth Tuesday, Thursday, Saturday, Sunday). Take I tablet by mouth Tuesday, Thursday, Saturday, Sunday    mexiletine (MEXITIL) 150 MG Cap Take 1 capsule (150 mg total) by mouth 2 (two) times daily with meals.    midodrine (PROAMATINE) 5 MG Tab Take 3 tablets (15 mg total) by mouth every 8 (eight) hours.    nitroGLYCERIN (NITROSTAT) 0.4 MG SL tablet Place 1 tablet (0.4 mg total) under the tongue every 5 (five) minutes as needed for Chest pain.    pantoprazole (PROTONIX) 40 MG tablet Take 1 tablet (40 mg total) by mouth once daily.    polyethylene glycol (GLYCOLAX) 17 gram PwPk Take 17 g by mouth once daily.    pravastatin (PRAVACHOL) 40 MG tablet TAKE 1 TABLET BY MOUTH EVERY DAY    vitamin renal formula, B-complex-vitamin c-folic acid, (TRIPHROCAPS) 1 mg Cap Take 1 capsule by mouth once daily.     Antibiotics (From admission, onward)      Start     Stop Route Frequency Ordered    10/08/24 0415  ceFEPIme (MAXIPIME) 1 g in D5W 100 mL IVPB (MB+)         -- IV Every 24 hours (non-standard times) 10/07/24 0835    10/06/24 0409  vancomycin - pharmacy to dose  (vancomycin IVPB (PEDS and ADULTS))        Placed in "And" Linked Group    -- IV pharmacy to manage frequency 10/06/24 0309    10/03/24 1000  mupirocin 2 % ointment         10/08/24 0859 Nasl 2 times daily 10/03/24 0853          Antifungals (From admission, onward)      None          Antivirals (From admission, onward)      None             Immunization History   Administered Date(s) Administered    COVID-19, MRNA, LN-S, PF (Pfizer) (Purple Cap) 03/05/2021, 03/25/2021, 01/12/2022    Influenza 10/01/2012, 10/30/2013, 09/25/2020    Influenza - Quadrivalent - MDCK - PF 09/25/2020    Influenza - Quadrivalent - PF *Preferred* (6 months and older) " 10/15/2014, 10/27/2015, 10/01/2017, 10/02/2018, 2019, 2021, 10/11/2022    Pneumococcal Conjugate - 13 Valent 2019    Pneumococcal Polysaccharide - 23 Valent 2012, 2020    Td (Adult), Unspecified Formulation 2010    Tdap 10/16/2018    Zoster Recombinant 2019, 2019       Family History       Problem Relation (Age of Onset)    Alcohol abuse Father    Arthritis Sister    Breast cancer Mother    Cancer Mother    Hypertension Mother    Kidney disease Father    No Known Problems Brother, Daughter, Son    Stroke Mother          Social History     Socioeconomic History    Marital status:    Tobacco Use    Smoking status: Former     Current packs/day: 0.00     Average packs/day: 1 pack/day for 33.2 years (33.2 ttl pk-yrs)     Types: Cigarettes     Start date: 1979     Quit date: 3/3/2012     Years since quittin.6     Passive exposure: Past    Smokeless tobacco: Never   Substance and Sexual Activity    Alcohol use: Not Currently     Comment: rare    Drug use: No    Sexual activity: Not Currently     Partners: Female     Social Drivers of Health     Financial Resource Strain: Low Risk  (10/3/2024)    Overall Financial Resource Strain (CARDIA)     Difficulty of Paying Living Expenses: Not hard at all   Food Insecurity: No Food Insecurity (10/3/2024)    Hunger Vital Sign     Worried About Running Out of Food in the Last Year: Never true     Ran Out of Food in the Last Year: Never true   Transportation Needs: No Transportation Needs (10/3/2024)    TRANSPORTATION NEEDS     Transportation : No   Physical Activity: Insufficiently Active (2024)    Exercise Vital Sign     Days of Exercise per Week: 2 days     Minutes of Exercise per Session: 10 min   Stress: Stress Concern Present (10/3/2024)    [employer] [place] [employer] - Occupational Stress Questionnaire     Feeling of Stress : To some extent   Housing Stability: Low Risk  (10/3/2024)    Housing  Stability Vital Sign     Unable to Pay for Housing in the Last Year: No     Homeless in the Last Year: No     Review of Systems   Constitutional:  Negative for chills and fever.   Musculoskeletal:  Positive for arthralgias.   All other systems reviewed and are negative.    Objective:     Vital Signs (Most Recent):  Temp: 98.7 °F (37.1 °C) (10/09/24 0701)  Pulse: 69 (10/09/24 0645)  Resp: 15 (10/09/24 0645)  BP: (!) 95/47 (10/09/24 0645)  SpO2: 96 % (10/09/24 0645) Vital Signs (24h Range):  Temp:  [97.3 °F (36.3 °C)-98.7 °F (37.1 °C)] 98.7 °F (37.1 °C)  Pulse:  [] 69  Resp:  [11-27] 15  SpO2:  [70 %-100 %] 96 %  BP: ()/(38-58) 95/47     Weight: 75.4 kg (166 lb 3.6 oz)  Body mass index is 24.55 kg/m².    Estimated Creatinine Clearance: 28.7 mL/min (A) (based on SCr of 2.6 mg/dL (H)).     Physical Exam  Vitals and nursing note reviewed.   Constitutional:       General: He is not in acute distress.     Appearance: Normal appearance. He is not ill-appearing, toxic-appearing or diaphoretic.   HENT:      Head: Normocephalic and atraumatic.      Right Ear: External ear normal.      Left Ear: External ear normal.   Cardiovascular:      Rate and Rhythm: Normal rate.      Heart sounds: Murmur heard.   Pulmonary:      Breath sounds: No wheezing or rhonchi.   Abdominal:      Tenderness: There is no right CVA tenderness or left CVA tenderness.   Neurological:      General: No focal deficit present.      Mental Status: He is alert.   Psychiatric:         Mood and Affect: Mood normal.         Behavior: Behavior normal.          Significant Labs: BMP:   Recent Labs   Lab 10/08/24  2330 10/09/24  0311   * 145*   * 136   K 4.3 4.0    102   CO2 21* 24   BUN 20 20   CREATININE 2.6* 2.6*   CALCIUM 8.5* 8.7   MG 2.1  --      CBC:   Recent Labs   Lab 10/08/24  0427 10/09/24  0311   WBC 18.99* 27.13*   HGB 10.7* 11.3*   HCT 32.4* 33.0*    272     Microbiology Results (last 7 days)       Procedure  Component Value Units Date/Time    Blood culture [[id number]] Collected: 10/05/24 1612    Order Status: Completed Specimen: Blood from Peripheral, Hand, Left Updated: 10/08/24 1703     Blood Culture, Routine No Growth to date      No Growth to date      No Growth to date      No Growth to date    Blood culture [[id number]] Collected: 10/05/24 1556    Order Status: Completed Specimen: Blood from Peripheral, Wrist, Left Updated: 10/08/24 1703     Blood Culture, Routine No Growth to date      No Growth to date      No Growth to date      No Growth to date          Pathology Results  (Last 10 years)      None            Significant Imaging: I have reviewed all pertinent imaging results/findings within the past 24 hours.

## 2024-10-09 NOTE — EICU
Intervention Initiated From:  Bedside    Margaret intervened regarding:  Documentation and Time-Out    Nurse Notified:  Yes    Doctor Notified:  Yes    Comments: Called to bedside for documentation and timeout prior to central line placement. QUINTON OVIEDO. Dr. Jose M Connolly at bedside to perform. See bedside procedural flowsheet for time out details.

## 2024-10-09 NOTE — ASSESSMENT & PLAN NOTE
NICM. EF 40-45%. Volume status appears controlled. Has CRT-D followed at [facility].   Neg cath 2022.  Hx VT s/p ablation

## 2024-10-09 NOTE — HPI
Mr. Sharma is a pleasant 63 y.o. man with h/o ESRD on HD (for 2 months but prior was on PD for about a month-Follows with [facility]) MWF via a tunneled HD catheter on the right, Essential HTN, HLD, CAD,  h/o systolic(EF 40-45%) and diastolic grade 3 CHF due to non-ischemic cardiomyopathy with a [employer] CRT-D implant 2016 (sees Dr. Hernandez) uses 3L supplemental oxygen at night, and h/o VT arhythmia (follows with Dr. Tenorio at [facility]) s/p ablation and on Mexiletin 150mg PO BID and Amiodarone 400mg daily present to the ER with c/o right hip pain after a fall at home. Went to HD and was feeling alright after. Was moving his tool box when he tripped over his feet and fell to the ground while working on his RV. Was unable to get up or put weight on his right leg. Radiograph revealed a femur fracture and the patient was transferred here from [place] for Ortho evaluation. Here he underwent surgery and has an IM nail placed. He had a TTE which demonstrated a mass through a PFO. ID is consulted for possible IE. The patient and spouse deny prodromal fever, chills, or night sweats. No IDU but has HD TIW. Blood cultures (10/5) are NGTD.

## 2024-10-09 NOTE — PLAN OF CARE
Pt remains in ICU, alert and oriented. NSR in the monitor, in 3L nasal cannula saturating >95%.  Gtt Levophed infusing @0.08mcg/kg/min, titrating as per the order. TLC placed on Left IJ.  Plan for HD on Friday.  POC reviewed with patient and spouse at bedside, verbalizes understanding. 100 ml urine output this shift. No falls and injures during this shift.  Problem: Adult Inpatient Plan of Care  Goal: Plan of Care Review  Outcome: Progressing  Goal: Patient-Specific Goal (Individualized)  Outcome: Progressing  Goal: Absence of Hospital-Acquired Illness or Injury  Outcome: Progressing  Goal: Optimal Comfort and Wellbeing  Outcome: Progressing  Goal: Readiness for Transition of Care  Outcome: Progressing     Problem: Infection  Goal: Absence of Infection Signs and Symptoms  Outcome: Progressing     Problem: Wound  Goal: Optimal Coping  Outcome: Progressing  Goal: Optimal Functional Ability  Outcome: Progressing  Goal: Absence of Infection Signs and Symptoms  Outcome: Progressing  Goal: Improved Oral Intake  Outcome: Progressing  Goal: Optimal Pain Control and Function  Outcome: Progressing  Goal: Skin Health and Integrity  Outcome: Progressing  Goal: Optimal Wound Healing  Outcome: Progressing     Problem: Skin Injury Risk Increased  Goal: Skin Health and Integrity  Outcome: Progressing     Problem: Hemodialysis  Goal: Safe, Effective Therapy Delivery  Outcome: Progressing  Goal: Effective Tissue Perfusion  Outcome: Progressing  Goal: Absence of Infection Signs and Symptoms  Outcome: Progressing     Problem: Orthopaedic Fracture  Goal: Absence of Bleeding  Outcome: Progressing  Goal: Bowel Elimination  Outcome: Progressing  Goal: Absence of Embolism Signs and Symptoms  Outcome: Progressing  Goal: Fracture Stability  Outcome: Progressing  Goal: Optimal Functional Ability  Outcome: Progressing  Goal: Absence of Infection Signs and Symptoms  Outcome: Progressing  Goal: Effective Tissue Perfusion  Outcome:  Progressing  Goal: Optimal Pain Control and Function  Outcome: Progressing  Goal: Effective Oxygenation and Ventilation  Outcome: Progressing     Problem: Fall Injury Risk  Goal: Absence of Fall and Fall-Related Injury  Outcome: Progressing     Problem: CRRT (Continuous Renal Replacement Therapy)  Goal: Safe, Effective Therapy Delivery  Outcome: Progressing  Goal: Hemodynamic Stability  Outcome: Progressing  Goal: Body Temperature Maintained in Desired Range  Outcome: Progressing  Goal: Absence of Infection Signs and Symptoms  Outcome: Progressing

## 2024-10-09 NOTE — PT/OT/SLP PROGRESS
Occupational Therapy      Patient Name:  Ar Sharma   MRN:  [id number]    Patient not seen today secondary to pt having central line placed at this time; pt still w/ low BP at rest, requiring ongoing treatment./intervention in the ICU . Pt encouraged to participate in bed level activities including BUE/BLE AROM, turning and repositioning (w/ assist from staff).  Will follow-up as able.    10/9/2024

## 2024-10-09 NOTE — CONSULTS
Pulmonary consulted for central access.  He has been on levophed for multiple days with slowly up-titrating pressor requirements. Now needing levophed in setting of midodrine with CRRT paused.  Line placed without complication.    Please consult for further questions, should further assistance be needed.    Jose M Connolly MD  [facility]

## 2024-10-09 NOTE — SUBJECTIVE & OBJECTIVE
Interval History: s/p CRRT yesterday; net negative 4.7L/24h. CRRT clotted around 5am; RRT held since. Patient reports to be doing well this morning. No complaints. Edema has resolved. Remains on levophed.     Review of patient's allergies indicates:   Allergen Reactions    Lokelma [sodium zirconium cyclosilicate] Other (See Comments)     Fluid retention, weight gain, CHF excerebration, severe constipation    Atorvastatin Other (See Comments)     Joint pain    Jardiance [empagliflozin] Other (See Comments)     Chest pains, significant weight loss, lower blood pressure     Current Facility-Administered Medications   Medication Frequency    acetaminophen tablet 650 mg Q4H PRN    allopurinoL tablet 100 mg Daily    apixaban tablet 5 mg BID    ceFEPIme (MAXIPIME) 1 g in D5W 100 mL IVPB (MB+) Q24H    docusate sodium capsule 100 mg Daily    furosemide tablet 360 mg See admin instructions    heparin (porcine) injection 1,000 Units PRN    heparin (porcine) injection 5,000 Units PRN    hydrALAZINE injection 10 mg Q6H PRN    HYDROmorphone injection 1 mg Q4H PRN    hydrOXYzine pamoate capsule 25 mg Q8H PRN    levalbuterol nebulizer solution 1.25 mg Q4H PRN    levothyroxine tablet 150 mcg Before breakfast    melatonin tablet 6 mg Nightly PRN    metOLazone tablet 10 mg See admin instructions    mexiletine capsule 150 mg BID WM    midodrine tablet 15 mg Q8H    naloxone 0.4 mg/mL injection 0.4 mg PRN    nitroGLYCERIN SL tablet 0.4 mg Q5 Min PRN    NORepinephrine 4 mg in dextrose 5% 250 mL infusion (premix) Continuous    ondansetron injection 4 mg Q6H PRN    oxyCODONE-acetaminophen  mg per tablet 1 tablet Q4H PRN    oxyCODONE-acetaminophen 5-325 mg per tablet 1 tablet Q4H PRN    polyethylene glycol packet 17 g BID PRN    polyethylene glycol packet 17 g Daily    pravastatin tablet 40 mg QHS    promethazine (PHENERGAN) 6.25 mg in 0.9% NaCl 50 mL IVPB Q6H PRN    simethicone chewable tablet 80 mg QID PRN    sodium chloride 0.9%  bolus 250 mL 250 mL PRN    sodium chloride 0.9% flush 10 mL Q12H PRN    sodium chloride 0.9% flush 10 mL PRN    triamcinolone acetonide 0.1% cream BID    vancomycin - pharmacy to dose pharmacy to manage frequency       Objective:     Vital Signs (Most Recent):  Temp: 98.8 °F (37.1 °C) (10/09/24 1101)  Pulse: 69 (10/09/24 1145)  Resp: 18 (10/09/24 1145)  BP: (!) 98/55 (10/09/24 1145)  SpO2: 97 % (10/09/24 1145) Vital Signs (24h Range):  Temp:  [97.3 °F (36.3 °C)-98.8 °F (37.1 °C)] 98.8 °F (37.1 °C)  Pulse:  [] 69  Resp:  [11-27] 18  SpO2:  [70 %-100 %] 97 %  BP: ()/(38-58) 98/55     Weight: 75.4 kg (166 lb 3.6 oz) (70.76 kg dry weight) (10/09/24 1101)  Body mass index is 24.55 kg/m².  Body surface area is 1.92 meters squared.    I/O last 3 completed shifts:  In: 1757.3 [P.O.:240; I.V.:600.9; IV Piggyback:916.4]  Out: 8115 [Other:8115]     Physical Exam  Vitals and nursing note reviewed.   Constitutional:       General: He is awake. He is not in acute distress.     Appearance: Normal appearance. He is well-developed.   HENT:      Head: Normocephalic and atraumatic.      Nose: Nose normal.      Mouth/Throat:      Mouth: Mucous membranes are moist.   Eyes:      Extraocular Movements: Extraocular movements intact.      Conjunctiva/sclera: Conjunctivae normal.   Cardiovascular:      Rate and Rhythm: Normal rate and regular rhythm.      Comments: RIJ TDC  Pulmonary:      Effort: Pulmonary effort is normal.      Breath sounds: Normal breath sounds.   Abdominal:      General: There is no distension.      Palpations: Abdomen is soft.      Comments: PD catheter to LLQ   Musculoskeletal:         General: No tenderness.      Right lower leg: No edema.      Left lower leg: No edema.      Comments: BLE with trace pitting edema   Skin:     General: Skin is warm and dry.      Findings: No erythema or rash.   Neurological:      Mental Status: He is alert.   Psychiatric:         Mood and Affect: Mood normal.          Behavior: Behavior normal.          Significant Labs:  CBC:   Recent Labs   Lab 10/09/24  0311   WBC 27.13*   RBC 4.14*   HGB 11.3*   HCT 33.0*      MCV 80*   MCH 27.3   MCHC 34.2     CMP:   Recent Labs   Lab 10/09/24  0311   *   CALCIUM 8.7   ALBUMIN 2.7*   PROT 6.2      K 4.0   CO2 24      BUN 20   CREATININE 2.6*   ALKPHOS 167*   ALT 22   AST 97*   BILITOT 2.5*     All labs within the past 24 hours have been reviewed.     Significant Imaging:  Labs: Reviewed

## 2024-10-09 NOTE — PT/OT/SLP PROGRESS
Physical Therapy      Patient Name:  Ar Sharma   MRN:  [id number]    Patient not seen today secondary to On the floor for procedure/surgery. Will follow-up tomorrow.

## 2024-10-09 NOTE — NURSING
[facility]  Nurses Note -- 4 Eyes      10/9/2024       Skin assessed on: Q Shift      [] No Pressure Injuries Present    [x]Prevention Measures Documented    [x] Yes LDA  for Pressure Injury Previously documented     [] Yes New Pressure Injury Discovered   [] LDA for New Pressure Injury Added      Attending RN:  Tyler Syed RN     Second RN:  MILADIS Murcia

## 2024-10-09 NOTE — PLAN OF CARE
10/09/24 1001   Rounds   Attendance Provider;Nurse ;Assigned nurse;Charge nurse;Occupational therapist;Pharmacist   Discharge Plan A   (tbd)   Why the patient remains in the hospital Requires continued medical care   Transition of Care Barriers None     Nephrology and Cardiology consulted.  Ortho following s/p ORIF on 10/04/2024.  Remains on Levophed.  Plan to add midodrine.   CRRT to stop and plan to do HD on Friday.  Treatment in the ICU on going at this time.

## 2024-10-09 NOTE — NURSING
0059: Patient's MAP <65mmHg, have to go up with the Inj Levophed to 0.12mcg/kg/min, Inj concentration of Levophed 4mg changed to 8mg.    0445: CRRT alarmed, venous pressure increasing. Unable to aspirate from venous line, venous line clotted. CRRT stopped. Nephrology oncall Dr Su aware.    0513: Inj Levophed to be started on peripheral IV, concentration of Inj Levophed back to 4mg.

## 2024-10-09 NOTE — PROGRESS NOTES
[facility] - Intensive Care  Adult Nutrition  Progress Note    SUMMARY       Recommendations    Recommendation:  1. Continue pt on renal diet  2. Continue novasource renal TID  3. Encourage intake at meals  4. Monitor weight/labs  5. RD to follow to monitor PO intake    Goals:  Pt will consume 50-75% intake at meals by RD follow-up  Nutrition Goal Status: progressing towards goal  Communication of RD Recs:  (POC)    Assessment and Plan  Endocrine  Moderate protein-calorie malnutrition  Malnutrition Type:  Context: acute illness or injury  Level: mild    Related to (etiology):   ESRD    Signs and Symptoms (as evidenced by):   Unintended weight loss     Malnutrition Characteristic Summary:  Weight Loss (Malnutrition): greater than 10% in 6 months  Subcutaneous Fat (Malnutrition): mild depletion  Muscle Mass (Malnutrition): mild depletion    Interventions:  Collaboration with other providers  SideStep beverage    Nutrition Diagnosis Status:   Continues     Malnutrition Assessment  Malnutrition Context: acute illness or injury  Malnutrition Level: mild  Weight Loss (Malnutrition): greater than 10% in 6 months  Subcutaneous Fat (Malnutrition): mild depletion  Muscle Mass (Malnutrition): mild depletion   Orbital Region (Subcutaneous Fat Loss): mild depletion  Upper Arm Region (Subcutaneous Fat Loss): mild depletion   Druze Region (Muscle Loss): mild depletion  Clavicle Bone Region (Muscle Loss): moderate depletion  Clavicle and Acromion Bone Region (Muscle Loss): moderate depletion  Scapular Bone Region (Muscle Loss): mild depletion  Dorsal Hand (Muscle Loss): mild depletion  Patellar Region (Muscle Loss): mild depletion  Anterior Thigh Region (Muscle Loss): mild depletion  Posterior Calf Region (Muscle Loss): mild depletion     Reason for Assessment  Reason For Assessment: RD follow-up  Diagnosis: other (see comments) (closed right hip fracture)  General Information Comments: Pt admitted s/p fall, diagnosed with Closed  "right hip fracture. Pt s/p ORIF on intertrochanteric fracture of femur 10/4. Pt sarted on CRRT 10/6. Unable to see pt at this time d/t central line placement. Nick 16- incision right hip, moisture associated dermatitis medial perineum. NFPE assessed 10/3, mild wasting throughout. Recent wt changed of 18.92lb wt loss x 6 months. Intake at 75% per chart. (PMH: ESRD on HD(MWF), CHF s/p ICD, COPD, CAD, HTN)  Nutrition Discharge Planning: d/c on renal diet    Nutrition Risk Screen  Nutrition Risk Screen: no indicators present    Nutrition/Diet History  Spiritual, Cultural Beliefs, Moravian Practices, Values that Affect Care: no    Anthropometrics  Temp: 98.8 °F (37.1 °C)  Height Method: Stated  Height: 5' 9" (175.3 cm)  Height (inches): 69 in  Weight Method: Bed Scale  Weight: 75.4 kg (166 lb 3.6 oz) (70.76 kg dry weight)  Weight (lb): 166.23 lb  Ideal Body Weight (IBW), Male: 160 lb  % Ideal Body Weight, Male (lb): 103.89 %  BMI (Calculated): 24.5  BMI Grade: 18.5-24.9 - normal  Usual Body Weight (UBW), k.2 kg (24)  % Usual Body Weight: 96.62  % Weight Change From Usual Weight: -3.58 %     Lab/Procedures/Meds  Pertinent Labs Reviewed: reviewed  Pertinent Labs Comments: creatinine 2.6(H), eGFR 27(L), glucose 145(H), (H), albumin 2.7(L), bilirubin 2.5(H)  Pertinent Medications Reviewed: reviewed  Pertinent Medications Comments: allopurinol, apixaban, cefepime, furosemide, levothyroxine, metiletine, polyethylene glycol, vancomycin    Nutrition Related Social Determinants of Health:  SDOH: Adequate food in home environment    Estimated/Assessed Needs  Weight Used For Calorie Calculations: 70.6 kg (155 lb 10.7 oz) (dry weight)  Energy Calorie Requirements (kcal): 2118 kcal (30 kcal/kg dry weight)  Energy Need Method: Kcal/kg  Protein Requirements: 85-99g (1.2-1.4 g/kg dry weight)  Weight Used For Protein Calculations: 70.8 kg (156 lb) (dry weight)  Estimated Fluid Requirement Method: RDA Method  RDA " Method (mL): 2118     Nutrition Prescription Ordered  Current Diet Order: Renal diet  Oral Nutrition Supplement: Novasource Renal    Evaluation of Received Nutrient/Fluid Intake  I/O: 1461.6/0  Energy Calories Required: not meeting needs  Protein Required: not meeting needs  Fluid Required: not meeting needs  Comments: LBM: 10/2  Tolerance: not tolerating  % Intake of Estimated Energy Needs: 50 - 75 %  % Meal Intake: 50 - 75 %    Nutrition Risk  Level of Risk/Frequency of Follow-up:  (1x weekly)     Monitor and Evaluation  Food and Nutrient Intake: energy intake  Food and Nutrient Adminstration: diet order  Physical Activity and Function: nutrition-related ADLs and IADLs  Anthropometric Measurements: weight  Biochemical Data, Medical Tests and Procedures: electrolyte and renal panel, inflammatory profile  Nutrition-Focused Physical Findings: overall appearance     Nutrition Follow-Up  RD Follow-up?: Yes

## 2024-10-09 NOTE — NURSING
[facility] - [place]  Nurses Note -- 4 Eyes      10/8/2024       Skin assessed on: Q Shift      [] No Pressure Injuries Present    [x]Prevention Measures Documented    [x] Yes LDA  for Pressure Injury Previously documented     [] Yes New Pressure Injury Discovered   [] LDA for New Pressure Injury Added      Attending RN:  Divina Do RN     Second RN:  MILADIS Tang

## 2024-10-09 NOTE — PROGRESS NOTES
[facility] Intensive Care  Nephrology  Progress Note    Patient Name: Ar Sharma  MRN: [id number]  Admission Date: 10/3/2024  Hospital Length of Stay: 6 days  Attending Provider: Delmy Agarwal, Charanjit   Primary Care Physician: Jc Elliott MD  Principal Problem:Closed right hip fracture, initial encounter    Subjective:     HPI: Following for ESRD. Patient receives HD MWF via [facility] at [facility] under the c/o Dr. Chicas. He was initiated on HD 4/2024. He remains with PD catheter.     Interval History: s/p CRRT yesterday; net negative 4.7L/24h. CRRT clotted around 5am; RRT held since. Patient reports to be doing well this morning. No complaints. Edema has resolved. Remains on levophed.     Review of patient's allergies indicates:   Allergen Reactions    Lokelma [sodium zirconium cyclosilicate] Other (See Comments)     Fluid retention, weight gain, CHF excerebration, severe constipation    Atorvastatin Other (See Comments)     Joint pain    Jardiance [empagliflozin] Other (See Comments)     Chest pains, significant weight loss, lower blood pressure     Current Facility-Administered Medications   Medication Frequency    acetaminophen tablet 650 mg Q4H PRN    allopurinoL tablet 100 mg Daily    apixaban tablet 5 mg BID    ceFEPIme (MAXIPIME) 1 g in D5W 100 mL IVPB (MB+) Q24H    docusate sodium capsule 100 mg Daily    furosemide tablet 360 mg See admin instructions    heparin (porcine) injection 1,000 Units PRN    heparin (porcine) injection 5,000 Units PRN    hydrALAZINE injection 10 mg Q6H PRN    HYDROmorphone injection 1 mg Q4H PRN    hydrOXYzine pamoate capsule 25 mg Q8H PRN    levalbuterol nebulizer solution 1.25 mg Q4H PRN    levothyroxine tablet 150 mcg Before breakfast    melatonin tablet 6 mg Nightly PRN    metOLazone tablet 10 mg See admin instructions    mexiletine capsule 150 mg BID WM    midodrine tablet 15 mg Q8H    naloxone 0.4 mg/mL injection 0.4 mg PRN    nitroGLYCERIN SL tablet 0.4  mg Q5 Min PRN    NORepinephrine 4 mg in dextrose 5% 250 mL infusion (premix) Continuous    ondansetron injection 4 mg Q6H PRN    oxyCODONE-acetaminophen  mg per tablet 1 tablet Q4H PRN    oxyCODONE-acetaminophen 5-325 mg per tablet 1 tablet Q4H PRN    polyethylene glycol packet 17 g BID PRN    polyethylene glycol packet 17 g Daily    pravastatin tablet 40 mg QHS    promethazine (PHENERGAN) 6.25 mg in 0.9% NaCl 50 mL IVPB Q6H PRN    simethicone chewable tablet 80 mg QID PRN    sodium chloride 0.9% bolus 250 mL 250 mL PRN    sodium chloride 0.9% flush 10 mL Q12H PRN    sodium chloride 0.9% flush 10 mL PRN    triamcinolone acetonide 0.1% cream BID    vancomycin - pharmacy to dose pharmacy to manage frequency       Objective:     Vital Signs (Most Recent):  Temp: 98.8 °F (37.1 °C) (10/09/24 1101)  Pulse: 69 (10/09/24 1145)  Resp: 18 (10/09/24 1145)  BP: (!) 98/55 (10/09/24 1145)  SpO2: 97 % (10/09/24 1145) Vital Signs (24h Range):  Temp:  [97.3 °F (36.3 °C)-98.8 °F (37.1 °C)] 98.8 °F (37.1 °C)  Pulse:  [] 69  Resp:  [11-27] 18  SpO2:  [70 %-100 %] 97 %  BP: ()/(38-58) 98/55     Weight: 75.4 kg (166 lb 3.6 oz) (70.76 kg dry weight) (10/09/24 1101)  Body mass index is 24.55 kg/m².  Body surface area is 1.92 meters squared.    I/O last 3 completed shifts:  In: 1757.3 [P.O.:240; I.V.:600.9; IV Piggyback:916.4]  Out: 8115 [Other:8115]     Physical Exam  Vitals and nursing note reviewed.   Constitutional:       General: He is awake. He is not in acute distress.     Appearance: Normal appearance. He is well-developed.   HENT:      Head: Normocephalic and atraumatic.      Nose: Nose normal.      Mouth/Throat:      Mouth: Mucous membranes are moist.   Eyes:      Extraocular Movements: Extraocular movements intact.      Conjunctiva/sclera: Conjunctivae normal.   Cardiovascular:      Rate and Rhythm: Normal rate and regular rhythm.      Comments: MARIBEL BARAHONAC  Pulmonary:      Effort: Pulmonary effort is normal.       Breath sounds: Normal breath sounds.   Abdominal:      General: There is no distension.      Palpations: Abdomen is soft.      Comments: PD catheter to LLQ   Musculoskeletal:         General: No tenderness.      Right lower leg: No edema.      Left lower leg: No edema.      Comments: BLE with trace pitting edema   Skin:     General: Skin is warm and dry.      Findings: No erythema or rash.   Neurological:      Mental Status: He is alert.   Psychiatric:         Mood and Affect: Mood normal.         Behavior: Behavior normal.          Significant Labs:  CBC:   Recent Labs   Lab 10/09/24  0311   WBC 27.13*   RBC 4.14*   HGB 11.3*   HCT 33.0*      MCV 80*   MCH 27.3   MCHC 34.2     CMP:   Recent Labs   Lab 10/09/24  0311   *   CALCIUM 8.7   ALBUMIN 2.7*   PROT 6.2      K 4.0   CO2 24      BUN 20   CREATININE 2.6*   ALKPHOS 167*   ALT 22   AST 97*   BILITOT 2.5*     All labs within the past 24 hours have been reviewed.     Significant Imaging:  Labs: Reviewed    Assessment/Plan:     ESRD  - receives HD MWF at [facility] under the c/o Dr. Chicas  - also with PD catheter; patient planning to transition back to PD soon.   - PD catheter last flushed on 10/7  - patient developed hypotension post-op and ended up receiving ~5L of IVF  - patient received CRRT with levophed from 10/5-10/9. Finally obtained net negative status this morning  - hold RRT today. Wean levophed as tolerated  - planning to resume HD on Friday to continue home MWF schedule  - daily labs    Hypotension  - baseline SBP 90-100s  - holding RRT today  - wean levophed as tolerated    Anemia of CKD  - hgb at goal; no need for AWAIS at this time    Secondary HPTH  - CCa and phos normal  - renal diet      Thank you for your consult. I will follow-up with patient. Please contact us if you have any additional questions.    Lizzette Cain MD  Nephrology  [facility] - Intensive Care

## 2024-10-09 NOTE — CONSULTS
[facility] Intensive Care  Infectious Disease  Consult Note    Patient Name: Ar Sharma  MRN: [id number]  Admission Date: 10/3/2024  Hospital Length of Stay: 6 days  Attending Physician: Delmy Agarwal, *  Primary Care Provider: Jc Elliott MD     Isolation Status: No active isolations    Patient information was obtained from patient, spouse/SO, past medical records, and ER records.      Inpatient consult to Infectious Diseases  Consult performed by: Colin Montanez MD  Consult ordered by: Farhat Correia MD        Assessment/Plan:     Atrial mass    Mr. Sharma is a pleasant 62 yo man admitted after a fall with a femur fracture. He is s/p IM nail placement. Incidentally found to have an atrial thrombus v vegetation. ID is consulted for that. The patient denies any prodromal symptoms, such as fever, and has no stigmata of IE. Additionally, his blood cultures are NGTD. At this pont, he meets [facility] criteria for possible IE, though NBTE is a definite possibility.    Recommendations  Will obtain additional blood cultures, rheumatoid factor, ESR, etc  Karius Test in am re: possible CNE  Trend WBC  If leukocytosis persists, consider HIDA scan, when feasible    Thank you for your consult. I will follow-up with patient. Please contact us if you have any additional questions.    Colin Montanez MD  Infectious Disease  [facility] Intensive Care    Subjective:     Principal Problem: Closed right hip fracture, initial encounter    HPI: Mr. Sharma is a pleasant 63 y.o. man with h/o ESRD on HD (for 2 months but prior was on PD for about a month-Follows with [facility]) MWF via a tunneled HD catheter on the right, Essential HTN, HLD, CAD,  h/o systolic(EF 40-45%) and diastolic grade 3 CHF due to non-ischemic cardiomyopathy with a [employer] CRT-D implant 2016 (sees Dr. Hernandez) uses 3L supplemental oxygen at night, and h/o VT arhythmia (follows with Dr. Tenorio at [facility]) s/p ablation and on  Mexiletin 150mg PO BID and Amiodarone 400mg daily present to the ER with c/o right hip pain after a fall at home. Went to HD and was feeling alright after. Was moving his tool box when he tripped over his feet and fell to the ground while working on his RV. Was unable to get up or put weight on his right leg. Radiograph revealed a femur fracture and the patient was transferred here from [place] for Ortho evaluation. Here he underwent surgery and has an IM nail placed. He had a TTE which demonstrated a mass through a PFO. ID is consulted for possible IE. The patient and spouse deny prodromal fever, chills, or night sweats. No IDU but has HD TIW. Blood cultures (10/5) are NGTD.         Past Medical History:   Diagnosis Date    Cardiomyopathy     CKD (chronic kidney disease) stage 4, GFR 15-29 ml/min     Congestive heart failure (CHF) 2015    COPD (chronic obstructive pulmonary disease)     Coronary artery disease     Edema     Essential (primary) hypertension 05/18/2022    Formatting of this note might be different from the original. Converted from Centricity: Description - ESSENTIAL HYPERTENSION, BENIGN    Heart attack     HLD (hyperlipidemia)     Hypertension     Hyperuricemia     Hypocalcemia     Renal cyst, left     Secondary hyperparathyroidism     Thyroid disease     V tach     Vitamin D deficiency        Past Surgical History:   Procedure Laterality Date    ablations  03/05/2018    albations  02/01/2018    COLONOSCOPY N/A 12/07/2018    Procedure: COLONOSCOPY/suprep;  Surgeon: Ananya Morillo MD;  Location: [facility] ENDO;  Service: Endoscopy;  Laterality: N/A;    defibulater N/A 2016    ESOPHAGOGASTRODUODENOSCOPY N/A 12/07/2018    Procedure: EGD (ESOPHAGOGASTRODUODENOSCOPY);  Surgeon: Ananya Morillo MD;  Location: [facility] ENDO;  Service: Endoscopy;  Laterality: N/A;    INSERTION OF TUNNELED CENTRAL VENOUS HEMODIALYSIS CATHETER Right 5/3/2024    Procedure: INSERTION, CATHETER, HEMODIALYSIS, DUAL LUMEN;  Surgeon:  Philip Perez MD;  Location: [facility] OR;  Service: General;  Laterality: Right;    INSERTION, CATHETER, DIALYSIS, PERITONEAL, LAPAROSCOPIC N/A 5/8/2024    Procedure: INSERTION, CATHETER, DIALYSIS, PERITONEAL, LAPAROSCOPIC;  Surgeon: Tyree Ruiz MD;  Location: [facility] OR;  Service: General;  Laterality: N/A;    INSERTION, CENTRAL VENOUS ACCESS DEVICE Right 6/5/2024    Procedure: Insertion,central venous access device;  Surgeon: Tyree Ruiz MD;  Location: [facility] OR;  Service: General;  Laterality: Right;    JOINT REPLACEMENT Bilateral 10/2016    knees, bilat    LEFT HEART CATHETERIZATION N/A 12/16/2020    Procedure: Left heart cath;  Surgeon: Shahram Stewart MD;  Location: [facility] CATH LAB/EP;  Service: Cardiology;  Laterality: N/A;    LEFT HEART CATHETERIZATION Left 9/27/2022    Procedure: Left heart cath;  Surgeon: Juan Roque MD;  Location: [facility] CATH LAB/EP;  Service: Cardiology;  Laterality: Left;    OPEN REDUCTION AND INTERNAL FIXATION (ORIF) OF INTERTROCHANTERIC FRACTURE OF FEMUR Right 10/4/2024    Procedure: ORIF, FRACTURE, FEMUR, INTERTROCHANTERIC;  Surgeon: Kinsey Reed MD;  Location: [facility] OR;  Service: Orthopedics;  Laterality: Right;  Cristina Del Real notified- Nc    NV HEMODIALYSIS, ONE EVALUATION  5/6/2024    REMOVAL OF HEMODIALYSIS CATHETER Right 5/3/2024    Procedure: REMOVAL, CATHETER, HEMODIALYSIS;  Surgeon: Philip Perez MD;  Location: [facility] OR;  Service: General;  Laterality: Right;    REPLACEMENT OF IMPLANTABLE CARDIOVERTER-DEFIBRILLATOR (ICD) GENERATOR Left 1/24/2023    Procedure: REPLACEMENT, ICD GENERATOR;  Surgeon: River Tenorio MD;  Location: [facility] EP LAB;  Service: Cardiology;  Laterality: Left;  ANTHONY, CRTD gen chg, MDT, MAC, DM, 3prep       Review of patient's allergies indicates:   Allergen Reactions    Lokelma [sodium zirconium cyclosilicate] Other (See Comments)     Fluid retention, weight gain, CHF excerebration, severe constipation    Atorvastatin Other  (See Comments)     Joint pain    Jardiance [empagliflozin] Other (See Comments)     Chest pains, significant weight loss, lower blood pressure       Medications:  Medications Prior to Admission   Medication Sig    acetaminophen (TYLENOL) 500 MG tablet Take 500 mg by mouth every 6 (six) hours as needed for Pain.    allopurinoL (ZYLOPRIM) 100 MG tablet TAKE 1 TABLET BY MOUTH EVERY DAY    amiodarone (PACERONE) 400 MG tablet Take 1 tablet (400 mg total) by mouth once daily.    aspirin (ECOTRIN) 81 MG EC tablet TAKE 1 TABLET BY MOUTH EVERY DAY    ergocalciferol (ERGOCALCIFEROL) 50,000 unit Cap TAKE 1 CAPSULE BY MOUTH ONE TIME PER WEEK    furosemide (LASIX) 80 MG tablet Take 4.5 tablets (360 mg total) by mouth 2 (two) times daily.    levothyroxine (SYNTHROID) 150 MCG tablet Take 1 tablet (150 mcg total) by mouth once daily.    metOLazone (ZAROXOLYN) 10 MG tablet Take 10 mg by mouth As instructed (Take I tablet by mouth Tuesday, Thursday, Saturday, Sunday). Take I tablet by mouth Tuesday, Thursday, Saturday, Sunday    mexiletine (MEXITIL) 150 MG Cap Take 1 capsule (150 mg total) by mouth 2 (two) times daily with meals.    midodrine (PROAMATINE) 5 MG Tab Take 3 tablets (15 mg total) by mouth every 8 (eight) hours.    nitroGLYCERIN (NITROSTAT) 0.4 MG SL tablet Place 1 tablet (0.4 mg total) under the tongue every 5 (five) minutes as needed for Chest pain.    pantoprazole (PROTONIX) 40 MG tablet Take 1 tablet (40 mg total) by mouth once daily.    polyethylene glycol (GLYCOLAX) 17 gram PwPk Take 17 g by mouth once daily.    pravastatin (PRAVACHOL) 40 MG tablet TAKE 1 TABLET BY MOUTH EVERY DAY    vitamin renal formula, B-complex-vitamin c-folic acid, (TRIPHROCAPS) 1 mg Cap Take 1 capsule by mouth once daily.     Antibiotics (From admission, onward)      Start     Stop Route Frequency Ordered    10/08/24 5532  ceFEPIme (MAXIPIME) 1 g in D5W 100 mL IVPB (MB+)         -- IV Every 24 hours (non-standard times) 10/07/24 5400     "10/06/24 0409  vancomycin - pharmacy to dose  (vancomycin IVPB (PEDS and ADULTS))        Placed in "And" Linked Group    -- IV pharmacy to manage frequency 10/06/24 0309    10/03/24 1000  mupirocin 2 % ointment         10/08/24 0859 Nasl 2 times daily 10/03/24 0853          Antifungals (From admission, onward)      None          Antivirals (From admission, onward)      None             Immunization History   Administered Date(s) Administered    COVID-19, MRNA, LN-S, PF (Pfizer) (Purple Cap) 2021, 2021, 2022    Influenza 10/01/2012, 10/30/2013, 2020    Influenza - Quadrivalent - MDCK - PF 2020    Influenza - Quadrivalent - PF *Preferred* (6 months and older) 10/15/2014, 10/27/2015, 10/01/2017, 10/02/2018, 2019, 2021, 10/11/2022    Pneumococcal Conjugate - 13 Valent 2019    Pneumococcal Polysaccharide - 23 Valent 2012, 2020    Td (Adult), Unspecified Formulation 2010    Tdap 10/16/2018    Zoster Recombinant 2019, 2019       Family History       Problem Relation (Age of Onset)    Alcohol abuse Father    Arthritis Sister    Breast cancer Mother    Cancer Mother    Hypertension Mother    Kidney disease Father    No Known Problems Brother, Daughter, Son    Stroke Mother          Social History     Socioeconomic History    Marital status:    Tobacco Use    Smoking status: Former     Current packs/day: 0.00     Average packs/day: 1 pack/day for 33.2 years (33.2 ttl pk-yrs)     Types: Cigarettes     Start date: 1979     Quit date: 3/3/2012     Years since quittin.6     Passive exposure: Past    Smokeless tobacco: Never   Substance and Sexual Activity    Alcohol use: Not Currently     Comment: rare    Drug use: No    Sexual activity: Not Currently     Partners: Female     Social Drivers of Health     Financial Resource Strain: Low Risk  (10/3/2024)    Overall Financial Resource Strain (CARDIA)     Difficulty of Paying Living " Expenses: Not hard at all   Food Insecurity: No Food Insecurity (10/3/2024)    Hunger Vital Sign     Worried About Running Out of Food in the Last Year: Never true     Ran Out of Food in the Last Year: Never true   Transportation Needs: No Transportation Needs (10/3/2024)    TRANSPORTATION NEEDS     Transportation : No   Physical Activity: Insufficiently Active (6/5/2024)    Exercise Vital Sign     Days of Exercise per Week: 2 days     Minutes of Exercise per Session: 10 min   Stress: Stress Concern Present (10/3/2024)    [employer] [place] [employer] - Occupational Stress Questionnaire     Feeling of Stress : To some extent   Housing Stability: Low Risk  (10/3/2024)    Housing Stability Vital Sign     Unable to Pay for Housing in the Last Year: No     Homeless in the Last Year: No     Review of Systems   Constitutional:  Negative for chills and fever.   Musculoskeletal:  Positive for arthralgias.   All other systems reviewed and are negative.    Objective:     Vital Signs (Most Recent):  Temp: 98.7 °F (37.1 °C) (10/09/24 0701)  Pulse: 69 (10/09/24 0645)  Resp: 15 (10/09/24 0645)  BP: (!) 95/47 (10/09/24 0645)  SpO2: 96 % (10/09/24 0645) Vital Signs (24h Range):  Temp:  [97.3 °F (36.3 °C)-98.7 °F (37.1 °C)] 98.7 °F (37.1 °C)  Pulse:  [] 69  Resp:  [11-27] 15  SpO2:  [70 %-100 %] 96 %  BP: ()/(38-58) 95/47     Weight: 75.4 kg (166 lb 3.6 oz)  Body mass index is 24.55 kg/m².    Estimated Creatinine Clearance: 28.7 mL/min (A) (based on SCr of 2.6 mg/dL (H)).     Physical Exam  Vitals and nursing note reviewed.   Constitutional:       General: He is not in acute distress.     Appearance: Normal appearance. He is not ill-appearing, toxic-appearing or diaphoretic.   HENT:      Head: Normocephalic and atraumatic.      Right Ear: External ear normal.      Left Ear: External ear normal.   Cardiovascular:      Rate and Rhythm: Normal rate.      Heart sounds: Murmur heard.   Pulmonary:      Breath  sounds: No wheezing or rhonchi.   Abdominal:      Tenderness: There is no right CVA tenderness or left CVA tenderness.   Neurological:      General: No focal deficit present.      Mental Status: He is alert.   Psychiatric:         Mood and Affect: Mood normal.         Behavior: Behavior normal.          Significant Labs: BMP:   Recent Labs   Lab 10/08/24  2330 10/09/24  0311   * 145*   * 136   K 4.3 4.0    102   CO2 21* 24   BUN 20 20   CREATININE 2.6* 2.6*   CALCIUM 8.5* 8.7   MG 2.1  --      CBC:   Recent Labs   Lab 10/08/24  0427 10/09/24  0311   WBC 18.99* 27.13*   HGB 10.7* 11.3*   HCT 32.4* 33.0*    272     Microbiology Results (last 7 days)       Procedure Component Value Units Date/Time    Blood culture [[id number]] Collected: 10/05/24 1612    Order Status: Completed Specimen: Blood from Peripheral, Hand, Left Updated: 10/08/24 1703     Blood Culture, Routine No Growth to date      No Growth to date      No Growth to date      No Growth to date    Blood culture [[id number]] Collected: 10/05/24 1556    Order Status: Completed Specimen: Blood from Peripheral, Wrist, Left Updated: 10/08/24 1703     Blood Culture, Routine No Growth to date      No Growth to date      No Growth to date      No Growth to date          Pathology Results  (Last 10 years)      None            Significant Imaging: I have reviewed all pertinent imaging results/findings within the past 24 hours.

## 2024-10-09 NOTE — ASSESSMENT & PLAN NOTE
Echo. Show ,  Left Atrium: There is a large mobile frondlike mass, appears to be adherent to LA septum.    Right Atrium: Right atrium is severely dilated. Multiple leads present in the right atrium with possible vegetation adherent to lead in RA.  Cardiology is consulted,recommended anticoagulations and IV Abx for possible endocarditis ,ID is consulted.blood culture is repeated.

## 2024-10-09 NOTE — SUBJECTIVE & OBJECTIVE
Review of Systems   Gastrointestinal:  Negative for melena.   Genitourinary:  Negative for hematuria.     Objective:     Vital Signs (Most Recent):  Temp: 98.7 °F (37.1 °C) (10/09/24 0701)  Pulse: 69 (10/09/24 0645)  Resp: 15 (10/09/24 0645)  BP: (!) 95/47 (10/09/24 0645)  SpO2: 96 % (10/09/24 0645) Vital Signs (24h Range):  Temp:  [97.3 °F (36.3 °C)-98.7 °F (37.1 °C)] 98.7 °F (37.1 °C)  Pulse:  [] 69  Resp:  [11-27] 15  SpO2:  [70 %-100 %] 96 %  BP: ()/(38-58) 95/47     Weight: 75.4 kg (166 lb 3.6 oz)  Body mass index is 24.55 kg/m².     SpO2: 96 %         Intake/Output Summary (Last 24 hours) at 10/9/2024 0818  Last data filed at 10/9/2024 0657  Gross per 24 hour   Intake 1208.83 ml   Output 5519 ml   Net -4310.17 ml       Lines/Drains/Airways       Central Venous Catheter Line  Duration                  Hemodialysis Catheter 05/03/24 1115 right subclavian 158 days              Peripheral Intravenous Line  Duration                  Peripheral IV - Single Lumen 10/06/24 0620 20 G Anterior;Left Upper Arm 3 days                     Exam unchanged vs 10/8/24  Physical Exam  Constitutional:       General: He is not in acute distress.     Appearance: He is well-developed. He is not ill-appearing, toxic-appearing or diaphoretic.   HENT:      Head: Normocephalic and atraumatic.   Eyes:      General: No scleral icterus.     Extraocular Movements: Extraocular movements intact.      Conjunctiva/sclera: Conjunctivae normal.      Pupils: Pupils are equal, round, and reactive to light.   Neck:      Thyroid: No thyromegaly.      Vascular: No JVD.      Trachea: No tracheal deviation.   Cardiovascular:      Rate and Rhythm: Normal rate and regular rhythm.      Heart sounds: S1 normal and S2 normal. No murmur heard.     No friction rub. No gallop.   Pulmonary:      Effort: Pulmonary effort is normal. No respiratory distress.      Breath sounds: Normal breath sounds. No stridor. No wheezing, rhonchi or rales.   Chest:       Chest wall: No tenderness.   Abdominal:      General: There is no distension.      Palpations: Abdomen is soft.   Musculoskeletal:         General: No swelling or tenderness. Normal range of motion.      Cervical back: Normal range of motion and neck supple. No rigidity.      Right lower leg: No edema.      Left lower leg: No edema.   Skin:     General: Skin is warm and dry.      Coloration: Skin is not jaundiced.   Neurological:      General: No focal deficit present.      Mental Status: He is alert and oriented to person, place, and time.      Cranial Nerves: No cranial nerve deficit.   Psychiatric:         Mood and Affect: Mood normal.         Behavior: Behavior normal.            Current Medications:   allopurinoL  100 mg Oral Daily    apixaban  5 mg Oral BID    ceFEPime IV (PEDS and ADULTS)  1 g Intravenous Q24H    docusate sodium  100 mg Oral Daily    levothyroxine  150 mcg Oral Before breakfast    mexiletine  150 mg Oral BID WM    midodrine  15 mg Oral Q8H    polyethylene glycol  17 g Oral Daily    pravastatin  40 mg Oral QHS    triamcinolone acetonide 0.1%   Topical (Top) BID      NORepinephrine bitartrate-D5W  0-3 mcg/kg/min Intravenous Continuous 22.6 mL/hr at 10/09/24 0657 0.08 mcg/kg/min at 10/09/24 0657       Current Facility-Administered Medications:     acetaminophen, 650 mg, Oral, Q4H PRN    furosemide, 360 mg, Oral, See admin instructions    heparin (porcine), 1,000 Units, Intra-Catheter, PRN    heparin (porcine), 5,000 Units, Intra-Catheter, PRN    hydrALAZINE, 10 mg, Intravenous, Q6H PRN    HYDROmorphone, 1 mg, Intravenous, Q4H PRN    hydrOXYzine pamoate, 25 mg, Oral, Q8H PRN    levalbuterol, 1.25 mg, Nebulization, Q4H PRN    melatonin, 6 mg, Oral, Nightly PRN    metOLazone, 10 mg, Oral, See admin instructions    naloxone, 0.4 mg, Intravenous, PRN    nitroGLYCERIN, 0.4 mg, Sublingual, Q5 Min PRN    ondansetron, 4 mg, Intravenous, Q6H PRN    oxyCODONE-acetaminophen, 1 tablet, Oral, Q4H PRN     oxyCODONE-acetaminophen, 1 tablet, Oral, Q4H PRN    polyethylene glycol, 17 g, Oral, BID PRN    promethazine (PHENERGAN) 6.25 mg in 0.9% NaCl 50 mL IVPB, 6.25 mg, Intravenous, Q6H PRN    simethicone, 1 tablet, Oral, QID PRN    sodium chloride 0.9%, 250 mL, Intravenous, PRN    sodium chloride 0.9%, 10 mL, Intravenous, Q12H PRN    sodium chloride 0.9%, 10 mL, Intravenous, PRN    Pharmacy to dose Vancomycin consult, , , Once **AND** vancomycin - pharmacy to dose, , Intravenous, pharmacy to manage frequency    Laboratory (all labs reviewed):  CBC:  Recent Labs   Lab 10/05/24  0544 10/06/24  0439 10/07/24  0219 10/08/24  0427 10/09/24  0311   WBC 19.83 H 20.85 H 15.41 H 18.99 H 27.13 H   Hemoglobin 12.6 L 12.2 L 11.5 L 10.7 L 11.3 L   Hematocrit 37.5 L 35.8 L 33.6 L 32.4 L 33.0 L   Platelets 407 294 320 288 272       CHEMISTRIES:  Recent Labs   Lab 01/10/22  0753 02/07/22  0745 04/04/22  1255 06/09/22  0740 07/07/22  0746 09/06/22  0741 10/06/24  2249 10/07/24  0219 10/07/24  1418 10/07/24  2313 10/08/24  0427 10/08/24  1357 10/08/24  1639 10/08/24  2330 10/09/24  0311   Glucose 103 86 92 90 92   < > 133 H   < > 119 H 151 H 110  110 111 H  --  113 H 145 H   Sodium 136 135 L 137 139 136   < > 134 L   < > 136 135 L 135 L  135 L 137  --  134 L 136   Potassium 4.4 4.5 4.8 4.8 4.3   < > 3.9   < > 4.1 4.1 4.8  4.8 4.2  --  4.3 4.0   BUN 72 H 72 H 68 H 81 H 76 H   < > 37 H   < > 33 H 27 H 25 H  25 H 21  --  20 20   Creatinine 4.83 H 4.30 H 4.08 H 4.64 H 4.06 H   < > 4.5 H   < > 4.1 H 3.5 H 3.3 H  3.3 H 2.7 H  --  2.6 H 2.6 H   eGFR if non  12.0 A 13.8 A 14.7 A 12.6 A 14.8 A  --   --   --   --   --   --   --   --   --   --    eGFR  --   --   --   --   --    < > 14 A   < > 15 A 19 A 20 A  20 A 26 A  --  27 A 27 A   Calcium 8.8 9.1 9.1 8.7 9.5   < > 8.5 L   < > 8.6 L 8.3 L 8.8  8.8 9.0  --  8.5 L 8.7   Magnesium  --   --   --   --   --    < > 1.8  --  1.8 1.8  --   --  1.9 2.1  --     < > = values in this  interval not displayed.       CARDIAC BIOMARKERS:  Recent Labs   Lab 09/25/22  1638 04/29/24  1707 10/04/24  0418   Troponin I 0.015 0.040 H 0.045 H       COAGS:  Recent Labs   Lab 01/17/23  0741 10/02/24  2224 10/03/24  0441   INR 1.2 1.3 H 1.2       LIPIDS/LFTS:  Recent Labs   Lab 05/04/23  1129 09/01/23  0836 03/04/24  1018 04/12/24  1056 04/29/24  2039 06/04/24  1440 09/04/24  1201 09/30/24  1100 10/05/24  0544 10/06/24  0439 10/07/24  0219 10/08/24  0427 10/09/24  0311   Cholesterol 151 139 124  --  127  --  189  --   --   --   --   --   --    Triglycerides 79 104 66  --  66  --  167 H  --   --   --   --   --   --    HDL 41 35 L 39 L  --  32 L  --  54  --   --   --   --   --   --    LDL Cholesterol 94.2 83.2 71.8  --  81.8  --  101.6  --   --   --   --   --   --    Non-HDL Cholesterol 110 104 85  --  95  --  135  --   --   --   --   --   --    AST 45 51 H 39   < >  --    < > 99 H   < > 367 H 240 H 152 H 115 H 97 H   ALT 56 H 57 H 35   < >  --    < > 68 H   < > 271 H 88 H 33 19 22    < > = values in this interval not displayed.       BNP:  Recent Labs   Lab 09/25/22  1638 09/28/22  1545 04/12/24  1056 04/29/24  1707   BNP 2,304 H 1,416 H 2,549 H 3,204 H       TSH:  Recent Labs   Lab 09/29/22  0448 12/20/22  1052 05/04/23  1129 03/04/24  1018 09/04/24  1201   TSH 3.045 1.480 1.160 0.283 L 1.740       Free T4:  Recent Labs   Lab 11/11/21  0727 03/04/24  1018   Free T4 1.48 3.25 H       Diagnostic Results:  ECG (personally reviewed and interpreted tracing(s)):  10/6/24 0720 AP-BiVP 60    Chest X-Ray (personally reviewed and interpreted image(s)): 10/6/24 CMeg, L ICD 3 leads, R HD catheter    Echo: 10/7/24 (images personally reviewed and interpreted).  Compared with report/images 10/3/24, RA and LA masses are new, LV WMA is old.  ?Thrombus in transit across PFO.    Left Ventricle: The left ventricle is normal in size. Mildly increased wall thickness. There is mild concentric hypertrophy. Regional wall motion  abnormalities present (severe distal anteroapical hypokinesis). There is mildly reduced systolic function with a visually estimated ejection fraction of 40 - 45%.    Right Ventricle: Severe right ventricular enlargement. Systolic function is severely reduced. Pacemaker lead present in the ventricle and appears abnormal with possible vegetation on lead in RA (2.7x2.0cm).    Left Atrium: There is a large mobile frondlike mass, appears to be adherent to LA septum.    Right Atrium: Right atrium is severely dilated. Multiple leads present in the right atrium with possible vegetation adherent to lead in RA.    Aortic Valve: The aortic valve is a trileaflet valve. There is mild aortic valve sclerosis.    Tricuspid Valve: There is mild regurgitation.    Pulmonary Artery: The estimated pulmonary artery systolic pressure is 67 mmHg.    RA mass 2.7x2.0 cm      LA mass 2.2x0.5cm      Cath: 9/27/22   There was non-obstructive coronary artery disease. Stable as compared to prior.   Left Main   The vessel was visualized by angiography, is moderate in size and is angiographically normal.      Left Anterior Descending   Mid LAD lesion was 20% stenosed.      Left Circumflex   The vessel was visualized by angiography, is moderate in size and is angiographically normal.      Right Coronary Artery   Prox RCA to Mid RCA lesion was 20% stenosed.

## 2024-10-09 NOTE — PROGRESS NOTES
Pharmacokinetic Assessment Follow Up: IV Vancomycin    Vancomycin serum concentration assessment(s):    The random level was drawn correctly and can be used to guide therapy at this time. The measurement is within the desired definitive target range of 10 to 20 mcg/mL.    Vancomycin Regimen Plan:    Give Vancomycin 750 mg x1 dose and obtain random level 10/10 at 0300    Drug levels (last 3 results):  Recent Labs   Lab Result Units 10/07/24  0219 10/08/24  0427 10/09/24  0311   Vancomycin, Random ug/mL  --  12.9 15.9   Vancomycin-Trough ug/mL 9.5*  --   --        Pharmacy will continue to follow and monitor vancomycin.    Please contact pharmacy at extension [phone] for questions regarding this assessment.    Thank you for the consult,   Madi Encinas       Patient brief summary:  Ar Sharma is a 63 y.o. male initiated on antimicrobial therapy with IV Vancomycin for treatment of lower respiratory infection    Drug Allergies:   Review of patient's allergies indicates:   Allergen Reactions    Lokelma [sodium zirconium cyclosilicate] Other (See Comments)     Fluid retention, weight gain, CHF excerebration, severe constipation    Atorvastatin Other (See Comments)     Joint pain    Jardiance [empagliflozin] Other (See Comments)     Chest pains, significant weight loss, lower blood pressure       Actual Body Weight:   75.4 kg    Renal Function:   Estimated Creatinine Clearance: 28.7 mL/min (A) (based on SCr of 2.6 mg/dL (H)).,     Dialysis Method (if applicable):  CRRT    CBC (last 72 hours):  Recent Labs   Lab Result Units 10/06/24  0439 10/07/24  0219 10/08/24  0427 10/09/24  0311   WBC K/uL 20.85* 15.41* 18.99* 27.13*   Hemoglobin g/dL 12.2* 11.5* 10.7* 11.3*   Hematocrit % 35.8* 33.6* 32.4* 33.0*   Platelets K/uL 294 320 288 272   Gran % % 89.9* 80.2* 78.8* 79.3*   Lymph % % 1.8* 3.4* 3.4* 3.2*   Mono % % 6.1 12.8 13.4 13.2   Eosinophil % % 0.7 2.0 2.2 1.8   Basophil % % 0.4 0.6 0.6 0.6   Differential Method   Automated Automated Automated Automated       Metabolic Panel (last 72 hours):  Recent Labs   Lab Result Units 10/06/24  0439 10/06/24  2249 10/07/24  0219 10/07/24  1418 10/07/24  2313 10/08/24  0427 10/08/24  1357 10/08/24  1639 10/08/24  2330 10/09/24  0311   Sodium mmol/L 135*  135* 134* 135* 136 135* 135*  135* 137  --  134* 136   Potassium mmol/L 3.7  3.7 3.9 4.1 4.1 4.1 4.8  4.8 4.2  --  4.3 4.0   Chloride mmol/L 103  103 103 103 104 103 104  104 103  --  103 102   CO2 mmol/L 18*  18* 20* 19* 23 24 24  24 25  --  21* 24   Glucose mg/dL 118*  118* 133* 120* 119* 151* 110  110 111*  --  113* 145*   BUN mg/dL 44*  44* 37* 36* 33* 27* 25*  25* 21  --  20 20   Creatinine mg/dL 5.4*  5.4* 4.5* 4.3* 4.1* 3.5* 3.3*  3.3* 2.7*  --  2.6* 2.6*   Albumin g/dL 3.0*  3.0* 2.7* 2.8* 2.6* 2.6* 2.8*  2.8* 2.8*  --  2.5* 2.7*   Total Bilirubin mg/dL 2.0*  --  2.1*  --   --  2.2*  --   --   --  2.5*   Alkaline Phosphatase U/L 115  --  149*  --   --  165*  --   --   --  167*   AST U/L 240*  --  152*  --   --  115*  --   --   --  97*   ALT U/L 88*  --  33  --   --  19  --   --   --  22   Magnesium mg/dL  --  1.8  --  1.8 1.8  --   --  1.9 2.1  --    Phosphorus mg/dL 4.8* 3.3  --  2.5* 2.1* 2.3* 3.1  --  2.6*  --        Vancomycin Administrations:  vancomycin given in the last 96 hours                     vancomycin (VANCOCIN) 1,000 mg in D5W 250 mL IVPB (admixture device) (mg) 1,000 mg New Bag 10/08/24 0553    vancomycin (VANCOCIN) 1,000 mg in D5W 250 mL IVPB (admixture device) (mg) 1,000 mg New Bag 10/07/24 0417    vancomycin 1,500 mg in D5W 250 mL IVPB (admixture device) (mg) 1,500 mg New Bag 10/06/24 0532                    Microbiologic Results:  Microbiology Results (last 7 days)       Procedure Component Value Units Date/Time    Blood culture [[id number]] Collected: 10/05/24 1612    Order Status: Completed Specimen: Blood from Peripheral, Hand, Left Updated: 10/08/24 1703     Blood Culture, Routine No  Growth to date      No Growth to date      No Growth to date      No Growth to date    Blood culture [[id number]] Collected: 10/05/24 1556    Order Status: Completed Specimen: Blood from Peripheral, Wrist, Left Updated: 10/08/24 1703     Blood Culture, Routine No Growth to date      No Growth to date      No Growth to date      No Growth to date

## 2024-10-09 NOTE — SUBJECTIVE & OBJECTIVE
Interval History: feeling better with no new complaints.  Echo. Show ,  Left Atrium: There is a large mobile frondlike mass, appears to be adherent to LA septum.    Right Atrium: Right atrium is severely dilated. Multiple leads present in the right atrium with possible vegetation adherent to lead in RA.  Cardiology is consulted,recommended anticoagulations and IV Abx for possible endocarditis ,ID is consulted.blood culture is repeated.    Review of Systems   HENT:  Negative for ear discharge and ear pain.    Eyes:  Negative for discharge and itching.   Endocrine: Negative for cold intolerance and heat intolerance.   Neurological:  Negative for seizures and syncope.     Objective:     Vital Signs (Most Recent):  Temp: 98.7 °F (37.1 °C) (10/09/24 0701)  Pulse: 66 (10/09/24 0830)  Resp: 20 (10/09/24 0830)  BP: (!) 89/52 (10/09/24 0830)  SpO2: 96 % (10/09/24 0830) Vital Signs (24h Range):  Temp:  [97.3 °F (36.3 °C)-98.7 °F (37.1 °C)] 98.7 °F (37.1 °C)  Pulse:  [] 66  Resp:  [11-27] 20  SpO2:  [70 %-100 %] 96 %  BP: ()/(38-58) 89/52     Weight: 75.4 kg (166 lb 3.6 oz)  Body mass index is 24.55 kg/m².    Intake/Output Summary (Last 24 hours) at 10/9/2024 1131  Last data filed at 10/9/2024 0800  Gross per 24 hour   Intake 983.94 ml   Output 4444 ml   Net -3460.06 ml         Physical Exam  Vitals and nursing note reviewed.   Constitutional:       General: He is not in acute distress.     Appearance: He is ill-appearing (chronically). He is not toxic-appearing or diaphoretic.      Interventions: Nasal cannula in place.   HENT:      Head: Normocephalic and atraumatic.      Nose: No rhinorrhea.      Mouth/Throat:      Mouth: Mucous membranes are moist.   Eyes:      General: No scleral icterus.     Extraocular Movements: Extraocular movements intact.   Neck:      Comments: Right IJ tunneled dialysis catheter  Cardiovascular:      Rate and Rhythm: Normal rate and regular rhythm.      Heart sounds: No murmur  heard.  Pulmonary:      Effort: No tachypnea, accessory muscle usage, respiratory distress or retractions.   Abdominal:      General: There is no distension.      Comments: PD catheter present   Skin:     General: Skin is warm and dry.      Coloration: Skin is not jaundiced.      Findings: No rash.   Neurological:      General: No focal deficit present.      Mental Status: He is alert. Mental status is at baseline.             Significant Labs: All pertinent labs within the past 24 hours have been reviewed.  BMP:   Recent Labs   Lab 10/08/24  2330 10/09/24  0311   * 145*   * 136   K 4.3 4.0    102   CO2 21* 24   BUN 20 20   CREATININE 2.6* 2.6*   CALCIUM 8.5* 8.7   MG 2.1  --      CBC:   Recent Labs   Lab 10/08/24  0427 10/09/24  0311   WBC 18.99* 27.13*   HGB 10.7* 11.3*   HCT 32.4* 33.0*    272       Significant Imaging: I have reviewed all pertinent imaging results/findings within the past 24 hours.

## 2024-10-09 NOTE — PROGRESS NOTES
[facility] Medicine  Progress Note    Patient Name: Ar Sharma  MRN: [id number]  Patient Class: IP- Inpatient   Admission Date: 10/3/2024  Length of Stay: 6 days  Attending Physician: Delmy Agarwal, *  Primary Care Provider: Jc Elliott MD        Subjective:     Principal Problem:Closed right hip fracture, initial encounter        HPI:  63 y.o. AAM with h/o ESRD on HD (for 2 months but prior was on PD for about a month-Follows with [facility]) MWF via a tunneled HD catheter on the right, Essential HTN, HLD, CAD,  h/o systolic(EF 40-45%) and diastolic grade 3 CHF due to non-ischemic cardiomyopathy with a [employer] CRT-D implant 2016 (sees Dr. Hernandez) uses 3L supplemental oxygen at night, and h/o VT arhythmia (follows with Dr. Tenorio at [facility]) s/p ablation and on Mexiletin 150mg PO BID and Amiodarone 400mg daily present to the ER with c/o right hip pain after a fall at home.     Went to HD today and was feeling alright after. Was moving his tool box when he tripped over his feet and fell to the ground. Was unable to get up or put weight on his right leg.     Work up in the ED at [place] noted normal WBC and Stable H/H.  INR 1.3.  Lytes stable and c/w post-HD with no indications for emergent HD tonight.     Right hip/pelvis x-ray FINDINGS:  There is a minimally displaced intertrochanteric fracture of the right proximal femur.  No additional fractures are seen.  There is osteopenia.  The femoral heads are well seated in the acetabula.  There is mild joint space narrowing of the bilateral femoroacetabular joints and the pubic symphysis.  There is a peritoneal dialysis catheter overlying the pelvis.     Impression:     Right intertrochanteric fracture.      Transfer center contacted us for admission due to [facility] on Ortho-diversion. They spoke with Dr. Mcclendon who agreed to see the patient in consultation. We have been asked to accept for further pre-op planning.  Last time he took his ASA was yesterday am.       Results for orders placed during the hospital encounter of 04/29/24    Echo Saline Bubble? No; Ultrasound enhancing contrast? No    Interpretation Summary    Left Ventricle: The left ventricle is normal in size. Normal wall thickness. Regional wall motion abnormalities present. See diagram for wall motion findings. Septal motion is consistent with pacing. There is mildly reduced systolic function with a visually estimated ejection fraction of 40 - 45%. Grade III diastolic dysfunction.    Right Ventricle: Moderate to severe right ventricular enlargement. Systolic function is reduced.TAPSE is 1.59 cm. Pacemaker lead present in the ventricle.    Left Atrium: Left atrium is severely dilated. The left atrium volume index is 71.4 mL/m2.    Right Atrium: Right atrium is severely dilated.    Aortic Valve: There is mild aortic valve sclerosis. There is mild to moderate stenosis. Aortic valve area by VTI is 1.49 cm². Aortic valve peak velocity is 1.43 m/s. Mean gradient is 5 mmHg. The dimensionless index is 0.52.    Mitral Valve: There is mild regurgitation.    Tricuspid Valve: There is mild to moderate regurgitation.    Pulmonary Artery: The estimated pulmonary artery systolic pressure is 64 mmHg.    IVC/SVC: Elevated venous pressure at 15 mmHg.      Overview/Hospital Course:   63 y.o. AAM with h/o ESRD on HD (MWF), HTN, HLD, CAD, CHF admitted on 10/03/24 for Right intertrochanteric Hip fracture after a fall at home. No head trauma or LOC. CXR with interstitial edema.  No evidence of pneumothorax.  X-ray of the right hip with minimally displaced intertrochanteric fracture of the right proximal femur.  Orthopedic surgery consulted- s/p right  femur intramedullary nail on 10/04.  Minimal blood loss per orthopedic. Echo with EF of 40-45%, mild aortic stenosis.  PASP of 66 mm Hg.  Cardiology consulted for cardiac clearance-cleared for surgery at moderate cardiac risk.  Continue  symptomatic management and supportive care.  Nephrology following for ESRD. Patient  hypotensive at baseline but acutely more hypotensive during hospitalization but is asymptomatic. Unable to tolerate HD on 10/05. Persistently hypotensive. patient transferred to ICU for presssor support and CRRT on 10/05.  Tolerating CRRT.    Echo. Show ,  Left Atrium: There is a large mobile frondlike mass, appears to be adherent to LA septum.    Right Atrium: Right atrium is severely dilated. Multiple leads present in the right atrium with possible vegetation adherent to lead in RA.  Cardiology is consulted,recommended anticoagulations and IV Abx for possible endocarditis ,ID is consulted.blood culture is repeated.    Interval History: feeling better with no new complaints.  Echo. Show ,  Left Atrium: There is a large mobile frondlike mass, appears to be adherent to LA septum.    Right Atrium: Right atrium is severely dilated. Multiple leads present in the right atrium with possible vegetation adherent to lead in RA.  Cardiology is consulted,recommended anticoagulations and IV Abx for possible endocarditis ,ID is consulted.blood culture is repeated.    Review of Systems   HENT:  Negative for ear discharge and ear pain.    Eyes:  Negative for discharge and itching.   Endocrine: Negative for cold intolerance and heat intolerance.   Neurological:  Negative for seizures and syncope.     Objective:     Vital Signs (Most Recent):  Temp: 98.7 °F (37.1 °C) (10/09/24 0701)  Pulse: 66 (10/09/24 0830)  Resp: 20 (10/09/24 0830)  BP: (!) 89/52 (10/09/24 0830)  SpO2: 96 % (10/09/24 0830) Vital Signs (24h Range):  Temp:  [97.3 °F (36.3 °C)-98.7 °F (37.1 °C)] 98.7 °F (37.1 °C)  Pulse:  [] 66  Resp:  [11-27] 20  SpO2:  [70 %-100 %] 96 %  BP: ()/(38-58) 89/52     Weight: 75.4 kg (166 lb 3.6 oz)  Body mass index is 24.55 kg/m².    Intake/Output Summary (Last 24 hours) at 10/9/2024 1131  Last data filed at 10/9/2024 0800  Gross per 24  hour   Intake 983.94 ml   Output 4444 ml   Net -3460.06 ml         Physical Exam  Vitals and nursing note reviewed.   Constitutional:       General: He is not in acute distress.     Appearance: He is ill-appearing (chronically). He is not toxic-appearing or diaphoretic.      Interventions: Nasal cannula in place.   HENT:      Head: Normocephalic and atraumatic.      Nose: No rhinorrhea.      Mouth/Throat:      Mouth: Mucous membranes are moist.   Eyes:      General: No scleral icterus.     Extraocular Movements: Extraocular movements intact.   Neck:      Comments: Right IJ tunneled dialysis catheter  Cardiovascular:      Rate and Rhythm: Normal rate and regular rhythm.      Heart sounds: No murmur heard.  Pulmonary:      Effort: No tachypnea, accessory muscle usage, respiratory distress or retractions.   Abdominal:      General: There is no distension.      Comments: PD catheter present   Skin:     General: Skin is warm and dry.      Coloration: Skin is not jaundiced.      Findings: No rash.   Neurological:      General: No focal deficit present.      Mental Status: He is alert. Mental status is at baseline.             Significant Labs: All pertinent labs within the past 24 hours have been reviewed.  BMP:   Recent Labs   Lab 10/08/24  2330 10/09/24  0311   * 145*   * 136   K 4.3 4.0    102   CO2 21* 24   BUN 20 20   CREATININE 2.6* 2.6*   CALCIUM 8.5* 8.7   MG 2.1  --      CBC:   Recent Labs   Lab 10/08/24  0427 10/09/24  0311   WBC 18.99* 27.13*   HGB 10.7* 11.3*   HCT 32.4* 33.0*    272       Significant Imaging: I have reviewed all pertinent imaging results/findings within the past 24 hours.    Assessment/Plan:      * Closed right hip fracture, initial encounter  -s/p fall. No LOC or head trauma  -Right hip XR: minimally displaced intertrochanteric fracture of the right proximal femur.   -Orthopedic surgery consulted: s/p right  femur intramedullary nail on 10/04  -PT/OT once more  stable.    Atrial mass    Echo. Show ,  Left Atrium: There is a large mobile frondlike mass, appears to be adherent to LA septum.    Right Atrium: Right atrium is severely dilated. Multiple leads present in the right atrium with possible vegetation adherent to lead in RA.  Cardiology is consulted,recommended anticoagulations and IV Abx for possible endocarditis ,ID is consulted.blood culture is repeated.    Right atrial thrombus  As above.      Leukocytosis  -WBC# trending up, 20k on 10/06. Pt remained afebrile.   -initially suspected reactive given fracture and recent surgery  -However, pt also with PD cath in LLQ.   BCX negative.  Empirically started on aBX's    Acute on chronic hypoxic respiratory failure  Patient with Hypoxic Respiratory failure which is Acute on chronic.  he is on home oxygen at 3 LPM. Supplemental oxygen was provided and noted-      .   Signs/symptoms of respiratory failure include- tachypnea and increased work of breathing. Contributing diagnoses includes - CHF and COPD Labs and images were reviewed. Patient Has recent ABG, which has been reviewed. Will treat underlying causes and adjust management of respiratory failure as follows-     -pt on home 3L NC. Hx of COPD/CHF  -Worsening hypoxia overnight 10/05; required up to 15L HFNC.  -acute on chronic hypoxia secondary to volume overload  -Pt unable to tolerate HD and CRRT, net positive 1.5L on 10/06  -CXR with pulmonary edema   -Pulmonology consulted  -Nephrology following  -Wean O2 as tolerated     Advanced care planning/counseling discussion  Advance Care Planning    Date: 10/05/2024    Code Status  I engaged the the patient in a voluntary conversation about the patient's preferences for care  at the very end of life. The patient wishes to have CPR and other invasive treatments performed when his heart and/or breathing stops. I communicated to the patient that his wishes align with full code status and he agrees.    A total of 16 min was  spent on advance care planning, goals of care discussion, emotional support, formulating and communicating prognosis and exploring burden/benefit of various approaches of treatment. This discussion occurred on a fully voluntary basis with the verbal consent of the patient and/or family.            Moderate protein-calorie malnutrition  Nutrition consulted. Most recent weight and BMI monitored-     Measurements:  Wt Readings from Last 1 Encounters:   10/06/24 74.6 kg (164 lb 7.4 oz)   Body mass index is 24.29 kg/m².    Patient has been screened and assessed by RD.    Malnutrition Type:  Context: acute illness or injury  Level: mild    Malnutrition Characteristic Summary:  Weight Loss (Malnutrition): greater than 10% in 6 months  Subcutaneous Fat (Malnutrition): mild depletion  Muscle Mass (Malnutrition): mild depletion    Interventions/Recommendations (treatment strategy):  1. Add Novasource Renal TID 2. [place] intake at meals3. Monitor weight/labs 4. RD to follow to monitor PO intake      Hypotension  -resume home midodrine   -Unable to tolerate HD on 10/05, SBP dropped to 70s  -now on CRRT    ESRD (end stage renal disease)  Nephrology consulted: HD per nephrology   HD stopped early on 10/05 due to hypotension  Persistently hypotensive, transfer to ICU for CRRT and pressor support on 10/05  CRRT started on 10/05    Pulmonary emphysema, unspecified emphysema type  COPD is controlled on his home 3L oxygen at night.   No wheezing on exam  Now volume overloaded and requiring more O2  PRN duonebs ordered.   Wean to home O2 as tolerated    Aortic atherosclerosis  Continue statin       Primary hypertension  Chronic, controlled. Pt now with hypotension   Pt with hypotension, continue home midodrine   Currently on pressors to keep MAP>65    Latest blood pressure and vitals reviewed-     Temp:  [97.5 °F (36.4 °C)-98.5 °F (36.9 °C)]   Pulse:  [60-66]   Resp:  [12-42]   BP: ()/(45-72)   SpO2:  [92 %-100 %] .   Home meds  for hypertension were reviewed and noted below.   Hypertension Medications               furosemide (LASIX) 80 MG tablet Take 4.5 tablets (360 mg total) by mouth 2 (two) times daily.    metOLazone (ZAROXOLYN) 10 MG tablet Take 10 mg by mouth As instructed (Take I tablet by mouth Tuesday, Thursday, Saturday, Sunday). Take I tablet by mouth Tuesday, Thursday, Saturday, Sunday    nitroGLYCERIN (NITROSTAT) 0.4 MG SL tablet Place 1 tablet (0.4 mg total) under the tongue every 5 (five) minutes as needed for Chest pain.            While in the hospital, will manage blood pressure as follows; Adjust home antihypertensive regimen as follows- patient with hypotension chronically. Continue home midodrine     Will utilize p.r.n. blood pressure medication only if patient's blood pressure greater than  180/90  and he develops symptoms such as worsening chest pain or shortness of breath.    Chronic combined systolic and diastolic heart failure  NO signs of acute exacerbation.   Echo as below   Cardiology consulted for cardiac clearance-cleared for surgery at moderate cardiac risk.   Nephrology for volume removal with HD    Echo    Result Date: 10/7/2024    Left Ventricle: The left ventricle is normal in size. Mildly increased   wall thickness. There is mild concentric hypertrophy. Regional wall motion   abnormalities present (severe distal anteroapical hypokinesis). There is   mildly reduced systolic function with a visually estimated ejection   fraction of 40 - 45%.    Right Ventricle: Severe right ventricular enlargement. Systolic   function is severely reduced. Pacemaker lead present in the ventricle and   appears abnormal with possible vegetation on lead in RA (2.7x2.0cm).    Left Atrium: There is a large mobile frondlike mass, appears to be   adherent to LA septum.    Right Atrium: Right atrium is severely dilated. Multiple leads present   in the right atrium with possible vegetation adherent to lead in RA.    Aortic Valve:  The aortic valve is a trileaflet valve. There is mild   aortic valve sclerosis.    Tricuspid Valve: There is mild regurgitation.    Pulmonary Artery: The estimated pulmonary artery systolic pressure is   67 mmHg.        Echo    Result Date: 10/3/2024    Left Ventricle: The left ventricle is mildly dilated. There is mild   eccentric hypertrophy. Septal flattening in diastole and systole   consistent with right ventricular volume and pressure overload. There is   mildly reduced systolic function with a visually estimated ejection   fraction of 40 - 45%.    Right Ventricle: Severe right ventricular enlargement. Systolic   function is moderately reduced.    Left Atrium: Left atrium is moderately dilated.    Right Atrium: Right atrium is severely dilated.    Aortic Valve: There is mild stenosis. Aortic valve area by VTI is 1.6   cm². Aortic valve peak velocity is 1.6 m/s. Mean gradient is 6.3 mmHg. The   dimensionless index is 0.50.    Mitral Valve: There is mild to moderate regurgitation.    Tricuspid Valve: There is moderate regurgitation.    Pulmonary Artery: The estimated pulmonary artery systolic pressure is   66 mmHg.    IVC/SVC: Elevated venous pressure at 15 mmHg.           S/P ablation of ventricular arrhythmia  Cardiology consulted for cardiac clearance.   Resume home Mexiletin 150mg PO BID   Hold amiodarone 400mg PO daily in setting of hypotension   ICD in place       Cardiac resynchronization therapy defibrillator (CRT-D) in place  -noted      Atherosclerosis of native coronary artery of native heart with angina pectoris  Patient with known CAD , which is controlled Will continue Statin and monitor for S/Sx of angina/ACS. Continue to monitor on telemetry.     Mixed hyperlipidemia  -continue statin       Iron deficiency anemia  Anemia is likely due to Iron deficiency. Most recent hemoglobin and hematocrit are listed below.  Recent Labs     10/05/24  0544 10/06/24  0439 10/07/24  0219   HGB 12.6* 12.2* 11.5*    HCT 37.5* 35.8* 33.6*       Plan  - Monitor serial CBC: Daily  - Transfuse PRBC if patient becomes hemodynamically unstable, symptomatic or H/H drops below 7/21.  - Patient has not received any PRBC transfusions to date  - Patient's anemia is currently stable  - stable, monitoring     Hypothyroidism  -resume home synthroid       VTE Risk Mitigation (From admission, onward)           Ordered     apixaban tablet 5 mg  2 times daily         10/07/24 1319     heparin (porcine) injection 5,000 Units  Use PRN         10/05/24 1830     Place sequential compression device  Until discontinued         10/04/24 2217     heparin (porcine) injection 1,000 Units  As needed (PRN)         10/04/24 2217     IP VTE LOW RISK PATIENT  Once         10/04/24 1438     Place sequential compression device  Until discontinued         10/03/24 0154     Reason for No Pharmacological VTE Prophylaxis  Once        Comments: Right hip fracture needs surgical eval   Question:  Reasons:  Answer:  Physician Provided (leave comment)    10/03/24 0154                    Discharge Planning   SLAVA:      Code Status: Full Code   Is the patient medically ready for discharge?:     Reason for patient still in hospital (select all that apply): Patient trending condition  Discharge Plan A:  (tbd)            Critical care time spent on the evaluation and treatment of severe organ dysfunction, review of pertinent labs and imaging studies, discussions with consulting providers and discussions with patient/family:  over 45  minutes.      Delmy Agarwal MD  Department of Hospital Medicine   [facility]

## 2024-10-09 NOTE — PLAN OF CARE
Recommendation:  1. Continue pt on renal diet  2. Continue novasource renal TID  3. Encourage intake at meals  4. Monitor weight/labs  5. RD to follow to monitor PO intake    Goals:  Pt will consume 50-75% intake at meals by RD follow-up

## 2024-10-09 NOTE — PROGRESS NOTES
[facility] - Intensive Care  Cardiology  Progress Note    Patient Name: Ar Sharma  MRN: [id number]  Admission Date: 10/3/2024  Hospital Length of Stay: 6 days  Code Status: Full Code   Attending Physician: Delmy Agarwal, *   Primary Care Physician: Jc Elliott MD  Expected Discharge Date:   Principal Problem:Closed right hip fracture, initial encounter    Subjective:     Interval Hx: pt seen in ICU.  No cp/sob.  No further chills.  On norepi this am.    Tele:  AV paced 60.  (personally reviewed and interpreted)        Review of Systems   Gastrointestinal:  Negative for melena.   Genitourinary:  Negative for hematuria.     Objective:     Vital Signs (Most Recent):  Temp: 98.7 °F (37.1 °C) (10/09/24 0701)  Pulse: 69 (10/09/24 0645)  Resp: 15 (10/09/24 0645)  BP: (!) 95/47 (10/09/24 0645)  SpO2: 96 % (10/09/24 0645) Vital Signs (24h Range):  Temp:  [97.3 °F (36.3 °C)-98.7 °F (37.1 °C)] 98.7 °F (37.1 °C)  Pulse:  [] 69  Resp:  [11-27] 15  SpO2:  [70 %-100 %] 96 %  BP: ()/(38-58) 95/47     Weight: 75.4 kg (166 lb 3.6 oz)  Body mass index is 24.55 kg/m².     SpO2: 96 %         Intake/Output Summary (Last 24 hours) at 10/9/2024 0818  Last data filed at 10/9/2024 0657  Gross per 24 hour   Intake 1208.83 ml   Output 5519 ml   Net -4310.17 ml       Lines/Drains/Airways       Central Venous Catheter Line  Duration                  Hemodialysis Catheter 05/03/24 1115 right subclavian 158 days              Peripheral Intravenous Line  Duration                  Peripheral IV - Single Lumen 10/06/24 0620 20 G Anterior;Left Upper Arm 3 days                     Exam unchanged vs 10/8/24  Physical Exam  Constitutional:       General: He is not in acute distress.     Appearance: He is well-developed. He is not ill-appearing, toxic-appearing or diaphoretic.   HENT:      Head: Normocephalic and atraumatic.   Eyes:      General: No scleral icterus.     Extraocular Movements: Extraocular movements intact.       Conjunctiva/sclera: Conjunctivae normal.      Pupils: Pupils are equal, round, and reactive to light.   Neck:      Thyroid: No thyromegaly.      Vascular: No JVD.      Trachea: No tracheal deviation.   Cardiovascular:      Rate and Rhythm: Normal rate and regular rhythm.      Heart sounds: S1 normal and S2 normal. No murmur heard.     No friction rub. No gallop.   Pulmonary:      Effort: Pulmonary effort is normal. No respiratory distress.      Breath sounds: Normal breath sounds. No stridor. No wheezing, rhonchi or rales.   Chest:      Chest wall: No tenderness.   Abdominal:      General: There is no distension.      Palpations: Abdomen is soft.   Musculoskeletal:         General: No swelling or tenderness. Normal range of motion.      Cervical back: Normal range of motion and neck supple. No rigidity.      Right lower leg: No edema.      Left lower leg: No edema.   Skin:     General: Skin is warm and dry.      Coloration: Skin is not jaundiced.   Neurological:      General: No focal deficit present.      Mental Status: He is alert and oriented to person, place, and time.      Cranial Nerves: No cranial nerve deficit.   Psychiatric:         Mood and Affect: Mood normal.         Behavior: Behavior normal.            Current Medications:   allopurinoL  100 mg Oral Daily    apixaban  5 mg Oral BID    ceFEPime IV (PEDS and ADULTS)  1 g Intravenous Q24H    docusate sodium  100 mg Oral Daily    levothyroxine  150 mcg Oral Before breakfast    mexiletine  150 mg Oral BID WM    midodrine  15 mg Oral Q8H    polyethylene glycol  17 g Oral Daily    pravastatin  40 mg Oral QHS    triamcinolone acetonide 0.1%   Topical (Top) BID      NORepinephrine bitartrate-D5W  0-3 mcg/kg/min Intravenous Continuous 22.6 mL/hr at 10/09/24 0657 0.08 mcg/kg/min at 10/09/24 0657       Current Facility-Administered Medications:     acetaminophen, 650 mg, Oral, Q4H PRN    furosemide, 360 mg, Oral, See admin instructions    heparin (porcine), 1,000  Units, Intra-Catheter, PRN    heparin (porcine), 5,000 Units, Intra-Catheter, PRN    hydrALAZINE, 10 mg, Intravenous, Q6H PRN    HYDROmorphone, 1 mg, Intravenous, Q4H PRN    hydrOXYzine pamoate, 25 mg, Oral, Q8H PRN    levalbuterol, 1.25 mg, Nebulization, Q4H PRN    melatonin, 6 mg, Oral, Nightly PRN    metOLazone, 10 mg, Oral, See admin instructions    naloxone, 0.4 mg, Intravenous, PRN    nitroGLYCERIN, 0.4 mg, Sublingual, Q5 Min PRN    ondansetron, 4 mg, Intravenous, Q6H PRN    oxyCODONE-acetaminophen, 1 tablet, Oral, Q4H PRN    oxyCODONE-acetaminophen, 1 tablet, Oral, Q4H PRN    polyethylene glycol, 17 g, Oral, BID PRN    promethazine (PHENERGAN) 6.25 mg in 0.9% NaCl 50 mL IVPB, 6.25 mg, Intravenous, Q6H PRN    simethicone, 1 tablet, Oral, QID PRN    sodium chloride 0.9%, 250 mL, Intravenous, PRN    sodium chloride 0.9%, 10 mL, Intravenous, Q12H PRN    sodium chloride 0.9%, 10 mL, Intravenous, PRN    Pharmacy to dose Vancomycin consult, , , Once **AND** vancomycin - pharmacy to dose, , Intravenous, pharmacy to manage frequency    Laboratory (all labs reviewed):  CBC:  Recent Labs   Lab 10/05/24  0544 10/06/24  0439 10/07/24  0219 10/08/24  0427 10/09/24  0311   WBC 19.83 H 20.85 H 15.41 H 18.99 H 27.13 H   Hemoglobin 12.6 L 12.2 L 11.5 L 10.7 L 11.3 L   Hematocrit 37.5 L 35.8 L 33.6 L 32.4 L 33.0 L   Platelets 407 294 320 288 272       CHEMISTRIES:  Recent Labs   Lab 01/10/22  0753 02/07/22  0745 04/04/22  1255 06/09/22  0740 07/07/22  0746 09/06/22  0741 10/06/24  2249 10/07/24  0219 10/07/24  1418 10/07/24  2313 10/08/24  0427 10/08/24  1357 10/08/24  1639 10/08/24  2330 10/09/24  0311   Glucose 103 86 92 90 92   < > 133 H   < > 119 H 151 H 110  110 111 H  --  113 H 145 H   Sodium 136 135 L 137 139 136   < > 134 L   < > 136 135 L 135 L  135 L 137  --  134 L 136   Potassium 4.4 4.5 4.8 4.8 4.3   < > 3.9   < > 4.1 4.1 4.8  4.8 4.2  --  4.3 4.0   BUN 72 H 72 H 68 H 81 H 76 H   < > 37 H   < > 33 H 27 H 25 H   25 H 21  --  20 20   Creatinine 4.83 H 4.30 H 4.08 H 4.64 H 4.06 H   < > 4.5 H   < > 4.1 H 3.5 H 3.3 H  3.3 H 2.7 H  --  2.6 H 2.6 H   eGFR if non  12.0 A 13.8 A 14.7 A 12.6 A 14.8 A  --   --   --   --   --   --   --   --   --   --    eGFR  --   --   --   --   --    < > 14 A   < > 15 A 19 A 20 A  20 A 26 A  --  27 A 27 A   Calcium 8.8 9.1 9.1 8.7 9.5   < > 8.5 L   < > 8.6 L 8.3 L 8.8  8.8 9.0  --  8.5 L 8.7   Magnesium  --   --   --   --   --    < > 1.8  --  1.8 1.8  --   --  1.9 2.1  --     < > = values in this interval not displayed.       CARDIAC BIOMARKERS:  Recent Labs   Lab 09/25/22  1638 04/29/24  1707 10/04/24  0418   Troponin I 0.015 0.040 H 0.045 H       COAGS:  Recent Labs   Lab 01/17/23  0741 10/02/24  2224 10/03/24  0441   INR 1.2 1.3 H 1.2       LIPIDS/LFTS:  Recent Labs   Lab 05/04/23  1129 09/01/23  0836 03/04/24  1018 04/12/24  1056 04/29/24  2039 06/04/24  1440 09/04/24  1201 09/30/24  1100 10/05/24  0544 10/06/24  0439 10/07/24  0219 10/08/24  0427 10/09/24  0311   Cholesterol 151 139 124  --  127  --  189  --   --   --   --   --   --    Triglycerides 79 104 66  --  66  --  167 H  --   --   --   --   --   --    HDL 41 35 L 39 L  --  32 L  --  54  --   --   --   --   --   --    LDL Cholesterol 94.2 83.2 71.8  --  81.8  --  101.6  --   --   --   --   --   --    Non-HDL Cholesterol 110 104 85  --  95  --  135  --   --   --   --   --   --    AST 45 51 H 39   < >  --    < > 99 H   < > 367 H 240 H 152 H 115 H 97 H   ALT 56 H 57 H 35   < >  --    < > 68 H   < > 271 H 88 H 33 19 22    < > = values in this interval not displayed.       BNP:  Recent Labs   Lab 09/25/22  1638 09/28/22  1545 04/12/24  1056 04/29/24  1707   BNP 2,304 H 1,416 H 2,549 H 3,204 H       TSH:  Recent Labs   Lab 09/29/22  0448 12/20/22  1052 05/04/23  1129 03/04/24  1018 09/04/24  1201   TSH 3.045 1.480 1.160 0.283 L 1.740       Free T4:  Recent Labs   Lab 11/11/21  0727 03/04/24  1018   Free T4 1.48 3.25 H        Diagnostic Results:  ECG (personally reviewed and interpreted tracing(s)):  10/6/24 0720 AP-BiVP 60    Chest X-Ray (personally reviewed and interpreted image(s)): 10/6/24 CMeg, L ICD 3 leads, R HD catheter    Echo: 10/7/24 (images personally reviewed and interpreted).  Compared with report/images 10/3/24, RA and LA masses are new, LV WMA is old.  ?Thrombus in transit across PFO.    Left Ventricle: The left ventricle is normal in size. Mildly increased wall thickness. There is mild concentric hypertrophy. Regional wall motion abnormalities present (severe distal anteroapical hypokinesis). There is mildly reduced systolic function with a visually estimated ejection fraction of 40 - 45%.    Right Ventricle: Severe right ventricular enlargement. Systolic function is severely reduced. Pacemaker lead present in the ventricle and appears abnormal with possible vegetation on lead in RA (2.7x2.0cm).    Left Atrium: There is a large mobile frondlike mass, appears to be adherent to LA septum.    Right Atrium: Right atrium is severely dilated. Multiple leads present in the right atrium with possible vegetation adherent to lead in RA.    Aortic Valve: The aortic valve is a trileaflet valve. There is mild aortic valve sclerosis.    Tricuspid Valve: There is mild regurgitation.    Pulmonary Artery: The estimated pulmonary artery systolic pressure is 67 mmHg.    RA mass 2.7x2.0 cm      LA mass 2.2x0.5cm      Cath: 9/27/22   There was non-obstructive coronary artery disease. Stable as compared to prior.   Left Main   The vessel was visualized by angiography, is moderate in size and is angiographically normal.      Left Anterior Descending   Mid LAD lesion was 20% stenosed.      Left Circumflex   The vessel was visualized by angiography, is moderate in size and is angiographically normal.      Right Coronary Artery   Prox RCA to Mid RCA lesion was 20% stenosed.          Assessment and Plan:     * Closed right hip fracture,  initial encounter  Status post ORIF.    Right atrial thrombus  Incidental finding of right atrial mass noted on right upper quadrant ultrasound (this was performed for elevated LFTs).   Echocardiogram performed 10/7/24 notes 2.5 cm mass in the right atrium which appears to have a stalk adherent to the inter-atrial septum and potentially passing through a PFO with frondlike features within the left atrium.  This suggests thrombus in transit.  Can not exclude endocarditis.  Can not exclude adherence of RA mass to one of his right atrial leads.  Recommend blood cultures and initiation of empiric antibiotics as well as initiation of anticoagulation (now on eliquis 5mg bid).  Consider ID evaluation (pending).  No need for SEGUN.  Repeat echo in 1 month to assess for resolution.    Chronic combined systolic and diastolic heart failure  NICM. EF 40-45%. Volume status appears controlled. Has CRT-D followed at [facility].   Neg cath 2022.  Hx VT s/p ablation    Mixed hyperlipidemia  Resume statin when LFTs normalized.      Cardiac resynchronization therapy defibrillator (CRT-D) in place  Followed by Dr Tenorio. Normal function at last check    ESRD (end stage renal disease)  Per renal    S/P ablation of ventricular arrhythmia  Followed by EP at [facility]. Continue amiodarone        VTE Risk Mitigation (From admission, onward)           Ordered     apixaban tablet 5 mg  2 times daily         10/07/24 1319     heparin (porcine) injection 5,000 Units  Use PRN         10/05/24 1830     Place sequential compression device  Until discontinued         10/04/24 2217     heparin (porcine) injection 1,000 Units  As needed (PRN)         10/04/24 2217     IP VTE LOW RISK PATIENT  Once         10/04/24 1438     Place sequential compression device  Until discontinued         10/03/24 0154     Reason for No Pharmacological VTE Prophylaxis  Once        Comments: Right hip fracture needs surgical eval   Question:  Reasons:  Answer:  Physician Provided (leave  comment)    10/03/24 0154                  Pt seen in ICU, case d/w RN and Dr. Montanez.  Critical care time 35min.    Mason Lynn MD  Cardiology  [facility] [facility]

## 2024-10-09 NOTE — PROCEDURES
"Ar Sharma is a 63 y.o. male patient.    Temp: 98.8 °F (37.1 °C) (10/09/24 1101)  Pulse: 69 (10/09/24 1145)  Resp: 18 (10/09/24 1145)  BP: (!) 98/55 (10/09/24 1145)  SpO2: 97 % (10/09/24 1145)  Weight: 75.4 kg (166 lb 3.6 oz) (10/09/24 0300)  Height: 5' 9" (175.3 cm) (10/03/24 1134)       Central Line    Date/Time: 10/9/2024 12:31 PM    Performed by: Jose M Connolly MD  Authorized by: Jose M Connolly MD    Location procedure was performed:  [facility] PULMONARY MEDICINE  Pre-operative diagnosis:  Hypotension  Post-operative diagnosis:  Hypotension  Consent Done ?:  Yes  Time out complete?: Verified correct patient, procedure, equipment, staff, and site/side    Indications:  Med administration and vascular access  Anesthesia:  Local infiltration  Local anesthetic:  Lidocaine 1% without epinephrine  Anesthetic total (ml):  3  Description of findings:  Normal left IJ with prominent EJ  Preparation:  Skin prepped with ChloraPrep  Skin prep agent dried: Skin prep agent completely dried prior to procedure    Sterile barriers: All five maximal sterile barriers used - gloves, gown, cap, mask and large sterile sheet    Hand hygiene: Hand hygiene performed immediately prior to central venous catheter insertion    Location:  Left internal jugular  Catheter type:  Triple lumen  Catheter size:  7 Fr  Inserted Catheter Length (cm):  20  Ultrasound guidance: Yes    Vessel Caliber:  Medium   patent  Comprressibility:  Normal  Needle advanced into vessel with real time ultrasound guidance.    Guidewire confirmed in vessel.    Steril sheath on probe.    Sterile gel used.  Manometry: Yes    Number of attempts:  1  Securement:  Line sutured, chlorhexidine patch, sterile dressing applied and blood return through all ports  Complications: No    Estimated blood loss (mL):  1  Specimens: No    Implants: No    Guidewire: guidewire removed intact, verified with nurse    XRay:  Placement verified by x-ray and no pneumothorax on " x-ray  Adverse Events:  NoneTermination Site: inferior vena cava      10/9/2024

## 2024-10-09 NOTE — ASSESSMENT & PLAN NOTE
Mr. Sharma is a pleasant 64 yo man admitted after a fall with a femur fracture. He is s/p IM nail placement. Incidentally found to have an atrial thrombus v vegetation. ID is consulted for that. The patient denies any prodromal symptoms, such as fever, and has no stigmata of IE. Additionally, his blood cultures are NGTD. At this pont, he meets [facility] criteria for possible IE, though NBTE is a definite possibility.    Recommendations  Will obtain additional blood cultures, rheumatoid factor, ESR, etc  Karius Test in am re: possible CNE  Trend WBC  If leukocytosis persists, consider HIDA scan, when feasible

## 2024-10-09 NOTE — ASSESSMENT & PLAN NOTE
Incidental finding of right atrial mass noted on right upper quadrant ultrasound (this was performed for elevated LFTs).   Echocardiogram performed 10/7/24 notes 2.5 cm mass in the right atrium which appears to have a stalk adherent to the inter-atrial septum and potentially passing through a PFO with frondlike features within the left atrium.  This suggests thrombus in transit.  Can not exclude endocarditis.  Can not exclude adherence of RA mass to one of his right atrial leads.  Recommend blood cultures and initiation of empiric antibiotics as well as initiation of anticoagulation (now on eliquis 5mg bid).  Consider ID evaluation (pending).  No need for SEGUN.  Repeat echo in 1 month to assess for resolution.

## 2024-10-10 NOTE — PLAN OF CARE
Patient remains in ICU on 3L NC, alert and oriented. In Levophed on continuous infusion to maintain MAP >65mmHg. Patient had hiccups, Inj Chlorpromazine given, relief obtained. Patient asked for pain medicine once for Hip pain, oxycodone-acetaminophen given, relief obtained. No any new skin injury during the shift.  Problem: Adult Inpatient Plan of Care  Goal: Plan of Care Review  Outcome: Progressing  Goal: Patient-Specific Goal (Individualized)  Outcome: Progressing  Goal: Absence of Hospital-Acquired Illness or Injury  Outcome: Progressing  Goal: Optimal Comfort and Wellbeing  Outcome: Progressing  Goal: Readiness for Transition of Care  Outcome: Progressing     Problem: Infection  Goal: Absence of Infection Signs and Symptoms  Outcome: Progressing     Problem: Wound  Goal: Optimal Coping  Outcome: Progressing  Goal: Optimal Functional Ability  Outcome: Progressing  Goal: Absence of Infection Signs and Symptoms  Outcome: Progressing  Goal: Improved Oral Intake  Outcome: Progressing  Goal: Optimal Pain Control and Function  Outcome: Progressing  Goal: Skin Health and Integrity  Outcome: Progressing  Goal: Optimal Wound Healing  Outcome: Progressing     Problem: Skin Injury Risk Increased  Goal: Skin Health and Integrity  Outcome: Progressing     Problem: Hemodialysis  Goal: Safe, Effective Therapy Delivery  Outcome: Progressing  Goal: Effective Tissue Perfusion  Outcome: Progressing  Goal: Absence of Infection Signs and Symptoms  Outcome: Progressing     Problem: Orthopaedic Fracture  Goal: Absence of Bleeding  Outcome: Progressing  Goal: Bowel Elimination  Outcome: Progressing  Goal: Absence of Embolism Signs and Symptoms  Outcome: Progressing  Goal: Fracture Stability  Outcome: Progressing  Goal: Optimal Functional Ability  Outcome: Progressing  Goal: Absence of Infection Signs and Symptoms  Outcome: Progressing  Goal: Effective Tissue Perfusion  Outcome: Progressing  Goal: Optimal Pain Control and  Function  Outcome: Progressing  Goal: Effective Oxygenation and Ventilation  Outcome: Progressing     Problem: Fall Injury Risk  Goal: Absence of Fall and Fall-Related Injury  Outcome: Progressing     Problem: CRRT (Continuous Renal Replacement Therapy)  Goal: Safe, Effective Therapy Delivery  Outcome: Progressing  Goal: Hemodynamic Stability  Outcome: Progressing  Goal: Body Temperature Maintained in Desired Range  Outcome: Progressing  Goal: Absence of Infection Signs and Symptoms  Outcome: Progressing

## 2024-10-10 NOTE — ASSESSMENT & PLAN NOTE
-resume home midodrine   -Unable to tolerate HD on 10/05, SBP dropped to 70s  BP is more stable at this time,will have HD in AM.

## 2024-10-10 NOTE — PLAN OF CARE
Problem: Occupational Therapy  Goal: Occupational Therapy Goal  Description: Goals to be met by: 10/18/24     Patient will increase functional independence with ADLs by performing:    Grooming while EOB with Modified [facility].  Toileting from bedside commode with Minimal Assistance for hygiene and clothing management.   Sitting at edge of bed x30 minutes with Stand-by Assistance.  Supine to sit with Stand-by Assistance.  Step transfer with Stand-by Assistance.   Sit to stand transfer with Stand-by Assistance.   Toilet transfer to bedside commode with Stand-by Assistance.  Upper extremity exercise program x15 reps per handout, with independence.    Outcome: Progressing

## 2024-10-10 NOTE — PROGRESS NOTES
[facility] Intensive Care  Nephrology  Progress Note    Patient Name: Ar Sharma  MRN: [id number]  Admission Date: 10/3/2024  Hospital Length of Stay: 7 days  Attending Provider: Delmy Agarwal, Charanjit   Primary Care Physician: Jc Elliott MD  Principal Problem:Closed right hip fracture, initial encounter    Subjective:     HPI: Following for ESRD. Patient receives HD MWF via RIJ TDC at [facility] under the c/o Dr. Chicas. He was initiated on HD 4/2024. He remains with PD catheter.     Interval History: UOP 100cc yesterday. No events overnight. Drowsy this morning s/p dilaudid. Remains on levophed.   Wears 3L NC at home.     Review of patient's allergies indicates:   Allergen Reactions    Lokelma [sodium zirconium cyclosilicate] Other (See Comments)     Fluid retention, weight gain, CHF excerebration, severe constipation    Atorvastatin Other (See Comments)     Joint pain    Jardiance [empagliflozin] Other (See Comments)     Chest pains, significant weight loss, lower blood pressure     Current Facility-Administered Medications   Medication Frequency    acetaminophen tablet 650 mg Q4H PRN    allopurinoL tablet 100 mg Daily    apixaban tablet 5 mg BID    ceFEPIme (MAXIPIME) 1 g in D5W 100 mL IVPB (MB+) Q24H    docusate sodium capsule 100 mg Daily    furosemide tablet 360 mg See admin instructions    heparin (porcine) injection 1,000 Units PRN    heparin (porcine) injection 5,000 Units PRN    hydrALAZINE injection 10 mg Q6H PRN    HYDROmorphone injection 1 mg Q4H PRN    hydrOXYzine pamoate capsule 25 mg Q8H PRN    levalbuterol nebulizer solution 1.25 mg Q4H PRN    levothyroxine tablet 150 mcg Before breakfast    melatonin tablet 6 mg Nightly PRN    metOLazone tablet 10 mg See admin instructions    mexiletine capsule 150 mg BID WM    midodrine tablet 15 mg Q8H    naloxone 0.4 mg/mL injection 0.4 mg PRN    nitroGLYCERIN SL tablet 0.4 mg Q5 Min PRN    NORepinephrine 4 mg in dextrose 5% 250 mL infusion  (premix) Continuous    ondansetron injection 4 mg Q6H PRN    oxyCODONE-acetaminophen  mg per tablet 1 tablet Q4H PRN    oxyCODONE-acetaminophen 5-325 mg per tablet 1 tablet Q4H PRN    polyethylene glycol packet 17 g BID PRN    polyethylene glycol packet 17 g Daily    pravastatin tablet 40 mg QHS    promethazine (PHENERGAN) 6.25 mg in 0.9% NaCl 50 mL IVPB Q6H PRN    simethicone chewable tablet 80 mg QID PRN    sodium chloride 0.9% bolus 250 mL 250 mL PRN    sodium chloride 0.9% flush 10 mL Q12H PRN    sodium chloride 0.9% flush 10 mL PRN    triamcinolone acetonide 0.1% cream BID    vancomycin - pharmacy to dose pharmacy to manage frequency       Objective:     Vital Signs (Most Recent):  Temp: 97.6 °F (36.4 °C) (10/10/24 1101)  Pulse: 60 (10/10/24 1200)  Resp: 13 (10/10/24 1200)  BP: (!) 96/52 (10/10/24 1200)  SpO2: 99 % (10/10/24 1200) Vital Signs (24h Range):  Temp:  [97.6 °F (36.4 °C)-99.1 °F (37.3 °C)] 97.6 °F (36.4 °C)  Pulse:  [60-69] 60  Resp:  [11-26] 13  SpO2:  [92 %-100 %] 99 %  BP: ()/(46-70) 96/52     Weight: 75.4 kg (166 lb 3.6 oz) (70.76 kg dry weight) (10/09/24 1101)  Body mass index is 24.55 kg/m².  Body surface area is 1.92 meters squared.    I/O last 3 completed shifts:  In: 2317.4 [P.O.:700; I.V.:921.7; IV Piggyback:695.7]  Out: 3284 [Urine:100; Other:3184]     Physical Exam  Vitals and nursing note reviewed.   Constitutional:       General: He is awake. He is not in acute distress.     Appearance: Normal appearance. He is well-developed.   HENT:      Head: Normocephalic and atraumatic.      Nose: Nose normal.      Mouth/Throat:      Mouth: Mucous membranes are moist.   Eyes:      Extraocular Movements: Extraocular movements intact.      Conjunctiva/sclera: Conjunctivae normal.   Cardiovascular:      Rate and Rhythm: Normal rate and regular rhythm.      Comments: RIJ TDC  Pulmonary:      Effort: Pulmonary effort is normal.      Breath sounds: Normal breath sounds.   Abdominal:       General: There is no distension.      Palpations: Abdomen is soft.      Comments: PD catheter to LLQ   Musculoskeletal:         General: No tenderness.      Right lower leg: No edema.      Left lower leg: No edema.   Skin:     General: Skin is warm and dry.      Findings: No erythema or rash.   Neurological:      Mental Status: He is alert.   Psychiatric:         Mood and Affect: Mood normal.         Behavior: Behavior normal.          Significant Labs:  CBC:   Recent Labs   Lab 10/10/24  0305   WBC 20.75*   RBC 3.73*   HGB 9.9*   HCT 29.2*      MCV 78*   MCH 26.5*   MCHC 33.9     CMP:   Recent Labs   Lab 10/09/24  0311 10/10/24  0305   * 127*   CALCIUM 8.7 8.5*   ALBUMIN 2.7* 2.4*   PROT 6.2  --     131*   K 4.0 4.2   CO2 24 23    97   BUN 20 39*   CREATININE 2.6* 4.7*   ALKPHOS 167*  --    ALT 22  --    AST 97*  --    BILITOT 2.5*  --      All labs within the past 24 hours have been reviewed.     Significant Imaging:  Labs: Reviewed    Assessment/Plan:     ESRD  - receives HD MWF at [facility] under the c/o Dr. Chicas  - also with PD catheter; patient planning to transition back to PD soon.   - dialysis RN to flush PD cathter today  - patient developed hypotension post-op and ended up receiving ~5L of IVF  - patient received CRRT with levophed from 10/5-10/9. Finally obtained net negative status on 10/9/24  - no need for RRT today. Wean levophed as tolerated  - next HD planned for tomorrow. If he remains on levophed, will not pull any fluid.    Hypotension  - baseline SBP 90-100s  - holding RRT today  - wean levophed as tolerated    Anemia of CKD  - hgb below goal today  - will start AWAIS with HD tomorrow    Secondary HPTH  - CCa and phos normal  - renal diet    Thank you for your consult. I will follow-up with patient. Please contact us if you have any additional questions.    Lizzette Cain MD  Nephrology  [facility] - Intensive Care

## 2024-10-10 NOTE — PT/OT/SLP PROGRESS
Occupational Therapy   Treatment    Name: Ar Sharma  MRN: [id number]  Admitting Diagnosis:  Closed right hip fracture, initial encounter  6 Days Post-Op    Recommendations:     Discharge Recommendations: High Intensity Therapy (when medically stable)   Discharge Equipment Recommendations:  to be determined by next level of care  Barriers to discharge:   (Pt is at high risk for falls, readmission and morbidity of d/c home; pt not at his PLOF as he was (I) w/ ADLs and functional mobility PTA; pt will benefit from HIGH Intensity Therapy at time of d/c, once medically stable.)    Assessment:     Ar Sharma is a 63 y.o. male with a medical diagnosis of Closed right hip fracture, initial encounter.  Performance deficits affecting function are weakness, impaired endurance, impaired self care skills, impaired functional mobility, gait instability, impaired balance, decreased coordination, decreased upper extremity function, decreased lower extremity function, decreased safety awareness, pain, decreased ROM.     Pt appeared lethargic and endorsed dizziness throughout tx session this date, requiring max A x 2 persons for supine>sit for briefly sitting EOB. Pt w/ relatively stable BP but appeared more lethargic, likely due to side effects of pain medications. Pt will continue to benefit from skilled acute OT services as tolerated to maximize functional capacity for safe performance w/ ADLs and functional mobility.     BP vitals as follows:   -Supine upon entry: 77/49  -Supine #2: 95/54  -EOB#1: 133/72  -EOB#2:119/70  -Supine w/ HOB elevated: 103/60     Rehab Prognosis:  Good; patient would benefit from acute skilled OT services to address these deficits and reach maximum level of function.       Plan:     Patient to be seen 5 x/week to address the above listed problems via self-care/home management, therapeutic activities, therapeutic exercises  Plan of Care Expires: 10/18/24  Plan of Care Reviewed with: patient,  "daughter    Subjective     Chief Complaint: "I feel dizzy."   Patient/Family Comments/goals: agreeable to participate   Pain/Comfort:  Pain Rating 1:  (Did not rate; pt pre-medicated w/ Dilaudid)    Objective:     Communicated with: Nurse prior to session. Nurse reports pt was pre-medicated w/ Dilaudid due to complaints of pain w/ minimal movement; nurse also increased Levo dose. Patient found HOB elevated with pulse ox (continuous), peripheral IV, central line, blood pressure cuff, oxygen upon OT entry to room.    Pt appeared lethargic upon entry, likely due to pain medications. Pt w/ BP of 77/49 upon entry but read 92/54 when it was taken by nurse. Pt agreeable to sit EOB.     General Precautions: Standard, fall    Orthopedic Precautions:RLE weight bearing as tolerated  Braces: N/A   Respiratory Status: Nasal cannula, flow 3 L/min (High Flow Cannula)      Occupational Performance:     Bed Mobility:  Pt w/ daze/stare while sitting EOB, complaining of dizziness. BP was taken x 2 trials as noted above which appeared stable. Pt required mod A for maintaining sitting balance due posterior lean; pt requested to return to supine due to persistent dizziness. Pt required consistent cueing, stimulation and redirection for initiating tasks. Pt appeared limited by side effects of pain medication.   Patient completed Rolling/Turning to Left with  maximal assistance and 2 persons  Patient completed Scooting/Bridging with maximal assistance and 2 persons  Patient completed Supine to Sit with maximal assistance and 2 persons  Patient completed Sit to Supine with maximal assistance and 2 persons     Functional Mobility/Transfers:  Unable to safely assess.     Activities of Daily Living:  Upper Body Dressing: maximal assistance for donnn  Lower Body Dressing: dependence for donning socks for BLEs     [facility] 6 Click ADL: 14    Treatment & Education:  -Pt educated on role of OT and POC.   -Pt educated on importance of EOB activity w/ " staff (as medically appropriate)   -Questions and concerns addressed within scope.    Patient left HOB elevated with all lines intact, call button in reach, and wife present    GOALS:   Multidisciplinary Problems       Occupational Therapy Goals          Problem: Occupational Therapy    Goal Priority Disciplines Outcome Interventions   Occupational Therapy Goal     OT, PT/OT Progressing    Description: Goals to be met by: 10/18/24     Patient will increase functional independence with ADLs by performing:    Grooming while EOB with Modified [place].  Toileting from bedside commode with Minimal Assistance for hygiene and clothing management.   Sitting at edge of bed x30 minutes with Stand-by Assistance.  Supine to sit with Stand-by Assistance.  Step transfer with Stand-by Assistance.   Sit to stand transfer with Stand-by Assistance.   Toilet transfer to bedside commode with Stand-by Assistance.  Upper extremity exercise program x15 reps per handout, with independence.                         Time Tracking:     OT Date of Treatment: 10/10/24  OT Start Time: 1025  OT Stop Time: 1049  OT Total Time (min): 24 min    Billable Minutes:Therapeutic Activity 24  Total Time 24    OT/INOCENCIA: OT          10/10/2024

## 2024-10-10 NOTE — PROGRESS NOTES
Pharmacokinetic Assessment Follow Up: IV Vancomycin    Vancomycin serum concentration assessment(s):    The random level was drawn correctly and can be used to guide therapy at this time. The measurement is above the desired definitive target range of 10 to 20 mcg/mL.    Vancomycin Regimen Plan:    HOLD Vancomycin dose today and obtain random level 10/11 at 0300  Will re-dose Vancomycin based on dialysis plan     Drug levels (last 3 results):  Recent Labs   Lab Result Units 10/08/24  0427 10/09/24  0311 10/10/24  0305   Vancomycin, Random ug/mL 12.9 15.9 22.1       Pharmacy will continue to follow and monitor vancomycin.    Please contact pharmacy at extension [phone] for questions regarding this assessment.    Thank you for the consult,   Madi Encinas       Patient brief summary:  Ar Sharma is a 63 y.o. male initiated on antimicrobial therapy with IV Vancomycin for treatment of lower respiratory infection    Drug Allergies:   Review of patient's allergies indicates:   Allergen Reactions    Lokelma [sodium zirconium cyclosilicate] Other (See Comments)     Fluid retention, weight gain, CHF excerebration, severe constipation    Atorvastatin Other (See Comments)     Joint pain    Jardiance [empagliflozin] Other (See Comments)     Chest pains, significant weight loss, lower blood pressure       Actual Body Weight:   75.4 kg    Renal Function:   Estimated Creatinine Clearance: 15.9 mL/min (A) (based on SCr of 4.7 mg/dL (H)).,     Dialysis Method (if applicable):  CRRT/iHD    CBC (last 72 hours):  Recent Labs   Lab Result Units 10/08/24  0427 10/09/24  0311 10/10/24  0305   WBC K/uL 18.99* 27.13* 20.75*   Hemoglobin g/dL 10.7* 11.3* 9.9*   Hematocrit % 32.4* 33.0* 29.2*   Platelets K/uL 288 272 302   Gran % % 78.8* 79.3* 77.0*   Lymph % % 3.4* 3.2* 5.1*   Mono % % 13.4 13.2 12.1   Eosinophil % % 2.2 1.8 2.1   Basophil % % 0.6 0.6 0.9   Differential Method  Automated Automated Automated       Metabolic Panel  (last 72 hours):  Recent Labs   Lab Result Units 10/07/24  1418 10/07/24  2313 10/08/24  0427 10/08/24  1357 10/08/24  1639 10/08/24  2330 10/09/24  0311 10/10/24  0305   Sodium mmol/L 136 135* 135*  135* 137  --  134* 136 131*   Potassium mmol/L 4.1 4.1 4.8  4.8 4.2  --  4.3 4.0 4.2   Chloride mmol/L 104 103 104  104 103  --  103 102 97   CO2 mmol/L 23 24 24  24 25  --  21* 24 23   Glucose mg/dL 119* 151* 110  110 111*  --  113* 145* 127*   BUN mg/dL 33* 27* 25*  25* 21  --  20 20 39*   Creatinine mg/dL 4.1* 3.5* 3.3*  3.3* 2.7*  --  2.6* 2.6* 4.7*   Albumin g/dL 2.6* 2.6* 2.8*  2.8* 2.8*  --  2.5* 2.7* 2.4*   Total Bilirubin mg/dL  --   --  2.2*  --   --   --  2.5*  --    Alkaline Phosphatase U/L  --   --  165*  --   --   --  167*  --    AST U/L  --   --  115*  --   --   --  97*  --    ALT U/L  --   --  19  --   --   --  22  --    Magnesium mg/dL 1.8 1.8  --   --  1.9 2.1  --   --    Phosphorus mg/dL 2.5* 2.1* 2.3* 3.1  --  2.6*  --  4.1       Vancomycin Administrations:  vancomycin given in the last 96 hours                     vancomycin 750 mg in D5W 250 mL IVPB (admixture device) (mg) 750 mg New Bag 10/09/24 0536    vancomycin (VANCOCIN) 1,000 mg in D5W 250 mL IVPB (admixture device) (mg) 1,000 mg New Bag 10/08/24 0553    vancomycin (VANCOCIN) 1,000 mg in D5W 250 mL IVPB (admixture device) (mg) 1,000 mg New Bag 10/07/24 0417    vancomycin 1,500 mg in D5W 250 mL IVPB (admixture device) (mg) 1,500 mg New Bag 10/06/24 0532                    Microbiologic Results:  Microbiology Results (last 7 days)       Procedure Component Value Units Date/Time    Blood culture [[id number]] Collected: 10/09/24 0930    Order Status: Completed Specimen: Blood Updated: 10/09/24 1712     Blood Culture, Routine No Growth to date    Narrative:      Draw sample # 2 from separate site    Blood culture [[id number]] Collected: 10/09/24 0915    Order Status: Completed Specimen: Blood Updated: 10/09/24 1712     Blood Culture,  Routine No Growth to date    Narrative:      Draw sample # 2 from separate site    Blood culture [[id number]] Collected: 10/05/24 1612    Order Status: Completed Specimen: Blood from Peripheral, Hand, Left Updated: 10/09/24 1703     Blood Culture, Routine No Growth after 4 days.    Blood culture [[id number]] Collected: 10/05/24 1556    Order Status: Completed Specimen: Blood from Peripheral, Wrist, Left Updated: 10/09/24 1703     Blood Culture, Routine No Growth after 4 days.

## 2024-10-10 NOTE — SUBJECTIVE & OBJECTIVE
Review of Systems   Gastrointestinal:  Negative for melena.   Genitourinary:  Negative for hematuria.     Objective:     Vital Signs (Most Recent):  Temp: 98.3 °F (36.8 °C) (10/10/24 0000)  Pulse: 62 (10/10/24 0744)  Resp: 15 (10/10/24 0744)  BP: (!) 96/54 (10/10/24 0700)  SpO2: (!) 92 % (10/10/24 0744) Vital Signs (24h Range):  Temp:  [98.3 °F (36.8 °C)-99.1 °F (37.3 °C)] 98.3 °F (36.8 °C)  Pulse:  [60-78] 62  Resp:  [13-25] 15  SpO2:  [92 %-100 %] 92 %  BP: ()/(46-57) 96/54     Weight: 75.4 kg (166 lb 3.6 oz) (70.76 kg dry weight)  Body mass index is 24.55 kg/m².     SpO2: (!) 92 %         Intake/Output Summary (Last 24 hours) at 10/10/2024 0756  Last data filed at 10/10/2024 0700  Gross per 24 hour   Intake 1457.21 ml   Output 100 ml   Net 1357.21 ml       Lines/Drains/Airways       Central Venous Catheter Line  Duration                  Hemodialysis Catheter 05/03/24 1115 right subclavian 159 days    Percutaneous Central Line - Triple Lumen  10/09/24 1127 Internal Jugular Left <1 day              Peripheral Intravenous Line  Duration                  Peripheral IV - Single Lumen 10/06/24 0620 20 G Anterior;Left Upper Arm 4 days         Peripheral IV - Single Lumen 10/09/24 0831 20 G Anterior;Left Forearm <1 day                     Exam unchanged vs 10/9/24  Physical Exam  Constitutional:       General: He is not in acute distress.     Appearance: He is well-developed. He is not ill-appearing, toxic-appearing or diaphoretic.   HENT:      Head: Normocephalic and atraumatic.   Eyes:      General: No scleral icterus.     Extraocular Movements: Extraocular movements intact.      Conjunctiva/sclera: Conjunctivae normal.      Pupils: Pupils are equal, round, and reactive to light.   Neck:      Thyroid: No thyromegaly.      Vascular: No JVD.      Trachea: No tracheal deviation.   Cardiovascular:      Rate and Rhythm: Normal rate and regular rhythm.      Heart sounds: S1 normal and S2 normal. No murmur heard.      No friction rub. No gallop.   Pulmonary:      Effort: Pulmonary effort is normal. No respiratory distress.      Breath sounds: Normal breath sounds. No stridor. No wheezing, rhonchi or rales.   Chest:      Chest wall: No tenderness.   Abdominal:      General: There is no distension.      Palpations: Abdomen is soft.   Musculoskeletal:         General: No swelling or tenderness. Normal range of motion.      Cervical back: Normal range of motion and neck supple. No rigidity.      Right lower leg: No edema.      Left lower leg: No edema.   Skin:     General: Skin is warm and dry.      Coloration: Skin is not jaundiced.   Neurological:      General: No focal deficit present.      Mental Status: He is alert and oriented to person, place, and time.      Cranial Nerves: No cranial nerve deficit.   Psychiatric:         Mood and Affect: Mood normal.         Behavior: Behavior normal.            Current Medications:   allopurinoL  100 mg Oral Daily    apixaban  5 mg Oral BID    ceFEPime IV (PEDS and ADULTS)  1 g Intravenous Q24H    docusate sodium  100 mg Oral Daily    levothyroxine  150 mcg Oral Before breakfast    mexiletine  150 mg Oral BID WM    midodrine  15 mg Oral Q8H    polyethylene glycol  17 g Oral Daily    pravastatin  40 mg Oral QHS    triamcinolone acetonide 0.1%   Topical (Top) BID      NORepinephrine bitartrate-D5W  0-3 mcg/kg/min Intravenous Continuous 28.3 mL/hr at 10/10/24 0700 0.1 mcg/kg/min at 10/10/24 0700       Current Facility-Administered Medications:     acetaminophen, 650 mg, Oral, Q4H PRN    furosemide, 360 mg, Oral, See admin instructions    heparin (porcine), 1,000 Units, Intra-Catheter, PRN    heparin (porcine), 5,000 Units, Intra-Catheter, PRN    hydrALAZINE, 10 mg, Intravenous, Q6H PRN    HYDROmorphone, 1 mg, Intravenous, Q4H PRN    hydrOXYzine pamoate, 25 mg, Oral, Q8H PRN    levalbuterol, 1.25 mg, Nebulization, Q4H PRN    melatonin, 6 mg, Oral, Nightly PRN    metOLazone, 10 mg, Oral, See  admin instructions    naloxone, 0.4 mg, Intravenous, PRN    nitroGLYCERIN, 0.4 mg, Sublingual, Q5 Min PRN    ondansetron, 4 mg, Intravenous, Q6H PRN    oxyCODONE-acetaminophen, 1 tablet, Oral, Q4H PRN    oxyCODONE-acetaminophen, 1 tablet, Oral, Q4H PRN    polyethylene glycol, 17 g, Oral, BID PRN    promethazine (PHENERGAN) 6.25 mg in 0.9% NaCl 50 mL IVPB, 6.25 mg, Intravenous, Q6H PRN    simethicone, 1 tablet, Oral, QID PRN    sodium chloride 0.9%, 250 mL, Intravenous, PRN    sodium chloride 0.9%, 10 mL, Intravenous, Q12H PRN    sodium chloride 0.9%, 10 mL, Intravenous, PRN    Pharmacy to dose Vancomycin consult, , , Once **AND** vancomycin - pharmacy to dose, , Intravenous, pharmacy to manage frequency    Laboratory (all labs reviewed):  CBC:  Recent Labs   Lab 10/06/24  0439 10/07/24  0219 10/08/24  0427 10/09/24  0311 10/10/24  0305   WBC 20.85 H 15.41 H 18.99 H 27.13 H 20.75 H   Hemoglobin 12.2 L 11.5 L 10.7 L 11.3 L 9.9 L   Hematocrit 35.8 L 33.6 L 32.4 L 33.0 L 29.2 L   Platelets 294 320 288 272 302       CHEMISTRIES:  Recent Labs   Lab 01/10/22  0753 02/07/22  0745 04/04/22  1255 06/09/22  0740 07/07/22  0746 09/06/22  0741 10/06/24  2249 10/07/24  0219 10/07/24  1418 10/07/24  2313 10/08/24  0427 10/08/24  1357 10/08/24  1639 10/08/24  2330 10/09/24  0311 10/10/24  0305   Glucose 103 86 92 90 92   < > 133 H   < > 119 H 151 H 110  110 111 H  --  113 H 145 H 127 H   Sodium 136 135 L 137 139 136   < > 134 L   < > 136 135 L 135 L  135 L 137  --  134 L 136 131 L   Potassium 4.4 4.5 4.8 4.8 4.3   < > 3.9   < > 4.1 4.1 4.8  4.8 4.2  --  4.3 4.0 4.2   BUN 72 H 72 H 68 H 81 H 76 H   < > 37 H   < > 33 H 27 H 25 H  25 H 21  --  20 20 39 H   Creatinine 4.83 H 4.30 H 4.08 H 4.64 H 4.06 H   < > 4.5 H   < > 4.1 H 3.5 H 3.3 H  3.3 H 2.7 H  --  2.6 H 2.6 H 4.7 H   eGFR if non  12.0 A 13.8 A 14.7 A 12.6 A 14.8 A  --   --   --   --   --   --   --   --   --   --   --    eGFR  --   --   --   --   --    < >  14 A   < > 15 A 19 A 20 A  20 A 26 A  --  27 A 27 A 13 A   Calcium 8.8 9.1 9.1 8.7 9.5   < > 8.5 L   < > 8.6 L 8.3 L 8.8  8.8 9.0  --  8.5 L 8.7 8.5 L   Magnesium  --   --   --   --   --    < > 1.8  --  1.8 1.8  --   --  1.9 2.1  --   --     < > = values in this interval not displayed.       CARDIAC BIOMARKERS:  Recent Labs   Lab 09/25/22  1638 04/29/24  1707 10/04/24  0418   Troponin I 0.015 0.040 H 0.045 H       COAGS:  Recent Labs   Lab 01/17/23  0741 10/02/24  2224 10/03/24  0441   INR 1.2 1.3 H 1.2       LIPIDS/LFTS:  Recent Labs   Lab 05/04/23  1129 09/01/23  0836 03/04/24  1018 04/12/24  1056 04/29/24  2039 06/04/24  1440 09/04/24  1201 09/30/24  1100 10/05/24  0544 10/06/24  0439 10/07/24  0219 10/08/24  0427 10/09/24  0311   Cholesterol 151 139 124  --  127  --  189  --   --   --   --   --   --    Triglycerides 79 104 66  --  66  --  167 H  --   --   --   --   --   --    HDL 41 35 L 39 L  --  32 L  --  54  --   --   --   --   --   --    LDL Cholesterol 94.2 83.2 71.8  --  81.8  --  101.6  --   --   --   --   --   --    Non-HDL Cholesterol 110 104 85  --  95  --  135  --   --   --   --   --   --    AST 45 51 H 39   < >  --    < > 99 H   < > 367 H 240 H 152 H 115 H 97 H   ALT 56 H 57 H 35   < >  --    < > 68 H   < > 271 H 88 H 33 19 22    < > = values in this interval not displayed.       BNP:  Recent Labs   Lab 09/25/22  1638 09/28/22  1545 04/12/24  1056 04/29/24  1707   BNP 2,304 H 1,416 H 2,549 H 3,204 H       TSH:  Recent Labs   Lab 09/29/22  0448 12/20/22  1052 05/04/23  1129 03/04/24  1018 09/04/24  1201   TSH 3.045 1.480 1.160 0.283 L 1.740       Free T4:  Recent Labs   Lab 11/11/21  0727 03/04/24  1018   Free T4 1.48 3.25 H       Diagnostic Results:  ECG (personally reviewed and interpreted tracing(s)):  10/6/24 0720 AP-BiVP 60    Chest X-Ray (personally reviewed and interpreted image(s)): 10/6/24 CMeg, L ICD 3 leads, R HD catheter    Echo: 10/7/24 (images prev personally reviewed and interpreted).   Compared with report/images 10/3/24, RA and LA masses are new, LV WMA is old.  ?Thrombus in transit across PFO.    Left Ventricle: The left ventricle is normal in size. Mildly increased wall thickness. There is mild concentric hypertrophy. Regional wall motion abnormalities present (severe distal anteroapical hypokinesis). There is mildly reduced systolic function with a visually estimated ejection fraction of 40 - 45%.    Right Ventricle: Severe right ventricular enlargement. Systolic function is severely reduced. Pacemaker lead present in the ventricle and appears abnormal with possible vegetation on lead in RA (2.7x2.0cm).    Left Atrium: There is a large mobile frondlike mass, appears to be adherent to LA septum.    Right Atrium: Right atrium is severely dilated. Multiple leads present in the right atrium with possible vegetation adherent to lead in RA.    Aortic Valve: The aortic valve is a trileaflet valve. There is mild aortic valve sclerosis.    Tricuspid Valve: There is mild regurgitation.    Pulmonary Artery: The estimated pulmonary artery systolic pressure is 67 mmHg.    RA mass 2.7x2.0 cm      LA mass 2.2x0.5cm      Cath: 9/27/22   There was non-obstructive coronary artery disease. Stable as compared to prior.   Left Main   The vessel was visualized by angiography, is moderate in size and is angiographically normal.      Left Anterior Descending   Mid LAD lesion was 20% stenosed.      Left Circumflex   The vessel was visualized by angiography, is moderate in size and is angiographically normal.      Right Coronary Artery   Prox RCA to Mid RCA lesion was 20% stenosed.

## 2024-10-10 NOTE — ASSESSMENT & PLAN NOTE
NICM. EF 40-45%. Volume status appears controlled. [place]D [place] [facility] [place]   Neg cath 2022.  Hx VT s/p ablation

## 2024-10-10 NOTE — PROGRESS NOTES
[facility]  Cardiology  Progress Note    Patient Name: Ar Sharma  MRN: [id number]  Admission Date: 10/3/2024  Hospital Length of Stay: 7 days  Code Status: Full Code   Attending Physician: Delmy Agarwal, *   Primary Care Physician: Jc Elliott MD  Expected Discharge Date:   Principal Problem:Closed right hip fracture, initial encounter    Subjective:     Interval Hx: pt seen in ICU.  No cp/sob.  Case d/w RN staff and family at bedside.    Tele:  Evin, TEZ paced 60.  (personally reviewed and interpreted)        Review of Systems   Gastrointestinal:  Negative for melena.   Genitourinary:  Negative for hematuria.     Objective:     Vital Signs (Most Recent):  Temp: 98.3 °F (36.8 °C) (10/10/24 0000)  Pulse: 62 (10/10/24 0744)  Resp: 15 (10/10/24 0744)  BP: (!) 96/54 (10/10/24 0700)  SpO2: (!) 92 % (10/10/24 0744) Vital Signs (24h Range):  Temp:  [98.3 °F (36.8 °C)-99.1 °F (37.3 °C)] 98.3 °F (36.8 °C)  Pulse:  [60-78] 62  Resp:  [13-25] 15  SpO2:  [92 %-100 %] 92 %  BP: ()/(46-57) 96/54     Weight: 75.4 kg (166 lb 3.6 oz) (70.76 kg dry weight)  Body mass index is 24.55 kg/m².     SpO2: (!) 92 %         Intake/Output Summary (Last 24 hours) at 10/10/2024 0756  Last data filed at 10/10/2024 0700  Gross per 24 hour   Intake 1457.21 ml   Output 100 ml   Net 1357.21 ml       Lines/Drains/Airways       Central Venous Catheter Line  Duration                  Hemodialysis Catheter 05/03/24 1115 right subclavian 159 days    Percutaneous Central Line - Triple Lumen  10/09/24 1127 Internal Jugular Left <1 day              Peripheral Intravenous Line  Duration                  Peripheral IV - Single Lumen 10/06/24 0620 20 G Anterior;Left Upper Arm 4 days         Peripheral IV - Single Lumen 10/09/24 0831 20 G Anterior;Left Forearm <1 day                     Exam unchanged vs 10/9/24  Physical Exam  Constitutional:       General: He is not in acute distress.     Appearance: He is  well-developed. He is not ill-appearing, toxic-appearing or diaphoretic.   HENT:      Head: Normocephalic and atraumatic.   Eyes:      General: No scleral icterus.     Extraocular Movements: Extraocular movements intact.      Conjunctiva/sclera: Conjunctivae normal.      Pupils: Pupils are equal, round, and reactive to light.   Neck:      Thyroid: No thyromegaly.      Vascular: No JVD.      Trachea: No tracheal deviation.   Cardiovascular:      Rate and Rhythm: Normal rate and regular rhythm.      Heart sounds: S1 normal and S2 normal. No murmur heard.     No friction rub. No gallop.   Pulmonary:      Effort: Pulmonary effort is normal. No respiratory distress.      Breath sounds: Normal breath sounds. No stridor. No wheezing, rhonchi or rales.   Chest:      Chest wall: No tenderness.   Abdominal:      General: There is no distension.      Palpations: Abdomen is soft.   Musculoskeletal:         General: No swelling or tenderness. Normal range of motion.      Cervical back: Normal range of motion and neck supple. No rigidity.      Right lower leg: No edema.      Left lower leg: No edema.   Skin:     General: Skin is warm and dry.      Coloration: Skin is not jaundiced.   Neurological:      General: No focal deficit present.      Mental Status: He is alert and oriented to person, place, and time.      Cranial Nerves: No cranial nerve deficit.   Psychiatric:         Mood and Affect: Mood normal.         Behavior: Behavior normal.            Current Medications:   allopurinoL  100 mg Oral Daily    apixaban  5 mg Oral BID    ceFEPime IV (PEDS and ADULTS)  1 g Intravenous Q24H    docusate sodium  100 mg Oral Daily    levothyroxine  150 mcg Oral Before breakfast    mexiletine  150 mg Oral BID WM    midodrine  15 mg Oral Q8H    polyethylene glycol  17 g Oral Daily    pravastatin  40 mg Oral QHS    triamcinolone acetonide 0.1%   Topical (Top) BID      NORepinephrine bitartrate-D5W  0-3 mcg/kg/min Intravenous Continuous 28.3  mL/hr at 10/10/24 0700 0.1 mcg/kg/min at 10/10/24 0700       Current Facility-Administered Medications:     acetaminophen, 650 mg, Oral, Q4H PRN    furosemide, 360 mg, Oral, See admin instructions    heparin (porcine), 1,000 Units, Intra-Catheter, PRN    heparin (porcine), 5,000 Units, Intra-Catheter, PRN    hydrALAZINE, 10 mg, Intravenous, Q6H PRN    HYDROmorphone, 1 mg, Intravenous, Q4H PRN    hydrOXYzine pamoate, 25 mg, Oral, Q8H PRN    levalbuterol, 1.25 mg, Nebulization, Q4H PRN    melatonin, 6 mg, Oral, Nightly PRN    metOLazone, 10 mg, Oral, See admin instructions    naloxone, 0.4 mg, Intravenous, PRN    nitroGLYCERIN, 0.4 mg, Sublingual, Q5 Min PRN    ondansetron, 4 mg, Intravenous, Q6H PRN    oxyCODONE-acetaminophen, 1 tablet, Oral, Q4H PRN    oxyCODONE-acetaminophen, 1 tablet, Oral, Q4H PRN    polyethylene glycol, 17 g, Oral, BID PRN    promethazine (PHENERGAN) 6.25 mg in 0.9% NaCl 50 mL IVPB, 6.25 mg, Intravenous, Q6H PRN    simethicone, 1 tablet, Oral, QID PRN    sodium chloride 0.9%, 250 mL, Intravenous, PRN    sodium chloride 0.9%, 10 mL, Intravenous, Q12H PRN    sodium chloride 0.9%, 10 mL, Intravenous, PRN    Pharmacy to dose Vancomycin consult, , , Once **AND** vancomycin - pharmacy to dose, , Intravenous, pharmacy to manage frequency    Laboratory (all labs reviewed):  CBC:  Recent Labs   Lab 10/06/24  0439 10/07/24  0219 10/08/24  0427 10/09/24  0311 10/10/24  0305   WBC 20.85 H 15.41 H 18.99 H 27.13 H 20.75 H   Hemoglobin 12.2 L 11.5 L 10.7 L 11.3 L 9.9 L   Hematocrit 35.8 L 33.6 L 32.4 L 33.0 L 29.2 L   Platelets 294 320 288 272 302       CHEMISTRIES:  Recent Labs   Lab 01/10/22  0753 02/07/22  0745 04/04/22  1255 06/09/22  0740 07/07/22  0746 09/06/22  0741 10/06/24  2249 10/07/24  0219 10/07/24  1418 10/07/24  2313 10/08/24  0427 10/08/24  1357 10/08/24  1639 10/08/24  2330 10/09/24  0311 10/10/24  0305   Glucose 103 86 92 90 92   < > 133 H   < > 119 H 151 H 110  110 111 H  --  113 H 145 H  127 H   Sodium 136 135 L 137 139 136   < > 134 L   < > 136 135 L 135 L  135 L 137  --  134 L 136 131 L   Potassium 4.4 4.5 4.8 4.8 4.3   < > 3.9   < > 4.1 4.1 4.8  4.8 4.2  --  4.3 4.0 4.2   BUN 72 H 72 H 68 H 81 H 76 H   < > 37 H   < > 33 H 27 H 25 H  25 H 21  --  20 20 39 H   Creatinine 4.83 H 4.30 H 4.08 H 4.64 H 4.06 H   < > 4.5 H   < > 4.1 H 3.5 H 3.3 H  3.3 H 2.7 H  --  2.6 H 2.6 H 4.7 H   eGFR if non  12.0 A 13.8 A 14.7 A 12.6 A 14.8 A  --   --   --   --   --   --   --   --   --   --   --    eGFR  --   --   --   --   --    < > 14 A   < > 15 A 19 A 20 A  20 A 26 A  --  27 A 27 A 13 A   Calcium 8.8 9.1 9.1 8.7 9.5   < > 8.5 L   < > 8.6 L 8.3 L 8.8  8.8 9.0  --  8.5 L 8.7 8.5 L   Magnesium  --   --   --   --   --    < > 1.8  --  1.8 1.8  --   --  1.9 2.1  --   --     < > = values in this interval not displayed.       CARDIAC BIOMARKERS:  Recent Labs   Lab 09/25/22  1638 04/29/24  1707 10/04/24  0418   Troponin I 0.015 0.040 H 0.045 H       COAGS:  Recent Labs   Lab 01/17/23  0741 10/02/24  2224 10/03/24  0441   INR 1.2 1.3 H 1.2       LIPIDS/LFTS:  Recent Labs   Lab 05/04/23  1129 09/01/23  0836 03/04/24  1018 04/12/24  1056 04/29/24  2039 06/04/24  1440 09/04/24  1201 09/30/24  1100 10/05/24  0544 10/06/24  0439 10/07/24  0219 10/08/24  0427 10/09/24  0311   Cholesterol 151 139 124  --  127  --  189  --   --   --   --   --   --    Triglycerides 79 104 66  --  66  --  167 H  --   --   --   --   --   --    HDL 41 35 L 39 L  --  32 L  --  54  --   --   --   --   --   --    LDL Cholesterol 94.2 83.2 71.8  --  81.8  --  101.6  --   --   --   --   --   --    Non-HDL Cholesterol 110 104 85  --  95  --  135  --   --   --   --   --   --    AST 45 51 H 39   < >  --    < > 99 H   < > 367 H 240 H 152 H 115 H 97 H   ALT 56 H 57 H 35   < >  --    < > 68 H   < > 271 H 88 H 33 19 22    < > = values in this interval not displayed.       BNP:  Recent Labs   Lab 09/25/22  1638 09/28/22  1545 04/12/24  1056  04/29/24  1707   BNP 2,304 H 1,416 H 2,549 H 3,204 H       TSH:  Recent Labs   Lab 09/29/22  0448 12/20/22  1052 05/04/23  1129 03/04/24  1018 09/04/24  1201   TSH 3.045 1.480 1.160 0.283 L 1.740       Free T4:  Recent Labs   Lab 11/11/21  0727 03/04/24  1018   Free T4 1.48 3.25 H       Diagnostic Results:  ECG (personally reviewed and interpreted tracing(s)):  10/6/24 0720 AP-BiVP 60    Chest X-Ray (personally reviewed and interpreted image(s)): 10/6/24 CMeg, L ICD 3 leads, R HD catheter    Echo: 10/7/24 (images prev personally reviewed and interpreted).  Compared with report/images 10/3/24, RA and LA masses are new, LV WMA is old.  ?Thrombus in transit across PFO.    Left Ventricle: The left ventricle is normal in size. Mildly increased wall thickness. There is mild concentric hypertrophy. Regional wall motion abnormalities present (severe distal anteroapical hypokinesis). There is mildly reduced systolic function with a visually estimated ejection fraction of 40 - 45%.    Right Ventricle: Severe right ventricular enlargement. Systolic function is severely reduced. Pacemaker lead present in the ventricle and appears abnormal with possible vegetation on lead in RA (2.7x2.0cm).    Left Atrium: There is a large mobile frondlike mass, appears to be adherent to LA septum.    Right Atrium: Right atrium is severely dilated. Multiple leads present in the right atrium with possible vegetation adherent to lead in RA.    Aortic Valve: The aortic valve is a trileaflet valve. There is mild aortic valve sclerosis.    Tricuspid Valve: There is mild regurgitation.    Pulmonary Artery: The estimated pulmonary artery systolic pressure is 67 mmHg.    RA mass 2.7x2.0 cm      LA mass 2.2x0.5cm      Cath: 9/27/22   There was non-obstructive coronary artery disease. Stable as compared to prior.   Left Main   The vessel was visualized by angiography, is moderate in size and is angiographically normal.      Left Anterior Descending   Mid  LAD lesion was 20% stenosed.      Left Circumflex   The vessel was visualized by angiography, is moderate in size and is angiographically normal.      Right Coronary Artery   Prox RCA to Mid RCA lesion was 20% stenosed.          Assessment and Plan:     * Closed right hip fracture, initial encounter  Status post ORIF.    Right atrial thrombus  Incidental finding of right atrial mass noted on right upper quadrant ultrasound (this was performed for elevated LFTs).   Echocardiogram performed 10/7/24 notes 2.5 cm mass in the right atrium which appears to have a stalk adherent to the inter-atrial septum and potentially passing through a PFO with frondlike features within the left atrium.  This suggests thrombus in transit.  Can not exclude endocarditis.  Can not exclude adherence of RA mass to one of his right atrial leads.  Recommend blood cultures and initiation of empiric antibiotics as well as initiation of anticoagulation (now on eliquis 5mg bid).  Consider ID evaluation (pending).  No need for SEGUN.  Repeat echo in 1 month to assess for resolution.    Chronic combined systolic and diastolic heart failure  NICM. EF 40-45%. Volume status appears controlled. Has CRT-D followed at [facility] [place].   Neg cath 2022.  Hx VT s/p ablation    Mixed hyperlipidemia  Resume statin when LFTs normalized.      Cardiac resynchronization therapy defibrillator (CRT-D) in place  Followed by Dr Tenorio. Normal function at last check    ESRD (end stage renal disease)  Per renal    S/P ablation of ventricular arrhythmia  Followed by EP at [facility] [place]. Resume amiodarone when LFTs OK.        VTE Risk Mitigation (From admission, onward)           Ordered     apixaban tablet 5 mg  2 times daily         10/07/24 1319     heparin (porcine) injection 5,000 Units  Use PRN         10/05/24 1830     Place sequential compression device  Until discontinued         10/04/24 2217     heparin (porcine) injection 1,000 Units  As needed (PRN)         10/04/24 2217     IP VTE  LOW RISK PATIENT  Once         10/04/24 1438     Place sequential compression device  Until discontinued         10/03/24 0154     Reason for No Pharmacological VTE Prophylaxis  Once        Comments: Right hip fracture needs surgical eval   Question:  Reasons:  Answer:  Physician Provided (leave comment)    10/03/24 0154                  Pt seen in ICU, critical care time 35min.  Cardiology will sign off, pls call with questions.  Pt to follow up with Dr. Hernandez at [place].    Mason Lynn MD  Cardiology  [facility] - Intensive Care

## 2024-10-10 NOTE — PLAN OF CARE
Pt is on 3 LPM nasal cannula humidified. Pt hasn't complained of any discomfort since this morning. Pt will begin dialysis schedule tomorrow MW. Dialysis RN flushed PD catheters at bedside. Pt has been resting.      Problem: Adult Inpatient Plan of Care  Goal: Plan of Care Review  Outcome: Progressing  Goal: Patient-Specific Goal (Individualized)  Outcome: Progressing  Goal: Absence of Hospital-Acquired Illness or Injury  Outcome: Progressing  Goal: Optimal Comfort and Wellbeing  Outcome: Progressing  Goal: Readiness for Transition of Care  Outcome: Progressing     Problem: Infection  Goal: Absence of Infection Signs and Symptoms  Outcome: Progressing     Problem: Wound  Goal: Optimal Coping  Outcome: Progressing  Goal: Optimal Functional Ability  Outcome: Progressing  Goal: Absence of Infection Signs and Symptoms  Outcome: Progressing  Goal: Improved Oral Intake  Outcome: Progressing  Goal: Optimal Pain Control and Function  Outcome: Progressing  Goal: Skin Health and Integrity  Outcome: Progressing  Goal: Optimal Wound Healing  Outcome: Progressing     Problem: Skin Injury Risk Increased  Goal: Skin Health and Integrity  Outcome: Progressing     Problem: Hemodialysis  Goal: Safe, Effective Therapy Delivery  Outcome: Progressing  Goal: Effective Tissue Perfusion  Outcome: Progressing  Goal: Absence of Infection Signs and Symptoms  Outcome: Progressing     Problem: Orthopaedic Fracture  Goal: Absence of Bleeding  Outcome: Progressing  Goal: Bowel Elimination  Outcome: Progressing  Goal: Absence of Embolism Signs and Symptoms  Outcome: Progressing  Goal: Fracture Stability  Outcome: Progressing  Goal: Optimal Functional Ability  Outcome: Progressing  Goal: Absence of Infection Signs and Symptoms  Outcome: Progressing  Goal: Effective Tissue Perfusion  Outcome: Progressing  Goal: Optimal Pain Control and Function  Outcome: Progressing  Goal: Effective Oxygenation and Ventilation  Outcome: Progressing     Problem:  Fall Injury Risk  Goal: Absence of Fall and Fall-Related Injury  Outcome: Progressing     Problem: CRRT (Continuous Renal Replacement Therapy)  Goal: Safe, Effective Therapy Delivery  Outcome: Progressing  Goal: Hemodynamic Stability  Outcome: Progressing  Goal: Body Temperature Maintained in Desired Range  Outcome: Progressing  Goal: Absence of Infection Signs and Symptoms  Outcome: Progressing

## 2024-10-10 NOTE — SUBJECTIVE & OBJECTIVE
Interval History: UOP 100cc yesterday. No events overnight. Drowsy this morning s/p dilaudid. Remains on levophed.   Wears 3L NC at home.     Review of patient's allergies indicates:   Allergen Reactions    Lokelma [sodium zirconium cyclosilicate] Other (See Comments)     Fluid retention, weight gain, CHF excerebration, severe constipation    Atorvastatin Other (See Comments)     Joint pain    Jardiance [empagliflozin] Other (See Comments)     Chest pains, significant weight loss, lower blood pressure     Current Facility-Administered Medications   Medication Frequency    acetaminophen tablet 650 mg Q4H PRN    allopurinoL tablet 100 mg Daily    apixaban tablet 5 mg BID    ceFEPIme (MAXIPIME) 1 g in D5W 100 mL IVPB (MB+) Q24H    docusate sodium capsule 100 mg Daily    furosemide tablet 360 mg See admin instructions    heparin (porcine) injection 1,000 Units PRN    heparin (porcine) injection 5,000 Units PRN    hydrALAZINE injection 10 mg Q6H PRN    HYDROmorphone injection 1 mg Q4H PRN    hydrOXYzine pamoate capsule 25 mg Q8H PRN    levalbuterol nebulizer solution 1.25 mg Q4H PRN    levothyroxine tablet 150 mcg Before breakfast    melatonin tablet 6 mg Nightly PRN    metOLazone tablet 10 mg See admin instructions    mexiletine capsule 150 mg BID WM    midodrine tablet 15 mg Q8H    naloxone 0.4 mg/mL injection 0.4 mg PRN    nitroGLYCERIN SL tablet 0.4 mg Q5 Min PRN    NORepinephrine 4 mg in dextrose 5% 250 mL infusion (premix) Continuous    ondansetron injection 4 mg Q6H PRN    oxyCODONE-acetaminophen  mg per tablet 1 tablet Q4H PRN    oxyCODONE-acetaminophen 5-325 mg per tablet 1 tablet Q4H PRN    polyethylene glycol packet 17 g BID PRN    polyethylene glycol packet 17 g Daily    pravastatin tablet 40 mg QHS    promethazine (PHENERGAN) 6.25 mg in 0.9% NaCl 50 mL IVPB Q6H PRN    simethicone chewable tablet 80 mg QID PRN    sodium chloride 0.9% bolus 250 mL 250 mL PRN    sodium chloride 0.9% flush 10 mL Q12H PRN     sodium chloride 0.9% flush 10 mL PRN    triamcinolone acetonide 0.1% cream BID    vancomycin - pharmacy to dose pharmacy to manage frequency       Objective:     Vital Signs (Most Recent):  Temp: 97.6 °F (36.4 °C) (10/10/24 1101)  Pulse: 60 (10/10/24 1200)  Resp: 13 (10/10/24 1200)  BP: (!) 96/52 (10/10/24 1200)  SpO2: 99 % (10/10/24 1200) Vital Signs (24h Range):  Temp:  [97.6 °F (36.4 °C)-99.1 °F (37.3 °C)] 97.6 °F (36.4 °C)  Pulse:  [60-69] 60  Resp:  [11-26] 13  SpO2:  [92 %-100 %] 99 %  BP: ()/(46-70) 96/52     Weight: 75.4 kg (166 lb 3.6 oz) (70.76 kg dry weight) (10/09/24 1101)  Body mass index is 24.55 kg/m².  Body surface area is 1.92 meters squared.    I/O last 3 completed shifts:  In: 2317.4 [P.O.:700; I.V.:921.7; IV Piggyback:695.7]  Out: 3284 [Urine:100; Other:3184]     Physical Exam  Vitals and nursing note reviewed.   Constitutional:       General: He is awake. He is not in acute distress.     Appearance: Normal appearance. He is well-developed.   HENT:      Head: Normocephalic and atraumatic.      Nose: Nose normal.      Mouth/Throat:      Mouth: Mucous membranes are moist.   Eyes:      Extraocular Movements: Extraocular movements intact.      Conjunctiva/sclera: Conjunctivae normal.   Cardiovascular:      Rate and Rhythm: Normal rate and regular rhythm.      Comments: RIJ TDC  Pulmonary:      Effort: Pulmonary effort is normal.      Breath sounds: Normal breath sounds.   Abdominal:      General: There is no distension.      Palpations: Abdomen is soft.      Comments: PD catheter to LLQ   Musculoskeletal:         General: No tenderness.      Right lower leg: No edema.      Left lower leg: No edema.   Skin:     General: Skin is warm and dry.      Findings: No erythema or rash.   Neurological:      Mental Status: He is alert.   Psychiatric:         Mood and Affect: Mood normal.         Behavior: Behavior normal.          Significant Labs:  CBC:   Recent Labs   Lab 10/10/24  0305   WBC 20.75*    RBC 3.73*   HGB 9.9*   HCT 29.2*      MCV 78*   MCH 26.5*   MCHC 33.9     CMP:   Recent Labs   Lab 10/09/24  0311 10/10/24  0305   * 127*   CALCIUM 8.7 8.5*   ALBUMIN 2.7* 2.4*   PROT 6.2  --     131*   K 4.0 4.2   CO2 24 23    97   BUN 20 39*   CREATININE 2.6* 4.7*   ALKPHOS 167*  --    ALT 22  --    AST 97*  --    BILITOT 2.5*  --      All labs within the past 24 hours have been reviewed.     Significant Imaging:  Labs: Reviewed

## 2024-10-10 NOTE — PT/OT/SLP PROGRESS
Physical Therapy Treatment    Patient Name:  Ar Sharma   MRN:  [id number]    Recommendations:     Discharge Recommendations: High Intensity Therapy  Discharge Equipment Recommendations: none  Barriers to discharge:  increased assist due to complications s/p R+ ORIF     Assessment:     Ar Sharma is a 63 y.o. male admitted with a medical diagnosis of Closed right hip fracture, initial encounter.  He presents with the following impairments/functional limitations: weakness, impaired endurance, impaired self care skills, impaired functional mobility, gait instability, impaired balance, pain, decreased safety awareness, decreased lower extremity function, decreased ROM.    Pt appeared lethargic and endorsed dizziness throughout tx session this date, requiring max A x 2 persons for supine>sit for briefly sitting EOB. Pt w/ relatively stable BP but appeared more lethargic, likely due to side effects of pain medications.   BP vitals as follows:   -Supine upon entry: 77/49  -Supine #2: 95/54  -EOB#1: 133/72  -EOB#2:119/70  -Supine w/ HOB elevated: 103/60     Rehab Prognosis: Good; patient would benefit from acute skilled PT services to address these deficits and reach maximum level of function.    Recent Surgery: Procedure(s) (LRB):  ORIF, FRACTURE, FEMUR, INTERTROCHANTERIC (Right) 6 Days Post-Op    Plan:     During this hospitalization, patient to be seen daily to address the identified rehab impairments via gait training, therapeutic activities, therapeutic exercises, neuromuscular re-education and progress toward the following goals:    Plan of Care Expires:  10/31/24    Subjective     Chief Complaint: Pt states that he just had pain meds and he is very sleepy, but he is willing to participate   Patient/Family Comments/goals: Rehab for pt once stable  Pain/Comfort:  Pain Rating 1: 6/10  Location - Side 1: Right  Location 1: hip  Pain Addressed 1: Pre-medicate for activity      Objective:     Communicated with  nursing prior to session.  Patient found HOB elevated with pulse ox (continuous), peripheral IV, oxygen upon PT entry to room.     General Precautions: Standard, fall  Orthopedic Precautions: RLE weight bearing as tolerated  Braces: N/A  Respiratory Status: High flow, flow 5 L/min, concentration 30%     Functional Mobility:  Bed Mobility:     Rolling Left:  maximal assistance and of 2 persons  Rolling Right: maximal assistance  Supine to Sit: maximal assistance and of 2 persons  Sit to Supine: maximal assistance and of 2 persons  Transfers:     Sit to Stand:  unable with decreased BP  Balance: Static Sit Balance Fair (-)      AM-PAC 6 CLICK MOBILITY  Turning over in bed (including adjusting bedclothes, sheets and blankets)?: 2  Sitting down on and standing up from a chair with arms (e.g., wheelchair, bedside commode, etc.): 2  Moving from lying on back to sitting on the side of the bed?: 2  Moving to and from a bed to a chair (including a wheelchair)?: 2  Need to walk in hospital room?: 2  Climbing 3-5 steps with a railing?: 1  Basic Mobility Total Score: 11       Treatment & Education:  TA 1:1 x 15 min see above for transitional functional mobility  TE x 8 min AA therex 2 x 5 R+ LE quad sets, heel slides, SAQ ; Active 2 x 10 AP's - max t/c     Patient left HOB elevated with all lines intact, call button in reach, nursing notified, and wife present..    GOALS:   Multidisciplinary Problems       Physical Therapy Goals          Problem: Physical Therapy    Goal Priority Disciplines Outcome Interventions   Physical Therapy Goal     PT, PT/OT Progressing    Description: Goals to be met by: 10/31/2024      Patient will increase functional independence with mobility by performin. Supine to sit with Modified [place]  2. Sit to supine with Modified [place]  3. Sit to stand transfer with Stand-by Assistance  4. Bed to chair transfer with Stand-by Assistance using Rolling Walker  5. Gait  x 150 feet with  Stand-by Assistance using Rolling Walker.                          Time Tracking:     PT Received On:    PT Start Time: 1027     PT Stop Time: 1050  PT Total Time (min): 23 min     Billable Minutes: Therapeutic Activity 1 and Therapeutic Exercise 1    Treatment Type: Treatment  PT/PTA: PT     Number of PTA visits since last PT visit: 0     10/10/2024

## 2024-10-10 NOTE — SUBJECTIVE & OBJECTIVE
Interval History: feeling better with no new complaints.  Echo. Show ,  Left Atrium: There is a large mobile frondlike mass, appears to be adherent to LA septum.    Right Atrium: Right atrium is severely dilated. Multiple leads present in the right atrium with possible vegetation adherent to lead in RA.  Cardiology is consulted,recommended anticoagulations and IV Abx for possible endocarditis ,ID is consulted.blood culture is repeated.  BP is more stable at this time,will have HD in AM.    Review of Systems   HENT:  Negative for ear discharge and ear pain.    Eyes:  Negative for discharge and itching.   Endocrine: Negative for cold intolerance and heat intolerance.   Neurological:  Negative for seizures and syncope.     Objective:     Vital Signs (Most Recent):  Temp: 97.6 °F (36.4 °C) (10/10/24 1101)  Pulse: 60 (10/10/24 1101)  Resp: 12 (10/10/24 1101)  BP: (!) 100/58 (10/10/24 1101)  SpO2: 99 % (10/10/24 1101) Vital Signs (24h Range):  Temp:  [97.6 °F (36.4 °C)-99.1 °F (37.3 °C)] 97.6 °F (36.4 °C)  Pulse:  [60-72] 60  Resp:  [12-26] 12  SpO2:  [92 %-100 %] 99 %  BP: ()/(46-70) 100/58     Weight: 75.4 kg (166 lb 3.6 oz) (70.76 kg dry weight)  Body mass index is 24.55 kg/m².    Intake/Output Summary (Last 24 hours) at 10/10/2024 1138  Last data filed at 10/10/2024 1100  Gross per 24 hour   Intake 1289.1 ml   Output 100 ml   Net 1189.1 ml         Physical Exam  Vitals and nursing note reviewed.   Constitutional:       General: He is not in acute distress.     Appearance: He is ill-appearing (chronically). He is not toxic-appearing or diaphoretic.      Interventions: Nasal cannula in place.   HENT:      Head: Normocephalic and atraumatic.      Nose: No rhinorrhea.      Mouth/Throat:      Mouth: Mucous membranes are moist.   Eyes:      General: No scleral icterus.     Extraocular Movements: Extraocular movements intact.   Neck:      Comments: Right IJ tunneled dialysis catheter  Cardiovascular:      Rate and  Rhythm: Normal rate and regular rhythm.      Heart sounds: No murmur heard.  Pulmonary:      Effort: No tachypnea, accessory muscle usage, respiratory distress or retractions.   Abdominal:      General: There is no distension.      Comments: PD catheter present   Skin:     General: Skin is warm and dry.      Coloration: Skin is not jaundiced.      Findings: No rash.   Neurological:      General: No focal deficit present.      Mental Status: He is alert. Mental status is at baseline.             Significant Labs: All pertinent labs within the past 24 hours have been reviewed.  BMP:   Recent Labs   Lab 10/08/24  2330 10/09/24  0311 10/10/24  0305   *   < > 127*   *   < > 131*   K 4.3   < > 4.2      < > 97   CO2 21*   < > 23   BUN 20   < > 39*   CREATININE 2.6*   < > 4.7*   CALCIUM 8.5*   < > 8.5*   MG 2.1  --   --     < > = values in this interval not displayed.     CBC:   Recent Labs   Lab 10/09/24  0311 10/10/24  0305   WBC 27.13* 20.75*   HGB 11.3* 9.9*   HCT 33.0* 29.2*    302       Significant Imaging: I have reviewed all pertinent imaging results/findings within the past 24 hours.

## 2024-10-10 NOTE — PLAN OF CARE
Discharge Recommendations: High Intensity   Problem: Physical Therapy  Goal: Physical Therapy Goal  Description: Goals to be met by: 10/31/2024      Patient will increase functional independence with mobility by performin. Supine to sit with Modified [place]  2. Sit to supine with Modified [place]  3. Sit to stand transfer with Stand-by Assistance  4. Bed to chair transfer with Stand-by Assistance using Rolling Walker  5. Gait  x 150 feet with Stand-by Assistance using Rolling Walker.     Outcome: Progressing

## 2024-10-10 NOTE — PROGRESS NOTES
[facility] Medicine  Progress Note    Patient Name: Ar Sharma  MRN: [id number]  Patient Class: IP- Inpatient   Admission Date: 10/3/2024  Length of Stay: 7 days  Attending Physician: Delmy Agarwal, *  Primary Care Provider: Jc Elliott MD        Subjective:     Principal Problem:Closed right hip fracture, initial encounter        HPI:  63 y.o. AAM with h/o ESRD on HD (for 2 months but prior was on PD for about a month-Follows with [facility]) MWF via a tunneled HD catheter on the right, Essential HTN, HLD, CAD,  h/o systolic(EF 40-45%) and diastolic grade 3 CHF due to non-ischemic cardiomyopathy with a [employer] CRT-D implant 2016 (sees Dr. Hernandez) uses 3L supplemental oxygen at night, and h/o VT arhythmia (follows with Dr. Tenorio at [facility]) s/p ablation and on Mexiletin 150mg PO BID and Amiodarone 400mg daily present to the ER with c/o right hip pain after a fall at home.     Went to HD today and was feeling alright after. Was moving his tool box when he tripped over his feet and fell to the ground. Was unable to get up or put weight on his right leg.     Work up in the ED at [place] noted normal WBC and Stable H/H.  INR 1.3.  Lytes stable and c/w post-HD with no indications for emergent HD tonight.     Right hip/pelvis x-ray FINDINGS:  There is a minimally displaced intertrochanteric fracture of the right proximal femur.  No additional fractures are seen.  There is osteopenia.  The femoral heads are well seated in the acetabula.  There is mild joint space narrowing of the bilateral femoroacetabular joints and the pubic symphysis.  There is a peritoneal dialysis catheter overlying the pelvis.     Impression:     Right intertrochanteric fracture.      Transfer center contacted us for admission due to [facility] on Ortho-diversion. They spoke with Dr. Mcclendon who agreed to see the patient in consultation. We have been asked to accept for further pre-op planning.  Last time he took his ASA was yesterday am.       Results for orders placed during the hospital encounter of 04/29/24    Echo Saline Bubble? No; Ultrasound enhancing contrast? No    Interpretation Summary    Left Ventricle: The left ventricle is normal in size. Normal wall thickness. Regional wall motion abnormalities present. See diagram for wall motion findings. Septal motion is consistent with pacing. There is mildly reduced systolic function with a visually estimated ejection fraction of 40 - 45%. Grade III diastolic dysfunction.    Right Ventricle: Moderate to severe right ventricular enlargement. Systolic function is reduced.TAPSE is 1.59 cm. Pacemaker lead present in the ventricle.    Left Atrium: Left atrium is severely dilated. The left atrium volume index is 71.4 mL/m2.    Right Atrium: Right atrium is severely dilated.    Aortic Valve: There is mild aortic valve sclerosis. There is mild to moderate stenosis. Aortic valve area by VTI is 1.49 cm². Aortic valve peak velocity is 1.43 m/s. Mean gradient is 5 mmHg. The dimensionless index is 0.52.    Mitral Valve: There is mild regurgitation.    Tricuspid Valve: There is mild to moderate regurgitation.    Pulmonary Artery: The estimated pulmonary artery systolic pressure is 64 mmHg.    IVC/SVC: Elevated venous pressure at 15 mmHg.      Overview/Hospital Course:   63 y.o. AAM with h/o ESRD on HD (MWF), HTN, HLD, CAD, CHF admitted on 10/03/24 for Right intertrochanteric Hip fracture after a fall at home. No head trauma or LOC. CXR with interstitial edema.  No evidence of pneumothorax.  X-ray of the right hip with minimally displaced intertrochanteric fracture of the right proximal femur.  Orthopedic surgery consulted- s/p right  femur intramedullary nail on 10/04.  Minimal blood loss per orthopedic. Echo with EF of 40-45%, mild aortic stenosis.  PASP of 66 mm Hg.  Cardiology consulted for cardiac clearance-cleared for surgery at moderate cardiac risk.  Continue  symptomatic management and supportive care.  Nephrology following for ESRD. Patient  hypotensive at baseline but acutely more hypotensive during hospitalization but is asymptomatic. Unable to tolerate HD on 10/05. Persistently hypotensive. patient transferred to ICU for presssor support and CRRT on 10/05.  Tolerating CRRT.    Echo. Show ,  Left Atrium: There is a large mobile frondlike mass, appears to be adherent to LA septum.    Right Atrium: Right atrium is severely dilated. Multiple leads present in the right atrium with possible vegetation adherent to lead in RA.  Cardiology is consulted,recommended anticoagulations and IV Abx for possible endocarditis ,ID is consulted.blood culture is repeated.  BP is more stable at this time,will have HD in AM.    Interval History: feeling better with no new complaints.  Echo. Show ,  Left Atrium: There is a large mobile frondlike mass, appears to be adherent to LA septum.    Right Atrium: Right atrium is severely dilated. Multiple leads present in the right atrium with possible vegetation adherent to lead in RA.  Cardiology is consulted,recommended anticoagulations and IV Abx for possible endocarditis ,ID is consulted.blood culture is repeated.  BP is more stable at this time,will have HD in AM.    Review of Systems   HENT:  Negative for ear discharge and ear pain.    Eyes:  Negative for discharge and itching.   Endocrine: Negative for cold intolerance and heat intolerance.   Neurological:  Negative for seizures and syncope.     Objective:     Vital Signs (Most Recent):  Temp: 97.6 °F (36.4 °C) (10/10/24 1101)  Pulse: 60 (10/10/24 1101)  Resp: 12 (10/10/24 1101)  BP: (!) 100/58 (10/10/24 1101)  SpO2: 99 % (10/10/24 1101) Vital Signs (24h Range):  Temp:  [97.6 °F (36.4 °C)-99.1 °F (37.3 °C)] 97.6 °F (36.4 °C)  Pulse:  [60-72] 60  Resp:  [12-26] 12  SpO2:  [92 %-100 %] 99 %  BP: ()/(46-70) 100/58     Weight: 75.4 kg (166 lb 3.6 oz) (70.76 kg dry weight)  Body mass index  is 24.55 kg/m².    Intake/Output Summary (Last 24 hours) at 10/10/2024 1138  Last data filed at 10/10/2024 1100  Gross per 24 hour   Intake 1289.1 ml   Output 100 ml   Net 1189.1 ml         Physical Exam  Vitals and nursing note reviewed.   Constitutional:       General: He is not in acute distress.     Appearance: He is ill-appearing (chronically). He is not toxic-appearing or diaphoretic.      Interventions: Nasal cannula in place.   HENT:      Head: Normocephalic and atraumatic.      Nose: No rhinorrhea.      Mouth/Throat:      Mouth: Mucous membranes are moist.   Eyes:      General: No scleral icterus.     Extraocular Movements: Extraocular movements intact.   Neck:      Comments: Right IJ tunneled dialysis catheter  Cardiovascular:      Rate and Rhythm: Normal rate and regular rhythm.      Heart sounds: No murmur heard.  Pulmonary:      Effort: No tachypnea, accessory muscle usage, respiratory distress or retractions.   Abdominal:      General: There is no distension.      Comments: PD catheter present   Skin:     General: Skin is warm and dry.      Coloration: Skin is not jaundiced.      Findings: No rash.   Neurological:      General: No focal deficit present.      Mental Status: He is alert. Mental status is at baseline.             Significant Labs: All pertinent labs within the past 24 hours have been reviewed.  BMP:   Recent Labs   Lab 10/08/24  2330 10/09/24  0311 10/10/24  0305   *   < > 127*   *   < > 131*   K 4.3   < > 4.2      < > 97   CO2 21*   < > 23   BUN 20   < > 39*   CREATININE 2.6*   < > 4.7*   CALCIUM 8.5*   < > 8.5*   MG 2.1  --   --     < > = values in this interval not displayed.     CBC:   Recent Labs   Lab 10/09/24  0311 10/10/24  0305   WBC 27.13* 20.75*   HGB 11.3* 9.9*   HCT 33.0* 29.2*    302       Significant Imaging: I have reviewed all pertinent imaging results/findings within the past 24 hours.    Assessment/Plan:      * Closed right hip fracture,  initial encounter  -s/p fall. No LOC or head trauma  -Right hip XR: minimally displaced intertrochanteric fracture of the right proximal femur.   -Orthopedic surgery consulted: s/p right  femur intramedullary nail on 10/04  -PT/OT once more stable.    Atrial mass    Echo. Show ,  Left Atrium: There is a large mobile frondlike mass, appears to be adherent to LA septum.    Right Atrium: Right atrium is severely dilated. Multiple leads present in the right atrium with possible vegetation adherent to lead in RA.  Cardiology is consulted,recommended anticoagulations and IV Abx for possible endocarditis ,ID is consulted.blood culture is repeated.    Right atrial thrombus  As above.      Leukocytosis  -WBC# trending up, 20k on 10/06. Pt remained afebrile.   -initially suspected reactive given fracture and recent surgery  -However, pt also with PD cath in LLQ.   BCX negative.  Empirically started on aBX's    Acute on chronic hypoxic respiratory failure  Patient with Hypoxic Respiratory failure which is Acute on chronic.  he is on home oxygen at 3 LPM. Supplemental oxygen was provided and noted-      .   Signs/symptoms of respiratory failure include- tachypnea and increased work of breathing. Contributing diagnoses includes - CHF and COPD Labs and images were reviewed. Patient Has recent ABG, which has been reviewed. Will treat underlying causes and adjust management of respiratory failure as follows-     -pt on home 3L NC. Hx of COPD/CHF  -Worsening hypoxia overnight 10/05; required up to 15L HFNC.  -acute on chronic hypoxia secondary to volume overload  -Pt unable to tolerate HD and CRRT, net positive 1.5L on 10/06  -CXR with pulmonary edema   -Pulmonology consulted  -Nephrology following  -Wean O2 as tolerated     Advanced care planning/counseling discussion  Advance Care Planning    Date: 10/05/2024    Code Status  I engaged the the patient in a voluntary conversation about the patient's preferences for care  at the very  end of life. The patient wishes to have CPR and other invasive treatments performed when his heart and/or breathing stops. I communicated to the patient that his wishes align with full code status and he agrees.    A total of 16 min was spent on advance care planning, goals of care discussion, emotional support, formulating and communicating prognosis and exploring burden/benefit of various approaches of treatment. This discussion occurred on a fully voluntary basis with the verbal consent of the patient and/or family.            Moderate protein-calorie malnutrition  Nutrition consulted. Most recent weight and BMI monitored-     Measurements:  Wt Readings from Last 1 Encounters:   10/06/24 74.6 kg (164 lb 7.4 oz)   Body mass index is 24.29 kg/m².    Patient has been screened and assessed by RD.    Malnutrition Type:  Context: acute illness or injury  Level: mild    Malnutrition Characteristic Summary:  Weight Loss (Malnutrition): greater than 10% in 6 months  Subcutaneous Fat (Malnutrition): mild depletion  Muscle Mass (Malnutrition): mild depletion    Interventions/Recommendations (treatment strategy):  1. Add Novasource Renal TID 2. [place] intake at meals3. Monitor weight/labs 4. RD to follow to monitor PO intake      Hypotension  -resume home midodrine   -Unable to tolerate HD on 10/05, SBP dropped to 70s  BP is more stable at this time,will have HD in AM.    ESRD (end stage renal disease)  Nephrology consulted: HD per nephrology   HD stopped early on 10/05 due to hypotension  Persistently hypotensive, transfer to ICU for CRRT and pressor support on 10/05  CRRT started on 10/05    Pulmonary emphysema, unspecified emphysema type  COPD is controlled on his home 3L oxygen at night.   No wheezing on exam  Now volume overloaded and requiring more O2  PRN duonebs ordered.   Wean to home O2 as tolerated    Aortic atherosclerosis  Continue statin       Primary hypertension  Chronic, controlled. Pt now with hypotension    Pt with hypotension, continue home midodrine   Currently on pressors to keep MAP>65    Latest blood pressure and vitals reviewed-     Temp:  [97.5 °F (36.4 °C)-98.5 °F (36.9 °C)]   Pulse:  [60-66]   Resp:  [12-42]   BP: ()/(45-72)   SpO2:  [92 %-100 %] .   Home meds for hypertension were reviewed and noted below.   Hypertension Medications               furosemide (LASIX) 80 MG tablet Take 4.5 tablets (360 mg total) by mouth 2 (two) times daily.    metOLazone (ZAROXOLYN) 10 MG tablet Take 10 mg by mouth As instructed (Take I tablet by mouth Tuesday, Thursday, Saturday, Sunday). Take I tablet by mouth Tuesday, Thursday, Saturday, Sunday    nitroGLYCERIN (NITROSTAT) 0.4 MG SL tablet Place 1 tablet (0.4 mg total) under the tongue every 5 (five) minutes as needed for Chest pain.            While in the hospital, will manage blood pressure as follows; Adjust home antihypertensive regimen as follows- patient with hypotension chronically. Continue home midodrine     Will utilize p.r.n. blood pressure medication only if patient's blood pressure greater than  180/90  and he develops symptoms such as worsening chest pain or shortness of breath.    Chronic combined systolic and diastolic heart failure  NO signs of acute exacerbation.   Echo as below   Cardiology consulted for cardiac clearance-cleared for surgery at moderate cardiac risk.   Nephrology for volume removal with HD    Echo    Result Date: 10/7/2024    Left Ventricle: The left ventricle is normal in size. Mildly increased   wall thickness. There is mild concentric hypertrophy. Regional wall motion   abnormalities present (severe distal anteroapical hypokinesis). There is   mildly reduced systolic function with a visually estimated ejection   fraction of 40 - 45%.    Right Ventricle: Severe right ventricular enlargement. Systolic   function is severely reduced. Pacemaker lead present in the ventricle and   appears abnormal with possible vegetation on lead in  RA (2.7x2.0cm).    Left Atrium: There is a large mobile frondlike mass, appears to be   adherent to LA septum.    Right Atrium: Right atrium is severely dilated. Multiple leads present   in the right atrium with possible vegetation adherent to lead in RA.    Aortic Valve: The aortic valve is a trileaflet valve. There is mild   aortic valve sclerosis.    Tricuspid Valve: There is mild regurgitation.    Pulmonary Artery: The estimated pulmonary artery systolic pressure is   67 mmHg.        Echo    Result Date: 10/3/2024    Left Ventricle: The left ventricle is mildly dilated. There is mild   eccentric hypertrophy. Septal flattening in diastole and systole   consistent with right ventricular volume and pressure overload. There is   mildly reduced systolic function with a visually estimated ejection   fraction of 40 - 45%.    Right Ventricle: Severe right ventricular enlargement. Systolic   function is moderately reduced.    Left Atrium: Left atrium is moderately dilated.    Right Atrium: Right atrium is severely dilated.    Aortic Valve: There is mild stenosis. Aortic valve area by VTI is 1.6   cm². Aortic valve peak velocity is 1.6 m/s. Mean gradient is 6.3 mmHg. The   dimensionless index is 0.50.    Mitral Valve: There is mild to moderate regurgitation.    Tricuspid Valve: There is moderate regurgitation.    Pulmonary Artery: The estimated pulmonary artery systolic pressure is   66 mmHg.    IVC/SVC: Elevated venous pressure at 15 mmHg.           S/P ablation of ventricular arrhythmia  Cardiology consulted for cardiac clearance.   Resume home Mexiletin 150mg PO BID   Hold amiodarone 400mg PO daily in setting of hypotension   ICD in place       Cardiac resynchronization therapy defibrillator (CRT-D) in place  -noted      Atherosclerosis of native coronary artery of native heart with angina pectoris  Patient with known CAD , which is controlled Will continue Statin and monitor for S/Sx of angina/ACS. Continue to monitor  on telemetry.     Mixed hyperlipidemia  -continue statin       Iron deficiency anemia  Anemia is likely due to Iron deficiency. Most recent hemoglobin and hematocrit are listed below.  Recent Labs     10/05/24  0544 10/06/24  0439 10/07/24  0219   HGB 12.6* 12.2* 11.5*   HCT 37.5* 35.8* 33.6*       Plan  - Monitor serial CBC: Daily  - Transfuse PRBC if patient becomes hemodynamically unstable, symptomatic or H/H drops below 7/21.  - Patient has not received any PRBC transfusions to date  - Patient's anemia is currently stable  - stable, monitoring     Hypothyroidism  -resume home synthroid       VTE Risk Mitigation (From admission, onward)           Ordered     apixaban tablet 5 mg  2 times daily         10/07/24 1319     heparin (porcine) injection 5,000 Units  Use PRN         10/05/24 1830     Place sequential compression device  Until discontinued         10/04/24 2217     heparin (porcine) injection 1,000 Units  As needed (PRN)         10/04/24 2217     IP VTE LOW RISK PATIENT  Once         10/04/24 1438     Place sequential compression device  Until discontinued         10/03/24 0154     Reason for No Pharmacological VTE Prophylaxis  Once        Comments: Right hip fracture needs surgical eval   Question:  Reasons:  Answer:  Physician Provided (leave comment)    10/03/24 0154                    Discharge Planning   SLAVA:      Code Status: Full Code   Is the patient medically ready for discharge?:     Reason for patient still in hospital (select all that apply): Patient trending condition  Discharge Plan A:  (tbd)            Critical care time spent on the evaluation and treatment of severe organ dysfunction, review of pertinent labs and imaging studies, discussions with consulting providers and discussions with patient/family:  over 45  minutes.      Delmy Agarwal MD  Department of Hospital Medicine   [facility] - [place] - Intensive Care

## 2024-10-10 NOTE — NURSING
[facility], [facility] [place]  Nurses Note -- 4 Eyes      10/9/2024       Skin assessed on: Q Shift      [] No Pressure Injuries Present    [x]Prevention Measures Documented    [x] Yes LDA  for Pressure Injury Previously documented     [] Yes New Pressure Injury Discovered   [] LDA for New Pressure Injury Added      Attending RN:  Divina Do RN     Second RN:  Selina RN

## 2024-10-10 NOTE — NURSING
[facility], [facility] - [place]  Nurses Note -- 4 Eyes      10/10/2024       Skin assessed on: Q Shift      [] No Pressure Injuries Present    [x]Prevention Measures Documented    [x] Yes LDA  for Pressure Injury Previously documented     [] Yes New Pressure Injury Discovered   [] LDA for New Pressure Injury Added      Attending RN:  Geoff Pelletier RN     Second RN:  MILADIS Gill

## 2024-10-11 PROBLEM — Z71.89 ADVANCE CARE PLANNING: Status: ACTIVE | Noted: 2024-10-11

## 2024-10-11 PROBLEM — I95.9 HYPOTENSION: Status: RESOLVED | Noted: 2024-01-01 | Resolved: 2024-01-01

## 2024-10-11 PROBLEM — R57.9 SHOCK: Status: ACTIVE | Noted: 2024-10-11

## 2024-10-11 NOTE — PLAN OF CARE
Per MDT SIBR rounds this am patient remains in the ICU on levophed and is not improving.  BP at 0845 this am 77/45.  Dialysis has been difficult.   Fmy is aware of patient's condition and understands but remains hopeful.  PT initial eval recommended HH.  Patient will need re-eval if he improves.       10/11/24 1042   Rounds   Attendance Provider;Nurse ;Assigned nurse;Charge nurse;Occupational therapist;Pharmacist  (Pulm/critical care MD, Palliative care MD & clergy)   Discharge Plan A   (tbd see comment)   Why the patient remains in the hospital Requires continued medical care   Transition of Care Barriers None

## 2024-10-11 NOTE — ASSESSMENT & PLAN NOTE
- PCCM consulted; being treated for possible infection and is on antibiotics and vasopressors. Random cortisol pending, as well as further infectious workup.   - Mgmt per their team/primary/ID

## 2024-10-11 NOTE — PROGRESS NOTES
[facility] [place]  Infectious Disease  Progress Note    Patient Name: Ar Sharma  MRN: [id number]  Admission Date: 10/3/2024  Length of Stay: 8 days  Attending Physician: Delmy Agarwal, *  Primary Care Provider: Jc Elliott MD    Isolation Status: No active isolations    Assessment/Plan:      Atrial mass    Mr. Sharma is a pleasant 62 yo man admitted after a fall with a femur fracture. He is s/p IM nail placement. Incidentally found to have an atrial thrombus v vegetation. ID is consulted for that. The patient denies any prodromal symptoms, such as fever, and has no stigmata of IE. Additionally, his blood cultures are NGTD. At this pont, he meets [facility] criteria for possible IE, though NBTE is a definite possibility.    ESR 27  Rheumatoid factor NR    Pending studies  Karius  Q fever  Bartonella  Whipple's PCR    Recommendations  Continue empiric abx - cefepime and vancomycin, avoiding CTX due to elevated LFTs  Follow-up pending studies to r/o possible CNE  Trend WBC  If leukocytosis persists, consider HIDA scan, when feasible    Thank you for your consult. I will follow-up with patient. Please contact us if you have any additional questions.    Colin Montanez MD  Infectious Disease  [facility]    Subjective:     Principal Problem:Closed right hip fracture, initial encounter    HPI: Mr. Sharma is a pleasant 63 y.o. man with h/o ESRD on HD (for 2 months but prior was on PD for about a month-Follows with [facility]) MWF via a tunneled HD catheter on the right, Essential HTN, HLD, CAD,  h/o systolic(EF 40-45%) and diastolic grade 3 CHF due to non-ischemic cardiomyopathy with a [employer] CRT-D implant 2016 (sees Dr. Hernandez) uses 3L supplemental oxygen at night, and h/o VT arhythmia (follows with Dr. Tenorio at [facility]) s/p ablation and on Mexiletin 150mg PO BID and Amiodarone 400mg daily present to the ER with c/o right hip pain after a fall at home. Went to HD and was  feeling alright after. Was moving his tool box when he tripped over his feet and fell to the ground while working on his RV. Was unable to get up or put weight on his right leg. Radiograph revealed a femur fracture and the patient was transferred here from [place] for Ortho evaluation. Here he underwent surgery and has an IM nail placed. He had a TTE which demonstrated a mass through a PFO. ID is consulted for possible IE. The patient and spouse deny prodromal fever, chills, or night sweats. No IDU but has HD TIW. Blood cultures (10/5) are NGTD.       Interval History: On HD. No complaints.    Review of Systems   All other systems reviewed and are negative.    Objective:     Vital Signs (Most Recent):  Temp: 97.9 °F (36.6 °C) (10/11/24 0745)  Pulse: 60 (10/11/24 0830)  Resp: 15 (10/11/24 0830)  BP: (!) 97/52 (10/11/24 0830)  SpO2: (!) 94 % (10/11/24 0830) Vital Signs (24h Range):  Temp:  [96 °F (35.6 °C)-97.9 °F (36.6 °C)] 97.9 °F (36.6 °C)  Pulse:  [60-69] 60  Resp:  [10-33] 15  SpO2:  [89 %-100 %] 94 %  BP: ()/(44-70) 97/52     Weight: 75.4 kg (166 lb 3.6 oz)  Body mass index is 24.55 kg/m².    Estimated Creatinine Clearance: 12.4 mL/min (A) (based on SCr of 6 mg/dL (H)).     Physical Exam  Vitals and nursing note reviewed.   Constitutional:       Appearance: Normal appearance.   HENT:      Head: Normocephalic.   Eyes:      Pupils: Pupils are equal, round, and reactive to light.   Cardiovascular:      Rate and Rhythm: Normal rate.      Heart sounds: Murmur heard.   Pulmonary:      Effort: No respiratory distress.      Breath sounds: No stridor. Rales present.   Musculoskeletal:         General: No swelling.      Right lower leg: No edema.      Left lower leg: No edema.   Skin:     Findings: No erythema.   Neurological:      Mental Status: He is alert.   Psychiatric:         Mood and Affect: Mood normal.         Behavior: Behavior normal.         Thought Content: Thought content normal.          Significant  Labs: CBC:   Recent Labs   Lab 10/10/24  0305 10/11/24  0421   WBC 20.75* 20.95*   HGB 9.9* 10.4*   HCT 29.2* 29.5*    352     Microbiology Results (last 7 days)       Procedure Component Value Units Date/Time    Blood culture [[id number]] Collected: 10/09/24 0930    Order Status: Completed Specimen: Blood Updated: 10/10/24 1103     Blood Culture, Routine No Growth to date      No Growth to date    Narrative:      Draw sample # 2 from separate site    Blood culture [[id number]] Collected: 10/09/24 0915    Order Status: Completed Specimen: Blood Updated: 10/10/24 1103     Blood Culture, Routine No Growth to date      No Growth to date    Narrative:      Draw sample # 2 from separate site    Blood culture [[id number]] Collected: 10/05/24 1612    Order Status: Completed Specimen: Blood from Peripheral, Hand, Left Updated: 10/09/24 1703     Blood Culture, Routine No Growth after 4 days.    Blood culture [[id number]] Collected: 10/05/24 1556    Order Status: Completed Specimen: Blood from Peripheral, Wrist, Left Updated: 10/09/24 1703     Blood Culture, Routine No Growth after 4 days.          All pertinent labs within the past 24 hours have been reviewed.    Significant Imaging: I have reviewed all pertinent imaging results/findings within the past 24 hours.

## 2024-10-11 NOTE — ASSESSMENT & PLAN NOTE
- Mgmt per primary team, cardiology, and ID; thrombus vs endocarditis. On anticoagulation and antibiotics currently.

## 2024-10-11 NOTE — SUBJECTIVE & OBJECTIVE
Interval History: On HD. No complaints.    Review of Systems   All other systems reviewed and are negative.    Objective:     Vital Signs (Most Recent):  Temp: 97.9 °F (36.6 °C) (10/11/24 0745)  Pulse: 60 (10/11/24 0830)  Resp: 15 (10/11/24 0830)  BP: (!) 97/52 (10/11/24 0830)  SpO2: (!) 94 % (10/11/24 0830) Vital Signs (24h Range):  Temp:  [96 °F (35.6 °C)-97.9 °F (36.6 °C)] 97.9 °F (36.6 °C)  Pulse:  [60-69] 60  Resp:  [10-33] 15  SpO2:  [89 %-100 %] 94 %  BP: ()/(44-70) 97/52     Weight: 75.4 kg (166 lb 3.6 oz)  Body mass index is 24.55 kg/m².    Estimated Creatinine Clearance: 12.4 mL/min (A) (based on SCr of 6 mg/dL (H)).     Physical Exam  Vitals and nursing note reviewed.   Constitutional:       Appearance: Normal appearance.   HENT:      Head: Normocephalic.   Eyes:      Pupils: Pupils are equal, round, and reactive to light.   Cardiovascular:      Rate and Rhythm: Normal rate.      Heart sounds: Murmur heard.   Pulmonary:      Effort: No respiratory distress.      Breath sounds: No stridor. Rales present.   Musculoskeletal:         General: No swelling.      Right lower leg: No edema.      Left lower leg: No edema.   Skin:     Findings: No erythema.   Neurological:      Mental Status: He is alert.   Psychiatric:         Mood and Affect: Mood normal.         Behavior: Behavior normal.         Thought Content: Thought content normal.          Significant Labs: CBC:   Recent Labs   Lab 10/10/24  0305 10/11/24  0421   WBC 20.75* 20.95*   HGB 9.9* 10.4*   HCT 29.2* 29.5*    352     Microbiology Results (last 7 days)       Procedure Component Value Units Date/Time    Blood culture [[id number]] Collected: 10/09/24 0930    Order Status: Completed Specimen: Blood Updated: 10/10/24 1103     Blood Culture, Routine No Growth to date      No Growth to date    Narrative:      Draw sample # 2 from separate site    Blood culture [[id number]] Collected: 10/09/24 0915    Order Status: Completed Specimen: Blood  Updated: 10/10/24 1103     Blood Culture, Routine No Growth to date      No Growth to date    Narrative:      Draw sample # 2 from separate site    Blood culture [[id number]] Collected: 10/05/24 1612    Order Status: Completed Specimen: Blood from Peripheral, Hand, Left Updated: 10/09/24 1703     Blood Culture, Routine No Growth after 4 days.    Blood culture [[id number]] Collected: 10/05/24 1556    Order Status: Completed Specimen: Blood from Peripheral, Wrist, Left Updated: 10/09/24 1703     Blood Culture, Routine No Growth after 4 days.          All pertinent labs within the past 24 hours have been reviewed.    Significant Imaging: I have reviewed all pertinent imaging results/findings within the past 24 hours.

## 2024-10-11 NOTE — ASSESSMENT & PLAN NOTE
Patient chronically with systolic blood pressures in the 90s.  Throughout his hospitalization he has become progressively more hypotensive.  Unclear etiology.  All cultures have been NGTD.  There is intracardiac thrombus that were noted on TTE and are being treated with heparin.  Cardiology following.  Leukocytosis present, but this could represent a stress response. Chest x-ray consistent with volume overload and cardiomegaly. Has been on broad spectrum empiric antibiotics per ID recommendations.  - continue CRRT until BP more appropriate  - obtaining VBG from CVC today to evaluate for SCvO2.  If heart failure contributing, may need to consider inotrope.   - continue antibiotics as guided by ID.  Currently on Vancomycin/cefepime (day 7). Continue to follow up cultures  - heparin infusion currently being utilized for possible intracardiac thrombus.  If Obstructive physiology is possible, this would be the therapy for it.   - midodrine 15mg TID  - obtaining AM cortisol and random cortisol.  Starting Hydrocortisone and fludrocortisone given elevated pressor requirements.  - levophed to keep MAP >60mmHg. If requirements increase above 0.15mcg/kg/min then we will add vasopressin.

## 2024-10-11 NOTE — ASSESSMENT & PLAN NOTE
Mr. Sharma is a pleasant 62 yo man admitted after a fall with a femur fracture. He is s/p IM nail placement. Incidentally found to have an atrial thrombus v vegetation. ID is consulted for that. The patient denies any prodromal symptoms, such as fever, and has no stigmata of IE. Additionally, his blood cultures are NGTD. At this pont, he meets [facility] criteria for possible IE, though NBTE is a definite possibility.      ESR 27  Rheumatoid factor NR    Pending studies  KarRUST  Q fever  Bartonella  Gabriela's PCR    Recommendations  Continue empiric abx - cefepime and vancomycin, avoiding CTX due to elevated LFTs  Follow-up pending studies to r/o possible CNE  Trend WBC  If leukocytosis persists, consider HIDA scan, when feasible

## 2024-10-11 NOTE — ASSESSMENT & PLAN NOTE
Likely multifactorial in etiology (pulm edema, heart failure, possible thrombotic disease, atelectasis).  Supplement O2 to keep >90%  Treat underlying issues per their respective sections.

## 2024-10-11 NOTE — PLAN OF CARE
SCvO2 41.  Will start dobutamine.    Also, not able to tolerate swallowing eliquis today.  Will discontinue and start heparin gtt without bolus.    Jose M Connolly MD  [facility]

## 2024-10-11 NOTE — HOSPITAL COURSE
Requiring high vasopressor requirements without clear infectious etiology.  Atrial mass present concerning for thrombus in setting of negative blood cultures.  Abx being adjusted per ID.  Antifungals added.  Due to low SCvO2 seen on VBG from left IJ CVC we have started dobutamine.  This improved SCvO2 accordingly.  CT Chest/abd/pelvis with overt hypervolemia and splenic infarcts.

## 2024-10-11 NOTE — PLAN OF CARE
Pt remains free from falls and injuries. R HD cath (venus side remains clotted, MD aware, per report), L TLC, PIV, NC, PD cath remain. Levo gtt titrated as ordered to maintain MAP>65. Percocet x1, dilaudid x1, zofran x1. Pt repositioned frequently. VSS, no acute issues overnight. Plan of care reviewed with pt and pt's spouse, who remains at bedside.

## 2024-10-11 NOTE — ASSESSMENT & PLAN NOTE
Unclear etiology of this (thrombus vs septic).  Seems less likely that it is septic given negative culture data.   - anticoagulation with heparin Gtt  - cards on board.

## 2024-10-11 NOTE — SUBJECTIVE & OBJECTIVE
Past Medical History:   Diagnosis Date    Cardiomyopathy     CKD (chronic kidney disease) stage 4, GFR 15-29 ml/min     Congestive heart failure (CHF) 2015    COPD (chronic obstructive pulmonary disease)     Coronary artery disease     Edema     Essential (primary) hypertension 05/18/2022    Formatting of this note might be different from the original. Converted from Centricity: Description - ESSENTIAL HYPERTENSION, BENIGN    Heart attack     HLD (hyperlipidemia)     Hypertension     Hyperuricemia     Hypocalcemia     Renal cyst, left     Secondary hyperparathyroidism     Thyroid disease     V tach     Vitamin D deficiency        Past Surgical History:   Procedure Laterality Date    ablations  03/05/2018    albations  02/01/2018    COLONOSCOPY N/A 12/07/2018    Procedure: COLONOSCOPY/suprep;  Surgeon: Ananya Morillo MD;  Location: [facility] ENDO;  Service: Endoscopy;  Laterality: N/A;    defibulater N/A 2016    ESOPHAGOGASTRODUODENOSCOPY N/A 12/07/2018    Procedure: EGD (ESOPHAGOGASTRODUODENOSCOPY);  Surgeon: Ananya Morillo MD;  Location: [facility] ENDO;  Service: Endoscopy;  Laterality: N/A;    INSERTION OF TUNNELED CENTRAL VENOUS HEMODIALYSIS CATHETER Right 5/3/2024    Procedure: INSERTION, CATHETER, HEMODIALYSIS, DUAL LUMEN;  Surgeon: Philip Perez MD;  Location: [facility] OR;  Service: General;  Laterality: Right;    INSERTION, CATHETER, DIALYSIS, PERITONEAL, LAPAROSCOPIC N/A 5/8/2024    Procedure: INSERTION, CATHETER, DIALYSIS, PERITONEAL, LAPAROSCOPIC;  Surgeon: Tyree Ruiz MD;  Location: [facility] OR;  Service: General;  Laterality: N/A;    INSERTION, CENTRAL VENOUS ACCESS DEVICE Right 6/5/2024    Procedure: Insertion,central venous access device;  Surgeon: Tyree Ruiz MD;  Location: [facility] OR;  Service: General;  Laterality: Right;    JOINT REPLACEMENT Bilateral 10/2016    knees, bilat    LEFT HEART CATHETERIZATION N/A 12/16/2020    Procedure: Left heart cath;  Surgeon: Shahram Stewart MD;  Location:  [facility] CATH LAB/EP;  Service: Cardiology;  Laterality: N/A;    LEFT HEART CATHETERIZATION Left 2022    Procedure: Left heart cath;  Surgeon: Juan Roque MD;  Location: [facility] CATH LAB/EP;  Service: Cardiology;  Laterality: Left;    OPEN REDUCTION AND INTERNAL FIXATION (ORIF) OF INTERTROCHANTERIC FRACTURE OF FEMUR Right 10/4/2024    Procedure: ORIF, FRACTURE, FEMUR, INTERTROCHANTERIC;  Surgeon: Kinsey Reed MD;  Location: [facility] OR;  Service: Orthopedics;  Laterality: Right;  Cristina Del Real notified- Nc    DE HEMODIALYSIS, ONE EVALUATION  2024    REMOVAL OF HEMODIALYSIS CATHETER Right 5/3/2024    Procedure: REMOVAL, CATHETER, HEMODIALYSIS;  Surgeon: Philip Perez MD;  Location: [facility] OR;  Service: General;  Laterality: Right;    REPLACEMENT OF IMPLANTABLE CARDIOVERTER-DEFIBRILLATOR (ICD) GENERATOR Left 2023    Procedure: REPLACEMENT, ICD GENERATOR;  Surgeon: River Tenorio MD;  Location: [facility] EP LAB;  Service: Cardiology;  Laterality: Left;  ANTHONY, CRTD gen chg, MDT, MAC, DM, 3prep       Review of patient's allergies indicates:   Allergen Reactions    Lokelma [sodium zirconium cyclosilicate] Other (See Comments)     Fluid retention, weight gain, CHF excerebration, severe constipation    Atorvastatin Other (See Comments)     Joint pain    Jardiance [empagliflozin] Other (See Comments)     Chest pains, significant weight loss, lower blood pressure       Family History       Problem Relation (Age of Onset)    Alcohol abuse Father    Arthritis Sister    Breast cancer Mother    Cancer Mother    Hypertension Mother    Kidney disease Father    No Known Problems Brother, Daughter, Son    Stroke Mother          Tobacco Use    Smoking status: Former     Current packs/day: 0.00     Average packs/day: 1 pack/day for 33.2 years (33.2 ttl pk-yrs)     Types: Cigarettes     Start date: 1979     Quit date: 3/3/2012     Years since quittin.6     Passive exposure: Past    Smokeless tobacco: Never    Substance and Sexual Activity    Alcohol use: Not Currently     Comment: rare    Drug use: No    Sexual activity: Not Currently     Partners: Female         Review of Systems   Constitutional:  Positive for activity change and fatigue. Negative for chills and fever.   Respiratory:  Positive for shortness of breath.    Cardiovascular:  Positive for leg swelling. Negative for chest pain.   Gastrointestinal:  Negative for abdominal pain.   Psychiatric/Behavioral: Negative.       Objective:     Vital Signs (Most Recent):  Temp: 97.9 °F (36.6 °C) (10/11/24 0900)  Pulse: 67 (10/11/24 1045)  Resp: 19 (10/11/24 1045)  BP: (!) 97/52 (10/11/24 1030)  SpO2: 98 % (10/11/24 1045) Vital Signs (24h Range):  Temp:  [96 °F (35.6 °C)-97.9 °F (36.6 °C)] 97.9 °F (36.6 °C)  Pulse:  [60-69] 67  Resp:  [10-33] 19  SpO2:  [89 %-100 %] 98 %  BP: ()/(44-64) 97/52     Weight: 75.4 kg (166 lb 3.6 oz)  Body mass index is 24.55 kg/m².      Intake/Output Summary (Last 24 hours) at 10/11/2024 1119  Last data filed at 10/11/2024 0627  Gross per 24 hour   Intake 1389.9 ml   Output 100 ml   Net 1289.9 ml        Physical Exam  Constitutional:       General: He is not in acute distress.     Appearance: Normal appearance. He is ill-appearing. He is not toxic-appearing or diaphoretic.   HENT:      Head: Normocephalic and atraumatic.      Nose: Nose normal.      Mouth/Throat:      Mouth: Mucous membranes are moist.   Eyes:      Extraocular Movements: Extraocular movements intact.      Pupils: Pupils are equal, round, and reactive to light.   Cardiovascular:      Rate and Rhythm: Normal rate and regular rhythm.   Pulmonary:      Effort: Pulmonary effort is normal. No respiratory distress.      Breath sounds: Normal breath sounds. No wheezing.   Abdominal:      General: Abdomen is flat. Bowel sounds are normal. There is no distension.      Palpations: Abdomen is soft.   Musculoskeletal:      Cervical back: Normal range of motion.      Right lower  leg: Edema present.      Left lower leg: Edema present.   Skin:     General: Skin is warm.      Capillary Refill: Capillary refill takes less than 2 seconds.   Neurological:      Mental Status: He is alert. Mental status is at baseline.          Vents:       Lines/Drains/Airways       Central Venous Catheter Line  Duration                  Hemodialysis Catheter 05/03/24 1115 right subclavian 161 days    Percutaneous Central Line - Triple Lumen  10/09/24 1127 Internal Jugular Left 1 day              Peripheral Intravenous Line  Duration                  Peripheral IV - Single Lumen 10/09/24 0831 20 G Anterior;Left Forearm 2 days                    Significant Labs:    CBC/Anemia Profile:  Recent Labs   Lab 10/10/24  0305 10/11/24  0421   WBC 20.75* 20.95*   HGB 9.9* 10.4*   HCT 29.2* 29.5*    352   MCV 78* 78*   RDW 16.4* 16.2*        Chemistries:  Recent Labs   Lab 10/10/24  0305 10/11/24  0421   * 126*   K 4.2 4.8   CL 97 94*   CO2 23 20*   BUN 39* 55*   CREATININE 4.7* 6.0*   CALCIUM 8.5* 8.7   ALBUMIN 2.4* 2.4*   PROT  --  5.9*   BILITOT  --  2.5*   ALKPHOS  --  204*   ALT  --  29   AST  --  110*   PHOS 4.1  --        All pertinent labs within the past 24 hours have been reviewed.    Significant Imaging:   I have reviewed all pertinent imaging results/findings within the past 24 hours.

## 2024-10-11 NOTE — ASSESSMENT & PLAN NOTE
Patient chronically with systolic blood pressures in the 90s.  Throughout his hospitalization he has become progressively more hypotensive.  Unclear etiology.  All cultures have been NGTD.  There is intracardiac thrombus that were noted on TTE and are being treated with heparin.  Cardiology following.  Leukocytosis present, but this could represent a stress response. Chest x-ray consistent with volume overload and cardiomegaly. Has been on broad spectrum empiric antibiotics per ID recommendations.  - continue CRRT until BP more appropriate  - obtaining VBG from C[facility] today to evaluate for SCvO2.  If heart failure contributing, may need to consider inotrope.   - continue antibiotics as guided by ID.  Currently on Vancomycin/cefepime (day 7). Continue to follow up cultures  - eliquis currently being utilized for possible intracardiac thrombus.  If Obstructive physiology is possible, this would be the therapy for it.   - midodrine 15mg TID  - obtaining AM cortisol and random cortisol.  Starting Hydrocortisone and fludrocortisone given elevated pressor requirements.  - levophed to keep MAP >60mmHg. If requirements increase above 0.15mcg/kg/min then we will add vasopressin.

## 2024-10-11 NOTE — PROGRESS NOTES
Contacted by nurse Re: drainage on dressing  Picture shows copious serous fluid on dressings, no erythema or warmth per nurse's exam. This is consistent in appearance with fluid from third spacing or dependent edema  Dressing changed, will assess in AM. May benefit from provena

## 2024-10-11 NOTE — CONSULTS
[facility] - Intensive Care  Palliative Medicine  Consult Note    Patient Name: Ar Sharma  MRN: [id number]  Admission Date: 10/3/2024  Hospital Length of Stay: 8 days  Code Status: Full Code   Attending Provider: Delmy Agarwal, *  Consulting Provider: Jesenia Mancera MD  Primary Care Physician: Jc Elliott MD  Principal Problem:Closed right hip fracture, initial encounter    Patient information was obtained from patient, spouse/SO, past medical records, ER records, and primary team.      Inpatient consult to Palliative Care  Consult performed by: Jesenia Mancera MD  Consult ordered by: Delmy Agarwal MD  Reason for consult: Advance Care Planning        Assessment/Plan:     Advance Care Planning    - Consult for support and advance care planning in pt initially admitted to hospital with hip fracture, but now in persistent shock in ICU requiring vasopressor support to facilitate RRT. RUQ ultrasound had incidental finding of atrial thrombus vs endocarditis; cardiology and ID consulted. Pressor requirements have gradually been increasing over the last few days; have gone from 0.02 of levo to 0.15 as of today during RRT. Chart reviewed; extensively discussed pt during MDT rounds and separately with Dr Connolly.   - Along with Dr Hawkins, visited with pt at bedside; introduced role of palliative medicine, and learned more about pt outside of hospital. He is  to his wife of many years, Nicolette, and they have three children. Their daughter Gloria is currently completing her family residency in [place]. He is very proud of his children, and family have been visiting him routinely in hospital.   - Dr Hawkins provided pt with medical update; discussed that while we will continue to look for reversible aspects of his illness (most significantly, his shock/hypotension) though we are concerned that this has not been improving over the course of the week. In fact, his pressor requirements are currently  higher. He verbalized understanding of this, and was agreeable to our offer to provide an update to his wife or daughter. Emotional support provided during discussion.   - Following this, Dr Connolly and I called and spoke to his wife Nicolette. She said she visited with pt yesterday and he mentioned he wants to go home. Validated that this is understandable, though discussed that her remains critically ill and unable to leave the hospital at this time. Medical update provided, and shared our worry about his increasing pressor requirements despite appropriate supportive care (abx, pressors, RRT). We have additional tests ordered for today, but if things continue to worsen (or fail to improve) we may need to have some hard conversations. She voiced understanding, and said she will likely be returning to hospital after his HD sessions has ended.   - At this time, plan remains to continue with maximal medical therapy in hopes of improvement, though will have further conversations as clinical course evolves   - Let her know we will continue to check in on pt and family throughout hospital stay; updated Dr Hawkins following this second conversation     Cardiac/Vascular  Shock  - PCCM consulted; being treated for possible infection and is on antibiotics and vasopressors. Random cortisol pending, as well as further infectious workup.   - Mgmt per their team/primary/ID    Atrial mass  - See thrombus    Right atrial thrombus  - Mgmt per primary team, cardiology, and ID; thrombus vs endocarditis. On anticoagulation and antibiotics currently.     Chronic combined systolic and diastolic heart failure  - Volume removal via RRT; overall mgmt per primary team     Cardiac resynchronization therapy defibrillator (CRT-D) in place  - Noted    Renal/  ESRD (end stage renal disease)  - Nephrology consulted and following for RRT needs; has been requiring vasopressor support to facilitate this   - Discussed our shared concern that we have not seen  "improvement in his blood pressure thus far    Endocrine  Moderate protein-calorie malnutrition  - Moderate to severe; has trace temporal wasting on exam  - RD consult   - His poor nutrition status is concerning and may be contributing at least to some degree to his persistent hypotension     Orthopedic  * Closed right hip fracture, initial encounter  - S/p operative repair    Thank you for your consult. I will follow-up with patient. Please contact us if you have any additional questions.    Subjective:     HPI:   (From hospital medicine notes) " 63 y.o. AAM with h/o ESRD on HD (MWF), HTN, HLD, CAD, CHF admitted on 10/03/24 for Right intertrochanteric Hip fracture after a fall at home. No head trauma or LOC. CXR with interstitial edema.  No evidence of pneumothorax.  X-ray of the right hip with minimally displaced intertrochanteric fracture of the right proximal femur.  Orthopedic surgery consulted- s/p right  femur intramedullary nail on 10/04.  Minimal blood loss per orthopedic. Echo with EF of 40-45%, mild aortic stenosis.  PASP of 66 mm Hg.  Cardiology consulted for cardiac clearance-cleared for surgery at moderate cardiac risk.  Continue symptomatic management and supportive care.  Nephrology following for ESRD. Patient  hypotensive at baseline but acutely more hypotensive during hospitalization but is asymptomatic. Unable to tolerate HD on 10/05. Persistently hypotensive. patient transferred to ICU for presssor support and CRRT on 10/05.  Tolerating CRRT.    Echo. Show ,  Left Atrium: There is a large mobile frondlike mass, appears to be adherent to LA septum.    Right Atrium: Right atrium is severely dilated. Multiple leads present in the right atrium with possible vegetation adherent to lead in RA.  Cardiology is consulted,recommended anticoagulations and IV Abx for possible endocarditis ,ID is consulted.blood culture is repeated."    Palliative medicine is consulted for support; see ACP section of plan. "     Past Medical History:   Diagnosis Date    Cardiomyopathy     CKD (chronic kidney disease) stage 4, GFR 15-29 ml/min     Congestive heart failure (CHF) 2015    COPD (chronic obstructive pulmonary disease)     Coronary artery disease     Edema     Essential (primary) hypertension 05/18/2022    Formatting of this note might be different from the original. Converted from [employer]: Description - ESSENTIAL HYPERTENSION, BENIGN    Heart attack     HLD (hyperlipidemia)     Hypertension     Hyperuricemia     Hypocalcemia     Renal cyst, left     Secondary hyperparathyroidism     Thyroid disease     V tach     Vitamin D deficiency        Past Surgical History:   Procedure Laterality Date    ablations  03/05/2018    albations  02/01/2018    COLONOSCOPY N/A 12/07/2018    Procedure: COLONOSCOPY/suprep;  Surgeon: Ananya Morillo MD;  Location: [facility] ENDO;  Service: Endoscopy;  Laterality: N/A;    defibulater N/A 2016    ESOPHAGOGASTRODUODENOSCOPY N/A 12/07/2018    Procedure: EGD (ESOPHAGOGASTRODUODENOSCOPY);  Surgeon: Ananya Morillo MD;  Location: [facility] ENDO;  Service: Endoscopy;  Laterality: N/A;    INSERTION OF TUNNELED CENTRAL VENOUS HEMODIALYSIS CATHETER Right 5/3/2024    Procedure: INSERTION, CATHETER, HEMODIALYSIS, DUAL LUMEN;  Surgeon: Philip Perez MD;  Location: [facility] OR;  Service: General;  Laterality: Right;    INSERTION, CATHETER, DIALYSIS, PERITONEAL, LAPAROSCOPIC N/A 5/8/2024    Procedure: INSERTION, CATHETER, DIALYSIS, PERITONEAL, LAPAROSCOPIC;  Surgeon: Tyree Ruiz MD;  Location: [facility] OR;  Service: General;  Laterality: N/A;    INSERTION, CENTRAL VENOUS ACCESS DEVICE Right 6/5/2024    Procedure: Insertion,central venous access device;  Surgeon: Tyree Ruiz MD;  Location: [facility] OR;  Service: General;  Laterality: Right;    JOINT REPLACEMENT Bilateral 10/2016    knees, bilat    LEFT HEART CATHETERIZATION N/A 12/16/2020    Procedure: Left heart cath;  Surgeon: Shahram Stewart MD;   Location: [facility] CATH LAB/EP;  Service: Cardiology;  Laterality: N/A;    LEFT HEART CATHETERIZATION Left 9/27/2022    Procedure: Left heart cath;  Surgeon: Juan Roque MD;  Location: [facility] CATH LAB/EP;  Service: Cardiology;  Laterality: Left;    OPEN REDUCTION AND INTERNAL FIXATION (ORIF) OF INTERTROCHANTERIC FRACTURE OF FEMUR Right 10/4/2024    Procedure: ORIF, FRACTURE, FEMUR, INTERTROCHANTERIC;  Surgeon: Kinsey Reed MD;  Location: [facility] OR;  Service: Orthopedics;  Laterality: Right;  Cristina Del Real notified- Nc    RI HEMODIALYSIS, ONE EVALUATION  5/6/2024    REMOVAL OF HEMODIALYSIS CATHETER Right 5/3/2024    Procedure: REMOVAL, CATHETER, HEMODIALYSIS;  Surgeon: Philip Perez MD;  Location: [facility] OR;  Service: General;  Laterality: Right;    REPLACEMENT OF IMPLANTABLE CARDIOVERTER-DEFIBRILLATOR (ICD) GENERATOR Left 1/24/2023    Procedure: REPLACEMENT, ICD GENERATOR;  Surgeon: River Tenorio MD;  Location: [facility] EP LAB;  Service: Cardiology;  Laterality: Left;  ANTHONY, CRTD gen chg, MDT, MAC, DM, 3prep       Review of patient's allergies indicates:   Allergen Reactions    Lokelma [sodium zirconium cyclosilicate] Other (See Comments)     Fluid retention, weight gain, CHF excerebration, severe constipation    Atorvastatin Other (See Comments)     Joint pain    Jardiance [empagliflozin] Other (See Comments)     Chest pains, significant weight loss, lower blood pressure       Medications:  Continuous Infusions:   NORepinephrine bitartrate-D5W  0-3 mcg/kg/min Intravenous Continuous 48.1 mL/hr at 10/11/24 1043 0.17 mcg/kg/min at 10/11/24 1043     Scheduled Meds:   allopurinoL  100 mg Oral Daily    apixaban  5 mg Oral BID    ceFEPime IV (PEDS and ADULTS)  1 g Intravenous Q24H    docusate sodium  100 mg Oral Daily    epoetin saba-epbx  10,000 Units Intravenous Every Mon, Wed, Fri    fludrocortisone  100 mcg Oral Daily    hydrocortisone sodium succinate  100 mg Intravenous Q8H    levothyroxine  150 mcg Oral  Before breakfast    mexiletine  150 mg Oral BID WM    midodrine  15 mg Oral Q8H    polyethylene glycol  17 g Oral Daily    pravastatin  40 mg Oral QHS    triamcinolone acetonide 0.1%   Topical (Top) BID     PRN Meds:  Current Facility-Administered Medications:     0.9% NaCl, , Intravenous, PRN    acetaminophen, 650 mg, Oral, Q4H PRN    furosemide, 360 mg, Oral, See admin instructions    heparin (porcine), 1,000 Units, Intra-Catheter, PRN    heparin (porcine), 5,000 Units, Intra-Catheter, PRN    hydrALAZINE, 10 mg, Intravenous, Q6H PRN    HYDROmorphone, 1 mg, Intravenous, Q4H PRN    hydrOXYzine pamoate, 25 mg, Oral, Q8H PRN    levalbuterol, 1.25 mg, Nebulization, Q4H PRN    melatonin, 6 mg, Oral, Nightly PRN    metOLazone, 10 mg, Oral, See admin instructions    naloxone, 0.4 mg, Intravenous, PRN    nitroGLYCERIN, 0.4 mg, Sublingual, Q5 Min PRN    ondansetron, 4 mg, Intravenous, Q6H PRN    oxyCODONE-acetaminophen, 1 tablet, Oral, Q4H PRN    oxyCODONE-acetaminophen, 1 tablet, Oral, Q4H PRN    polyethylene glycol, 17 g, Oral, BID PRN    promethazine (PHENERGAN) 6.25 mg in 0.9% NaCl 50 mL IVPB, 6.25 mg, Intravenous, Q6H PRN    simethicone, 1 tablet, Oral, QID PRN    sodium chloride 0.9%, 250 mL, Intravenous, PRN    sodium chloride 0.9%, 10 mL, Intravenous, Q12H PRN    sodium chloride 0.9%, 10 mL, Intravenous, PRN    Pharmacy to dose Vancomycin consult, , , Once **AND** vancomycin - pharmacy to dose, , Intravenous, pharmacy to manage frequency    Family History       Problem Relation (Age of Onset)    Alcohol abuse Father    Arthritis Sister    Breast cancer Mother    Cancer Mother    Hypertension Mother    Kidney disease Father    No Known Problems Brother, Daughter, Son    Stroke Mother          Tobacco Use    Smoking status: Former     Current packs/day: 0.00     Average packs/day: 1 pack/day for 33.2 years (33.2 ttl pk-yrs)     Types: Cigarettes     Start date: 1/1/1979     Quit date: 3/3/2012     Years since  quittin.6     Passive exposure: Past    Smokeless tobacco: Never   Substance and Sexual Activity    Alcohol use: Not Currently     Comment: rare    Drug use: No    Sexual activity: Not Currently     Partners: Female       Review of Systems   All other systems reviewed and are negative.    Objective:     Vital Signs (Most Recent):  Temp: 97.9 °F (36.6 °C) (10/11/24 0900)  Pulse: 67 (10/11/24 1045)  Resp: 19 (10/11/24 1045)  BP: (!) 97/52 (10/11/24 1030)  SpO2: 98 % (10/11/24 1045) Vital Signs (24h Range):  Temp:  [96 °F (35.6 °C)-97.9 °F (36.6 °C)] 97.9 °F (36.6 °C)  Pulse:  [60-69] 67  Resp:  [10-33] 19  SpO2:  [89 %-100 %] 98 %  BP: ()/(44-64) 97/     Weight: 75.4 kg (166 lb 3.6 oz)  Body mass index is 24.55 kg/m².       Physical Exam  Constitutional:       Comments: Frail and chronically ill-appearing, sitting up in bed, readily conversational, on vasopressors during RRT     Pulmonary:      Effort: Pulmonary effort is normal.       Significant Labs: All pertinent labs within the past 24 hours have been reviewed.  CBC:   Recent Labs   Lab 10/11/24  0421   WBC 20.95*   HGB 10.4*   HCT 29.5*   MCV 78*        BMP:  Recent Labs   Lab 10/11/24  0421   *   *   K 4.8   CL 94*   CO2 20*   BUN 55*   CREATININE 6.0*   CALCIUM 8.7     LFT:  Lab Results   Component Value Date     (H) 10/11/2024    ALKPHOS 204 (H) 10/11/2024    BILITOT 2.5 (H) 10/11/2024     Albumin:   Albumin   Date Value Ref Range Status   10/11/2024 2.4 (L) 3.5 - 5.2 g/dL Final     Protein:   Total Protein   Date Value Ref Range Status   10/11/2024 5.9 (L) 6.0 - 8.4 g/dL Final     Lactic acid:   Lab Results   Component Value Date    LACTATE 1.2 10/04/2024    LACTATE 1.1 2024       Significant Imaging: I have reviewed all pertinent imaging results/findings within the past 24 hours.

## 2024-10-11 NOTE — ASSESSMENT & PLAN NOTE
Nutrition consulted. Most recent weight and BMI monitored-     Measurements:  Wt Readings from Last 1 Encounters:   10/10/24 75.4 kg (166 lb 3.6 oz)   Body mass index is 24.55 kg/m².    Patient has been screened and assessed by RD.    Malnutrition Type:  Context: acute illness or injury  Level: mild    Malnutrition Characteristic Summary:  Weight Loss (Malnutrition): greater than 10% in 6 months  Subcutaneous Fat (Malnutrition): mild depletion  Muscle Mass (Malnutrition): mild depletion    Interventions/Recommendations (treatment strategy):  1. COntinue pt on renal diet 2. Continue novasource renal TID 3. Encourage intake at meals 4. Monitor weight/labs 5. RD to follow to monitor PO intake

## 2024-10-11 NOTE — ASSESSMENT & PLAN NOTE
- Moderate to severe; has trace temporal wasting on exam  - RD consult   - His poor nutrition status is concerning and may be contributing at least to some degree to his persistent hypotension

## 2024-10-11 NOTE — SUBJECTIVE & OBJECTIVE
Interval History: feeling better with no new complaints.  Echo. Show ,  Left Atrium: There is a large mobile frondlike mass, appears to be adherent to LA septum.    Right Atrium: Right atrium is severely dilated. Multiple leads present in the right atrium with possible vegetation adherent to lead in RA.  Cardiology is consulted,recommended anticoagulations and IV Abx for possible endocarditis ,ID is consulted.blood culture is repeated.  BP remains on low side,required levophed for HD.    Review of Systems   HENT:  Negative for ear discharge and ear pain.    Eyes:  Negative for discharge and itching.   Endocrine: Negative for cold intolerance and heat intolerance.   Neurological:  Negative for seizures and syncope.     Objective:     Vital Signs (Most Recent):  Temp: 98 °F (36.7 °C) (10/11/24 1200)  Pulse: 67 (10/11/24 1045)  Resp: 16 (10/11/24 1338)  BP: (!) 97/52 (10/11/24 1030)  SpO2: 98 % (10/11/24 1045) Vital Signs (24h Range):  Temp:  [96 °F (35.6 °C)-98 °F (36.7 °C)] 98 °F (36.7 °C)  Pulse:  [60-69] 67  Resp:  [10-33] 16  SpO2:  [89 %-100 %] 98 %  BP: ()/(44-64) 97/52     Weight: 75.4 kg (166 lb 3.6 oz)  Body mass index is 24.55 kg/m².    Intake/Output Summary (Last 24 hours) at 10/11/2024 1358  Last data filed at 10/11/2024 1200  Gross per 24 hour   Intake 1711.46 ml   Output 3100 ml   Net -1388.54 ml         Physical Exam  Vitals and nursing note reviewed.   Constitutional:       General: He is not in acute distress.     Appearance: He is ill-appearing (chronically). He is not toxic-appearing or diaphoretic.      Interventions: Nasal cannula in place.   HENT:      Head: Normocephalic and atraumatic.      Nose: No rhinorrhea.      Mouth/Throat:      Mouth: Mucous membranes are moist.   Eyes:      General: No scleral icterus.     Extraocular Movements: Extraocular movements intact.   Neck:      Comments: Right IJ tunneled dialysis catheter  Cardiovascular:      Rate and Rhythm: Normal rate and regular  rhythm.      Heart sounds: No murmur heard.  Pulmonary:      Effort: No tachypnea, accessory muscle usage, respiratory distress or retractions.   Abdominal:      General: There is no distension.      Comments: PD catheter present   Skin:     General: Skin is warm and dry.      Coloration: Skin is not jaundiced.      Findings: No rash.   Neurological:      General: No focal deficit present.      Mental Status: He is alert. Mental status is at baseline.             Significant Labs: All pertinent labs within the past 24 hours have been reviewed.  BMP:   Recent Labs   Lab 10/11/24  0421   *   *   K 4.8   CL 94*   CO2 20*   BUN 55*   CREATININE 6.0*   CALCIUM 8.7     CBC:   Recent Labs   Lab 10/10/24  0305 10/11/24  0421   WBC 20.75* 20.95*   HGB 9.9* 10.4*   HCT 29.2* 29.5*    352       Significant Imaging: I have reviewed all pertinent imaging results/findings within the past 24 hours.

## 2024-10-11 NOTE — PROGRESS NOTES
Patient seen and examined during hemodialysis this morning.  /51, HR 67. . AP -118,  155.  Tolerating treatment well. Remains on levophed. Hypotensive workup ongoing.      ESRD - HD today; continue HD MWF.     Hypotension - baseline SBP 90-100s. Consider lowering MAP goal to >60.     Anemia of CKD - hgb at goal today; AWAIS with HD.     Secondary HPTH - controlled; will trend.    Hyponatremia - HD today with UF goal 1-2L as tolerated (as long as pressor requirement doesn't increase). Agree with replacing heparin gtt with Eliquis.       Thank you for allowing me to participate in the care of this patient. Please call with any questions.     Lizzette Cain MD  [facility]  [phone]

## 2024-10-11 NOTE — ASSESSMENT & PLAN NOTE
Patient's COPD is controlled currently.  Patient is currently off COPD Pathway. Continue scheduled inhalers  PRN nebulizers and Supplemental oxygen and monitor respiratory status closely.

## 2024-10-11 NOTE — ASSESSMENT & PLAN NOTE
Patient has not shown overt evidence of infection on culture data.    - consider HIDA scan per ID.

## 2024-10-11 NOTE — CONSULTS
[facility] - Intensive Care  Critical Care Medicine  Consult Note    Patient Name: Ar Sharma  MRN: [id number]  Admission Date: 10/3/2024  Hospital Length of Stay: 8 days  Code Status: Full Code  Attending Physician: Delmy Agarwal, *   Primary Care Provider: Jc Elliott MD   Principal Problem: Closed right hip fracture, initial encounter    Inpatient consult to Critical Care Medicine  Consult performed by: Jose M Connolly MD  Consult ordered by: Jesenia Mancera MD        Subjective:     HPI:  Mr Ar Sharma is a 63 year old male with ESRD on HD (MWF) for the last 2 months, prior PD, HTN, HLD, combined systolic and diastolic HF (EF 40-45% and grade III DD), ischemic cardiomyopathy, s/p medtronic CRT-D implant in 2016, history of VT s/p ablation on mexiletine and amiodarone, who presents after a mechanical fall resulting in a right femur fracture.  S/p ORIF 10/4.  He is chronically hypotensive and had issues following surgery tolerating HD.  Moved to the ICU for pressor support for CRRT.  Pulm previously consulted for CVC placement (which occurred on 10/9/2024), but now following due to persistent and elevated pressor requirements.    Hospital/ICU Course:  Patient remains on vasopressors and now is on 0.15 of levophed even before taking CRRT.  He is afebrile but has leukocytosis.  All culture data thus far has been negative.  Performing adrenal insufficiency workup and starting SDS.    Past Medical History:   Diagnosis Date    Cardiomyopathy     CKD (chronic kidney disease) stage 4, GFR 15-29 ml/min     Congestive heart failure (CHF) 2015    COPD (chronic obstructive pulmonary disease)     Coronary artery disease     Edema     Essential (primary) hypertension 05/18/2022    Formatting of this note might be different from the original. Converted from [employer]: Description - ESSENTIAL HYPERTENSION, BENIGN    Heart attack     HLD (hyperlipidemia)     Hypertension     Hyperuricemia     Hypocalcemia      Renal cyst, left     Secondary hyperparathyroidism     Thyroid disease     V tach     Vitamin D deficiency        Past Surgical History:   Procedure Laterality Date    ablations  03/05/2018    albations  02/01/2018    COLONOSCOPY N/A 12/07/2018    Procedure: COLONOSCOPY/suprep;  Surgeon: Ananya Morillo MD;  Location: [facility] ENDO;  Service: Endoscopy;  Laterality: N/A;    defibulater N/A 2016    ESOPHAGOGASTRODUODENOSCOPY N/A 12/07/2018    Procedure: EGD (ESOPHAGOGASTRODUODENOSCOPY);  Surgeon: Ananya Morillo MD;  Location: [facility] ENDO;  Service: Endoscopy;  Laterality: N/A;    INSERTION OF TUNNELED CENTRAL VENOUS HEMODIALYSIS CATHETER Right 5/3/2024    Procedure: INSERTION, CATHETER, HEMODIALYSIS, DUAL LUMEN;  Surgeon: Philip Perez MD;  Location: [facility] OR;  Service: General;  Laterality: Right;    INSERTION, CATHETER, DIALYSIS, PERITONEAL, LAPAROSCOPIC N/A 5/8/2024    Procedure: INSERTION, CATHETER, DIALYSIS, PERITONEAL, LAPAROSCOPIC;  Surgeon: Tyree Ruiz MD;  Location: [facility] OR;  Service: General;  Laterality: N/A;    INSERTION, CENTRAL VENOUS ACCESS DEVICE Right 6/5/2024    Procedure: Insertion,central venous access device;  Surgeon: Tyree Ruiz MD;  Location: [facility];  Service: General;  Laterality: Right;    JOINT REPLACEMENT Bilateral 10/2016    knees, bilat    LEFT HEART CATHETERIZATION N/A 12/16/2020    Procedure: Left heart cath;  Surgeon: Shahram Stewart MD;  Location: [facility] CATH LAB/EP;  Service: Cardiology;  Laterality: N/A;    LEFT HEART CATHETERIZATION Left 9/27/2022    Procedure: Left heart cath;  Surgeon: Juan Roque MD;  Location: [facility] CATH LAB/EP;  Service: Cardiology;  Laterality: Left;    OPEN REDUCTION AND INTERNAL FIXATION (ORIF) OF INTERTROCHANTERIC FRACTURE OF FEMUR Right 10/4/2024    Procedure: ORIF, FRACTURE, FEMUR, INTERTROCHANTERIC;  Surgeon: Kinsey Reed MD;  Location: [facility] OR;  Service: Orthopedics;  Laterality: Right;  Cristina Del Real notified-  Nc    CA HEMODIALYSIS, ONE EVALUATION  2024    REMOVAL OF HEMODIALYSIS CATHETER Right 5/3/2024    Procedure: REMOVAL, CATHETER, HEMODIALYSIS;  Surgeon: Philip Perez MD;  Location: [facility] OR;  Service: General;  Laterality: Right;    REPLACEMENT OF IMPLANTABLE CARDIOVERTER-DEFIBRILLATOR (ICD) GENERATOR Left 2023    Procedure: REPLACEMENT, ICD GENERATOR;  Surgeon: River Tenorio MD;  Location: [facility] EP LAB;  Service: Cardiology;  Laterality: Left;  ANTHONY, CRTD gen chg, MDT, MAC, DM, 3prep       Review of patient's allergies indicates:   Allergen Reactions    Lokelma [sodium zirconium cyclosilicate] Other (See Comments)     Fluid retention, weight gain, CHF excerebration, severe constipation    Atorvastatin Other (See Comments)     Joint pain    Jardiance [empagliflozin] Other (See Comments)     Chest pains, significant weight loss, lower blood pressure       Family History       Problem Relation (Age of Onset)    Alcohol abuse Father    Arthritis Sister    Breast cancer Mother    Cancer Mother    Hypertension Mother    Kidney disease Father    No Known Problems Brother, Daughter, Son    Stroke Mother          Tobacco Use    Smoking status: Former     Current packs/day: 0.00     Average packs/day: 1 pack/day for 33.2 years (33.2 ttl pk-yrs)     Types: Cigarettes     Start date: 1979     Quit date: 3/3/2012     Years since quittin.6     Passive exposure: Past    Smokeless tobacco: Never   Substance and Sexual Activity    Alcohol use: Not Currently     Comment: rare    Drug use: No    Sexual activity: Not Currently     Partners: Female         Review of Systems   Constitutional:  Positive for activity change and fatigue. Negative for chills and fever.   Respiratory:  Positive for shortness of breath.    Cardiovascular:  Positive for leg swelling. Negative for chest pain.   Gastrointestinal:  Negative for abdominal pain.   Psychiatric/Behavioral: Negative.       Objective:     Vital Signs (Most  Recent):  Temp: 97.9 °F (36.6 °C) (10/11/24 0900)  Pulse: 67 (10/11/24 1045)  Resp: 19 (10/11/24 1045)  BP: (!) 97/52 (10/11/24 1030)  SpO2: 98 % (10/11/24 1045) Vital Signs (24h Range):  Temp:  [96 °F (35.6 °C)-97.9 °F (36.6 °C)] 97.9 °F (36.6 °C)  Pulse:  [60-69] 67  Resp:  [10-33] 19  SpO2:  [89 %-100 %] 98 %  BP: ()/(44-64) 97/52     Weight: 75.4 kg (166 lb 3.6 oz)  Body mass index is 24.55 kg/m².      Intake/Output Summary (Last 24 hours) at 10/11/2024 1119  Last data filed at 10/11/2024 0627  Gross per 24 hour   Intake 1389.9 ml   Output 100 ml   Net 1289.9 ml        Physical Exam  Constitutional:       General: He is not in acute distress.     Appearance: Normal appearance. He is ill-appearing. He is not toxic-appearing or diaphoretic.   HENT:      Head: Normocephalic and atraumatic.      Nose: Nose normal.      Mouth/Throat:      Mouth: Mucous membranes are moist.   Eyes:      Extraocular Movements: Extraocular movements intact.      Pupils: Pupils are equal, round, and reactive to light.   Cardiovascular:      Rate and Rhythm: Normal rate and regular rhythm.   Pulmonary:      Effort: Pulmonary effort is normal. No respiratory distress.      Breath sounds: Normal breath sounds. No wheezing.   Abdominal:      General: Abdomen is flat. Bowel sounds are normal. There is no distension.      Palpations: Abdomen is soft.   Musculoskeletal:      Cervical back: Normal range of motion.      Right lower leg: Edema present.      Left lower leg: Edema present.   Skin:     General: Skin is warm.      Capillary Refill: Capillary refill takes less than 2 seconds.   Neurological:      Mental Status: He is alert. Mental status is at baseline.          Vents:       Lines/Drains/Airways       Central Venous Catheter Line  Duration                  Hemodialysis Catheter 05/03/24 1115 right subclavian 161 days    Percutaneous Central Line - Triple Lumen  10/09/24 1127 Internal Jugular Left 1 day              Peripheral  Intravenous Line  Duration                  Peripheral IV - Single Lumen 10/09/24 0831 20 G Anterior;Left Forearm 2 days                    Significant Labs:    CBC/Anemia Profile:  Recent Labs   Lab 10/10/24  0305 10/11/24  0421   WBC 20.75* 20.95*   HGB 9.9* 10.4*   HCT 29.2* 29.5*    352   MCV 78* 78*   RDW 16.4* 16.2*        Chemistries:  Recent Labs   Lab 10/10/24  0305 10/11/24  0421   * 126*   K 4.2 4.8   CL 97 94*   CO2 23 20*   BUN 39* 55*   CREATININE 4.7* 6.0*   CALCIUM 8.5* 8.7   ALBUMIN 2.4* 2.4*   PROT  --  5.9*   BILITOT  --  2.5*   ALKPHOS  --  204*   ALT  --  29   AST  --  110*   PHOS 4.1  --        All pertinent labs within the past 24 hours have been reviewed.    Significant Imaging:   I have reviewed all pertinent imaging results/findings within the past 24 hours.    ABG  Recent Labs   Lab 10/11/24  1136   PH 7.395   PO2 32*   PCO2 49.8*   HCO3 30.5*   BE 5*     Assessment/Plan:     Pulmonary  Acute on chronic hypoxic respiratory failure  Likely multifactorial in etiology (pulm edema, heart failure, possible thrombotic disease, atelectasis).  Supplement O2 to keep >90%  Treat underlying issues per their respective sections.      Pulmonary emphysema, unspecified emphysema type  Patient's COPD is controlled currently.  Patient is currently off COPD Pathway. Continue scheduled inhalers  PRN nebulizers and Supplemental oxygen and monitor respiratory status closely.     Cardiac/Vascular  Shock  Patient chronically with systolic blood pressures in the 90s.  Throughout his hospitalization he has become progressively more hypotensive.  Unclear etiology.  All cultures have been NGTD.  There is intracardiac thrombus that were noted on TTE and are being treated with heparin.  Cardiology following.  Leukocytosis present, but this could represent a stress response. Chest x-ray consistent with volume overload and cardiomegaly. Has been on broad spectrum empiric antibiotics per ID  recommendations.  - continue CRRT until BP more appropriate  - obtaining VBG from CVC today to evaluate for SCvO2.  If heart failure contributing, may need to consider inotrope.   - continue antibiotics as guided by ID.  Currently on Vancomycin/cefepime (day 7). Continue to follow up cultures  - eliquis currently being utilized for possible intracardiac thrombus.  If Obstructive physiology is possible, this would be the therapy for it.   - midodrine 15mg TID  - obtaining AM cortisol and random cortisol.  Starting Hydrocortisone and fludrocortisone given elevated pressor requirements.  - levophed to keep MAP >60mmHg. If requirements increase above 0.15mcg/kg/min then we will add vasopressin.     Atrial mass  Unclear etiology of this (thrombus vs septic).  Seems less likely that it is septic given negative culture data.   - anticoagulation with eliquis and antibiotics per ID.  - cards on board.    Primary hypertension  Holding all antihypertensives at this time, while in shock    Chronic combined systolic and diastolic heart failure  EF 40-45% with grade III DD.  This is likely having a significant impact on ability to tolerate iHD.  Cardiology following  - volume removal as permitted by RRT    S/P ablation of ventricular arrhythmia  Mexiletine  Chronic anticoagulation.  Continuous cardiac monitoring.  Cards on board.    Cardiac resynchronization therapy defibrillator (CRT-D) in place  Noted      Mixed hyperlipidemia  statin    Renal/  ESRD (end stage renal disease)  Currently requiring CRRT.  Previously had utilized HD, but now too unstable from a blood pressure standpoint. Also has previously used PD, but this is currently being held.  - nephrology following.  - midodrine 15mg TID    Oncology  Leukocytosis  Patient has not shown overt evidence of infection on culture data.    - consider HIDA scan per ID.    Iron deficiency anemia  Transfuse to keep HgB >7.0    Endocrine  Moderate protein-calorie  malnutrition  Nutrition consulted. Most recent weight and BMI monitored-     Measurements:  Wt Readings from Last 1 Encounters:   10/10/24 75.4 kg (166 lb 3.6 oz)   Body mass index is 24.55 kg/m².    Patient has been screened and assessed by RD.    Malnutrition Type:  Context: acute illness or injury  Level: mild    Malnutrition Characteristic Summary:  Weight Loss (Malnutrition): greater than 10% in 6 months  Subcutaneous Fat (Malnutrition): mild depletion  Muscle Mass (Malnutrition): mild depletion    Interventions/Recommendations (treatment strategy):  1. COntinue pt on renal diet 2. Continue novasource renal TID 3. Encourage intake at meals 4. Monitor weight/labs 5. RD to follow to monitor PO intake      Hypothyroidism  synthroid    Orthopedic  * Closed right hip fracture, initial encounter  S/p ORIF by orthopedics.  Postoperative care per their recs.        Critical Care Daily Checklist:    A: Awake: RASS Goal/Actual Goal:    Actual:     B: Spontaneous Breathing Trial Performed?     C: SAT & SBT Coordinated?  N/A                      D: Delirium: CAM-ICU     E: Early Mobility Performed? No   F: Feeding Goal: Goals: Pt will consume 50-75% intake at meals by RD follow-up  Status: Nutrition Goal Status: progressing towards goal   Current Diet Order   Procedures    Diet Renal Fluid - 1000mL; Standard Tray     Order Specific Question:   Fluid restriction:     Answer:   Fluid - 1000mL     Order Specific Question:   Tray type:     Answer:   Standard Tray      AS: Analgesia/Sedation PRN   T: Thromboembolic Prophylaxis Eliquis, ok with nephrology given renal function   H: HOB > 300 Yes   U: Stress Ulcer Prophylaxis (if needed) N/a   G: Glucose Control PRN   B: Bowel Function Stool Occurrence: 0   I: Indwelling Catheter (Lines & Vo) Necessity HD catheter (tunneled), left IJ CVC, PIV   D: De-escalation of Antimicrobials/Pharmacotherapies Per ID    Plan for the day/ETD Wean pressors, start steroids    Code  Status:  Family/Goals of Care: Full Code  recovery     Critical Care Time: 50 minutes  Critical secondary to Patient has a condition that poses threat to life and bodily function: shock, renal failure     Critical care was time spent personally by me on the following activities: development of treatment plan with patient or surrogate and bedside caregivers, discussions with consultants, evaluation of patient's response to treatment, examination of patient, ordering and performing treatments and interventions, ordering and review of laboratory studies, ordering and review of radiographic studies, pulse oximetry, re-evaluation of patient's condition. This critical care time did not overlap with that of any other provider or involve time for any procedures.    Thank you for your consult. I will follow-up with patient. Please contact us if you have any additional questions.     Jose M Connolly MD  Critical Care Medicine  [facility] [place] [facility]

## 2024-10-11 NOTE — PROGRESS NOTES
Pharmacokinetic Assessment Follow Up: IV Vancomycin    Vancomycin serum concentration assessment(s):    The random level was drawn correctly and can be used to guide therapy at this time. The measurement is within the desired definitive target range of 10 to 20 mcg/mL.    Vancomycin Regimen Plan:    Give 500 mg after dialysis today.  Re-dose when the random level is less than 20 mcg/mL, next level to be drawn at 0300 on 10/12/2024    Drug levels (last 3 results):  Recent Labs   Lab Result Units 10/09/24  0311 10/10/24  0305 10/11/24  0421   Vancomycin, Random ug/mL 15.9 22.1 19.4       Pharmacy will continue to follow and monitor vancomycin.    Please contact pharmacy at extension [phone] for questions regarding this assessment.    Thank you for the consult,   Krystian Patel Jr       Patient brief summary:  Ar Sharma is a 63 y.o. male initiated on antimicrobial therapy with IV Vancomycin for treatment of  pneumonia    Drug Allergies:   Review of patient's allergies indicates:   Allergen Reactions    Lokelma [sodium zirconium cyclosilicate] Other (See Comments)     Fluid retention, weight gain, CHF excerebration, severe constipation    Atorvastatin Other (See Comments)     Joint pain    Jardiance [empagliflozin] Other (See Comments)     Chest pains, significant weight loss, lower blood pressure       Actual Body Weight:   75.4 kg    Renal Function:   Estimated Creatinine Clearance: 12.4 mL/min (A) (based on SCr of 6 mg/dL (H)).,     Dialysis Method (if applicable):  intermittent HD    CBC (last 72 hours):  Recent Labs   Lab Result Units 10/09/24  0311 10/10/24  0305 10/11/24  0421   WBC K/uL 27.13* 20.75* 20.95*   Hemoglobin g/dL 11.3* 9.9* 10.4*   Hematocrit % 33.0* 29.2* 29.5*   Platelets K/uL 272 302 352   Gran % % 79.3* 77.0* 77.2*   Lymph % % 3.2* 5.1* 4.2*   Mono % % 13.2 12.1 12.2   Eosinophil % % 1.8 2.1 1.9   Basophil % % 0.6 0.9 0.4   Differential Method  Automated Automated Automated        Metabolic Panel (last 72 hours):  Recent Labs   Lab Result Units 10/08/24  1357 10/08/24  1639 10/08/24  2330 10/09/24  0311 10/10/24  0305 10/11/24  0421   Sodium mmol/L 137  --  134* 136 131* 126*   Potassium mmol/L 4.2  --  4.3 4.0 4.2 4.8   Chloride mmol/L 103  --  103 102 97 94*   CO2 mmol/L 25  --  21* 24 23 20*   Glucose mg/dL 111*  --  113* 145* 127* 114*   BUN mg/dL 21  --  20 20 39* 55*   Creatinine mg/dL 2.7*  --  2.6* 2.6* 4.7* 6.0*   Albumin g/dL 2.8*  --  2.5* 2.7* 2.4* 2.4*   Total Bilirubin mg/dL  --   --   --  2.5*  --  2.5*   Alkaline Phosphatase U/L  --   --   --  167*  --  204*   AST U/L  --   --   --  97*  --  110*   ALT U/L  --   --   --  22  --  29   Magnesium mg/dL  --  1.9 2.1  --   --   --    Phosphorus mg/dL 3.1  --  2.6*  --  4.1  --        Vancomycin Administrations:  vancomycin given in the last 96 hours                     vancomycin 750 mg in D5W 250 mL IVPB (admixture device) (mg) 750 mg New Bag 10/09/24 0536    vancomycin (VANCOCIN) 1,000 mg in D5W 250 mL IVPB (admixture device) (mg) 1,000 mg New Bag 10/08/24 0553                    Microbiologic Results:  Microbiology Results (last 7 days)       Procedure Component Value Units Date/Time    Blood culture [[id number]] Collected: 10/09/24 0930    Order Status: Completed Specimen: Blood Updated: 10/10/24 1103     Blood Culture, Routine No Growth to date      No Growth to date    Narrative:      Draw sample # 2 from separate site    Blood culture [[id number]] Collected: 10/09/24 0915    Order Status: Completed Specimen: Blood Updated: 10/10/24 1103     Blood Culture, Routine No Growth to date      No Growth to date    Narrative:      Draw sample # 2 from separate site    Blood culture [[id number]] Collected: 10/05/24 1612    Order Status: Completed Specimen: Blood from Peripheral, Hand, Left Updated: 10/09/24 1703     Blood Culture, Routine No Growth after 4 days.    Blood culture [[id number]] Collected: 10/05/24 1556    Order  Status: Completed Specimen: Blood from Peripheral, Wrist, Left Updated: 10/09/24 1703     Blood Culture, Routine No Growth after 4 days.

## 2024-10-11 NOTE — ASSESSMENT & PLAN NOTE
- Consult for support and advance care planning in pt initially admitted to hospital with hip fracture, but now in persistent shock in ICU requiring vasopressor support to facilitate RRT. RUQ ultrasound had incidental finding of atrial thrombus vs endocarditis; cardiology and ID consulted. Pressor requirements have gradually been increasing over the last few days; have gone from 0.02 of levo to 0.15 as of today during RRT. Chart reviewed; extensively discussed pt during MDT rounds and separately with Dr Connolly.   - Along with Dr Hawkins, visited with pt at bedside; introduced role of palliative medicine, and learned more about pt outside of hospital. He is  to his wife of many years, Nicolette, and they have three children. Their daughter Gloria is currently completing her family residency in [place]. He is very proud of his children, and family have been visiting him routinely in hospital.   - Dr Hawkins provided pt with medical update; discussed that while we will continue to look for reversible aspects of his illness (most significantly, his shock/hypotension) though we are concerned that this has not been improving over the course of the week. In fact, his pressor requirements are currently higher. He verbalized understanding of this, and was agreeable to our offer to provide an update to his wife or daughter. Emotional support provided during discussion.   - Following this, Dr Connolly and I called and spoke to his wife Nicolette. She said she visited with pt yesterday and he mentioned he wants to go home. Validated that this is understandable, though discussed that her remains critically ill and unable to leave the hospital at this time. Medical update provided, and shared our worry about his increasing pressor requirements despite appropriate supportive care (abx, pressors, RRT). We have additional tests ordered for today, but if things continue to worsen (or fail to improve) we may need to have some hard  conversations. She voiced understanding, and said she will likely be returning to hospital after his HD sessions has ended.   - Let her know we will continue to check in on pt and family throughout hospital stay; updated Dr Hawkins following this second conversation

## 2024-10-11 NOTE — SUBJECTIVE & OBJECTIVE
Past Medical History:   Diagnosis Date    Cardiomyopathy     CKD (chronic kidney disease) stage 4, GFR 15-29 ml/min     Congestive heart failure (CHF) 2015    COPD (chronic obstructive pulmonary disease)     Coronary artery disease     Edema     Essential (primary) hypertension 05/18/2022    Formatting of this note might be different from the original. Converted from [employer]: Description - ESSENTIAL HYPERTENSION, BENIGN    Heart attack     HLD (hyperlipidemia)     Hypertension     Hyperuricemia     Hypocalcemia     Renal cyst, left     Secondary hyperparathyroidism     Thyroid disease     V tach     Vitamin D deficiency        Past Surgical History:   Procedure Laterality Date    ablations  03/05/2018    albations  02/01/2018    COLONOSCOPY N/A 12/07/2018    Procedure: COLONOSCOPY/suprep;  Surgeon: Ananya Morillo MD;  Location: [facility] ENDO;  Service: Endoscopy;  Laterality: N/A;    defibulater N/A 2016    ESOPHAGOGASTRODUODENOSCOPY N/A 12/07/2018    Procedure: EGD (ESOPHAGOGASTRODUODENOSCOPY);  Surgeon: Ananya Morillo MD;  Location: [facility] ENDO;  Service: Endoscopy;  Laterality: N/A;    INSERTION OF TUNNELED CENTRAL VENOUS HEMODIALYSIS CATHETER Right 5/3/2024    Procedure: INSERTION, CATHETER, HEMODIALYSIS, DUAL LUMEN;  Surgeon: Philip Perez MD;  Location: [facility] OR;  Service: General;  Laterality: Right;    INSERTION, CATHETER, DIALYSIS, PERITONEAL, LAPAROSCOPIC N/A 5/8/2024    Procedure: INSERTION, CATHETER, DIALYSIS, PERITONEAL, LAPAROSCOPIC;  Surgeon: Tyree Ruiz MD;  Location: [facility] OR;  Service: General;  Laterality: N/A;    INSERTION, CENTRAL VENOUS ACCESS DEVICE Right 6/5/2024    Procedure: Insertion,central venous access device;  Surgeon: Tyree Ruiz MD;  Location: [facility] OR;  Service: General;  Laterality: Right;    JOINT REPLACEMENT Bilateral 10/2016    knees, bilat    LEFT HEART CATHETERIZATION N/A 12/16/2020    Procedure: Left heart cath;  Surgeon: Shahram Stewart MD;  Location:  [facility] CATH LAB/EP;  Service: Cardiology;  Laterality: N/A;    LEFT HEART CATHETERIZATION Left 9/27/2022    Procedure: Left heart cath;  Surgeon: Juan Roque MD;  Location: [facility] CATH LAB/EP;  Service: Cardiology;  Laterality: Left;    OPEN REDUCTION AND INTERNAL FIXATION (ORIF) OF INTERTROCHANTERIC FRACTURE OF FEMUR Right 10/4/2024    Procedure: ORIF, FRACTURE, FEMUR, INTERTROCHANTERIC;  Surgeon: Kinsey Reed MD;  Location: [facility] OR;  Service: Orthopedics;  Laterality: Right;  Cristina Del Real notified- Nc    NC HEMODIALYSIS, ONE EVALUATION  5/6/2024    REMOVAL OF HEMODIALYSIS CATHETER Right 5/3/2024    Procedure: REMOVAL, CATHETER, HEMODIALYSIS;  Surgeon: Philip Perez MD;  Location: [facility] OR;  Service: General;  Laterality: Right;    REPLACEMENT OF IMPLANTABLE CARDIOVERTER-DEFIBRILLATOR (ICD) GENERATOR Left 1/24/2023    Procedure: REPLACEMENT, ICD GENERATOR;  Surgeon: River Tenorio MD;  Location: [facility] EP LAB;  Service: Cardiology;  Laterality: Left;  ANTHONY, CRTD gen chg, MDT, MAC, DM, 3prep       Review of patient's allergies indicates:   Allergen Reactions    Lokelma [sodium zirconium cyclosilicate] Other (See Comments)     Fluid retention, weight gain, CHF excerebration, severe constipation    Atorvastatin Other (See Comments)     Joint pain    Jardiance [empagliflozin] Other (See Comments)     Chest pains, significant weight loss, lower blood pressure       Medications:  Continuous Infusions:   NORepinephrine bitartrate-D5W  0-3 mcg/kg/min Intravenous Continuous 48.1 mL/hr at 10/11/24 1043 0.17 mcg/kg/min at 10/11/24 1043     Scheduled Meds:   allopurinoL  100 mg Oral Daily    apixaban  5 mg Oral BID    ceFEPime IV (PEDS and ADULTS)  1 g Intravenous Q24H    docusate sodium  100 mg Oral Daily    epoetin saba-epbx  10,000 Units Intravenous Every Mon, Wed, Fri    fludrocortisone  100 mcg Oral Daily    hydrocortisone sodium succinate  100 mg Intravenous Q8H    levothyroxine  150 mcg Oral Before  breakfast    mexiletine  150 mg Oral BID WM    midodrine  15 mg Oral Q8H    polyethylene glycol  17 g Oral Daily    pravastatin  40 mg Oral QHS    triamcinolone acetonide 0.1%   Topical (Top) BID     PRN Meds:  Current Facility-Administered Medications:     0.9% NaCl, , Intravenous, PRN    acetaminophen, 650 mg, Oral, Q4H PRN    furosemide, 360 mg, Oral, See admin instructions    heparin (porcine), 1,000 Units, Intra-Catheter, PRN    heparin (porcine), 5,000 Units, Intra-Catheter, PRN    hydrALAZINE, 10 mg, Intravenous, Q6H PRN    HYDROmorphone, 1 mg, Intravenous, Q4H PRN    hydrOXYzine pamoate, 25 mg, Oral, Q8H PRN    levalbuterol, 1.25 mg, Nebulization, Q4H PRN    melatonin, 6 mg, Oral, Nightly PRN    metOLazone, 10 mg, Oral, See admin instructions    naloxone, 0.4 mg, Intravenous, PRN    nitroGLYCERIN, 0.4 mg, Sublingual, Q5 Min PRN    ondansetron, 4 mg, Intravenous, Q6H PRN    oxyCODONE-acetaminophen, 1 tablet, Oral, Q4H PRN    oxyCODONE-acetaminophen, 1 tablet, Oral, Q4H PRN    polyethylene glycol, 17 g, Oral, BID PRN    promethazine (PHENERGAN) 6.25 mg in 0.9% NaCl 50 mL IVPB, 6.25 mg, Intravenous, Q6H PRN    simethicone, 1 tablet, Oral, QID PRN    sodium chloride 0.9%, 250 mL, Intravenous, PRN    sodium chloride 0.9%, 10 mL, Intravenous, Q12H PRN    sodium chloride 0.9%, 10 mL, Intravenous, PRN    Pharmacy to dose Vancomycin consult, , , Once **AND** vancomycin - pharmacy to dose, , Intravenous, pharmacy to manage frequency    Family History       Problem Relation (Age of Onset)    Alcohol abuse Father    Arthritis Sister    Breast cancer Mother    Cancer Mother    Hypertension Mother    Kidney disease Father    No Known Problems Brother, Daughter, Son    Stroke Mother          Tobacco Use    Smoking status: Former     Current packs/day: 0.00     Average packs/day: 1 pack/day for 33.2 years (33.2 ttl pk-yrs)     Types: Cigarettes     Start date: 1979     Quit date: 3/3/2012     Years since quittin.6      Passive exposure: Past    Smokeless tobacco: Never   Substance and Sexual Activity    Alcohol use: Not Currently     Comment: rare    Drug use: No    Sexual activity: Not Currently     Partners: Female       Review of Systems   All other systems reviewed and are negative.    Objective:     Vital Signs (Most Recent):  Temp: 97.9 °F (36.6 °C) (10/11/24 0900)  Pulse: 67 (10/11/24 1045)  Resp: 19 (10/11/24 1045)  BP: (!) 97/52 (10/11/24 1030)  SpO2: 98 % (10/11/24 1045) Vital Signs (24h Range):  Temp:  [96 °F (35.6 °C)-97.9 °F (36.6 °C)] 97.9 °F (36.6 °C)  Pulse:  [60-69] 67  Resp:  [10-33] 19  SpO2:  [89 %-100 %] 98 %  BP: ()/(44-64) 97/52     Weight: 75.4 kg (166 lb 3.6 oz)  Body mass index is 24.55 kg/m².       Physical Exam  Constitutional:       Comments: Frail and chronically ill-appearing, sitting up in bed, readily conversational, on vasopressors during RRT     Pulmonary:      Effort: Pulmonary effort is normal.       Significant Labs: All pertinent labs within the past 24 hours have been reviewed.  CBC:   Recent Labs   Lab 10/11/24  0421   WBC 20.95*   HGB 10.4*   HCT 29.5*   MCV 78*        BMP:  Recent Labs   Lab 10/11/24  0421   *   *   K 4.8   CL 94*   CO2 20*   BUN 55*   CREATININE 6.0*   CALCIUM 8.7     LFT:  Lab Results   Component Value Date     (H) 10/11/2024    ALKPHOS 204 (H) 10/11/2024    BILITOT 2.5 (H) 10/11/2024     Albumin:   Albumin   Date Value Ref Range Status   10/11/2024 2.4 (L) 3.5 - 5.2 g/dL Final     Protein:   Total Protein   Date Value Ref Range Status   10/11/2024 5.9 (L) 6.0 - 8.4 g/dL Final     Lactic acid:   Lab Results   Component Value Date    LACTATE 1.2 10/04/2024    LACTATE 1.1 05/01/2024       Significant Imaging: I have reviewed all pertinent imaging results/findings within the past 24 hours.

## 2024-10-11 NOTE — PT/OT/SLP PROGRESS
Physical Therapy      Patient Name:  Ar Sharma   MRN:  [id number]    Patient not seen today secondary to Dialysis. Will follow-up .

## 2024-10-11 NOTE — ASSESSMENT & PLAN NOTE
EF 40-45% with grade III DD.  This is likely having a significant impact on ability to tolerate iHD.  Cardiology following  - volume removal as permitted by RRT

## 2024-10-11 NOTE — ASSESSMENT & PLAN NOTE
Currently requiring CRRT.  Previously had utilized HD, but now too unstable from a blood pressure standpoint. Also has previously used PD, but this is currently being held.  - nephrology following.  - midodrine 15mg TID

## 2024-10-11 NOTE — PT/OT/SLP PROGRESS
Occupational Therapy      Patient Name:  Ar Sharma   MRN:  [id number]    Patient not seen today secondary to pt receiving dialysis  . Will follow-up as able.    10/11/2024

## 2024-10-11 NOTE — PROGRESS NOTES
[facility] [place]  Nurses Note -- 4 Eyes      10/11/2024       Skin assessed on: Q Shift      [x] No Pressure Injuries Present    [x]Prevention Measures Documented    [] Yes LDA  for Pressure Injury Previously documented     [] Yes New Pressure Injury Discovered   [] LDA for New Pressure Injury Added      Attending RN:  Bridget Coker RN     Second RN:  Elvi REDDING

## 2024-10-11 NOTE — ASSESSMENT & PLAN NOTE
Unclear etiology of this (thrombus vs septic).  Seems less likely that it is septic given negative culture data.   - anticoagulation with eliquis and antibiotics per ID.  - cards on board.

## 2024-10-11 NOTE — PROGRESS NOTES
Pt remains in ICU. Lethargic throughout shift. Not eating/drinking much. Seen by palliative and Critical care medicine today. Started on dobutamine and vasopressin in addition to being on levophed. New orders for dressing changes to R hip. Dialysis done today. Cortisol level sent. Starting heparin gtt due to pt unable to swallow eliquis effectively. Wife updated at bedside.

## 2024-10-11 NOTE — PROGRESS NOTES
[facility] Medicine  Progress Note    Patient Name: Ar Sharma  MRN: [id number]  Patient Class: IP- Inpatient   Admission Date: 10/3/2024  Length of Stay: 8 days  Attending Physician: Delmy Agarwal, *  Primary Care Provider: Jc Elliott MD        Subjective:     Principal Problem:Closed right hip fracture, initial encounter        HPI:  63 y.o. AAM with h/o ESRD on HD (for 2 months but prior was on PD for about a month-Follows with [facility]) MWF via a tunneled HD catheter on the right, Essential HTN, HLD, CAD,  h/o systolic(EF 40-45%) and diastolic grade 3 CHF due to non-ischemic cardiomyopathy with a [employer] CRT-D implant 2016 (sees Dr. Hernandez) uses 3L supplemental oxygen at night, and h/o VT arhythmia (follows with Dr. Tenorio at [facility]) s/p ablation and on Mexiletin 150mg PO BID and Amiodarone 400mg daily present to the ER with c/o right hip pain after a fall at home.     Went to HD today and was feeling alright after. Was moving his tool box when he tripped over his feet and fell to the ground. Was unable to get up or put weight on his right leg.     Work up in the ED at [facility] noted normal WBC and Stable H/H.  INR 1.3.  Lytes stable and c/w post-HD with no indications for emergent HD tonight.     Right hip/pelvis x-ray FINDINGS:  There is a minimally displaced intertrochanteric fracture of the right proximal femur.  No additional fractures are seen.  There is osteopenia.  The femoral heads are well seated in the acetabula.  There is mild joint space narrowing of the bilateral femoroacetabular joints and the pubic symphysis.  There is a peritoneal dialysis catheter overlying the pelvis.     Impression:     Right intertrochanteric fracture.      Transfer center contacted us for admission due to [facility] on Ortho-diversion. They spoke with Dr. Mcclendon who agreed to see the patient in consultation. We have been asked to accept for further pre-op planning.  Last time he took his ASA was yesterday am.       Results for orders placed during the hospital encounter of 04/29/24    Echo Saline Bubble? No; Ultrasound enhancing contrast? No    Interpretation Summary    Left Ventricle: The left ventricle is normal in size. Normal wall thickness. Regional wall motion abnormalities present. See diagram for wall motion findings. Septal motion is consistent with pacing. There is mildly reduced systolic function with a visually estimated ejection fraction of 40 - 45%. Grade III diastolic dysfunction.    Right Ventricle: Moderate to severe right ventricular enlargement. Systolic function is reduced.TAPSE is 1.59 cm. Pacemaker lead present in the ventricle.    Left Atrium: Left atrium is severely dilated. The left atrium volume index is 71.4 mL/m2.    Right Atrium: Right atrium is severely dilated.    Aortic Valve: There is mild aortic valve sclerosis. There is mild to moderate stenosis. Aortic valve area by VTI is 1.49 cm². Aortic valve peak velocity is 1.43 m/s. Mean gradient is 5 mmHg. The dimensionless index is 0.52.    Mitral Valve: There is mild regurgitation.    Tricuspid Valve: There is mild to moderate regurgitation.    Pulmonary Artery: The estimated pulmonary artery systolic pressure is 64 mmHg.    IVC/SVC: Elevated venous pressure at 15 mmHg.      Overview/Hospital Course:   63 y.o. AAM with h/o ESRD on HD (MWF), HTN, HLD, CAD, CHF admitted on 10/03/24 for Right intertrochanteric Hip fracture after a fall at home. No head trauma or LOC. CXR with interstitial edema.  No evidence of pneumothorax.  X-ray of the right hip with minimally displaced intertrochanteric fracture of the right proximal femur.  Orthopedic surgery consulted- s/p right  femur intramedullary nail on 10/04.  Minimal blood loss per orthopedic. Echo with EF of 40-45%, mild aortic stenosis.  PASP of 66 mm Hg.  Cardiology consulted for cardiac clearance-cleared for surgery at moderate cardiac risk.  Continue  symptomatic management and supportive care.  Nephrology following for ESRD. Patient  hypotensive at baseline but acutely more hypotensive during hospitalization but is asymptomatic. Unable to tolerate HD on 10/05. Persistently hypotensive. patient transferred to ICU for presssor support and CRRT on 10/05.  Tolerating CRRT.    Echo. Show ,  Left Atrium: There is a large mobile frondlike mass, appears to be adherent to LA septum.    Right Atrium: Right atrium is severely dilated. Multiple leads present in the right atrium with possible vegetation adherent to lead in RA.  Cardiology is consulted,recommended anticoagulations and IV Abx for possible endocarditis ,ID is consulted.blood culture is repeated.  BP remains on low side,required levophed for HD.    Interval History: feeling better with no new complaints.  Echo. Show ,  Left Atrium: There is a large mobile frondlike mass, appears to be adherent to LA septum.    Right Atrium: Right atrium is severely dilated. Multiple leads present in the right atrium with possible vegetation adherent to lead in RA.  Cardiology is consulted,recommended anticoagulations and IV Abx for possible endocarditis ,ID is consulted.blood culture is repeated.  BP remains on low side,required levophed for HD.    Review of Systems   HENT:  Negative for ear discharge and ear pain.    Eyes:  Negative for discharge and itching.   Endocrine: Negative for cold intolerance and heat intolerance.   Neurological:  Negative for seizures and syncope.     Objective:     Vital Signs (Most Recent):  Temp: 98 °F (36.7 °C) (10/11/24 1200)  Pulse: 67 (10/11/24 1045)  Resp: 16 (10/11/24 1338)  BP: (!) 97/52 (10/11/24 1030)  SpO2: 98 % (10/11/24 1045) Vital Signs (24h Range):  Temp:  [96 °F (35.6 °C)-98 °F (36.7 °C)] 98 °F (36.7 °C)  Pulse:  [60-69] 67  Resp:  [10-33] 16  SpO2:  [89 %-100 %] 98 %  BP: ()/(44-64) 97/52     Weight: 75.4 kg (166 lb 3.6 oz)  Body mass index is 24.55 kg/m².    Intake/Output  Summary (Last 24 hours) at 10/11/2024 1358  Last data filed at 10/11/2024 1200  Gross per 24 hour   Intake 1711.46 ml   Output 3100 ml   Net -1388.54 ml         Physical Exam  Vitals and nursing note reviewed.   Constitutional:       General: He is not in acute distress.     Appearance: He is ill-appearing (chronically). He is not toxic-appearing or diaphoretic.      Interventions: Nasal cannula in place.   HENT:      Head: Normocephalic and atraumatic.      Nose: No rhinorrhea.      Mouth/Throat:      Mouth: Mucous membranes are moist.   Eyes:      General: No scleral icterus.     Extraocular Movements: Extraocular movements intact.   Neck:      Comments: Right IJ tunneled dialysis catheter  Cardiovascular:      Rate and Rhythm: Normal rate and regular rhythm.      Heart sounds: No murmur heard.  Pulmonary:      Effort: No tachypnea, accessory muscle usage, respiratory distress or retractions.   Abdominal:      General: There is no distension.      Comments: PD catheter present   Skin:     General: Skin is warm and dry.      Coloration: Skin is not jaundiced.      Findings: No rash.   Neurological:      General: No focal deficit present.      Mental Status: He is alert. Mental status is at baseline.             Significant Labs: All pertinent labs within the past 24 hours have been reviewed.  BMP:   Recent Labs   Lab 10/11/24  0421   *   *   K 4.8   CL 94*   CO2 20*   BUN 55*   CREATININE 6.0*   CALCIUM 8.7     CBC:   Recent Labs   Lab 10/10/24  0305 10/11/24  0421   WBC 20.75* 20.95*   HGB 9.9* 10.4*   HCT 29.2* 29.5*    352       Significant Imaging: I have reviewed all pertinent imaging results/findings within the past 24 hours.    Assessment/Plan:      * Closed right hip fracture, initial encounter  -s/p fall. No LOC or head trauma  -Right hip XR: minimally displaced intertrochanteric fracture of the right proximal femur.   -Orthopedic surgery consulted: s/p right  femur intramedullary nail  on 10/04  -PT/OT once more stable.    Shock  BP remains on low side,required levophed for HD.    Atrial mass    Echo. Show ,  Left Atrium: There is a large mobile frondlike mass, appears to be adherent to LA septum.    Right Atrium: Right atrium is severely dilated. Multiple leads present in the right atrium with possible vegetation adherent to lead in RA.  Cardiology is consulted,recommended anticoagulations and IV Abx for possible endocarditis ,ID is consulted.blood culture is repeated.    Right atrial thrombus  As above.      Leukocytosis  -WBC# trending up, 20k on 10/06. Pt remained afebrile.   -initially suspected reactive given fracture and recent surgery  -However, pt also with PD cath in Q.   BCX negative.  Empirically started on aBX's    Acute on chronic hypoxic respiratory failure  Patient with Hypoxic Respiratory failure which is Acute on chronic.  he is on home oxygen at 3 LPM. Supplemental oxygen was provided and noted-      .   Signs/symptoms of respiratory failure include- tachypnea and increased work of breathing. Contributing diagnoses includes - CHF and COPD Labs and images were reviewed. Patient Has recent ABG, which has been reviewed. Will treat underlying causes and adjust management of respiratory failure as follows-     -pt on home 3L NC. Hx of COPD/CHF  -Worsening hypoxia overnight 10/05; required up to 15L HFNC.  -acute on chronic hypoxia secondary to volume overload  -Pt unable to tolerate HD and CRRT, net positive 1.5L on 10/06  -CXR with pulmonary edema   -Pulmonology consulted  -Nephrology following  -Wean O2 as tolerated     Advanced care planning/counseling discussion  Advance Care Planning    Date: 10/05/2024    Code Status  I engaged the the patient in a voluntary conversation about the patient's preferences for care  at the very end of life. The patient wishes to have CPR and other invasive treatments performed when his heart and/or breathing stops. I communicated to the patient  that his wishes align with full code status and he agrees.    A total of 16 min was spent on advance care planning, goals of care discussion, emotional support, formulating and communicating prognosis and exploring burden/benefit of various approaches of treatment. This discussion occurred on a fully voluntary basis with the verbal consent of the patient and/or family.            Moderate protein-calorie malnutrition  Nutrition consulted. Most recent weight and BMI monitored-     Measurements:  Wt Readings from Last 1 Encounters:   10/06/24 74.6 kg (164 lb 7.4 oz)   Body mass index is 24.29 kg/m².    Patient has been screened and assessed by RD.    Malnutrition Type:  Context: acute illness or injury  Level: mild    Malnutrition Characteristic Summary:  Weight Loss (Malnutrition): greater than 10% in 6 months  Subcutaneous Fat (Malnutrition): mild depletion  Muscle Mass (Malnutrition): mild depletion    Interventions/Recommendations (treatment strategy):  1. Add Novasource Renal TID 2. [place] intake at meals3. Monitor weight/labs 4. RD to follow to monitor PO intake      ESRD (end stage renal disease)  Nephrology consulted: HD per nephrology   HD stopped early on 10/05 due to hypotension  Persistently hypotensive, transfer to ICU for CRRT and pressor support on 10/05  CRRT started on 10/05    Pulmonary emphysema, unspecified emphysema type  COPD is controlled on his home 3L oxygen at night.   No wheezing on exam  Now volume overloaded and requiring more O2  PRN duonebs ordered.   Wean to home O2 as tolerated    Aortic atherosclerosis  Continue statin       Primary hypertension  Chronic, controlled. Pt now with hypotension   Pt with hypotension, continue home midodrine   Currently on pressors to keep MAP>65    Latest blood pressure and vitals reviewed-     Temp:  [97.5 °F (36.4 °C)-98.5 °F (36.9 °C)]   Pulse:  [60-66]   Resp:  [12-42]   BP: ()/(45-72)   SpO2:  [92 %-100 %] .   Home meds for hypertension were  reviewed and noted below.   Hypertension Medications               furosemide (LASIX) 80 MG tablet Take 4.5 tablets (360 mg total) by mouth 2 (two) times daily.    metOLazone (ZAROXOLYN) 10 MG tablet Take 10 mg by mouth As instructed (Take I tablet by mouth Tuesday, Thursday, Saturday, Sunday). Take I tablet by mouth Tuesday, Thursday, Saturday, Sunday    nitroGLYCERIN (NITROSTAT) 0.4 MG SL tablet Place 1 tablet (0.4 mg total) under the tongue every 5 (five) minutes as needed for Chest pain.            While in the hospital, will manage blood pressure as follows; Adjust home antihypertensive regimen as follows- patient with hypotension chronically. Continue home midodrine     Will utilize p.r.n. blood pressure medication only if patient's blood pressure greater than  180/90  and he develops symptoms such as worsening chest pain or shortness of breath.    Chronic combined systolic and diastolic heart failure  NO signs of acute exacerbation.   Echo as below   Cardiology consulted for cardiac clearance-cleared for surgery at moderate cardiac risk.   Nephrology for volume removal with HD    Echo    Result Date: 10/7/2024    Left Ventricle: The left ventricle is normal in size. Mildly increased   wall thickness. There is mild concentric hypertrophy. Regional wall motion   abnormalities present (severe distal anteroapical hypokinesis). There is   mildly reduced systolic function with a visually estimated ejection   fraction of 40 - 45%.    Right Ventricle: Severe right ventricular enlargement. Systolic   function is severely reduced. Pacemaker lead present in the ventricle and   appears abnormal with possible vegetation on lead in RA (2.7x2.0cm).    Left Atrium: There is a large mobile frondlike mass, appears to be   adherent to LA septum.    Right Atrium: Right atrium is severely dilated. Multiple leads present   in the right atrium with possible vegetation adherent to lead in RA.    Aortic Valve: The aortic valve is a  trileaflet valve. There is mild   aortic valve sclerosis.    Tricuspid Valve: There is mild regurgitation.    Pulmonary Artery: The estimated pulmonary artery systolic pressure is   67 mmHg.        Echo    Result Date: 10/3/2024    Left Ventricle: The left ventricle is mildly dilated. There is mild   eccentric hypertrophy. Septal flattening in diastole and systole   consistent with right ventricular volume and pressure overload. There is   mildly reduced systolic function with a visually estimated ejection   fraction of 40 - 45%.    Right Ventricle: Severe right ventricular enlargement. Systolic   function is moderately reduced.    Left Atrium: Left atrium is moderately dilated.    Right Atrium: Right atrium is severely dilated.    Aortic Valve: There is mild stenosis. Aortic valve area by VTI is 1.6   cm². Aortic valve peak velocity is 1.6 m/s. Mean gradient is 6.3 mmHg. The   dimensionless index is 0.50.    Mitral Valve: There is mild to moderate regurgitation.    Tricuspid Valve: There is moderate regurgitation.    Pulmonary Artery: The estimated pulmonary artery systolic pressure is   66 mmHg.    IVC/SVC: Elevated venous pressure at 15 mmHg.           S/P ablation of ventricular arrhythmia  Cardiology consulted for cardiac clearance.   Resume home Mexiletin 150mg PO BID   Hold amiodarone 400mg PO daily in setting of hypotension   ICD in place       Cardiac resynchronization therapy defibrillator (CRT-D) in place  -noted      Atherosclerosis of native coronary artery of native heart with angina pectoris  Patient with known CAD , which is controlled Will continue Statin and monitor for S/Sx of angina/ACS. Continue to monitor on telemetry.     Mixed hyperlipidemia  -continue statin       Iron deficiency anemia  Anemia is likely due to Iron deficiency. Most recent hemoglobin and hematocrit are listed below.  Recent Labs     10/05/24  0544 10/06/24  0439 10/07/24  0219   HGB 12.6* 12.2* 11.5*   HCT 37.5* 35.8* 33.6*        Plan  - Monitor serial CBC: Daily  - Transfuse PRBC if patient becomes hemodynamically unstable, symptomatic or H/H drops below 7/21.  - Patient has not received any PRBC transfusions to date  - Patient's anemia is currently stable  - stable, monitoring     Hypothyroidism  -resume home synthroid       VTE Risk Mitigation (From admission, onward)           Ordered     apixaban tablet 5 mg  2 times daily         10/07/24 1319     heparin (porcine) injection 5,000 Units  Use PRN         10/05/24 1830     Place sequential compression device  Until discontinued         10/04/24 2217     heparin (porcine) injection 1,000 Units  As needed (PRN)         10/04/24 2217     IP VTE LOW RISK PATIENT  Once         10/04/24 1438     Place sequential compression device  Until discontinued         10/03/24 0154     Reason for No Pharmacological VTE Prophylaxis  Once        Comments: Right hip fracture needs surgical eval   Question:  Reasons:  Answer:  Physician Provided (leave comment)    10/03/24 0154                    Discharge Planning   SLAVA:      Code Status: Full Code   Is the patient medically ready for discharge?:     Reason for patient still in hospital (select all that apply): Patient trending condition  Discharge Plan A:  (tbd see comment)            Critical care time spent on the evaluation and treatment of severe organ dysfunction, review of pertinent labs and imaging studies, discussions with consulting providers and discussions with patient/family:  over 45  minutes.      Delmy Agarwal MD  Department of Hospital Medicine   [facility] - Intensive Care

## 2024-10-12 NOTE — CLINICAL REVIEW
IP Sepsis Screen (most recent)       Sepsis Screen (IP) - 10/12/24 0727       Is the patient's history or complaint suggestive of a possible infection? Yes  -WL    Are there at least two of the following signs and symptoms present? Yes  -WL    Sepsis signs/symptoms - Tachypnea Tachypnea     >20  -WL    Sepsis signs/symptoms - WBC WBC < 4,000 or WBC > 12,000  -WL    Are any of the following organ dysfunction criteria present and not considered to be due to a chronic condition? Yes  -WL    Organ Dysfunction Criteria Total Bilirubin > 2.0 Platelet count < 100,000  -WL    Organ Dysfunction Criteria INR > 1.5 or aPTT > 60  -WL    Initiate Sepsis Protocol No  -WL    Reason sepsis not considered Pt. receiving appropriate management   Increasing WBCs, BCx2 ngtd, LA wnl, on abx, ID following -WL              User Key  (r) = Recorded By, (t) = Taken By, (c) = Cosigned By      Initials Name     Verena Loja RN

## 2024-10-12 NOTE — PROGRESS NOTES
S:  Lethargic - per notes has been increasing throughout the day   Contacted earlier today by daytime nurse - concern for significant amount drainage from the wound.  Pictures were sent - did not appear to be consistent with infection.  Dressings were applied time around 4:00 p.m.  Palliative Care has been consulted and is following    O:    Temp:  [96 °F (35.6 °C)-98.5 °F (36.9 °C)] 98.5 °F (36.9 °C)  Pulse:  [60-85] 84  Resp:  [10-33] 20  SpO2:  [86 %-100 %] 97 %  BP: ()/(44-64) 77/44    PE:  Resting in bed, no apparent distress  Less interactive today than he has been during previous exams  right lower extremity:  ABDs in place with Medipore tape  Minimal amount of sanguinous drainage to dressings applied earlier today  No erythema or warmth or other signs of infection  No foul odor from drainage  No purulence  No NV changes    Laboratory:  Most Recent Data:  CBC:   Lab Results   Component Value Date    WBC 31.56 (H) 10/11/2024    HGB 11.0 (L) 10/11/2024    HCT 31.5 (L) 10/11/2024     10/11/2024    MCV 77 (L) 10/11/2024    RDW 16.2 (H) 10/11/2024     BMP:   Lab Results   Component Value Date     (L) 10/11/2024    K 4.8 10/11/2024    CL 94 (L) 10/11/2024    CO2 20 (L) 10/11/2024    BUN 55 (H) 10/11/2024    CREATININE 6.0 (H) 10/11/2024     (H) 10/11/2024    CALCIUM 8.7 10/11/2024    MG 2.1 10/08/2024    PHOS 4.1 10/10/2024       A/P: 63 y.o.male  7 Days Post-Op R IMN for intertrochanteric femur fracture   - PT OT as soon as he is safely able to participate   - no signs of infection of incision.  Significant drainage likely related to bedrest as well as soft tissue edema and 3rd spacing.  Prevena incisional VAC applied to manage local edema and assist with wound care.  Discussed management of weeks with nursing.  Left additional supplies at bedside.  May be left in place for 7 days.  At that time can be transitioned to a standard dressing with 4x4s or ABD and tape.  If drainage has slowed  down, can consider Aquacel.  Does not need dressing after he is 14 days postop as long as he is no longer having any drainage.  - Pain control   - WBAT RLE   - Will follow

## 2024-10-12 NOTE — ASSESSMENT & PLAN NOTE
Patient chronically with systolic blood pressures in the 90s.  Throughout his hospitalization he has become progressively more hypotensive.  Unclear etiology.  All cultures have been NGTD.  There is intracardiac thrombus that were noted on TTE and are being treated with heparin.  Cardiology following.  Leukocytosis present, but this could represent a stress response. Chest x-ray consistent with volume overload and cardiomegaly. Has been on broad spectrum empiric antibiotics per ID recommendations.  - continue CRRT until BP more appropriate  - VBG showing SCvO2 of 41 yesterday. Dobutamine added with improvement in this value.  Certainly some component of cardiogenic shock.  - continue antibiotics as guided by ID.  Currently on broad spectrum abx (day 8). ID recommending changing vanc to daptomycin.  Continue meropenem.    - empirically starting micafungin today (day 1) given severity of illness and progressive shock in setting of long standing abx, illness, and tunneled catheter.  No cultures positive however.  - eliquis currently being utilized for possible intracardiac thrombus.  If Obstructive physiology is possible, this would be the therapy for it.   - midodrine 15mg TID, if he can safely tolerate PO intake.  - Starting Hydrocortisone and fludrocortisone given elevated pressor requirements.  - levophed and vasopressin to keep MAP >60mmHg.

## 2024-10-12 NOTE — ASSESSMENT & PLAN NOTE
Mr. Sharma is a pleasant 62 yo man admitted after a fall with a femur fracture. He is s/p IM nail placement. Incidentally found to have an atrial thrombus v vegetation. ID is consulted for that. The patient denies any prodromal symptoms, such as fever, and has no stigmata of IE. Additionally, his blood cultures are NGTD. At this pont, he meets [facility] criteria for possible IE, though NBTE is a definite possibility.     Worsening pressure with increased leukocytosis since yesterday. Weissella confusa detected on Karius is of unclear significance but given the patient's current condition, will change to daptomycin since Weissella is usually resistant to vancomycin.      ESR 27  Rheumatoid factor NR    Karius: Weissella confusa    Pending studies  Q fever  Bartonella - NR  Gabriela's PCR    Recommendations  Repeat blood and urine cultures  Expand abx given worsening sepsis: meropenem, micafungin, daptomycin  Follow-up pending studies to r/o possible CNE  Trend WBC  Consider additional imaging of chest/abd/pelvis, if safe  Discussed with patient and family

## 2024-10-12 NOTE — ASSESSMENT & PLAN NOTE
BP remains on low side,required levophed for HD.  Patient at this time is on 3 vasopressors,already consulted palliative and spoke with daughter and wife,nephrology can not perform HD today.  CC added Micafungin,spooked with ID ,will do CT chest,abdomen,pelvic,also ID changed cefepime with meropenem.

## 2024-10-12 NOTE — PLAN OF CARE
Patient remains in ICU. Still requiring Levophed, Dobutamine, Vasopressin, and a Heparin drip. All PO medications held due to patient coughing up a large piece of broccoli. Speech consulted for swallow study. MD Reed came to the bedside at 2040 and applied a negative pressure wound vac. PRN Dilaudid was required as the patient experienced increased pain at the incision site. Free of falls, injury, or further breakdown.     Problem: Adult Inpatient Plan of Care  Goal: Plan of Care Review  Outcome: Progressing  Goal: Patient-Specific Goal (Individualized)  Outcome: Progressing  Goal: Absence of Hospital-Acquired Illness or Injury  Outcome: Progressing  Goal: Optimal Comfort and Wellbeing  Outcome: Progressing  Goal: Readiness for Transition of Care  Outcome: Progressing     Problem: Infection  Goal: Absence of Infection Signs and Symptoms  Outcome: Progressing     Problem: Wound  Goal: Optimal Coping  Outcome: Progressing  Goal: Optimal Functional Ability  Outcome: Progressing  Goal: Absence of Infection Signs and Symptoms  Outcome: Progressing  Goal: Improved Oral Intake  Outcome: Progressing  Goal: Optimal Pain Control and Function  Outcome: Progressing  Goal: Skin Health and Integrity  Outcome: Progressing  Goal: Optimal Wound Healing  Outcome: Progressing     Problem: Skin Injury Risk Increased  Goal: Skin Health and Integrity  Outcome: Progressing     Problem: Hemodialysis  Goal: Safe, Effective Therapy Delivery  Outcome: Progressing  Goal: Effective Tissue Perfusion  Outcome: Progressing  Goal: Absence of Infection Signs and Symptoms  Outcome: Progressing     Problem: Orthopaedic Fracture  Goal: Absence of Bleeding  Outcome: Progressing  Goal: Bowel Elimination  Outcome: Progressing  Goal: Absence of Embolism Signs and Symptoms  Outcome: Progressing  Goal: Fracture Stability  Outcome: Progressing  Goal: Optimal Functional Ability  Outcome: Progressing  Goal: Absence of Infection Signs and Symptoms  Outcome:  Progressing  Goal: Effective Tissue Perfusion  Outcome: Progressing  Goal: Optimal Pain Control and Function  Outcome: Progressing  Goal: Effective Oxygenation and Ventilation  Outcome: Progressing     Problem: Fall Injury Risk  Goal: Absence of Fall and Fall-Related Injury  Outcome: Progressing     Problem: CRRT (Continuous Renal Replacement Therapy)  Goal: Safe, Effective Therapy Delivery  Outcome: Progressing  Goal: Hemodynamic Stability  Outcome: Progressing  Goal: Body Temperature Maintained in Desired Range  Outcome: Progressing  Goal: Absence of Infection Signs and Symptoms  Outcome: Progressing     Problem: Coping Ineffective  Goal: Effective Coping  Outcome: Progressing

## 2024-10-12 NOTE — CONSULTS
1910:    Consult was conducted with the patient's medical provider and his spouse. Medical Provider reports that the patient's wife has been with him all day. Patient's spouse that their daughter, who is a M>D> will be visiting with her Father this evening. She is driving from [place], [place].    My visit with the patient found him sitting up in bed, but presented as being lethargic. He was not very engaging in conversation..    The patient's wife requested that I contact their , Father Vineet at [phone] and request him to visit with her . Telephone call was initiated at this telephone, no answer, left voice message. Further investigation was pursued for a telephone number for [facility] in [place], [place]. Telephone number called [phone]. Answering service connected me with Father Vineet, and the message was related to him.    Prayers were offered for the patient and his family. The Spiritual Care Team kayli continue to provide spiritual support to the patient and his family.        BHARATH Albright   [phone]

## 2024-10-12 NOTE — ASSESSMENT & PLAN NOTE
Currently requiring CRRT.  Previously had utilized HD, but now too unstable from a blood pressure standpoint. Also has previously used PD, but this is currently being held.  - nephrology following.  - midodrine 15mg TID.  Now off due to inability to safely swallow.

## 2024-10-12 NOTE — SUBJECTIVE & OBJECTIVE
Interval history:  Patient is resting in bed, but having increasing pressor requirements (0.4 levophed, 0.04 vaso, 12.5 dobutamine).  SCvO2 improved with dobutamine, but pressor requirement remains very high.  A line placed today.  Family at bedside and understanding of the severity of his condition.  Planning on more family coming for visit today.  They plan on discussing code status at this time.  Adding micafungin today.  Vanc adjusted to dapto today per ID.    Review of Systems   Constitutional:  Positive for activity change and fatigue. Negative for chills and fever.   Respiratory:  Positive for cough and shortness of breath.    Cardiovascular:  Positive for leg swelling. Negative for chest pain.   Gastrointestinal:  Negative for abdominal pain.   Psychiatric/Behavioral: Negative.       Objective:     Vital Signs (Most Recent):  Temp: 98.1 °F (36.7 °C) (10/12/24 0701)  Pulse: 83 (10/12/24 0930)  Resp: (!) 24 (10/12/24 0930)  BP: (!) 87/60 (10/12/24 0930)  SpO2: 100 % (10/12/24 0930) Vital Signs (24h Range):  Temp:  [97.9 °F (36.6 °C)-98.9 °F (37.2 °C)] 98.1 °F (36.7 °C)  Pulse:  [67-92] 83  Resp:  [10-28] 24  SpO2:  [60 %-100 %] 100 %  BP: ()/(43-60) 87/60     Weight: 80.7 kg (177 lb 14.6 oz)  Body mass index is 26.27 kg/m².      Intake/Output Summary (Last 24 hours) at 10/12/2024 1101  Last data filed at 10/12/2024 0900  Gross per 24 hour   Intake 1894.74 ml   Output 3000 ml   Net -1105.26 ml        Physical Exam  Constitutional:       General: He is not in acute distress.     Appearance: Normal appearance. He is ill-appearing. He is not toxic-appearing or diaphoretic.   HENT:      Head: Normocephalic and atraumatic.      Nose: Nose normal.      Mouth/Throat:      Mouth: Mucous membranes are moist.   Eyes:      Extraocular Movements: Extraocular movements intact.      Pupils: Pupils are equal, round, and reactive to light.   Neck:      Comments: Left IJ CVC  Cardiovascular:      Rate and Rhythm: Normal  rate and regular rhythm.   Pulmonary:      Effort: Pulmonary effort is normal. No respiratory distress.      Breath sounds: Normal breath sounds. No wheezing.   Abdominal:      General: Abdomen is flat. Bowel sounds are normal. There is no distension.      Palpations: Abdomen is soft.   Musculoskeletal:      Cervical back: Normal range of motion.      Right lower leg: Edema present.      Left lower leg: Edema present.      Comments: Left radial a line.   Skin:     General: Skin is warm.      Capillary Refill: Capillary refill takes less than 2 seconds.   Neurological:      Mental Status: He is alert.      Comments: Alert to voice and interactive, but slowed mentation compared to baseline.          Vents:       Lines/Drains/Airways       Central Venous Catheter Line  Duration                  Hemodialysis Catheter 05/03/24 1115 right subclavian 161 days    Percutaneous Central Line - Triple Lumen  10/09/24 1127 Internal Jugular Left 2 days              Arterial Line  Duration             Arterial Line 10/12/24 1040 Left Radial <1 day              Peripheral Intravenous Line  Duration                  Peripheral IV - Single Lumen 10/09/24 0831 20 G Anterior;Left Forearm 3 days                    Significant Labs:    CBC/Anemia Profile:  Recent Labs   Lab 10/11/24  0421 10/11/24  1637 10/12/24  0325   WBC 20.95* 31.56* 33.99*   HGB 10.4* 11.0* 10.6*   HCT 29.5* 31.5* 29.8*    400 311   MCV 78* 77* 77*   RDW 16.2* 16.2* 16.3*        Chemistries:  Recent Labs   Lab 10/11/24  0421   *   K 4.8   CL 94*   CO2 20*   BUN 55*   CREATININE 6.0*   CALCIUM 8.7   ALBUMIN 2.4*   PROT 5.9*   BILITOT 2.5*   ALKPHOS 204*   ALT 29   *       All pertinent labs within the past 24 hours have been reviewed.    Significant Imaging:   I have reviewed all pertinent imaging results/findings within the past 24 hours.

## 2024-10-12 NOTE — ASSESSMENT & PLAN NOTE
Family updated at bedside and counseled on clinical worsening that has taken place over the past week.  Planning on more family members coming in town today and to discuss code status moving forward.

## 2024-10-12 NOTE — ASSESSMENT & PLAN NOTE
ESRD MWF    Plan/recommendation  HD done yesterday, no acute indication for dialysis at this time  Started on dobutamine yesterday for low SVO2, with some improvement with high dose dobutamine - however other vasopressor requirements have not improved. Discussed with family at bedside - while volume removal is the treatment for heart failure he is already net negative this admission and that it may not help. More family to arrive today and they will discuss code status and goals of care.    We also discussed that further dialysis may be determental and that we could try CVVHD tomorrow with slow volume removal - but he remains with a guarded prognosis.     -Strict ins and outs  -Avoid nephrotoxic agents if possible and renally dose medications  -Avoid drastic hemodynamic changes if possible    Secondary hyperparathyroidism  Calcium   Date Value Ref Range Status   10/11/2024 8.7 8.7 - 10.5 mg/dL Final     Phosphorus   Date Value Ref Range Status   10/10/2024 4.1 2.7 - 4.5 mg/dL Final     PTH, Intact   Date Value Ref Range Status   04/29/2024 162.6 (H) 9.0 - 77.0 pg/mL Final   Continue to monitor - critically ill this admission    Anemia of chronic renal disease  Goal hemoglobin in ESRD is 10-12  Hemoglobin   Date Value Ref Range Status   10/12/2024 10.6 (L) 14.0 - 18.0 g/dL Final     Iron   Date Value Ref Range Status   10/05/2024 22 (L) 45 - 160 ug/dL Final     Transferrin   Date Value Ref Range Status   10/05/2024 207 200 - 375 mg/dL Final     TIBC   Date Value Ref Range Status   10/05/2024 306 250 - 450 ug/dL Final     Saturated Iron   Date Value Ref Range Status   10/05/2024 7 (L) 20 - 50 % Final     Ferritin   Date Value Ref Range Status   10/05/2024 338 (H) 20.0 - 300.0 ng/mL Final   Continue to monitor - critically ll this admission

## 2024-10-12 NOTE — PROGRESS NOTES
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 Sharma is a pleasant 64 yo man admitted after a fall with a femur fracture. He is s/p IM nail placement. Incidentally found to have an atrial thrombus v vegetation. ID is consulted for that. The patient denies any prodromal symptoms, such as fever, and has no stigmata of IE. Additionally, his blood cultures are NGTD. At this pont, he meets [facility] criteria for possible IE, though NBTE is a definite possibility.     Mr. Sharma has experienced worsening pressure with increased leukocytosis since yesterday. He has been on abx and has an indwelling line. Will expand coverage to cover the ususal nosocomial suspects. Weissella confusa detected on Karius is of unclear significance but given the patient's current condition, will change to daptomycin since Weissella is usually resistant to vancomycin.    ESR 27  Rheumatoid factor NR    Karius: Weissella confusa  Bartonella: NR    Pending studies  Q fever  Whipple's PCR    Recommendations  Repeat blood and urine cultures  Expand abx given worsening sepsis: meropenem, micafungin, daptomycin  Follow-up pending studies to r/o possible CNE  Trend WBC  Consider additional imaging of chest/abd/pelvis, if safe  Discussed with patient and family      Colin Montanez MD  Infectious Disease  [facility] - Intensive Care    Subjective:     Principal Problem:Closed right hip fracture, initial encounter    HPI: Mr. Sharma is a pleasant 63 y.o. man with h/o ESRD on HD (for 2 months but prior was on PD for about a month-Follows with [facility]) MWF via a tunneled HD catheter on the right, Essential HTN, HLD, CAD,  h/o systolic(EF 40-45%) and  diastolic grade 3 CHF due to non-ischemic cardiomyopathy with a [employer] CRT-D implant 2016 (sees Dr. Hernandez) uses 3L supplemental oxygen at night, and h/o VT arhythmia (follows with Dr. Tenorio at [facility]) s/p ablation and on Mexiletin 150mg PO BID and Amiodarone 400mg daily present to the ER with c/o right hip pain after a fall at home. Went to HD and was feeling alright after. Was moving his tool box when he tripped over his feet and fell to the ground while working on his RV. Was unable to get up or put weight on his right leg. Radiograph revealed a femur fracture and the patient was transferred here from [place] for Ortho evaluation. Here he underwent surgery and has an IM nail placed. He had a TTE which demonstrated a mass through a PFO. ID is consulted for possible IE. The patient and spouse deny prodromal fever, chills, or night sweats. No IDU but has HD TIW. Blood cultures (10/5) are NGTD.       Interval History: Events noted. Discussed with Dr. Hawkins. Appears septic with hypotension and worsening leukocytosis. Micafungin added.    Review of Systems   Constitutional:  Negative for chills and fever.   Gastrointestinal:  Negative for abdominal pain and diarrhea.   All other systems reviewed and are negative.    Objective:     Vital Signs (Most Recent):  Temp: 98.1 °F (36.7 °C) (10/12/24 0701)  Pulse: 83 (10/12/24 0930)  Resp: (!) 24 (10/12/24 0930)  BP: (!) 87/60 (10/12/24 0930)  SpO2: 100 % (10/12/24 0930) Vital Signs (24h Range):  Temp:  [97.9 °F (36.6 °C)-98.9 °F (37.2 °C)] 98.1 °F (36.7 °C)  Pulse:  [67-92] 83  Resp:  [10-28] 24  SpO2:  [60 %-100 %] 100 %  BP: ()/(43-60) 87/60     Weight: 80.7 kg (177 lb 14.6 oz)  Body mass index is 26.27 kg/m².    Estimated Creatinine Clearance: 12.4 mL/min (A) (based on SCr of 6 mg/dL (H)).     Physical Exam  Vitals and nursing note reviewed.   Constitutional:       General: He is not in acute distress.     Appearance: Normal appearance. He is not ill-appearing,  toxic-appearing or diaphoretic.   HENT:      Head: Normocephalic and atraumatic.      Right Ear: External ear normal.      Left Ear: External ear normal.   Eyes:      Extraocular Movements: Extraocular movements intact.      Pupils: Pupils are equal, round, and reactive to light.   Cardiovascular:      Rate and Rhythm: Normal rate.      Heart sounds: Murmur heard.   Pulmonary:      Breath sounds: No rhonchi or rales.   Abdominal:      Tenderness: There is no guarding or rebound.   Musculoskeletal:         General: No tenderness.   Neurological:      General: No focal deficit present.      Mental Status: He is alert and oriented to person, place, and time.   Psychiatric:         Mood and Affect: Mood normal.         Behavior: Behavior normal.         Thought Content: Thought content normal.         Judgment: Judgment normal.          Significant Labs: CBC:   Recent Labs   Lab 10/11/24  0421 10/11/24  1637 10/12/24  0325   WBC 20.95* 31.56* 33.99*   HGB 10.4* 11.0* 10.6*   HCT 29.5* 31.5* 29.8*    400 311     CMP:   Recent Labs   Lab 10/11/24  0421   *   K 4.8   CL 94*   CO2 20*   *   BUN 55*   CREATININE 6.0*   CALCIUM 8.7   PROT 5.9*   ALBUMIN 2.4*   BILITOT 2.5*   ALKPHOS 204*   *   ALT 29   ANIONGAP 12     Microbiology Results (last 7 days)       Procedure Component Value Units Date/Time    Blood culture [[id number]] Collected: 10/09/24 0930    Order Status: Completed Specimen: Blood Updated: 10/12/24 1103     Blood Culture, Routine No Growth to date      No Growth to date      No Growth to date      No Growth to date    Narrative:      Draw sample # 2 from separate site    Blood culture [[id number]] Collected: 10/09/24 0915    Order Status: Completed Specimen: Blood Updated: 10/12/24 1103     Blood Culture, Routine No Growth to date      No Growth to date      No Growth to date      No Growth to date    Narrative:      Draw sample # 2 from separate site    Urine Culture High Risk  [[id number]]     Order Status: No result Specimen: Urine, Catheterized     Blood culture [[id number]]     Order Status: Sent Specimen: Blood     Fungus Culture, Blood or Bone Marrow [[id number]]     Order Status: Canceled Specimen: Blood     Blood culture [[id number]] Collected: 10/05/24 1612    Order Status: Completed Specimen: Blood from Peripheral, Hand, Left Updated: 10/09/24 1703     Blood Culture, Routine No Growth after 4 days.    Blood culture [[id number]] Collected: 10/05/24 1556    Order Status: Completed Specimen: Blood from Peripheral, Wrist, Left Updated: 10/09/24 1703     Blood Culture, Routine No Growth after 4 days.            Significant Imaging: I have reviewed all pertinent imaging results/findings within the past 24 hours.

## 2024-10-12 NOTE — ASSESSMENT & PLAN NOTE
Nutrition consulted. Most recent weight and BMI monitored-     Measurements:  Wt Readings from Last 1 Encounters:   10/12/24 80.7 kg (177 lb 14.6 oz)   Body mass index is 26.27 kg/m².    Patient has been screened and assessed by RD.    Malnutrition Type:  Context: acute illness or injury  Level: mild    Malnutrition Characteristic Summary:  Weight Loss (Malnutrition): greater than 10% in 6 months  Subcutaneous Fat (Malnutrition): mild depletion  Muscle Mass (Malnutrition): mild depletion    Interventions/Recommendations (treatment strategy):  1. COntinue pt on renal diet 2. Continue novasource renal TID 3. Encourage intake at meals 4. Monitor weight/labs 5. RD to follow to monitor PO intake

## 2024-10-12 NOTE — PROGRESS NOTES
[facility] - Intensive Care  Nephrology  Progress Note    Patient Name: Ar Sharma  MRN: [id number]  Admission Date: 10/3/2024  Hospital Length of Stay: 9 days  Attending Provider: Delmy Agarwal, Charanjit   Primary Care Physician: Jc Elliott MD  Principal Problem:Closed right hip fracture, initial encounter    Subjective:     HPI: Following for ESRD. Patient receives HD MWF via [facility] at [facility] under the c/o Dr. Chicas. He was initiated on HD 4/2024. He remains with PD catheter.     Interval History: this AM on dobutamine 12.5, vaso 0.04 and levo 0.4    Review of patient's allergies indicates:   Allergen Reactions    Lokelma [sodium zirconium cyclosilicate] Other (See Comments)     Fluid retention, weight gain, CHF excerebration, severe constipation    Atorvastatin Other (See Comments)     Joint pain    Jardiance [empagliflozin] Other (See Comments)     Chest pains, significant weight loss, lower blood pressure     Current Facility-Administered Medications   Medication Frequency    0.9%  NaCl infusion PRN    acetaminophen tablet 650 mg Q4H PRN    allopurinoL tablet 100 mg Daily    DAPTOmycin (CUBICIN) 645 mg in 0.9% NaCl SolP 50 mL IVPB Q48H    DOBUtamine 1000 mg in D5W 250 mL infusion Continuous    docusate sodium capsule 100 mg Daily    epoetin saba-epbx injection 10,000 Units Every Mon, Wed, Fri    fentaNYL 50 mcg/hr 1 patch Q72H    fludrocortisone tablet 100 mcg Daily    furosemide tablet 360 mg See admin instructions    heparin (porcine) injection 1,000 Units PRN    heparin (porcine) injection 5,000 Units PRN    heparin 25,000 units in dextrose 5% (100 units/ml) IV bolus from bag HIGH INTENSITY nomogram - [facility] PRN    heparin 25,000 units in dextrose 5% (100 units/ml) IV bolus from bag HIGH INTENSITY nomogram - [facility] PRN    heparin 25,000 units in dextrose 5% 250 mL (100 units/mL) infusion HIGH INTENSITY nomogram - [facility] Continuous    hydrALAZINE injection 10 mg Q6H PRN    hydrocortisone sodium  succinate injection 100 mg Q8H    HYDROmorphone injection 1 mg Q4H PRN    hydrOXYzine pamoate capsule 25 mg Q8H PRN    levalbuterol nebulizer solution 1.25 mg Q4H PRN    levothyroxine tablet 150 mcg Before breakfast    melatonin tablet 6 mg Nightly PRN    meropenem (MERREM) 500 mg in 0.9% NaCl 100 mL IVPB (MB+) Q12H    metOLazone tablet 10 mg See admin instructions    mexiletine capsule 150 mg BID WM    micafungin 100 mg in 0.9% NaCl 100 mL IVPB (MB+) Q24H    midodrine tablet 15 mg Q8H    naloxone 0.4 mg/mL injection 0.4 mg PRN    nitroGLYCERIN SL tablet 0.4 mg Q5 Min PRN    NORepinephrine 32 mg in D5W 250 mL infusion Continuous    ondansetron injection 4 mg Q6H PRN    oxyCODONE-acetaminophen  mg per tablet 1 tablet Q4H PRN    oxyCODONE-acetaminophen 5-325 mg per tablet 1 tablet Q4H PRN    polyethylene glycol packet 17 g BID PRN    polyethylene glycol packet 17 g Daily    pravastatin tablet 40 mg QHS    promethazine (PHENERGAN) 6.25 mg in 0.9% NaCl 50 mL IVPB Q6H PRN    simethicone chewable tablet 80 mg QID PRN    sodium chloride 0.9% bolus 250 mL 250 mL PRN    sodium chloride 0.9% flush 10 mL Q12H PRN    sodium chloride 0.9% flush 10 mL PRN    triamcinolone acetonide 0.1% cream BID    vasopressin (PITRESSIN) 0.2 Units/mL in D5W 100 mL infusion Continuous       Objective:     Vital Signs (Most Recent):  Temp: 97.4 °F (36.3 °C) (10/12/24 1101)  Pulse: 88 (10/12/24 1115)  Resp: (!) 24 (10/12/24 1115)  BP: (!) 100/55 (10/12/24 1115)  SpO2: 100 % (10/12/24 1115) Vital Signs (24h Range):  Temp:  [97.4 °F (36.3 °C)-98.9 °F (37.2 °C)] 97.4 °F (36.3 °C)  Pulse:  [69-92] 88  Resp:  [10-28] 24  SpO2:  [60 %-100 %] 100 %  BP: ()/(43-60) 100/55  Arterial Line BP: (102-107)/(37-50) 105/50     Weight: 80.7 kg (177 lb 14.6 oz) (10/12/24 0301)  Body mass index is 26.27 kg/m².  Body surface area is 1.98 meters squared.    I/O last 3 completed shifts:  In: 2668.9 [P.O.:300; I.V.:1532.2; Other:530; IV  Piggyback:306.8]  Out: 3000 [Other:3000]     Physical Exam  Constitutional:       General: He is not in acute distress.     Appearance: He is ill-appearing and toxic-appearing.      Comments: cachetic   HENT:      Head: Normocephalic and atraumatic.      Nose: Nose normal. No congestion.      Mouth/Throat:      Mouth: Mucous membranes are moist.   Cardiovascular:      Rate and Rhythm: Normal rate.      Heart sounds: Murmur heard.   Pulmonary:      Effort: Pulmonary effort is normal.      Breath sounds: No rhonchi.   Abdominal:      General: Abdomen is flat. There is no distension.   Musculoskeletal:      Right lower leg: No edema.      Left lower leg: No edema.          Significant Labs:  All labs within the past 24 hours have been reviewed.     Significant Imaging:  Labs: Reviewed  Assessment/Plan:     Cardiac/Vascular  Shock  See reasoning in ESRD    Renal/  ESRD (end stage renal disease)  ESRD MWF    Plan/recommendation  HD done yesterday, no acute indication for dialysis at this time  Started on dobutamine yesterday for low SVO2, with some improvement with high dose dobutamine - however other vasopressor requirements have not improved. Discussed with family at bedside - while volume removal is the treatment for heart failure he is already net negative this admission and that it may not help. More family to arrive today and they will discuss code status and goals of care.    We also discussed that further dialysis may be determental and that we could try CVVHD tomorrow with slow volume removal - but he remains with a guarded prognosis.     -Strict ins and outs  -Avoid nephrotoxic agents if possible and renally dose medications  -Avoid drastic hemodynamic changes if possible    Secondary hyperparathyroidism  Calcium   Date Value Ref Range Status   10/11/2024 8.7 8.7 - 10.5 mg/dL Final     Phosphorus   Date Value Ref Range Status   10/10/2024 4.1 2.7 - 4.5 mg/dL Final     PTH, Intact   Date Value Ref Range Status    04/29/2024 162.6 (H) 9.0 - 77.0 pg/mL Final   Continue to monitor - critically ill this admission    Anemia of chronic renal disease  Goal hemoglobin in ESRD is 10-12  Hemoglobin   Date Value Ref Range Status   10/12/2024 10.6 (L) 14.0 - 18.0 g/dL Final     Iron   Date Value Ref Range Status   10/05/2024 22 (L) 45 - 160 ug/dL Final     Transferrin   Date Value Ref Range Status   10/05/2024 207 200 - 375 mg/dL Final     TIBC   Date Value Ref Range Status   10/05/2024 306 250 - 450 ug/dL Final     Saturated Iron   Date Value Ref Range Status   10/05/2024 7 (L) 20 - 50 % Final     Ferritin   Date Value Ref Range Status   10/05/2024 338 (H) 20.0 - 300.0 ng/mL Final   Continue to monitor - critically ll this admission    Palliative Care  Advanced care planning/counseling discussion  See ESRD      Critical care time spent on the evaluation and treatment of severe organ dysfunction, review of pertinent labs and imaging studies, discussions with consulting providers and discussions with patient/family: 30 minutes.    Thank you for your consult. I will follow-up with patient. Please contact us if you have any additional questions.    Shashank Brown MD  Nephrology  [facility] - Intensive Care

## 2024-10-12 NOTE — EICU
Intervention Initiated From:  Bedside    Margaret intervened regarding:  Time-Out    Nurse Notified:  Yes    Doctor Notified:  Yes    Comments: 1025 called into room for QUINTON Strickland for Dr Connolly, consent per MD, timeout complete, allergies and labs reviewed with bedside, jeanmarie connected to pressure tubing with appropriate waveform, sutured in place site biopatch and tegaderm

## 2024-10-12 NOTE — SUBJECTIVE & OBJECTIVE
Interval History: Events noted. Discussed with Dr. Hawkins. Appears septic with hypotension and worsening leukocytosis. Micafungin added.    Review of Systems   Constitutional:  Negative for chills and fever.   Gastrointestinal:  Negative for abdominal pain and diarrhea.   All other systems reviewed and are negative.    Objective:     Vital Signs (Most Recent):  Temp: 98.1 °F (36.7 °C) (10/12/24 0701)  Pulse: 83 (10/12/24 0930)  Resp: (!) 24 (10/12/24 0930)  BP: (!) 87/60 (10/12/24 0930)  SpO2: 100 % (10/12/24 0930) Vital Signs (24h Range):  Temp:  [97.9 °F (36.6 °C)-98.9 °F (37.2 °C)] 98.1 °F (36.7 °C)  Pulse:  [67-92] 83  Resp:  [10-28] 24  SpO2:  [60 %-100 %] 100 %  BP: ()/(43-60) 87/60     Weight: 80.7 kg (177 lb 14.6 oz)  Body mass index is 26.27 kg/m².    Estimated Creatinine Clearance: 12.4 mL/min (A) (based on SCr of 6 mg/dL (H)).     Physical Exam  Vitals and nursing note reviewed.   Constitutional:       General: He is not in acute distress.     Appearance: Normal appearance. He is not ill-appearing, toxic-appearing or diaphoretic.   HENT:      Head: Normocephalic and atraumatic.      Right Ear: External ear normal.      Left Ear: External ear normal.   Eyes:      Extraocular Movements: Extraocular movements intact.      Pupils: Pupils are equal, round, and reactive to light.   Cardiovascular:      Rate and Rhythm: Normal rate.      Heart sounds: Murmur heard.   Pulmonary:      Breath sounds: No rhonchi or rales.   Abdominal:      Tenderness: There is no guarding or rebound.   Musculoskeletal:         General: No tenderness.   Neurological:      General: No focal deficit present.      Mental Status: He is alert and oriented to person, place, and time.   Psychiatric:         Mood and Affect: Mood normal.         Behavior: Behavior normal.         Thought Content: Thought content normal.         Judgment: Judgment normal.          Significant Labs: CBC:   Recent Labs   Lab 10/11/24  0421 10/11/24  1637  10/12/24  0325   WBC 20.95* 31.56* 33.99*   HGB 10.4* 11.0* 10.6*   HCT 29.5* 31.5* 29.8*    400 311     CMP:   Recent Labs   Lab 10/11/24  0421   *   K 4.8   CL 94*   CO2 20*   *   BUN 55*   CREATININE 6.0*   CALCIUM 8.7   PROT 5.9*   ALBUMIN 2.4*   BILITOT 2.5*   ALKPHOS 204*   *   ALT 29   ANIONGAP 12     Microbiology Results (last 7 days)       Procedure Component Value Units Date/Time    Blood culture [[id number]] Collected: 10/09/24 0930    Order Status: Completed Specimen: Blood Updated: 10/12/24 1103     Blood Culture, Routine No Growth to date      No Growth to date      No Growth to date      No Growth to date    Narrative:      Draw sample # 2 from separate site    Blood culture [[id number]] Collected: 10/09/24 0915    Order Status: Completed Specimen: Blood Updated: 10/12/24 1103     Blood Culture, Routine No Growth to date      No Growth to date      No Growth to date      No Growth to date    Narrative:      Draw sample # 2 from separate site    Urine Culture High Risk [[id number]]     Order Status: No result Specimen: Urine, Catheterized     Blood culture [[id number]]     Order Status: Sent Specimen: Blood     Fungus Culture, Blood or Bone Marrow [[id number]]     Order Status: Canceled Specimen: Blood     Blood culture [[id number]] Collected: 10/05/24 1612    Order Status: Completed Specimen: Blood from Peripheral, Hand, Left Updated: 10/09/24 1703     Blood Culture, Routine No Growth after 4 days.    Blood culture [[id number]] Collected: 10/05/24 1556    Order Status: Completed Specimen: Blood from Peripheral, Wrist, Left Updated: 10/09/24 1703     Blood Culture, Routine No Growth after 4 days.            Significant Imaging: I have reviewed all pertinent imaging results/findings within the past 24 hours.

## 2024-10-12 NOTE — PROGRESS NOTES
[facility]  Progress Note    Patient Name: Ar Sharma  MRN: [id number]  Patient Class: IP- Inpatient   Admission Date: 10/3/2024  Length of Stay: 9 days  Attending Physician: Delmy Agarwal, *  Primary Care Provider: Jc Elliott MD        Subjective:     Principal Problem:Closed right hip fracture, initial encounter        HPI:  63 y.o. AAM with h/o ESRD on HD (for 2 months but prior was on PD for about a month-Follows with [facility]) MWF via a tunneled HD catheter on the right, Essential HTN, HLD, CAD,  h/o systolic(EF 40-45%) and diastolic grade 3 CHF due to non-ischemic cardiomyopathy with a [employer] CRT-D implant 2016 (sees Dr. Hernandez) uses 3L supplemental oxygen at night, and h/o VT arhythmia (follows with Dr. Tenorio at [facility]) s/p ablation and on Mexiletin 150mg PO BID and Amiodarone 400mg daily present to the ER with c/o right hip pain after a fall at home.     Went to HD today and was feeling alright after. Was moving his tool box when he tripped over his feet and fell to the ground. Was unable to get up or put weight on his right leg.     Work up in the ED at [facility] noted normal WBC and Stable H/H.  INR 1.3.  Lytes stable and c/w post-HD with no indications for emergent HD tonight.     Right hip/pelvis x-ray FINDINGS:  There is a minimally displaced intertrochanteric fracture of the right proximal femur.  No additional fractures are seen.  There is osteopenia.  The femoral heads are well seated in the acetabula.  There is mild joint space narrowing of the bilateral femoroacetabular joints and the pubic symphysis.  There is a peritoneal dialysis catheter overlying the pelvis.     Impression:     Right intertrochanteric fracture.      Transfer center contacted us for admission due to [facility] on Ortho-diversion. They spoke with Dr. Mcclendon who agreed to see the patient in consultation. We have been asked to accept for further pre-op planning.  Last time he took his ASA was yesterday am.       Results for orders placed during the hospital encounter of 04/29/24    Echo Saline Bubble? No; Ultrasound enhancing contrast? No    Interpretation Summary    Left Ventricle: The left ventricle is normal in size. Normal wall thickness. Regional wall motion abnormalities present. See diagram for wall motion findings. Septal motion is consistent with pacing. There is mildly reduced systolic function with a visually estimated ejection fraction of 40 - 45%. Grade III diastolic dysfunction.    Right Ventricle: Moderate to severe right ventricular enlargement. Systolic function is reduced.TAPSE is 1.59 cm. Pacemaker lead present in the ventricle.    Left Atrium: Left atrium is severely dilated. The left atrium volume index is 71.4 mL/m2.    Right Atrium: Right atrium is severely dilated.    Aortic Valve: There is mild aortic valve sclerosis. There is mild to moderate stenosis. Aortic valve area by VTI is 1.49 cm². Aortic valve peak velocity is 1.43 m/s. Mean gradient is 5 mmHg. The dimensionless index is 0.52.    Mitral Valve: There is mild regurgitation.    Tricuspid Valve: There is mild to moderate regurgitation.    Pulmonary Artery: The estimated pulmonary artery systolic pressure is 64 mmHg.    IVC/SVC: Elevated venous pressure at 15 mmHg.      Overview/Hospital Course:   63 y.o. AAM with h/o ESRD on HD (MWF), HTN, HLD, CAD, CHF admitted on 10/03/24 for Right intertrochanteric Hip fracture after a fall at home. No head trauma or LOC. CXR with interstitial edema.  No evidence of pneumothorax.  X-ray of the right hip with minimally displaced intertrochanteric fracture of the right proximal femur.  Orthopedic surgery consulted- s/p right  femur intramedullary nail on 10/04.  Minimal blood loss per orthopedic. Echo with EF of 40-45%, mild aortic stenosis.  PASP of 66 mm Hg.  Cardiology consulted for cardiac clearance-cleared for surgery at moderate cardiac risk.  Continue  symptomatic management and supportive care.  Nephrology following for ESRD. Patient  hypotensive at baseline but acutely more hypotensive during hospitalization but is asymptomatic. Unable to tolerate HD on 10/05. Persistently hypotensive. patient transferred to ICU for presssor support and CRRT on 10/05.  Tolerating CRRT.    Echo. Show ,  Left Atrium: There is a large mobile frondlike mass, appears to be adherent to LA septum.    Right Atrium: Right atrium is severely dilated. Multiple leads present in the right atrium with possible vegetation adherent to lead in RA.  Cardiology is consulted,recommended anticoagulations and IV Abx for possible endocarditis ,ID is consulted.blood culture is repeated.  BP remains on low side,required levophed for HD.  Patient at this time is on 3 vasopressors,already consulted palliative and spoke with daughter and wife,nephrology can not perform HD today.  CC added Micafungin,spooked with ID ,will do CT chest,abdomen,pelvic,also ID changed cefepime with meropenem.    Interval History: feeling better with no new complaints.  Echo. Show ,  Left Atrium: There is a large mobile frondlike mass, appears to be adherent to LA septum.    Right Atrium: Right atrium is severely dilated. Multiple leads present in the right atrium with possible vegetation adherent to lead in RA.  Cardiology is consulted,recommended anticoagulations and IV Abx for possible endocarditis ,ID is consulted.blood culture is repeated.  BP remains on low side,required levophed for HD.  Patient at this time is on 3 vasopressors,already consulted palliative and spoke with daughter and wife,nephrology can not perform HD today.  CC added Micafungin,spooked with ID ,will do CT chest,abdomen,pelvic,also ID changed cefepime with meropenem.       Review of Systems   HENT:  Negative for ear discharge and ear pain.    Eyes:  Negative for discharge and itching.   Endocrine: Negative for cold intolerance and heat intolerance.    Neurological:  Negative for seizures and syncope.     Objective:     Vital Signs (Most Recent):  Temp: 98.1 °F (36.7 °C) (10/12/24 0701)  Pulse: 83 (10/12/24 0930)  Resp: (!) 24 (10/12/24 0930)  BP: (!) 87/60 (10/12/24 0930)  SpO2: 100 % (10/12/24 0930) Vital Signs (24h Range):  Temp:  [97.9 °F (36.6 °C)-98.9 °F (37.2 °C)] 98.1 °F (36.7 °C)  Pulse:  [67-92] 83  Resp:  [10-28] 24  SpO2:  [60 %-100 %] 100 %  BP: ()/(43-60) 87/60     Weight: 80.7 kg (177 lb 14.6 oz)  Body mass index is 26.27 kg/m².    Intake/Output Summary (Last 24 hours) at 10/12/2024 1039  Last data filed at 10/12/2024 0900  Gross per 24 hour   Intake 1894.74 ml   Output 3000 ml   Net -1105.26 ml         Physical Exam  Vitals and nursing note reviewed.   Constitutional:       General: He is not in acute distress.     Appearance: He is ill-appearing (chronically). He is not toxic-appearing or diaphoretic.      Interventions: Nasal cannula in place.   HENT:      Head: Normocephalic and atraumatic.      Nose: No rhinorrhea.      Mouth/Throat:      Mouth: Mucous membranes are moist.   Eyes:      General: No scleral icterus.     Extraocular Movements: Extraocular movements intact.   Neck:      Comments: Right IJ tunneled dialysis catheter  Cardiovascular:      Rate and Rhythm: Normal rate and regular rhythm.      Heart sounds: No murmur heard.  Pulmonary:      Effort: No tachypnea, accessory muscle usage, respiratory distress or retractions.   Abdominal:      General: There is no distension.      Comments: PD catheter present   Skin:     General: Skin is warm and dry.      Coloration: Skin is not jaundiced.      Findings: No rash.   Neurological:      General: No focal deficit present.      Mental Status: He is alert. Mental status is at baseline.             Significant Labs: All pertinent labs within the past 24 hours have been reviewed.  BMP:   Recent Labs   Lab 10/11/24  0421   *   *   K 4.8   CL 94*   CO2 20*   BUN 55*    CREATININE 6.0*   CALCIUM 8.7     CBC:   Recent Labs   Lab 10/11/24  0421 10/11/24  1637 10/12/24  0325   WBC 20.95* 31.56* 33.99*   HGB 10.4* 11.0* 10.6*   HCT 29.5* 31.5* 29.8*    400 311       Significant Imaging: I have reviewed all pertinent imaging results/findings within the past 24 hours.    Assessment/Plan:      * Closed right hip fracture, initial encounter  -s/p fall. No LOC or head trauma  -Right hip XR: minimally displaced intertrochanteric fracture of the right proximal femur.   -Orthopedic surgery consulted: s/p right  femur intramedullary nail on 10/04  -PT/OT once more stable.    Shock  BP remains on low side,required levophed for HD.  Patient at this time is on 3 vasopressors,already consulted palliative and spoke with daughter and wife,nephrology can not perform HD today.  CC added Micafungin,spooked with ID ,will do CT chest,abdomen,pelvic,also ID changed cefepime with meropenem.    Atrial mass    Echo. Show ,  Left Atrium: There is a large mobile frondlike mass, appears to be adherent to LA septum.    Right Atrium: Right atrium is severely dilated. Multiple leads present in the right atrium with possible vegetation adherent to lead in RA.  Cardiology is consulted,recommended anticoagulations and IV Abx for possible endocarditis ,ID is consulted.blood culture is repeated.    Right atrial thrombus  As above.      Leukocytosis  -WBC# trending up, 20k on 10/06. Pt remained afebrile.   -initially suspected reactive given fracture and recent surgery  -However, pt also with PD cath in Q.   BCX negative.  Empirically started on aBX's    Acute on chronic hypoxic respiratory failure  Patient with Hypoxic Respiratory failure which is Acute on chronic.  he is on home oxygen at 3 LPM. Supplemental oxygen was provided and noted-      .   Signs/symptoms of respiratory failure include- tachypnea and increased work of breathing. Contributing diagnoses includes - CHF and COPD Labs and images were  reviewed. Patient Has recent ABG, which has been reviewed. Will treat underlying causes and adjust management of respiratory failure as follows-     -pt on home 3L NC. Hx of COPD/CHF  -Worsening hypoxia overnight 10/05; required up to 15L HFNC.  -acute on chronic hypoxia secondary to volume overload  -Pt unable to tolerate HD and CRRT, net positive 1.5L on 10/06  -CXR with pulmonary edema   -Pulmonology consulted  -Nephrology following  -Wean O2 as tolerated     Advanced care planning/counseling discussion  Advance Care Planning    Date: 10/05/2024    Code Status  I engaged the the patient in a voluntary conversation about the patient's preferences for care  at the very end of life. The patient wishes to have CPR and other invasive treatments performed when his heart and/or breathing stops. I communicated to the patient that his wishes align with full code status and he agrees.    A total of 16 min was spent on advance care planning, goals of care discussion, emotional support, formulating and communicating prognosis and exploring burden/benefit of various approaches of treatment. This discussion occurred on a fully voluntary basis with the verbal consent of the patient and/or family.            Moderate protein-calorie malnutrition  Nutrition consulted. Most recent weight and BMI monitored-     Measurements:  Wt Readings from Last 1 Encounters:   10/06/24 74.6 kg (164 lb 7.4 oz)   Body mass index is 24.29 kg/m².    Patient has been screened and assessed by RD.    Malnutrition Type:  Context: acute illness or injury  Level: mild    Malnutrition Characteristic Summary:  Weight Loss (Malnutrition): greater than 10% in 6 months  Subcutaneous Fat (Malnutrition): mild depletion  Muscle Mass (Malnutrition): mild depletion    Interventions/Recommendations (treatment strategy):  1. Add Novasource Renal TID 2. [place] intake at meals3. Monitor weight/labs 4. RD to follow to monitor PO intake      ESRD (end stage renal  disease)  Nephrology consulted: HD per nephrology   HD stopped early on 10/05 due to hypotension  Persistently hypotensive, transfer to ICU for CRRT and pressor support on 10/05  CRRT started on 10/05    Pulmonary emphysema, unspecified emphysema type  COPD is controlled on his home 3L oxygen at night.   No wheezing on exam  Now volume overloaded and requiring more O2  PRN duonebs ordered.   Wean to home O2 as tolerated    Aortic atherosclerosis  Continue statin       Primary hypertension  Chronic, controlled. Pt now with hypotension   Pt with hypotension, continue home midodrine   Currently on pressors to keep MAP>65    Latest blood pressure and vitals reviewed-     Temp:  [97.5 °F (36.4 °C)-98.5 °F (36.9 °C)]   Pulse:  [60-66]   Resp:  [12-42]   BP: ()/(45-72)   SpO2:  [92 %-100 %] .   Home meds for hypertension were reviewed and noted below.   Hypertension Medications               furosemide (LASIX) 80 MG tablet Take 4.5 tablets (360 mg total) by mouth 2 (two) times daily.    metOLazone (ZAROXOLYN) 10 MG tablet Take 10 mg by mouth As instructed (Take I tablet by mouth Tuesday, Thursday, Saturday, Sunday). Take I tablet by mouth Tuesday, Thursday, Saturday, Sunday    nitroGLYCERIN (NITROSTAT) 0.4 MG SL tablet Place 1 tablet (0.4 mg total) under the tongue every 5 (five) minutes as needed for Chest pain.            While in the hospital, will manage blood pressure as follows; Adjust home antihypertensive regimen as follows- patient with hypotension chronically. Continue home midodrine     Will utilize p.r.n. blood pressure medication only if patient's blood pressure greater than  180/90  and he develops symptoms such as worsening chest pain or shortness of breath.    Chronic combined systolic and diastolic heart failure  NO signs of acute exacerbation.   Echo as below   Cardiology consulted for cardiac clearance-cleared for surgery at moderate cardiac risk.   Nephrology for volume removal with      Echo    Result Date: 10/7/2024    Left Ventricle: The left ventricle is normal in size. Mildly increased   wall thickness. There is mild concentric hypertrophy. Regional wall motion   abnormalities present (severe distal anteroapical hypokinesis). There is   mildly reduced systolic function with a visually estimated ejection   fraction of 40 - 45%.    Right Ventricle: Severe right ventricular enlargement. Systolic   function is severely reduced. Pacemaker lead present in the ventricle and   appears abnormal with possible vegetation on lead in RA (2.7x2.0cm).    Left Atrium: There is a large mobile frondlike mass, appears to be   adherent to LA septum.    Right Atrium: Right atrium is severely dilated. Multiple leads present   in the right atrium with possible vegetation adherent to lead in RA.    Aortic Valve: The aortic valve is a trileaflet valve. There is mild   aortic valve sclerosis.    Tricuspid Valve: There is mild regurgitation.    Pulmonary Artery: The estimated pulmonary artery systolic pressure is   67 mmHg.        Echo    Result Date: 10/3/2024    Left Ventricle: The left ventricle is mildly dilated. There is mild   eccentric hypertrophy. Septal flattening in diastole and systole   consistent with right ventricular volume and pressure overload. There is   mildly reduced systolic function with a visually estimated ejection   fraction of 40 - 45%.    Right Ventricle: Severe right ventricular enlargement. Systolic   function is moderately reduced.    Left Atrium: Left atrium is moderately dilated.    Right Atrium: Right atrium is severely dilated.    Aortic Valve: There is mild stenosis. Aortic valve area by VTI is 1.6   cm². Aortic valve peak velocity is 1.6 m/s. Mean gradient is 6.3 mmHg. The   dimensionless index is 0.50.    Mitral Valve: There is mild to moderate regurgitation.    Tricuspid Valve: There is moderate regurgitation.    Pulmonary Artery: The estimated pulmonary artery systolic  pressure is   66 mmHg.    IVC/SVC: Elevated venous pressure at 15 mmHg.           S/P ablation of ventricular arrhythmia  Cardiology consulted for cardiac clearance.   Resume home Mexiletin 150mg PO BID   Hold amiodarone 400mg PO daily in setting of hypotension   ICD in place       Cardiac resynchronization therapy defibrillator (CRT-D) in place  -noted      Atherosclerosis of native coronary artery of native heart with angina pectoris  Patient with known CAD , which is controlled Will continue Statin and monitor for S/Sx of angina/ACS. Continue to monitor on telemetry.     Mixed hyperlipidemia  -continue statin       Iron deficiency anemia  Anemia is likely due to Iron deficiency. Most recent hemoglobin and hematocrit are listed below.  Recent Labs     10/05/24  0544 10/06/24  0439 10/07/24  0219   HGB 12.6* 12.2* 11.5*   HCT 37.5* 35.8* 33.6*       Plan  - Monitor serial CBC: Daily  - Transfuse PRBC if patient becomes hemodynamically unstable, symptomatic or H/H drops below 7/21.  - Patient has not received any PRBC transfusions to date  - Patient's anemia is currently stable  - stable, monitoring     Hypothyroidism  -resume home synthroid       VTE Risk Mitigation (From admission, onward)           Ordered     heparin 25,000 units in dextrose 5% (100 units/ml) IV bolus from bag HIGH INTENSITY nomogram - [facility]  As needed (PRN)        Question:  Heparin Infusion Adjustment (DO NOT MODIFY ANSWER)  Answer:  [email or web address]\Images\Pharmacy\HeparinInfusions\heparin HIGH INTENSITY nomogram for [facility] [id number]    10/11/24 1622     heparin 25,000 units in dextrose 5% (100 units/ml) IV bolus from bag HIGH INTENSITY nomogram - [facility]  As needed (PRN)        Question:  Heparin Infusion Adjustment (DO NOT MODIFY ANSWER)  Answer:  [email or web address]\[email or web address]\Images\Pharmacy\HeparinInfusions\heparin HIGH INTENSITY nomogram for [facility] [id number].pdf    10/11/24 1622     heparin 25,000 units in dextrose 5% 250 mL (100 units/mL) infusion HIGH  INTENSITY nomogram - [facility]  Continuous        Question:  Begin at (units/kg/hr)  Answer:  18    10/11/24 1622     heparin (porcine) injection 5,000 Units  Use PRN         10/05/24 1830     Place sequential compression device  Until discontinued         10/04/24 2217     heparin (porcine) injection 1,000 Units  As needed (PRN)         10/04/24 2217     IP VTE LOW RISK PATIENT  Once         10/04/24 1438     Place sequential compression device  Until discontinued         10/03/24 0154     Reason for No Pharmacological VTE Prophylaxis  Once        Comments: Right hip fracture needs surgical eval   Question:  Reasons:  Answer:  Physician Provided (leave comment)    10/03/24 0154                    Discharge Planning   SLAVA:      Code Status: Full Code   Is the patient medically ready for discharge?:     Reason for patient still in hospital (select all that apply): Patient trending condition  Discharge Plan A:  (tbd see comment)            Critical care time spent on the evaluation and treatment of severe organ dysfunction, review of pertinent labs and imaging studies, discussions with consulting providers and discussions with patient/family:  over 45  minutes.      Delmy Agarwal MD  Department of Hospital Medicine   [facility] - Intensive Care

## 2024-10-12 NOTE — NURSING
[facility] [place]  Nurses Note -- 4 Eyes      10/11/2024       Skin assessed on: Q Shift      [] No Pressure Injuries Present    []Prevention Measures Documented    [x] Yes LDA  for Pressure Injury Previously documented     [] Yes New Pressure Injury Discovered   [] LDA for New Pressure Injury Added      Attending RN:  Izabel Valentino RN     Second RN:  MILADIS Choudhury    \

## 2024-10-12 NOTE — SUBJECTIVE & OBJECTIVE
Interval History: feeling better with no new complaints.  Echo. Show ,  Left Atrium: There is a large mobile frondlike mass, appears to be adherent to LA septum.    Right Atrium: Right atrium is severely dilated. Multiple leads present in the right atrium with possible vegetation adherent to lead in RA.  Cardiology is consulted,recommended anticoagulations and IV Abx for possible endocarditis ,ID is consulted.blood culture is repeated.  BP remains on low side,required levophed for HD.  Patient at this time is on 3 vasopressors,already consulted palliative and spoke with daughter and wife,nephrology can not perform HD today.  CC added Micafungin,spooked with ID ,will do CT chest,abdomen,pelvic,also ID changed cefepime with meropenem.       Review of Systems   HENT:  Negative for ear discharge and ear pain.    Eyes:  Negative for discharge and itching.   Endocrine: Negative for cold intolerance and heat intolerance.   Neurological:  Negative for seizures and syncope.     Objective:     Vital Signs (Most Recent):  Temp: 98.1 °F (36.7 °C) (10/12/24 0701)  Pulse: 83 (10/12/24 0930)  Resp: (!) 24 (10/12/24 0930)  BP: (!) 87/60 (10/12/24 0930)  SpO2: 100 % (10/12/24 0930) Vital Signs (24h Range):  Temp:  [97.9 °F (36.6 °C)-98.9 °F (37.2 °C)] 98.1 °F (36.7 °C)  Pulse:  [67-92] 83  Resp:  [10-28] 24  SpO2:  [60 %-100 %] 100 %  BP: ()/(43-60) 87/60     Weight: 80.7 kg (177 lb 14.6 oz)  Body mass index is 26.27 kg/m².    Intake/Output Summary (Last 24 hours) at 10/12/2024 1039  Last data filed at 10/12/2024 0900  Gross per 24 hour   Intake 1894.74 ml   Output 3000 ml   Net -1105.26 ml         Physical Exam  Vitals and nursing note reviewed.   Constitutional:       General: He is not in acute distress.     Appearance: He is ill-appearing (chronically). He is not toxic-appearing or diaphoretic.      Interventions: Nasal cannula in place.   HENT:      Head: Normocephalic and atraumatic.      Nose: No rhinorrhea.       Mouth/Throat:      Mouth: Mucous membranes are moist.   Eyes:      General: No scleral icterus.     Extraocular Movements: Extraocular movements intact.   Neck:      Comments: Right IJ tunneled dialysis catheter  Cardiovascular:      Rate and Rhythm: Normal rate and regular rhythm.      Heart sounds: No murmur heard.  Pulmonary:      Effort: No tachypnea, accessory muscle usage, respiratory distress or retractions.   Abdominal:      General: There is no distension.      Comments: PD catheter present   Skin:     General: Skin is warm and dry.      Coloration: Skin is not jaundiced.      Findings: No rash.   Neurological:      General: No focal deficit present.      Mental Status: He is alert. Mental status is at baseline.             Significant Labs: All pertinent labs within the past 24 hours have been reviewed.  BMP:   Recent Labs   Lab 10/11/24  0421   *   *   K 4.8   CL 94*   CO2 20*   BUN 55*   CREATININE 6.0*   CALCIUM 8.7     CBC:   Recent Labs   Lab 10/11/24  0421 10/11/24  1637 10/12/24  0325   WBC 20.95* 31.56* 33.99*   HGB 10.4* 11.0* 10.6*   HCT 29.5* 31.5* 29.8*    400 311       Significant Imaging: I have reviewed all pertinent imaging results/findings within the past 24 hours.

## 2024-10-12 NOTE — SUBJECTIVE & OBJECTIVE
Interval History: this AM on dobutamine 12.5, vaso 0.04 and levo 0.4    Review of patient's allergies indicates:   Allergen Reactions    Lokelma [sodium zirconium cyclosilicate] Other (See Comments)     Fluid retention, weight gain, CHF excerebration, severe constipation    Atorvastatin Other (See Comments)     Joint pain    Jardiance [empagliflozin] Other (See Comments)     Chest pains, significant weight loss, lower blood pressure     Current Facility-Administered Medications   Medication Frequency    0.9%  NaCl infusion PRN    acetaminophen tablet 650 mg Q4H PRN    allopurinoL tablet 100 mg Daily    DAPTOmycin (CUBICIN) 645 mg in 0.9% NaCl SolP 50 mL IVPB Q48H    DOBUtamine 1000 mg in D5W 250 mL infusion Continuous    docusate sodium capsule 100 mg Daily    epoetin saba-epbx injection 10,000 Units Every Mon, Wed, Fri    fentaNYL 50 mcg/hr 1 patch Q72H    fludrocortisone tablet 100 mcg Daily    furosemide tablet 360 mg See admin instructions    heparin (porcine) injection 1,000 Units PRN    heparin (porcine) injection 5,000 Units PRN    heparin 25,000 units in dextrose 5% (100 units/ml) IV bolus from bag HIGH INTENSITY nomogram - [facility] PRN    heparin 25,000 units in dextrose 5% (100 units/ml) IV bolus from bag HIGH INTENSITY nomogram - [facility] PRN    heparin 25,000 units in dextrose 5% 250 mL (100 units/mL) infusion HIGH INTENSITY nomogram - [facility] Continuous    hydrALAZINE injection 10 mg Q6H PRN    hydrocortisone sodium succinate injection 100 mg Q8H    HYDROmorphone injection 1 mg Q4H PRN    hydrOXYzine pamoate capsule 25 mg Q8H PRN    levalbuterol nebulizer solution 1.25 mg Q4H PRN    levothyroxine tablet 150 mcg Before breakfast    melatonin tablet 6 mg Nightly PRN    meropenem (MERREM) 500 mg in 0.9% NaCl 100 mL IVPB (MB+) Q12H    metOLazone tablet 10 mg See admin instructions    mexiletine capsule 150 mg BID WM    micafungin 100 mg in 0.9% NaCl 100 mL IVPB (MB+) Q24H    midodrine tablet 15 mg Q8H    naloxone 0.4  mg/mL injection 0.4 mg PRN    nitroGLYCERIN SL tablet 0.4 mg Q5 Min PRN    NORepinephrine 32 mg in D5W 250 mL infusion Continuous    ondansetron injection 4 mg Q6H PRN    oxyCODONE-acetaminophen  mg per tablet 1 tablet Q4H PRN    oxyCODONE-acetaminophen 5-325 mg per tablet 1 tablet Q4H PRN    polyethylene glycol packet 17 g BID PRN    polyethylene glycol packet 17 g Daily    pravastatin tablet 40 mg QHS    promethazine (PHENERGAN) 6.25 mg in 0.9% NaCl 50 mL IVPB Q6H PRN    simethicone chewable tablet 80 mg QID PRN    sodium chloride 0.9% bolus 250 mL 250 mL PRN    sodium chloride 0.9% flush 10 mL Q12H PRN    sodium chloride 0.9% flush 10 mL PRN    triamcinolone acetonide 0.1% cream BID    vasopressin (PITRESSIN) 0.2 Units/mL in D5W 100 mL infusion Continuous       Objective:     Vital Signs (Most Recent):  Temp: 97.4 °F (36.3 °C) (10/12/24 1101)  Pulse: 88 (10/12/24 1115)  Resp: (!) 24 (10/12/24 1115)  BP: (!) 100/55 (10/12/24 1115)  SpO2: 100 % (10/12/24 1115) Vital Signs (24h Range):  Temp:  [97.4 °F (36.3 °C)-98.9 °F (37.2 °C)] 97.4 °F (36.3 °C)  Pulse:  [69-92] 88  Resp:  [10-28] 24  SpO2:  [60 %-100 %] 100 %  BP: ()/(43-60) 100/55  Arterial Line BP: (102-107)/(37-50) 105/50     Weight: 80.7 kg (177 lb 14.6 oz) (10/12/24 0301)  Body mass index is 26.27 kg/m².  Body surface area is 1.98 meters squared.    I/O last 3 completed shifts:  In: 2668.9 [P.O.:300; I.V.:1532.2; Other:530; IV Piggyback:306.8]  Out: 3000 [Other:3000]     Physical Exam  Constitutional:       General: He is not in acute distress.     Appearance: He is ill-appearing and toxic-appearing.      Comments: cachetic   HENT:      Head: Normocephalic and atraumatic.      Nose: Nose normal. No congestion.      Mouth/Throat:      Mouth: Mucous membranes are moist.   Cardiovascular:      Rate and Rhythm: Normal rate.      Heart sounds: Murmur heard.   Pulmonary:      Effort: Pulmonary effort is normal.      Breath sounds: No rhonchi.    Abdominal:      General: Abdomen is flat. There is no distension.   Musculoskeletal:      Right lower leg: No edema.      Left lower leg: No edema.          Significant Labs:  All labs within the past 24 hours have been reviewed.     Significant Imaging:  Labs: Reviewed

## 2024-10-12 NOTE — PROCEDURES
"Ar Sharma is a 63 y.o. male patient.    Temp: 98.1 °F (36.7 °C) (10/12/24 0701)  Pulse: 83 (10/12/24 0930)  Resp: (!) 24 (10/12/24 0930)  BP: (!) 87/60 (10/12/24 0930)  SpO2: 100 % (10/12/24 0930)  Weight: 80.7 kg (177 lb 14.6 oz) (10/12/24 0301)  Height: 5' 9" (175.3 cm) (10/09/24 1101)       Arterial Line    Date/Time: 10/12/2024 10:57 AM  Location procedure was performed: [facility] PULMONARY MEDICINE    Performed by: Jose M Connolly MD  Authorized by: Jose M Connolly MD  Pre-op Diagnosis: shock  Post-operative diagnosis: shock  Consent Done: Yes  Consent: Written consent obtained.  Risks and benefits: risks, benefits and alternatives were discussed  Patient identity confirmed: , MRN and name  Time out: Immediately prior to procedure a "time out" was called to verify the correct patient, procedure, equipment, support staff and site/side marked as required.  Preparation: Patient was prepped and draped in the usual sterile fashion.  Indications: hemodynamic monitoring  Location: left radial    Anesthesia:  Local Anesthetic: lidocaine 1% without epinephrine  Anesthetic total: 1 mL    Patient sedated: no  Description of findings: normal anatomy   Yonis's test normal: yes  Seldinger technique: Seldinger technique used  Number of attempts: 1  Complications: No  Estimated blood loss (mL): 1  Specimens: No  Implants: No  Post-procedure: line sutured and dressing applied  Post-procedure CMS: normal  Patient tolerance: Patient tolerated the procedure well with no immediate complications          10/12/2024    "

## 2024-10-12 NOTE — PROGRESS NOTES
[facility] Intensive Care  Critical Care Medicine  Progress Note    Patient Name: Ar Sharma  MRN: [id number]  Admission Date: 10/3/2024  Hospital Length of Stay: 9 days  Code Status: Full Code  Attending Provider: Delmy Agarwal, *  Primary Care Provider: Jc Elliott MD   Principal Problem: Closed right hip fracture, initial encounter    Subjective:     HPI:  Mr Ar Sharma is a 63 year old male with ESRD on HD (MWF) for the last 2 months, prior PD, HTN, HLD, combined systolic and diastolic HF (EF 40-45% and grade III DD), ischemic cardiomyopathy, s/p medtronic CRT-D implant in 2016, history of VT s/p ablation on mexiletine and amiodarone, who presents after a mechanical fall resulting in a right femur fracture.  S/p ORIF 10/4.  He is chronically hypotensive and had issues following surgery tolerating HD.  Moved to the ICU for pressor support for CRRT.  Pulm previously consulted for CVC placement (which occurred on 10/9/2024), but now following due to persistent and elevated pressor requirements.    Hospital/ICU Course:  Requiring high vasopressor requirements without clear infectious etiology.  Atrial mass present concerning for thrombus in setting of negative blood cultures.  Abx being adjusted per ID.  Antifungals added.  Due to low SCvO2 seen on VBG from left IJ CVC we have started dobutamine.  This improved SCvO2 accordingly.    Interval history:  Patient is resting in bed, but having increasing pressor requirements (0.4 levophed, 0.04 vaso, 12.5 dobutamine).  SCvO2 improved with dobutamine, but pressor requirement remains very high.  A line placed today.  Family at bedside and understanding of the severity of his condition.  Planning on more family coming for visit today.  They plan on discussing code status at this time.  Adding micafungin today.  Vanc adjusted to dapto today per ID.    Review of Systems   Constitutional:  Positive for activity change and fatigue. Negative for chills  and fever.   Respiratory:  Positive for cough and shortness of breath.    Cardiovascular:  Positive for leg swelling. Negative for chest pain.   Gastrointestinal:  Negative for abdominal pain.   Psychiatric/Behavioral: Negative.       Objective:     Vital Signs (Most Recent):  Temp: 98.1 °F (36.7 °C) (10/12/24 0701)  Pulse: 83 (10/12/24 0930)  Resp: (!) 24 (10/12/24 0930)  BP: (!) 87/60 (10/12/24 0930)  SpO2: 100 % (10/12/24 0930) Vital Signs (24h Range):  Temp:  [97.9 °F (36.6 °C)-98.9 °F (37.2 °C)] 98.1 °F (36.7 °C)  Pulse:  [67-92] 83  Resp:  [10-28] 24  SpO2:  [60 %-100 %] 100 %  BP: ()/(43-60) 87/60     Weight: 80.7 kg (177 lb 14.6 oz)  Body mass index is 26.27 kg/m².      Intake/Output Summary (Last 24 hours) at 10/12/2024 1101  Last data filed at 10/12/2024 0900  Gross per 24 hour   Intake 1894.74 ml   Output 3000 ml   Net -1105.26 ml        Physical Exam  Constitutional:       General: He is not in acute distress.     Appearance: Normal appearance. He is ill-appearing. He is not toxic-appearing or diaphoretic.   HENT:      Head: Normocephalic and atraumatic.      Nose: Nose normal.      Mouth/Throat:      Mouth: Mucous membranes are moist.   Eyes:      Extraocular Movements: Extraocular movements intact.      Pupils: Pupils are equal, round, and reactive to light.   Neck:      Comments: Left IJ CVC  Cardiovascular:      Rate and Rhythm: Normal rate and regular rhythm.   Pulmonary:      Effort: Pulmonary effort is normal. No respiratory distress.      Breath sounds: Normal breath sounds. No wheezing.   Abdominal:      General: Abdomen is flat. Bowel sounds are normal. There is no distension.      Palpations: Abdomen is soft.   Musculoskeletal:      Cervical back: Normal range of motion.      Right lower leg: Edema present.      Left lower leg: Edema present.      Comments: Left radial a line.   Skin:     General: Skin is warm.      Capillary Refill: Capillary refill takes less than 2 seconds.    Neurological:      Mental Status: He is alert.      Comments: Alert to voice and interactive, but slowed mentation compared to baseline.          Vents:       Lines/Drains/Airways       Central Venous Catheter Line  Duration                  Hemodialysis Catheter 05/03/24 1115 right subclavian 161 days    Percutaneous Central Line - Triple Lumen  10/09/24 1127 Internal Jugular Left 2 days              Arterial Line  Duration             Arterial Line 10/12/24 1040 Left Radial <1 day              Peripheral Intravenous Line  Duration                  Peripheral IV - Single Lumen 10/09/24 0831 20 G Anterior;Left Forearm 3 days                    Significant Labs:    CBC/Anemia Profile:  Recent Labs   Lab 10/11/24  0421 10/11/24  1637 10/12/24  0325   WBC 20.95* 31.56* 33.99*   HGB 10.4* 11.0* 10.6*   HCT 29.5* 31.5* 29.8*    400 311   MCV 78* 77* 77*   RDW 16.2* 16.2* 16.3*        Chemistries:  Recent Labs   Lab 10/11/24  0421   *   K 4.8   CL 94*   CO2 20*   BUN 55*   CREATININE 6.0*   CALCIUM 8.7   ALBUMIN 2.4*   PROT 5.9*   BILITOT 2.5*   ALKPHOS 204*   ALT 29   *       All pertinent labs within the past 24 hours have been reviewed.    Significant Imaging:   I have reviewed all pertinent imaging results/findings within the past 24 hours.    ABG  Recent Labs   Lab 10/12/24  0756   PH 7.368   PO2 37*   PCO2 45.8*   HCO3 26.3   BE 1     Assessment/Plan:     Pulmonary  Acute on chronic hypoxic respiratory failure  Likely multifactorial in etiology (pulm edema, heart failure, possible thrombotic disease, atelectasis).  Supplement O2 to keep >90%  Treat underlying issues per their respective sections.      Pulmonary emphysema, unspecified emphysema type  Patient's COPD is controlled currently.  Patient is currently off COPD Pathway. Continue scheduled inhalers  PRN nebulizers and Supplemental oxygen and monitor respiratory status closely.     Cardiac/Vascular  Shock  Patient chronically with  systolic blood pressures in the 90s.  Throughout his hospitalization he has become progressively more hypotensive.  Unclear etiology.  All cultures have been NGTD.  There is intracardiac thrombus that were noted on TTE and are being treated with heparin.  Cardiology following.  Leukocytosis present, but this could represent a stress response. Chest x-ray consistent with volume overload and cardiomegaly. Has been on broad spectrum empiric antibiotics per ID recommendations.  - continue CRRT until BP more appropriate  - VBG showing SCvO2 of 41 yesterday. Dobutamine added with improvement in this value.  Certainly some component of cardiogenic shock.  - continue antibiotics as guided by ID.  Currently on broad spectrum abx (day 8). ID recommending changing vanc to daptomycin.  Continue meropenem.    - empirically starting micafungin today (day 1) given severity of illness and progressive shock in setting of long standing abx, illness, and tunneled catheter.  No cultures positive however.  - eliquis currently being utilized for possible intracardiac thrombus.  If Obstructive physiology is possible, this would be the therapy for it.   - midodrine 15mg TID, if he can safely tolerate PO intake.  - Starting Hydrocortisone and fludrocortisone given elevated pressor requirements.  - levophed and vasopressin to keep MAP >60mmHg.     Atrial mass  Unclear etiology of this (thrombus vs septic).  Seems less likely that it is septic given negative culture data.   - anticoagulation with eliquis and antibiotics per ID.  - cards on board.    Primary hypertension  Holding all antihypertensives at this time, while in shock    Chronic combined systolic and diastolic heart failure  EF 40-45% with grade III DD.  This is likely having a significant impact on ability to tolerate iHD.  Cardiology following  - volume removal as permitted by RRT      S/P ablation of ventricular arrhythmia  Mexiletine  Chronic anticoagulation.  Continuous  cardiac monitoring.  Cards on board.    Cardiac resynchronization therapy defibrillator (CRT-D) in place  Noted      Mixed hyperlipidemia  statin    Renal/  ESRD (end stage renal disease)  Currently requiring CRRT.  Previously had utilized HD, but now too unstable from a blood pressure standpoint. Also has previously used PD, but this is currently being held.  - nephrology following.  - midodrine 15mg TID.  Now off due to inability to safely swallow.    Oncology  Leukocytosis  Patient has not shown overt evidence of infection on culture data.    - consider CT C/A/P    Iron deficiency anemia  Transfuse to keep HgB >7.0    Endocrine  Moderate protein-calorie malnutrition  Nutrition consulted. Most recent weight and BMI monitored-     Measurements:  Wt Readings from Last 1 Encounters:   10/12/24 80.7 kg (177 lb 14.6 oz)   Body mass index is 26.27 kg/m².    Patient has been screened and assessed by RD.    Malnutrition Type:  Context: acute illness or injury  Level: mild    Malnutrition Characteristic Summary:  Weight Loss (Malnutrition): greater than 10% in 6 months  Subcutaneous Fat (Malnutrition): mild depletion  Muscle Mass (Malnutrition): mild depletion    Interventions/Recommendations (treatment strategy):  1. COntinue pt on renal diet 2. Continue novasource renal TID 3. Encourage intake at meals 4. Monitor weight/labs 5. RD to follow to monitor PO intake      Hypothyroidism  Synthroid  - recent tsh normal.    Orthopedic  * Closed right hip fracture, initial encounter  S/p ORIF by orthopedics.  Postoperative care per their recs.     Palliative Care  Advanced care planning/counseling discussion  Family updated at bedside and counseled on clinical worsening that has taken place over the past week.  Planning on more family members coming in town today and to discuss code status moving forward.       Critical Care Daily Checklist:    A: Awake: RASS Goal/Actual Goal:    Actual:     B: Spontaneous Breathing Trial  Performed?     C: SAT & SBT Coordinated?  N/a                      D: Delirium: CAM-ICU     E: Early Mobility Performed? No   F: Feeding Goal: Goals: Pt will consume 50-75% intake at meals by RD follow-up  Status: Nutrition Goal Status: progressing towards goal   Current Diet Order   Procedures    Diet Renal Fluid - 1000mL; Standard Tray     Order Specific Question:   Fluid restriction:     Answer:   Fluid - 1000mL     Order Specific Question:   Tray type:     Answer:   Standard Tray      AS: Analgesia/Sedation PRN   T: Thromboembolic Prophylaxis Heparin gtt   H: HOB > 300 Yes   U: Stress Ulcer Prophylaxis (if needed) N/a   G: Glucose Control PRN   B: Bowel Function Stool Occurrence: 0   I: Indwelling Catheter (Lines & Vo) Necessity Right IJ tunneled HD catheter, left IJ CVC, a line, PIV   D: De-escalation of Antimicrobials/Pharmacotherapies Per ID    Plan for the day/ETD Wean pressors, family visit    Code Status:  Family/Goals of Care: Full Code  Recovery, but they understand the critical nature of current illness.     Critical Care Time: 60 minutes  Critical secondary to Patient has a condition that poses threat to life and bodily function: shock, ESRD      Critical care was time spent personally by me on the following activities: development of treatment plan with patient or surrogate and bedside caregivers, discussions with consultants, evaluation of patient's response to treatment, examination of patient, ordering and performing treatments and interventions, ordering and review of laboratory studies, ordering and review of radiographic studies, pulse oximetry, re-evaluation of patient's condition. This critical care time did not overlap with that of any other provider or involve time for any procedures.     Jose M Connolly MD  Critical Care Medicine  [facility] Intensive Care

## 2024-10-12 NOTE — NURSING
[facility]  Nurses Note -- 4 Eyes      10/12/2024       Skin assessed on: Q Shift      [] No Pressure Injuries Present    [x]Prevention Measures Documented    [x] Yes LDA  for Pressure Injury Previously documented     [] Yes New Pressure Injury Discovered   [] LDA for New Pressure Injury Added      Attending RN:  Geoff Pelletier RN     Second RN:  Bridget REDDING,

## 2024-10-13 NOTE — PT/OT/SLP EVAL
"Speech Language Pathology Evaluation  Bedside Swallow    Patient Name:  Ar Sharma   MRN:  [id number]  Admitting Diagnosis: Closed right hip fracture, initial encounter    Recommendations:                 General Recommendations:  Dysphagia therapy, consider GI consult regarding emesis/coughing up 'old' undigested food from previous days meals,as noted in chart review and reported by staff.  Diet recommendations:  NPO with alternate means nutrition/hydration(meds crushed in puree, when Pt sitting upright, awake and alert, with close 1:1 assistance and verbal cue, "swallow quick" for each bolus presentation- to reduce oral holding and aspiration risk), NPO   Aspiration Precautions: Continue alternate means of nutrition/hydration, Frequent oral care, Meds crushed in puree, and Strict aspiration precautions  General Precautions: Standard, aspiration, fall, NPO  Communication strategies:  yes/no, binary/multiple choice questions  and provide increased time to answer    Assessment:     Ar Sharma is a 63 y.o. male admitted with femur fracture, s/p fall, s/p IM nail placement 10/4; in ICU for pressor support. Pt with recent reported difficulty swallowing c/b coughing with liquids and solid PO, coughing up and/or emesis of pieces of undigested food from previous meals, recent difficulty swallowing secretions and requiring suction, bolus holding. ST consulted for clinical bedside swallow evaluation due to concern for aspiration and concerns mentioned above.  He presents with  oral pharyngeal dysphagia c/b prolonged bolus holding, decreased attention to bolus, and s/s aspiration at b/s across consistencies. Pt benefited from 1:1 assistance and use of verbal cue, "swallow quick", to reduce s/s aspiration with tsp boluses of puree consistency. ST to follow for ongoing assessment of swallow function at b/s, dysphagia education, and goals of care.            History:     Past Medical History:   Diagnosis Date    " Cardiomyopathy     CKD (chronic kidney disease) stage 4, GFR 15-29 ml/min     Congestive heart failure (CHF) 2015    COPD (chronic obstructive pulmonary disease)     Coronary artery disease     Edema     Essential (primary) hypertension 05/18/2022    Formatting of this note might be different from the original. Converted from [employer]: Description - ESSENTIAL HYPERTENSION, BENIGN    Heart attack     HLD (hyperlipidemia)     Hypertension     Hyperuricemia     Hypocalcemia     Renal cyst, left     Secondary hyperparathyroidism     Thyroid disease     V tach     Vitamin D deficiency        Past Surgical History:   Procedure Laterality Date    ablations  03/05/2018    albations  02/01/2018    COLONOSCOPY N/A 12/07/2018    Procedure: COLONOSCOPY/suprep;  Surgeon: Ananya Morillo MD;  Location: [facility] ENDO;  Service: Endoscopy;  Laterality: N/A;    defibulater N/A 2016    ESOPHAGOGASTRODUODENOSCOPY N/A 12/07/2018    Procedure: EGD (ESOPHAGOGASTRODUODENOSCOPY);  Surgeon: Ananya Morillo MD;  Location: [facility] ENDO;  Service: Endoscopy;  Laterality: N/A;    INSERTION OF TUNNELED CENTRAL VENOUS HEMODIALYSIS CATHETER Right 5/3/2024    Procedure: INSERTION, CATHETER, HEMODIALYSIS, DUAL LUMEN;  Surgeon: Philip Perez MD;  Location: [facility] OR;  Service: General;  Laterality: Right;    INSERTION, CATHETER, DIALYSIS, PERITONEAL, LAPAROSCOPIC N/A 5/8/2024    Procedure: INSERTION, CATHETER, DIALYSIS, PERITONEAL, LAPAROSCOPIC;  Surgeon: Tyree Ruiz MD;  Location: [facility] OR;  Service: General;  Laterality: N/A;    INSERTION, CENTRAL VENOUS ACCESS DEVICE Right 6/5/2024    Procedure: Insertion,central venous access device;  Surgeon: Tyree Ruiz MD;  Location: [facility] OR;  Service: General;  Laterality: Right;    JOINT REPLACEMENT Bilateral 10/2016    knees, bilat    LEFT HEART CATHETERIZATION N/A 12/16/2020    Procedure: Left heart cath;  Surgeon: Shahram Stewart MD;  Location: [facility] CATH LAB/EP;  Service: Cardiology;   Laterality: N/A;    LEFT HEART CATHETERIZATION Left 9/27/2022    Procedure: Left heart cath;  Surgeon: Juan Roque MD;  Location: [facility] CATH LAB/EP;  Service: Cardiology;  Laterality: Left;    OPEN REDUCTION AND INTERNAL FIXATION (ORIF) OF INTERTROCHANTERIC FRACTURE OF FEMUR Right 10/4/2024    Procedure: ORIF, FRACTURE, FEMUR, INTERTROCHANTERIC;  Surgeon: Kinsey Reed MD;  Location: [facility] OR;  Service: Orthopedics;  Laterality: Right;  Cristina Del Real notified- Nc    GA HEMODIALYSIS, ONE EVALUATION  5/6/2024    REMOVAL OF HEMODIALYSIS CATHETER Right 5/3/2024    Procedure: REMOVAL, CATHETER, HEMODIALYSIS;  Surgeon: Philip Perez MD;  Location: [facility] OR;  Service: General;  Laterality: Right;    REPLACEMENT OF IMPLANTABLE CARDIOVERTER-DEFIBRILLATOR (ICD) GENERATOR Left 1/24/2023    Procedure: REPLACEMENT, ICD GENERATOR;  Surgeon: River Tenorio MD;  Location: [facility] EP LAB;  Service: Cardiology;  Laterality: Left;  ANTHONY, CRTD gen chg, MDT, MAC, DM, 3prep       Social History: Patient lives with at home with family.    Modified Barium Swallow: none on file for review    Chest X-Rays: 10/09/2024: Grossly unchanged cardiothymic contours.  Patchy opacities in both lungs similar to slightly progressed.   No definite pneumothorax.  Suspect small bilateral pleural effusions.    Chest CT 10/12/2024:    Lungs: Volumes are normal and symmetric.  Upper lobe predominant emphysema.  Bilateral pulmonary parenchymal ground-glass attenuation.  Dependent atelectasis within the bilateral lower lobes.  Nodular areas of increased soft tissue attenuation within the right lower lobe, possibly areas of round atelectasis.  Trace bilateral pleural fluid.  No pneumothorax.    Prior diet: unrestricted consistencies      Subjective   Pt appeared awake and alert, however decreased attention to tasks, decreased command following- decreased initiation and delayed processing noted across tasks and t/o session. Pt with flat affect. Pt  "stated name and , introduced family members at b/s, when prompted.    Patient goals: unable to obtain at this time     Pain/Comfort:  Pain Rating 1: 0/10      Objective:     Oral Musculature Evaluation  Oral Musculature: WFL (Pt appeared awake/alert, though with decreased attention to task, decreased command following- Oral musculature deemed grossly WFL via occasional commands followed and observation in function.)  Dentition: present and adequate  Secretion Management:  (not overt difficulty with secretion mgmt observed this session; decreased ability to swallow respiratory secretions and suction requirement noted in chart review)  Mucosal Quality: adequate  Mandibular Strength and Mobility: WFL  Lingual Strength and Mobility: WFL  Buccal Strength and Mobility: WFL  Volitional Cough: adequate  Volitional Swallow: unable to elicit dry volitional upon command despite max cues; Pt without notable difficulty controlling secretions during session  Voice Prior to PO Intake: clear, dry- though decreased vocal intensity and slightly hoarse vocal quality, remained unchanged t/o session    Bedside Swallow Eval:   Consistencies Assessed:  Thin liquids : ice chips, cup sips;   Nectar thick liquids : cup sips; clinician assisted  Honey thick liquids :cup sips; clinician assisted  Puree : half to full tsp boluses, clinician assisted     Oral Phase:   Prolonged bolus holding: across consistencies; up to one minute at times- with coughing prior to swallow initiation.  Alleviated with puree boluses with use of consistent verbal cue, "swallow quick"; similar cueing did not appear to alleviate bolus holding with liquid consistency trials.   Slow oral transit time    Pharyngeal Phase:   coughing/choking: across consistencies.    Pt with overt s/s aspiration greater than 50% of the time for ice chips, thin liquids via cup, nectar and honey thick liquids- not alleviated by cues, assistance, strategies attempted  Pt with initial s/s " "aspiration with puree boluses x 2, however bolus holding and overt s/s aspiration alleviated with use of consistent verbal cue, "swallow quick"- using this method, Pt consistently tolerated puree boluses without overt s/s aspiration at [facility].  Max cues provided.   delayed swallow initation  multiple spontaneous swallows    Compensatory Strategies  Verbal cue to "swallow quick" to reduce bolus holding      Treatment: Oral care via suction kit provided prior to PO trials to increase swallow safety and facilitate command following and oral motor movement. Pt tolerated oral care via suction without overt s/s aspiration at /s. SLP provided skilled education and discussion to Pt and family at [facility] related to self-care and home management topics including role of ST, ST POC, dysphagia education, s/s aspiration, risk of aspiration and sequelae, swallow safety precautions, frequent oral care, changing ST based on goals of care.  Pt did not answer yes/no or binary choice questions (simple level) related to content covered, despite max cues.  Pt's family at [facility] accurately modeled correct answering of questions related to content covered by ST.  Reinforcement is recommended.  Handouts attached to chart.     Goals:   Multidisciplinary Problems       SLP Goals          Problem: SLP    Goal Priority Disciplines Outcome   SLP Goal     SLP    Description: Short Term Goals:  1. Pt will participate in ongoing assessment of swallow function at [facility].                         Plan:     Patient to be seen:   (3-5x/week)   Plan of Care expires:     Plan of Care reviewed with:  patient, spouse, family   SLP Follow-Up:  Yes       Discharge recommendations:  High Intensity Therapy (Following for change in goals of care; requires frequent ST at this time 2/2 NPO status)   Barriers to Discharge:   nutritional status    Time Tracking:     SLP Treatment Date:   10/13/24  Speech Start Time:  0948  Speech Stop Time:  1035     Speech Total Time (min):  " 47 min    Billable Minutes: Treatment Swallowing Dysfunction 22 min, Self Care/Home Management Training 25 min, and Total Time 47min    10/13/2024

## 2024-10-13 NOTE — NURSING
Pt AAOx4. Delayed responses but answers questions appropriately. Bilat upper and lower extremities are weak but move equally. Paced rhythm. Dobutamine infusing at 12.5.  Levo and vaso per MAR to maintain MAP's>65. Weaned to 3L nasal cannula. Afebrile. Left IJ TLC intact. Left radial a line intact. Pt Anuric. Pt NPO except Family at bedside and updated on POC.

## 2024-10-13 NOTE — PLAN OF CARE
"Clinical bedside swallow evaluation completed with SLP.  Pt presents with oral pharyngeal dysphagia c/b prolonged bolus holding, decreased attention to bolus, and s/s aspiration at b/s across consistencies.  Pt benefited from 1:1 assistance and use of verbal cue, "swallow quick", to reduce s/s aspiration with tsp boluses of puree consistency.  ST RECS: NPO with alternate means nutrition/medication/hydration; meds crushed in puree- provided with close 1:1 assistance when Pt awake and alert, sitting upright, and using verbal cue, "swallow quick", for each bolus presentation.  ST to follow for ongoing assessment of swallow function at b/s.   "

## 2024-10-13 NOTE — SUBJECTIVE & OBJECTIVE
Interval history:  Pressor requirements continue to up-trend.  SCvO2 is improved on dobutamine 12.5.  decision was made to make DNR/DNI, but family would be amenable to reversing this if a procedure was possible to improve his condition (ie thrombectomy, etc). Planning on CTA chest and CT head today for evaluation of further thromboembolic phenomenon.  Also will obtain US B LE and TTE.    Review of Systems   Constitutional:  Positive for activity change and fatigue. Negative for chills and fever.   Respiratory:  Positive for cough and shortness of breath.    Cardiovascular:  Positive for leg swelling. Negative for chest pain.   Gastrointestinal:  Negative for abdominal pain.   Psychiatric/Behavioral: Negative.       Objective:     Vital Signs (Most Recent):  Temp: 97.6 °F (36.4 °C) (10/13/24 0800)  Pulse: 77 (10/13/24 1100)  Resp: (!) 21 (10/13/24 1100)  BP: 106/61 (10/13/24 1100)  SpO2: (!) 94 % (10/13/24 1100) Vital Signs (24h Range):  Temp:  [97.5 °F (36.4 °C)-98.1 °F (36.7 °C)] 97.6 °F (36.4 °C)  Pulse:  [75-94] 77  Resp:  [9-37] 21  SpO2:  [88 %-100 %] 94 %  BP: ()/(53-78) 106/61  Arterial Line BP: ()/(38-58) 122/55     Weight: 79.9 kg (176 lb 2.4 oz)  Body mass index is 26.01 kg/m².      Intake/Output Summary (Last 24 hours) at 10/13/2024 1141  Last data filed at 10/13/2024 0957  Gross per 24 hour   Intake 1528.77 ml   Output 0 ml   Net 1528.77 ml        Physical Exam  Constitutional:       General: He is not in acute distress.     Appearance: Normal appearance. He is ill-appearing. He is not toxic-appearing or diaphoretic.   HENT:      Head: Normocephalic and atraumatic.      Nose: Nose normal.      Mouth/Throat:      Mouth: Mucous membranes are moist.   Eyes:      Extraocular Movements: Extraocular movements intact.      Pupils: Pupils are equal, round, and reactive to light.   Neck:      Comments: Left IJ CVC  Cardiovascular:      Rate and Rhythm: Normal rate and regular rhythm.   Pulmonary:       Effort: Pulmonary effort is normal. No respiratory distress.      Breath sounds: Normal breath sounds. No wheezing.   Abdominal:      General: Abdomen is flat. Bowel sounds are normal. There is no distension.      Palpations: Abdomen is soft.   Musculoskeletal:      Cervical back: Normal range of motion.      Right lower leg: Edema present.      Left lower leg: Edema present.      Comments: Left radial a line. Right hip wound with clean dressing and minimal swelling.   Skin:     General: Skin is warm.      Capillary Refill: Capillary refill takes less than 2 seconds.   Neurological:      Mental Status: He is alert.      Comments: Alert to voice and interactive, but slowed mentation compared to baseline.          Vents:       Lines/Drains/Airways       Central Venous Catheter Line  Duration                  Hemodialysis Catheter 05/03/24 1115 right subclavian 163 days    Percutaneous Central Line - Triple Lumen  10/09/24 1127 Internal Jugular Left 4 days              Arterial Line  Duration             Arterial Line 10/12/24 1040 Left Radial 1 day              Peripheral Intravenous Line  Duration                  Peripheral IV - Single Lumen 10/09/24 0831 20 G Anterior;Left Forearm 4 days                    Significant Labs:    CBC/Anemia Profile:  Recent Labs   Lab 10/11/24  1637 10/12/24  0325 10/13/24  0357   WBC 31.56* 33.99* 40.62*   HGB 11.0* 10.6* 9.9*   HCT 31.5* 29.8* 28.4*    311 396   MCV 77* 77* 76*   RDW 16.2* 16.3* 15.7*        Chemistries:  Recent Labs   Lab 10/13/24  0945   *   K 4.8   CL 92*   CO2 21*   BUN 65*   CREATININE 7.0*   CALCIUM 8.4*   ALBUMIN 2.4*   PROT 6.2   BILITOT 2.9*   ALKPHOS 203*   ALT 45*   *       All pertinent labs within the past 24 hours have been reviewed.    Significant Imaging:   I have reviewed all pertinent imaging results/findings within the past 24 hours.

## 2024-10-13 NOTE — SUBJECTIVE & OBJECTIVE
"Interval History: Patient looks more alert but is, in fact, more confused. Remains hypotensive. CT completed yesterday. Family at bedside. Patient feels "about the same" when compared to yesterday.    Review of Systems   All other systems reviewed and are negative.    Objective:     Vital Signs (Most Recent):  Temp: 97.6 °F (36.4 °C) (10/13/24 0800)  Pulse: 79 (10/13/24 0945)  Resp: 11 (10/13/24 0945)  BP: 126/64 (10/13/24 0900)  SpO2: 95 % (10/13/24 0945) Vital Signs (24h Range):  Temp:  [97.4 °F (36.3 °C)-98.1 °F (36.7 °C)] 97.6 °F (36.4 °C)  Pulse:  [75-94] 79  Resp:  [9-37] 11  SpO2:  [88 %-100 %] 95 %  BP: ()/(53-78) 126/64  Arterial Line BP: ()/(37-58) 113/58     Weight: 79.9 kg (176 lb 2.4 oz)  Body mass index is 26.01 kg/m².    Estimated Creatinine Clearance: 12.4 mL/min (A) (based on SCr of 6 mg/dL (H)).     Physical Exam  Vitals and nursing note reviewed.   Constitutional:       Appearance: Normal appearance.   HENT:      Head: Normocephalic and atraumatic.   Eyes:      Extraocular Movements: Extraocular movements intact.      Pupils: Pupils are equal, round, and reactive to light.   Cardiovascular:      Rate and Rhythm: Normal rate.      Heart sounds: Murmur heard.   Pulmonary:      Breath sounds: Rales present. No rhonchi.   Abdominal:      General: There is no distension.      Tenderness: There is no abdominal tenderness. There is no right CVA tenderness, left CVA tenderness or guarding.   Musculoskeletal:         General: Swelling present.   Skin:     Findings: No erythema.   Neurological:      Mental Status: He is alert.   Psychiatric:         Mood and Affect: Mood normal.          Significant Labs: Blood Culture:   Recent Labs   Lab 10/05/24  1556 10/05/24  1612 10/09/24  0915 10/09/24  0930 10/12/24  1449   LABBLOO No Growth after 4 days. No Growth after 4 days. No Growth to date  No Growth to date  No Growth to date  No Growth to date No Growth to date  No Growth to date  No Growth " "to date  No Growth to date No Growth to date     BMP: No results for input(s): "GLU", "NA", "K", "CL", "CO2", "BUN", "CREATININE", "CALCIUM", "MG" in the last 48 hours.  CBC:   Recent Labs   Lab 10/11/24  1637 10/12/24  0325 10/13/24  0357   WBC 31.56* 33.99* 40.62*   HGB 11.0* 10.6* 9.9*   HCT 31.5* 29.8* 28.4*    311 396     Respiratory Culture: No results for input(s): "GSRESP", "RESPIRATORYC" in the last 4320 hours.  Urine Culture:   Recent Labs   Lab 05/11/24  1153   LABURIN No growth     Wound Culture: No results for input(s): "LABAERO" in the last 4320 hours.    Significant Imaging: I have reviewed all pertinent imaging results/findings within the past 24 hours.  "

## 2024-10-13 NOTE — PROGRESS NOTES
[facility]  Progress Note    Patient Name: Ar Sharma  MRN: [id number]  Patient Class: IP- Inpatient   Admission Date: 10/3/2024  Length of Stay: 10 days  Attending Physician: Delmy Agarwal, *  Primary Care Provider: Jc Elliott MD        Subjective:     Principal Problem:Closed right hip fracture, initial encounter        HPI:  63 y.o. AAM with h/o ESRD on HD (for 2 months but prior was on PD for about a month-Follows with [facility]) MWF via a tunneled HD catheter on the right, Essential HTN, HLD, CAD,  h/o systolic(EF 40-45%) and diastolic grade 3 CHF due to non-ischemic cardiomyopathy with a [employer] CRT-D implant 2016 (sees Dr. Hernandez) uses 3L supplemental oxygen at night, and h/o VT arhythmia (follows with Dr. Tenorio at [facility]) s/p ablation and on Mexiletin 150mg PO BID and Amiodarone 400mg daily present to the ER with c/o right hip pain after a fall at home.     Went to HD today and was feeling alright after. Was moving his tool box when he tripped over his feet and fell to the ground. Was unable to get up or put weight on his right leg.     Work up in the ED at [place] noted normal WBC and Stable H/H.  INR 1.3.  Lytes stable and c/w post-HD with no indications for emergent HD tonight.     Right hip/pelvis x-ray FINDINGS:  There is a minimally displaced intertrochanteric fracture of the right proximal femur.  No additional fractures are seen.  There is osteopenia.  The femoral heads are well seated in the acetabula.  There is mild joint space narrowing of the bilateral femoroacetabular joints and the pubic symphysis.  There is a peritoneal dialysis catheter overlying the pelvis.     Impression:     Right intertrochanteric fracture.      Transfer center contacted us for admission due to [facility] on Ortho-diversion. They spoke with Dr. Mcclendon who agreed to see the patient in consultation. We have been asked to accept for further pre-op planning.  Last time he took his ASA was yesterday am.       Results for orders placed during the hospital encounter of 04/29/24    Echo Saline Bubble? No; Ultrasound enhancing contrast? No    Interpretation Summary    Left Ventricle: The left ventricle is normal in size. Normal wall thickness. Regional wall motion abnormalities present. See diagram for wall motion findings. Septal motion is consistent with pacing. There is mildly reduced systolic function with a visually estimated ejection fraction of 40 - 45%. Grade III diastolic dysfunction.    Right Ventricle: Moderate to severe right ventricular enlargement. Systolic function is reduced.TAPSE is 1.59 cm. Pacemaker lead present in the ventricle.    Left Atrium: Left atrium is severely dilated. The left atrium volume index is 71.4 mL/m2.    Right Atrium: Right atrium is severely dilated.    Aortic Valve: There is mild aortic valve sclerosis. There is mild to moderate stenosis. Aortic valve area by VTI is 1.49 cm². Aortic valve peak velocity is 1.43 m/s. Mean gradient is 5 mmHg. The dimensionless index is 0.52.    Mitral Valve: There is mild regurgitation.    Tricuspid Valve: There is mild to moderate regurgitation.    Pulmonary Artery: The estimated pulmonary artery systolic pressure is 64 mmHg.    IVC/SVC: Elevated venous pressure at 15 mmHg.      Overview/Hospital Course:   63 y.o. AAM with h/o ESRD on HD (MWF), HTN, HLD, CAD, CHF admitted on 10/03/24 for Right intertrochanteric Hip fracture after a fall at home. No head trauma or LOC. CXR with interstitial edema.  No evidence of pneumothorax.  X-ray of the right hip with minimally displaced intertrochanteric fracture of the right proximal femur.  Orthopedic surgery consulted- s/p right  femur intramedullary nail on 10/04.  Minimal blood loss per orthopedic. Echo with EF of 40-45%, mild aortic stenosis.  PASP of 66 mm Hg.  Cardiology consulted for cardiac clearance-cleared for surgery at moderate cardiac risk.  Continue  symptomatic management and supportive care.  Nephrology following for ESRD. Patient  hypotensive at baseline but acutely more hypotensive during hospitalization but is asymptomatic. Unable to tolerate HD on 10/05. Persistently hypotensive. patient transferred to ICU for presssor support and CRRT on 10/05.  Tolerating CRRT.    Echo. Show ,  Left Atrium: There is a large mobile frondlike mass, appears to be adherent to LA septum.    Right Atrium: Right atrium is severely dilated. Multiple leads present in the right atrium with possible vegetation adherent to lead in RA.  Cardiology is consulted,recommended anticoagulations and IV Abx for possible endocarditis ,ID is consulted.blood culture is repeated.  BP remains on low side,required levophed for HD.  Patient at this time is on 3 vasopressors,already consulted palliative and spoke with daughter and wife,nephrology can not perform HD   CC added Micafungin,spooked with ID ,CT chest,abdomen,pelvic show no abscess,,also ID changed cefepime with meropenem.  Family asking for transfer to [place],where he came from as transfer,but orthopedic  did not accepted patient.  Will check CTA chest ,    Interval History: feeling better with no new complaints.  Echo. Show ,  Left Atrium: There is a large mobile frondlike mass, appears to be adherent to LA septum.    Right Atrium: Right atrium is severely dilated. Multiple leads present in the right atrium with possible vegetation adherent to lead in RA.  Cardiology is consulted,recommended anticoagulations and IV Abx for possible endocarditis ,ID is consulted.blood culture is repeated.  BP remains on low side,required levophed for HD.  Patient at this time is on 3 vasopressors,already consulted palliative and spoke with daughter and wife,nephrology can not perform HD today.  CC added Micafungin,spooked with ID ,will do CT chest,abdomen,pelvic,also ID changed cefepime with meropenem.   Family asking for transfer to  [place],where he came from as transfer,but orthopedic  did not accepted patient.  Will check CTA chest ,    Review of Systems   HENT:  Negative for ear discharge and ear pain.    Eyes:  Negative for discharge and itching.   Endocrine: Negative for cold intolerance and heat intolerance.   Neurological:  Negative for seizures and syncope.     Objective:     Vital Signs (Most Recent):  Temp: 97.6 °F (36.4 °C) (10/13/24 0800)  Pulse: 78 (10/13/24 1000)  Resp: 17 (10/13/24 1000)  BP: 103/67 (10/13/24 1000)  SpO2: 95 % (10/13/24 0945) Vital Signs (24h Range):  Temp:  [97.4 °F (36.3 °C)-98.1 °F (36.7 °C)] 97.6 °F (36.4 °C)  Pulse:  [75-94] 78  Resp:  [9-37] 17  SpO2:  [88 %-100 %] 95 %  BP: ()/(53-78) 103/67  Arterial Line BP: ()/(37-58) 116/49     Weight: 79.9 kg (176 lb 2.4 oz)  Body mass index is 26.01 kg/m².    Intake/Output Summary (Last 24 hours) at 10/13/2024 1042  Last data filed at 10/13/2024 0957  Gross per 24 hour   Intake 1665.02 ml   Output 0 ml   Net 1665.02 ml         Physical Exam  Vitals and nursing note reviewed.   Constitutional:       General: He is not in acute distress.     Appearance: He is ill-appearing (chronically). He is not toxic-appearing or diaphoretic.      Interventions: Nasal cannula in place.   HENT:      Head: Normocephalic and atraumatic.      Nose: No rhinorrhea.      Mouth/Throat:      Mouth: Mucous membranes are moist.   Eyes:      General: No scleral icterus.     Extraocular Movements: Extraocular movements intact.   Neck:      Comments: Right IJ tunneled dialysis catheter  Cardiovascular:      Rate and Rhythm: Normal rate and regular rhythm.      Heart sounds: No murmur heard.  Pulmonary:      Effort: No tachypnea, accessory muscle usage, respiratory distress or retractions.   Abdominal:      General: There is no distension.      Comments: PD catheter present   Skin:     General: Skin is warm and dry.      Coloration: Skin is not jaundiced.      Findings: No  rash.   Neurological:      General: No focal deficit present.      Mental Status: He is alert. Mental status is at baseline.             Significant Labs: All pertinent labs within the past 24 hours have been reviewed.  BMP:   Recent Labs   Lab 10/13/24  0945   *   *   K 4.8   CL 92*   CO2 21*   BUN 65*   CREATININE 7.0*   CALCIUM 8.4*     CBC:   Recent Labs   Lab 10/11/24  1637 10/12/24  0325 10/13/24  0357   WBC 31.56* 33.99* 40.62*   HGB 11.0* 10.6* 9.9*   HCT 31.5* 29.8* 28.4*    311 396       Significant Imaging: I have reviewed all pertinent imaging results/findings within the past 24 hours.    Assessment/Plan:      * Closed right hip fracture, initial encounter  -s/p fall. No LOC or head trauma  -Right hip XR: minimally displaced intertrochanteric fracture of the right proximal femur.   -Orthopedic surgery consulted: s/p right  femur intramedullary nail on 10/04  -PT/OT once more stable.    Shock  BP remains on low side,required levophed for HD.  Patient at this time is on 3 vasopressors,already consulted palliative and spoke with daughter and wife,nephrology can not perform HD today.  CC added Micafungin,spooked with ID ,will do CT chest,abdomen,pelvic,also ID changed cefepime with meropenem.    Atrial mass    Echo. Show ,  Left Atrium: There is a large mobile frondlike mass, appears to be adherent to LA septum.    Right Atrium: Right atrium is severely dilated. Multiple leads present in the right atrium with possible vegetation adherent to lead in RA.  Cardiology is consulted,recommended anticoagulations with heparin gtt,and IV Abx for possible endocarditis ,ID is consulted.blood culture is repeated.Abx is adjusted.    Right atrial thrombus  As above.      Leukocytosis  -WBC# trending up, 20k on 10/06. Pt remained afebrile.   -initially suspected reactive given fracture and recent surgery  -However, pt also with PD cath in Q.   BCX negative.  Empirically started on aBX's    Acute on  chronic hypoxic respiratory failure  Patient with Hypoxic Respiratory failure which is Acute on chronic.  he is on home oxygen at 3 LPM. Supplemental oxygen was provided and noted-      .   Signs/symptoms of respiratory failure include- tachypnea and increased work of breathing. Contributing diagnoses includes - CHF and COPD Labs and images were reviewed. Patient Has recent ABG, which has been reviewed. Will treat underlying causes and adjust management of respiratory failure as follows-     -pt on home 3L NC. Hx of COPD/CHF  -Worsening hypoxia overnight 10/05; required up to 15L HFNC.  -acute on chronic hypoxia secondary to volume overload  -Pt unable to tolerate HD and CRRT, net positive 1.5L on 10/06  -CXR with pulmonary edema   -Pulmonology consulted  -Nephrology following  -Wean O2 as tolerated .  Family asking for transfer to [place],where he came from as transfer,but orthopedic  did not accepted patient.  Will check CTA chest ,    Advanced care planning/counseling discussion  Advance Care Planning    Date: 10/05/2024    Code Status  I engaged the the patient in a voluntary conversation about the patient's preferences for care  at the very end of life. The patient wishes to have CPR and other invasive treatments performed when his heart and/or breathing stops. I communicated to the patient that his wishes align with full code status and he agrees.    A total of 16 min was spent on advance care planning, goals of care discussion, emotional support, formulating and communicating prognosis and exploring burden/benefit of various approaches of treatment. This discussion occurred on a fully voluntary basis with the verbal consent of the patient and/or family.            Moderate protein-calorie malnutrition  Nutrition consulted. Most recent weight and BMI monitored-     Measurements:  Wt Readings from Last 1 Encounters:   10/06/24 74.6 kg (164 lb 7.4 oz)   Body mass index is 24.29 kg/m².    Patient has been  screened and assessed by RD.    Malnutrition Type:  Context: acute illness or injury  Level: mild    Malnutrition Characteristic Summary:  Weight Loss (Malnutrition): greater than 10% in 6 months  Subcutaneous Fat (Malnutrition): mild depletion  Muscle Mass (Malnutrition): mild depletion    Interventions/Recommendations (treatment strategy):  1. Add Novasource Renal TID 2. [place] intake at meals3. Monitor weight/labs 4. RD to follow to monitor PO intake      ESRD (end stage renal disease)  Nephrology consulted: HD per nephrology   HD stopped early on 10/05 due to hypotension  Persistently hypotensive, transfer to ICU for CRRT and pressor support on 10/05  CRRT started on 10/05    Pulmonary emphysema, unspecified emphysema type  COPD is controlled on his home 3L oxygen at night.   No wheezing on exam  Now volume overloaded and requiring more O2  PRN duonebs ordered.   Wean to home O2 as tolerated    Aortic atherosclerosis  Continue statin       Primary hypertension  Chronic, controlled. Pt now with hypotension   Pt with hypotension, continue home midodrine   Currently on pressors to keep MAP>65    Latest blood pressure and vitals reviewed-     Temp:  [97.5 °F (36.4 °C)-98.5 °F (36.9 °C)]   Pulse:  [60-66]   Resp:  [12-42]   BP: ()/(45-72)   SpO2:  [92 %-100 %] .   Home meds for hypertension were reviewed and noted below.   Hypertension Medications               furosemide (LASIX) 80 MG tablet Take 4.5 tablets (360 mg total) by mouth 2 (two) times daily.    metOLazone (ZAROXOLYN) 10 MG tablet Take 10 mg by mouth As instructed (Take I tablet by mouth Tuesday, Thursday, Saturday, Sunday). Take I tablet by mouth Tuesday, Thursday, Saturday, Sunday    nitroGLYCERIN (NITROSTAT) 0.4 MG SL tablet Place 1 tablet (0.4 mg total) under the tongue every 5 (five) minutes as needed for Chest pain.            While in the hospital, will manage blood pressure as follows; Adjust home antihypertensive regimen as follows-  patient with hypotension chronically. Continue home midodrine     Will utilize p.r.n. blood pressure medication only if patient's blood pressure greater than  180/90  and he develops symptoms such as worsening chest pain or shortness of breath.    Chronic combined systolic and diastolic heart failure  NO signs of acute exacerbation.   Echo as below   Cardiology consulted for cardiac clearance-cleared for surgery at moderate cardiac risk.   Nephrology for volume removal with HD    Echo    Result Date: 10/7/2024    Left Ventricle: The left ventricle is normal in size. Mildly increased   wall thickness. There is mild concentric hypertrophy. Regional wall motion   abnormalities present (severe distal anteroapical hypokinesis). There is   mildly reduced systolic function with a visually estimated ejection   fraction of 40 - 45%.    Right Ventricle: Severe right ventricular enlargement. Systolic   function is severely reduced. Pacemaker lead present in the ventricle and   appears abnormal with possible vegetation on lead in RA (2.7x2.0cm).    Left Atrium: There is a large mobile frondlike mass, appears to be   adherent to LA septum.    Right Atrium: Right atrium is severely dilated. Multiple leads present   in the right atrium with possible vegetation adherent to lead in RA.    Aortic Valve: The aortic valve is a trileaflet valve. There is mild   aortic valve sclerosis.    Tricuspid Valve: There is mild regurgitation.    Pulmonary Artery: The estimated pulmonary artery systolic pressure is   67 mmHg.        Echo    Result Date: 10/3/2024    Left Ventricle: The left ventricle is mildly dilated. There is mild   eccentric hypertrophy. Septal flattening in diastole and systole   consistent with right ventricular volume and pressure overload. There is   mildly reduced systolic function with a visually estimated ejection   fraction of 40 - 45%.    Right Ventricle: Severe right ventricular enlargement. Systolic   function is  moderately reduced.    Left Atrium: Left atrium is moderately dilated.    Right Atrium: Right atrium is severely dilated.    Aortic Valve: There is mild stenosis. Aortic valve area by VTI is 1.6   cm². Aortic valve peak velocity is 1.6 m/s. Mean gradient is 6.3 mmHg. The   dimensionless index is 0.50.    Mitral Valve: There is mild to moderate regurgitation.    Tricuspid Valve: There is moderate regurgitation.    Pulmonary Artery: The estimated pulmonary artery systolic pressure is   66 mmHg.    IVC/SVC: Elevated venous pressure at 15 mmHg.           S/P ablation of ventricular arrhythmia  Cardiology consulted for cardiac clearance.   Resume home Mexiletin 150mg PO BID   Hold amiodarone 400mg PO daily in setting of hypotension   ICD in place       Cardiac resynchronization therapy defibrillator (CRT-D) in place  -noted      Atherosclerosis of native coronary artery of native heart with angina pectoris  Patient with known CAD , which is controlled Will continue Statin and monitor for S/Sx of angina/ACS. Continue to monitor on telemetry.     Mixed hyperlipidemia  -continue statin       Iron deficiency anemia  Anemia is likely due to Iron deficiency. Most recent hemoglobin and hematocrit are listed below.  Recent Labs     10/05/24  0544 10/06/24  0439 10/07/24  0219   HGB 12.6* 12.2* 11.5*   HCT 37.5* 35.8* 33.6*       Plan  - Monitor serial CBC: Daily  - Transfuse PRBC if patient becomes hemodynamically unstable, symptomatic or H/H drops below 7/21.  - Patient has not received any PRBC transfusions to date  - Patient's anemia is currently stable  - stable, monitoring     Hypothyroidism  -resume home synthroid       VTE Risk Mitigation (From admission, onward)           Ordered     heparin 25,000 units in dextrose 5% (100 units/ml) IV bolus from bag HIGH INTENSITY nomogram - [facility]  As needed (PRN)        Question:  Heparin Infusion Adjustment (DO NOT MODIFY ANSWER)  Answer:   [email or web address]\epic\Images\Pharmacy\HeparinInfusions\heparin HIGH INTENSITY nomogram for [facility] [id number]    10/11/24 1622     heparin 25,000 units in dextrose 5% (100 units/ml) IV bolus from bag HIGH INTENSITY nomogram - [facility]  As needed (PRN)        Question:  Heparin Infusion Adjustment (DO NOT MODIFY ANSWER)  Answer:  [email or web address]\epic\Images\Pharmacy\HeparinInfusions\heparin HIGH INTENSITY nomogram for [facility] [id number]    10/11/24 1622     heparin 25,000 units in dextrose 5% 250 mL (100 units/mL) infusion HIGH INTENSITY nomogram - [facility]  Continuous        Question:  Begin at (units/kg/hr)  Answer:  18    10/11/24 1622     heparin (porcine) injection 5,000 Units  Use PRN         10/05/24 1830     Place sequential compression device  Until discontinued         10/04/24 2217     heparin (porcine) injection 1,000 Units  As needed (PRN)         10/04/24 2217     IP VTE LOW RISK PATIENT  Once         10/04/24 1438     Place sequential compression device  Until discontinued         10/03/24 0154     Reason for No Pharmacological VTE Prophylaxis  Once        Comments: Right hip fracture needs surgical eval   Question:  Reasons:  Answer:  Physician Provided (leave comment)    10/03/24 0154                    Discharge Planning   SLAVA:      Code Status: Full Code   Is the patient medically ready for discharge?:     Reason for patient still in hospital (select all that apply): Patient trending condition  Discharge Plan A:  (tbd see comment)            Critical care time spent on the evaluation and treatment of severe organ dysfunction, review of pertinent labs and imaging studies, discussions with consulting providers and discussions with patient/family:  over 45  minutes.      Delmy Agarwal MD  Department of Hospital Medicine   [facility] - Intensive Care

## 2024-10-13 NOTE — ASSESSMENT & PLAN NOTE
Family updated at bedside and counseled on clinical worsening that has taken place over the past week.  Planning on more family members coming in town today and to discuss code status moving forward.    10/13/2024 - patient code status changed to DNR/DNI after family discussion and discussion with Mr Sharma himself.  The decision was made to make him DNR/DNI given severity of illness, pressor requirements, frailty, and poor likelihood of improvement with these interventions.  He is ok with reversal of code status, should a procedure be offered that could improve his condition (ie thrombectomy).

## 2024-10-13 NOTE — PROGRESS NOTES
[facility]  Cardiology  Progress Note    Patient Name: Ar Sharma  MRN: [id number]  Admission Date: 10/3/2024  Hospital Length of Stay: 10 days  Code Status: DNR   Attending Physician: Delmy Agarwal, *   Primary Care Physician: Jc Elliott MD  Expected Discharge Date:   Principal Problem:Closed right hip fracture, initial encounter    Subjective:         Interval History:  Patient on 3 pressors.  Cardiology reconsulted for management of heart failure.  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  Patient currently on 3 pressors.  Awake oriented x3.  Denies any chest pains     Review of Systems   Constitutional: Positive for malaise/fatigue.   HENT:  Negative for hearing loss.    Eyes:  Negative for photophobia.   Cardiovascular:  Negative for chest pain.     Objective:     Vital Signs (Most Recent):  Temp: 97.6 °F (36.4 °C) (10/13/24 0800)  Pulse: 76 (10/13/24 1528)  Resp: 16 (10/13/24 1528)  BP: 111/66 (10/13/24 1500)  SpO2: 97 % (10/13/24 1528) Vital Signs (24h Range):  Temp:  [97.5 °F (36.4 °C)-97.9 °F (36.6 °C)] 97.6 °F (36.4 °C)  Pulse:  [] 76  Resp:  [9-25] 16  SpO2:  [75 %-100 %] 97 %  BP: ()/(57-78) 111/66  Arterial Line BP: ()/(42-61) 120/52     Weight: 79.9 kg (176 lb 2.4 oz)  Body mass index is 26.01 kg/m².     SpO2: 97 %         Intake/Output Summary (Last 24 hours) at 10/13/2024 1652  Last data filed at 10/13/2024 1444  Gross per 24 hour   Intake 2052.48 ml   Output 0 ml   Net 2052.48 ml       Lines/Drains/Airways       Central Venous Catheter Line  Duration                  Hemodialysis Catheter 05/03/24 1115 right subclavian 163 days    Percutaneous Central Line - Triple Lumen  10/09/24 1127 Internal Jugular Left 4 days              Arterial Line  Duration             Arterial Line 10/12/24 1040 Left Radial 1 day              Peripheral Intravenous Line  Duration                   Peripheral IV - Single Lumen 10/09/24 0831 20 G Anterior;Left Forearm 4 days                       Physical Exam  Vitals reviewed.   Constitutional:       Appearance: He is well-developed.   HENT:      Head: Normocephalic.   Eyes:      Conjunctiva/sclera: Conjunctivae normal.      Pupils: Pupils are equal, round, and reactive to light.   Cardiovascular:      Rate and Rhythm: Normal rate and regular rhythm.      Heart sounds: Murmur heard.   Pulmonary:      Effort: Pulmonary effort is normal.      Breath sounds: Normal breath sounds.   Abdominal:      General: Bowel sounds are normal.      Palpations: Abdomen is soft.   Musculoskeletal:      Cervical back: Normal range of motion and neck supple.   Skin:     General: Skin is warm.   Neurological:      Mental Status: He is alert and oriented to person, place, and time.            Significant Labs:     DATA:     Laboratory:  CBC:  Recent Labs   Lab 10/11/24  1637 10/12/24  0325 10/13/24  0357   WBC 31.56 H 33.99 H 40.62 H   Hemoglobin 11.0 L 10.6 L 9.9 L   Hematocrit 31.5 L 29.8 L 28.4 L   Platelets 400 311 396       CHEMISTRIES:  Recent Labs   Lab 04/04/22  1255 06/09/22  0740 07/07/22  0746 09/06/22  0741 10/08/24  1639 10/08/24  2330 10/09/24  0311 10/10/24  0305 10/11/24  0421 10/13/24  0945 10/13/24  1345   Glucose 92 90 92   < >  --  113 H   < > 127 H 114 H 163 H  --    Sodium 137 139 136   < >  --  134 L   < > 131 L 126 L 127 L  --    Potassium 4.8 4.8 4.3   < >  --  4.3   < > 4.2 4.8 4.8  --    BUN 68 H 81 H 76 H   < >  --  20   < > 39 H 55 H 65 H  --    Creatinine 4.08 H 4.64 H 4.06 H   < >  --  2.6 H   < > 4.7 H 6.0 H 7.0 H  --    eGFR if  17.0 A 14.5 A 17.1 A  --   --   --   --   --   --   --   --    eGFR if non  14.7 A 12.6 A 14.8 A  --   --   --   --   --   --   --   --    Calcium 9.1 8.7 9.5   < >  --  8.5 L   < > 8.5 L 8.7 8.4 L  --    Magnesium  --   --   --    < > 1.9 2.1  --   --   --   --  2.3    < > = values in this  interval not displayed.       CARDIAC BIOMARKERS:  Recent Labs   Lab 09/25/22  1638 04/29/24  1707 10/04/24  0418   Troponin I 0.015 0.040 H 0.045 H       COAGS:  Recent Labs   Lab 10/02/24  2224 10/03/24  0441 10/11/24  1637   INR 1.3 H 1.2 1.2       LIPIDS/LFTS:  Recent Labs   Lab 03/04/24  1018 04/12/24  1056 04/29/24  2039 06/04/24  1440 09/04/24  1201 09/30/24  1100 10/09/24  0311 10/11/24  0421 10/13/24  0945   Cholesterol 124  --  127  --  189  --   --   --   --    Triglycerides 66  --  66  --  167 H  --   --   --   --    HDL 39 L  --  32 L  --  54  --   --   --   --    LDL Cholesterol 71.8  --  81.8  --  101.6  --   --   --   --    Non-HDL Cholesterol 85  --  95  --  135  --   --   --   --    AST 39   < >  --    < > 99 H   < > 97 H 110 H 106 H   ALT 35   < >  --    < > 68 H   < > 22 29 45 H    < > = values in this interval not displayed.       Hemoglobin A1C   Date Value Ref Range Status   04/29/2024 5.6 4.0 - 5.6 % Final     Comment:     ADA Screening Guidelines:  5.7-6.4%  Consistent with prediabetes  >or=6.5%  Consistent with diabetes    High levels of fetal hemoglobin interfere with the HbA1C  assay. Heterozygous hemoglobin variants (HbS, HgC, etc)do  not significantly interfere with this assay.   However, presence of multiple variants may affect accuracy.     03/04/2024 5.6 4.0 - 5.6 % Final     Comment:     ADA Screening Guidelines:  5.7-6.4%  Consistent with prediabetes  >or=6.5%  Consistent with diabetes    High levels of fetal hemoglobin interfere with the HbA1C  assay. Heterozygous hemoglobin variants (HbS, HgC, etc)do  not significantly interfere with this assay.   However, presence of multiple variants may affect accuracy.     09/01/2023 6.0 (H) 4.0 - 5.6 % Final     Comment:     ADA Screening Guidelines:  5.7-6.4%  Consistent with prediabetes  >or=6.5%  Consistent with diabetes    High levels of fetal hemoglobin interfere with the HbA1C  assay. Heterozygous hemoglobin variants (HbS, HgC,  etc)do  not significantly interfere with this assay.   However, presence of multiple variants may affect accuracy.         TSH  Recent Labs   Lab 05/04/23  1129 03/04/24  1018 09/04/24  1201   TSH 1.160 0.283 L 1.740       The 10-year ASCVD risk score (Susanne SPEARS, et al., 2019) is: 11.6%    Values used to calculate the score:      Age: 63 years      Sex: Male      Is Non- : Yes      Diabetic: No      Tobacco smoker: No      Systolic Blood Pressure: 111 mmHg      Is BP treated: Yes      HDL Cholesterol: 54 mg/dL      Total Cholesterol: 189 mg/dL       BNP    Lab Results   Component Value Date/Time    BNP 3,204 (H) 04/29/2024 05:07 PM    BNP 2,549 (H) 04/12/2024 10:56 AM    BNP 1,416 (H) 09/28/2022 03:45 PM    BNP 2,304 (H) 09/25/2022 04:38 PM     (H) 11/02/2018 12:17 PM            ECHO    Results for orders placed during the hospital encounter of 10/03/24    Echo    Interpretation Summary    Left Ventricle: The left ventricle is normal in size. Normal wall thickness. There is mildly reduced systolic function with a visually estimated ejection fraction of  45%.    Right Ventricle: Right ventricular enlargement. Systolic function is reduced.    Left Atrium: Left atrium is dilated.    Right Atrium: Right atrium is dilated. 4.2 x 2.5 large mobile echogenic mass present.appears to be attached to the device lead    Tricuspid Valve: There is severe regurgitation.    Pulmonary Artery: There is severe pulmonary hypertension. The estimated pulmonary artery systolic pressure is 72 mmHg.    IVC/SVC: Elevated venous pressure at 15 mmHg.    Limited echo    Findings conveyed to critical care and primary team      STRESS TEST    Results for orders placed during the hospital encounter of 07/11/22    Nuclear Stress Test    Interpretation Summary    The EKG portion of this study is abnormal but not diagnostic.    The patient reported no chest pain during the stress test.    The nuclear portion of this  study will be reported separately.        CATH    Results for orders placed during the hospital encounter of 09/25/22    Cardiac catheterization    Conclusion    There was non-obstructive coronary artery disease. Stable as compared to prior.    The procedure log was documented by Documenter: Neftali Casey RT; May Galdamez RN and verified by Juan Roque MD.    Date: 9/27/2022  Time: 6:36 PM      Assessment and Plan:     Brief HPI:     * Closed right hip fracture, initial encounter  Status post ORIF.    Shock  Has been on broad spectrum antibiotics to cover for septic shock. ECHO today demonstrated increase in size of RA mass despite being on heparin drip. CTA showed B/L PE, which could be contributing to his shock. ECHO showed pulm HTN and RVE. Case discussed with IC at [facility]. Pt will benefit from multidisciplinary evaluation at [facility] and has been accepted for transfer.    Continue heparin drip at high intensity.        Atrial mass  Patient been on heparin drip as well as broad-spectrum antibiotics since this mass was discovered.  Repeat echo done today demonstrated that the right atrial mass has increased in size.  Because of RV enlargement and RA enlargement with severe TR clinically suspected pulmonary embolism.  Subsequent CTA confirmed the suspicion.  Patient has been accepted to [facility] for further evaluation and management.  Case discussed with CT surgery and Interventional Cardiology at [facility]    Right atrial thrombus        ESRD (end stage renal disease)  Per renal    Chronic combined systolic and diastolic heart failure  NICM. EF 40-45%. Volume status appears controlled. Has CRT-D followed at [facility].   Neg cath 2022.  Hx VT s/p ablation    Currently on 3 pressors. Working diagnosis has been septic shock, and getting  anti biotics for that. Likely etiology could also be PE     S/P ablation of ventricular arrhythmia  Followed by EP at [facility]. Continue amiodarone    Cardiac  resynchronization therapy defibrillator (CRT-D) in place  Followed by Dr Tenorio. Normal function at last check    would need eval for lead extraction at main also    Mixed hyperlipidemia  Resume statin when LFTs normalized.          VTE Risk Mitigation (From admission, onward)           Ordered     heparin 25,000 units in dextrose 5% (100 units/ml) IV bolus from bag HIGH INTENSITY nomogram - [facility]  As needed (PRN)        Question:  Heparin Infusion Adjustment (DO NOT MODIFY ANSWER)  Answer:  [email or web address]\epic\Images\Pharmacy\HeparinInfusions\heparin HIGH INTENSITY nomogram for [facility] [id number]    10/11/24 1622     heparin 25,000 units in dextrose 5% (100 units/ml) IV bolus from bag HIGH INTENSITY nomogram - [facility]  As needed (PRN)        Question:  Heparin Infusion Adjustment (DO NOT MODIFY ANSWER)  Answer:  \\[email or web address]\epic\Images\Pharmacy\HeparinInfusions\heparin HIGH INTENSITY nomogram for [facility] [id number].pdf    10/11/24 1622     heparin 25,000 units in dextrose 5% 250 mL (100 units/mL) infusion HIGH INTENSITY nomogram - [facility]  Continuous        Question:  Begin at (units/kg/hr)  Answer:  18    10/11/24 1622     heparin (porcine) injection 5,000 Units  Use PRN         10/05/24 1830     Place sequential compression device  Until discontinued         10/04/24 2217     heparin (porcine) injection 1,000 Units  As needed (PRN)         10/04/24 2217     IP VTE LOW RISK PATIENT  Once         10/04/24 1438     Place sequential compression device  Until discontinued         10/03/24 0154     Reason for No Pharmacological VTE Prophylaxis  Once        Comments: Right hip fracture needs surgical eval   Question:  Reasons:  Answer:  Physician Provided (leave comment)    10/03/24 0154                    Damon Knight MD  Cardiology  [facility] - Intensive Care      Critical Care Time:  70 minutes     Critical care was time spent personally by me on the following activities: development of treatment plan with patient or surrogate and  bedside caregivers, discussions with consultants, evaluation of patient's response to treatment, examination of patient, ordering and performing treatments and interventions, ordering and review of laboratory studies, ordering and review of radiographic studies, pulse oximetry, re-evaluation of patient's condition. This critical care time did not overlap with that of any other provider or involve time for any procedures.

## 2024-10-13 NOTE — SUBJECTIVE & OBJECTIVE
Interval History: pressor requirements increased, but mentation improved today.    Review of patient's allergies indicates:   Allergen Reactions    Lokelma [sodium zirconium cyclosilicate] Other (See Comments)     Fluid retention, weight gain, CHF excerebration, severe constipation    Atorvastatin Other (See Comments)     Joint pain    Jardiance [empagliflozin] Other (See Comments)     Chest pains, significant weight loss, lower blood pressure     Current Facility-Administered Medications   Medication Frequency    0.9%  NaCl infusion (CRRT USE ONLY) Continuous    0.9%  NaCl infusion PRN    acetaminophen tablet 650 mg Q4H PRN    allopurinoL tablet 100 mg Daily    DAPTOmycin (CUBICIN) 645 mg in 0.9% NaCl SolP 50 mL IVPB Q48H    DOBUtamine 1000 mg in D5W 250 mL infusion Continuous    docusate sodium capsule 100 mg Daily    epoetin saba-epbx injection 10,000 Units Every Mon, Wed, Fri    fentaNYL 50 mcg/hr 1 patch Q72H    fludrocortisone tablet 100 mcg Daily    furosemide tablet 360 mg See admin instructions    heparin (porcine) injection 1,000 Units PRN    heparin (porcine) injection 5,000 Units PRN    heparin 25,000 units in dextrose 5% (100 units/ml) IV bolus from bag HIGH INTENSITY nomogram - [facility] PRN    heparin 25,000 units in dextrose 5% (100 units/ml) IV bolus from bag HIGH INTENSITY nomogram - [facility] PRN    heparin 25,000 units in dextrose 5% 250 mL (100 units/mL) infusion HIGH INTENSITY nomogram - [facility] Continuous    hydrALAZINE injection 10 mg Q6H PRN    hydrocortisone sodium succinate injection 100 mg Q8H    HYDROmorphone injection 1 mg Q4H PRN    hydrOXYzine pamoate capsule 25 mg Q8H PRN    levalbuterol nebulizer solution 1.25 mg Q4H PRN    levothyroxine tablet 150 mcg Before breakfast    magnesium sulfate 2g in water 50mL IVPB (premix) PRN    melatonin tablet 6 mg Nightly PRN    meropenem (MERREM) 500 mg in 0.9% NaCl 100 mL IVPB (MB+) Q12H    metOLazone tablet 10 mg See admin instructions    mexiletine capsule 150  mg BID WM    micafungin 100 mg in 0.9% NaCl 100 mL IVPB (MB+) Q24H    midodrine tablet 15 mg Q8H    naloxone 0.4 mg/mL injection 0.4 mg PRN    nitroGLYCERIN SL tablet 0.4 mg Q5 Min PRN    NORepinephrine 32 mg in D5W 250 mL infusion Continuous    ondansetron injection 4 mg Q6H PRN    oxyCODONE-acetaminophen  mg per tablet 1 tablet Q4H PRN    oxyCODONE-acetaminophen 5-325 mg per tablet 1 tablet Q4H PRN    polyethylene glycol packet 17 g BID PRN    polyethylene glycol packet 17 g Daily    pravastatin tablet 40 mg QHS    promethazine (PHENERGAN) 6.25 mg in 0.9% NaCl 50 mL IVPB Q6H PRN    simethicone chewable tablet 80 mg QID PRN    sodium chloride 0.9% bolus 250 mL 250 mL PRN    sodium chloride 0.9% bolus 500 mL 500 mL ED 1 Time    sodium chloride 0.9% flush 10 mL Q12H PRN    sodium chloride 0.9% flush 10 mL PRN    sodium phosphate 20.01 mmol in D5W 250 mL IVPB PRN    sodium phosphate 30 mmol in D5W 250 mL IVPB PRN    sodium phosphate 39.99 mmol in D5W 250 mL IVPB PRN    triamcinolone acetonide 0.1% cream BID    vasopressin (PITRESSIN) 0.2 Units/mL in D5W 100 mL infusion Continuous       Objective:     Vital Signs (Most Recent):  Temp: 97.6 °F (36.4 °C) (10/13/24 0800)  Pulse: 77 (10/13/24 1100)  Resp: (!) 21 (10/13/24 1100)  BP: 106/61 (10/13/24 1100)  SpO2: (!) 94 % (10/13/24 1100) Vital Signs (24h Range):  Temp:  [97.5 °F (36.4 °C)-98.1 °F (36.7 °C)] 97.6 °F (36.4 °C)  Pulse:  [75-94] 77  Resp:  [9-37] 21  SpO2:  [88 %-100 %] 94 %  BP: ()/(53-78) 106/61  Arterial Line BP: ()/(38-58) 116/49     Weight: 79.9 kg (176 lb 2.4 oz) (10/12/24 2305)  Body mass index is 26.01 kg/m².  Body surface area is 1.97 meters squared.    I/O last 3 completed shifts:  In: 2267.8 [I.V.:1842.6; IV Piggyback:425.2]  Out: 0      Physical Exam  Constitutional:       General: He is not in acute distress.     Appearance: He is ill-appearing and toxic-appearing.      Comments: cachetic   HENT:      Head: Normocephalic and  atraumatic.      Nose: Nose normal. No congestion.      Mouth/Throat:      Mouth: Mucous membranes are moist.   Cardiovascular:      Rate and Rhythm: Normal rate.      Heart sounds: Murmur heard.   Pulmonary:      Effort: Pulmonary effort is normal.      Breath sounds: No rhonchi.   Abdominal:      General: Abdomen is flat. There is no distension.   Musculoskeletal:      Right lower leg: No edema.      Left lower leg: No edema.          Significant Labs:  All labs within the past 24 hours have been reviewed.     Significant Imaging:  Labs: Reviewed

## 2024-10-13 NOTE — PROGRESS NOTES
[facility] Intensive [facility]  Infectious Disease  Progress Note    Patient Name: Ar Sharma  MRN: [id number]  Admission Date: 10/3/2024  Length of Stay: 10 days  Attending Physician: Delmy Agarwal, *  Primary Care Provider: Jc Elliott MD    Isolation Status: No active isolations    Assessment/Plan:      Atrial mass    Mr. Sharma is a pleasant 62 yo man admitted after a fall with a femur fracture. He is s/p IM nail placement. Incidentally found to have an atrial thrombus v vegetation. ID is consulted for that. The patient denies any prodromal symptoms, such as fever, and has no stigmata of IE. Additionally, his blood cultures are NGTD. At this pont, he meets [facility] criteria for possible IE, though NBTE is a definite possibility.     Worsening pressure with increased leukocytosis since yesterday. Weissella confusa detected on Karius is of unclear significance but given the patient's current condition, daptomycin added (Weissella is usually resistant to vancomycin).    Alert but remains hypotensive. WBC 40.    ESR 27  Rheumatoid factor NR    Karius: Weissella confusa    Blood cultures: NGTD    Pending studies  Q fever  Whipple's PCR    Recommendations  Follow-up repeat blood and urine culture  Continue abx for worsening sepsis: meropenem, micafungin, daptomycin  Follow-up pending studies to r/o possible CNE  Trend WBC (no signs of CDI)  Discussed with patient and family    Colin Montanez MD  Infectious Disease  [facility]ensive [facility]    Subjective:     Principal Problem:Closed right hip fracture, initial encounter    HPI: Mr. Sharma is a pleasant 63 y.o. man with h/o ESRD on HD (for 2 months but prior was on PD for about a month-Follows with [facility]) MWF via a tunneled HD catheter on the right, Essential HTN, HLD, CAD,  h/o systolic(EF 40-45%) and diastolic grade 3 CHF due to non-ischemic cardiomyopathy with a [employer] CRT-D implant 2016 (sees Dr. Hernandez) uses 3L supplemental oxygen at  "night, and h/o VT arhythmia (follows with Dr. Tenorio at [facility]) s/p ablation and on Mexiletin 150mg PO BID and Amiodarone 400mg daily present to the ER with c/o right hip pain after a fall at home. Went to HD and was feeling alright after. Was moving his tool box when he tripped over his feet and fell to the ground while working on his RV. Was unable to get up or put weight on his right leg. Radiograph revealed a femur fracture and the patient was transferred here from [place] for Ortho evaluation. Here he underwent surgery and has an IM nail placed. He had a TTE which demonstrated a mass through a PFO. ID is consulted for possible IE. The patient and spouse deny prodromal fever, chills, or night sweats. No IDU but has HD TIW. Blood cultures (10/5) are NGTD.       Interval History: Patient looks more alert but is, in fact, more confused. Remains hypotensive. CT completed yesterday. Family at bedside. Patient feels "about the same" when compared to yesterday.    Review of Systems   All other systems reviewed and are negative.    Objective:     Vital Signs (Most Recent):  Temp: 97.6 °F (36.4 °C) (10/13/24 0800)  Pulse: 79 (10/13/24 0945)  Resp: 11 (10/13/24 0945)  BP: 126/64 (10/13/24 0900)  SpO2: 95 % (10/13/24 0945) Vital Signs (24h Range):  Temp:  [97.4 °F (36.3 °C)-98.1 °F (36.7 °C)] 97.6 °F (36.4 °C)  Pulse:  [75-94] 79  Resp:  [9-37] 11  SpO2:  [88 %-100 %] 95 %  BP: ()/(53-78) 126/64  Arterial Line BP: ()/(37-58) 113/58     Weight: 79.9 kg (176 lb 2.4 oz)  Body mass index is 26.01 kg/m².    Estimated Creatinine Clearance: 12.4 mL/min (A) (based on SCr of 6 mg/dL (H)).     Physical Exam  Vitals and nursing note reviewed.   Constitutional:       Appearance: Normal appearance.   HENT:      Head: Normocephalic and atraumatic.   Eyes:      Extraocular Movements: Extraocular movements intact.      Pupils: Pupils are equal, round, and reactive to light.   Cardiovascular:      Rate and Rhythm: Normal rate. " "     Heart sounds: Murmur heard.   Pulmonary:      Breath sounds: Rales present. No rhonchi.   Abdominal:      General: There is no distension.      Tenderness: There is no abdominal tenderness. There is no right CVA tenderness, left CVA tenderness or guarding.   Musculoskeletal:         General: Swelling present.   Skin:     Findings: No erythema.   Neurological:      Mental Status: He is alert.   Psychiatric:         Mood and Affect: Mood normal.          Significant Labs: Blood Culture:   Recent Labs   Lab 10/05/24  1556 10/05/24  1612 10/09/24  0915 10/09/24  0930 10/12/24  1449   LABBLOO No Growth after 4 days. No Growth after 4 days. No Growth to date  No Growth to date  No Growth to date  No Growth to date No Growth to date  No Growth to date  No Growth to date  No Growth to date No Growth to date     BMP: No results for input(s): "GLU", "NA", "K", "CL", "CO2", "BUN", "CREATININE", "CALCIUM", "MG" in the last 48 hours.  CBC:   Recent Labs   Lab 10/11/24  1637 10/12/24  0325 10/13/24  0357   WBC 31.56* 33.99* 40.62*   HGB 11.0* 10.6* 9.9*   HCT 31.5* 29.8* 28.4*    311 396     Respiratory Culture: No results for input(s): "GSRESP", "RESPIRATORYC" in the last 4320 hours.  Urine Culture:   Recent Labs   Lab 05/11/24  1153   LABURIN No growth     Wound Culture: No results for input(s): "LABAERO" in the last 4320 hours.    Significant Imaging: I have reviewed all pertinent imaging results/findings within the past 24 hours.  "

## 2024-10-13 NOTE — ASSESSMENT & PLAN NOTE
Unclear etiology of this (thrombus vs septic).  Seems less likely that it is septic given negative culture data.   - anticoagulation with eliquis and antibiotics per ID.  - cards on board.  - performing CTA PE study today and CT head.  Also repeat TTE and bilateral LE US

## 2024-10-13 NOTE — SUBJECTIVE & OBJECTIVE
Interval History:  Patient on 3 pressors.  Cardiology reconsulted for management of heart failure.  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  Patient currently on 3 pressors.  Awake oriented x3.  Denies any chest pains     Review of Systems   Constitutional: Positive for malaise/fatigue.   HENT:  Negative for hearing loss.    Eyes:  Negative for photophobia.   Cardiovascular:  Negative for chest pain.     Objective:     Vital Signs (Most Recent):  Temp: 97.6 °F (36.4 °C) (10/13/24 0800)  Pulse: 76 (10/13/24 1528)  Resp: 16 (10/13/24 1528)  BP: 111/66 (10/13/24 1500)  SpO2: 97 % (10/13/24 1528) Vital Signs (24h Range):  Temp:  [97.5 °F (36.4 °C)-97.9 °F (36.6 °C)] 97.6 °F (36.4 °C)  Pulse:  [] 76  Resp:  [9-25] 16  SpO2:  [75 %-100 %] 97 %  BP: ()/(57-78) 111/66  Arterial Line BP: ()/(42-61) 120/52     Weight: 79.9 kg (176 lb 2.4 oz)  Body mass index is 26.01 kg/m².     SpO2: 97 %         Intake/Output Summary (Last 24 hours) at 10/13/2024 1652  Last data filed at 10/13/2024 1444  Gross per 24 hour   Intake 2052.48 ml   Output 0 ml   Net 2052.48 ml       Lines/Drains/Airways       Central Venous Catheter Line  Duration                  Hemodialysis Catheter 05/03/24 1115 right subclavian 163 days    Percutaneous Central Line - Triple Lumen  10/09/24 1127 Internal Jugular Left 4 days              Arterial Line  Duration             Arterial Line 10/12/24 1040 Left Radial 1 day              Peripheral Intravenous Line  Duration                  Peripheral IV - Single Lumen 10/09/24 0831 20 G Anterior;Left Forearm 4 days                       Physical Exam  Vitals reviewed.   Constitutional:       Appearance: He is well-developed.   HENT:      Head: Normocephalic.   Eyes:      Conjunctiva/sclera: Conjunctivae normal.      Pupils: Pupils are equal, round, and reactive to light.   Cardiovascular:      Rate and Rhythm: Normal rate  and regular rhythm.      Heart sounds: Murmur heard.   Pulmonary:      Effort: Pulmonary effort is normal.      Breath sounds: Normal breath sounds.   Abdominal:      General: Bowel sounds are normal.      Palpations: Abdomen is soft.   Musculoskeletal:      Cervical back: Normal range of motion and neck supple.   Skin:     General: Skin is warm.   Neurological:      Mental Status: He is alert and oriented to person, place, and time.            Significant Labs:     DATA:     Laboratory:  CBC:  Recent Labs   Lab 10/11/24  1637 10/12/24  0325 10/13/24  0357   WBC 31.56 H 33.99 H 40.62 H   Hemoglobin 11.0 L 10.6 L 9.9 L   Hematocrit 31.5 L 29.8 L 28.4 L   Platelets 400 311 396       CHEMISTRIES:  Recent Labs   Lab 04/04/22  1255 06/09/22  0740 07/07/22  0746 09/06/22  0741 10/08/24  1639 10/08/24  2330 10/09/24  0311 10/10/24  0305 10/11/24  0421 10/13/24  0945 10/13/24  1345   Glucose 92 90 92   < >  --  113 H   < > 127 H 114 H 163 H  --    Sodium 137 139 136   < >  --  134 L   < > 131 L 126 L 127 L  --    Potassium 4.8 4.8 4.3   < >  --  4.3   < > 4.2 4.8 4.8  --    BUN 68 H 81 H 76 H   < >  --  20   < > 39 H 55 H 65 H  --    Creatinine 4.08 H 4.64 H 4.06 H   < >  --  2.6 H   < > 4.7 H 6.0 H 7.0 H  --    eGFR if  17.0 A 14.5 A 17.1 A  --   --   --   --   --   --   --   --    eGFR if non  14.7 A 12.6 A 14.8 A  --   --   --   --   --   --   --   --    Calcium 9.1 8.7 9.5   < >  --  8.5 L   < > 8.5 L 8.7 8.4 L  --    Magnesium  --   --   --    < > 1.9 2.1  --   --   --   --  2.3    < > = values in this interval not displayed.       CARDIAC BIOMARKERS:  Recent Labs   Lab 09/25/22  1638 04/29/24  1707 10/04/24  0418   Troponin I 0.015 0.040 H 0.045 H       COAGS:  Recent Labs   Lab 10/02/24  2224 10/03/24  0441 10/11/24  1637   INR 1.3 H 1.2 1.2       LIPIDS/LFTS:  Recent Labs   Lab 03/04/24  1018 04/12/24  1056 04/29/24  2039 06/04/24  1440 09/04/24  1201 09/30/24  1100 10/09/24  0311  10/11/24  0421 10/13/24  0945   Cholesterol 124  --  127  --  189  --   --   --   --    Triglycerides 66  --  66  --  167 H  --   --   --   --    HDL 39 L  --  32 L  --  54  --   --   --   --    LDL Cholesterol 71.8  --  81.8  --  101.6  --   --   --   --    Non-HDL Cholesterol 85  --  95  --  135  --   --   --   --    AST 39   < >  --    < > 99 H   < > 97 H 110 H 106 H   ALT 35   < >  --    < > 68 H   < > 22 29 45 H    < > = values in this interval not displayed.       Hemoglobin A1C   Date Value Ref Range Status   04/29/2024 5.6 4.0 - 5.6 % Final     Comment:     ADA Screening Guidelines:  5.7-6.4%  Consistent with prediabetes  >or=6.5%  Consistent with diabetes    High levels of fetal hemoglobin interfere with the HbA1C  assay. Heterozygous hemoglobin variants (HbS, HgC, etc)do  not significantly interfere with this assay.   However, presence of multiple variants may affect accuracy.     03/04/2024 5.6 4.0 - 5.6 % Final     Comment:     ADA Screening Guidelines:  5.7-6.4%  Consistent with prediabetes  >or=6.5%  Consistent with diabetes    High levels of fetal hemoglobin interfere with the HbA1C  assay. Heterozygous hemoglobin variants (HbS, HgC, etc)do  not significantly interfere with this assay.   However, presence of multiple variants may affect accuracy.     09/01/2023 6.0 (H) 4.0 - 5.6 % Final     Comment:     ADA Screening Guidelines:  5.7-6.4%  Consistent with prediabetes  >or=6.5%  Consistent with diabetes    High levels of fetal hemoglobin interfere with the HbA1C  assay. Heterozygous hemoglobin variants (HbS, HgC, etc)do  not significantly interfere with this assay.   However, presence of multiple variants may affect accuracy.         TSH  Recent Labs   Lab 05/04/23  1129 03/04/24  1018 09/04/24  1201   TSH 1.160 0.283 L 1.740       The 10-year ASCVD risk score (Susanne SPEARS, et al., 2019) is: 11.6%    Values used to calculate the score:      Age: 63 years      Sex: Male      Is Non-   American: Yes      Diabetic: No      Tobacco smoker: No      Systolic Blood Pressure: 111 mmHg      Is BP treated: Yes      HDL Cholesterol: 54 mg/dL      Total Cholesterol: 189 mg/dL       BNP    Lab Results   Component Value Date/Time    BNP 3,204 (H) 04/29/2024 05:07 PM    BNP 2,549 (H) 04/12/2024 10:56 AM    BNP 1,416 (H) 09/28/2022 03:45 PM    BNP 2,304 (H) 09/25/2022 04:38 PM     (H) 11/02/2018 12:17 PM            ECHO    Results for orders placed during the hospital encounter of 10/03/24    Echo    Interpretation Summary    Left Ventricle: The left ventricle is normal in size. Normal wall thickness. There is mildly reduced systolic function with a visually estimated ejection fraction of  45%.    Right Ventricle: Right ventricular enlargement. Systolic function is reduced.    Left Atrium: Left atrium is dilated.    Right Atrium: Right atrium is dilated. 4.2 x 2.5 large mobile echogenic mass present.appears to be attached to the device lead    Tricuspid Valve: There is severe regurgitation.    Pulmonary Artery: There is severe pulmonary hypertension. The estimated pulmonary artery systolic pressure is 72 mmHg.    IVC/SVC: Elevated venous pressure at 15 mmHg.    Limited echo    Findings conveyed to critical care and primary team      STRESS TEST    Results for orders placed during the hospital encounter of 07/11/22    Nuclear Stress Test    Interpretation Summary    The EKG portion of this study is abnormal but not diagnostic.    The patient reported no chest pain during the stress test.    The nuclear portion of this study will be reported separately.        CATH    Results for orders placed during the hospital encounter of 09/25/22    Cardiac catheterization    Conclusion    There was non-obstructive coronary artery disease. Stable as compared to prior.    The procedure log was documented by Documenter: RT Teri; May Galdamez RN and verified by Juan Roque MD.    Date: 9/27/2022  Time:  6:36 PM

## 2024-10-13 NOTE — ASSESSMENT & PLAN NOTE
ESRD MWF    Discussed with family today with regards ot renal outcomes and overall goals of care. Will attempt CVVHD in an effort to possibly correct cardiogenic shock, however this was unlikely to be the case. And while pressor requirements may improve, the degree of improvement vs his ability to tolerate hemodialysis at this point may be the limiting factor in the effort to cure him. If starting RRT makes him clinically worse, then will stop RRT and discuss further goals of care    Plan/recommendation  -CVVHD today, UF 25 cc, stop if pressor requirements increase  -Strict ins and outs  -Avoid nephrotoxic agents if possible and renally dose medications  -Avoid drastic hemodynamic changes if possible    Secondary hyperparathyroidism  Calcium   Date Value Ref Range Status   10/13/2024 8.4 (L) 8.7 - 10.5 mg/dL Final     Phosphorus   Date Value Ref Range Status   10/10/2024 4.1 2.7 - 4.5 mg/dL Final     PTH, Intact   Date Value Ref Range Status   04/29/2024 162.6 (H) 9.0 - 77.0 pg/mL Final   Continue to monitor - critically ill this admission    Anemia of chronic renal disease  Goal hemoglobin in ESRD is 10-12  Hemoglobin   Date Value Ref Range Status   10/13/2024 9.9 (L) 14.0 - 18.0 g/dL Final     Iron   Date Value Ref Range Status   10/05/2024 22 (L) 45 - 160 ug/dL Final     Transferrin   Date Value Ref Range Status   10/05/2024 207 200 - 375 mg/dL Final     TIBC   Date Value Ref Range Status   10/05/2024 306 250 - 450 ug/dL Final     Saturated Iron   Date Value Ref Range Status   10/05/2024 7 (L) 20 - 50 % Final     Ferritin   Date Value Ref Range Status   10/05/2024 338 (H) 20.0 - 300.0 ng/mL Final   Continue to monitor - critically ll this admission

## 2024-10-13 NOTE — ASSESSMENT & PLAN NOTE
Mr. Sharma is a pleasant 64 yo man admitted after a fall with a femur fracture. He is s/p IM nail placement. Incidentally found to have an atrial thrombus v vegetation. ID is consulted for that. The patient denies any prodromal symptoms, such as fever, and has no stigmata of IE. Additionally, his blood cultures are NGTD. At this pont, he meets [facility] criteria for possible IE, though NBTE is a definite possibility.     Worsening pressure with increased leukocytosis since yesterday. Weissella confusa detected on Karius is of unclear significance but given the patient's current condition, daptomycin added (Weissella is usually resistant to vancomycin).    Alert but remains hypotensive. WBC 40.      ESR 27  Rheumatoid factor NR    Karius: Weissella confusa    Blood cultures: NGTD    Pending studies  Q fever  Whipple's PCR    Recommendations  Follow-up repeat blood and urine culture  Continue abx for worsening sepsis: meropenem, micafungin, daptomycin  Follow-up pending studies to r/o possible CNE  Trend WBC (no signs of CDI)  Discussed with patient and family

## 2024-10-13 NOTE — ASSESSMENT & PLAN NOTE
NICM. EF 40-45%. Volume status appears controlled. Has CRT-D followed at [facility].   Neg cath 2022.  Hx VT s/p ablation    Currently on 3 pressors. Working diagnosis has been septic shock, and getting  anti biotics for that. Likely etiology could also be PE

## 2024-10-13 NOTE — NURSING
[facility] [place]  Nurses Note -- 4 Eyes      10/13/2024       Skin assessed on: Q Shift      [] No Pressure Injuries Present    [x]Prevention Measures Documented    [x] Yes LDA  for Pressure Injury Previously documented     [] Yes New Pressure Injury Discovered   [] LDA for New Pressure Injury Added      Attending RN:  Izabel Valentino RN     Second RN:  Macey RN

## 2024-10-13 NOTE — ASSESSMENT & PLAN NOTE
Echo. Show ,  Left Atrium: There is a large mobile frondlike mass, appears to be adherent to LA septum.    Right Atrium: Right atrium is severely dilated. Multiple leads present in the right atrium with possible vegetation adherent to lead in RA.  Cardiology is consulted,recommended anticoagulations with heparin gtt,and IV Abx for possible endocarditis ,ID is consulted.blood culture is repeated.Abx is adjusted.

## 2024-10-13 NOTE — ASSESSMENT & PLAN NOTE
Patient has not shown overt evidence of infection on culture data.    - consider CT C/A/P with splenic infarcts. In setting of known cardiac thrombus, we will perform a CTA thorax and CT head.  Also performing B LE US, and repeat TTE.    - continue abx per ID.

## 2024-10-13 NOTE — ASSESSMENT & PLAN NOTE
Has been on broad spectrum antibiotics to cover for septic shock. ECHO today demonstrated increase in size of RA mass despite being on heparin drip. CTA showed B/L PE, which could be contributing to his shock. ECHO showed pulm HTN and RVE. Case discussed with IC at [facility]. Pt will benefit from multidisciplinary evaluation at [facility] and has been accepted for transfer.    Continue heparin drip at high intensity.

## 2024-10-13 NOTE — SUBJECTIVE & OBJECTIVE
Interval History: feeling better with no new complaints.  Echo. Show ,  Left Atrium: There is a large mobile frondlike mass, appears to be adherent to LA septum.    Right Atrium: Right atrium is severely dilated. Multiple leads present in the right atrium with possible vegetation adherent to lead in RA.  Cardiology is consulted,recommended anticoagulations and IV Abx for possible endocarditis ,ID is consulted.blood culture is repeated.  BP remains on low side,required levophed for HD.  Patient at this time is on 3 vasopressors,already consulted palliative and spoke with daughter and wife,nephrology can not perform HD today.  CC added Micafungin,spooked with ID ,will do CT chest,abdomen,pelvic,also ID changed cefepime with meropenem.   Family asking for transfer to [place],where he came from as transfer,but orthopedic  did not accepted patient.  Will check CTA chest ,    Review of Systems   HENT:  Negative for ear discharge and ear pain.    Eyes:  Negative for discharge and itching.   Endocrine: Negative for cold intolerance and heat intolerance.   Neurological:  Negative for seizures and syncope.     Objective:     Vital Signs (Most Recent):  Temp: 97.6 °F (36.4 °C) (10/13/24 0800)  Pulse: 78 (10/13/24 1000)  Resp: 17 (10/13/24 1000)  BP: 103/67 (10/13/24 1000)  SpO2: 95 % (10/13/24 0945) Vital Signs (24h Range):  Temp:  [97.4 °F (36.3 °C)-98.1 °F (36.7 °C)] 97.6 °F (36.4 °C)  Pulse:  [75-94] 78  Resp:  [9-37] 17  SpO2:  [88 %-100 %] 95 %  BP: ()/(53-78) 103/67  Arterial Line BP: ()/(37-58) 116/49     Weight: 79.9 kg (176 lb 2.4 oz)  Body mass index is 26.01 kg/m².    Intake/Output Summary (Last 24 hours) at 10/13/2024 1042  Last data filed at 10/13/2024 0957  Gross per 24 hour   Intake 1665.02 ml   Output 0 ml   Net 1665.02 ml         Physical Exam  Vitals and nursing note reviewed.   Constitutional:       General: He is not in acute distress.     Appearance: He is ill-appearing (chronically).  He is not toxic-appearing or diaphoretic.      Interventions: Nasal cannula in place.   HENT:      Head: Normocephalic and atraumatic.      Nose: No rhinorrhea.      Mouth/Throat:      Mouth: Mucous membranes are moist.   Eyes:      General: No scleral icterus.     Extraocular Movements: Extraocular movements intact.   Neck:      Comments: Right IJ tunneled dialysis catheter  Cardiovascular:      Rate and Rhythm: Normal rate and regular rhythm.      Heart sounds: No murmur heard.  Pulmonary:      Effort: No tachypnea, accessory muscle usage, respiratory distress or retractions.   Abdominal:      General: There is no distension.      Comments: PD catheter present   Skin:     General: Skin is warm and dry.      Coloration: Skin is not jaundiced.      Findings: No rash.   Neurological:      General: No focal deficit present.      Mental Status: He is alert. Mental status is at baseline.             Significant Labs: All pertinent labs within the past 24 hours have been reviewed.  BMP:   Recent Labs   Lab 10/13/24  0945   *   *   K 4.8   CL 92*   CO2 21*   BUN 65*   CREATININE 7.0*   CALCIUM 8.4*     CBC:   Recent Labs   Lab 10/11/24  1637 10/12/24  0325 10/13/24  0357   WBC 31.56* 33.99* 40.62*   HGB 11.0* 10.6* 9.9*   HCT 31.5* 29.8* 28.4*    311 396       Significant Imaging: I have reviewed all pertinent imaging results/findings within the past 24 hours.

## 2024-10-13 NOTE — PLAN OF CARE
Patient remains in ICU. Remains on Levophed, Dobutamine, Vasopressin, and Heparin drips. Patient continues to require more Levophed. Episode of emesis on 10/13/24 at 0300. Emesis appeared yellow and contained undigested food. Free of falls, injury, or breakdown.      Problem: Adult Inpatient Plan of Care  Goal: Plan of Care Review  Outcome: Not Progressing  Goal: Patient-Specific Goal (Individualized)  Outcome: Not Progressing  Goal: Absence of Hospital-Acquired Illness or Injury  Outcome: Not Progressing  Goal: Optimal Comfort and Wellbeing  Outcome: Not Progressing  Goal: Readiness for Transition of Care  Outcome: Not Progressing     Problem: Infection  Goal: Absence of Infection Signs and Symptoms  Outcome: Not Progressing     Problem: Wound  Goal: Optimal Coping  Outcome: Not Progressing  Goal: Optimal Functional Ability  Outcome: Not Progressing  Goal: Absence of Infection Signs and Symptoms  Outcome: Not Progressing  Goal: Improved Oral Intake  Outcome: Not Progressing  Goal: Optimal Pain Control and Function  Outcome: Not Progressing  Goal: Skin Health and Integrity  Outcome: Not Progressing  Goal: Optimal Wound Healing  Outcome: Not Progressing     Problem: Skin Injury Risk Increased  Goal: Skin Health and Integrity  Outcome: Not Progressing     Problem: Hemodialysis  Goal: Safe, Effective Therapy Delivery  Outcome: Not Progressing  Goal: Effective Tissue Perfusion  Outcome: Not Progressing  Goal: Absence of Infection Signs and Symptoms  Outcome: Not Progressing     Problem: Orthopaedic Fracture  Goal: Absence of Bleeding  Outcome: Not Progressing  Goal: Bowel Elimination  Outcome: Not Progressing  Goal: Absence of Embolism Signs and Symptoms  Outcome: Not Progressing  Goal: Fracture Stability  Outcome: Not Progressing  Goal: Optimal Functional Ability  Outcome: Not Progressing  Goal: Absence of Infection Signs and Symptoms  Outcome: Not Progressing  Goal: Effective Tissue Perfusion  Outcome: Not  Progressing  Goal: Optimal Pain Control and Function  Outcome: Not Progressing  Goal: Effective Oxygenation and Ventilation  Outcome: Not Progressing     Problem: Fall Injury Risk  Goal: Absence of Fall and Fall-Related Injury  Outcome: Not Progressing     Problem: CRRT (Continuous Renal Replacement Therapy)  Goal: Safe, Effective Therapy Delivery  Outcome: Not Progressing  Goal: Hemodynamic Stability  Outcome: Not Progressing  Goal: Body Temperature Maintained in Desired Range  Outcome: Not Progressing  Goal: Absence of Infection Signs and Symptoms  Outcome: Not Progressing     Problem: Coping Ineffective  Goal: Effective Coping  Outcome: Not Progressing

## 2024-10-13 NOTE — ASSESSMENT & PLAN NOTE
Patient been on heparin drip as well as broad-spectrum antibiotics since this mass was discovered.  Repeat echo done today demonstrated that the right atrial mass has increased in size.  Because of RV enlargement and RA enlargement with severe TR clinically suspected pulmonary embolism.  Subsequent CTA confirmed the suspicion.  Patient has been accepted to [facility] for further evaluation and management.  Case discussed with CT surgery and Interventional Cardiology at [facility]

## 2024-10-13 NOTE — NURSING
Attempted to give patient PO medicines and pudding. Pt began to cough while eating putting. PO foods/medicine held. Shortly after pt began to vomit. Dr Hawkins notified.

## 2024-10-13 NOTE — PROGRESS NOTES
[facility] [place] Intensive Care  Critical Care Medicine  Progress Note    Patient Name: Ar Sharma  MRN: [id number]  Admission Date: 10/3/2024  Hospital Length of Stay: 10 days  Code Status: DNR  Attending Provider: Delmy Agarwal, *  Primary Care Provider: Jc Elliott MD   Principal Problem: Closed right hip fracture, initial encounter    Subjective:     HPI:  Mr Ar Sharma is a 63 year old male with ESRD on HD (MWF) for the last 2 months, prior PD, HTN, HLD, combined systolic and diastolic HF (EF 40-45% and grade III DD), ischemic cardiomyopathy, s/p medtronic CRT-D implant in 2016, history of VT s/p ablation on mexiletine and amiodarone, who presents after a mechanical fall resulting in a right femur fracture.  S/p ORIF 10/4.  He is chronically hypotensive and had issues following surgery tolerating HD.  Moved to the ICU for pressor support for CRRT.  Pulm previously consulted for CVC placement (which occurred on 10/9/2024), but now following due to persistent and elevated pressor requirements.    Hospital/ICU Course:  Requiring high vasopressor requirements without clear infectious etiology.  Atrial mass present concerning for thrombus in setting of negative blood cultures.  Abx being adjusted per ID.  Antifungals added.  Due to low SCvO2 seen on VBG from left IJ CVC we have started dobutamine.  This improved SCvO2 accordingly.  CT Chest/abd/pelvis with overt hypervolemia and splenic infarcts.      Interval history:  Pressor requirements continue to up-trend.  SCvO2 is improved on dobutamine 12.5.  decision was made to make DNR/DNI, but family would be amenable to reversing this if a procedure was possible to improve his condition (ie thrombectomy, etc). Planning on CTA chest and CT head today for evaluation of further thromboembolic phenomenon.  Also will obtain US B LE and TTE.    Review of Systems   Constitutional:  Positive for activity change and fatigue. Negative for chills and fever.    Respiratory:  Positive for cough and shortness of breath.    Cardiovascular:  Positive for leg swelling. Negative for chest pain.   Gastrointestinal:  Negative for abdominal pain.   Psychiatric/Behavioral: Negative.       Objective:     Vital Signs (Most Recent):  Temp: 97.6 °F (36.4 °C) (10/13/24 0800)  Pulse: 77 (10/13/24 1100)  Resp: (!) 21 (10/13/24 1100)  BP: 106/61 (10/13/24 1100)  SpO2: (!) 94 % (10/13/24 1100) Vital Signs (24h Range):  Temp:  [97.5 °F (36.4 °C)-98.1 °F (36.7 °C)] 97.6 °F (36.4 °C)  Pulse:  [75-94] 77  Resp:  [9-37] 21  SpO2:  [88 %-100 %] 94 %  BP: ()/(53-78) 106/61  Arterial Line BP: ()/(38-58) 122/55     Weight: 79.9 kg (176 lb 2.4 oz)  Body mass index is 26.01 kg/m².      Intake/Output Summary (Last 24 hours) at 10/13/2024 1141  Last data filed at 10/13/2024 0957  Gross per 24 hour   Intake 1528.77 ml   Output 0 ml   Net 1528.77 ml        Physical Exam  Constitutional:       General: He is not in acute distress.     Appearance: Normal appearance. He is ill-appearing. He is not toxic-appearing or diaphoretic.   HENT:      Head: Normocephalic and atraumatic.      Nose: Nose normal.      Mouth/Throat:      Mouth: Mucous membranes are moist.   Eyes:      Extraocular Movements: Extraocular movements intact.      Pupils: Pupils are equal, round, and reactive to light.   Neck:      Comments: Left IJ CVC  Cardiovascular:      Rate and Rhythm: Normal rate and regular rhythm.   Pulmonary:      Effort: Pulmonary effort is normal. No respiratory distress.      Breath sounds: Normal breath sounds. No wheezing.   Abdominal:      General: Abdomen is flat. Bowel sounds are normal. There is no distension.      Palpations: Abdomen is soft.   Musculoskeletal:      Cervical back: Normal range of motion.      Right lower leg: Edema present.      Left lower leg: Edema present.      Comments: Left radial a line. Right hip wound with clean dressing and minimal swelling.   Skin:     General: Skin is  warm.      Capillary Refill: Capillary refill takes less than 2 seconds.   Neurological:      Mental Status: He is alert.      Comments: Alert to voice and interactive, but slowed mentation compared to baseline.          Vents:       Lines/Drains/Airways       Central Venous Catheter Line  Duration                  Hemodialysis Catheter 05/03/24 1115 right subclavian 163 days    Percutaneous Central Line - Triple Lumen  10/09/24 1127 Internal Jugular Left 4 days              Arterial Line  Duration             Arterial Line 10/12/24 1040 Left Radial 1 day              Peripheral Intravenous Line  Duration                  Peripheral IV - Single Lumen 10/09/24 0831 20 G Anterior;Left Forearm 4 days                    Significant Labs:    CBC/Anemia Profile:  Recent Labs   Lab 10/11/24  1637 10/12/24  0325 10/13/24  0357   WBC 31.56* 33.99* 40.62*   HGB 11.0* 10.6* 9.9*   HCT 31.5* 29.8* 28.4*    311 396   MCV 77* 77* 76*   RDW 16.2* 16.3* 15.7*        Chemistries:  Recent Labs   Lab 10/13/24  0945   *   K 4.8   CL 92*   CO2 21*   BUN 65*   CREATININE 7.0*   CALCIUM 8.4*   ALBUMIN 2.4*   PROT 6.2   BILITOT 2.9*   ALKPHOS 203*   ALT 45*   *       All pertinent labs within the past 24 hours have been reviewed.    Significant Imaging:   I have reviewed all pertinent imaging results/findings within the past 24 hours.    ABG  Recent Labs   Lab 10/13/24  0945   PH 7.322*   PO2 38*   PCO2 44.3   HCO3 22.9*   BE -3*     Assessment/Plan:     Pulmonary  Acute on chronic hypoxic respiratory failure  Likely multifactorial in etiology (pulm edema, heart failure, possible thrombotic disease, atelectasis).  Supplement O2 to keep >90%  Treat underlying issues per their respective sections.      Pulmonary emphysema, unspecified emphysema type  Patient's COPD is controlled currently.  Patient is currently off COPD Pathway. Continue scheduled inhalers  PRN nebulizers and Supplemental oxygen and monitor respiratory  status closely.     Cardiac/Vascular  Shock  Patient chronically with systolic blood pressures in the 90s.  Throughout his hospitalization he has become progressively more hypotensive.  Unclear etiology.  All cultures have been NGTD.  There is intracardiac thrombus that were noted on TTE and are being treated with heparin.  Cardiology following.  Leukocytosis present, but this could represent a stress response. Chest x-ray consistent with volume overload and cardiomegaly. Has been on broad spectrum empiric antibiotics per ID recommendations.  - continue CRRT until BP more appropriate  - SCvO2 improved when Dobutamine added.  Certainly some component of cardiogenic shock.  - continue antibiotics as guided by ID.  Currently on broad spectrum abx (day 9). ID recommending to Continue meropenem and daptomycin  - empirically starting micafungin today (day 2) given severity of illness and progressive shock in setting of long standing abx, illness, and tunneled catheter.  No cultures positive however.  - heparin gtt currently being utilized for possible intracardiac thrombus.  If Obstructive physiology is possible, this would be the therapy for it. Obtaining CTA thorax today and B LE US.  - midodrine 15mg TID, if he can safely tolerate PO intake.  - continue Hydrocortisone and fludrocortisone given elevated pressor requirements.  - levophed and vasopressin to keep MAP >60mmHg.     Atrial mass  Unclear etiology of this (thrombus vs septic).  Seems less likely that it is septic given negative culture data.   - anticoagulation with eliquis and antibiotics per ID.  - cards on board.  - performing CTA PE study today and CT head.  Also repeat TTE and bilateral LE US    Primary hypertension  Holding all antihypertensives at this time, while in shock    Chronic combined systolic and diastolic heart failure  EF 40-45% with grade III DD.  This is likely having a significant impact on ability to tolerate iHD.  Cardiology following  -  volume removal as permitted by RRT      S/P ablation of ventricular arrhythmia  Mexiletine  Chronic anticoagulation.  Continuous cardiac monitoring.  Cards on board.    Cardiac resynchronization therapy defibrillator (CRT-D) in place  Noted      Mixed hyperlipidemia  statin    Renal/  ESRD (end stage renal disease)  Currently requiring CRRT.  Previously had utilized HD, but now too unstable from a blood pressure standpoint. Also has previously used PD, but this is currently being held.  - nephrology following.  - midodrine 15mg TID.  Now off due to inability to safely swallow.    Oncology  Leukocytosis  Patient has not shown overt evidence of infection on culture data.    - consider CT C/A/P with splenic infarcts. In setting of known cardiac thrombus, we will perform a CTA thorax and CT head.  Also performing B LE US, and repeat TTE.    - continue abx per ID.    Iron deficiency anemia  Transfuse to keep HgB >7.0    Endocrine  Moderate protein-calorie malnutrition  Nutrition consulted. Most recent weight and BMI monitored-     Measurements:  Wt Readings from Last 1 Encounters:   10/12/24 79.9 kg (176 lb 2.4 oz)   Body mass index is 26.01 kg/m².    Patient has been screened and assessed by RD.    Malnutrition Type:  Context: acute illness or injury  Level: mild    Malnutrition Characteristic Summary:  Weight Loss (Malnutrition): greater than 10% in 6 months  Subcutaneous Fat (Malnutrition): mild depletion  Muscle Mass (Malnutrition): mild depletion    Interventions/Recommendations (treatment strategy):  1. COntinue pt on renal diet 2. Continue novasource renal TID 3. Encourage intake at meals 4. Monitor weight/labs 5. RD to follow to monitor PO intake      Hypothyroidism  Synthroid  - recent tsh normal.    Orthopedic  * Closed right hip fracture, initial encounter  S/p ORIF by orthopedics.  Postoperative care per their recs.     Palliative Care  Advanced care planning/counseling discussion  Family updated at bedside  "and counseled on clinical worsening that has taken place over the past week.  Planning on more family members coming in town today and to discuss code status moving forward.    10/13/2024 - patient code status changed to DNR/DNI after family discussion and discussion with Mr Sharma himself.  The decision was made to make him DNR/DNI given severity of illness, pressor requirements, frailty, and poor likelihood of improvement with these interventions.  He is ok with reversal of code status, should a procedure be offered that could improve his condition (ie thrombectomy).         Critical Care Daily Checklist:    A: Awake: RASS Goal/Actual Goal:    Actual:     B: Spontaneous Breathing Trial Performed?     C: SAT & SBT Coordinated?  N/a                      D: Delirium: CAM-ICU     E: Early Mobility Performed? No   F: Feeding Goal: Goals: Pt will consume 50-75% intake at meals by RD follow-up  Status: Nutrition Goal Status: progressing towards goal   Current Diet Order   Procedures    Diet NPO Except for: Medication (crushed in puree, cue Pt to "swallow quick")     Order Specific Question:   Except for:     Answer:   Medication     Comments:   crushed in puree, cue Pt to "swallow quick"      AS: Analgesia/Sedation PRN   T: Thromboembolic Prophylaxis Heparin gtt   H: HOB > 300 Yes   U: Stress Ulcer Prophylaxis (if needed) N/a   G: Glucose Control PRN   B: Bowel Function Stool Occurrence: 0   I: Indwelling Catheter (Lines & Vo) Necessity Tunneled HD catheter, left IJ CVC, PIV, a line   D: De-escalation of Antimicrobials/Pharmacotherapies Per ID    Plan for the day/ETD CT, TTE, US    Code Status:  Family/Goals of Care: DNR  Recovery, but understand his severity of illness.     Critical Care Time: 60 minutes  Critical secondary to Patient has a condition that poses threat to life and bodily function: undifferentiated shock.      Critical care was time spent personally by me on the following activities: development of " treatment plan with patient or surrogate and bedside caregivers, discussions with consultants, evaluation of patient's response to treatment, examination of patient, ordering and performing treatments and interventions, ordering and review of laboratory studies, ordering and review of radiographic studies, pulse oximetry, re-evaluation of patient's condition. This critical care time did not overlap with that of any other provider or involve time for any procedures.     Jose M Connolly MD  Critical Care Medicine  [facility] - [place] - Intensive Care

## 2024-10-13 NOTE — ASSESSMENT & PLAN NOTE
Followed by Dr Tenorio. Normal function at last check    would need eval for lead extraction at main also

## 2024-10-13 NOTE — ASSESSMENT & PLAN NOTE
Patient chronically with systolic blood pressures in the 90s.  Throughout his hospitalization he has become progressively more hypotensive.  Unclear etiology.  All cultures have been NGTD.  There is intracardiac thrombus that were noted on TTE and are being treated with heparin.  Cardiology following.  Leukocytosis present, but this could represent a stress response. Chest x-ray consistent with volume overload and cardiomegaly. Has been on broad spectrum empiric antibiotics per ID recommendations.  - continue CRRT until BP more appropriate  - SCvO2 improved when Dobutamine added.  Certainly some component of cardiogenic shock.  - continue antibiotics as guided by ID.  Currently on broad spectrum abx (day 9). ID recommending to Continue meropenem and daptomycin  - empirically starting micafungin today (day 2) given severity of illness and progressive shock in setting of long standing abx, illness, and tunneled catheter.  No cultures positive however.  - heparin gtt currently being utilized for possible intracardiac thrombus.  If Obstructive physiology is possible, this would be the therapy for it. Obtaining CTA thorax today and B MARLENA RENAE.  - midodrine 15mg TID, if he can safely tolerate PO intake.  - continue Hydrocortisone and fludrocortisone given elevated pressor requirements.  - levophed and vasopressin to keep MAP >60mmHg.

## 2024-10-13 NOTE — ACP (ADVANCE CARE PLANNING)
I spoke with Mr Sharma, his wife, daughter, and son today.  Given persistent and continuously worsening pressor requirements and extreme frailty we have decided to make the patient DNR/DNI.      Given embolic evidence in spleen, we are going to obtain a CTA chest and CT head, US BL LE, and repeat TTE.  If significant findings are present that are amenable to intervention that would improve his clinical condition, the patient would be ok with reversing this code status for procedure.      All questions answered to my best ability.      Jose M Connolly MD  [facility]

## 2024-10-13 NOTE — ASSESSMENT & PLAN NOTE
Patient with Hypoxic Respiratory failure which is Acute on chronic.  he is on home oxygen at 3 LPM. Supplemental oxygen was provided and noted-      .   Signs/symptoms of respiratory failure include- tachypnea and increased work of breathing. Contributing diagnoses includes - CHF and COPD Labs and images were reviewed. Patient Has recent ABG, which has been reviewed. Will treat underlying causes and adjust management of respiratory failure as follows-     -pt on home 3L NC. Hx of COPD/CHF  -Worsening hypoxia overnight 10/05; required up to 15L HFNC.  -acute on chronic hypoxia secondary to volume overload  -Pt unable to tolerate HD and CRRT, net positive 1.5L on 10/06  -CXR with pulmonary edema   -Pulmonology consulted  -Nephrology following  -Wean O2 as tolerated .  Family asking for transfer to [place],where he came from as transfer,but orthopedic  did not accepted patient.  Will check CTA chest ,

## 2024-10-13 NOTE — ASSESSMENT & PLAN NOTE
Nutrition consulted. Most recent weight and BMI monitored-     Measurements:  Wt Readings from Last 1 Encounters:   10/12/24 79.9 kg (176 lb 2.4 oz)   Body mass index is 26.01 kg/m².    Patient has been screened and assessed by RD.    Malnutrition Type:  Context: acute illness or injury  Level: mild    Malnutrition Characteristic Summary:  Weight Loss (Malnutrition): greater than 10% in 6 months  Subcutaneous Fat (Malnutrition): mild depletion  Muscle Mass (Malnutrition): mild depletion    Interventions/Recommendations (treatment strategy):  1. COntinue pt on renal diet 2. Continue novasource renal TID 3. Encourage intake at meals 4. Monitor weight/labs 5. RD to follow to monitor PO intake

## 2024-10-13 NOTE — PROGRESS NOTES
[facility] [place] Intensive Care  Nephrology  Progress Note    Patient Name: Ar Sharma  MRN: [id number]  Admission Date: 10/3/2024  Hospital Length of Stay: 10 days  Attending Provider: Delmy Agarwal, Charanjit   Primary Care Physician: Jc Elliott MD  Principal Problem:Closed right hip fracture, initial encounter    Subjective:     HPI: Following for ESRD. Patient receives HD MWF via RIJ TDC at [facility] under the c/o Dr. Chicas. He was initiated on HD 4/2024. He remains with PD catheter.     Interval History: pressor requirements increased, but mentation improved today.    Review of patient's allergies indicates:   Allergen Reactions    Lokelma [sodium zirconium cyclosilicate] Other (See Comments)     Fluid retention, weight gain, CHF excerebration, severe constipation    Atorvastatin Other (See Comments)     Joint pain    Jardiance [empagliflozin] Other (See Comments)     Chest pains, significant weight loss, lower blood pressure     Current Facility-Administered Medications   Medication Frequency    0.9%  NaCl infusion (CRRT USE ONLY) Continuous    0.9%  NaCl infusion PRN    acetaminophen tablet 650 mg Q4H PRN    allopurinoL tablet 100 mg Daily    DAPTOmycin (CUBICIN) 645 mg in 0.9% NaCl SolP 50 mL IVPB Q48H    DOBUtamine 1000 mg in D5W 250 mL infusion Continuous    docusate sodium capsule 100 mg Daily    epoetin saba-epbx injection 10,000 Units Every Mon, Wed, Fri    fentaNYL 50 mcg/hr 1 patch Q72H    fludrocortisone tablet 100 mcg Daily    furosemide tablet 360 mg See admin instructions    heparin (porcine) injection 1,000 Units PRN    heparin (porcine) injection 5,000 Units PRN    heparin 25,000 units in dextrose 5% (100 units/ml) IV bolus from bag HIGH INTENSITY nomogram - [facility] PRN    heparin 25,000 units in dextrose 5% (100 units/ml) IV bolus from bag HIGH INTENSITY nomogram - [facility] PRN    heparin 25,000 units in dextrose 5% 250 mL (100 units/mL) infusion HIGH INTENSITY nomogram - [facility] Continuous     hydrALAZINE injection 10 mg Q6H PRN    hydrocortisone sodium succinate injection 100 mg Q8H    HYDROmorphone injection 1 mg Q4H PRN    hydrOXYzine pamoate capsule 25 mg Q8H PRN    levalbuterol nebulizer solution 1.25 mg Q4H PRN    levothyroxine tablet 150 mcg Before breakfast    magnesium sulfate 2g in water 50mL IVPB (premix) PRN    melatonin tablet 6 mg Nightly PRN    meropenem (MERREM) 500 mg in 0.9% NaCl 100 mL IVPB (MB+) Q12H    metOLazone tablet 10 mg See admin instructions    mexiletine capsule 150 mg BID WM    micafungin 100 mg in 0.9% NaCl 100 mL IVPB (MB+) Q24H    midodrine tablet 15 mg Q8H    naloxone 0.4 mg/mL injection 0.4 mg PRN    nitroGLYCERIN SL tablet 0.4 mg Q5 Min PRN    NORepinephrine 32 mg in D5W 250 mL infusion Continuous    ondansetron injection 4 mg Q6H PRN    oxyCODONE-acetaminophen  mg per tablet 1 tablet Q4H PRN    oxyCODONE-acetaminophen 5-325 mg per tablet 1 tablet Q4H PRN    polyethylene glycol packet 17 g BID PRN    polyethylene glycol packet 17 g Daily    pravastatin tablet 40 mg QHS    promethazine (PHENERGAN) 6.25 mg in 0.9% NaCl 50 mL IVPB Q6H PRN    simethicone chewable tablet 80 mg QID PRN    sodium chloride 0.9% bolus 250 mL 250 mL PRN    sodium chloride 0.9% bolus 500 mL 500 mL ED 1 Time    sodium chloride 0.9% flush 10 mL Q12H PRN    sodium chloride 0.9% flush 10 mL PRN    sodium phosphate 20.01 mmol in D5W 250 mL IVPB PRN    sodium phosphate 30 mmol in D5W 250 mL IVPB PRN    sodium phosphate 39.99 mmol in D5W 250 mL IVPB PRN    triamcinolone acetonide 0.1% cream BID    vasopressin (PITRESSIN) 0.2 Units/mL in D5W 100 mL infusion Continuous       Objective:     Vital Signs (Most Recent):  Temp: 97.6 °F (36.4 °C) (10/13/24 0800)  Pulse: 77 (10/13/24 1100)  Resp: (!) 21 (10/13/24 1100)  BP: 106/61 (10/13/24 1100)  SpO2: (!) 94 % (10/13/24 1100) Vital Signs (24h Range):  Temp:  [97.5 °F (36.4 °C)-98.1 °F (36.7 °C)] 97.6 °F (36.4 °C)  Pulse:  [75-94] 77  Resp:  [9-37]  21  SpO2:  [88 %-100 %] 94 %  BP: ()/(53-78) 106/61  Arterial Line BP: ()/(38-58) 116/49     Weight: 79.9 kg (176 lb 2.4 oz) (10/12/24 2305)  Body mass index is 26.01 kg/m².  Body surface area is 1.97 meters squared.    I/O last 3 completed shifts:  In: 2267.8 [I.V.:1842.6; IV Piggyback:425.2]  Out: 0      Physical Exam  Constitutional:       General: He is not in acute distress.     Appearance: He is ill-appearing and toxic-appearing.      Comments: cachetic   HENT:      Head: Normocephalic and atraumatic.      Nose: Nose normal. No congestion.      Mouth/Throat:      Mouth: Mucous membranes are moist.   Cardiovascular:      Rate and Rhythm: Normal rate.      Heart sounds: Murmur heard.   Pulmonary:      Effort: Pulmonary effort is normal.      Breath sounds: No rhonchi.   Abdominal:      General: Abdomen is flat. There is no distension.   Musculoskeletal:      Right lower leg: No edema.      Left lower leg: No edema.          Significant Labs:  All labs within the past 24 hours have been reviewed.     Significant Imaging:  Labs: Reviewed  Assessment/Plan:     Renal/  ESRD (end stage renal disease)  ESRD MWF    Discussed with family today with regards ot renal outcomes and overall goals of care. Will attempt CVVHD in an effort to possibly correct cardiogenic shock, however this was unlikely to be the case. And while pressor requirements may improve, the degree of improvement vs his ability to tolerate hemodialysis at this point may be the limiting factor in the effort to cure him. If starting RRT makes him clinically worse, then will stop RRT and discuss further goals of care    Plan/recommendation  -CVVHD today, UF 25 cc, stop if pressor requirements increase  -Strict ins and outs  -Avoid nephrotoxic agents if possible and renally dose medications  -Avoid drastic hemodynamic changes if possible    Secondary hyperparathyroidism  Calcium   Date Value Ref Range Status   10/13/2024 8.4 (L) 8.7 - 10.5  mg/dL Final     Phosphorus   Date Value Ref Range Status   10/10/2024 4.1 2.7 - 4.5 mg/dL Final     PTH, Intact   Date Value Ref Range Status   04/29/2024 162.6 (H) 9.0 - 77.0 pg/mL Final   Continue to monitor - critically ill this admission    Anemia of chronic renal disease  Goal hemoglobin in ESRD is 10-12  Hemoglobin   Date Value Ref Range Status   10/13/2024 9.9 (L) 14.0 - 18.0 g/dL Final     Iron   Date Value Ref Range Status   10/05/2024 22 (L) 45 - 160 ug/dL Final     Transferrin   Date Value Ref Range Status   10/05/2024 207 200 - 375 mg/dL Final     TIBC   Date Value Ref Range Status   10/05/2024 306 250 - 450 ug/dL Final     Saturated Iron   Date Value Ref Range Status   10/05/2024 7 (L) 20 - 50 % Final     Ferritin   Date Value Ref Range Status   10/05/2024 338 (H) 20.0 - 300.0 ng/mL Final   Continue to monitor - critically ll this admission      Critical care time spent on the evaluation and treatment of severe organ dysfunction, review of pertinent labs and imaging studies, discussions with consulting providers and discussions with patient/family: 30 minutes.    Thank you for your consult. I will follow-up with patient. Please contact us if you have any additional questions.    Shashank Brown MD  Nephrology  [facility] [place] - Intensive Care

## 2024-10-14 PROBLEM — A41.9 SEPTIC SHOCK: Status: ACTIVE | Noted: 2024-01-01

## 2024-10-14 PROBLEM — R65.21 SEPTIC SHOCK: Status: ACTIVE | Noted: 2024-01-01

## 2024-10-14 PROBLEM — I26.99 ACUTE PULMONARY EMBOLISM: Status: ACTIVE | Noted: 2024-01-01

## 2024-10-14 PROBLEM — G93.40 ENCEPHALOPATHY: Status: ACTIVE | Noted: 2024-01-01

## 2024-10-14 NOTE — ASSESSMENT & PLAN NOTE
Echo. Show ,  Left Atrium: There is a large mobile frondlike mass, appears to be adherent to LA septum.    Right Atrium: Right atrium is severely dilated. Multiple leads present in the right atrium with possible vegetation adherent to lead in RA.  Cardiology is consulted,recommended anticoagulations with heparin gtt,and IV Abx for possible endocarditis ,ID is consulted.blood culture is repeated.Abx is broadened .    Repeat Echo. By cardiology show enlarging right atrial  mass.cardiology spoke with interventional  and CTS in [facility],called transfer center go to [facility],today will be call meeting with CCU ,CTS and interventional cardiology phone call to decide if patient can have intervention.

## 2024-10-14 NOTE — SUBJECTIVE & OBJECTIVE
Past Medical History:   Diagnosis Date    Cardiomyopathy     CKD (chronic kidney disease) stage 4, GFR 15-29 ml/min     Congestive heart failure (CHF) 2015    COPD (chronic obstructive pulmonary disease)     Coronary artery disease     Edema     Essential (primary) hypertension 05/18/2022    Formatting of this note might be different from the original. Converted from Cent[employer]ity: Description - ESSENTIAL HYPERTENSION, BENIGN    Heart attack     HLD (hyperlipidemia)     Hypertension     Hyperuricemia     Hypocalcemia     Renal cyst, left     Secondary hyperparathyroidism     Thyroid disease     V tach     Vitamin D deficiency        Past Surgical History:   Procedure Laterality Date    ablations  03/05/2018    albations  02/01/2018    COLONOSCOPY N/A 12/07/2018    Procedure: COLONOSCOPY/suprep;  Surgeon: Ananya Morillo MD;  Location: [facility] ENDO;  Service: Endoscopy;  Laterality: N/A;    defibulater N/A 2016    ESOPHAGOGASTRODUODENOSCOPY N/A 12/07/2018    Procedure: EGD (ESOPHAGOGASTRODUODENOSCOPY);  Surgeon: Ananya Morillo MD;  Location: [facility] ENDO;  Service: Endoscopy;  Laterality: N/A;    INSERTION OF TUNNELED CENTRAL VENOUS HEMODIALYSIS CATHETER Right 5/3/2024    Procedure: INSERTION, CATHETER, HEMODIALYSIS, DUAL LUMEN;  Surgeon: Philip Perez MD;  Location: [facility] OR;  Service: General;  Laterality: Right;    INSERTION, CATHETER, DIALYSIS, PERITONEAL, LAPAROSCOPIC N/A 5/8/2024    Procedure: INSERTION, CATHETER, DIALYSIS, PERITONEAL, LAPAROSCOPIC;  Surgeon: Tyree Ruiz MD;  Location: [facility] OR;  Service: General;  Laterality: N/A;    INSERTION, CENTRAL VENOUS ACCESS DEVICE Right 6/5/2024    Procedure: Insertion,central venous access device;  Surgeon: Tyree Ruiz MD;  Location: [facility] OR;  Service: General;  Laterality: Right;    JOINT REPLACEMENT Bilateral 10/2016    knees, bilat    LEFT HEART CATHETERIZATION N/A 12/16/2020    Procedure: Left heart cath;  Surgeon: Shahram Stewart MD;  Location:  [facility] CATH LAB/EP;  Service: Cardiology;  Laterality: N/A;    LEFT HEART CATHETERIZATION Left 9/27/2022    Procedure: Left heart cath;  Surgeon: Juan Roque MD;  Location: [facility] CATH LAB/EP;  Service: Cardiology;  Laterality: Left;    OPEN REDUCTION AND INTERNAL FIXATION (ORIF) OF INTERTROCHANTERIC FRACTURE OF FEMUR Right 10/4/2024    Procedure: ORIF, FRACTURE, FEMUR, INTERTROCHANTERIC;  Surgeon: Kinsey Reed MD;  Location: [facility] OR;  Service: Orthopedics;  Laterality: Right;  Cristina Del Real notified- Nc    IN HEMODIALYSIS, ONE EVALUATION  5/6/2024    REMOVAL OF HEMODIALYSIS CATHETER Right 5/3/2024    Procedure: REMOVAL, CATHETER, HEMODIALYSIS;  Surgeon: Philip Perez MD;  Location: [facility] OR;  Service: General;  Laterality: Right;    REPLACEMENT OF IMPLANTABLE CARDIOVERTER-DEFIBRILLATOR (ICD) GENERATOR Left 1/24/2023    Procedure: REPLACEMENT, ICD GENERATOR;  Surgeon: River Tenorio MD;  Location: [facility] EP LAB;  Service: Cardiology;  Laterality: Left;  ANTHONY, CRTD gen chg, MDT, MAC, DM, 3prep       Review of patient's allergies indicates:   Allergen Reactions    Lokelma [sodium zirconium cyclosilicate] Other (See Comments)     Fluid retention, weight gain, CHF excerebration, severe constipation    Atorvastatin Other (See Comments)     Joint pain    Jardiance [empagliflozin] Other (See Comments)     Chest pains, significant weight loss, lower blood pressure       Current Neurological Medications:     No current facility-administered medications on file prior to encounter.     Current Outpatient Medications on File Prior to Encounter   Medication Sig    acetaminophen (TYLENOL) 500 MG tablet Take 500 mg by mouth every 6 (six) hours as needed for Pain.    allopurinoL (ZYLOPRIM) 100 MG tablet TAKE 1 TABLET BY MOUTH EVERY DAY    amiodarone (PACERONE) 400 MG tablet Take 1 tablet (400 mg total) by mouth once daily.    aspirin (ECOTRIN) 81 MG EC tablet TAKE 1 TABLET BY MOUTH EVERY DAY    ergocalciferol  (ERGOCALCIFEROL) 50,000 unit Cap TAKE 1 CAPSULE BY MOUTH ONE TIME PER WEEK    furosemide (LASIX) 80 MG tablet Take 4.5 tablets (360 mg total) by mouth 2 (two) times daily.    levothyroxine (SYNTHROID) 150 MCG tablet Take 1 tablet (150 mcg total) by mouth once daily.    metOLazone (ZAROXOLYN) 10 MG tablet Take 10 mg by mouth As instructed (Take I tablet by mouth Tuesday, Thursday, Saturday, ). Take I tablet by mouth Tuesday, Thursday, Saturday,     mexiletine (MEXITIL) 150 MG Cap Take 1 capsule (150 mg total) by mouth 2 (two) times daily with meals.    midodrine (PROAMATINE) 5 MG Tab Take 3 tablets (15 mg total) by mouth every 8 (eight) hours.    nitroGLYCERIN (NITROSTAT) 0.4 MG SL tablet Place 1 tablet (0.4 mg total) under the tongue every 5 (five) minutes as needed for Chest pain.    pantoprazole (PROTONIX) 40 MG tablet Take 1 tablet (40 mg total) by mouth once daily.    polyethylene glycol (GLYCOLAX) 17 gram PwPk Take 17 g by mouth once daily.    pravastatin (PRAVACHOL) 40 MG tablet TAKE 1 TABLET BY MOUTH EVERY DAY    vitamin renal formula, B-complex-vitamin c-folic acid, (TRIPHROCAPS) 1 mg Cap Take 1 capsule by mouth once daily.     Family History       Problem Relation (Age of Onset)    Alcohol abuse Father    Arthritis Sister    Breast cancer Mother    Cancer Mother    Hypertension Mother    Kidney disease Father    No Known Problems Brother, Daughter, Son    Stroke Mother          Tobacco Use    Smoking status: Former     Current packs/day: 0.00     Average packs/day: 1 pack/day for 33.2 years (33.2 ttl pk-yrs)     Types: Cigarettes     Start date: 1979     Quit date: 3/3/2012     Years since quittin.6     Passive exposure: Past    Smokeless tobacco: Never   Substance and Sexual Activity    Alcohol use: Not Currently     Comment: rare    Drug use: No    Sexual activity: Not Currently     Partners: Female     Review of Systems   Respiratory:  Negative for shortness of breath.   "  Cardiovascular:  Negative for chest pain.   Neurological:  Negative for seizures, facial asymmetry and weakness.   Psychiatric/Behavioral:  Positive for confusion.      Objective:     Vital Signs (Most Recent):  Temp: 96.1 °F (35.6 °C) (10/14/24 0800)  Pulse: 69 (10/14/24 1215)  Resp: (!) 31 (10/14/24 1215)  BP: (!) 114/51 (10/14/24 1200)  SpO2: (!) 92 % (10/14/24 1215) Vital Signs (24h Range):  Temp:  [96.1 °F (35.6 °C)-98.4 °F (36.9 °C)] 96.1 °F (35.6 °C)  Pulse:  [] 69  Resp:  [8-31] 31  SpO2:  [75 %-98 %] 92 %  BP: (100-125)/(45-66) 114/51  Arterial Line BP: ()/(38-61) 114/51     Weight: 79.2 kg (174 lb 9.7 oz)  Body mass index is 25.78 kg/m².     Physical Exam  HENT:      Head: Normocephalic and atraumatic.   Eyes:      Extraocular Movements: Extraocular movements intact and EOM normal.   Pulmonary:      Effort: No respiratory distress.   Neurological:      Mental Status: He is alert.   Psychiatric:         Speech: Speech normal.          NEUROLOGICAL EXAMINATION:     MENTAL STATUS   Oriented to person.   Disoriented to place.   Disoriented to time.   Attention: decreased. Concentration: decreased.   Speech: speech is normal   Level of consciousness: alert    CRANIAL NERVES     CN III, IV, VI   Extraocular motions are normal.   Nystagmus: none   Ophthalmoparesis: none    CN VII   Facial expression full, symmetric.     MOTOR EXAM        No new focal motor deficits       SENSORY EXAM        No new focal sensory deficits       GAIT AND COORDINATION     Tremor   Resting tremor: absent      Significant Labs: Hemoglobin A1c: No results for input(s): "HGBA1C" in the last 720 hours.  Blood Culture:   Recent Labs   Lab 10/12/24  1449   LABBLOO No Growth to date  No Growth to date     BMP:   Recent Labs   Lab 10/13/24  1345 10/14/24  0052 10/14/24  0628   * 148* 147*   * 125* 126*   K 4.9 5.1 5.2*   CL 92* 92* 92*   CO2 17* 18* 18*   BUN 67* 77* 78*   CREATININE 7.1* 7.8* 8.0*   CALCIUM 8.2* " "8.1* 8.0*   MG 2.3  --  2.4     CBC:   Recent Labs   Lab 10/13/24  0357 10/14/24  0052 10/14/24  0628   WBC 40.62* 38.14* 37.64*   HGB 9.9* 10.0* 9.7*   HCT 28.4* 27.8* 27.3*    413 421     CMP:   Recent Labs   Lab 10/13/24  0945 10/13/24  1345 10/14/24  0052 10/14/24  0628   * 155* 148* 147*   * 130* 125* 126*   K 4.8 4.9 5.1 5.2*   CL 92* 92* 92* 92*   CO2 21* 17* 18* 18*   BUN 65* 67* 77* 78*   CREATININE 7.0* 7.1* 7.8* 8.0*   CALCIUM 8.4* 8.2* 8.1* 8.0*   MG  --  2.3  --  2.4   PROT 6.2  --   --   --    ALBUMIN 2.4* 2.4*  --   --    BILITOT 2.9*  --   --   --    ALKPHOS 203*  --   --   --    *  --   --   --    ALT 45*  --   --   --    ANIONGAP 14 21* 15 16     CSF Culture: No results for input(s): "CSFCULTURE" in the last 48 hours.  CSF Studies: No results for input(s): "ALIQUT", "APPEARCSF", "COLORCSF", "CSFWBC", "CSFRBC", "GLUCCSF", "LDHCSF", "PROTEINCSF", "VDRLCSF" in the last 48 hours.  Inflammatory Markers: No results for input(s): "SEDRATE", "CRP", "PROCAL" in the last 48 hours.  POCT Glucose: No results for input(s): "POCTGLUCOSE" in the last 24 hours.  Prealbumin: No results for input(s): "PREALBUMIN" in the last 48 hours.  Respiratory Culture: No results for input(s): "GSRESP", "RESPIRATORYC" in the last 48 hours.  Urine Culture: No results for input(s): "LABURIN" in the last 48 hours.  Urine Studies: No results for input(s): "COLORU", "APPEARANCEUA", "PHUR", "SPECGRAV", "PROTEINUA", "GLUCUA", "KETONESU", "BILIRUBINUA", "OCCULTUA", "NITRITE", "UROBILINOGEN", "LEUKOCYTESUR", "RBCUA", "WBCUA", "BACTERIA", "SQUAMEPITHEL", "HYALINECASTS" in the last 48 hours.    Invalid input(s): "WRIGHTSUR"  Recent Lab Results  (Last 5 results in the past 24 hours)        10/14/24  0628   10/14/24  0053   10/14/24  0052   10/13/24  1838   10/13/24  1345        Acanthocytes     Present           Albumin         2.4       Allens Test   N/A             Anion Gap 16     15     21       Aniso     " Slight           PTT 49.5  Comment: Refer to local heparin nomogram for intensity/dose specific   therapeutic   range.                 Bands     6.0           Baso # 0.11     CANCELED  Comment: Result canceled by the ancillary.           Basophil % 0.3     0.0           Site   Other             BUN 78     77     67       Narvon/Echinocytes     Occasional           Calcium 8.0     8.1     8.2       Chloride 92     92     92       CO2 18     18     17       Creatinine 8.0     7.8     7.1       DelSys   Nasal Can             Differential Method Automated     Manual           eGFR 7     7     8       Eos # 0.0     CANCELED  Comment: Result canceled by the ancillary.           Eos % 0.0     0.0           Glucose 147     148     155       Gran # (ANC) 33.4               Gran % 88.6     86.0           Hematocrit 27.3     27.8           Hemoglobin 9.7     10.0           HIV 1/2 Ag/Ab       Non-reactive         Immature Grans (Abs) 1.85  Comment: Mild elevation in immature granulocytes is non specific and   can be seen in a variety of conditions including stress response,   acute inflammation, trauma and pregnancy. Correlation with other   laboratory and clinical findings is essential.       CANCELED  Comment: Mild elevation in immature granulocytes is non specific and   can be seen in a variety of conditions including stress response,   acute inflammation, trauma and pregnancy. Correlation with other   laboratory and clinical findings is essential.    Result canceled by the ancillary.             Immature Granulocytes 4.9     CANCELED  Comment: Result canceled by the ancillary.           Lactic Acid Level     0.7  Comment: Falsely low lactic acid results can be found in samples   containing >=13.0 mg/dL total bilirubin and/or >=3.5 mg/dL   direct bilirubin.             Lymph # 0.5     CANCELED  Comment: Result canceled by the ancillary.           Lymph % 1.4     4.0           Magnesium  2.4         2.3       MCH 26.5      26.7           MCHC 35.5     36.0           MCV 75     74           Mono # 1.8     CANCELED  Comment: Result canceled by the ancillary.           Mono % 4.8     4.0           MPV 11.5     11.3           nRBC 0     0           Phosphorus Level         8.3       Platelet Estimate     Appears normal           Platelet Count 421     413           POC BE   -5             POC HCO3   21.7             POC PCO2   43.4             POC PH   7.307             POC PO2   40             POC SATURATED O2   70             POC TCO2   23             Poikilocytosis     Slight           Poly     Occasional           Potassium 5.2     5.1     4.9       RBC 3.66     3.75           RDW 15.7     15.9           Sample   VENOUS             Schistocytes     Present           Sodium 126     125     130       WBC 37.64     38.14                                All pertinent lab results from the past 24 hours have been reviewed.    Significant Imaging: I have reviewed and interpreted all pertinent imaging results/findings within the past 24 hours.

## 2024-10-14 NOTE — PROGRESS NOTES
"HOSPITALIST ON CALL NOTE:    Contacted by the RN regarding changed in neuro status. Reviewed his case and went to bedside. Pt. Found with family in the room. Pt. Appears aphasic and strength 2/5 upper and lower extremities (recently had a right hip replacement so right lower extremity weakness anticipated).   Noted echo:  Results for orders placed during the hospital encounter of 10/03/24    Echo    Interpretation Summary    Left Ventricle: The left ventricle is normal in size. Normal wall thickness. There is mildly reduced systolic function with a visually estimated ejection fraction of  45%.    Right Ventricle: Right ventricular enlargement. Systolic function is reduced.    Left Atrium: Left atrium is dilated.    Right Atrium: Right atrium is dilated. 4.2 x 2.5 large mobile echogenic mass present.appears to be attached to the device lead    Tricuspid Valve: There is severe regurgitation.    Pulmonary Artery: There is severe pulmonary hypertension. The estimated pulmonary artery systolic pressure is 72 mmHg.    IVC/SVC: Elevated venous pressure at 15 mmHg.    Limited echo    Findings conveyed to critical care and primary team      D/w RN and pt. With tunneled HD catheter on the right and TLC on the left.   He is on heparin and given wax and waning symptoms concern for possible embolic stroke vs. Bleed due to hemorrhagic conversion. Was noted to have an area of possible stroke on the right parietal lobe on CT head this am. Also noted to have bilateral PE on CTA chest.     STAT CT non-contrast Head negative for bleed or mass affect.   Called and d/w Nephrologyh if I can order a CTA head and neck, which was approved.     CTA head and neck with no large vessel occlusion.     D/w family at bedside results and will f/u with primary team. [facility] eval this right atrial "mass".     Vitals stable with SBP >100.       "

## 2024-10-14 NOTE — PT/OT/SLP PROGRESS
Physical Therapy      Patient Name:  Ar Sharma   MRN:  [id number]    Patient not seen today secondary to Nurse/ DONNELL hold, Other (Comment) (Pt receiving CRRT for the next several days, currently on pressors.). Will follow-up when appropriate.

## 2024-10-14 NOTE — ASSESSMENT & PLAN NOTE
-s/p fall. No LOC or head trauma  -Right hip XR: minimally displaced intertrochanteric fracture of the right proximal femur.   -Orthopedic surgery consulted: s/p right  femur intramedullary nail on 10/04  -PT/OT once more stable.  Per ortho. No sign of active infection,

## 2024-10-14 NOTE — PROGRESS NOTES
[facility] - Intensive Care  Nephrology  Progress Note    Patient Name: Ar Sharma  MRN: [id number]  Admission Date: 10/3/2024  Hospital Length of Stay: 11 days  Attending Provider: Delmy Agarwal, *   Primary Care Physician: Jc Elliott MD  Principal Problem:Closed right hip fracture, initial encounter    Subjective:     HPI: Following for ESRD. Patient receives HD MWF via [facility] TDC at [facility] under the c/o Dr. Chicas. He was initiated on HD 4/2024. He remains with PD catheter.     Interval History: acute events overnight - CTA with bilateral PE and increased mass in right atrium. Overnight additionally with aphasic and TIA sx, CT head negative and CTA head/neck negative for acute findings. This AM, is responsive - albiet slowed, encephalopathy waxing and waning    Review of patient's allergies indicates:   Allergen Reactions    Lokelma [sodium zirconium cyclosilicate] Other (See Comments)     Fluid retention, weight gain, CHF excerebration, severe constipation    Atorvastatin Other (See Comments)     Joint pain    Jardiance [empagliflozin] Other (See Comments)     Chest pains, significant weight loss, lower blood pressure     Current Facility-Administered Medications   Medication Frequency    0.9%  NaCl infusion (CRRT USE ONLY) Continuous    0.9%  NaCl infusion PRN    acetaminophen tablet 650 mg Q4H PRN    allopurinoL tablet 100 mg Daily    DAPTOmycin (CUBICIN) 645 mg in 0.9% NaCl SolP 50 mL IVPB Q48H    DOBUtamine 1000 mg in D5W 250 mL infusion Continuous    docusate sodium capsule 100 mg Daily    epoetin saba-epbx injection 10,000 Units Every Mon, Wed, Fri    famotidine (PF) injection 20 mg Daily    fentaNYL 50 mcg/hr 1 patch Q72H    fludrocortisone tablet 100 mcg Daily    furosemide tablet 360 mg See admin instructions    heparin (porcine) injection 1,000 Units PRN    heparin (porcine) injection 5,000 Units PRN    heparin 25,000 units in dextrose 5% (100 units/ml) IV bolus from bag HIGH  INTENSITY nomogram - [facility] PRN    heparin 25,000 units in dextrose 5% (100 units/ml) IV bolus from bag HIGH INTENSITY nomogram - [facility] PRN    heparin 25,000 units in dextrose 5% 250 mL (100 units/mL) infusion HIGH INTENSITY nomogram - [facility] Continuous    hydrALAZINE injection 10 mg Q6H PRN    hydrocortisone sodium succinate injection 100 mg Q8H    HYDROmorphone injection 1 mg Q4H PRN    hydrOXYzine pamoate capsule 25 mg Q8H PRN    levalbuterol nebulizer solution 1.25 mg Q4H PRN    levothyroxine tablet 150 mcg Before breakfast    magnesium sulfate 2g in water 50mL IVPB (premix) PRN    melatonin tablet 6 mg Nightly PRN    meropenem (MERREM) 500 mg in 0.9% NaCl 100 mL IVPB (MB+) Q12H    metOLazone tablet 10 mg See admin instructions    mexiletine capsule 150 mg BID WM    micafungin 100 mg in 0.9% NaCl 100 mL IVPB (MB+) Q24H    midodrine tablet 15 mg Q8H    naloxone 0.4 mg/mL injection 0.4 mg PRN    nitroGLYCERIN SL tablet 0.4 mg Q5 Min PRN    NORepinephrine 32 mg in D5W 250 mL infusion Continuous    ondansetron injection 4 mg Q6H PRN    oxyCODONE-acetaminophen  mg per tablet 1 tablet Q4H PRN    oxyCODONE-acetaminophen 5-325 mg per tablet 1 tablet Q4H PRN    polyethylene glycol packet 17 g BID PRN    polyethylene glycol packet 17 g Daily    pravastatin tablet 40 mg QHS    promethazine (PHENERGAN) 6.25 mg in 0.9% NaCl 50 mL IVPB Q6H PRN    simethicone chewable tablet 80 mg QID PRN    sodium chloride 0.9% bolus 250 mL 250 mL PRN    sodium chloride 0.9% flush 10 mL Q12H PRN    sodium chloride 0.9% flush 10 mL PRN    sodium phosphate 20.01 mmol in D5W 250 mL IVPB PRN    sodium phosphate 30 mmol in D5W 250 mL IVPB PRN    sodium phosphate 39.99 mmol in D5W 250 mL IVPB PRN    triamcinolone acetonide 0.1% cream BID    vasopressin (PITRESSIN) 0.2 Units/mL in D5W 100 mL infusion Continuous       Objective:     Vital Signs (Most Recent):  Temp: 98.4 °F (36.9 °C) (10/14/24 0305)  Pulse: 70 (10/14/24 0600)  Resp: 10 (10/14/24  0600)  BP: (!) 116/48 (10/14/24 0600)  SpO2: 95 % (10/14/24 0600) Vital Signs (24h Range):  Temp:  [98 °F (36.7 °C)-98.4 °F (36.9 °C)] 98.4 °F (36.9 °C)  Pulse:  [] 70  Resp:  [8-22] 10  SpO2:  [75 %-98 %] 95 %  BP: ()/(45-66) 116/48  Arterial Line BP: (101-123)/(45-61) 116/48     Weight: 79.2 kg (174 lb 9.7 oz) (10/14/24 0305)  Body mass index is 25.78 kg/m².  Body surface area is 1.96 meters squared.    I/O last 3 completed shifts:  In: 2852.9 [I.V.:2025; IV Piggyback:827.9]  Out: 0      Physical Exam  Constitutional:       General: He is not in acute distress.     Appearance: He is ill-appearing and toxic-appearing.      Comments: cachetic   HENT:      Head: Normocephalic and atraumatic.      Nose: Nose normal. No congestion.      Mouth/Throat:      Mouth: Mucous membranes are dry.   Eyes:      Pupils: Pupils are equal, round, and reactive to light.   Cardiovascular:      Rate and Rhythm: Normal rate.      Heart sounds: Murmur heard.   Pulmonary:      Effort: Pulmonary effort is normal.      Breath sounds: No rhonchi.   Musculoskeletal:      Cervical back: Normal range of motion.      Right lower leg: No edema.      Left lower leg: No edema.   Skin:     General: Skin is warm.      Findings: Bruising present.   Neurological:      Mental Status: He is alert.          Significant Labs:  All labs within the past 24 hours have been reviewed.     Significant Imaging:  Labs: Reviewed  Assessment/Plan:     Renal/  ESRD (end stage renal disease)  ESRD MWF    Possibly will go to main campus for higher level of care with regards to his bilateral PE and right atrial mass    Plan/recommendation  -CVVHD today, 0 UF in light of obstructive shock due to bilateral PE.    -Strict ins and outs  -Avoid nephrotoxic agents if possible and renally dose medications  -Avoid drastic hemodynamic changes if possible    Secondary hyperparathyroidism  Calcium   Date Value Ref Range Status   10/14/2024 8.0 (L) 8.7 - 10.5 mg/dL  Final     Phosphorus   Date Value Ref Range Status   10/13/2024 8.3 (H) 2.7 - 4.5 mg/dL Final     PTH, Intact   Date Value Ref Range Status   04/29/2024 162.6 (H) 9.0 - 77.0 pg/mL Final   Continue to monitor - critically ill this admission    Anemia of chronic renal disease  Goal hemoglobin in ESRD is 10-12  Hemoglobin   Date Value Ref Range Status   10/14/2024 9.7 (L) 14.0 - 18.0 g/dL Final     Iron   Date Value Ref Range Status   10/05/2024 22 (L) 45 - 160 ug/dL Final     Transferrin   Date Value Ref Range Status   10/05/2024 207 200 - 375 mg/dL Final     TIBC   Date Value Ref Range Status   10/05/2024 306 250 - 450 ug/dL Final     Saturated Iron   Date Value Ref Range Status   10/05/2024 7 (L) 20 - 50 % Final     Ferritin   Date Value Ref Range Status   10/05/2024 338 (H) 20.0 - 300.0 ng/mL Final   Continue to monitor - critically ll this admission      Critical care time spent on the evaluation and treatment of severe organ dysfunction, review of pertinent labs and imaging studies, discussions with consulting providers and discussions with patient/family: 30 minutes.      Thank you for your consult. I will follow-up with patient. Please contact us if you have any additional questions.    Shashank Brown MD  Nephrology  [facility] - Intensive Care

## 2024-10-14 NOTE — PROGRESS NOTES
[facility] Medicine  Progress Note    Patient Name: Ar Sharma  MRN: [id number]  Patient Class: IP- Inpatient   Admission Date: 10/3/2024  Length of Stay: 11 days  Attending Physician: Delmy Agarwal, *  Primary Care Provider: Jc Elliott MD        Subjective:     Principal Problem:Closed right hip fracture, initial encounter        HPI:  63 y.o. AAM with h/o ESRD on HD (for 2 months but prior was on PD for about a month-Follows with [facility]) MWF via a tunneled HD catheter on the right, Essential HTN, HLD, CAD,  h/o systolic(EF 40-45%) and diastolic grade 3 CHF due to non-ischemic cardiomyopathy with a [employer] CRT-D implant 2016 (sees Dr. Hernandez) uses 3L supplemental oxygen at night, and h/o VT arhythmia (follows with Dr. Tenorio at [facility]) s/p ablation and on Mexiletin 150mg PO BID and Amiodarone 400mg daily present to the ER with c/o right hip pain after a fall at home.     Went to HD today and was feeling alright after. Was moving his tool box when he tripped over his feet and fell to the ground. Was unable to get up or put weight on his right leg.     Work up in the ED at [facility] noted normal WBC and Stable H/H.  INR 1.3.  Lytes stable and c/w post-HD with no indications for emergent HD tonight.     Right hip/pelvis x-ray FINDINGS:  There is a minimally displaced intertrochanteric fracture of the right proximal femur.  No additional fractures are seen.  There is osteopenia.  The femoral heads are well seated in the acetabula.  There is mild joint space narrowing of the bilateral femoroacetabular joints and the pubic symphysis.  There is a peritoneal dialysis catheter overlying the pelvis.     Impression:     Right intertrochanteric fracture.      Transfer center contacted us for admission due to [facility] on Ortho-diversion. They spoke with Dr. Mcclendon who agreed to see the patient in consultation. We have been asked to accept for further pre-op planning.  Last time he took his ASA was yesterday am.       Results for orders placed during the hospital encounter of 04/29/24    Echo Saline Bubble? No; Ultrasound enhancing contrast? No    Interpretation Summary    Left Ventricle: The left ventricle is normal in size. Normal wall thickness. Regional wall motion abnormalities present. See diagram for wall motion findings. Septal motion is consistent with pacing. There is mildly reduced systolic function with a visually estimated ejection fraction of 40 - 45%. Grade III diastolic dysfunction.    Right Ventricle: Moderate to severe right ventricular enlargement. Systolic function is reduced.TAPSE is 1.59 cm. Pacemaker lead present in the ventricle.    Left Atrium: Left atrium is severely dilated. The left atrium volume index is 71.4 mL/m2.    Right Atrium: Right atrium is severely dilated.    Aortic Valve: There is mild aortic valve sclerosis. There is mild to moderate stenosis. Aortic valve area by VTI is 1.49 cm². Aortic valve peak velocity is 1.43 m/s. Mean gradient is 5 mmHg. The dimensionless index is 0.52.    Mitral Valve: There is mild regurgitation.    Tricuspid Valve: There is mild to moderate regurgitation.    Pulmonary Artery: The estimated pulmonary artery systolic pressure is 64 mmHg.    IVC/SVC: Elevated venous pressure at 15 mmHg.      Overview/Hospital Course:   63 y.o. AAM with h/o ESRD on HD (MWF), HTN, HLD, CAD, CHF admitted on 10/03/24 for Right intertrochanteric Hip fracture after a fall at home. No head trauma or LOC. CXR with interstitial edema.  No evidence of pneumothorax.  X-ray of the right hip with minimally displaced intertrochanteric fracture of the right proximal femur.  Orthopedic surgery consulted- s/p right  femur intramedullary nail on 10/04.  Minimal blood loss per orthopedic. Echo with EF of 40-45%, mild aortic stenosis.  PASP of 66 mm Hg.  Cardiology consulted for cardiac clearance-cleared for surgery at moderate cardiac risk.  Continue  symptomatic management and supportive care.  Nephrology following for ESRD. Patient  hypotensive at baseline but acutely more hypotensive during hospitalization but is asymptomatic. Unable to tolerate HD on 10/05. Persistently hypotensive. patient transferred to ICU for presssor support and CRRT on 10/05.  Tolerating CRRT.    Echo.on 10.7  Show ,  Left Atrium: There is a large mobile frondlike mass, appears to be adherent to LA septum.    Right Atrium: Right atrium is severely dilated. Multiple leads present in the right atrium with possible vegetation adherent to lead in RA.  Cardiology is consulted,recommended anticoagulations and IV Abx for possible endocarditis ,ID is consulted.blood culture is repeated.no growth,  BP remains on low side,require 3 vasopressors with levophed,dobutamine and vasopressin.  Patient at this time is on 3 vasopressors,already consulted palliative and spoke with daughter and wife,they agree with DNR status.  CC added Micafungin,spooked with ID ,CT chest,abdomen,pelvic show no abscess,,also ID changed cefepime with meropenem.also added Daptomycin.  Family asking for transfer to [place],where he came from as transfer,but orthopedic  did not accepted patient.per our orthopedic,leg is not infected.   checked CTA chest ,has PE,but no leg DVT,remains on heparin gtt.  Repeat Echo. By cardiology show enlarging right atrial  mass.cardiology spoke with interventional  and CTS in [place],called transfer center go to [place],today will be call meeting with CCU ,CTS and interventional cardiology phone call to decide if patient can have intervention.  CTA head show ,Small area of hypoattenuation in the right parietal lobe, which may be related to chronic small vessel ischemic changes or age-indeterminate infarct.  No large vessel occlusion ,he is aletr and oriented at this time.consulted neurology for evaluations.    Interval History: more alert today.  Echo.on 10.7  Show ,  Left Atrium: There is a  large mobile frondlike mass, appears to be adherent to LA septum.    Right Atrium: Right atrium is severely dilated. Multiple leads present in the right atrium with possible vegetation adherent to lead in RA.  Cardiology is consulted,recommended anticoagulations and IV Abx for possible endocarditis ,ID is consulted.blood culture no growth,on broads spectrumIV Abx,  BP remains on low side,required 3 vasopressins.  Patient at this time is on 3 vasopressors,already consulted palliative and spoke with daughter and wife,and CC spoke with family,they agree with DNR status,but OK for intervention.  CC added Micafungin,spooked with ID ,will do CT chest,abdomen,pelvic,also ID changed cefepime with meropenem.and daptomycin is added.no sign of abscess on abdomen   Family asking for transfer to [place],where he came from as transfer,but orthopedic  did not accepted patient.    checked CTA chest ,has PE,but no leg DVT,remains on heparin gtt.  Repeat Echo. By cardiology show enlarging right atrial  mass.cardiology spoke with interventional  and CTS in [facility],called transfer center go to [facility],today will be call meeting with CCU ,CTS and interventional cardiology phone call to decide if patient can have intervention.  CTA head show ,Small area of hypoattenuation in the right parietal lobe, which may be related to chronic small vessel ischemic changes or age-indeterminate infarct.  No large vessel occlusion ,he is aletr and oriented at this time.consulted neurology for evaluations.  Had today, long phone meeting with all speciality CCU,CTS,interventional cardiology with help of transfer center,all speciality telling patient is not candidate for any procedure at this time.he is hemodynamically too unstable for procedure,they recommending continue medical treatment with broads spectrum IV Abx,heparin gtt ,supportive care and palliative care consultations,,but if in future patient is more hemodynamically stable,intervention can be  reconsidered and they can be called back,updated family.  Review of Systems   HENT:  Negative for ear discharge and ear pain.    Eyes:  Negative for discharge and itching.   Endocrine: Negative for cold intolerance and heat intolerance.   Neurological:  Negative for seizures and syncope.     Objective:     Vital Signs (Most Recent):  Temp: 98.4 °F (36.9 °C) (10/14/24 0305)  Pulse: 70 (10/14/24 0600)  Resp: 10 (10/14/24 0600)  BP: (!) 116/48 (10/14/24 0600)  SpO2: 95 % (10/14/24 0600) Vital Signs (24h Range):  Temp:  [98 °F (36.7 °C)-98.4 °F (36.9 °C)] 98.4 °F (36.9 °C)  Pulse:  [] 70  Resp:  [8-22] 10  SpO2:  [75 %-98 %] 95 %  BP: ()/(45-66) 116/48  Arterial Line BP: (101-123)/(45-61) 116/48     Weight: 79.2 kg (174 lb 9.7 oz)  Body mass index is 25.78 kg/m².    Intake/Output Summary (Last 24 hours) at 10/14/2024 1002  Last data filed at 10/14/2024 0600  Gross per 24 hour   Intake 1895.56 ml   Output 0 ml   Net 1895.56 ml         Physical Exam  Vitals and nursing note reviewed.   Constitutional:       General: He is not in acute distress.     Appearance: He is ill-appearing (chronically). He is not toxic-appearing or diaphoretic.      Interventions: Nasal cannula in place.   HENT:      Head: Normocephalic and atraumatic.      Nose: No rhinorrhea.      Mouth/Throat:      Mouth: Mucous membranes are moist.   Eyes:      General: No scleral icterus.     Extraocular Movements: Extraocular movements intact.   Neck:      Comments: Right IJ tunneled dialysis catheter  Cardiovascular:      Rate and Rhythm: Normal rate and regular rhythm.      Heart sounds: No murmur heard.  Pulmonary:      Effort: No tachypnea, accessory muscle usage, respiratory distress or retractions.   Abdominal:      General: There is no distension.      Comments: PD catheter present   Skin:     General: Skin is warm and dry.      Coloration: Skin is not jaundiced.      Findings: No rash.   Neurological:      General: No focal deficit  present.      Mental Status: He is alert. Mental status is at baseline.             Significant Labs: All pertinent labs within the past 24 hours have been reviewed.  BMP:   Recent Labs   Lab 10/14/24  0628   *   *   K 5.2*   CL 92*   CO2 18*   BUN 78*   CREATININE 8.0*   CALCIUM 8.0*   MG 2.4     CBC:   Recent Labs   Lab 10/13/24  0357 10/14/24  0052 10/14/24  0628   WBC 40.62* 38.14* 37.64*   HGB 9.9* 10.0* 9.7*   HCT 28.4* 27.8* 27.3*    [phone]       Significant Imaging: I have reviewed all pertinent imaging results/findings within the past 24 hours.    Assessment/Plan:      * Closed right hip fracture, initial encounter  -s/p fall. No LOC or head trauma  -Right hip XR: minimally displaced intertrochanteric fracture of the right proximal femur.   -Orthopedic surgery consulted: s/p right  femur intramedullary nail on 10/04  -PT/OT once more stable.  Per ortho. No sign of active infection,    Acute pulmonary embolism  checked CTA chest ,has PE,but no leg DVT,remains on heparin gtt.        Advance care planning  Family agree with DNR status,but OK with intervention and remove DNR status for procedure,spent over 16 minutes,      Septic shock  On 3 vasopressors,BP with vasopressors are stable at thi stime.    Atrial mass    Echo. Show ,  Left Atrium: There is a large mobile frondlike mass, appears to be adherent to LA septum.    Right Atrium: Right atrium is severely dilated. Multiple leads present in the right atrium with possible vegetation adherent to lead in RA.  Cardiology is consulted,recommended anticoagulations with heparin gtt,and IV Abx for possible endocarditis ,ID is consulted.blood culture is repeated.Abx is broadened .    Repeat Echo. By cardiology show enlarging right atrial  mass.cardiology spoke with interventional  and CTS in [facility] center go [place] [facility],today will be call meeting with CCU ,CTS and interventional cardiology phone call to decide if patient can have  intervention.      Right atrial thrombus      Repeat Echo. By cardiology show enlarging right atrial  mass.cardiology spoke with interventional  and CTS in [facility] [place] go [place] [facility],today will be call meeting with CCU ,CTS and     Dermatitis associated with moisture  Local care.      Leukocytosis  -WBC# trending up, 20k on 10/06. Pt remained afebrile.   -initially suspected reactive given fracture and recent surgery  -However, pt also with PD cath in [facility].   BCX negative.  Empirically started on aBX's  Some improvement today.    Acute on chronic hypoxic respiratory failure  Patient with Hypoxic Respiratory failure which is Acute on chronic.  he is on home oxygen at 3 LPM. Supplemental oxygen was provided and noted-      .   Signs/symptoms of respiratory failure include- tachypnea and increased work of breathing. Contributing diagnoses includes - CHF and COPD Labs and images were reviewed. Patient Has recent ABG, which has been reviewed. Will treat underlying causes and adjust management of respiratory failure as follows-     -pt on home 3L NC. Hx of COPD/CHF  -Worsening hypoxia overnight 10/05; required on HFNC.  -acute on chronic hypoxia secondary to volume overload  -Pt unable to tolerate HD and CRRT, net positive 1.5L on 10/06  -CXR with pulmonary edema   -Pulmonology consulted  -Nephrology following  -Wean O2 as tolerated .  CC is on case.    Moderate protein-calorie malnutrition  Nutrition consulted. Most recent weight and BMI monitored-     Measurements:  Wt Readings from Last 1 Encounters:   10/06/24 74.6 kg (164 lb 7.4 oz)   Body mass index is 24.29 kg/m².    Patient has been screened and assessed by RD.    Malnutrition Type:  Context: acute illness or injury  Level: mild    Malnutrition Characteristic Summary:  Weight Loss (Malnutrition): greater than 10% in 6 months  Subcutaneous Fat (Malnutrition): mild depletion  Muscle Mass (Malnutrition): mild depletion    Interventions/Recommendations  (treatment strategy):  1. Add Novasource Renal TID 2. [place] intake at meals3. Monitor weight/labs 4. RD to follow to monitor PO intake      ESRD (end stage renal disease)  Nephrology consulted: HD per nephrology   HD stopped early on 10/05 due to hypotension  Persistently hypotensive, transfer to ICU for CRRT and pressor support on 10/05  CRRT started on 10/05,  Will have HD today.    Pulmonary emphysema, unspecified emphysema type  COPD is controlled on his home 3L oxygen at night.   No wheezing on exam  Now volume overloaded and requiring more O2  PRN duonebs ordered.   Wean to home O2 as tolerated    Aortic atherosclerosis  Continue statin       Primary hypertension  Chronic, controlled. Pt now with hypotension   Pt with hypotension, continue home midodrine   Currently on pressors to keep MAP>65    Latest blood pressure and vitals reviewed-     Temp:  [97.5 °F (36.4 °C)-98.5 °F (36.9 °C)]   Pulse:  [60-66]   Resp:  [12-42]   BP: ()/(45-72)   SpO2:  [92 %-100 %] .   Home meds for hypertension were reviewed and noted below.   Hypertension Medications               furosemide (LASIX) 80 MG tablet Take 4.5 tablets (360 mg total) by mouth 2 (two) times daily.    metOLazone (ZAROXOLYN) 10 MG tablet Take 10 mg by mouth As instructed (Take I tablet by mouth Tuesday, Thursday, Saturday, Sunday). Take I tablet by mouth Tuesday, Thursday, Saturday, Sunday    nitroGLYCERIN (NITROSTAT) 0.4 MG SL tablet Place 1 tablet (0.4 mg total) under the tongue every 5 (five) minutes as needed for Chest pain.            While in the hospital, will manage blood pressure as follows; Adjust home antihypertensive regimen as follows- patient with hypotension chronically. Continue home midodrine     Will utilize p.r.n. blood pressure medication only if patient's blood pressure greater than  180/90  and he develops symptoms such as worsening chest pain or shortness of breath.    Chronic combined systolic and diastolic heart failure  NO  signs of acute exacerbation.   Echo as below   Cardiology consulted for cardiac clearance-cleared for surgery at moderate cardiac risk.   Nephrology for volume removal with HD    Echo    Result Date: 10/7/2024    Left Ventricle: The left ventricle is normal in size. Mildly increased   wall thickness. There is mild concentric hypertrophy. Regional wall motion   abnormalities present (severe distal anteroapical hypokinesis). There is   mildly reduced systolic function with a visually estimated ejection   fraction of 40 - 45%.    Right Ventricle: Severe right ventricular enlargement. Systolic   function is severely reduced. Pacemaker lead present in the ventricle and   appears abnormal with possible vegetation on lead in RA (2.7x2.0cm).    Left Atrium: There is a large mobile frondlike mass, appears to be   adherent to LA septum.    Right Atrium: Right atrium is severely dilated. Multiple leads present   in the right atrium with possible vegetation adherent to lead in RA.    Aortic Valve: The aortic valve is a trileaflet valve. There is mild   aortic valve sclerosis.    Tricuspid Valve: There is mild regurgitation.    Pulmonary Artery: The estimated pulmonary artery systolic pressure is   67 mmHg.        Echo    Result Date: 10/3/2024    Left Ventricle: The left ventricle is mildly dilated. There is mild   eccentric hypertrophy. Septal flattening in diastole and systole   consistent with right ventricular volume and pressure overload. There is   mildly reduced systolic function with a visually estimated ejection   fraction of 40 - 45%.    Right Ventricle: Severe right ventricular enlargement. Systolic   function is moderately reduced.    Left Atrium: Left atrium is moderately dilated.    Right Atrium: Right atrium is severely dilated.    Aortic Valve: There is mild stenosis. Aortic valve area by VTI is 1.6   cm². Aortic valve peak velocity is 1.6 m/s. Mean gradient is 6.3 mmHg. The   dimensionless index is 0.50.     Mitral Valve: There is mild to moderate regurgitation.    Tricuspid Valve: There is moderate regurgitation.    Pulmonary Artery: The estimated pulmonary artery systolic pressure is   66 mmHg.    IVC/SVC: Elevated venous pressure at 15 mmHg.           S/P ablation of ventricular arrhythmia  Cardiology consulted for cardiac clearance.   Resume home Mexiletin 150mg PO BID   Hold amiodarone 400mg PO daily in setting of hypotension   ICD in place       Cardiac resynchronization therapy defibrillator (CRT-D) in place  -noted      Atherosclerosis of native coronary artery of native heart with angina pectoris  Patient with known CAD , which is controlled Will continue Statin and monitor for S/Sx of angina/ACS. Continue to monitor on telemetry.     Mixed hyperlipidemia  -continue statin       Iron deficiency anemia  Anemia is likely due to Iron deficiency. Most recent hemoglobin and hematocrit are listed below.  Recent Labs     10/05/24  0544 10/06/24  0439 10/07/24  0219   HGB 12.6* 12.2* 11.5*   HCT 37.5* 35.8* 33.6*       Plan  - Monitor serial CBC: Daily  - Transfuse PRBC if patient becomes hemodynamically unstable, symptomatic or H/H drops below 7/21.  - Patient has not received any PRBC transfusions to date  - Patient's anemia is currently stable  - stable, monitoring     Hypothyroidism  -resume home synthroid       VTE Risk Mitigation (From admission, onward)           Ordered     heparin 25,000 units in dextrose 5% (100 units/ml) IV bolus from bag HIGH INTENSITY nomogram - [facility]  As needed (PRN)        Question:  Heparin Infusion Adjustment (DO NOT MODIFY ANSWER)  Answer:  [email or web address]\epic\Images\Pharmacy\HeparinInfusions\heparin HIGH INTENSITY nomogram for [facility] [id number]    10/11/24 1622     heparin 25,000 units in dextrose 5% (100 units/ml) IV bolus from bag HIGH INTENSITY nomogram - [facility]  As needed (PRN)        Question:  Heparin Infusion Adjustment (DO NOT MODIFY ANSWER)  Answer:   [email or web address]\epic\Images\Pharmacy\HeparinInfusions\heparin HIGH INTENSITY nomogram for [facility] [id number]    10/11/24 1622     heparin 25,000 units in dextrose 5% 250 mL (100 units/mL) infusion HIGH INTENSITY nomogram - [facility]  Continuous        Question:  Begin at (units/kg/hr)  Answer:  18    10/11/24 1622     heparin (porcine) injection 5,000 Units  Use PRN         10/05/24 1830     Place sequential compression device  Until discontinued         10/04/24 2217     heparin (porcine) injection 1,000 Units  As needed (PRN)         10/04/24 2217     IP VTE LOW RISK PATIENT  Once         10/04/24 1438     Place sequential compression device  Until discontinued         10/03/24 0154     Reason for No Pharmacological VTE Prophylaxis  Once        Comments: Right hip fracture needs surgical eval   Question:  Reasons:  Answer:  Physician Provided (leave comment)    10/03/24 0154                    Discharge Planning   SLAVA:      Code Status: DNR   Is the patient medically ready for discharge?:     Reason for patient still in hospital (select all that apply): Patient trending condition  Discharge Plan A:  (tbd see comment)            Critical care time spent on the evaluation and treatment of severe organ dysfunction, review of pertinent labs and imaging studies, discussions with consulting providers and discussions with patient/family:  over 45  minutes.      Delmy Agarwal MD  Department of Hospital Medicine   [facility] [place] - Intensive Care

## 2024-10-14 NOTE — PROGRESS NOTES
[facility] Intensive Care  Critical Care Medicine  Progress Note    Patient Name: Ar Sharma  MRN: [id number]  Admission Date: 10/3/2024  Hospital Length of Stay: 11 days  Code Status: DNR  Attending Provider: Delmy Agarwal, *  Primary Care Provider: Jc Elliott MD   Principal Problem: Closed right hip fracture, initial encounter    Subjective:     HPI:  Mr Ar Sharma is a 63 year old male with ESRD on HD (MWF) for the last 2 months, prior PD, HTN, HLD, combined systolic and diastolic HF (EF 40-45% and grade III DD), ischemic cardiomyopathy, s/p medtronic CRT-D implant in 2016, history of VT s/p ablation on mexiletine and amiodarone, who presents after a mechanical fall resulting in a right femur fracture.  S/p ORIF 10/4.  He is chronically hypotensive and had issues following surgery tolerating HD.  Moved to the ICU for pressor support for CRRT.  Pulm previously consulted for CVC placement (which occurred on 10/9/2024), but now following due to persistent and elevated pressor requirements.    Hospital/ICU Course:  Requiring high vasopressor requirements without clear infectious etiology.  Atrial mass present concerning for thrombus in setting of negative blood cultures.  Abx being adjusted per ID.  Antifungals added.  Due to low SCvO2 seen on VBG from left IJ CVC we have started dobutamine.  This improved SCvO2 accordingly.  CT Chest/abd/pelvis with overt hypervolemia and splenic infarcts.      Interval history:  No acute issue.  Patient is alert but very stoic.  Responding to verbal cues from family.  CT head without acute findings.  CTA with bilateral PE.  Currently on 0.6mcg/kg/min of levophed + vaspressin + dobutamine.      Review of Systems   Constitutional:  Positive for activity change and fatigue. Negative for chills and fever.   Respiratory:  Positive for cough and shortness of breath.    Cardiovascular:  Positive for leg swelling. Negative for chest pain.   Gastrointestinal:   Negative for abdominal pain.   Psychiatric/Behavioral: Negative.       Objective:     Vital Signs (Most Recent):  Temp: 98.4 °F (36.9 °C) (10/14/24 0305)  Pulse: 70 (10/14/24 0600)  Resp: 10 (10/14/24 0600)  BP: (!) 116/48 (10/14/24 0600)  SpO2: 95 % (10/14/24 0600) Vital Signs (24h Range):  Temp:  [98 °F (36.7 °C)-98.4 °F (36.9 °C)] 98.4 °F (36.9 °C)  Pulse:  [] 70  Resp:  [8-22] 10  SpO2:  [75 %-98 %] 95 %  BP: ()/(45-66) 116/48  Arterial Line BP: (101-123)/(45-61) 116/48     Weight: 79.2 kg (174 lb 9.7 oz)  Body mass index is 25.78 kg/m².      Intake/Output Summary (Last 24 hours) at 10/14/2024 1010  Last data filed at 10/14/2024 0600  Gross per 24 hour   Intake 1895.56 ml   Output 0 ml   Net 1895.56 ml        Physical Exam  Constitutional:       General: He is not in acute distress.     Appearance: Normal appearance. He is ill-appearing. He is not toxic-appearing or diaphoretic.   HENT:      Head: Normocephalic and atraumatic.      Nose: Nose normal.      Mouth/Throat:      Mouth: Mucous membranes are moist.   Eyes:      Extraocular Movements: Extraocular movements intact.      Pupils: Pupils are equal, round, and reactive to light.   Neck:      Comments: Left IJ CVC  Cardiovascular:      Rate and Rhythm: Normal rate and regular rhythm.   Pulmonary:      Effort: Pulmonary effort is normal. No respiratory distress.      Breath sounds: Normal breath sounds. No wheezing.   Abdominal:      General: Abdomen is flat. Bowel sounds are normal. There is no distension.      Palpations: Abdomen is soft.   Musculoskeletal:      Cervical back: Normal range of motion.      Right lower leg: Edema present.      Left lower leg: Edema present.      Comments: Left radial a line. Right hip wound with clean dressing and minimal swelling.   Skin:     General: Skin is warm.      Capillary Refill: Capillary refill takes less than 2 seconds.   Neurological:      Mental Status: He is alert.      Comments: Alert to voice and  interactive, but slowed mentation compared to baseline.          Vents:       Lines/Drains/Airways       Central Venous Catheter Line  Duration                  Hemodialysis Catheter 05/03/24 1115 right subclavian 163 days    Percutaneous Central Line - Triple Lumen  10/09/24 1127 Internal Jugular Left 4 days              Arterial Line  Duration             Arterial Line 10/12/24 1040 Left Radial 1 day              Peripheral Intravenous Line  Duration                  Peripheral IV - Single Lumen 10/09/24 0831 20 G Anterior;Left Forearm 5 days                    Significant Labs:    CBC/Anemia Profile:  Recent Labs   Lab 10/13/24  0357 10/14/24  0052 10/14/24  0628   WBC 40.62* 38.14* 37.64*   HGB 9.9* 10.0* 9.7*   HCT 28.4* 27.8* 27.3*    413 421   MCV 76* 74* 75*   RDW 15.7* 15.9* 15.7*        Chemistries:  Recent Labs   Lab 10/13/24  0945 10/13/24  1345 10/14/24  0052 10/14/24  0628   * 130* 125* 126*   K 4.8 4.9 5.1 5.2*   CL 92* 92* 92* 92*   CO2 21* 17* 18* 18*   BUN 65* 67* 77* 78*   CREATININE 7.0* 7.1* 7.8* 8.0*   CALCIUM 8.4* 8.2* 8.1* 8.0*   ALBUMIN 2.4* 2.4*  --   --    PROT 6.2  --   --   --    BILITOT 2.9*  --   --   --    ALKPHOS 203*  --   --   --    ALT 45*  --   --   --    *  --   --   --    MG  --  2.3  --  2.4   PHOS  --  8.3*  --   --        All pertinent labs within the past 24 hours have been reviewed.    Significant Imaging:   I have reviewed all pertinent imaging results/findings within the past 24 hours.    ABG  Recent Labs   Lab 10/14/24  0053   PH 7.307*   PO2 40   PCO2 43.4   HCO3 21.7*   BE -5*     Assessment/Plan:     Pulmonary  Acute on chronic hypoxic respiratory failure  Likely multifactorial in etiology (pulm edema, heart failure, possible thrombotic disease, atelectasis).  Supplement O2 to keep >90%  Treat underlying issues per their respective sections.      Pulmonary emphysema, unspecified emphysema type  Patient's COPD is controlled currently.  Patient is  currently off COPD Pathway. Continue scheduled inhalers  PRN nebulizers and Supplemental oxygen and monitor respiratory status closely.   No evidence of exacerbation    Cardiac/Vascular  Atrial mass  Unclear etiology of this (thrombus vs septic).  - anticoagulation with eliquis and antibiotics per ID.  - cards on board.  - CTA with subsegmental pe bilaterally c/w embolic event from atrial mass.      Primary hypertension  Holding all antihypertensives at this time, while in shock.    Chronic combined systolic and diastolic heart failure  EF 40-45% with grade III DD.  This is likely having a significant impact on ability to tolerate iHD.  Cardiology following  Crrt restarted today.  Only ultrafiltration.          S/P ablation of ventricular arrhythmia  Mexiletine  Chronic anticoagulation.  Continuous cardiac monitoring.  Cards on board.    Cardiac resynchronization therapy defibrillator (CRT-D) in place  Noted      Mixed hyperlipidemia  statin    Renal/  ESRD (end stage renal disease)  Currently requiring CRRT.  Previously had utilized HD, but now too unstable from a blood pressure standpoint. Also has previously used PD, but this is currently being held.  - nephrology following.  - midodrine 15mg TID.    Crrt resumed today for electrolytes correction.  Await cvp to help guide decision about ultrafiltration.      ID  Septic shock  Patient chronically with systolic blood pressures in the 90s.  Throughout his hospitalization he has become progressively more hypotensive.    Unclear etiology.  DDx:  cardiac (EF 40s) vs obstructive with subsegmental PE vs septic.   Chest x-ray consistent with volume overload and cardiomegaly.     IVC per pocus 10/14 2.8 cm c/w intravascular volume overload  Await cvp.  May consider volume removal.    Has been on broad spectrum empiric antibiotics per ID recommendations.  - SCvO2 improved when Dobutamine added.    - continue antibiotics as guided by ID.  Currently on broad spectrum abx  (day 9). ID recommending to Continue meropenem and daptomycin  - empirically starting micafungin 10/13 given severity of illness and progressive shock in setting of long standing abx, illness, and tunneled catheter.  No cultures positive however.  - heparin gtt currently being utilized for possible intracardiac thrombus.    - midodrine 15mg TID, if he can safely tolerate PO intake.  - continue Hydrocortisone and fludrocortisone given elevated pressor requirements.  - levophed and vasopressin to keep MAP >60mmHg.     Hematology  Acute pulmonary embolism  Heparin drip    Oncology  Leukocytosis  Patient has not shown overt evidence of infection on culture data.  Karius with Weissella confusa.   - consider CT C/A/P with splenic infarcts. In setting of known cardiac thrombus, we will perform a CTA thorax and CT head.  Also performing B LE US, and repeat TTE.    - continue abx per ID.    Iron deficiency anemia  Transfuse to keep HgB >7.0    Endocrine  Moderate protein-calorie malnutrition  Nutrition consulted. Most recent weight and BMI monitored-     Measurements:  Wt Readings from Last 1 Encounters:   10/14/24 79.2 kg (174 lb 9.7 oz)   Body mass index is 25.78 kg/m².    Patient has been screened and assessed by RD.    Malnutrition Type:  Context: acute illness or injury  Level: mild    Malnutrition Characteristic Summary:  Weight Loss (Malnutrition): greater than 10% in 6 months  Subcutaneous Fat (Malnutrition): mild depletion  Muscle Mass (Malnutrition): mild depletion    Interventions/Recommendations (treatment strategy):  1. COntinue pt on renal diet 2. Continue novasource renal TID 3. Encourage intake at meals 4. Monitor weight/labs 5. RD to follow to monitor PO intake      Hypothyroidism  Synthroid  - recent tsh normal.    Orthopedic  * Closed right hip fracture, initial encounter  S/p ORIF by orthopedics.  Postoperative care per their recs.     Palliative Care  Advance care planning  DNR per Dr. Connolly but family is  "amenable to reversal if patient is deemed a candidate for surgery.    Spoken with daughter and all questions answered.         Critical Care Daily Checklist:    A: Awake: RASS Goal/Actual Goal:    Actual:     B: Spontaneous Breathing Trial Performed?     C: SAT & SBT Coordinated?  N/a                      D: Delirium: CAM-ICU     E: Early Mobility Performed? No   F: Feeding Goal: Goals: Pt will consume 50-75% intake at meals by RD follow-up  Status: Nutrition Goal Status: progressing towards goal   Current Diet Order   Procedures    Diet NPO Except for: Medication (crushed in puree, cue Pt to "swallow quick")     Order Specific Question:   Except for:     Answer:   Medication     Comments:   crushed in puree, cue Pt to "swallow quick"      AS: Analgesia/Sedation none   T: Thromboembolic Prophylaxis Heparin drip   H: HOB > 300 Yes   U: Stress Ulcer Prophylaxis (if needed) pepcid   G: Glucose Control prn   B: Bowel Function Stool Occurrence: 0   I: Indwelling Catheter (Lines & Vo) Necessity Tlc, perm cath and a-line   D: De-escalation of Antimicrobials/Pharmacotherapies no    Plan for the day/ETD Supportive care    Code Status:  Family/Goals of Care: DNR       Critical Care Time: 85 minutes  Critical secondary to Patient has a condition that poses threat to life and bodily function: Pulmonary Embolism and shock      Critical care was time spent personally by me on the following activities: development of treatment plan with patient or surrogate and bedside caregivers, discussions with consultants, evaluation of patient's response to treatment, examination of patient, ordering and performing treatments and interventions, ordering and review of laboratory studies, ordering and review of radiographic studies, pulse oximetry, re-evaluation of patient's condition. This critical care time did not overlap with that of any other provider or involve time for any procedures.     Raimundo Caldwell MD  Critical Care Medicine  [facility] - Intensive Care

## 2024-10-14 NOTE — ASSESSMENT & PLAN NOTE
Patient with Hypoxic Respiratory failure which is Acute on chronic.  he is on home oxygen at 3 LPM. Supplemental oxygen was provided and noted-      .   Signs/symptoms of respiratory failure include- tachypnea and increased work of breathing. Contributing diagnoses includes - CHF and COPD Labs and images were reviewed. Patient Has recent ABG, which has been reviewed. Will treat underlying causes and adjust management of respiratory failure as follows-     -pt on home 3L NC. Hx of COPD/CHF  -Worsening hypoxia overnight 10/05; required on HFNC.  -acute on chronic hypoxia secondary to volume overload  -Pt unable to tolerate HD and CRRT, net positive 1.5L on 10/06  -CXR with pulmonary edema   -Pulmonology consulted  -Nephrology following  -Wean O2 as tolerated .  CC is on case.

## 2024-10-14 NOTE — PROGRESS NOTES
[facility] - Intensive Care  Cardiology  Progress Note    Patient Name: Ar Sharma  MRN: [id number]  Admission Date: 10/3/2024  Hospital Length of Stay: 11 days  Code Status: DNR   Attending Physician: Delmy Agarwal, *   Primary Care Physician: Jc Elliott MD  Expected Discharge Date:   Principal Problem:Closed right hip fracture, initial encounter    Subjective:         Interval History:  Concern for stroke overnight.  Stroke workup done by primary team.  Otherwise denies any new cardiac complaints.  CTA showed focal hypoattenuation in the right parietal lobe which may be related to chronic small-vessel ischemic changes or age indeterminate infarct.  Per nurse, his mental status waxes and wanes.  When I saw him he was oriented x3 awake and alert.    Review of Systems   Constitutional: Positive for malaise/fatigue.   HENT:  Negative for hearing loss.    Eyes:  Negative for photophobia.   Cardiovascular:  Negative for chest pain.     Objective:     Vital Signs (Most Recent):  Temp: 96.1 °F (35.6 °C) (10/14/24 0800)  Pulse: 70 (10/14/24 1015)  Resp: 13 (10/14/24 1015)  BP: (!) 114/53 (10/14/24 0800)  SpO2: (!) 93 % (10/14/24 1015) Vital Signs (24h Range):  Temp:  [96.1 °F (35.6 °C)-98.4 °F (36.9 °C)] 96.1 °F (35.6 °C)  Pulse:  [] 70  Resp:  [8-26] 13  SpO2:  [75 %-98 %] 93 %  BP: ()/(45-66) 114/53  Arterial Line BP: ()/(45-61) 107/48     Weight: 79.2 kg (174 lb 9.7 oz)  Body mass index is 25.78 kg/m².     SpO2: (!) 93 %         Intake/Output Summary (Last 24 hours) at 10/14/2024 1101  Last data filed at 10/14/2024 1000  Gross per 24 hour   Intake 2172.34 ml   Output 0 ml   Net 2172.34 ml       Lines/Drains/Airways       Central Venous Catheter Line  Duration                  Hemodialysis Catheter 05/03/24 1115 right subclavian 163 days    Percutaneous Central Line - Triple Lumen  10/09/24 1127 Internal Jugular Left 4 days              Arterial Line  Duration             Arterial Line  10/12/24 1040 Left Radial 2 days              Peripheral Intravenous Line  Duration                  Peripheral IV - Single Lumen 10/09/24 0831 20 G Anterior;Left Forearm 5 days                       Physical Exam  Vitals reviewed.   Constitutional:       Appearance: He is well-developed.   HENT:      Head: Normocephalic.   Eyes:      Conjunctiva/sclera: Conjunctivae normal.      Pupils: Pupils are equal, round, and reactive to light.   Cardiovascular:      Rate and Rhythm: Normal rate and regular rhythm.      Heart sounds: Murmur heard.   Pulmonary:      Effort: Pulmonary effort is normal.      Breath sounds: Normal breath sounds.   Abdominal:      General: Bowel sounds are normal.      Palpations: Abdomen is soft.   Musculoskeletal:      Cervical back: Normal range of motion and neck supple.   Skin:     General: Skin is warm.   Neurological:      Mental Status: He is alert and oriented to person, place, and time.            Significant Labs:     DATA:     Laboratory:  CBC:  Recent Labs   Lab 10/13/24  0357 10/14/24  0052 10/14/24  0628   WBC 40.62 H 38.14 H 37.64 H   Hemoglobin 9.9 L 10.0 L 9.7 L   Hematocrit 28.4 L 27.8 L 27.3 L   Platelets 396 413 421       CHEMISTRIES:  Recent Labs   Lab 04/04/22  1255 06/09/22  0740 07/07/22  0746 09/06/22  0741 10/08/24  2330 10/09/24  0311 10/13/24  1345 10/14/24  0052 10/14/24  0628   Glucose 92 90 92   < > 113 H   < > 155 H 148 H 147 H   Sodium 137 139 136   < > 134 L   < > 130 L 125 L 126 L   Potassium 4.8 4.8 4.3   < > 4.3   < > 4.9 5.1 5.2 H   BUN 68 H 81 H 76 H   < > 20   < > 67 H 77 H 78 H   Creatinine 4.08 H 4.64 H 4.06 H   < > 2.6 H   < > 7.1 H 7.8 H 8.0 H   eGFR if  17.0 A 14.5 A 17.1 A  --   --   --   --   --   --    eGFR if non  14.7 A 12.6 A 14.8 A  --   --   --   --   --   --    Calcium 9.1 8.7 9.5   < > 8.5 L   < > 8.2 L 8.1 L 8.0 L   Magnesium  --   --   --    < > 2.1  --  2.3  --  2.4    < > = values in this interval not  displayed.       CARDIAC BIOMARKERS:  Recent Labs   Lab 09/25/22  1638 04/29/24  1707 10/04/24  0418   Troponin I 0.015 0.040 H 0.045 H       COAGS:  Recent Labs   Lab 10/02/24  2224 10/03/24  0441 10/11/24  1637   INR 1.3 H 1.2 1.2       LIPIDS/LFTS:  Recent Labs   Lab 03/04/24  1018 04/12/24  1056 04/29/24  2039 06/04/24  1440 09/04/24  1201 09/30/24  1100 10/09/24  0311 10/11/24  0421 10/13/24  0945   Cholesterol 124  --  127  --  189  --   --   --   --    Triglycerides 66  --  66  --  167 H  --   --   --   --    HDL 39 L  --  32 L  --  54  --   --   --   --    LDL Cholesterol 71.8  --  81.8  --  101.6  --   --   --   --    Non-HDL Cholesterol 85  --  95  --  135  --   --   --   --    AST 39   < >  --    < > 99 H   < > 97 H 110 H 106 H   ALT 35   < >  --    < > 68 H   < > 22 29 45 H    < > = values in this interval not displayed.       Hemoglobin A1C   Date Value Ref Range Status   04/29/2024 5.6 4.0 - 5.6 % Final     Comment:     ADA Screening Guidelines:  5.7-6.4%  Consistent with prediabetes  >or=6.5%  Consistent with diabetes    High levels of fetal hemoglobin interfere with the HbA1C  assay. Heterozygous hemoglobin variants (HbS, HgC, etc)do  not significantly interfere with this assay.   However, presence of multiple variants may affect accuracy.     03/04/2024 5.6 4.0 - 5.6 % Final     Comment:     ADA Screening Guidelines:  5.7-6.4%  Consistent with prediabetes  >or=6.5%  Consistent with diabetes    High levels of fetal hemoglobin interfere with the HbA1C  assay. Heterozygous hemoglobin variants (HbS, HgC, etc)do  not significantly interfere with this assay.   However, presence of multiple variants may affect accuracy.     09/01/2023 6.0 (H) 4.0 - 5.6 % Final     Comment:     ADA Screening Guidelines:  5.7-6.4%  Consistent with prediabetes  >or=6.5%  Consistent with diabetes    High levels of fetal hemoglobin interfere with the HbA1C  assay. Heterozygous hemoglobin variants (HbS, HgC, etc)do  not  significantly interfere with this assay.   However, presence of multiple variants may affect accuracy.         TSH  Recent Labs   Lab 05/04/23  1129 03/04/24  1018 09/04/24  1201   TSH 1.160 0.283 L 1.740       The 10-year ASCVD risk score (Susanne SPEARS, et al., 2019) is: 14.3%    Values used to calculate the score:      Age: 63 years      Sex: Male      Is Non- : Yes      Diabetic: No      Tobacco smoker: No      Systolic Blood Pressure: 125 mmHg      Is BP treated: Yes      HDL Cholesterol: 54 mg/dL      Total Cholesterol: 189 mg/dL       BNP    Lab Results   Component Value Date/Time    BNP 3,204 (H) 04/29/2024 05:07 PM    BNP 2,549 (H) 04/12/2024 10:56 AM    BNP 1,416 (H) 09/28/2022 03:45 PM    BNP 2,304 (H) 09/25/2022 04:38 PM     (H) 11/02/2018 12:17 PM            ECHO    Results for orders placed during the hospital encounter of 10/03/24    Echo    Interpretation Summary    Left Ventricle: The left ventricle is normal in size. Normal wall thickness. There is mildly reduced systolic function with a visually estimated ejection fraction of  45%.    Right Ventricle: Right ventricular enlargement. Systolic function is reduced.    Left Atrium: Left atrium is dilated.    Right Atrium: Right atrium is dilated. 4.2 x 2.5 large mobile echogenic mass present.appears to be attached to the device lead    Tricuspid Valve: There is severe regurgitation.    Pulmonary Artery: There is severe pulmonary hypertension. The estimated pulmonary artery systolic pressure is 72 mmHg.    IVC/SVC: Elevated venous pressure at 15 mmHg.    Limited echo    Findings conveyed to critical care and primary team      STRESS TEST    Results for orders placed during the hospital encounter of 07/11/22    Nuclear Stress Test    Interpretation Summary    The EKG portion of this study is abnormal but not diagnostic.    The patient reported no chest pain during the stress test.    The nuclear portion of this study will be  reported separately.        CATH    Results for orders placed during the hospital encounter of 09/25/22    Cardiac catheterization    Conclusion    There was non-obstructive coronary artery disease. Stable as compared to prior.    The procedure log was documented by Documenter: Neftali Casey RT; May Galdamez RN and verified by Juan Roque MD.    Date: 9/27/2022  Time: 6:36 PM      Assessment and Plan:     Brief HPI:     * Closed right hip fracture, initial encounter  Status post ORIF.    Septic shock  Has been on broad spectrum antibiotics to cover for septic shock. ECHO today demonstrated increase in size of RA mass despite being on heparin drip. CTA showed B/L PE, which could be contributing to his shock. ECHO showed pulm HTN and RVE. Case discussed with IC at [facility]. Pt will benefit from multidisciplinary evaluation at [facility] and has been accepted for transfer.    Continue heparin drip at high intensity.        Atrial mass  Patient been on heparin drip as well as broad-spectrum antibiotics since this mass was discovered.  Repeat echo done today demonstrated that the right atrial mass has increased in size.  Because of RV enlargement and RA enlargement with severe TR clinically suspected pulmonary embolism.  Subsequent CTA confirmed the suspicion.  Patient has been accepted to [facility] for further evaluation and management.  Case discussed with CT surgery and Interventional Cardiology at [facility]    Notes from October 14th20 24:  No change in cardiac clinical status.  Had concern for stroke last night.  Now feeling better.  Workup done by primary team.  Awaiting transfer to [facility].  Still on pressors    Right atrial thrombus        ESRD (end stage renal disease)  Per renal    Chronic combined systolic and diastolic heart failure  NICM. EF 40-45%. Volume status appears controlled. Has CRT-D followed at [facility].   Neg cath 2022.  Hx VT s/p ablation    Currently on 3 pressors. Working  diagnosis has been septic shock, and getting  anti biotics for that. Likely etiology could also be PE     S/P ablation of ventricular arrhythmia  Followed [place] [facility]. Continue amiodarone    Cardiac resynchronization therapy defibrillator (CRT-D) in place  Followed by Dr Tenorio. Normal function at last check    would need eval for lead extraction at main also    Mixed hyperlipidemia  Resume statin when LFTs normalized.          VTE Risk Mitigation (From admission, onward)           Ordered     heparin 25,000 units in dextrose 5% (100 units/ml) IV bolus from bag HIGH INTENSITY nomogram - [facility]  As needed (PRN)        Question:  Heparin Infusion Adjustment (DO NOT MODIFY ANSWER)  Answer:  [email or web address]\epic\Images\Pharmacy\HeparinInfusions\heparin HIGH INTENSITY nomogram for [facility] [id number]    10/11/24 1622     heparin 25,000 units in dextrose 5% (100 units/ml) IV bolus from bag HIGH INTENSITY nomogram - [facility]  As needed (PRN)        Question:  Heparin Infusion Adjustment (DO NOT MODIFY ANSWER)  Answer:  [email or web address]\epic\Images\Pharmacy\HeparinInfusions\heparin HIGH INTENSITY nomogram for [facility] [id number]    10/11/24 1622     heparin 25,000 units in dextrose 5% 250 mL (100 units/mL) infusion HIGH INTENSITY nomogram - [facility]  Continuous        Question:  Begin at (units/kg/hr)  Answer:  18    10/11/24 1622     heparin (porcine) injection 5,000 Units  Use PRN         10/05/24 1830     Place sequential compression device  Until discontinued         10/04/24 2217     heparin (porcine) injection 1,000 Units  As needed (PRN)         10/04/24 2217     IP VTE LOW RISK PATIENT  Once         10/04/24 1438     Place sequential compression device  Until discontinued         10/03/24 0154     Reason for No Pharmacological VTE Prophylaxis  Once        Comments: Right hip fracture needs surgical eval   Question:  Reasons:  Answer:  Physician Provided (leave comment)    10/03/24 0154                    Damon Knight MD  Cardiology  [facility] - Intensive Care      Critical Care Time:  35 minutes     Critical care was time spent personally by me on the following activities: development of treatment plan with patient or surrogate and bedside caregivers, discussions with consultants, evaluation of patient's response to treatment, examination of patient, ordering and performing treatments and interventions, ordering and review of laboratory studies, ordering and review of radiographic studies, pulse oximetry, re-evaluation of patient's condition. This critical care time did not overlap with that of any other provider or involve time for any procedures.

## 2024-10-14 NOTE — ASSESSMENT & PLAN NOTE
DNR per Dr. Connolly but family is amenable to reversal if patient is deemed a candidate for surgery.    Spoken with daughter and all questions answered.

## 2024-10-14 NOTE — ASSESSMENT & PLAN NOTE
63 y.o. male with medical history of ESRD on HD, HTN, HLD, CHF, CAD, Ischemic cardiomyopathy - s/p medtronic CRT-D, VT s/p ablation on mexiletine and amiodarone - with admission concerns of right femur fracture.  S/p ORIF 10/4. Hospital course complicated hypotension / needing vasopressors; right atrial mass and PE / needing broad spectrum Abx and AC. Associated encephalopathy - and neurology consulted for the same.    History from primary team / family / medical records review. Fluctuating encephalopathy - in the spectrum of affect in attention / concentration and orientation / confusion. No new focal motor or sensory or cranial nerve deficits. No concerns for clinical seizures. No history of stroke / epilepsy or complex migraines. No history of primary neurodegenerative cognitive disorder or neuro psychiatric disorder.     Currently on exam - awake, alert - following simple commands, oriented to person, not oriented to month  / distratable / no focal motor or sensory or cranial nerve deficits - RLE ORIF  CT head negative for acute findings / CTA head and neck negative for any occlusion or stenosis or any septic embolic aneurysms or vasculitis   Labs - severe leucocytosis / hyponatremia / hyperkalemia / uremia     Recs:    Fluctuating encephalopathy - suspect in spectrum of metabolic / systemic infectious / hypotension triggers. Low suspicion for focal cerebrovascular ischemic event or epileptic or neuro inflammatory etiology.      Need no acute MRI brain wo contrast or EEG at the moment / consider if any clinical concerns for seizure or focal neurological deficits     Encephalopathy evaluation and management  -- Continue management as per cardiology   -- Correct lytes as needed / investigate and control infection / avoid hypoxia or hypercapnia / avoid hypoglycemia / control uremia - avoid rapid correction / avoid-correct metabolic-respiratory acidosis or alkalosis   -- Correct any nutritional deficiencies /  correct anemia / provide nutritional support as appropriate / ambulate when appropriate   -- PT/OT evaluation and management     Delirium precautions    - Recommend PRN Zyprexa 2.5 mg PO/IM q6h PRN severe, nonredirectable agitation.  Max dose is 30 mg daily from all sources.  Parenteral Zyprexa is not to be given within 1 hours of parenteral benzodiazepines including Ativan.    - Recommend standard delirium precautions:               - Avoid use of physical restraints and judiciously use chemical sedatives for management of agitation              - Avoid deliriogenic agents if possible including benzodiazepines, anticholinergics, and opiates              - Normalize patient's sleep-wake cycle if possible              - Environmental adjustments to include bright lighting and windows open during the day              - Early involvement in PT/OT              - Recommend physical sitter to be present at bedside.

## 2024-10-14 NOTE — NURSING
[facility] [place] -- 4 [place]      10/13/2024       Skin assessed on: Q Shift      [] No Pressure Injuries Present    [x]Prevention Measures Documented    [x] Yes LDA  for Pressure Injury Previously documented     [] Yes New Pressure Injury Discovered   [] LDA for New Pressure Injury Added      Attending RN:  Izabel Valentino RN     Second RN:  Macey RN

## 2024-10-14 NOTE — ASSESSMENT & PLAN NOTE
Patient chronically with systolic blood pressures in the 90s.  Throughout his hospitalization he has become progressively more hypotensive.    Unclear etiology.  DDx:  cardiac (EF 40s) vs obstructive with subsegmental PE vs septic.   Chest x-ray consistent with volume overload and cardiomegaly.     IVC per pocus 10/14 2.8 cm c/w intravascular volume overload  Await cvp.  May consider volume removal.    Has been on broad spectrum empiric antibiotics per ID recommendations.  - SCvO2 improved when Dobutamine added.    - continue antibiotics as guided by ID.  Currently on broad spectrum abx (day 9). ID recommending to Continue meropenem and daptomycin  - empirically starting micafungin 10/13 given severity of illness and progressive shock in setting of long standing abx, illness, and tunneled catheter.  No cultures positive however.  - heparin gtt currently being utilized for possible intracardiac thrombus.    - midodrine 15mg TID, if he can safely tolerate PO intake.  - continue Hydrocortisone and fludrocortisone given elevated pressor requirements.  - levophed and vasopressin to keep MAP >60mmHg.

## 2024-10-14 NOTE — ASSESSMENT & PLAN NOTE
Unclear etiology of this (thrombus vs septic).  - anticoagulation with eliquis and antibiotics per ID.  - cards on board.  - CTA with subsegmental pe bilaterally c/w embolic event from atrial mass.

## 2024-10-14 NOTE — ASSESSMENT & PLAN NOTE
ESRD MWF    Possibly will go to [facility] for higher level of care with regards to his bilateral PE and right atrial mass    Plan/recommendation  -CVVHD today, 0 UF in light of obstructive shock due to bilateral PE.    -Strict ins and outs  -Avoid nephrotoxic agents if possible and renally dose medications  -Avoid drastic hemodynamic changes if possible    Secondary hyperparathyroidism  Calcium   Date Value Ref Range Status   10/14/2024 8.0 (L) 8.7 - 10.5 mg/dL Final     Phosphorus   Date Value Ref Range Status   10/13/2024 8.3 (H) 2.7 - 4.5 mg/dL Final     PTH, Intact   Date Value Ref Range Status   04/29/2024 162.6 (H) 9.0 - 77.0 pg/mL Final   Continue to monitor - critically ill this admission    Anemia of chronic renal disease  Goal hemoglobin in ESRD is 10-12  Hemoglobin   Date Value Ref Range Status   10/14/2024 9.7 (L) 14.0 - 18.0 g/dL Final     Iron   Date Value Ref Range Status   10/05/2024 22 (L) 45 - 160 ug/dL Final     Transferrin   Date Value Ref Range Status   10/05/2024 207 200 - 375 mg/dL Final     TIBC   Date Value Ref Range Status   10/05/2024 306 250 - 450 ug/dL Final     Saturated Iron   Date Value Ref Range Status   10/05/2024 7 (L) 20 - 50 % Final     Ferritin   Date Value Ref Range Status   10/05/2024 338 (H) 20.0 - 300.0 ng/mL Final   Continue to monitor - critically ll this admission

## 2024-10-14 NOTE — ASSESSMENT & PLAN NOTE
- Nephrology consulted and following for RRT needs; has been requiring significant vasopressor support to facilitate this

## 2024-10-14 NOTE — ASSESSMENT & PLAN NOTE
Repeat Echo. By cardiology show enlarging right atrial  mass.cardiology spoke with interventional  and CTS in [facility],called transfer center go to [facility],today will be call meeting with CCU ,CTS and

## 2024-10-14 NOTE — SUBJECTIVE & OBJECTIVE
Interval History:  Concern for stroke overnight.  Stroke workup done by primary team.  Otherwise denies any new cardiac complaints.  CTA showed focal hypoattenuation in the right parietal lobe which may be related to chronic small-vessel ischemic changes or age indeterminate infarct.  Per nurse, his mental status waxes and wanes.  When I saw him he was oriented x3 awake and alert.    Review of Systems   Constitutional: Positive for malaise/fatigue.   HENT:  Negative for hearing loss.    Eyes:  Negative for photophobia.   Cardiovascular:  Negative for chest pain.     Objective:     Vital Signs (Most Recent):  Temp: 96.1 °F (35.6 °C) (10/14/24 0800)  Pulse: 70 (10/14/24 1015)  Resp: 13 (10/14/24 1015)  BP: (!) 114/53 (10/14/24 0800)  SpO2: (!) 93 % (10/14/24 1015) Vital Signs (24h Range):  Temp:  [96.1 °F (35.6 °C)-98.4 °F (36.9 °C)] 96.1 °F (35.6 °C)  Pulse:  [] 70  Resp:  [8-26] 13  SpO2:  [75 %-98 %] 93 %  BP: ()/(45-66) 114/53  Arterial Line BP: ()/(45-61) 107/48     Weight: 79.2 kg (174 lb 9.7 oz)  Body mass index is 25.78 kg/m².     SpO2: (!) 93 %         Intake/Output Summary (Last 24 hours) at 10/14/2024 1101  Last data filed at 10/14/2024 1000  Gross per 24 hour   Intake 2172.34 ml   Output 0 ml   Net 2172.34 ml       Lines/Drains/Airways       Central Venous Catheter Line  Duration                  Hemodialysis Catheter 05/03/24 1115 right subclavian 163 days    Percutaneous Central Line - Triple Lumen  10/09/24 1127 Internal Jugular Left 4 days              Arterial Line  Duration             Arterial Line 10/12/24 1040 Left Radial 2 days              Peripheral Intravenous Line  Duration                  Peripheral IV - Single Lumen 10/09/24 0831 20 G Anterior;Left Forearm 5 days                       Physical Exam  Vitals reviewed.   Constitutional:       Appearance: He is well-developed.   HENT:      Head: Normocephalic.   Eyes:      Conjunctiva/sclera: Conjunctivae normal.      Pupils:  Pupils are equal, round, and reactive to light.   Cardiovascular:      Rate and Rhythm: Normal rate and regular rhythm.      Heart sounds: Murmur heard.   Pulmonary:      Effort: Pulmonary effort is normal.      Breath sounds: Normal breath sounds.   Abdominal:      General: Bowel sounds are normal.      Palpations: Abdomen is soft.   Musculoskeletal:      Cervical back: Normal range of motion and neck supple.   Skin:     General: Skin is warm.   Neurological:      Mental Status: He is alert and oriented to person, place, and time.            Significant Labs:     DATA:     Laboratory:  CBC:  Recent Labs   Lab 10/13/24  0357 10/14/24  0052 10/14/24  0628   WBC 40.62 H 38.14 H 37.64 H   Hemoglobin 9.9 L 10.0 L 9.7 L   Hematocrit 28.4 L 27.8 L 27.3 L   Platelets 396 413 421       CHEMISTRIES:  Recent Labs   Lab 04/04/22  1255 06/09/22  0740 07/07/22  0746 09/06/22  0741 10/08/24  2330 10/09/24  0311 10/13/24  1345 10/14/24  0052 10/14/24  0628   Glucose 92 90 92   < > 113 H   < > 155 H 148 H 147 H   Sodium 137 139 136   < > 134 L   < > 130 L 125 L 126 L   Potassium 4.8 4.8 4.3   < > 4.3   < > 4.9 5.1 5.2 H   BUN 68 H 81 H 76 H   < > 20   < > 67 H 77 H 78 H   Creatinine 4.08 H 4.64 H 4.06 H   < > 2.6 H   < > 7.1 H 7.8 H 8.0 H   eGFR if  17.0 A 14.5 A 17.1 A  --   --   --   --   --   --    eGFR if non  14.7 A 12.6 A 14.8 A  --   --   --   --   --   --    Calcium 9.1 8.7 9.5   < > 8.5 L   < > 8.2 L 8.1 L 8.0 L   Magnesium  --   --   --    < > 2.1  --  2.3  --  2.4    < > = values in this interval not displayed.       CARDIAC BIOMARKERS:  Recent Labs   Lab 09/25/22  1638 04/29/24  1707 10/04/24  0418   Troponin I 0.015 0.040 H 0.045 H       COAGS:  Recent Labs   Lab 10/02/24  2224 10/03/24  0441 10/11/24  1637   INR 1.3 H 1.2 1.2       LIPIDS/LFTS:  Recent Labs   Lab 03/04/24  1018 04/12/24  1056 04/29/24  2039 06/04/24  1440 09/04/24  1201 09/30/24  1100 10/09/24  0311 10/11/24  0421  10/13/24  0945   Cholesterol 124  --  127  --  189  --   --   --   --    Triglycerides 66  --  66  --  167 H  --   --   --   --    HDL 39 L  --  32 L  --  54  --   --   --   --    LDL Cholesterol 71.8  --  81.8  --  101.6  --   --   --   --    Non-HDL Cholesterol 85  --  95  --  135  --   --   --   --    AST 39   < >  --    < > 99 H   < > 97 H 110 H 106 H   ALT 35   < >  --    < > 68 H   < > 22 29 45 H    < > = values in this interval not displayed.       Hemoglobin A1C   Date Value Ref Range Status   04/29/2024 5.6 4.0 - 5.6 % Final     Comment:     ADA Screening Guidelines:  5.7-6.4%  Consistent with prediabetes  >or=6.5%  Consistent with diabetes    High levels of fetal hemoglobin interfere with the HbA1C  assay. Heterozygous hemoglobin variants (HbS, HgC, etc)do  not significantly interfere with this assay.   However, presence of multiple variants may affect accuracy.     03/04/2024 5.6 4.0 - 5.6 % Final     Comment:     ADA Screening Guidelines:  5.7-6.4%  Consistent with prediabetes  >or=6.5%  Consistent with diabetes    High levels of fetal hemoglobin interfere with the HbA1C  assay. Heterozygous hemoglobin variants (HbS, HgC, etc)do  not significantly interfere with this assay.   However, presence of multiple variants may affect accuracy.     09/01/2023 6.0 (H) 4.0 - 5.6 % Final     Comment:     ADA Screening Guidelines:  5.7-6.4%  Consistent with prediabetes  >or=6.5%  Consistent with diabetes    High levels of fetal hemoglobin interfere with the HbA1C  assay. Heterozygous hemoglobin variants (HbS, HgC, etc)do  not significantly interfere with this assay.   However, presence of multiple variants may affect accuracy.         TSH  Recent Labs   Lab 05/04/23  1129 03/04/24  1018 09/04/24  1201   TSH 1.160 0.283 L 1.740       The 10-year ASCVD risk score (Susanne SPEARS, et al., 2019) is: 14.3%    Values used to calculate the score:      Age: 63 years      Sex: Male      Is Non- : Yes       Diabetic: No      Tobacco smoker: No      Systolic Blood Pressure: 125 mmHg      Is BP treated: Yes      HDL Cholesterol: 54 mg/dL      Total Cholesterol: 189 mg/dL       BNP    Lab Results   Component Value Date/Time    BNP 3,204 (H) 04/29/2024 05:07 PM    BNP 2,549 (H) 04/12/2024 10:56 AM    BNP 1,416 (H) 09/28/2022 03:45 PM    BNP 2,304 (H) 09/25/2022 04:38 PM     (H) 11/02/2018 12:17 PM            ECHO    Results for orders placed during the hospital encounter of 10/03/24    Echo    Interpretation Summary    Left Ventricle: The left ventricle is normal in size. Normal wall thickness. There is mildly reduced systolic function with a visually estimated ejection fraction of  45%.    Right Ventricle: Right ventricular enlargement. Systolic function is reduced.    Left Atrium: Left atrium is dilated.    Right Atrium: Right atrium is dilated. 4.2 x 2.5 large mobile echogenic mass present.appears to be attached to the device lead    Tricuspid Valve: There is severe regurgitation.    Pulmonary Artery: There is severe pulmonary hypertension. The estimated pulmonary artery systolic pressure is 72 mmHg.    IVC/SVC: Elevated venous pressure at 15 mmHg.    Limited echo    Findings conveyed to critical care and primary team      STRESS TEST    Results for orders placed during the hospital encounter of 07/11/22    Nuclear Stress Test    Interpretation Summary    The EKG portion of this study is abnormal but not diagnostic.    The patient reported no chest pain during the stress test.    The nuclear portion of this study will be reported separately.        CATH    Results for orders placed during the hospital encounter of 09/25/22    Cardiac catheterization    Conclusion    There was non-obstructive coronary artery disease. Stable as compared to prior.    The procedure log was documented by Documenter: RT Teri; May Galdamez RN and verified by Juan Roque MD.    Date: 9/27/2022  Time: 6:36 PM

## 2024-10-14 NOTE — PROGRESS NOTES
[facility] - Intensive Care  Palliative Medicine  Progress Note    Patient Name: Ar Sharma  MRN: [id number]  Admission Date: 10/3/2024  Hospital Length of Stay: 11 days  Code Status: DNR   Attending Provider: Delmy Agarwal, *  Consulting Provider: Jesenia Mancera MD  Primary Care Physician: Jc Elliott MD  Principal Problem:Closed right hip fracture, initial encounter    Assessment/Plan:     Advance Care Planning    10/14/24  - Chart and interval history reviewed; discussed pt during MDT rounds. Pt with increasing hemodynamic instability, and remains on two vasopressors + dobutamine as mixed venous gas suggestive of cardiogenic shock. Mass on ICD lead appears to have increased in size despite abx, antifungals, and anticoagulation. Pts care was discussed with interventional cardiology and CTS at [facility], though they have declined transfer due to pt's instability.   - Requested by pt's daughter (Dr Sharma, family medicine resident at [facility]) to discuss plan moving forward. Along with Dr Caldwell ([facility]), met with her in the waiting room; addressed her questions and concerns. During visit to pt's room, he was alert though somewhat lethargic. He appeared comfortable.   - At this time, plan remains to continue with current level of care (ie, vasopressors, inotropes, and CRRT), though will have further discussions about comfort-focused care if pt's shock further worsens and/or he has signs of distress. Due to his tenuous overall status, family visitation has been encouraged.   - His daughter has excellent insight into situation, and has been doing her best to provide transparent updates to family. She is aware that we are concerned pt is unlikely to survive hospital stay.   - Emotional support provided; also briefly spoke to her brother via her phone, and encouraged him to visit hospital if possible  - Will follow up with pt and family tomorrow    10/11/24  - Consult for support and advance  care planning in pt initially admitted to hospital with hip fracture, but now in persistent shock in ICU requiring vasopressor support to facilitate RRT. RUQ ultrasound had incidental finding of atrial thrombus vs endocarditis; cardiology and ID consulted. Pressor requirements have gradually been increasing over the last few days; have gone from 0.02 of levo to 0.15 as of today during RRT. Chart reviewed; extensively discussed pt during MDT rounds and separately with Dr Connolly.   - Along with Dr Hawkins, visited with pt at bedside; introduced role of palliative medicine, and learned more about pt outside of hospital. He is  to his wife of many years, Nicolette, and they have three children. Their daughter Gloria is currently completing her family residency in [place]. He is very proud of his children, and family have been visiting him routinely in hospital.   - Dr Hawkins provided pt with medical update; discussed that while we will continue to look for reversible aspects of his illness (most significantly, his shock/hypotension) though we are concerned that this has not been improving over the course of the week. In fact, his pressor requirements are currently higher. He verbalized understanding of this, and was agreeable to our offer to provide an update to his wife or daughter. Emotional support provided during discussion.   - Following this, Dr Connolly and I called and spoke to his wife Nicolette. She said she visited with pt yesterday and he mentioned he wants to go home. Validated that this is understandable, though discussed that her remains critically ill and unable to leave the hospital at this time. Medical update provided, and shared our worry about his increasing pressor requirements despite appropriate supportive care (abx, pressors, RRT). We have additional tests ordered for today, but if things continue to worsen (or fail to improve) we may need to have some hard conversations. She voiced understanding, and  said she will likely be returning to hospital after his HD sessions has ended.   - Let her know we will continue to check in on pt and family throughout hospital stay; updated Dr Hawkins following this second conversation     Pulmonary  Acute on chronic hypoxic respiratory failure  - Mgmt per primary team/PCCM; significant PE and HF contributing to this   - See PE    Renal/  ESRD (end stage renal disease)  - Nephrology consulted and following for RRT needs; has been requiring significant vasopressor support to facilitate this      ID  Septic shock  - Believed to be cardiogenic with possible septic component, though cultures have been NGTD despite presence of atrial mass (endocarditis vs thrombus). On pressors, abx, and anticoagulation.   - mgmt per primary team     Hematology  Acute pulmonary embolism  - Not a candidate for thrombectomy due to hemodynamic instability     I will follow-up with patient. Please contact us if you have any additional questions.    BRITTANIE Thorpe  Palliative Medicine Staff   [phone]    Total visit time: 51 minutes    > 50% of 35 min visit spent in chart review, face to face discussion of symptom assessment, coordination of care with other specialists, and discharge planning.    16 min ACP time spent: goals of care, emotional support, formulating and communicating prognosis, exploring burden/ benefit of various approaches of treatment.      Subjective:     Interval History: on two pressors and inotropes    Medications:  Continuous Infusions:   sodium chloride 0.9%   Intravenous Continuous        DOBUTamine IV infusion (titrating)  0-20 mcg/kg/min Intravenous Continuous 22.6 mL/hr at 10/14/24 1200 20 mcg/kg/min at 10/14/24 1200    heparin (porcine) in D5W  0-40 Units/kg/hr (Adjusted) Intravenous Continuous 10.2 mL/hr at 10/14/24 1200 14 Units/kg/hr at 10/14/24 1200    NORepinephrine bitartrate-D5W  0-3 mcg/kg/min Intravenous Continuous 24 mL/hr at 10/14/24 1200 0.68 mcg/kg/min at  10/14/24 1200    vasopressin  0.04 Units/min Intravenous Continuous 12 mL/hr at 10/14/24 1200 0.04 Units/min at 10/14/24 1200     Scheduled Meds:   DAPTOmycin (CUBICIN) IV (PEDS and ADULTS)  8 mg/kg Intravenous Q48H    docusate sodium  100 mg Oral Daily    epoetin saba-epbx  10,000 Units Intravenous Every Mon, Wed, Fri    famotidine (PF)  20 mg Intravenous Daily    fentaNYL  1 patch Transdermal Q72H    fludrocortisone  100 mcg Oral Daily    glycerin 99.5%  100 mL Rectal ED 1 Time    And    magnesium citrate  296 mL Rectal ED 1 Time    And    sodium chloride 0.9%  500 mL Rectal ED 1 Time    hydrocortisone sodium succinate  100 mg Intravenous Q8H    levothyroxine  150 mcg Oral Before breakfast    meropenem (MERREM) extended infusion  500 mg Intravenous Q12H    mexiletine  150 mg Oral BID WM    micafungin  100 mg Intravenous Q24H    midodrine  15 mg Oral Q8H    polyethylene glycol  17 g Oral Daily    triamcinolone acetonide 0.1%   Topical (Top) BID     PRN Meds:  Current Facility-Administered Medications:     0.9% NaCl, , Intravenous, PRN    acetaminophen, 650 mg, Oral, Q4H PRN    furosemide, 360 mg, Oral, See admin instructions    heparin (porcine), 1,000 Units, Intra-Catheter, PRN    heparin (porcine), 5,000 Units, Intra-Catheter, PRN    heparin (PORCINE), 60 Units/kg (Adjusted), Intravenous, PRN    heparin (PORCINE), 30 Units/kg (Adjusted), Intravenous, PRN    hydrALAZINE, 10 mg, Intravenous, Q6H PRN    HYDROmorphone, 1 mg, Intravenous, Q4H PRN    hydrOXYzine pamoate, 25 mg, Oral, Q8H PRN    levalbuterol, 1.25 mg, Nebulization, Q4H PRN    magnesium sulfate IVPB, 2 g, Intravenous, PRN    melatonin, 6 mg, Oral, Nightly PRN    metOLazone, 10 mg, Oral, See admin instructions    naloxone, 0.4 mg, Intravenous, PRN    nitroGLYCERIN, 0.4 mg, Sublingual, Q5 Min PRN    ondansetron, 4 mg, Intravenous, Q6H PRN    oxyCODONE-acetaminophen, 1 tablet, Oral, Q4H PRN    oxyCODONE-acetaminophen, 1 tablet, Oral, Q4H PRN    polyethylene  glycol, 17 g, Oral, BID PRN    promethazine (PHENERGAN) 6.25 mg in 0.9% NaCl 50 mL IVPB, 6.25 mg, Intravenous, Q6H PRN    simethicone, 1 tablet, Oral, QID PRN    sodium chloride 0.9%, 250 mL, Intravenous, PRN    sodium chloride 0.9%, 10 mL, Intravenous, Q12H PRN    sodium chloride 0.9%, 10 mL, Intravenous, PRN    sodium phosphate 20.01 mmol in D5W 250 mL IVPB, 20.01 mmol, Intravenous, PRN    sodium phosphate 30 mmol in D5W 250 mL IVPB, 30 mmol, Intravenous, PRN    sodium phosphate 39.99 mmol in D5W 250 mL IVPB, 39.99 mmol, Intravenous, PRN    Objective:     Vital Signs (Most Recent):  Temp: 96.1 °F (35.6 °C) (10/14/24 0800)  Pulse: 69 (10/14/24 1215)  Resp: (!) 31 (10/14/24 1215)  BP: (!) 114/51 (10/14/24 1200)  SpO2: (!) 92 % (10/14/24 1215) Vital Signs (24h Range):  Temp:  [96.1 °F (35.6 °C)-98.4 °F (36.9 °C)] 96.1 °F (35.6 °C)  Pulse:  [] 69  Resp:  [8-31] 31  SpO2:  [75 %-98 %] 92 %  BP: (100-125)/(45-66) 114/51  Arterial Line BP: ()/(38-61) 114/51     Weight: 79.2 kg (174 lb 9.7 oz)  Body mass index is 25.78 kg/m².       Physical Exam  Constitutional:       Comments: Awake but somewhat lethargic, sitting up in bed, does not appear to be in distress, ill-appearing        Significant Labs: All pertinent labs within the past 24 hours have been reviewed.  CBC:   Recent Labs   Lab 10/14/24  0628   WBC 37.64*   HGB 9.7*   HCT 27.3*   MCV 75*        BMP:  Recent Labs   Lab 10/14/24  0628   *   *   K 5.2*   CL 92*   CO2 18*   BUN 78*   CREATININE 8.0*   CALCIUM 8.0*   MG 2.4     LFT:  Lab Results   Component Value Date     (H) 10/13/2024    ALKPHOS 203 (H) 10/13/2024    BILITOT 2.9 (H) 10/13/2024     Albumin:   Albumin   Date Value Ref Range Status   10/13/2024 2.4 (L) 3.5 - 5.2 g/dL Final     Protein:   Total Protein   Date Value Ref Range Status   10/13/2024 6.2 6.0 - 8.4 g/dL Final     Lactic acid:   Lab Results   Component Value Date    LACTATE 0.7 10/14/2024    LACTATE 1.2  10/04/2024       Significant Imaging: I have reviewed all pertinent imaging results/findings within the past 24 hours.

## 2024-10-14 NOTE — SUBJECTIVE & OBJECTIVE
Interval History: awake, alert but does not answering questions appropriately. Pending transfer to [facility]. Denies nausea, vomiting, diarrhea, rash, itching, worsening joint pains, fevers.     Review of Systems   All other systems reviewed and are negative.    Objective:     Vital Signs (Most Recent):  Temp: 96.1 °F (35.6 °C) (10/14/24 0800)  Pulse: 69 (10/14/24 1215)  Resp: (!) 31 (10/14/24 1215)  BP: (!) 114/51 (10/14/24 1200)  SpO2: (!) 92 % (10/14/24 1215) Vital Signs (24h Range):  Temp:  [96.1 °F (35.6 °C)-98.4 °F (36.9 °C)] 96.1 °F (35.6 °C)  Pulse:  [] 69  Resp:  [8-31] 31  SpO2:  [75 %-98 %] 92 %  BP: (100-125)/(45-66) 114/51  Arterial Line BP: ()/(38-61) 114/51     Weight: 79.2 kg (174 lb 9.7 oz)  Body mass index is 25.78 kg/m².    Estimated Creatinine Clearance: 9.3 mL/min (A) (based on SCr of 8 mg/dL (H)).     Physical Exam  Vitals and nursing note reviewed.   Constitutional:       Appearance: He is ill-appearing.   HENT:      Head: Normocephalic.      Nose: Nose normal.      Comments: Nasal cannula     Mouth/Throat:      Mouth: Mucous membranes are moist.   Cardiovascular:      Comments: R IJ, L subclavian, L radial A line   Pulmonary:      Effort: Pulmonary effort is normal. No respiratory distress.   Abdominal:      General: There is no distension.      Palpations: Abdomen is soft.      Tenderness: There is no abdominal tenderness.   Musculoskeletal:         General: No swelling or tenderness. Normal range of motion.   Skin:     General: Skin is warm and dry.      Findings: No lesion or rash.   Neurological:      Mental Status: He is alert.          Significant Labs:   Microbiology Results (last 7 days)       Procedure Component Value Units Date/Time    Blood culture [[id number]] Collected: 10/12/24 1440    Order Status: Completed Specimen: Blood from Peripheral, Lower Arm, Left Updated: 10/13/24 9706     Blood Culture, Routine No Growth to date      No Growth to date    Blood culture  [[id number]] Collected: 10/09/24 0930    Order Status: Completed Specimen: Blood Updated: 10/13/24 1103     Blood Culture, Routine No Growth after 4 days.    Narrative:      Draw sample # 2 from separate site    Blood culture [[id number]] Collected: 10/09/24 0915    Order Status: Completed Specimen: Blood Updated: 10/13/24 1103     Blood Culture, Routine No Growth after 4 days.    Narrative:      Draw sample # 2 from separate site    Urine Culture High Risk [[id number]]     Order Status: No result Specimen: Urine, Catheterized     Fungus Culture, Blood or Bone Marrow [[id number]]     Order Status: Canceled Specimen: Blood     Blood culture [[id number]] Collected: 10/05/24 1612    Order Status: Completed Specimen: Blood from Peripheral, Hand, Left Updated: 10/09/24 1703     Blood Culture, Routine No Growth after 4 days.    Blood culture [[id number]] Collected: 10/05/24 1556    Order Status: Completed Specimen: Blood from Peripheral, Wrist, Left Updated: 10/09/24 1703     Blood Culture, Routine No Growth after 4 days.            Significant Imaging: I have reviewed all pertinent imaging results/findings within the past 24 hours.

## 2024-10-14 NOTE — ASSESSMENT & PLAN NOTE
10/14/24  - Chart and interval history reviewed; discussed pt during MDT rounds. Pt with increasing hemodynamic instability, and remains on two vasopressors + dobutamine as mixed venous gas suggestive of cardiogenic shock. Mass on ICD lead appears to have increased in size despite abx, antifungals, and anticoagulation. Pts care was discussed with interventional cardiology and CTS at [facility], though they have declined transfer due to pt's instability.   - Requested by pt's daughter (Dr Sharma, family medicine resident at [facility]) to discuss plan moving forward. Along with Dr Caldwell ([facility]), met with her in the waiting room; addressed her questions and concerns. During visit to pt's room, he was alert though somewhat lethargic. He appeared comfortable.   - At this time, plan remains to continue with current level of care, though will have further discussions if pt's shock further worsens and/or he has signs of distress. Due to his tenuous overall status, family visitation has been encouraged.   - His daughter has excellent insight into situation, and has been doing her best to provide transparent updates to family. She is aware that we are concerned pt is unlikely to survive hospital stay.   - Emotional support provided; also briefly spoke to her brother via her phone, and encouraged him to visit hospital if possible  - Will follow up with pt and family tomorrow    10/11/24  - Consult for support and advance care planning in pt initially admitted to hospital with hip fracture, but now in persistent shock in ICU requiring vasopressor support to facilitate RRT. RUQ ultrasound had incidental finding of atrial thrombus vs endocarditis; cardiology and ID consulted. Pressor requirements have gradually been increasing over the last few days; have gone from 0.02 of levo to 0.15 as of today during RRT. Chart reviewed; extensively discussed pt during MDT rounds and separately with Dr Connolly.   - Along with Dr Hawkins,  visited with pt at bedside; introduced role of palliative medicine, and learned more about pt outside of hospital. He is  to his wife of many years, Nicolette, and they have three children. Their daughter Gloria is currently completing her family residency in [place]. He is very proud of his children, and family have been visiting him routinely in hospital.   - Dr Hawkins provided pt with medical update; discussed that while we will continue to look for reversible aspects of his illness (most significantly, his shock/hypotension) though we are concerned that this has not been improving over the course of the week. In fact, his pressor requirements are currently higher. He verbalized understanding of this, and was agreeable to our offer to provide an update to his wife or daughter. Emotional support provided during discussion.   - Following this, Dr Connolly and I called and spoke to his wife Nicolette. She said she visited with pt yesterday and he mentioned he wants to go home. Validated that this is understandable, though discussed that her remains critically ill and unable to leave the hospital at this time. Medical update provided, and shared our worry about his increasing pressor requirements despite appropriate supportive care (abx, pressors, RRT). We have additional tests ordered for today, but if things continue to worsen (or fail to improve) we may need to have some hard conversations. She voiced understanding, and said she will likely be returning to hospital after his HD sessions has ended.   - Let her know we will continue to check in on pt and family throughout hospital stay; updated Dr Hawkins following this second conversation

## 2024-10-14 NOTE — CONSULTS
[facility] - Intensive Care  Neurology  Consult Note    Patient Name: Ar Sharma  MRN: [id number]  Admission Date: 10/3/2024  Hospital Length of Stay: 11 days  Code Status: DNR   Attending Provider: Delmy Agarwal, *   Consulting Provider: Rula Martinez MD  Primary Care Physician: Jc Elliott MD  Principal Problem:Closed right hip fracture, initial encounter    Inpatient consult to Neurology  Consult performed by: Rula Martinez MD  Consult ordered by: Delmy Agarwal MD         Subjective:     Chief Complaint:  Encephalopathy      HPI:   63 y.o. male with medical history of ESRD on HD, HTN, HLD, CHF, CAD, Ischemic cardiomyopathy - s/p medtronic CRT-D, VT s/p ablation on mexiletine and amiodarone - with admission concerns of right femur fracture.  S/p ORIF 10/4. Hospital course complicated hypotension / needing vasopressors; right atrial mass and PE / needing broad spectrum Abx and AC. Associated encephalopathy - and neurology consulted for the same.    History from primary team / family / medical records review. Fluctuating encephalopathy - in the spectrum of affect in attention / concentration and orientation / confusion. No new focal motor or sensory or cranial nerve deficits. No concerns for clinical seizures. No history of stroke / epilepsy or complex migraines. No history of primary neurodegenerative cognitive disorder or neuro psychiatric disorder.     Currently on exam - awake, alert - following simple commands, oriented to person, not oriented to month  / distratable / no focal motor or sensory or cranial nerve deficits - RLE ORIF  CT head negative for acute findings / CTA head and neck negative for any occlusion or stenosis or any septic embolic aneurysms or vasculitis   Labs - severe leucocytosis / hyponatremia / hyperkalemia / uremia      Past Medical History:   Diagnosis Date    Cardiomyopathy     CKD (chronic kidney disease) stage 4, GFR 15-29 ml/min      Congestive heart failure (CHF) 2015    COPD (chronic obstructive pulmonary disease)     Coronary artery disease     Edema     Essential (primary) hypertension 05/18/2022    Formatting of this note might be different from the original. Converted from [employer]: Description - ESSENTIAL HYPERTENSION, BENIGN    Heart attack     HLD (hyperlipidemia)     Hypertension     Hyperuricemia     Hypocalcemia     Renal cyst, left     Secondary hyperparathyroidism     Thyroid disease     V tach     Vitamin D deficiency        Past Surgical History:   Procedure Laterality Date    ablations  03/05/2018    albations  02/01/2018    COLONOSCOPY N/A 12/07/2018    Procedure: COLONOSCOPY/suprep;  Surgeon: Ananya Morillo MD;  Location: [facility] ENDO;  Service: Endoscopy;  Laterality: N/A;    defibulater N/A 2016    ESOPHAGOGASTRODUODENOSCOPY N/A 12/07/2018    Procedure: EGD (ESOPHAGOGASTRODUODENOSCOPY);  Surgeon: Ananya Morillo MD;  Location: [facility] ENDO;  Service: Endoscopy;  Laterality: N/A;    INSERTION OF TUNNELED CENTRAL VENOUS HEMODIALYSIS CATHETER Right 5/3/2024    Procedure: INSERTION, CATHETER, HEMODIALYSIS, DUAL LUMEN;  Surgeon: Philip Perez MD;  Location: [facility] OR;  Service: General;  Laterality: Right;    INSERTION, CATHETER, DIALYSIS, PERITONEAL, LAPAROSCOPIC N/A 5/8/2024    Procedure: INSERTION, CATHETER, DIALYSIS, PERITONEAL, LAPAROSCOPIC;  Surgeon: Tyree Ruiz MD;  Location: [facility] OR;  Service: General;  Laterality: N/A;    INSERTION, CENTRAL VENOUS ACCESS DEVICE Right 6/5/2024    Procedure: Insertion,central venous access device;  Surgeon: Tyree Ruiz MD;  Location: [facility] OR;  Service: General;  Laterality: Right;    JOINT REPLACEMENT Bilateral 10/2016    knees, bilat    LEFT HEART CATHETERIZATION N/A 12/16/2020    Procedure: Left heart cath;  Surgeon: Shahram Stewart MD;  Location: [facility] CATH LAB/EP;  Service: Cardiology;  Laterality: N/A;    LEFT HEART CATHETERIZATION Left 9/27/2022    Procedure: Left  heart cath;  Surgeon: Juan Roque MD;  Location: [facility] CATH LAB/EP;  Service: Cardiology;  Laterality: Left;    OPEN REDUCTION AND INTERNAL FIXATION (ORIF) OF INTERTROCHANTERIC FRACTURE OF FEMUR Right 10/4/2024    Procedure: ORIF, FRACTURE, FEMUR, INTERTROCHANTERIC;  Surgeon: Kinsey Reed MD;  Location: [facility] OR;  Service: Orthopedics;  Laterality: Right;  Cristina Del Real notified- Nc    NE HEMODIALYSIS, ONE EVALUATION  5/6/2024    REMOVAL OF HEMODIALYSIS CATHETER Right 5/3/2024    Procedure: REMOVAL, CATHETER, HEMODIALYSIS;  Surgeon: Philip Perez MD;  Location: [facility] OR;  Service: General;  Laterality: Right;    REPLACEMENT OF IMPLANTABLE CARDIOVERTER-DEFIBRILLATOR (ICD) GENERATOR Left 1/24/2023    Procedure: REPLACEMENT, ICD GENERATOR;  Surgeon: River Tenorio MD;  Location: [facility] EP LAB;  Service: Cardiology;  Laterality: Left;  ANTHONY, CRTD gen chg, MDT, MAC, DM, 3prep       Review of patient's allergies indicates:   Allergen Reactions    Lokelma [sodium zirconium cyclosilicate] Other (See Comments)     Fluid retention, weight gain, CHF excerebration, severe constipation    Atorvastatin Other (See Comments)     Joint pain    Jardiance [empagliflozin] Other (See Comments)     Chest pains, significant weight loss, lower blood pressure       Current Neurological Medications:     No current facility-administered medications on file prior to encounter.     Current Outpatient Medications on File Prior to Encounter   Medication Sig    acetaminophen (TYLENOL) 500 MG tablet Take 500 mg by mouth every 6 (six) hours as needed for Pain.    allopurinoL (ZYLOPRIM) 100 MG tablet TAKE 1 TABLET BY MOUTH EVERY DAY    amiodarone (PACERONE) 400 MG tablet Take 1 tablet (400 mg total) by mouth once daily.    aspirin (ECOTRIN) 81 MG EC tablet TAKE 1 TABLET BY MOUTH EVERY DAY    ergocalciferol (ERGOCALCIFEROL) 50,000 unit Cap TAKE 1 CAPSULE BY MOUTH ONE TIME PER WEEK    furosemide (LASIX) 80 MG tablet Take 4.5 tablets  (360 mg total) by mouth 2 (two) times daily.    levothyroxine (SYNTHROID) 150 MCG tablet Take 1 tablet (150 mcg total) by mouth once daily.    metOLazone (ZAROXOLYN) 10 MG tablet Take 10 mg by mouth As instructed (Take I tablet by mouth Tuesday, Thursday, Saturday, ). Take I tablet by mouth Tuesday, Thursday, Saturday,     mexiletine (MEXITIL) 150 MG Cap Take 1 capsule (150 mg total) by mouth 2 (two) times daily with meals.    midodrine (PROAMATINE) 5 MG Tab Take 3 tablets (15 mg total) by mouth every 8 (eight) hours.    nitroGLYCERIN (NITROSTAT) 0.4 MG SL tablet Place 1 tablet (0.4 mg total) under the tongue every 5 (five) minutes as needed for Chest pain.    pantoprazole (PROTONIX) 40 MG tablet Take 1 tablet (40 mg total) by mouth once daily.    polyethylene glycol (GLYCOLAX) 17 gram PwPk Take 17 g by mouth once daily.    pravastatin (PRAVACHOL) 40 MG tablet TAKE 1 TABLET BY MOUTH EVERY DAY    vitamin renal formula, B-complex-vitamin c-folic acid, (TRIPHROCAPS) 1 mg Cap Take 1 capsule by mouth once daily.     Family History       Problem Relation (Age of Onset)    Alcohol abuse Father    Arthritis Sister    Breast cancer Mother    Cancer Mother    Hypertension Mother    Kidney disease Father    No Known Problems Brother, Daughter, Son    Stroke Mother          Tobacco Use    Smoking status: Former     Current packs/day: 0.00     Average packs/day: 1 pack/day for 33.2 years (33.2 ttl pk-yrs)     Types: Cigarettes     Start date: 1979     Quit date: 3/3/2012     Years since quittin.6     Passive exposure: Past    Smokeless tobacco: Never   Substance and Sexual Activity    Alcohol use: Not Currently     Comment: rare    Drug use: No    Sexual activity: Not Currently     Partners: Female     Review of Systems   Respiratory:  Negative for shortness of breath.    Cardiovascular:  Negative for chest pain.   Neurological:  Negative for seizures, facial asymmetry and weakness.   Psychiatric/Behavioral:  " Positive for confusion.      Objective:     Vital Signs (Most Recent):  Temp: 96.1 °F (35.6 °C) (10/14/24 0800)  Pulse: 69 (10/14/24 1215)  Resp: (!) 31 (10/14/24 1215)  BP: (!) 114/51 (10/14/24 1200)  SpO2: (!) 92 % (10/14/24 1215) Vital Signs (24h Range):  Temp:  [96.1 °F (35.6 °C)-98.4 °F (36.9 °C)] 96.1 °F (35.6 °C)  Pulse:  [] 69  Resp:  [8-31] 31  SpO2:  [75 %-98 %] 92 %  BP: (100-125)/(45-66) 114/51  Arterial Line BP: ()/(38-61) 114/51     Weight: 79.2 kg (174 lb 9.7 oz)  Body mass index is 25.78 kg/m².     Physical Exam  HENT:      Head: Normocephalic and atraumatic.   Eyes:      Extraocular Movements: Extraocular movements intact and EOM normal.   Pulmonary:      Effort: No respiratory distress.   Neurological:      Mental Status: He is alert.   Psychiatric:         Speech: Speech normal.          NEUROLOGICAL EXAMINATION:     MENTAL STATUS   Oriented to person.   Disoriented to place.   Disoriented to time.   Attention: decreased. Concentration: decreased.   Speech: speech is normal   Level of consciousness: alert    CRANIAL NERVES     CN III, IV, VI   Extraocular motions are normal.   Nystagmus: none   Ophthalmoparesis: none    CN VII   Facial expression full, symmetric.     MOTOR EXAM        No new focal motor deficits       SENSORY EXAM        No new focal sensory deficits       GAIT AND COORDINATION     Tremor   Resting tremor: absent      Significant Labs: Hemoglobin A1c: No results for input(s): "HGBA1C" in the last 720 hours.  Blood Culture:   Recent Labs   Lab 10/12/24  1449   LABBLOO No Growth to date  No Growth to date     BMP:   Recent Labs   Lab 10/13/24  1345 10/14/24  0052 10/14/24  0628   * 148* 147*   * 125* 126*   K 4.9 5.1 5.2*   CL 92* 92* 92*   CO2 17* 18* 18*   BUN 67* 77* 78*   CREATININE 7.1* 7.8* 8.0*   CALCIUM 8.2* 8.1* 8.0*   MG 2.3  --  2.4     CBC:   Recent Labs   Lab 10/13/24  0357 10/14/24  0052 10/14/24  0628   WBC 40.62* 38.14* 37.64*   HGB 9.9* " "10.0* 9.7*   HCT 28.4* 27.8* 27.3*    413 421     CMP:   Recent Labs   Lab 10/13/24  0945 10/13/24  1345 10/14/24  0052 10/14/24  0628   * 155* 148* 147*   * 130* 125* 126*   K 4.8 4.9 5.1 5.2*   CL 92* 92* 92* 92*   CO2 21* 17* 18* 18*   BUN 65* 67* 77* 78*   CREATININE 7.0* 7.1* 7.8* 8.0*   CALCIUM 8.4* 8.2* 8.1* 8.0*   MG  --  2.3  --  2.4   PROT 6.2  --   --   --    ALBUMIN 2.4* 2.4*  --   --    BILITOT 2.9*  --   --   --    ALKPHOS 203*  --   --   --    *  --   --   --    ALT 45*  --   --   --    ANIONGAP 14 21* 15 16     CSF Culture: No results for input(s): "CSFCULTURE" in the last 48 hours.  CSF Studies: No results for input(s): "ALIQUT", "APPEARCSF", "COLORCSF", "CSFWBC", "CSFRBC", "GLUCCSF", "LDHCSF", "PROTEINCSF", "VDRLCSF" in the last 48 hours.  Inflammatory Markers: No results for input(s): "SEDRATE", "CRP", "PROCAL" in the last 48 hours.  POCT Glucose: No results for input(s): "POCTGLUCOSE" in the last 24 hours.  Prealbumin: No results for input(s): "PREALBUMIN" in the last 48 hours.  Respiratory Culture: No results for input(s): "GSRESP", "RESPIRATORYC" in the last 48 hours.  Urine Culture: No results for input(s): "LABURIN" in the last 48 hours.  Urine Studies: No results for input(s): "COLORU", "APPEARANCEUA", "PHUR", "SPECGRAV", "PROTEINUA", "GLUCUA", "KETONESU", "BILIRUBINUA", "OCCULTUA", "NITRITE", "UROBILINOGEN", "LEUKOCYTESUR", "RBCUA", "WBCUA", "BACTERIA", "SQUAMEPITHEL", "HYALINECASTS" in the last 48 hours.    Invalid input(s): "WRIGHTSUR"  Recent Lab Results  (Last 5 results in the past 24 hours)        10/14/24  0628   10/14/24  0053   10/14/24  0052   10/13/24  1838   10/13/24  1345        Acanthocytes     Present           Albumin         2.4       Allens Test   N/A             Anion Gap 16     15     21       Aniso     Slight           PTT 49.5  Comment: Refer to local heparin nomogram for intensity/dose specific   therapeutic   range.                 Bands     " 6.0           Baso # 0.11     CANCELED  Comment: Result canceled by the ancillary.           Basophil % 0.3     0.0           Site   Other             BUN 78     77     67       Talon/Echinocytes     Occasional           Calcium 8.0     8.1     8.2       Chloride 92     92     92       CO2 18     18     17       Creatinine 8.0     7.8     7.1       DelSys   Nasal Can             Differential Method Automated     Manual           eGFR 7     7     8       Eos # 0.0     CANCELED  Comment: Result canceled by the ancillary.           Eos % 0.0     0.0           Glucose 147     148     155       Gran # (ANC) 33.4               Gran % 88.6     86.0           Hematocrit 27.3     27.8           Hemoglobin 9.7     10.0           HIV 1/2 Ag/Ab       Non-reactive         Immature Grans (Abs) 1.85  Comment: Mild elevation in immature granulocytes is non specific and   can be seen in a variety of conditions including stress response,   acute inflammation, trauma and pregnancy. Correlation with other   laboratory and clinical findings is essential.       CANCELED  Comment: Mild elevation in immature granulocytes is non specific and   can be seen in a variety of conditions including stress response,   acute inflammation, trauma and pregnancy. Correlation with other   laboratory and clinical findings is essential.    Result canceled by the ancillary.             Immature Granulocytes 4.9     CANCELED  Comment: Result canceled by the ancillary.           Lactic Acid Level     0.7  Comment: Falsely low lactic acid results can be found in samples   containing >=13.0 mg/dL total bilirubin and/or >=3.5 mg/dL   direct bilirubin.             Lymph # 0.5     CANCELED  Comment: Result canceled by the ancillary.           Lymph % 1.4     4.0           Magnesium  2.4         2.3       MCH 26.5     26.7           MCHC 35.5     36.0           MCV 75     74           Mono # 1.8     CANCELED  Comment: Result canceled by the ancillary.            Mono % 4.8     4.0           MPV 11.5     11.3           nRBC 0     0           Phosphorus Level         8.3       Platelet Estimate     Appears normal           Platelet Count 421     413           POC BE   -5             POC HCO3   21.7             POC PCO2   43.4             POC PH   7.307             POC PO2   40             POC SATURATED O2   70             POC TCO2   23             Poikilocytosis     Slight           Poly     Occasional           Potassium 5.2     5.1     4.9       RBC 3.66     3.75           RDW 15.7     15.9           Sample   VENOUS             Schistocytes     Present           Sodium 126     125     130       WBC 37.64     38.14                                All pertinent lab results from the past 24 hours have been reviewed.    Significant Imaging: I have reviewed and interpreted all pertinent imaging results/findings within the past 24 hours.  Assessment and Plan:     Encephalopathy  63 y.o. male with medical history of ESRD on HD, HTN, HLD, CHF, CAD, Ischemic cardiomyopathy - s/p medtronic CRT-D, VT s/p ablation on mexiletine and amiodarone - with admission concerns of right femur fracture.  S/p ORIF 10/4. Hospital course complicated hypotension / needing vasopressors; right atrial mass and PE / needing broad spectrum Abx and AC. Associated encephalopathy - and neurology consulted for the same.    History from primary team / family / medical records review. Fluctuating encephalopathy - in the spectrum of affect in attention / concentration and orientation / confusion. No new focal motor or sensory or cranial nerve deficits. No concerns for clinical seizures. No history of stroke / epilepsy or complex migraines. No history of primary neurodegenerative cognitive disorder or neuro psychiatric disorder.     Currently on exam - awake, alert - following simple commands, oriented to person, not oriented to month  / distratable / no focal motor or sensory or cranial nerve deficits - RLE  ORIF  CT head negative for acute findings / CTA head and neck negative for any occlusion or stenosis or any septic embolic aneurysms or vasculitis   Labs - severe leucocytosis / hyponatremia / hyperkalemia / uremia     Recs:    Fluctuating encephalopathy - suspect in spectrum of metabolic / systemic infectious / hypotension triggers - with confounders of hypo-hyperactive delirium. Low suspicion for focal cerebrovascular ischemic event or epileptic or neuro inflammatory etiology.      Need no acute MRI brain wo contrast or EEG at the moment / consider if any clinical concerns for seizure or focal neurological deficits     Encephalopathy evaluation and management  -- Continue management as per cardiology   -- Correct lytes as needed / investigate and control infection / avoid hypoxia or hypercapnia / avoid hypoglycemia / control uremia - avoid rapid correction / avoid-correct metabolic-respiratory acidosis or alkalosis   -- Correct any nutritional deficiencies / correct anemia / provide nutritional support as appropriate / ambulate when appropriate   -- PT/OT evaluation and management     Delirium precautions    - Recommend PRN Zyprexa 2.5 mg PO/IM q6h PRN severe, nonredirectable agitation.  Max dose is 30 mg daily from all sources.  Parenteral Zyprexa is not to be given within 1 hours of parenteral benzodiazepines including Ativan.    - Recommend standard delirium precautions:               - Avoid use of physical restraints and judiciously use chemical sedatives for management of agitation              - Avoid deliriogenic agents if possible including benzodiazepines, anticholinergics, and opiates              - Normalize patient's sleep-wake cycle if possible              - Environmental adjustments to include bright lighting and windows open during the day              - Early involvement in PT/OT              - Recommend physical sitter to be present at bedside.         VTE Risk Mitigation (From admission,  onward)           Ordered     heparin 25,000 units in dextrose 5% (100 units/ml) IV bolus from bag HIGH INTENSITY nomogram - [facility]  As needed (PRN)        Question:  Heparin Infusion Adjustment (DO NOT MODIFY ANSWER)  Answer:  [email or web address]\Images\Pharmacy\HeparinInfusions\heparin HIGH INTENSITY nomogram for [facility] [id number].[email or web address]    10/11/24 1622     heparin 25,000 units in dextrose 5% (100 units/ml) IV bolus from bag HIGH INTENSITY nomogram - [facility]  As needed (PRN)        Question:  Heparin Infusion Adjustment (DO NOT MODIFY ANSWER)  Answer:  [email or web address]\Images\Pharmacy\HeparinInfusions\heparin HIGH INTENSITY nomogram for [facility] [id number]    10/11/24 1622     heparin 25,000 units in dextrose 5% 250 mL (100 units/mL) infusion HIGH INTENSITY nomogram - [facility]  Continuous        Question:  Begin at (units/kg/hr)  Answer:  18    10/11/24 1622     heparin (porcine) injection 5,000 Units  Use PRN         10/05/24 1830     Place sequential compression device  Until discontinued         10/04/24 2217     heparin (porcine) injection 1,000 Units  As needed (PRN)         10/04/24 2217     IP VTE LOW RISK PATIENT  Once         10/04/24 1438     Place sequential compression device  Until discontinued         10/03/24 0154     Reason for No Pharmacological VTE Prophylaxis  Once        Comments: Right hip fracture needs surgical eval   Question:  Reasons:  Answer:  Physician Provided (leave comment)    10/03/24 0154                    Thank you for your consult.     Rula Martinez MD  Neurology  [facility] - Intensive Care

## 2024-10-14 NOTE — ASSESSMENT & PLAN NOTE
Patient been on heparin drip as well as broad-spectrum antibiotics since this mass was discovered.  Repeat echo done today demonstrated that the right atrial mass has increased in size.  Because of RV enlargement and RA enlargement with severe TR clinically suspected pulmonary embolism.  Subsequent CTA confirmed the suspicion.  Patient has been accepted to [facility] for further evaluation and management.  Case discussed with CT surgery and Interventional Cardiology at [facility]    Notes from October 14th20 24:  No change in cardiac clinical status.  Had concern for stroke last night.  Now feeling better.  Workup done by primary team.  Awaiting transfer to [facility].  Still on pressors

## 2024-10-14 NOTE — ASSESSMENT & PLAN NOTE
Family agree with DNR status,but OK with intervention and remove DNR status for procedure,spent over 16 minutes,

## 2024-10-14 NOTE — CONSULTS
[facility] INTERVENTIONAL CARDIOLOGY  Response for E-Consult     Patient Name: Ar Sharma  MRN: [id number]  Primary Care Provider: Jc Elliott MD   Requesting Provider: Delmy Agarwal,*  E-Consult to Specialist  Consult performed by: Navid Daigle MD  Consult ordered by: Delmy Agarwal MD  Reason for consult: Right sided intracardiac mass  Assessment/Recommendations: Ar Sharma is a 63 y.o. male with ESRD currently at [facility] in septic shock with right sided endocarditis. He was found to have large vegetation attached to device lead with evidence of lung and spleen embolization. Request for transfer for potential angio vac procedure. On discussion with multidisciplinary shock team patient is DNR and currently very critically ill with 3 vasoactive medications. We discussed with referring team that likelihood of surviving an attempt to remove the vegetation percutaneously may lead to patient immediate demise without surgical option. CT surgeon on the call confirmed the patient is not a surgical candidate. Decision was to advise to continue supportive care, possible palliative care consultation and goals of care discussion.           Recommendation: Supportive care at [facility]    Contingency that warrants a repeat eConsult or referral: Clinical improvement    Total time of Consultation: 21 minute    I did speak to the requesting provider verbally about this.     *This eConsult is based on the clinical data available to me and is furnished without benefit of a physical examination. The eConsult will need to be interpreted in light of any clinical issues or changes in patient status not available to me at the time of filing this eConsults. Significant changes in patient condition or level of acuity should result in immediate formal consultation and reevaluation. Please alert me if you have further questions.    Thank you for this eConsult referral.     Navid Turcios MD  [facility]  INTERVENTIONAL CARDIOLOGY

## 2024-10-14 NOTE — SUBJECTIVE & OBJECTIVE
Interval History: on two pressors and inotropes    Medications:  Continuous Infusions:   sodium chloride 0.9%   Intravenous Continuous        DOBUTamine IV infusion (titrating)  0-20 mcg/kg/min Intravenous Continuous 22.6 mL/hr at 10/14/24 1200 20 mcg/kg/min at 10/14/24 1200    heparin (porcine) in D5W  0-40 Units/kg/hr (Adjusted) Intravenous Continuous 10.2 mL/hr at 10/14/24 1200 14 Units/kg/hr at 10/14/24 1200    NORepinephrine bitartrate-D5W  0-3 mcg/kg/min Intravenous Continuous 24 mL/hr at 10/14/24 1200 0.68 mcg/kg/min at 10/14/24 1200    vasopressin  0.04 Units/min Intravenous Continuous 12 mL/hr at 10/14/24 1200 0.04 Units/min at 10/14/24 1200     Scheduled Meds:   DAPTOmycin (CUBICIN) IV (PEDS and ADULTS)  8 mg/kg Intravenous Q48H    docusate sodium  100 mg Oral Daily    epoetin saba-epbx  10,000 Units Intravenous Every Mon, Wed, Fri    famotidine (PF)  20 mg Intravenous Daily    fentaNYL  1 patch Transdermal Q72H    fludrocortisone  100 mcg Oral Daily    glycerin 99.5%  100 mL Rectal ED 1 Time    And    magnesium citrate  296 mL Rectal ED 1 Time    And    sodium chloride 0.9%  500 mL Rectal ED 1 Time    hydrocortisone sodium succinate  100 mg Intravenous Q8H    levothyroxine  150 mcg Oral Before breakfast    meropenem (MERREM) extended infusion  500 mg Intravenous Q12H    mexiletine  150 mg Oral BID WM    micafungin  100 mg Intravenous Q24H    midodrine  15 mg Oral Q8H    polyethylene glycol  17 g Oral Daily    triamcinolone acetonide 0.1%   Topical (Top) BID     PRN Meds:  Current Facility-Administered Medications:     0.9% NaCl, , Intravenous, PRN    acetaminophen, 650 mg, Oral, Q4H PRN    furosemide, 360 mg, Oral, See admin instructions    heparin (porcine), 1,000 Units, Intra-Catheter, PRN    heparin (porcine), 5,000 Units, Intra-Catheter, PRN    heparin (PORCINE), 60 Units/kg (Adjusted), Intravenous, PRN    heparin (PORCINE), 30 Units/kg (Adjusted), Intravenous, PRN    hydrALAZINE, 10 mg, Intravenous,  Q6H PRN    HYDROmorphone, 1 mg, Intravenous, Q4H PRN    hydrOXYzine pamoate, 25 mg, Oral, Q8H PRN    levalbuterol, 1.25 mg, Nebulization, Q4H PRN    magnesium sulfate IVPB, 2 g, Intravenous, PRN    melatonin, 6 mg, Oral, Nightly PRN    metOLazone, 10 mg, Oral, See admin instructions    naloxone, 0.4 mg, Intravenous, PRN    nitroGLYCERIN, 0.4 mg, Sublingual, Q5 Min PRN    ondansetron, 4 mg, Intravenous, Q6H PRN    oxyCODONE-acetaminophen, 1 tablet, Oral, Q4H PRN    oxyCODONE-acetaminophen, 1 tablet, Oral, Q4H PRN    polyethylene glycol, 17 g, Oral, BID PRN    promethazine (PHENERGAN) 6.25 mg in 0.9% NaCl 50 mL IVPB, 6.25 mg, Intravenous, Q6H PRN    simethicone, 1 tablet, Oral, QID PRN    sodium chloride 0.9%, 250 mL, Intravenous, PRN    sodium chloride 0.9%, 10 mL, Intravenous, Q12H PRN    sodium chloride 0.9%, 10 mL, Intravenous, PRN    sodium phosphate 20.01 mmol in D5W 250 mL IVPB, 20.01 mmol, Intravenous, PRN    sodium phosphate 30 mmol in D5W 250 mL IVPB, 30 mmol, Intravenous, PRN    sodium phosphate 39.99 mmol in D5W 250 mL IVPB, 39.99 mmol, Intravenous, PRN    Objective:     Vital Signs (Most Recent):  Temp: 96.1 °F (35.6 °C) (10/14/24 0800)  Pulse: 69 (10/14/24 1215)  Resp: (!) 31 (10/14/24 1215)  BP: (!) 114/51 (10/14/24 1200)  SpO2: (!) 92 % (10/14/24 1215) Vital Signs (24h Range):  Temp:  [96.1 °F (35.6 °C)-98.4 °F (36.9 °C)] 96.1 °F (35.6 °C)  Pulse:  [] 69  Resp:  [8-31] 31  SpO2:  [75 %-98 %] 92 %  BP: (100-125)/(45-66) 114/51  Arterial Line BP: ()/(38-61) 114/51     Weight: 79.2 kg (174 lb 9.7 oz)  Body mass index is 25.78 kg/m².       Physical Exam  Constitutional:       Comments: Awake but somewhat lethargic, sitting up in bed, does not appear to be in distress, ill-appearing        Significant Labs: All pertinent labs within the past 24 hours have been reviewed.  CBC:   Recent Labs   Lab 10/14/24  0628   WBC 37.64*   HGB 9.7*   HCT 27.3*   MCV 75*        BMP:  Recent Labs   Lab  10/14/24  0628   *   *   K 5.2*   CL 92*   CO2 18*   BUN 78*   CREATININE 8.0*   CALCIUM 8.0*   MG 2.4     LFT:  Lab Results   Component Value Date     (H) 10/13/2024    ALKPHOS 203 (H) 10/13/2024    BILITOT 2.9 (H) 10/13/2024     Albumin:   Albumin   Date Value Ref Range Status   10/13/2024 2.4 (L) 3.5 - 5.2 g/dL Final     Protein:   Total Protein   Date Value Ref Range Status   10/13/2024 6.2 6.0 - 8.4 g/dL Final     Lactic acid:   Lab Results   Component Value Date    LACTATE 0.7 10/14/2024    LACTATE 1.2 10/04/2024       Significant Imaging: I have reviewed all pertinent imaging results/findings within the past 24 hours.

## 2024-10-14 NOTE — HPI
63 y.o. male with medical history of ESRD on HD, HTN, HLD, CHF, CAD, Ischemic cardiomyopathy - s/p medtronic CRT-D, VT s/p ablation on mexiletine and amiodarone - with admission concerns of right femur fracture.  S/p ORIF 10/4. Hospital course complicated hypotension / needing vasopressors; right atrial mass and PE / needing broad spectrum Abx and AC. Associated encephalopathy - and neurology consulted for the same.    History from primary team / family / medical records review. Fluctuating encephalopathy - in the spectrum of affect in attention / concentration and orientation / confusion. No new focal motor or sensory or cranial nerve deficits. No concerns for clinical seizures. No history of stroke / epilepsy or complex migraines. No history of primary neurodegenerative cognitive disorder or neuro psychiatric disorder.     Currently on exam - awake, alert - following simple commands, oriented to person, not oriented to month  / distratable / no focal motor or sensory or cranial nerve deficits - RLE ORIF  CT head negative for acute findings / CTA head and neck negative for any occlusion or stenosis or any septic embolic aneurysms or vasculitis   Labs - severe leucocytosis / hyponatremia / hyperkalemia / uremia

## 2024-10-14 NOTE — SUBJECTIVE & OBJECTIVE
Interval history:  No acute issue.  Patient is alert but very stoic.  Responding to verbal cues from family.  CT head without acute findings.  CTA with bilateral PE.  Currently on 0.6mcg/kg/min of levophed + vaspressin + dobutamine.      Review of Systems   Constitutional:  Positive for activity change and fatigue. Negative for chills and fever.   Respiratory:  Positive for cough and shortness of breath.    Cardiovascular:  Positive for leg swelling. Negative for chest pain.   Gastrointestinal:  Negative for abdominal pain.   Psychiatric/Behavioral: Negative.       Objective:     Vital Signs (Most Recent):  Temp: 98.4 °F (36.9 °C) (10/14/24 0305)  Pulse: 70 (10/14/24 0600)  Resp: 10 (10/14/24 0600)  BP: (!) 116/48 (10/14/24 0600)  SpO2: 95 % (10/14/24 0600) Vital Signs (24h Range):  Temp:  [98 °F (36.7 °C)-98.4 °F (36.9 °C)] 98.4 °F (36.9 °C)  Pulse:  [] 70  Resp:  [8-22] 10  SpO2:  [75 %-98 %] 95 %  BP: ()/(45-66) 116/48  Arterial Line BP: (101-123)/(45-61) 116/48     Weight: 79.2 kg (174 lb 9.7 oz)  Body mass index is 25.78 kg/m².      Intake/Output Summary (Last 24 hours) at 10/14/2024 1010  Last data filed at 10/14/2024 0600  Gross per 24 hour   Intake 1895.56 ml   Output 0 ml   Net 1895.56 ml        Physical Exam  Constitutional:       General: He is not in acute distress.     Appearance: Normal appearance. He is ill-appearing. He is not toxic-appearing or diaphoretic.   HENT:      Head: Normocephalic and atraumatic.      Nose: Nose normal.      Mouth/Throat:      Mouth: Mucous membranes are moist.   Eyes:      Extraocular Movements: Extraocular movements intact.      Pupils: Pupils are equal, round, and reactive to light.   Neck:      Comments: Left IJ CVC  Cardiovascular:      Rate and Rhythm: Normal rate and regular rhythm.   Pulmonary:      Effort: Pulmonary effort is normal. No respiratory distress.      Breath sounds: Normal breath sounds. No wheezing.   Abdominal:      General: Abdomen is  flat. Bowel sounds are normal. There is no distension.      Palpations: Abdomen is soft.   Musculoskeletal:      Cervical back: Normal range of motion.      Right lower leg: Edema present.      Left lower leg: Edema present.      Comments: Left radial a line. Right hip wound with clean dressing and minimal swelling.   Skin:     General: Skin is warm.      Capillary Refill: Capillary refill takes less than 2 seconds.   Neurological:      Mental Status: He is alert.      Comments: Alert to voice and interactive, but slowed mentation compared to baseline.          Vents:       Lines/Drains/Airways       Central Venous Catheter Line  Duration                  Hemodialysis Catheter 05/03/24 1115 right subclavian 163 days    Percutaneous Central Line - Triple Lumen  10/09/24 1127 Internal Jugular Left 4 days              Arterial Line  Duration             Arterial Line 10/12/24 1040 Left Radial 1 day              Peripheral Intravenous Line  Duration                  Peripheral IV - Single Lumen 10/09/24 0831 20 G Anterior;Left Forearm 5 days                    Significant Labs:    CBC/Anemia Profile:  Recent Labs   Lab 10/13/24  0357 10/14/24  0052 10/14/24  0628   WBC 40.62* 38.14* 37.64*   HGB 9.9* 10.0* 9.7*   HCT 28.4* 27.8* 27.3*    413 421   MCV 76* 74* 75*   RDW 15.7* 15.9* 15.7*        Chemistries:  Recent Labs   Lab 10/13/24  0945 10/13/24  1345 10/14/24  0052 10/14/24  0628   * 130* 125* 126*   K 4.8 4.9 5.1 5.2*   CL 92* 92* 92* 92*   CO2 21* 17* 18* 18*   BUN 65* 67* 77* 78*   CREATININE 7.0* 7.1* 7.8* 8.0*   CALCIUM 8.4* 8.2* 8.1* 8.0*   ALBUMIN 2.4* 2.4*  --   --    PROT 6.2  --   --   --    BILITOT 2.9*  --   --   --    ALKPHOS 203*  --   --   --    ALT 45*  --   --   --    *  --   --   --    MG  --  2.3  --  2.4   PHOS  --  8.3*  --   --        All pertinent labs within the past 24 hours have been reviewed.    Significant Imaging:   I have reviewed all pertinent imaging  results/findings within the past 24 hours.

## 2024-10-14 NOTE — PLAN OF CARE
Pt remains in the ICU on 3L NC.  Paced rhythm on the monitor.  Afebrile.  Disoriented x4.  Wound vac in place with 100cc claimed this shift.  Levophed, vasopressin and dobutamine infusing per order.  Heparin infusing per nomogram.  CRRT dialyzing per order.  Family at bedside, plan of care reviewed.  No injuries, falls or skin breakdown occurred this shift.

## 2024-10-14 NOTE — ASSESSMENT & PLAN NOTE
Nutrition consulted. Most recent weight and BMI monitored-     Measurements:  Wt Readings from Last 1 Encounters:   10/14/24 79.2 kg (174 lb 9.7 oz)   Body mass index is 25.78 kg/m².    Patient has been screened and assessed by RD.    Malnutrition Type:  Context: acute illness or injury  Level: mild    Malnutrition Characteristic Summary:  Weight Loss (Malnutrition): greater than 10% in 6 months  Subcutaneous Fat (Malnutrition): mild depletion  Muscle Mass (Malnutrition): mild depletion    Interventions/Recommendations (treatment strategy):  1. COntinue pt on renal diet 2. Continue novasource renal TID 3. Encourage intake at meals 4. Monitor weight/labs 5. RD to follow to monitor PO intake

## 2024-10-14 NOTE — SUBJECTIVE & OBJECTIVE
Interval History: acute events overnight - CTA with bilateral PE and increased mass in right atrium. Overnight additionally with aphasic and TIA sx, CT head negative and CTA head/neck negative for acute findings. This AM, is responsive - albiet slowed, encephalopathy waxing and waning    Review of patient's allergies indicates:   Allergen Reactions    Lokelma [sodium zirconium cyclosilicate] Other (See Comments)     Fluid retention, weight gain, CHF excerebration, severe constipation    Atorvastatin Other (See Comments)     Joint pain    Jardiance [empagliflozin] Other (See Comments)     Chest pains, significant weight loss, lower blood pressure     Current Facility-Administered Medications   Medication Frequency    0.9%  NaCl infusion (CRRT USE ONLY) Continuous    0.9%  NaCl infusion PRN    acetaminophen tablet 650 mg Q4H PRN    allopurinoL tablet 100 mg Daily    DAPTOmycin (CUBICIN) 645 mg in 0.9% NaCl SolP 50 mL IVPB Q48H    DOBUtamine 1000 mg in D5W 250 mL infusion Continuous    docusate sodium capsule 100 mg Daily    epoetin saba-epbx injection 10,000 Units Every Mon, Wed, Fri    famotidine (PF) injection 20 mg Daily    fentaNYL 50 mcg/hr 1 patch Q72H    fludrocortisone tablet 100 mcg Daily    furosemide tablet 360 mg See admin instructions    heparin (porcine) injection 1,000 Units PRN    heparin (porcine) injection 5,000 Units PRN    heparin 25,000 units in dextrose 5% (100 units/ml) IV bolus from bag HIGH INTENSITY nomogram - [facility] PRN    heparin 25,000 units in dextrose 5% (100 units/ml) IV bolus from bag HIGH INTENSITY nomogram - [facility] PRN    heparin 25,000 units in dextrose 5% 250 mL (100 units/mL) infusion HIGH INTENSITY nomogram - [facility] Continuous    hydrALAZINE injection 10 mg Q6H PRN    hydrocortisone sodium succinate injection 100 mg Q8H    HYDROmorphone injection 1 mg Q4H PRN    hydrOXYzine pamoate capsule 25 mg Q8H PRN    levalbuterol nebulizer solution 1.25 mg Q4H PRN    levothyroxine tablet 150 mcg  Before breakfast    magnesium sulfate 2g in water 50mL IVPB (premix) PRN    melatonin tablet 6 mg Nightly PRN    meropenem (MERREM) 500 mg in 0.9% NaCl 100 mL IVPB (MB+) Q12H    metOLazone tablet 10 mg See admin instructions    mexiletine capsule 150 mg BID WM    micafungin 100 mg in 0.9% NaCl 100 mL IVPB (MB+) Q24H    midodrine tablet 15 mg Q8H    naloxone 0.4 mg/mL injection 0.4 mg PRN    nitroGLYCERIN SL tablet 0.4 mg Q5 Min PRN    NORepinephrine 32 mg in D5W 250 mL infusion Continuous    ondansetron injection 4 mg Q6H PRN    oxyCODONE-acetaminophen  mg per tablet 1 tablet Q4H PRN    oxyCODONE-acetaminophen 5-325 mg per tablet 1 tablet Q4H PRN    polyethylene glycol packet 17 g BID PRN    polyethylene glycol packet 17 g Daily    pravastatin tablet 40 mg QHS    promethazine (PHENERGAN) 6.25 mg in 0.9% NaCl 50 mL IVPB Q6H PRN    simethicone chewable tablet 80 mg QID PRN    sodium chloride 0.9% bolus 250 mL 250 mL PRN    sodium chloride 0.9% flush 10 mL Q12H PRN    sodium chloride 0.9% flush 10 mL PRN    sodium phosphate 20.01 mmol in D5W 250 mL IVPB PRN    sodium phosphate 30 mmol in D5W 250 mL IVPB PRN    sodium phosphate 39.99 mmol in D5W 250 mL IVPB PRN    triamcinolone acetonide 0.1% cream BID    vasopressin (PITRESSIN) 0.2 Units/mL in D5W 100 mL infusion Continuous       Objective:     Vital Signs (Most Recent):  Temp: 98.4 °F (36.9 °C) (10/14/24 0305)  Pulse: 70 (10/14/24 0600)  Resp: 10 (10/14/24 0600)  BP: (!) 116/48 (10/14/24 0600)  SpO2: 95 % (10/14/24 0600) Vital Signs (24h Range):  Temp:  [98 °F (36.7 °C)-98.4 °F (36.9 °C)] 98.4 °F (36.9 °C)  Pulse:  [] 70  Resp:  [8-22] 10  SpO2:  [75 %-98 %] 95 %  BP: ()/(45-66) 116/48  Arterial Line BP: (101-123)/(45-61) 116/48     Weight: 79.2 kg (174 lb 9.7 oz) (10/14/24 0305)  Body mass index is 25.78 kg/m².  Body surface area is 1.96 meters squared.    I/O last 3 completed shifts:  In: 2852.9 [I.V.:2025; IV Piggyback:827.9]  Out: 0      Physical  Exam  Constitutional:       General: He is not in acute distress.     Appearance: He is ill-appearing and toxic-appearing.      Comments: cachetic   HENT:      Head: Normocephalic and atraumatic.      Nose: Nose normal. No congestion.      Mouth/Throat:      Mouth: Mucous membranes are dry.   Eyes:      Pupils: Pupils are equal, round, and reactive to light.   Cardiovascular:      Rate and Rhythm: Normal rate.      Heart sounds: Murmur heard.   Pulmonary:      Effort: Pulmonary effort is normal.      Breath sounds: No rhonchi.   Musculoskeletal:      Cervical back: Normal range of motion.      Right lower leg: No edema.      Left lower leg: No edema.   Skin:     General: Skin is warm.      Findings: Bruising present.   Neurological:      Mental Status: He is alert.          Significant Labs:  All labs within the past 24 hours have been reviewed.     Significant Imaging:  Labs: Reviewed

## 2024-10-14 NOTE — ASSESSMENT & PLAN NOTE
Currently requiring CRRT.  Previously had utilized HD, but now too unstable from a blood pressure standpoint. Also has previously used PD, but this is currently being held.  - nephrology following.  - midodrine 15mg TID.    Crrt resumed today for electrolytes correction.  Await cvp to help guide decision about ultrafiltration.

## 2024-10-14 NOTE — PLAN OF CARE
Changes in medical condition or discharge plan:  Right atrial mass increasing in size.  Right atrium and ventricle enlarged.  CTA confirmed Pulmonary embolism.  Admit history:  Rfemoral fracture with repair 10/4    Does patient need new DME? tbd    Follow up appts needed: tbd    Medically stable: tbd    Estimated Discharge Date: Patient will transfer to main campus for higher level of care when bed available       10/14/24 1339   Discharge Reassessment   Assessment Type Discharge Planning Reassessment   Did the patient's condition or plan change since previous assessment? Yes   Discharge Plan discussed with:   (MDTSibr rounds this am)   Communicated SLAVA with patient/caregiver Date not available/Unable to determine   Discharge Plan A Other   Discharge Plan B   (see comment)   DME Needed Upon Discharge    (tbd)   Transition of Care Barriers None   Why the patient remains in the hospital Requires continued medical care

## 2024-10-14 NOTE — ASSESSMENT & PLAN NOTE
EF 40-45% with grade III DD.  This is likely having a significant impact on ability to tolerate iHD.  Cardiology following  Crrt restarted today.  Only ultrafiltration.

## 2024-10-14 NOTE — ASSESSMENT & PLAN NOTE
- Believed to be cardiogenic with possible septic component, though cultures have been NGTD despite presence of atrial mass (endocarditis vs thrombus). On pressors, abx, and anticoagulation.   - mgmt per primary team

## 2024-10-14 NOTE — NURSING
[facility] [place]  Nurses Note -- 4 Eyes      10/14/2024       Skin assessed on: Q Shift      [x] No Pressure Injuries Present    [x]Prevention Measures Documented    [] Yes LDA  for Pressure Injury Previously documented     [] Yes New Pressure Injury Discovered   [] LDA for New Pressure Injury Added      Attending RN:  Kitty Betts RN     Second RN:  MILADIS Paiz

## 2024-10-14 NOTE — SUBJECTIVE & OBJECTIVE
Interval History: more alert today.  Echo.on 10.7  Show ,  Left Atrium: There is a large mobile frondlike mass, appears to be adherent to LA septum.    Right Atrium: Right atrium is severely dilated. Multiple leads present in the right atrium with possible vegetation adherent to lead in RA.  Cardiology is consulted,recommended anticoagulations and IV Abx for possible endocarditis ,ID is consulted.blood culture no growth,on broads spectrumIV Abx,  BP remains on low side,required 3 vasopressins.  Patient at this time is on 3 vasopressors,already consulted palliative and spoke with daughter and wife,and CC spoke with family,they agree with DNR status,but OK for intervention.  CC added Micafungin,spooked with ID ,will do CT chest,abdomen,pelvic,also ID changed cefepime with meropenem.and daptomycin is added.no sign of abscess on abdomen   Family asking for transfer to [place],where he came from as transfer,but orthopedic  did not accepted patient.    checked CTA chest ,has PE,but no leg DVT,remains on heparin gtt.  Repeat Echo. By cardiology show enlarging right atrial  mass.cardiology spoke with interventional  and CTS in [facility] [place]called transfer center go to [facility] [place],today will be call meeting with CCU ,CTS and interventional cardiology phone call to decide if patient can have intervention.  CTA head show ,Small area of hypoattenuation in the right parietal lobe, which may be related to chronic small vessel ischemic changes or age-indeterminate infarct.  No large vessel occlusion ,he is aletr and oriented at this time.consulted neurology for evaluations.    Review of Systems   HENT:  Negative for ear discharge and ear pain.    Eyes:  Negative for discharge and itching.   Endocrine: Negative for cold intolerance and heat intolerance.   Neurological:  Negative for seizures and syncope.     Objective:     Vital Signs (Most Recent):  Temp: 98.4 °F (36.9 °C) (10/14/24 0305)  Pulse: 70 (10/14/24 0600)  Resp: 10 (10/14/24  0600)  BP: (!) 116/48 (10/14/24 0600)  SpO2: 95 % (10/14/24 0600) Vital Signs (24h Range):  Temp:  [98 °F (36.7 °C)-98.4 °F (36.9 °C)] 98.4 °F (36.9 °C)  Pulse:  [] 70  Resp:  [8-22] 10  SpO2:  [75 %-98 %] 95 %  BP: ()/(45-66) 116/48  Arterial Line BP: (101-123)/(45-61) 116/48     Weight: 79.2 kg (174 lb 9.7 oz)  Body mass index is 25.78 kg/m².    Intake/Output Summary (Last 24 hours) at 10/14/2024 1002  Last data filed at 10/14/2024 0600  Gross per 24 hour   Intake 1895.56 ml   Output 0 ml   Net 1895.56 ml         Physical Exam  Vitals and nursing note reviewed.   Constitutional:       General: He is not in acute distress.     Appearance: He is ill-appearing (chronically). He is not toxic-appearing or diaphoretic.      Interventions: Nasal cannula in place.   HENT:      Head: Normocephalic and atraumatic.      Nose: No rhinorrhea.      Mouth/Throat:      Mouth: Mucous membranes are moist.   Eyes:      General: No scleral icterus.     Extraocular Movements: Extraocular movements intact.   Neck:      Comments: Right IJ tunneled dialysis catheter  Cardiovascular:      Rate and Rhythm: Normal rate and regular rhythm.      Heart sounds: No murmur heard.  Pulmonary:      Effort: No tachypnea, accessory muscle usage, respiratory distress or retractions.   Abdominal:      General: There is no distension.      Comments: PD catheter present   Skin:     General: Skin is warm and dry.      Coloration: Skin is not jaundiced.      Findings: No rash.   Neurological:      General: No focal deficit present.      Mental Status: He is alert. Mental status is at baseline.             Significant Labs: All pertinent labs within the past 24 hours have been reviewed.  BMP:   Recent Labs   Lab 10/14/24  0628   *   *   K 5.2*   CL 92*   CO2 18*   BUN 78*   CREATININE 8.0*   CALCIUM 8.0*   MG 2.4     CBC:   Recent Labs   Lab 10/13/24  0357 10/14/24  0052 10/14/24  0628   WBC 40.62* 38.14* 37.64*   HGB 9.9* 10.0*  9.7*   HCT 28.4* 27.8* 27.3*    [phone]       Significant Imaging: I have reviewed all pertinent imaging results/findings within the past 24 hours.

## 2024-10-14 NOTE — PT/OT/SLP PROGRESS
"Speech Language Pathology Treatment    Patient Name:  Ar Sharma   MRN:  [id number]  Admitting Diagnosis: Closed right hip fracture, initial encounter    Recommendations:                 General Recommendations:  Dysphagia therapy  Diet recommendations:  NPO, Liquid Diet Level: NPO   Aspiration Precautions:  NPO; alternative means of nutrition, hydration, and medication.     General Precautions: Standard, fall, NPO  Communication strategies:  yes/no questions only and provide increased time to answer    Assessment:     Ar Sharma is a 63 y.o. male admitted with femur fracture, s/p fall, s/p IM nail placement 10/4; in ICU for pressor support. St saw pt in ICU and pt with waxing and waning alertness throughout session which impacts pt's ability to participate in po trials.     Subjective     ST confirmed with RN prior to entry. Pt in bed awake upon ST entry. Pt's daughter and family was present during session. Pt requires MAX cues to participate in session  Patient goals: "I'm doing good how are you?"     Pain/Comfort:  Pain Rating 1: 0/10    Respiratory Status:  nasal cannula    Objective:     Has the patient been evaluated by SLP for swallowing?   Yes  Keep patient NPO? Yes   Current Respiratory Status:        Swallowing: Upon ST entry, pt was awake and alert. ST introduced self to pt and pt immediately said "I'm good how are you?" Pt then immediately stared off into space and did not answer st. Pt required MAX cues to become alert and communicate with st and daughter again. Pt was then able to orient to name, , and current place only. During orientation questions, pt with another episode of blankly starting at ST and not responding. ST provided verbal and tactile cues to re-gain pt's attention. Once pt was awake and alert, st placed ice chips to pt's lips. Pt made no attempt at oral rooting or follow any oral motor commands despite visual cues given by st. ST then placed 1/2 tsp sips of water to pt's " "lips; pt immediately took sip from spoon and drank water. St and daughter cued pt to "swallow quick" in order for pt to clear bolus. Pt then safely consumed x3 more tsp sips of water with no overt s/s of aspiration. St placed 1/2 spoonful of pudding to pt's lips. Pt consumed pudding, however, despite max verbal cues to "swallow quick" pt with bolus holding and required ST to suction pudding from oral cavity. ST attempted a second pudding po trial and pt with bolus holding again and required st to suction pudding from oral cavity. St recommends cont NPO; alternative means of nutrition, hydration, and medication.     Goals:   Multidisciplinary Problems       SLP Goals          Problem: SLP    Goal Priority Disciplines Outcome   SLP Goal     SLP    Description: Short Term Goals:  1. Pt will participate in ongoing assessment of swallow function at b/s.                         Plan:     Patient to be seen:  2 x/week   Plan of Care expires:  11/13/24  Plan of Care reviewed with:  patient, daughter, family   SLP Follow-Up:  Yes       Discharge recommendations:   (TBD)   Barriers to Discharge:  None    Time Tracking:     SLP Treatment Date:   10/14/24  Speech Start Time:  0947  Speech Stop Time:  1010     Speech Total Time (min):  23 min    Billable Minutes: Treatment Swallowing Dysfunction 13 and Self Care/Home Management Training 10    10/14/2024    " Island Pedicle Flap With Canthal Suspension Text: The defect edges were debeveled with a #15 scalpel blade.  Given the location of the defect, shape of the defect and the proximity to free margins an island pedicle advancement flap was deemed most appropriate.  Using a sterile surgical marker, an appropriate advancement flap was drawn incorporating the defect, outlining the appropriate donor tissue and placing the expected incisions within the relaxed skin tension lines where possible. The area thus outlined was incised deep to adipose tissue with a #15 scalpel blade.  The skin margins were undermined to an appropriate distance in all directions around the primary defect and laterally outward around the island pedicle utilizing iris scissors.  There was minimal undermining beneath the pedicle flap. A suspension suture was placed in the canthal tendon to prevent tension and prevent ectropion.

## 2024-10-14 NOTE — PROGRESS NOTES
[facility] - Intensive Care  Infectious Disease  Progress Note    Patient Name: Ar Sharma  MRN: [id number]  Admission Date: 10/3/2024  Length of Stay: 11 days  Attending Physician: Delmy Agarwal, *  Primary Care Provider: Jc Elliott MD    Isolation Status: No active isolations  Assessment/Plan:      Cardiac/Vascular  Atrial mass  Ar Sharma is a 63 year old man with ESRD on HD via right subclavian HD catheter (5/2024), CAD, HF with CRT-D (2016), on home oxygen who was admitted 10/3 after a fall with a femur fracture requiring intramedullary nail placement. During pre-surgical evaluation he was found to have atrial thrombus vs vegetation. No preceding infectious symptoms. Blood cultures on 10/5, 10/9 and 10/12 are no growth, T. Whipplei, Q fever and bartonella ab negative. Karius with Weissella confusa. Developed leukocytosis and hypotension requiring multiple pressors. Unclear significance of Weissella but given the patient's current condition, will treat.     Recommendations  - Continue meropenem 500 q12 hours  - continue micafungin 100 mg q24 hours   - continue daptomycin 8 mg/kg q48 hours   - would consider removal of tunneled HD line with line holiday         Above discussed with primary team.     Critical care time: 40 minutes  I personally spent critical care time on the following: evaluating this patient's organ dysfunction, development of treatment plan, discussing treatment plan with patient or surrogate and bedside caregivers, discussions with critical care service and/or consultants, evaluation of patient's response to treatment, physical examination of patient, ordering and review of treatments interventions, laboratory studies, and radiographic studies, re-evaluation of patient's condition. This critical care time did not overlap with that of any other provider of the same specialty or involve time for procedures. This patient has increasing pressor requirements, has worsening CNS end  organ damage related to their infection.They continue to be critically ill.     Anticipated Disposition: TBD    Thank you for your consult. I will follow-up with patient. Please contact us if you have any additional questions.    Rylee May MD  Infectious Disease  [facility] - Intensive Care    Subjective:     Principal Problem:Closed right hip fracture, initial encounter    HPI: Mr. Sharma is a pleasant 63 y.o. man with h/o ESRD on HD (for 2 months but prior was on PD for about a month-Follows with [facility]) MWF via a tunneled HD catheter on the right, Essential HTN, HLD, CAD,  h/o systolic(EF 40-45%) and diastolic grade 3 CHF due to non-ischemic cardiomyopathy with a [employer] CRT-D implant 2016 (sees Dr. Hernandez) uses 3L supplemental oxygen at night, and h/o VT arhythmia (follows with Dr. Tenorio at [facility]) s/p ablation and on Mexiletin 150mg PO BID and Amiodarone 400mg daily present to the ER with c/o right hip pain after a fall at home. Went to HD and was feeling alright after. Was moving his tool box when he tripped over his feet and fell to the ground while working on his RV. Was unable to get up or put weight on his right leg. Radiograph revealed a femur fracture and the patient was transferred here from [place] for Ortho evaluation. Here he underwent surgery and has an IM nail placed. He had a TTE which demonstrated a mass through a PFO. ID is consulted for possible IE. The patient and spouse deny prodromal fever, chills, or night sweats. No IDU but has HD TIW. Blood cultures (10/5) are NGTD.       Interval History: awake, alert but does not answering questions appropriately. Pending transfer to [facility]. Denies nausea, vomiting, diarrhea, rash, itching, worsening joint pains, fevers.     Review of Systems   All other systems reviewed and are negative.    Objective:     Vital Signs (Most Recent):  Temp: 96.1 °F (35.6 °C) (10/14/24 0800)  Pulse: 69 (10/14/24 1215)  Resp: (!) 31 (10/14/24 1215)  BP:  (!) 114/51 (10/14/24 1200)  SpO2: (!) 92 % (10/14/24 1215) Vital Signs (24h Range):  Temp:  [96.1 °F (35.6 °C)-98.4 °F (36.9 °C)] 96.1 °F (35.6 °C)  Pulse:  [] 69  Resp:  [8-31] 31  SpO2:  [75 %-98 %] 92 %  BP: (100-125)/(45-66) 114/51  Arterial Line BP: ()/(38-61) 114/51     Weight: 79.2 kg (174 lb 9.7 oz)  Body mass index is 25.78 kg/m².    Estimated Creatinine Clearance: 9.3 mL/min (A) (based on SCr of 8 mg/dL (H)).     Physical Exam  Vitals and nursing note reviewed.   Constitutional:       Appearance: He is ill-appearing.   HENT:      Head: Normocephalic.      Nose: Nose normal.      Comments: Nasal cannula     Mouth/Throat:      Mouth: Mucous membranes are moist.   Cardiovascular:      Comments: R IJ, L subclavian, L radial A line   Pulmonary:      Effort: Pulmonary effort is normal. No respiratory distress.   Abdominal:      General: There is no distension.      Palpations: Abdomen is soft.      Tenderness: There is no abdominal tenderness.   Musculoskeletal:         General: No swelling or tenderness. Normal range of motion.   Skin:     General: Skin is warm and dry.      Findings: No lesion or rash.   Neurological:      Mental Status: He is alert.          Significant Labs:   Microbiology Results (last 7 days)       Procedure Component Value Units Date/Time    Blood culture [[id number]] Collected: 10/12/24 1449    Order Status: Completed Specimen: Blood from Peripheral, Lower Arm, Left Updated: 10/13/24 1703     Blood Culture, Routine No Growth to date      No Growth to date    Blood culture [[id number]] Collected: 10/09/24 0930    Order Status: Completed Specimen: Blood Updated: 10/13/24 1103     Blood Culture, Routine No Growth after 4 days.    Narrative:      Draw sample # 2 from separate site    Blood culture [[id number]] Collected: 10/09/24 0915    Order Status: Completed Specimen: Blood Updated: 10/13/24 1103     Blood Culture, Routine No Growth after 4 days.    Narrative:      Draw sample  # 2 from separate site    Urine Culture High Risk [[id number]]     Order Status: No result Specimen: Urine, Catheterized     Fungus Culture, Blood or Bone Marrow [[id number]]     Order Status: Canceled Specimen: Blood     Blood culture [[id number]] Collected: 10/05/24 1612    Order Status: Completed Specimen: Blood from Peripheral, Hand, Left Updated: 10/09/24 1703     Blood Culture, Routine No Growth after 4 days.    Blood culture [[id number]] Collected: 10/05/24 1556    Order Status: Completed Specimen: Blood from Peripheral, Wrist, Left Updated: 10/09/24 1703     Blood Culture, Routine No Growth after 4 days.            Significant Imaging: I have reviewed all pertinent imaging results/findings within the past 24 hours.

## 2024-10-14 NOTE — PT/OT/SLP PROGRESS
Occupational Therapy      Patient Name:  Ar Sharma   MRN:  [id number]    Patient not seen today secondary to  hold; per therapist who attended ICU rounds, pt still requiring significant pressor support.  Will follow-up when appropriate.     10/14/2024

## 2024-10-14 NOTE — ASSESSMENT & PLAN NOTE
-WBC# trending up, 20k on 10/06. Pt remained afebrile.   -initially suspected reactive given fracture and recent surgery  -However, pt also with PD cath in LLQ.   BCX negative.  Empirically started on aBX's  Some improvement today.

## 2024-10-14 NOTE — ASSESSMENT & PLAN NOTE
Patient has not shown overt evidence of infection on culture data.  Karius with Weissella confusa.   - consider CT C/A/P with splenic infarcts. In setting of known cardiac thrombus, we will perform a CTA thorax and CT head.  Also performing B LE US, and repeat TTE.    - continue abx per ID.

## 2024-10-14 NOTE — ASSESSMENT & PLAN NOTE
Patient's COPD is controlled currently.  Patient is currently off COPD Pathway. Continue scheduled inhalers  PRN nebulizers and Supplemental oxygen and monitor respiratory status closely.   No evidence of exacerbation

## 2024-10-14 NOTE — ASSESSMENT & PLAN NOTE
Nephrology consulted: HD per nephrology   HD stopped early on 10/05 due to hypotension  Persistently hypotensive, transfer to ICU for CRRT and pressor support on 10/05  CRRT started on 10/05,  Will have HD today.

## 2024-10-14 NOTE — PLAN OF CARE
Patient remains in ICU. Remains on Levophed, Dobutamine, Vasopressin, and Heparin drips. Patient had an episode of aphasia, left sided visual neglect, and not following commands that began on 10/13/2024 at 2300 and lasted until 0200 on 10/14/2024. Per MD Burciaga, a CT and CTA were performed and labs were drawn. No acute changes in vital signs during this episode. MD Burciaga, cleared CTA with nephrology MD Brown, prior to performing as long as patient receives dialysis today. The patient seemed to be experiencing the aphasia intermittently as he responded twice verbally with one word answers and did nod appropriately approximately 3 times. The patient maintained movement in all extremities throughout this episode and also continuously responded to painful stimuli. Around 0200 the patient began to respond using full sentences and following commands. Remains disoriented to situation and place. Free of falls, injury, or further breakdown.     Problem: Adult Inpatient Plan of Care  Goal: Plan of Care Review  Outcome: Not Progressing  Goal: Patient-Specific Goal (Individualized)  Outcome: Not Progressing  Goal: Absence of Hospital-Acquired Illness or Injury  Outcome: Not Progressing  Goal: Optimal Comfort and Wellbeing  Outcome: Not Progressing  Goal: Readiness for Transition of Care  Outcome: Not Progressing     Problem: Infection  Goal: Absence of Infection Signs and Symptoms  Outcome: Not Progressing     Problem: Wound  Goal: Optimal Coping  Outcome: Not Progressing  Goal: Optimal Functional Ability  Outcome: Not Progressing  Goal: Absence of Infection Signs and Symptoms  Outcome: Not Progressing  Goal: Improved Oral Intake  Outcome: Not Progressing  Goal: Optimal Pain Control and Function  Outcome: Not Progressing  Goal: Skin Health and Integrity  Outcome: Not Progressing  Goal: Optimal Wound Healing  Outcome: Not Progressing     Problem: Skin Injury Risk Increased  Goal: Skin Health and Integrity  Outcome: Not Progressing      Problem: Hemodialysis  Goal: Safe, Effective Therapy Delivery  Outcome: Not Progressing  Goal: Effective Tissue Perfusion  Outcome: Not Progressing  Goal: Absence of Infection Signs and Symptoms  Outcome: Not Progressing     Problem: Orthopaedic Fracture  Goal: Absence of Bleeding  Outcome: Not Progressing  Goal: Bowel Elimination  Outcome: Not Progressing  Goal: Absence of Embolism Signs and Symptoms  Outcome: Not Progressing  Goal: Fracture Stability  Outcome: Not Progressing  Goal: Optimal Functional Ability  Outcome: Not Progressing  Goal: Absence of Infection Signs and Symptoms  Outcome: Not Progressing  Goal: Effective Tissue Perfusion  Outcome: Not Progressing  Goal: Optimal Pain Control and Function  Outcome: Not Progressing  Goal: Effective Oxygenation and Ventilation  Outcome: Not Progressing     Problem: Fall Injury Risk  Goal: Absence of Fall and Fall-Related Injury  Outcome: Not Progressing     Problem: CRRT (Continuous Renal Replacement Therapy)  Goal: Safe, Effective Therapy Delivery  Outcome: Not Progressing  Goal: Hemodynamic Stability  Outcome: Not Progressing  Goal: Body Temperature Maintained in Desired Range  Outcome: Not Progressing  Goal: Absence of Infection Signs and Symptoms  Outcome: Not Progressing     Problem: Coping Ineffective  Goal: Effective Coping  Outcome: Not Progressing

## 2024-10-14 NOTE — CLINICAL REVIEW
IP Sepsis Screen (most recent)       Sepsis Screen (IP) - 10/14/24 1202       Is the patient's history or complaint suggestive of a possible infection? Yes  -WL    Are there at least two of the following signs and symptoms present? Yes  -WL    Sepsis signs/symptoms - Hyper or Hypothermia Hyperthermia >100.4 or Hypothermia < 96.8  -WL    Sepsis signs/symptoms - WBC WBC < 4,000 or WBC > 12,000  -WL    Are any of the following organ dysfunction criteria present and not considered to be due to a chronic condition? Yes  -WL    Organ Dysfunction Criteria Total Bilirubin > 2.0 Platelet count < 100,000  -WL    Initiate Sepsis Protocol No  -WL    Reason sepsis not considered Pt. receiving appropriate management   ID following -WL              User Key  (r) = Recorded By, (t) = Taken By, (c) = Cosigned By      Initials Name    Verena Menard RN

## 2024-10-14 NOTE — ASSESSMENT & PLAN NOTE
Ar Sharma is a 63 year old man with ESRD on HD via right subclavian HD catheter (5/2024), CAD, HF with CRT-D (2016), on home oxygen who was admitted 10/3 after a fall with a femur fracture requiring intramedullary nail placement. During pre-surgical evaluation he was found to have atrial thrombus vs vegetation. No preceding infectious symptoms. Blood cultures on 10/5, 10/9 and 10/12 are no growth, T. Whipplei, Q fever and bartonella ab negative. Moraima with Weissella confusa. Developed leukocytosis and hypotension requiring multiple pressors. Unclear significance of Weissella but given the patient's current condition, will treat.     Recommendations  - Continue meropenem 500 q12 hours  - continue micafungin 100 mg q24 hours   - continue daptomycin 8 mg/kg q48 hours   - would consider removal of tunneled HD line with line holiday

## 2024-10-15 PROBLEM — I51.3 RIGHT ATRIAL THROMBUS: Status: RESOLVED | Noted: 2024-01-01 | Resolved: 2024-01-01

## 2024-10-15 PROBLEM — I70.0 AORTIC ATHEROSCLEROSIS: Status: RESOLVED | Noted: 2023-09-15 | Resolved: 2024-01-01

## 2024-10-15 NOTE — SUBJECTIVE & OBJECTIVE
Interval History: He is lying in bed, alert, with daughter and son at bedside. He says his feet feel better. Otherwise, his answers and speech do not match the questions he is asked.     Review of Systems   Unable to perform ROS: Mental status change   Respiratory:  Negative for shortness of breath.    Cardiovascular:  Negative for chest pain.   Gastrointestinal:  Negative for abdominal pain.   Psychiatric/Behavioral:  Positive for confusion.      Objective:     Vital Signs (Most Recent):  Temp: (!) 95.2 °F (35.1 °C) (10/15/24 1400)  Pulse: 69 (10/15/24 1400)  Resp: 16 (10/15/24 1400)  BP: 111/63 (10/15/24 1400)  SpO2: 97 % (10/15/24 1400) Vital Signs (24h Range):  Temp:  [94.3 °F (34.6 °C)-97.2 °F (36.2 °C)] 95.2 °F (35.1 °C)  Pulse:  [66-84] 69  Resp:  [14-36] 16  SpO2:  [85 %-98 %] 97 %  BP: (100-115)/(52-67) 111/63  Arterial Line BP: ()/(44-56) 96/47     Weight: 81.2 kg (179 lb 0.2 oz)  Body mass index is 26.44 kg/m².    Intake/Output Summary (Last 24 hours) at 10/15/2024 1538  Last data filed at 10/15/2024 1400  Gross per 24 hour   Intake 1983.2 ml   Output 1987 ml   Net -3.8 ml         Physical Exam  Vitals and nursing note reviewed.   Constitutional:       Appearance: He is ill-appearing and toxic-appearing.   HENT:      Head:      Comments: Temporal wasting  Eyes:      General: Scleral icterus present.   Neck:      Comments: R THDC, [facility] TLC  Cardiovascular:      Rate and Rhythm: Normal rate.      Comments: Paced rhythm. L chest wall AICD in place  Pulmonary:      Effort: Pulmonary effort is normal.      Breath sounds: Normal breath sounds. No wheezing.      Comments: O2 by NC  Abdominal:      General: Bowel sounds are normal.      Palpations: Abdomen is soft.      Tenderness: There is no abdominal tenderness.   Musculoskeletal:      Right lower leg: No edema.      Left lower leg: No edema.   Skin:     General: Skin is warm and dry.   Neurological:      Mental Status: He is alert.      Comments:  Oriented to self, recognizes family             Significant Labs: All pertinent labs within the past 24 hours have been reviewed.    Significant Imaging: I have reviewed all pertinent imaging results/findings within the past 24 hours.

## 2024-10-15 NOTE — PLAN OF CARE
Patient discussed in MDT SIBR rounds.  Patient will remain at the [facility] for IV anticoagulation.  Family to discuss comfort measures today.       10/15/24 0943   Rounds   Attendance Provider;Nurse ;Assigned nurse;Charge nurse;Occupational therapist;Pharmacist  (Dr Caldwell, Dr Brown & Clefabien)   Discharge Plan A   (tbd)   Why the patient remains in the hospital Requires continued medical care   Transition of Care Barriers Other (see comments)

## 2024-10-15 NOTE — PROGRESS NOTES
Nurse swallow evaluation completed:  Patient awake and alert, sitting up in high altamirano's position; given sips of water X3 via spoon; swallowed without difficulty; no coughing noted; attempted to have patient swallow small pill; able to swallow water, but pill remained on patient's tongue; crushed pill and placed in apple sauce; swallowed without difficulty; no pocketing noted

## 2024-10-15 NOTE — ASSESSMENT & PLAN NOTE
Nutrition consulted. Most recent weight and BMI monitored-     Measurements:  Wt Readings from Last 1 Encounters:   10/15/24 81.2 kg (179 lb 0.2 oz)   Body mass index is 26.44 kg/m².    Patient has been screened and assessed by RD.    Malnutrition Type:  Context: acute illness or injury  Level: mild    Malnutrition Characteristic Summary:  Weight Loss (Malnutrition): greater than 10% in 6 months  Subcutaneous Fat (Malnutrition): mild depletion  Muscle Mass (Malnutrition): mild depletion    Interventions/Recommendations (treatment strategy):  1. When medically acceptable, restart pt on renal diet, texture per SLP 2. Continue Novasource Renal ID 3. Monitor weight/labs 4. RD to follow to monitor nutriton status

## 2024-10-15 NOTE — PROGRESS NOTES
[facility]  Nephrology  Progress Note    Patient Name: Ar Sharma  MRN: [id number]  Admission Date: 10/3/2024  Hospital Length of Stay: 12 days  Attending Provider: Jennifer Montelongo MD   Primary Care Physician: Jc Elliott MD  Principal Problem:Closed right hip fracture, initial encounter    Subjective:     HPI: Following for ESRD. Patient receives HD MWF via RIJ TDC at [facility] under the c/o Dr. Chicas. He was initiated on HD 4/2024. He remains with PD catheter.     Interval History: encephalopathic this AM, per report hyperactive delirium all night. Tolerating CVVHD with net 200 cc positive in last 24 hours, but overall holding with net neg 10 cc UF    Review of patient's allergies indicates:   Allergen Reactions    Lokelma [sodium zirconium cyclosilicate] Other (See Comments)     Fluid retention, weight gain, CHF excerebration, severe constipation    Atorvastatin Other (See Comments)     Joint pain    Jardiance [empagliflozin] Other (See Comments)     Chest pains, significant weight loss, lower blood pressure     Current Facility-Administered Medications   Medication Frequency    0.9%  NaCl infusion PRN    acetaminophen tablet 650 mg Q4H PRN    DAPTOmycin (CUBICIN) 645 mg in 0.9% NaCl SolP 50 mL IVPB Q48H    DOBUtamine 1000 mg in D5W 250 mL infusion Continuous    epoetin saba-epbx injection 10,000 Units Every Mon, Wed, Fri    famotidine (PF) injection 20 mg Daily    fentaNYL 50 mcg/hr 1 patch Q72H    fludrocortisone tablet 100 mcg Daily    heparin (porcine) injection 1,000 Units PRN    heparin (porcine) injection 5,000 Units PRN    heparin 25,000 units in dextrose 5% (100 units/ml) IV bolus from bag HIGH INTENSITY nomogram - [facility] PRN    heparin 25,000 units in dextrose 5% (100 units/ml) IV bolus from bag HIGH INTENSITY nomogram - [facility] PRN    heparin 25,000 units in dextrose 5% 250 mL (100 units/mL) infusion HIGH INTENSITY nomogram - [facility] Continuous    hydrALAZINE injection 10 mg  Q6H PRN    hydrocortisone sodium succinate injection 100 mg Q8H    HYDROmorphone injection 1 mg Q4H PRN    hydrOXYzine pamoate capsule 25 mg Q8H PRN    levalbuterol nebulizer solution 1.25 mg Q4H PRN    levothyroxine tablet 150 mcg Before breakfast    melatonin tablet 6 mg Nightly PRN    meropenem (MERREM) 500 mg in 0.9% NaCl 100 mL IVPB (MB+) Q12H    metOLazone tablet 10 mg See admin instructions    mexiletine capsule 150 mg BID WM    micafungin 100 mg in 0.9% NaCl 100 mL IVPB (MB+) Q24H    midodrine tablet 15 mg Q8H    naloxone 0.4 mg/mL injection 0.4 mg PRN    nitroGLYCERIN SL tablet 0.4 mg Q5 Min PRN    NORepinephrine 32 mg in D5W 250 mL infusion Continuous    ondansetron injection 4 mg Q6H PRN    oxyCODONE-acetaminophen  mg per tablet 1 tablet Q4H PRN    oxyCODONE-acetaminophen 5-325 mg per tablet 1 tablet Q4H PRN    polyethylene glycol packet 17 g BID PRN    polyethylene glycol packet 17 g Daily    promethazine (PHENERGAN) 6.25 mg in 0.9% NaCl 50 mL IVPB Q6H PRN    senna-docusate 8.6-50 mg per tablet 2 tablet BID    simethicone chewable tablet 80 mg QID PRN    sodium chloride 0.9% bolus 250 mL 250 mL PRN    sodium chloride 0.9% flush 10 mL Q12H PRN    sodium chloride 0.9% flush 10 mL PRN    triamcinolone acetonide 0.1% cream BID    vasopressin (PITRESSIN) 0.2 Units/mL in D5W 100 mL infusion Continuous       Objective:     Vital Signs (Most Recent):  Temp: (!) 94.5 °F (34.7 °C) (10/15/24 0827)  Pulse: 78 (10/15/24 0827)  Resp: 14 (10/15/24 1015)  BP: (!) 111/52 (10/14/24 1600)  SpO2:  (sats unable to obtain. rn stated last sat she saw was 99%) (10/15/24 0827) Vital Signs (24h Range):  Temp:  [94.5 °F (34.7 °C)-97.2 °F (36.2 °C)] 94.5 °F (34.7 °C)  Pulse:  [68-84] 78  Resp:  [11-36] 14  SpO2:  [85 %-96 %] 85 %  BP: (111-125)/(51-58) 111/52  Arterial Line BP: ()/(38-56) 96/47     Weight: 81.2 kg (179 lb 0.2 oz) (10/15/24 0300)  Body mass index is 26.44 kg/m².  Body surface area is 1.99 meters  squared.    I/O last 3 completed shifts:  In: 2989.9 [P.O.:300; I.V.:2243.9; IV Piggyback:446]  Out: 1623 [Other:1623]     Physical Exam  Constitutional:       General: He is not in acute distress.     Appearance: He is ill-appearing and toxic-appearing.   HENT:      Head: Normocephalic and atraumatic.      Nose: Nose normal. No congestion.      Mouth/Throat:      Mouth: Mucous membranes are moist.   Eyes:      Pupils: Pupils are equal, round, and reactive to light.   Cardiovascular:      Rate and Rhythm: Tachycardia present.   Pulmonary:      Breath sounds: No rhonchi.   Abdominal:      General: Abdomen is flat.   Musculoskeletal:      Cervical back: Normal range of motion. No rigidity.      Right lower leg: No edema.      Left lower leg: No edema.   Skin:     General: Skin is warm.   Neurological:      Mental Status: He is alert.          Significant Labs:  All labs within the past 24 hours have been reviewed.     Significant Imaging:  Labs: Reviewed  Assessment/Plan:     Renal/  ESRD (end stage renal disease)  ESRD MWF    Possibly will go to [facility] for higher level of care with regards to his bilateral PE and right atrial mass    Plan/recommendation  -CVVHD, UF set to -10 cc/hr.  -Family possibly to move to comfort care today, will continue to assist  -Strict ins and outs  -Avoid nephrotoxic agents if possible and renally dose medications  -Avoid drastic hemodynamic changes if possible    Secondary hyperparathyroidism  Calcium   Date Value Ref Range Status   10/15/2024 8.2 (L) 8.7 - 10.5 mg/dL Final     Phosphorus   Date Value Ref Range Status   10/15/2024 4.0 2.7 - 4.5 mg/dL Final     PTH, Intact   Date Value Ref Range Status   04/29/2024 162.6 (H) 9.0 - 77.0 pg/mL Final   Continue to monitor - critically ill this admission    Anemia of chronic renal disease  Goal hemoglobin in ESRD is 10-12  Hemoglobin   Date Value Ref Range Status   10/15/2024 9.8 (L) 14.0 - 18.0 g/dL Final     Iron   Date Value Ref  Range Status   10/05/2024 22 (L) 45 - 160 ug/dL Final     Transferrin   Date Value Ref Range Status   10/05/2024 207 200 - 375 mg/dL Final     TIBC   Date Value Ref Range Status   10/05/2024 306 250 - 450 ug/dL Final     Saturated Iron   Date Value Ref Range Status   10/05/2024 7 (L) 20 - 50 % Final     Ferritin   Date Value Ref Range Status   10/05/2024 338 (H) 20.0 - 300.0 ng/mL Final   Continue to monitor - critically ll this admission        Thank you for your consult. I will follow-up with patient. Please contact us if you have any additional questions.    Shashank Brown MD  Nephrology  [facility] - Intensive Care

## 2024-10-15 NOTE — ASSESSMENT & PLAN NOTE
- noted- he has functional device in place and needs magnet if switching to hospice plan of care

## 2024-10-15 NOTE — ASSESSMENT & PLAN NOTE
Patient with Hypoxic Respiratory failure which is Acute on chronic.  he is on home oxygen at 3 LPM.    Signs/symptoms of respiratory failure include- tachypnea and increased work of breathing. Contributing diagnoses includes - CHF and COPD Labs and images were reviewed. Patient Has recent ABG, which has been reviewed. Will treat underlying causes and adjust management of respiratory failure as follows-     -pt on home 3L NC. Hx of COPD/CHF  -Worsening hypoxia overnight 10/05; required on HFNC.  -acute on chronic hypoxia secondary to volume overload  -Pt unable to tolerate HD and CRRT, net positive 1.5L on 10/06  -CXR with pulmonary edema   -Pulmonology consulted  -Nephrology following  -Wean O2 as tolerated .

## 2024-10-15 NOTE — ASSESSMENT & PLAN NOTE
- he is not at mental status baseline  - CTH with age indeterminate parietal CVA potentially  - on heparin gtt as this may be embolic from RA mass

## 2024-10-15 NOTE — PROGRESS NOTES
[facility] - Intensive Care  Infectious Disease  Progress Note    Patient Name: Ar Sharma  MRN: [id number]  Admission Date: 10/3/2024  Length of Stay: 12 days  Attending Physician: Jennifer Montelongo MD  Primary Care Provider: Jc Elliott MD    Isolation Status: No active isolations  Assessment/Plan:      Cardiac/Vascular  Atrial mass  Ar Sharma is a 63 year old man with ESRD on HD via right subclavian HD catheter (5/2024), CAD, HF with CRT-D (2016), on home oxygen who was admitted 10/3 after a fall with a femur fracture requiring intramedullary nail placement. During pre-surgical evaluation he was found to have atrial thrombus vs vegetation. No preceding infectious symptoms. Blood cultures on 10/5, 10/9 and 10/12 are no growth, T. Whipplei, Q fever and bartonella ab negative. Karius with Weissella confusa. Developed leukocytosis and hypotension requiring multiple pressors. Unclear significance of Weissella but given the patient's current condition, will treat.     Recommendations  - Continue meropenem 500 q12 hours  - continue micafungin 100 mg q24 hours   - continue daptomycin 8 mg/kg q48 hours   - would consider removal of tunneled HD line if consistent with patient's goals of care        Above discussed with primary team.     Critical care time: 40 minutes  I personally spent critical care time on the following: evaluating this patient's organ dysfunction, development of treatment plan, discussing treatment plan with patient or surrogate and bedside caregivers, discussions with critical care service and/or consultants, evaluation of patient's response to treatment, physical examination of patient, ordering and review of treatments interventions, laboratory studies, and radiographic studies, re-evaluation of patient's condition. This critical care time did not overlap with that of any other provider of the same specialty or involve time for procedures. This patient has decreasing pressor requirements,  renal dysfunction requiring renal replacement therapy related to their infection, has worsening CNS and cardiac end organ damage related to their infection.They continue to be critically ill.     Anticipated Disposition: TBD    Thank you for your consult. I will follow-up with patient. Please contact us if you have any additional questions.    Rylee May MD  Infectious Disease  [facility] - Intensive Care    Subjective:     Principal Problem:Closed right hip fracture, initial encounter    HPI: Mr. Sharma is a pleasant 63 y.o. man with h/o ESRD on HD (for 2 months but prior was on PD for about a month-Follows with [facility]) MWF via a tunneled HD catheter on the right, Essential HTN, HLD, CAD,  h/o systolic(EF 40-45%) and diastolic grade 3 CHF due to non-ischemic cardiomyopathy with a [employer] CRT-D implant 2016 (sees Dr. Hernandez) uses 3L supplemental oxygen at night, and h/o VT arhythmia (follows with Dr. Tenorio at [facility]) s/p ablation and on Mexiletin 150mg PO BID and Amiodarone 400mg daily present to the ER with c/o right hip pain after a fall at home. Went to HD and was feeling alright after. Was moving his tool box when he tripped over his feet and fell to the ground while working on his RV. Was unable to get up or put weight on his right leg. Radiograph revealed a femur fracture and the patient was transferred here from [place] for Ortho evaluation. Here he underwent surgery and has an IM nail placed. He had a TTE which demonstrated a mass through a PFO. ID is consulted for possible IE. The patient and spouse deny prodromal fever, chills, or night sweats. No IDU but has HD TIW. Blood cultures (10/5) are NGTD.       Interval History: family at bedside, reports he is very confused. Patient sleeping.     Review of Systems   Reason unable to perform ROS: mental status.     Objective:     Vital Signs (Most Recent):  Temp: (!) 94.8 °F (34.9 °C) (10/15/24 1200)  Pulse: 71 (10/15/24 1200)  Resp: 16  (10/15/24 1200)  BP: 115/67 (10/15/24 1200)  SpO2: 98 % (10/15/24 1200) Vital Signs (24h Range):  Temp:  [94.3 °F (34.6 °C)-97.2 °F (36.2 °C)] 94.8 °F (34.9 °C)  Pulse:  [66-84] 71  Resp:  [13-36] 16  SpO2:  [85 %-98 %] 98 %  BP: (100-115)/(52-67) 115/67  Arterial Line BP: ()/(44-56) 96/47     Weight: 81.2 kg (179 lb 0.2 oz)  Body mass index is 26.44 kg/m².    Estimated Creatinine Clearance: 21.3 mL/min (A) (based on SCr of 3.5 mg/dL (H)).     Physical Exam  Vitals reviewed.   Constitutional:       Appearance: He is ill-appearing.   HENT:      Head: Normocephalic.      Nose: Nose normal.      Comments: Nasal cannula   Cardiovascular:      Comments: L IJ, R subclavian HD line. L radial A line  Pulmonary:      Effort: Pulmonary effort is normal.   Abdominal:      General: There is no distension.      Palpations: Abdomen is soft.   Skin:     General: Skin is warm and dry.          Significant Labs:   Microbiology Results (last 7 days)       Procedure Component Value Units Date/Time    Blood culture [[id number]] Collected: 10/12/24 1449    Order Status: Completed Specimen: Blood from Peripheral, Lower Arm, Left Updated: 10/14/24 1703     Blood Culture, Routine No Growth to date      No Growth to date      No Growth to date    Blood culture [[id number]] Collected: 10/09/24 0930    Order Status: Completed Specimen: Blood Updated: 10/13/24 1103     Blood Culture, Routine No Growth after 4 days.    Narrative:      Draw sample # 2 from separate site    Blood culture [[id number]] Collected: 10/09/24 0915    Order Status: Completed Specimen: Blood Updated: 10/13/24 1103     Blood Culture, Routine No Growth after 4 days.    Narrative:      Draw sample # 2 from separate site    Urine Culture High Risk [[id number]]     Order Status: Canceled Specimen: Urine, Catheterized     Fungus Culture, Blood or Bone Marrow [[id number]]     Order Status: Canceled Specimen: Blood     Blood culture [[id number]] Collected: 10/05/24 1615     Order Status: Completed Specimen: Blood from Peripheral, Hand, Left Updated: 10/09/24 1703     Blood Culture, Routine No Growth after 4 days.    Blood culture [[id number]] Collected: 10/05/24 1556    Order Status: Completed Specimen: Blood from Peripheral, Wrist, Left Updated: 10/09/24 1703     Blood Culture, Routine No Growth after 4 days.            Significant Imaging: I have reviewed all pertinent imaging results/findings within the past 24 hours.

## 2024-10-15 NOTE — PT/OT/SLP PROGRESS
Occupational Therapy      Patient Name:  Ar Sharma   MRN:  [id number]    Per therapist who attended rounds, PT/OT to cancel therapy orders at this time . Please re-consult as warranted. Thank you.     10/15/2024

## 2024-10-15 NOTE — PROGRESS NOTES
[facility] Intensive Care  Cardiology  Progress Note    Patient Name: Ar Sharma  MRN: [id number]  Admission Date: 10/3/2024  Hospital Length of Stay: 12 days  Code Status: DNR   Attending Physician: Jennifer Montelongo MD   Primary Care Physician: Jc Elliott MD  Expected Discharge Date:   Principal Problem:Closed right hip fracture, initial encounter    Subjective:       Interval History:  Mental status waxing and waning.  No new cardiac complaints    Review of Systems   Constitutional: Positive for malaise/fatigue.   HENT:  Negative for hearing loss.    Eyes:  Negative for photophobia.   Cardiovascular:  Negative for chest pain.     Objective:     Vital Signs (Most Recent):  Temp: (!) 95.2 °F (35.1 °C) (10/15/24 1400)  Pulse: 69 (10/15/24 1400)  Resp: 16 (10/15/24 1400)  BP: 111/63 (10/15/24 1400)  SpO2: 97 % (10/15/24 1400) Vital Signs (24h Range):  Temp:  [94.3 °F (34.6 °C)-97.2 °F (36.2 °C)] 95.2 °F (35.1 °C)  Pulse:  [66-84] 69  Resp:  [14-36] 16  SpO2:  [85 %-98 %] 97 %  BP: (100-115)/(52-67) 111/63  Arterial Line BP: ()/(44-56) 96/47     Weight: 81.2 kg (179 lb 0.2 oz)  Body mass index is 26.44 kg/m².     SpO2: 97 %         Intake/Output Summary (Last 24 hours) at 10/15/2024 1509  Last data filed at 10/15/2024 1400  Gross per 24 hour   Intake 1983.2 ml   Output 1987 ml   Net -3.8 ml       Lines/Drains/Airways       Central Venous Catheter Line  Duration                  Hemodialysis Catheter 05/03/24 1115 right subclavian 165 days    Percutaneous Central Line - Triple Lumen  10/09/24 1127 Internal Jugular Left 6 days              Arterial Line  Duration             Arterial Line 10/12/24 1040 Left Radial 3 days                       Physical Exam  Vitals reviewed.   Constitutional:       Appearance: He is well-developed.   HENT:      Head: Normocephalic.   Eyes:      Conjunctiva/sclera: Conjunctivae normal.      Pupils: Pupils are equal, round, and reactive to light.   Cardiovascular:       Rate and Rhythm: Normal rate and regular rhythm.      Heart sounds: Murmur heard.   Pulmonary:      Effort: Pulmonary effort is normal.      Breath sounds: Normal breath sounds.   Abdominal:      General: Bowel sounds are normal.      Palpations: Abdomen is soft.   Musculoskeletal:      Cervical back: Normal range of motion and neck supple.   Skin:     General: Skin is warm.   Neurological:      Mental Status: He is alert and oriented to person, place, and time.            Significant Labs:      DATA:     Laboratory:  CBC:  Recent Labs   Lab 10/14/24  0052 10/14/24  0628 10/15/24  0420   WBC 38.14 H 37.64 H 37.42 H   Hemoglobin 10.0 L 9.7 L 9.8 L   Hematocrit 27.8 L 27.3 L 26.8 L   Platelets 413 421 397       CHEMISTRIES:  Recent Labs   Lab 04/04/22  1255 06/09/22  0740 07/07/22  0746 09/06/22  0741 10/14/24  1612 10/15/24  0051 10/15/24  0420 10/15/24  0837   Glucose 92 90 92   < > 147 H 125 H 127 H 131 H   Sodium 137 139 136   < > 130 L 132 L 131 L 133 L   Potassium 4.8 4.8 4.3   < > 4.6 4.4 4.5 4.4   BUN 68 H 81 H 76 H   < > 62 H 47 H 42 H 39 H   Creatinine 4.08 H 4.64 H 4.06 H   < > 6.2 H 4.5 H 4.1 H 3.5 H   eGFR if  17.0 A 14.5 A 17.1 A  --   --   --   --   --    eGFR if non  14.7 A 12.6 A 14.8 A  --   --   --   --   --    Calcium 9.1 8.7 9.5   < > 7.8 L 8.1 L 8.4 L 8.2 L   Magnesium  --   --   --    < > 2.2 1.9  --  2.0    < > = values in this interval not displayed.       CARDIAC BIOMARKERS:  Recent Labs   Lab 09/25/22  1638 04/29/24  1707 10/04/24  0418   Troponin I 0.015 0.040 H 0.045 H       COAGS:  Recent Labs   Lab 10/02/24  2224 10/03/24  0441 10/11/24  1637   INR 1.3 H 1.2 1.2       LIPIDS/LFTS:  Recent Labs   Lab 03/04/24  1018 04/12/24  1056 04/29/24  2039 06/04/24  1440 09/04/24  1201 09/30/24  1100 10/11/24  0421 10/13/24  0945 10/15/24  0420   Cholesterol 124  --  127  --  189  --   --   --   --    Triglycerides 66  --  66  --  167 H  --   --   --   --    HDL 39 L   --  32 L  --  54  --   --   --   --    LDL Cholesterol 71.8  --  81.8  --  101.6  --   --   --   --    Non-HDL Cholesterol 85  --  95  --  135  --   --   --   --    AST 39   < >  --    < > 99 H   < > 110 H 106 H 85 H   ALT 35   < >  --    < > 68 H   < > 29 45 H 45 H    < > = values in this interval not displayed.       Hemoglobin A1C   Date Value Ref Range Status   04/29/2024 5.6 4.0 - 5.6 % Final     Comment:     ADA Screening Guidelines:  5.7-6.4%  Consistent with prediabetes  >or=6.5%  Consistent with diabetes    High levels of fetal hemoglobin interfere with the HbA1C  assay. Heterozygous hemoglobin variants (HbS, HgC, etc)do  not significantly interfere with this assay.   However, presence of multiple variants may affect accuracy.     03/04/2024 5.6 4.0 - 5.6 % Final     Comment:     ADA Screening Guidelines:  5.7-6.4%  Consistent with prediabetes  >or=6.5%  Consistent with diabetes    High levels of fetal hemoglobin interfere with the HbA1C  assay. Heterozygous hemoglobin variants (HbS, HgC, etc)do  not significantly interfere with this assay.   However, presence of multiple variants may affect accuracy.     09/01/2023 6.0 (H) 4.0 - 5.6 % Final     Comment:     ADA Screening Guidelines:  5.7-6.4%  Consistent with prediabetes  >or=6.5%  Consistent with diabetes    High levels of fetal hemoglobin interfere with the HbA1C  assay. Heterozygous hemoglobin variants (HbS, HgC, etc)do  not significantly interfere with this assay.   However, presence of multiple variants may affect accuracy.         TSH  Recent Labs   Lab 05/04/23  1129 03/04/24  1018 09/04/24  1201   TSH 1.160 0.283 L 1.740       The 10-year ASCVD risk score (Susanne SPEARS, et al., 2019) is: 11.6%    Values used to calculate the score:      Age: 63 years      Sex: Male      Is Non- : Yes      Diabetic: No      Tobacco smoker: No      Systolic Blood Pressure: 111 mmHg      Is BP treated: Yes      HDL Cholesterol: 54 mg/dL      Total  Cholesterol: 189 mg/dL       BNP    Lab Results   Component Value Date/Time    BNP 3,204 (H) 04/29/2024 05:07 PM    BNP 2,549 (H) 04/12/2024 10:56 AM    BNP 1,416 (H) 09/28/2022 03:45 PM    BNP 2,304 (H) 09/25/2022 04:38 PM     (H) 11/02/2018 12:17 PM            ECHO    Results for orders placed during the hospital encounter of 10/03/24    Echo    Interpretation Summary    Left Ventricle: The left ventricle is normal in size. Normal wall thickness. There is mildly reduced systolic function with a visually estimated ejection fraction of  45%.    Right Ventricle: Right ventricular enlargement. Systolic function is reduced.    Left Atrium: Left atrium is dilated.    Right Atrium: Right atrium is dilated. 4.2 x 2.5 large mobile echogenic mass present.appears to be attached to the device lead    Tricuspid Valve: There is severe regurgitation.    Pulmonary Artery: There is severe pulmonary hypertension. The estimated pulmonary artery systolic pressure is 72 mmHg.    IVC/SVC: Elevated venous pressure at 15 mmHg.    Limited echo    Findings conveyed to critical care and primary team      STRESS TEST    Results for orders placed during the hospital encounter of 07/11/22    Nuclear Stress Test    Interpretation Summary    The EKG portion of this study is abnormal but not diagnostic.    The patient reported no chest pain during the stress test.    The nuclear portion of this study will be reported separately.        CATH    Results for orders placed during the hospital encounter of 09/25/22    Cardiac catheterization    Conclusion    There was non-obstructive coronary artery disease. Stable as compared to prior.    The procedure log was documented by Documenter: RT Teri; May Galdamez RN and verified by Juan Roque MD.    Date: 9/27/2022  Time: 6:36 PM      Assessment and Plan:     Brief HPI:     * Closed right hip fracture, initial encounter  Status post ORIF.    Shock  Has been on broad spectrum  antibiotics to cover for septic shock. ECHO today demonstrated increase in size of RA mass despite being on heparin drip. CTA showed B/L PE, which could be contributing to his shock. ECHO showed pulm HTN and RVE. Case discussed with IC at [facility]. Pt will benefit from multidisciplinary evaluation at [facility] and has been accepted for transfer.    Continue heparin drip at high intensity.        Atrial mass  Patient been on heparin drip as well as broad-spectrum antibiotics since this mass was discovered.  Repeat echo done today demonstrated that the right atrial mass has increased in size.  Because of RV enlargement and RA enlargement with severe TR clinically suspected pulmonary embolism.  Subsequent CTA confirmed the suspicion.  Patient has been accepted to [facility] for further evaluation and management.  Case discussed with CT surgery and Interventional Cardiology at [facility]    Notes from October 14th20 24:  No change in cardiac clinical status.  Had concern for stroke last night.  Now feeling better.  Workup done by primary team.  Awaiting transfer to [facility].  Still on pressors    Notes from October 15th:  Has been refused for transfer to [facility] because of nothing new can be offered there.  Intervention felt to be too high-risk by multidisciplinary team approach over there    Continue medical management and supportive care    Continue heparin drip and broad-spectrum antibiotics    Right atrial thrombus        ESRD (end stage renal disease)  Per renal    Chronic combined systolic and diastolic heart failure  NICM. EF 40-45%. Volume status appears controlled. Has CRT-D followed at [facility].   Neg cath 2022.  Hx VT s/p ablation    Currently on 3 pressors. Working diagnosis has been septic shock, and getting  anti biotics for that. Likely etiology could also be PE     S/P ablation of ventricular arrhythmia  Followed by EP at [facility]. Continue amiodarone    Cardiac resynchronization therapy  defibrillator (CRT-D) in place  Followed by Dr Tenorio. Normal function at last check    would need eval for lead extraction at main also    Mixed hyperlipidemia  Resume statin when LFTs normalized.          VTE Risk Mitigation (From admission, onward)           Ordered     heparin 25,000 units in dextrose 5% (100 units/ml) IV bolus from bag HIGH INTENSITY nomogram - [facility]  As needed (PRN)        Question:  Heparin Infusion Adjustment (DO NOT MODIFY ANSWER)  Answer:  [email or web address]\Images\Pharmacy\HeparinInfusions\heparin HIGH INTENSITY nomogram for [facility] [id number]    10/11/24 1622     heparin 25,000 units in dextrose 5% (100 units/ml) IV bolus from bag HIGH INTENSITY nomogram - [facility]  As needed (PRN)        Question:  Heparin Infusion Adjustment (DO NOT MODIFY ANSWER)  Answer:  [email or web address]\epic\Images\Pharmacy\HeparinInfusions\heparin HIGH INTENSITY nomogram for [facility] [id number]    10/11/24 1622     heparin 25,000 units in dextrose 5% 250 mL (100 units/mL) infusion HIGH INTENSITY nomogram - [facility]  Continuous        Question:  Begin at (units/kg/hr)  Answer:  18    10/11/24 1622     heparin (porcine) injection 5,000 Units  Use PRN         10/05/24 1830     heparin (porcine) injection 1,000 Units  As needed (PRN)         10/04/24 2217     IP VTE LOW RISK PATIENT  Once         10/04/24 1438     Place sequential compression device  Until discontinued         10/03/24 0154     Reason for No Pharmacological VTE Prophylaxis  Once        Comments: Right hip fracture needs surgical eval   Question:  Reasons:  Answer:  Physician Provided (leave comment)    10/03/24 0154                    Damon Knight MD  Cardiology  [facility]      Critical Care Time:  35 minutes     Critical care was time spent personally by me on the following activities: development of treatment plan with patient or surrogate and bedside caregivers, discussions with consultants, evaluation of patient's response to treatment, examination of  patient, ordering and performing treatments and interventions, ordering and review of laboratory studies, ordering and review of radiographic studies, pulse oximetry, re-evaluation of patient's condition. This critical care time did not overlap with that of any other provider or involve time for any procedures.

## 2024-10-15 NOTE — ASSESSMENT & PLAN NOTE
- s/p mechanical fall. No LOC or head trauma  - Right hip XR: minimally displaced intertrochanteric fracture of the right proximal femur.   - Orthopedic surgery consulted: s/p right  femur intramedullary nail on 10/04  - PT/OT once stable.

## 2024-10-15 NOTE — SUBJECTIVE & OBJECTIVE
Interval History: remains critically ill    Medications:  Continuous Infusions:   sodium chloride 0.9%   Intravenous Continuous        DOBUTamine IV infusion (titrating)  0-20 mcg/kg/min Intravenous Continuous 22.6 mL/hr at 10/15/24 0600 20 mcg/kg/min at 10/15/24 0600    heparin (porcine) in D5W  0-40 Units/kg/hr (Adjusted) Intravenous Continuous 10.2 mL/hr at 10/15/24 0600 14 Units/kg/hr at 10/15/24 0600    NORepinephrine bitartrate-D5W  0-3 mcg/kg/min Intravenous Continuous 17.7 mL/hr at 10/15/24 0600 0.5 mcg/kg/min at 10/15/24 0600    vasopressin  0.04 Units/min Intravenous Continuous 12 mL/hr at 10/15/24 0726 0.04 Units/min at 10/15/24 0726     Scheduled Meds:   DAPTOmycin (CUBICIN) IV (PEDS and ADULTS)  8 mg/kg Intravenous Q48H    docusate sodium  100 mg Oral Daily    epoetin saba-epbx  10,000 Units Intravenous Every Mon, Wed, Fri    famotidine (PF)  20 mg Intravenous Daily    fentaNYL  1 patch Transdermal Q72H    fludrocortisone  100 mcg Oral Daily    hydrocortisone sodium succinate  100 mg Intravenous Q8H    levothyroxine  150 mcg Oral Before breakfast    meropenem (MERREM) extended infusion  500 mg Intravenous Q12H    mexiletine  150 mg Oral BID WM    micafungin  100 mg Intravenous Q24H    midodrine  15 mg Oral Q8H    polyethylene glycol  17 g Oral Daily    triamcinolone acetonide 0.1%   Topical (Top) BID     PRN Meds:  Current Facility-Administered Medications:     0.9% NaCl, , Intravenous, PRN    acetaminophen, 650 mg, Oral, Q4H PRN    furosemide, 360 mg, Oral, See admin instructions    heparin (porcine), 1,000 Units, Intra-Catheter, PRN    heparin (porcine), 5,000 Units, Intra-Catheter, PRN    heparin (PORCINE), 60 Units/kg (Adjusted), Intravenous, PRN    heparin (PORCINE), 30 Units/kg (Adjusted), Intravenous, PRN    hydrALAZINE, 10 mg, Intravenous, Q6H PRN    HYDROmorphone, 1 mg, Intravenous, Q4H PRN    hydrOXYzine pamoate, 25 mg, Oral, Q8H PRN    levalbuterol, 1.25 mg, Nebulization, Q4H PRN    magnesium  sulfate IVPB, 2 g, Intravenous, PRN    melatonin, 6 mg, Oral, Nightly PRN    metOLazone, 10 mg, Oral, See admin instructions    naloxone, 0.4 mg, Intravenous, PRN    nitroGLYCERIN, 0.4 mg, Sublingual, Q5 Min PRN    ondansetron, 4 mg, Intravenous, Q6H PRN    oxyCODONE-acetaminophen, 1 tablet, Oral, Q4H PRN    oxyCODONE-acetaminophen, 1 tablet, Oral, Q4H PRN    polyethylene glycol, 17 g, Oral, BID PRN    promethazine (PHENERGAN) 6.25 mg in 0.9% NaCl 50 mL IVPB, 6.25 mg, Intravenous, Q6H PRN    simethicone, 1 tablet, Oral, QID PRN    sodium chloride 0.9%, 250 mL, Intravenous, PRN    sodium chloride 0.9%, 10 mL, Intravenous, Q12H PRN    sodium chloride 0.9%, 10 mL, Intravenous, PRN    sodium phosphate 20.01 mmol in D5W 250 mL IVPB, 20.01 mmol, Intravenous, PRN    sodium phosphate 30 mmol in D5W 250 mL IVPB, 30 mmol, Intravenous, PRN    sodium phosphate 39.99 mmol in D5W 250 mL IVPB, 39.99 mmol, Intravenous, PRN    Objective:     Vital Signs (Most Recent):  Temp: 96.7 °F (35.9 °C) (10/15/24 0400)  Pulse: 70 (10/15/24 0500)  Resp: 16 (10/15/24 0500)  BP: (!) 111/52 (10/14/24 1600)  SpO2: (!) 85 % (10/15/24 0430) Vital Signs (24h Range):  Temp:  [96.1 °F (35.6 °C)-97.2 °F (36.2 °C)] 96.7 °F (35.9 °C)  Pulse:  [68-84] 70  Resp:  [9-36] 16  SpO2:  [85 %-96 %] 85 %  BP: (111-125)/(51-58) 111/52  Arterial Line BP: ()/(38-56) 96/47     Weight: 81.2 kg (179 lb 0.2 oz)  Body mass index is 26.44 kg/m².       Physical Exam  Constitutional:       Comments: Awake but encephalopathic, ill-appearing, no obvious signs of distress         Significant Labs: All pertinent labs within the past 24 hours have been reviewed.  CBC:   Recent Labs   Lab 10/15/24  0420   WBC 37.42*   HGB 9.8*   HCT 26.8*   MCV 75*        BMP:  Recent Labs   Lab 10/15/24  0051 10/15/24  0420   * 127*   * 131*   K 4.4 4.5    100   CO2 20* 20*   BUN 47* 42*   CREATININE 4.5* 4.1*   CALCIUM 8.1* 8.4*   MG 1.9  --      LFT:  Lab Results    Component Value Date    AST 85 (H) 10/15/2024    ALKPHOS 177 (H) 10/15/2024    BILITOT 3.6 (H) 10/15/2024     Albumin:   Albumin   Date Value Ref Range Status   10/15/2024 2.4 (L) 3.5 - 5.2 g/dL Final     Protein:   Total Protein   Date Value Ref Range Status   10/15/2024 5.9 (L) 6.0 - 8.4 g/dL Final     Lactic acid:   Lab Results   Component Value Date    LACTATE 0.7 10/14/2024    LACTATE 1.2 10/04/2024       Significant Imaging: I have reviewed all pertinent imaging results/findings within the past 24 hours.

## 2024-10-15 NOTE — ASSESSMENT & PLAN NOTE
Patient with known CAD , which is controlled  - statin on hold with elevated LFTs which are now improving   - currently not on any Rx for CAD

## 2024-10-15 NOTE — PROGRESS NOTES
[facility] - Intensive Care  Adult Nutrition  Progress Note    SUMMARY       Recommendations    Recommendation:  1. When medically acceptable, restart pt on renal diet, texture per SLP  2. Continue Novasource Renal ID  3. Monitor weight/labs  4. RD to follow to monitor nutriton status    Goals:  Pt kayli be restarted on diet by RD follow-up  Nutrition Goal Status: new  Communication of RD Recs:  (POC)    Assessment and Plan  Endocrine  Moderate protein-calorie malnutrition  Malnutrition Type:  Context: acute illness or injury  Level: mild    Related to (etiology):   ESRD    Signs and Symptoms (as evidenced by):   Unintended weight loss     Malnutrition Characteristic Summary:  Weight Loss (Malnutrition): greater than 10% in 6 months  Subcutaneous Fat (Malnutrition): mild depletion  Muscle Mass (Malnutrition): mild depletion    Interventions:  Collaboration with other providers  Commercial Beverage    Nutrition Diagnosis Status:   Continues    Malnutrition Assessment  Malnutrition Context: acute illness or injury  Malnutrition Level: mild  Weight Loss (Malnutrition): greater than 10% in 6 months  Subcutaneous Fat (Malnutrition): mild depletion  Muscle Mass (Malnutrition): mild depletion   Orbital Region (Subcutaneous Fat Loss): mild depletion  Upper Arm Region (Subcutaneous Fat Loss): mild depletion   [place] Region (Muscle Loss): mild depletion  Clavicle Bone Region (Muscle Loss): moderate depletion  Clavicle and Acromion Bone Region (Muscle Loss): moderate depletion  Scapular Bone Region (Muscle Loss): mild depletion  Dorsal Hand (Muscle Loss): mild depletion  Patellar Region (Muscle Loss): mild depletion  Anterior Thigh Region (Muscle Loss): mild depletion  Posterior Calf Region (Muscle Loss): mild depletion     Reason for Assessment  Reason For Assessment: RD follow-up  Diagnosis: other (see comments) (closed right hip fracture)  General Information Comments: Pt admitted with closed right hip fracture s/p fall. Pt  "s/p ORIF on interochanteric fracture of femur 10/4. Pt started on CRRT 10/6. Nick 12- incision right hip, moisture associated dermatitis medial perineum. NFPE assessed 10/3, moderate malnutrition. Recent wt change of 18.92lb weight loss x 6 months. Pt NPO per SLP.  Nutrition Discharge Planning: d/c on renal diet texture per SLP    Nutrition Risk Screen  Nutrition Risk Screen: difficulty chewing/swallowing    Nutrition/Diet History  Spiritual, Cultural Beliefs, [employer] Practices, Values that Affect Care: no  Factors Affecting Nutritional Intake: chewing difficulties/inability to chew food, NPO    Anthropometrics  Temp: (!) 95 °F (35 °C)  Height Method: Stated  Height: 5' 9" (175.3 cm)  Height (inches): 69 in  Weight Method: Bed Scale  Weight: 81.2 kg (179 lb 0.2 oz)  Weight (lb): 179.02 lb  Ideal Body Weight (IBW), Male: 160 lb  % Ideal Body Weight, Male (lb): 111.89 %  BMI (Calculated): 26.4  BMI Grade: 25 - 29.9 - overweight  Usual Body Weight (UBW), k.2 kg (24)  % Usual Body Weight: 104.05  % Weight Change From Usual Weight: 3.84 %     Lab/Procedures/Meds  Pertinent Labs Reviewed: reviewed  Pertinent Labs Comments: sodium 131 (L), CO2 20(L), BUN 42(H), creatinine 4.1(H), eGFR 1(L), glucose 127(H), calcium 8.4(L), phosphorus 4.6(H), albumin 2.4(L), bilirubin 3.6(H)  Pertinent Medications Reviewed: reviewed  Pertinent Medications Comments: dobutamine, famotidine, heparin, merrem, midodrine, polyethylene glycol, vasopressin    Nutrition Related Social Determinants of Health:  SDOH: Adequate food in home environment    Estimated/Assessed Needs  Weight Used For Calorie Calculations: 70.6 kg (155 lb 10.7 oz) (dry weight)  Energy Calorie Requirements (kcal): 2118 kcal (30 kcal/kg dry weight)  Energy Need Method: Kcal/kg  Protein Requirements: 85-99g (1.2-1.4 g/kg dry weight)  Weight Used For Protein Calculations: 70.8 kg (156 lb) (dry weight)  Estimated Fluid Requirement Method: RDA Method  RDA Method (mL): " 2118     Nutrition Prescription Ordered  Current Diet Order: NPO  Oral Nutrition Supplement: Novasource Renal    Evaluation of Received Nutrient/Fluid Intake  I/O: 2200.1/1623  Energy Calories Required: not meeting needs  Protein Required: not meeting needs  Fluid Required: not meeting needs  Comments: LBM: 10/2  Tolerance: not tolerating  % Intake of Estimated Energy Needs: Other: NPO  % Meal Intake: NPO    Nutrition Risk  Level of Risk/Frequency of Follow-up:  (2x weekly)     Monitor and Evaluation  Food and Nutrient Intake: energy intake  Food and Nutrient Adminstration: diet order  Physical Activity and Function: nutrition-related ADLs and IADLs  Anthropometric Measurements: weight  Biochemical Data, Medical Tests and Procedures: electrolyte and renal panel, inflammatory profile  Nutrition-Focused Physical Findings: overall appearance     Nutrition Follow-Up  RD Follow-up?: Yes

## 2024-10-15 NOTE — PLAN OF CARE
Pt with good attention to bolus prep and bolus presentation for today's reassess. Per palliative teams,comfort measure. ST RECS: diet per patient's preference when alerted to straw/utensil, check for holding, ST with nothing more to add. Samantha George, CCC-SLP

## 2024-10-15 NOTE — SUBJECTIVE & OBJECTIVE
Interval History: family at bedside, reports he is very confused. Patient sleeping.     Review of Systems   Reason unable to perform ROS: mental status.     Objective:     Vital Signs (Most Recent):  Temp: (!) 94.8 °F (34.9 °C) (10/15/24 1200)  Pulse: 71 (10/15/24 1200)  Resp: 16 (10/15/24 1200)  BP: 115/67 (10/15/24 1200)  SpO2: 98 % (10/15/24 1200) Vital Signs (24h Range):  Temp:  [94.3 °F (34.6 °C)-97.2 °F (36.2 °C)] 94.8 °F (34.9 °C)  Pulse:  [66-84] 71  Resp:  [13-36] 16  SpO2:  [85 %-98 %] 98 %  BP: (100-115)/(52-67) 115/67  Arterial Line BP: ()/(44-56) 96/47     Weight: 81.2 kg (179 lb 0.2 oz)  Body mass index is 26.44 kg/m².    Estimated Creatinine Clearance: 21.3 mL/min (A) (based on SCr of 3.5 mg/dL (H)).     Physical Exam  Vitals reviewed.   Constitutional:       Appearance: He is ill-appearing.   HENT:      Head: Normocephalic.      Nose: Nose normal.      Comments: Nasal cannula   Cardiovascular:      Comments: L IJ, R subclavian HD line. L radial A line  Pulmonary:      Effort: Pulmonary effort is normal.   Abdominal:      General: There is no distension.      Palpations: Abdomen is soft.   Skin:     General: Skin is warm and dry.          Significant Labs:   Microbiology Results (last 7 days)       Procedure Component Value Units Date/Time    Blood culture [[id number]] Collected: 10/12/24 1449    Order Status: Completed Specimen: Blood from Peripheral, Lower Arm, Left Updated: 10/14/24 1703     Blood Culture, Routine No Growth to date      No Growth to date      No Growth to date    Blood culture [[id number]] Collected: 10/09/24 0930    Order Status: Completed Specimen: Blood Updated: 10/13/24 1103     Blood Culture, Routine No Growth after 4 days.    Narrative:      Draw sample # 2 from separate site    Blood culture [[id number]] Collected: 10/09/24 0915    Order Status: Completed Specimen: Blood Updated: 10/13/24 1103     Blood Culture, Routine No Growth after 4 days.    Narrative:      Draw  sample # 2 from separate site    Urine Culture High Risk [[id number]]     Order Status: Canceled Specimen: Urine, Catheterized     Fungus Culture, Blood or Bone Marrow [[id number]]     Order Status: Canceled Specimen: Blood     Blood culture [[id number]] Collected: 10/05/24 1612    Order Status: Completed Specimen: Blood from Peripheral, Hand, Left Updated: 10/09/24 1703     Blood Culture, Routine No Growth after 4 days.    Blood culture [[id number]] Collected: 10/05/24 1556    Order Status: Completed Specimen: Blood from Peripheral, Wrist, Left Updated: 10/09/24 1703     Blood Culture, Routine No Growth after 4 days.            Significant Imaging: I have reviewed all pertinent imaging results/findings within the past 24 hours.

## 2024-10-15 NOTE — NURSING
[facility] [place]  Nurses Note -- 4 Eyes      10/15/2024       Skin assessed on: Q Shift      [x] No Pressure Injuries Present    []Prevention Measures Documented    [] Yes LDA  for Pressure Injury Previously documented     [] Yes New Pressure Injury Discovered   [] LDA for New Pressure Injury Added      Attending RN:  Richmond Smiley RN     Second RN:  MILADIS Radford

## 2024-10-15 NOTE — PROGRESS NOTES
"[facility]  Palliative Medicine  Progress Note    Patient Name: Ar Sharma  MRN: [id number]  Admission Date: 10/3/2024  Hospital Length of Stay: 12 days  Code Status: DNR   Attending Provider: Jennifer Montelongo MD  Consulting Provider: Jesenia Mancera MD  Primary Care Physician: Jc Elliott MD  Principal Problem:Closed right hip fracture, initial encounter    Assessment/Plan:     Advance Care Planning    10/15/24  - Chart and interval history reviewed; discussed pt during MDT rounds. No major changes since yesterday.   - Visited with pt's wife at bedside; reminded her of role of palliative medicine in pt's care. She noted that the way pt is right now (very ill, lying in bed, encephalopathic at times) is "just not him." Validated that it can be very difficult to see our loved ones in such a vulnerable and different state than we are used to. We discussed that it is a blessing that pt is so alert currently, and to appreciate this time. Additionally, he overall appears quite comfortable; she agreed that his level of comfort and alertness are both things she is appreciative of.   - As she mentioned it would be helpful to have a visit from [facility], arranged to have Father Tomás come to hospital   - Additional family members are due to arrive in hospital; encouraged ongoing visitation, though let her know if at any point moving forward they would like to transition to comfort-focused care (ie, stop vasopressors and inotropes), they just need to let us know. She verbalized understanding of this option.   - Will follow up with pt and family; updated Dr Montelongo in person after visit     10/14/24  - Chart and interval history reviewed; discussed pt during MDT rounds. Pt with increasing hemodynamic instability, and remains on two vasopressors + dobutamine as mixed venous gas suggestive of cardiogenic shock. Mass on ICD lead appears to have increased in size despite abx, antifungals, and " anticoagulation. Pts care was discussed with interventional cardiology and CTS at [facility], though they have declined transfer due to pt's instability.   - Requested by pt's daughter (Dr Sharma, family medicine resident at [facility]) to discuss plan moving forward. Along with Dr Caldwell (PCCM), met with her in the waiting room; addressed her questions and concerns. During visit to pt's room, he was alert though somewhat lethargic. He appeared comfortable.   - At this time, plan remains to continue with current level of care, though will have further discussions if pt's shock further worsens and/or he has signs of distress. Due to his tenuous overall status, family visitation has been encouraged.   - His daughter has excellent insight into situation, and has been doing her best to provide transparent updates to family. She is aware that we are concerned pt is unlikely to survive hospital stay.   - Emotional support provided; also briefly spoke to her brother via her phone, and encouraged him to visit hospital if possible  - Will follow up with pt and family tomorrow    10/11/24  - See initial consult     Pulmonary  Acute on chronic hypoxic respiratory failure  - Mgmt per primary team/PCCM; significant PE and HF contributing to this   - See PE    Cardiac/Vascular  Shock  - Believed to be cardiogenic with possible septic component, though cultures have been NGTD despite presence of atrial mass (endocarditis vs thrombus). On pressors, abx, and anticoagulation.   - Mgmt per primary team     Atrial mass  - See thrombus    Cardiac resynchronization therapy defibrillator (CRT-D) in place  - Noted    Renal/  ESRD (end stage renal disease)  - Nephrology consulted and following for RRT needs; has been requiring significant vasopressor support to facilitate this        Hematology  Acute pulmonary embolism  - Not a candidate for thrombectomy due to hemodynamic instability     Endocrine  Moderate protein-calorie  malnutrition  - Moderate to severe; has trace temporal wasting on exam  - RD consult   - His poor nutrition status is concerning and may be contributing at least to some degree to his persistent hypotension     I will follow-up with patient. Please contact us if you have any additional questions.    BRITTANIE Thorpe  Palliative Medicine Staff   [phone]    > 50% of 35 min visit spent in chart review, face to face discussion of symptom assessment, coordination of care with other specialists, goals of care, emotional support, formulating and communicating prognosis, exploring burden/ benefit of various approaches of treatment.      Subjective:     Interval History: remains critically ill    Medications:  Continuous Infusions:   sodium chloride 0.9%   Intravenous Continuous        DOBUTamine IV infusion (titrating)  0-20 mcg/kg/min Intravenous Continuous 22.6 mL/hr at 10/15/24 0600 20 mcg/kg/min at 10/15/24 0600    heparin (porcine) in D5W  0-40 Units/kg/hr (Adjusted) Intravenous Continuous 10.2 mL/hr at 10/15/24 0600 14 Units/kg/hr at 10/15/24 0600    NORepinephrine bitartrate-D5W  0-3 mcg/kg/min Intravenous Continuous 17.7 mL/hr at 10/15/24 0600 0.5 mcg/kg/min at 10/15/24 0600    vasopressin  0.04 Units/min Intravenous Continuous 12 mL/hr at 10/15/24 0726 0.04 Units/min at 10/15/24 0726     Scheduled Meds:   DAPTOmycin (CUBICIN) IV (PEDS and ADULTS)  8 mg/kg Intravenous Q48H    docusate sodium  100 mg Oral Daily    epoetin saba-epbx  10,000 Units Intravenous Every Mon, Wed, Fri    famotidine (PF)  20 mg Intravenous Daily    fentaNYL  1 patch Transdermal Q72H    fludrocortisone  100 mcg Oral Daily    hydrocortisone sodium succinate  100 mg Intravenous Q8H    levothyroxine  150 mcg Oral Before breakfast    meropenem (MERREM) extended infusion  500 mg Intravenous Q12H    mexiletine  150 mg Oral BID WM    micafungin  100 mg Intravenous Q24H    midodrine  15 mg Oral Q8H    polyethylene glycol  17 g Oral Daily     triamcinolone acetonide 0.1%   Topical (Top) BID     PRN Meds:  Current Facility-Administered Medications:     0.9% NaCl, , Intravenous, PRN    acetaminophen, 650 mg, Oral, Q4H PRN    furosemide, 360 mg, Oral, See admin instructions    heparin (porcine), 1,000 Units, Intra-Catheter, PRN    heparin (porcine), 5,000 Units, Intra-Catheter, PRN    heparin (PORCINE), 60 Units/kg (Adjusted), Intravenous, PRN    heparin (PORCINE), 30 Units/kg (Adjusted), Intravenous, PRN    hydrALAZINE, 10 mg, Intravenous, Q6H PRN    HYDROmorphone, 1 mg, Intravenous, Q4H PRN    hydrOXYzine pamoate, 25 mg, Oral, Q8H PRN    levalbuterol, 1.25 mg, Nebulization, Q4H PRN    magnesium sulfate IVPB, 2 g, Intravenous, PRN    melatonin, 6 mg, Oral, Nightly PRN    metOLazone, 10 mg, Oral, See admin instructions    naloxone, 0.4 mg, Intravenous, PRN    nitroGLYCERIN, 0.4 mg, Sublingual, Q5 Min PRN    ondansetron, 4 mg, Intravenous, Q6H PRN    oxyCODONE-acetaminophen, 1 tablet, Oral, Q4H PRN    oxyCODONE-acetaminophen, 1 tablet, Oral, Q4H PRN    polyethylene glycol, 17 g, Oral, BID PRN    promethazine (PHENERGAN) 6.25 mg in 0.9% NaCl 50 mL IVPB, 6.25 mg, Intravenous, Q6H PRN    simethicone, 1 tablet, Oral, QID PRN    sodium chloride 0.9%, 250 mL, Intravenous, PRN    sodium chloride 0.9%, 10 mL, Intravenous, Q12H PRN    sodium chloride 0.9%, 10 mL, Intravenous, PRN    sodium phosphate 20.01 mmol in D5W 250 mL IVPB, 20.01 mmol, Intravenous, PRN    sodium phosphate 30 mmol in D5W 250 mL IVPB, 30 mmol, Intravenous, PRN    sodium phosphate 39.99 mmol in D5W 250 mL IVPB, 39.99 mmol, Intravenous, PRN    Objective:     Vital Signs (Most Recent):  Temp: 96.7 °F (35.9 °C) (10/15/24 0400)  Pulse: 70 (10/15/24 0500)  Resp: 16 (10/15/24 0500)  BP: (!) 111/52 (10/14/24 1600)  SpO2: (!) 85 % (10/15/24 0430) Vital Signs (24h Range):  Temp:  [96.1 °F (35.6 °C)-97.2 °F (36.2 °C)] 96.7 °F (35.9 °C)  Pulse:  [68-84] 70  Resp:  [9-36] 16  SpO2:  [85 %-96 %] 85 %  BP:  (111-125)/(51-58) 111/52  Arterial Line BP: ()/(38-56) 96/47     Weight: 81.2 kg (179 lb 0.2 oz)  Body mass index is 26.44 kg/m².       Physical Exam  Constitutional:       Comments: Awake but encephalopathic, ill-appearing, no obvious signs of distress         Significant Labs: All pertinent labs within the past 24 hours have been reviewed.  CBC:   Recent Labs   Lab 10/15/24  0420   WBC 37.42*   HGB 9.8*   HCT 26.8*   MCV 75*        BMP:  Recent Labs   Lab 10/15/24  0051 10/15/24  0420   * 127*   * 131*   K 4.4 4.5    100   CO2 20* 20*   BUN 47* 42*   CREATININE 4.5* 4.1*   CALCIUM 8.1* 8.4*   MG 1.9  --      LFT:  Lab Results   Component Value Date    AST 85 (H) 10/15/2024    ALKPHOS 177 (H) 10/15/2024    BILITOT 3.6 (H) 10/15/2024     Albumin:   Albumin   Date Value Ref Range Status   10/15/2024 2.4 (L) 3.5 - 5.2 g/dL Final     Protein:   Total Protein   Date Value Ref Range Status   10/15/2024 5.9 (L) 6.0 - 8.4 g/dL Final     Lactic acid:   Lab Results   Component Value Date    LACTATE 0.7 10/14/2024    LACTATE 1.2 10/04/2024       Significant Imaging: I have reviewed all pertinent imaging results/findings within the past 24 hours.

## 2024-10-15 NOTE — ASSESSMENT & PLAN NOTE
ESRD MWF    Possibly will go to [facility] for higher level of care with regards to his bilateral PE and right atrial mass    Plan/recommendation  -CVVHD, UF set to -10 cc/hr.  -Family possibly to move to comfort care today, will continue to assist  -Strict ins and outs  -Avoid nephrotoxic agents if possible and renally dose medications  -Avoid drastic hemodynamic changes if possible    Secondary hyperparathyroidism  Calcium   Date Value Ref Range Status   10/15/2024 8.2 (L) 8.7 - 10.5 mg/dL Final     Phosphorus   Date Value Ref Range Status   10/15/2024 4.0 2.7 - 4.5 mg/dL Final     PTH, Intact   Date Value Ref Range Status   04/29/2024 162.6 (H) 9.0 - 77.0 pg/mL Final   Continue to monitor - critically ill this admission    Anemia of chronic renal disease  Goal hemoglobin in ESRD is 10-12  Hemoglobin   Date Value Ref Range Status   10/15/2024 9.8 (L) 14.0 - 18.0 g/dL Final     Iron   Date Value Ref Range Status   10/05/2024 22 (L) 45 - 160 ug/dL Final     Transferrin   Date Value Ref Range Status   10/05/2024 207 200 - 375 mg/dL Final     TIBC   Date Value Ref Range Status   10/05/2024 306 250 - 450 ug/dL Final     Saturated Iron   Date Value Ref Range Status   10/05/2024 7 (L) 20 - 50 % Final     Ferritin   Date Value Ref Range Status   10/05/2024 338 (H) 20.0 - 300.0 ng/mL Final   Continue to monitor - critically ll this admission

## 2024-10-15 NOTE — PROGRESS NOTES
[facility] [place] 4 [place]      10/14/2024       Skin assessed on: Q Shift      [x] No Pressure Injuries Present    [x]Prevention Measures Documented    [] Yes LDA  for Pressure Injury Previously documented     [] Yes New Pressure Injury Discovered   [] LDA for New Pressure Injury Added      Attending RN:  Charles Perales, RN     Second RN:  Kitty

## 2024-10-15 NOTE — ASSESSMENT & PLAN NOTE
Ar Sharma is a 63 year old man with ESRD on HD via right subclavian HD catheter (5/2024), CAD, HF with CRT-D (2016), on home oxygen who was admitted 10/3 after a fall with a femur fracture requiring intramedullary nail placement. During pre-surgical evaluation he was found to have atrial thrombus vs vegetation. No preceding infectious symptoms. Blood cultures on 10/5, 10/9 and 10/12 are no growth, T. Whipplei, Q fever and bartonella ab negative. Moraima with Weissella confusa. Developed leukocytosis and hypotension requiring multiple pressors. Unclear significance of Weissella but given the patient's current condition, will treat.     Recommendations  - Continue meropenem 500 q12 hours  - continue micafungin 100 mg q24 hours   - continue daptomycin 8 mg/kg q48 hours   - would consider removal of tunneled HD line if consistent with patient's goals of care

## 2024-10-15 NOTE — SUBJECTIVE & OBJECTIVE
Interval History: encephalopathic this AM, per report hyperactive delirium all night. Tolerating CVVHD with net 200 cc positive in last 24 hours, but overall holding with net neg 10 cc UF    Review of patient's allergies indicates:   Allergen Reactions    Lokelma [sodium zirconium cyclosilicate] Other (See Comments)     Fluid retention, weight gain, CHF excerebration, severe constipation    Atorvastatin Other (See Comments)     Joint pain    Jardiance [empagliflozin] Other (See Comments)     Chest pains, significant weight loss, lower blood pressure     Current Facility-Administered Medications   Medication Frequency    0.9%  NaCl infusion PRN    acetaminophen tablet 650 mg Q4H PRN    DAPTOmycin (CUBICIN) 645 mg in 0.9% NaCl SolP 50 mL IVPB Q48H    DOBUtamine 1000 mg in D5W 250 mL infusion Continuous    epoetin saba-epbx injection 10,000 Units Every Mon, Wed, Fri    famotidine (PF) injection 20 mg Daily    fentaNYL 50 mcg/hr 1 patch Q72H    fludrocortisone tablet 100 mcg Daily    heparin (porcine) injection 1,000 Units PRN    heparin (porcine) injection 5,000 Units PRN    heparin 25,000 units in dextrose 5% (100 units/ml) IV bolus from bag HIGH INTENSITY nomogram - [facility] PRN    heparin 25,000 units in dextrose 5% (100 units/ml) IV bolus from bag HIGH INTENSITY nomogram - [facility] PRN    heparin 25,000 units in dextrose 5% 250 mL (100 units/mL) infusion HIGH INTENSITY nomogram - [facility] Continuous    hydrALAZINE injection 10 mg Q6H PRN    hydrocortisone sodium succinate injection 100 mg Q8H    HYDROmorphone injection 1 mg Q4H PRN    hydrOXYzine pamoate capsule 25 mg Q8H PRN    levalbuterol nebulizer solution 1.25 mg Q4H PRN    levothyroxine tablet 150 mcg Before breakfast    melatonin tablet 6 mg Nightly PRN    meropenem (MERREM) 500 mg in 0.9% NaCl 100 mL IVPB (MB+) Q12H    metOLazone tablet 10 mg See admin instructions    mexiletine capsule 150 mg BID WM    micafungin 100 mg in 0.9% NaCl 100 mL IVPB (MB+) Q24H    midodrine  tablet 15 mg Q8H    naloxone 0.4 mg/mL injection 0.4 mg PRN    nitroGLYCERIN SL tablet 0.4 mg Q5 Min PRN    NORepinephrine 32 mg in D5W 250 mL infusion Continuous    ondansetron injection 4 mg Q6H PRN    oxyCODONE-acetaminophen  mg per tablet 1 tablet Q4H PRN    oxyCODONE-acetaminophen 5-325 mg per tablet 1 tablet Q4H PRN    polyethylene glycol packet 17 g BID PRN    polyethylene glycol packet 17 g Daily    promethazine (PHENERGAN) 6.25 mg in 0.9% NaCl 50 mL IVPB Q6H PRN    senna-docusate 8.6-50 mg per tablet 2 tablet BID    simethicone chewable tablet 80 mg QID PRN    sodium chloride 0.9% bolus 250 mL 250 mL PRN    sodium chloride 0.9% flush 10 mL Q12H PRN    sodium chloride 0.9% flush 10 mL PRN    triamcinolone acetonide 0.1% cream BID    vasopressin (PITRESSIN) 0.2 Units/mL in D5W 100 mL infusion Continuous       Objective:     Vital Signs (Most Recent):  Temp: (!) 94.5 °F (34.7 °C) (10/15/24 0827)  Pulse: 78 (10/15/24 0827)  Resp: 14 (10/15/24 1015)  BP: (!) 111/52 (10/14/24 1600)  SpO2:  (sats unable to obtain. rn stated last sat she saw was 99%) (10/15/24 0827) Vital Signs (24h Range):  Temp:  [94.5 °F (34.7 °C)-97.2 °F (36.2 °C)] 94.5 °F (34.7 °C)  Pulse:  [68-84] 78  Resp:  [11-36] 14  SpO2:  [85 %-96 %] 85 %  BP: (111-125)/(51-58) 111/52  Arterial Line BP: ()/(38-56) 96/47     Weight: 81.2 kg (179 lb 0.2 oz) (10/15/24 0300)  Body mass index is 26.44 kg/m².  Body surface area is 1.99 meters squared.    I/O last 3 completed shifts:  In: 2989.9 [P.O.:300; I.V.:2243.9; IV Piggyback:446]  Out: 1623 [Other:1623]     Physical Exam  Constitutional:       General: He is not in acute distress.     Appearance: He is ill-appearing and toxic-appearing.   HENT:      Head: Normocephalic and atraumatic.      Nose: Nose normal. No congestion.      Mouth/Throat:      Mouth: Mucous membranes are moist.   Eyes:      Pupils: Pupils are equal, round, and reactive to light.   Cardiovascular:      Rate and Rhythm:  Tachycardia present.   Pulmonary:      Breath sounds: No rhonchi.   Abdominal:      General: Abdomen is flat.   Musculoskeletal:      Cervical back: Normal range of motion. No rigidity.      Right lower leg: No edema.      Left lower leg: No edema.   Skin:     General: Skin is warm.   Neurological:      Mental Status: He is alert.          Significant Labs:  All labs within the past 24 hours have been reviewed.     Significant Imaging:  Labs: Reviewed

## 2024-10-15 NOTE — ASSESSMENT & PLAN NOTE
- Cardiology consulted for cardiac clearance prior to R femur IMN  - continues on home mexiletine 150mg PO BID   - Hold amiodarone 400mg PO daily in setting of hypotension   - ICD in place

## 2024-10-15 NOTE — CLINICAL REVIEW
IP Sepsis Screen (most recent)       Sepsis Screen (IP) - 10/15/24 8948       Is the patient's history or complaint suggestive of a possible infection? Yes  -WL    Are there at least two of the following signs and symptoms present? Yes  -WL    Sepsis signs/symptoms - Hyper or Hypothermia Hyperthermia >100.4 or Hypothermia < 96.8  -WL    Sepsis signs/symptoms - WBC WBC < 4,000 or WBC > 12,000  -WL    Are any of the following organ dysfunction criteria present and not considered to be due to a chronic condition? Yes  -WL    Organ Dysfunction Criteria Total Bilirubin > 2.0 Platelet count < 100,000  -WL    Initiate Sepsis Protocol No  -WL    Reason sepsis not considered Pt. receiving appropriate management   ID following -WL              User Key  (r) = Recorded By, (t) = Taken By, (c) = Cosigned By      Initials Name    Verena Menard RN

## 2024-10-15 NOTE — ASSESSMENT & PLAN NOTE
DNR per Dr. Connolly but family is amenable to reversal if patient is deemed a candidate for surgery.    Family is considering comfort care once the rest of immediate family arrived from out of town later today.

## 2024-10-15 NOTE — SUBJECTIVE & OBJECTIVE
Interval history:  No acute issue.  Still on  0.4 mcg/kg/min of levophed + vasopressin + dobutamine.      Review of Systems   Unable to perform ROS: Patient nonverbal   Constitutional:  Positive for activity change. Negative for chills, fatigue and fever.   Respiratory:  Negative for cough and shortness of breath.    Cardiovascular:  Negative for chest pain and leg swelling.   Gastrointestinal:  Negative for abdominal pain.   Psychiatric/Behavioral: Negative.       Objective:     Vital Signs (Most Recent):  Temp: (!) 94.5 °F (34.7 °C) (10/15/24 0827)  Pulse: 78 (10/15/24 0827)  Resp: 14 (10/15/24 1015)  BP: (!) 111/52 (10/14/24 1600)  SpO2:  (sats unable to obtain. rn stated last sat she saw was 99%) (10/15/24 0827) Vital Signs (24h Range):  Temp:  [94.5 °F (34.7 °C)-97.2 °F (36.2 °C)] 94.5 °F (34.7 °C)  Pulse:  [68-84] 78  Resp:  [11-36] 14  SpO2:  [85 %-96 %] 85 %  BP: (111-125)/(51-58) 111/52  Arterial Line BP: ()/(38-56) 96/47     Weight: 81.2 kg (179 lb 0.2 oz)  Body mass index is 26.44 kg/m².      Intake/Output Summary (Last 24 hours) at 10/15/2024 1036  Last data filed at 10/15/2024 0600  Gross per 24 hour   Intake 1923.3 ml   Output 1623 ml   Net 300.3 ml        Physical Exam  Constitutional:       General: He is not in acute distress.     Appearance: Normal appearance. He is ill-appearing. He is not toxic-appearing or diaphoretic.   HENT:      Head: Normocephalic and atraumatic.      Nose: Nose normal.      Mouth/Throat:      Mouth: Mucous membranes are moist.   Eyes:      Extraocular Movements: Extraocular movements intact.      Pupils: Pupils are equal, round, and reactive to light.   Neck:      Comments: Left IJ CVC  Cardiovascular:      Rate and Rhythm: Normal rate and regular rhythm.   Pulmonary:      Effort: Pulmonary effort is normal. No respiratory distress.      Breath sounds: Normal breath sounds. No wheezing.   Abdominal:      General: Abdomen is flat. Bowel sounds are normal. There is no  distension.      Palpations: Abdomen is soft.   Musculoskeletal:      Cervical back: Normal range of motion.      Right lower leg: Edema present.      Left lower leg: Edema present.      Comments: Left radial a line. Right hip wound with clean dressing and minimal swelling.   Skin:     General: Skin is warm.      Capillary Refill: Capillary refill takes less than 2 seconds.   Neurological:      Mental Status: He is alert.      Comments: Alert to voice and interactive, but slowed mentation compared to baseline.          Vents:       Lines/Drains/Airways       Central Venous Catheter Line  Duration                  Hemodialysis Catheter 05/03/24 1115 right subclavian 164 days    Percutaneous Central Line - Triple Lumen  10/09/24 1127 Internal Jugular Left 5 days              Arterial Line  Duration             Arterial Line 10/12/24 1040 Left Radial 2 days                    Significant Labs:    CBC/Anemia Profile:  Recent Labs   Lab 10/14/24  0052 10/14/24  0628 10/15/24  0420   WBC 38.14* 37.64* 37.42*   HGB 10.0* 9.7* 9.8*   HCT 27.8* 27.3* 26.8*    421 397   MCV 74* 75* 75*   RDW 15.9* 15.7* 15.9*        Chemistries:  Recent Labs   Lab 10/14/24  1612 10/15/24  0051 10/15/24  0420 10/15/24  0837   * 132* 131* 133*   K 4.6 4.4 4.5 4.4   CL 96 100 100 102   CO2 19* 20* 20* 20*   BUN 62* 47* 42* 39*   CREATININE 6.2* 4.5* 4.1* 3.5*   CALCIUM 7.8* 8.1* 8.4* 8.2*   ALBUMIN 2.4* 2.3* 2.4* 2.4*   PROT  --   --  5.9*  --    BILITOT  --   --  3.6*  --    ALKPHOS  --   --  177*  --    ALT  --   --  45*  --    AST  --   --  85*  --    MG 2.2 1.9  --  2.0   PHOS 6.4* 4.6*  --  4.0       All pertinent labs within the past 24 hours have been reviewed.    Significant Imaging:   I have reviewed all pertinent imaging results/findings within the past 24 hours.

## 2024-10-15 NOTE — ASSESSMENT & PLAN NOTE
- TTE on admit did not show mass, but then noted RA mass  - unclear if thrombus vs vegetation  - treating with heparin gtt for potential thrombus, eliazar/jose/dapto for potential vegetation  - discussed with interventional cardiology- not a candidate for angiovac procedure

## 2024-10-15 NOTE — ASSESSMENT & PLAN NOTE
Patient been on heparin drip as well as broad-spectrum antibiotics since this mass was discovered.  Repeat echo done today demonstrated that the right atrial mass has increased in size.  Because of RV enlargement and RA enlargement with severe TR clinically suspected pulmonary embolism.  Subsequent CTA confirmed the suspicion.  Patient has been accepted to [facility] for further evaluation and management.  Case discussed with CT surgery and Interventional Cardiology at [facility]    Notes from October 14th20 24:  No change in cardiac clinical status.  Had concern for stroke last night.  Now feeling better.  Workup done by primary team.  Awaiting transfer to [facility].  Still on pressors    Notes from October 15th:  Has been refused for transfer to [facility] because of nothing new can be offered there.  Intervention felt to be too high-risk by multidisciplinary team approach over there    Continue medical management and supportive care    Continue heparin drip and broad-spectrum antibiotics

## 2024-10-15 NOTE — ASSESSMENT & PLAN NOTE
- WBC increased, now stable at 37  - all infectious workup negative but does have RA thrombus vs vegetation  - ID following- continues on jose/dapto/eliazar  - not currently candidate for angiovac procedure due to critical illness

## 2024-10-15 NOTE — ASSESSMENT & PLAN NOTE
- ESRD on HD via R [facility]DC, previously on PD  - with chronic hypotension on home midodrine  - now on CRRT with vasopressor/inotrope support due to shock  - Nephrology following

## 2024-10-15 NOTE — ASSESSMENT & PLAN NOTE
"10/15/24  - Chart and interval history reviewed; discussed pt during MDT rounds. No major changes since yesterday.   - Visited with pt's wife at bedside; reminded her of role of palliative medicine in pt's care. She noted that the way pt is right now (very ill, lying in bed, encephalopathic at times) is "just not him." Validated that it can be very difficult to see our loved ones in such a vulnerable and different state than we are used to. We discussed that it is a blessing that pt is so alert currently, and to appreciate this time. Additionally, he overall appears quite comfortable; she agreed that his level of comfort and alertness are both things she is appreciative of.   - As she mentioned it would be helpful to have a visit from [facility], arranged to have Father Tomás come to hospital   - Additional family members are due to arrive in hospital; encouraged ongoing visitation, though let her know if at any point moving forward they would like to transition to comfort-focused care (ie, stop vasopressors and inotropes), they just need to let us know. She verbalized understanding of this option.   - Will follow up with pt and family; updated Dr Montelongo in person after visit     10/14/24  - Chart and interval history reviewed; discussed pt during MDT rounds. Pt with increasing hemodynamic instability, and remains on two vasopressors + dobutamine as mixed venous gas suggestive of cardiogenic shock. Mass on ICD lead appears to have increased in size despite abx, antifungals, and anticoagulation. Pts care was discussed with interventional cardiology and CTS at [facility], though they have declined transfer due to pt's instability.   - Requested by pt's daughter (Dr Sharma, family medicine resident at [facility]) to discuss plan moving forward. Along with Dr Caldwell ([facility]), met with her in the waiting room; addressed her questions and concerns. During visit to pt's room, he was alert though somewhat " lethargic. He appeared comfortable.   - At this time, plan remains to continue with current level of care, though will have further discussions if pt's shock further worsens and/or he has signs of distress. Due to his tenuous overall status, family visitation has been encouraged.   - His daughter has excellent insight into situation, and has been doing her best to provide transparent updates to family. She is aware that we are concerned pt is unlikely to survive hospital stay.   - Emotional support provided; also briefly spoke to her brother via her phone, and encouraged him to visit hospital if possible  - Will follow up with pt and family tomorrow    10/11/24  - Consult for support and advance care planning in pt initially admitted to hospital with hip fracture, but now in persistent shock in ICU requiring vasopressor support to facilitate RRT. RUQ ultrasound had incidental finding of atrial thrombus vs endocarditis; cardiology and ID consulted. Pressor requirements have gradually been increasing over the last few days; have gone from 0.02 of levo to 0.15 as of today during RRT. Chart reviewed; extensively discussed pt during MDT rounds and separately with Dr Connolly.   - Along with Dr Hawkins, visited with pt at bedside; introduced role of palliative medicine, and learned more about pt outside of hospital. He is  to his wife of many years, Nicolette, and they have three children. Their daughter Gloria is currently completing her family residency in [place]. He is very proud of his children, and family have been visiting him routinely in hospital.   - Dr Hawkins provided pt with medical update; discussed that while we will continue to look for reversible aspects of his illness (most significantly, his shock/hypotension) though we are concerned that this has not been improving over the course of the week. In fact, his pressor requirements are currently higher. He verbalized understanding of this, and was agreeable to  our offer to provide an update to his wife or daughter. Emotional support provided during discussion.   - Following this, Dr Connolly and I called and spoke to his wife Nicolette. She said she visited with pt yesterday and he mentioned he wants to go home. Validated that this is understandable, though discussed that her remains critically ill and unable to leave the hospital at this time. Medical update provided, and shared our worry about his increasing pressor requirements despite appropriate supportive care (abx, pressors, RRT). We have additional tests ordered for today, but if things continue to worsen (or fail to improve) we may need to have some hard conversations. She voiced understanding, and said she will likely be returning to hospital after his HD sessions has ended.   - Let her know we will continue to check in on pt and family throughout hospital stay; updated Dr Hawkins following this second conversation

## 2024-10-15 NOTE — ASSESSMENT & PLAN NOTE
Anemia is likely due to Iron deficiency. Most recent hemoglobin and hematocrit are listed below.  Recent Labs     10/14/24  0052 10/14/24  0628 10/15/24  0420   HGB 10.0* 9.7* 9.8*   HCT 27.8* 27.3* 26.8*       Plan  - Monitor serial CBC: Daily  - Transfuse PRBC if patient becomes hemodynamically unstable, symptomatic or H/H drops below 7/21.  - Patient has not received any PRBC transfusions to date  - Patient's anemia is currently stable

## 2024-10-15 NOTE — PROGRESS NOTES
[facility]  Critical Care Medicine  Progress Note    Patient Name: Ar Sharma  MRN: [id number]  Admission Date: 10/3/2024  Hospital Length of Stay: 12 days  Code Status: DNR  Attending Provider: Jennifer Montelongo MD  Primary Care Provider: Jc Elliott MD   Principal Problem: Closed right hip fracture, initial encounter    Subjective:     HPI:  Mr Ar Sharma is a 63 year old male with ESRD on HD (MWF) for the last 2 months, prior PD, HTN, HLD, combined systolic and diastolic HF (EF 40-45% and grade III DD), ischemic cardiomyopathy, s/p medtronic CRT-D implant in 2016, history of VT s/p ablation on mexiletine and amiodarone, who presents after a mechanical fall resulting in a right femur fracture.  S/p ORIF 10/4.  He is chronically hypotensive and had issues following surgery tolerating HD.  Moved to the ICU for pressor support for CRRT.  Pulm previously consulted for CVC placement (which occurred on 10/9/2024), but now following due to persistent and elevated pressor requirements.    Hospital/ICU Course:  Requiring high vasopressor requirements without clear infectious etiology.  Atrial mass present concerning for thrombus in setting of negative blood cultures.  Abx being adjusted per ID.  Antifungals added.  Due to low SCvO2 seen on VBG from left IJ CVC we have started dobutamine.  This improved SCvO2 accordingly.  CT Chest/abd/pelvis with overt hypervolemia and splenic infarcts.      Interval history:  No acute issue.  Still on  0.4 mcg/kg/min of levophed + vasopressin + dobutamine.      Review of Systems   Unable to perform ROS: Patient nonverbal   Constitutional:  Positive for activity change. Negative for chills, fatigue and fever.   Respiratory:  Negative for cough and shortness of breath.    Cardiovascular:  Negative for chest pain and leg swelling.   Gastrointestinal:  Negative for abdominal pain.   Psychiatric/Behavioral: Negative.       Objective:     Vital Signs (Most  Recent):  Temp: (!) 94.5 °F (34.7 °C) (10/15/24 0827)  Pulse: 78 (10/15/24 0827)  Resp: 14 (10/15/24 1015)  BP: (!) 111/52 (10/14/24 1600)  SpO2:  (sats unable to obtain. rn stated last sat she saw was 99%) (10/15/24 0827) Vital Signs (24h Range):  Temp:  [94.5 °F (34.7 °C)-97.2 °F (36.2 °C)] 94.5 °F (34.7 °C)  Pulse:  [68-84] 78  Resp:  [11-36] 14  SpO2:  [85 %-96 %] 85 %  BP: (111-125)/(51-58) 111/52  Arterial Line BP: ()/(38-56) 96/47     Weight: 81.2 kg (179 lb 0.2 oz)  Body mass index is 26.44 kg/m².      Intake/Output Summary (Last 24 hours) at 10/15/2024 1036  Last data filed at 10/15/2024 0600  Gross per 24 hour   Intake 1923.3 ml   Output 1623 ml   Net 300.3 ml        Physical Exam  Constitutional:       General: He is not in acute distress.     Appearance: Normal appearance. He is ill-appearing. He is not toxic-appearing or diaphoretic.   HENT:      Head: Normocephalic and atraumatic.      Nose: Nose normal.      Mouth/Throat:      Mouth: Mucous membranes are moist.   Eyes:      Extraocular Movements: Extraocular movements intact.      Pupils: Pupils are equal, round, and reactive to light.   Neck:      Comments: Left IJ CVC  Cardiovascular:      Rate and Rhythm: Normal rate and regular rhythm.   Pulmonary:      Effort: Pulmonary effort is normal. No respiratory distress.      Breath sounds: Normal breath sounds. No wheezing.   Abdominal:      General: Abdomen is flat. Bowel sounds are normal. There is no distension.      Palpations: Abdomen is soft.   Musculoskeletal:      Cervical back: Normal range of motion.      Right lower leg: Edema present.      Left lower leg: Edema present.      Comments: Left radial a line. Right hip wound with clean dressing and minimal swelling.   Skin:     General: Skin is warm.      Capillary Refill: Capillary refill takes less than 2 seconds.   Neurological:      Mental Status: He is alert.      Comments: Alert to voice and interactive, but slowed mentation compared  to baseline.          Vents:       Lines/Drains/Airways       Central Venous Catheter Line  Duration                  Hemodialysis Catheter 05/03/24 1115 right subclavian 164 days    Percutaneous Central Line - Triple Lumen  10/09/24 1127 Internal Jugular Left 5 days              Arterial Line  Duration             Arterial Line 10/12/24 1040 Left Radial 2 days                    Significant Labs:    CBC/Anemia Profile:  Recent Labs   Lab 10/14/24  0052 10/14/24  0628 10/15/24  0420   WBC 38.14* 37.64* 37.42*   HGB 10.0* 9.7* 9.8*   HCT 27.8* 27.3* 26.8*    421 397   MCV 74* 75* 75*   RDW 15.9* 15.7* 15.9*        Chemistries:  Recent Labs   Lab 10/14/24  1612 10/15/24  0051 10/15/24  0420 10/15/24  0837   * 132* 131* 133*   K 4.6 4.4 4.5 4.4   CL 96 100 100 102   CO2 19* 20* 20* 20*   BUN 62* 47* 42* 39*   CREATININE 6.2* 4.5* 4.1* 3.5*   CALCIUM 7.8* 8.1* 8.4* 8.2*   ALBUMIN 2.4* 2.3* 2.4* 2.4*   PROT  --   --  5.9*  --    BILITOT  --   --  3.6*  --    ALKPHOS  --   --  177*  --    ALT  --   --  45*  --    AST  --   --  85*  --    MG 2.2 1.9  --  2.0   PHOS 6.4* 4.6*  --  4.0       All pertinent labs within the past 24 hours have been reviewed.    Significant Imaging:   I have reviewed all pertinent imaging results/findings within the past 24 hours.    ABG  Recent Labs   Lab 10/15/24  0432   PH 7.375   PO2 75*   PCO2 38.9   HCO3 22.7*   BE -2     Assessment/Plan:     Pulmonary  Acute on chronic hypoxic respiratory failure  Likely multifactorial in etiology (pulm edema, heart failure, possible thrombotic disease, atelectasis).  Supplement O2 to keep >90%        Pulmonary emphysema, unspecified emphysema type  Patient's COPD is controlled currently.  Patient is currently off COPD Pathway. Continue scheduled inhalers  PRN nebulizers and Supplemental oxygen and monitor respiratory status closely.   No evidence of exacerbation    Cardiac/Vascular  Atrial mass  Unclear etiology of this (thrombus vs septic).   - anticoagulation with heparin drip and antibiotics per ID.  - cards and ID on board.  CTS and Interventional cardiology at [facility] was consulted for transfer for possible thrombectomy.  Patient is not a candidate for percutaneous or open surgery given high pressor support.  Options at this point are limited and family is consider comfort care once son from out of town arrived.       Primary hypertension  Holding all antihypertensives at this time, while in shock.    Chronic combined systolic and diastolic heart failure  EF 40-45% with grade III DD.  This is likely having a significant impact on ability to tolerate iHD.  Cardiology following  Crrt restarted today.  Only ultrafiltration.          S/P ablation of ventricular arrhythmia  Mexiletine  Chronic anticoagulation.  Continuous cardiac monitoring.  Cards on board.    Cardiac resynchronization therapy defibrillator (CRT-D) in place  Noted      Mixed hyperlipidemia  statin    Renal/  ESRD (end stage renal disease)  Currently requiring CRRT.  Previously had utilized HD, but now too unstable from a blood pressure standpoint. Also has previously used PD, but this is currently being held.  - nephrology following.  - midodrine 15mg TID.    Crrt resumed today for electrolytes correction.  Await cvp to help guide decision about ultrafiltration.      ID  Septic shock  Patient chronically with systolic blood pressures in the 90s.  Throughout his hospitalization he has become progressively more hypotensive.    Unclear etiology.  DDx:  cardiac (EF 40s) vs obstructive with subsegmental PE vs septic.   Chest x-ray consistent with volume overload and cardiomegaly.     IVC per pocus 10/14 2.8 cm c/w intravascular volume overload  Cvp is 11  Has been on broad spectrum empiric antibiotics per ID recommendations.  - SCvO2 improved when Dobutamine added.    - continue antibiotics as guided by ID.  Currently on broad spectrum abx . ID recommending to Continue meropenem and  daptomycin  - empirically starting micafungin 10/13 given severity of illness and progressive shock in setting of long standing abx, illness, and tunneled catheter.  No cultures positive.  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   continue Hydrocortisone and fludrocortisone given elevated pressor requirements.   levophed and vasopressin to keep MAP >60mmHg.  Already on max dose dopamine    Hematology  Acute pulmonary embolism  Heparin drip    Oncology  Leukocytosis  Patient has not shown overt evidence of infection on culture data.  Garyius with Weissella confusa.   - consider CT C/A/P with splenic infarcts. In setting of known cardiac thrombus, we will perform a CTA thorax and CT head.  Also performing B LE US, and repeat TTE.    - continue abx per ID.    Iron deficiency anemia  Transfuse to keep HgB >7.0    Endocrine  Moderate protein-calorie malnutrition  Nutrition consulted. Most recent weight and BMI monitored-     Measurements:  Wt Readings from Last 1 Encounters:   10/14/24 79.2 kg (174 lb 9.7 oz)   Body mass index is 25.78 kg/m².    Patient has been screened and assessed by RD.    Malnutrition Type:  Context: acute illness or injury  Level: mild    Malnutrition Characteristic Summary:  Weight Loss (Malnutrition): greater than 10% in 6 months  Subcutaneous Fat (Malnutrition): mild depletion  Muscle Mass (Malnutrition): mild depletion    Interventions/Recommendations (treatment strategy):  1. COntinue pt on renal diet 2. Continue novasource renal TID 3. Encourage intake at meals 4. Monitor weight/labs 5. RD to follow to monitor PO intake      Hypothyroidism  Synthroid  - recent tsh normal.    Orthopedic  * Closed right hip fracture, initial encounter  S/p ORIF by orthopedics.  Postoperative care per their recs.     Palliative Care  Advance care planning  DNR per Dr. Connolly but family is amenable to reversal if patient is deemed a candidate  "for surgery.    Family is considering comfort care once the rest of immediate family arrived from out of town later today.         Critical Care Daily Checklist:    A: Awake: RASS Goal/Actual Goal:    Actual:     B: Spontaneous Breathing Trial Performed?     C: SAT & SBT Coordinated?  N/a                      D: Delirium: CAM-ICU Overall CAM-ICU: Positive   E: Early Mobility Performed? No   F: Feeding Goal: Goals: Pt will consume 50-75% intake at meals by RD follow-up  Status: Nutrition Goal Status: progressing towards goal   Current Diet Order   Procedures    Diet NPO Except for: Medication (crushed in puree, cue Pt to "swallow quick")     Order Specific Question:   Except for:     Answer:   Medication     Comments:   crushed in puree, cue Pt to "swallow quick"      AS: Analgesia/Sedation Prn dilaudid   T: Thromboembolic Prophylaxis Heparin drip   H: HOB > 300 Yes   U: Stress Ulcer Prophylaxis (if needed) pepcid   G: Glucose Control prn   B: Bowel Function Stool Occurrence: 0   I: Indwelling Catheter (Lines & Vo) Necessity Perm cath, tlc, atline   D: De-escalation of Antimicrobials/Pharmacotherapies no    Plan for the day/ETD Possible transitioning to comfort care    Code Status:  Family/Goals of Care: DNR       Critical Care Time: 45 minutes  Critical secondary to Patient has a condition that poses threat to life and bodily function: severe shock and persistent PE.        Critical care was time spent personally by me on the following activities: development of treatment plan with patient or surrogate and bedside caregivers, discussions with consultants, evaluation of patient's response to treatment, examination of patient, ordering and performing treatments and interventions, ordering and review of laboratory studies, ordering and review of radiographic studies, pulse oximetry, re-evaluation of patient's condition. This critical care time did not overlap with that of any other provider or involve time for any " procedures.     Raimundo Caldwell MD  Critical Care Medicine  [facility] - Intensive Care

## 2024-10-15 NOTE — ASSESSMENT & PLAN NOTE
- suspect multifactorial- cardiogenic vs septic vs obstructive  - CRRT for volume removal  - antibiotics for potential sepsis  - heparin gtt for PE  - unable to wean off vasopressors/inotropes

## 2024-10-15 NOTE — PROGRESS NOTES
[facility] Medicine  Progress Note    Patient Name: Ar Sharma  MRN: [id number]  Patient Class: IP- Inpatient   Admission Date: 10/3/2024  Length of Stay: 12 days  Attending Physician: Jennifer Montelongo MD  Primary Care Provider: Jc Elliott MD        Subjective:     Principal Problem:Shock        HPI:  63 y.o. AAM with h/o ESRD on HD (for 2 months but prior was on PD for about a month-Follows with [facility]) MWF via a tunneled HD catheter on the right, Essential HTN, HLD, CAD,  h/o systolic(EF 40-45%) and diastolic grade 3 CHF due to non-ischemic cardiomyopathy with a [employer] CRT-D implant 2016 (sees Dr. Hernandez) uses 3L supplemental oxygen at night, and h/o VT arhythmia (follows with Dr. Tenorio at [facility]) s/p ablation and on Mexiletin 150mg PO BID and Amiodarone 400mg daily present to the ER with c/o right hip pain after a fall at home.     Went to HD today and was feeling alright after. Was moving his tool box when he tripped over his feet and fell to the ground. Was unable to get up or put weight on his right leg.     Work up in the ED at [place] noted normal WBC and Stable H/H.  INR 1.3.  Lytes stable and c/w post-HD with no indications for emergent HD tonight.     Right hip/pelvis x-ray FINDINGS:  There is a minimally displaced intertrochanteric fracture of the right proximal femur.  No additional fractures are seen.  There is osteopenia.  The femoral heads are well seated in the acetabula.  There is mild joint space narrowing of the bilateral femoroacetabular joints and the pubic symphysis.  There is a peritoneal dialysis catheter overlying the pelvis.     Impression:     Right intertrochanteric fracture.      Transfer center contacted us for admission due to [facility] on Ortho-diversion. They spoke with Dr. Mcclendon who agreed to see the patient in consultation. We have been asked to accept for further pre-op planning. Last time he took his ASA was yesterday  am.       Results for orders placed during the hospital encounter of 04/29/24    Echo Saline Bubble? No; Ultrasound enhancing contrast? No    Interpretation Summary    Left Ventricle: The left ventricle is normal in size. Normal wall thickness. Regional wall motion abnormalities present. See diagram for wall motion findings. Septal motion is consistent with pacing. There is mildly reduced systolic function with a visually estimated ejection fraction of 40 - 45%. Grade III diastolic dysfunction.    Right Ventricle: Moderate to severe right ventricular enlargement. Systolic function is reduced.TAPSE is 1.59 cm. Pacemaker lead present in the ventricle.    Left Atrium: Left atrium is severely dilated. The left atrium volume index is 71.4 mL/m2.    Right Atrium: Right atrium is severely dilated.    Aortic Valve: There is mild aortic valve sclerosis. There is mild to moderate stenosis. Aortic valve area by VTI is 1.49 cm². Aortic valve peak velocity is 1.43 m/s. Mean gradient is 5 mmHg. The dimensionless index is 0.52.    Mitral Valve: There is mild regurgitation.    Tricuspid Valve: There is mild to moderate regurgitation.    Pulmonary Artery: The estimated pulmonary artery systolic pressure is 64 mmHg.    IVC/SVC: Elevated venous pressure at 15 mmHg.      Overview/Hospital Course:  Mr Ar Sharma is a 63 y.o. man with ESRD on MWF HD (recently converted from PD), CHF s/p AICD who was admitted with mechanical fall causing R intertrochanteric hip fracture. S/p R femur IMN on 10/4/24. Post operatively he had worsening hypotension and moved to ICU for vasopressor/ionotrope support and CRRT starting on 10/5/24. Found to have vegetation vs thrombus on RA AICD lead, bilateral PE, splenic infarcts, and age indeterminate R parietal infarct. Multiple specialists consulted including Neurology, Cardiology, Pulmonary, Nephrology, Infectious Disease, GI, wound care, and palliative care. All cultures are negative, and he continues  on daptomycin, meropenem, micafungin. He continues in multifactorial shock (cardiogenic vs septic vs obstructive) on levophed, vasopressin, and dobutamine which cannot be weaned. Discussions took place with Interventional Cardiology at [facility] for angiovac procedure for thrombus vs vegetation associated with AICD. At this point, he is not stable for procedure.     Interval History: He is lying in bed, alert, with daughter and son at bedside. He says his feet feel better. Otherwise, his answers and speech do not match the questions he is asked.     Review of Systems   Unable to perform ROS: Mental status change   Respiratory:  Negative for shortness of breath.    Cardiovascular:  Negative for chest pain.   Gastrointestinal:  Negative for abdominal pain.   Psychiatric/Behavioral:  Positive for confusion.      Objective:     Vital Signs (Most Recent):  Temp: (!) 95.2 °F (35.1 °C) (10/15/24 1400)  Pulse: 69 (10/15/24 1400)  Resp: 16 (10/15/24 1400)  BP: 111/63 (10/15/24 1400)  SpO2: 97 % (10/15/24 1400) Vital Signs (24h Range):  Temp:  [94.3 °F (34.6 °C)-97.2 °F (36.2 °C)] 95.2 °F (35.1 °C)  Pulse:  [66-84] 69  Resp:  [14-36] 16  SpO2:  [85 %-98 %] 97 %  BP: (100-115)/(52-67) 111/63  Arterial Line BP: ()/(44-56) 96/47     Weight: 81.2 kg (179 lb 0.2 oz)  Body mass index is 26.44 kg/m².    Intake/Output Summary (Last 24 hours) at 10/15/2024 1538  Last data filed at 10/15/2024 1400  Gross per 24 hour   Intake 1983.2 ml   Output 1987 ml   Net -3.8 ml         Physical Exam  Vitals and nursing note reviewed.   Constitutional:       Appearance: He is ill-appearing and toxic-appearing.   HENT:      Head:      Comments: Temporal wasting  Eyes:      General: Scleral icterus present.   Neck:      Comments: R THDC, [facility] TLC  Cardiovascular:      Rate and Rhythm: Normal rate.      Comments: Paced rhythm. L chest wall AICD in place  Pulmonary:      Effort: Pulmonary effort is normal.      Breath sounds: Normal breath sounds. No  wheezing.      Comments: O2 by NC  Abdominal:      General: Bowel sounds are normal.      Palpations: Abdomen is soft.      Tenderness: There is no abdominal tenderness.   Musculoskeletal:      Right lower leg: No edema.      Left lower leg: No edema.   Skin:     General: Skin is warm and dry.   Neurological:      Mental Status: He is alert.      Comments: Oriented to self, recognizes family             Significant Labs: All pertinent labs within the past 24 hours have been reviewed.    Significant Imaging: I have reviewed all pertinent imaging results/findings within the past 24 hours.    Assessment/Plan:      * Shock  - suspect multifactorial- cardiogenic vs septic vs obstructive  - CRRT for volume removal  - antibiotics for potential sepsis  - heparin gtt for PE  - unable to wean off vasopressors/inotropes     Encephalopathy  - he is not at mental status baseline  - CTH with age indeterminate parietal CVA potentially  - on heparin gtt as this may be embolic from RA mass      Acute pulmonary embolism  - CTA chest bilateral PE  - continues on heparin gtt        Advance care planning  Advance Care Planning    Date: 10/15/2024  DNR code status. Palliative discussing with family. 5 minutes spent reviewing.          Atrial mass  - TTE on admit did not show mass, but then noted RA mass  - unclear if thrombus vs vegetation  - treating with heparin gtt for potential thrombus, eliazar/jose/dapto for potential vegetation  - discussed with interventional cardiology- not a candidate for angiovac procedure    Dermatitis associated with moisture  - wound care consulted       Leukocytosis  - WBC increased, now stable at 37  - all infectious workup negative but does have RA thrombus vs vegetation  - ID following- continues on jose/dapto/eliazar  - not currently candidate for angiovac procedure due to critical illness    Acute on chronic hypoxic respiratory failure  Patient with Hypoxic Respiratory failure which is Acute on chronic.  he  is on home oxygen at 3 LPM.    Signs/symptoms of respiratory failure include- tachypnea and increased work of breathing. Contributing diagnoses includes - CHF and COPD Labs and images were reviewed. Patient Has recent ABG, which has been reviewed. Will treat underlying causes and adjust management of respiratory failure as follows-     -pt on home 3L NC. Hx of COPD/CHF  -Worsening hypoxia overnight 10/05; required on HFNC.  -acute on chronic hypoxia secondary to volume overload  -Pt unable to tolerate HD and CRRT, net positive 1.5L on 10/06  -CXR with pulmonary edema   -Pulmonology consulted  -Nephrology following  -Wean O2 as tolerated .    Moderate protein-calorie malnutrition  Nutrition consulted. Most recent weight and BMI monitored-     Measurements:  Wt Readings from Last 1 Encounters:   10/15/24 81.2 kg (179 lb 0.2 oz)   Body mass index is 26.44 kg/m².    Patient has been screened and assessed by RD.    Malnutrition Type:  Context: acute illness or injury  Level: mild    Malnutrition Characteristic Summary:  Weight Loss (Malnutrition): greater than 10% in 6 months  Subcutaneous Fat (Malnutrition): mild depletion  Muscle Mass (Malnutrition): mild depletion    Interventions/Recommendations (treatment strategy):  1. When medically acceptable, restart pt on renal diet, texture per SLP 2. Continue Novasource Renal ID 3. Monitor weight/labs 4. RD to follow to monitor nutriton status      Closed right hip fracture, initial encounter  - s/p mechanical fall. No LOC or head trauma  - Right hip XR: minimally displaced intertrochanteric fracture of the right proximal femur.   - Orthopedic surgery consulted: s/p right  femur intramedullary nail on 10/04  - PT/OT once stable.    ESRD (end stage renal disease)  - ESRD on HD via R THDC, previously on PD  - with chronic hypotension on home midodrine  - now on CRRT with vasopressor/inotrope support due to shock  - Nephrology following     Pulmonary emphysema, unspecified  emphysema type  - COPD is controlled on his home 3L oxygen at night.   - No wheezing on exam  - O2 ordered    Primary hypertension  - he is in shock  - holding all home medications    Chronic combined systolic and diastolic heart failure  Echo as below   Cardiology consulted for cardiac clearance-cleared for surgery at moderate cardiac risk.   Nephrology for volume removal with HD    Echo    Result Date: 10/13/2024    Left Ventricle: The left ventricle is normal in size. Normal wall   thickness. There is mildly reduced systolic function with a visually   estimated ejection fraction of  45%.    Right Ventricle: Right ventricular enlargement. Systolic function is   reduced.    Left Atrium: Left atrium is dilated.    Right Atrium: Right atrium is dilated. 4.2 x 2.5 large mobile echogenic   mass present.appears to be attached to the device lead    Tricuspid Valve: There is severe regurgitation.    Pulmonary Artery: There is severe pulmonary hypertension. The estimated   pulmonary artery systolic pressure is 72 mmHg.    IVC/SVC: Elevated venous pressure at 15 mmHg.    Limited echo    Findings conveyed to critical care and primary team        Echo    Result Date: 10/7/2024    Left Ventricle: The left ventricle is normal in size. Mildly increased   wall thickness. There is mild concentric hypertrophy. Regional wall motion   abnormalities present (severe distal anteroapical hypokinesis). There is   mildly reduced systolic function with a visually estimated ejection   fraction of 40 - 45%.    Right Ventricle: Severe right ventricular enlargement. Systolic   function is severely reduced. Pacemaker lead present in the ventricle and   appears abnormal with possible vegetation on lead in RA (2.7x2.0cm).    Left Atrium: There is a large mobile frondlike mass, appears to be   adherent to LA septum.    Right Atrium: Right atrium is severely dilated. Multiple leads present   in the right atrium with possible vegetation adherent to  lead in RA.    Aortic Valve: The aortic valve is a trileaflet valve. There is mild   aortic valve sclerosis.    Tricuspid Valve: There is mild regurgitation.    Pulmonary Artery: The estimated pulmonary artery systolic pressure is   67 mmHg.        Echo    Result Date: 10/3/2024    Left Ventricle: The left ventricle is mildly dilated. There is mild   eccentric hypertrophy. Septal flattening in diastole and systole   consistent with right ventricular volume and pressure overload. There is   mildly reduced systolic function with a visually estimated ejection   fraction of 40 - 45%.    Right Ventricle: Severe right ventricular enlargement. Systolic   function is moderately reduced.    Left Atrium: Left atrium is moderately dilated.    Right Atrium: Right atrium is severely dilated.    Aortic Valve: There is mild stenosis. Aortic valve area by VTI is 1.6   cm². Aortic valve peak velocity is 1.6 m/s. Mean gradient is 6.3 mmHg. The   dimensionless index is 0.50.    Mitral Valve: There is mild to moderate regurgitation.    Tricuspid Valve: There is moderate regurgitation.    Pulmonary Artery: The estimated pulmonary artery systolic pressure is   66 mmHg.    IVC/SVC: Elevated venous pressure at 15 mmHg.           S/P ablation of ventricular arrhythmia  - Cardiology consulted for cardiac clearance prior to R femur IMN  - continues on home mexiletine 150mg PO BID   - Hold amiodarone 400mg PO daily in setting of hypotension   - ICD in place       Cardiac resynchronization therapy defibrillator (CRT-D) in place  - noted- he has functional device in place and needs magnet if switching to hospice plan of care      Atherosclerosis of native coronary artery of native heart with angina pectoris  Patient with known CAD , which is controlled  - statin on hold with elevated LFTs which are now improving   - currently not on any Rx for CAD    Mixed hyperlipidemia  - statin held with elevated LFTs      Iron deficiency anemia  Anemia is  likely due to Iron deficiency. Most recent hemoglobin and hematocrit are listed below.  Recent Labs     10/14/24  0052 10/14/24  0628 10/15/24  0420   HGB 10.0* 9.7* 9.8*   HCT 27.8* 27.3* 26.8*       Plan  - Monitor serial CBC: Daily  - Transfuse PRBC if patient becomes hemodynamically unstable, symptomatic or H/H drops below 7/21.  - Patient has not received any PRBC transfusions to date  - Patient's anemia is currently stable    Hypothyroidism  Lab Results   Component Value Date    TSH 1.740 09/04/2024     - continue levothyroxine      VTE Risk Mitigation (From admission, onward)           Ordered     heparin 25,000 units in dextrose 5% (100 units/ml) IV bolus from bag HIGH INTENSITY nomogram - [facility]  As needed (PRN)        Question:  Heparin Infusion Adjustment (DO NOT MODIFY ANSWER)  Answer:  \\[email or web address]\epic\Images\Pharmacy\HeparinInfusions\heparin HIGH INTENSITY nomogram for [facility] [id number]    10/11/24 1622     heparin 25,000 units in dextrose 5% (100 units/ml) IV bolus from bag HIGH INTENSITY nomogram - [facility]  As needed (PRN)        Question:  Heparin Infusion Adjustment (DO NOT MODIFY ANSWER)  Answer:  \\[email or web address]\epic\Images\Pharmacy\HeparinInfusions\heparin HIGH INTENSITY nomogram for [facility] [id number]    10/11/24 1622     heparin 25,000 units in dextrose 5% 250 mL (100 units/mL) infusion HIGH INTENSITY nomogram - [facility]  Continuous        Question:  Begin at (units/kg/hr)  Answer:  18    10/11/24 1622     heparin (porcine) injection 5,000 Units  Use PRN         10/05/24 1830     heparin (porcine) injection 1,000 Units  As needed (PRN)         10/04/24 2217     IP VTE LOW RISK PATIENT  Once         10/04/24 1438     Place sequential compression device  Until discontinued         10/03/24 0154     Reason for No Pharmacological VTE Prophylaxis  Once        Comments: Right hip fracture needs surgical eval   Question:  Reasons:  Answer:  Physician Provided (leave comment)    10/03/24 0154                     Discharge Planning   SLAVA:      Code Status: DNR   Is the patient medically ready for discharge?:     Reason for patient still in hospital (select all that apply): Patient unstable  Discharge Plan A:  (tbd)            Critical care time spent on the evaluation and treatment of severe organ dysfunction, review of pertinent labs and imaging studies, discussions with consulting providers and discussions with patient/family: 45 minutes.      Jennifer Montelongo MD  Department of Hospital Medicine   [facility] - Intensive Care

## 2024-10-15 NOTE — PLAN OF CARE
Recommendation:  1. When medically acceptable, restart pt on renal diet, texture per SLP  2. Continue Novasource Renal ID  3. Monitor weight/labs  4. RD to follow to monitor nutriton status    Goals:  Pt kayli be restarted on diet by RD follow-up

## 2024-10-15 NOTE — ASSESSMENT & PLAN NOTE
Likely multifactorial in etiology (pulm edema, heart failure, possible thrombotic disease, atelectasis).  Supplement O2 to keep >90%

## 2024-10-15 NOTE — PLAN OF CARE
Patient progressing towards outcomes although continues to be positive for hyperactive delirium and disoriented to place, time and situation frequently showing signs of visual hallucinations; constant rambling speech with flight of ideas noted; CRRT in progress; attempted to wean vasopressors overnight        Problem: Adult Inpatient Plan of Care  Goal: Plan of Care Review  Outcome: Progressing  Goal: Patient-Specific Goal (Individualized)  Outcome: Progressing  Goal: Absence of Hospital-Acquired Illness or Injury  Outcome: Progressing  Goal: Optimal Comfort and Wellbeing  Outcome: Progressing  Goal: Readiness for Transition of Care  Outcome: Progressing     Problem: Infection  Goal: Absence of Infection Signs and Symptoms  Outcome: Progressing     Problem: Wound  Goal: Optimal Coping  Outcome: Progressing  Goal: Absence of Infection Signs and Symptoms  Outcome: Progressing  Goal: Optimal Pain Control and Function  Outcome: Progressing  Goal: Skin Health and Integrity  Outcome: Progressing  Goal: Optimal Wound Healing  Outcome: Progressing     Problem: Skin Injury Risk Increased  Goal: Skin Health and Integrity  Outcome: Progressing     Problem: Hemodialysis  Goal: Safe, Effective Therapy Delivery  Outcome: Progressing  Goal: Effective Tissue Perfusion  Outcome: Progressing  Goal: Absence of Infection Signs and Symptoms  Outcome: Progressing     Problem: Fall Injury Risk  Goal: Absence of Fall and Fall-Related Injury  Outcome: Progressing     Problem: CRRT (Continuous Renal Replacement Therapy)  Goal: Safe, Effective Therapy Delivery  Outcome: Progressing  Goal: Hemodynamic Stability  Outcome: Progressing  Goal: Body Temperature Maintained in Desired Range  Outcome: Progressing  Goal: Absence of Infection Signs and Symptoms  Outcome: Progressing     Problem: Coping Ineffective  Goal: Effective Coping  Outcome: Progressing     Problem: Delirium  Goal: Optimal Coping  Outcome: Progressing  Goal: Improved Behavioral  Control  Outcome: Progressing

## 2024-10-15 NOTE — ASSESSMENT & PLAN NOTE
Echo as below   Cardiology consulted for cardiac clearance-cleared for surgery at moderate cardiac risk.   Nephrology for volume removal with HD    Echo    Result Date: 10/13/2024    Left Ventricle: The left ventricle is normal in size. Normal wall   thickness. There is mildly reduced systolic function with a visually   estimated ejection fraction of  45%.    Right Ventricle: Right ventricular enlargement. Systolic function is   reduced.    Left Atrium: Left atrium is dilated.    Right Atrium: Right atrium is dilated. 4.2 x 2.5 large mobile echogenic   mass present.appears to be attached to the device lead    Tricuspid Valve: There is severe regurgitation.    Pulmonary Artery: There is severe pulmonary hypertension. The estimated   pulmonary artery systolic pressure is 72 mmHg.    IVC/SVC: Elevated venous pressure at 15 mmHg.    Limited echo    Findings conveyed to critical care and primary team        Echo    Result Date: 10/7/2024    Left Ventricle: The left ventricle is normal in size. Mildly increased   wall thickness. There is mild concentric hypertrophy. Regional wall motion   abnormalities present (severe distal anteroapical hypokinesis). There is   mildly reduced systolic function with a visually estimated ejection   fraction of 40 - 45%.    Right Ventricle: Severe right ventricular enlargement. Systolic   function is severely reduced. Pacemaker lead present in the ventricle and   appears abnormal with possible vegetation on lead in RA (2.7x2.0cm).    Left Atrium: There is a large mobile frondlike mass, appears to be   adherent to LA septum.    Right Atrium: Right atrium is severely dilated. Multiple leads present   in the right atrium with possible vegetation adherent to lead in RA.    Aortic Valve: The aortic valve is a trileaflet valve. There is mild   aortic valve sclerosis.    Tricuspid Valve: There is mild regurgitation.    Pulmonary Artery: The estimated pulmonary artery systolic pressure is   67  mmHg.        Echo    Result Date: 10/3/2024    Left Ventricle: The left ventricle is mildly dilated. There is mild   eccentric hypertrophy. Septal flattening in diastole and systole   consistent with right ventricular volume and pressure overload. There is   mildly reduced systolic function with a visually estimated ejection   fraction of 40 - 45%.    Right Ventricle: Severe right ventricular enlargement. Systolic   function is moderately reduced.    Left Atrium: Left atrium is moderately dilated.    Right Atrium: Right atrium is severely dilated.    Aortic Valve: There is mild stenosis. Aortic valve area by VTI is 1.6   cm². Aortic valve peak velocity is 1.6 m/s. Mean gradient is 6.3 mmHg. The   dimensionless index is 0.50.    Mitral Valve: There is mild to moderate regurgitation.    Tricuspid Valve: There is moderate regurgitation.    Pulmonary Artery: The estimated pulmonary artery systolic pressure is   66 mmHg.    IVC/SVC: Elevated venous pressure at 15 mmHg.

## 2024-10-15 NOTE — SUBJECTIVE & OBJECTIVE
Interval History:  Mental status waxing and waning.  No new cardiac complaints    Review of Systems   Constitutional: Positive for malaise/fatigue.   HENT:  Negative for hearing loss.    Eyes:  Negative for photophobia.   Cardiovascular:  Negative for chest pain.     Objective:     Vital Signs (Most Recent):  Temp: (!) 95.2 °F (35.1 °C) (10/15/24 1400)  Pulse: 69 (10/15/24 1400)  Resp: 16 (10/15/24 1400)  BP: 111/63 (10/15/24 1400)  SpO2: 97 % (10/15/24 1400) Vital Signs (24h Range):  Temp:  [94.3 °F (34.6 °C)-97.2 °F (36.2 °C)] 95.2 °F (35.1 °C)  Pulse:  [66-84] 69  Resp:  [14-36] 16  SpO2:  [85 %-98 %] 97 %  BP: (100-115)/(52-67) 111/63  Arterial Line BP: ()/(44-56) 96/47     Weight: 81.2 kg (179 lb 0.2 oz)  Body mass index is 26.44 kg/m².     SpO2: 97 %         Intake/Output Summary (Last 24 hours) at 10/15/2024 1509  Last data filed at 10/15/2024 1400  Gross per 24 hour   Intake 1983.2 ml   Output 1987 ml   Net -3.8 ml       Lines/Drains/Airways       Central Venous Catheter Line  Duration                  Hemodialysis Catheter 05/03/24 1115 right subclavian 165 days    Percutaneous Central Line - Triple Lumen  10/09/24 1127 Internal Jugular Left 6 days              Arterial Line  Duration             Arterial Line 10/12/24 1040 Left Radial 3 days                       Physical Exam  Vitals reviewed.   Constitutional:       Appearance: He is well-developed.   HENT:      Head: Normocephalic.   Eyes:      Conjunctiva/sclera: Conjunctivae normal.      Pupils: Pupils are equal, round, and reactive to light.   Cardiovascular:      Rate and Rhythm: Normal rate and regular rhythm.      Heart sounds: Murmur heard.   Pulmonary:      Effort: Pulmonary effort is normal.      Breath sounds: Normal breath sounds.   Abdominal:      General: Bowel sounds are normal.      Palpations: Abdomen is soft.   Musculoskeletal:      Cervical back: Normal range of motion and neck supple.   Skin:     General: Skin is warm.    Neurological:      Mental Status: He is alert and oriented to person, place, and time.            Significant Labs:      DATA:     Laboratory:  CBC:  Recent Labs   Lab 10/14/24  0052 10/14/24  0628 10/15/24  0420   WBC 38.14 H 37.64 H 37.42 H   Hemoglobin 10.0 L 9.7 L 9.8 L   Hematocrit 27.8 L 27.3 L 26.8 L   Platelets 413 421 397       CHEMISTRIES:  Recent Labs   Lab 04/04/22  1255 06/09/22  0740 07/07/22  0746 09/06/22  0741 10/14/24  1612 10/15/24  0051 10/15/24  0420 10/15/24  0837   Glucose 92 90 92   < > 147 H 125 H 127 H 131 H   Sodium 137 139 136   < > 130 L 132 L 131 L 133 L   Potassium 4.8 4.8 4.3   < > 4.6 4.4 4.5 4.4   BUN 68 H 81 H 76 H   < > 62 H 47 H 42 H 39 H   Creatinine 4.08 H 4.64 H 4.06 H   < > 6.2 H 4.5 H 4.1 H 3.5 H   eGFR if  17.0 A 14.5 A 17.1 A  --   --   --   --   --    eGFR if non  14.7 A 12.6 A 14.8 A  --   --   --   --   --    Calcium 9.1 8.7 9.5   < > 7.8 L 8.1 L 8.4 L 8.2 L   Magnesium  --   --   --    < > 2.2 1.9  --  2.0    < > = values in this interval not displayed.       CARDIAC BIOMARKERS:  Recent Labs   Lab 09/25/22  1638 04/29/24  1707 10/04/24  0418   Troponin I 0.015 0.040 H 0.045 H       COAGS:  Recent Labs   Lab 10/02/24  2224 10/03/24  0441 10/11/24  1637   INR 1.3 H 1.2 1.2       LIPIDS/LFTS:  Recent Labs   Lab 03/04/24  1018 04/12/24  1056 04/29/24  2039 06/04/24  1440 09/04/24  1201 09/30/24  1100 10/11/24  0421 10/13/24  0945 10/15/24  0420   Cholesterol 124  --  127  --  189  --   --   --   --    Triglycerides 66  --  66  --  167 H  --   --   --   --    HDL 39 L  --  32 L  --  54  --   --   --   --    LDL Cholesterol 71.8  --  81.8  --  101.6  --   --   --   --    Non-HDL Cholesterol 85  --  95  --  135  --   --   --   --    AST 39   < >  --    < > 99 H   < > 110 H 106 H 85 H   ALT 35   < >  --    < > 68 H   < > 29 45 H 45 H    < > = values in this interval not displayed.       Hemoglobin A1C   Date Value Ref Range Status   04/29/2024  5.6 4.0 - 5.6 % Final     Comment:     ADA Screening Guidelines:  5.7-6.4%  Consistent with prediabetes  >or=6.5%  Consistent with diabetes    High levels of fetal hemoglobin interfere with the HbA1C  assay. Heterozygous hemoglobin variants (HbS, HgC, etc)do  not significantly interfere with this assay.   However, presence of multiple variants may affect accuracy.     03/04/2024 5.6 4.0 - 5.6 % Final     Comment:     ADA Screening Guidelines:  5.7-6.4%  Consistent with prediabetes  >or=6.5%  Consistent with diabetes    High levels of fetal hemoglobin interfere with the HbA1C  assay. Heterozygous hemoglobin variants (HbS, HgC, etc)do  not significantly interfere with this assay.   However, presence of multiple variants may affect accuracy.     09/01/2023 6.0 (H) 4.0 - 5.6 % Final     Comment:     ADA Screening Guidelines:  5.7-6.4%  Consistent with prediabetes  >or=6.5%  Consistent with diabetes    High levels of fetal hemoglobin interfere with the HbA1C  assay. Heterozygous hemoglobin variants (HbS, HgC, etc)do  not significantly interfere with this assay.   However, presence of multiple variants may affect accuracy.         TSH  Recent Labs   Lab 05/04/23  1129 03/04/24  1018 09/04/24  1201   TSH 1.160 0.283 L 1.740       The 10-year ASCVD risk score (Susanne DK, et al., 2019) is: 11.6%    Values used to calculate the score:      Age: 63 years      Sex: Male      Is Non- : Yes      Diabetic: No      Tobacco smoker: No      Systolic Blood Pressure: 111 mmHg      Is BP treated: Yes      HDL Cholesterol: 54 mg/dL      Total Cholesterol: 189 mg/dL       BNP    Lab Results   Component Value Date/Time    BNP 3,204 (H) 04/29/2024 05:07 PM    BNP 2,549 (H) 04/12/2024 10:56 AM    BNP 1,416 (H) 09/28/2022 03:45 PM    BNP 2,304 (H) 09/25/2022 04:38 PM     (H) 11/02/2018 12:17 PM            ECHO    Results for orders placed during the hospital encounter of 10/03/24    Echo    Interpretation  Summary    Left Ventricle: The left ventricle is normal in size. Normal wall thickness. There is mildly reduced systolic function with a visually estimated ejection fraction of  45%.    Right Ventricle: Right ventricular enlargement. Systolic function is reduced.    Left Atrium: Left atrium is dilated.    Right Atrium: Right atrium is dilated. 4.2 x 2.5 large mobile echogenic mass present.appears to be attached to the device lead    Tricuspid Valve: There is severe regurgitation.    Pulmonary Artery: There is severe pulmonary hypertension. The estimated pulmonary artery systolic pressure is 72 mmHg.    IVC/SVC: Elevated venous pressure at 15 mmHg.    Limited echo    Findings conveyed to critical care and primary team      STRESS TEST    Results for orders placed during the hospital encounter of 07/11/22    Nuclear Stress Test    Interpretation Summary    The EKG portion of this study is abnormal but not diagnostic.    The patient reported no chest pain during the stress test.    The nuclear portion of this study will be reported separately.        CATH    Results for orders placed during the hospital encounter of 09/25/22    Cardiac catheterization    Conclusion    There was non-obstructive coronary artery disease. Stable as compared to prior.    The procedure log was documented by Documenter: RT Teri; May Galdamez RN and verified by Juan Roque MD.    Date: 9/27/2022  Time: 6:36 PM

## 2024-10-15 NOTE — PLAN OF CARE
Patient remains on 4L NC. Variable confusion throughout shift. Delirium precautions maintained. Patient hypothermic at beginning of shift, placed on hypothermic blanket. Levophed, vasopressin, dobutamine, and Heparin infusing per order. Negative pressure wound vac in place 50 ml drained on shift. CRRT continued per order. Puree diet tolerated but minimal appetite. Multiple family members at bedside, updated on plan of care.    Problem: Adult Inpatient Plan of Care  Goal: Plan of Care Review  Outcome: Progressing  Goal: Patient-Specific Goal (Individualized)  Outcome: Progressing  Goal: Absence of Hospital-Acquired Illness or Injury  Outcome: Progressing  Goal: Optimal Comfort and Wellbeing  Outcome: Progressing  Goal: Readiness for Transition of Care  Outcome: Progressing     Problem: Infection  Goal: Absence of Infection Signs and Symptoms  Outcome: Progressing     Problem: Wound  Goal: Optimal Coping  Outcome: Progressing  Goal: Optimal Functional Ability  Outcome: Progressing  Goal: Absence of Infection Signs and Symptoms  Outcome: Progressing  Goal: Improved Oral Intake  Outcome: Progressing  Goal: Optimal Pain Control and Function  Outcome: Progressing  Goal: Skin Health and Integrity  Outcome: Progressing  Goal: Optimal Wound Healing  Outcome: Progressing     Problem: Skin Injury Risk Increased  Goal: Skin Health and Integrity  Outcome: Progressing     Problem: Hemodialysis  Goal: Safe, Effective Therapy Delivery  Outcome: Progressing  Goal: Effective Tissue Perfusion  Outcome: Progressing  Goal: Absence of Infection Signs and Symptoms  Outcome: Progressing     Problem: Fall Injury Risk  Goal: Absence of Fall and Fall-Related Injury  Outcome: Progressing     Problem: CRRT (Continuous Renal Replacement Therapy)  Goal: Safe, Effective Therapy Delivery  Outcome: Progressing  Goal: Hemodynamic Stability  Outcome: Progressing  Goal: Body Temperature Maintained in Desired Range  Outcome: Progressing  Goal: Absence  of Infection Signs and Symptoms  Outcome: Progressing     Problem: Coping Ineffective  Goal: Effective Coping  Outcome: Progressing     Problem: Delirium  Goal: Optimal Coping  Outcome: Progressing  Goal: Improved Behavioral Control  Outcome: Progressing  Goal: Improved Attention and Thought Clarity  Outcome: Progressing  Goal: Improved Sleep  Outcome: Progressing

## 2024-10-15 NOTE — ASSESSMENT & PLAN NOTE
Unclear etiology of this (thrombus vs septic).  - anticoagulation with heparin drip and antibiotics per ID.  - cards and ID on board.  CTS and Interventional cardiology at [facility] was consulted for transfer for possible thrombectomy.  Patient is not a candidate for percutaneous or open surgery given high pressor support.  Options at this point are limited and family is consider comfort care once son from out of town arrived.

## 2024-10-15 NOTE — ASSESSMENT & PLAN NOTE
Patient chronically with systolic blood pressures in the 90s.  Throughout his hospitalization he has become progressively more hypotensive.    Unclear etiology.  DDx:  cardiac (EF 40s) vs obstructive with subsegmental PE vs septic.   Chest x-ray consistent with volume overload and cardiomegaly.     IVC per pocus 10/14 2.8 cm c/w intravascular volume overload  Cvp is 11  Has been on broad spectrum empiric antibiotics per ID recommendations.  - SCvO2 improved when Dobutamine added.    - continue antibiotics as guided by ID.  Currently on broad spectrum abx . ID recommending to Continue meropenem and daptomycin  - empirically starting micafungin 10/13 given severity of illness and progressive shock in setting of long standing abx, illness, and tunneled catheter.  No cultures positive.  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   continue Hydrocortisone and fludrocortisone given elevated pressor requirements.   levophed and vasopressin to keep MAP >60mmHg.  Already on max dose dopamine

## 2024-10-15 NOTE — ASSESSMENT & PLAN NOTE
Advance Care Planning     Date: 10/15/2024  DNR code status. Palliative discussing with family. 5 minutes spent reviewing.

## 2024-10-16 PROBLEM — Z51.5 COMFORT MEASURES ONLY STATUS: Status: ACTIVE | Noted: 2024-01-01

## 2024-10-16 PROBLEM — N18.6 ANEMIA IN ESRD (END-STAGE RENAL DISEASE): Status: ACTIVE | Noted: 2018-10-16

## 2024-10-16 PROBLEM — D63.1 ANEMIA IN ESRD (END-STAGE RENAL DISEASE): Status: ACTIVE | Noted: 2018-10-16

## 2024-10-16 NOTE — ASSESSMENT & PLAN NOTE
Anemia is likely due to ESRD. Most recent hemoglobin and hematocrit are listed below.  Recent Labs     10/14/24  0628 10/15/24  0420 10/16/24  0344   HGB 9.7* 9.8* 9.8*   HCT 27.3* 26.8* 27.3*       Plan  - Monitor serial CBC: Daily  - Transfuse PRBC if patient becomes hemodynamically unstable, symptomatic or H/H drops below 7/21.  - Patient has not received any PRBC transfusions to date  - Patient's anemia is currently stable  - on EPO MWF

## 2024-10-16 NOTE — ASSESSMENT & PLAN NOTE
- WBC increasing  - all infectious workup but Karius is negative. Karius with Weissella confusa.   - does have RA thrombus vs vegetation- ideally would have this extracted and cultured. Ideally also would have THDC extracted  - ID following- continues on jose/dapto/eliazar  - not currently candidate for angiovac procedure due to critical illness

## 2024-10-16 NOTE — PROGRESS NOTES
[facility] Medicine  Progress Note    Patient Name: Ar Sharma  MRN: [id number]  Patient Class: IP- Inpatient   Admission Date: 10/3/2024  Length of Stay: 13 days  Attending Physician: Jennifer Montelongo MD  Primary Care Provider: Jc Elliott MD        Subjective:     Principal Problem:Shock        HPI:  63 y.o. AAM with h/o ESRD on HD (for 2 months but prior was on PD for about a month-Follows with [facility]) MWF via a tunneled HD catheter on the right, Essential HTN, HLD, CAD,  h/o systolic(EF 40-45%) and diastolic grade 3 CHF due to non-ischemic cardiomyopathy with a [employer] CRT-D implant 2016 (sees Dr. Hernandez) uses 3L supplemental oxygen at night, and h/o VT arhythmia (follows with Dr. Tenorio at [facility]) s/p ablation and on Mexiletin 150mg PO BID and Amiodarone 400mg daily present to the ER with c/o right hip pain after a fall at home.     Went to HD today and was feeling alright after. Was moving his tool box when he tripped over his feet and fell to the ground. Was unable to get up or put weight on his right leg.     Work up in the ED at [place] noted normal WBC and Stable H/H.  INR 1.3.  Lytes stable and c/w post-HD with no indications for emergent HD tonight.     Right hip/pelvis x-ray FINDINGS:  There is a minimally displaced intertrochanteric fracture of the right proximal femur.  No additional fractures are seen.  There is osteopenia.  The femoral heads are well seated in the acetabula.  There is mild joint space narrowing of the bilateral femoroacetabular joints and the pubic symphysis.  There is a peritoneal dialysis catheter overlying the pelvis.     Impression:     Right intertrochanteric fracture.      Transfer center contacted us for admission due to [facility] on Ortho-diversion. They spoke with Dr. Mcclendon who agreed to see the patient in consultation. We have been asked to accept for further pre-op planning. Last time he took his ASA was yesterday  am.       Results for orders placed during the hospital encounter of 04/29/24    Echo Saline Bubble? No; Ultrasound enhancing contrast? No    Interpretation Summary    Left Ventricle: The left ventricle is normal in size. Normal wall thickness. Regional wall motion abnormalities present. See diagram for wall motion findings. Septal motion is consistent with pacing. There is mildly reduced systolic function with a visually estimated ejection fraction of 40 - 45%. Grade III diastolic dysfunction.    Right Ventricle: Moderate to severe right ventricular enlargement. Systolic function is reduced.TAPSE is 1.59 cm. Pacemaker lead present in the ventricle.    Left Atrium: Left atrium is severely dilated. The left atrium volume index is 71.4 mL/m2.    Right Atrium: Right atrium is severely dilated.    Aortic Valve: There is mild aortic valve sclerosis. There is mild to moderate stenosis. Aortic valve area by VTI is 1.49 cm². Aortic valve peak velocity is 1.43 m/s. Mean gradient is 5 mmHg. The dimensionless index is 0.52.    Mitral Valve: There is mild regurgitation.    Tricuspid Valve: There is mild to moderate regurgitation.    Pulmonary Artery: The estimated pulmonary artery systolic pressure is 64 mmHg.    IVC/SVC: Elevated venous pressure at 15 mmHg.      Overview/Hospital Course:  Mr Ar Sharma is a 63 y.o. man with ESRD on MWF HD (recently converted from PD), CHF s/p AICD who was admitted with mechanical fall causing R intertrochanteric hip fracture. S/p R femur IMN on 10/4/24. Post operatively he had worsening hypotension and moved to ICU for vasopressor/ionotrope support and CRRT starting on 10/5/24. Found to have vegetation vs thrombus on RA AICD lead, bilateral PE, splenic infarcts, and age indeterminate R parietal infarct. Multiple specialists consulted including Neurology, Cardiology, Pulmonary, Nephrology, Infectious Disease, GI, wound care, and palliative care. All cultures are negative, and he continues  on daptomycin, meropenem, micafungin. He continues in multifactorial shock (cardiogenic vs septic vs obstructive) on levophed, vasopressin, and dobutamine which cannot be weaned. [place] [facility] [place] for thrombus vs vegetation associated with AICD. At this point, he is not stable for procedure. Unable to wean vasopressors and inotrope. Mental status has waxed and waned.     Interval History: He is not alert currently. Family has been at bedside visiting.     Review of Systems   Unable to perform ROS: Mental status change   Psychiatric/Behavioral:  Positive for confusion.      Objective:     Vital Signs (Most Recent):  Temp: 97 °F (36.1 °C) (10/16/24 0900)  Pulse: 71 (10/16/24 0900)  Resp: 14 (10/16/24 0900)  BP: 103/64 (10/16/24 0900)  SpO2: 95 % (10/16/24 0900) Vital Signs (24h Range):  Temp:  [94.6 °F (34.8 °C)-97.3 °F (36.3 °C)] 97 °F (36.1 °C)  Pulse:  [67-86] 71  Resp:  [7-26] 14  SpO2:  [91 %-98 %] 95 %  BP: ()/(51-76) 103/64  Arterial Line BP: ()/(36-60) 115/55     Weight: 80.9 kg (178 lb 5.6 oz)  Body mass index is 26.34 kg/m².    Intake/Output Summary (Last 24 hours) at 10/16/2024 1127  Last data filed at 10/16/2024 1000  Gross per 24 hour   Intake 2062.41 ml   Output 1841 ml   Net 221.41 ml         Physical Exam  Vitals and nursing note reviewed.   Constitutional:       Appearance: He is ill-appearing and toxic-appearing.      Comments: somnolent   HENT:      Head:      Comments: Temporal wasting  Eyes:      General: Scleral icterus present.      Pupils: Pupils are equal, round, and reactive to light.   Neck:      Comments: R THDC, LIJ TLC  Cardiovascular:      Rate and Rhythm: Normal rate.      Comments: Paced rhythm. L chest wall CRT-D in place  Pulmonary:      Effort: Pulmonary effort is normal.      Breath sounds: Normal breath sounds. No wheezing.      Comments: O2 by NC  Abdominal:      General: Bowel sounds are normal.       Palpations: Abdomen is soft.      Tenderness: There is no abdominal tenderness.   Musculoskeletal:      Right lower leg: Edema present.      Left lower leg: Edema present.   Skin:     General: Skin is warm and dry.   Neurological:      Comments: Somnolent, does not awaken to voice             Significant Labs: All pertinent labs within the past 24 hours have been reviewed.    Significant Imaging: I have reviewed all pertinent imaging results/findings within the past 24 hours.    Assessment/Plan:      * Shock  - suspect multifactorial- cardiogenic vs septic vs obstructive  - CRRT for volume removal  - antibiotics for potential sepsis  - heparin gtt for PE  - unable to wean off vasopressors/inotropes     Encephalopathy  - he is not at mental status baseline  - CTH with age indeterminate parietal CVA potentially  - on heparin gtt as this may be embolic from RA mass  - worsening LFTs, suspect congestion. Check ammonia       Acute pulmonary embolism  - CTA chest bilateral PE  - continues on heparin gtt        Advance care planning  Advance Care Planning    Date: 10/15/2024  DNR code status. Palliative discussing with family. 5 minutes spent reviewing.         Atrial mass  - TTE on admit did not show mass, but then noted RA mass  - unclear if thrombus vs vegetation  - treating with heparin gtt for potential thrombus, eliazar/jose/dapto for potential vegetation  - discussed with interventional cardiology- not a candidate for angiovac procedure    Dermatitis associated with moisture  - wound care consulted       Leukocytosis  - WBC increasing  - all infectious workup but Karius is negative. Karius with Weissella confusa.   - does have RA thrombus vs vegetation- ideally would have this extracted and cultured. Ideally also would have THDC extracted  - ID following- continues on jose/dapto/eliazar  - not currently candidate for angiovac procedure due to critical illness    Acute on chronic hypoxic respiratory failure  Patient with  Hypoxic Respiratory failure which is Acute on chronic.  he is on home oxygen at 3 LPM.    Signs/symptoms of respiratory failure include- tachypnea and increased work of breathing. Contributing diagnoses includes - CHF and COPD Labs and images were reviewed. Patient Has recent ABG, which has been reviewed. Will treat underlying causes and adjust management of respiratory failure as follows-     -pt on home 3L NC. Hx of COPD/CHF  -Worsening hypoxia overnight 10/05; required on HFNC.  -acute on chronic hypoxia secondary to volume overload  -Pt unable to tolerate HD and CRRT, net positive 1.5L on 10/06  -CXR with pulmonary edema   -Pulmonology consulted  -Nephrology following  -Wean O2 as tolerated .    Moderate protein-calorie malnutrition  Nutrition consulted. Most recent weight and BMI monitored-     Measurements:  Wt Readings from Last 1 Encounters:   10/16/24 80.9 kg (178 lb 5.6 oz)   Body mass index is 26.34 kg/m².    Patient has been screened and assessed by RD.    Malnutrition Type:  Context: acute illness or injury  Level: mild    Malnutrition Characteristic Summary:  Weight Loss (Malnutrition): greater than 10% in 6 months  Subcutaneous Fat (Malnutrition): mild depletion  Muscle Mass (Malnutrition): mild depletion    Interventions/Recommendations (treatment strategy):  1. When medically acceptable, restart pt on renal diet, texture per SLP 2. Continue Novasource Renal ID 3. Monitor weight/labs 4. RD to follow to monitor nutriton status      Closed right hip fracture, initial encounter  - s/p mechanical fall. No LOC or head trauma  - Right hip XR: minimally displaced intertrochanteric fracture of the right proximal femur.   - Orthopedic surgery consulted: s/p right  femur intramedullary nail on 10/04  - PT/OT once stable.    ESRD (end stage renal disease)  - ESRD on HD via R THDC, previously on PD  - with chronic hypotension on home midodrine  - now on CRRT with vasopressor/inotrope support due to shock  -  Nephrology following     Pulmonary emphysema, unspecified emphysema type  - COPD is controlled on his home 3L oxygen at night.   - No wheezing on exam  - O2 ordered    Primary hypertension  - he is in shock  - holding all home medications    Chronic combined systolic and diastolic heart failure  - he appears somewhat volume overloaded  - he is on CRRT for volume removal  - his vasopressor requirements have not improved with volume removal    S/P ablation of ventricular arrhythmia  - Cardiology consulted for cardiac clearance prior to R femur IMN  - continues on home mexiletine 150mg PO BID   - Hold amiodarone 400mg PO daily in setting of hypotension and elevated LFTs  - ICD in place       Cardiac resynchronization therapy defibrillator (CRT-D) in place  - noted- he has functional device in place and needs magnet if switching to hospice plan of care      Atherosclerosis of native coronary artery of native heart with angina pectoris  Patient with known CAD , which is controlled  - statin on hold with elevated LFTs which are worsening  - currently not on any Rx for CAD    Mixed hyperlipidemia  - statin held with elevated LFTs      Anemia in ESRD (end-stage renal disease)  Anemia is likely due to ESRD. Most recent hemoglobin and hematocrit are listed below.  Recent Labs     10/14/24  0628 10/15/24  0420 10/16/24  0344   HGB 9.7* 9.8* 9.8*   HCT 27.3* 26.8* 27.3*       Plan  - Monitor serial CBC: Daily  - Transfuse PRBC if patient becomes hemodynamically unstable, symptomatic or H/H drops below 7/21.  - Patient has not received any PRBC transfusions to date  - Patient's anemia is currently stable  - on EPO MWF    Hypothyroidism  Lab Results   Component Value Date    TSH 1.740 09/04/2024     - continue levothyroxine      VTE Risk Mitigation (From admission, onward)           Ordered     heparin 25,000 units in dextrose 5% (100 units/ml) IV bolus from bag HIGH INTENSITY nomogram - [facility]  As needed (PRN)        Question:   Heparin Infusion Adjustment (DO NOT MODIFY ANSWER)  Answer:  [email or web address]\epic\Images\Pharmacy\HeparinInfusions\heparin HIGH INTENSITY nomogram for [facility] [id number]    10/11/24 1622     heparin 25,000 units in dextrose 5% (100 units/ml) IV bolus from bag HIGH INTENSITY nomogram - [facility]  As needed (PRN)        Question:  Heparin Infusion Adjustment (DO NOT MODIFY ANSWER)  Answer:  \\[email or web address]\epic\Images\Pharmacy\HeparinInfusions\heparin HIGH INTENSITY nomogram for [facility] [id number]    10/11/24 1622     heparin 25,000 units in dextrose 5% 250 mL (100 units/mL) infusion HIGH INTENSITY nomogram - [facility]  Continuous        Question:  Begin at (units/kg/hr)  Answer:  18    10/11/24 1622     heparin (porcine) injection 5,000 Units  Use PRN         10/05/24 1830     heparin (porcine) injection 1,000 Units  As needed (PRN)         10/04/24 2217     IP VTE LOW RISK PATIENT  Once         10/04/24 1438     Place sequential compression device  Until discontinued         10/03/24 0154     Reason for No Pharmacological VTE Prophylaxis  Once        Comments: Right hip fracture needs surgical eval   Question:  Reasons:  Answer:  Physician Provided (leave comment)    10/03/24 0154                    Discharge Planning   SLAVA:      Code Status: DNR   Is the patient medically ready for discharge?:     Reason for patient still in hospital (select all that apply): Patient unstable  Discharge Plan A: Home with family   Discharge Delays: None known at this time    2:31 PM  Transitioned to comfort focused treatment/hospice measures.     Critical care time spent on the evaluation and treatment of severe organ dysfunction, review of pertinent labs and imaging studies, discussions with consulting providers and discussions with patient/family: 30 minutes.      Jennifer Montelongo MD  Department of Hospital Medicine   [facility] [place] - Intensive Care

## 2024-10-16 NOTE — SUBJECTIVE & OBJECTIVE
Interval History:  patient  is now comfort care only.  No new cardiac complaints    Review of Systems   Constitutional: Positive for malaise/fatigue.   HENT:  Negative for hearing loss.    Eyes:  Negative for photophobia.   Cardiovascular:  Negative for chest pain.     Objective:     Vital Signs (Most Recent):  Temp: 98.4 °F (36.9 °C) (10/16/24 1200)  Pulse: 78 (10/16/24 1200)  Resp: 10 (10/16/24 1436)  BP: (!) 84/50 (10/16/24 1200)  SpO2: (!) 94 % (10/16/24 1200) Vital Signs (24h Range):  Temp:  [95.5 °F (35.3 °C)-98.4 °F (36.9 °C)] 98.4 °F (36.9 °C)  Pulse:  [67-86] 78  Resp:  [7-26] 10  SpO2:  [91 %-98 %] 94 %  BP: ()/(50-76) 84/50  Arterial Line BP: ()/(38-60) 115/55     Weight: 80.9 kg (178 lb 5.6 oz)  Body mass index is 26.34 kg/m².     SpO2: (!) 94 %         Intake/Output Summary (Last 24 hours) at 10/16/2024 1515  Last data filed at 10/16/2024 1200  Gross per 24 hour   Intake 1884.41 ml   Output 1456 ml   Net 428.41 ml       Lines/Drains/Airways       Central Venous Catheter Line  Duration                  Hemodialysis Catheter 05/03/24 1115 right subclavian 166 days    Percutaneous Central Line - Triple Lumen  10/09/24 1127 Internal Jugular Left 7 days                       Physical Exam  Vitals reviewed.   Constitutional:       Appearance: He is well-developed.   HENT:      Head: Normocephalic.   Eyes:      Conjunctiva/sclera: Conjunctivae normal.      Pupils: Pupils are equal, round, and reactive to light.   Cardiovascular:      Rate and Rhythm: Normal rate and regular rhythm.      Heart sounds: Murmur heard.   Pulmonary:      Effort: Pulmonary effort is normal.      Breath sounds: Normal breath sounds.   Abdominal:      General: Bowel sounds are normal.      Palpations: Abdomen is soft.   Musculoskeletal:      Cervical back: Normal range of motion and neck supple.   Skin:     General: Skin is warm.   Neurological:      Mental Status: He is alert and oriented to person, place, and time.             Significant Labs:      DATA:     Laboratory:  CBC:  Recent Labs   Lab 10/14/24  0628 10/15/24  0420 10/16/24  0344   WBC 37.64 H 37.42 H 41.42 H   Hemoglobin 9.7 L 9.8 L 9.8 L   Hematocrit 27.3 L 26.8 L 27.3 L   Platelets 421 397 442       CHEMISTRIES:  Recent Labs   Lab 04/04/22  1255 06/09/22  0740 07/07/22  0746 09/06/22  0741 10/15/24  1618 10/16/24  0014 10/16/24  0808   Glucose 92 90 92   < > 136 H 139 H 141 H   Sodium 137 139 136   < > 134 L 133 L 133 L   Potassium 4.8 4.8 4.3   < > 4.6 4.6 4.6   BUN 68 H 81 H 76 H   < > 33 H 26 H 23   Creatinine 4.08 H 4.64 H 4.06 H   < > 2.9 H 2.5 H 2.1 H   eGFR if  17.0 A 14.5 A 17.1 A  --   --   --   --    eGFR if non  14.7 A 12.6 A 14.8 A  --   --   --   --    Calcium 9.1 8.7 9.5   < > 8.5 L 8.7 8.5 L   Magnesium  --   --   --    < > 1.9 1.8 1.9    < > = values in this interval not displayed.       CARDIAC BIOMARKERS:  Recent Labs   Lab 09/25/22  1638 04/29/24  1707 10/04/24  0418   Troponin I 0.015 0.040 H 0.045 H       COAGS:  Recent Labs   Lab 10/02/24  2224 10/03/24  0441 10/11/24  1637   INR 1.3 H 1.2 1.2       LIPIDS/LFTS:  Recent Labs   Lab 03/04/24  1018 04/12/24  1056 04/29/24  2039 06/04/24  1440 09/04/24  1201 09/30/24  1100 10/13/24  0945 10/15/24  0420 10/16/24  0344   Cholesterol 124  --  127  --  189  --   --   --   --    Triglycerides 66  --  66  --  167 H  --   --   --   --    HDL 39 L  --  32 L  --  54  --   --   --   --    LDL Cholesterol 71.8  --  81.8  --  101.6  --   --   --   --    Non-HDL Cholesterol 85  --  95  --  135  --   --   --   --    AST 39   < >  --    < > 99 H   < > 106 H 85 H 93 H   ALT 35   < >  --    < > 68 H   < > 45 H 45 H 52 H    < > = values in this interval not displayed.       Hemoglobin A1C   Date Value Ref Range Status   04/29/2024 5.6 4.0 - 5.6 % Final     Comment:     ADA Screening Guidelines:  5.7-6.4%  Consistent with prediabetes  >or=6.5%  Consistent with diabetes    High levels of  fetal hemoglobin interfere with the HbA1C  assay. Heterozygous hemoglobin variants (HbS, HgC, etc)do  not significantly interfere with this assay.   However, presence of multiple variants may affect accuracy.     03/04/2024 5.6 4.0 - 5.6 % Final     Comment:     ADA Screening Guidelines:  5.7-6.4%  Consistent with prediabetes  >or=6.5%  Consistent with diabetes    High levels of fetal hemoglobin interfere with the HbA1C  assay. Heterozygous hemoglobin variants (HbS, HgC, etc)do  not significantly interfere with this assay.   However, presence of multiple variants may affect accuracy.     09/01/2023 6.0 (H) 4.0 - 5.6 % Final     Comment:     ADA Screening Guidelines:  5.7-6.4%  Consistent with prediabetes  >or=6.5%  Consistent with diabetes    High levels of fetal hemoglobin interfere with the HbA1C  assay. Heterozygous hemoglobin variants (HbS, HgC, etc)do  not significantly interfere with this assay.   However, presence of multiple variants may affect accuracy.         TSH  Recent Labs   Lab 05/04/23  1129 03/04/24  1018 09/04/24  1201   TSH 1.160 0.283 L 1.740       The ASCVD Risk score (Susanne DK, et al., 2019) failed to calculate for the following reasons:    The valid systolic blood pressure range is 90 to 200 mmHg       BNP    Lab Results   Component Value Date/Time    BNP 3,204 (H) 04/29/2024 05:07 PM    BNP 2,549 (H) 04/12/2024 10:56 AM    BNP 1,416 (H) 09/28/2022 03:45 PM    BNP 2,304 (H) 09/25/2022 04:38 PM     (H) 11/02/2018 12:17 PM            ECHO    Results for orders placed during the hospital encounter of 10/03/24    Echo    Interpretation Summary    Left Ventricle: The left ventricle is normal in size. Normal wall thickness. There is mildly reduced systolic function with a visually estimated ejection fraction of  45%.    Right Ventricle: Right ventricular enlargement. Systolic function is reduced.    Left Atrium: Left atrium is dilated.    Right Atrium: Right atrium is dilated. 4.2 x 2.5  large mobile echogenic mass present.appears to be attached to the device lead    Tricuspid Valve: There is severe regurgitation.    Pulmonary Artery: There is severe pulmonary hypertension. The estimated pulmonary artery systolic pressure is 72 mmHg.    IVC/SVC: Elevated venous pressure at 15 mmHg.    Limited echo    Findings conveyed to critical care and primary team      STRESS TEST    Results for orders placed during the hospital encounter of 07/11/22    Nuclear Stress Test    Interpretation Summary    The EKG portion of this study is abnormal but not diagnostic.    The patient reported no chest pain during the stress test.    The nuclear portion of this study will be reported separately.        CATH    Results for orders placed during the hospital encounter of 09/25/22    Cardiac catheterization    Conclusion    There was non-obstructive coronary artery disease. Stable as compared to prior.    The procedure log was documented by Documenter: RT Teri; May Galdamez RN and verified by Juan Roque MD.    Date: 9/27/2022  Time: 6:36 PM

## 2024-10-16 NOTE — DISCHARGE SUMMARY
[facility] Medicine  Discharge Summary      Patient Name: Ar Sharma  MRN: [id number]  KAREEM: [id number]  Patient Class: IP- Inpatient  Admission Date: 10/3/2024  Hospital Length of Stay: 13 days  Discharge Date and Time:  10/16/2024 6:02 PM  Attending Physician: Jennifer Montelongo MD   Discharging Provider: Jeison Helton MD  Primary Care Provider: Jc Elliott MD    Primary Care Team: Networked reference to record PCT     HPI:   63 y.o. AAM with h/o ESRD on HD (for 2 months but prior was on PD for about a month-[facility] M[facility] via a tunneled HD catheter on the right, Essential HTN, HLD, CAD,  h/o systolic(EF 40-45%) and diastolic grade 3 CHF due to non-ischemic cardiomyopathy with a [employer] CRT-D implant 2016 (sees Dr. Hernandez) uses 3L supplemental oxygen at night, and h/o VT arhythmia (follows with Dr. Tenorio at [facility]) s/p ablation and on Mexiletin 150mg PO BID and Amiodarone 400mg daily present to the ER with c/o right hip pain after a fall at home.     Went to HD today and was feeling alright after. Was moving his tool box when he tripped over his feet and fell to the ground. Was unable to get up or put weight on his right leg.     Work up in the ED at [place] noted normal WBC and Stable H/H.  INR 1.3.  Lytes stable and c/w post-HD with no indications for emergent HD tonight.     Right hip/pelvis x-ray FINDINGS:  There is a minimally displaced intertrochanteric fracture of the right proximal femur.  No additional fractures are seen.  There is osteopenia.  The femoral heads are well seated in the acetabula.  There is mild joint space narrowing of the bilateral femoroacetabular joints and the pubic symphysis.  There is a peritoneal dialysis catheter overlying the pelvis.     Impression:     Right intertrochanteric fracture.      Transfer center contacted us for admission due to [facility] on Ortho-diversion. They spoke with Dr. Mcclendon who agreed to see  the patient in consultation. We have been asked to accept for further pre-op planning. Last time he took his ASA was yesterday am.       Results for orders placed during the hospital encounter of 04/29/24    Echo Saline Bubble? No; Ultrasound enhancing contrast? No    Interpretation Summary    Left Ventricle: The left ventricle is normal in size. Normal wall thickness. Regional wall motion abnormalities present. See diagram for wall motion findings. Septal motion is consistent with pacing. There is mildly reduced systolic function with a visually estimated ejection fraction of 40 - 45%. Grade III diastolic dysfunction.    Right Ventricle: Moderate to severe right ventricular enlargement. Systolic function is reduced.TAPSE is 1.59 cm. Pacemaker lead present in the ventricle.    Left Atrium: Left atrium is severely dilated. The left atrium volume index is 71.4 mL/m2.    Right Atrium: Right atrium is severely dilated.    Aortic Valve: There is mild aortic valve sclerosis. There is mild to moderate stenosis. Aortic valve area by VTI is 1.49 cm². Aortic valve peak velocity is 1.43 m/s. Mean gradient is 5 mmHg. The dimensionless index is 0.52.    Mitral Valve: There is mild regurgitation.    Tricuspid Valve: There is mild to moderate regurgitation.    Pulmonary Artery: The estimated pulmonary artery systolic pressure is 64 mmHg.    IVC/SVC: Elevated venous pressure at 15 mmHg.      Procedure(s) (LRB):  ORIF, FRACTURE, FEMUR, INTERTROCHANTERIC (Right)      Hospital Course:   Mr Ar Sharma is a 63 y.o. man with ESRD on MWF HD (recently converted from PD), CHF s/p AICD who was admitted with mechanical fall causing R intertrochanteric hip fracture. S/p R femur IMN on 10/4/24. Post operatively he had worsening hypotension and moved to ICU for vasopressor/ionotrope support and CRRT starting on 10/5/24. Found to have vegetation vs thrombus on RA AICD lead, bilateral PE, splenic infarcts, and age indeterminate R parietal  infarct. Multiple specialists consulted including Neurology, Cardiology, Pulmonary, Nephrology, Infectious Disease, GI, wound care, and palliative care. All cultures are negative, and he continues on daptomycin, meropenem, micafungin. He continues in multifactorial shock (cardiogenic vs septic vs obstructive) on levophed, vasopressin, and dobutamine which cannot be weaned. Discussions took place [place] [facility] [place] for thrombus vs vegetation associated with AICD. At this point, he is not stable for procedure. Unable to wean vasopressors and inotrope. Mental status has waxed and waned.   On 10/16/24 in the setting of ongoing clinical decline, family made difficult decision to transition to Comfort measures only status after he was able to intimate to them that he was tired.  He passed away on 10/16/24 at 17:45 of multiorgan failure due to multifactorial shock.     Goals of Care Treatment Preferences:  Code Status: DNR    Living Will: Yes                 Consults:   Consults (From admission, onward)          Status Ordering Provider     Inpatient consult to Neurology  Once        Provider:  Rula Martinez MD    Completed PEDRO BAUEROLAEMILY     Inpatient consult to Cardiology  Once        Provider:  Damon Knight MD    Completed LIZETTE ABDOLAZIM     Inpatient consult to Critical Care Medicine  Once        Provider:  (Not yet assigned)    Completed GILBERTO ALFARO     Inpatient consult to Palliative Care  Once        Provider:  Gilberto Alfaro MD    Completed LIZETTE ABDOLAEMILY     Inpatient consult to Spiritual Care  Once        Provider:  (Not yet assigned)    Completed AKHONDZADESHOLA ABDOLAZIM     Inpatient consult to Pulmonology  Once        Provider:  (Not yet assigned)    Completed AKHODANIELDESHOLA ABDOLAZIM     Inpatient consult to Infectious Diseases  Once        Provider:  Colin Montanez MD    Completed MOSES GALLAGHER     Inpatient  consult to Cardiology  Once        Provider:  Mason Lynn MD    Completed MOSES GALLAGHER     Inpatient consult to Pulmonology  Once        Provider:  Jeffry Hale MD    Completed GENA MEDINA.     Inpatient consult to Gastroenterology  Once        Provider:  Chepe Sotelo MD    Completed GENA MEDINA.     Inpatient consult to Orthopedic Surgery  Once        Provider:  Jose M Begum MD    Completed MISAEL PLASCENCIA     Inpatient consult to Nephrology  Once        Provider:  Lizzette Cain MD    Completed GENA MEDINA.     Inpatient consult to Cardiology  Once        Provider:  Bhavin Cheng MD    Completed CHARLES RICHEY     Inpatient consult to Social Work  Once        Provider:  (Not yet assigned)    Completed TERE PETERS            No new Assessment & Plan notes have been filed under this hospital service since the last note was generated.  Service: Hospital Medicine    Final Active Diagnoses:    Diagnosis Date Noted POA    PRINCIPAL PROBLEM:  Comfort measures only status [Z51.5] 10/16/2024 Not Applicable    Acute pulmonary embolism [I26.99] 10/14/2024 Yes    Encephalopathy [G93.40] 10/14/2024 Yes    Shock [R57.9] 10/11/2024 No    Advance care planning [Z71.89] 10/11/2024 Not Applicable    Atrial mass [I51.89] 10/08/2024 Yes    Dermatitis associated with moisture [L30.8] 10/07/2024 No    Acute on chronic hypoxic respiratory failure [J96.21] 10/06/2024 No    Leukocytosis [D72.829] 10/06/2024 Yes    Closed right hip fracture, initial encounter [S72.001A] 10/03/2024 Yes    Moderate protein-calorie malnutrition [E44.0] 10/03/2024 Yes    ESRD (end stage renal disease) [N18.6] 04/30/2024 Yes    Pulmonary emphysema, unspecified emphysema type [J43.9] 09/15/2023 Yes    Primary hypertension [I10] 05/18/2022 Yes    Chronic combined systolic and diastolic heart failure [I50.42] 12/10/2020 Yes    S/P ablation of ventricular arrhythmia [Z98.890, Z86.79] 10/25/2018 Not  "Applicable    Cardiac resynchronization therapy defibrillator (CRT-D) in place [Z95.810] 10/25/2018 Yes    Hypothyroidism [E03.9] 10/16/2018 Yes    Anemia in ESRD (end-stage renal disease) [N18.6, D63.1] 10/16/2018 Yes    Mixed hyperlipidemia [E78.2] 10/16/2018 Yes    Atherosclerosis of native coronary artery of native heart with angina pectoris [I25.119]  Yes      Problems Resolved During this Admission:    Diagnosis Date Noted Date Resolved POA    Right atrial thrombus [I51.3] 10/07/2024 10/15/2024 No    Hypotension [I95.9] 2024 10/11/2024 Yes    Aortic atherosclerosis [I70.0] 09/15/2023 10/15/2024 Yes       Discharged Condition:     Disposition:     Follow Up:    Patient Instructions:   No discharge procedures on file.    Significant Diagnostic Studies: Labs: CMP   Recent Labs   Lab 10/15/24  0420 10/15/24  0837 10/15/24  1618 10/16/24  0014 10/16/24  0344 10/16/24  0808   *   < > 134* 133*  --  133*   K 4.5   < > 4.6 4.6  --  4.6      < > 101 102  --  101   CO2 20*   < > 20* 22*  --  23   *   < > 136* 139*  --  141*   BUN 42*   < > 33* 26*  --  23   CREATININE 4.1*   < > 2.9* 2.5*  --  2.1*   CALCIUM 8.4*   < > 8.5* 8.7  --  8.5*   PROT 5.9*  --   --   --  6.0  --    ALBUMIN 2.4*   < > 2.5* 2.4* 2.5* 2.5*   BILITOT 3.6*  --   --   --  4.6*  --    ALKPHOS 177*  --   --   --  176*  --    AST 85*  --   --   --  93*  --    ALT 45*  --   --   --  52*  --    ANIONGAP 11   < > 13 9  --  9    < > = values in this interval not displayed.   , CBC   Recent Labs   Lab 10/15/24  0420 10/16/24  0344   WBC 37.42* 41.42*   HGB 9.8* 9.8*   HCT 26.8* 27.3*    442   , INR   Lab Results   Component Value Date    INR 1.2 10/11/2024    INR 1.2 10/03/2024    INR 1.3 (H) 10/02/2024   , and Troponin No results for input(s): "TROPONINI" in the last 168 hours.  Microbiology: Blood Culture   Lab Results   Component Value Date    LABBLOO No Growth after 4 days. 10/12/2024   , Urine Culture    Lab " Results   Component Value Date    LABURIN No growth 05/11/2024   , and Wound Culture: negative  Radiology: X-Ray: CXR: X-Ray Chest 1 View (CXR):   Results for orders placed or performed during the hospital encounter of 10/03/24   X-Ray Chest 1 View    Narrative    EXAMINATION:  XR CHEST 1 VIEW    CLINICAL HISTORY:  check L IJ central line placement;    TECHNIQUE:  Single frontal view of the chest was performed.    COMPARISON:  Chest radiograph performed 10/06/2024, 03:24 hours.    FINDINGS:  Left internal jugular approach central venous catheter tip projects within the upper SVC.  Remainder support apparatus grossly unchanged compared to 10/06/2024, 03:24 hours examination.    Grossly unchanged cardiothymic contours.  Patchy opacities in both lungs similar to slightly progressed.    No definite pneumothorax.  Suspect small bilateral pleural effusions.    Gaseous distention of stomach.    Remainder of examination not substantially changed.      Impression    As above.      Electronically signed by: Tramaine Perez  Date:    10/09/2024  Time:    12:19    and KUB: X-Ray Abdomen AP 1 View (KUB): No results found for this visit on 10/03/24.  CT scan: CT ABDOMEN PELVIS WITH CONTRAST: No results found for this visit on 10/03/24.  Cardiac Graphics: Echocardiogram: 2D echo with color flow doppler:   Results for orders placed or performed during the hospital encounter of 10/02/18   2D echo with color flow doppler   Result Value Ref Range    EF + QEF 30 (A) 55 - 65    Mitral Valve Regurgitation MILD     Diastolic Dysfunction Yes (A)     Aortic Valve Regurgitation TRIVIAL     Est. PA Systolic Pressure 64 (A)     Mitral Valve Mobility NORMAL     Tricuspid Valve Regurgitation MILD     Narrative    Date of Procedure: 10/02/2018        TEST DESCRIPTION   Technical Quality: This is a technically adequate study.     Aorta: The aortic root is normal in size, measuring 2.6 cm at sinotubular junction and 2.6 cm at Sinuses of Valsalva.      Left Atrium: The left atrial volume index is severely enlarged, measuring 65.66 cc/m2.     Left Ventricle: The left ventricle is normal in size, with an end-diastolic diameter of 5.6 cm, and an end-systolic diameter of 4.6 cm. LV wall thickness is normal, with the septum measuring 1.4 cm and the posterior wall measuring 1.5 cm across. Relative   wall thickness was increased at 0.54, and the LV mass index was increased at 197.5 g/m2 consistent with concentric left ventricular hypertrophy. The inferior septum is moderately hypokinetic. The anterior wall is moderately hypokinetic. The following   segments were moderately hypokinetic: apical lateral wall, mid anterolateral wall, apical inferior wall, mid inferior wall.  Left ventricular systolic function appears moderately depressed. Visually estimated ejection fraction is 30-35%. The LV Doppler derived stroke volume equals 74.0 ccs.     Diastolic indices: E wave velocity 1.0 m/s, E/A ratio 2.1,  msec., E/e' ratio(avg) 20. There is diastolic dysfunction secondary to restrictive abnormality.     Right Atrium: The right atrium is normal in size, measuring 6.9 cm in length and 6.0 cm in width in the apical view.     Right Ventricle: The right ventricle is normal in size measuring 3.8 cm at the base in the apical right ventricle-focused view. Global right ventricular systolic function appears normal. Tricuspid annular plane systolic excursion (TAPSE) is 2.1 cm. The   estimated PA systolic pressure is 64 mmHg.     Aortic Valve:  The aortic valve is mildly sclerotic with normal leaflet mobility. The aortic valve is tri-leaflet in structure. Additionally, there is trivial aortic regurgitation.     Mitral Valve:  The mitral valve is mildly sclerotic with normal leaflet mobility. The pressure half time is 51 msec. The calculated mitral valve area is 4.31 cm2. There is mild mitral regurgitation.     Tricuspid Valve:  The tricuspid valve is mildly sclerotic with normal  leaflet mobility. There is mild tricuspid regurgitation.     Pulmonary Valve:  The pulmonic valve is normal in structure.     IVC: IVC is enlarged and collapses < 50% with a sniff, suggesting high right atrial pressure of 15 mmHg.     Intracavitary: There is no evidence of pericardial effusion, intracavity mass, thrombi, or vegetation.         CONCLUSIONS     1 - Moderately depressed left ventricular systolic function (EF 30-35%).     2 - Restrictive LV filling pattern, indicating markedly elevated LAP (grade 3 diastolic dysfunction).     3 - Normal right ventricular systolic function .     4 - Pulmonary hypertension. The estimated PA systolic pressure is 64 mmHg.     5 - Mild mitral regurgitation.     6 - Increased central venous pressure.     7 - Severe left atrial enlargement.   Small PFO present.          This document has been electronically    SIGNED BY: Cristobal Chavira MD On: 10/04/2018 12:02    This document was originally electronically signed on: 10/02/2018 15:47       Pending Diagnostic Studies:       None           Medications:  None    Indwelling Lines/Drains at time of discharge:   Lines/Drains/Airways       Central Venous Catheter Line  Duration                  Hemodialysis Catheter 05/03/24 1115 right subclavian 166 days    Percutaneous Central Line - Triple Lumen  10/09/24 1127 Internal Jugular Left 7 days                    Time spent on the discharge of patient: 45 minutes    Critical care time spent on the evaluation and treatment of severe organ dysfunction, review of pertinent labs and imaging studies, discussions with consulting providers and discussions with patient/family: 45 minutes.     Jeison Helton MD  Department of Hospital Medicine  [facility] - Intensive Care

## 2024-10-16 NOTE — PLAN OF CARE
[place] - Intensive Care  Discharge Reassessment    Primary Care Provider: Jc Elliott MD    Expected Discharge Date: Pending    Reassessment (most recent)       Discharge Reassessment - 10/16/24 1121          Discharge Reassessment    Assessment Type Discharge Planning Reassessment     Did the patient's condition or plan change since previous assessment? No     Discharge Plan discussed with: --   MDT Rounding    Communicated SLAVA with patient/caregiver No     Discharge Plan A Home with family     Discharge Plan B Other   TBD based on need at discharge.  Unknown at this time.    DME Needed Upon Discharge  other (see comments)   TBD    Transition of Care Barriers None     Why the patient remains in the hospital Requires continued medical care        Post-Acute Status    Coverage Payor: [employer] - [employer] -     Discharge Delays None known at this time                 This patient has been screened for Case Management needs.  Based on (documentation in medical record/communication with attending physician/communication with nursing), patient's treatment in ICU is ongoing. Atrial mass identified and  ID consulted.  Patient on levophed, vasoprerssin, and dobutamine.  Palliative medicine team is following and discussing GOC.

## 2024-10-16 NOTE — PROGRESS NOTES
[facility]  Nephrology  Progress Note    Patient Name: Ar Sharma  MRN: [id number]  Admission Date: 10/3/2024  Hospital Length of Stay: 13 days  Attending Provider: Jennifer Montelongo MD   Primary Care Physician: Jc Elliott MD  Principal Problem:Shock    Subjective:     HPI: Following for ESRD. Patient receives HD MWF via RIJ TDC at [facility] under the c/o Dr. Chicas. He was initiated on HD 4/2024. He remains with PD catheter.     Interval History: no acute events overnight, pressor requirement overall even.     Review of patient's allergies indicates:   Allergen Reactions    Lokelma [sodium zirconium cyclosilicate] Other (See Comments)     Fluid retention, weight gain, CHF excerebration, severe constipation    Atorvastatin Other (See Comments)     Joint pain    Jardiance [empagliflozin] Other (See Comments)     Chest pains, significant weight loss, lower blood pressure     Current Facility-Administered Medications   Medication Frequency    0.9%  NaCl infusion PRN    acetaminophen tablet 650 mg Q4H PRN    DAPTOmycin (CUBICIN) 645 mg in 0.9% NaCl SolP 50 mL IVPB Q48H    DOBUtamine 1000 mg in D5W 250 mL infusion Continuous    epoetin saba-epbx injection 10,000 Units Every Mon, Wed, Fri    famotidine (PF) injection 20 mg Daily    fentaNYL 50 mcg/hr 1 patch Q72H    fludrocortisone tablet 100 mcg Daily    heparin (porcine) injection 1,000 Units PRN    heparin (porcine) injection 5,000 Units PRN    heparin 25,000 units in dextrose 5% (100 units/ml) IV bolus from bag HIGH INTENSITY nomogram - [facility] PRN    heparin 25,000 units in dextrose 5% (100 units/ml) IV bolus from bag HIGH INTENSITY nomogram - [facility] PRN    heparin 25,000 units in dextrose 5% 250 mL (100 units/mL) infusion HIGH INTENSITY nomogram - [facility] Continuous    hydrALAZINE injection 10 mg Q6H PRN    hydrocortisone sodium succinate injection 100 mg Q8H    HYDROmorphone injection 1 mg Q4H PRN    hydrOXYzine pamoate capsule 25 mg Q8H PRN     levalbuterol nebulizer solution 1.25 mg Q4H PRN    levothyroxine tablet 150 mcg Before breakfast    melatonin tablet 6 mg Nightly PRN    meropenem injection 500 mg Q12H    metOLazone tablet 10 mg See admin instructions    mexiletine capsule 150 mg BID WM    micafungin 100 mg in 0.9% NaCl 100 mL IVPB (MB+) Q24H    midodrine tablet 15 mg Q8H    naloxone 0.4 mg/mL injection 0.4 mg PRN    nitroGLYCERIN SL tablet 0.4 mg Q5 Min PRN    NORepinephrine 32 mg in D5W 250 mL infusion Continuous    ondansetron injection 4 mg Q6H PRN    oxyCODONE-acetaminophen  mg per tablet 1 tablet Q4H PRN    oxyCODONE-acetaminophen 5-325 mg per tablet 1 tablet Q4H PRN    polyethylene glycol packet 17 g BID PRN    polyethylene glycol packet 17 g Daily    promethazine (PHENERGAN) 6.25 mg in 0.9% NaCl 50 mL IVPB Q6H PRN    QUEtiapine tablet 25 mg Nightly    senna-docusate 8.6-50 mg per tablet 2 tablet BID    simethicone chewable tablet 80 mg QID PRN    sodium chloride 0.9% bolus 250 mL 250 mL PRN    sodium chloride 0.9% flush 10 mL Q12H PRN    sodium chloride 0.9% flush 10 mL PRN    triamcinolone acetonide 0.1% cream BID    vasopressin (PITRESSIN) 0.2 Units/mL in D5W 100 mL infusion Continuous       Objective:     Vital Signs (Most Recent):  Temp: 97 °F (36.1 °C) (10/16/24 0900)  Pulse: 71 (10/16/24 0900)  Resp: 14 (10/16/24 0900)  BP: 103/64 (10/16/24 0900)  SpO2: 95 % (10/16/24 0900) Vital Signs (24h Range):  Temp:  [94.6 °F (34.8 °C)-97.3 °F (36.3 °C)] 97 °F (36.1 °C)  Pulse:  [67-86] 71  Resp:  [7-26] 14  SpO2:  [91 %-98 %] 95 %  BP: ()/(51-76) 103/64  Arterial Line BP: ()/(36-60) 115/55     Weight: 80.9 kg (178 lb 5.6 oz) (10/16/24 0300)  Body mass index is 26.34 kg/m².  Body surface area is 1.98 meters squared.    I/O last 3 completed shifts:  In: 3416.8 [P.O.:774; I.V.:2258.3; IV Piggyback:384.5]  Out: 2827 [Other:2827]     Physical Exam  Constitutional:       General: He is not in acute distress.     Appearance: He is  ill-appearing and toxic-appearing.   HENT:      Head: Normocephalic and atraumatic.      Nose: Nose normal. No congestion.      Mouth/Throat:      Mouth: Mucous membranes are moist.   Eyes:      Pupils: Pupils are equal, round, and reactive to light.   Cardiovascular:      Rate and Rhythm: Tachycardia present.   Pulmonary:      Breath sounds: No rhonchi.   Abdominal:      General: Abdomen is flat.   Musculoskeletal:      Cervical back: Normal range of motion. No rigidity.      Right lower leg: No edema.      Left lower leg: No edema.   Skin:     General: Skin is warm.   Neurological:      Mental Status: He is alert.          Significant Labs:  All labs within the past 24 hours have been reviewed.     Significant Imaging:  Labs: Reviewed  Assessment/Plan:     Renal/  ESRD (end stage renal disease)  ESRD MWF    Possibly will go to [facility] for higher level of care with regards to his bilateral PE and right atrial mass    Plan/recommendation  -CVVHD, UF set to -10 cc/hr.  -Family possibly to move to comfort care today, will continue to assist  -Strict ins and outs  -Avoid nephrotoxic agents if possible and renally dose medications  -Avoid drastic hemodynamic changes if possible    Secondary hyperparathyroidism  Calcium   Date Value Ref Range Status   10/16/2024 8.5 (L) 8.7 - 10.5 mg/dL Final     Phosphorus   Date Value Ref Range Status   10/16/2024 2.8 2.7 - 4.5 mg/dL Final     PTH, Intact   Date Value Ref Range Status   04/29/2024 162.6 (H) 9.0 - 77.0 pg/mL Final   Continue to monitor - critically ill this admission    Anemia of chronic renal disease  Goal hemoglobin in ESRD is 10-12  Hemoglobin   Date Value Ref Range Status   10/16/2024 9.8 (L) 14.0 - 18.0 g/dL Final     Iron   Date Value Ref Range Status   10/05/2024 22 (L) 45 - 160 ug/dL Final     Transferrin   Date Value Ref Range Status   10/05/2024 207 200 - 375 mg/dL Final     TIBC   Date Value Ref Range Status   10/05/2024 306 250 - 450 ug/dL Final      Saturated Iron   Date Value Ref Range Status   10/05/2024 7 (L) 20 - 50 % Final     Ferritin   Date Value Ref Range Status   10/05/2024 338 (H) 20.0 - 300.0 ng/mL Final   Continue to monitor - critically ll this admission        Thank you for your consult. I will follow-up with patient. Please contact us if you have any additional questions.    Shashank Brown MD  Nephrology  [facility] - Intensive Care

## 2024-10-16 NOTE — ASSESSMENT & PLAN NOTE
Patient been on heparin drip as well as broad-spectrum antibiotics since this mass was discovered.  Repeat echo done today demonstrated that the right atrial mass has increased in size.  Because of RV enlargement and RA enlargement with severe TR clinically suspected pulmonary embolism.  Subsequent CTA confirmed the suspicion.  Patient has been accepted to [facility] for further evaluation and management.  Case discussed with CT surgery and Interventional Cardiology at [facility]    Notes from October 14th20 24:  No change in cardiac clinical status.  Had concern for stroke last night.  Now feeling better.  Workup done by primary team.  Awaiting transfer to [facility].  Still on pressors    Notes from October 15th:  Has been refused for transfer to [facility] because of nothing new can be offered there.  Intervention felt to be too high-risk by multidisciplinary team approach over there    Continue medical management and supportive care    Continue heparin drip and broad-spectrum antibiotics     October 16th: Patient is now comfort care only.   Will sign off

## 2024-10-16 NOTE — SUBJECTIVE & OBJECTIVE
Interval History: He is not alert currently. Family has been at bedside visiting.     Review of Systems   Unable to perform ROS: Mental status change   Psychiatric/Behavioral:  Positive for confusion.      Objective:     Vital Signs (Most Recent):  Temp: 97 °F (36.1 °C) (10/16/24 0900)  Pulse: 71 (10/16/24 0900)  Resp: 14 (10/16/24 0900)  BP: 103/64 (10/16/24 0900)  SpO2: 95 % (10/16/24 0900) Vital Signs (24h Range):  Temp:  [94.6 °F (34.8 °C)-97.3 °F (36.3 °C)] 97 °F (36.1 °C)  Pulse:  [67-86] 71  Resp:  [7-26] 14  SpO2:  [91 %-98 %] 95 %  BP: ()/(51-76) 103/64  Arterial Line BP: ()/(36-60) 115/55     Weight: 80.9 kg (178 lb 5.6 oz)  Body mass index is 26.34 kg/m².    Intake/Output Summary (Last 24 hours) at 10/16/2024 1127  Last data filed at 10/16/2024 1000  Gross per 24 hour   Intake 2062.41 ml   Output 1841 ml   Net 221.41 ml         Physical Exam  Vitals and nursing note reviewed.   Constitutional:       Appearance: He is ill-appearing and toxic-appearing.      Comments: somnolent   HENT:      Head:      Comments: Temporal wasting  Eyes:      General: Scleral icterus present.      Pupils: Pupils are equal, round, and reactive to light.   Neck:      Comments: R THDC, LIJ TLC  Cardiovascular:      Rate and Rhythm: Normal rate.      Comments: Paced rhythm. L chest wall CRT-D in place  Pulmonary:      Effort: Pulmonary effort is normal.      Breath sounds: Normal breath sounds. No wheezing.      Comments: O2 by NC  Abdominal:      General: Bowel sounds are normal.      Palpations: Abdomen is soft.      Tenderness: There is no abdominal tenderness.   Musculoskeletal:      Right lower leg: Edema present.      Left lower leg: Edema present.   Skin:     General: Skin is warm and dry.   Neurological:      Comments: Somnolent, does not awaken to voice             Significant Labs: All pertinent labs within the past 24 hours have been reviewed.    Significant Imaging: I have reviewed all pertinent imaging  results/findings within the past 24 hours.

## 2024-10-16 NOTE — SUBJECTIVE & OBJECTIVE
Interval history:  A-line was taken out due to malfunction.  Still on  0.4 mcg/kg/min of levophed + vasopressin + dobutamine.  Sating well on nasal canula.      Review of Systems   Unable to perform ROS: Patient nonverbal   Constitutional:  Positive for activity change. Negative for chills, fatigue and fever.   Respiratory:  Negative for cough and shortness of breath.    Cardiovascular:  Negative for chest pain and leg swelling.   Gastrointestinal:  Negative for abdominal pain.   Psychiatric/Behavioral: Negative.       Objective:     Vital Signs (Most Recent):  Temp: 97 °F (36.1 °C) (10/16/24 0900)  Pulse: 71 (10/16/24 0900)  Resp: 14 (10/16/24 0900)  BP: 103/64 (10/16/24 0900)  SpO2: 95 % (10/16/24 0900) Vital Signs (24h Range):  Temp:  [94.6 °F (34.8 °C)-97.3 °F (36.3 °C)] 97 °F (36.1 °C)  Pulse:  [67-86] 71  Resp:  [7-26] 14  SpO2:  [91 %-98 %] 95 %  BP: ()/(51-76) 103/64  Arterial Line BP: ()/(36-60) 115/55     Weight: 80.9 kg (178 lb 5.6 oz)  Body mass index is 26.34 kg/m².      Intake/Output Summary (Last 24 hours) at 10/16/2024 1050  Last data filed at 10/16/2024 1000  Gross per 24 hour   Intake 2062.41 ml   Output 1927 ml   Net 135.41 ml        Physical Exam  Constitutional:       General: He is not in acute distress.     Appearance: Normal appearance. He is ill-appearing. He is not toxic-appearing or diaphoretic.   HENT:      Head: Normocephalic and atraumatic.      Nose: Nose normal.      Mouth/Throat:      Mouth: Mucous membranes are moist.   Eyes:      Extraocular Movements: Extraocular movements intact.      Pupils: Pupils are equal, round, and reactive to light.   Neck:      Comments: Left IJ CVC  Cardiovascular:      Rate and Rhythm: Normal rate and regular rhythm.   Pulmonary:      Effort: Pulmonary effort is normal. No respiratory distress.      Breath sounds: Normal breath sounds. No wheezing.   Abdominal:      General: Abdomen is flat. Bowel sounds are normal. There is no distension.       Palpations: Abdomen is soft.   Musculoskeletal:      Cervical back: Normal range of motion.      Right lower leg: Edema present.      Left lower leg: Edema present.      Comments: Left radial a line. Right hip wound with clean dressing and minimal swelling.   Skin:     General: Skin is warm.      Capillary Refill: Capillary refill takes less than 2 seconds.   Neurological:      Mental Status: He is alert.      Comments: Alert to voice and interactive, but slowed mentation compared to baseline.          Vents:       Lines/Drains/Airways       Central Venous Catheter Line  Duration                  Hemodialysis Catheter 05/03/24 1115 right subclavian 165 days    Percutaneous Central Line - Triple Lumen  10/09/24 1127 Internal Jugular Left 6 days                    Significant Labs:    CBC/Anemia Profile:  Recent Labs   Lab 10/15/24  0420 10/16/24  0344   WBC 37.42* 41.42*   HGB 9.8* 9.8*   HCT 26.8* 27.3*    442   MCV 75* 75*   RDW 15.9* 16.0*        Chemistries:  Recent Labs   Lab 10/15/24  0420 10/15/24  0837 10/15/24  1618 10/16/24  0014 10/16/24  0344 10/16/24  0808   *   < > 134* 133*  --  133*   K 4.5   < > 4.6 4.6  --  4.6      < > 101 102  --  101   CO2 20*   < > 20* 22*  --  23   BUN 42*   < > 33* 26*  --  23   CREATININE 4.1*   < > 2.9* 2.5*  --  2.1*   CALCIUM 8.4*   < > 8.5* 8.7  --  8.5*   ALBUMIN 2.4*   < > 2.5* 2.4* 2.5* 2.5*   PROT 5.9*  --   --   --  6.0  --    BILITOT 3.6*  --   --   --  4.6*  --    ALKPHOS 177*  --   --   --  176*  --    ALT 45*  --   --   --  52*  --    AST 85*  --   --   --  93*  --    MG  --    < > 1.9 1.8  --  1.9   PHOS  --    < > 3.5 3.0  --  2.8    < > = values in this interval not displayed.       All pertinent labs within the past 24 hours have been reviewed.    Significant Imaging:   I have reviewed all pertinent imaging results/findings within the past 24 hours.

## 2024-10-16 NOTE — ASSESSMENT & PLAN NOTE
Patient chronically with systolic blood pressures in the 90s.  Throughout his hospitalization he has become progressively more hypotensive.    Unclear etiology.  DDx:  cardiac (EF 40s) vs obstructive with subsegmental PE vs septic.   Chest x-ray consistent with volume overload and cardiomegaly.     IVC per pocus 10/14 2.8 cm c/w intravascular volume overload  Cvp is 11  Has been on broad spectrum empiric antibiotics per ID recommendations.  - SCvO2 improved when Dobutamine added.    - continue antibiotics as guided by ID.  Currently on broad spectrum abx . ID recommending to Continue meropenem and daptomycin  - empirically starting micafungin 10/13 given severity of illness and progressive shock in setting of long standing abx, illness, and tunneled catheter.  No cultures positive.  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   continue Hydrocortisone and fludrocortisone given elevated pressor requirements.   levophed and vasopressin to keep MAP >60mmHg.  Already on max dose dobutamine  Family deferred another a-line placement given their inclination for comfort care.

## 2024-10-16 NOTE — ASSESSMENT & PLAN NOTE
- he is not at mental status baseline  - CTH with age indeterminate parietal CVA potentially  - on heparin gtt as this may be embolic from RA mass  - worsening LFTs, suspect congestion. Check ammonia

## 2024-10-16 NOTE — ASSESSMENT & PLAN NOTE
- Cardiology consulted for cardiac clearance prior to R femur IMN  - continues on home mexiletine 150mg PO BID   - Hold amiodarone 400mg PO daily in setting of hypotension and elevated LFTs  - ICD in place

## 2024-10-16 NOTE — SUBJECTIVE & OBJECTIVE
Interval History: no acute events overnight, pressor requirement overall even.     Review of patient's allergies indicates:   Allergen Reactions    Lokelma [sodium zirconium cyclosilicate] Other (See Comments)     Fluid retention, weight gain, CHF excerebration, severe constipation    Atorvastatin Other (See Comments)     Joint pain    Jardiance [empagliflozin] Other (See Comments)     Chest pains, significant weight loss, lower blood pressure     Current Facility-Administered Medications   Medication Frequency    0.9%  NaCl infusion PRN    acetaminophen tablet 650 mg Q4H PRN    DAPTOmycin (CUBICIN) 645 mg in 0.9% NaCl SolP 50 mL IVPB Q48H    DOBUtamine 1000 mg in D5W 250 mL infusion Continuous    epoetin saba-epbx injection 10,000 Units Every Mon, Wed, Fri    famotidine (PF) injection 20 mg Daily    fentaNYL 50 mcg/hr 1 patch Q72H    fludrocortisone tablet 100 mcg Daily    heparin (porcine) injection 1,000 Units PRN    heparin (porcine) injection 5,000 Units PRN    heparin 25,000 units in dextrose 5% (100 units/ml) IV bolus from bag HIGH INTENSITY nomogram - [facility] PRN    heparin 25,000 units in dextrose 5% (100 units/ml) IV bolus from bag HIGH INTENSITY nomogram - [facility] PRN    heparin 25,000 units in dextrose 5% 250 mL (100 units/mL) infusion HIGH INTENSITY nomogram - [facility] Continuous    hydrALAZINE injection 10 mg Q6H PRN    hydrocortisone sodium succinate injection 100 mg Q8H    HYDROmorphone injection 1 mg Q4H PRN    hydrOXYzine pamoate capsule 25 mg Q8H PRN    levalbuterol nebulizer solution 1.25 mg Q4H PRN    levothyroxine tablet 150 mcg Before breakfast    melatonin tablet 6 mg Nightly PRN    meropenem injection 500 mg Q12H    metOLazone tablet 10 mg See admin instructions    mexiletine capsule 150 mg BID WM    micafungin 100 mg in 0.9% NaCl 100 mL IVPB (MB+) Q24H    midodrine tablet 15 mg Q8H    naloxone 0.4 mg/mL injection 0.4 mg PRN    nitroGLYCERIN SL tablet 0.4 mg Q5 Min PRN    NORepinephrine 32 mg in D5W 250  mL infusion Continuous    ondansetron injection 4 mg Q6H PRN    oxyCODONE-acetaminophen  mg per tablet 1 tablet Q4H PRN    oxyCODONE-acetaminophen 5-325 mg per tablet 1 tablet Q4H PRN    polyethylene glycol packet 17 g BID PRN    polyethylene glycol packet 17 g Daily    promethazine (PHENERGAN) 6.25 mg in 0.9% NaCl 50 mL IVPB Q6H PRN    QUEtiapine tablet 25 mg Nightly    senna-docusate 8.6-50 mg per tablet 2 tablet BID    simethicone chewable tablet 80 mg QID PRN    sodium chloride 0.9% bolus 250 mL 250 mL PRN    sodium chloride 0.9% flush 10 mL Q12H PRN    sodium chloride 0.9% flush 10 mL PRN    triamcinolone acetonide 0.1% cream BID    vasopressin (PITRESSIN) 0.2 Units/mL in D5W 100 mL infusion Continuous       Objective:     Vital Signs (Most Recent):  Temp: 97 °F (36.1 °C) (10/16/24 0900)  Pulse: 71 (10/16/24 0900)  Resp: 14 (10/16/24 0900)  BP: 103/64 (10/16/24 0900)  SpO2: 95 % (10/16/24 0900) Vital Signs (24h Range):  Temp:  [94.6 °F (34.8 °C)-97.3 °F (36.3 °C)] 97 °F (36.1 °C)  Pulse:  [67-86] 71  Resp:  [7-26] 14  SpO2:  [91 %-98 %] 95 %  BP: ()/(51-76) 103/64  Arterial Line BP: ()/(36-60) 115/55     Weight: 80.9 kg (178 lb 5.6 oz) (10/16/24 0300)  Body mass index is 26.34 kg/m².  Body surface area is 1.98 meters squared.    I/O last 3 completed shifts:  In: 3416.8 [P.O.:774; I.V.:2258.3; IV Piggyback:384.5]  Out: 2827 [Other:2827]     Physical Exam  Constitutional:       General: He is not in acute distress.     Appearance: He is ill-appearing and toxic-appearing.   HENT:      Head: Normocephalic and atraumatic.      Nose: Nose normal. No congestion.      Mouth/Throat:      Mouth: Mucous membranes are moist.   Eyes:      Pupils: Pupils are equal, round, and reactive to light.   Cardiovascular:      Rate and Rhythm: Tachycardia present.   Pulmonary:      Breath sounds: No rhonchi.   Abdominal:      General: Abdomen is flat.   Musculoskeletal:      Cervical back: Normal range of motion. No  rigidity.      Right lower leg: No edema.      Left lower leg: No edema.   Skin:     General: Skin is warm.   Neurological:      Mental Status: He is alert.          Significant Labs:  All labs within the past 24 hours have been reviewed.     Significant Imaging:  Labs: Reviewed

## 2024-10-16 NOTE — SIGNIFICANT EVENT
[facility]    Death Note    Patient Name: [facility] Team: Networked reference to record PCT  Jeison Helton MD  Date of Admission:  10/3/2024     Principal Problem:  Comfort measures only status       I was called to bedside by patient's nurse. Nursing supervisor notified. Family at bedside.  has also been called to bedside.    Patient is not responding to verbal or tactile stimuli. Patient does not have a papillary or corneal reflex. Their pupils are fixed and dilated. No heart or breath sounds on auscultation (I listened for 60 seconds). No respirations. No palpable pulses. EKG shows flatline on at least 2 leads.    Date and time of death: 10/16/24 17:45     Cause of Death: Multiorgan failure 2/2 multifactorial shock    Jeison Helton M.D., M.P.H.  Department of Hospital Medicine  [facility] [address] [place] [address]  [email or web address]  [phone]

## 2024-10-16 NOTE — ASSESSMENT & PLAN NOTE
Unclear etiology of this (thrombus vs septic).  - anticoagulation with heparin drip and antibiotics per ID.  - cards and ID on board.  [place] and [place] [facility] [place] for possible [place].  Patient is not a candidate for percutaneous or open surgery given high pressor support.  Options at this point are limited and family is consider comfort care

## 2024-10-16 NOTE — PROGRESS NOTES
[facility] - Intensive Care  Wound Care  [facility]    Patient Name:  Ar Sharma   MRN:  [id number]  Date: 10/16/2024  Diagnosis: Shock    History:     Past Medical History:   Diagnosis Date    Cardiomyopathy     CKD (chronic kidney disease) stage 4, GFR 15-29 ml/min     Congestive heart failure (CHF)     COPD (chronic obstructive pulmonary disease)     Coronary artery disease     Edema     Essential (primary) hypertension 2022    Formatting of this note might be different from the original. Converted from [employer]: Description - ESSENTIAL HYPERTENSION, BENIGN    Heart attack     HLD (hyperlipidemia)     Hypertension     Hyperuricemia     Hypocalcemia     Renal cyst, left     Secondary hyperparathyroidism     Thyroid disease     V tach     Vitamin D deficiency        Social History     Socioeconomic History    Marital status:    Tobacco Use    Smoking status: Former     Current packs/day: 0.00     Average packs/day: 1 pack/day for 33.2 years (33.2 ttl pk-yrs)     Types: Cigarettes     Start date: 1979     Quit date: 3/3/2012     Years since quittin.6     Passive exposure: Past    Smokeless tobacco: Never   Substance and Sexual Activity    Alcohol use: Not Currently     Comment: rare    Drug use: No    Sexual activity: Not Currently     Partners: Female     Social Drivers of Health     Financial Resource Strain: Low Risk  (10/3/2024)    Overall Financial Resource Strain (CARDIA)     Difficulty of Paying Living Expenses: Not hard at all   Food Insecurity: No Food Insecurity (10/3/2024)    Hunger Vital Sign     Worried About Running Out of Food in the Last Year: Never true     Ran Out of Food in the Last Year: Never true   Transportation Needs: No Transportation Needs (10/3/2024)    TRANSPORTATION NEEDS     Transportation : No   Physical Activity: Insufficiently Active (2024)    Exercise Vital Sign     Days of Exercise per Week: 2 days     Minutes of Exercise per Session: 10 min    Stress: Stress Concern Present (10/3/2024)    [employer] - Occupational Stress Questionnaire     Feeling of Stress : To some extent   Housing Stability: Low Risk  (10/3/2024)    Housing Stability Vital Sign     Unable to Pay for Housing in the Last Year: No     Homeless in the Last Year: No       Precautions:     Allergies as of 10/02/2024 - Reviewed 10/02/2024   Allergen Reaction Noted    Lokelma [sodium zirconium cyclosilicate] Other (See Comments) 01/12/2022    Atorvastatin Other (See Comments) 10/02/2018    Jardiance [empagliflozin] Other (See Comments) 07/17/2023       W[facility] Assessment Details/Treatment     Active Problem List with Overview Notes    Diagnosis Date Noted    Acute pulmonary embolism 10/14/2024    Encephalopathy 10/14/2024    Shock 10/11/2024    Advance care planning 10/11/2024    Atrial mass 10/08/2024    Dermatitis associated with moisture 10/07/2024    Acute on chronic hypoxic respiratory failure 10/06/2024    Leukocytosis 10/06/2024    Closed right hip fracture, initial encounter 10/03/2024    Moderate protein-calorie malnutrition 10/03/2024    Dialysis patient 05/13/2024    ESRD (end stage renal disease) 04/30/2024    Pulmonary emphysema, unspecified emphysema type 09/15/2023    Secondary hyperparathyroidism of renal origin 11/02/2022    Non-ischemic cardiomyopathy 09/28/2022    Abuse of herbal or folk remedies 09/26/2022    Primary hypertension 05/18/2022     Formatting of this note might be different from the original.  Converted from [employer]:  Description - ESSENTIAL HYPERTENSION, BENIGN      Thrombocythemia -  on 4/4/22 05/18/2022    Chronic combined systolic and diastolic heart failure 12/10/2020    Cardiac resynchronization therapy defibrillator (CRT-D) in place 10/25/2018    S/P ablation of ventricular arrhythmia 10/25/2018    V-tach 10/16/2018    Hypothyroidism 10/16/2018    Anemia in ESRD (end-stage renal disease) 10/16/2018    Mixed  hyperlipidemia 10/16/2018    Atherosclerosis of native coronary artery of native heart with angina pectoris     Prediabetes 04/30/2018    Enlarged prostate with lower urinary tract symptoms (LUTS) 06/09/2014     Formatting of this note might be different from the original.  Converted from [employer]:  Description - BENIGN PROSTATIC HYPERTROPHY, WITH OBSTRUCTION      Primary osteoarthritis of one knee 02/05/2013     Formatting of this note might be different from the original.  Converted from [employer]:  Description - OSTEOARTHRITIS PRIMARY KNEE       10/4/24 S/P Right femur IM nail per Dr. Derek Reed requested changing of Prevena dressing to right femur on Friday, 10/18/24 instead of discontinuing NPWT dressing.   Nursing to change dressing to contain drainage from incision and continue NPWT at 125 mmHg.   Prevena dressing sent to ICU from [employer].   Orders placed.     10/16/2024

## 2024-10-16 NOTE — NURSING
[facility] [place]  Nurses Note -- 4 Eyes      10/16/2024       Skin assessed on: Q Shift      [x] No Pressure Injuries Present    []Prevention Measures Documented    [] Yes LDA  for Pressure Injury Previously documented     [] Yes New Pressure Injury Discovered   [] LDA for New Pressure Injury Added      Attending RN:  Richmond Smiley RN     Second RN:  Malina REDDING

## 2024-10-16 NOTE — PROGRESS NOTES
[facility] [place] [facility]ensive [facility]  Critical Care Medicine  Progress Note    Patient Name: Ar Sharma  MRN: [id number]  Admission Date: 10/3/2024  Hospital Length of Stay: 13 days  Code Status: DNR  Attending Provider: Jennifer Montelongo MD  Primary Care Provider: Jc Elliott MD   Principal Problem: Shock    Subjective:     HPI:  Mr Ar Sharma is a 63 year old male with ESRD on HD (MWF) for the last 2 months, prior PD, HTN, HLD, combined systolic and diastolic HF (EF 40-45% and grade III DD), ischemic cardiomyopathy, s/p medtronic CRT-D implant in 2016, history of VT s/p ablation on mexiletine and amiodarone, who presents after a mechanical fall resulting in a right femur fracture.  S/p ORIF 10/4.  He is chronically hypotensive and had issues following surgery tolerating HD.  Moved to the ICU for pressor support for CRRT.  Pulm previously consulted for CVC placement (which occurred on 10/9/2024), but now following due to persistent and elevated pressor requirements.    Hospital/ICU Course:  Requiring high vasopressor requirements without clear infectious etiology.  Atrial mass present concerning for thrombus in setting of negative blood cultures.  Abx being adjusted per ID.  Antifungals added.  Due to low SCvO2 seen on VBG from left IJ CVC we have started dobutamine.  This improved SCvO2 accordingly.  CT Chest/abd/pelvis with overt hypervolemia and splenic infarcts.      Interval history:  A-line was taken out due to malfunction.  Still on  0.4 mcg/kg/min of levophed + vasopressin + dobutamine.  Sating well on nasal canula.      Review of Systems   Unable to perform ROS: Patient nonverbal   Constitutional:  Positive for activity change. Negative for chills, fatigue and fever.   Respiratory:  Negative for cough and shortness of breath.    Cardiovascular:  Negative for chest pain and leg swelling.   Gastrointestinal:  Negative for abdominal pain.   Psychiatric/Behavioral: Negative.       Objective:     Vital  Signs (Most Recent):  Temp: 97 °F (36.1 °C) (10/16/24 0900)  Pulse: 71 (10/16/24 0900)  Resp: 14 (10/16/24 0900)  BP: 103/64 (10/16/24 0900)  SpO2: 95 % (10/16/24 0900) Vital Signs (24h Range):  Temp:  [94.6 °F (34.8 °C)-97.3 °F (36.3 °C)] 97 °F (36.1 °C)  Pulse:  [67-86] 71  Resp:  [7-26] 14  SpO2:  [91 %-98 %] 95 %  BP: ()/(51-76) 103/64  Arterial Line BP: ()/(36-60) 115/55     Weight: 80.9 kg (178 lb 5.6 oz)  Body mass index is 26.34 kg/m².      Intake/Output Summary (Last 24 hours) at 10/16/2024 1050  Last data filed at 10/16/2024 1000  Gross per 24 hour   Intake 2062.41 ml   Output 1927 ml   Net 135.41 ml        Physical Exam  Constitutional:       General: He is not in acute distress.     Appearance: Normal appearance. He is ill-appearing. He is not toxic-appearing or diaphoretic.   HENT:      Head: Normocephalic and atraumatic.      Nose: Nose normal.      Mouth/Throat:      Mouth: Mucous membranes are moist.   Eyes:      Extraocular Movements: Extraocular movements intact.      Pupils: Pupils are equal, round, and reactive to light.   Neck:      Comments: Left IJ CVC  Cardiovascular:      Rate and Rhythm: Normal rate and regular rhythm.   Pulmonary:      Effort: Pulmonary effort is normal. No respiratory distress.      Breath sounds: Normal breath sounds. No wheezing.   Abdominal:      General: Abdomen is flat. Bowel sounds are normal. There is no distension.      Palpations: Abdomen is soft.   Musculoskeletal:      Cervical back: Normal range of motion.      Right lower leg: Edema present.      Left lower leg: Edema present.      Comments: Left radial a line. Right hip wound with clean dressing and minimal swelling.   Skin:     General: Skin is warm.      Capillary Refill: Capillary refill takes less than 2 seconds.   Neurological:      Mental Status: He is alert.      Comments: Alert to voice and interactive, but slowed mentation compared to baseline.          Vents:       Lines/Drains/Airways        Central Venous Catheter Line  Duration                  Hemodialysis Catheter 05/03/24 1115 right subclavian 165 days    Percutaneous Central Line - Triple Lumen  10/09/24 1127 Internal Jugular Left 6 days                    Significant Labs:    CBC/Anemia Profile:  Recent Labs   Lab 10/15/24  0420 10/16/24  0344   WBC 37.42* 41.42*   HGB 9.8* 9.8*   HCT 26.8* 27.3*    442   MCV 75* 75*   RDW 15.9* 16.0*        Chemistries:  Recent Labs   Lab 10/15/24  0420 10/15/24  0837 10/15/24  1618 10/16/24  0014 10/16/24  0344 10/16/24  0808   *   < > 134* 133*  --  133*   K 4.5   < > 4.6 4.6  --  4.6      < > 101 102  --  101   CO2 20*   < > 20* 22*  --  23   BUN 42*   < > 33* 26*  --  23   CREATININE 4.1*   < > 2.9* 2.5*  --  2.1*   CALCIUM 8.4*   < > 8.5* 8.7  --  8.5*   ALBUMIN 2.4*   < > 2.5* 2.4* 2.5* 2.5*   PROT 5.9*  --   --   --  6.0  --    BILITOT 3.6*  --   --   --  4.6*  --    ALKPHOS 177*  --   --   --  176*  --    ALT 45*  --   --   --  52*  --    AST 85*  --   --   --  93*  --    MG  --    < > 1.9 1.8  --  1.9   PHOS  --    < > 3.5 3.0  --  2.8    < > = values in this interval not displayed.       All pertinent labs within the past 24 hours have been reviewed.    Significant Imaging:   I have reviewed all pertinent imaging results/findings within the past 24 hours.    ABG  Recent Labs   Lab 10/15/24  0432   PH 7.375   PO2 75*   PCO2 38.9   HCO3 22.7*   BE -2     Assessment/Plan:     Pulmonary  Acute on chronic hypoxic respiratory failure  Likely multifactorial in etiology (pulm edema, heart failure, possible thrombotic disease, atelectasis).  Supplement O2 to keep >90%        Pulmonary emphysema, unspecified emphysema type  Patient's COPD is controlled currently.  Patient is currently off COPD Pathway. Continue scheduled inhalers  PRN nebulizers and Supplemental oxygen and monitor respiratory status closely.   No evidence of exacerbation    Cardiac/Vascular  * Shock  Patient chronically  with systolic blood pressures in the 90s.  Throughout his hospitalization he has become progressively more hypotensive.    Unclear etiology.  DDx:  cardiac (EF 40s) vs obstructive with subsegmental PE vs septic.   Chest x-ray consistent with volume overload and cardiomegaly.     IVC per pocus 10/14 2.8 cm c/w intravascular volume overload  Cvp is 11  Has been on broad spectrum empiric antibiotics per ID recommendations.  - SCvO2 improved when Dobutamine added.    - continue antibiotics as guided by ID.  Currently on broad spectrum abx . ID recommending to Continue meropenem and daptomycin  - empirically starting micafungin 10/13 given severity of illness and progressive shock in setting of long standing abx, illness, and tunneled catheter.  No cultures positive.  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   continue Hydrocortisone and fludrocortisone given elevated pressor requirements.   levophed and vasopressin to keep MAP >60mmHg.  Already on max dose dobutamine  Family deferred another a-line placement given their inclination for comfort care.      Atrial mass  Unclear etiology of this (thrombus vs septic).  - anticoagulation with heparin drip and antibiotics per ID.  - cards and ID on board.  CTS and Interventional cardiology at [facility] was consulted for transfer for possible thrombectomy.  Patient is not a candidate for percutaneous or open surgery given high pressor support.  Options at this point are limited and family is consider comfort care     Primary hypertension  Holding all antihypertensives at this time, while in shock.    Chronic combined systolic and diastolic heart failure  EF 40-45% with grade III DD.  This is likely having a significant impact on ability to tolerate iHD.  Cardiology following  CRRT with minimal ultrafiltration        S/P ablation of ventricular arrhythmia  Chronic anticoagulation.  Continuous cardiac  monitoring.  Cards on board.    Cardiac resynchronization therapy defibrillator (CRT-D) in place  Noted      Mixed hyperlipidemia  statin    Renal/  ESRD (end stage renal disease)  Currently requiring CRRT.  Previously had utilized HD, but now too unstable from a blood pressure standpoint. Also has previously used PD, but this is currently being held.  - nephrology following.  - midodrine 15mg TID.    Crrt resumed today for electrolytes correction.  Await cvp to help guide decision about ultrafiltration.      Hematology  Acute pulmonary embolism  Heparin drip    Oncology  Leukocytosis  Patient has not shown overt evidence of infection on culture data.  Karius with Weissella confusa.   - consider CT C/A/P with splenic infarcts. In setting of known cardiac thrombus, we will perform a CTA thorax and CT head.  Also performing B LE US, and repeat TTE.    - continue abx per ID.    Iron deficiency anemia  Transfuse to keep HgB >7.0    Endocrine  Moderate protein-calorie malnutrition  Nutrition consulted. Most recent weight and BMI monitored-     Measurements:  Wt Readings from Last 1 Encounters:   10/14/24 79.2 kg (174 lb 9.7 oz)   Body mass index is 25.78 kg/m².    Patient has been screened and assessed by RD.    Malnutrition Type:  Context: acute illness or injury  Level: mild    Malnutrition Characteristic Summary:  Weight Loss (Malnutrition): greater than 10% in 6 months  Subcutaneous Fat (Malnutrition): mild depletion  Muscle Mass (Malnutrition): mild depletion    Interventions/Recommendations (treatment strategy):  1. COntinue pt on renal diet 2. Continue novasource renal TID 3. Encourage intake at meals 4. Monitor weight/labs 5. RD to follow to monitor PO intake      Hypothyroidism  Synthroid  - recent tsh normal.    Orthopedic  Closed right hip fracture, initial encounter  S/p ORIF by orthopedics.  Postoperative care per their recs.     Palliative Care  Advance care planning  DNR per Dr. Connolly but family is  amenable to reversal if patient is deemed a candidate for surgery.    10/16/24 spoken with wife, daughter along with Dr. Mancera and palliative care .  Family understands that patient remained critically ill.  Given lack of improvement, patient's  wife is ready to transition patient to comfort care.  However, she would like to give her son a little more time to come to term with patient's condition.  Advise wife and daughter to let us know when the family is ready.          Critical Care Time: 45 minutes  Critical secondary to Patient has a condition that poses threat to life and bodily function: severe shock      Critical care was time spent personally by me on the following activities: development of treatment plan with patient or surrogate and bedside caregivers, discussions with consultants, evaluation of patient's response to treatment, examination of patient, ordering and performing treatments and interventions, ordering and review of laboratory studies, ordering and review of radiographic studies, pulse oximetry, re-evaluation of patient's condition. This critical care time did not overlap with that of any other provider or involve time for any procedures.     Raimundo Caldwell MD  Critical Care Medicine  [facility] - Intensive Care

## 2024-10-16 NOTE — ASSESSMENT & PLAN NOTE
Patient with known CAD , which is controlled  - statin on hold with elevated LFTs which are worsening  - currently not on any Rx for CAD

## 2024-10-16 NOTE — ASSESSMENT & PLAN NOTE
- he appears somewhat volume overloaded  - he is on CRRT for volume removal  - his vasopressor requirements have not improved with volume removal

## 2024-10-16 NOTE — PLAN OF CARE
[facility] - Intensive Care  Discharge Reassessment    Primary Care Provider: Jc Elliott MD    Expected Discharge Date: TBD    Changes in medical condition or discharge plan:  Patient remains in ICU with treatment ongoing.  Multiple specialists consulted including Neurology, Cardiology, Pulmonary, Nephrology, Infectious Disease, GI, wound care, and palliative care. All cultures are negative, and he continues on daptomycin, meropenem, micafungin. He continues in multifactorial shock (cardiogenic vs septic vs obstructive) on levophed, vasopressin, and dobutamine which cannot be weaned     Does patient need new DME? To be determined prior to discharge    Follow up appts needed: Will need f/u with PCP, Cardiology, Nephrology, Ortho and additional appts as recommended by the discharging provider.    Medically stable:  no    Estimated Discharge Date: Unknown      Reassessment (most recent)       Discharge Reassessment - 10/16/24 1257          Discharge Reassessment    Assessment Type Discharge Planning Reassessment     Did the patient's condition or plan change since previous assessment? No     Discharge Plan discussed with: --   MDT Rounding    Communicated SLAVA with patient/caregiver No     Discharge Plan A Home with family     Discharge Plan B Other   TBD based on need at discharge.  Currently unknown    DME Needed Upon Discharge  other (see comments)   TBD    Transition of Care Barriers None     Why the patient remains in the hospital Requires continued medical care        Post-Acute Status    Discharge Delays None known at this time

## 2024-10-16 NOTE — PLAN OF CARE
[facility] [place] Intensive Care  Discharge Reassessment    Primary Care Provider: Jc Elliott MD    Expected Discharge Date: TBD    Reassessment (most recent)       Discharge Reassessment - 10/16/24 1642          Discharge Reassessment    Assessment Type Discharge Planning Reassessment     Did the patient's condition or plan change since previous assessment? Yes     Communicated SLAVA with patient/caregiver No     Discharge Plan A Comfort care/withdrawal        Post-Acute Status    Discharge Delays None known at this time                     Patient transitioned to comfort care.

## 2024-10-16 NOTE — ACP (ADVANCE CARE PLANNING)
Advance Care Planning     Date: 10/16/2024    Today a voluntary meeting took place: bedside, and later in conference room    Patient Participation: Patient is unable to participate      Attendees (Name and  Relationship to patient): Pt's wife Nicolette (initial meeting), daughter Dr Sharma (second meeting)     Staff attendees (Name and  Role): Blas Hawkins (Palliative SW), and myself in initial meeting, and Alexa Alvarado (ICU Director of Nursing) and myself in second meeting    ACP Conversation (General): Multiple discussions with family over the course of the day. Family with good insight into pt's situation, and recognizes that he is not improving despite maximal medical therapy. His wife told us that pt looked at her this morning in a way that she knows he is tired, and is ready to be peaceful. Based on our discussions with family, transitioned to comfort-focused care this afternoon. Provided end of life education, and discussed medications we would order for comfort. Emotional support provided throughout day.      Recommendations/  Follow-up tasks:   1) Ordered PRN medications (IV hydromorphone 0.5 mg Q15 minutes PRN nonverbal signs of dyspnea or pain, IV lorazepam 1 mg Q4H PRN anxiety/agitation, and IV glycopyrrolate 0.2 mg Q4H PRN secretions)   2) Overall plan of care is exclusively comfort-focused    Length of ACP   conversation in minutes: A total of 81 min was spent on advance care planning, goals of care discussion, emotional support, formulating and communicating prognosis and exploring burden/benefit of various approaches of treatment. This discussion occurred on a fully voluntary basis with the verbal consent of the patient and/or family.

## 2024-10-16 NOTE — PLAN OF CARE
Patient transitioned to comfort measures after family discussion with unit manager and medical providers. Patient passed this evening, Dr. Jeison Helton pronounced the patient at 1745. Supportive care provided to family.      Problem: Adult Inpatient Plan of Care  Goal: Plan of Care Review  Outcome: Unable to Meet  Goal: Patient-Specific Goal (Individualized)  Outcome: Unable to Meet  Goal: Absence of Hospital-Acquired Illness or Injury  Outcome: Unable to Meet  Goal: Optimal Comfort and Wellbeing  Outcome: Unable to Meet  Goal: Readiness for Transition of Care  Outcome: Unable to Meet     Problem: Infection  Goal: Absence of Infection Signs and Symptoms  Outcome: Unable to Meet     Problem: Wound  Goal: Optimal Coping  Outcome: Unable to Meet  Goal: Optimal Functional Ability  Outcome: Unable to Meet  Goal: Absence of Infection Signs and Symptoms  Outcome: Unable to Meet  Goal: Improved Oral Intake  Outcome: Unable to Meet  Goal: Optimal Pain Control and Function  Outcome: Unable to Meet  Goal: Skin Health and Integrity  Outcome: Unable to Meet  Goal: Optimal Wound Healing  Outcome: Unable to Meet     Problem: Skin Injury Risk Increased  Goal: Skin Health and Integrity  Outcome: Unable to Meet     Problem: Hemodialysis  Goal: Safe, Effective Therapy Delivery  Outcome: Unable to Meet  Goal: Effective Tissue Perfusion  Outcome: Unable to Meet  Goal: Absence of Infection Signs and Symptoms  Outcome: Unable to Meet     Problem: Fall Injury Risk  Goal: Absence of Fall and Fall-Related Injury  Outcome: Unable to Meet     Problem: CRRT (Continuous Renal Replacement Therapy)  Goal: Safe, Effective Therapy Delivery  Outcome: Unable to Meet  Goal: Hemodynamic Stability  Outcome: Unable to Meet  Goal: Body Temperature Maintained in Desired Range  Outcome: Unable to Meet  Goal: Absence of Infection Signs and Symptoms  Outcome: Unable to Meet     Problem: Coping Ineffective  Goal: Effective Coping  Outcome: Unable to Meet      Problem: Delirium  Goal: Optimal Coping  Outcome: Unable to Meet  Goal: Improved Behavioral Control  Outcome: Unable to Meet  Goal: Improved Attention and Thought Clarity  Outcome: Unable to Meet  Goal: Improved Sleep  Outcome: Unable to Meet

## 2024-10-16 NOTE — PROGRESS NOTES
[facility]  [place]iology  Progress Note    Patient Name: Ar Sharma  MRN: [id number]  Admission Date: 10/3/2024  Hospital Length of Stay: 13 days  Code Status: DNR   Attending Physician: Jennifer Montelongo MD   Primary Care Physician: Jc Elliott MD  Expected Discharge Date:   Principal Problem:Comfort measures only status    Subjective:       Interval History:  patient  is now comfort care only.  No new cardiac complaints    Review of Systems   Constitutional: Positive for malaise/fatigue.   HENT:  Negative for hearing loss.    Eyes:  Negative for photophobia.   Cardiovascular:  Negative for chest pain.     Objective:     Vital Signs (Most Recent):  Temp: 98.4 °F (36.9 °C) (10/16/24 1200)  Pulse: 78 (10/16/24 1200)  Resp: 10 (10/16/24 1436)  BP: (!) 84/50 (10/16/24 1200)  SpO2: (!) 94 % (10/16/24 1200) Vital Signs (24h Range):  Temp:  [95.5 °F (35.3 °C)-98.4 °F (36.9 °C)] 98.4 °F (36.9 °C)  Pulse:  [67-86] 78  Resp:  [7-26] 10  SpO2:  [91 %-98 %] 94 %  BP: ()/(50-76) 84/50  Arterial Line BP: ()/(38-60) 115/55     Weight: 80.9 kg (178 lb 5.6 oz)  Body mass index is 26.34 kg/m².     SpO2: (!) 94 %         Intake/Output Summary (Last 24 hours) at 10/16/2024 1515  Last data filed at 10/16/2024 1200  Gross per 24 hour   Intake 1884.41 ml   Output 1456 ml   Net 428.41 ml       Lines/Drains/Airways       Central Venous Catheter Line  Duration                  Hemodialysis Catheter 05/03/24 1115 right subclavian 166 days    Percutaneous Central Line - Triple Lumen  10/09/24 1127 Internal Jugular Left 7 days                       Physical Exam  Vitals reviewed.   Constitutional:       Appearance: He is well-developed.   HENT:      Head: Normocephalic.   Eyes:      Conjunctiva/sclera: Conjunctivae normal.      Pupils: Pupils are equal, round, and reactive to light.   Cardiovascular:      Rate and Rhythm: Normal rate and regular rhythm.      Heart sounds: Murmur heard.   Pulmonary:       Effort: Pulmonary effort is normal.      Breath sounds: Normal breath sounds.   Abdominal:      General: Bowel sounds are normal.      Palpations: Abdomen is soft.   Musculoskeletal:      Cervical back: Normal range of motion and neck supple.   Skin:     General: Skin is warm.   Neurological:      Mental Status: He is alert and oriented to person, place, and time.            Significant Labs:      DATA:     Laboratory:  CBC:  Recent Labs   Lab 10/14/24  0628 10/15/24  0420 10/16/24  0344   WBC 37.64 H 37.42 H 41.42 H   Hemoglobin 9.7 L 9.8 L 9.8 L   Hematocrit 27.3 L 26.8 L 27.3 L   Platelets 421 397 442       CHEMISTRIES:  Recent Labs   Lab 04/04/22  1255 06/09/22  0740 07/07/22  0746 09/06/22  0741 10/15/24  1618 10/16/24  0014 10/16/24  0808   Glucose 92 90 92   < > 136 H 139 H 141 H   Sodium 137 139 136   < > 134 L 133 L 133 L   Potassium 4.8 4.8 4.3   < > 4.6 4.6 4.6   BUN 68 H 81 H 76 H   < > 33 H 26 H 23   Creatinine 4.08 H 4.64 H 4.06 H   < > 2.9 H 2.5 H 2.1 H   eGFR if  17.0 A 14.5 A 17.1 A  --   --   --   --    eGFR if non  14.7 A 12.6 A 14.8 A  --   --   --   --    Calcium 9.1 8.7 9.5   < > 8.5 L 8.7 8.5 L   Magnesium  --   --   --    < > 1.9 1.8 1.9    < > = values in this interval not displayed.       CARDIAC BIOMARKERS:  Recent Labs   Lab 09/25/22  1638 04/29/24  1707 10/04/24  0418   Troponin I 0.015 0.040 H 0.045 H       COAGS:  Recent Labs   Lab 10/02/24  2224 10/03/24  0441 10/11/24  1637   INR 1.3 H 1.2 1.2       LIPIDS/LFTS:  Recent Labs   Lab 03/04/24  1018 04/12/24  1056 04/29/24  2039 06/04/24  1440 09/04/24  1201 09/30/24  1100 10/13/24  0945 10/15/24  0420 10/16/24  0344   Cholesterol 124  --  127  --  189  --   --   --   --    Triglycerides 66  --  66  --  167 H  --   --   --   --    HDL 39 L  --  32 L  --  54  --   --   --   --    LDL Cholesterol 71.8  --  81.8  --  101.6  --   --   --   --    Non-HDL Cholesterol 85  --  95  --  135  --   --   --   --    AST  39   < >  --    < > 99 H   < > 106 H 85 H 93 H   ALT 35   < >  --    < > 68 H   < > 45 H 45 H 52 H    < > = values in this interval not displayed.       Hemoglobin A1C   Date Value Ref Range Status   04/29/2024 5.6 4.0 - 5.6 % Final     Comment:     ADA Screening Guidelines:  5.7-6.4%  Consistent with prediabetes  >or=6.5%  Consistent with diabetes    High levels of fetal hemoglobin interfere with the HbA1C  assay. Heterozygous hemoglobin variants (HbS, HgC, etc)do  not significantly interfere with this assay.   However, presence of multiple variants may affect accuracy.     03/04/2024 5.6 4.0 - 5.6 % Final     Comment:     ADA Screening Guidelines:  5.7-6.4%  Consistent with prediabetes  >or=6.5%  Consistent with diabetes    High levels of fetal hemoglobin interfere with the HbA1C  assay. Heterozygous hemoglobin variants (HbS, HgC, etc)do  not significantly interfere with this assay.   However, presence of multiple variants may affect accuracy.     09/01/2023 6.0 (H) 4.0 - 5.6 % Final     Comment:     ADA Screening Guidelines:  5.7-6.4%  Consistent with prediabetes  >or=6.5%  Consistent with diabetes    High levels of fetal hemoglobin interfere with the HbA1C  assay. Heterozygous hemoglobin variants (HbS, HgC, etc)do  not significantly interfere with this assay.   However, presence of multiple variants may affect accuracy.         TSH  Recent Labs   Lab 05/04/23  1129 03/04/24  1018 09/04/24  1201   TSH 1.160 0.283 L 1.740       The ASCVD Risk score (Susanne DK, et al., 2019) failed to calculate for the following reasons:    The valid systolic blood pressure range is 90 to 200 mmHg       BNP    Lab Results   Component Value Date/Time    BNP 3,204 (H) 04/29/2024 05:07 PM    BNP 2,549 (H) 04/12/2024 10:56 AM    BNP 1,416 (H) 09/28/2022 03:45 PM    BNP 2,304 (H) 09/25/2022 04:38 PM     (H) 11/02/2018 12:17 PM            ECHO    Results for orders placed during the hospital encounter of  10/03/24    Echo    Interpretation Summary    Left Ventricle: The left ventricle is normal in size. Normal wall thickness. There is mildly reduced systolic function with a visually estimated ejection fraction of  45%.    Right Ventricle: Right ventricular enlargement. Systolic function is reduced.    Left Atrium: Left atrium is dilated.    Right Atrium: Right atrium is dilated. 4.2 x 2.5 large mobile echogenic mass present.appears to be attached to the device lead    Tricuspid Valve: There is severe regurgitation.    Pulmonary Artery: There is severe pulmonary hypertension. The estimated pulmonary artery systolic pressure is 72 mmHg.    IVC/SVC: Elevated venous pressure at 15 mmHg.    Limited echo    Findings conveyed to critical care and primary team      STRESS TEST    Results for orders placed during the hospital encounter of 07/11/22    Nuclear Stress Test    Interpretation Summary    The EKG portion of this study is abnormal but not diagnostic.    The patient reported no chest pain during the stress test.    The nuclear portion of this study will be reported separately.        CATH    Results for orders placed during the hospital encounter of 09/25/22    Cardiac catheterization    Conclusion    There was non-obstructive coronary artery disease. Stable as compared to prior.    The procedure log was documented by Documenter: RT Teri; May Galdamez RN and verified by Juan Roque MD.    Date: 9/27/2022  Time: 6:36 PM      Assessment and Plan:     Brief HPI:     Shock  Has been on broad spectrum antibiotics to cover for septic shock. ECHO today demonstrated increase in size of RA mass despite being on heparin drip. CTA showed B/L PE, which could be contributing to his shock. ECHO showed pulm HTN and RVE. Case discussed with IC at [facility]. Pt will benefit from multidisciplinary evaluation at [facility] and has been accepted for transfer.    Continue heparin drip at high  intensity.        Atrial mass  Patient been on heparin drip as well as broad-spectrum antibiotics since this mass was discovered.  Repeat echo done today demonstrated that the right atrial mass has increased in size.  Because of RV enlargement and RA enlargement with severe TR clinically suspected pulmonary embolism.  Subsequent CTA confirmed the suspicion.  Patient has been accepted to [facility] for further evaluation and management.  Case discussed with CT surgery and Interventional Cardiology at [facility]    Notes from October 14th20 24:  No change in cardiac clinical status.  Had concern for stroke last night.  Now feeling better.  Workup done by primary team.  Awaiting transfer to [facility].  Still on pressors    Notes from October 15th:  Has been refused for transfer to [facility] because of nothing new can be offered there.  Intervention felt to be too high-risk by multidisciplinary team approach over there    Continue medical management and supportive care    Continue heparin drip and broad-spectrum antibiotics     October 16th: Patient is now comfort care only.   Will sign off    Closed right hip fracture, initial encounter  Status post ORIF.    ESRD (end stage renal disease)  Per renal    Chronic combined systolic and diastolic heart failure  NICM. EF 40-45%. Volume status appears controlled. Has CRT-D followed at [facility].   Neg cath 2022.  Hx VT s/p ablation    Currently on 3 pressors. Working diagnosis has been septic shock, and getting  anti biotics for that. Likely etiology could also be PE     S/P ablation of ventricular arrhythmia  Followed by EP at [facility]. Continue amiodarone    Cardiac resynchronization therapy defibrillator (CRT-D) in place  Followed by Dr Tenorio. Normal function at last check    would need eval for lead extraction at [facility] also    Mixed hyperlipidemia  Resume statin when LFTs normalized.          VTE Risk Mitigation (From admission, onward)           Ordered     IP VTE LOW RISK  PATIENT  Once         10/04/24 1438     Place sequential compression device  Until discontinued         10/03/24 0154     Reason for No Pharmacological VTE Prophylaxis  Once        Comments: Right hip fracture needs surgical eval   Question:  Reasons:  Answer:  Physician Provided (leave comment)    10/03/24 0154                    Damon Knight MD  Cardiology  [facility] - [place], [place] - Intensive Care    Critical Care Time:  35 minutes     Critical care was time spent personally by me on the following activities: development of treatment plan with patient or surrogate and bedside caregivers, discussions with consultants, evaluation of patient's response to treatment, examination of patient, ordering and performing treatments and interventions, ordering and review of laboratory studies, ordering and review of radiographic studies, pulse oximetry, re-evaluation of patient's condition. This critical care time did not overlap with that of any other provider or involve time for any procedures.

## 2024-10-16 NOTE — ASSESSMENT & PLAN NOTE
ESRD MWF    Possibly will go to [facility] for higher level of care with regards to his bilateral PE and right atrial mass    Plan/recommendation  -CVVHD, UF set to -10 cc/hr.  -Family possibly to move to comfort care today, will continue to assist  -Strict ins and outs  -Avoid nephrotoxic agents if possible and renally dose medications  -Avoid drastic hemodynamic changes if possible    Secondary hyperparathyroidism  Calcium   Date Value Ref Range Status   10/16/2024 8.5 (L) 8.7 - 10.5 mg/dL Final     Phosphorus   Date Value Ref Range Status   10/16/2024 2.8 2.7 - 4.5 mg/dL Final     PTH, Intact   Date Value Ref Range Status   04/29/2024 162.6 (H) 9.0 - 77.0 pg/mL Final   Continue to monitor - critically ill this admission    Anemia of chronic renal disease  Goal hemoglobin in ESRD is 10-12  Hemoglobin   Date Value Ref Range Status   10/16/2024 9.8 (L) 14.0 - 18.0 g/dL Final     Iron   Date Value Ref Range Status   10/05/2024 22 (L) 45 - 160 ug/dL Final     Transferrin   Date Value Ref Range Status   10/05/2024 207 200 - 375 mg/dL Final     TIBC   Date Value Ref Range Status   10/05/2024 306 250 - 450 ug/dL Final     Saturated Iron   Date Value Ref Range Status   10/05/2024 7 (L) 20 - 50 % Final     Ferritin   Date Value Ref Range Status   10/05/2024 338 (H) 20.0 - 300.0 ng/mL Final   Continue to monitor - critically ll this admission

## 2024-10-16 NOTE — ASSESSMENT & PLAN NOTE
EF 40-45% with grade III DD.  This is likely having a significant impact on ability to tolerate iHD.  Cardiology following  CRRT with minimal ultrafiltration

## 2024-10-16 NOTE — ASSESSMENT & PLAN NOTE
DNR per Dr. Connolly but family is amenable to reversal if patient is deemed a candidate for surgery.    10/16/24 spoken with wife, daughter along with Dr. Mancera and palliative care .  Family understands that patient remained critically ill.  Given lack of improvement, patient's  wife is ready to transition patient to comfort care.  However, she would like to give her son a little more time to come to term with patient's condition.  Advise wife and daughter to let us know when the family is ready.

## 2024-10-16 NOTE — ASSESSMENT & PLAN NOTE
Nutrition consulted. Most recent weight and BMI monitored-     Measurements:  Wt Readings from Last 1 Encounters:   10/16/24 80.9 kg (178 lb 5.6 oz)   Body mass index is 26.34 kg/m².    Patient has been screened and assessed by RD.    Malnutrition Type:  Context: acute illness or injury  Level: mild    Malnutrition Characteristic Summary:  Weight Loss (Malnutrition): greater than 10% in 6 months  Subcutaneous Fat (Malnutrition): mild depletion  Muscle Mass (Malnutrition): mild depletion    Interventions/Recommendations (treatment strategy):  1. When medically acceptable, restart pt on renal diet, texture per SLP 2. Continue Novasource Renal ID 3. Monitor weight/labs 4. RD to follow to monitor nutriton status

## 2024-10-17 NOTE — CHAPLAIN
Remains of recent  pt. Ar Sharma were released to [facility] on 10/17/ 2024 @ 12:29 p.m.

## 2024-10-24 LAB
MISCELLANEOUS TEST NAME: NORMAL
REFERENCE LAB: NORMAL
REFERENCE RANGE: NORMAL
SPECIMEN TYPE: NORMAL
TEST RESULT: NORMAL

## 2024-12-12 NOTE — ED NOTES
Name: Gisel Wolfe      : 1943      MRN: [id number]  Encounter Provider: Jeremy Vallejo DO  Encounter Date: 2024   Encounter department: [facility]    Assessment & Plan  Primary hypertension    Orders:    Albumin / creatinine urine ratio; Future    Comprehensive metabolic panel; Future    TSH, 3rd generation; Future    Bilateral carotid artery stenosis    Orders:    Comprehensive metabolic panel; Future    Lipid Panel with Direct LDL reflex; Future    Asthma, unspecified asthma severity, unspecified whether complicated, unspecified whether persistent         Gastroesophageal reflux disease without esophagitis         Central hypothyroidism    Orders:    TSH, 3rd generation; Future    Restless movement disorder         Osteopenia of multiple sites         Seropositive rheumatoid arthritis (HCC)         Stage 3 chronic kidney disease, unspecified whether stage 3a or 3b CKD ([facility])  Lab Results   Component Value Date    EGFR 42 07/10/2024    EGFR 42 2024    EGFR 45 10/20/2023    CREATININE 1.21 07/10/2024    CREATININE 1.19 2024    CREATININE 1.14 10/20/2023     Orders:    Albumin / creatinine urine ratio; Future    CBC and differential; Future    Comprehensive metabolic panel; Future    Hemoglobin A1C; Future    Lipid Panel with Direct LDL reflex; Future    TSH, 3rd generation; Future    Recurrent major depressive disorder, in full remission ([facility])  Depression Screening Follow-up Plan: Patient's depression screening was positive with a PHQ-9 score of 6.        Mixed hyperlipidemia    Orders:    Comprehensive metabolic panel; Future    Lipid Panel with Direct LDL reflex; Future    Vitamin D deficiency    Orders:    Vitamin D 25 hydroxy; Future    Abnormal finding of blood chemistry, unspecified    Orders:    Hemoglobin A1C; Future    Medicare annual wellness visit, subsequent            Preventive health issues were discussed with patient, and age appropriate screening  Patient admitted by EMS with c/o fall at home and right leg pain. Patient is conscious, coherent, alert, oriented and responding to verbal commands. Placed patient on continuous viatls monitoring. PD cathter on left sided abdomen noted and Hemodialysis on right subclavian line noted  with dressings intact on both. Patient denies Chest pain and SOB.   tests were ordered as noted in patient's After Visit Summary. Personalized health advice and appropriate referrals for health education or preventive services given if needed, as noted in patient's After Visit Summary.    History of Present Illness     HPI   Patient Care Team:  Jeremy Vallejo DO as PCP - General (Internal Medicine)  SPARKLE Narvaez as PCP - Endocrinology (Endocrinology)  SPARKLE Narvaez as Nurse Practitioner (Endocrinology)    Review of Systems  Medical History Reviewed by provider this encounter:  Tobacco  Allergies  Meds  Problems  Med Hx  Surg Hx  Fam Hx       Annual Wellness Visit Questionnaire   Gisel is here for her Subsequent Wellness visit. Last Medicare Wellness visit information reviewed, patient interviewed and updates made to the record today.      Health Risk Assessment:   Patient rates overall health as good. Patient feels that their physical health rating is slightly worse. Patient is satisfied with their life. Eyesight was rated as same. Hearing was rated as slightly worse. Patient feels that their emotional and mental health rating is same. Patients states they are never, rarely angry. Patient states they are often unusually tired/fatigued. Pain experienced in the last 7 days has been a lot. Patient's pain rating has been 6/10. Patient states that she has experienced weight loss or gain in last 6 months.     Depression Screening:   PHQ-9 Score: 6      Fall Risk Screening:   In the past year, patient has experienced: no history of falling in past year      Urinary Incontinence Screening:   Patient has leaked urine accidently in the last six months.     Home Safety:  Patient has trouble with stairs inside or outside of their home. Patient has working smoke alarms and has no working carbon monoxide detector. Home safety hazards include: medications that cause fatigue.     Nutrition:   Current diet is Low Cholesterol.     Medications:   Patient is not  currently taking any over-the-counter supplements. Patient is able to manage medications.     Activities of Daily Living (ADLs)/Instrumental Activities of Daily Living (IADLs):   Walk and transfer into and out of bed and chair?: Yes  Dress and groom yourself?: Yes    Bathe or shower yourself?: Yes    Feed yourself? Yes  Do your laundry/housekeeping?: Yes  Manage your money, pay your bills and track your expenses?: Yes  Make your own meals?: Yes    Do your own shopping?: Yes    Previous Hospitalizations:   Any hospitalizations or ED visits within the last 12 months?: Yes    How many hospitalizations have you had in the last year?: 1-2    Advance Care Planning:   Living will: Yes    Durable POA for healthcare: Yes    Advanced directive: Yes    Advanced directive counseling given: Yes    Five wishes given: No    Patient declined ACP directive: No    End of Life Decisions reviewed with patient: Yes    Provider agrees with end of life decisions: Yes      Cognitive Screening:   Provider or family/friend/caregiver concerned regarding cognition?: No    PREVENTIVE SCREENINGS      Cardiovascular Screening:    General: Screening Not Indicated and History Lipid Disorder    Due for: Lipid Panel      Diabetes Screening:     General: Screening Current    Due for: Blood Glucose      Colorectal Cancer Screening:     General: Screening Not Indicated      Breast Cancer Screening:     General: Screening Current and Screening Not Indicated      Cervical Cancer Screening:    General: Screening Not Indicated      Osteoporosis Screening:    General: Screening Current      Abdominal Aortic Aneurysm (AAA) Screening:        General: Screening Not Indicated      Lung Cancer Screening:     General: Screening Not Indicated      Hepatitis C Screening:    General: Screening Current    Screening, Brief Intervention, and Referral to Treatment (SBIRT)    Screening  Typical number of drinks in a day: 0  Typical number of drinks in a week:  "0  Interpretation: Low risk drinking behavior.    AUDIT-C Screenin) How often did you have a drink containing alcohol in the past year? never  2) How many drinks did you have on a typical day when you were drinking in the past year? 0  3) How often did you have 6 or more drinks on one occasion in the past year? never    AUDIT-C Score: 0  Interpretation: Score 0-2 (female): Negative screen for alcohol misuse    Single Item Drug Screening:  How often have you used an illegal drug (including marijuana) or a prescription medication for non-medical reasons in the past year? never    Single Item Drug Screen Score: 0  Interpretation: Negative screen for possible drug use disorder    Other Counseling Topics:   Car/seat belt/driving safety, sunscreen and regular weightbearing exercise and calcium and vitamin D intake.     Social Drivers of Health     Financial Resource Strain: Low Risk  (2023)    Overall Financial Resource Strain (CARDIA)     Difficulty of Paying Living Expenses: Not hard at all   Food Insecurity: No Food Insecurity (2024)    Hunger Vital Sign     Worried About Running Out of Food in the Last Year: Never true     Ran Out of Food in the Last Year: Never true   Transportation Needs: No Transportation Needs (2024)    PRAPARE - Transportation     Lack of Transportation (Medical): No     Lack of Transportation (Non-Medical): No   Housing Stability: Low Risk  (2024)    Housing Stability Vital Sign     Unable to Pay for Housing in the Last Year: No     Number of Times Moved in the Last Year: 0     Homeless in the Last Year: No   Utilities: Not At Risk (2024)    [facility] Utilities     Threatened with loss of utilities: No     No results found.    Objective   /78 (BP Location: Left arm, Patient Position: Sitting)   Pulse 77   Temp 98.4 °F (36.9 °C) (Tympanic)   Ht 4' 11\" (1.499 m)   Wt 63.6 kg (140 lb 2 oz)   LMP  (LMP Unknown)   SpO2 98%   BMI 28.30 kg/m²     Physical " Exam    A/P: Doing well and no falls reported. Denies depression and feels safe at home. Diverse diet. Doesn't drive, but uses seat belts. Has a living will and POA. No DME or referrals needed today. RTC one year for medicare wellness.

## (undated) DEVICE — TOWEL OR XRAY BLUE 17X26IN

## (undated) DEVICE — TIP YANKAUERS BULB NO VENT

## (undated) DEVICE — SUT MCRYL PLUS 4-0 PS2 27IN

## (undated) DEVICE — DRAPE U SPLIT SHEET 54X76IN

## (undated) DEVICE — SUT SILK 0 STRANDS 30IN BLK

## (undated) DEVICE — ELECTRODE REM PLYHSV RETURN 9

## (undated) DEVICE — GOWN POLY REINF BRTH SLV LG

## (undated) DEVICE — DRESSING AQUACEL AG ADV 3.5X6

## (undated) DEVICE — GLOVE SURGICAL LATEX SZ 7

## (undated) DEVICE — GLIDESHEATH SLENDER SS 5FR10CM

## (undated) DEVICE — DRAPE C-ARM/MOBILE XRAY 44X80

## (undated) DEVICE — STRIP MEDI WND CLSR 1/2X4IN

## (undated) DEVICE — DRESSING ANTIMICROBIAL 1 INCH

## (undated) DEVICE — SUT MONOCYRL 4-0 PS2 UND

## (undated) DEVICE — APPLICATOR CHLORAPREP ORN 26ML

## (undated) DEVICE — GLOVE SIGNATURE ESSNTL LTX 7.5

## (undated) DEVICE — SOL IRR NACL .9% 1000CC

## (undated) DEVICE — NDL HYPO REG 25G X 1 1/2

## (undated) DEVICE — PACK SURGERY START

## (undated) DEVICE — KIT LEFT HEART MANIFOLD CUSTOM

## (undated) DEVICE — TROCAR ENDOPATH XCEL 5MM 7.5CM

## (undated) DEVICE — COVER OVERHEAD SURG LT BLUE

## (undated) DEVICE — KIT PT CARE HANA PROFX SSXT

## (undated) DEVICE — GLOVE SURGICAL LATEX SZ 8

## (undated) DEVICE — CATH OPTITORQUE TIGER 5F 100CM

## (undated) DEVICE — COVER PROBE US 5.5X58L NON LTX

## (undated) DEVICE — KIT WRENCH

## (undated) DEVICE — SPONGE COTTON TRAY 4X4IN

## (undated) DEVICE — COVER PROXIMA MAYO STAND

## (undated) DEVICE — SEE MEDLINE ITEM 157187

## (undated) DEVICE — SUT 2/0 30IN SILK BLK BRAI

## (undated) DEVICE — DRAPE ORTH SPLIT 77X108IN

## (undated) DEVICE — CATH IMPULSE 5FR PIGTAIL 125CM

## (undated) DEVICE — Device

## (undated) DEVICE — TROCAR ENDOPATH XCEL 8MM 10CM

## (undated) DEVICE — DRAPE C-ARM FOR MOBILE XRAY

## (undated) DEVICE — DRILL T2 ALPHA LOK 4.2X360MM

## (undated) DEVICE — GLOVE SENSICARE PI GRN 8

## (undated) DEVICE — COVER INSTR ELASTIC BAND 40X20

## (undated) DEVICE — MANIFOLD 4 PORT

## (undated) DEVICE — SUT VICRYL 3-0 27 SH

## (undated) DEVICE — STAPLER SKIN PROXIMATE WIDE

## (undated) DEVICE — PACK PACER PERMANENT OMC

## (undated) DEVICE — BOOT SUTURE AID

## (undated) DEVICE — COVER PROBE 6X48

## (undated) DEVICE — SUT VICRYL PLUS 0 CT1 36IN

## (undated) DEVICE — SUPPORT ULNA NERVE PROTECTOR

## (undated) DEVICE — ADHESIVE DERMABOND ADVANCED

## (undated) DEVICE — DRAPE STERI-DRAPE 83X125 IOBAN

## (undated) DEVICE — IMPLANTABLE DEVICE
Type: IMPLANTABLE DEVICE | Site: CHEST | Status: NON-FUNCTIONAL
Removed: 2024-06-05

## (undated) DEVICE — CATH JACKY RADIAL 3.5 110CM

## (undated) DEVICE — COVER TABLE 44X90 STERILE

## (undated) DEVICE — K-WIRE, STERILE
Type: IMPLANTABLE DEVICE | Site: FEMUR | Status: NON-FUNCTIONAL
Removed: 2024-10-04

## (undated) DEVICE — BLADE SURG CARBON STEEL SZ11

## (undated) DEVICE — PRECISION PIN TAPERED
Type: IMPLANTABLE DEVICE | Site: FEMUR | Status: NON-FUNCTIONAL
Brand: GAMMA
Removed: 2024-10-04

## (undated) DEVICE — PAD DEFIB RADIOLUCENT ADULT

## (undated) DEVICE — DECANTER FLUID TRNSF WHITE 9IN

## (undated) DEVICE — DRESSING CHG FOAM 7MM 1IN

## (undated) DEVICE — SUT MONOCRYL 5-0 P-3 UND 18

## (undated) DEVICE — SOL IRR SOD CHL .9% POUR

## (undated) DEVICE — DRAPE THYROID SOFT STERILE

## (undated) DEVICE — TOWEL OR DISP STRL BLUE 4/PK

## (undated) DEVICE — REAMER MOD BIXCUT 8X48MM STER.

## (undated) DEVICE — CONTRAST VISIPAQUE 150ML

## (undated) DEVICE — ELECTRODE BLADE INSULATED 1 IN

## (undated) DEVICE — PACK ECLIPSE BASIC III SURG

## (undated) DEVICE — SUT 0 VICRYL / UR6 (J603)

## (undated) DEVICE — BLANKET UPPER BODY 78.7X29.9IN

## (undated) DEVICE — DRAPE IOBAN 2 STERI

## (undated) DEVICE — BLADE PLASMA WIDE SPATULA TIP

## (undated) DEVICE — POSITIONER IV ARMBOARD FOAM

## (undated) DEVICE — HEMOSTAT VASC BAND REG 24CM

## (undated) DEVICE — PAD DEFIB CADENCE ADULT R2

## (undated) DEVICE — SUT VICRYL PLUS ANTIBACT

## (undated) DEVICE — SYR 10CC LUER LOCK

## (undated) DEVICE — GUIDE WIRE 3.0X1000MM BALL TIP
Type: IMPLANTABLE DEVICE | Site: FEMUR | Status: NON-FUNCTIONAL
Removed: 2024-10-04

## (undated) DEVICE — GOWN NONREINF SET-IN SLV XL

## (undated) DEVICE — GOWN POLY REINF BRTH SLV XL

## (undated) DEVICE — GUIDEWIRE WHOLEY STD STR 260CM

## (undated) DEVICE — DRESSING TRANS 4X4 3/4

## (undated) DEVICE — DEVICE PICC SECURE SORBA VIEW

## (undated) DEVICE — DRAPE C-ARMOR EQUIPMENT COVER

## (undated) DEVICE — PAD TRACTION BOOT

## (undated) DEVICE — DRAPE ANGIO BRACH 38X44IN

## (undated) DEVICE — SEE MEDLINE ITEM 157166

## (undated) DEVICE — DRAPE INCISE IOBAN 2 23X17IN

## (undated) DEVICE — DRAPE STERI U-SHAPED 47X51IN

## (undated) DEVICE — SET CYSTO IRRIGATION UNIV SPIK

## (undated) DEVICE — SUT ETHILON 3-0 PS2 18 BLK

## (undated) DEVICE — SYR ONLY LUER LOCK 20CC

## (undated) DEVICE — SYR 30CC LUER LOCK

## (undated) DEVICE — DRESSING AQUACEL AG 3.5X10IN

## (undated) DEVICE — SYR MARK 7 ARTERION 150ML

## (undated) DEVICE — CATH DIALYSIS HEMOSPLIT16FR 23: Type: IMPLANTABLE DEVICE | Site: CHEST | Status: NON-FUNCTIONAL